# Patient Record
Sex: MALE | Race: WHITE | NOT HISPANIC OR LATINO | Employment: OTHER | URBAN - METROPOLITAN AREA
[De-identification: names, ages, dates, MRNs, and addresses within clinical notes are randomized per-mention and may not be internally consistent; named-entity substitution may affect disease eponyms.]

---

## 2018-11-12 ENCOUNTER — OFFICE VISIT (OUTPATIENT)
Dept: ENDOCRINOLOGY | Facility: MEDICAL CENTER | Age: 78
End: 2018-11-12
Payer: COMMERCIAL

## 2018-11-12 VITALS
HEART RATE: 102 BPM | OXYGEN SATURATION: 100 % | DIASTOLIC BLOOD PRESSURE: 60 MMHG | RESPIRATION RATE: 16 BRPM | SYSTOLIC BLOOD PRESSURE: 110 MMHG | WEIGHT: 177 LBS | HEIGHT: 70 IN | BODY MASS INDEX: 25.34 KG/M2

## 2018-11-12 DIAGNOSIS — E11.65 UNCONTROLLED TYPE 2 DIABETES MELLITUS WITH HYPERGLYCEMIA (HCC): ICD-10-CM

## 2018-11-12 LAB
GLUCOSE BLD-MCNC: 293 MG/DL (ref 70–100)
HBA1C MFR BLD: 6.2 % (ref ?–5.8)
INT CON NEG: NEGATIVE
INT CON POS: POSITIVE

## 2018-11-12 PROCEDURE — 83036 HEMOGLOBIN GLYCOSYLATED A1C: CPT | Performed by: PHYSICIAN ASSISTANT

## 2018-11-12 PROCEDURE — 82962 GLUCOSE BLOOD TEST: CPT | Performed by: PHYSICIAN ASSISTANT

## 2018-11-12 PROCEDURE — 99204 OFFICE O/P NEW MOD 45 MIN: CPT | Performed by: PHYSICIAN ASSISTANT

## 2018-11-12 RX ORDER — NATEGLINIDE 120 MG/1
120 TABLET ORAL
Status: ON HOLD | COMMUNITY
End: 2019-01-04

## 2018-11-12 RX ORDER — CALCITRIOL 0.25 UG/1
0.25 CAPSULE, LIQUID FILLED ORAL DAILY
Status: ON HOLD | COMMUNITY
End: 2018-12-03

## 2018-11-12 NOTE — PATIENT INSTRUCTIONS
Start:  1.  Ozempic 0.25 once a week on Monday  2.  Stay Actos 30mg once a day  Stop Nateslinde    Check when you wake up and when go to bed

## 2018-11-12 NOTE — PROGRESS NOTES
New Patient Consult Note  Referred by: Reema Gallo D.O.    Reason for consult: Diabetes Management Type 2    HPI:  Vince Almanzar is a 78 y.o. old patient who is seeing us today for diabetes care.  This is a pleasant patient with diabetes and I appreciate the opportunity to participate in the care of this patient.  This is a new patient with me today.    Labs of 11/12/18 HbA1c is 6.2, GFR 30    BG Diary:11/12/2018  In the AM:    Has been Diabetic since T@ 30+ years  Has a Glucagon pen at home: no    1. Uncontrolled type 2 diabetes mellitus with hyperglycemia (HCC)  This is a new patient on 11/12/18   He is on:  1.  Actos  2.  Starlix    ROS:   Constitutional: No change in weight , No fatigue, No night sweats.  HEENT: No Headache.  Eyes:  No blurred vision, No visual changes.  Cardiac: No chest pain, No palpitations.  Resp: No shortness of breath, No cough,   Gastro: No nausea or vomiting, No diarrhea.  Neuro: Denies numbness or tinging in bilateral feet or hands, and no loss of sensation.  Endo: No heat or cold intolerance.  : No polyuria, No polydipsia, No chronic UTI's.  Lower extremities: No lower leg edema bilateral.  All other systems were reviewed and were negative.    Past Medical History:  Patient Active Problem List    Diagnosis Date Noted   • Uncontrolled type 2 diabetes mellitus with hyperglycemia (HCC) 11/12/2018   • Mass of pancreas 06/03/2016       Past Surgical History:  Past Surgical History:   Procedure Laterality Date   • GASTROSCOPY-ENDO N/A 6/3/2016    Procedure: GASTROSCOPY-ENDO;  Surgeon: Jasper Donnelly M.D.;  Location: [facility];  Service:    • EGD W/ENDOSCOPIC ULTRASOUND N/A 6/3/2016    Procedure: EGD W/ENDOSCOPIC ULTRASOUND UPPER;  Surgeon: Jasper Donnelly M.D.;  Location: [facility];  Service:    • EGD WITH ASP/BX N/A 6/3/2016    Procedure: EGD WITH ASP/BX - FNA, DUODENAL BIOPSIES, FNA OF PANCREAS;  Surgeon: Jasper Donnelly M.D.;  Location:  "SURGERY [facility];  Service:    • ERCP  5/2016   • CHOLECYSTECTOMY  2006    gallstones removed   • HIP ARTHROPLASTY TOTAL Left 2001    left total hip       Allergies:  Patient has no known allergies.    Social History:  Social History     Social History   • Marital status: Single     Spouse name: N/A   • Number of children: N/A   • Years of education: N/A     Occupational History   • Not on file.     Social History Main Topics   • Smoking status: Never Smoker   • Smokeless tobacco: Never Used   • Alcohol use No   • Drug use: No   • Sexual activity: Not on file     Other Topics Concern   • Not on file     Social History Narrative   • No narrative on file       Family History:  History reviewed. No pertinent family history.    Medications:    Current Outpatient Prescriptions:   •  nateglinide (STARLIX) 120 MG Tab, Take 120 mg by mouth 3 times a day before meals., Disp: , Rfl:   •  calcitRIOL (ROCALTROL) 0.25 MCG Cap, Take 0.25 mcg by mouth every day., Disp: , Rfl:   •  vitamin D (CHOLECALCIFEROL) 1000 UNIT Tab, Take 1,000 Units by mouth every day., Disp: , Rfl:   •  propranolol (INDERAL) 80 MG Tab, Take 40 mg by mouth 2 Times a Day., Disp: , Rfl:   •  simvastatin (ZOCOR) 20 MG Tab, Take 20 mg by mouth every evening., Disp: , Rfl:   •  lisinopril (PRINIVIL) 10 MG Tab, Take 10 mg by mouth every day., Disp: , Rfl:   •  ferrous sulfate (FEOSOL) 300 (60 FE) MG/5ML Syrup, Take 325 mg by mouth 2 times a day., Disp: , Rfl:   •  pioglitazone (ACTOS) 45 MG Tab, Take 30 mg by mouth every day., Disp: , Rfl:       Physical Examination:   Vital signs: /60 (BP Location: Right arm, Patient Position: Sitting, BP Cuff Size: Adult)   Pulse (!) 102   Resp 16   Ht 1.778 m (5' 10\")   Wt 80.3 kg (177 lb)   SpO2 100%   BMI 25.40 kg/m²   General: No distress, cooperative, well dressed and well nourished.   Eyes: No scleral icterus or discharge, No hyposphagma  ENMT: Normal on external inspection of nose, lips, No nasal " drainage   Neck: No abnormal masses on inspection  Resp: Normal effort, Bilateral clear to auscultation, No wheezing, No rales  CVS: Regular rate and rhythm, S1 S2 normal, No murmur. No gallop  Extremities: No edema bilateral extremities  Neuro: Alert and oriented  Skin: No rash, No Ulcers  Psych: Normal mood and affect  Foot exam: normal sensation to monofilament testing, normal pulses, no ulcers.  Normal Vibration quantitative sensation test.    Assessment and Plan:    1. Uncontrolled type 2 diabetes mellitus with hyperglycemia (HCC)    Start:  1.  Ozempic 0.25 once a week on Monday  2.  Stay Actos 30mg once a day  Stop Nateslinde    Check when you wake up and when go to bed    Return in about 3 weeks (around 12/3/2018).     The total time spent seeing this patient today face to face in consultation, and formulating an action plan for this visit was greater than 41 minutes. > Than 50% of this time was spent counseling, discussing problems documented above and below, coordinating care and answering questions by the physician assistant.  We developed a diabetes care plan for this patient today.      Blood glucose log: Check BG in the morning when wake up, before lunch or dinner and before bed.  So three times a day.  Always bring BG diary to the next office visit.     This patient during there office visit was started on new medication Ozempic.  Side effects of new medications were discussed with the patient today in the office. The patient was supplied paperwork on this new medication.    Thank you kindly for allowing me to participate in the diabetes care plan for this patient.    Moisés Duggan PA-C, BC-ADM  Board Certified - Advanced Diabetes Management  11/12/18    CC:   Reema Gallo D.O.

## 2018-11-12 NOTE — LETTER
Request for Medical Records    Patient Name: Vince Almanzar    : 1940      Dear Doctor: Dr. Park    The above named patient receives primary care at the [facility] by Reema Gallo D.O..  The patient informs us that you are his eye care Provider.    Please fax a copy of the most recent eye exam to [phone] or answer the  questions below and fax this sheet back to us at the above number.  Attached is a signed Release of Information.      Date of last eye exam: _____________    Retinal eye exam summary:        Please select the choice(s) that apply.    ____ No diabetic retinopathy    ____    Diabetic retinopathy present      Printed Name and Credentials: __________________________________    Signature of Eye Care Provider: _________________________________    We appreciate your assistance and collaboration in providing efficient patient care!    Kindest Regards,    [facility]  [address]  [place] [address] [phone]

## 2018-11-12 NOTE — LETTER
[facility]  Reema Gallo D.O.  [address] [address]  Fax: [phone]   Authorization for Release/Disclosure of   Protected Health Information   Name: PABLO ALMANZAR : 1940 SSN: [id number]   Address: [address] [place] [id number] Phone:    [phone] (home)    I authorize the entity listed below to release/disclose the PHI below to:   [facility]/Reema Gallo D.O. and Moisés Duggan P.A.-C.   Provider or Entity Name:  Dr. Park   Address   City, [facility] [address], [facility]   Phone:      Fax:     Reason for request: continuity of care   Information to be released:    [  ] LAST COLONOSCOPY,  including any PATH REPORT and follow-up  [  ] LAST FIT/COLOGUARD RESULT [  ] LAST DEXA  [  ] LAST MAMMOGRAM  [  ] LAST PAP  [  ] LAST LABS [  ] RETINA EXAM REPORT  [  ] IMMUNIZATION RECORDS  [  ] Release all info      [  ] Check here and initial the line next to each item to release ALL health information INCLUDING  _____ Care and treatment for drug and / or alcohol abuse  _____ HIV testing, infection status, or AIDS  _____ Genetic Testing    DATES OF SERVICE OR TIME PERIOD TO BE DISCLOSED: _____________  I understand and acknowledge that:  * This Authorization may be revoked at any time by you in writing, except if your health information has already been used or disclosed.  * Your health information that will be used or disclosed as a result of you signing this authorization could be re-disclosed by the recipient. If this occurs, your re-disclosed health information may no longer be protected by State or Federal laws.  * You may refuse to sign this Authorization. Your refusal will not affect your ability to obtain treatment.  * This Authorization becomes effective upon signing and will  on (date) __________.      If no date is indicated, this Authorization will  one (1) year from the signature date.    Name: Pablo Almanzar    Signature:   Date:          11/12/2018       PLEASE FAX REQUESTED RECORDS BACK TO: [phone]

## 2018-11-21 ENCOUNTER — TELEPHONE (OUTPATIENT)
Dept: ENDOCRINOLOGY | Facility: MEDICAL CENTER | Age: 78
End: 2018-11-21

## 2018-11-21 NOTE — TELEPHONE ENCOUNTER
Pt left message stating his BS have been at 390 since starting ozempic. Would like to know if normal or what to do? Please advise.

## 2018-11-26 ENCOUNTER — HOSPITAL ENCOUNTER (OUTPATIENT)
Dept: LAB | Facility: MEDICAL CENTER | Age: 78
End: 2018-11-26
Attending: PHYSICIAN ASSISTANT
Payer: MEDICARE

## 2018-11-26 DIAGNOSIS — E11.65 UNCONTROLLED TYPE 2 DIABETES MELLITUS WITH HYPERGLYCEMIA (HCC): ICD-10-CM

## 2018-11-26 PROCEDURE — 82985 ASSAY OF GLYCATED PROTEIN: CPT

## 2018-11-26 PROCEDURE — 36415 COLL VENOUS BLD VENIPUNCTURE: CPT

## 2018-11-28 LAB — FRUCTOSAMINE SERPL-SCNC: 450 UMOL/L (ref 170–285)

## 2018-12-03 ENCOUNTER — APPOINTMENT (OUTPATIENT)
Dept: RADIOLOGY | Facility: MEDICAL CENTER | Age: 78
DRG: 964 | End: 2018-12-03
Attending: EMERGENCY MEDICINE
Payer: MEDICARE

## 2018-12-03 ENCOUNTER — HOSPITAL ENCOUNTER (INPATIENT)
Facility: MEDICAL CENTER | Age: 78
LOS: 7 days | DRG: 964 | End: 2018-12-10
Attending: EMERGENCY MEDICINE | Admitting: SURGERY
Payer: MEDICARE

## 2018-12-03 ENCOUNTER — APPOINTMENT (OUTPATIENT)
Dept: RADIOLOGY | Facility: MEDICAL CENTER | Age: 78
DRG: 964 | End: 2018-12-03
Attending: SURGERY
Payer: MEDICARE

## 2018-12-03 ENCOUNTER — APPOINTMENT (OUTPATIENT)
Dept: ENDOCRINOLOGY | Facility: MEDICAL CENTER | Age: 78
End: 2018-12-03
Payer: COMMERCIAL

## 2018-12-03 DIAGNOSIS — S14.129A CENTRAL CORD SYNDROME, INITIAL ENCOUNTER (HCC): ICD-10-CM

## 2018-12-03 DIAGNOSIS — S06.9X9A TRAUMATIC BRAIN INJURY WITH BRIEF (LESS THAN 1 HOUR) LOSS OF CONSCIOUSNESS (HCC): ICD-10-CM

## 2018-12-03 PROBLEM — I10 ESSENTIAL HYPERTENSION: Status: ACTIVE | Noted: 2018-12-03

## 2018-12-03 PROBLEM — S01.01XA SCALP LACERATION: Status: ACTIVE | Noted: 2018-12-03

## 2018-12-03 PROBLEM — N18.9 CHRONIC RENAL FAILURE: Status: ACTIVE | Noted: 2018-12-03

## 2018-12-03 PROBLEM — T14.90XA TRAUMA: Status: ACTIVE | Noted: 2018-12-03

## 2018-12-03 PROBLEM — E11.9 TYPE 2 DIABETES MELLITUS (HCC): Status: ACTIVE | Noted: 2018-12-03

## 2018-12-03 PROBLEM — E78.5 HYPERLIPEMIA: Status: ACTIVE | Noted: 2018-12-03

## 2018-12-03 PROBLEM — Z53.09 CONTRAINDICATION TO DEEP VEIN THROMBOSIS (DVT) PROPHYLAXIS: Status: ACTIVE | Noted: 2018-12-03

## 2018-12-03 PROBLEM — S06.2XAA BRAIN CONTUSION (HCC): Status: ACTIVE | Noted: 2018-12-03

## 2018-12-03 LAB
ALBUMIN SERPL BCP-MCNC: 3.9 G/DL (ref 3.2–4.9)
ALBUMIN/GLOB SERPL: 1.9 G/DL
ALP SERPL-CCNC: 77 U/L (ref 30–99)
ALT SERPL-CCNC: 9 U/L (ref 2–50)
ANION GAP SERPL CALC-SCNC: 8 MMOL/L (ref 0–11.9)
APTT PPP: 29.1 SEC (ref 24.7–36)
AST SERPL-CCNC: 14 U/L (ref 12–45)
BASOPHILS # BLD AUTO: 0.3 % (ref 0–1.8)
BASOPHILS # BLD: 0.02 K/UL (ref 0–0.12)
BILIRUB SERPL-MCNC: 1.1 MG/DL (ref 0.1–1.5)
BUN SERPL-MCNC: 44 MG/DL (ref 8–22)
CALCIUM SERPL-MCNC: 8.7 MG/DL (ref 8.5–10.5)
CHLORIDE SERPL-SCNC: 104 MMOL/L (ref 96–112)
CO2 SERPL-SCNC: 20 MMOL/L (ref 20–33)
CREAT SERPL-MCNC: 2.53 MG/DL (ref 0.5–1.4)
EKG IMPRESSION: NORMAL
EOSINOPHIL # BLD AUTO: 0.09 K/UL (ref 0–0.51)
EOSINOPHIL NFR BLD: 1.3 % (ref 0–6.9)
ERYTHROCYTE [DISTWIDTH] IN BLOOD BY AUTOMATED COUNT: 50.4 FL (ref 35.9–50)
GLOBULIN SER CALC-MCNC: 2.1 G/DL (ref 1.9–3.5)
GLUCOSE BLD-MCNC: 272 MG/DL (ref 65–99)
GLUCOSE SERPL-MCNC: 345 MG/DL (ref 65–99)
HCT VFR BLD AUTO: 34 % (ref 42–52)
HGB BLD-MCNC: 11.8 G/DL (ref 14–18)
IMM GRANULOCYTES # BLD AUTO: 0.03 K/UL (ref 0–0.11)
IMM GRANULOCYTES NFR BLD AUTO: 0.4 % (ref 0–0.9)
INR PPP: 1.07 (ref 0.87–1.13)
LYMPHOCYTES # BLD AUTO: 0.49 K/UL (ref 1–4.8)
LYMPHOCYTES NFR BLD: 7.1 % (ref 22–41)
MCH RBC QN AUTO: 30 PG (ref 27–33)
MCHC RBC AUTO-ENTMCNC: 34.7 G/DL (ref 33.7–35.3)
MCV RBC AUTO: 86.5 FL (ref 81.4–97.8)
MONOCYTES # BLD AUTO: 0.57 K/UL (ref 0–0.85)
MONOCYTES NFR BLD AUTO: 8.2 % (ref 0–13.4)
NEUTROPHILS # BLD AUTO: 5.74 K/UL (ref 1.82–7.42)
NEUTROPHILS NFR BLD: 82.7 % (ref 44–72)
NRBC # BLD AUTO: 0 K/UL
NRBC BLD-RTO: 0 /100 WBC
PLATELET # BLD AUTO: 150 K/UL (ref 164–446)
PMV BLD AUTO: 10.3 FL (ref 9–12.9)
POTASSIUM SERPL-SCNC: 5.3 MMOL/L (ref 3.6–5.5)
PROT SERPL-MCNC: 6 G/DL (ref 6–8.2)
PROTHROMBIN TIME: 14 SEC (ref 12–14.6)
RBC # BLD AUTO: 3.93 M/UL (ref 4.7–6.1)
SODIUM SERPL-SCNC: 132 MMOL/L (ref 135–145)
TROPONIN I SERPL-MCNC: <0.01 NG/ML (ref 0–0.04)
WBC # BLD AUTO: 6.9 K/UL (ref 4.8–10.8)

## 2018-12-03 PROCEDURE — 700101 HCHG RX REV CODE 250: Performed by: NURSE PRACTITIONER

## 2018-12-03 PROCEDURE — 36556 INSERT NON-TUNNEL CV CATH: CPT

## 2018-12-03 PROCEDURE — 71045 X-RAY EXAM CHEST 1 VIEW: CPT

## 2018-12-03 PROCEDURE — 96374 THER/PROPH/DIAG INJ IV PUSH: CPT

## 2018-12-03 PROCEDURE — 700102 HCHG RX REV CODE 250 W/ 637 OVERRIDE(OP): Performed by: EMERGENCY MEDICINE

## 2018-12-03 PROCEDURE — 99291 CRITICAL CARE FIRST HOUR: CPT

## 2018-12-03 PROCEDURE — 84484 ASSAY OF TROPONIN QUANT: CPT

## 2018-12-03 PROCEDURE — 700111 HCHG RX REV CODE 636 W/ 250 OVERRIDE (IP): Performed by: NURSE PRACTITIONER

## 2018-12-03 PROCEDURE — A9270 NON-COVERED ITEM OR SERVICE: HCPCS | Performed by: NURSE PRACTITIONER

## 2018-12-03 PROCEDURE — 304217 HCHG IRRIGATION SYSTEM

## 2018-12-03 PROCEDURE — 93005 ELECTROCARDIOGRAM TRACING: CPT | Performed by: EMERGENCY MEDICINE

## 2018-12-03 PROCEDURE — 304999 HCHG REPAIR-SIMPLE/INTERMED LEVEL 1

## 2018-12-03 PROCEDURE — 82962 GLUCOSE BLOOD TEST: CPT

## 2018-12-03 PROCEDURE — 36415 COLL VENOUS BLD VENIPUNCTURE: CPT

## 2018-12-03 PROCEDURE — 700105 HCHG RX REV CODE 258: Performed by: NURSE PRACTITIONER

## 2018-12-03 PROCEDURE — 700102 HCHG RX REV CODE 250 W/ 637 OVERRIDE(OP): Performed by: NURSE PRACTITIONER

## 2018-12-03 PROCEDURE — 85610 PROTHROMBIN TIME: CPT

## 2018-12-03 PROCEDURE — C1751 CATH, INF, PER/CENT/MIDLINE: HCPCS

## 2018-12-03 PROCEDURE — 96375 TX/PRO/DX INJ NEW DRUG ADDON: CPT

## 2018-12-03 PROCEDURE — 72141 MRI NECK SPINE W/O DYE: CPT

## 2018-12-03 PROCEDURE — 96376 TX/PRO/DX INJ SAME DRUG ADON: CPT

## 2018-12-03 PROCEDURE — 70450 CT HEAD/BRAIN W/O DYE: CPT

## 2018-12-03 PROCEDURE — 02HV33Z INSERTION OF INFUSION DEVICE INTO SUPERIOR VENA CAVA, PERCUTANEOUS APPROACH: ICD-10-PCS | Performed by: SURGERY

## 2018-12-03 PROCEDURE — C1751 CATH, INF, PER/CENT/MIDLINE: HCPCS | Performed by: SURGERY

## 2018-12-03 PROCEDURE — 94760 N-INVAS EAR/PLS OXIMETRY 1: CPT

## 2018-12-03 PROCEDURE — 93005 ELECTROCARDIOGRAM TRACING: CPT

## 2018-12-03 PROCEDURE — 700101 HCHG RX REV CODE 250: Performed by: EMERGENCY MEDICINE

## 2018-12-03 PROCEDURE — 96372 THER/PROPH/DIAG INJ SC/IM: CPT

## 2018-12-03 PROCEDURE — 0HQ1XZZ REPAIR FACE SKIN, EXTERNAL APPROACH: ICD-10-PCS | Performed by: EMERGENCY MEDICINE

## 2018-12-03 PROCEDURE — 73060 X-RAY EXAM OF HUMERUS: CPT | Mod: RT

## 2018-12-03 PROCEDURE — 85025 COMPLETE CBC W/AUTO DIFF WBC: CPT

## 2018-12-03 PROCEDURE — 303747 HCHG EXTRA SUTURE

## 2018-12-03 PROCEDURE — 770022 HCHG ROOM/CARE - ICU (200)

## 2018-12-03 PROCEDURE — 85730 THROMBOPLASTIN TIME PARTIAL: CPT

## 2018-12-03 PROCEDURE — 80053 COMPREHEN METABOLIC PANEL: CPT

## 2018-12-03 PROCEDURE — 72125 CT NECK SPINE W/O DYE: CPT

## 2018-12-03 PROCEDURE — 700111 HCHG RX REV CODE 636 W/ 250 OVERRIDE (IP): Performed by: EMERGENCY MEDICINE

## 2018-12-03 RX ORDER — AMOXICILLIN 250 MG
1 CAPSULE ORAL NIGHTLY
Status: DISCONTINUED | OUTPATIENT
Start: 2018-12-03 | End: 2018-12-10 | Stop reason: HOSPADM

## 2018-12-03 RX ORDER — ONDANSETRON 2 MG/ML
4 INJECTION INTRAMUSCULAR; INTRAVENOUS ONCE
Status: COMPLETED | OUTPATIENT
Start: 2018-12-03 | End: 2018-12-03

## 2018-12-03 RX ORDER — BACITRACIN ZINC AND POLYMYXIN B SULFATE 500; 1000 [USP'U]/G; [USP'U]/G
OINTMENT TOPICAL 3 TIMES DAILY
Status: DISCONTINUED | OUTPATIENT
Start: 2018-12-03 | End: 2018-12-04

## 2018-12-03 RX ORDER — ONDANSETRON 2 MG/ML
4 INJECTION INTRAMUSCULAR; INTRAVENOUS EVERY 4 HOURS PRN
Status: DISCONTINUED | OUTPATIENT
Start: 2018-12-03 | End: 2018-12-10 | Stop reason: HOSPADM

## 2018-12-03 RX ORDER — MORPHINE SULFATE 10 MG/ML
1-4 INJECTION, SOLUTION INTRAMUSCULAR; INTRAVENOUS
Status: DISCONTINUED | OUTPATIENT
Start: 2018-12-03 | End: 2018-12-10 | Stop reason: HOSPADM

## 2018-12-03 RX ORDER — LIDOCAINE HYDROCHLORIDE AND EPINEPHRINE 10; 10 MG/ML; UG/ML
10 INJECTION, SOLUTION INFILTRATION; PERINEURAL ONCE
Status: COMPLETED | OUTPATIENT
Start: 2018-12-03 | End: 2018-12-03

## 2018-12-03 RX ORDER — OXYCODONE HYDROCHLORIDE 10 MG/1
10 TABLET ORAL
Status: DISCONTINUED | OUTPATIENT
Start: 2018-12-03 | End: 2018-12-10 | Stop reason: HOSPADM

## 2018-12-03 RX ORDER — OXYCODONE HYDROCHLORIDE 5 MG/1
5 TABLET ORAL
Status: DISCONTINUED | OUTPATIENT
Start: 2018-12-03 | End: 2018-12-10 | Stop reason: HOSPADM

## 2018-12-03 RX ORDER — SIMVASTATIN 20 MG
20 TABLET ORAL NIGHTLY
Status: DISCONTINUED | OUTPATIENT
Start: 2018-12-03 | End: 2018-12-10 | Stop reason: HOSPADM

## 2018-12-03 RX ORDER — PROPRANOLOL HYDROCHLORIDE 40 MG/1
40 TABLET ORAL 3 TIMES DAILY
Status: ON HOLD | COMMUNITY
End: 2019-01-04

## 2018-12-03 RX ORDER — ENEMA 19; 7 G/133ML; G/133ML
1 ENEMA RECTAL
Status: DISCONTINUED | OUTPATIENT
Start: 2018-12-03 | End: 2018-12-10 | Stop reason: HOSPADM

## 2018-12-03 RX ORDER — HYDROMORPHONE HYDROCHLORIDE 1 MG/ML
0.5 INJECTION, SOLUTION INTRAMUSCULAR; INTRAVENOUS; SUBCUTANEOUS ONCE
Status: COMPLETED | OUTPATIENT
Start: 2018-12-03 | End: 2018-12-03

## 2018-12-03 RX ORDER — FERROUS SULFATE 325(65) MG
325 TABLET ORAL DAILY
Status: ON HOLD | COMMUNITY
End: 2019-01-04

## 2018-12-03 RX ORDER — BISACODYL 10 MG
10 SUPPOSITORY, RECTAL RECTAL
Status: DISCONTINUED | OUTPATIENT
Start: 2018-12-03 | End: 2018-12-10 | Stop reason: HOSPADM

## 2018-12-03 RX ORDER — SODIUM CHLORIDE 9 MG/ML
INJECTION, SOLUTION INTRAVENOUS CONTINUOUS
Status: DISCONTINUED | OUTPATIENT
Start: 2018-12-03 | End: 2018-12-04

## 2018-12-03 RX ORDER — HYDROMORPHONE HYDROCHLORIDE 1 MG/ML
0.5 INJECTION, SOLUTION INTRAMUSCULAR; INTRAVENOUS; SUBCUTANEOUS
Status: COMPLETED | OUTPATIENT
Start: 2018-12-03 | End: 2018-12-03

## 2018-12-03 RX ORDER — DEXTROSE MONOHYDRATE 25 G/50ML
25 INJECTION, SOLUTION INTRAVENOUS
Status: DISCONTINUED | OUTPATIENT
Start: 2018-12-03 | End: 2018-12-04

## 2018-12-03 RX ORDER — POLYETHYLENE GLYCOL 3350 17 G/17G
1 POWDER, FOR SOLUTION ORAL 2 TIMES DAILY
Status: DISCONTINUED | OUTPATIENT
Start: 2018-12-03 | End: 2018-12-10 | Stop reason: HOSPADM

## 2018-12-03 RX ORDER — MIDODRINE HYDROCHLORIDE 5 MG/1
5 TABLET ORAL EVERY 8 HOURS
Status: DISCONTINUED | OUTPATIENT
Start: 2018-12-03 | End: 2018-12-05

## 2018-12-03 RX ORDER — SODIUM CHLORIDE 9 MG/ML
1000 INJECTION, SOLUTION INTRAVENOUS ONCE
Status: COMPLETED | OUTPATIENT
Start: 2018-12-03 | End: 2018-12-03

## 2018-12-03 RX ORDER — AMOXICILLIN 250 MG
1 CAPSULE ORAL
Status: DISCONTINUED | OUTPATIENT
Start: 2018-12-03 | End: 2018-12-10 | Stop reason: HOSPADM

## 2018-12-03 RX ORDER — DOCUSATE SODIUM 100 MG/1
100 CAPSULE, LIQUID FILLED ORAL 2 TIMES DAILY
Status: DISCONTINUED | OUTPATIENT
Start: 2018-12-05 | End: 2018-12-05

## 2018-12-03 RX ADMIN — HYDROMORPHONE HYDROCHLORIDE 0.5 MG: 1 INJECTION, SOLUTION INTRAMUSCULAR; INTRAVENOUS; SUBCUTANEOUS at 16:30

## 2018-12-03 RX ADMIN — MIDODRINE HYDROCHLORIDE 5 MG: 5 TABLET ORAL at 23:08

## 2018-12-03 RX ADMIN — HYDROMORPHONE HYDROCHLORIDE 0.5 MG: 1 INJECTION, SOLUTION INTRAMUSCULAR; INTRAVENOUS; SUBCUTANEOUS at 17:41

## 2018-12-03 RX ADMIN — Medication 1 EACH: at 21:48

## 2018-12-03 RX ADMIN — ONDANSETRON 4 MG: 2 INJECTION INTRAMUSCULAR; INTRAVENOUS at 12:57

## 2018-12-03 RX ADMIN — SODIUM CHLORIDE: 9 INJECTION, SOLUTION INTRAVENOUS at 22:25

## 2018-12-03 RX ADMIN — MORPHINE SULFATE 1 MG: 10 INJECTION INTRAVENOUS at 23:15

## 2018-12-03 RX ADMIN — OXYCODONE HYDROCHLORIDE 10 MG: 10 TABLET ORAL at 21:50

## 2018-12-03 RX ADMIN — SODIUM CHLORIDE 1000 ML: 9 INJECTION, SOLUTION INTRAVENOUS at 20:16

## 2018-12-03 RX ADMIN — HYDROMORPHONE HYDROCHLORIDE 0.5 MG: 1 INJECTION, SOLUTION INTRAMUSCULAR; INTRAVENOUS; SUBCUTANEOUS at 12:57

## 2018-12-03 RX ADMIN — SIMVASTATIN 20 MG: 40 TABLET, FILM COATED ORAL at 23:09

## 2018-12-03 RX ADMIN — LIDOCAINE HYDROCHLORIDE AND EPINEPHRINE 3 ML: 10; 10 INJECTION, SOLUTION INFILTRATION; PERINEURAL at 19:00

## 2018-12-03 RX ADMIN — HYDROMORPHONE HYDROCHLORIDE 0.5 MG: 1 INJECTION, SOLUTION INTRAMUSCULAR; INTRAVENOUS; SUBCUTANEOUS at 14:13

## 2018-12-03 RX ADMIN — HYDROMORPHONE HYDROCHLORIDE 0.5 MG: 1 INJECTION, SOLUTION INTRAMUSCULAR; INTRAVENOUS; SUBCUTANEOUS at 18:58

## 2018-12-03 ASSESSMENT — PAIN SCALES - GENERAL
PAINLEVEL_OUTOF10: 6
PAINLEVEL_OUTOF10: 6
PAINLEVEL_OUTOF10: 8
PAINLEVEL_OUTOF10: 7

## 2018-12-03 ASSESSMENT — LIFESTYLE VARIABLES
EVER_SMOKED: NEVER
DO YOU DRINK ALCOHOL: NO

## 2018-12-03 ASSESSMENT — COPD QUESTIONNAIRES
COPD SCREENING SCORE: 0
DO YOU EVER COUGH UP ANY MUCUS OR PHLEGM?: NO/ONLY WITH OCCASIONAL COLDS OR INFECTIONS
HAVE YOU SMOKED AT LEAST 100 CIGARETTES IN YOUR ENTIRE LIFE: NO/DON'T KNOW
DURING THE PAST 4 WEEKS HOW MUCH DID YOU FEEL SHORT OF BREATH: NONE/LITTLE OF THE TIME

## 2018-12-03 NOTE — ED TRIAGE NOTES
"Chief Complaint   Patient presents with   • T-5000 GLF   • Neck Pain   • High Blood Sugar     fsbs 467   • Weakness     bilateral arm weakness     /79   Pulse 84   Temp 36.7 °C (98.1 °F) (Temporal)   Resp 16   Ht 1.778 m (5' 10\")   Wt 80.3 kg (177 lb)   SpO2 97%   BMI 25.40 kg/m²     BIB EMS for GLF. Pt was walking into urgent care to get checked out for high blood sugar (FSBS 467 per EMS), tripped on curb and fell. Lac to R forehead. Pt c/o neck pain, was placed in c collar by EMS, upper back pain, bilateral arm weakness. Connected to monitor, EKG done. No blood thinners. Pt with weak  bilaterally, able to lift legs off gurney.    Chart up for eval.    "

## 2018-12-03 NOTE — ED PROVIDER NOTES
"ED Provider Note    CHIEF COMPLAINT  Chief Complaint   Patient presents with   • T-5000 GLF   • Neck Pain   • High Blood Sugar     fsbs 467   • Weakness     bilateral arm weakness       HPI  Vince Almanzar is a 78 y.o. male who presents for evaluation of ground-level fall with head and neck injury, right arm pain.  Patient also reports bilateral arm weakness the patient reports that his blood sugar was running significantly high.  He is on his way to the urgent care with his sister.  He apparently tripped over an unmarked curb and struck his head and right arm.  He reports head and neck pain.  No reported injury to the lower extremities chest or abdomen.  He does not taken to anticoagulants, blood thinners or aspirin.  He did reportedly have a brief episode of loss of consciousness.  He reports some mild weakness to his bilateral arms no numbness or tingling.    REVIEW OF SYSTEMS  See HPI for further details.  Positive for headache loss of consciousness right arm pain all other systems are negative.     PAST MEDICAL HISTORY  Past Medical History:   Diagnosis Date   • Jaundice 5/8/2016   • Diabetes (HCC)    • Hemorrhagic disorder (HCC)     \"bleeds easily\"   • High cholesterol    • Hypertension    • Renal disorder     insufficiency \"due to metformin\"       FAMILY HISTORY  Noncontributory    SOCIAL HISTORY  Social History     Social History   • Marital status: Single     Spouse name: N/A   • Number of children: N/A   • Years of education: N/A     Social History Main Topics   • Smoking status: Never Smoker   • Smokeless tobacco: Never Used   • Alcohol use No   • Drug use: No   • Sexual activity: Not on file     Other Topics Concern   • Not on file     Social History Narrative   • No narrative on file     No drugs or alcohol  SURGICAL HISTORY  Past Surgical History:   Procedure Laterality Date   • GASTROSCOPY-ENDO N/A 6/3/2016    Procedure: GASTROSCOPY-ENDO;  Surgeon: Jasper Donnelly M.D.;  Location: SURGERY [facility]" [facility];  Service:    • EGD W/ENDOSCOPIC ULTRASOUND N/A 6/3/2016    Procedure: EGD W/ENDOSCOPIC ULTRASOUND UPPER;  Surgeon: Jasper Donnelly M.D.;  Location: SURGERY [facility];  Service:    • EGD WITH ASP/BX N/A 6/3/2016    Procedure: EGD WITH ASP/BX - FNA, DUODENAL BIOPSIES, FNA OF PANCREAS;  Surgeon: Jasper Donnelly M.D.;  Location: SURGERY [facility];  Service:    • ERCP  5/2016   • CHOLECYSTECTOMY  2006    gallstones removed   • HIP ARTHROPLASTY TOTAL Left 2001    left total hip       CURRENT MEDICATIONS  Home Medications     Reviewed by Meg Kilgore R.N. (Registered Nurse) on 12/03/18 at 1228  Med List Status: <None>   Medication Last Dose Status   calcitRIOL (ROCALTROL) 0.25 MCG Cap Taking Active   ferrous sulfate (FEOSOL) 300 (60 FE) MG/5ML Syrup Taking Active   lisinopril (PRINIVIL) 10 MG Tab Taking Active   nateglinide (STARLIX) 120 MG Tab Taking Active   pioglitazone (ACTOS) 45 MG Tab Taking Differently Active   propranolol (INDERAL) 80 MG Tab Taking Differently Active   simvastatin (ZOCOR) 20 MG Tab Taking Active   vitamin D (CHOLECALCIFEROL) 1000 UNIT Tab Taking Active                ALLERGIES  No Known Allergies    PHYSICAL EXAM  VITAL SIGNS: /79   Pulse 82   Temp 36.7 °C (98.1 °F) (Temporal)   Resp 16   Ht 1.778 m (5' 10\")   Wt 80.3 kg (177 lb)   SpO2 96%   BMI 25.40 kg/m²  Room air O2: 97    Constitutional: Well developed, Well nourished, No acute distress, Non-toxic appearance.   HENT: 2 x 3 cm hematoma noted over the right forehead with an associated 3.5 cm deep laceration no hemotympanum negative lugo sign, Bilateral external ears normal, Oropharynx moist, No oral exudates, Nose normal.   Eyes: PERRLA, EOMI, Conjunctiva normal, No discharge.   Neck: Rigid cervical collar is in place bony tenderness at C3 C2 without step-off  Cardiovascular: Normal heart rate, Normal rhythm, No murmurs, No rubs, No gallops.   Thorax & Lungs: Normal breath sounds, No respiratory " distress, No wheezing, No chest tenderness.   Abdomen: Bowel sounds normal, Soft, No tenderness, No masses, No pulsatile masses.   Skin: Warm, Dry, No erythema, No rash.   Back: No tenderness, No CVA tenderness.   Extremities: Intact distal pulses, No edema pain with range of motion in the midshaft of the right humerus no shortening no rotation   neurologic: Alert & oriented x 3 patient has diminished strength in the bilateral arms right worse than left approximately 2 out of 5 on the right 3 out of 5 on the left  Psychiatric: Anxious    Results for orders placed or performed during the hospital encounter of 12/03/18   CBC WITH DIFFERENTIAL   Result Value Ref Range    WBC 6.9 4.8 - 10.8 K/uL    RBC 3.93 (L) 4.70 - 6.10 M/uL    Hemoglobin 11.8 (L) 14.0 - 18.0 g/dL    Hematocrit 34.0 (L) 42.0 - 52.0 %    MCV 86.5 81.4 - 97.8 fL    MCH 30.0 27.0 - 33.0 pg    MCHC 34.7 33.7 - 35.3 g/dL    RDW 50.4 (H) 35.9 - 50.0 fL    Platelet Count 150 (L) 164 - 446 K/uL    MPV 10.3 9.0 - 12.9 fL    Neutrophils-Polys 82.70 (H) 44.00 - 72.00 %    Lymphocytes 7.10 (L) 22.00 - 41.00 %    Monocytes 8.20 0.00 - 13.40 %    Eosinophils 1.30 0.00 - 6.90 %    Basophils 0.30 0.00 - 1.80 %    Immature Granulocytes 0.40 0.00 - 0.90 %    Nucleated RBC 0.00 /100 WBC    Neutrophils (Absolute) 5.74 1.82 - 7.42 K/uL    Lymphs (Absolute) 0.49 (L) 1.00 - 4.80 K/uL    Monos (Absolute) 0.57 0.00 - 0.85 K/uL    Eos (Absolute) 0.09 0.00 - 0.51 K/uL    Baso (Absolute) 0.02 0.00 - 0.12 K/uL    Immature Granulocytes (abs) 0.03 0.00 - 0.11 K/uL    NRBC (Absolute) 0.00 K/uL   COMP METABOLIC PANEL   Result Value Ref Range    Sodium 132 (L) 135 - 145 mmol/L    Potassium 5.3 3.6 - 5.5 mmol/L    Chloride 104 96 - 112 mmol/L    Co2 20 20 - 33 mmol/L    Anion Gap 8.0 0.0 - 11.9    Glucose 345 (H) 65 - 99 mg/dL    Bun 44 (H) 8 - 22 mg/dL    Creatinine 2.53 (H) 0.50 - 1.40 mg/dL    Calcium 8.7 8.5 - 10.5 mg/dL    AST(SGOT) 14 12 - 45 U/L    ALT(SGPT) 9 2 - 50 U/L     Alkaline Phosphatase 77 30 - 99 U/L    Total Bilirubin 1.1 0.1 - 1.5 mg/dL    Albumin 3.9 3.2 - 4.9 g/dL    Total Protein 6.0 6.0 - 8.2 g/dL    Globulin 2.1 1.9 - 3.5 g/dL    A-G Ratio 1.9 g/dL   TROPONIN   Result Value Ref Range    Troponin I <0.01 0.00 - 0.04 ng/mL   PROTHROMBIN TIME   Result Value Ref Range    PT 14.0 12.0 - 14.6 sec    INR 1.07 0.87 - 1.13   APTT   Result Value Ref Range    APTT 29.1 24.7 - 36.0 sec   ESTIMATED GFR   Result Value Ref Range    GFR If African American 30 (A) >60 mL/min/1.73 m 2    GFR If Non African American 25 (A) >60 mL/min/1.73 m 2   EKG   Result Value Ref Range    Report       [facility] Emergency Dept.    Test Date:  2018  Pt Name:    PABLO STEVENS               Department: ER  MRN:        [id number]                      Room:       BL 15  Gender:     Male                         Technician: [id number]  :        1940                   Requested By:ER TRIAGE PROTOCOL  Order #:    [id number]                    Reading MD:    Measurements  Intervals                                Axis  Rate:       84                           P:          55  AR:         228                          QRS:        70  QRSD:       126                          T:          36  QT:         388  QTc:        459    Interpretive Statements  SINUS RHYTHM  SINUS PAUSE/ARREST WITH ATRIAL ESCAPE  FIRST DEGREE AV BLOCK  RIGHT BUNDLE BRANCH BLOCK  Compared to ECG 2016 14:33:11  Sinus pause or arrest now present  First degree AV block now present        RADIOLOGY/PROCEDURES  CT-CSPINE WITHOUT PLUS RECONS   Final Result      1.  Possible increased density within the cervical and proximal thoracic cervical canal which may be artifactual or related to hemorrhage within the CSF space. Consider MRI for further evaluation if indicated.   2.  Degenerative changes of the cervical spine as described above.      Attempted to convey these findings to Dr. SANDIE MCDERMOTT were initiated on  12/3/2018 2:53 PM.      CT-HEAD W/O   Final Result      1.  Soft tissue scalp injury over the right frontal region.   2.  Moderate area of encephalomalacic change at the inferior and lateral aspect of the left temporal lobe most consistent with chronic sequela of posttraumatic brain contusion.   3.  Moderate cerebral atrophy.   4.  Mild microvascular ischemic change in the deep white matter.   5.  No acute intracranial hemorrhage.      DX-HUMERUS 2+ RIGHT   Final Result      No RIGHT humerus fracture.      DX-CHEST-PORTABLE (1 VIEW)   Final Result      1.  Hypoinflation with minimal LEFT lung base atelectasis.   2.  No other evidence for acute intrathoracic injury.      MR-CERVICAL SPINE-W/O    (Results Pending)       Physician procedure: 3.5 cm facial laceration.  The wound was anesthetized with a total of 4 cc 1% lidocaine with epinephrine.  It was gently irrigated with sterile saline.  Sterile prep and drape.  A total of 7, six-point 0 nylon interrupted sutures were placed.  Minimal blood loss no complications  COURSE & MEDICAL DECISION MAKING  Pertinent Labs & Imaging studies reviewed. (See chart for details)  Patient is critically ill.  He presented here with ground-level fall with head neck injury with bilateral arm weakness.  Subsequent CT scan of the head was negative for acute hemorrhage and cervical spine CT was negative for acute fracture however he had ongoing persistent bilateral right greater than left arm weakness.  Emergent consultation was obtained with Dr. Simon with neurosurgery and a stat MRI was performed suggesting a cervical spine cord contusion consistent with classic central cord syndrome.  The patient will be admitted to trauma ICU, his blood pressure may need to be driven up as hyperdynamic therapy is indicated.    FINAL IMPRESSION  1.  Closed head injury  2.  Facial laceration  3.  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 This excludes any procedures. This includes time spent directly at caring for the patient, making critical medical decisions, involving consultants and speaking with the family.         Electronically signed by: Dimas Multani, 12/3/2018 12:43 PM

## 2018-12-04 ENCOUNTER — APPOINTMENT (OUTPATIENT)
Dept: RADIOLOGY | Facility: MEDICAL CENTER | Age: 78
DRG: 964 | End: 2018-12-04
Attending: NURSE PRACTITIONER
Payer: MEDICARE

## 2018-12-04 PROBLEM — R13.10 DYSPHAGIA: Status: ACTIVE | Noted: 2018-12-04

## 2018-12-04 LAB
ALBUMIN SERPL BCP-MCNC: 3.7 G/DL (ref 3.2–4.9)
ALBUMIN/GLOB SERPL: 1.9 G/DL
ALP SERPL-CCNC: 74 U/L (ref 30–99)
ALT SERPL-CCNC: 10 U/L (ref 2–50)
ANION GAP SERPL CALC-SCNC: 11 MMOL/L (ref 0–11.9)
ANION GAP SERPL CALC-SCNC: 8 MMOL/L (ref 0–11.9)
AST SERPL-CCNC: 12 U/L (ref 12–45)
BASOPHILS # BLD AUTO: 0.2 % (ref 0–1.8)
BASOPHILS # BLD AUTO: 0.3 % (ref 0–1.8)
BASOPHILS # BLD: 0.02 K/UL (ref 0–0.12)
BASOPHILS # BLD: 0.03 K/UL (ref 0–0.12)
BILIRUB SERPL-MCNC: 1.1 MG/DL (ref 0.1–1.5)
BUN SERPL-MCNC: 37 MG/DL (ref 8–22)
BUN SERPL-MCNC: 39 MG/DL (ref 8–22)
CALCIUM SERPL-MCNC: 8.4 MG/DL (ref 8.5–10.5)
CALCIUM SERPL-MCNC: 8.5 MG/DL (ref 8.5–10.5)
CHLORIDE SERPL-SCNC: 107 MMOL/L (ref 96–112)
CHLORIDE SERPL-SCNC: 107 MMOL/L (ref 96–112)
CO2 SERPL-SCNC: 18 MMOL/L (ref 20–33)
CO2 SERPL-SCNC: 19 MMOL/L (ref 20–33)
CREAT SERPL-MCNC: 2.19 MG/DL (ref 0.5–1.4)
CREAT SERPL-MCNC: 2.2 MG/DL (ref 0.5–1.4)
EOSINOPHIL # BLD AUTO: 0.01 K/UL (ref 0–0.51)
EOSINOPHIL # BLD AUTO: 0.04 K/UL (ref 0–0.51)
EOSINOPHIL NFR BLD: 0.1 % (ref 0–6.9)
EOSINOPHIL NFR BLD: 0.4 % (ref 0–6.9)
ERYTHROCYTE [DISTWIDTH] IN BLOOD BY AUTOMATED COUNT: 50.5 FL (ref 35.9–50)
ERYTHROCYTE [DISTWIDTH] IN BLOOD BY AUTOMATED COUNT: 52.3 FL (ref 35.9–50)
EST. AVERAGE GLUCOSE BLD GHB EST-MCNC: 157 MG/DL
GLOBULIN SER CALC-MCNC: 1.9 G/DL (ref 1.9–3.5)
GLUCOSE BLD-MCNC: 158 MG/DL (ref 65–99)
GLUCOSE BLD-MCNC: 190 MG/DL (ref 65–99)
GLUCOSE BLD-MCNC: 207 MG/DL (ref 65–99)
GLUCOSE BLD-MCNC: 237 MG/DL (ref 65–99)
GLUCOSE SERPL-MCNC: 167 MG/DL (ref 65–99)
GLUCOSE SERPL-MCNC: 190 MG/DL (ref 65–99)
HBA1C MFR BLD: 7.1 % (ref 0–5.6)
HCT VFR BLD AUTO: 33.2 % (ref 42–52)
HCT VFR BLD AUTO: 34 % (ref 42–52)
HGB BLD-MCNC: 11.2 G/DL (ref 14–18)
HGB BLD-MCNC: 11.3 G/DL (ref 14–18)
IMM GRANULOCYTES # BLD AUTO: 0.04 K/UL (ref 0–0.11)
IMM GRANULOCYTES # BLD AUTO: 0.11 K/UL (ref 0–0.11)
IMM GRANULOCYTES NFR BLD AUTO: 0.5 % (ref 0–0.9)
IMM GRANULOCYTES NFR BLD AUTO: 1.1 % (ref 0–0.9)
LYMPHOCYTES # BLD AUTO: 0.48 K/UL (ref 1–4.8)
LYMPHOCYTES # BLD AUTO: 0.69 K/UL (ref 1–4.8)
LYMPHOCYTES NFR BLD: 5.8 % (ref 22–41)
LYMPHOCYTES NFR BLD: 7.1 % (ref 22–41)
MAGNESIUM SERPL-MCNC: 1.8 MG/DL (ref 1.5–2.5)
MCH RBC QN AUTO: 29.3 PG (ref 27–33)
MCH RBC QN AUTO: 29.7 PG (ref 27–33)
MCHC RBC AUTO-ENTMCNC: 33.2 G/DL (ref 33.7–35.3)
MCHC RBC AUTO-ENTMCNC: 33.7 G/DL (ref 33.7–35.3)
MCV RBC AUTO: 88.1 FL (ref 81.4–97.8)
MCV RBC AUTO: 88.1 FL (ref 81.4–97.8)
MONOCYTES # BLD AUTO: 0.95 K/UL (ref 0–0.85)
MONOCYTES # BLD AUTO: 1.32 K/UL (ref 0–0.85)
MONOCYTES NFR BLD AUTO: 11.5 % (ref 0–13.4)
MONOCYTES NFR BLD AUTO: 13.7 % (ref 0–13.4)
NEUTROPHILS # BLD AUTO: 6.79 K/UL (ref 1.82–7.42)
NEUTROPHILS # BLD AUTO: 7.47 K/UL (ref 1.82–7.42)
NEUTROPHILS NFR BLD: 77.4 % (ref 44–72)
NEUTROPHILS NFR BLD: 81.9 % (ref 44–72)
NRBC # BLD AUTO: 0 K/UL
NRBC # BLD AUTO: 0 K/UL
NRBC BLD-RTO: 0 /100 WBC
NRBC BLD-RTO: 0 /100 WBC
PHOSPHATE SERPL-MCNC: 3.6 MG/DL (ref 2.5–4.5)
PLATELET # BLD AUTO: 161 K/UL (ref 164–446)
PLATELET # BLD AUTO: 182 K/UL (ref 164–446)
PMV BLD AUTO: 9.8 FL (ref 9–12.9)
PMV BLD AUTO: 9.8 FL (ref 9–12.9)
POTASSIUM SERPL-SCNC: 4.2 MMOL/L (ref 3.6–5.5)
POTASSIUM SERPL-SCNC: 4.4 MMOL/L (ref 3.6–5.5)
PROT SERPL-MCNC: 5.6 G/DL (ref 6–8.2)
RBC # BLD AUTO: 3.77 M/UL (ref 4.7–6.1)
RBC # BLD AUTO: 3.86 M/UL (ref 4.7–6.1)
SODIUM SERPL-SCNC: 134 MMOL/L (ref 135–145)
SODIUM SERPL-SCNC: 136 MMOL/L (ref 135–145)
WBC # BLD AUTO: 8.3 K/UL (ref 4.8–10.8)
WBC # BLD AUTO: 9.7 K/UL (ref 4.8–10.8)

## 2018-12-04 PROCEDURE — G8997 SWALLOW GOAL STATUS: HCPCS | Mod: CI

## 2018-12-04 PROCEDURE — 97535 SELF CARE MNGMENT TRAINING: CPT

## 2018-12-04 PROCEDURE — 700101 HCHG RX REV CODE 250: Performed by: NURSE PRACTITIONER

## 2018-12-04 PROCEDURE — 85025 COMPLETE CBC W/AUTO DIFF WBC: CPT

## 2018-12-04 PROCEDURE — 92526 ORAL FUNCTION THERAPY: CPT

## 2018-12-04 PROCEDURE — 700102 HCHG RX REV CODE 250 W/ 637 OVERRIDE(OP): Performed by: NURSE PRACTITIONER

## 2018-12-04 PROCEDURE — 92610 EVALUATE SWALLOWING FUNCTION: CPT

## 2018-12-04 PROCEDURE — 83036 HEMOGLOBIN GLYCOSYLATED A1C: CPT

## 2018-12-04 PROCEDURE — 83735 ASSAY OF MAGNESIUM: CPT

## 2018-12-04 PROCEDURE — G8996 SWALLOW CURRENT STATUS: HCPCS | Mod: CK

## 2018-12-04 PROCEDURE — 700111 HCHG RX REV CODE 636 W/ 250 OVERRIDE (IP): Performed by: SURGERY

## 2018-12-04 PROCEDURE — 700105 HCHG RX REV CODE 258: Performed by: NURSE PRACTITIONER

## 2018-12-04 PROCEDURE — 80053 COMPREHEN METABOLIC PANEL: CPT

## 2018-12-04 PROCEDURE — 71045 X-RAY EXAM CHEST 1 VIEW: CPT

## 2018-12-04 PROCEDURE — 84100 ASSAY OF PHOSPHORUS: CPT

## 2018-12-04 PROCEDURE — 700102 HCHG RX REV CODE 250 W/ 637 OVERRIDE(OP): Performed by: SURGERY

## 2018-12-04 PROCEDURE — A9270 NON-COVERED ITEM OR SERVICE: HCPCS | Performed by: SURGERY

## 2018-12-04 PROCEDURE — 82962 GLUCOSE BLOOD TEST: CPT

## 2018-12-04 PROCEDURE — A9270 NON-COVERED ITEM OR SERVICE: HCPCS | Performed by: NURSE PRACTITIONER

## 2018-12-04 PROCEDURE — 700111 HCHG RX REV CODE 636 W/ 250 OVERRIDE (IP): Performed by: NURSE PRACTITIONER

## 2018-12-04 PROCEDURE — 700105 HCHG RX REV CODE 258: Performed by: SURGERY

## 2018-12-04 PROCEDURE — 770022 HCHG ROOM/CARE - ICU (200)

## 2018-12-04 PROCEDURE — 99291 CRITICAL CARE FIRST HOUR: CPT | Performed by: SURGERY

## 2018-12-04 PROCEDURE — 80048 BASIC METABOLIC PNL TOTAL CA: CPT

## 2018-12-04 RX ORDER — GABAPENTIN 100 MG/1
100 CAPSULE ORAL EVERY MORNING
Status: DISCONTINUED | OUTPATIENT
Start: 2018-12-04 | End: 2018-12-10 | Stop reason: HOSPADM

## 2018-12-04 RX ORDER — DEXTROSE MONOHYDRATE 25 G/50ML
25 INJECTION, SOLUTION INTRAVENOUS
Status: DISCONTINUED | OUTPATIENT
Start: 2018-12-04 | End: 2018-12-04

## 2018-12-04 RX ORDER — HEPARIN SODIUM 5000 [USP'U]/ML
5000 INJECTION, SOLUTION INTRAVENOUS; SUBCUTANEOUS EVERY 8 HOURS
Status: DISCONTINUED | OUTPATIENT
Start: 2018-12-04 | End: 2018-12-10 | Stop reason: HOSPADM

## 2018-12-04 RX ORDER — ACETAMINOPHEN 325 MG/1
650 TABLET ORAL EVERY 6 HOURS
Status: DISCONTINUED | OUTPATIENT
Start: 2018-12-04 | End: 2018-12-10 | Stop reason: HOSPADM

## 2018-12-04 RX ORDER — SODIUM CHLORIDE 9 MG/ML
250 INJECTION, SOLUTION INTRAVENOUS ONCE
Status: COMPLETED | OUTPATIENT
Start: 2018-12-04 | End: 2018-12-04

## 2018-12-04 RX ORDER — SODIUM CHLORIDE 9 MG/ML
INJECTION, SOLUTION INTRAVENOUS CONTINUOUS
Status: DISCONTINUED | OUTPATIENT
Start: 2018-12-04 | End: 2018-12-10 | Stop reason: HOSPADM

## 2018-12-04 RX ORDER — DEXTROSE AND SODIUM CHLORIDE 5; .45 G/100ML; G/100ML
INJECTION, SOLUTION INTRAVENOUS CONTINUOUS
Status: DISCONTINUED | OUTPATIENT
Start: 2018-12-04 | End: 2018-12-04

## 2018-12-04 RX ORDER — DEXTROSE MONOHYDRATE 25 G/50ML
25 INJECTION, SOLUTION INTRAVENOUS
Status: DISCONTINUED | OUTPATIENT
Start: 2018-12-04 | End: 2018-12-10 | Stop reason: HOSPADM

## 2018-12-04 RX ADMIN — OXYCODONE HYDROCHLORIDE 10 MG: 10 TABLET ORAL at 22:02

## 2018-12-04 RX ADMIN — SODIUM CHLORIDE 250 ML: 9 INJECTION, SOLUTION INTRAVENOUS at 02:28

## 2018-12-04 RX ADMIN — ACETAMINOPHEN 650 MG: 325 TABLET, FILM COATED ORAL at 11:16

## 2018-12-04 RX ADMIN — DEXTROSE AND SODIUM CHLORIDE: 5; 450 INJECTION, SOLUTION INTRAVENOUS at 08:26

## 2018-12-04 RX ADMIN — INSULIN HUMAN 4 UNITS: 100 INJECTION, SOLUTION PARENTERAL at 12:18

## 2018-12-04 RX ADMIN — STANDARDIZED SENNA CONCENTRATE AND DOCUSATE SODIUM 1 TABLET: 8.6; 5 TABLET, FILM COATED ORAL at 21:13

## 2018-12-04 RX ADMIN — OXYCODONE HYDROCHLORIDE 10 MG: 10 TABLET ORAL at 13:03

## 2018-12-04 RX ADMIN — SODIUM CHLORIDE: 9 INJECTION, SOLUTION INTRAVENOUS at 01:40

## 2018-12-04 RX ADMIN — POLYETHYLENE GLYCOL 3350 1 PACKET: 17 POWDER, FOR SOLUTION ORAL at 18:38

## 2018-12-04 RX ADMIN — GABAPENTIN 100 MG: 100 CAPSULE ORAL at 11:16

## 2018-12-04 RX ADMIN — OXYCODONE HYDROCHLORIDE 10 MG: 10 TABLET ORAL at 18:38

## 2018-12-04 RX ADMIN — MORPHINE SULFATE 2 MG: 10 INJECTION INTRAVENOUS at 00:35

## 2018-12-04 RX ADMIN — MORPHINE SULFATE 4 MG: 10 INJECTION INTRAVENOUS at 04:25

## 2018-12-04 RX ADMIN — HEPARIN SODIUM 5000 UNITS: 5000 INJECTION, SOLUTION INTRAVENOUS; SUBCUTANEOUS at 14:28

## 2018-12-04 RX ADMIN — SIMVASTATIN 20 MG: 40 TABLET, FILM COATED ORAL at 21:12

## 2018-12-04 RX ADMIN — SODIUM CHLORIDE: 9 INJECTION, SOLUTION INTRAVENOUS at 11:17

## 2018-12-04 RX ADMIN — MIDODRINE HYDROCHLORIDE 5 MG: 5 TABLET ORAL at 19:54

## 2018-12-04 RX ADMIN — MORPHINE SULFATE 4 MG: 10 INJECTION INTRAVENOUS at 06:00

## 2018-12-04 RX ADMIN — MIDODRINE HYDROCHLORIDE 5 MG: 5 TABLET ORAL at 11:16

## 2018-12-04 RX ADMIN — MORPHINE SULFATE 2 MG: 10 INJECTION INTRAVENOUS at 02:20

## 2018-12-04 RX ADMIN — ACETAMINOPHEN 650 MG: 325 TABLET, FILM COATED ORAL at 18:38

## 2018-12-04 RX ADMIN — NOREPINEPHRINE BITARTRATE 5 MCG/MIN: 1 INJECTION INTRAVENOUS at 03:16

## 2018-12-04 RX ADMIN — Medication 1 EACH: at 05:59

## 2018-12-04 RX ADMIN — INSULIN HUMAN 3 UNITS: 100 INJECTION, SOLUTION PARENTERAL at 21:10

## 2018-12-04 RX ADMIN — MORPHINE SULFATE 4 MG: 10 INJECTION INTRAVENOUS at 08:26

## 2018-12-04 RX ADMIN — INSULIN HUMAN 4 UNITS: 100 INJECTION, SOLUTION PARENTERAL at 18:45

## 2018-12-04 RX ADMIN — HEPARIN SODIUM 5000 UNITS: 5000 INJECTION, SOLUTION INTRAVENOUS; SUBCUTANEOUS at 21:10

## 2018-12-04 ASSESSMENT — PAIN SCALES - GENERAL
PAINLEVEL_OUTOF10: 6
PAINLEVEL_OUTOF10: 3
PAINLEVEL_OUTOF10: 2
PAINLEVEL_OUTOF10: 7
PAINLEVEL_OUTOF10: 6
PAINLEVEL_OUTOF10: 6
PAINLEVEL_OUTOF10: 2
PAINLEVEL_OUTOF10: 2
PAINLEVEL_OUTOF10: 0
PAINLEVEL_OUTOF10: 6
PAINLEVEL_OUTOF10: 5
PAINLEVEL_OUTOF10: 6
PAINLEVEL_OUTOF10: 0
PAINLEVEL_OUTOF10: 6
PAINLEVEL_OUTOF10: 0

## 2018-12-04 ASSESSMENT — LIFESTYLE VARIABLES: EVER_SMOKED: NEVER

## 2018-12-04 ASSESSMENT — ACTIVITIES OF DAILY LIVING (ADL): TOILETING: INDEPENDENT

## 2018-12-04 NOTE — CONSULTS
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 Dimas Multani    History of Present Illness:  Vince Almanzar is a 78 y.o. male who presents for evaluation of ground-level fall with head and neck injury, right arm pain.  Patient also reports bilateral arm weakness the patient reports that his blood sugar was running significantly high.  He was on his way to the endocrinologist with his sister.  He apparently tripped over an unmarked curb and struck his head and right arm.  He reports head and neck pain.  No reported injury to the lower extremities chest or abdomen.  He does not taken to anticoagulants, blood thinners or aspirin.  He did reportedly have a brief episode of loss of consciousness.  He reports some weakness in both arms and some burning in the right arm.    Review of Systems:  Constitutional: Denies fevers, chills, night sweats, or weight changes  Eyes: Denies changes in vision, or eye pain  Ears/Nose/Throat/Mouth: Denies nasal congestion or sore throat   Cardiovascular: Denies chest pain or palpitations   Respiratory: Denies shortness of breath, or cough  Gastrointestinal/Hepatic: Denies abdominal pain, nausea, vomiting, diarrhea, or constipation  Genitourinary: Denies dysuria, frequency, or incontinence  Musculoskeletal/Rheum: Denies joint pain or swelling   Skin: Denies rash  Neurological: Denies headache, confusion, memory loss  Psychiatric: Denies mood disorder   Endocrine: Denies hx of thyroid dysfunction  Heme/Oncology/Lymph Nodes: Denies enlarged lymph nodes, bruising, or known bleeding disorder  Allergic/Immunologic: Denies hx of autoimmune disorder    Medications:  Current Facility-Administered Medications   Medication Dose Route Frequency Provider Last Rate Last Dose   • lidocaine-epinephrine 1 %-1:312512  "injection 10 mL  10 mL Injection Once Dimas Multani M.D.       • insulin regular (HUMULIN R) injection 4 Units  4 Units Subcutaneous Once Dimas Multani M.D.         Current Outpatient Prescriptions   Medication Sig Dispense Refill   • nateglinide (STARLIX) 120 MG Tab Take 120 mg by mouth 3 times a day before meals.     • calcitRIOL (ROCALTROL) 0.25 MCG Cap Take 0.25 mcg by mouth every day.     • vitamin D (CHOLECALCIFEROL) 1000 UNIT Tab Take 1,000 Units by mouth every day.     • propranolol (INDERAL) 80 MG Tab Take 40 mg by mouth 2 Times a Day.     • simvastatin (ZOCOR) 20 MG Tab Take 20 mg by mouth every evening.     • lisinopril (PRINIVIL) 10 MG Tab Take 10 mg by mouth every day.     • pioglitazone (ACTOS) 45 MG Tab Take 30 mg by mouth every day.     • ferrous sulfate (FEOSOL) 300 (60 FE) MG/5ML Syrup Take 325 mg by mouth 2 times a day.         Allergies:  No Known Allergies    Past Medical History:  Past Medical History:   Diagnosis Date   • Diabetes (HCC)    • Hemorrhagic disorder (HCC)     \"bleeds easily\"   • High cholesterol    • Hypertension    • Jaundice 5/8/2016   • Renal disorder     insufficiency \"due to metformin\"       Past Surgical History:  Past Surgical History:   Procedure Laterality Date   • GASTROSCOPY-ENDO N/A 6/3/2016    Procedure: GASTROSCOPY-ENDO;  Surgeon: Jasper Donnelly M.D.;  Location: [facility];  Service:    • EGD W/ENDOSCOPIC ULTRASOUND N/A 6/3/2016    Procedure: EGD W/ENDOSCOPIC ULTRASOUND UPPER;  Surgeon: Jasper Donnelly M.D.;  Location: [facility];  Service:    • EGD WITH ASP/BX N/A 6/3/2016    Procedure: EGD WITH ASP/BX - FNA, DUODENAL BIOPSIES, FNA OF PANCREAS;  Surgeon: Jasper Donnelly M.D.;  Location: [facility];  Service:    • ERCP  5/2016   • CHOLECYSTECTOMY  2006    gallstones removed   • HIP ARTHROPLASTY TOTAL Left 2001    left total hip       Family History:  No family history on file.    Social History:  Social History "     Social History   • Marital status: Single     Spouse name: N/A   • Number of children: N/A   • Years of education: N/A     Occupational History   • Not on file.     Social History Main Topics   • Smoking status: Never Smoker   • Smokeless tobacco: Never Used   • Alcohol use No   • Drug use: No   • Sexual activity: Not on file     Other Topics Concern   • Not on file     Social History Narrative   • No narrative on file       Physical Examination:  Patient has abrasions over the right side of his face  Pupils 3 mm and reactive  Extraocular eye movements are intact  Face is symmetric  He is weak in the right arm compared to the left; right deltoid 0/5, right biceps 1/5, right extensor radialis 1/5, right triceps 1/5, right hand  4/5.  Left deltoid 4/5, left biceps 4/5, left extensor radialis 5/5, left triceps 5/5, left hand  5/5.  He has 5/5 strength in his lower extremities bilaterally.  He complains of burning dysesthesia in the right arm, and less so in the left arm; he has normal sensation in the legs bilaterally  He is in a cervical collar    Labs:  Recent Labs      12/03/18   1300   WBC  6.9   RBC  3.93*   HEMOGLOBIN  11.8*   HEMATOCRIT  34.0*   MCV  86.5   MCH  30.0   MCHC  34.7   RDW  50.4*   PLATELETCT  150*   MPV  10.3     Recent Labs      12/03/18   1300   SODIUM  132*   POTASSIUM  5.3   CHLORIDE  104   CO2  20   GLUCOSE  345*   BUN  44*   CREATININE  2.53*   CALCIUM  8.7     Recent Labs      12/03/18   1300   APTT  29.1   INR  1.07           Imaging:  CT scan of the head shows some encephalomalacia in the left temporal lobe, but no evidence of intracranial hemorrhage.  MRI of the cervical spine shows cervical spondylosis with spinal cord compression due to degenerative changes.  There does appear to be intrinsic cord signal change at the C3-C4 level.    Assessment and Plan:  Vince Almanzar is a 78-year-old gentleman with cervical spondylosis who fell and has now sustained a spinal cord  contusion and a central cord syndrome with the weakness primarily involving the right arm, more proximally than distally.  He does have some weakness in the left arm, but not nearly as severe as the right.  Typically these injuries do improve over several weeks.  I think the patient should be monitored in the intensive care unit to monitor for neurologic deterioration and also to allow us to provide hyperperfusion therapy to the spinal cord.  The goal be to keep his mean arterial pressure between 85-90.  There is no role for steroids.  Surgical decompression could be considered if he fails to improve.  The plan of care was discussed with the patient's sister and his niece who are at the bedside.        Robert Simon M.D.

## 2018-12-04 NOTE — PROGRESS NOTES
Dysphagia Screening Tool completed after pt noted coughing on water. Pt failed, made NPO status per RN judgement. SLP evaluation already ordered.

## 2018-12-04 NOTE — PROGRESS NOTES
Neurosurgery Progress Note    Subjective:  No neck pain; burning in arms is better today    Exam:  Alert  In [place] collar  Burning dysesthesia in shoulders bilaterally, right < left  Right arm: deltoid 1/5, biceps 2/5, extensor carpi 3/5, triceps 3/5, hand intrinsics 4/5  Left arm: deltoid 2/5, biceps 3/5, extensor carpi 4/5, triceps 4/5, hand intrinsics 4+/5  Legs 5/5 bilaterally  Normal sensation in legs bilaterally    BP  Min: 149/79  Max: 149/79  Pulse  Av.2  Min: 77  Max: 94  Resp  Av.5  Min: 7  Max: 18  Temp  Av.3 °C (97.4 °F)  Min: 36 °C (96.8 °F)  Max: 36.7 °C (98.1 °F)  SpO2  Av.9 %  Min: 96 %  Max: 99 %    No Data Recorded    Recent Labs      18   1300  18   0130  18   0500   WBC  6.9  8.3  9.7   RBC  3.93*  3.77*  3.86*   HEMOGLOBIN  11.8*  11.2*  11.3*   HEMATOCRIT  34.0*  33.2*  34.0*   MCV  86.5  88.1  88.1   MCH  30.0  29.7  29.3   MCHC  34.7  33.7  33.2*   RDW  50.4*  50.5*  52.3*   PLATELETCT  150*  161*  182   MPV  10.3  9.8  9.8     Recent Labs      18   1300  18   0130  18   0500   SODIUM  132*  136  134*   POTASSIUM  5.3  4.4  4.2   CHLORIDE  104  107  107   CO2  20  18*  19*   GLUCOSE  345*  190*  167*   BUN  44*  39*  37*   CREATININE  2.53*  2.20*  2.19*   CALCIUM  8.7  8.5  8.4*     Recent Labs      18   1300   APTT  29.1   INR  1.07           Intake/Output       18 0700 - 18 0659 18 0700 - 18 0659      5755-5209 3425-7574 Total 7015-5784 2538-0839 Total       Intake    P.O.  --  240 240  --  -- --    P.O. -- 240 240 -- -- --    I.V.  --  832.4 832.4  --  -- --    Norepinephrine Volume -- 17.4 17.4 -- -- --    Volume (mL) (NS infusion) -- 815 815 -- -- --    IV Piggyback  --  1250 1250  --  -- --    Volume (mL) (NS (BOLUS) infusion 1,000 mL) -- 1000 1000 -- -- --    Volume (mL) (NS (BOLUS) infusion 250 mL) -- 250 250 -- -- --    Total Intake -- 2322.4 2322.4 -- -- --       Output    Urine  --  668 475  --   -- --    Number of Times Voided -- 2 x 2 x -- -- --    Urine Void (mL) -- 475 475 -- -- --    Stool  --  -- --  --  -- --    Number of Times Stooled -- 0 x 0 x -- -- --    Total Output -- 475 475 -- -- --       Net I/O     -- 1847.4 1847.4 -- -- --            Intake/Output Summary (Last 24 hours) at 12/04/18 0839  Last data filed at 12/04/18 0600   Gross per 24 hour   Intake          2322.38 ml   Output              475 ml   Net          1847.38 ml            • insulin regular  2-9 Units Q6HRS    And   • glucose 4 g  16 g Q15 MIN PRN    And   • dextrose 50%  25 mL Q15 MIN PRN   • D5 1/2 NS   Continuous   • NORepinephrine (LEVOPHED) infusion  0-30 mcg/min Continuous   • Phenylephrine infusion  0-300 mcg/min Continuous   • midodrine  5 mg Q8HR   • simvastatin  20 mg Nightly   • Respiratory Care per Protocol   Continuous RT   • Pharmacy Consult Request  1 Each PRN   • [START ON 12/5/2018] docusate sodium  100 mg BID   • senna-docusate  1 Tab Nightly   • senna-docusate  1 Tab Q24HRS PRN   • polyethylene glycol/lytes  1 Packet BID   • magnesium hydroxide  30 mL DAILY   • bisacodyl  10 mg Q24HRS PRN   • fleet  1 Each Once PRN   • oxyCODONE immediate-release  5 mg Q3HRS PRN   • oxyCODONE immediate release  10 mg Q3HRS PRN   • morphine injection  1-4 mg Q HOUR PRN   • ondansetron  4 mg Q4HRS PRN   • bacitracin-polymyxin b   TID       Assessment and Plan:  Hospital day #2  Doing well clinically; slightly less dysesthesia today  Keep MAP 85-90 mmHg for 5 days  Prophylactic anticoagulation: yes         Start date/time: OK to start from NSx POV  OK to mobilize in [place] collar

## 2018-12-04 NOTE — ASSESSMENT & PLAN NOTE
Moderate area of encephalomalacic change at the inferior and lateral aspect of the left temporal lobe most consistent with chronic sequela of posttraumatic brain contusion.  (+)Loss of consciousness  SLP for cognitive evaluation.

## 2018-12-04 NOTE — PROGRESS NOTES
"Trauma Progress Note     Briefly, this is a 78 y.o. male with central cord syndrome and hyperglycemia.    /79   Pulse 81   Temp 36.2 °C (97.1 °F) (Temporal)   Resp 13   Ht 1.778 m (5' 10\")   Wt 75.8 kg (167 lb 1.7 oz)   SpO2 97%   BMI 23.98 kg/m²       Intake/Output Summary (Last 24 hours) at 12/04/18 0221  Last data filed at 12/04/18 0000   Gross per 24 hour   Intake          1238.75 ml   Output              300 ml   Net           938.75 ml          Lab Results   Component Value Date/Time    WBC 8.3 12/04/2018 01:30 AM    RBC 3.77 (L) 12/04/2018 01:30 AM    HEMOGLOBIN 11.2 (L) 12/04/2018 01:30 AM    HEMATOCRIT 33.2 (L) 12/04/2018 01:30 AM    MCV 88.1 12/04/2018 01:30 AM    MCH 29.7 12/04/2018 01:30 AM    MCHC 33.7 12/04/2018 01:30 AM    MPV 9.8 12/04/2018 01:30 AM    NEUTSPOLYS 81.90 (H) 12/04/2018 01:30 AM    LYMPHOCYTES 5.80 (L) 12/04/2018 01:30 AM    MONOCYTES 11.50 12/04/2018 01:30 AM    EOSINOPHILS 0.10 12/04/2018 01:30 AM    BASOPHILS 0.20 12/04/2018 01:30 AM        Lab Results   Component Value Date/Time    SODIUM 132 (L) 12/03/2018 01:00 PM    POTASSIUM 5.3 12/03/2018 01:00 PM    CHLORIDE 104 12/03/2018 01:00 PM    CO2 20 12/03/2018 01:00 PM    GLUCOSE 345 (H) 12/03/2018 01:00 PM    BUN 44 (H) 12/03/2018 01:00 PM    CREATININE 2.53 (H) 12/03/2018 01:00 PM      Assessment/Plan  No changes in neurologic exam  Neurosurgery recommendations reviewed  Glucose 345  No need for pressors, MAP maintained between 85-90    - Increase high dose sliding scale insulin  - Increase MIVF to 150cc/hr, 250cc bolus now  - Advance to regular diabetic diet  - Labs at 0500    Plan discussed and directed by Dr. Bonilla  "

## 2018-12-04 NOTE — PROGRESS NOTES
2 RN skin assessment completed. Laceration and abrasion noted on R forehead, sutured and dressing in place. Laceration noted on R elbow. Brusing, abrasions, and tear noted on R hand. Redness and abrasions noted on bilateral knees and legs. Redness noted on bilateral inner thighs. Sacrum intact. Skin intact under [place] collar. Pictures taken, mepilexes in place, and appropriate skin interventions in place.

## 2018-12-04 NOTE — PROGRESS NOTES
"Trauma Progress Note     Briefly, this is a 78 y.o. male with a central cord syndrome and hyperglycemia.    /79   Pulse 94   Temp 36.6 °C (97.8 °F) (Temporal)   Resp 15   Ht 1.778 m (5' 10\")   Wt 75.8 kg (167 lb 1.7 oz)   SpO2 96%   BMI 23.98 kg/m²       Intake/Output Summary (Last 24 hours) at 12/04/18 0648  Last data filed at 12/04/18 0600   Gross per 24 hour   Intake          2322.38 ml   Output              475 ml   Net          1847.38 ml       Lab Results   Component Value Date/Time    WBC 9.7 12/04/2018 05:00 AM    RBC 3.86 (L) 12/04/2018 05:00 AM    HEMOGLOBIN 11.3 (L) 12/04/2018 05:00 AM    HEMATOCRIT 34.0 (L) 12/04/2018 05:00 AM    MCV 88.1 12/04/2018 05:00 AM    MCH 29.3 12/04/2018 05:00 AM    MCHC 33.2 (L) 12/04/2018 05:00 AM    MPV 9.8 12/04/2018 05:00 AM    NEUTSPOLYS 77.40 (H) 12/04/2018 05:00 AM    LYMPHOCYTES 7.10 (L) 12/04/2018 05:00 AM    MONOCYTES 13.70 (H) 12/04/2018 05:00 AM    EOSINOPHILS 0.40 12/04/2018 05:00 AM    BASOPHILS 0.30 12/04/2018 05:00 AM        Lab Results   Component Value Date/Time    SODIUM 134 (L) 12/04/2018 05:00 AM    POTASSIUM 4.2 12/04/2018 05:00 AM    CHLORIDE 107 12/04/2018 05:00 AM    CO2 19 (L) 12/04/2018 05:00 AM    GLUCOSE 167 (H) 12/04/2018 05:00 AM    BUN 37 (H) 12/04/2018 05:00 AM    CREATININE 2.19 (H) 12/04/2018 05:00 AM      Assessment and Plan  Low dose pressors for cord perfusion, MAP 85-90  Neurologic exam unchanged  Glucose improved  Some aspiration noted on bedside swallow    - NPO, swallow eval  - Medium sliding scale insulin  - D51/2NS at 125ml/hr    Plan discussed and directed by Dr. Bonilla      "

## 2018-12-04 NOTE — CARE PLAN
Problem: Skin Integrity  Goal: Risk for impaired skin integrity will decrease  Dale skin risk assessment and complete skin assessment completed. Pt is turned and repositioned q2h and PRN. Appropriate wound protocols in place.    Problem: Spinal Cord Injury  Goal: Optimal care of the spinal cord patient  Neuro checks completed every hour. Maintain MAP > 85 per MAR.

## 2018-12-04 NOTE — ASSESSMENT & PLAN NOTE
Failed bedside swallow.   SLP eval complete  12/6 Recommendations: 1) upgrade diet to dysphagia II with nectars, 1:1 feeding, 2) OK for ice chips  12/9 re-eval 12/10

## 2018-12-04 NOTE — ASSESSMENT & PLAN NOTE
Chronic condition treated with Starlix and Actos.  Difficult to control, admission serum glucose 467  HemA1C 6.2  High sliding scale insulin with Lantus

## 2018-12-04 NOTE — H&P
TRAUMA HISTORY AND PHYSICAL    CHIEF COMPLAINT: GLF with Spinal cord injury    HISTORY OF PRESENT ILLNESS: The patient is a  78 year old male who fell on a curb striking his head and right arm.  Brief LOC.  He was on his way to urgent care for high blood sugar.   The patient was triaged as a trauma   activation in accordance with established pre hospital protocols.  Upon arrival,   primary and secondary surveys with required adjuncts were performed.  CT showed encephalomalacia.  No obvious cervical fracture.  Upper extremity weakness prompted MRI which demonstrates cord injury.  Now admitted to TICU .  Patient has advanced directive.     Scalp laceration closed in ED.        PAST MEDICAL HISTORY:  has a past medical history of Diabetes (HCC); Hemorrhagic disorder (HCC); High cholesterol; Hypertension; Jaundice (5/8/2016); and Renal disorder.     PAST SURGICAL HISTORY:  has a past surgical history that includes hip arthroplasty total (Left, 2001); cholecystectomy (2006); ercp (5/2016); gastroscopy-endo (N/A, 6/3/2016); egd w/endoscopic ultrasound (N/A, 6/3/2016); and egd with asp/bx (N/A, 6/3/2016).     ALLERGIES: No Known Allergies     CURRENT MEDICATIONS:   Home Medications     Reviewed by Meg Kilgore R.N. (Registered Nurse) on 12/03/18 at 1228  Med List Status: <None>   Medication Last Dose Status   calcitRIOL (ROCALTROL) 0.25 MCG Cap Taking Active   ferrous sulfate (FEOSOL) 300 (60 FE) MG/5ML Syrup Taking Active   lisinopril (PRINIVIL) 10 MG Tab Taking Active   nateglinide (STARLIX) 120 MG Tab Taking Active   pioglitazone (ACTOS) 45 MG Tab Taking Differently Active   propranolol (INDERAL) 80 MG Tab Taking Differently Active   simvastatin (ZOCOR) 20 MG Tab Taking Active   vitamin D (CHOLECALCIFEROL) 1000 UNIT Tab Taking Active                FAMILY HISTORY: No family history on file.     SOCIAL HISTORY:   Social History     Social History Main Topics   • Smoking status: Never Smoker   • Smokeless tobacco:  "Never Used   • Alcohol use No   • Drug use: No   • Sexual activity: Not on file       REVIEW OF SYSTEMS: Comprehensive review of systems is negative with the   exception of the aforementioned HPI, PMH, and PSH elements in accordance with CMS guidelines.     PHYSICAL EXAMINATION:     GENERAL: No distress  VITAL SIGNS: Blood pressure 149/79, pulse 82, temperature 36.7 °C (98.1 °F), temperature source Temporal, resp. rate 16, height 1.778 m (5' 10\"), weight 80.3 kg (177 lb), SpO2 96 %.  HEAD AND NECK: Pupils:  Equal and Reactive.  Forehead laceration  Dentition: Occlusion is adequate  Facial:  Symmetrical.    NECK: No JVD. Trachea midline. Cervical tenderness is  absent   CHEST: Breath sounds are equal. No sternal tenderness.  No  lateral rib tenderness.  CARDIOVASCULAR: Regular rhythm  ABDOMEN: Soft, no tenderness guarding or peritoneal findings.   PELVIS: Stable.  EXTREMITIES: Examination of the upper and lower extremities : No gross long bone or joint deformity.    BACK: No midline stepoffs.  No Tenderness  NEUROLOGIC: Anders Coma Score is 15.   Left  4/5 Right 3/5 .  Proximal upper and brachioradialis weakness.  Lowers ok,    LABORATORY VALUES:   Recent Labs      12/03/18   1300   WBC  6.9   RBC  3.93*   HEMOGLOBIN  11.8*   HEMATOCRIT  34.0*   MCV  86.5   MCH  30.0   MCHC  34.7   RDW  50.4*   PLATELETCT  150*   MPV  10.3     Recent Labs      12/03/18   1300   SODIUM  132*   POTASSIUM  5.3   CHLORIDE  104   CO2  20   GLUCOSE  345*   BUN  44*   CREATININE  2.53*   CALCIUM  8.7     Recent Labs      12/03/18   1300   ASTSGOT  14   ALTSGPT  9   TBILIRUBIN  1.1   ALKPHOSPHAT  77   GLOBULIN  2.1   INR  1.07     Recent Labs      12/03/18   1300   APTT  29.1   INR  1.07        IMAGING:   CT-CSPINE WITHOUT PLUS RECONS   Final Result      1.  Possible increased density within the cervical and proximal thoracic cervical canal which may be artifactual or related to hemorrhage within the CSF space. Consider MRI for further " evaluation if indicated.   2.  Degenerative changes of the cervical spine as described above.      Attempted to convey these findings to Dr. SANDIE MCDERMOTT were initiated on 12/3/2018 2:53 PM.      CT-HEAD W/O   Final Result      1.  Soft tissue scalp injury over the right frontal region.   2.  Moderate area of encephalomalacic change at the inferior and lateral aspect of the left temporal lobe most consistent with chronic sequela of posttraumatic brain contusion.   3.  Moderate cerebral atrophy.   4.  Mild microvascular ischemic change in the deep white matter.   5.  No acute intracranial hemorrhage.      DX-HUMERUS 2+ RIGHT   Final Result      No RIGHT humerus fracture.      DX-CHEST-PORTABLE (1 VIEW)   Final Result      1.  Hypoinflation with minimal LEFT lung base atelectasis.   2.  No other evidence for acute intrathoracic injury.      MR-CERVICAL SPINE-W/O    (Results Pending)       IMPRESSION AND PLAN:  Central cord syndrome (HCC)  Possible increased density within the cervical and proximal thoracic cervical canal which may be artifactual or related to hemorrhage within the CSF space.  MRI pending  Spinal cord perfusion with goal MAP > 85 for 7 days  Definitive plan pending.  Robert Simon MD. Neurosurgery.    Chronic renal failure  Avoid nephrotoxic agents and monitor laboratory studies    Type 2 diabetes mellitus (HCC)  Difficult to control       Critical care 40 minutes managing multisystem injury : head and spinal cord injury.  Fluid bolus.  Vasopressors prn for MAP<85.      ____________________________________   Drew Bonilla MD, FACS      DD: 12/3/2018   DT: 7:17 PM

## 2018-12-04 NOTE — ED NOTES
Assumed care of patient. Pt assessed, AAOx4 . Patient's concerns addressed.  Fall precautions in place.  Pt repositioned and comfortable.  Call light within reach. Will continue to monitor. Family at bedside, Traction called for ASPEN collar

## 2018-12-04 NOTE — ASSESSMENT & PLAN NOTE
Chronic condition treated with Lisinopril and Inderal.  Spinal cord perfusion protocol completed (12/8).

## 2018-12-04 NOTE — THERAPY
"Speech Language Therapy Clinical Swallow Evaluation completed.    Functional Status: Pt was AAOx4, vocal quality and cough were strong.  Pt reports he has been having trouble swallowing large bites of food and liquids intermittently over the past year, but compensates by taking small bites and sips.  PO trials consisted of ice chips, nectars, purees, soft solids and thin liquids.  Pt had immediate coughing with thin liquids, and coughing with soft solids 50% of the time, which is concerning for possible aspiration.  He consumed ice chips, puree and nectars without any overt s/sx of aspiration, taking small bites and sips.  Pt remains at risk for aspiration due to acute onset of central cord syndrome, however appears to be at the level to start a nectar thick full liquid diet with 1:1 feeding.  SLP is following closely.      Recommendations - Diet: Nectar Thick Full Liquid                          Strategies: Strict 1:1 feeding  and Head of Bed at 90 Degrees                          Medication Administration: Crush all Medications in Puree    Plan of Care: Will benefit from Speech Therapy 5 times per week    Post-Acute Therapy: Recommend inpatient transitional care services for continued speech therapy services. Please consider physiatry (PM&R) consult.     See \"Rehab Therapy-Acute\" Patient Summary Report for complete documentation.  Thank you for the consult.  "

## 2018-12-04 NOTE — THERAPY
"Speech Language Therapy dysphagia treatment completed.     Functional Status:  Pt was seen for dysphagia tx with a NTFL meal tray.  Pt was unable to work with therapy this morning following swallow eval due to an episode of bradycardia while at rest, and nausea.  Pt willingly participated.  He ate purees (bowl of cream of wheat) as well as 4 straw sips of nectars without any overt s/sx of aspiration.  Vocal quality remained clear throughout session.  Pt noted to be repetitive and forgetful regarding results of swallow evaluation this morning.  He requires ongoing education regarding SLP recommendations and current swallowing status.  Recommend to continue a nectar thick full liquid diet with strict 1:1 feeding.  Discontinue PO with any s/sx of aspiration.  Pt is OK for single ice chips with RN.  SLP continues to follow closely.    Recommendations:  1) Continue a nectar thick full liquid diet with strict 1:1 feeding, 2) Discontinue PO with any s/sx of aspiration, 3) Pt is OK for single ice chips with RN.       Plan of Care: Will benefit from Speech Therapy 5 times per week    Post-Acute Therapy: Recommend inpatient transitional care services for continued speech therapy services. Please consider physiatry (PM&R) consult.     See \"Rehab Therapy-Acute\" Patient Summary Report for complete documentation.     "

## 2018-12-04 NOTE — ASSESSMENT & PLAN NOTE
Right scalp laceration  Will be approximated in the ER  Local wound care.   Suture removal in 7 days.

## 2018-12-04 NOTE — ASSESSMENT & PLAN NOTE
Ground level fall. Brief loss of consciousness.  T-5000 Activation.  Drew Bonilla MD. Trauma Surgery.

## 2018-12-04 NOTE — ED NOTES
Xray at bedside for line placement verification. Bonilla at bedside.   Floor called for pt ready.

## 2018-12-04 NOTE — PROGRESS NOTES
Bedside report received from Kimberlyn CINTRON. Pt transported from [place] [id number] to [id number] by ACLS RN and CCT with no incident. Pt arrived to room at 2125. /67, HR 88, RR 18, SpO2 97 on RA.

## 2018-12-04 NOTE — ED NOTES
Med rec updated and complete.  Allergies reviewed.  Pt denies antibiotic use in lats 30 days at home.

## 2018-12-04 NOTE — PROGRESS NOTES
Trauma / Surgical Daily Progress Note    Date of Service  12/4/2018    Interval Events  New admit  Some improvement in function of BLE  Dr. Simon's documentation reviewed  Presently on a norepinephrine infusion to maintain MAP goals per Cord Perfusion Protocol  Occasional ectopy, electrolytes optimized    Review of Systems     Vital Signs for last 24 hours  Temp:  [36 °C (96.8 °F)-36.6 °C (97.8 °F)] 36.1 °C (97 °F)  Pulse:  [] 91  Resp:  [7-22] 12    Respiratory Data     Respiration: 12, Pulse Oximetry: 95 %, O2 Daily Delivery Respiratory : Room Air with O2 Available     Work Of Breathing / Effort: Mild  RUL Breath Sounds: Diminished, RML Breath Sounds: Diminished, RLL Breath Sounds: Diminished, CIRA Breath Sounds: Diminished, LLL Breath Sounds: Diminished    Physical Exam  Physical Exam   Constitutional: He appears well-developed and well-nourished.  Non-toxic appearance. He does not have a sickly appearance. He does not appear ill. Cervical collar and nasal cannula in place.   HENT:   3cm laceration over the right forehead, approximated with suture.   Eyes: Pupils are equal, round, and reactive to light. Conjunctivae and EOM are normal.   Cardiovascular: Normal rate, regular rhythm, intact distal pulses and normal pulses.    Pulmonary/Chest: Effort normal and breath sounds normal. No accessory muscle usage. No respiratory distress. He has no decreased breath sounds.   Abdominal: Soft. Normal appearance. There is no tenderness.   Neurological: He is alert. No cranial nerve deficit. GCS eye subscore is 4. GCS verbal subscore is 5. GCS motor subscore is 6.   RUE with decreased active motor function at wrist, elbow and shoulder  LUE with decreased active motor function at wrist, elbow and shoulder, but less so than RUE  Able to make a fist bilaterally   Nursing note and vitals reviewed.      Laboratory  Recent Results (from the past 24 hour(s))   ACCU-CHEK GLUCOSE    Collection Time: 12/03/18  7:50 PM   Result  Value Ref Range    Glucose - Accu-Ck 272 (H) 65 - 99 mg/dL   CBC with Differential: Tomorrow AM    Collection Time: 12/04/18  1:30 AM   Result Value Ref Range    WBC 8.3 4.8 - 10.8 K/uL    RBC 3.77 (L) 4.70 - 6.10 M/uL    Hemoglobin 11.2 (L) 14.0 - 18.0 g/dL    Hematocrit 33.2 (L) 42.0 - 52.0 %    MCV 88.1 81.4 - 97.8 fL    MCH 29.7 27.0 - 33.0 pg    MCHC 33.7 33.7 - 35.3 g/dL    RDW 50.5 (H) 35.9 - 50.0 fL    Platelet Count 161 (L) 164 - 446 K/uL    MPV 9.8 9.0 - 12.9 fL    Neutrophils-Polys 81.90 (H) 44.00 - 72.00 %    Lymphocytes 5.80 (L) 22.00 - 41.00 %    Monocytes 11.50 0.00 - 13.40 %    Eosinophils 0.10 0.00 - 6.90 %    Basophils 0.20 0.00 - 1.80 %    Immature Granulocytes 0.50 0.00 - 0.90 %    Nucleated RBC 0.00 /100 WBC    Neutrophils (Absolute) 6.79 1.82 - 7.42 K/uL    Lymphs (Absolute) 0.48 (L) 1.00 - 4.80 K/uL    Monos (Absolute) 0.95 (H) 0.00 - 0.85 K/uL    Eos (Absolute) 0.01 0.00 - 0.51 K/uL    Baso (Absolute) 0.02 0.00 - 0.12 K/uL    Immature Granulocytes (abs) 0.04 0.00 - 0.11 K/uL    NRBC (Absolute) 0.00 K/uL   Comp Metabolic Panel (CMP): Tomorrow AM    Collection Time: 12/04/18  1:30 AM   Result Value Ref Range    Sodium 136 135 - 145 mmol/L    Potassium 4.4 3.6 - 5.5 mmol/L    Chloride 107 96 - 112 mmol/L    Co2 18 (L) 20 - 33 mmol/L    Anion Gap 11.0 0.0 - 11.9    Glucose 190 (H) 65 - 99 mg/dL    Bun 39 (H) 8 - 22 mg/dL    Creatinine 2.20 (H) 0.50 - 1.40 mg/dL    Calcium 8.5 8.5 - 10.5 mg/dL    AST(SGOT) 12 12 - 45 U/L    ALT(SGPT) 10 2 - 50 U/L    Alkaline Phosphatase 74 30 - 99 U/L    Albumin 3.7 3.2 - 4.9 g/dL    Total Protein 5.6 (L) 6.0 - 8.2 g/dL    Globulin 1.9 1.9 - 3.5 g/dL    A-G Ratio 1.9 g/dL    Total Bilirubin 1.1 0.1 - 1.5 mg/dL   ESTIMATED GFR    Collection Time: 12/04/18  1:30 AM   Result Value Ref Range    GFR If African American 35 (A) >60 mL/min/1.73 m 2    GFR If Non  29 (A) >60 mL/min/1.73 m 2   CBC WITH DIFFERENTIAL    Collection Time: 12/04/18  5:00 AM    Result Value Ref Range    WBC 9.7 4.8 - 10.8 K/uL    RBC 3.86 (L) 4.70 - 6.10 M/uL    Hemoglobin 11.3 (L) 14.0 - 18.0 g/dL    Hematocrit 34.0 (L) 42.0 - 52.0 %    MCV 88.1 81.4 - 97.8 fL    MCH 29.3 27.0 - 33.0 pg    MCHC 33.2 (L) 33.7 - 35.3 g/dL    RDW 52.3 (H) 35.9 - 50.0 fL    Platelet Count 182 164 - 446 K/uL    MPV 9.8 9.0 - 12.9 fL    Neutrophils-Polys 77.40 (H) 44.00 - 72.00 %    Lymphocytes 7.10 (L) 22.00 - 41.00 %    Monocytes 13.70 (H) 0.00 - 13.40 %    Eosinophils 0.40 0.00 - 6.90 %    Basophils 0.30 0.00 - 1.80 %    Immature Granulocytes 1.10 (H) 0.00 - 0.90 %    Nucleated RBC 0.00 /100 WBC    Neutrophils (Absolute) 7.47 (H) 1.82 - 7.42 K/uL    Lymphs (Absolute) 0.69 (L) 1.00 - 4.80 K/uL    Monos (Absolute) 1.32 (H) 0.00 - 0.85 K/uL    Eos (Absolute) 0.04 0.00 - 0.51 K/uL    Baso (Absolute) 0.03 0.00 - 0.12 K/uL    Immature Granulocytes (abs) 0.11 0.00 - 0.11 K/uL    NRBC (Absolute) 0.00 K/uL   BASIC METABOLIC PANEL    Collection Time: 12/04/18  5:00 AM   Result Value Ref Range    Sodium 134 (L) 135 - 145 mmol/L    Potassium 4.2 3.6 - 5.5 mmol/L    Chloride 107 96 - 112 mmol/L    Co2 19 (L) 20 - 33 mmol/L    Glucose 167 (H) 65 - 99 mg/dL    Bun 37 (H) 8 - 22 mg/dL    Creatinine 2.19 (H) 0.50 - 1.40 mg/dL    Calcium 8.4 (L) 8.5 - 10.5 mg/dL    Anion Gap 8.0 0.0 - 11.9   ESTIMATED GFR    Collection Time: 12/04/18  5:00 AM   Result Value Ref Range    GFR If  36 (A) >60 mL/min/1.73 m 2    GFR If Non  29 (A) >60 mL/min/1.73 m 2   ACCU-CHEK GLUCOSE    Collection Time: 12/04/18  6:14 AM   Result Value Ref Range    Glucose - Accu-Ck 158 (H) 65 - 99 mg/dL   ACCU-CHEK GLUCOSE    Collection Time: 12/04/18 12:15 PM   Result Value Ref Range    Glucose - Accu-Ck 237 (H) 65 - 99 mg/dL       Fluids    Intake/Output Summary (Last 24 hours) at 12/04/18 1333  Last data filed at 12/04/18 0600   Gross per 24 hour   Intake          2322.38 ml   Output              475 ml   Net           1847.38 ml       Core Measures & Quality Metrics  Labs reviewed, Medications reviewed and Radiology images reviewed  Wallis catheter: Critically Ill - Requiring Accurate Measurement of Urinary Output  Central line in place: Need for access and Vasopressors    DVT Prophylaxis: Heparin  DVT prophylaxis - mechanical: SCDs  Ulcer prophylaxis: Not indicated        AYLA Score  ETOH Screening    Assessment/Plan  Central cord syndrome (HCC)- (present on admission)   Assessment & Plan    Weakness BUE and burning in RUE  Possible increased density within the cervical and proximal thoracic cervical canal which may be artifactual or related to hemorrhage within the CSF space.  MRI: Suspicion for focal myelopathic cord signal abnormality at C4 in the right paramedian cord possible small cord contusion  Spinal cord perfusion with goal MAP > 85 for 5 days  Robert Simon MD. Neurosurgery.     Dysphagia- (present on admission)   Assessment & Plan    Failed bedside swallow.   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  (+)Loss of consciousness  SLP for cognitive evaluation     Scalp laceration- (present on admission)   Assessment & Plan    Right scalp laceration  Will be approximated in the ER  Local wound care.   Suture removal in 7 days     Chronic renal failure- (present on admission)   Assessment & Plan    Avoid nephrotoxic agents and monitor laboratory studies     Type 2 diabetes mellitus (HCC)- (present on admission)   Assessment & Plan    Chronic condition treated with Starlix and Actos.  Difficult  to control, admission serum glucose 467  HemA1C 6.2  High sliding scale insulin      Trauma- (present on admission)   Assessment & Plan    Ground level fall. Brief loss of consciousness.  T-5000 Activation.  Drew Bonilla MD. Trauma Surgery.     Hyperlipemia- (present on admission)   Assessment & Plan    Chronic condition treated with Simvistatin.  Resumed maintenance medication.     Essential hypertension- (present on admission)   Assessment & Plan    Chronic condition treated with Lisinopril and Inderal.  Hold for now, spinal cord perfusion protocol       I independently reviewed pertinent clinical lab tests since admission and ordered additional follow up clinical lab tests.  I independently reviewed pertinent radiographic images and the radiologist's reports since admission and ordered additional follow up radiographic studies.  I reviewed the details of the available patient records and documentation by consulting physicians in [facility] up to today, summated the information, and utilized the information as warranted in today's medical decision making for this patient.  I personally evaluated the patient condition at bedside and discussed the daily plan(s) with available nursing staff, dieticians, social workers, pharmacists on rounds.    Abrupt change in neurologic status due to traumatic central cord injury requiring drug therapy requiring intensive monitoring for toxicity involving high complexity decision making initiated in an urgent mannerby assessing, manipulating, and supporting central nervous system function given the high probability of further deterioration in the patient's condition and threat to life.    Aggregated critical care time spent evaluating, reviewing documentation, providing care, and managing this patient exclusive of procedures: 45 minutes    Willard Abdi MD

## 2018-12-05 ENCOUNTER — APPOINTMENT (OUTPATIENT)
Dept: RADIOLOGY | Facility: MEDICAL CENTER | Age: 78
DRG: 964 | End: 2018-12-05
Attending: NURSE PRACTITIONER
Payer: MEDICARE

## 2018-12-05 LAB
ANION GAP SERPL CALC-SCNC: 9 MMOL/L (ref 0–11.9)
BASOPHILS # BLD AUTO: 0.4 % (ref 0–1.8)
BASOPHILS # BLD: 0.03 K/UL (ref 0–0.12)
BUN SERPL-MCNC: 31 MG/DL (ref 8–22)
CALCIUM SERPL-MCNC: 8.4 MG/DL (ref 8.5–10.5)
CHLORIDE SERPL-SCNC: 109 MMOL/L (ref 96–112)
CO2 SERPL-SCNC: 20 MMOL/L (ref 20–33)
CREAT SERPL-MCNC: 2.03 MG/DL (ref 0.5–1.4)
EOSINOPHIL # BLD AUTO: 0.18 K/UL (ref 0–0.51)
EOSINOPHIL NFR BLD: 2.2 % (ref 0–6.9)
ERYTHROCYTE [DISTWIDTH] IN BLOOD BY AUTOMATED COUNT: 51.3 FL (ref 35.9–50)
GLUCOSE BLD-MCNC: 145 MG/DL (ref 65–99)
GLUCOSE BLD-MCNC: 149 MG/DL (ref 65–99)
GLUCOSE BLD-MCNC: 210 MG/DL (ref 65–99)
GLUCOSE BLD-MCNC: 213 MG/DL (ref 65–99)
GLUCOSE SERPL-MCNC: 144 MG/DL (ref 65–99)
HCT VFR BLD AUTO: 32.4 % (ref 42–52)
HGB BLD-MCNC: 11.1 G/DL (ref 14–18)
IMM GRANULOCYTES # BLD AUTO: 0.04 K/UL (ref 0–0.11)
IMM GRANULOCYTES NFR BLD AUTO: 0.5 % (ref 0–0.9)
LYMPHOCYTES # BLD AUTO: 0.55 K/UL (ref 1–4.8)
LYMPHOCYTES NFR BLD: 6.6 % (ref 22–41)
MAGNESIUM SERPL-MCNC: 1.7 MG/DL (ref 1.5–2.5)
MCH RBC QN AUTO: 29.8 PG (ref 27–33)
MCHC RBC AUTO-ENTMCNC: 34.3 G/DL (ref 33.7–35.3)
MCV RBC AUTO: 87.1 FL (ref 81.4–97.8)
MONOCYTES # BLD AUTO: 1.15 K/UL (ref 0–0.85)
MONOCYTES NFR BLD AUTO: 13.9 % (ref 0–13.4)
NEUTROPHILS # BLD AUTO: 6.33 K/UL (ref 1.82–7.42)
NEUTROPHILS NFR BLD: 76.4 % (ref 44–72)
NRBC # BLD AUTO: 0 K/UL
NRBC BLD-RTO: 0 /100 WBC
PHOSPHATE SERPL-MCNC: 3.2 MG/DL (ref 2.5–4.5)
PLATELET # BLD AUTO: 160 K/UL (ref 164–446)
PMV BLD AUTO: 9.3 FL (ref 9–12.9)
POTASSIUM SERPL-SCNC: 4.1 MMOL/L (ref 3.6–5.5)
RBC # BLD AUTO: 3.72 M/UL (ref 4.7–6.1)
SODIUM SERPL-SCNC: 138 MMOL/L (ref 135–145)
WBC # BLD AUTO: 8.3 K/UL (ref 4.8–10.8)

## 2018-12-05 PROCEDURE — 700111 HCHG RX REV CODE 636 W/ 250 OVERRIDE (IP): Performed by: NURSE PRACTITIONER

## 2018-12-05 PROCEDURE — 99291 CRITICAL CARE FIRST HOUR: CPT | Performed by: SURGERY

## 2018-12-05 PROCEDURE — 700111 HCHG RX REV CODE 636 W/ 250 OVERRIDE (IP): Performed by: SURGERY

## 2018-12-05 PROCEDURE — 97162 PT EVAL MOD COMPLEX 30 MIN: CPT

## 2018-12-05 PROCEDURE — G8978 MOBILITY CURRENT STATUS: HCPCS | Mod: CM

## 2018-12-05 PROCEDURE — G8997 SWALLOW GOAL STATUS: HCPCS | Mod: CI

## 2018-12-05 PROCEDURE — 770022 HCHG ROOM/CARE - ICU (200)

## 2018-12-05 PROCEDURE — 700101 HCHG RX REV CODE 250: Performed by: NURSE PRACTITIONER

## 2018-12-05 PROCEDURE — 84100 ASSAY OF PHOSPHORUS: CPT

## 2018-12-05 PROCEDURE — G8996 SWALLOW CURRENT STATUS: HCPCS | Mod: CK

## 2018-12-05 PROCEDURE — A9270 NON-COVERED ITEM OR SERVICE: HCPCS | Performed by: SURGERY

## 2018-12-05 PROCEDURE — A9270 NON-COVERED ITEM OR SERVICE: HCPCS | Performed by: NURSE PRACTITIONER

## 2018-12-05 PROCEDURE — 80048 BASIC METABOLIC PNL TOTAL CA: CPT

## 2018-12-05 PROCEDURE — 85025 COMPLETE CBC W/AUTO DIFF WBC: CPT

## 2018-12-05 PROCEDURE — 700112 HCHG RX REV CODE 229: Performed by: NURSE PRACTITIONER

## 2018-12-05 PROCEDURE — 97167 OT EVAL HIGH COMPLEX 60 MIN: CPT

## 2018-12-05 PROCEDURE — G8988 SELF CARE GOAL STATUS: HCPCS | Mod: CI

## 2018-12-05 PROCEDURE — G8987 SELF CARE CURRENT STATUS: HCPCS | Mod: CM

## 2018-12-05 PROCEDURE — 700105 HCHG RX REV CODE 258: Performed by: NURSE PRACTITIONER

## 2018-12-05 PROCEDURE — G8979 MOBILITY GOAL STATUS: HCPCS | Mod: CI

## 2018-12-05 PROCEDURE — 71045 X-RAY EXAM CHEST 1 VIEW: CPT

## 2018-12-05 PROCEDURE — 700105 HCHG RX REV CODE 258: Performed by: SURGERY

## 2018-12-05 PROCEDURE — 92523 SPEECH SOUND LANG COMPREHEN: CPT

## 2018-12-05 PROCEDURE — 700102 HCHG RX REV CODE 250 W/ 637 OVERRIDE(OP): Performed by: SURGERY

## 2018-12-05 PROCEDURE — 82962 GLUCOSE BLOOD TEST: CPT

## 2018-12-05 PROCEDURE — 700102 HCHG RX REV CODE 250 W/ 637 OVERRIDE(OP): Performed by: NURSE PRACTITIONER

## 2018-12-05 PROCEDURE — 83735 ASSAY OF MAGNESIUM: CPT

## 2018-12-05 RX ORDER — DIPHENHYDRAMINE HCL 25 MG
25 TABLET ORAL EVERY 6 HOURS PRN
Status: DISCONTINUED | OUTPATIENT
Start: 2018-12-05 | End: 2018-12-05

## 2018-12-05 RX ORDER — MAGNESIUM SULFATE HEPTAHYDRATE 40 MG/ML
2 INJECTION, SOLUTION INTRAVENOUS ONCE
Status: COMPLETED | OUTPATIENT
Start: 2018-12-05 | End: 2018-12-05

## 2018-12-05 RX ORDER — DOCUSATE SODIUM 50 MG/5ML
100 LIQUID ORAL 2 TIMES DAILY
Status: DISCONTINUED | OUTPATIENT
Start: 2018-12-05 | End: 2018-12-10 | Stop reason: HOSPADM

## 2018-12-05 RX ORDER — MIDODRINE HYDROCHLORIDE 5 MG/1
10 TABLET ORAL EVERY 8 HOURS
Status: DISCONTINUED | OUTPATIENT
Start: 2018-12-05 | End: 2018-12-06

## 2018-12-05 RX ORDER — INSULIN GLARGINE 100 [IU]/ML
8 INJECTION, SOLUTION SUBCUTANEOUS EVERY EVENING
Status: DISCONTINUED | OUTPATIENT
Start: 2018-12-05 | End: 2018-12-10 | Stop reason: HOSPADM

## 2018-12-05 RX ADMIN — MIDODRINE HYDROCHLORIDE 5 MG: 5 TABLET ORAL at 03:50

## 2018-12-05 RX ADMIN — NOREPINEPHRINE BITARTRATE 5 MCG/MIN: 1 INJECTION INTRAVENOUS at 11:57

## 2018-12-05 RX ADMIN — OXYCODONE HYDROCHLORIDE 5 MG: 5 TABLET ORAL at 08:16

## 2018-12-05 RX ADMIN — DOCUSATE SODIUM 100 MG: 50 LIQUID ORAL at 05:17

## 2018-12-05 RX ADMIN — ACETAMINOPHEN 650 MG: 325 TABLET, FILM COATED ORAL at 17:50

## 2018-12-05 RX ADMIN — INSULIN HUMAN 4 UNITS: 100 INJECTION, SOLUTION PARENTERAL at 08:24

## 2018-12-05 RX ADMIN — OXYCODONE HYDROCHLORIDE 10 MG: 10 TABLET ORAL at 23:57

## 2018-12-05 RX ADMIN — MAGNESIUM SULFATE IN WATER 2 G: 40 INJECTION, SOLUTION INTRAVENOUS at 08:18

## 2018-12-05 RX ADMIN — GABAPENTIN 100 MG: 100 CAPSULE ORAL at 05:15

## 2018-12-05 RX ADMIN — INSULIN HUMAN 4 UNITS: 100 INJECTION, SOLUTION PARENTERAL at 12:09

## 2018-12-05 RX ADMIN — MORPHINE SULFATE 4 MG: 10 INJECTION INTRAVENOUS at 17:50

## 2018-12-05 RX ADMIN — MIDODRINE HYDROCHLORIDE 10 MG: 5 TABLET ORAL at 21:16

## 2018-12-05 RX ADMIN — SODIUM CHLORIDE: 9 INJECTION, SOLUTION INTRAVENOUS at 16:36

## 2018-12-05 RX ADMIN — ACETAMINOPHEN 650 MG: 325 TABLET, FILM COATED ORAL at 11:57

## 2018-12-05 RX ADMIN — HEPARIN SODIUM 5000 UNITS: 5000 INJECTION, SOLUTION INTRAVENOUS; SUBCUTANEOUS at 21:16

## 2018-12-05 RX ADMIN — SIMVASTATIN 20 MG: 40 TABLET, FILM COATED ORAL at 21:17

## 2018-12-05 RX ADMIN — ACETAMINOPHEN 650 MG: 325 TABLET, FILM COATED ORAL at 23:57

## 2018-12-05 RX ADMIN — HEPARIN SODIUM 5000 UNITS: 5000 INJECTION, SOLUTION INTRAVENOUS; SUBCUTANEOUS at 05:16

## 2018-12-05 RX ADMIN — ONDANSETRON 4 MG: 2 INJECTION INTRAMUSCULAR; INTRAVENOUS at 08:55

## 2018-12-05 RX ADMIN — STANDARDIZED SENNA CONCENTRATE AND DOCUSATE SODIUM 1 TABLET: 8.6; 5 TABLET, FILM COATED ORAL at 21:16

## 2018-12-05 RX ADMIN — DOCUSATE SODIUM 100 MG: 50 LIQUID ORAL at 17:50

## 2018-12-05 RX ADMIN — INSULIN GLARGINE 8 UNITS: 100 INJECTION, SOLUTION SUBCUTANEOUS at 18:00

## 2018-12-05 RX ADMIN — OXYCODONE HYDROCHLORIDE 10 MG: 10 TABLET ORAL at 03:50

## 2018-12-05 RX ADMIN — MAGNESIUM HYDROXIDE 15 ML: 400 SUSPENSION ORAL at 05:16

## 2018-12-05 RX ADMIN — SODIUM CHLORIDE: 9 INJECTION, SOLUTION INTRAVENOUS at 01:10

## 2018-12-05 RX ADMIN — ACETAMINOPHEN 650 MG: 325 TABLET, FILM COATED ORAL at 05:15

## 2018-12-05 RX ADMIN — MIDODRINE HYDROCHLORIDE 10 MG: 5 TABLET ORAL at 11:57

## 2018-12-05 RX ADMIN — HEPARIN SODIUM 5000 UNITS: 5000 INJECTION, SOLUTION INTRAVENOUS; SUBCUTANEOUS at 11:57

## 2018-12-05 RX ADMIN — OXYCODONE HYDROCHLORIDE 5 MG: 5 TABLET ORAL at 16:36

## 2018-12-05 RX ADMIN — DIPHENHYDRAMINE HCL 25 MG: 25 TABLET ORAL at 13:23

## 2018-12-05 ASSESSMENT — COGNITIVE AND FUNCTIONAL STATUS - GENERAL
MOVING TO AND FROM BED TO CHAIR: A LOT
SUGGESTED CMS G CODE MODIFIER DAILY ACTIVITY: CL
PERSONAL GROOMING: A LOT
DRESSING REGULAR UPPER BODY CLOTHING: A LOT
CLIMB 3 TO 5 STEPS WITH RAILING: A LOT
SUGGESTED CMS G CODE MODIFIER MOBILITY: CL
EATING MEALS: A LOT
DRESSING REGULAR LOWER BODY CLOTHING: A LOT
STANDING UP FROM CHAIR USING ARMS: A LOT
TURNING FROM BACK TO SIDE WHILE IN FLAT BAD: A LOT
HELP NEEDED FOR BATHING: A LOT
MOBILITY SCORE: 12
TOILETING: A LOT
DAILY ACTIVITIY SCORE: 12
WALKING IN HOSPITAL ROOM: A LOT
MOVING FROM LYING ON BACK TO SITTING ON SIDE OF FLAT BED: A LOT

## 2018-12-05 ASSESSMENT — PAIN SCALES - GENERAL
PAINLEVEL_OUTOF10: 4
PAINLEVEL_OUTOF10: 0
PAINLEVEL_OUTOF10: 0
PAINLEVEL_OUTOF10: 5
PAINLEVEL_OUTOF10: 4
PAINLEVEL_OUTOF10: 7
PAINLEVEL_OUTOF10: 6
PAINLEVEL_OUTOF10: 6
PAINLEVEL_OUTOF10: 7
PAINLEVEL_OUTOF10: 7
PAINLEVEL_OUTOF10: 0
PAINLEVEL_OUTOF10: 8
PAINLEVEL_OUTOF10: 0
PAINLEVEL_OUTOF10: 6

## 2018-12-05 ASSESSMENT — GAIT ASSESSMENTS
GAIT LEVEL OF ASSIST: MODERATE ASSIST
ASSISTIVE DEVICE: FRONT WHEEL WALKER
DISTANCE (FEET): 10
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
## 2018-12-05 NOTE — PROGRESS NOTES
Trauma / Surgical Daily Progress Note    Date of Service  12/5/2018    Interval Events  Continued improvement in function of BLE - L>>R  Dr. Simon's documentation reviewed  Presently on a norepinephrine infusion to maintain MAP goals per Cord Perfusion Protocol  Remains in ICU during Cord Perfusion Protocol    Review of Systems     Vital Signs for last 24 hours  Temp:  [36.1 °C (97 °F)-36.9 °C (98.5 °F)] 36.2 °C (97.1 °F)  Pulse:  [] 98  Resp:  [8-35] 20    Respiratory Data     Respiration: 20, Pulse Oximetry: 97 %     Work Of Breathing / Effort: Mild  RUL Breath Sounds: Diminished, RML Breath Sounds: Diminished, RLL Breath Sounds: Diminished, CIRA Breath Sounds: Diminished, LLL Breath Sounds: Diminished    Physical Exam  Physical Exam   Constitutional: He appears well-developed and well-nourished.  Non-toxic appearance. He does not have a sickly appearance. He does not appear ill. Cervical collar and nasal cannula in place.   HENT:   3cm laceration over the right forehead, approximated with suture.   Eyes: Pupils are equal, round, and reactive to light. Conjunctivae and EOM are normal.   Cardiovascular: Normal rate, regular rhythm, intact distal pulses and normal pulses.    Pulmonary/Chest: Effort normal and breath sounds normal. No accessory muscle usage. No respiratory distress. He has no decreased breath sounds.   Abdominal: Soft. Normal appearance. There is no tenderness.   Neurological: He is alert. No cranial nerve deficit. GCS eye subscore is 4. GCS verbal subscore is 5. GCS motor subscore is 6.   RUE with decreased active motor function at wrist, elbow and shoulder  LUE with decreased active motor function at wrist, elbow and shoulder, but less so than RUE  Able to make a fist bilaterally   Nursing note and vitals reviewed.      Laboratory  Recent Results (from the past 24 hour(s))   MAGNESIUM    Collection Time: 12/04/18  2:30 PM   Result Value Ref Range    Magnesium 1.8 1.5 - 2.5 mg/dL   PHOSPHORUS     Collection Time: 12/04/18  2:30 PM   Result Value Ref Range    Phosphorus 3.6 2.5 - 4.5 mg/dL   ACCU-CHEK GLUCOSE    Collection Time: 12/04/18  6:44 PM   Result Value Ref Range    Glucose - Accu-Ck 207 (H) 65 - 99 mg/dL   ACCU-CHEK GLUCOSE    Collection Time: 12/04/18  9:08 PM   Result Value Ref Range    Glucose - Accu-Ck 190 (H) 65 - 99 mg/dL   MAGNESIUM    Collection Time: 12/05/18  3:35 AM   Result Value Ref Range    Magnesium 1.7 1.5 - 2.5 mg/dL   PHOSPHORUS    Collection Time: 12/05/18  3:35 AM   Result Value Ref Range    Phosphorus 3.2 2.5 - 4.5 mg/dL   CBC WITH DIFFERENTIAL    Collection Time: 12/05/18  3:35 AM   Result Value Ref Range    WBC 8.3 4.8 - 10.8 K/uL    RBC 3.72 (L) 4.70 - 6.10 M/uL    Hemoglobin 11.1 (L) 14.0 - 18.0 g/dL    Hematocrit 32.4 (L) 42.0 - 52.0 %    MCV 87.1 81.4 - 97.8 fL    MCH 29.8 27.0 - 33.0 pg    MCHC 34.3 33.7 - 35.3 g/dL    RDW 51.3 (H) 35.9 - 50.0 fL    Platelet Count 160 (L) 164 - 446 K/uL    MPV 9.3 9.0 - 12.9 fL    Neutrophils-Polys 76.40 (H) 44.00 - 72.00 %    Lymphocytes 6.60 (L) 22.00 - 41.00 %    Monocytes 13.90 (H) 0.00 - 13.40 %    Eosinophils 2.20 0.00 - 6.90 %    Basophils 0.40 0.00 - 1.80 %    Immature Granulocytes 0.50 0.00 - 0.90 %    Nucleated RBC 0.00 /100 WBC    Neutrophils (Absolute) 6.33 1.82 - 7.42 K/uL    Lymphs (Absolute) 0.55 (L) 1.00 - 4.80 K/uL    Monos (Absolute) 1.15 (H) 0.00 - 0.85 K/uL    Eos (Absolute) 0.18 0.00 - 0.51 K/uL    Baso (Absolute) 0.03 0.00 - 0.12 K/uL    Immature Granulocytes (abs) 0.04 0.00 - 0.11 K/uL    NRBC (Absolute) 0.00 K/uL   BASIC METABOLIC PANEL    Collection Time: 12/05/18  3:35 AM   Result Value Ref Range    Sodium 138 135 - 145 mmol/L    Potassium 4.1 3.6 - 5.5 mmol/L    Chloride 109 96 - 112 mmol/L    Co2 20 20 - 33 mmol/L    Glucose 144 (H) 65 - 99 mg/dL    Bun 31 (H) 8 - 22 mg/dL    Creatinine 2.03 (H) 0.50 - 1.40 mg/dL    Calcium 8.4 (L) 8.5 - 10.5 mg/dL    Anion Gap 9.0 0.0 - 11.9   ESTIMATED GFR    Collection  Time: 12/05/18  3:35 AM   Result Value Ref Range    GFR If  39 (A) >60 mL/min/1.73 m 2    GFR If Non  32 (A) >60 mL/min/1.73 m 2   ACCU-CHEK GLUCOSE    Collection Time: 12/05/18  8:23 AM   Result Value Ref Range    Glucose - Accu-Ck 213 (H) 65 - 99 mg/dL   ACCU-CHEK GLUCOSE    Collection Time: 12/05/18 12:08 PM   Result Value Ref Range    Glucose - Accu-Ck 210 (H) 65 - 99 mg/dL       Fluids    Intake/Output Summary (Last 24 hours) at 12/05/18 1300  Last data filed at 12/05/18 1200   Gross per 24 hour   Intake          2793.28 ml   Output             1075 ml   Net          1718.28 ml       Core Measures & Quality Metrics  Labs reviewed, Medications reviewed and Radiology images reviewed  Wallis catheter: Critically Ill - Requiring Accurate Measurement of Urinary Output  Central line in place: Need for access and Vasopressors    DVT Prophylaxis: Heparin  DVT prophylaxis - mechanical: SCDs  Ulcer prophylaxis: Not indicated        AYLA Score  ETOH Screening    Assessment/Plan  Central cord syndrome (HCC)- (present on admission)   Assessment & Plan    Weakness BUE and burning in RUE  Possible increased density within the cervical and proximal thoracic cervical canal which may be artifactual or related to hemorrhage within the CSF space.  MRI: Suspicion for focal myelopathic cord signal abnormality at C4 in the right paramedian cord possible small cord contusion  Spinal cord perfusion with goal MAP > 85 for 5 days  Robert Simon MD. Neurosurgery.     Dysphagia- (present on admission)   Assessment & Plan    Failed bedside swallow.   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  (+)Loss of consciousness  SLP for cognitive evaluation     Scalp laceration- (present on admission)   Assessment & Plan    Right scalp laceration  Will be approximated in the ER  Local wound care.   Suture removal in 7 days     Chronic renal failure- (present on admission)   Assessment & Plan    Avoid nephrotoxic agents and monitor laboratory studies     Type 2 diabetes mellitus (HCC)- (present on admission)   Assessment & Plan    Chronic condition treated with Starlix and Actos.  Difficult to control, admission serum glucose 467  HemA1C 6.2  High sliding scale insulin with Lantus     Trauma- (present on admission)   Assessment & Plan    Ground level fall. Brief loss of consciousness.  T-5000 Activation.  Drew Bonilla MD. Trauma Surgery.     Hyperlipemia- (present on admission)   Assessment & Plan    Chronic condition treated with Simvistatin.  Resumed maintenance medication.     Essential hypertension- (present on admission)   Assessment & Plan    Chronic condition treated with Lisinopril and Inderal.  Hold for now, spinal cord perfusion protocol       I independently reviewed pertinent clinical lab tests since admission and ordered additional follow up clinical lab tests.  I independently reviewed pertinent radiographic images and the radiologist's reports since admission and ordered additional follow up radiographic studies.  I reviewed the details of the available patient records and documentation by consulting physicians in EPIC up to today, summated the information, and utilized the information as warranted in today's medical decision making for this patient.  I personally evaluated the patient condition at bedside and discussed the daily plan(s) with available nursing staff, dieticians, social workers, pharmacists on rounds.    Abrupt change in neurologic status due to traumatic central cord  injury requiring drug therapy requiring intensive monitoring for toxicity involving high complexity decision making initiated in an urgent mannerby assessing, manipulating, and supporting central nervous system function given the high probability of further deterioration in the patient's condition and threat to life.    Aggregated critical care time spent evaluating, reviewing documentation, providing care, and managing this patient exclusive of procedures: 40 minutes    Willard Abdi MD
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
## 2018-12-05 NOTE — PROGRESS NOTES
Neurosurgery Progress Note    Subjective:  No neck pain    Exam:  Alert  In [place] collar; up in chair  Burning dysesthesia in shoulders bilaterally, right < left  Right arm: deltoid 1/5, biceps 2/5, extensor carpi 3/5, triceps 3/5, hand intrinsics 4/5  Left arm: deltoid 2/5, biceps 3/5, extensor carpi 4/5, triceps 4/5, hand intrinsics 4+/5  Legs 5/5 bilaterally  Normal sensation in legs bilaterally    Pulse  Av.1  Min: 75  Max: 101  Resp  Av.2  Min: 8  Max: 38  Temp  Av.4 °C (97.6 °F)  Min: 36.1 °C (97 °F)  Max: 36.9 °C (98.5 °F)  SpO2  Av %  Min: 92 %  Max: 97 %    No Data Recorded    Recent Labs      18   0130  18   0500  18   0335   WBC  8.3  9.7  8.3   RBC  3.77*  3.86*  3.72*   HEMOGLOBIN  11.2*  11.3*  11.1*   HEMATOCRIT  33.2*  34.0*  32.4*   MCV  88.1  88.1  87.1   MCH  29.7  29.3  29.8   MCHC  33.7  33.2*  34.3   RDW  50.5*  52.3*  51.3*   PLATELETCT  161*  182  160*   MPV  9.8  9.8  9.3     Recent Labs      18   0130  18   0500  18   0335   SODIUM  136  134*  138   POTASSIUM  4.4  4.2  4.1   CHLORIDE  107  107  109   CO2  18*  19*  20   GLUCOSE  190*  167*  144*   BUN  39*  37*  31*   CREATININE  2.20*  2.19*  2.03*   CALCIUM  8.5  8.4*  8.4*     Recent Labs      18   1300   APTT  29.1   INR  1.07           Intake/Output       18 0700 - 18 0659 18 0700 - 18 0659      3527-9532 4435-8227 Total 8729-0092 3620-2206 Total       Intake    P.O.  480  420 900  --  -- --    P.O. 480 420 900 -- -- --    I.V.  1263.4  989 2252.4  --  -- --    Norepinephrine Volume 88.4 89 177.4 -- -- --    Volume (mL) (NS infusion) 300 -- 300 -- -- --    Volume (mL) (D5 1/2 NS infusion) 350 -- 350 -- -- --    Volume (mL) (NS infusion)  -- -- --    Total Intake 1743.4 1409 3152.4 -- -- --       Output    Urine  600  325 925  --  -- --    Number of Times Voided -- 3 x 3 x -- -- --    Urine Void (mL) 600 325 925 -- -- --    Stool  --  --  --  --  -- --    Number of Times Stooled -- 0 x 0 x -- -- --    Total Output 600 325 925 -- -- --       Net I/O     1143.4 1084 2227.4 -- -- --            Intake/Output Summary (Last 24 hours) at 12/05/18 0755  Last data filed at 12/05/18 0600   Gross per 24 hour   Intake          3152.41 ml   Output              925 ml   Net          2227.41 ml            • docusate sodium 100mg/10mL  100 mg BID   • NS   Continuous   • acetaminophen  650 mg Q6HRS   • gabapentin  100 mg QAM   • heparin  5,000 Units Q8HRS   • insulin regular  3-14 Units 4X/DAY ACHS    And   • glucose 4 g  16 g Q15 MIN PRN    And   • dextrose 50%  25 mL Q15 MIN PRN   • NORepinephrine (LEVOPHED) infusion  0-30 mcg/min Continuous   • Phenylephrine infusion  0-300 mcg/min Continuous   • midodrine  5 mg Q8HR   • simvastatin  20 mg Nightly   • Respiratory Care per Protocol   Continuous RT   • Pharmacy Consult Request  1 Each PRN   • senna-docusate  1 Tab Nightly   • senna-docusate  1 Tab Q24HRS PRN   • polyethylene glycol/lytes  1 Packet BID   • magnesium hydroxide  30 mL DAILY   • bisacodyl  10 mg Q24HRS PRN   • fleet  1 Each Once PRN   • oxyCODONE immediate-release  5 mg Q3HRS PRN   • oxyCODONE immediate release  10 mg Q3HRS PRN   • morphine injection  1-4 mg Q HOUR PRN   • ondansetron  4 mg Q4HRS PRN       Assessment and Plan:  Hospital day #3  Doing well clinically; slightly less dysesthesia today  Keep MAP 85-90 mmHg for 5 days  Prophylactic anticoagulation: yes         Start date/time: OK to start from NSx POV  OK to mobilize in [place] collar; does not need collar in bed; may remove for hygiene  Rehab evaluation

## 2018-12-05 NOTE — PROGRESS NOTES
2 RN skin assessment completed. Laceration and abrasion noted on R forehead. Laceration noted on R elbow. Bruising, abrasions, and tear noted on R hand. Redness and abrasions noted on bilateral knees and legs. Redness noted on bilateral inner thighs and groin. Sacrum intact. Skin intact under [place] collar. Barrier cream applied to groin/inner thigh.

## 2018-12-05 NOTE — PROGRESS NOTES
Dr. Simon at bedside. Orders received for Q4 hr neuro checks, c-collar required only when pt is mobilizing OOB.

## 2018-12-05 NOTE — PROGRESS NOTES
2 RN skin assessment completed. Blood blister noted on 2nd toe of R foot. Sacrum intact, mepilex in place. Skin under [place] collar intact, mepilexes padding. Appropriate interventions in place.

## 2018-12-05 NOTE — CARE PLAN
Problem: Venous Thromboembolism (VTW)/Deep Vein Thrombosis (DVT) Prevention:  Goal: Patient will participate in Venous Thrombosis (VTE)/Deep Vein Thrombosis (DVT)Prevention Measures  Outcome: PROGRESSING AS EXPECTED  SCD's are in place and inflating    Problem: Pain Management  Goal: Pain level will decrease to patient's comfort goal  Outcome: PROGRESSING AS EXPECTED  Pain assessment completed using appropriate scale and PRN medications administered per MAR

## 2018-12-05 NOTE — DISCHARGE PLANNING
Care Transition Team Assessment  In the case of an emergency, pt's legal NOK is Ángela hernandez     RNCM met with pt at bedside and obtained the information used in this assessment. Pt verified accuracy of facesheet. Pt lives in a single story apt alone.  [facility] Prior to current hospitalization, pt was completely independent in ADLS/IADLS.Received Meals on Wheels and sister provides tranportation. Pt has a limited support system. Pt denies any hx of substance use and denies any dx of mh. Plan is to go to acute Rehab at discharge.      Information Source pt  Orientation : Oriented x 4  Information Given By: Patient  Informant's Name:  (niko)  Who is responsible for making decisions for patient? : Patient         Elopement Risk  Legal Hold: No  Ambulatory or Self Mobile in Wheelchair: No-Not an Elopement Risk  Elopement Risk: Not at Risk for Elopement    Interdisciplinary Discharge Planning  Does Admitting Nurse Feel This Could be a Complex Discharge?: Yes  Primary Care Physician:  ([facility])  Lives with - Patient's Self Care Capacity: Alone and Able to Care For Self  Patient or legal guardian wants to designate a caregiver (see row info): No  Support Systems: Family Member(s)  Housing / Facility: 2 Story Apartment / Condo  Do You Take your Prescribed Medications Regularly: Yes  Mobility Issues: Yes  Prior Services: None  Patient Expects to be Discharged to::  (Rehab)    Discharge Preparedness  What is your plan after discharge?: Uncertain - pending medical team collaboration, Other (comment)  What are your discharge supports?:  (Rehab)  Prior Functional Level: Ambulatory, Independent with Activities of Daily Living, Independent with Medication Management  Difficulity with ADLs: None  Difficulity with IADLs: None    Functional Assesment  Prior Functional Level: Ambulatory, Independent with Activities of Daily Living, Independent with Medication Management    Finances  Financial  Barriers to Discharge: No    Vision / Hearing Impairment  Vision Impairment : No  Right Eye Vision: Impaired, Wears Glasses  Left Eye Vision: Impaired, Wears Glasses  Hearing Impairment : Yes  Hearing Impairment: Hearing Device Not Available, Both Ears  Does Pt Need Special Equipment for the Hearing Impaired?: No    Values / Beliefs / Concerns  Values / Beliefs Concerns : No         Domestic Abuse  Have you ever been the victim of abuse or violence?: No  Physical Abuse or Sexual Abuse: No  Verbal Abuse or Emotional Abuse: No  Possible Abuse Reported to:: Not Applicable    Psychological Assessment  History of Substance Abuse: None  History of Psychiatric Problems: No         Anticipated Discharge Information  Anticipated discharge disposition: Acute rehab

## 2018-12-05 NOTE — THERAPY
"Physical Therapy Evaluation completed.   Bed Mobility:     Transfers: Sit to Stand: Moderate Assist  Gait: Level Of Assist: Moderate Assist with Front-Wheel Walker     10 ft  Plan of Care: Will benefit from Physical Therapy 5 times per week  Discharge Recommendations: Equipment: Will Continue to Assess for Equipment Needs. Post-acute therapy placement  See \"Rehab Therapy-Acute\" Patient Summary Report for complete documentation.     "

## 2018-12-05 NOTE — PROGRESS NOTES
2 RN skin assessment completed. Laceration and abrasion noted on R forehead, sutured and dressing in place. Laceration noted on R elbow. Brusing, abrasions, and tear noted on R hand. Redness and abrasions noted on bilateral knees and legs. Redness noted on bilateral inner thighs. Sacrum intact. Skin intact under [place] collar. Redness noted to groin area. Berrier cream applied.  Mepilex in place.

## 2018-12-05 NOTE — DISCHARGE PLANNING
Aware of PMR referral from Dr. Simon. 78 y.o. male presents for evaluation of ground-level fall with head and neck injury, right arm pain.  MRI cervical spine - suspicion for focal myelopathic cord signal abnormality at C4 in the right paramedian cord possible small cord contusion. Remains in ICU during Cord Perfusion Protocol. Anticipate Physiatry Dr. Garner to consult Tuesday per protocol. TCC will follow. Thank you for the referral.

## 2018-12-06 ENCOUNTER — APPOINTMENT (OUTPATIENT)
Dept: RADIOLOGY | Facility: MEDICAL CENTER | Age: 78
DRG: 964 | End: 2018-12-06
Attending: NURSE PRACTITIONER
Payer: MEDICARE

## 2018-12-06 LAB
ANION GAP SERPL CALC-SCNC: 7 MMOL/L (ref 0–11.9)
BASOPHILS # BLD AUTO: 0.3 % (ref 0–1.8)
BASOPHILS # BLD: 0.02 K/UL (ref 0–0.12)
BUN SERPL-MCNC: 27 MG/DL (ref 8–22)
CALCIUM SERPL-MCNC: 8.2 MG/DL (ref 8.5–10.5)
CHLORIDE SERPL-SCNC: 108 MMOL/L (ref 96–112)
CO2 SERPL-SCNC: 20 MMOL/L (ref 20–33)
CREAT SERPL-MCNC: 1.99 MG/DL (ref 0.5–1.4)
EOSINOPHIL # BLD AUTO: 0.12 K/UL (ref 0–0.51)
EOSINOPHIL NFR BLD: 1.7 % (ref 0–6.9)
ERYTHROCYTE [DISTWIDTH] IN BLOOD BY AUTOMATED COUNT: 51.1 FL (ref 35.9–50)
GLUCOSE BLD-MCNC: 120 MG/DL (ref 65–99)
GLUCOSE BLD-MCNC: 121 MG/DL (ref 65–99)
GLUCOSE BLD-MCNC: 163 MG/DL (ref 65–99)
GLUCOSE BLD-MCNC: 171 MG/DL (ref 65–99)
GLUCOSE SERPL-MCNC: 165 MG/DL (ref 65–99)
HCT VFR BLD AUTO: 28.9 % (ref 42–52)
HGB BLD-MCNC: 9.8 G/DL (ref 14–18)
IMM GRANULOCYTES # BLD AUTO: 0.03 K/UL (ref 0–0.11)
IMM GRANULOCYTES NFR BLD AUTO: 0.4 % (ref 0–0.9)
LYMPHOCYTES # BLD AUTO: 0.56 K/UL (ref 1–4.8)
LYMPHOCYTES NFR BLD: 8 % (ref 22–41)
MCH RBC QN AUTO: 29.5 PG (ref 27–33)
MCHC RBC AUTO-ENTMCNC: 33.9 G/DL (ref 33.7–35.3)
MCV RBC AUTO: 87 FL (ref 81.4–97.8)
MONOCYTES # BLD AUTO: 1 K/UL (ref 0–0.85)
MONOCYTES NFR BLD AUTO: 14.2 % (ref 0–13.4)
NEUTROPHILS # BLD AUTO: 5.3 K/UL (ref 1.82–7.42)
NEUTROPHILS NFR BLD: 75.4 % (ref 44–72)
NRBC # BLD AUTO: 0 K/UL
NRBC BLD-RTO: 0 /100 WBC
PLATELET # BLD AUTO: 136 K/UL (ref 164–446)
PMV BLD AUTO: 9.3 FL (ref 9–12.9)
POTASSIUM SERPL-SCNC: 4.3 MMOL/L (ref 3.6–5.5)
RBC # BLD AUTO: 3.32 M/UL (ref 4.7–6.1)
SODIUM SERPL-SCNC: 135 MMOL/L (ref 135–145)
WBC # BLD AUTO: 7 K/UL (ref 4.8–10.8)

## 2018-12-06 PROCEDURE — 71045 X-RAY EXAM CHEST 1 VIEW: CPT

## 2018-12-06 PROCEDURE — A9270 NON-COVERED ITEM OR SERVICE: HCPCS | Performed by: NURSE PRACTITIONER

## 2018-12-06 PROCEDURE — 82962 GLUCOSE BLOOD TEST: CPT | Mod: 91

## 2018-12-06 PROCEDURE — 700111 HCHG RX REV CODE 636 W/ 250 OVERRIDE (IP): Performed by: SURGERY

## 2018-12-06 PROCEDURE — A9270 NON-COVERED ITEM OR SERVICE: HCPCS | Performed by: SURGERY

## 2018-12-06 PROCEDURE — 770022 HCHG ROOM/CARE - ICU (200)

## 2018-12-06 PROCEDURE — 700105 HCHG RX REV CODE 258: Performed by: SURGERY

## 2018-12-06 PROCEDURE — 700102 HCHG RX REV CODE 250 W/ 637 OVERRIDE(OP): Performed by: SURGERY

## 2018-12-06 PROCEDURE — 700112 HCHG RX REV CODE 229: Performed by: NURSE PRACTITIONER

## 2018-12-06 PROCEDURE — 85025 COMPLETE CBC W/AUTO DIFF WBC: CPT

## 2018-12-06 PROCEDURE — 80048 BASIC METABOLIC PNL TOTAL CA: CPT

## 2018-12-06 PROCEDURE — 92526 ORAL FUNCTION THERAPY: CPT

## 2018-12-06 PROCEDURE — 99291 CRITICAL CARE FIRST HOUR: CPT | Performed by: SURGERY

## 2018-12-06 PROCEDURE — 700102 HCHG RX REV CODE 250 W/ 637 OVERRIDE(OP): Performed by: NURSE PRACTITIONER

## 2018-12-06 RX ORDER — MIDODRINE HYDROCHLORIDE 5 MG/1
10 TABLET ORAL EVERY 8 HOURS
Status: DISCONTINUED | OUTPATIENT
Start: 2018-12-06 | End: 2018-12-10 | Stop reason: HOSPADM

## 2018-12-06 RX ADMIN — DOCUSATE SODIUM 100 MG: 50 LIQUID ORAL at 05:25

## 2018-12-06 RX ADMIN — ACETAMINOPHEN 650 MG: 325 TABLET, FILM COATED ORAL at 23:24

## 2018-12-06 RX ADMIN — INSULIN GLARGINE 8 UNITS: 100 INJECTION, SOLUTION SUBCUTANEOUS at 18:17

## 2018-12-06 RX ADMIN — MAGNESIUM HYDROXIDE 30 ML: 400 SUSPENSION ORAL at 05:25

## 2018-12-06 RX ADMIN — OXYCODONE HYDROCHLORIDE 5 MG: 5 TABLET ORAL at 04:10

## 2018-12-06 RX ADMIN — INSULIN HUMAN 3 UNITS: 100 INJECTION, SOLUTION PARENTERAL at 13:24

## 2018-12-06 RX ADMIN — HEPARIN SODIUM 5000 UNITS: 5000 INJECTION, SOLUTION INTRAVENOUS; SUBCUTANEOUS at 15:24

## 2018-12-06 RX ADMIN — OXYCODONE HYDROCHLORIDE 5 MG: 5 TABLET ORAL at 16:33

## 2018-12-06 RX ADMIN — OXYCODONE HYDROCHLORIDE 5 MG: 5 TABLET ORAL at 07:53

## 2018-12-06 RX ADMIN — OXYCODONE HYDROCHLORIDE 5 MG: 5 TABLET ORAL at 13:19

## 2018-12-06 RX ADMIN — BISACODYL 10 MG: 10 SUPPOSITORY RECTAL at 19:18

## 2018-12-06 RX ADMIN — MIDODRINE HYDROCHLORIDE 10 MG: 5 TABLET ORAL at 15:25

## 2018-12-06 RX ADMIN — HEPARIN SODIUM 5000 UNITS: 5000 INJECTION, SOLUTION INTRAVENOUS; SUBCUTANEOUS at 21:15

## 2018-12-06 RX ADMIN — HEPARIN SODIUM 5000 UNITS: 5000 INJECTION, SOLUTION INTRAVENOUS; SUBCUTANEOUS at 05:25

## 2018-12-06 RX ADMIN — DOCUSATE SODIUM 100 MG: 50 LIQUID ORAL at 18:14

## 2018-12-06 RX ADMIN — SODIUM CHLORIDE: 9 INJECTION, SOLUTION INTRAVENOUS at 18:47

## 2018-12-06 RX ADMIN — MORPHINE SULFATE 4 MG: 10 INJECTION INTRAVENOUS at 15:32

## 2018-12-06 RX ADMIN — MORPHINE SULFATE 4 MG: 10 INJECTION INTRAVENOUS at 23:24

## 2018-12-06 RX ADMIN — ACETAMINOPHEN 650 MG: 325 TABLET, FILM COATED ORAL at 18:14

## 2018-12-06 RX ADMIN — OXYCODONE HYDROCHLORIDE 10 MG: 10 TABLET ORAL at 21:15

## 2018-12-06 RX ADMIN — ACETAMINOPHEN 650 MG: 325 TABLET, FILM COATED ORAL at 13:19

## 2018-12-06 RX ADMIN — SIMVASTATIN 20 MG: 40 TABLET, FILM COATED ORAL at 21:14

## 2018-12-06 RX ADMIN — INSULIN HUMAN 3 UNITS: 100 INJECTION, SOLUTION PARENTERAL at 08:23

## 2018-12-06 RX ADMIN — ACETAMINOPHEN 650 MG: 325 TABLET, FILM COATED ORAL at 05:25

## 2018-12-06 RX ADMIN — GABAPENTIN 100 MG: 100 CAPSULE ORAL at 05:27

## 2018-12-06 RX ADMIN — MIDODRINE HYDROCHLORIDE 10 MG: 5 TABLET ORAL at 04:03

## 2018-12-06 RX ADMIN — MIDODRINE HYDROCHLORIDE 10 MG: 5 TABLET ORAL at 21:15

## 2018-12-06 ASSESSMENT — PAIN SCALES - GENERAL
PAINLEVEL_OUTOF10: 8
PAINLEVEL_OUTOF10: 5
PAINLEVEL_OUTOF10: 6
PAINLEVEL_OUTOF10: 7
PAINLEVEL_OUTOF10: 3
PAINLEVEL_OUTOF10: 6
PAINLEVEL_OUTOF10: 5
PAINLEVEL_OUTOF10: 6
PAINLEVEL_OUTOF10: 7
PAINLEVEL_OUTOF10: 1
PAINLEVEL_OUTOF10: 6
PAINLEVEL_OUTOF10: 4
PAINLEVEL_OUTOF10: 6
PAINLEVEL_OUTOF10: 6
PAINLEVEL_OUTOF10: 7

## 2018-12-06 NOTE — THERAPY
"Speech Language Therapy dysphagia treatment completed.     Functional Status:  Pt was seen for dysphagia tx at breakfast with a [facility] meal tray.  Pt was sitting up in a chair for meal, attempting some self feeding.  He was observed taking a pill with sips of nectars without difficulty, with RN.  Pt eager to participate, and consumed purees and nectars via straw sips without any overt s/sx of aspiration.  He also took PO trials of 4 oz of soft solids without s/sx of aspiration, and mastication was effective.  Pt continues to have coughing in approx 1 of 3 trials of thins, which is concerning for aspiration.  Recommend to upgrade diet to a dysphagia II with nectars.  Pt is OK for ice chips with RN supervision.  SLP continues to follow.    Recommendations: 1) upgrade diet to dysphagia II with nectars, 1:1 feeding, 2) OK for ice chips     Plan of Care: Will benefit from Speech Therapy 5 times per week    Post-Acute Therapy: Recommend inpatient transitional care services for continued speech therapy services. Please consider physiatry (PM&R) consult.     See \"Rehab Therapy-Acute\" Patient Summary Report for complete documentation.     "

## 2018-12-06 NOTE — CARE PLAN
Problem: Safety  Goal: Will remain free from falls    Intervention: Assess risk factors for falls  Appropriate interventions in place to prevent falls. Bed/chair alarm on, appropriate assistive device available, pt educated on risk and encouraged to call for help       Problem: Pain Management  Goal: Pain level will decrease to patient's comfort goal    Intervention: Follow pain managment plan developed in collaboration with patient and Interdisciplinary Team  Pain medication administered per MAR as needed

## 2018-12-06 NOTE — PROGRESS NOTES
Trauma / Surgical Daily Progress Note    Date of Service  12/6/2018    Interval Events  Neuro exam unchanged  In good spirits  Dr. Simon's documentation reviewed  Presently on a norepinephrine infusion to maintain MAP goals per Cord Perfusion Protocol  Remains in ICU during Cord Perfusion Protocol    Review of Systems     Vital Signs for last 24 hours  Temp:  [35.9 °C (96.7 °F)-37.1 °C (98.8 °F)] 36.1 °C (97 °F)  Pulse:  [] 90  Resp:  [8-53] 13    Respiratory Data     Respiration: 13, Pulse Oximetry: 99 %     Work Of Breathing / Effort: Mild  RUL Breath Sounds: Diminished, RML Breath Sounds: Diminished, RLL Breath Sounds: Diminished, CIRA Breath Sounds: Diminished, LLL Breath Sounds: Diminished    Physical Exam  Physical Exam   Constitutional: He appears well-developed and well-nourished.  Non-toxic appearance. He does not have a sickly appearance. He does not appear ill. Cervical collar and nasal cannula in place.   HENT:   3cm laceration over the right forehead, approximated with suture.   Eyes: Pupils are equal, round, and reactive to light. Conjunctivae and EOM are normal.   Cardiovascular: Normal rate, regular rhythm, intact distal pulses and normal pulses.    Pulmonary/Chest: Effort normal and breath sounds normal. No accessory muscle usage. No respiratory distress. He has no decreased breath sounds.   Abdominal: Soft. Normal appearance. There is no tenderness.   Neurological: He is alert. No cranial nerve deficit. GCS eye subscore is 4. GCS verbal subscore is 5. GCS motor subscore is 6.   RUE with decreased active motor function at wrist, elbow and shoulder  LUE with decreased active motor function at wrist, elbow and shoulder, but less so than RUE  Able to make a fist bilaterally   Nursing note and vitals reviewed.      Laboratory  Recent Results (from the past 24 hour(s))   ACCU-CHEK GLUCOSE    Collection Time: 12/05/18  5:59 PM   Result Value Ref Range    Glucose - Accu-Ck 149 (H) 65 - 99 mg/dL    ACCU-CHEK GLUCOSE    Collection Time: 12/05/18  9:13 PM   Result Value Ref Range    Glucose - Accu-Ck 145 (H) 65 - 99 mg/dL   CBC WITH DIFFERENTIAL    Collection Time: 12/06/18  4:00 AM   Result Value Ref Range    WBC 7.0 4.8 - 10.8 K/uL    RBC 3.32 (L) 4.70 - 6.10 M/uL    Hemoglobin 9.8 (L) 14.0 - 18.0 g/dL    Hematocrit 28.9 (L) 42.0 - 52.0 %    MCV 87.0 81.4 - 97.8 fL    MCH 29.5 27.0 - 33.0 pg    MCHC 33.9 33.7 - 35.3 g/dL    RDW 51.1 (H) 35.9 - 50.0 fL    Platelet Count 136 (L) 164 - 446 K/uL    MPV 9.3 9.0 - 12.9 fL    Neutrophils-Polys 75.40 (H) 44.00 - 72.00 %    Lymphocytes 8.00 (L) 22.00 - 41.00 %    Monocytes 14.20 (H) 0.00 - 13.40 %    Eosinophils 1.70 0.00 - 6.90 %    Basophils 0.30 0.00 - 1.80 %    Immature Granulocytes 0.40 0.00 - 0.90 %    Nucleated RBC 0.00 /100 WBC    Neutrophils (Absolute) 5.30 1.82 - 7.42 K/uL    Lymphs (Absolute) 0.56 (L) 1.00 - 4.80 K/uL    Monos (Absolute) 1.00 (H) 0.00 - 0.85 K/uL    Eos (Absolute) 0.12 0.00 - 0.51 K/uL    Baso (Absolute) 0.02 0.00 - 0.12 K/uL    Immature Granulocytes (abs) 0.03 0.00 - 0.11 K/uL    NRBC (Absolute) 0.00 K/uL   BASIC METABOLIC PANEL    Collection Time: 12/06/18  4:00 AM   Result Value Ref Range    Sodium 135 135 - 145 mmol/L    Potassium 4.3 3.6 - 5.5 mmol/L    Chloride 108 96 - 112 mmol/L    Co2 20 20 - 33 mmol/L    Glucose 165 (H) 65 - 99 mg/dL    Bun 27 (H) 8 - 22 mg/dL    Creatinine 1.99 (H) 0.50 - 1.40 mg/dL    Calcium 8.2 (L) 8.5 - 10.5 mg/dL    Anion Gap 7.0 0.0 - 11.9   ESTIMATED GFR    Collection Time: 12/06/18  4:00 AM   Result Value Ref Range    GFR If  40 (A) >60 mL/min/1.73 m 2    GFR If Non  33 (A) >60 mL/min/1.73 m 2   ACCU-CHEK GLUCOSE    Collection Time: 12/06/18  8:21 AM   Result Value Ref Range    Glucose - Accu-Ck 163 (H) 65 - 99 mg/dL       Fluids    Intake/Output Summary (Last 24 hours) at 12/06/18 1341  Last data filed at 12/06/18 1000   Gross per 24 hour   Intake          2970.42 ml    Output             1225 ml   Net          1745.42 ml       Core Measures & Quality Metrics  Labs reviewed, Medications reviewed and Radiology images reviewed  Wallis catheter: Critically Ill - Requiring Accurate Measurement of Urinary Output  Central line in place: Need for access and Vasopressors    DVT Prophylaxis: Heparin  DVT prophylaxis - mechanical: SCDs  Ulcer prophylaxis: Not indicated        AYLA Score  ETOH Screening    Assessment/Plan  Central cord syndrome (HCC)- (present on admission)   Assessment & Plan    Weakness BUE and burning in RUE  Possible increased density within the cervical and proximal thoracic cervical canal which may be artifactual or related to hemorrhage within the CSF space.  MRI: Suspicion for focal myelopathic cord signal abnormality at C4 in the right paramedian cord possible small cord contusion  Spinal cord perfusion with goal MAP > 85 for 5 days per the Neurosurgeon  Robert Simon MD. Neurosurgery.     Dysphagia- (present on admission)   Assessment & Plan    Failed bedside swallow.   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  (+)Loss of consciousness  SLP for cognitive evaluation     Scalp laceration- (present on admission)   Assessment & Plan    Right scalp laceration  Will be approximated in the ER  Local wound care.   Suture removal in 7 days     Chronic renal failure- (present on admission)   Assessment & Plan    Avoid nephrotoxic agents and monitor laboratory studies     Type 2 diabetes mellitus (HCC)- (present on admission)   Assessment & Plan     Chronic condition treated with Starlix and Actos.  Difficult to control, admission serum glucose 467  HemA1C 6.2  High sliding scale insulin with Lantus     Trauma- (present on admission)   Assessment & Plan    Ground level fall. Brief loss of consciousness.  T-5000 Activation.  Drew Bonilla MD. Trauma Surgery.     Hyperlipemia- (present on admission)   Assessment & Plan    Chronic condition treated with Simvistatin.  Resumed maintenance medication.     Essential hypertension- (present on admission)   Assessment & Plan    Chronic condition treated with Lisinopril and Inderal.  Hold for now, spinal cord perfusion protocol       I independently reviewed pertinent clinical lab tests since admission and ordered additional follow up clinical lab tests.  I independently reviewed pertinent radiographic images and the radiologist's reports since admission and ordered additional follow up radiographic studies.  I reviewed the details of the available patient records and documentation by consulting physicians in EPIC up to today, summated the information, and utilized the information as warranted in today's medical decision making for this patient.  I personally evaluated the patient condition at bedside and discussed the daily plan(s) with available nursing staff, dieticians, social workers, pharmacists on rounds.    Abrupt change in neurologic status due to traumatic central cord injury requiring drug therapy requiring intensive monitoring for toxicity involving high complexity decision making initiated in an urgent mannerby assessing, manipulating, and supporting central nervous system function given the high probability of further deterioration in the patient's condition and threat to life.    Aggregated critical care time spent evaluating, reviewing documentation, providing care, and managing this patient exclusive of procedures: 40 minutes    Willard Abdi MD

## 2018-12-06 NOTE — DOCUMENTATION QUERY
DOCUMENTATION QUERY    PROVIDERS: Please select “Cosign w/ note”to reply to query.    To better represent the severity of illness of your patient, please review the following information and exercise your independent professional judgment in responding to this query. Please reference the information at the bottom of this query for clinical criteria to support CKD staging per [employer].     Chronic renal failure is documented in the H&P and the progress notes without specified stage.      Based upon the clinical findings, risk factors, and treatment, can this diagnosis be further specified?     • Chronic Kidney Disease Stage I      • Chronic Kidney Disease Stage II     • Chronic Kidney Disease Stage III    • Chronic Kidney Disease Stage IV    • Chronic Kidney Disease Stage V     • End Stage Renal Disease  • Other explanation of clinical findings  • Unable to determine     The medical record reflects the following:   Clinical Findings Unspecified chronic renal failure   12/3 BUN 44   Creatinine 2.53 with GFR of 25  12/4 BUN 39, 37   Creatinine 2.20, 2.19 with GFR of 29  12/5 BUN 31, 27  Creatinine 2.03 with GFR of 32   Treatment Avoid nephrotoxic agents   Monitory laboratory studies    Risk Factors Advanced age   Type 2 diabetes   Hypertension   Traumatic brain injury   Dysphagia  Central cord syndrome    Location within medical record History and Physical, Progress Notes and Lab Results      Thank you,   Clarissa Krishnamurthy RN  Clinical   [phone]

## 2018-12-06 NOTE — PROGRESS NOTES
Neurosurgery Progress Note    Subjective:  No neck pain    Exam:  Alert  In [place] collar; up in chair  Burning dysesthesia in shoulders improving  Right arm: deltoid 1/5, biceps 2/5, extensor carpi 3/5, triceps 3/5, hand intrinsics 4/5  Left arm: deltoid 2/5, biceps 3/5, extensor carpi 4/5, triceps 4/5, hand intrinsics 4+/5  Legs 5/5 bilaterally  Normal sensation in legs bilaterally    Pulse  Av.3  Min: 78  Max: 103  Resp  Avg: 15.8  Min: 8  Max: 53  Temp  Av.5 °C (97.7 °F)  Min: 35.9 °C (96.7 °F)  Max: 37.1 °C (98.8 °F)  SpO2  Av.9 %  Min: 95 %  Max: 99 %    No Data Recorded    Recent Labs      18   0500  18   0335  18   0400   WBC  9.7  8.3  7.0   RBC  3.86*  3.72*  3.32*   HEMOGLOBIN  11.3*  11.1*  9.8*   HEMATOCRIT  34.0*  32.4*  28.9*   MCV  88.1  87.1  87.0   MCH  29.3  29.8  29.5   MCHC  33.2*  34.3  33.9   RDW  52.3*  51.3*  51.1*   PLATELETCT  182  160*  136*   MPV  9.8  9.3  9.3     Recent Labs      18   0500  18   0335  18   0400   SODIUM  134*  138  135   POTASSIUM  4.2  4.1  4.3   CHLORIDE  107  109  108   CO2  19*  20  20   GLUCOSE  167*  144*  165*   BUN  37*  31*  27*   CREATININE  2.19*  2.03*  1.99*   CALCIUM  8.4*  8.4*  8.2*     Recent Labs      18   1300   APTT  29.1   INR  1.07           Intake/Output       18 0700 - 18 0659 18 0700 - 18 0659      1448-1946 3221-7287 Total 0567-8419 9284-7202 Total       Intake    P.O.  960  360 1320  --  -- --    P.O.  -- -- --    I.V.  1045.5  931.8 1977.3  155.6  -- 155.6    Magnesium Sulfate Volume 50 -- 50 -- -- --    Norepinephrine Volume 95.5 31.8 127.3 5.6 -- 5.6    Volume (mL) (NS infusion)  150 -- 150    Other  --  -- --  40  -- 40    Medications (PO/Enteral Liquids) -- -- -- 40 -- 40    Total Intake 2005.5 1291.8 3297.3 195.6 -- 195.6       Output    Urine  425  925 1350  --  -- --    Number of Times Voided 2 x 3 x 5 x -- -- --    Urine Void (mL)   -- -- --    Emesis  150  -- 150  --  -- --    Emesis 150 -- 150 -- -- --    Stool  --  -- --  --  -- --    Number of Times Stooled -- 0 x 0 x -- -- --    Total Output  -- -- --       Net I/O     1430.5 366.8 1797.3 195.6 -- 195.6            Intake/Output Summary (Last 24 hours) at 12/06/18 0837  Last data filed at 12/06/18 0800   Gross per 24 hour   Intake          3324.54 ml   Output             1400 ml   Net          1924.54 ml            • docusate sodium 100mg/10mL  100 mg BID   • midodrine  10 mg Q8HR   • insulin glargine  8 Units Q EVENING   • NS   Continuous   • acetaminophen  650 mg Q6HRS   • gabapentin  100 mg QAM   • heparin  5,000 Units Q8HRS   • insulin regular  3-14 Units 4X/DAY ACHS    And   • glucose 4 g  16 g Q15 MIN PRN    And   • dextrose 50%  25 mL Q15 MIN PRN   • NORepinephrine (LEVOPHED) infusion  0-30 mcg/min Continuous   • Phenylephrine infusion  0-300 mcg/min Continuous   • simvastatin  20 mg Nightly   • Respiratory Care per Protocol   Continuous RT   • Pharmacy Consult Request  1 Each PRN   • senna-docusate  1 Tab Nightly   • senna-docusate  1 Tab Q24HRS PRN   • polyethylene glycol/lytes  1 Packet BID   • magnesium hydroxide  30 mL DAILY   • bisacodyl  10 mg Q24HRS PRN   • fleet  1 Each Once PRN   • oxyCODONE immediate-release  5 mg Q3HRS PRN   • oxyCODONE immediate release  10 mg Q3HRS PRN   • morphine injection  1-4 mg Q HOUR PRN   • ondansetron  4 mg Q4HRS PRN       Assessment and Plan:  Hospital day #4  Doing well clinically; dysesthesia improving  Keep MAP 85-90 mmHg for 5 days  Prophylactic anticoagulation: yes         Start date/time: OK to start from NSx POV  OK to mobilize in [place] collar; does not need collar in bed; may remove for hygiene  Rehab evaluation

## 2018-12-06 NOTE — CONSULTS
Medical chart review completed on 12/6/2018    Patient is a 78 y.o. year-old male with a past medical history significant for diabetes, hyperlipidemia, hypertension, renal impairment, reported hemorrhagic disorder admitted to [facility] on 12/3/2018 12:22 PM following a ground-level fall.  He fell and struck his head and right arm on the curb.  He reported a brief loss of consciousness. he was reportedly on his way to the urgent care due to elevated blood glucose.    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  He had bilateral upper limb weakness.  There is also a painful paresthesia in the right upper limb.  He was seen by Dr. Simon from neurosurgery on December 3 with recommendation for maintaining mean arterial pressure at 85-90 with ongoing conservative management and monitoring.  He recommended consideration of surgical decompression if there is no clear improvement.      He is on the spinal cord perfusion protocol for a total of 5 days (through 12/8).  He is having dysphasia and will require speech-language pathology evaluation.  Due to his loss of consciousness lasting less than an hour, he will also benefit from speech-language pathology cognitive evaluations.    He is currently on Tylenol, sliding scale insulin, gabapentin, Lantus, ProAmatine 10 mg every 8 hours to maintain blood pressure, norepinephrine infusion to maintain blood pressure, oral oxycodone for pain control    He has some anemia with hemoglobin of 9.8.  He has chronic kidney disease with a creatinine of 1.99 today.    He was seen by speech-language pathology on December 6 and was upgraded to dysphagia 2 diet with nectar thick liquids.  He was seen by physical therapy on December 5 and required moderate assistance to ambulate 10 feet with a front wheeled walker.  He required moderate assist for bed mobility.  He had knee buckling and ataxic foot placement.  He was seen by occupational therapy on December  "5 and required max assist for lower body dressing and moderate assist for eating.        PMH:  Past Medical History:   Diagnosis Date   • Diabetes (HCC)    • Hemorrhagic disorder (HCC)     \"bleeds easily\"   • High cholesterol    • Hypertension    • Jaundice 5/8/2016   • Renal disorder     insufficiency \"due to metformin\"       PSH:  Past Surgical History:   Procedure Laterality Date   • GASTROSCOPY-ENDO N/A 6/3/2016    Procedure: GASTROSCOPY-ENDO;  Surgeon: Jasper Donnelly M.D.;  Location: SURGERY [facility];  Service:    • EGD W/ENDOSCOPIC ULTRASOUND N/A 6/3/2016    Procedure: EGD W/ENDOSCOPIC ULTRASOUND UPPER;  Surgeon: Jasper Donnelly M.D.;  Location: SURGERY [facility];  Service:    • EGD WITH ASP/BX N/A 6/3/2016    Procedure: EGD WITH ASP/BX - FNA, DUODENAL BIOPSIES, FNA OF PANCREAS;  Surgeon: Jasper Donnelly M.D.;  Location: [facility];  Service:    • ERCP  5/2016   • CHOLECYSTECTOMY  2006    gallstones removed   • HIP ARTHROPLASTY TOTAL Left 2001    left total hip         MEDICATIONS:  Current Facility-Administered Medications   Medication Dose   • midodrine (PROAMATINE) tablet 10 mg  10 mg   • norepinephrine (LEVOPHED) 8 mg in  mL Infusion  0-30 mcg/min   • docusate sodium 100mg/10mL (COLACE) solution 100 mg  100 mg   • insulin glargine (LANTUS) injection 8 Units  8 Units   • NS infusion     • acetaminophen (TYLENOL) tablet 650 mg  650 mg   • gabapentin (NEURONTIN) capsule 100 mg  100 mg   • heparin injection 5,000 Units  5,000 Units   • insulin regular (HUMULIN R) injection 3-14 Units  3-14 Units    And   • glucose 4 g chewable tablet 16 g  16 g    And   • dextrose 50% (D50W) injection 25 mL  25 mL   • simvastatin (ZOCOR) tablet 20 mg  20 mg   • Respiratory Care per Protocol     • Pharmacy Consult Request ...Pain Management Review 1 Each  1 Each   • senna-docusate (PERICOLACE or SENOKOT S) 8.6-50 MG per tablet 1 Tab  1 Tab   • senna-docusate (PERICOLACE or SENOKOT S) 8.6-50 " MG per tablet 1 Tab  1 Tab   • polyethylene glycol/lytes (MIRALAX) PACKET 1 Packet  1 Packet   • magnesium hydroxide (MILK OF MAGNESIA) suspension 30 mL  30 mL   • bisacodyl (DULCOLAX) suppository 10 mg  10 mg   • fleet enema 133 mL  1 Each   • oxyCODONE immediate-release (ROXICODONE) tablet 5 mg  5 mg   • oxyCODONE immediate-release (ROXICODONE) tablet 10 mg  10 mg   • morphine (pf) 10 mg/mL injection 1-4 mg  1-4 mg   • ondansetron (ZOFRAN) syringe/vial injection 4 mg  4 mg       ALLERGIES:  Patient has no known allergies.    PSYCHOSOCIAL HISTORY:    Chart review indicates that he lives in a second level apartment with no stairs to enter by himself.      DISCUSSION AND RECOMMENDATIONS:  The patient is a 78-year-old male with a central cord syndrome and mild traumatic brain injury.  He is currently on a spinal cord perfusion protocol requiring IV vasopressor support.  This is to continue through December 8.  He currently requires ongoing therapy from PT/OT/SLP.  His pain appears well-controlled with reasonable amounts of oxycodone.    As he continues to improve, he will likely need inpatient rehabilitation.  He currently lives alone, and with his functional deficits, he will likely require intermittent assistance throughout the day.  If he has a firm discharge plan with identified support, he would be a good candidate for acute inpatient rehabilitation.  If he has no support available, he would likely not progress enough to achieve full independence during a brief rehabilitation hospitalization.  In this case, he would need a skilled nursing facility level of care for more gradual rehabilitation.      The patient will need to be tapered off of IV pain medications and blood pressure medications prior to admission to rehabilitation.    The patient should be aware that pain will need to be controlled with oral medications totaling less than 90 mg Morphine equivalents by the time of discharge from a comprehensive  rehabilitation program    Note: if the patient continues to improve while waiting for bed availability/medical clearance/insurance authorization, and no longer requires 2 of of 3 therapy services (PT/OT/SLP), the patient will no longer meet criteria for acute inpatient rehabilitation.    Estimated length of stay is approximately 14-21 days.     Thank you for the consultation. Please call with any questions regarding this recommendation.    Mustapha Brandt M.D.  Spinal Cord Injury Medicine  12/6/2018

## 2018-12-07 ENCOUNTER — APPOINTMENT (OUTPATIENT)
Dept: RADIOLOGY | Facility: MEDICAL CENTER | Age: 78
DRG: 964 | End: 2018-12-07
Attending: NURSE PRACTITIONER
Payer: MEDICARE

## 2018-12-07 LAB
ANION GAP SERPL CALC-SCNC: 9 MMOL/L (ref 0–11.9)
BASOPHILS # BLD AUTO: 0.4 % (ref 0–1.8)
BASOPHILS # BLD: 0.03 K/UL (ref 0–0.12)
BUN SERPL-MCNC: 23 MG/DL (ref 8–22)
CALCIUM SERPL-MCNC: 8.4 MG/DL (ref 8.5–10.5)
CHLORIDE SERPL-SCNC: 106 MMOL/L (ref 96–112)
CO2 SERPL-SCNC: 20 MMOL/L (ref 20–33)
CREAT SERPL-MCNC: 1.91 MG/DL (ref 0.5–1.4)
EOSINOPHIL # BLD AUTO: 0.09 K/UL (ref 0–0.51)
EOSINOPHIL NFR BLD: 1.2 % (ref 0–6.9)
ERYTHROCYTE [DISTWIDTH] IN BLOOD BY AUTOMATED COUNT: 50.5 FL (ref 35.9–50)
GLUCOSE BLD-MCNC: 132 MG/DL (ref 65–99)
GLUCOSE BLD-MCNC: 153 MG/DL (ref 65–99)
GLUCOSE BLD-MCNC: 153 MG/DL (ref 65–99)
GLUCOSE BLD-MCNC: 168 MG/DL (ref 65–99)
GLUCOSE SERPL-MCNC: 130 MG/DL (ref 65–99)
HCT VFR BLD AUTO: 31.3 % (ref 42–52)
HGB BLD-MCNC: 11 G/DL (ref 14–18)
IMM GRANULOCYTES # BLD AUTO: 0.04 K/UL (ref 0–0.11)
IMM GRANULOCYTES NFR BLD AUTO: 0.5 % (ref 0–0.9)
LYMPHOCYTES # BLD AUTO: 0.47 K/UL (ref 1–4.8)
LYMPHOCYTES NFR BLD: 6.2 % (ref 22–41)
MCH RBC QN AUTO: 30.2 PG (ref 27–33)
MCHC RBC AUTO-ENTMCNC: 35.1 G/DL (ref 33.7–35.3)
MCV RBC AUTO: 86 FL (ref 81.4–97.8)
MONOCYTES # BLD AUTO: 0.86 K/UL (ref 0–0.85)
MONOCYTES NFR BLD AUTO: 11.3 % (ref 0–13.4)
NEUTROPHILS # BLD AUTO: 6.1 K/UL (ref 1.82–7.42)
NEUTROPHILS NFR BLD: 80.4 % (ref 44–72)
NRBC # BLD AUTO: 0 K/UL
NRBC BLD-RTO: 0 /100 WBC
PLATELET # BLD AUTO: 139 K/UL (ref 164–446)
PMV BLD AUTO: 9.2 FL (ref 9–12.9)
POTASSIUM SERPL-SCNC: 4.1 MMOL/L (ref 3.6–5.5)
RBC # BLD AUTO: 3.64 M/UL (ref 4.7–6.1)
SODIUM SERPL-SCNC: 135 MMOL/L (ref 135–145)
WBC # BLD AUTO: 7.6 K/UL (ref 4.8–10.8)

## 2018-12-07 PROCEDURE — 700111 HCHG RX REV CODE 636 W/ 250 OVERRIDE (IP): Performed by: SURGERY

## 2018-12-07 PROCEDURE — 97112 NEUROMUSCULAR REEDUCATION: CPT

## 2018-12-07 PROCEDURE — 700102 HCHG RX REV CODE 250 W/ 637 OVERRIDE(OP): Performed by: NURSE PRACTITIONER

## 2018-12-07 PROCEDURE — 85025 COMPLETE CBC W/AUTO DIFF WBC: CPT

## 2018-12-07 PROCEDURE — 700112 HCHG RX REV CODE 229: Performed by: NURSE PRACTITIONER

## 2018-12-07 PROCEDURE — A9270 NON-COVERED ITEM OR SERVICE: HCPCS | Performed by: SURGERY

## 2018-12-07 PROCEDURE — 700102 HCHG RX REV CODE 250 W/ 637 OVERRIDE(OP): Performed by: SURGERY

## 2018-12-07 PROCEDURE — 80048 BASIC METABOLIC PNL TOTAL CA: CPT

## 2018-12-07 PROCEDURE — 97535 SELF CARE MNGMENT TRAINING: CPT

## 2018-12-07 PROCEDURE — A9270 NON-COVERED ITEM OR SERVICE: HCPCS | Performed by: NURSE PRACTITIONER

## 2018-12-07 PROCEDURE — 71045 X-RAY EXAM CHEST 1 VIEW: CPT

## 2018-12-07 PROCEDURE — 700101 HCHG RX REV CODE 250: Performed by: SURGERY

## 2018-12-07 PROCEDURE — 700111 HCHG RX REV CODE 636 W/ 250 OVERRIDE (IP): Performed by: NURSE PRACTITIONER

## 2018-12-07 PROCEDURE — 700105 HCHG RX REV CODE 258: Performed by: SURGERY

## 2018-12-07 PROCEDURE — 82962 GLUCOSE BLOOD TEST: CPT | Mod: 91

## 2018-12-07 PROCEDURE — 99291 CRITICAL CARE FIRST HOUR: CPT | Performed by: SURGERY

## 2018-12-07 PROCEDURE — 770022 HCHG ROOM/CARE - ICU (200)

## 2018-12-07 PROCEDURE — 97110 THERAPEUTIC EXERCISES: CPT

## 2018-12-07 RX ADMIN — ACETAMINOPHEN 650 MG: 325 TABLET, FILM COATED ORAL at 18:26

## 2018-12-07 RX ADMIN — INSULIN HUMAN 3 UNITS: 100 INJECTION, SOLUTION PARENTERAL at 20:48

## 2018-12-07 RX ADMIN — OXYCODONE HYDROCHLORIDE 10 MG: 10 TABLET ORAL at 04:12

## 2018-12-07 RX ADMIN — DOCUSATE SODIUM 100 MG: 50 LIQUID ORAL at 05:17

## 2018-12-07 RX ADMIN — ONDANSETRON 4 MG: 2 INJECTION INTRAMUSCULAR; INTRAVENOUS at 04:05

## 2018-12-07 RX ADMIN — NOREPINEPHRINE BITARTRATE 2 MCG/MIN: 1 INJECTION INTRAVENOUS at 07:18

## 2018-12-07 RX ADMIN — HEPARIN SODIUM 5000 UNITS: 5000 INJECTION, SOLUTION INTRAVENOUS; SUBCUTANEOUS at 20:46

## 2018-12-07 RX ADMIN — MORPHINE SULFATE 2 MG: 10 INJECTION INTRAVENOUS at 20:57

## 2018-12-07 RX ADMIN — MIDODRINE HYDROCHLORIDE 10 MG: 5 TABLET ORAL at 05:25

## 2018-12-07 RX ADMIN — HEPARIN SODIUM 5000 UNITS: 5000 INJECTION, SOLUTION INTRAVENOUS; SUBCUTANEOUS at 15:17

## 2018-12-07 RX ADMIN — SIMVASTATIN 20 MG: 40 TABLET, FILM COATED ORAL at 20:46

## 2018-12-07 RX ADMIN — SODIUM CHLORIDE: 9 INJECTION, SOLUTION INTRAVENOUS at 08:15

## 2018-12-07 RX ADMIN — HEPARIN SODIUM 5000 UNITS: 5000 INJECTION, SOLUTION INTRAVENOUS; SUBCUTANEOUS at 05:28

## 2018-12-07 RX ADMIN — SODIUM CHLORIDE: 9 INJECTION, SOLUTION INTRAVENOUS at 20:57

## 2018-12-07 RX ADMIN — ACETAMINOPHEN 650 MG: 325 TABLET, FILM COATED ORAL at 15:17

## 2018-12-07 RX ADMIN — MIDODRINE HYDROCHLORIDE 10 MG: 5 TABLET ORAL at 20:46

## 2018-12-07 RX ADMIN — DOCUSATE SODIUM 100 MG: 50 LIQUID ORAL at 18:26

## 2018-12-07 RX ADMIN — INSULIN GLARGINE 8 UNITS: 100 INJECTION, SOLUTION SUBCUTANEOUS at 18:28

## 2018-12-07 RX ADMIN — MIDODRINE HYDROCHLORIDE 10 MG: 5 TABLET ORAL at 15:17

## 2018-12-07 RX ADMIN — INSULIN HUMAN 3 UNITS: 100 INJECTION, SOLUTION PARENTERAL at 08:34

## 2018-12-07 RX ADMIN — INSULIN HUMAN 3 UNITS: 100 INJECTION, SOLUTION PARENTERAL at 15:16

## 2018-12-07 RX ADMIN — GABAPENTIN 100 MG: 100 CAPSULE ORAL at 05:25

## 2018-12-07 RX ADMIN — ACETAMINOPHEN 650 MG: 325 TABLET, FILM COATED ORAL at 05:18

## 2018-12-07 ASSESSMENT — PAIN SCALES - GENERAL
PAINLEVEL_OUTOF10: 2
PAINLEVEL_OUTOF10: 0
PAINLEVEL_OUTOF10: 0
PAINLEVEL_OUTOF10: 5
PAINLEVEL_OUTOF10: 0
PAINLEVEL_OUTOF10: 0
PAINLEVEL_OUTOF10: 6
PAINLEVEL_OUTOF10: 5
PAINLEVEL_OUTOF10: 8
PAINLEVEL_OUTOF10: 0
PAINLEVEL_OUTOF10: 7
PAINLEVEL_OUTOF10: 0

## 2018-12-07 ASSESSMENT — COGNITIVE AND FUNCTIONAL STATUS - GENERAL
EATING MEALS: A LITTLE
DAILY ACTIVITIY SCORE: 14
DRESSING REGULAR LOWER BODY CLOTHING: A LOT
DRESSING REGULAR UPPER BODY CLOTHING: A LOT
HELP NEEDED FOR BATHING: A LOT
PERSONAL GROOMING: A LITTLE
SUGGESTED CMS G CODE MODIFIER DAILY ACTIVITY: CK
TOILETING: A LOT

## 2018-12-07 ASSESSMENT — ENCOUNTER SYMPTOMS: NECK PAIN: 0

## 2018-12-07 NOTE — PROGRESS NOTES
Trauma / Surgical Daily Progress Note    Date of Service  12/7/2018    Chief Complaint  78 y.o. male admitted 12/3/2018 s/p fall with associated central cord syndrome    Interval Events  Hospital day #5  Critically ill  Seen on rounds and discussed with multidisciplinary team  Critical care interventions include:  Ongoing pressor support for spinal cord hyperperfusion-on levophed-actively titrating  nutritional support  Ongoing therapies    Review of Systems  Review of Systems   Musculoskeletal: Negative for neck pain.   All other systems reviewed and are negative.       Vital Signs for last 24 hours  Temp:  [36.3 °C (97.4 °F)-37.1 °C (98.7 °F)] 36.5 °C (97.7 °F)  Pulse:  [] 92  Resp:  [11-38] 24    Hemodynamic parameters for last 24 hours       Respiratory Data     Respiration: (!) 24, Pulse Oximetry: 95 %     Work Of Breathing / Effort: Mild  RUL Breath Sounds: Clear;Diminished, RML Breath Sounds: Diminished, RLL Breath Sounds: Diminished, CIRA Breath Sounds: Clear, LLL Breath Sounds: Diminished    Physical Exam  Physical Exam   Constitutional: He is oriented to person, place, and time. He appears well-developed and well-nourished. No distress.   HENT:   Head: Normocephalic and atraumatic.   Eyes: Pupils are equal, round, and reactive to light. EOM are normal. No scleral icterus.   Neck:   c-collar on   Cardiovascular: Normal rate, regular rhythm and normal heart sounds.    Pulmonary/Chest: Effort normal and breath sounds normal. No respiratory distress.   Abdominal: Soft. Bowel sounds are normal. He exhibits no distension.   Musculoskeletal: Normal range of motion.   Neurological: He is alert and oriented to person, place, and time. No cranial nerve deficit.   Skin: Skin is warm and dry.   Psychiatric: He has a normal mood and affect. His behavior is normal. Thought content normal.       Laboratory  Recent Results (from the past 24 hour(s))   ACCU-CHEK GLUCOSE    Collection Time: 12/06/18  6:12 PM   Result  Value Ref Range    Glucose - Accu-Ck 121 (H) 65 - 99 mg/dL   ACCU-CHEK GLUCOSE    Collection Time: 12/06/18  9:13 PM   Result Value Ref Range    Glucose - Accu-Ck 120 (H) 65 - 99 mg/dL   CBC WITH DIFFERENTIAL    Collection Time: 12/07/18  4:10 AM   Result Value Ref Range    WBC 7.6 4.8 - 10.8 K/uL    RBC 3.64 (L) 4.70 - 6.10 M/uL    Hemoglobin 11.0 (L) 14.0 - 18.0 g/dL    Hematocrit 31.3 (L) 42.0 - 52.0 %    MCV 86.0 81.4 - 97.8 fL    MCH 30.2 27.0 - 33.0 pg    MCHC 35.1 33.7 - 35.3 g/dL    RDW 50.5 (H) 35.9 - 50.0 fL    Platelet Count 139 (L) 164 - 446 K/uL    MPV 9.2 9.0 - 12.9 fL    Neutrophils-Polys 80.40 (H) 44.00 - 72.00 %    Lymphocytes 6.20 (L) 22.00 - 41.00 %    Monocytes 11.30 0.00 - 13.40 %    Eosinophils 1.20 0.00 - 6.90 %    Basophils 0.40 0.00 - 1.80 %    Immature Granulocytes 0.50 0.00 - 0.90 %    Nucleated RBC 0.00 /100 WBC    Neutrophils (Absolute) 6.10 1.82 - 7.42 K/uL    Lymphs (Absolute) 0.47 (L) 1.00 - 4.80 K/uL    Monos (Absolute) 0.86 (H) 0.00 - 0.85 K/uL    Eos (Absolute) 0.09 0.00 - 0.51 K/uL    Baso (Absolute) 0.03 0.00 - 0.12 K/uL    Immature Granulocytes (abs) 0.04 0.00 - 0.11 K/uL    NRBC (Absolute) 0.00 K/uL   BASIC METABOLIC PANEL    Collection Time: 12/07/18  4:10 AM   Result Value Ref Range    Sodium 135 135 - 145 mmol/L    Potassium 4.1 3.6 - 5.5 mmol/L    Chloride 106 96 - 112 mmol/L    Co2 20 20 - 33 mmol/L    Glucose 130 (H) 65 - 99 mg/dL    Bun 23 (H) 8 - 22 mg/dL    Creatinine 1.91 (H) 0.50 - 1.40 mg/dL    Calcium 8.4 (L) 8.5 - 10.5 mg/dL    Anion Gap 9.0 0.0 - 11.9   ESTIMATED GFR    Collection Time: 12/07/18  4:10 AM   Result Value Ref Range    GFR If  42 (A) >60 mL/min/1.73 m 2    GFR If Non  34 (A) >60 mL/min/1.73 m 2   ACCU-CHEK GLUCOSE    Collection Time: 12/07/18  8:32 AM   Result Value Ref Range    Glucose - Accu-Ck 153 (H) 65 - 99 mg/dL       Fluids    Intake/Output Summary (Last 24 hours) at 12/07/18 1336  Last data filed at 12/07/18  0800   Gross per 24 hour   Intake          2364.67 ml   Output             1051 ml   Net          1313.67 ml       Core Measures & Quality Metrics  Medications reviewed and Radiology images reviewed  Wallis catheter: Critically Ill - Requiring Accurate Measurement of Urinary Output and No Wallis      DVT Prophylaxis: Heparin  DVT prophylaxis - mechanical: SCDs  Ulcer prophylaxis: Not indicated        AYLA Score  ETOH Screening    Assessment/Plan  Central cord syndrome (HCC)- (present on admission)   Assessment & Plan    Weakness BUE and burning in RUE  Possible increased density within the cervical and proximal thoracic cervical canal which may be artifactual or related to hemorrhage within the CSF space.  MRI: Suspicion for focal myelopathic cord signal abnormality at C4 in the right paramedian cord possible small cord contusion  Spinal cord perfusion with goal MAP > 85 for 5 days per the Neurosurgeon.  Robert Simon MD. Neurosurgery.     Dysphagia- (present on admission)   Assessment & Plan    Failed bedside swallow.   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  (+)Loss of consciousness  SLP for cognitive evaluation.     Scalp laceration- (present on admission)   Assessment & Plan    Right scalp laceration  Will be approximated in the ER  Local wound care.   Suture removal in 7 days.     Chronic renal failure- (present on admission)   Assessment & Plan    Avoid nephrotoxic agents and monitor laboratory studies  Parameters slowly improving     Type 2 diabetes mellitus (HCC)- (present on  admission)   Assessment & Plan    Chronic condition treated with Starlix and Actos.  Difficult to control, admission serum glucose 467  HemA1C 6.2  High sliding scale insulin with Lantus     Trauma- (present on admission)   Assessment & Plan    Ground level fall. Brief loss of consciousness.  T-5000 Activation.  Drew Bonilla MD. Trauma Surgery.     Hyperlipemia- (present on admission)   Assessment & Plan    Chronic condition treated with Simvistatin.  Resumed maintenance medication.     Essential hypertension- (present on admission)   Assessment & Plan    Chronic condition treated with Lisinopril and Inderal.  Hold for now, spinal cord perfusion protocol         Discussed patient condition with RN, RT, Pharmacy, Dietary,  and Patient.  CRITICAL CARE TIME EXCLUDING PROCEDURES: 32    minutes

## 2018-12-07 NOTE — PROGRESS NOTES
Neurosurgery Progress Note    Subjective:  No neck pain  Some persistent burning dysesthesias    Exam:  Alert & oriented x4, speech fluent & appropriate  In [place] collar; up in chair  Right arm: deltoid 1/5, biceps 2/5, extensor carpi 3/5, triceps 3/5, hand intrinsics 4/5  Left arm: deltoid 2/5, biceps 3/5, extensor carpi 4/5, triceps 4/5, hand intrinsics 4+/5  Legs 5/5 bilaterally  Normal sensation in legs bilaterally    Pulse  Av.2  Min: 77  Max: 103  Resp  Av  Min: 11  Max: 38  Temp  Av.7 °C (98.1 °F)  Min: 36.1 °C (97 °F)  Max: 37.1 °C (98.7 °F)  SpO2  Av %  Min: 95 %  Max: 99 %    No Data Recorded    Recent Labs      18   0335  18   0400  18   0410   WBC  8.3  7.0  7.6   RBC  3.72*  3.32*  3.64*   HEMOGLOBIN  11.1*  9.8*  11.0*   HEMATOCRIT  32.4*  28.9*  31.3*   MCV  87.1  87.0  86.0   MCH  29.8  29.5  30.2   MCHC  34.3  33.9  35.1   RDW  51.3*  51.1*  50.5*   PLATELETCT  160*  136*  139*   MPV  9.3  9.3  9.2     Recent Labs      18   0335  18   0400  18   0410   SODIUM  138  135  135   POTASSIUM  4.1  4.3  4.1   CHLORIDE  109  108  106   CO2  20  20  20   GLUCOSE  144*  165*  130*   BUN  31*  27*  23*   CREATININE  2.03*  1.99*  1.91*   CALCIUM  8.4*  8.2*  8.4*               Intake/Output       18 - 18 - 18 Total  Total       Intake    P.O.  --  600 600  --  -- --    P.O. -- 600 600 -- -- --    I.V.  931.4  911.4 1842.8  --  -- --    Norepinephrine Volume 31.4 11.4 42.8 -- -- --    Volume (mL) (NS infusion)  -- -- --    Other  520  -- 520  --  -- --    Medications (PO/Enteral Liquids) 520 -- 520 -- -- --    Total Intake 1451.4 1511.4 2962.8 -- -- --       Output    Urine  700  551 1251  --  -- --    Number of Times Voided 4 x 479 x 483 x -- -- --    Urine Void (mL)  -- -- --    Stool/Urine  --  150 150  --  -- --    Mixed Stool / Urine (ml)  -- 150 150 -- -- --    Stool  --  -- --  --  -- --    Number of Times Stooled 0 x 3 x 3 x -- -- --    Total Output  -- -- --       Net I/O     751.4 810.4 1561.8 -- -- --            Intake/Output Summary (Last 24 hours) at 12/07/18 0812  Last data filed at 12/07/18 0600   Gross per 24 hour   Intake          2767.21 ml   Output             1401 ml   Net          1366.21 ml            • midodrine  10 mg Q8HR   • NORepinephrine (LEVOPHED) infusion  0-30 mcg/min Continuous   • docusate sodium 100mg/10mL  100 mg BID   • insulin glargine  8 Units Q EVENING   • NS   Continuous   • acetaminophen  650 mg Q6HRS   • gabapentin  100 mg QAM   • heparin  5,000 Units Q8HRS   • insulin regular  3-14 Units 4X/DAY ACHS    And   • glucose 4 g  16 g Q15 MIN PRN    And   • dextrose 50%  25 mL Q15 MIN PRN   • simvastatin  20 mg Nightly   • Respiratory Care per Protocol   Continuous RT   • Pharmacy Consult Request  1 Each PRN   • senna-docusate  1 Tab Nightly   • senna-docusate  1 Tab Q24HRS PRN   • polyethylene glycol/lytes  1 Packet BID   • magnesium hydroxide  30 mL DAILY   • bisacodyl  10 mg Q24HRS PRN   • fleet  1 Each Once PRN   • oxyCODONE immediate-release  5 mg Q3HRS PRN   • oxyCODONE immediate release  10 mg Q3HRS PRN   • morphine injection  1-4 mg Q HOUR PRN   • ondansetron  4 mg Q4HRS PRN       Assessment and Plan:  Hospital day #5  Doing well clinically; dysesthesia improving  Keep MAP 85-90 mmHg for 5 days (last day today)  Prophylactic anticoagulation: yes         Start date/time: OK to start from NSx POV  OK to mobilize in [place] collar; does not need collar in bed; may remove for hygiene  Rehab evaluation- OK to floor or rehab tomorrow (Day 6) from NSGY POV  Discussed with Dr Simon

## 2018-12-07 NOTE — DISCHARGE PLANNING
Anticipated Discharge Disposition: Rehab    Action: LSW was updated during IDT rounds that pt will be medically cleared for [facility]. LSW spoke to BLANE [facility] who will have [facility] re-consult to see if they can accept pt.     Barriers to Discharge: None    Plan: Waiting on acceptance/denial from [facility]

## 2018-12-07 NOTE — PROGRESS NOTES
NSG PA at bedside to assess patient.  Today is the last day of hyperperfusion.  Would be okay to transfer to acute rehab tomorrow from NSG standpoint.

## 2018-12-07 NOTE — THERAPY
"Occupational Therapy Treatment completed with focus on ADLs, ADL transfers, patient education and upper extremity function.  Functional Status:  Pt seen today for OT treatment. He remains pleasant, cooperative, and motivated. He was able to feed himself with CGA-Sarah using his LUE and built up , an improvement from initial evaluation. He needed Sarah for balance while grooming in stance at the sink. He needed modA sit<>stand. Sarah for functional mobility with FWW, about 10 feet total during session. Attempted LB dressing, pt reports at baseline he doffs socks in supine, and dons socks sitting EOB with sock aid. He reports having done this for about 10 years. Will trial next session. He participated in A/AROM exercises to BUE in all planes. RUE still remains weaker than LUE but he had improved strength in LUE today as demonstrated by increased ability to feed himself. He remains limited by weakness, fatigue, impaired balance, and pain which impacts independence in ADLs and functional mobility.  Plan of Care: Will benefit from Occupational Therapy 5 times per week  Discharge Recommendations:  Equipment Will Continue to Assess for Equipment Needs. Recommend inpatient transitional care services for continued occupational therapy services. Please consider physiatry (PM&R) consult.       See \"Rehab Therapy-Acute\" Patient Summary Report for complete documentation.   "

## 2018-12-07 NOTE — PROGRESS NOTES
Discussed case with transitional care coordinator today.    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  He is currently on a spinal cord perfusion protocol requiring IV vasopressor support.  This is to continue through December 8.    Patient will be cleared from hyperperfusion protocol tomorrow.  He has limited support from his sister 3 times a week.  Due to his central cord syndrome and impaired hand function, I had requested occupational therapy reevaluation of toileting abilities, but this was not possible today due to his current status in the ICU.    The transitional care team will continue to follow the patient.  Recommend occupational therapy reevaluation on December 9 when he has left the intensive care unit and has completed the hyperperfusion protocol.      At discharge from the rehabilitation hospital, he would need to be able to independently manage his bowel and bladder as well as self administer insulin.  Currently he is still requiring the use of built-up utensils and his ability to manage bowel and bladder has not yet been established.    If he is able to show improved hand function with occupational therapy and a reasonable expectation of being modified independent with bladder/bowel management after OT eval he would qualify for an inpatient rehabilitation stay lasting 14-21 days.  If occupational therapy evaluation on December 9 shows limited potential for hygiene and medication management, he would likely require skilled nursing facility placement.    Mustapha Brandt MD  Spinal Cord Injury Medicine  12/7/2018

## 2018-12-08 ENCOUNTER — APPOINTMENT (OUTPATIENT)
Dept: RADIOLOGY | Facility: MEDICAL CENTER | Age: 78
DRG: 964 | End: 2018-12-08
Attending: NURSE PRACTITIONER
Payer: MEDICARE

## 2018-12-08 LAB
ANION GAP SERPL CALC-SCNC: 10 MMOL/L (ref 0–11.9)
BASOPHILS # BLD AUTO: 0.3 % (ref 0–1.8)
BASOPHILS # BLD: 0.02 K/UL (ref 0–0.12)
BUN SERPL-MCNC: 21 MG/DL (ref 8–22)
CALCIUM SERPL-MCNC: 9.1 MG/DL (ref 8.5–10.5)
CHLORIDE SERPL-SCNC: 106 MMOL/L (ref 96–112)
CO2 SERPL-SCNC: 18 MMOL/L (ref 20–33)
CREAT SERPL-MCNC: 1.91 MG/DL (ref 0.5–1.4)
EOSINOPHIL # BLD AUTO: 0.07 K/UL (ref 0–0.51)
EOSINOPHIL NFR BLD: 1 % (ref 0–6.9)
ERYTHROCYTE [DISTWIDTH] IN BLOOD BY AUTOMATED COUNT: 51.8 FL (ref 35.9–50)
GLUCOSE BLD-MCNC: 117 MG/DL (ref 65–99)
GLUCOSE BLD-MCNC: 120 MG/DL (ref 65–99)
GLUCOSE BLD-MCNC: 153 MG/DL (ref 65–99)
GLUCOSE BLD-MCNC: 158 MG/DL (ref 65–99)
GLUCOSE SERPL-MCNC: 127 MG/DL (ref 65–99)
HCT VFR BLD AUTO: 32.7 % (ref 42–52)
HGB BLD-MCNC: 10.9 G/DL (ref 14–18)
IMM GRANULOCYTES # BLD AUTO: 0.02 K/UL (ref 0–0.11)
IMM GRANULOCYTES NFR BLD AUTO: 0.3 % (ref 0–0.9)
LYMPHOCYTES # BLD AUTO: 0.44 K/UL (ref 1–4.8)
LYMPHOCYTES NFR BLD: 6 % (ref 22–41)
MCH RBC QN AUTO: 29.1 PG (ref 27–33)
MCHC RBC AUTO-ENTMCNC: 33.3 G/DL (ref 33.7–35.3)
MCV RBC AUTO: 87.4 FL (ref 81.4–97.8)
MONOCYTES # BLD AUTO: 0.83 K/UL (ref 0–0.85)
MONOCYTES NFR BLD AUTO: 11.3 % (ref 0–13.4)
NEUTROPHILS # BLD AUTO: 5.97 K/UL (ref 1.82–7.42)
NEUTROPHILS NFR BLD: 81.1 % (ref 44–72)
NRBC # BLD AUTO: 0 K/UL
NRBC BLD-RTO: 0 /100 WBC
PLATELET # BLD AUTO: 144 K/UL (ref 164–446)
PMV BLD AUTO: 8.9 FL (ref 9–12.9)
POTASSIUM SERPL-SCNC: 4.2 MMOL/L (ref 3.6–5.5)
RBC # BLD AUTO: 3.74 M/UL (ref 4.7–6.1)
SODIUM SERPL-SCNC: 134 MMOL/L (ref 135–145)
WBC # BLD AUTO: 7.4 K/UL (ref 4.8–10.8)

## 2018-12-08 PROCEDURE — A9270 NON-COVERED ITEM OR SERVICE: HCPCS | Performed by: SURGERY

## 2018-12-08 PROCEDURE — A9270 NON-COVERED ITEM OR SERVICE: HCPCS | Performed by: NURSE PRACTITIONER

## 2018-12-08 PROCEDURE — 700111 HCHG RX REV CODE 636 W/ 250 OVERRIDE (IP): Performed by: SURGERY

## 2018-12-08 PROCEDURE — 700102 HCHG RX REV CODE 250 W/ 637 OVERRIDE(OP): Performed by: SURGERY

## 2018-12-08 PROCEDURE — 770001 HCHG ROOM/CARE - MED/SURG/GYN PRIV*

## 2018-12-08 PROCEDURE — 82962 GLUCOSE BLOOD TEST: CPT

## 2018-12-08 PROCEDURE — 99233 SBSQ HOSP IP/OBS HIGH 50: CPT | Performed by: SURGERY

## 2018-12-08 PROCEDURE — 80048 BASIC METABOLIC PNL TOTAL CA: CPT

## 2018-12-08 PROCEDURE — 85025 COMPLETE CBC W/AUTO DIFF WBC: CPT

## 2018-12-08 PROCEDURE — 700102 HCHG RX REV CODE 250 W/ 637 OVERRIDE(OP): Performed by: NURSE PRACTITIONER

## 2018-12-08 PROCEDURE — 71045 X-RAY EXAM CHEST 1 VIEW: CPT

## 2018-12-08 PROCEDURE — 700112 HCHG RX REV CODE 229: Performed by: NURSE PRACTITIONER

## 2018-12-08 PROCEDURE — 700111 HCHG RX REV CODE 636 W/ 250 OVERRIDE (IP): Performed by: NURSE PRACTITIONER

## 2018-12-08 RX ADMIN — HEPARIN SODIUM 5000 UNITS: 5000 INJECTION, SOLUTION INTRAVENOUS; SUBCUTANEOUS at 21:22

## 2018-12-08 RX ADMIN — INSULIN HUMAN 3 UNITS: 100 INJECTION, SOLUTION PARENTERAL at 11:50

## 2018-12-08 RX ADMIN — STANDARDIZED SENNA CONCENTRATE AND DOCUSATE SODIUM 1 TABLET: 8.6; 5 TABLET, FILM COATED ORAL at 21:20

## 2018-12-08 RX ADMIN — INSULIN GLARGINE 8 UNITS: 100 INJECTION, SOLUTION SUBCUTANEOUS at 18:03

## 2018-12-08 RX ADMIN — MIDODRINE HYDROCHLORIDE 10 MG: 5 TABLET ORAL at 04:52

## 2018-12-08 RX ADMIN — HEPARIN SODIUM 5000 UNITS: 5000 INJECTION, SOLUTION INTRAVENOUS; SUBCUTANEOUS at 04:52

## 2018-12-08 RX ADMIN — ACETAMINOPHEN 650 MG: 325 TABLET, FILM COATED ORAL at 17:09

## 2018-12-08 RX ADMIN — DOCUSATE SODIUM 100 MG: 50 LIQUID ORAL at 17:08

## 2018-12-08 RX ADMIN — MIDODRINE HYDROCHLORIDE 10 MG: 5 TABLET ORAL at 13:31

## 2018-12-08 RX ADMIN — INSULIN HUMAN 3 UNITS: 100 INJECTION, SOLUTION PARENTERAL at 18:01

## 2018-12-08 RX ADMIN — GABAPENTIN 100 MG: 100 CAPSULE ORAL at 04:58

## 2018-12-08 RX ADMIN — POLYETHYLENE GLYCOL 3350 1 PACKET: 17 POWDER, FOR SOLUTION ORAL at 17:09

## 2018-12-08 RX ADMIN — OXYCODONE HYDROCHLORIDE 10 MG: 10 TABLET ORAL at 21:21

## 2018-12-08 RX ADMIN — ACETAMINOPHEN 650 MG: 325 TABLET, FILM COATED ORAL at 04:52

## 2018-12-08 RX ADMIN — SIMVASTATIN 20 MG: 40 TABLET, FILM COATED ORAL at 21:20

## 2018-12-08 RX ADMIN — MIDODRINE HYDROCHLORIDE 10 MG: 5 TABLET ORAL at 21:19

## 2018-12-08 RX ADMIN — ONDANSETRON 4 MG: 2 INJECTION INTRAMUSCULAR; INTRAVENOUS at 04:44

## 2018-12-08 RX ADMIN — HEPARIN SODIUM 5000 UNITS: 5000 INJECTION, SOLUTION INTRAVENOUS; SUBCUTANEOUS at 13:31

## 2018-12-08 RX ADMIN — MORPHINE SULFATE 2 MG: 10 INJECTION INTRAVENOUS at 04:52

## 2018-12-08 RX ADMIN — ACETAMINOPHEN 650 MG: 325 TABLET, FILM COATED ORAL at 11:43

## 2018-12-08 ASSESSMENT — PAIN SCALES - GENERAL
PAINLEVEL_OUTOF10: 0
PAINLEVEL_OUTOF10: 7

## 2018-12-08 ASSESSMENT — ENCOUNTER SYMPTOMS
FOCAL WEAKNESS: 1
SHORTNESS OF BREATH: 1
SPEECH CHANGE: 0
NAUSEA: 0
SENSORY CHANGE: 1
ABDOMINAL PAIN: 0
NECK PAIN: 0
FEVER: 0
MYALGIAS: 0
POLYDIPSIA: 0

## 2018-12-08 ASSESSMENT — PATIENT HEALTH QUESTIONNAIRE - PHQ9
2. FEELING DOWN, DEPRESSED, IRRITABLE, OR HOPELESS: NOT AT ALL
1. LITTLE INTEREST OR PLEASURE IN DOING THINGS: NOT AT ALL
SUM OF ALL RESPONSES TO PHQ9 QUESTIONS 1 AND 2: 0

## 2018-12-08 ASSESSMENT — LIFESTYLE VARIABLES: SUBSTANCE_ABUSE: 0

## 2018-12-08 NOTE — DISCHARGE PLANNING
Please review the progress note from Dr. Brandt on 12-07-18 for current Physiatry recommendations.  FIDELIA Peralta [id number] is aware of my request for an updated PT/OT on Sunday.

## 2018-12-08 NOTE — PROGRESS NOTES
2 RN skin check done. Generalized abrasions and scabs on BULE. 2+ edema on bilateral ankles. 1+ edema on right hand only. Redness on anterior neck, blanching. Q2 turns in place.

## 2018-12-08 NOTE — CARE PLAN
Problem: Communication  Goal: The ability to communicate needs accurately and effectively will improve  Outcome: PROGRESSING AS EXPECTED  AAO X4, calls appropriately    Problem: Pain Management  Goal: Pain level will decrease to patient's comfort goal  Outcome: PROGRESSING AS EXPECTED  PRNs available

## 2018-12-08 NOTE — PROGRESS NOTES
2 RN skin assessment completed. Noted dark spot to base of right 3rd toe (appeared to be an old diabetic wound). Bleeding/moisture noted to L 5th toe. Photos taken. Wound consult placed.

## 2018-12-08 NOTE — DISCHARGE PLANNING
:    Received a page from RN stating patient is under the impression he is going to [facility] today.  Reviewed notes from Ray Wilkins, Clinical Admissions Coordinator which states he is requesting an updated PT/OT eval on Sunday.  Discussed with RN who stated therapies saw him yesterday and is recommending inpatient rehab.      Message left for Ray Wiklins ([id number]) to discuss.

## 2018-12-08 NOTE — PROGRESS NOTES
Patient arrived on the floor via gurney. This RN agrees with the previous RN's assessment. Patient is A&Ox4. No complaints of pain or nausea. Patient denies numbness/tingling.

## 2018-12-08 NOTE — PROGRESS NOTES
Trauma / Surgical Daily Progress Note    Date of Service  12/8/2018    Chief Complaint  78 y.o. male admitted 12/3/2018 with Trauma  GLF T-5000    Interval Events  Medically cleared for transfer to Neurosurgery unit  Tertiary survey completed with no further findings  RAP/SBIRT completed.  Awaiting rehab or skilled acceptance.   Referrals placed.     Review of Systems  Review of Systems   Constitutional: Negative for fever.   Respiratory: Positive for shortness of breath.         Supplemental O2   Cardiovascular: Negative for chest pain.   Gastrointestinal: Negative for abdominal pain and nausea.        +BM 12/7   Genitourinary:        Awllis     Musculoskeletal: Negative for myalgias and neck pain.   Neurological: Positive for sensory change and focal weakness. Negative for speech change.   Endo/Heme/Allergies: Negative for polydipsia.   Psychiatric/Behavioral: Negative for substance abuse.        Vital Signs  Temp:  [36.5 °C (97.7 °F)-37.8 °C (100 °F)] 37.4 °C (99.3 °F)  Pulse:  [] 114  Resp:  [11-97] 18    Physical Exam  Physical Exam   Constitutional: He is oriented to person, place, and time. He appears well-developed and well-nourished. No distress.   HENT:   Head: Normocephalic and atraumatic.   Eyes: Pupils are equal, round, and reactive to light.   Neck:   c-collar on   Cardiovascular: Normal rate, regular rhythm and normal heart sounds.    Pulmonary/Chest: Effort normal and breath sounds normal. No respiratory distress.   Abdominal: Soft. Bowel sounds are normal. He exhibits no distension.   Musculoskeletal: Normal range of motion.   Neurological: He is alert and oriented to person, place, and time. No cranial nerve deficit.   Skin: Skin is warm and dry.   Psychiatric: He has a normal mood and affect. His behavior is normal.       Laboratory  Recent Results (from the past 24 hour(s))   ACCU-CHEK GLUCOSE    Collection Time: 12/07/18  3:15 PM   Result Value Ref Range    Glucose - Accu-Ck 168 (H) 65 - 99  mg/dL   ACCU-CHEK GLUCOSE    Collection Time: 12/07/18  6:28 PM   Result Value Ref Range    Glucose - Accu-Ck 132 (H) 65 - 99 mg/dL   ACCU-CHEK GLUCOSE    Collection Time: 12/07/18  8:48 PM   Result Value Ref Range    Glucose - Accu-Ck 153 (H) 65 - 99 mg/dL   ACCU-CHEK GLUCOSE    Collection Time: 12/08/18  4:57 AM   Result Value Ref Range    Glucose - Accu-Ck 117 (H) 65 - 99 mg/dL   CBC WITH DIFFERENTIAL    Collection Time: 12/08/18  5:00 AM   Result Value Ref Range    WBC 7.4 4.8 - 10.8 K/uL    RBC 3.74 (L) 4.70 - 6.10 M/uL    Hemoglobin 10.9 (L) 14.0 - 18.0 g/dL    Hematocrit 32.7 (L) 42.0 - 52.0 %    MCV 87.4 81.4 - 97.8 fL    MCH 29.1 27.0 - 33.0 pg    MCHC 33.3 (L) 33.7 - 35.3 g/dL    RDW 51.8 (H) 35.9 - 50.0 fL    Platelet Count 144 (L) 164 - 446 K/uL    MPV 8.9 (L) 9.0 - 12.9 fL    Neutrophils-Polys 81.10 (H) 44.00 - 72.00 %    Lymphocytes 6.00 (L) 22.00 - 41.00 %    Monocytes 11.30 0.00 - 13.40 %    Eosinophils 1.00 0.00 - 6.90 %    Basophils 0.30 0.00 - 1.80 %    Immature Granulocytes 0.30 0.00 - 0.90 %    Nucleated RBC 0.00 /100 WBC    Neutrophils (Absolute) 5.97 1.82 - 7.42 K/uL    Lymphs (Absolute) 0.44 (L) 1.00 - 4.80 K/uL    Monos (Absolute) 0.83 0.00 - 0.85 K/uL    Eos (Absolute) 0.07 0.00 - 0.51 K/uL    Baso (Absolute) 0.02 0.00 - 0.12 K/uL    Immature Granulocytes (abs) 0.02 0.00 - 0.11 K/uL    NRBC (Absolute) 0.00 K/uL   BASIC METABOLIC PANEL    Collection Time: 12/08/18  5:00 AM   Result Value Ref Range    Sodium 134 (L) 135 - 145 mmol/L    Potassium 4.2 3.6 - 5.5 mmol/L    Chloride 106 96 - 112 mmol/L    Co2 18 (L) 20 - 33 mmol/L    Glucose 127 (H) 65 - 99 mg/dL    Bun 21 8 - 22 mg/dL    Creatinine 1.91 (H) 0.50 - 1.40 mg/dL    Calcium 9.1 8.5 - 10.5 mg/dL    Anion Gap 10.0 0.0 - 11.9   ESTIMATED GFR    Collection Time: 12/08/18  5:00 AM   Result Value Ref Range    GFR If  42 (A) >60 mL/min/1.73 m 2    GFR If Non  34 (A) >60 mL/min/1.73 m 2        Fluids    Intake/Output Summary (Last 24 hours) at 12/08/18 1112  Last data filed at 12/08/18 1000   Gross per 24 hour   Intake           1327.6 ml   Output             1350 ml   Net            -22.4 ml       Core Measures & Quality Metrics  Medications reviewed and Radiology images reviewed  Wallis catheter: Critically Ill - Requiring Accurate Measurement of Urinary Output and No Wallis      DVT Prophylaxis: Heparin  DVT prophylaxis - mechanical: SCDs  Ulcer prophylaxis: Not indicated        Total Score: 2    ETOH Screening    Assessment/Plan  Central cord syndrome (HCC)- (present on admission)   Assessment & Plan    Weakness BUE and burning in RUE  Possible increased density within the cervical and proximal thoracic cervical canal which may be artifactual or related to hemorrhage within the CSF space.  MRI: Suspicion for focal myelopathic cord signal abnormality at C4 in the right paramedian cord possible small cord contusion  Spinal cord perfusion with goal MAP > 85 for 5 days per the Neurosurgeon. Completed 12/8   Robert Simon MD. Neurosurgery.     Dysphagia- (present on admission)   Assessment & Plan    Failed bedside swallow.   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  (+)Loss of consciousness  SLP for cognitive evaluation.     Scalp laceration- (present on admission)   Assessment & Plan    Right scalp laceration  Will be approximated in the ER  Local wound care.   Suture removal in 7 days.      Chronic renal failure- (present on admission)   Assessment & Plan    Avoid nephrotoxic agents and monitor laboratory studies  Parameters slowly improving     Type 2 diabetes mellitus (HCC)- (present on admission)   Assessment & Plan    Chronic condition treated with Starlix and Actos.  Difficult to control, admission serum glucose 467  HemA1C 6.2  High sliding scale insulin with Lantus     Uncontrolled type 2 diabetes mellitus with hyperglycemia (HCC)- (present on admission)   Assessment & Plan    12/8     Relative control     Trauma- (present on admission)   Assessment & Plan    Ground level fall. Brief loss of consciousness.  T-5000 Activation.  Drew Bonilla MD. Trauma Surgery.     Hyperlipemia- (present on admission)   Assessment & Plan    Chronic condition treated with Simvistatin.  Resumed maintenance medication.     Essential hypertension- (present on admission)   Assessment & Plan    Chronic condition treated with Lisinopril and Inderal.  Hold for now, spinal cord perfusion protocol completed (12/8)         Discussed patient condition with RN, Patient and trauma surgery. Dr. Desai    Seen on rounds  Now off of spinal hyperperfusion protocol  Clinically stable  Ok to the floor  Discussed with RN, RT, pharmacy, and Sabrina Desai Md

## 2018-12-08 NOTE — DISCHARGE PLANNING
:    Attempted to contact Ray Wilkins-Rehab Admissions Coordinater at [phone].  Left another message.

## 2018-12-08 NOTE — PROGRESS NOTES
2 RN skin assessment: redness to chest under aspen collar, mepilex in place and redness blanching. Scattered abrasions and scabs throughout all extremities. Bilateral lower extremities are cold, pale/purple, pt reports baseline. In gluteal fold, pt has scattered bruising, q2 hour turns in place.

## 2018-12-08 NOTE — PROGRESS NOTES
Neurosurgery Progress Note    Subjective:  No change in status. Awaiting rehab    Exam:  Alert and oriented,   Right delt 0, left delt 1  Right biceps 2, left 4-  Right triceps 3 , left 4   right 3, left 4  Legs 5/5    Dysesthesias diminished but present in hands.     Pulse  Av.7  Min: 81  Max: 121  Resp  Av.3  Min: 11  Max: 97  Temp  Av.9 °C (98.4 °F)  Min: 36.5 °C (97.7 °F)  Max: 37.2 °C (99 °F)  SpO2  Av %  Min: 94 %  Max: 99 %    No Data Recorded    Recent Labs      18   0400  18   0410  18   0500   WBC  7.0  7.6  7.4   RBC  3.32*  3.64*  3.74*   HEMOGLOBIN  9.8*  11.0*  10.9*   HEMATOCRIT  28.9*  31.3*  32.7*   MCV  87.0  86.0  87.4   MCH  29.5  30.2  29.1   MCHC  33.9  35.1  33.3*   RDW  51.1*  50.5*  51.8*   PLATELETCT  136*  139*  144*   MPV  9.3  9.2  8.9*     Recent Labs      18   0400  18   0410  18   0500   SODIUM  135  135  134*   POTASSIUM  4.3  4.1  4.2   CHLORIDE  108  106  106   CO2  20  20  18*   GLUCOSE  165*  130*  127*   BUN  27*  23*  21   CREATININE  1.99*  1.91*  1.91*   CALCIUM  8.2*  8.4*  9.1               Intake/Output       18 07 - 1859 18 07 - 1859       Total  Total       Intake    P.O.  --  210 210  --  -- --    P.O. -- 210 210 -- -- --    I.V.  317.9  900 1217.9  --  -- --    Norepinephrine Volume 17.9 -- 17.9 -- -- --    Volume (mL) (NS infusion)  -- -- --    Total Intake 317.9 1110 1427.9 -- -- --       Output    Urine  400  800 1200  --  -- --    Number of Times Voided 100 x 3 x 103 x -- -- --    Urine Void (mL)  -- -- --    Stool  --  -- --  --  -- --    Number of Times Stooled 1 x 1 x 2 x -- -- --    Total Output  -- -- --       Net I/O     -82.1 310 227.9 -- -- --            Intake/Output Summary (Last 24 hours) at 18 1038  Last data filed at 18 0600   Gross per 24 hour   Intake           1267.6 ml    Output             1200 ml   Net             67.6 ml            • midodrine  10 mg Q8HR   • NORepinephrine (LEVOPHED) infusion  0-30 mcg/min Continuous   • docusate sodium 100mg/10mL  100 mg BID   • insulin glargine  8 Units Q EVENING   • NS   Continuous   • acetaminophen  650 mg Q6HRS   • gabapentin  100 mg QAM   • heparin  5,000 Units Q8HRS   • insulin regular  3-14 Units 4X/DAY ACHS    And   • glucose 4 g  16 g Q15 MIN PRN    And   • dextrose 50%  25 mL Q15 MIN PRN   • simvastatin  20 mg Nightly   • Respiratory Care per Protocol   Continuous RT   • Pharmacy Consult Request  1 Each PRN   • senna-docusate  1 Tab Nightly   • senna-docusate  1 Tab Q24HRS PRN   • polyethylene glycol/lytes  1 Packet BID   • magnesium hydroxide  30 mL DAILY   • bisacodyl  10 mg Q24HRS PRN   • fleet  1 Each Once PRN   • oxyCODONE immediate-release  5 mg Q3HRS PRN   • oxyCODONE immediate release  10 mg Q3HRS PRN   • morphine injection  1-4 mg Q HOUR PRN   • ondansetron  4 mg Q4HRS PRN       Assessment and Plan:  Hospital day #5  POD #o  Prophylactic anticoagulation: yes     Start date/time:     Per Dr. Simon, patient awaiting Rehab.

## 2018-12-08 NOTE — CARE PLAN
Problem: Spinal Cord Injury  Goal: Optimal care of the spinal cord patient    Intervention: Early case management and  coordination of family and patient coping, support and discharge needs  Patient reports that he thought he was going to Rehab today. Paged social work, PT, and OT to discuss realistic discharge time frame. Patient needs to be seen by therapies once off the hyperperfusion protocol. Updated MD transfer orders placed.

## 2018-12-09 ENCOUNTER — APPOINTMENT (OUTPATIENT)
Dept: RADIOLOGY | Facility: MEDICAL CENTER | Age: 78
DRG: 964 | End: 2018-12-09
Attending: NURSE PRACTITIONER
Payer: MEDICARE

## 2018-12-09 LAB
ANION GAP SERPL CALC-SCNC: 8 MMOL/L (ref 0–11.9)
BASOPHILS # BLD AUTO: 0.4 % (ref 0–1.8)
BASOPHILS # BLD: 0.02 K/UL (ref 0–0.12)
BUN SERPL-MCNC: 20 MG/DL (ref 8–22)
CALCIUM SERPL-MCNC: 8.9 MG/DL (ref 8.5–10.5)
CHLORIDE SERPL-SCNC: 106 MMOL/L (ref 96–112)
CO2 SERPL-SCNC: 21 MMOL/L (ref 20–33)
CREAT SERPL-MCNC: 1.91 MG/DL (ref 0.5–1.4)
EOSINOPHIL # BLD AUTO: 0.09 K/UL (ref 0–0.51)
EOSINOPHIL NFR BLD: 1.9 % (ref 0–6.9)
ERYTHROCYTE [DISTWIDTH] IN BLOOD BY AUTOMATED COUNT: 50.8 FL (ref 35.9–50)
GLUCOSE BLD-MCNC: 102 MG/DL (ref 65–99)
GLUCOSE BLD-MCNC: 107 MG/DL (ref 65–99)
GLUCOSE BLD-MCNC: 118 MG/DL (ref 65–99)
GLUCOSE BLD-MCNC: 159 MG/DL (ref 65–99)
GLUCOSE SERPL-MCNC: 109 MG/DL (ref 65–99)
HCT VFR BLD AUTO: 28.4 % (ref 42–52)
HGB BLD-MCNC: 9.6 G/DL (ref 14–18)
IMM GRANULOCYTES # BLD AUTO: 0.02 K/UL (ref 0–0.11)
IMM GRANULOCYTES NFR BLD AUTO: 0.4 % (ref 0–0.9)
LYMPHOCYTES # BLD AUTO: 0.52 K/UL (ref 1–4.8)
LYMPHOCYTES NFR BLD: 11.2 % (ref 22–41)
MCH RBC QN AUTO: 29.3 PG (ref 27–33)
MCHC RBC AUTO-ENTMCNC: 33.8 G/DL (ref 33.7–35.3)
MCV RBC AUTO: 86.6 FL (ref 81.4–97.8)
MONOCYTES # BLD AUTO: 0.68 K/UL (ref 0–0.85)
MONOCYTES NFR BLD AUTO: 14.6 % (ref 0–13.4)
NEUTROPHILS # BLD AUTO: 3.32 K/UL (ref 1.82–7.42)
NEUTROPHILS NFR BLD: 71.5 % (ref 44–72)
NRBC # BLD AUTO: 0 K/UL
NRBC BLD-RTO: 0 /100 WBC
PLATELET # BLD AUTO: 122 K/UL (ref 164–446)
PMV BLD AUTO: 9.4 FL (ref 9–12.9)
POTASSIUM SERPL-SCNC: 4.2 MMOL/L (ref 3.6–5.5)
RBC # BLD AUTO: 3.28 M/UL (ref 4.7–6.1)
SODIUM SERPL-SCNC: 135 MMOL/L (ref 135–145)
WBC # BLD AUTO: 4.7 K/UL (ref 4.8–10.8)

## 2018-12-09 PROCEDURE — 80048 BASIC METABOLIC PNL TOTAL CA: CPT

## 2018-12-09 PROCEDURE — 36415 COLL VENOUS BLD VENIPUNCTURE: CPT

## 2018-12-09 PROCEDURE — A9270 NON-COVERED ITEM OR SERVICE: HCPCS | Performed by: SURGERY

## 2018-12-09 PROCEDURE — 700111 HCHG RX REV CODE 636 W/ 250 OVERRIDE (IP): Performed by: SURGERY

## 2018-12-09 PROCEDURE — A9270 NON-COVERED ITEM OR SERVICE: HCPCS | Performed by: NURSE PRACTITIONER

## 2018-12-09 PROCEDURE — 770001 HCHG ROOM/CARE - MED/SURG/GYN PRIV*

## 2018-12-09 PROCEDURE — 700102 HCHG RX REV CODE 250 W/ 637 OVERRIDE(OP): Performed by: SURGERY

## 2018-12-09 PROCEDURE — 71045 X-RAY EXAM CHEST 1 VIEW: CPT

## 2018-12-09 PROCEDURE — 700105 HCHG RX REV CODE 258: Performed by: SURGERY

## 2018-12-09 PROCEDURE — 97535 SELF CARE MNGMENT TRAINING: CPT

## 2018-12-09 PROCEDURE — 82962 GLUCOSE BLOOD TEST: CPT | Mod: 91

## 2018-12-09 PROCEDURE — 97530 THERAPEUTIC ACTIVITIES: CPT

## 2018-12-09 PROCEDURE — 85025 COMPLETE CBC W/AUTO DIFF WBC: CPT

## 2018-12-09 PROCEDURE — 700102 HCHG RX REV CODE 250 W/ 637 OVERRIDE(OP): Performed by: NURSE PRACTITIONER

## 2018-12-09 PROCEDURE — 700112 HCHG RX REV CODE 229: Performed by: NURSE PRACTITIONER

## 2018-12-09 RX ADMIN — ACETAMINOPHEN 650 MG: 325 TABLET, FILM COATED ORAL at 05:59

## 2018-12-09 RX ADMIN — HEPARIN SODIUM 5000 UNITS: 5000 INJECTION, SOLUTION INTRAVENOUS; SUBCUTANEOUS at 13:22

## 2018-12-09 RX ADMIN — OXYCODONE HYDROCHLORIDE 5 MG: 5 TABLET ORAL at 21:22

## 2018-12-09 RX ADMIN — POLYETHYLENE GLYCOL 3350 1 PACKET: 17 POWDER, FOR SOLUTION ORAL at 06:05

## 2018-12-09 RX ADMIN — SIMVASTATIN 20 MG: 40 TABLET, FILM COATED ORAL at 21:22

## 2018-12-09 RX ADMIN — MIDODRINE HYDROCHLORIDE 10 MG: 5 TABLET ORAL at 13:22

## 2018-12-09 RX ADMIN — ACETAMINOPHEN 650 MG: 325 TABLET, FILM COATED ORAL at 13:22

## 2018-12-09 RX ADMIN — MAGNESIUM HYDROXIDE 30 ML: 400 SUSPENSION ORAL at 06:01

## 2018-12-09 RX ADMIN — GABAPENTIN 100 MG: 100 CAPSULE ORAL at 06:02

## 2018-12-09 RX ADMIN — MIDODRINE HYDROCHLORIDE 10 MG: 5 TABLET ORAL at 06:02

## 2018-12-09 RX ADMIN — OXYCODONE HYDROCHLORIDE 5 MG: 5 TABLET ORAL at 06:03

## 2018-12-09 RX ADMIN — INSULIN GLARGINE 8 UNITS: 100 INJECTION, SOLUTION SUBCUTANEOUS at 18:01

## 2018-12-09 RX ADMIN — SODIUM CHLORIDE: 9 INJECTION, SOLUTION INTRAVENOUS at 18:42

## 2018-12-09 RX ADMIN — DOCUSATE SODIUM 100 MG: 50 LIQUID ORAL at 05:59

## 2018-12-09 RX ADMIN — HEPARIN SODIUM 5000 UNITS: 5000 INJECTION, SOLUTION INTRAVENOUS; SUBCUTANEOUS at 06:07

## 2018-12-09 RX ADMIN — MIDODRINE HYDROCHLORIDE 10 MG: 5 TABLET ORAL at 21:22

## 2018-12-09 RX ADMIN — ACETAMINOPHEN 650 MG: 325 TABLET, FILM COATED ORAL at 00:14

## 2018-12-09 RX ADMIN — HEPARIN SODIUM 5000 UNITS: 5000 INJECTION, SOLUTION INTRAVENOUS; SUBCUTANEOUS at 21:23

## 2018-12-09 RX ADMIN — INSULIN HUMAN 3 UNITS: 100 INJECTION, SOLUTION PARENTERAL at 13:23

## 2018-12-09 RX ADMIN — ACETAMINOPHEN 650 MG: 325 TABLET, FILM COATED ORAL at 18:01

## 2018-12-09 ASSESSMENT — PAIN SCALES - GENERAL
PAINLEVEL_OUTOF10: 0
PAINLEVEL_OUTOF10: 0
PAINLEVEL_OUTOF10: 6
PAINLEVEL_OUTOF10: 5
PAINLEVEL_OUTOF10: 0
PAINLEVEL_OUTOF10: 0

## 2018-12-09 ASSESSMENT — COGNITIVE AND FUNCTIONAL STATUS - GENERAL
DRESSING REGULAR LOWER BODY CLOTHING: A LOT
MOBILITY SCORE: 8
CLIMB 3 TO 5 STEPS WITH RAILING: TOTAL
TOILETING: A LOT
DAILY ACTIVITIY SCORE: 14
MOVING TO AND FROM BED TO CHAIR: UNABLE
TURNING FROM BACK TO SIDE WHILE IN FLAT BAD: A LOT
PERSONAL GROOMING: A LITTLE
SUGGESTED CMS G CODE MODIFIER DAILY ACTIVITY: CK
EATING MEALS: A LITTLE
STANDING UP FROM CHAIR USING ARMS: A LOT
HELP NEEDED FOR BATHING: A LOT
WALKING IN HOSPITAL ROOM: TOTAL
MOVING FROM LYING ON BACK TO SITTING ON SIDE OF FLAT BED: UNABLE
DRESSING REGULAR UPPER BODY CLOTHING: A LOT
SUGGESTED CMS G CODE MODIFIER MOBILITY: CM

## 2018-12-09 ASSESSMENT — ENCOUNTER SYMPTOMS
SENSORY CHANGE: 1
FEVER: 0
FOCAL WEAKNESS: 1
ABDOMINAL PAIN: 0
POLYDIPSIA: 0
NAUSEA: 0
NECK PAIN: 0
MYALGIAS: 0
SPEECH CHANGE: 0
SHORTNESS OF BREATH: 1

## 2018-12-09 ASSESSMENT — LIFESTYLE VARIABLES: SUBSTANCE_ABUSE: 0

## 2018-12-09 ASSESSMENT — GAIT ASSESSMENTS: GAIT LEVEL OF ASSIST: UNABLE TO PARTICIPATE

## 2018-12-09 NOTE — CARE PLAN
Problem: Safety  Goal: Will remain free from injury  Outcome: PROGRESSING AS EXPECTED    Goal: Will remain free from falls  Outcome: PROGRESSING AS EXPECTED  Pt educated to utilize call light when needing assistance to decrease chances of falling. Pt has not fallen this shift. Will continue to monitor.    Problem: Knowledge Deficit  Goal: Knowledge of disease process/condition, treatment plan, diagnostic tests, and medications will improve  Outcome: PROGRESSING AS EXPECTED  Pt educated on care plan, medications, treatment plan, diagnostic tests, and medications prescribed for the day. Pt verbalizes teaching and understanding of education. Will continue to monitor throughout the shift.

## 2018-12-09 NOTE — PROGRESS NOTES
Skin check: scab on right second toe, wound with scabs on right hand covered with dressing, bruising on UEs and LEs. redness on chest covered with mepilex. Laceration on forehead with stitches. Sacrum red and blanching. Two small black areas on the skin of both buttocks near the sacrum. Photos taken by Clara using camera.

## 2018-12-09 NOTE — PROGRESS NOTES
Neurosurgery Progress Note    Subjective:  No change in status. Awaiting rehab, up in chair, denies pain, collar on    Exam:  Alert and oriented, very [place]  Right delt 2, left 3  Right biceps 3, left 4-  Right triceps 3 , left 4   right 3, left 4  Legs 5/5    Dysesthesias diminished but present in hands.     BP  Min: 155/72  Max: 166/89  Pulse  Av.3  Min: 80  Max: 118  Resp  Av.9  Min: 17  Max: 20  Temp  Av.8 °C (98.3 °F)  Min: 36.4 °C (97.5 °F)  Max: 37.4 °C (99.3 °F)  SpO2  Av %  Min: 97 %  Max: 99 %    No Data Recorded    Recent Labs      18   0410  18   0500  18   0352   WBC  7.6  7.4  4.7*   RBC  3.64*  3.74*  3.28*   HEMOGLOBIN  11.0*  10.9*  9.6*   HEMATOCRIT  31.3*  32.7*  28.4*   MCV  86.0  87.4  86.6   MCH  30.2  29.1  29.3   MCHC  35.1  33.3*  33.8   RDW  50.5*  51.8*  50.8*   PLATELETCT  139*  144*  122*   MPV  9.2  8.9*  9.4     Recent Labs      18   0410  18   0500  18   0352   SODIUM  135  134*  135   POTASSIUM  4.1  4.2  4.2   CHLORIDE  106  106  106   CO2  20  18*  21   GLUCOSE  130*  127*  109*   BUN  23*  21  20   CREATININE  1.91*  1.91*  1.91*   CALCIUM  8.4*  9.1  8.9               Intake/Output       18 - 18 - 12/10/18 0659       Total  Total       Intake    P.O.  60  480 540  120  -- 120    P.O. 60 480 540 120 -- 120    I.V.  75  -- 75  --  -- --    Volume (mL) (NS infusion) 75 -- 75 -- -- --    Other  30  -- 30  --  -- --    Medications (PO/Enteral Liquids) 30 -- 30 -- -- --    Total Intake 165 480 645 120 -- 120       Output    Urine  775  850 1625  200  -- 200    Number of Times Voided 3 x 3 x 6 x 1 x -- 1 x    Urine Void (mL)  200 -- 200    Total Output  200 -- 200       Net I/O     -610 -370 -980 -80 -- -80            Intake/Output Summary (Last 24 hours) at 18  Last data filed at 18   Gross per 24 hour    Intake              690 ml   Output             1675 ml   Net             -985 ml            • midodrine  10 mg Q8HR   • docusate sodium 100mg/10mL  100 mg BID   • insulin glargine  8 Units Q EVENING   • NS   Continuous   • acetaminophen  650 mg Q6HRS   • gabapentin  100 mg QAM   • heparin  5,000 Units Q8HRS   • insulin regular  3-14 Units 4X/DAY ACHS    And   • glucose 4 g  16 g Q15 MIN PRN    And   • dextrose 50%  25 mL Q15 MIN PRN   • simvastatin  20 mg Nightly   • Respiratory Care per Protocol   Continuous RT   • Pharmacy Consult Request  1 Each PRN   • senna-docusate  1 Tab Nightly   • senna-docusate  1 Tab Q24HRS PRN   • polyethylene glycol/lytes  1 Packet BID   • magnesium hydroxide  30 mL DAILY   • bisacodyl  10 mg Q24HRS PRN   • fleet  1 Each Once PRN   • oxyCODONE immediate-release  5 mg Q3HRS PRN   • oxyCODONE immediate release  10 mg Q3HRS PRN   • morphine injection  1-4 mg Q HOUR PRN   • ondansetron  4 mg Q4HRS PRN       Assessment and Plan:  Hospital day #6 central cord sydrome, signal change in cord C3-4  POD #o  Prophylactic anticoagulation: yes     Start date/time:     Per Dr. Simon, patient awaiting Rehab.   Collar

## 2018-12-09 NOTE — THERAPY
"Occupational Therapy Treatment completed with focus on ADLs, ADL transfers and upper extremity function.  Functional Status:  Max Ax2 for functional transfer; total A for toileting; total Ax2 for sit>supine  Plan of Care: Will benefit from Occupational Therapy 5 times per week  Discharge Recommendations:  Equipment Will Continue to Assess for Equipment Needs. Recommend inpatient transitional care services for continued occupational therapy services.     See \"Rehab Therapy-Acute\" Patient Summary Report for complete documentation.     Pt participated in OT tx session. Pt required increased physical assist this tx session. Per RN, pt had been able to func amb with FWW to toilet however pt with difficulty standing and weightshifting during OT tx session. Pt required total A for toileting 2' difficulty maintaining standing this tx session and difficulty holding wipes. Pt reported 'tingling' at spine with return KATLYN, RN aware. Pt LUE continues to have greater AROM than RUE. Pt would require post-acute transitional care stay prior to d/c home. Will continue to follow for acute OT services while in-house.   "

## 2018-12-09 NOTE — PROGRESS NOTES
Trauma / Surgical Daily Progress Note    Date of Service  12/9/2018    Chief Complaint  78 y.o. male admitted 12/3/2018 with Trauma  GLF    Interval Events  Transferred to Neurosurgery  Therapies in place.   No overnight events.       Review of Systems  Review of Systems   Constitutional: Negative for fever.   Respiratory: Positive for shortness of breath.         Supplemental O2   Cardiovascular: Negative for chest pain.   Gastrointestinal: Negative for abdominal pain and nausea.        +BM 12/7   Genitourinary:        Wallis     Musculoskeletal: Negative for myalgias and neck pain.   Neurological: Positive for sensory change and focal weakness. Negative for speech change.   Endo/Heme/Allergies: Negative for polydipsia.   Psychiatric/Behavioral: Negative for substance abuse.        Vital Signs  Temp:  [36.4 °C (97.5 °F)-37.4 °C (99.3 °F)] 36.5 °C (97.7 °F)  Pulse:  [] 81  Resp:  [17-20] 20  BP: (155-166)/(72-89) 155/72    Physical Exam  Physical Exam    Laboratory  Recent Results (from the past 24 hour(s))   ACCU-CHEK GLUCOSE    Collection Time: 12/08/18 11:45 AM   Result Value Ref Range    Glucose - Accu-Ck 158 (H) 65 - 99 mg/dL   ACCU-CHEK GLUCOSE    Collection Time: 12/08/18  5:18 PM   Result Value Ref Range    Glucose - Accu-Ck 153 (H) 65 - 99 mg/dL   ACCU-CHEK GLUCOSE    Collection Time: 12/08/18  9:27 PM   Result Value Ref Range    Glucose - Accu-Ck 120 (H) 65 - 99 mg/dL   CBC WITH DIFFERENTIAL    Collection Time: 12/09/18  3:52 AM   Result Value Ref Range    WBC 4.7 (L) 4.8 - 10.8 K/uL    RBC 3.28 (L) 4.70 - 6.10 M/uL    Hemoglobin 9.6 (L) 14.0 - 18.0 g/dL    Hematocrit 28.4 (L) 42.0 - 52.0 %    MCV 86.6 81.4 - 97.8 fL    MCH 29.3 27.0 - 33.0 pg    MCHC 33.8 33.7 - 35.3 g/dL    RDW 50.8 (H) 35.9 - 50.0 fL    Platelet Count 122 (L) 164 - 446 K/uL    MPV 9.4 9.0 - 12.9 fL    Neutrophils-Polys 71.50 44.00 - 72.00 %    Lymphocytes 11.20 (L) 22.00 - 41.00 %    Monocytes 14.60 (H) 0.00 - 13.40 %     Eosinophils 1.90 0.00 - 6.90 %    Basophils 0.40 0.00 - 1.80 %    Immature Granulocytes 0.40 0.00 - 0.90 %    Nucleated RBC 0.00 /100 WBC    Neutrophils (Absolute) 3.32 1.82 - 7.42 K/uL    Lymphs (Absolute) 0.52 (L) 1.00 - 4.80 K/uL    Monos (Absolute) 0.68 0.00 - 0.85 K/uL    Eos (Absolute) 0.09 0.00 - 0.51 K/uL    Baso (Absolute) 0.02 0.00 - 0.12 K/uL    Immature Granulocytes (abs) 0.02 0.00 - 0.11 K/uL    NRBC (Absolute) 0.00 K/uL   BASIC METABOLIC PANEL    Collection Time: 12/09/18  3:52 AM   Result Value Ref Range    Sodium 135 135 - 145 mmol/L    Potassium 4.2 3.6 - 5.5 mmol/L    Chloride 106 96 - 112 mmol/L    Co2 21 20 - 33 mmol/L    Glucose 109 (H) 65 - 99 mg/dL    Bun 20 8 - 22 mg/dL    Creatinine 1.91 (H) 0.50 - 1.40 mg/dL    Calcium 8.9 8.5 - 10.5 mg/dL    Anion Gap 8.0 0.0 - 11.9   ESTIMATED GFR    Collection Time: 12/09/18  3:52 AM   Result Value Ref Range    GFR If  42 (A) >60 mL/min/1.73 m 2    GFR If Non  34 (A) >60 mL/min/1.73 m 2   ACCU-CHEK GLUCOSE    Collection Time: 12/09/18  6:11 AM   Result Value Ref Range    Glucose - Accu-Ck 107 (H) 65 - 99 mg/dL       Fluids    Intake/Output Summary (Last 24 hours) at 12/09/18 0807  Last data filed at 12/09/18 0525   Gross per 24 hour   Intake               90 ml   Output             1475 ml   Net            -1385 ml       Core Measures & Quality Metrics  Core Measures & Quality Metrics  AYLA Score  ETOH Screening    Assessment/Plan  Central cord syndrome (HCC)- (present on admission)   Assessment & Plan    Weakness BUE and burning in RUE  Possible increased density within the cervical and proximal thoracic cervical canal which may be artifactual or related to hemorrhage within the CSF space.  MRI: Suspicion for focal myelopathic cord signal abnormality at C4 in the right paramedian cord possible small cord contusion  Spinal cord perfusion with goal MAP > 85 for 5 days per the Neurosurgeon. Completed 12/8   Robert Simon  MD. Neurosurgery.     Dysphagia- (present on admission)   Assessment & Plan    Failed bedside swallow.   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  (+)Loss of consciousness  SLP for cognitive evaluation.     Scalp laceration- (present on admission)   Assessment & Plan    Right scalp laceration  Will be approximated in the ER  Local wound care.   Suture removal in 7 days.     Chronic renal failure- (present on admission)   Assessment & Plan    Avoid nephrotoxic agents and monitor laboratory studies  Parameters slowly improving     Type 2 diabetes mellitus (HCC)- (present on admission)   Assessment & Plan    Chronic condition treated with Starlix and Actos.  Difficult to control, admission serum glucose 467  HemA1C 6.2  High sliding scale insulin with Lantus     Uncontrolled type 2 diabetes mellitus with hyperglycemia (HCC)- (present on admission)   Assessment & Plan    12/9     Relative control     Trauma- (present on admission)   Assessment & Plan    Ground level fall. Brief loss of consciousness.  T-5000 Activation.  Drew Bonilla MD. Trauma Surgery.     Hyperlipemia- (present on admission)   Assessment & Plan    Chronic condition treated with Simvistatin.  Resumed maintenance medication.     Essential hypertension- (present on admission)   Assessment & Plan    Chronic condition treated with Lisinopril and Inderal.  Hold for now, spinal cord perfusion protocol completed (12/8).          Discussed patient condition with RN, Patient and trauma surgery. Dr. Abdi

## 2018-12-09 NOTE — CARE PLAN
Problem: Safety  Goal: Will remain free from falls    Intervention: Assess risk factors for falls  Fall risk assessed (see flow sheet). Bed in low position.      Problem: Infection  Goal: Will remain free from infection    Intervention: Implement standard precautions and perform hand washing before and after patient contact    Hand hygiene and standard precautions implemented

## 2018-12-09 NOTE — DISCHARGE PLANNING
Anticipated Discharge Disposition: Rehab pending acceptance    Action: DERICW spoke with Ray. PT/OT to see the pt and then Dr. Brandt will review therapy notes Monday.    Barriers to Discharge: None    Plan: LSW or RN CM to follow up with rehab Monday.

## 2018-12-10 ENCOUNTER — APPOINTMENT (OUTPATIENT)
Dept: RADIOLOGY | Facility: MEDICAL CENTER | Age: 78
DRG: 964 | End: 2018-12-10
Attending: NURSE PRACTITIONER
Payer: MEDICARE

## 2018-12-10 ENCOUNTER — HOSPITAL ENCOUNTER (INPATIENT)
Facility: REHABILITATION | Age: 78
LOS: 25 days | DRG: 949 | End: 2019-01-04
Attending: PHYSICAL MEDICINE & REHABILITATION | Admitting: PHYSICAL MEDICINE & REHABILITATION
Payer: MEDICARE

## 2018-12-10 VITALS
RESPIRATION RATE: 16 BRPM | OXYGEN SATURATION: 97 % | TEMPERATURE: 98.8 F | SYSTOLIC BLOOD PRESSURE: 148 MMHG | DIASTOLIC BLOOD PRESSURE: 55 MMHG | BODY MASS INDEX: 23.45 KG/M2 | HEIGHT: 70 IN | HEART RATE: 74 BPM | WEIGHT: 163.8 LBS

## 2018-12-10 LAB
ANION GAP SERPL CALC-SCNC: 9 MMOL/L (ref 0–11.9)
BASOPHILS # BLD AUTO: 0.3 % (ref 0–1.8)
BASOPHILS # BLD: 0.02 K/UL (ref 0–0.12)
BUN SERPL-MCNC: 19 MG/DL (ref 8–22)
CALCIUM SERPL-MCNC: 8.9 MG/DL (ref 8.5–10.5)
CHLORIDE SERPL-SCNC: 104 MMOL/L (ref 96–112)
CO2 SERPL-SCNC: 22 MMOL/L (ref 20–33)
CREAT SERPL-MCNC: 1.66 MG/DL (ref 0.5–1.4)
EOSINOPHIL # BLD AUTO: 0.05 K/UL (ref 0–0.51)
EOSINOPHIL NFR BLD: 0.8 % (ref 0–6.9)
ERYTHROCYTE [DISTWIDTH] IN BLOOD BY AUTOMATED COUNT: 50.2 FL (ref 35.9–50)
GLUCOSE BLD-MCNC: 128 MG/DL (ref 65–99)
GLUCOSE BLD-MCNC: 148 MG/DL (ref 65–99)
GLUCOSE BLD-MCNC: 191 MG/DL (ref 65–99)
GLUCOSE BLD-MCNC: 81 MG/DL (ref 65–99)
GLUCOSE SERPL-MCNC: 91 MG/DL (ref 65–99)
HCT VFR BLD AUTO: 28.8 % (ref 42–52)
HGB BLD-MCNC: 9.8 G/DL (ref 14–18)
IMM GRANULOCYTES # BLD AUTO: 0.02 K/UL (ref 0–0.11)
IMM GRANULOCYTES NFR BLD AUTO: 0.3 % (ref 0–0.9)
LYMPHOCYTES # BLD AUTO: 0.45 K/UL (ref 1–4.8)
LYMPHOCYTES NFR BLD: 7.6 % (ref 22–41)
MCH RBC QN AUTO: 29.3 PG (ref 27–33)
MCHC RBC AUTO-ENTMCNC: 34 G/DL (ref 33.7–35.3)
MCV RBC AUTO: 86 FL (ref 81.4–97.8)
MONOCYTES # BLD AUTO: 0.76 K/UL (ref 0–0.85)
MONOCYTES NFR BLD AUTO: 12.8 % (ref 0–13.4)
NEUTROPHILS # BLD AUTO: 4.66 K/UL (ref 1.82–7.42)
NEUTROPHILS NFR BLD: 78.2 % (ref 44–72)
NRBC # BLD AUTO: 0 K/UL
NRBC BLD-RTO: 0 /100 WBC
PLATELET # BLD AUTO: 134 K/UL (ref 164–446)
PMV BLD AUTO: 9.1 FL (ref 9–12.9)
POTASSIUM SERPL-SCNC: 4.2 MMOL/L (ref 3.6–5.5)
RBC # BLD AUTO: 3.35 M/UL (ref 4.7–6.1)
SODIUM SERPL-SCNC: 135 MMOL/L (ref 135–145)
WBC # BLD AUTO: 6 K/UL (ref 4.8–10.8)

## 2018-12-10 PROCEDURE — 700102 HCHG RX REV CODE 250 W/ 637 OVERRIDE(OP): Performed by: SURGERY

## 2018-12-10 PROCEDURE — A9270 NON-COVERED ITEM OR SERVICE: HCPCS | Performed by: SURGERY

## 2018-12-10 PROCEDURE — 700111 HCHG RX REV CODE 636 W/ 250 OVERRIDE (IP): Performed by: PHYSICAL MEDICINE & REHABILITATION

## 2018-12-10 PROCEDURE — 700111 HCHG RX REV CODE 636 W/ 250 OVERRIDE (IP): Performed by: SURGERY

## 2018-12-10 PROCEDURE — 97116 GAIT TRAINING THERAPY: CPT

## 2018-12-10 PROCEDURE — 71045 X-RAY EXAM CHEST 1 VIEW: CPT

## 2018-12-10 PROCEDURE — 85025 COMPLETE CBC W/AUTO DIFF WBC: CPT

## 2018-12-10 PROCEDURE — 99223 1ST HOSP IP/OBS HIGH 75: CPT | Mod: AI | Performed by: PHYSICAL MEDICINE & REHABILITATION

## 2018-12-10 PROCEDURE — 82962 GLUCOSE BLOOD TEST: CPT | Mod: 91

## 2018-12-10 PROCEDURE — 700102 HCHG RX REV CODE 250 W/ 637 OVERRIDE(OP): Performed by: PHYSICAL MEDICINE & REHABILITATION

## 2018-12-10 PROCEDURE — 700112 HCHG RX REV CODE 229: Performed by: PHYSICAL MEDICINE & REHABILITATION

## 2018-12-10 PROCEDURE — 80048 BASIC METABOLIC PNL TOTAL CA: CPT

## 2018-12-10 PROCEDURE — 770010 HCHG ROOM/CARE - REHAB SEMI PRIVAT*

## 2018-12-10 PROCEDURE — 700105 HCHG RX REV CODE 258: Performed by: SURGERY

## 2018-12-10 PROCEDURE — 94760 N-INVAS EAR/PLS OXIMETRY 1: CPT

## 2018-12-10 PROCEDURE — A9270 NON-COVERED ITEM OR SERVICE: HCPCS | Performed by: PHYSICAL MEDICINE & REHABILITATION

## 2018-12-10 PROCEDURE — 700102 HCHG RX REV CODE 250 W/ 637 OVERRIDE(OP): Performed by: NURSE PRACTITIONER

## 2018-12-10 PROCEDURE — A9270 NON-COVERED ITEM OR SERVICE: HCPCS | Performed by: NURSE PRACTITIONER

## 2018-12-10 PROCEDURE — 36415 COLL VENOUS BLD VENIPUNCTURE: CPT

## 2018-12-10 RX ORDER — BISACODYL 10 MG
10 SUPPOSITORY, RECTAL RECTAL
Status: DISCONTINUED | OUTPATIENT
Start: 2018-12-10 | End: 2018-12-17

## 2018-12-10 RX ORDER — INSULIN GLARGINE 100 [IU]/ML
8 INJECTION, SOLUTION SUBCUTANEOUS EVERY EVENING
Qty: 10 ML | Status: ON HOLD
Start: 2018-12-10 | End: 2019-01-04

## 2018-12-10 RX ORDER — BISACODYL 10 MG
10 SUPPOSITORY, RECTAL RECTAL
Refills: 0 | Status: ON HOLD
Start: 2018-12-10 | End: 2019-01-04

## 2018-12-10 RX ORDER — GABAPENTIN 100 MG/1
100 CAPSULE ORAL EVERY MORNING
Status: DISCONTINUED | OUTPATIENT
Start: 2018-12-11 | End: 2018-12-12

## 2018-12-10 RX ORDER — ALUMINA, MAGNESIA, AND SIMETHICONE 2400; 2400; 240 MG/30ML; MG/30ML; MG/30ML
20 SUSPENSION ORAL
Status: DISCONTINUED | OUTPATIENT
Start: 2018-12-10 | End: 2019-01-04 | Stop reason: HOSPADM

## 2018-12-10 RX ORDER — CYCLOBENZAPRINE HCL 10 MG
10 TABLET ORAL 3 TIMES DAILY PRN
Status: DISCONTINUED | OUTPATIENT
Start: 2018-12-10 | End: 2019-01-04 | Stop reason: HOSPADM

## 2018-12-10 RX ORDER — DEXTROSE MONOHYDRATE 25 G/50ML
25 INJECTION, SOLUTION INTRAVENOUS
Status: DISCONTINUED | OUTPATIENT
Start: 2018-12-10 | End: 2019-01-04 | Stop reason: HOSPADM

## 2018-12-10 RX ORDER — DOCUSATE SODIUM 50 MG/5ML
100 LIQUID ORAL 2 TIMES DAILY
Qty: 450 ML | Status: ON HOLD
Start: 2018-12-10 | End: 2019-01-04

## 2018-12-10 RX ORDER — GABAPENTIN 100 MG/1
100 CAPSULE ORAL EVERY MORNING
Qty: 90 CAP | Status: ON HOLD
Start: 2018-12-11 | End: 2019-01-04

## 2018-12-10 RX ORDER — ONDANSETRON 4 MG/1
4 TABLET, ORALLY DISINTEGRATING ORAL 4 TIMES DAILY PRN
Status: DISCONTINUED | OUTPATIENT
Start: 2018-12-10 | End: 2019-01-04 | Stop reason: HOSPADM

## 2018-12-10 RX ORDER — DOCUSATE SODIUM 50 MG/5ML
100 LIQUID ORAL 2 TIMES DAILY
Status: DISCONTINUED | OUTPATIENT
Start: 2018-12-10 | End: 2018-12-20

## 2018-12-10 RX ORDER — INSULIN GLARGINE 100 [IU]/ML
8 INJECTION, SOLUTION SUBCUTANEOUS EVERY EVENING
Status: DISCONTINUED | OUTPATIENT
Start: 2018-12-10 | End: 2018-12-12

## 2018-12-10 RX ORDER — ACETAMINOPHEN 325 MG/1
650 TABLET ORAL EVERY 4 HOURS PRN
Status: DISCONTINUED | OUTPATIENT
Start: 2018-12-10 | End: 2019-01-04 | Stop reason: HOSPADM

## 2018-12-10 RX ORDER — OXYCODONE HYDROCHLORIDE 5 MG/1
5 TABLET ORAL
Status: DISCONTINUED | OUTPATIENT
Start: 2018-12-10 | End: 2018-12-19

## 2018-12-10 RX ORDER — POLYETHYLENE GLYCOL 3350 17 G/17G
1 POWDER, FOR SOLUTION ORAL 2 TIMES DAILY
Status: DISCONTINUED | OUTPATIENT
Start: 2018-12-10 | End: 2018-12-20

## 2018-12-10 RX ORDER — HEPARIN SODIUM 5000 [USP'U]/ML
5000 INJECTION, SOLUTION INTRAVENOUS; SUBCUTANEOUS EVERY 8 HOURS
Refills: 0 | Status: ON HOLD
Start: 2018-12-10 | End: 2019-01-04

## 2018-12-10 RX ORDER — ECHINACEA PURPUREA EXTRACT 125 MG
2 TABLET ORAL PRN
Status: DISCONTINUED | OUTPATIENT
Start: 2018-12-10 | End: 2019-01-04 | Stop reason: HOSPADM

## 2018-12-10 RX ORDER — AMOXICILLIN 250 MG
2 CAPSULE ORAL 2 TIMES DAILY
Status: DISCONTINUED | OUTPATIENT
Start: 2018-12-10 | End: 2018-12-10

## 2018-12-10 RX ORDER — POLYVINYL ALCOHOL 14 MG/ML
1 SOLUTION/ DROPS OPHTHALMIC PRN
Status: DISCONTINUED | OUTPATIENT
Start: 2018-12-10 | End: 2019-01-04 | Stop reason: HOSPADM

## 2018-12-10 RX ORDER — HYDRALAZINE HYDROCHLORIDE 25 MG/1
25 TABLET, FILM COATED ORAL EVERY 8 HOURS PRN
Status: DISCONTINUED | OUTPATIENT
Start: 2018-12-10 | End: 2019-01-04 | Stop reason: HOSPADM

## 2018-12-10 RX ORDER — OMEPRAZOLE 20 MG/1
20 CAPSULE, DELAYED RELEASE ORAL DAILY
Status: DISCONTINUED | OUTPATIENT
Start: 2018-12-11 | End: 2019-01-04 | Stop reason: HOSPADM

## 2018-12-10 RX ORDER — AMOXICILLIN 250 MG
2 CAPSULE ORAL 2 TIMES DAILY PRN
Status: DISCONTINUED | OUTPATIENT
Start: 2018-12-10 | End: 2018-12-17

## 2018-12-10 RX ORDER — ACETAMINOPHEN 325 MG/1
650 TABLET ORAL EVERY 6 HOURS
Qty: 30 TAB | Refills: 0 | Status: SHIPPED | OUTPATIENT
Start: 2018-12-10 | End: 2018-12-10

## 2018-12-10 RX ORDER — ONDANSETRON 2 MG/ML
4 INJECTION INTRAMUSCULAR; INTRAVENOUS 4 TIMES DAILY PRN
Status: DISCONTINUED | OUTPATIENT
Start: 2018-12-10 | End: 2019-01-04 | Stop reason: HOSPADM

## 2018-12-10 RX ORDER — OXYCODONE HYDROCHLORIDE 10 MG/1
10 TABLET ORAL
Status: DISCONTINUED | OUTPATIENT
Start: 2018-12-10 | End: 2018-12-13

## 2018-12-10 RX ORDER — OXYCODONE HYDROCHLORIDE 5 MG/1
5 TABLET ORAL EVERY 6 HOURS PRN
Qty: 18 TAB | Refills: 0 | Status: ON HOLD
Start: 2018-12-10 | End: 2019-01-04

## 2018-12-10 RX ORDER — SIMVASTATIN 20 MG
20 TABLET ORAL NIGHTLY
Status: DISCONTINUED | OUTPATIENT
Start: 2018-12-10 | End: 2019-01-04 | Stop reason: HOSPADM

## 2018-12-10 RX ORDER — ACETAMINOPHEN 325 MG/1
650 TABLET ORAL EVERY 6 HOURS
Status: DISCONTINUED | OUTPATIENT
Start: 2018-12-10 | End: 2019-01-04 | Stop reason: HOSPADM

## 2018-12-10 RX ORDER — BISACODYL 10 MG
10 SUPPOSITORY, RECTAL RECTAL
Status: DISCONTINUED | OUTPATIENT
Start: 2018-12-10 | End: 2018-12-10

## 2018-12-10 RX ORDER — TRAZODONE HYDROCHLORIDE 50 MG/1
50 TABLET ORAL
Status: DISCONTINUED | OUTPATIENT
Start: 2018-12-10 | End: 2019-01-04 | Stop reason: HOSPADM

## 2018-12-10 RX ORDER — ACETAMINOPHEN 325 MG/1
650 TABLET ORAL EVERY 6 HOURS
Qty: 30 TAB | Refills: 0
Start: 2018-12-10 | End: 2020-02-25

## 2018-12-10 RX ORDER — DEXTROSE MONOHYDRATE 25 G/50ML
25 INJECTION, SOLUTION INTRAVENOUS PRN
Refills: 0
Start: 2018-12-10 | End: 2020-02-25

## 2018-12-10 RX ORDER — POLYETHYLENE GLYCOL 3350 17 G/17G
1 POWDER, FOR SOLUTION ORAL
Status: DISCONTINUED | OUTPATIENT
Start: 2018-12-10 | End: 2018-12-17

## 2018-12-10 RX ORDER — HEPARIN SODIUM 5000 [USP'U]/ML
5000 INJECTION, SOLUTION INTRAVENOUS; SUBCUTANEOUS EVERY 8 HOURS
Status: DISCONTINUED | OUTPATIENT
Start: 2018-12-10 | End: 2019-01-04 | Stop reason: HOSPADM

## 2018-12-10 RX ORDER — FERROUS SULFATE 325(65) MG
325 TABLET ORAL
Status: DISCONTINUED | OUTPATIENT
Start: 2018-12-11 | End: 2019-01-04 | Stop reason: HOSPADM

## 2018-12-10 RX ORDER — POLYETHYLENE GLYCOL 3350 17 G/17G
1 POWDER, FOR SOLUTION ORAL
Status: DISCONTINUED | OUTPATIENT
Start: 2018-12-10 | End: 2018-12-10

## 2018-12-10 RX ORDER — AMOXICILLIN 250 MG
1 CAPSULE ORAL NIGHTLY
Status: DISCONTINUED | OUTPATIENT
Start: 2018-12-10 | End: 2019-01-04 | Stop reason: HOSPADM

## 2018-12-10 RX ORDER — MIDODRINE HYDROCHLORIDE 10 MG/1
10 TABLET ORAL EVERY 8 HOURS
Status: DISCONTINUED | OUTPATIENT
Start: 2018-12-10 | End: 2018-12-11

## 2018-12-10 RX ADMIN — OXYCODONE HYDROCHLORIDE 5 MG: 5 TABLET ORAL at 07:38

## 2018-12-10 RX ADMIN — ACETAMINOPHEN 650 MG: 325 TABLET, FILM COATED ORAL at 11:54

## 2018-12-10 RX ADMIN — DOCUSATE SODIUM 100 MG: 50 LIQUID ORAL at 20:49

## 2018-12-10 RX ADMIN — SENNOSIDES AND DOCUSATE SODIUM 1 TABLET: 8.6; 5 TABLET ORAL at 20:49

## 2018-12-10 RX ADMIN — ACETAMINOPHEN 650 MG: 325 TABLET ORAL at 23:09

## 2018-12-10 RX ADMIN — ACETAMINOPHEN 650 MG: 325 TABLET ORAL at 17:54

## 2018-12-10 RX ADMIN — MIDODRINE HYDROCHLORIDE 10 MG: 10 TABLET ORAL at 16:02

## 2018-12-10 RX ADMIN — GABAPENTIN 100 MG: 100 CAPSULE ORAL at 05:23

## 2018-12-10 RX ADMIN — OXYCODONE HYDROCHLORIDE 5 MG: 5 TABLET ORAL at 11:54

## 2018-12-10 RX ADMIN — MIDODRINE HYDROCHLORIDE 10 MG: 10 TABLET ORAL at 23:09

## 2018-12-10 RX ADMIN — MIDODRINE HYDROCHLORIDE 10 MG: 5 TABLET ORAL at 05:24

## 2018-12-10 RX ADMIN — HEPARIN SODIUM 5000 UNITS: 5000 INJECTION, SOLUTION INTRAVENOUS; SUBCUTANEOUS at 20:48

## 2018-12-10 RX ADMIN — SODIUM CHLORIDE: 9 INJECTION, SOLUTION INTRAVENOUS at 07:38

## 2018-12-10 RX ADMIN — HEPARIN SODIUM 5000 UNITS: 5000 INJECTION, SOLUTION INTRAVENOUS; SUBCUTANEOUS at 16:04

## 2018-12-10 RX ADMIN — OXYCODONE HYDROCHLORIDE 10 MG: 10 TABLET ORAL at 20:51

## 2018-12-10 RX ADMIN — POLYETHYLENE GLYCOL 3350 1 PACKET: 17 POWDER, FOR SOLUTION ORAL at 20:49

## 2018-12-10 RX ADMIN — HEPARIN SODIUM 5000 UNITS: 5000 INJECTION, SOLUTION INTRAVENOUS; SUBCUTANEOUS at 05:23

## 2018-12-10 RX ADMIN — OXYCODONE HYDROCHLORIDE 5 MG: 5 TABLET ORAL at 16:04

## 2018-12-10 RX ADMIN — INSULIN GLARGINE 8 UNITS: 100 INJECTION, SOLUTION SUBCUTANEOUS at 21:01

## 2018-12-10 RX ADMIN — ACETAMINOPHEN 650 MG: 325 TABLET, FILM COATED ORAL at 00:43

## 2018-12-10 RX ADMIN — MAGNESIUM HYDROXIDE 30 ML: 400 SUSPENSION ORAL at 17:54

## 2018-12-10 RX ADMIN — SIMVASTATIN 20 MG: 20 TABLET, FILM COATED ORAL at 20:49

## 2018-12-10 RX ADMIN — INSULIN HUMAN 3 UNITS: 100 INJECTION, SOLUTION PARENTERAL at 17:54

## 2018-12-10 RX ADMIN — ACETAMINOPHEN 650 MG: 325 TABLET, FILM COATED ORAL at 05:23

## 2018-12-10 ASSESSMENT — ENCOUNTER SYMPTOMS
SHORTNESS OF BREATH: 1
BLURRED VISION: 0
NECK PAIN: 0
SPEECH CHANGE: 0
HEADACHES: 0
SENSORY CHANGE: 1
FOCAL WEAKNESS: 1
VOMITING: 0
FEVER: 0
ABDOMINAL PAIN: 0
POLYDIPSIA: 0
MYALGIAS: 0
NAUSEA: 0

## 2018-12-10 ASSESSMENT — GAIT ASSESSMENTS
GAIT LEVEL OF ASSIST: MINIMAL ASSIST
DISTANCE (FEET): 20
DEVIATION: ATAXIC;BRADYKINETIC;SHUFFLED GAIT
ASSISTIVE DEVICE: FRONT WHEEL WALKER

## 2018-12-10 ASSESSMENT — PAIN SCALES - GENERAL
PAINLEVEL_OUTOF10: 0
PAINLEVEL_OUTOF10: 6
PAINLEVEL_OUTOF10: 3
PAINLEVEL_OUTOF10: 3
PAINLEVEL_OUTOF10: 5
PAINLEVEL_OUTOF10: 7

## 2018-12-10 ASSESSMENT — LIFESTYLE VARIABLES
ALCOHOL_USE: NO
EVER_SMOKED: NEVER
SUBSTANCE_ABUSE: 0

## 2018-12-10 NOTE — CARE PLAN
Problem: Communication  Goal: The ability to communicate needs accurately and effectively will improve  Outcome: PROGRESSING AS EXPECTED  Pt is able to voice his needs/feelings at this time.    Problem: Infection  Goal: Will remain free from infection  Outcome: PROGRESSING AS EXPECTED  Pt assessed, proper hand hygiene performed. No signs/symptoms of infection observed at this time.
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
## 2018-12-10 NOTE — DISCHARGE INSTRUCTIONS
Discharge Instructions    Discharged to other by [facility] with escort. Discharged via wheelchair, hospital escort: Yes.  Special equipment needed: Not Applicable    Be sure to schedule a follow-up appointment with your primary care doctor or any specialists as instructed.     Discharge Plan:   Influenza Vaccine Indication: Not indicated: Previously immunized this influenza season and > 8 years of age    I understand that a diet low in cholesterol, fat, and sodium is recommended for good health. Unless I have been given specific instructions below for another diet, I accept this instruction as my diet prescription.   Other diet: Diabetic, Dysphagia II, Nectar thick liquids    Special Instructions: None    · Is patient discharged on Warfarin / Coumadin?   No     Depression / Suicide Risk    As you are discharged from this [facility] facility, it is important to learn how to keep safe from harming yourself.    Recognize the warning signs:  · Abrupt changes in personality, positive or negative- including increase in energy   · Giving away possessions  · Change in eating patterns- significant weight changes-  positive or negative  · Change in sleeping patterns- unable to sleep or sleeping all the time   · Unwillingness or inability to communicate  · Depression  · Unusual sadness, discouragement and loneliness  · Talk of wanting to die  · Neglect of personal appearance   · Rebelliousness- reckless behavior  · Withdrawal from people/activities they love  · Confusion- inability to concentrate     If you or a loved one observes any of these behaviors or has concerns about self-harm, here's what you can do:  · Talk about it- your feelings and reasons for harming yourself  · Remove any means that you might use to hurt yourself (examples: pills, rope, extension cords, firearm)  · Get professional help from the community (Mental Health, Substance Abuse, psychological counseling)  · Do not be alone:Call your Safe Contact-  someone whom you trust who will be there for you.  · Call your local CRISIS HOTLINE 457-9312 or 237-302-5591  · Call your local Children's Mobile Crisis Response Team [place] (147) 937-6574 or www.e-SENS  · Call the toll free National Suicide Prevention Hotlines   · National Suicide Prevention Lifeline 343-543-PJZE (5733)  · [employer] Line Network 800-SUICIDE (027-3432)

## 2018-12-10 NOTE — PROGRESS NOTES
Trauma / Surgical Daily Progress Note    Date of Service  12/10/2018    Chief Complaint  78 y.o. male admitted 12/3/2018 with Trauma  GLF  Interval Events  Overall doing well   Awaiting disposition  Medically cleared for post acute services.  Discussed case with .    Review of Systems  Review of Systems   Constitutional: Negative for fever.   Eyes: Negative for blurred vision.   Respiratory: Positive for shortness of breath.         Supplemental O2   Cardiovascular: Negative for chest pain.   Gastrointestinal: Negative for abdominal pain, nausea and vomiting.        +BM 12/8   Genitourinary:        Wallis     Musculoskeletal: Negative for myalgias and neck pain.   Neurological: Positive for sensory change and focal weakness. Negative for speech change and headaches.   Endo/Heme/Allergies: Negative for polydipsia.   Psychiatric/Behavioral: Negative for substance abuse.        Vital Signs  Temp:  [36.4 °C (97.6 °F)-37.2 °C (99 °F)] 36.7 °C (98.1 °F)  Pulse:  [84-97] 97  Resp:  [16-18] 16  BP: (130-141)/(75-86) 139/81    Physical Exam  Physical Exam   Constitutional: He is oriented to person, place, and time. He appears well-developed and well-nourished. No distress.   HENT:   Head: Normocephalic and atraumatic.   Eyes: Pupils are equal, round, and reactive to light.   Neck: Normal range of motion.   c-collar on   Cardiovascular: Normal rate, regular rhythm and normal heart sounds.    Pulmonary/Chest: Effort normal and breath sounds normal. No respiratory distress.   Abdominal: Soft. Bowel sounds are normal. He exhibits no distension.   Musculoskeletal: Normal range of motion.   Neurological: He is alert and oriented to person, place, and time.   Skin: Skin is warm and dry.   Psychiatric: He has a normal mood and affect. His behavior is normal.       Laboratory  Recent Results (from the past 24 hour(s))   ACCU-CHEK GLUCOSE    Collection Time: 12/09/18 11:56 AM   Result Value Ref Range    Glucose - Accu-Ck  159 (H) 65 - 99 mg/dL   ACCU-CHEK GLUCOSE    Collection Time: 12/09/18  5:56 PM   Result Value Ref Range    Glucose - Accu-Ck 118 (H) 65 - 99 mg/dL   ACCU-CHEK GLUCOSE    Collection Time: 12/09/18  9:29 PM   Result Value Ref Range    Glucose - Accu-Ck 102 (H) 65 - 99 mg/dL   CBC WITH DIFFERENTIAL    Collection Time: 12/10/18  5:07 AM   Result Value Ref Range    WBC 6.0 4.8 - 10.8 K/uL    RBC 3.35 (L) 4.70 - 6.10 M/uL    Hemoglobin 9.8 (L) 14.0 - 18.0 g/dL    Hematocrit 28.8 (L) 42.0 - 52.0 %    MCV 86.0 81.4 - 97.8 fL    MCH 29.3 27.0 - 33.0 pg    MCHC 34.0 33.7 - 35.3 g/dL    RDW 50.2 (H) 35.9 - 50.0 fL    Platelet Count 134 (L) 164 - 446 K/uL    MPV 9.1 9.0 - 12.9 fL    Neutrophils-Polys 78.20 (H) 44.00 - 72.00 %    Lymphocytes 7.60 (L) 22.00 - 41.00 %    Monocytes 12.80 0.00 - 13.40 %    Eosinophils 0.80 0.00 - 6.90 %    Basophils 0.30 0.00 - 1.80 %    Immature Granulocytes 0.30 0.00 - 0.90 %    Nucleated RBC 0.00 /100 WBC    Neutrophils (Absolute) 4.66 1.82 - 7.42 K/uL    Lymphs (Absolute) 0.45 (L) 1.00 - 4.80 K/uL    Monos (Absolute) 0.76 0.00 - 0.85 K/uL    Eos (Absolute) 0.05 0.00 - 0.51 K/uL    Baso (Absolute) 0.02 0.00 - 0.12 K/uL    Immature Granulocytes (abs) 0.02 0.00 - 0.11 K/uL    NRBC (Absolute) 0.00 K/uL   BASIC METABOLIC PANEL    Collection Time: 12/10/18  5:07 AM   Result Value Ref Range    Sodium 135 135 - 145 mmol/L    Potassium 4.2 3.6 - 5.5 mmol/L    Chloride 104 96 - 112 mmol/L    Co2 22 20 - 33 mmol/L    Glucose 91 65 - 99 mg/dL    Bun 19 8 - 22 mg/dL    Creatinine 1.66 (H) 0.50 - 1.40 mg/dL    Calcium 8.9 8.5 - 10.5 mg/dL    Anion Gap 9.0 0.0 - 11.9   ESTIMATED GFR    Collection Time: 12/10/18  5:07 AM   Result Value Ref Range    GFR If  49 (A) >60 mL/min/1.73 m 2    GFR If Non African American 40 (A) >60 mL/min/1.73 m 2   ACCU-CHEK GLUCOSE    Collection Time: 12/10/18  5:31 AM   Result Value Ref Range    Glucose - Accu-Ck 81 65 - 99 mg/dL       Fluids    Intake/Output  Summary (Last 24 hours) at 12/10/18 0921  Last data filed at 12/10/18 0700   Gross per 24 hour   Intake              480 ml   Output             1400 ml   Net             -920 ml       Core Measures & Quality Metrics  Medications reviewed and Radiology images reviewed  Wallis catheter: Critically Ill - Requiring Accurate Measurement of Urinary Output and No Wallis      DVT Prophylaxis: Heparin  DVT prophylaxis - mechanical: SCDs  Ulcer prophylaxis: Not indicated        Total Score: 10    ETOH Screening     Intervention complete date: 12/10/2018  Patient response to intervention: negative for ETOH.   Patient demonstrats understanding of intervention.Plan of care:    has not been contacted.Follow up with: PCP  Total ETOH intervention time: 15 - 30 mintues      Assessment/Plan  Central cord syndrome (HCC)- (present on admission)   Assessment & Plan    Weakness BUE and burning in RUE  Possible increased density within the cervical and proximal thoracic cervical canal which may be artifactual or related to hemorrhage within the CSF space.  MRI: Suspicion for focal myelopathic cord signal abnormality at C4 in the right paramedian cord possible small cord contusion  Spinal cord perfusion with goal MAP > 85 for 5 days per the Neurosurgeon. Completed 12/8   Robert Simon MD. Neurosurgery.     Dysphagia- (present on admission)   Assessment & Plan    Failed bedside swallow.   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  (+)Loss of consciousness  SLP for cognitive evaluation.     Scalp laceration- (present on admission)   Assessment & Plan    Right scalp laceration  Will be approximated in the ER  Local wound care.   Suture removal in 7 days.     Chronic renal failure- (present on admission)   Assessment & Plan    Avoid nephrotoxic agents and monitor laboratory studies  Parameters slowly improving     Type 2 diabetes mellitus (HCC)- (present on admission)   Assessment & Plan    Chronic condition treated with Starlix and Actos.  Difficult to control, admission serum glucose 467  HemA1C 6.2  High sliding scale insulin with Lantus     Uncontrolled type 2 diabetes mellitus with hyperglycemia (HCC)- (present on admission)   Assessment & Plan    12/10 BS 81   Relative control     Trauma- (present on admission)   Assessment & Plan    Ground level fall. Brief loss of consciousness.  T-5000 Activation.  Drew Bonilla MD. Trauma Surgery.     Hyperlipemia- (present on admission)   Assessment & Plan    Chronic condition treated with Simvistatin.  Resumed maintenance medication     Essential hypertension- (present on admission)   Assessment & Plan    Chronic condition treated with Lisinopril and Inderal.  Spinal cord perfusion protocol completed (12/8).         Discussed patient condition with Patient and trauma surgery. Dr.. Bonilla

## 2018-12-10 NOTE — FLOWSHEET NOTE
12/10/18 1451   Incentive Spirometry Group   Incentive Spirometry Instruction Yes   Breathing Exercises Yes   Incentive Spirometer Volume 750 mL   Incentive Spirometer Date Last Changed 12/03/18   Incentive Spirometer Next Change Date (Q 30 Days) 01/03/18   Chest Exam   Respiration 18   Pulse (!) 102   Breath Sounds   RML Breath Sounds Diminished   RLL Breath Sounds Diminished   LLL Breath Sounds Diminished   Secretions   Cough (occasional)   Oximetry   #Pulse Oximetry (Single Determination) Yes   Oxygen   Home O2 Use Prior To Admission? No   Pulse Oximetry 96 %   O2 Daily Delivery Respiratory  Room Air with O2 Available

## 2018-12-10 NOTE — PROGRESS NOTES
Bedside report received from day shift RN, care assumed. Pt assessed, A&Ox4, hard hearing noticed at this time. HOB @30 degrees at this time. Pt denies any pain/needs at this time. Bed locked in lowest position, bed alarm on. Call light and personal belongings within reach.

## 2018-12-10 NOTE — THERAPY
"Physical Therapy Treatment completed.   Bed Mobility:  Supine to Sit: Moderate Assist (HOB slightly elevated)  Transfers: Sit to Stand: Minimal Assist (->Mod assist from both EOB/chair->FWW)  Gait: Level Of Assist: Minimal Assist with Front-Wheel Walker, 20' x 2  Plan of Care: Will benefit from Physical Therapy 5 times per week  Discharge Recommendations: Equipment: Will Continue to Assess for Equipment Needs. Post-acute therapy Discharge to a transitional care facility for continued skilled therapy services.     See \"Rehab Therapy-Acute\" Patient Summary Report for complete documentation.       "

## 2018-12-10 NOTE — PROGRESS NOTES
Neurosurgery Progress Note    Subjective:  No change in status. Awaiting rehab,  collar in place    Exam:  Alert and oriented  Right delt 2, left 3  Right biceps 3, left 4-  Right triceps 3 , left 4   right 3, left 4  Legs 5/5    Dysesthesias diminished but present in hands.     BP  Min: 130/75  Max: 149/77  Pulse  Av.5  Min: 84  Max: 97  Resp  Av.5  Min: 16  Max: 20  Temp  Av.8 °C (98.2 °F)  Min: 36.4 °C (97.6 °F)  Max: 37.2 °C (99 °F)    No Data Recorded    Recent Labs      18   0500  18   0352  12/10/18   0507   WBC  7.4  4.7*  6.0   RBC  3.74*  3.28*  3.35*   HEMOGLOBIN  10.9*  9.6*  9.8*   HEMATOCRIT  32.7*  28.4*  28.8*   MCV  87.4  86.6  86.0   MCH  29.1  29.3  29.3   MCHC  33.3*  33.8  34.0   RDW  51.8*  50.8*  50.2*   PLATELETCT  144*  122*  134*   MPV  8.9*  9.4  9.1     Recent Labs      18   0500  18   0352  12/10/18   0507   SODIUM  134*  135  135   POTASSIUM  4.2  4.2  4.2   CHLORIDE  106  106  104   CO2  18*  21  22   GLUCOSE  127*  109*  91   BUN  21  20  19   CREATININE  1.91*  1.91*  1.66*   CALCIUM  9.1  8.9  8.9               Intake/Output       18 07 - 12/10/18 0659 12/10/18 07 - 18 0659      5721-8249 1213-6722 Total  4654-5089 Total       Intake    P.O.  600  -- 600  --  -- --    P.O. 600 -- 600 -- -- --    Total Intake 600 -- 600 -- -- --       Output    Urine  400  200 600  1000  -- 1000    Number of Times Voided 2 x 1 x 3 x 5 x -- 5 x    Urine Void (mL) 400  -- 1000    Stool  --  -- --  --  -- --    Number of Times Stooled 1 x -- 1 x -- -- --    Total Output 400  -- 1000       Net I/O     200 -200 0 -1000 -- -1000            Intake/Output Summary (Last 24 hours) at 12/10/18 0743  Last data filed at 12/10/18 0700   Gross per 24 hour   Intake              600 ml   Output             1600 ml   Net            -1000 ml            • midodrine  10 mg Q8HR   • docusate sodium 100mg/10mL  100 mg BID   •  insulin glargine  8 Units Q EVENING   • NS   Continuous   • acetaminophen  650 mg Q6HRS   • gabapentin  100 mg QAM   • heparin  5,000 Units Q8HRS   • insulin regular  3-14 Units 4X/DAY ACHS    And   • glucose 4 g  16 g Q15 MIN PRN    And   • dextrose 50%  25 mL Q15 MIN PRN   • simvastatin  20 mg Nightly   • Respiratory Care per Protocol   Continuous RT   • Pharmacy Consult Request  1 Each PRN   • senna-docusate  1 Tab Nightly   • senna-docusate  1 Tab Q24HRS PRN   • polyethylene glycol/lytes  1 Packet BID   • magnesium hydroxide  30 mL DAILY   • bisacodyl  10 mg Q24HRS PRN   • fleet  1 Each Once PRN   • oxyCODONE immediate-release  5 mg Q3HRS PRN   • oxyCODONE immediate release  10 mg Q3HRS PRN   • morphine injection  1-4 mg Q HOUR PRN   • ondansetron  4 mg Q4HRS PRN       Assessment and Plan:  Hospital day #7 central cord sydrome, signal change in cord C3-4  POD #o  Prophylactic anticoagulation: yes     Start date/time:     Per Dr. Simon, patient awaiting Rehab- OK to DC when arrangements made  Continue collar when OOB, can remove for shower, follow up in our office in 2 weeks

## 2018-12-10 NOTE — THERAPY
"Physical Therapy Treatment completed.   Bed Mobility:  Supine to Sit:  (chair pre)  Transfers: Sit to Stand: Maximal Assist (x2)  Gait: Level Of Assist: Unable to Participate with Front-Wheel Walker       Plan of Care: Will benefit from Physical Therapy 5 times per week  Discharge Recommendations: Equipment: Will Continue to Assess for Equipment Needs.     See \"Rehab Therapy-Acute\" Patient Summary Report for complete documentation.     Mr. Almanzar demonstrated significantly decreased functional mobility and leg strength compared to initial eval. Apparently he ambulated to and from the bathroom this morning without much difficulty (stated by both patient and RN). He required blocking at the knees and 2 person squat pivot transfer during the session today, and was unable to ambulate. After transferring to the bed, he reported he felt tingling down his spinal cord as well as feeling nasueated and the nurse was immediately notified. The RN came to the room and assessed the patient, who at that time stated the tingling down the spinal cord had resolved. Therapy was terminated at that time. He also demonstrated catch-and-release spasticity in bilateral knees and had poor control when performing knee flexion actively. He would likely benefit from post-acute transitional care before returning home.   "

## 2018-12-10 NOTE — DISCHARGE PLANNING
Dr. Owusu will accept Vince to inpatient rehab. Transport is scheduled at 1:30p today. Nursing to call report to [phone]. Bedside RN Farhan aware. TRINO Sinha aware. TCC will follow to assist as needed with transition to [facility]

## 2018-12-10 NOTE — DISCHARGE SUMMARY
Trauma Discharge Summary    DATE OF ADMISSION: 12/3/2018    DATE OF DISCHARGE: 12/10/2018    LENGTH OF STAY: eight day    ATTENDING PHYSICIAN: Drew Bonilla M.D.    CONSULTING PHYSICIAN:   1. Dr. Simon Nerosurgery.      DISCHARGE DIAGNOSIS:  1. [place] - T-5000 activation GLF  2. Central cord syndrome  3. Type 2 diabetes  4. Chronic renal failure  5. Scalp laceration  6. Traumatic Brain Injury  7. Mass of pancreas  8. HTN  9. Dysphagia    PROCEDURES:  None during this hospital course    HISTORY OF PRESENT ILLNESS: The patient is a 78 y.o. male involved in a GLF. He was subsequently transferred to [facility] for definite trauma care. He was triaged as a Trauma T-5000 in accordance with established pre-hospital protocols.    HOSPITAL COURSE: On arrival, Mr Almanzar was met by ER physician,  Dr. GM Multani.  The pt was stabilized in the trauma bay and taken to the CT scanner where CT head and c-spine was completed, finding were Soft tissue scalp injury over the right frontal region. with moderate area of encephalomalacia change, inferior and lateral aspect of the left temporal lobe, consistent with chronic posttraumatic brain contusion.  Moderate cerebral atrophy. No acute hemorrhage.  Possible increased density within the cervical and proximal thoracic cervical canal.  MRI Suspicion for focal myelopathic cord signal abnormality at C4 in the right paramedian cord possible small cord contusion. Dr. Simon - Neurosurgeon, recommended ICU care and surgical decompression if pt fails to improve.  Dr. NYA Bonilla, trauma was consulted for admission to SICU and management of his care.  During ICU course pt required norepinephrine infusion to maintain MAP per Cord perfusion protocol.  Protocol completed 12/8/18 and pt was medically cleared for transfer to the Neurosurgery unit.  Pt continued to work with therapies and was accepted for rehab hospital day #8.  He was transferred and very anxious to start rehab.      DISCHARGE PHYSICAL EXAM: See [facility] physical exam dated 12/10/2018  Physical Exam  Constitutional: He is oriented to person, place, and time. He appears well-developed and well-nourished. No distress.   HENT:   Head: Normocephalic and atraumatic.   Eyes: Pupils are equal, round, and reactive to light.   Neck: Normal range of motion.   c-collar on   Cardiovascular: Normal rate, regular rhythm and normal heart sounds.    Pulmonary/Chest: Effort normal and breath sounds normal. No respiratory distress.   Abdominal: Soft. Bowel sounds are normal. He exhibits no distension.   Musculoskeletal: Normal range of motion.   Neurological: He is alert and oriented to person, place, and time.   Skin: Skin is warm and dry.   Psychiatric: He has a normal mood and affect. His behavior is normal    DISCHARGE MEDICATIONS:  I reviewed the patients controlled substance history and obtained a controlled substance use informed consent (if applicable) provided by [facility] and the patient has been prescribed.       Medication List      START taking these medications      Instructions   acetaminophen 325 MG Tabs  Commonly known as:  TYLENOL   Take 2 Tabs by mouth every 6 hours.  Dose:  650 mg     bisacodyl 10 MG Supp  Commonly known as:  DULCOLAX   Insert 1 Suppository in rectum every 24 hours as needed (if magnesium hydroxide ineffective).  Dose:  10 mg     dextrose 50% 50 % Soln  Commonly known as:  D50W   25 mL by Intravenous route as needed (If FSBG is less than or equal to 70 mg/dL and If patient is NPO).  Dose:  25 mL     docusate sodium 100mg/10mL 150 MG/15ML Liqd  Commonly known as:  COLACE   Take 10 mL by mouth 2 Times a Day.  Dose:  100 mg     gabapentin 100 MG Caps  Start taking on:  12/11/2018  Commonly known as:  NEURONTIN   Take 1 Cap by mouth every morning.  Dose:  100 mg     glucose 4 g 4 g Chew   Take 4 Tabs by mouth as needed for Low Blood Sugar (If FSBG is less than or equal to 70 mg/dL and patient  able to eat or drink).  Dose:  16 g     heparin 5000 UNIT/ML Soln   Inject 1 mL as instructed every 8 hours.  Dose:  5000 Units     insulin glargine 100 UNIT/ML Soln  Commonly known as:  LANTUS   Inject 8 Units as instructed every evening.  Dose:  8 Units     insulin regular 100 Unit/mL Soln  Commonly known as:  HUMULIN R   Inject 3-14 Units as instructed 4 Times a Day,Before Meals and at Bedtime.  Dose:  3-14 Units     oxyCODONE immediate-release 5 MG Tabs  Commonly known as:  ROXICODONE   Take 1 Tab by mouth every 6 hours as needed for up to 3 days.  Dose:  5 mg        CONTINUE taking these medications      Instructions   ferrous sulfate 325 (65 Fe) MG tablet   Take 325 mg by mouth every day.  Dose:  325 mg     lisinopril 10 MG Tabs  Commonly known as:  PRINIVIL   Take 10 mg by mouth every day.  Dose:  10 mg     nateglinide 120 MG Tabs  Commonly known as:  STARLIX   Take 120 mg by mouth 3 times a day before meals.  Dose:  120 mg     pioglitazone 45 MG Tabs  Commonly known as:  ACTOS   Take 30 mg by mouth every day.  Dose:  30 mg     propranolol 40 MG Tabs  Commonly known as:  INDERAL   Take 40 mg by mouth 3 times a day.  Dose:  40 mg     simvastatin 20 MG Tabs  Commonly known as:  ZOCOR   Take 20 mg by mouth every evening.  Dose:  20 mg     vitamin D 1000 UNIT Tabs  Commonly known as:  cholecalciferol   Take 1,000 Units by mouth every day.  Dose:  1000 Units            DISPOSITION: The patient will be discharged home in stable condition on 12/10/18. He will follow up with Dr. Simon in one - two weeks.    The patient has and family have been extensively counseled and all questions have been answered. Special attention was paid to respiratory decompensation,  and signs and symptoms of infection and to seek immediate medical attention if these develop. The patient and family demonstrate understanding and give verbal compliance with discharge instructions.    TIME SPENT ON DISCHARGE: 55  minutes      ____________________________________________  DEBBIE Buckley    DD: 12/10/2018 11:56 AM

## 2018-12-10 NOTE — OP REPORT
DATE OF OPERATION: 12/3/2018    DIAGNOSES:   1. Central cord injury    PROCEDURE PERFORMED: Left  subclavian central venous catheter placement.     SURGEON: Drew Bonilla MD, FACS     INDICATIONS: The patient is a critically ill 78 y.o. male needing central venous access for fluid therapy and medications.     DESCRIPTION OF PROCEDURE: Following informed consent, the patient was properly identified and optimally positioned. Intravenous sedation and analgesia was administered. The clavicle and chest were prepped with ChloraPrep® and draped in a sterile fashion. Full barrier precautions.  The patient was placed in Trendelenburg position. The subclavian vein was cannulated and a  flexible guide wire was advanced without resistance. A  triple lumen catheter was passed using  Seldinger technique and secured to the skin with silk sutures. A sterile dressing was applied. All ports were noted to flush and aspirate freely.      The patient tolerated the procedure well. There were no apparent complications. A stat portable chest radiograph was ordered.      ____________________________________   Drew Bonilla MD , PAR[facility]    MJG / NTS   DD: 12/9/2018  7:41 PM

## 2018-12-10 NOTE — DISCHARGE PLANNING
Anticipated Discharge Disposition:   IRF    Action:   Received a call from Anel rincon have accepted pt.   APRN aware  Pt aware  IMM letter given. Explained to pt, pt signed, copy to pt and to chart.      Barriers to Discharge:   none    Plan:  Dc to IRF today at 1:30 pm

## 2018-12-11 PROBLEM — E55.9 VITAMIN D DEFICIENCY: Status: ACTIVE | Noted: 2018-12-11

## 2018-12-11 PROBLEM — D64.9 ANEMIA: Status: ACTIVE | Noted: 2018-12-11

## 2018-12-11 PROBLEM — D69.6 THROMBOCYTOPENIA (HCC): Status: ACTIVE | Noted: 2018-12-11

## 2018-12-11 LAB
25(OH)D3 SERPL-MCNC: 28 NG/ML (ref 30–100)
ALBUMIN SERPL BCP-MCNC: 4.1 G/DL (ref 3.2–4.9)
ALBUMIN/GLOB SERPL: 1.6 G/DL
ALP SERPL-CCNC: 118 U/L (ref 30–99)
ALT SERPL-CCNC: 33 U/L (ref 2–50)
ANION GAP SERPL CALC-SCNC: 10 MMOL/L (ref 0–11.9)
AST SERPL-CCNC: 31 U/L (ref 12–45)
BASOPHILS # BLD AUTO: 0.4 % (ref 0–1.8)
BASOPHILS # BLD: 0.02 K/UL (ref 0–0.12)
BILIRUB SERPL-MCNC: 0.9 MG/DL (ref 0.1–1.5)
BUN SERPL-MCNC: 21 MG/DL (ref 8–22)
CALCIUM SERPL-MCNC: 9.3 MG/DL (ref 8.5–10.5)
CHLORIDE SERPL-SCNC: 102 MMOL/L (ref 96–112)
CHOLEST SERPL-MCNC: 106 MG/DL (ref 100–199)
CO2 SERPL-SCNC: 25 MMOL/L (ref 20–33)
CREAT SERPL-MCNC: 1.92 MG/DL (ref 0.5–1.4)
EOSINOPHIL # BLD AUTO: 0.08 K/UL (ref 0–0.51)
EOSINOPHIL NFR BLD: 1.6 % (ref 0–6.9)
ERYTHROCYTE [DISTWIDTH] IN BLOOD BY AUTOMATED COUNT: 52.4 FL (ref 35.9–50)
GLOBULIN SER CALC-MCNC: 2.5 G/DL (ref 1.9–3.5)
GLUCOSE BLD-MCNC: 132 MG/DL (ref 65–99)
GLUCOSE BLD-MCNC: 133 MG/DL (ref 65–99)
GLUCOSE BLD-MCNC: 150 MG/DL (ref 65–99)
GLUCOSE SERPL-MCNC: 134 MG/DL (ref 65–99)
HCT VFR BLD AUTO: 34.4 % (ref 42–52)
HDLC SERPL-MCNC: 34 MG/DL
HGB BLD-MCNC: 11.3 G/DL (ref 14–18)
IMM GRANULOCYTES # BLD AUTO: 0.03 K/UL (ref 0–0.11)
IMM GRANULOCYTES NFR BLD AUTO: 0.6 % (ref 0–0.9)
LDLC SERPL CALC-MCNC: 41 MG/DL
LYMPHOCYTES # BLD AUTO: 0.56 K/UL (ref 1–4.8)
LYMPHOCYTES NFR BLD: 11.2 % (ref 22–41)
MCH RBC QN AUTO: 28.9 PG (ref 27–33)
MCHC RBC AUTO-ENTMCNC: 32.8 G/DL (ref 33.7–35.3)
MCV RBC AUTO: 88 FL (ref 81.4–97.8)
MONOCYTES # BLD AUTO: 0.55 K/UL (ref 0–0.85)
MONOCYTES NFR BLD AUTO: 11 % (ref 0–13.4)
NEUTROPHILS # BLD AUTO: 3.77 K/UL (ref 1.82–7.42)
NEUTROPHILS NFR BLD: 75.2 % (ref 44–72)
NRBC # BLD AUTO: 0 K/UL
NRBC BLD-RTO: 0 /100 WBC
PLATELET # BLD AUTO: 156 K/UL (ref 164–446)
PMV BLD AUTO: 9.4 FL (ref 9–12.9)
POTASSIUM SERPL-SCNC: 4.2 MMOL/L (ref 3.6–5.5)
PROT SERPL-MCNC: 6.6 G/DL (ref 6–8.2)
RBC # BLD AUTO: 3.91 M/UL (ref 4.7–6.1)
SODIUM SERPL-SCNC: 137 MMOL/L (ref 135–145)
TRIGL SERPL-MCNC: 157 MG/DL (ref 0–149)
TSH SERPL DL<=0.005 MIU/L-ACNC: 4.28 UIU/ML (ref 0.38–5.33)
WBC # BLD AUTO: 5 K/UL (ref 4.8–10.8)

## 2018-12-11 PROCEDURE — 700102 HCHG RX REV CODE 250 W/ 637 OVERRIDE(OP): Performed by: HOSPITALIST

## 2018-12-11 PROCEDURE — 83036 HEMOGLOBIN GLYCOSYLATED A1C: CPT

## 2018-12-11 PROCEDURE — 99223 1ST HOSP IP/OBS HIGH 75: CPT | Performed by: HOSPITALIST

## 2018-12-11 PROCEDURE — A9270 NON-COVERED ITEM OR SERVICE: HCPCS | Performed by: PHYSICAL MEDICINE & REHABILITATION

## 2018-12-11 PROCEDURE — 700102 HCHG RX REV CODE 250 W/ 637 OVERRIDE(OP): Performed by: PHYSICAL MEDICINE & REHABILITATION

## 2018-12-11 PROCEDURE — 97530 THERAPEUTIC ACTIVITIES: CPT

## 2018-12-11 PROCEDURE — 82306 VITAMIN D 25 HYDROXY: CPT

## 2018-12-11 PROCEDURE — 700111 HCHG RX REV CODE 636 W/ 250 OVERRIDE (IP): Performed by: PHYSICAL MEDICINE & REHABILITATION

## 2018-12-11 PROCEDURE — 36415 COLL VENOUS BLD VENIPUNCTURE: CPT

## 2018-12-11 PROCEDURE — 80053 COMPREHEN METABOLIC PANEL: CPT

## 2018-12-11 PROCEDURE — 92523 SPEECH SOUND LANG COMPREHEN: CPT

## 2018-12-11 PROCEDURE — 97162 PT EVAL MOD COMPLEX 30 MIN: CPT

## 2018-12-11 PROCEDURE — 97166 OT EVAL MOD COMPLEX 45 MIN: CPT

## 2018-12-11 PROCEDURE — 84443 ASSAY THYROID STIM HORMONE: CPT

## 2018-12-11 PROCEDURE — A9270 NON-COVERED ITEM OR SERVICE: HCPCS | Performed by: HOSPITALIST

## 2018-12-11 PROCEDURE — 80061 LIPID PANEL: CPT

## 2018-12-11 PROCEDURE — 97535 SELF CARE MNGMENT TRAINING: CPT

## 2018-12-11 PROCEDURE — 770010 HCHG ROOM/CARE - REHAB SEMI PRIVAT*

## 2018-12-11 PROCEDURE — 82962 GLUCOSE BLOOD TEST: CPT | Mod: 91

## 2018-12-11 PROCEDURE — 92610 EVALUATE SWALLOWING FUNCTION: CPT

## 2018-12-11 PROCEDURE — 85025 COMPLETE CBC W/AUTO DIFF WBC: CPT

## 2018-12-11 PROCEDURE — 99233 SBSQ HOSP IP/OBS HIGH 50: CPT | Performed by: PHYSICAL MEDICINE & REHABILITATION

## 2018-12-11 PROCEDURE — 700112 HCHG RX REV CODE 229: Performed by: PHYSICAL MEDICINE & REHABILITATION

## 2018-12-11 RX ORDER — ASCORBIC ACID 500 MG
500 TABLET ORAL
Status: DISCONTINUED | OUTPATIENT
Start: 2018-12-13 | End: 2019-01-04 | Stop reason: HOSPADM

## 2018-12-11 RX ORDER — MIDODRINE HYDROCHLORIDE 2.5 MG/1
5 TABLET ORAL
Status: DISCONTINUED | OUTPATIENT
Start: 2018-12-11 | End: 2018-12-12

## 2018-12-11 RX ADMIN — OXYCODONE HYDROCHLORIDE 10 MG: 10 TABLET ORAL at 14:48

## 2018-12-11 RX ADMIN — GABAPENTIN 100 MG: 100 CAPSULE ORAL at 09:38

## 2018-12-11 RX ADMIN — MIDODRINE HYDROCHLORIDE 5 MG: 2.5 TABLET ORAL at 18:17

## 2018-12-11 RX ADMIN — OMEPRAZOLE 20 MG: 20 CAPSULE, DELAYED RELEASE ORAL at 09:38

## 2018-12-11 RX ADMIN — POLYETHYLENE GLYCOL 3350 1 PACKET: 17 POWDER, FOR SOLUTION ORAL at 21:46

## 2018-12-11 RX ADMIN — ONDANSETRON 4 MG: 4 TABLET, ORALLY DISINTEGRATING ORAL at 10:11

## 2018-12-11 RX ADMIN — DOCUSATE SODIUM 100 MG: 50 LIQUID ORAL at 21:45

## 2018-12-11 RX ADMIN — ACETAMINOPHEN 650 MG: 325 TABLET ORAL at 11:56

## 2018-12-11 RX ADMIN — HEPARIN SODIUM 5000 UNITS: 5000 INJECTION, SOLUTION INTRAVENOUS; SUBCUTANEOUS at 21:45

## 2018-12-11 RX ADMIN — MIDODRINE HYDROCHLORIDE 5 MG: 2.5 TABLET ORAL at 21:46

## 2018-12-11 RX ADMIN — FERROUS SULFATE TAB 325 MG (65 MG ELEMENTAL FE) 325 MG: 325 (65 FE) TAB at 09:38

## 2018-12-11 RX ADMIN — OXYCODONE HYDROCHLORIDE 10 MG: 10 TABLET ORAL at 02:31

## 2018-12-11 RX ADMIN — HEPARIN SODIUM 5000 UNITS: 5000 INJECTION, SOLUTION INTRAVENOUS; SUBCUTANEOUS at 06:01

## 2018-12-11 RX ADMIN — MIDODRINE HYDROCHLORIDE 10 MG: 10 TABLET ORAL at 09:38

## 2018-12-11 RX ADMIN — ACETAMINOPHEN 650 MG: 325 TABLET ORAL at 06:01

## 2018-12-11 RX ADMIN — INSULIN GLARGINE 8 UNITS: 100 INJECTION, SOLUTION SUBCUTANEOUS at 21:47

## 2018-12-11 RX ADMIN — MIDODRINE HYDROCHLORIDE 10 MG: 10 TABLET ORAL at 14:45

## 2018-12-11 RX ADMIN — HEPARIN SODIUM 5000 UNITS: 5000 INJECTION, SOLUTION INTRAVENOUS; SUBCUTANEOUS at 14:45

## 2018-12-11 RX ADMIN — INSULIN HUMAN 3 UNITS: 100 INJECTION, SOLUTION PARENTERAL at 11:39

## 2018-12-11 RX ADMIN — SENNOSIDES AND DOCUSATE SODIUM 1 TABLET: 8.6; 5 TABLET ORAL at 21:45

## 2018-12-11 RX ADMIN — SIMVASTATIN 20 MG: 20 TABLET, FILM COATED ORAL at 21:46

## 2018-12-11 RX ADMIN — ACETAMINOPHEN 650 MG: 325 TABLET ORAL at 18:17

## 2018-12-11 RX ADMIN — OXYCODONE HYDROCHLORIDE 10 MG: 10 TABLET ORAL at 21:46

## 2018-12-11 ASSESSMENT — ENCOUNTER SYMPTOMS
CHILLS: 0
NAUSEA: 0
ABDOMINAL PAIN: 0
SENSORY CHANGE: 1
FOCAL WEAKNESS: 1
SHORTNESS OF BREATH: 0
VOMITING: 0
COUGH: 0
PALPITATIONS: 0
EYES NEGATIVE: 1
NECK PAIN: 1
FEVER: 0

## 2018-12-11 ASSESSMENT — BRIEF INTERVIEW FOR MENTAL STATUS (BIMS)
INITIAL REPETITION OF BED BLUE SOCK - FIRST ATTEMPT: 3
WHAT YEAR IS IT: CORRECT
BIMS SUMMARY SCORE: 11
ASKED TO RECALL BLUE: YES, AFTER CUEING (A COLOR")"
BIMS SUMMARY SCORE: 12
WHAT YEAR IS IT: CORRECT
ASKED TO RECALL BED: YES, AFTER CUEING (A PIECE OF FURNITURE")"
WHAT MONTH IS IT: ACCURATE WITHIN 5 DAYS
ASKED TO RECALL SOCK: YES, AFTER CUEING (SOMETHING TO WEAR")"
ASKED TO RECALL BED: YES, AFTER CUEING (A PIECE OF FURNITURE")"
INITIAL REPETITION OF BED BLUE SOCK - FIRST ATTEMPT: 3
ASKED TO RECALL BLUE: YES, NO CUE REQUIRED
WHAT MONTH IS IT: ACCURATE WITHIN 5 DAYS
ASKED TO RECALL SOCK: NO, COULD NOT RECALL
WHAT DAY OF THE WEEK IS IT: CORRECT
WHAT DAY OF THE WEEK IS IT: INCORRECT

## 2018-12-11 ASSESSMENT — PAIN SCALES - GENERAL
PAINLEVEL_OUTOF10: 7
PAINLEVEL_OUTOF10: 3
PAINLEVEL_OUTOF10: 1
PAINLEVEL_OUTOF10: 5
PAINLEVEL_OUTOF10: 3
PAINLEVEL_OUTOF10: 7

## 2018-12-11 ASSESSMENT — GAIT ASSESSMENTS
DISTANCE (FEET): 40
GAIT LEVEL OF ASSIST: CONTACT GUARD ASSIST
ASSISTIVE DEVICE: FRONT WHEEL WALKER

## 2018-12-11 ASSESSMENT — ACTIVITIES OF DAILY LIVING (ADL): TOILETING: INDEPENDENT

## 2018-12-11 NOTE — THERAPY
Speech Therapy Initial Plan of Care Note    1) Assessment:  Patient is 78 y.o. male with a diagnosis of mTBI, SCI secondary to GLF.  Additional factors influencing patient status / progress (ie: cognitive factors, co-morbidities, social support, etc): independent PLOF, supportive sister lives locally, dysphagia, cognitive deficits.  Long term and short term goals have been discussed with patient and they are in agreement.  2) Plan:  Recommend Speech Therapy  minutes per day 5-7 days per week for 3 weeks for the following treatments:  SLP Swallowing Dysfunction Treatment, SLP Oral Pharyngeal Evaluation, SLP Cognitive Skill Development and SLP Group Treatment.  3) Goals:  Please refer to care plan for goals.

## 2018-12-11 NOTE — PROGRESS NOTES
Admit to [facility] from [facility] via transport.  follow for .  Initial assessment initiated. O[facility] to [facility] process, safety in room, bathroom, and call light.    Client/family goal to return home

## 2018-12-11 NOTE — CARE PLAN
Problem: Safety  Goal: Will remain free from injury  Outcome: PROGRESSING AS EXPECTED  Instructed on the use of the call light and to call for all transfers into the w/c. He verbalized understanding.

## 2018-12-11 NOTE — DISCHARGE PLANNING
Case Management;  Called patient's sister and provided update and contact information.  She visited her brother today.  Her daughter will bring his clothes on Friday.  His sister states that he has always been very sharp.  Today she felt he was confused and hallucinating.  She states he was seeing people in the room that weren't there.  I will let his treating team know about this.  His sister confirms that she did housekeeping and laundry but he did his own meds and self care.  Will follow to update her after team conference.

## 2018-12-11 NOTE — CARE PLAN
Problem: Communication  Goal: The ability to communicate needs accurately and effectively will improve  Makes needs known to staff.  Encouraged to use call light for staff assist.    Problem: Safety  Goal: Will remain free from falls  Call light kept within reach and encouraged to use for assistance and with toileting needs. Encouraged to call staff and to wait for staff before transfers.    Problem: Pain Management  Goal: Pain level will decrease to patient's comfort goal  Complains of RUE pain, medicated with Oxycodone tonight with + relief.  See MAR and doc flow sheet.

## 2018-12-11 NOTE — REH[facility]-PHARMACY IDT TEAM NOTE
Pharmacy   Pharmacy  Antibiotics/Antifungals/Antivirals:  Medication:      Active Orders     None        Route:         n/a  Stop Date:  n/a  Reason:   Antihypertensives/Cardiac:  Medication:    Orders (72h ago through future)    Start     Ordered    12/10/18 2100  simvastatin (ZOCOR) tablet 20 mg  NIGHTLY      12/10/18 1510    12/10/18 1530  midodrine (PROAMATINE) tablet 10 mg  EVERY 8 HOURS      12/10/18 1510    12/10/18 1510  hydrALAZINE (APRESOLINE) tablet 25 mg  EVERY 8 HOURS PRN      12/10/18 1510        Patient Vitals for the past 24 hrs:   BP Pulse   12/11/18 0700 107/68 98   12/10/18 2309 132/68 100   12/10/18 2051 148/82 98   12/10/18 1841 156/85 (!) 102   12/10/18 1451 - (!) 102   12/10/18 1415 138/78 98       Anticoagulation:  Medication:  Heparin  INR:      Other key medications:Cyclobenzaprine, Gabapentin, Lantus, Omeprazole.   A review of the medication list reveals no issues at this time. Patient is currently on an antihypertensive. Recommend home blood pressure monitoring/recording if antihypertensive regimen continues.  Section completed by:  Sherri Dumont RPH

## 2018-12-11 NOTE — H&P
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 patient's medical status and rate of impairment recovery):  Transfers:   Modified Independent  Mobility/Gait:   Modified Independent  ADL's:   Modified Independent  Cognition:  Gokul  Swallowing:  Gokul    DISPOSITION: Discharge to pre-morbid independent living setting with the supportive care of patient's family.    ELOS: 14-21 days

## 2018-12-11 NOTE — REHAB-CM IDT TEAM NOTE
Case Management    DC Planning  DC destination/dispostion:  Patient lives alone in a Senior apartment complex.  He has safety bars in his bathroom and shower.    Referrals: Will update Meals on Wheels.    DC Needs: He may need home health initially.  He has a fww, spc safety bars and shower seat.  He has been doing his own insulin and fingersticks.  We may need to schedule family training closer to d/c.    Barriers to discharge:  Lives alone     Strengths: sister and her children are very supportive.    Section completed by:  Mckayla Mason R.N.

## 2018-12-11 NOTE — PROGRESS NOTES
Received bedside shift report from Susan FRANCO RN regarding patient and assumed care. Patient awake, calm and stable, currently positioned in bed for comfort and safety; call light within reach. Denies pain or discomfort at this time. Will continue to monitor.

## 2018-12-11 NOTE — IDT DISCHARGE PLANNING
CASE MANAGEMENT INITIAL ASSESSMENT    Admit Date:  12/10/2018     I met with patient to discuss role of case management / discharge planning / team conference.  Patient is a  78 y.o. male transferred from [facility].  Patient relays that he was on his way to his doctors office when he fell and was injured.  His admitting physician for rehab will be Dr. Brandt.    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) 12/03/2018     Priority: Medium   • Contraindication to deep vein thrombosis (DVT) prophylaxis 12/03/2018     Priority: Medium   • Essential hypertension 12/03/2018     Priority: Low   • Hyperlipemia 12/03/2018     Priority: Low   • Trauma 12/03/2018     Priority: Low   • Uncontrolled type 2 diabetes mellitus with hyperglycemia (HCC) 11/12/2018   • Mass of pancreas 06/03/2016     Prior Living Situation:  Housing / Facility: 1 Story Apartment / Condo  Lives with - Patient's Self Care Capacity: Alone and Able to Care For Self    Prior Level of Function:  Patient lived independently and was able to do his own personal care.  His sister drives him and helps with housekeeping and does his laundry.    Support Systems:  Primary : Sister is Jai Rodriguez at [phone]  Other support systems: Niece and nephew also live in [place].  Advance Directives: Yes  Power of  (Name & Phone): Jai Rodriguez at [phone]    Previous Services Utilized:   Equipment Owned: Single Point Cane, 4-Wheel Walker  Prior Services: Meals on Wheels,  Housekeeping / Homemaker Services    Other Information:  Occupation (Pre-Hospital Vocational): Retired Due To Age  Primary Payor Source: Medicare A, Medicare B  Secondary Payor Source: Medicare B  [facility]  Other MDs: Dr. Simon for neurosurgery    Additional Case Management Questions:  Have you ever received case management services for yourself or a family member?  no    Do you feel you have and an understanding of what services  provide? Yes after we reviewed services.    Do you have any additional questions regarding case management?   Just remember that [facility]       CASE MANAGEMENT PLAN OF CARE   Individualized Goals:   1. I will update patient's sister per request of plans and weekly team conference.  2. I will [facility] for [facility] and provision of medications.    Barriers:   1. Patient does live alone.    Patient / Family Goal:  Patient / Family Goal: Patient plans to return to his senior apartment in [place].  His sister lives 1/2 mile away and does his housekeeping for him.  [facility]  [facility].  He also has a neice and nephew who help out.  He gets Meals on Wheels once a day at his apartment.  He has been checking his own blood sugars and giving himself injections.  I will follow for set up of his follow up therapy, dr garcia and additional dme.    Plan:  1. Continue to follow patient through hospitalization and provide discharge planning in collaboration with patient, family, physicians and ancillary services.     2. Utilize community resources to ensure a safe discharge.

## 2018-12-11 NOTE — THERAPY
Physical Therapy Initial Plan of Care Note    1) Assessment: Patient is 78 y.o. 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  Additional factors influencing patient status / progress (ie: cognitive factors, co-morbidities, social support, etc): Impaired bed mobility, transfers, sitting and standing balance, fall risk.  Long term and short term goals have been discussed with patient and they are in agreement.  2) Plan: Recommend Physical Therapy  minutes per day 5-7 days per week for 4 weeks for the following treatments:  PT E Stim Attended, PT Gait Training, PT Therapeutic Exercises, PT Neuro Re-Ed/Balance, PT Therapeutic Activity and PT Evaluation.  3) Goals:  Please refer to care plan for goals.

## 2018-12-11 NOTE — PROGRESS NOTES
"Rehab Progress Note     Encounter Date: 12/11/2018    CC: Cervical myelopathy    Interval Events (Subjective)  Patient sitting up in bed. He reports he slept alright overnight. Reviewed admission labs, Cr 1.92, Vitamin D 28, and normal TSH.  Otherwise he reports sitting up for SLP this morning increased his fatigue. He is still willing to work with therapy as he wants to get stronger. Discussed would consult Hospitalist with goal to discontinue insulin as he will have difficulty self-administering at discharge.       Objective:  VITAL SIGNS: /68   Pulse 98   Temp 36.2 °C (97.2 °F) (Oral)   Resp 18   Ht 1.778 m (5' 10\")   Wt 78.2 kg (172 lb 8 oz)   SpO2 98%   BMI 24.75 kg/m²   Gen: NAD, right scalp lacerations  Psych: Mood and affect appropriate  CV: RRR, no edema  Resp: CTAB, no upper airway sounds  Abd: NTND  Neuro: AOx4, 3/5 RUE and 3+/5 LUE    Recent Results (from the past 72 hour(s))   ACCU-CHEK GLUCOSE    Collection Time: 12/08/18  5:18 PM   Result Value Ref Range    Glucose - Accu-Ck 153 (H) 65 - 99 mg/dL   ACCU-CHEK GLUCOSE    Collection Time: 12/08/18  9:27 PM   Result Value Ref Range    Glucose - Accu-Ck 120 (H) 65 - 99 mg/dL   CBC WITH DIFFERENTIAL    Collection Time: 12/09/18  3:52 AM   Result Value Ref Range    WBC 4.7 (L) 4.8 - 10.8 K/uL    RBC 3.28 (L) 4.70 - 6.10 M/uL    Hemoglobin 9.6 (L) 14.0 - 18.0 g/dL    Hematocrit 28.4 (L) 42.0 - 52.0 %    MCV 86.6 81.4 - 97.8 fL    MCH 29.3 27.0 - 33.0 pg    MCHC 33.8 33.7 - 35.3 g/dL    RDW 50.8 (H) 35.9 - 50.0 fL    Platelet Count 122 (L) 164 - 446 K/uL    MPV 9.4 9.0 - 12.9 fL    Neutrophils-Polys 71.50 44.00 - 72.00 %    Lymphocytes 11.20 (L) 22.00 - 41.00 %    Monocytes 14.60 (H) 0.00 - 13.40 %    Eosinophils 1.90 0.00 - 6.90 %    Basophils 0.40 0.00 - 1.80 %    Immature Granulocytes 0.40 0.00 - 0.90 %    Nucleated RBC 0.00 /100 WBC    Neutrophils (Absolute) 3.32 1.82 - 7.42 K/uL    Lymphs (Absolute) 0.52 (L) 1.00 - 4.80 K/uL    Monos " (Absolute) 0.68 0.00 - 0.85 K/uL    Eos (Absolute) 0.09 0.00 - 0.51 K/uL    Baso (Absolute) 0.02 0.00 - 0.12 K/uL    Immature Granulocytes (abs) 0.02 0.00 - 0.11 K/uL    NRBC (Absolute) 0.00 K/uL   BASIC METABOLIC PANEL    Collection Time: 12/09/18  3:52 AM   Result Value Ref Range    Sodium 135 135 - 145 mmol/L    Potassium 4.2 3.6 - 5.5 mmol/L    Chloride 106 96 - 112 mmol/L    Co2 21 20 - 33 mmol/L    Glucose 109 (H) 65 - 99 mg/dL    Bun 20 8 - 22 mg/dL    Creatinine 1.91 (H) 0.50 - 1.40 mg/dL    Calcium 8.9 8.5 - 10.5 mg/dL    Anion Gap 8.0 0.0 - 11.9   ESTIMATED GFR    Collection Time: 12/09/18  3:52 AM   Result Value Ref Range    GFR If  42 (A) >60 mL/min/1.73 m 2    GFR If Non  34 (A) >60 mL/min/1.73 m 2   ACCU-CHEK GLUCOSE    Collection Time: 12/09/18  6:11 AM   Result Value Ref Range    Glucose - Accu-Ck 107 (H) 65 - 99 mg/dL   ACCU-CHEK GLUCOSE    Collection Time: 12/09/18 11:56 AM   Result Value Ref Range    Glucose - Accu-Ck 159 (H) 65 - 99 mg/dL   ACCU-CHEK GLUCOSE    Collection Time: 12/09/18  5:56 PM   Result Value Ref Range    Glucose - Accu-Ck 118 (H) 65 - 99 mg/dL   ACCU-CHEK GLUCOSE    Collection Time: 12/09/18  9:29 PM   Result Value Ref Range    Glucose - Accu-Ck 102 (H) 65 - 99 mg/dL   CBC WITH DIFFERENTIAL    Collection Time: 12/10/18  5:07 AM   Result Value Ref Range    WBC 6.0 4.8 - 10.8 K/uL    RBC 3.35 (L) 4.70 - 6.10 M/uL    Hemoglobin 9.8 (L) 14.0 - 18.0 g/dL    Hematocrit 28.8 (L) 42.0 - 52.0 %    MCV 86.0 81.4 - 97.8 fL    MCH 29.3 27.0 - 33.0 pg    MCHC 34.0 33.7 - 35.3 g/dL    RDW 50.2 (H) 35.9 - 50.0 fL    Platelet Count 134 (L) 164 - 446 K/uL    MPV 9.1 9.0 - 12.9 fL    Neutrophils-Polys 78.20 (H) 44.00 - 72.00 %    Lymphocytes 7.60 (L) 22.00 - 41.00 %    Monocytes 12.80 0.00 - 13.40 %    Eosinophils 0.80 0.00 - 6.90 %    Basophils 0.30 0.00 - 1.80 %    Immature Granulocytes 0.30 0.00 - 0.90 %    Nucleated RBC 0.00 /100 WBC    Neutrophils (Absolute)  4.66 1.82 - 7.42 K/uL    Lymphs (Absolute) 0.45 (L) 1.00 - 4.80 K/uL    Monos (Absolute) 0.76 0.00 - 0.85 K/uL    Eos (Absolute) 0.05 0.00 - 0.51 K/uL    Baso (Absolute) 0.02 0.00 - 0.12 K/uL    Immature Granulocytes (abs) 0.02 0.00 - 0.11 K/uL    NRBC (Absolute) 0.00 K/uL   BASIC METABOLIC PANEL    Collection Time: 12/10/18  5:07 AM   Result Value Ref Range    Sodium 135 135 - 145 mmol/L    Potassium 4.2 3.6 - 5.5 mmol/L    Chloride 104 96 - 112 mmol/L    Co2 22 20 - 33 mmol/L    Glucose 91 65 - 99 mg/dL    Bun 19 8 - 22 mg/dL    Creatinine 1.66 (H) 0.50 - 1.40 mg/dL    Calcium 8.9 8.5 - 10.5 mg/dL    Anion Gap 9.0 0.0 - 11.9   ESTIMATED GFR    Collection Time: 12/10/18  5:07 AM   Result Value Ref Range    GFR If  49 (A) >60 mL/min/1.73 m 2    GFR If Non African American 40 (A) >60 mL/min/1.73 m 2   ACCU-CHEK GLUCOSE    Collection Time: 12/10/18  5:31 AM   Result Value Ref Range    Glucose - Accu-Ck 81 65 - 99 mg/dL   ACCU-CHEK GLUCOSE    Collection Time: 12/10/18 11:53 AM   Result Value Ref Range    Glucose - Accu-Ck 128 (H) 65 - 99 mg/dL   ACCU-CHEK GLUCOSE    Collection Time: 12/10/18  5:24 PM   Result Value Ref Range    Glucose - Accu-Ck 191 (H) 65 - 99 mg/dL   ACCU-CHEK GLUCOSE    Collection Time: 12/10/18  8:59 PM   Result Value Ref Range    Glucose - Accu-Ck 148 (H) 65 - 99 mg/dL   CBC with Differential    Collection Time: 12/11/18  6:32 AM   Result Value Ref Range    WBC 5.0 4.8 - 10.8 K/uL    RBC 3.91 (L) 4.70 - 6.10 M/uL    Hemoglobin 11.3 (L) 14.0 - 18.0 g/dL    Hematocrit 34.4 (L) 42.0 - 52.0 %    MCV 88.0 81.4 - 97.8 fL    MCH 28.9 27.0 - 33.0 pg    MCHC 32.8 (L) 33.7 - 35.3 g/dL    RDW 52.4 (H) 35.9 - 50.0 fL    Platelet Count 156 (L) 164 - 446 K/uL    MPV 9.4 9.0 - 12.9 fL    Neutrophils-Polys 75.20 (H) 44.00 - 72.00 %    Lymphocytes 11.20 (L) 22.00 - 41.00 %    Monocytes 11.00 0.00 - 13.40 %    Eosinophils 1.60 0.00 - 6.90 %    Basophils 0.40 0.00 - 1.80 %    Immature Granulocytes  0.60 0.00 - 0.90 %    Nucleated RBC 0.00 /100 WBC    Neutrophils (Absolute) 3.77 1.82 - 7.42 K/uL    Lymphs (Absolute) 0.56 (L) 1.00 - 4.80 K/uL    Monos (Absolute) 0.55 0.00 - 0.85 K/uL    Eos (Absolute) 0.08 0.00 - 0.51 K/uL    Baso (Absolute) 0.02 0.00 - 0.12 K/uL    Immature Granulocytes (abs) 0.03 0.00 - 0.11 K/uL    NRBC (Absolute) 0.00 K/uL   Comp Metabolic Panel (CMP)    Collection Time: 12/11/18  6:32 AM   Result Value Ref Range    Sodium 137 135 - 145 mmol/L    Potassium 4.2 3.6 - 5.5 mmol/L    Chloride 102 96 - 112 mmol/L    Co2 25 20 - 33 mmol/L    Anion Gap 10.0 0.0 - 11.9    Glucose 134 (H) 65 - 99 mg/dL    Bun 21 8 - 22 mg/dL    Creatinine 1.92 (H) 0.50 - 1.40 mg/dL    Calcium 9.3 8.5 - 10.5 mg/dL    AST(SGOT) 31 12 - 45 U/L    ALT(SGPT) 33 2 - 50 U/L    Alkaline Phosphatase 118 (H) 30 - 99 U/L    Total Bilirubin 0.9 0.1 - 1.5 mg/dL    Albumin 4.1 3.2 - 4.9 g/dL    Total Protein 6.6 6.0 - 8.2 g/dL    Globulin 2.5 1.9 - 3.5 g/dL    A-G Ratio 1.6 g/dL   Lipid Profile (Lipid Panel)    Collection Time: 12/11/18  6:32 AM   Result Value Ref Range    Cholesterol,Tot 106 100 - 199 mg/dL    Triglycerides 157 (H) 0 - 149 mg/dL    HDL 34 (A) >=40 mg/dL    LDL 41 <100 mg/dL   TSH with Reflex to FT4    Collection Time: 12/11/18  6:32 AM   Result Value Ref Range    TSH 4.280 0.380 - 5.330 uIU/mL   Vitamin D, 25-hydroxy (blood)    Collection Time: 12/11/18  6:32 AM   Result Value Ref Range    25-Hydroxy   Vitamin D 25 28 (L) 30 - 100 ng/mL   ESTIMATED GFR    Collection Time: 12/11/18  6:32 AM   Result Value Ref Range    GFR If  41 (A) >60 mL/min/1.73 m 2    GFR If Non  34 (A) >60 mL/min/1.73 m 2   ACCU-CHEK GLUCOSE    Collection Time: 12/11/18  7:28 AM   Result Value Ref Range    Glucose - Accu-Ck 133 (H) 65 - 99 mg/dL       Current Facility-Administered Medications   Medication Frequency   • [START ON 12/12/2018] vitamin D (cholecalciferol) tablet 1,000 Units DAILY   • acetaminophen  (TYLENOL) tablet 650 mg Q6HRS   • docusate sodium 100mg/10mL (COLACE) solution 100 mg BID   • gabapentin (NEURONTIN) capsule 100 mg QAM   • heparin injection 5,000 Units Q8HRS   • insulin glargine (LANTUS) injection 8 Units Q EVENING   • insulin regular (HUMULIN R) injection 3-14 Units 4X/DAY ACHS    And   • glucose 4 g chewable tablet 16 g Q15 MIN PRN    And   • dextrose 50% (D50W) injection 25 mL Q15 MIN PRN   • magnesium hydroxide (MILK OF MAGNESIA) suspension 30 mL DAILY   • midodrine (PROAMATINE) tablet 10 mg Q8HR   • polyethylene glycol/lytes (MIRALAX) PACKET 1 Packet BID   • senna-docusate (PERICOLACE or SENOKOT S) 8.6-50 MG per tablet 1 Tab Nightly   • simvastatin (ZOCOR) tablet 20 mg Nightly   • Respiratory Care per Protocol Continuous RT   • Pharmacy Consult Request ...Pain Management Review 1 Each PRN   • oxyCODONE immediate-release (ROXICODONE) tablet 5 mg Q3HRS PRN   • oxyCODONE immediate release (ROXICODONE) tablet 10 mg Q3HRS PRN   • hydrALAZINE (APRESOLINE) tablet 25 mg Q8HRS PRN   • acetaminophen (TYLENOL) tablet 650 mg Q4HRS PRN   • artificial tears 1.4 % ophthalmic solution 1 Drop PRN   • benzocaine-menthol (CEPACOL) lozenge 1 Lozenge Q2HRS PRN   • mag hydrox-al hydrox-simeth (MAALOX PLUS ES or MYLANTA DS) suspension 20 mL Q2HRS PRN   • ondansetron (ZOFRAN ODT) dispertab 4 mg 4X/DAY PRN    Or   • ondansetron (ZOFRAN) syringe/vial injection 4 mg 4X/DAY PRN   • traZODone (DESYREL) tablet 50 mg QHS PRN   • sodium chloride (OCEAN) 0.65 % nasal spray 2 Spray PRN   • senna-docusate (PERICOLACE or SENOKOT S) 8.6-50 MG per tablet 2 Tab BID PRN    And   • polyethylene glycol/lytes (MIRALAX) PACKET 1 Packet QDAY PRN    And   • magnesium hydroxide (MILK OF MAGNESIA) suspension 30 mL QDAY PRN    And   • bisacodyl (DULCOLAX) suppository 10 mg QDAY PRN   • cyclobenzaprine (FLEXERIL) tablet 10 mg TID PRN   • ferrous sulfate tablet 325 mg Q48HRS   • omeprazole (PRILOSEC) capsule 20 mg DAILY       Orders Placed  This Encounter   Procedures   • Diet Order Diabetic     Standing Status:   Standing     Number of Occurrences:   1     Order Specific Question:   Diet:     Answer:   Diabetic [3]     Order Specific Question:   Texture/Fiber modifications:     Answer:   Dysphagia 2(Pureed/Chopped)specify fluid consistency(question 6) [2]     Comments:   1:1 supervision for self feeding     Order Specific Question:   Consistency/Fluid modifications:     Answer:   Catarina Thick [2]       Assessment:  Active Hospital Problems    Diagnosis   • *Central cord syndrome (HCC)   • Dysphagia   • Type 2 diabetes mellitus (HCC)   • Chronic renal failure   • Scalp laceration   • 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  Patient was managed conservatively in ICU with cord perfusion protocol.  Now off of protocol and improving.   -PT and OT for mobility and ADLs  -SLP for cognition.    -C collar when OOB per NSG. Will need follow-up with Dr. Simon in 2 weeks.      Dysphagia - Patient with dysphagia 2/2 either his TBI or high cervical injury. Dysphagia 2 diet with thins on admission  -Consult SLP for swallow evaluation     Pain - Patient on scheduled tylenol q6h and Gabapentin 100 mg daily.  PRN Oxycodone  -Consider increase in gabapentin.      Neurogenic Bladder - Patient has frequency but has sensation. Will check PVRs, last volume 55     Neurogenic Bowel - patient with constipation since injury. He reports he thinks it from opiates, he does have sensation. Would like to avoid scheduled suppository at this time. Will monitor on Senna and docusate.  May require BTP  -With LBM on 12/11/18     HTN - Patient previously on Lisinopril 10 and Propranolol 40 mg TID. Transferred on midodrine for cord perfusion  -Will monitor on midodrine. Consult Hospitalist     DM - Patient on SSI and 8 U of Lantus.  Would be preferable to get patient onto oral agents as insulin self injections may be difficult with current level of injury. Previously on Actos 30 mg daily and Nateglinide 120 mg TID  -Consult hospitalist, would like patient off of insulin due to poor hand function     HLD - Patient on Simvastatin 20 mg daily.     Anemia - Patient on Fe supplement on transfer. Will check CBC     Vitamin D -  21 on admission , increase baseline to 2000 U     GI Ppx - Patient on Prilosec 20 mg daily.     DVT ppx - Patient on Heparin 5000 q8h on transfer.      Total time:  35 minutes.  I spent greater than 50% of the time for patient care and coordination on this date, including unit/floor time, and face-to-face time with the patient as per assessment and plan above.  Admission labs including Cr, neurogenic bowel/bladder improving, increase vitamin D supplement and consulted hospitalist for diabetes and HTN/hypotension.    Arsen Alvarado M.D.

## 2018-12-11 NOTE — WOUND TEAM
In to see pt for concerns with sacrum and both feet. Pt sitting up in chair. Viewed dry scab with suture present to R side of forehead. Pt has C-collar. He was able to stand with assistance from RN so that skin could be assessed. Viewed and palpated sacrococcygeal area. There are multiple discolored areas that are non-blanching to both buttocks and sacrococcygeal area. He also has similar discolored areas to upper back. These appear to be bruising vs pressure. Edges are diffuse and pt had fall causing trauma and reason for hospital admit. R 3rd toe with some eschar on tip and dorsal L 2nd toe with small abrasion. Both wounds are dry with no s/s of infection. Applied moisturizer to both feet and reapplied slipper socks. RN requested that R hand skin tear to be assessed. Changed gloves and then removed drsg, cleaned wounds with NS, dried. Applied small adhesive foam drsg. Pt to DC to rehab later.

## 2018-12-12 ENCOUNTER — APPOINTMENT (OUTPATIENT)
Dept: RADIOLOGY | Facility: REHABILITATION | Age: 78
DRG: 949 | End: 2018-12-12
Attending: PHYSICAL MEDICINE & REHABILITATION
Payer: MEDICARE

## 2018-12-12 ENCOUNTER — APPOINTMENT (OUTPATIENT)
Dept: PAIN MANAGEMENT | Facility: REHABILITATION | Age: 78
DRG: 949 | End: 2018-12-12
Attending: PHYSICAL MEDICINE & REHABILITATION
Payer: MEDICARE

## 2018-12-12 LAB
GLUCOSE BLD-MCNC: 100 MG/DL (ref 65–99)
GLUCOSE BLD-MCNC: 138 MG/DL (ref 65–99)
GLUCOSE BLD-MCNC: 151 MG/DL (ref 65–99)
GLUCOSE BLD-MCNC: 172 MG/DL (ref 65–99)
GLUCOSE BLD-MCNC: 200 MG/DL (ref 65–99)

## 2018-12-12 PROCEDURE — 73030 X-RAY EXAM OF SHOULDER: CPT | Mod: RT

## 2018-12-12 PROCEDURE — 92526 ORAL FUNCTION THERAPY: CPT

## 2018-12-12 PROCEDURE — 99233 SBSQ HOSP IP/OBS HIGH 50: CPT | Performed by: HOSPITALIST

## 2018-12-12 PROCEDURE — 770010 HCHG ROOM/CARE - REHAB SEMI PRIVAT*

## 2018-12-12 PROCEDURE — 700111 HCHG RX REV CODE 636 W/ 250 OVERRIDE (IP): Performed by: PHYSICAL MEDICINE & REHABILITATION

## 2018-12-12 PROCEDURE — A9270 NON-COVERED ITEM OR SERVICE: HCPCS | Performed by: HOSPITALIST

## 2018-12-12 PROCEDURE — 700102 HCHG RX REV CODE 250 W/ 637 OVERRIDE(OP): Performed by: HOSPITALIST

## 2018-12-12 PROCEDURE — 700102 HCHG RX REV CODE 250 W/ 637 OVERRIDE(OP): Performed by: PHYSICAL MEDICINE & REHABILITATION

## 2018-12-12 PROCEDURE — 97116 GAIT TRAINING THERAPY: CPT

## 2018-12-12 PROCEDURE — 700112 HCHG RX REV CODE 229: Performed by: PHYSICAL MEDICINE & REHABILITATION

## 2018-12-12 PROCEDURE — 92611 MOTION FLUOROSCOPY/SWALLOW: CPT

## 2018-12-12 PROCEDURE — 97535 SELF CARE MNGMENT TRAINING: CPT

## 2018-12-12 PROCEDURE — 97110 THERAPEUTIC EXERCISES: CPT

## 2018-12-12 PROCEDURE — 97530 THERAPEUTIC ACTIVITIES: CPT

## 2018-12-12 PROCEDURE — 74230 X-RAY XM SWLNG FUNCJ C+: CPT

## 2018-12-12 PROCEDURE — A9270 NON-COVERED ITEM OR SERVICE: HCPCS | Performed by: PHYSICAL MEDICINE & REHABILITATION

## 2018-12-12 PROCEDURE — 82962 GLUCOSE BLOOD TEST: CPT

## 2018-12-12 PROCEDURE — 99233 SBSQ HOSP IP/OBS HIGH 50: CPT | Performed by: PHYSICAL MEDICINE & REHABILITATION

## 2018-12-12 RX ORDER — MIDODRINE HYDROCHLORIDE 2.5 MG/1
5 TABLET ORAL 3 TIMES DAILY
Status: DISCONTINUED | OUTPATIENT
Start: 2018-12-12 | End: 2018-12-14

## 2018-12-12 RX ORDER — PIOGLITAZONEHYDROCHLORIDE 15 MG/1
7.5 TABLET ORAL DAILY
Status: DISCONTINUED | OUTPATIENT
Start: 2018-12-13 | End: 2018-12-14

## 2018-12-12 RX ORDER — INSULIN GLARGINE 100 [IU]/ML
5 INJECTION, SOLUTION SUBCUTANEOUS EVERY EVENING
Status: DISCONTINUED | OUTPATIENT
Start: 2018-12-12 | End: 2018-12-14

## 2018-12-12 RX ORDER — GABAPENTIN 100 MG/1
100 CAPSULE ORAL 3 TIMES DAILY
Status: DISCONTINUED | OUTPATIENT
Start: 2018-12-12 | End: 2018-12-14

## 2018-12-12 RX ADMIN — ACETAMINOPHEN 650 MG: 325 TABLET ORAL at 00:23

## 2018-12-12 RX ADMIN — HEPARIN SODIUM 5000 UNITS: 5000 INJECTION, SOLUTION INTRAVENOUS; SUBCUTANEOUS at 06:13

## 2018-12-12 RX ADMIN — GABAPENTIN 100 MG: 100 CAPSULE ORAL at 14:00

## 2018-12-12 RX ADMIN — INSULIN HUMAN 3 UNITS: 100 INJECTION, SOLUTION PARENTERAL at 20:48

## 2018-12-12 RX ADMIN — DOCUSATE SODIUM 100 MG: 50 LIQUID ORAL at 20:46

## 2018-12-12 RX ADMIN — ACETAMINOPHEN 650 MG: 325 TABLET ORAL at 06:16

## 2018-12-12 RX ADMIN — OXYCODONE HYDROCHLORIDE 5 MG: 5 TABLET ORAL at 14:00

## 2018-12-12 RX ADMIN — VITAMIN D, TAB 1000IU (100/BT) 2000 UNITS: 25 TAB at 08:24

## 2018-12-12 RX ADMIN — POLYETHYLENE GLYCOL 3350 1 PACKET: 17 POWDER, FOR SOLUTION ORAL at 20:47

## 2018-12-12 RX ADMIN — HEPARIN SODIUM 5000 UNITS: 5000 INJECTION, SOLUTION INTRAVENOUS; SUBCUTANEOUS at 20:46

## 2018-12-12 RX ADMIN — INSULIN GLARGINE 5 UNITS: 100 INJECTION, SOLUTION SUBCUTANEOUS at 21:00

## 2018-12-12 RX ADMIN — GABAPENTIN 100 MG: 100 CAPSULE ORAL at 20:47

## 2018-12-12 RX ADMIN — SENNOSIDES AND DOCUSATE SODIUM 1 TABLET: 8.6; 5 TABLET ORAL at 20:47

## 2018-12-12 RX ADMIN — HEPARIN SODIUM 5000 UNITS: 5000 INJECTION, SOLUTION INTRAVENOUS; SUBCUTANEOUS at 14:00

## 2018-12-12 RX ADMIN — ACETAMINOPHEN 650 MG: 325 TABLET ORAL at 17:35

## 2018-12-12 RX ADMIN — OMEPRAZOLE 20 MG: 20 CAPSULE, DELAYED RELEASE ORAL at 08:24

## 2018-12-12 RX ADMIN — MIDODRINE HYDROCHLORIDE 5 MG: 2.5 TABLET ORAL at 14:34

## 2018-12-12 RX ADMIN — SIMVASTATIN 20 MG: 20 TABLET, FILM COATED ORAL at 20:47

## 2018-12-12 RX ADMIN — INSULIN HUMAN 3 UNITS: 100 INJECTION, SOLUTION PARENTERAL at 17:31

## 2018-12-12 RX ADMIN — GABAPENTIN 100 MG: 100 CAPSULE ORAL at 08:24

## 2018-12-12 RX ADMIN — MIDODRINE HYDROCHLORIDE 5 MG: 2.5 TABLET ORAL at 20:47

## 2018-12-12 RX ADMIN — ACETAMINOPHEN 650 MG: 325 TABLET ORAL at 11:24

## 2018-12-12 RX ADMIN — DOCUSATE SODIUM 100 MG: 50 LIQUID ORAL at 08:24

## 2018-12-12 ASSESSMENT — PATIENT HEALTH QUESTIONNAIRE - PHQ9
SUM OF ALL RESPONSES TO PHQ9 QUESTIONS 1 AND 2: 0
1. LITTLE INTEREST OR PLEASURE IN DOING THINGS: NOT AT ALL
2. FEELING DOWN, DEPRESSED, IRRITABLE, OR HOPELESS: NOT AT ALL

## 2018-12-12 ASSESSMENT — ENCOUNTER SYMPTOMS
PALPITATIONS: 0
FEVER: 0
NAUSEA: 0
VOMITING: 0
ABDOMINAL PAIN: 0
FOCAL WEAKNESS: 1
EYES NEGATIVE: 1
COUGH: 0
NECK PAIN: 1
CHILLS: 0
SHORTNESS OF BREATH: 0

## 2018-12-12 ASSESSMENT — GAIT ASSESSMENTS
ASSISTIVE DEVICE: FRONT WHEEL WALKER
DISTANCE (FEET): 130
DEVIATION: ATAXIC;DECREASED BASE OF SUPPORT;BRADYKINETIC;DECREASED HEEL STRIKE;DECREASED TOE OFF
GAIT LEVEL OF ASSIST: CONTACT GUARD ASSIST

## 2018-12-12 ASSESSMENT — PAIN SCALES - GENERAL
PAINLEVEL_OUTOF10: 0
PAINLEVEL_OUTOF10: 0

## 2018-12-12 NOTE — CARE PLAN
Problem: Communication  Goal: The ability to communicate needs accurately and effectively will improve  Makes needs known to staff.  Encouraged to use call light for staff assist.    Problem: Safety  Goal: Will remain free from falls  Call light kept within reach and encouraged to use for assistance and with toileting needs. Encouraged to call staff and to wait for staff before transfers.    Problem: Pain Management  Goal: Pain level will decrease to patient's comfort goal  Complains of RUE and back pain, medicated with Oxycodone tonight with + relief.  See MAR and doc flow sheet.

## 2018-12-12 NOTE — CARE PLAN
Problem: Safety  Goal: Will remain free from injury  Patient demonstrates good safety technique this shift.  Asks for assistance when needed and does not attempt self transfer.  Able to verbalize needs.  Will continue to monitor.    Problem: Pain Management  Goal: Pain level will decrease to patient's comfort goal  Patient able to verbalize pain level and verbalize an acceptable level of pain. Medicated with roxicodone with relief

## 2018-12-12 NOTE — REHAB-PT IDT TEAM NOTE
Physical Therapy   Mobility  Bed mobility:   Min A  Bed /Chair/Wheelchair Transfer Initial:  3 - Moderate Assistance  Bed /Chair/Wheelchair Transfer Current:  4 - Minimal Assistance   Bed/Chair/Wheelchair Transfer Description:  Increased time, Supervision for safety, Verbal cueing, Adaptive equipment   Walk Initial:  1 - Total Assistance  Walk Current:  1 - Total Assistance   Walk Description:  Extra time, Walker, Safety concerns, Supervision for safety, Requires incidental assist  Wheelchair Initial:  2 - Max Assistance (AMB x 40 feet x 2 with FWW and CGA)  Wheelchair Current:  2 - Max Assistance   Wheelchair Description:  Extra time, Verbal cueing, Requires incidental assist  Stairs Initial:  0 - Not tested,medical condition (W/C mobility x 50 feet with CGA)  Stairs Current: 0 - Not tested,medical condition   Stairs Description:    Patient/Family Training/Education:  PT role, PT POC, rehab orientation   DME/DC Recommendations:  TBD  Strengths:  Independent PLOF, Motivated for self care and independence, Pleasant and cooperative and Willingly participates in therapeutic activities  Barriers:   Generalized weakness, Limited mobility, Poor activity tolerance, Poor balance and Poor family support  # of short term goals set= 4  # of short term goals met=0 (Pt just evaluated 12/11/18)       Physical Therapy Problems           Problem: Balance     Dates: Start: 12/11/18       Goal: STG-Within one week, patient will maintain dynamic standing     Dates: Start: 12/11/18       Description: 1) Individualized goal: Maintain dynamic standing balance fww sba 1 week  2) Interventions:  PT Gait Training, PT Therapeutic Exercises, PT Neuro Re-Ed/Balance and PT Therapeutic Activity               Problem: Mobility     Dates: Start: 12/11/18       Goal: STG-Within one week, patient will     Dates: Start: 12/11/18       Description: 1) Individualized goal: Gait fww sba 100 ft 1 week  2) Interventions:  PT Gait Training, PT Therapeutic  Exercises, PT Neuro Re-Ed/Balance and PT Therapeutic Activity               Problem: Mobility Transfers     Dates: Start: 12/11/18       Goal: STG-Within one week, patient will transfer bed to chair     Dates: Start: 12/11/18       Description: 1) Individualized goal: Transfer bed to/from chair fww sba, vc`s, 1 week  2) Interventions:  PT Gait Training, PT Therapeutic Exercises, PT Neuro Re-Ed/Balance and PT Therapeutic Activity             Goal: STG-Within one week, patient will move supine to sit     Dates: Start: 12/11/18       Description: 1) Individualized goal: Transfer supine to/from sit sba 1 week  2) Interventions:  PT Gait Training, PT Therapeutic Exercises, PT Neuro Re-Ed/Balance and PT Therapeutic Activity               Problem: PT-Long Term Goals     Dates: Start: 12/11/18       Goal: LTG-By discharge, patient will ambulate     Dates: Start: 12/11/18       Description: 1) Individualized goal: Gait fww household 150 ft, community 400 ft, supervision at discharge  2) Interventions:  PT Gait Training, PT Therapeutic Exercises, PT Neuro Re-Ed/Balance and PT Therapeutic Activity             Goal: LTG-By discharge, patient will transfer one surface to another     Dates: Start: 12/11/18       Description: 1) Individualized goal: Transfer one surface to another fww modified independent at discharge  2) Interventions:  PT Gait Training, PT Therapeutic Exercises, PT Neuro Re-Ed/Balance and PT Therapeutic Activity             Goal: LTG-By discharge, patient will transfer in/out of a car     Dates: Start: 12/11/18       Description: 1) Individualized goal: Car transfer fww supervision at discharge  2) Interventions:  PT Gait Training, PT Therapeutic Exercises, PT Neuro Re-Ed/Balance and PT Therapeutic Activity                     Section completed by:  Anca Fernandez DPT

## 2018-12-12 NOTE — CARE PLAN
Problem: Swallowing STGs  Goal: STG-Within one week, patient will  1) Individualized goal:  Safely swallow Dys 2/thin liquids with no overt s/sx of pen/asp.   2) Interventions:  SLP Swallowing Dysfunction Treatment and SLP Group Treatment     Outcome: NOT MET  Pt currently on Dys 2/NTL, will reassess following MBSS at 1300 today  Goal: STG-Within one week, patient will  1) Individualized goal: complete a MBSS for further evaluation of swallow function; with additional goals pending results  2) Interventions:  SLP Video Swallow Evaluation     Outcome: NOT MET  To be completed today at 1300    Problem: Problem Solving STGs  Goal: STG-Within one week, patient will  1) Individualized goal: Complete functional medication management/sorting task with demonstration of knowledge of accurate medication names, functions, admin times using MOD A with 100% accuracy   2) Interventions:  SLP Cognitive Skill Development     Outcome: NOT MET  Pt assessed yesterday 12/11/18. Will continue to target.     Problem: Memory STGs  Goal: STG-Within one week, patient will  1) Individualized goal:  use written memory strategies and simple memory book following education in order to direct staff to document daily activities with MIN A.    2) Interventions:  SLP Cognitive Skill Development     Outcome: NOT MET  Pt assessed 12/11/18, will continue to target.

## 2018-12-12 NOTE — ASSESSMENT & PLAN NOTE
CKD Stage III  Creatinine at baseline  Bun more elevated over baseline  Likely a little dehydrated -- encouraging fluid (water/juice) intake  Continue to monitor

## 2018-12-12 NOTE — DIETARY
"Nutrition services: Day 2 of admit.  Vince Almanzar is a 78 y.o. male with admitting DX of Traumatic Quadriplegia, Incomplete C1-C4, and Cervical Myelopathy.     Pt admitted following GLF on 12/3 and struck his R arm and head on the curb. Pt fell while in route to  for elevated blood sugar. Pt developed central cord pattern SCI and underwent spinal cord perfusion protocol for a total of 5 days. Pt noted to be having dysphagia. Pt noted to have been upgraded to Dysphagia II with NTL. Noted to also require mod assist for eating. Of note, pt reports constipation since the accident 2/2 to opiates. PO intake 25-50% x 2 % x 1 <25% x 2 and inadequate.     This RD visited pt in room. Pt resting in his wheelchair and enjoying some TV. Pt a little hard of hearing. Pt stated he has not noticed a change in his appetite though thinks it may be related to constipation. Stated his last BM was 12/11 AM (sounded as if he normally goes daily). Pt stated he is habitual with his meals and typically eats 3 meals a day. Pt states he typically eats every 5 hours for his meals and is getting used to the schedule at . PT denied N/V/D. Pt declined offer of boost GC, though amenable to a snack of 1/2 a sandwich or cheese and crackers. RD to continue to monitor to ensure adequate PO.     Assessment:  Height: 177.8 cm (5' 10\")  Weight: 78.2 kg (172 lb 8 oz)  Body mass index is 24.75 kg/m².   Diet/Intake: Diabetic Dysphagia II NTL as of 12/11     Evaluation:   1. Meds: Vit C 500mg q48 hours, Colace 10mg BID, Ferrous Sulfate 325mg q48 hours, Lantus 8 units QHS, Humulin SSI QID, MOM refused 30mL 12/12, Prilosec 20mg QD, Miralax 1 packet BID refused 0900, Senna, Zocor, Vit D 2000 units QD    2. Fluids: No IVF   3. Skin: Sutures noted to R forehead   4. GI/: large soft formed noted 12/11, UO Accident 12/12   5. Labs: POC Glucose  Cr 1.92 GFR 34  Vit D 28     Malnutrition Risk: Recent fall with SCI "     Recommendations/Plan:  1. Diet as ordered. Diet advancements per SLP.   2. Snack QD at PM of cheese and crackers or 1/2 sandwich.   3. Encourage intake of 50% or greater.   4. Document intake of all meals/supplements as % taken in ADL's to provide interdisciplinary communication across all shifts.   5. Monitor weight.  6. Nutrition rep will continue to see patient for ongoing meal and snack preferences.

## 2018-12-12 NOTE — THERAPY
Occupational Therapy Initial Plan of Care Note    1) Assessment:  Pt is a 78 year old male presenting to [facility] for TBI and central cord syndrome following a fall w/ a hx of diabetes, hyperlipidemia, hypertension, renal impairment, reported hemorrhagic disorder per H&P. Pt seen on this day for initial OT eval and tx and is currently functioning at a max to total assist for ADLs (except mod A for eating). Functional transfers are being performed w/ mod A sit<>stand w/ FWW. PTA, pt was living alone in a senior apartment complex in a single level apartment on the second floor (elevator access). Pt reports he was indep w/ ADLs/functional transfers w/ use of SPC but required assist for IADLs. Pt is now limited by decreased functional use of LUE, no functional use of RUE, impaired standing balance, generalized weakness, impulsivity, and decreased activity tolerance. As a result, pt will benefit from skilled inpatient OT services to address aforementioned deficits and increase overall independence with goals set at mod to min A for ADLS and SPV for functional transfers. Long term and short term goals have been discussed with patient and they are in agreement.  2) Plan:  Recommend Occupational Therapy  minutes per day 5-7 days per week for 3 weeks for the following treatments:  OT E Stim Attended, OT Group Therapy, OT Self Care/ADL, OT Neuro Re-Ed/Balance, OT Sensory Int Techniques, OT Therapeutic Activity, OT Aquatic Therapy and OT Therapeutic Exercise.  3) Goals:  Please refer to care plan for goals.

## 2018-12-12 NOTE — REHAB-COLLABORATIVE ONGOING IDT TEAM NOTE
Weekly Interdisciplinary Team Conference Note    Weekly Interdisciplinary Team Conference # 1  Date:  12/12/2018    Clinicians present and reporting at team conference include the following:   MD: Arsen Alvarado MD   RN:  Sybil Fonseca RN   PT:   Anca Fernandez, PT, DPT  OT:  Isabel Wells OT, OTR/L   ST:  Carla Hammer, MS, CCC-SLP  CM:  Mckayla Mason RN, [facility]  REC: [facility]  RT:  None  RPh:  Migel Hu [facility]  Other:   None  All reporting clinicians have a working knowledge of this patient's plan of care.    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) 12/03/2018     Priority: Medium   • Contraindication to deep vein thrombosis (DVT) prophylaxis 12/03/2018     Priority: Medium   • Essential hypertension 12/03/2018     Priority: Low   • Hyperlipemia 12/03/2018     Priority: Low   • Trauma 12/03/2018     Priority: Low   • Anemia 12/11/2018   • Thrombocytopenia (HCC) 12/11/2018   • Vitamin D deficiency 12/11/2018   • Uncontrolled type 2 diabetes mellitus with hyperglycemia (HCC) 11/12/2018   • Mass of pancreas 06/03/2016     Results     Procedure Component Value Ref Range Date/Time    ACCU-CHEK GLUCOSE [[id number]]  (Abnormal) Collected:  12/12/18 1122    Order Status:  Completed Lab Status:  Final result Updated:  12/12/18 1140     Glucose - Accu-Ck 138 (H) 65 - 99 mg/dL     ACCU-CHEK GLUCOSE [[id number]]  (Abnormal) Collected:  12/12/18 0727    Order Status:  Completed Lab Status:  Final result Updated:  12/12/18 0751     Glucose - Accu-Ck 100 (H) 65 - 99 mg/dL     ACCU-CHEK  GLUCOSE [[id number]]  (Abnormal) Collected:  12/11/18 1135    Order Status:  Completed Lab Status:  Final result Updated:  12/12/18 0109     Glucose - Accu-Ck 151 (H) 65 - 99 mg/dL     ACCU-CHEK GLUCOSE [[id number]]  (Abnormal) Collected:  12/11/18 2140    Order Status:  Completed Lab Status:  Final result Updated:  12/11/18 2210     Glucose - Accu-Ck 150 (H) 65 - 99 mg/dL     ACCU-CHEK GLUCOSE [[id number]]  (Abnormal) Collected:  12/11/18 1726    Order Status:  Completed Lab Status:  Final result Updated:  12/11/18 1755     Glucose - Accu-Ck 132 (H) 65 - 99 mg/dL            Nursing  Diet/Nutrition:  Regular, Dysphagia III-Mechanical Soft and Thin Liquids  Medication Administration:  Crush all Medications in Puree  % consumed at meals in last 24 hours:  Consumed % of meals per documentation.  Meal/Snack Consumption for the past 24 hrs:   Oral Nutrition   12/11/18 1900 Dinner;Between % Consumed   12/11/18 1258 Lunch;Less than 25% Consumed   12/11/18 0944 Breakfast;Between 25-50% Consumed       Snack schedule:  None  Supplement:  Other: N/A  Appetite:  Fair  Fluid Intake/Output in past 24 hours: In: 820 [P.O.:820]  Out: 1250   Admit Weight:  Weight: 78.2 kg (172 lb 8 oz)  Weight Last 7 Days   [78.2 kg (172 lb 8 oz)] 78.2 kg (172 lb 8 oz) (12/10 1415)    Pain Issues:    Location:  Arm (12/11 2246)  Right (12/11 2246)         Severity:  Severe   Scheduled pain medications:  gabapentin (NEURONTIN) Tylenol     PRN pain medications used in last 24 hours:  oxycodone immediate release (ROXICODONE)    Non Pharmacologic Interventions:  rest  Effectiveness of pain management plan:  good=patient states acceptable comfort after interventions    Bowel:    Bowel Assist Initial Score:  1 - Total Assistance  Bowel Assist Current Score:  1 - Total Assistance  Bowl Accidents in last 7 days:     Last bowel movement: 12/11/18  Stool Description: Large, Soft, Formed     Usual bowel pattern:  daily  Scheduled bowel medications:   docusate sodium (COLACE), senna-docusate (PERICOLACE or SENOKOT S) , polyethylene glycol/lytes (MIRALAX)  and magnesium hydroxide (MILK OF MAGNESIA)   PRN bowel medications used in last 24 hours:  None  Nursing Interventions:  Increased time, Scheduled medication, Supervision, Verbal cueing, Set-up, Brief, Positioning on commode/toilet  Effectiveness of bowel program:   good=regular, predictable, controlled emptying of bowel  Bladder:    Bladder Assist Initial Score:  1 - Total Assistance  Bladder Assist Current Score:  1 - Total Assistance  Bladder Accidents in last 7 days:     PVR range for past 24-48 hours:  [55-60]  ()  Intermittent Catheterization:  N/A  Medications affecting bladder:  None    Time void schedule/voiding pattern:  Voiding every 2-4 hours  Interventions:  Increased time, Verbal cueing, Emptying of device, Urinal, Supervision, Brief, Time void patient initiated  Effectiveness of bladder training:  Good=regular, predictable, emptying of bladder, patient initiates time voiding    Diabetes:  Blood Sugar Frequency:  Before meals and at bedtime    Range of BS for last 48 hours:   Recent Labs      12/10/18   0531  12/10/18   1153  12/10/18   1724  12/10/18   2059  12/11/18   0728  12/11/18   1135  12/11/18   1726  12/11/18   2140   POCGLUCOSE  81  128*  191*  148*  133*  151*  132*  150*     Scheduled diabetic medications:  insulin glargine (LANTUS) injection   Sliding scale usage in past 24 hours:  Yes  Total Short acting insulin in the past 24 hours:  Humulin 3 units.  Total Long acting insulin in the past 24 hours:  Lantus 8    Wound:         Patient Lines/Drains/Airways Status    Active Current Wounds     Name: Placement date: Placement time: Site: Days:    Wound 12/03/18 Laceration Face 12/03/18         9    Wound 12/08/18 Diabetic Ulcer Toe (Comment which one) R 2nd toe 12/10/18         3                   Interventions:  Laceration to face is EVANGELISTA.  Effectiveness of intervention:  wound is  improving     Sleep/wake cycle:   Average hours slept:  Sleeps 3-4 hours without waking  Sleep medication usage:  None    Patient/Family Training/Education:  Fall Prevention, General Self Care, Medication Management, Pain Management, Safe Transfers, Safety and Skin Care  Discharge Supply Recommendations:  Glucometer and Strips  Strengths: Alert and oriented and Effective communication skills   Barriers:   Generalized weakness and Limited mobility            Nursing Problems           Problem: Bowel/Gastric:     Goal: Normal bowel function is maintained or improved           Goal: Will not experience complications related to bowel motility             Problem: Communication     Goal: The ability to communicate needs accurately and effectively will improve             Problem: Discharge Barriers/Planning     Goal: Patient's continuum of care needs will be met             Problem: Infection     Goal: Will remain free from infection             Problem: Knowledge Deficit     Goal: Knowledge of disease process/condition, treatment plan, diagnostic tests, and medications will improve           Goal: Knowledge of the prescribed therapeutic regimen will improve             Problem: Mobility     Goal: Risk for activity intolerance will decrease             Problem: Pain Management     Goal: Pain level will decrease to patient's comfort goal             Problem: Psychosocial Needs:     Goal: Level of anxiety will decrease             Problem: Respiratory:     Goal: Respiratory status will improve             Problem: Safety     Goal: Will remain free from injury           Goal: Will remain free from falls             Problem: Skin Integrity     Goal: Risk for impaired skin integrity will decrease             Problem: Venous Thromboembolism (VTW)/Deep Vein Thrombosis (DVT) Prevention:     Goal: Patient will participate in Venous Thrombosis (VTE)/Deep Vein Thrombosis (DVT)Prevention Measures                  Long Term Goals:    At discharge patient will be able to function safely at home and in the community with support.    Section completed by:  Niya Kruse L.P.N.2              Mobility  Bed mobility:   Min A  Bed /Chair/Wheelchair Transfer Initial:  3 - Moderate Assistance  Bed /Chair/Wheelchair Transfer Current:  4 - Minimal Assistance   Bed/Chair/Wheelchair Transfer Description:  Increased time, Supervision for safety, Verbal cueing, Adaptive equipment   Walk Initial:  1 - Total Assistance  Walk Current:  1 - Total Assistance   Walk Description:  Extra time, Walker, Safety concerns, Supervision for safety, Requires incidental assist  Wheelchair Initial:  2 - Max Assistance (AMB x 40 feet x 2 with FWW and CGA)  Wheelchair Current:  2 - Max Assistance   Wheelchair Description:  Extra time, Verbal cueing, Requires incidental assist  Stairs Initial:  0 - Not tested,medical condition (W/C mobility x 50 feet with CGA)  Stairs Current: 0 - Not tested,medical condition   Stairs Description:    Patient/Family Training/Education:  PT role, PT POC, rehab orientation   DME/DC Recommendations:  TBD  Strengths:  Independent PLOF, Motivated for self care and independence, Pleasant and cooperative and Willingly participates in therapeutic activities  Barriers:   Generalized weakness, Limited mobility, Poor activity tolerance, Poor balance and Poor family support  # of short term goals set= 4  # of short term goals met=0 (Pt just evaluated 12/11/18)       Physical Therapy Problems           Problem: Balance     Dates: Start: 12/11/18       Goal: STG-Within one week, patient will maintain dynamic standing     Dates: Start: 12/11/18       Description: 1) Individualized goal: Maintain dynamic standing balance fww sba 1 week  2) Interventions:  PT Gait Training, PT Therapeutic Exercises, PT Neuro Re-Ed/Balance and PT Therapeutic Activity               Problem: Mobility     Dates: Start: 12/11/18       Goal: STG-Within one week, patient will      Dates: Start: 12/11/18       Description: 1) Individualized goal: Gait fww sba 100 ft 1 week  2) Interventions:  PT Gait Training, PT Therapeutic Exercises, PT Neuro Re-Ed/Balance and PT Therapeutic Activity               Problem: Mobility Transfers     Dates: Start: 12/11/18       Goal: STG-Within one week, patient will transfer bed to chair     Dates: Start: 12/11/18       Description: 1) Individualized goal: Transfer bed to/from chair fww sba, vc`s, 1 week  2) Interventions:  PT Gait Training, PT Therapeutic Exercises, PT Neuro Re-Ed/Balance and PT Therapeutic Activity             Goal: STG-Within one week, patient will move supine to sit     Dates: Start: 12/11/18       Description: 1) Individualized goal: Transfer supine to/from sit sba 1 week  2) Interventions:  PT Gait Training, PT Therapeutic Exercises, PT Neuro Re-Ed/Balance and PT Therapeutic Activity               Problem: PT-Long Term Goals     Dates: Start: 12/11/18       Goal: LTG-By discharge, patient will ambulate     Dates: Start: 12/11/18       Description: 1) Individualized goal: Gait fww household 150 ft, community 400 ft, supervision at discharge  2) Interventions:  PT Gait Training, PT Therapeutic Exercises, PT Neuro Re-Ed/Balance and PT Therapeutic Activity             Goal: LTG-By discharge, patient will transfer one surface to another     Dates: Start: 12/11/18       Description: 1) Individualized goal: Transfer one surface to another fww modified independent at discharge  2) Interventions:  PT Gait Training, PT Therapeutic Exercises, PT Neuro Re-Ed/Balance and PT Therapeutic Activity             Goal: LTG-By discharge, patient will transfer in/out of a car     Dates: Start: 12/11/18       Description: 1) Individualized goal: Car transfer fww supervision at discharge  2) Interventions:  PT Gait Training, PT Therapeutic Exercises, PT Neuro Re-Ed/Balance and PT Therapeutic Activity                     Section completed by:  Anca Fernandez,  DPT       Activities of Daily Living  Eating Initial:  1 - Total Assistance  Eating Current:  3 - Moderate Assistance   Eating Description:  Needs help scooping food, Needs help bringing food to mouth, Supervision for safety, Verbal cueing, Modified diet  Grooming Initial:  2 - Max Assistance  Grooming Current:  2 - Max Assistance   Grooming Description:   (Max A for OFH and combing hair.)  Bathing Initial:  2 - Max Assistance  Bathing Current:  2 - Max Assistance   Bathing Description:  Grab bar, Tub bench, Hand held shower, Increased time, Supervision for safety, Verbal cueing (Max A w/ pt able to wash chest, abdomen, RUE, and stefanie  area)  Upper Body Dressing Initial:  1 - Total Assistance  Upper Body Dressing Current:  1 - Total Assistance   Upper Body Dressing Description:  Supervision for safety, Increased time, Verbal cueing (Total A for donning pullover shirt w/ assist for threading BUE, pulling overhead, and adustment.)  Lower Body Dressing Initial:  1 - Total Assistance  Lower Body Dressing Current:  1 - Total Assistance   Lower Body Dressing Description:  1 - Total Assistance  Toileting Initial:  2 - Max Assistance  Toileting Current:  2 - Max Assistance   Toileting Description:  Grab bar, Increased time, Supervision for safety, Verbal cueing (Max A w/ pt requiring assist for clothing managment w/ CGA for standing balance.)  Toilet Transfer Initial:  3 - Moderate Assistance  Toilet Transfer Current:  3 - Moderate Assistance   Toilet Transfer Description:  3 - Moderate Assistance  Tub / Shower Transfer Initial:  3 - Moderate Assistance  Tub / Shower Transfer Current:  3 - Moderate Assistance   Tub / Shower Transfer Description:  Increased time, Supervision for safety, Verbal cueing, Grab bar (Mod A for sit<>stand from FWW<>tub bench.)  IADL:  TBD  Family Training/Education:  TBD   DME/DC Recommendations:  TBD     Strengths:  Alert and oriented, Effective communication skills, Independent PLOF, Manages pain  appropriately, Motivated for self care and independence, Pleasant and cooperative, Supportive family and Willingly participates in therapeutic activities  Barriers:  Decreased endurance, Generalized weakness, Impulsive, Limited mobility, Poor balance and Other: Lives alone.     # of short term goals set= 4    # of short term goals met= 0   (pt evaluated on 12/11/18)       Occupational Therapy Goals           Problem: Bathing     Dates: Start: 12/11/18       Goal: STG-Within one week, patient will bathe     Dates: Start: 12/11/18       Description: 1) Individualized Goal:  W/ mod A using AD/AE/DME prn.  2) Interventions:  OT E Stim Attended, OT Group Therapy, OT Self Care/ADL, OT Neuro Re-Ed/Balance, OT Sensory Int Techniques, OT Therapeutic Activity, OT Aquatic Therapy and OT Therapeutic Exercise       Note:     Goal Note filed on 12/12/18 1212 by Lucie Carlos, Student    Goal: STG-Within one week, patient will bathe  Outcome: PROGRESSING AS EXPECTED  Pt evaluated yesterday (12/11).                  Problem: Dressing     Dates: Start: 12/11/18       Goal: STG-Within one week, patient will dress UB     Dates: Start: 12/11/18       Description: 1) Individualized Goal:  W/ max A using AD/AE/DME prn.  2) Interventions:  OT E Stim Attended, OT Group Therapy, OT Self Care/ADL, OT Neuro Re-Ed/Balance, OT Sensory Int Techniques, OT Therapeutic Activity, OT Aquatic Therapy and OT Therapeutic Exercise       Note:     Goal Note filed on 12/12/18 1212 by Lucie Carlos, Student    Goal: STG-Within one week, patient will dress UB  Outcome: PROGRESSING AS EXPECTED  Pt evaluated yesterday (12/11).                Goal: STG-Within one week, patient will dress LB     Dates: Start: 12/11/18       Description: 1) Individualized Goal:  W/ max A using AD/AE/DME prn.  2) Interventions:  OT E Stim Attended, OT Group Therapy, OT Self Care/ADL, OT Neuro Re-Ed/Balance, OT Sensory Int Techniques, OT Therapeutic Activity, OT Aquatic  Therapy and OT Therapeutic Exercise       Note:     Goal Note filed on 12/12/18 1212 by Lucie Carlos, Student    Goal: STG-Within one week, patient will dress LB  Outcome: PROGRESSING AS EXPECTED  Pt evaluated yesterday (12/11).                  Problem: Functional Transfers     Dates: Start: 12/11/18       Goal: STG-Within one week, patient will transfer to toilet     Dates: Start: 12/11/18       Description: 1) Individualized Goal:  W/ min A using AD/AE/DME prn.  2) Interventions:  OT E Stim Attended, OT Group Therapy, OT Self Care/ADL, OT Neuro Re-Ed/Balance, OT Sensory Int Techniques, OT Therapeutic Activity, OT Aquatic Therapy and OT Therapeutic Exercise       Note:     Goal Note filed on 12/12/18 1212 by Lucie Carlos, Student    Goal: STG-Within one week, patient will transfer to toilet  Outcome: PROGRESSING AS EXPECTED  Pt evaluated yesterday (12/11).                  Problem: OT Long Term Goals     Dates: Start: 12/11/18       Goal: LTG-By discharge, patient will complete basic self care tasks     Dates: Start: 12/11/18       Description: 1) Individualized Goal:  W/ min A (except mod A for UBD) using AD/AE/DME prn.  2) Interventions:  OT E Stim Attended, OT Group Therapy, OT Self Care/ADL, OT Neuro Re-Ed/Balance, OT Sensory Int Techniques, OT Therapeutic Activity, OT Aquatic Therapy and OT Therapeutic Exercise             Goal: LTG-By discharge, patient will perform bathroom transfers     Dates: Start: 12/11/18       Description: 1) Individualized Goal:  W/ SPV using AD/AE/DME prn.  2) Interventions:  OT E Stim Attended, OT Group Therapy, OT Self Care/ADL, OT Neuro Re-Ed/Balance, OT Sensory Int Techniques, OT Therapeutic Activity, OT Aquatic Therapy and OT Therapeutic Exercise                   Section completed by:  Lucie Carlos, Student       Cognitive Linquistic Functions  Comprehension Initial:  4 - Minimal Assistance  Comprehension Current:  4 - Minimal Assistance   Comprehension  Description:  Verbal cues, Glasses. [place]  Expression Initial:  5 - Stand-by Prompting/Supervision or Set-up  Expression Current:  5 - Stand-by Prompting/Supervision or Set-up   Expression Description:  Verbal cueing  Social Interaction Initial:  5 - Stand-by Prompting/Supervision or Set-up  Social Interaction Current:  5 - Stand-by Prompting/Supervision or Set-up   Social Interaction Description:  Verbal cues  Problem Solving Initial:  3 - Moderate Assistance  Problem Solving Current:  3 - Moderate Assistance   Problem Solving Description:  Verbal cueing, Therapy schedule, Increased time  Memory Initial:  2 - Max Assistance  Memory Current:  2 - Max Assistance   Memory Description:  Verbal cueing, Therapy schedule  Executive Functioning / Organization Initial:     Executive Functioning / Organization Current: 2 - Max Assistance     Executive Functioning Desciption: pt was independent prior with IADLs.   Swallowing  Swallowing Status: Pt currently on Dys 2/NTL, MBSS scheduled for today at 1300 to determine readiness for advancement.   Orders Placed This Encounter   Procedures   • Diet Order Diabetic     Standing Status:   Standing     Number of Occurrences:   1     Order Specific Question:   Diet:     Answer:   Diabetic [3]     Order Specific Question:   Texture/Fiber modifications:     Answer:   Dysphagia 2(Pureed/Chopped)specify fluid consistency(question 6) [2]     Comments:   1:1 supervision for self feeding     Order Specific Question:   Consistency/Fluid modifications:     Answer:   Nectar Thick [2]     Behavior Modification Plan  Keep instructions simple/concrete, Give clear feedback, Set clear goals, Provide reasonable choices, Decrease the chance of failure by offering activities that are within the patient's abilities and Analyze tasks (break down into smaller steps)  Assistive Technology  Low/Impaired vision equipment and Low tech: Calendar, planner, schedule, alarms/timers, pill organizer, post-it notes,  lists  Family Training/Education:  To be completed with pt's sister  DC Recommendations: TBD  Strengths:  Independent PLOF, Motivated for self care and independence, Pleasant and cooperative, Supportive family and Willingly participates in therapeutic activities  Barriers:  Aspiration risk, Unable to follow instructions, Poor insight/denial of deficits and Other: STM, attention  # of short term goals set=4  # of short term goals met=0 (anticipate 2 goals will be met following MBSS)       Speech Therapy Problems           Problem: Memory STGs     Dates: Start: 12/11/18       Goal: STG-Within one week, patient will     Dates: Start: 12/11/18       Description: 1) Individualized goal:  use written memory strategies and simple memory book following education in order to direct staff to document daily activities with MIN A.    2) Interventions:  SLP Cognitive Skill Development       Note:     Goal Note filed on 12/12/18 1146 by Carla Hammer MS,CCC-SLP    Goal: STG-Within one week, patient will  Outcome: NOT MET  Pt assessed 12/11/18, will continue to target.                   Problem: Problem Solving STGs     Dates: Start: 12/11/18       Goal: STG-Within one week, patient will     Dates: Start: 12/11/18       Description: 1) Individualized goal: Complete functional medication management/sorting task with demonstration of knowledge of accurate medication names, functions, admin times using MOD A with 100% accuracy   2) Interventions:  SLP Cognitive Skill Development       Note:     Goal Note filed on 12/12/18 1146 by Carla Hammer MS,CCC-SLP    Goal: STG-Within one week, patient will  Outcome: NOT MET  Pt assessed yesterday 12/11/18. Will continue to target.                   Problem: Speech/Swallowing LTGs     Dates: Start: 12/11/18       Goal: LTG-By discharge, patient will safely swallow     Dates: Start: 12/11/18       Description: 1) Individualized goal:  Regular textures/thin liquids with MOD I for use of  compensatory strategies.   2) Interventions:  SLP Swallowing Dysfunction Treatment, SLP Cognitive Skill Development and SLP Group Treatment             Goal: LTG-By discharge, patient will solve complex problem     Dates: Start: 12/11/18       Description: 1) Individualized goal:  By utilizing compensatory intervention for memory and problem-solving to allow for safe completion of daily activities with MOD I  2) Interventions:  SLP Cognitive Skill Development and SLP Group Treatment               Problem: Swallowing STGs     Dates: Start: 12/11/18       Goal: STG-Within one week, patient will     Dates: Start: 12/11/18       Description: 1) Individualized goal:  Safely swallow Dys 2/thin liquids with no overt s/sx of pen/asp.   2) Interventions:  SLP Swallowing Dysfunction Treatment and SLP Group Treatment       Note:     Goal Note filed on 12/12/18 1146 by Carla Hammer MS,CCC-SLP    Goal: STG-Within one week, patient will  Outcome: NOT MET  Pt currently on Dys 2/NTL, will reassess following MBSS at 1300 today                Goal: STG-Within one week, patient will     Dates: Start: 12/11/18       Description: 1) Individualized goal: complete a MBSS for further evaluation of swallow function; with additional goals pending results  2) Interventions:  SLP Video Swallow Evaluation       Note:     Goal Note filed on 12/12/18 1146 by Carla Hammer MS,CCC-SLP    Goal: STG-Within one week, patient will  Outcome: NOT MET  To be completed today at 1300                       Section completed by:  Carla Hammer MS,CCC-SLP          REHAB-Pharmacy IDT Team Note by Sherri Dumont RPH at 12/11/2018 11:05 AM  Version 1 of 1    Author:  Sherri Dumont RPH Service:  (none) Author Type:  Pharmacist    Filed:  12/11/2018 11:07 AM Date of Service:  12/11/2018 11:05 AM Status:  Signed    :  Sherri Dumont RPH (Pharmacist)         Pharmacy   Pharmacy  Antibiotics/Antifungals/Antivirals:  Medication:      Active  Orders     None        Route:         n/a  Stop Date:  n/a  Reason:   Antihypertensives/Cardiac:  Medication:    Orders (72h ago through future)    Start     Ordered    12/10/18 2100  simvastatin (ZOCOR) tablet 20 mg  NIGHTLY      12/10/18 1510    12/10/18 1530  midodrine (PROAMATINE) tablet 10 mg  EVERY 8 HOURS      12/10/18 1510    12/10/18 1510  hydrALAZINE (APRESOLINE) tablet 25 mg  EVERY 8 HOURS PRN      12/10/18 1510        Patient Vitals for the past 24 hrs:   BP Pulse   12/11/18 0700 107/68 98   12/10/18 2309 132/68 100   12/10/18 2051 148/82 98   12/10/18 1841 156/85 (!) 102   12/10/18 1451 - (!) 102   12/10/18 1415 138/78 98       Anticoagulation:  Medication:  Heparin  INR:      Other key medications:Cyclobenzaprine, Gabapentin, Lantus, Omeprazole.   A review of the medication list reveals no issues at this time. Patient is currently on an antihypertensive. Recommend home blood pressure monitoring/recording if antihypertensive regimen continues.  Section completed by:  Sherri Dumont RPH[TK.1]        Attribution Key     TK.1 - Sherri Dumont RPH on 12/11/2018 11:05 AM                    DC Planning  DC destination/dispostion:  Patient lives alone in a Senior apartment complex.  He has safety bars in his bathroom and shower.    Referrals: Will update Meals on Wheels.    DC Needs: He may need home health initially.  He has a fww, [facility] safety bars and shower seat.  He has been doing his own insulin and fingersticks.  We may need to schedule family training closer to d/c.    Barriers to discharge:  Lives alone     Strengths: sister and her children are very supportive.    Section completed by:  Mckayla Mason R.N.      Physician Summary  Arsen Alvarado MD participated and led team conference discussion.

## 2018-12-12 NOTE — REHAB-OT IDT TEAM NOTE
Occupational Therapy   Activities of Daily Living  Eating Initial:  1 - Total Assistance  Eating Current:  3 - Moderate Assistance   Eating Description:  Needs help scooping food, Needs help bringing food to mouth, Supervision for safety, Verbal cueing, Modified diet  Grooming Initial:  2 - Max Assistance  Grooming Current:  2 - Max Assistance   Grooming Description:   (Max A for OFH and combing hair.)  Bathing Initial:  2 - Max Assistance  Bathing Current:  2 - Max Assistance   Bathing Description:  Grab bar, Tub bench, Hand held shower, Increased time, Supervision for safety, Verbal cueing (Max A w/ pt able to wash chest, abdomen, RUE, and stefanie  area)  Upper Body Dressing Initial:  1 - Total Assistance  Upper Body Dressing Current:  1 - Total Assistance   Upper Body Dressing Description:  Supervision for safety, Increased time, Verbal cueing (Total A for donning pullover shirt w/ assist for threading BUE, pulling overhead, and adustment.)  Lower Body Dressing Initial:  1 - Total Assistance  Lower Body Dressing Current:  1 - Total Assistance   Lower Body Dressing Description:  1 - Total Assistance  Toileting Initial:  2 - Max Assistance  Toileting Current:  2 - Max Assistance   Toileting Description:  Grab bar, Increased time, Supervision for safety, Verbal cueing (Max A w/ pt requiring assist for clothing managment w/ CGA for standing balance.)  Toilet Transfer Initial:  3 - Moderate Assistance  Toilet Transfer Current:  3 - Moderate Assistance   Toilet Transfer Description:  3 - Moderate Assistance  Tub / Shower Transfer Initial:  3 - Moderate Assistance  Tub / Shower Transfer Current:  3 - Moderate Assistance   Tub / Shower Transfer Description:  Increased time, Supervision for safety, Verbal cueing, Grab bar (Mod A for sit<>stand from FWW<>tub bench.)  IADL:  TBD  Family Training/Education:  TBD   DME/DC Recommendations:  TBD     Strengths:  Alert and oriented, Effective communication skills, Independent PLOF,  Manages pain appropriately, Motivated for self care and independence, Pleasant and cooperative, Supportive family and Willingly participates in therapeutic activities  Barriers:  Decreased endurance, Generalized weakness, Impulsive, Limited mobility, Poor balance and Other: Lives alone.     # of short term goals set= 4    # of short term goals met= 0   (pt evaluated on 12/11/18)       Occupational Therapy Goals           Problem: Bathing     Dates: Start: 12/11/18       Goal: STG-Within one week, patient will bathe     Dates: Start: 12/11/18       Description: 1) Individualized Goal:  W/ mod A using AD/AE/DME prn.  2) Interventions:  OT E Stim Attended, OT Group Therapy, OT Self Care/ADL, OT Neuro Re-Ed/Balance, OT Sensory Int Techniques, OT Therapeutic Activity, OT Aquatic Therapy and OT Therapeutic Exercise       Note:     Goal Note filed on 12/12/18 1212 by Lucie Carlos, Student    Goal: STG-Within one week, patient will bathe  Outcome: PROGRESSING AS EXPECTED  Pt evaluated yesterday (12/11).                  Problem: Dressing     Dates: Start: 12/11/18       Goal: STG-Within one week, patient will dress UB     Dates: Start: 12/11/18       Description: 1) Individualized Goal:  W/ max A using AD/AE/DME prn.  2) Interventions:  OT E Stim Attended, OT Group Therapy, OT Self Care/ADL, OT Neuro Re-Ed/Balance, OT Sensory Int Techniques, OT Therapeutic Activity, OT Aquatic Therapy and OT Therapeutic Exercise       Note:     Goal Note filed on 12/12/18 1212 by Lucie Carlos, Student    Goal: STG-Within one week, patient will dress UB  Outcome: PROGRESSING AS EXPECTED  Pt evaluated yesterday (12/11).                Goal: STG-Within one week, patient will dress LB     Dates: Start: 12/11/18       Description: 1) Individualized Goal:  W/ max A using AD/AE/DME prn.  2) Interventions:  OT E Stim Attended, OT Group Therapy, OT Self Care/ADL, OT Neuro Re-Ed/Balance, OT Sensory Int Techniques, OT Therapeutic Activity,  OT Aquatic Therapy and OT Therapeutic Exercise       Note:     Goal Note filed on 12/12/18 1212 by Lucie Carlos, Student    Goal: STG-Within one week, patient will dress LB  Outcome: PROGRESSING AS EXPECTED  Pt evaluated yesterday (12/11).                  Problem: Functional Transfers     Dates: Start: 12/11/18       Goal: STG-Within one week, patient will transfer to toilet     Dates: Start: 12/11/18       Description: 1) Individualized Goal:  W/ min A using AD/AE/DME prn.  2) Interventions:  OT E Stim Attended, OT Group Therapy, OT Self Care/ADL, OT Neuro Re-Ed/Balance, OT Sensory Int Techniques, OT Therapeutic Activity, OT Aquatic Therapy and OT Therapeutic Exercise       Note:     Goal Note filed on 12/12/18 1212 by Lucie Carlos, Student    Goal: STG-Within one week, patient will transfer to toilet  Outcome: PROGRESSING AS EXPECTED  Pt evaluated yesterday (12/11).                  Problem: OT Long Term Goals     Dates: Start: 12/11/18       Goal: LTG-By discharge, patient will complete basic self care tasks     Dates: Start: 12/11/18       Description: 1) Individualized Goal:  W/ min A (except mod A for UBD) using AD/AE/DME prn.  2) Interventions:  OT E Stim Attended, OT Group Therapy, OT Self Care/ADL, OT Neuro Re-Ed/Balance, OT Sensory Int Techniques, OT Therapeutic Activity, OT Aquatic Therapy and OT Therapeutic Exercise             Goal: LTG-By discharge, patient will perform bathroom transfers     Dates: Start: 12/11/18       Description: 1) Individualized Goal:  W/ SPV using AD/AE/DME prn.  2) Interventions:  OT E Stim Attended, OT Group Therapy, OT Self Care/ADL, OT Neuro Re-Ed/Balance, OT Sensory Int Techniques, OT Therapeutic Activity, OT Aquatic Therapy and OT Therapeutic Exercise                   Section completed by:  Lucie Carlos, Student

## 2018-12-12 NOTE — REHAB-SLP IDT TEAM NOTE
Speech Therapy   Cognitive Linquistic Functions  Comprehension Initial:  4 - Minimal Assistance  Comprehension Current:  4 - Minimal Assistance   Comprehension Description:  Verbal cues, Glasses. [place]  Expression Initial:  5 - Stand-by Prompting/Supervision or Set-up  Expression Current:  5 - Stand-by Prompting/Supervision or Set-up   Expression Description:  Verbal cueing  Social Interaction Initial:  5 - Stand-by Prompting/Supervision or Set-up  Social Interaction Current:  5 - Stand-by Prompting/Supervision or Set-up   Social Interaction Description:  Verbal cues  Problem Solving Initial:  3 - Moderate Assistance  Problem Solving Current:  3 - Moderate Assistance   Problem Solving Description:  Verbal cueing, Therapy schedule, Increased time  Memory Initial:  2 - Max Assistance  Memory Current:  2 - Max Assistance   Memory Description:  Verbal cueing, Therapy schedule  Executive Functioning / Organization Initial:     Executive Functioning / Organization Current: 2 - Max Assistance     Executive Functioning Desciption: pt was independent prior with IADLs.   Swallowing  Swallowing Status: Pt currently on Dys 2/NTL, MBSS scheduled for today at 1300 to determine readiness for advancement.   Orders Placed This Encounter   Procedures   • Diet Order Diabetic     Standing Status:   Standing     Number of Occurrences:   1     Order Specific Question:   Diet:     Answer:   Diabetic [3]     Order Specific Question:   Texture/Fiber modifications:     Answer:   Dysphagia 2(Pureed/Chopped)specify fluid consistency(question 6) [2]     Comments:   1:1 supervision for self feeding     Order Specific Question:   Consistency/Fluid modifications:     Answer:   Nectar Thick [2]     Behavior Modification Plan  Keep instructions simple/concrete, Give clear feedback, Set clear goals, Provide reasonable choices, Decrease the chance of failure by offering activities that are within the patient's abilities and Analyze tasks (break down  into smaller steps)  Assistive Technology  Low/Impaired vision equipment and Low tech: Calendar, planner, schedule, alarms/timers, pill organizer, post-it notes, lists  Family Training/Education:  To be completed with pt's sister  DC Recommendations: TBD  Strengths:  Independent PLOF, Motivated for self care and independence, Pleasant and cooperative, Supportive family and Willingly participates in therapeutic activities  Barriers:  Aspiration risk, Unable to follow instructions, Poor insight/denial of deficits and Other: STM, attention  # of short term goals set=4  # of short term goals met=0 (anticipate 2 goals will be met following MBSS)       Speech Therapy Problems           Problem: Memory STGs     Dates: Start: 12/11/18       Goal: STG-Within one week, patient will     Dates: Start: 12/11/18       Description: 1) Individualized goal:  use written memory strategies and simple memory book following education in order to direct staff to document daily activities with MIN A.    2) Interventions:  SLP Cognitive Skill Development       Note:     Goal Note filed on 12/12/18 1146 by Carla Hammer MS,CCC-SLP    Goal: STG-Within one week, patient will  Outcome: NOT MET  Pt assessed 12/11/18, will continue to target.                   Problem: Problem Solving STGs     Dates: Start: 12/11/18       Goal: STG-Within one week, patient will     Dates: Start: 12/11/18       Description: 1) Individualized goal: Complete functional medication management/sorting task with demonstration of knowledge of accurate medication names, functions, admin times using MOD A with 100% accuracy   2) Interventions:  SLP Cognitive Skill Development       Note:     Goal Note filed on 12/12/18 1146 by Carla Hammer MS,CCC-SLP    Goal: STG-Within one week, patient will  Outcome: NOT MET  Pt assessed yesterday 12/11/18. Will continue to target.                   Problem: Speech/Swallowing LTGs     Dates: Start: 12/11/18       Goal: LTG-By  discharge, patient will safely swallow     Dates: Start: 12/11/18       Description: 1) Individualized goal:  Regular textures/thin liquids with MOD I for use of compensatory strategies.   2) Interventions:  SLP Swallowing Dysfunction Treatment, SLP Cognitive Skill Development and SLP Group Treatment             Goal: LTG-By discharge, patient will solve complex problem     Dates: Start: 12/11/18       Description: 1) Individualized goal:  By utilizing compensatory intervention for memory and problem-solving to allow for safe completion of daily activities with MOD I  2) Interventions:  SLP Cognitive Skill Development and SLP Group Treatment               Problem: Swallowing STGs     Dates: Start: 12/11/18       Goal: STG-Within one week, patient will     Dates: Start: 12/11/18       Description: 1) Individualized goal:  Safely swallow Dys 2/thin liquids with no overt s/sx of pen/asp.   2) Interventions:  SLP Swallowing Dysfunction Treatment and SLP Group Treatment       Note:     Goal Note filed on 12/12/18 1146 by Carla Hammer MS,CCC-SLP    Goal: STG-Within one week, patient will  Outcome: NOT MET  Pt currently on Dys 2/NTL, will reassess following MBSS at 1300 today                Goal: STG-Within one week, patient will     Dates: Start: 12/11/18       Description: 1) Individualized goal: complete a MBSS for further evaluation of swallow function; with additional goals pending results  2) Interventions:  SLP Video Swallow Evaluation       Note:     Goal Note filed on 12/12/18 1146 by Carla Hammer MS,CCC-SLP    Goal: STG-Within one week, patient will  Outcome: NOT MET  To be completed today at 1300                       Section completed by:  Carla Hammer MS,CCC-SLP

## 2018-12-12 NOTE — CARE PLAN
Problem: Bathing  Goal: STG-Within one week, patient will bathe  1) Individualized Goal:  W/ mod A using AD/AE/DME prn.  2) Interventions:  OT E Stim Attended, OT Group Therapy, OT Self Care/ADL, OT Neuro Re-Ed/Balance, OT Sensory Int Techniques, OT Therapeutic Activity, OT Aquatic Therapy and OT Therapeutic Exercise     Outcome: PROGRESSING AS EXPECTED  Pt evaluated yesterday (12/11).    Problem: Dressing  Goal: STG-Within one week, patient will dress UB  1) Individualized Goal:  W/ max A using AD/AE/DME prn.  2) Interventions:  OT E Stim Attended, OT Group Therapy, OT Self Care/ADL, OT Neuro Re-Ed/Balance, OT Sensory Int Techniques, OT Therapeutic Activity, OT Aquatic Therapy and OT Therapeutic Exercise     Outcome: PROGRESSING AS EXPECTED  Pt evaluated yesterday (12/11).  Goal: STG-Within one week, patient will dress LB  1) Individualized Goal:  W/ max A using AD/AE/DME prn.  2) Interventions:  OT E Stim Attended, OT Group Therapy, OT Self Care/ADL, OT Neuro Re-Ed/Balance, OT Sensory Int Techniques, OT Therapeutic Activity, OT Aquatic Therapy and OT Therapeutic Exercise     Outcome: PROGRESSING AS EXPECTED  Pt evaluated yesterday (12/11).    Problem: Functional Transfers  Goal: STG-Within one week, patient will transfer to toilet  1) Individualized Goal:  W/ min A using AD/AE/DME prn.  2) Interventions:  OT E Stim Attended, OT Group Therapy, OT Self Care/ADL, OT Neuro Re-Ed/Balance, OT Sensory Int Techniques, OT Therapeutic Activity, OT Aquatic Therapy and OT Therapeutic Exercise     Outcome: PROGRESSING AS EXPECTED  Pt evaluated yesterday (12/11).

## 2018-12-12 NOTE — REHAB-NURSING IDT TEAM NOTE
Nursing   Nursing  Diet/Nutrition:  Regular, Dysphagia III-Mechanical Soft and Thin Liquids  Medication Administration:  Crush all Medications in Puree  % consumed at meals in last 24 hours:  Consumed % of meals per documentation.  Meal/Snack Consumption for the past 24 hrs:   Oral Nutrition   12/11/18 1900 Dinner;Between % Consumed   12/11/18 1258 Lunch;Less than 25% Consumed   12/11/18 0944 Breakfast;Between 25-50% Consumed       Snack schedule:  None  Supplement:  Other: N/A  Appetite:  Fair  Fluid Intake/Output in past 24 hours: In: 820 [P.O.:820]  Out: 1250   Admit Weight:  Weight: 78.2 kg (172 lb 8 oz)  Weight Last 7 Days   [78.2 kg (172 lb 8 oz)] 78.2 kg (172 lb 8 oz) (12/10 1415)    Pain Issues:    Location:  Arm (12/11 2246)  Right (12/11 2246)         Severity:  Severe   Scheduled pain medications:  gabapentin (NEURONTIN) Tylenol     PRN pain medications used in last 24 hours:  oxycodone immediate release (ROXICODONE)    Non Pharmacologic Interventions:  rest  Effectiveness of pain management plan:  good=patient states acceptable comfort after interventions    Bowel:    Bowel Assist Initial Score:  1 - Total Assistance  Bowel Assist Current Score:  1 - Total Assistance  Bowl Accidents in last 7 days:     Last bowel movement: 12/11/18  Stool Description: Large, Soft, Formed     Usual bowel pattern:  daily  Scheduled bowel medications:  docusate sodium (COLACE), senna-docusate (PERICOLACE or SENOKOT S) , polyethylene glycol/lytes (MIRALAX)  and magnesium hydroxide (MILK OF MAGNESIA)   PRN bowel medications used in last 24 hours:  None  Nursing Interventions:  Increased time, Scheduled medication, Supervision, Verbal cueing, Set-up, Brief, Positioning on commode/toilet  Effectiveness of bowel program:   good=regular, predictable, controlled emptying of bowel  Bladder:    Bladder Assist Initial Score:  1 - Total Assistance  Bladder Assist Current Score:  1 - Total Assistance  Bladder Accidents in  last 7 days:     PVR range for past 24-48 hours:  [55-60]  ()  Intermittent Catheterization:  N/A  Medications affecting bladder:  None    Time void schedule/voiding pattern:  Voiding every 2-4 hours  Interventions:  Increased time, Verbal cueing, Emptying of device, Urinal, Supervision, Brief, Time void patient initiated  Effectiveness of bladder training:  Good=regular, predictable, emptying of bladder, patient initiates time voiding    Diabetes:  Blood Sugar Frequency:  Before meals and at bedtime    Range of BS for last 48 hours:   Recent Labs      12/10/18   0531  12/10/18   1153  12/10/18   1724  12/10/18   2059  12/11/18   0728  12/11/18   1135  12/11/18   1726  12/11/18   2140   POCGLUCOSE  81  128*  191*  148*  133*  151*  132*  150*     Scheduled diabetic medications:  insulin glargine (LANTUS) injection   Sliding scale usage in past 24 hours:  Yes  Total Short acting insulin in the past 24 hours:  Humulin 3 units.  Total Long acting insulin in the past 24 hours:  Lantus 8    Wound:         Patient Lines/Drains/Airways Status    Active Current Wounds     Name: Placement date: Placement time: Site: Days:    Wound 12/03/18 Laceration Face 12/03/18         9    Wound 12/08/18 Diabetic Ulcer Toe (Comment which one) R 2nd toe 12/10/18         3                   Interventions:  Laceration to face is EVANGELISTA.  Effectiveness of intervention:  wound is improving     Sleep/wake cycle:   Average hours slept:  Sleeps 3-4 hours without waking  Sleep medication usage:  None    Patient/Family Training/Education:  Fall Prevention, General Self Care, Medication Management, Pain Management, Safe Transfers, Safety and Skin Care  Discharge Supply Recommendations:  Glucometer and Strips  Strengths: Alert and oriented and Effective communication skills   Barriers:   Generalized weakness and Limited mobility            Nursing Problems           Problem: Bowel/Gastric:     Goal: Normal bowel function is maintained or improved            Goal: Will not experience complications related to bowel motility             Problem: Communication     Goal: The ability to communicate needs accurately and effectively will improve             Problem: Discharge Barriers/Planning     Goal: Patient's continuum of care needs will be met             Problem: Infection     Goal: Will remain free from infection             Problem: Knowledge Deficit     Goal: Knowledge of disease process/condition, treatment plan, diagnostic tests, and medications will improve           Goal: Knowledge of the prescribed therapeutic regimen will improve             Problem: Mobility     Goal: Risk for activity intolerance will decrease             Problem: Pain Management     Goal: Pain level will decrease to patient's comfort goal             Problem: Psychosocial Needs:     Goal: Level of anxiety will decrease             Problem: Respiratory:     Goal: Respiratory status will improve             Problem: Safety     Goal: Will remain free from injury           Goal: Will remain free from falls             Problem: Skin Integrity     Goal: Risk for impaired skin integrity will decrease             Problem: Venous Thromboembolism (VTW)/Deep Vein Thrombosis (DVT) Prevention:     Goal: Patient will participate in Venous Thrombosis (VTE)/Deep Vein Thrombosis (DVT)Prevention Measures                  Long Term Goals:   At discharge patient will be able to function safely at home and in the community with support.    Section completed by:  Niya Kruse L.P.N.2

## 2018-12-12 NOTE — CARE PLAN
Problem: Balance  Goal: STG-Within one week, patient will maintain dynamic standing  1) Individualized goal: Maintain dynamic standing balance fww sba 1 week  2) Interventions:  PT Gait Training, PT Therapeutic Exercises, PT Neuro Re-Ed/Balance and PT Therapeutic Activity     Outcome: NOT MET      Problem: Mobility  Goal: STG-Within one week, patient will  1) Individualized goal: Gait fww sba 100 ft 1 week  2) Interventions:  PT Gait Training, PT Therapeutic Exercises, PT Neuro Re-Ed/Balance and PT Therapeutic Activity     Outcome: NOT MET      Problem: Mobility Transfers  Goal: STG-Within one week, patient will transfer bed to chair  1) Individualized goal: Transfer bed to/from chair fww sba, vc`s, 1 week  2) Interventions:  PT Gait Training, PT Therapeutic Exercises, PT Neuro Re-Ed/Balance and PT Therapeutic Activity     Outcome: NOT MET    Goal: STG-Within one week, patient will move supine to sit  1) Individualized goal: Transfer supine to/from sit sba 1 week  2) Interventions:  PT Gait Training, PT Therapeutic Exercises, PT Neuro Re-Ed/Balance and PT Therapeutic Activity     Outcome: NOT MET

## 2018-12-12 NOTE — PROGRESS NOTES
"Rehab Progress Note     Encounter Date: 12/12/2018    CC: Cervical myelopathy    Interval Events (Subjective)  Patient sitting up in room. He reports he is doing well. He reports his strength is improving and he was able to walk much further today. Reports pain in right shoulder has improved such that they are working on his ROM.   Will check XR of R shoulder.  Otherwise discussed will have IDT today.    IDT Team Meeting 12/12/2018    IArsen M.D., was present and led the interdisciplinary team conference on 12/12/2018.  I led the IDT conference and agree with the IDT conference documentation and plan of care as noted below.     RN:  Diet    % Meal     Pain    Sleep    Bowel Continent   Bladder Incontinent   In's & Out's    Taking medications for whole with NTL    PT:  Bed Mobility Sarah   Transfers Sarah   Mobility 130 feet CGA-maxA   Stairs    Barrier: Lives alone  Poor balance  Poor activity tolerance    OT:  Eating maxA   Grooming maxA   Bathing totA   UB Dressing    LB Dressing    Toileting    Shower & Transfer    Limited by RUE    SLP:  Silently aspirated thins on MBS  Delayed cough  Still on NTL  Advanced to Dysphagia 3  Did score low on memory    CM:  Continues to work on disposition and DME needs.      DC/Disposition:  12/27/18    Objective:  VITAL SIGNS: /71   Pulse 90   Temp 36.7 °C (98 °F) (Oral)   Resp 17   Ht 1.778 m (5' 10\")   Wt 78.2 kg (172 lb 8 oz)   SpO2 97%   BMI 24.75 kg/m²   Gen: NAD, right scalp lacerations  Psych: Mood and affect appropriate  CV: RRR, no edema  Resp: CTAB, no upper airway sounds  Abd: NTND  Neuro: AOx4, 3/5 RUE and 3+/5 LUE  Unchanged from 12/11/18    Recent Results (from the past 72 hour(s))   ACCU-CHEK GLUCOSE    Collection Time: 12/09/18  5:56 PM   Result Value Ref Range    Glucose - Accu-Ck 118 (H) 65 - 99 mg/dL   ACCU-CHEK GLUCOSE    Collection Time: 12/09/18  9:29 PM   Result Value Ref Range    Glucose - Accu-Ck 102 (H) 65 - 99 mg/dL   CBC " WITH DIFFERENTIAL    Collection Time: 12/10/18  5:07 AM   Result Value Ref Range    WBC 6.0 4.8 - 10.8 K/uL    RBC 3.35 (L) 4.70 - 6.10 M/uL    Hemoglobin 9.8 (L) 14.0 - 18.0 g/dL    Hematocrit 28.8 (L) 42.0 - 52.0 %    MCV 86.0 81.4 - 97.8 fL    MCH 29.3 27.0 - 33.0 pg    MCHC 34.0 33.7 - 35.3 g/dL    RDW 50.2 (H) 35.9 - 50.0 fL    Platelet Count 134 (L) 164 - 446 K/uL    MPV 9.1 9.0 - 12.9 fL    Neutrophils-Polys 78.20 (H) 44.00 - 72.00 %    Lymphocytes 7.60 (L) 22.00 - 41.00 %    Monocytes 12.80 0.00 - 13.40 %    Eosinophils 0.80 0.00 - 6.90 %    Basophils 0.30 0.00 - 1.80 %    Immature Granulocytes 0.30 0.00 - 0.90 %    Nucleated RBC 0.00 /100 WBC    Neutrophils (Absolute) 4.66 1.82 - 7.42 K/uL    Lymphs (Absolute) 0.45 (L) 1.00 - 4.80 K/uL    Monos (Absolute) 0.76 0.00 - 0.85 K/uL    Eos (Absolute) 0.05 0.00 - 0.51 K/uL    Baso (Absolute) 0.02 0.00 - 0.12 K/uL    Immature Granulocytes (abs) 0.02 0.00 - 0.11 K/uL    NRBC (Absolute) 0.00 K/uL   BASIC METABOLIC PANEL    Collection Time: 12/10/18  5:07 AM   Result Value Ref Range    Sodium 135 135 - 145 mmol/L    Potassium 4.2 3.6 - 5.5 mmol/L    Chloride 104 96 - 112 mmol/L    Co2 22 20 - 33 mmol/L    Glucose 91 65 - 99 mg/dL    Bun 19 8 - 22 mg/dL    Creatinine 1.66 (H) 0.50 - 1.40 mg/dL    Calcium 8.9 8.5 - 10.5 mg/dL    Anion Gap 9.0 0.0 - 11.9   ESTIMATED GFR    Collection Time: 12/10/18  5:07 AM   Result Value Ref Range    GFR If  49 (A) >60 mL/min/1.73 m 2    GFR If Non African American 40 (A) >60 mL/min/1.73 m 2   ACCU-CHEK GLUCOSE    Collection Time: 12/10/18  5:31 AM   Result Value Ref Range    Glucose - Accu-Ck 81 65 - 99 mg/dL   ACCU-CHEK GLUCOSE    Collection Time: 12/10/18 11:53 AM   Result Value Ref Range    Glucose - Accu-Ck 128 (H) 65 - 99 mg/dL   ACCU-CHEK GLUCOSE    Collection Time: 12/10/18  5:24 PM   Result Value Ref Range    Glucose - Accu-Ck 191 (H) 65 - 99 mg/dL   ACCU-CHEK GLUCOSE    Collection Time: 12/10/18  8:59 PM    Result Value Ref Range    Glucose - Accu-Ck 148 (H) 65 - 99 mg/dL   CBC with Differential    Collection Time: 12/11/18  6:32 AM   Result Value Ref Range    WBC 5.0 4.8 - 10.8 K/uL    RBC 3.91 (L) 4.70 - 6.10 M/uL    Hemoglobin 11.3 (L) 14.0 - 18.0 g/dL    Hematocrit 34.4 (L) 42.0 - 52.0 %    MCV 88.0 81.4 - 97.8 fL    MCH 28.9 27.0 - 33.0 pg    MCHC 32.8 (L) 33.7 - 35.3 g/dL    RDW 52.4 (H) 35.9 - 50.0 fL    Platelet Count 156 (L) 164 - 446 K/uL    MPV 9.4 9.0 - 12.9 fL    Neutrophils-Polys 75.20 (H) 44.00 - 72.00 %    Lymphocytes 11.20 (L) 22.00 - 41.00 %    Monocytes 11.00 0.00 - 13.40 %    Eosinophils 1.60 0.00 - 6.90 %    Basophils 0.40 0.00 - 1.80 %    Immature Granulocytes 0.60 0.00 - 0.90 %    Nucleated RBC 0.00 /100 WBC    Neutrophils (Absolute) 3.77 1.82 - 7.42 K/uL    Lymphs (Absolute) 0.56 (L) 1.00 - 4.80 K/uL    Monos (Absolute) 0.55 0.00 - 0.85 K/uL    Eos (Absolute) 0.08 0.00 - 0.51 K/uL    Baso (Absolute) 0.02 0.00 - 0.12 K/uL    Immature Granulocytes (abs) 0.03 0.00 - 0.11 K/uL    NRBC (Absolute) 0.00 K/uL   Comp Metabolic Panel (CMP)    Collection Time: 12/11/18  6:32 AM   Result Value Ref Range    Sodium 137 135 - 145 mmol/L    Potassium 4.2 3.6 - 5.5 mmol/L    Chloride 102 96 - 112 mmol/L    Co2 25 20 - 33 mmol/L    Anion Gap 10.0 0.0 - 11.9    Glucose 134 (H) 65 - 99 mg/dL    Bun 21 8 - 22 mg/dL    Creatinine 1.92 (H) 0.50 - 1.40 mg/dL    Calcium 9.3 8.5 - 10.5 mg/dL    AST(SGOT) 31 12 - 45 U/L    ALT(SGPT) 33 2 - 50 U/L    Alkaline Phosphatase 118 (H) 30 - 99 U/L    Total Bilirubin 0.9 0.1 - 1.5 mg/dL    Albumin 4.1 3.2 - 4.9 g/dL    Total Protein 6.6 6.0 - 8.2 g/dL    Globulin 2.5 1.9 - 3.5 g/dL    A-G Ratio 1.6 g/dL   Lipid Profile (Lipid Panel)    Collection Time: 12/11/18  6:32 AM   Result Value Ref Range    Cholesterol,Tot 106 100 - 199 mg/dL    Triglycerides 157 (H) 0 - 149 mg/dL    HDL 34 (A) >=40 mg/dL    LDL 41 <100 mg/dL   TSH with Reflex to FT4    Collection Time: 12/11/18  6:32 AM    Result Value Ref Range    TSH 4.280 0.380 - 5.330 uIU/mL   Vitamin D, 25-hydroxy (blood)    Collection Time: 12/11/18  6:32 AM   Result Value Ref Range    25-Hydroxy   Vitamin D 25 28 (L) 30 - 100 ng/mL   ESTIMATED GFR    Collection Time: 12/11/18  6:32 AM   Result Value Ref Range    GFR If  41 (A) >60 mL/min/1.73 m 2    GFR If Non  34 (A) >60 mL/min/1.73 m 2   ACCU-CHEK GLUCOSE    Collection Time: 12/11/18  7:28 AM   Result Value Ref Range    Glucose - Accu-Ck 133 (H) 65 - 99 mg/dL   ACCU-CHEK GLUCOSE    Collection Time: 12/11/18 11:35 AM   Result Value Ref Range    Glucose - Accu-Ck 151 (H) 65 - 99 mg/dL   ACCU-CHEK GLUCOSE    Collection Time: 12/11/18  5:26 PM   Result Value Ref Range    Glucose - Accu-Ck 132 (H) 65 - 99 mg/dL   ACCU-CHEK GLUCOSE    Collection Time: 12/11/18  9:40 PM   Result Value Ref Range    Glucose - Accu-Ck 150 (H) 65 - 99 mg/dL   ACCU-CHEK GLUCOSE    Collection Time: 12/12/18  7:27 AM   Result Value Ref Range    Glucose - Accu-Ck 100 (H) 65 - 99 mg/dL   ACCU-CHEK GLUCOSE    Collection Time: 12/12/18 11:22 AM   Result Value Ref Range    Glucose - Accu-Ck 138 (H) 65 - 99 mg/dL       Current Facility-Administered Medications   Medication Frequency   • midodrine (PROAMATINE) tablet 5 mg TID   • vitamin D (cholecalciferol) tablet 2,000 Units DAILY   • [START ON 12/13/2018] ascorbic acid tablet 500 mg Q48HRS   • acetaminophen (TYLENOL) tablet 650 mg Q6HRS   • docusate sodium 100mg/10mL (COLACE) solution 100 mg BID   • gabapentin (NEURONTIN) capsule 100 mg QAM   • heparin injection 5,000 Units Q8HRS   • insulin glargine (LANTUS) injection 8 Units Q EVENING   • insulin regular (HUMULIN R) injection 3-14 Units 4X/DAY ACHS    And   • glucose 4 g chewable tablet 16 g Q15 MIN PRN    And   • dextrose 50% (D50W) injection 25 mL Q15 MIN PRN   • magnesium hydroxide (MILK OF MAGNESIA) suspension 30 mL DAILY   • polyethylene glycol/lytes (MIRALAX) PACKET 1 Packet BID   •  senna-docusate (PERICOLACE or SENOKOT S) 8.6-50 MG per tablet 1 Tab Nightly   • simvastatin (ZOCOR) tablet 20 mg Nightly   • Respiratory Care per Protocol Continuous RT   • Pharmacy Consult Request ...Pain Management Review 1 Each PRN   • oxyCODONE immediate-release (ROXICODONE) tablet 5 mg Q3HRS PRN   • oxyCODONE immediate release (ROXICODONE) tablet 10 mg Q3HRS PRN   • hydrALAZINE (APRESOLINE) tablet 25 mg Q8HRS PRN   • acetaminophen (TYLENOL) tablet 650 mg Q4HRS PRN   • artificial tears 1.4 % ophthalmic solution 1 Drop PRN   • benzocaine-menthol (CEPACOL) lozenge 1 Lozenge Q2HRS PRN   • mag hydrox-al hydrox-simeth (MAALOX PLUS ES or MYLANTA DS) suspension 20 mL Q2HRS PRN   • ondansetron (ZOFRAN ODT) dispertab 4 mg 4X/DAY PRN    Or   • ondansetron (ZOFRAN) syringe/vial injection 4 mg 4X/DAY PRN   • traZODone (DESYREL) tablet 50 mg QHS PRN   • sodium chloride (OCEAN) 0.65 % nasal spray 2 Spray PRN   • senna-docusate (PERICOLACE or SENOKOT S) 8.6-50 MG per tablet 2 Tab BID PRN    And   • polyethylene glycol/lytes (MIRALAX) PACKET 1 Packet QDAY PRN    And   • magnesium hydroxide (MILK OF MAGNESIA) suspension 30 mL QDAY PRN    And   • bisacodyl (DULCOLAX) suppository 10 mg QDAY PRN   • cyclobenzaprine (FLEXERIL) tablet 10 mg TID PRN   • ferrous sulfate tablet 325 mg Q48HRS   • omeprazole (PRILOSEC) capsule 20 mg DAILY       Orders Placed This Encounter   Procedures   • Diet Order Diabetic     Standing Status:   Standing     Number of Occurrences:   1     Order Specific Question:   Diet:     Answer:   Diabetic [3]     Order Specific Question:   Texture/Fiber modifications:     Answer:   Dysphagia 2(Pureed/Chopped)specify fluid consistency(question 6) [2]     Comments:   1:1 supervision for self feeding     Order Specific Question:   Consistency/Fluid modifications:     Answer:   Markle Thick [2]       Assessment:  Active Hospital Problems    Diagnosis   • *Central cord syndrome (HCC)   • Dysphagia   • Type 2 diabetes  mellitus (HCC)   • Chronic renal failure   • Scalp laceration   • 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  Patient was managed conservatively in ICU with cord perfusion protocol.  Now off of protocol and improving.   -PT and OT for mobility and ADLs  -SLP for cognition.    -C collar when OOB per NSG. Will need follow-up with Dr. Simon in 2 weeks.      Dysphagia - Patient with dysphagia 2/2 either his TBI or high cervical injury. Dysphagia 2 diet with thins on admission  -Consult SLP for swallow evaluation. MBSS on 12/12/18 - pending     Pain - Patient on scheduled tylenol q6h and Gabapentin 100 mg daily.  PRN Oxycodone  -Consider increase in gabapentin. Increased to TID     Right shoulder - Injured in fall, most likely muscular but patient guarding on ROM exam. Will check XR. Previous humerus XR without fracture.   -XR R shoulder - pending    Neurogenic Bladder - Patient has frequency but has sensation. Will check PVRs, last volume 55     Neurogenic Bowel - patient with constipation since injury. He reports he thinks it from opiates, he does have sensation. Would like to avoid scheduled suppository at this time. Will monitor on Senna and docusate. May require BTP  -With LBM on 12/11/18     HTN - Patient previously on Lisinopril 10 and Propranolol 40 mg TID. Transferred on midodrine for cord perfusion  -Will monitor on midodrine. Consult Hospitalist     DM - Patient on SSI and 8 U of Lantus.  Would be preferable to get patient onto oral agents as insulin self injections may be difficult with current level of injury.  Previously on Actos 30 mg daily and Nateglinide 120 mg TID  -Consult hospitalist, would like patient off of insulin due to poor hand function     HLD - Patient on Simvastatin 20 mg daily.     Anemia - Patient on Fe supplement on transfer. Will check CBC     Vitamin D -  21 on admission , increase baseline to 2000 U     GI Ppx - Patient on Prilosec 20 mg daily.     DVT ppx - Patient on Heparin 5000 q8h on transfer.      Total time:  40 minutes.  I spent greater than 50% of the time for patient care, counseling, and coordination on this date, including unit/floor time, and face-to-face time with the patient as per interval events and assessment and plan above. Topics discussed included right shoulder pain, MBSS today discussed risks and benefits and discharge planning. Patient was discussed separately in IDT today; please see details above.    Arsen Alvarado M.D.

## 2018-12-12 NOTE — PROGRESS NOTES
Received bedside shift report from Safia BRAND RN regarding patient and assumed care. Patient awake, calm and stable, currently positioned in bed for comfort and safety; call light within reach. Denies pain or discomfort at this time. Will continue to monitor.

## 2018-12-12 NOTE — ASSESSMENT & PLAN NOTE
Hba1c: 6.9 (12/11)  BS: 126-231  On Actos: 30 mg daily --> 45 mg daily  Note: home meds was Actos 45 mg daily  Note: pt will be started on different meds when he goes home (sees Endocrinology)  Cont to monitor

## 2018-12-12 NOTE — CONSULTS
HOSPITAL MEDICINE CONSULTATION    Requesting Physician:  Dr. Alvarado    Reason for Consult:  Diabetes    History of Present Illness:  The patient is a 78-year-old  male with past medical history significant for hypertension, diabetes, and stage 3 chronic kidney disease.  His present illness began when he sustained a ground level fall.  He was admitted to [facility] under the Trauma service.  He was diagnosed with central cord syndrome and conservative management was recommended by Dr. Simon of Neurosurgery.  He completed spinal cord perfusion protocol and continues to have weakness and numbness in his arms.  Due to his ongoing functional debility, the patient was transferred to [facility].  Hospital Medicine consultation is requested to assist in the management of this patient's diabetes.  His home regimen includes Actos and Nateglinide but he was recently instructed to discontinue Nateglinide.    Review of Systems:  Review of Systems   Constitutional: Negative for chills and fever.   HENT: Negative.    Eyes: Negative.    Respiratory: Negative for cough and shortness of breath.    Cardiovascular: Negative for chest pain and palpitations.   Gastrointestinal: Negative for abdominal pain, nausea and vomiting.   Musculoskeletal: Positive for neck pain.   Skin: Negative for itching and rash.   Neurological: Positive for sensory change and focal weakness.       Allergies:  No Known Allergies    Medications:    Current Facility-Administered Medications:   •  [START ON 12/12/2018] vitamin D (cholecalciferol) tablet 2,000 Units, 2,000 Units, Oral, DAILY, Arsen Alvarado M.D.  •  ascorbic acid tablet 500 mg, 500 mg, Oral, Q48HRS, Diana Dykes M.D.  •  midodrine (PROAMATINE) tablet 5 mg, 5 mg, Oral, 4X/DAY WITH MEALS + NIGHTLY, Diana Dykes M.D.  •  acetaminophen (TYLENOL) tablet 650 mg, 650 mg, Oral, Q6HRS, Arsen Alvarado M.D., 650 mg at 12/11/18 1156  •   docusate sodium 100mg/10mL (COLACE) solution 100 mg, 100 mg, Oral, BID, Arsen Alvarado M.D., 100 mg at 12/10/18 2049  •  gabapentin (NEURONTIN) capsule 100 mg, 100 mg, Oral, QAM, Arsen Alvarado M.D., 100 mg at 12/11/18 0938  •  heparin injection 5,000 Units, 5,000 Units, Subcutaneous, Q8HRS, Arsen Alvarado M.D., 5,000 Units at 12/11/18 1445  •  insulin glargine (LANTUS) injection 8 Units, 8 Units, Subcutaneous, Q EVENING, Arsen Alvarado M.D., 8 Units at 12/10/18 2101  •  insulin regular (HUMULIN R) injection 3-14 Units, 3-14 Units, Subcutaneous, 4X/DAY ACHS, 3 Units at 12/11/18 1139 **AND** Accu-Chek ACHS, , , Q AC AND BEDTIME(S) **AND** NOTIFY MD and PharmD, , , Once **AND** glucose 4 g chewable tablet 16 g, 16 g, Oral, Q15 MIN PRN **AND** dextrose 50% (D50W) injection 25 mL, 25 mL, Intravenous, Q15 MIN PRN, Arsne Alvarado M.D.  •  magnesium hydroxide (MILK OF MAGNESIA) suspension 30 mL, 30 mL, Oral, DAILY, Arsen Alvarado M.D., 30 mL at 12/10/18 1754  •  polyethylene glycol/lytes (MIRALAX) PACKET 1 Packet, 1 Packet, Oral, BID, Arsen Alvarado M.D., 1 Packet at 12/10/18 2049  •  senna-docusate (PERICOLACE or SENOKOT S) 8.6-50 MG per tablet 1 Tab, 1 Tab, Oral, Nightly, Arsen Alvarado M.D., 1 Tab at 12/10/18 2049  •  simvastatin (ZOCOR) tablet 20 mg, 20 mg, Oral, Nightly, Arsen Alvarado M.D., 20 mg at 12/10/18 2049  •  Respiratory Care per Protocol, , Nebulization, Continuous RT, Arsen Alvarado M.D.  •  Pharmacy Consult Request ...Pain Management Review 1 Each, 1 Each, Other, PRN, Arsen Alvarado M.D.  •  oxyCODONE immediate-release (ROXICODONE) tablet 5 mg, 5 mg, Oral, Q3HRS PRN, Arsen Alvarado M.D., 5 mg at 12/10/18 1604  •  oxyCODONE immediate release (ROXICODONE) tablet 10 mg, 10 mg, Oral, Q3HRS PRN, Arsen Alvarado M.D., 10 mg at 12/11/18 1448  •  hydrALAZINE (APRESOLINE) tablet 25 mg, 25  "mg, Oral, Q8HRS PRN, Arsen Alvarado M.D.  •  acetaminophen (TYLENOL) tablet 650 mg, 650 mg, Oral, Q4HRS PRN, Arsen Alvarado M.D.  •  artificial tears 1.4 % ophthalmic solution 1 Drop, 1 Drop, Both Eyes, PRN, Arsen Alvarado M.D.  •  benzocaine-menthol (CEPACOL) lozenge 1 Lozenge, 1 Lozenge, Mouth/Throat, Q2HRS PRN, Arsen Alvarado M.D.  •  mag hydrox-al hydrox-simeth (MAALOX PLUS ES or MYLANTA DS) suspension 20 mL, 20 mL, Oral, Q2HRS PRN, Arsen Alvarado M.D.  •  ondansetron (ZOFRAN ODT) dispertab 4 mg, 4 mg, Oral, 4X/DAY PRN, 4 mg at 12/11/18 1011 **OR** ondansetron (ZOFRAN) syringe/vial injection 4 mg, 4 mg, Intramuscular, 4X/DAY PRN, Arsen Alvarado M.D.  •  traZODone (DESYREL) tablet 50 mg, 50 mg, Oral, QHS PRN, Arsen Alvarado M.D.  •  sodium chloride (OCEAN) 0.65 % nasal spray 2 Spray, 2 Spray, Nasal, PRN, Arsen Alvarado M.D.  •  senna-docusate (PERICOLACE or SENOKOT S) 8.6-50 MG per tablet 2 Tab, 2 Tab, Oral, BID PRN **AND** polyethylene glycol/lytes (MIRALAX) PACKET 1 Packet, 1 Packet, Oral, QDAY PRN **AND** magnesium hydroxide (MILK OF MAGNESIA) suspension 30 mL, 30 mL, Oral, QDAY PRN **AND** bisacodyl (DULCOLAX) suppository 10 mg, 10 mg, Rectal, QDAY PRN, Arsen Alvarado M.D.  •  cyclobenzaprine (FLEXERIL) tablet 10 mg, 10 mg, Oral, TID PRN, Leger Chandrakant Christiano, M.D.  •  ferrous sulfate tablet 325 mg, 325 mg, Oral, Q48HRS, Arsne Alvarado M.D., 325 mg at 12/11/18 0938  •  omeprazole (PRILOSEC) capsule 20 mg, 20 mg, Oral, DAILY, Arsen Alvarado M.D., 20 mg at 12/11/18 0938    Past Medical/Surgical History:  Past Medical History:   Diagnosis Date   • Diabetes (HCC)    • Hemorrhagic disorder (HCC)     \"bleeds easily\"   • High cholesterol    • Hypertension    • Jaundice 5/8/2016   • Renal disorder     insufficiency \"due to metformin\"     Past Surgical History:   Procedure Laterality Date   • " GASTROSCOPY-ENDO N/A 6/3/2016    Procedure: GASTROSCOPY-ENDO;  Surgeon: Jasper Donnelly M.D.;  Location: SURGERY [facility];  Service:    • EGD W/ENDOSCOPIC ULTRASOUND N/A 6/3/2016    Procedure: EGD W/ENDOSCOPIC ULTRASOUND UPPER;  Surgeon: Jasper Donnelly M.D.;  Location: SURGERY [facility];  Service:    • EGD WITH ASP/BX N/A 6/3/2016    Procedure: EGD WITH ASP/BX - FNA, DUODENAL BIOPSIES, FNA OF PANCREAS;  Surgeon: Jasper Donnelly M.D.;  Location: SURGERY [facility];  Service:    • ERCP  5/2016   • CHOLECYSTECTOMY  2006    gallstones removed   • HIP ARTHROPLASTY TOTAL Left 2001    left total hip       Social History:  Social History     Social History   • Marital status: Single     Spouse name: N/A   • Number of children: N/A   • Years of education: N/A     Occupational History   • Not on file.     Social History Main Topics   • Smoking status: Never Smoker   • Smokeless tobacco: Never Used   • Alcohol use No   • Drug use: No   • Sexual activity: Not on file     Other Topics Concern   • Not on file     Social History Narrative   • No narrative on file       Family History  No family history on file.    Physical Examination:   Vitals:    12/10/18 2051 12/10/18 2309 12/11/18 0700 12/11/18 1404   BP: 148/82 132/68 107/68 133/80   Pulse: 98 100 98 90   Resp: 18 18 18 18   Temp:   36.2 °C (97.2 °F) 36.2 °C (97.1 °F)   TempSrc:   Oral Tympanic   SpO2:   98% 95%   Weight:       Height:           Physical Exam   Constitutional: He is oriented to person, place, and time. No distress.   HENT:   Head: Normocephalic and atraumatic.   Right Ear: External ear normal.   Left Ear: External ear normal.   Eyes: Conjunctivae and EOM are normal. Right eye exhibits no discharge. Left eye exhibits no discharge.   Neck:   C-collar   Cardiovascular: Normal rate and regular rhythm.    Pulmonary/Chest: Effort normal and breath sounds normal. No stridor. No respiratory distress. He has no wheezes.   Abdominal: Soft. Bowel  sounds are normal. He exhibits no distension. There is no tenderness.   Musculoskeletal:   Trace edema BLE   Neurological: He is alert and oriented to person, place, and time.   Skin: Skin is warm and dry. He is not diaphoretic.   Vitals reviewed.      Laboratory Data:  Recent Labs      12/09/18 0352  12/10/18   0507  12/11/18   0632   WBC  4.7*  6.0  5.0   RBC  3.28*  3.35*  3.91*   HEMOGLOBIN  9.6*  9.8*  11.3*   HEMATOCRIT  28.4*  28.8*  34.4*   MCV  86.6  86.0  88.0   MCH  29.3  29.3  28.9   MCHC  33.8  34.0  32.8*   RDW  50.8*  50.2*  52.4*   PLATELETCT  122*  134*  156*   MPV  9.4  9.1  9.4     Recent Labs      12/09/18   0352  12/10/18   0507  12/11/18   0632   SODIUM  135  135  137   POTASSIUM  4.2  4.2  4.2   CHLORIDE  106  104  102   CO2  21  22  25   GLUCOSE  109*  91  134*   BUN  20  19  21   CREATININE  1.91*  1.66*  1.92*   CALCIUM  8.9  8.9  9.3       Imaging:  No orders to display       Impressions/Recommendations:  Central cord syndrome (HCC)  Conservative management per Neurosurgery  Cervical collar    Type 2 diabetes mellitus (HCC)  On Lantus and SSI  Monitor glucose trends prior to resuming outpt meds to avoid hypoglycemia    Chronic renal failure  CKD Stage III  Renal dose all meds  Avoid nephrotoxins  Outpt Nephrology consult    Essential hypertension  BP trending high  Will decrease Midodrine    Hyperlipemia  On Zocor    Anemia  Add Vit C to Fe to aid absorption    Thrombocytopenia (HCC)  Follow Platelet counts    Vitamin D deficiency  Vit D level 28  On supplementation    Full Code    Thank you for the opportunity to assist in this patient's care.  We will continue to follow along with you.

## 2018-12-13 LAB
EST. AVERAGE GLUCOSE BLD GHB EST-MCNC: 151 MG/DL
GLUCOSE BLD-MCNC: 121 MG/DL (ref 65–99)
GLUCOSE BLD-MCNC: 135 MG/DL (ref 65–99)
GLUCOSE BLD-MCNC: 173 MG/DL (ref 65–99)
GLUCOSE BLD-MCNC: 199 MG/DL (ref 65–99)
HBA1C MFR BLD: 6.9 % (ref 0–5.6)

## 2018-12-13 PROCEDURE — 99233 SBSQ HOSP IP/OBS HIGH 50: CPT | Performed by: PHYSICAL MEDICINE & REHABILITATION

## 2018-12-13 PROCEDURE — A9270 NON-COVERED ITEM OR SERVICE: HCPCS | Performed by: HOSPITALIST

## 2018-12-13 PROCEDURE — 82962 GLUCOSE BLOOD TEST: CPT | Mod: 91

## 2018-12-13 PROCEDURE — 97535 SELF CARE MNGMENT TRAINING: CPT

## 2018-12-13 PROCEDURE — 97110 THERAPEUTIC EXERCISES: CPT

## 2018-12-13 PROCEDURE — A9270 NON-COVERED ITEM OR SERVICE: HCPCS | Performed by: PHYSICAL MEDICINE & REHABILITATION

## 2018-12-13 PROCEDURE — 99232 SBSQ HOSP IP/OBS MODERATE 35: CPT | Performed by: HOSPITALIST

## 2018-12-13 PROCEDURE — 97116 GAIT TRAINING THERAPY: CPT

## 2018-12-13 PROCEDURE — 770010 HCHG ROOM/CARE - REHAB SEMI PRIVAT*

## 2018-12-13 PROCEDURE — 97530 THERAPEUTIC ACTIVITIES: CPT

## 2018-12-13 PROCEDURE — 700112 HCHG RX REV CODE 229: Performed by: PHYSICAL MEDICINE & REHABILITATION

## 2018-12-13 PROCEDURE — 700102 HCHG RX REV CODE 250 W/ 637 OVERRIDE(OP): Performed by: PHYSICAL MEDICINE & REHABILITATION

## 2018-12-13 PROCEDURE — 700102 HCHG RX REV CODE 250 W/ 637 OVERRIDE(OP): Performed by: HOSPITALIST

## 2018-12-13 PROCEDURE — 92526 ORAL FUNCTION THERAPY: CPT

## 2018-12-13 PROCEDURE — 700111 HCHG RX REV CODE 636 W/ 250 OVERRIDE (IP): Performed by: PHYSICAL MEDICINE & REHABILITATION

## 2018-12-13 RX ADMIN — GABAPENTIN 100 MG: 100 CAPSULE ORAL at 08:59

## 2018-12-13 RX ADMIN — OXYCODONE HYDROCHLORIDE AND ACETAMINOPHEN 500 MG: 500 TABLET ORAL at 09:00

## 2018-12-13 RX ADMIN — SENNOSIDES AND DOCUSATE SODIUM 1 TABLET: 8.6; 5 TABLET ORAL at 21:34

## 2018-12-13 RX ADMIN — HEPARIN SODIUM 5000 UNITS: 5000 INJECTION, SOLUTION INTRAVENOUS; SUBCUTANEOUS at 15:32

## 2018-12-13 RX ADMIN — GABAPENTIN 100 MG: 100 CAPSULE ORAL at 15:33

## 2018-12-13 RX ADMIN — MIDODRINE HYDROCHLORIDE 5 MG: 2.5 TABLET ORAL at 08:59

## 2018-12-13 RX ADMIN — GUAIFENESIN 200 MG: 100 SOLUTION ORAL at 11:38

## 2018-12-13 RX ADMIN — ACETAMINOPHEN 650 MG: 325 TABLET ORAL at 17:37

## 2018-12-13 RX ADMIN — PIOGLITAZONE 7.5 MG: 15 TABLET ORAL at 08:59

## 2018-12-13 RX ADMIN — GUAIFENESIN 200 MG: 100 SOLUTION ORAL at 21:35

## 2018-12-13 RX ADMIN — GUAIFENESIN 200 MG: 100 SOLUTION ORAL at 15:32

## 2018-12-13 RX ADMIN — MIDODRINE HYDROCHLORIDE 5 MG: 2.5 TABLET ORAL at 15:32

## 2018-12-13 RX ADMIN — INSULIN HUMAN 3 UNITS: 100 INJECTION, SOLUTION PARENTERAL at 11:40

## 2018-12-13 RX ADMIN — FERROUS SULFATE TAB 325 MG (65 MG ELEMENTAL FE) 325 MG: 325 (65 FE) TAB at 09:00

## 2018-12-13 RX ADMIN — OMEPRAZOLE 20 MG: 20 CAPSULE, DELAYED RELEASE ORAL at 08:59

## 2018-12-13 RX ADMIN — ACETAMINOPHEN 650 MG: 325 TABLET ORAL at 04:58

## 2018-12-13 RX ADMIN — HEPARIN SODIUM 5000 UNITS: 5000 INJECTION, SOLUTION INTRAVENOUS; SUBCUTANEOUS at 04:56

## 2018-12-13 RX ADMIN — ACETAMINOPHEN 650 MG: 325 TABLET ORAL at 11:38

## 2018-12-13 RX ADMIN — DOCUSATE SODIUM 100 MG: 50 LIQUID ORAL at 21:35

## 2018-12-13 RX ADMIN — HEPARIN SODIUM 5000 UNITS: 5000 INJECTION, SOLUTION INTRAVENOUS; SUBCUTANEOUS at 21:42

## 2018-12-13 RX ADMIN — INSULIN GLARGINE 5 UNITS: 100 INJECTION, SOLUTION SUBCUTANEOUS at 21:42

## 2018-12-13 RX ADMIN — INSULIN HUMAN 3 UNITS: 100 INJECTION, SOLUTION PARENTERAL at 21:42

## 2018-12-13 RX ADMIN — SIMVASTATIN 20 MG: 20 TABLET, FILM COATED ORAL at 21:34

## 2018-12-13 RX ADMIN — MIDODRINE HYDROCHLORIDE 5 MG: 2.5 TABLET ORAL at 21:34

## 2018-12-13 RX ADMIN — GABAPENTIN 100 MG: 100 CAPSULE ORAL at 21:34

## 2018-12-13 RX ADMIN — VITAMIN D, TAB 1000IU (100/BT) 2000 UNITS: 25 TAB at 09:00

## 2018-12-13 ASSESSMENT — PAIN SCALES - GENERAL
PAINLEVEL_OUTOF10: 0
PAINLEVEL_OUTOF10: 0

## 2018-12-13 ASSESSMENT — ENCOUNTER SYMPTOMS
ABDOMINAL PAIN: 0
SHORTNESS OF BREATH: 0
NECK PAIN: 1
EYES NEGATIVE: 1
FEVER: 0
CHILLS: 0
NAUSEA: 0
VOMITING: 0
COUGH: 0
PALPITATIONS: 0
FOCAL WEAKNESS: 1

## 2018-12-13 NOTE — CARE PLAN
Problem: Safety  Goal: Will remain free from falls    Intervention: Implement fall precautions  Use of call light encouraged to make needs known.Bed in low position, non skid socks/shoes on when out of bed.Call light within reach.Will continue to monitor and assess needs and safety.      Problem: Pain Management  Goal: Pain level will decrease to patient's comfort goal    Intervention: Follow pain managment plan developed in collaboration with patient and Interdisciplinary Team  Pain is manageable with scheduled medication.Repositioned with pillows for comfort.Will continue to monitor and assess pain level and medicate as needed.      Problem: Metabolic:  Goal: Ability to maintain appropriate glucose levels will improve    Intervention: Manage blood glucose measurement  FSBS 200, coverage given.Refused hs snack.Will continue to monitor and assess for s/sx of hyper/hypoglycemia.

## 2018-12-13 NOTE — PROGRESS NOTES
[facility] Medicine Daily Progress Note    Date of Service  12/12/2018    Chief Complaint  DM    Interval Problem Update  Slept well overnight.  Denies complaints.    Review of Systems  Review of Systems   Constitutional: Negative for chills and fever.   HENT: Negative.    Eyes: Negative.    Respiratory: Negative for cough and shortness of breath.    Cardiovascular: Negative for chest pain and palpitations.   Gastrointestinal: Negative for abdominal pain, nausea and vomiting.   Genitourinary: Negative.    Musculoskeletal: Positive for neck pain.   Neurological: Positive for focal weakness.        Physical Exam  Temp:  [36.4 °C (97.5 °F)-36.8 °C (98.3 °F)] 36.4 °C (97.5 °F)  Pulse:  [] 103  Resp:  [16-17] 17  BP: (129-145)/(69-76) 129/76    Physical Exam   Constitutional: He is oriented to person, place, and time. No distress.   HENT:   Head: Normocephalic and atraumatic.   Right Ear: External ear normal.   Left Ear: External ear normal.   Eyes: Conjunctivae and EOM are normal. Right eye exhibits no discharge. Left eye exhibits no discharge.   Neck:   C-collar   Cardiovascular: Normal rate and regular rhythm.    Pulmonary/Chest: Effort normal and breath sounds normal. No stridor. No respiratory distress. He has no wheezes.   Abdominal: Soft. Bowel sounds are normal. He exhibits no distension. There is no tenderness. There is no rebound and no guarding.   Musculoskeletal:   Trace edema   Neurological: He is alert and oriented to person, place, and time.   Skin: Skin is warm and dry. He is not diaphoretic.   Vitals reviewed.      Fluids    Intake/Output Summary (Last 24 hours) at 12/12/18 1745  Last data filed at 12/12/18 1511   Gross per 24 hour   Intake              560 ml   Output              300 ml   Net              260 ml       Laboratory  Recent Labs      12/10/18   0507  12/11/18   0632   WBC  6.0  5.0   RBC  3.35*  3.91*   HEMOGLOBIN  9.8*  11.3*   HEMATOCRIT  28.8*  34.4*   MCV  86.0  88.0   MCH  29.3   28.9   MCHC  34.0  32.8*   RDW  50.2*  52.4*   PLATELETCT  134*  156*   MPV  9.1  9.4     Recent Labs      12/10/18   0507  12/11/18   0632   SODIUM  135  137   POTASSIUM  4.2  4.2   CHLORIDE  104  102   CO2  22  25   GLUCOSE  91  134*   BUN  19  21   CREATININE  1.66*  1.92*   CALCIUM  8.9  9.3             Recent Labs      12/11/18   0632   TRIGLYCERIDE  157*   HDL  34*   LDL  41       Imaging  DX-SHOULDER 2+ RIGHT   Final Result      1.  No radiographic evidence of acute traumatic injury.   2.  Osteopenia      DX-ESOPHAGUS - [id number]    (Results Pending)        Assessment/Plan  * Central cord syndrome (HCC)- (present on admission)   Assessment & Plan    Conservative management per Neurosurgery  Cervical collar     Chronic renal failure- (present on admission)   Assessment & Plan    CKD Stage III  Renal dose all meds  Avoid nephrotoxins  Outpt Nephrology F/U     Type 2 diabetes mellitus (HCC)- (present on admission)   Assessment & Plan    Decrease Lantus and resume low dose outpt Actos  Continue to monitor glucose trends     Vitamin D deficiency   Assessment & Plan    Vit D level 28  On supplementation     Thrombocytopenia (HCC)   Assessment & Plan    Follow Platelet counts     Anemia   Assessment & Plan    Continue Fe and Vit C supplementation     Hyperlipemia- (present on admission)   Assessment & Plan    On Zocor     Essential hypertension- (present on admission)   Assessment & Plan    Attempting to taper off Midodrine     Full Code

## 2018-12-13 NOTE — DISCHARGE PLANNING
Case Management;  Met with patient and left vm message for his sister to update on team conference discussion.  Current discharge target reviewed.  Patient is verbally agreeable with this.  Will follow.

## 2018-12-13 NOTE — CARE PLAN
Problem: Venous Thromboembolism (VTW)/Deep Vein Thrombosis (DVT) Prevention:  Goal: Patient will participate in Venous Thrombosis (VTE)/Deep Vein Thrombosis (DVT)Prevention Measures    Intervention: Assess and monitor for anticoagulation complications  Right hand appear edematous. Pillow was placed under patient's arm to facilitate circulation.       Problem: Discharge Barriers/Planning  Goal: Patient's continuum of care needs will be met  Pt. Has been incontinent of bladder during this shift. Pt. Was kept clean and dry by staff.

## 2018-12-13 NOTE — CARE PLAN
Problem: Inadequate nutrient intake  Goal: Patient to consume greater than or equal to 50% of meals  Outcome: PROGRESSING SLOWER THAN EXPECTED

## 2018-12-13 NOTE — PROGRESS NOTES
Hospital Medicine Daily Progress Note        Chief Complaint  DM    Interval Problem Update  Pt w/o complaints to me but RN asking for Robitussin for dry cough.    Review of Systems  Review of Systems   Constitutional: Negative for chills and fever.   HENT: Negative.    Eyes: Negative.    Respiratory: Negative for cough and shortness of breath.    Cardiovascular: Negative for chest pain and palpitations.   Gastrointestinal: Negative for abdominal pain, nausea and vomiting.   Genitourinary: Negative.    Musculoskeletal: Positive for neck pain.   Neurological: Positive for focal weakness.        Physical Exam  Temp:  [36.4 °C (97.6 °F)-36.8 °C (98.2 °F)] 36.8 °C (98.2 °F)  Pulse:  [] 101  Resp:  [16-18] 16  BP: (128-131)/(63-75) 131/63    Physical Exam   Constitutional: He is oriented to person, place, and time. No distress.   HENT:   Head: Normocephalic and atraumatic.   Right Ear: External ear normal.   Left Ear: External ear normal.   Eyes: Conjunctivae and EOM are normal. Right eye exhibits no discharge. Left eye exhibits no discharge.   Neck:   C-collar   Cardiovascular: Normal rate and regular rhythm.    Pulmonary/Chest: Effort normal and breath sounds normal. No stridor. No respiratory distress. He has no wheezes.   Abdominal: Soft. Bowel sounds are normal. He exhibits no distension. There is no tenderness. There is no rebound and no guarding.   Musculoskeletal:   Trace edema   Neurological: He is alert and oriented to person, place, and time.   Skin: Skin is warm and dry. He is not diaphoretic.   Vitals reviewed.      Fluids    Intake/Output Summary (Last 24 hours) at 12/15/18 1436  Last data filed at 12/15/18 0900   Gross per 24 hour   Intake              460 ml   Output              300 ml   Net              160 ml       Laboratory                    Assessment/Plan  * Central cord syndrome (HCC)- (present on admission)   Assessment & Plan    Conservative management per Neurosurgery  Cervical collar      Chronic renal failure- (present on admission)   Assessment & Plan    CKD Stage III  Renal dose all meds  Avoid nephrotoxins  Outpt Nephrology F/U     Type 2 diabetes mellitus (HCC)- (present on admission)   Assessment & Plan    Actos resumed and tapering off Lantus  Continue to monitor glucose trends     Vitamin D deficiency   Assessment & Plan    Vit D level 28  On supplementation     Thrombocytopenia (HCC)   Assessment & Plan    Follow Platelet counts     Anemia   Assessment & Plan    Continue Fe and Vit C supplementation     Hyperlipemia- (present on admission)   Assessment & Plan    On Zocor     Essential hypertension- (present on admission)   Assessment & Plan    Attempting to taper off Midodrine     Full Code

## 2018-12-13 NOTE — REHAB-DIETARY IDT TEAM NOTE
"Dietary   Nutrition       Dietary Problems           Problem: Inadequate nutrient intake     Goal: Patient to consume greater than or equal to 50% of meals             Nutrition services: Day 2 of admit.  Vince Almanzar is a 78 y.o. male with admitting DX of Traumatic Quadriplegia, Incomplete C1-C4, and Cervical Myelopathy.     Pt admitted following GLF on 12/3 and struck his R arm and head on the curb. Pt fell while in route to  for elevated blood sugar. Pt developed central cord pattern SCI and underwent spinal cord perfusion protocol for a total of 5 days. Pt noted to be having dysphagia. Pt noted to have been upgraded to Dysphagia II with NTL. Noted to also require mod assist for eating. Of note, pt reports constipation since the accident 2/2 to opiates. PO intake 25-50% x 2 % x 1 <25% x 2 and inadequate.     This RD visited pt in room. Pt resting in his wheelchair and enjoying some TV. Pt a little hard of hearing. Pt stated he has not noticed a change in his appetite though thinks it may be related to constipation. Stated his last BM was 12/11 AM (sounded as if he normally goes daily). Pt stated he is habitual with his meals and typically eats 3 meals a day. Pt states he typically eats every 5 hours for his meals and is getting used to the schedule at . PT denied N/V/D. Pt declined offer of boost GC, though amenable to a snack of 1/2 a sandwich or cheese and crackers. RD to continue to monitor to ensure adequate PO.     Assessment:  Height: 177.8 cm (5' 10\")  Weight: 78.2 kg (172 lb 8 oz)  Body mass index is 24.75 kg/m².   Diet/Intake: Diabetic Dysphagia II NTL as of 12/11     Evaluation:   1. Meds: Vit C 500mg q48 hours, Colace 10mg BID, Ferrous Sulfate 325mg q48 hours, Lantus 8 units QHS, Humulin SSI QID, MOM refused 30mL 12/12, Prilosec 20mg QD, Miralax 1 packet BID refused 0900, Senna, Zocor, Vit D 2000 units QD    2. Fluids: No IVF   3. Skin: Sutures noted to R forehead   4. GI/: large " soft formed noted 12/11, UO Accident 12/12   5. Labs: POC Glucose  Cr 1.92 GFR 34  Vit D 28     Malnutrition Risk: Recent fall with SCI     Recommendations/Plan:  1. Diet as ordered. Diet advancements per SLP.   2. Snack QD at PM of cheese and crackers or 1/2 sandwich.   3. Encourage intake of 50% or greater.   4. Document intake of all meals/supplements as % taken in ADL's to provide interdisciplinary communication across all shifts.   5. Monitor weight.  6. Nutrition rep will continue to see patient for ongoing meal and snack preferences.       Section completed by:  Kena Ghotra R.D.

## 2018-12-14 LAB
GLUCOSE BLD-MCNC: 125 MG/DL (ref 65–99)
GLUCOSE BLD-MCNC: 205 MG/DL (ref 65–99)
GLUCOSE BLD-MCNC: 214 MG/DL (ref 65–99)
GLUCOSE BLD-MCNC: 243 MG/DL (ref 65–99)

## 2018-12-14 PROCEDURE — 700111 HCHG RX REV CODE 636 W/ 250 OVERRIDE (IP): Performed by: PHYSICAL MEDICINE & REHABILITATION

## 2018-12-14 PROCEDURE — 97110 THERAPEUTIC EXERCISES: CPT

## 2018-12-14 PROCEDURE — 97535 SELF CARE MNGMENT TRAINING: CPT

## 2018-12-14 PROCEDURE — 700102 HCHG RX REV CODE 250 W/ 637 OVERRIDE(OP): Performed by: HOSPITALIST

## 2018-12-14 PROCEDURE — 700102 HCHG RX REV CODE 250 W/ 637 OVERRIDE(OP): Performed by: PHYSICAL MEDICINE & REHABILITATION

## 2018-12-14 PROCEDURE — 770010 HCHG ROOM/CARE - REHAB SEMI PRIVAT*

## 2018-12-14 PROCEDURE — 92526 ORAL FUNCTION THERAPY: CPT

## 2018-12-14 PROCEDURE — 99232 SBSQ HOSP IP/OBS MODERATE 35: CPT | Performed by: HOSPITALIST

## 2018-12-14 PROCEDURE — 700112 HCHG RX REV CODE 229: Performed by: PHYSICAL MEDICINE & REHABILITATION

## 2018-12-14 PROCEDURE — 82962 GLUCOSE BLOOD TEST: CPT | Mod: 91

## 2018-12-14 PROCEDURE — 99232 SBSQ HOSP IP/OBS MODERATE 35: CPT | Performed by: PHYSICAL MEDICINE & REHABILITATION

## 2018-12-14 PROCEDURE — A9270 NON-COVERED ITEM OR SERVICE: HCPCS | Performed by: PHYSICAL MEDICINE & REHABILITATION

## 2018-12-14 PROCEDURE — 97116 GAIT TRAINING THERAPY: CPT

## 2018-12-14 PROCEDURE — A9270 NON-COVERED ITEM OR SERVICE: HCPCS | Performed by: HOSPITALIST

## 2018-12-14 PROCEDURE — 97112 NEUROMUSCULAR REEDUCATION: CPT

## 2018-12-14 RX ORDER — PIOGLITAZONEHYDROCHLORIDE 15 MG/1
15 TABLET ORAL DAILY
Status: DISCONTINUED | OUTPATIENT
Start: 2018-12-15 | End: 2018-12-18

## 2018-12-14 RX ORDER — GABAPENTIN 300 MG/1
300 CAPSULE ORAL 3 TIMES DAILY
Status: DISCONTINUED | OUTPATIENT
Start: 2018-12-14 | End: 2019-01-04 | Stop reason: HOSPADM

## 2018-12-14 RX ORDER — MIDODRINE HYDROCHLORIDE 2.5 MG/1
5 TABLET ORAL 2 TIMES DAILY
Status: DISCONTINUED | OUTPATIENT
Start: 2018-12-14 | End: 2018-12-15

## 2018-12-14 RX ADMIN — GABAPENTIN 100 MG: 100 CAPSULE ORAL at 08:28

## 2018-12-14 RX ADMIN — MIDODRINE HYDROCHLORIDE 5 MG: 2.5 TABLET ORAL at 20:58

## 2018-12-14 RX ADMIN — HEPARIN SODIUM 5000 UNITS: 5000 INJECTION, SOLUTION INTRAVENOUS; SUBCUTANEOUS at 05:47

## 2018-12-14 RX ADMIN — VITAMIN D, TAB 1000IU (100/BT) 2000 UNITS: 25 TAB at 08:29

## 2018-12-14 RX ADMIN — HEPARIN SODIUM 5000 UNITS: 5000 INJECTION, SOLUTION INTRAVENOUS; SUBCUTANEOUS at 14:56

## 2018-12-14 RX ADMIN — GUAIFENESIN 200 MG: 100 SOLUTION ORAL at 05:47

## 2018-12-14 RX ADMIN — GABAPENTIN 300 MG: 300 CAPSULE ORAL at 14:55

## 2018-12-14 RX ADMIN — MIDODRINE HYDROCHLORIDE 5 MG: 2.5 TABLET ORAL at 14:54

## 2018-12-14 RX ADMIN — PIOGLITAZONE 7.5 MG: 15 TABLET ORAL at 08:29

## 2018-12-14 RX ADMIN — ACETAMINOPHEN 650 MG: 325 TABLET ORAL at 17:58

## 2018-12-14 RX ADMIN — DOCUSATE SODIUM 100 MG: 50 LIQUID ORAL at 20:58

## 2018-12-14 RX ADMIN — OMEPRAZOLE 20 MG: 20 CAPSULE, DELAYED RELEASE ORAL at 08:29

## 2018-12-14 RX ADMIN — INSULIN HUMAN 4 UNITS: 100 INJECTION, SOLUTION PARENTERAL at 11:18

## 2018-12-14 RX ADMIN — INSULIN HUMAN 4 UNITS: 100 INJECTION, SOLUTION PARENTERAL at 20:57

## 2018-12-14 RX ADMIN — OXYCODONE HYDROCHLORIDE 5 MG: 5 TABLET ORAL at 13:17

## 2018-12-14 RX ADMIN — GABAPENTIN 300 MG: 300 CAPSULE ORAL at 20:58

## 2018-12-14 RX ADMIN — SENNOSIDES AND DOCUSATE SODIUM 1 TABLET: 8.6; 5 TABLET ORAL at 20:58

## 2018-12-14 RX ADMIN — MIDODRINE HYDROCHLORIDE 5 MG: 2.5 TABLET ORAL at 08:28

## 2018-12-14 RX ADMIN — POLYETHYLENE GLYCOL 3350 1 PACKET: 17 POWDER, FOR SOLUTION ORAL at 20:58

## 2018-12-14 RX ADMIN — ACETAMINOPHEN 650 MG: 325 TABLET ORAL at 23:31

## 2018-12-14 RX ADMIN — ACETAMINOPHEN 650 MG: 325 TABLET ORAL at 05:47

## 2018-12-14 RX ADMIN — INSULIN HUMAN 4 UNITS: 100 INJECTION, SOLUTION PARENTERAL at 17:57

## 2018-12-14 RX ADMIN — ACETAMINOPHEN 650 MG: 325 TABLET ORAL at 11:59

## 2018-12-14 RX ADMIN — SIMVASTATIN 20 MG: 20 TABLET, FILM COATED ORAL at 20:58

## 2018-12-14 RX ADMIN — HEPARIN SODIUM 5000 UNITS: 5000 INJECTION, SOLUTION INTRAVENOUS; SUBCUTANEOUS at 20:58

## 2018-12-14 ASSESSMENT — ENCOUNTER SYMPTOMS
FOCAL WEAKNESS: 1
VOMITING: 0
NAUSEA: 0
SHORTNESS OF BREATH: 0
FEVER: 0
CHILLS: 0
EYES NEGATIVE: 1
NECK PAIN: 1
ABDOMINAL PAIN: 0
PALPITATIONS: 0
COUGH: 0

## 2018-12-14 ASSESSMENT — PAIN SCALES - GENERAL
PAINLEVEL_OUTOF10: 0
PAINLEVEL_OUTOF10: 0
PAINLEVEL_OUTOF10: 7

## 2018-12-14 NOTE — PROGRESS NOTES
"Rehab Progress Note     Encounter Date: 12/13/2018    CC: Cervical myelopathy    Interval Events (Subjective)  Patient sitting up in wheelchair watching television. He reports he is doing well. He reports his shoulder pain is slowly improving. Reviewed XR of his shoulder, no acute fracture. Denies NVD.     IDT Team Meeting 12/12/2018  DC/Disposition:  12/27/18    Objective:  VITAL SIGNS: /71   Pulse 98   Temp 36.6 °C (97.8 °F) (Oral)   Resp 19   Ht 1.778 m (5' 10\")   Wt 78.2 kg (172 lb 8 oz)   SpO2 93%   BMI 24.75 kg/m²   Gen: NAD  Psych: Mood and affect appropriate  CV: RRR, no edema  Resp: CTAB, no upper airway sounds  Abd: NTND  Neuro: AOx4, pushing wheelchair with legs    Recent Results (from the past 72 hour(s))   ACCU-CHEK GLUCOSE    Collection Time: 12/10/18  5:24 PM   Result Value Ref Range    Glucose - Accu-Ck 191 (H) 65 - 99 mg/dL   ACCU-CHEK GLUCOSE    Collection Time: 12/10/18  8:59 PM   Result Value Ref Range    Glucose - Accu-Ck 148 (H) 65 - 99 mg/dL   CBC with Differential    Collection Time: 12/11/18  6:32 AM   Result Value Ref Range    WBC 5.0 4.8 - 10.8 K/uL    RBC 3.91 (L) 4.70 - 6.10 M/uL    Hemoglobin 11.3 (L) 14.0 - 18.0 g/dL    Hematocrit 34.4 (L) 42.0 - 52.0 %    MCV 88.0 81.4 - 97.8 fL    MCH 28.9 27.0 - 33.0 pg    MCHC 32.8 (L) 33.7 - 35.3 g/dL    RDW 52.4 (H) 35.9 - 50.0 fL    Platelet Count 156 (L) 164 - 446 K/uL    MPV 9.4 9.0 - 12.9 fL    Neutrophils-Polys 75.20 (H) 44.00 - 72.00 %    Lymphocytes 11.20 (L) 22.00 - 41.00 %    Monocytes 11.00 0.00 - 13.40 %    Eosinophils 1.60 0.00 - 6.90 %    Basophils 0.40 0.00 - 1.80 %    Immature Granulocytes 0.60 0.00 - 0.90 %    Nucleated RBC 0.00 /100 WBC    Neutrophils (Absolute) 3.77 1.82 - 7.42 K/uL    Lymphs (Absolute) 0.56 (L) 1.00 - 4.80 K/uL    Monos (Absolute) 0.55 0.00 - 0.85 K/uL    Eos (Absolute) 0.08 0.00 - 0.51 K/uL    Baso (Absolute) 0.02 0.00 - 0.12 K/uL    Immature Granulocytes (abs) 0.03 0.00 - 0.11 K/uL    NRBC " (Absolute) 0.00 K/uL   Comp Metabolic Panel (CMP)    Collection Time: 12/11/18  6:32 AM   Result Value Ref Range    Sodium 137 135 - 145 mmol/L    Potassium 4.2 3.6 - 5.5 mmol/L    Chloride 102 96 - 112 mmol/L    Co2 25 20 - 33 mmol/L    Anion Gap 10.0 0.0 - 11.9    Glucose 134 (H) 65 - 99 mg/dL    Bun 21 8 - 22 mg/dL    Creatinine 1.92 (H) 0.50 - 1.40 mg/dL    Calcium 9.3 8.5 - 10.5 mg/dL    AST(SGOT) 31 12 - 45 U/L    ALT(SGPT) 33 2 - 50 U/L    Alkaline Phosphatase 118 (H) 30 - 99 U/L    Total Bilirubin 0.9 0.1 - 1.5 mg/dL    Albumin 4.1 3.2 - 4.9 g/dL    Total Protein 6.6 6.0 - 8.2 g/dL    Globulin 2.5 1.9 - 3.5 g/dL    A-G Ratio 1.6 g/dL   Lipid Profile (Lipid Panel)    Collection Time: 12/11/18  6:32 AM   Result Value Ref Range    Cholesterol,Tot 106 100 - 199 mg/dL    Triglycerides 157 (H) 0 - 149 mg/dL    HDL 34 (A) >=40 mg/dL    LDL 41 <100 mg/dL   HEMOGLOBIN A1C    Collection Time: 12/11/18  6:32 AM   Result Value Ref Range    Glycohemoglobin 6.9 (H) 0.0 - 5.6 %    Est Avg Glucose 151 mg/dL   TSH with Reflex to FT4    Collection Time: 12/11/18  6:32 AM   Result Value Ref Range    TSH 4.280 0.380 - 5.330 uIU/mL   Vitamin D, 25-hydroxy (blood)    Collection Time: 12/11/18  6:32 AM   Result Value Ref Range    25-Hydroxy   Vitamin D 25 28 (L) 30 - 100 ng/mL   ESTIMATED GFR    Collection Time: 12/11/18  6:32 AM   Result Value Ref Range    GFR If  41 (A) >60 mL/min/1.73 m 2    GFR If Non  34 (A) >60 mL/min/1.73 m 2   ACCU-CHEK GLUCOSE    Collection Time: 12/11/18  7:28 AM   Result Value Ref Range    Glucose - Accu-Ck 133 (H) 65 - 99 mg/dL   ACCU-CHEK GLUCOSE    Collection Time: 12/11/18 11:35 AM   Result Value Ref Range    Glucose - Accu-Ck 151 (H) 65 - 99 mg/dL   ACCU-CHEK GLUCOSE    Collection Time: 12/11/18  5:26 PM   Result Value Ref Range    Glucose - Accu-Ck 132 (H) 65 - 99 mg/dL   ACCU-CHEK GLUCOSE    Collection Time: 12/11/18  9:40 PM   Result Value Ref Range    Glucose -  Accu-Ck 150 (H) 65 - 99 mg/dL   ACCU-CHEK GLUCOSE    Collection Time: 12/12/18  7:27 AM   Result Value Ref Range    Glucose - Accu-Ck 100 (H) 65 - 99 mg/dL   ACCU-CHEK GLUCOSE    Collection Time: 12/12/18 11:22 AM   Result Value Ref Range    Glucose - Accu-Ck 138 (H) 65 - 99 mg/dL   ACCU-CHEK GLUCOSE    Collection Time: 12/12/18  5:26 PM   Result Value Ref Range    Glucose - Accu-Ck 172 (H) 65 - 99 mg/dL   ACCU-CHEK GLUCOSE    Collection Time: 12/12/18  8:45 PM   Result Value Ref Range    Glucose - Accu-Ck 200 (H) 65 - 99 mg/dL   ACCU-CHEK GLUCOSE    Collection Time: 12/13/18  8:05 AM   Result Value Ref Range    Glucose - Accu-Ck 121 (H) 65 - 99 mg/dL   ACCU-CHEK GLUCOSE    Collection Time: 12/13/18 11:24 AM   Result Value Ref Range    Glucose - Accu-Ck 173 (H) 65 - 99 mg/dL       Current Facility-Administered Medications   Medication Frequency   • guaiFENesin (ROBITUSSIN) 100 MG/5ML solution 200 mg 4X/DAY   • midodrine (PROAMATINE) tablet 5 mg TID   • gabapentin (NEURONTIN) capsule 100 mg TID   • pioglitazone (ACTOS) tablet 7.5 mg DAILY   • insulin glargine (LANTUS) injection 5 Units Q EVENING   • vitamin D (cholecalciferol) tablet 2,000 Units DAILY   • ascorbic acid tablet 500 mg Q48HRS   • acetaminophen (TYLENOL) tablet 650 mg Q6HRS   • docusate sodium 100mg/10mL (COLACE) solution 100 mg BID   • heparin injection 5,000 Units Q8HRS   • insulin regular (HUMULIN R) injection 3-14 Units 4X/DAY ACHS    And   • glucose 4 g chewable tablet 16 g Q15 MIN PRN    And   • dextrose 50% (D50W) injection 25 mL Q15 MIN PRN   • magnesium hydroxide (MILK OF MAGNESIA) suspension 30 mL DAILY   • polyethylene glycol/lytes (MIRALAX) PACKET 1 Packet BID   • senna-docusate (PERICOLACE or SENOKOT S) 8.6-50 MG per tablet 1 Tab Nightly   • simvastatin (ZOCOR) tablet 20 mg Nightly   • Respiratory Care per Protocol Continuous RT   • Pharmacy Consult Request ...Pain Management Review 1 Each PRN   • oxyCODONE immediate-release (ROXICODONE)  tablet 5 mg Q3HRS PRN   • oxyCODONE immediate release (ROXICODONE) tablet 10 mg Q3HRS PRN   • hydrALAZINE (APRESOLINE) tablet 25 mg Q8HRS PRN   • acetaminophen (TYLENOL) tablet 650 mg Q4HRS PRN   • artificial tears 1.4 % ophthalmic solution 1 Drop PRN   • benzocaine-menthol (CEPACOL) lozenge 1 Lozenge Q2HRS PRN   • mag hydrox-al hydrox-simeth (MAALOX PLUS ES or MYLANTA DS) suspension 20 mL Q2HRS PRN   • ondansetron (ZOFRAN ODT) dispertab 4 mg 4X/DAY PRN    Or   • ondansetron (ZOFRAN) syringe/vial injection 4 mg 4X/DAY PRN   • traZODone (DESYREL) tablet 50 mg QHS PRN   • sodium chloride (OCEAN) 0.65 % nasal spray 2 Spray PRN   • senna-docusate (PERICOLACE or SENOKOT S) 8.6-50 MG per tablet 2 Tab BID PRN    And   • polyethylene glycol/lytes (MIRALAX) PACKET 1 Packet QDAY PRN    And   • magnesium hydroxide (MILK OF MAGNESIA) suspension 30 mL QDAY PRN    And   • bisacodyl (DULCOLAX) suppository 10 mg QDAY PRN   • cyclobenzaprine (FLEXERIL) tablet 10 mg TID PRN   • ferrous sulfate tablet 325 mg Q48HRS   • omeprazole (PRILOSEC) capsule 20 mg DAILY       Orders Placed This Encounter   Procedures   • Diet Order Diabetic     Standing Status:   Standing     Number of Occurrences:   1     Order Specific Question:   Diet:     Answer:   Diabetic [3]     Order Specific Question:   Texture/Fiber modifications:     Answer:   Dysphagia 3(Mechanical Soft)specify fluid consistency(question 6) [3]     Comments:   1:1 supervision for self feeding     Order Specific Question:   Consistency/Fluid modifications:     Answer:   [place] Thick [2]       Assessment:  Active Hospital Problems    Diagnosis   • *Central cord syndrome (HCC)   • Dysphagia   • Type 2 diabetes mellitus (HCC)   • Chronic renal failure   • Scalp laceration   • 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  Patient was managed conservatively in ICU with cord perfusion protocol.  Now off of protocol and improving.   -PT and OT for mobility and ADLs  -SLP for cognition.    -C collar when OOB per NSG. Will need follow-up with Dr. Simon in 2 weeks.      Dysphagia - Patient with dysphagia 2/2 either his TBI or high cervical injury. Dysphagia 2 diet with thins on admission  -Consult SLP for swallow evaluation. MBSS on 12/12/18 - upgraded to Dysphagia 3 with NTL     Pain - Patient on scheduled tylenol q6h and Gabapentin 100 mg daily.  PRN Oxycodone  -Consider increase in gabapentin. Increased to TID. Discontinue Oxycodone 10 mg. 5 mg PRN     Right shoulder - Injured in fall, most likely muscular but patient guarding on ROM exam. Will check XR. Previous humerus XR without fracture.   -XR R shoulder - no acute fractures. OT to work on ROM    Neurogenic Bladder - Patient has frequency but has sensation. Will check PVRs, last volume 55     Neurogenic Bowel - patient with constipation since injury. He reports he thinks it from opiates, he does have sensation. Would like to avoid scheduled suppository at this time. Will monitor on Senna and docusate. May require BTP  -With LBM on 12/11/18     HTN - Patient previously on Lisinopril 10 and Propranolol 40 mg TID. Transferred on midodrine for cord perfusion  -Will monitor on midodrine. Consult Hospitalist     DM - Patient on SSI and 8 U of Lantus.  Would be preferable to get patient onto oral agents as insulin self injections may be difficult with current level of injury. Previously on Actos 30 mg daily and Nateglinide 120 mg TID  -Consult hospitalist, would like patient off of insulin due to poor hand function. Started on Actos 7.5 mg daily per  Hospitalist     HLD - Patient on Simvastatin 20 mg daily.     Anemia - Patient on Fe supplement on transfer. Will check CBC     Vitamin D -  21 on admission , increase baseline to 2000 U     GI Ppx - Patient on Prilosec 20 mg daily.     DVT ppx - Patient on Heparin 5000 q8h on transfer.      Total time:  35 minutes.  I spent greater than 50% of the time for patient care, counseling, and coordination on this date, including unit/floor time, and face-to-face time with the patient as per interval events and assessment and plan above. Topics discussed included pain control, discussed XR results, discussed MBSS results, and discussed with hospitalist about DM medications.     Arsen Alvarado M.D.

## 2018-12-14 NOTE — PROGRESS NOTES
"Rehab Progress Note     Encounter Date: 12/14/2018    CC: Cervical myelopathy    Interval Events (Subjective)  Patient sitting up in room after therapy. He reports he is doing well. He is wondering about removal of his sutures, reviewed scalp sutures and they are ready to be removed. Otherwise still with ongoing pain, will increase gabapentin to 300 mg TID. Reports he was able to feed himself today with his left hand. Denies NVD.     IDT Team Meeting 12/12/2018  DC/Disposition:  12/27/18    Objective:  VITAL SIGNS: /66   Pulse 96   Temp 36.8 °C (98.2 °F) (Temporal)   Resp 16   Ht 1.778 m (5' 10\")   Wt 78.2 kg (172 lb 8 oz)   SpO2 96%   BMI 24.75 kg/m²   Gen: NAD  Psych: Mood and affect appropriate  CV: RRR, no edema  Resp: CTAB, no upper airway sounds  Abd: NTND  Neuro: AOx4, pushing wheelchair with legs  Unchanged from 12/13/18    Recent Results (from the past 72 hour(s))   ACCU-CHEK GLUCOSE    Collection Time: 12/11/18  5:26 PM   Result Value Ref Range    Glucose - Accu-Ck 132 (H) 65 - 99 mg/dL   ACCU-CHEK GLUCOSE    Collection Time: 12/11/18  9:40 PM   Result Value Ref Range    Glucose - Accu-Ck 150 (H) 65 - 99 mg/dL   ACCU-CHEK GLUCOSE    Collection Time: 12/12/18  7:27 AM   Result Value Ref Range    Glucose - Accu-Ck 100 (H) 65 - 99 mg/dL   ACCU-CHEK GLUCOSE    Collection Time: 12/12/18 11:22 AM   Result Value Ref Range    Glucose - Accu-Ck 138 (H) 65 - 99 mg/dL   ACCU-CHEK GLUCOSE    Collection Time: 12/12/18  5:26 PM   Result Value Ref Range    Glucose - Accu-Ck 172 (H) 65 - 99 mg/dL   ACCU-CHEK GLUCOSE    Collection Time: 12/12/18  8:45 PM   Result Value Ref Range    Glucose - Accu-Ck 200 (H) 65 - 99 mg/dL   ACCU-CHEK GLUCOSE    Collection Time: 12/13/18  8:05 AM   Result Value Ref Range    Glucose - Accu-Ck 121 (H) 65 - 99 mg/dL   ACCU-CHEK GLUCOSE    Collection Time: 12/13/18 11:24 AM   Result Value Ref Range    Glucose - Accu-Ck 173 (H) 65 - 99 mg/dL   ACCU-CHEK GLUCOSE    Collection Time: " 12/13/18  5:06 PM   Result Value Ref Range    Glucose - Accu-Ck 135 (H) 65 - 99 mg/dL   ACCU-CHEK GLUCOSE    Collection Time: 12/13/18  9:33 PM   Result Value Ref Range    Glucose - Accu-Ck 199 (H) 65 - 99 mg/dL   ACCU-CHEK GLUCOSE    Collection Time: 12/14/18  7:16 AM   Result Value Ref Range    Glucose - Accu-Ck 125 (H) 65 - 99 mg/dL   ACCU-CHEK GLUCOSE    Collection Time: 12/14/18 11:15 AM   Result Value Ref Range    Glucose - Accu-Ck 214 (H) 65 - 99 mg/dL       Current Facility-Administered Medications   Medication Frequency   • midodrine (PROAMATINE) tablet 5 mg TID   • gabapentin (NEURONTIN) capsule 100 mg TID   • pioglitazone (ACTOS) tablet 7.5 mg DAILY   • insulin glargine (LANTUS) injection 5 Units Q EVENING   • vitamin D (cholecalciferol) tablet 2,000 Units DAILY   • ascorbic acid tablet 500 mg Q48HRS   • acetaminophen (TYLENOL) tablet 650 mg Q6HRS   • docusate sodium 100mg/10mL (COLACE) solution 100 mg BID   • heparin injection 5,000 Units Q8HRS   • insulin regular (HUMULIN R) injection 3-14 Units 4X/DAY ACHS    And   • glucose 4 g chewable tablet 16 g Q15 MIN PRN    And   • dextrose 50% (D50W) injection 25 mL Q15 MIN PRN   • magnesium hydroxide (MILK OF MAGNESIA) suspension 30 mL DAILY   • polyethylene glycol/lytes (MIRALAX) PACKET 1 Packet BID   • senna-docusate (PERICOLACE or SENOKOT S) 8.6-50 MG per tablet 1 Tab Nightly   • simvastatin (ZOCOR) tablet 20 mg Nightly   • Respiratory Care per Protocol Continuous RT   • Pharmacy Consult Request ...Pain Management Review 1 Each PRN   • oxyCODONE immediate-release (ROXICODONE) tablet 5 mg Q3HRS PRN   • hydrALAZINE (APRESOLINE) tablet 25 mg Q8HRS PRN   • acetaminophen (TYLENOL) tablet 650 mg Q4HRS PRN   • artificial tears 1.4 % ophthalmic solution 1 Drop PRN   • benzocaine-menthol (CEPACOL) lozenge 1 Lozenge Q2HRS PRN   • mag hydrox-al hydrox-simeth (MAALOX PLUS ES or MYLANTA DS) suspension 20 mL Q2HRS PRN   • ondansetron (ZOFRAN ODT) dispertab 4 mg 4X/DAY  PRN    Or   • ondansetron (ZOFRAN) syringe/vial injection 4 mg 4X/DAY PRN   • traZODone (DESYREL) tablet 50 mg QHS PRN   • sodium chloride (OCEAN) 0.65 % nasal spray 2 Spray PRN   • senna-docusate (PERICOLACE or SENOKOT S) 8.6-50 MG per tablet 2 Tab BID PRN    And   • polyethylene glycol/lytes (MIRALAX) PACKET 1 Packet QDAY PRN    And   • magnesium hydroxide (MILK OF MAGNESIA) suspension 30 mL QDAY PRN    And   • bisacodyl (DULCOLAX) suppository 10 mg QDAY PRN   • cyclobenzaprine (FLEXERIL) tablet 10 mg TID PRN   • ferrous sulfate tablet 325 mg Q48HRS   • omeprazole (PRILOSEC) capsule 20 mg DAILY       Orders Placed This Encounter   Procedures   • Diet Order Diabetic     Standing Status:   Standing     Number of Occurrences:   1     Order Specific Question:   Diet:     Answer:   Diabetic [3]     Order Specific Question:   Texture/Fiber modifications:     Answer:   Dysphagia 3(Mechanical Soft)specify fluid consistency(question 6) [3]     Comments:   1:1 supervision for self feeding     Order Specific Question:   Consistency/Fluid modifications:     Answer:   [place] Thick [2]       Assessment:  Active Hospital Problems    Diagnosis   • *Central cord syndrome (HCC)   • Dysphagia   • Type 2 diabetes mellitus (HCC)   • Chronic renal failure   • Scalp laceration   • 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  Patient was managed conservatively in ICU with cord perfusion protocol.  Now off of protocol and improving.   -PT and OT for mobility and ADLs  -SLP for cognition.    -C collar when OOB per NSG. Will need follow-up with  Dr. Simon in 2 weeks.   -Scalp suture removal 12/14/18    Dysphagia - Patient with dysphagia 2/2 either his TBI or high cervical injury. Dysphagia 2 diet with thins on admission  -Consult SLP for swallow evaluation. MBSS on 12/12/18 - upgraded to Dysphagia 3 with NTL     Pain - Patient on scheduled tylenol q6h and Gabapentin 100 mg daily.  PRN Oxycodone  -Consider increase in gabapentin. Increased to TID. Discontinue Oxycodone 10 mg. 5 mg PRN.  Gabapentin 300 mg TID     Right shoulder - Injured in fall, most likely muscular but patient guarding on ROM exam. Will check XR. Previous humerus XR without fracture.   -XR R shoulder - no acute fractures. OT to work on ROM    Neurogenic Bladder - Patient has frequency but has sensation. Will check PVRs, last volume 55     Neurogenic Bowel - patient with constipation since injury. He reports he thinks it from opiates, he does have sensation. Would like to avoid scheduled suppository at this time. Will monitor on Senna and docusate. May require BTP  -With LBM on 12/11/18     HTN - Patient previously on Lisinopril 10 and Propranolol 40 mg TID. Transferred on midodrine for cord perfusion  -Will monitor on midodrine. Consult Hospitalist     DM - Patient on SSI and 8 U of Lantus.  Would be preferable to get patient onto oral agents as insulin self injections may be difficult with current level of injury. Previously on Actos 30 mg daily and Nateglinide 120 mg TID  -Consult hospitalist, would like patient off of insulin due to poor hand function. Started on Actos 7.5 mg daily per Hospitalist, weaning off of insulin.     HLD - Patient on Simvastatin 20 mg daily.     Anemia - Patient on Fe supplement on transfer. Will check CBC     Vitamin D -  21 on admission , increase baseline to 2000 U     GI Ppx - Patient on Prilosec 20 mg daily.     DVT ppx - Patient on Heparin 5000 q8h on transfer.      Total time:  30 minutes.  I spent greater than 50% of the time for patient care,  counseling, and coordination on this date, including unit/floor time, and face-to-face time with the patient as per interval events and assessment and plan above. Topics discussed included insulin weaning as will not be able to self medicate, increase pain medication, and continued hypotension.     Arsen Alvarado M.D.

## 2018-12-14 NOTE — CARE PLAN
Problem: Venous Thromboembolism (VTW)/Deep Vein Thrombosis (DVT) Prevention:  Goal: Patient will participate in Venous Thrombosis (VTE)/Deep Vein Thrombosis (DVT)Prevention Measures  Outcome: PROGRESSING AS EXPECTED  Patient is receiving scheduled unfractionated heparin, no s/s of DVT at this time. Will continue to monitor for changes.     Problem: Metabolic:  Goal: Ability to maintain appropriate glucose levels will improve  Outcome: PROGRESSING AS EXPECTED  Patient's HS blood sugar reading was 199, he was given 3 units of Humulin R, as well as 5 units Lantus. HS snack was given but patient refused. Encouraged patient to call if he begins to feel hypoglycemic.

## 2018-12-14 NOTE — CARE PLAN
Problem: Skin Integrity  Goal: Risk for impaired skin integrity will decrease  Outcome: PROGRESSING AS EXPECTED  Patient's skin remains intact and free from new or accidental injury this shift.  Patient is able to reposition self and pillows used to off set pressure points.

## 2018-12-14 NOTE — CARE PLAN
Problem: Mobility  Goal: Risk for activity intolerance will decrease  Outcome: PROGRESSING AS EXPECTED  Patient is able to reposition self and pillows used to off set pressure points.    Problem: Inadequate nutrient intake  Goal: Patient to consume greater than or equal to 50% of meals  Outcome: PROGRESSING AS EXPECTED  Pt request regular portions on his meals and eats 50 to 80% of all meals also request for snacks.

## 2018-12-14 NOTE — CARE PLAN
Problem: Safety  Goal: Will remain free from falls  Outcome: PROGRESSING AS EXPECTED  Patient uses call light consistently and appropriately this shift.  Waits for assistance when needed and does not attempt self transfer.  Able to verbalize needs.

## 2018-12-15 LAB
GLUCOSE BLD-MCNC: 161 MG/DL (ref 65–99)
GLUCOSE BLD-MCNC: 201 MG/DL (ref 65–99)
GLUCOSE BLD-MCNC: 235 MG/DL (ref 65–99)
GLUCOSE BLD-MCNC: 266 MG/DL (ref 65–99)

## 2018-12-15 PROCEDURE — 700102 HCHG RX REV CODE 250 W/ 637 OVERRIDE(OP): Performed by: PHYSICAL MEDICINE & REHABILITATION

## 2018-12-15 PROCEDURE — A9270 NON-COVERED ITEM OR SERVICE: HCPCS | Performed by: PHYSICAL MEDICINE & REHABILITATION

## 2018-12-15 PROCEDURE — 700102 HCHG RX REV CODE 250 W/ 637 OVERRIDE(OP): Performed by: HOSPITALIST

## 2018-12-15 PROCEDURE — 770010 HCHG ROOM/CARE - REHAB SEMI PRIVAT*

## 2018-12-15 PROCEDURE — A9270 NON-COVERED ITEM OR SERVICE: HCPCS | Performed by: HOSPITALIST

## 2018-12-15 PROCEDURE — 82962 GLUCOSE BLOOD TEST: CPT | Mod: 91

## 2018-12-15 PROCEDURE — 99232 SBSQ HOSP IP/OBS MODERATE 35: CPT | Performed by: HOSPITALIST

## 2018-12-15 PROCEDURE — 700111 HCHG RX REV CODE 636 W/ 250 OVERRIDE (IP): Performed by: PHYSICAL MEDICINE & REHABILITATION

## 2018-12-15 RX ORDER — MIDODRINE HYDROCHLORIDE 2.5 MG/1
2.5 TABLET ORAL 2 TIMES DAILY
Status: DISCONTINUED | OUTPATIENT
Start: 2018-12-15 | End: 2018-12-19

## 2018-12-15 RX ADMIN — PIOGLITAZONE 15 MG: 15 TABLET ORAL at 08:27

## 2018-12-15 RX ADMIN — INSULIN HUMAN 4 UNITS: 100 INJECTION, SOLUTION PARENTERAL at 17:31

## 2018-12-15 RX ADMIN — HEPARIN SODIUM 5000 UNITS: 5000 INJECTION, SOLUTION INTRAVENOUS; SUBCUTANEOUS at 05:35

## 2018-12-15 RX ADMIN — INSULIN HUMAN 3 UNITS: 100 INJECTION, SOLUTION PARENTERAL at 08:25

## 2018-12-15 RX ADMIN — INSULIN HUMAN 7 UNITS: 100 INJECTION, SOLUTION PARENTERAL at 20:02

## 2018-12-15 RX ADMIN — GABAPENTIN 300 MG: 300 CAPSULE ORAL at 19:51

## 2018-12-15 RX ADMIN — ACETAMINOPHEN 650 MG: 325 TABLET ORAL at 05:35

## 2018-12-15 RX ADMIN — GABAPENTIN 300 MG: 300 CAPSULE ORAL at 08:27

## 2018-12-15 RX ADMIN — HEPARIN SODIUM 5000 UNITS: 5000 INJECTION, SOLUTION INTRAVENOUS; SUBCUTANEOUS at 14:45

## 2018-12-15 RX ADMIN — INSULIN HUMAN 4 UNITS: 100 INJECTION, SOLUTION PARENTERAL at 11:28

## 2018-12-15 RX ADMIN — OXYCODONE HYDROCHLORIDE AND ACETAMINOPHEN 500 MG: 500 TABLET ORAL at 08:27

## 2018-12-15 RX ADMIN — VITAMIN D, TAB 1000IU (100/BT) 2000 UNITS: 25 TAB at 08:26

## 2018-12-15 RX ADMIN — GABAPENTIN 300 MG: 300 CAPSULE ORAL at 14:45

## 2018-12-15 RX ADMIN — OMEPRAZOLE 20 MG: 20 CAPSULE, DELAYED RELEASE ORAL at 08:27

## 2018-12-15 RX ADMIN — FERROUS SULFATE TAB 325 MG (65 MG ELEMENTAL FE) 325 MG: 325 (65 FE) TAB at 08:27

## 2018-12-15 RX ADMIN — OXYCODONE HYDROCHLORIDE 5 MG: 5 TABLET ORAL at 03:28

## 2018-12-15 RX ADMIN — MIDODRINE HYDROCHLORIDE 5 MG: 2.5 TABLET ORAL at 08:26

## 2018-12-15 RX ADMIN — ACETAMINOPHEN 650 MG: 325 TABLET ORAL at 11:30

## 2018-12-15 RX ADMIN — SIMVASTATIN 20 MG: 20 TABLET, FILM COATED ORAL at 19:51

## 2018-12-15 RX ADMIN — ACETAMINOPHEN 650 MG: 325 TABLET ORAL at 17:31

## 2018-12-15 RX ADMIN — HEPARIN SODIUM 5000 UNITS: 5000 INJECTION, SOLUTION INTRAVENOUS; SUBCUTANEOUS at 20:01

## 2018-12-15 ASSESSMENT — ENCOUNTER SYMPTOMS
EYES NEGATIVE: 1
PALPITATIONS: 0
ABDOMINAL PAIN: 0
VOMITING: 0
CHILLS: 0
NAUSEA: 0
FEVER: 0
NECK PAIN: 1
SHORTNESS OF BREATH: 0
COUGH: 0
FOCAL WEAKNESS: 1

## 2018-12-15 ASSESSMENT — PAIN SCALES - GENERAL
PAINLEVEL_OUTOF10: 0
PAINLEVEL_OUTOF10: 7
PAINLEVEL_OUTOF10: 0

## 2018-12-15 NOTE — CARE PLAN
Problem: Safety  Goal: Will remain free from falls  Outcome: PROGRESSING AS EXPECTED  Patient is asleep in bed, with no s/s of pain or discomfort. VS stable, pulse was elevated but when rechecked it was WDL.  Patient is hard of hearing which can make communication difficult. All needs met at this time, hourly rounding in place to make sure all needs are being met.     Problem: Metabolic:  Goal: Ability to maintain appropriate glucose levels will improve  Outcome: PROGRESSING AS EXPECTED  Patient's HS finger stick reading was 243, he received 4 units Humulin R per sliding scale. His 5 units of Lantus has been discontinued. HS snack was given and patient ate 100% of it.

## 2018-12-15 NOTE — CARE PLAN
Problem: Pain Management  Goal: Pain level will decrease to patient's comfort goal  Outcome: PROGRESSING AS EXPECTED  Patient able to verbalize needs.  Denies pain or discomfort this shift and no s/s same noted.      Problem: Inadequate nutrient intake  Goal: Patient to consume greater than or equal to 50% of meals  Outcome: PROGRESSING AS EXPECTED  Patient free from s/s dehydration.  Oral and buccal mucosa pink and moist; conjunctiva moist; skin turgor good.  PO intake adequate. 75 to 100% of meals.

## 2018-12-15 NOTE — PROGRESS NOTES
Removed sutures from right upper forehead, area is well appoximated and open to air, pt tolerated well.

## 2018-12-15 NOTE — PROGRESS NOTES
Hospital Medicine Daily Progress Note        Chief Complaint  DM    Interval Problem Update  Doing well w/o new issues.    Review of Systems  Review of Systems   Constitutional: Negative for chills and fever.   HENT: Negative.    Eyes: Negative.    Respiratory: Negative for cough and shortness of breath.    Cardiovascular: Negative for chest pain and palpitations.   Gastrointestinal: Negative for abdominal pain, nausea and vomiting.   Genitourinary: Negative.    Musculoskeletal: Positive for neck pain.   Neurological: Positive for focal weakness.        Physical Exam  Temp:  [36.4 °C (97.6 °F)-36.8 °C (98.2 °F)] 36.8 °C (98.2 °F)  Pulse:  [] 101  Resp:  [16-18] 16  BP: (128-131)/(63-75) 131/63    Physical Exam   Constitutional: He is oriented to person, place, and time. No distress.   HENT:   Head: Normocephalic and atraumatic.   Right Ear: External ear normal.   Left Ear: External ear normal.   Eyes: Conjunctivae and EOM are normal. Right eye exhibits no discharge. Left eye exhibits no discharge.   Neck:   C-collar   Cardiovascular: Normal rate and regular rhythm.    Pulmonary/Chest: Effort normal and breath sounds normal. No stridor. No respiratory distress. He has no wheezes.   Abdominal: Soft. Bowel sounds are normal. He exhibits no distension. There is no tenderness. There is no rebound and no guarding.   Musculoskeletal:   Trace edema   Neurological: He is alert and oriented to person, place, and time.   Skin: Skin is warm and dry. He is not diaphoretic.   Vitals reviewed.      Fluids    Intake/Output Summary (Last 24 hours) at 12/15/18 1449  Last data filed at 12/15/18 0900   Gross per 24 hour   Intake              460 ml   Output              300 ml   Net              160 ml       Laboratory                      Assessment/Plan  * Central cord syndrome (HCC)- (present on admission)   Assessment & Plan    Conservative management per Neurosurgery  Cervical collar     Chronic renal failure- (present on  admission)   Assessment & Plan    CKD Stage III  Renal dose all meds  Avoid nephrotoxins  Outpt Nephrology F/U     Type 2 diabetes mellitus (HCC)- (present on admission)   Assessment & Plan    Back on outpt Actos  Continue to monitor glucose trends off Lantus     Vitamin D deficiency   Assessment & Plan    Vit D level 28  On supplementation     Thrombocytopenia (HCC)   Assessment & Plan    Follow Platelet counts     Anemia   Assessment & Plan    Continue Fe and Vit C supplementation     Hyperlipemia- (present on admission)   Assessment & Plan    On Zocor     Essential hypertension- (present on admission)   Assessment & Plan    Continue to taper off Midodrine     Full Code

## 2018-12-15 NOTE — PROGRESS NOTES
Hospital Medicine Daily Progress Note        Chief Complaint  DM    Interval Problem Update  No new problems; cough better.    Review of Systems  Review of Systems   Constitutional: Negative for chills and fever.   HENT: Negative.    Eyes: Negative.    Respiratory: Negative for cough and shortness of breath.    Cardiovascular: Negative for chest pain and palpitations.   Gastrointestinal: Negative for abdominal pain, nausea and vomiting.   Genitourinary: Negative.    Musculoskeletal: Positive for neck pain.   Neurological: Positive for focal weakness.        Physical Exam  Temp:  [36.4 °C (97.6 °F)-36.8 °C (98.2 °F)] 36.8 °C (98.2 °F)  Pulse:  [] 101  Resp:  [16-18] 16  BP: (128-131)/(63-75) 131/63    Physical Exam   Constitutional: He is oriented to person, place, and time. No distress.   HENT:   Head: Normocephalic and atraumatic.   Right Ear: External ear normal.   Left Ear: External ear normal.   Eyes: Conjunctivae and EOM are normal. Right eye exhibits no discharge. Left eye exhibits no discharge.   Neck:   C-collar   Cardiovascular: Normal rate and regular rhythm.    Pulmonary/Chest: Effort normal and breath sounds normal. No stridor. No respiratory distress. He has no wheezes.   Abdominal: Soft. Bowel sounds are normal. He exhibits no distension. There is no tenderness. There is no rebound and no guarding.   Musculoskeletal:   Trace edema   Neurological: He is alert and oriented to person, place, and time.   Skin: Skin is warm and dry. He is not diaphoretic.   Vitals reviewed.      Fluids    Intake/Output Summary (Last 24 hours) at 12/15/18 1445  Last data filed at 12/15/18 0900   Gross per 24 hour   Intake              460 ml   Output              300 ml   Net              160 ml       Laboratory                        Imaging  DX-SHOULDER 2+ RIGHT   Final Result      1.  No radiographic evidence of acute traumatic injury.   2.  Osteopenia      DX-ESOPHAGUS - [id number]    (Results Pending)         Assessment/Plan  * Central cord syndrome (HCC)- (present on admission)   Assessment & Plan    Conservative management per Neurosurgery  Cervical collar     Chronic renal failure- (present on admission)   Assessment & Plan    CKD Stage III  Renal dose all meds  Avoid nephrotoxins  Outpt Nephrology F/U     Type 2 diabetes mellitus (HCC)- (present on admission)   Assessment & Plan    Increase Actos and D/C Lantus  Continue to monitor glucose trends     Vitamin D deficiency   Assessment & Plan    Vit D level 28  On supplementation     Thrombocytopenia (HCC)   Assessment & Plan    Follow Platelet counts     Anemia   Assessment & Plan    Continue Fe and Vit C supplementation     Hyperlipemia- (present on admission)   Assessment & Plan    On Zocor     Essential hypertension- (present on admission)   Assessment & Plan    Continue to taper off Midodrine     Full Code

## 2018-12-16 PROBLEM — R00.0 TACHYCARDIA: Status: ACTIVE | Noted: 2018-12-16

## 2018-12-16 LAB
ANION GAP SERPL CALC-SCNC: 9 MMOL/L (ref 0–11.9)
BUN SERPL-MCNC: 27 MG/DL (ref 8–22)
CALCIUM SERPL-MCNC: 8.8 MG/DL (ref 8.5–10.5)
CHLORIDE SERPL-SCNC: 102 MMOL/L (ref 96–112)
CO2 SERPL-SCNC: 25 MMOL/L (ref 20–33)
CREAT SERPL-MCNC: 2.01 MG/DL (ref 0.5–1.4)
ERYTHROCYTE [DISTWIDTH] IN BLOOD BY AUTOMATED COUNT: 53.3 FL (ref 35.9–50)
GLUCOSE BLD-MCNC: 149 MG/DL (ref 65–99)
GLUCOSE BLD-MCNC: 224 MG/DL (ref 65–99)
GLUCOSE BLD-MCNC: 225 MG/DL (ref 65–99)
GLUCOSE BLD-MCNC: 279 MG/DL (ref 65–99)
GLUCOSE SERPL-MCNC: 159 MG/DL (ref 65–99)
HCT VFR BLD AUTO: 30.7 % (ref 42–52)
HGB BLD-MCNC: 10.1 G/DL (ref 14–18)
MCH RBC QN AUTO: 29.3 PG (ref 27–33)
MCHC RBC AUTO-ENTMCNC: 32.9 G/DL (ref 33.7–35.3)
MCV RBC AUTO: 89 FL (ref 81.4–97.8)
PLATELET # BLD AUTO: 138 K/UL (ref 164–446)
PMV BLD AUTO: 9.3 FL (ref 9–12.9)
POTASSIUM SERPL-SCNC: 4.6 MMOL/L (ref 3.6–5.5)
RBC # BLD AUTO: 3.45 M/UL (ref 4.7–6.1)
SODIUM SERPL-SCNC: 136 MMOL/L (ref 135–145)
WBC # BLD AUTO: 4.4 K/UL (ref 4.8–10.8)

## 2018-12-16 PROCEDURE — 36415 COLL VENOUS BLD VENIPUNCTURE: CPT

## 2018-12-16 PROCEDURE — 700102 HCHG RX REV CODE 250 W/ 637 OVERRIDE(OP): Performed by: HOSPITALIST

## 2018-12-16 PROCEDURE — 700102 HCHG RX REV CODE 250 W/ 637 OVERRIDE(OP): Performed by: PHYSICAL MEDICINE & REHABILITATION

## 2018-12-16 PROCEDURE — 85027 COMPLETE CBC AUTOMATED: CPT

## 2018-12-16 PROCEDURE — 97112 NEUROMUSCULAR REEDUCATION: CPT

## 2018-12-16 PROCEDURE — 700112 HCHG RX REV CODE 229: Performed by: PHYSICAL MEDICINE & REHABILITATION

## 2018-12-16 PROCEDURE — A9270 NON-COVERED ITEM OR SERVICE: HCPCS | Performed by: HOSPITALIST

## 2018-12-16 PROCEDURE — 97530 THERAPEUTIC ACTIVITIES: CPT

## 2018-12-16 PROCEDURE — 93005 ELECTROCARDIOGRAM TRACING: CPT | Performed by: HOSPITALIST

## 2018-12-16 PROCEDURE — 93010 ELECTROCARDIOGRAM REPORT: CPT | Performed by: INTERNAL MEDICINE

## 2018-12-16 PROCEDURE — 770010 HCHG ROOM/CARE - REHAB SEMI PRIVAT*

## 2018-12-16 PROCEDURE — 92526 ORAL FUNCTION THERAPY: CPT

## 2018-12-16 PROCEDURE — A9270 NON-COVERED ITEM OR SERVICE: HCPCS | Performed by: PHYSICAL MEDICINE & REHABILITATION

## 2018-12-16 PROCEDURE — 80048 BASIC METABOLIC PNL TOTAL CA: CPT

## 2018-12-16 PROCEDURE — 99232 SBSQ HOSP IP/OBS MODERATE 35: CPT | Performed by: HOSPITALIST

## 2018-12-16 PROCEDURE — 82962 GLUCOSE BLOOD TEST: CPT | Mod: 91

## 2018-12-16 PROCEDURE — 94760 N-INVAS EAR/PLS OXIMETRY 1: CPT

## 2018-12-16 PROCEDURE — 97535 SELF CARE MNGMENT TRAINING: CPT

## 2018-12-16 PROCEDURE — 700111 HCHG RX REV CODE 636 W/ 250 OVERRIDE (IP): Performed by: PHYSICAL MEDICINE & REHABILITATION

## 2018-12-16 RX ADMIN — HEPARIN SODIUM 5000 UNITS: 5000 INJECTION, SOLUTION INTRAVENOUS; SUBCUTANEOUS at 05:05

## 2018-12-16 RX ADMIN — OXYCODONE HYDROCHLORIDE 5 MG: 5 TABLET ORAL at 20:59

## 2018-12-16 RX ADMIN — GABAPENTIN 300 MG: 300 CAPSULE ORAL at 14:39

## 2018-12-16 RX ADMIN — ACETAMINOPHEN 650 MG: 325 TABLET ORAL at 00:32

## 2018-12-16 RX ADMIN — MAGNESIUM HYDROXIDE 30 ML: 400 SUSPENSION ORAL at 08:20

## 2018-12-16 RX ADMIN — POLYETHYLENE GLYCOL 3350 1 PACKET: 17 POWDER, FOR SOLUTION ORAL at 20:59

## 2018-12-16 RX ADMIN — ACETAMINOPHEN 650 MG: 325 TABLET ORAL at 17:37

## 2018-12-16 RX ADMIN — OMEPRAZOLE 20 MG: 20 CAPSULE, DELAYED RELEASE ORAL at 08:21

## 2018-12-16 RX ADMIN — PIOGLITAZONE 15 MG: 15 TABLET ORAL at 08:21

## 2018-12-16 RX ADMIN — DOCUSATE SODIUM 100 MG: 50 LIQUID ORAL at 08:20

## 2018-12-16 RX ADMIN — ACETAMINOPHEN 650 MG: 325 TABLET ORAL at 11:53

## 2018-12-16 RX ADMIN — OXYCODONE HYDROCHLORIDE 5 MG: 5 TABLET ORAL at 04:29

## 2018-12-16 RX ADMIN — SENNOSIDES AND DOCUSATE SODIUM 1 TABLET: 8.6; 5 TABLET ORAL at 20:59

## 2018-12-16 RX ADMIN — INSULIN HUMAN 7 UNITS: 100 INJECTION, SOLUTION PARENTERAL at 17:01

## 2018-12-16 RX ADMIN — GABAPENTIN 300 MG: 300 CAPSULE ORAL at 08:21

## 2018-12-16 RX ADMIN — MIDODRINE HYDROCHLORIDE 2.5 MG: 2.5 TABLET ORAL at 20:59

## 2018-12-16 RX ADMIN — SIMVASTATIN 20 MG: 20 TABLET, FILM COATED ORAL at 20:59

## 2018-12-16 RX ADMIN — POLYETHYLENE GLYCOL 3350 1 PACKET: 17 POWDER, FOR SOLUTION ORAL at 08:20

## 2018-12-16 RX ADMIN — ACETAMINOPHEN 650 MG: 325 TABLET ORAL at 05:06

## 2018-12-16 RX ADMIN — HEPARIN SODIUM 5000 UNITS: 5000 INJECTION, SOLUTION INTRAVENOUS; SUBCUTANEOUS at 14:39

## 2018-12-16 RX ADMIN — VITAMIN D, TAB 1000IU (100/BT) 2000 UNITS: 25 TAB at 08:21

## 2018-12-16 RX ADMIN — GABAPENTIN 300 MG: 300 CAPSULE ORAL at 20:59

## 2018-12-16 RX ADMIN — MIDODRINE HYDROCHLORIDE 2.5 MG: 2.5 TABLET ORAL at 08:21

## 2018-12-16 RX ADMIN — DOCUSATE SODIUM 100 MG: 50 LIQUID ORAL at 20:58

## 2018-12-16 RX ADMIN — HEPARIN SODIUM 5000 UNITS: 5000 INJECTION, SOLUTION INTRAVENOUS; SUBCUTANEOUS at 20:59

## 2018-12-16 RX ADMIN — INSULIN HUMAN 4 UNITS: 100 INJECTION, SOLUTION PARENTERAL at 21:01

## 2018-12-16 RX ADMIN — INSULIN HUMAN 4 UNITS: 100 INJECTION, SOLUTION PARENTERAL at 11:04

## 2018-12-16 ASSESSMENT — PAIN SCALES - GENERAL
PAINLEVEL_OUTOF10: 6
PAINLEVEL_OUTOF10: 7
PAINLEVEL_OUTOF10: 3
PAINLEVEL_OUTOF10: ASSUMED PAIN PRESENT

## 2018-12-16 ASSESSMENT — ENCOUNTER SYMPTOMS
NAUSEA: 0
PALPITATIONS: 0
EYES NEGATIVE: 1
CHILLS: 0
FEVER: 0
SHORTNESS OF BREATH: 0
FOCAL WEAKNESS: 1
COUGH: 0
NECK PAIN: 1
VOMITING: 0
ABDOMINAL PAIN: 0

## 2018-12-16 NOTE — PROGRESS NOTES
Patient heart rate increased in the 130s. Dr Dykes notified.   This R.N. Increased patient fluids at bedside, will continue to monitor. Patient denies complaints such as shortness of breath or chest pain and has been in his room watching tv.

## 2018-12-16 NOTE — FLOWSHEET NOTE
12/16/18 1535   Chest Exam   Respiration 18   Pulse (!) 113   Oximetry   #Pulse Oximetry (Single Determination) Yes   Oxygen   Home O2 Use Prior To Admission? No   Pulse Oximetry 96 %   O2 (LPM) 0   EKG Group   EKG Completed Yes

## 2018-12-16 NOTE — PROGRESS NOTES
Patient is alert, uses the call light when assistance is needed and has not attempted to self-transfer so far this shift. Patient states he has pain that is controlled with scheduled and prn pain management. Patient has been up participating in therapies.

## 2018-12-16 NOTE — CARE PLAN
Problem: Respiratory:  Goal: Respiratory status will improve  Outcome: PROGRESSING AS EXPECTED  Pt is saturating >90% on RA. No s/s of respiratory distress or labored breathing noted.    Problem: Pain Management  Goal: Pain level will decrease to patient's comfort goal  Outcome: PROGRESSING AS EXPECTED  Pt denied pain when assessed at start of shift. Pt educated to call for changes in pain level or other needs.

## 2018-12-16 NOTE — CONSULTS
DATE OF SERVICE:  12/12/2018    BEHAVIORAL MEDICINE EVALUATION    BRIEF HISTORY OF PRESENTING COMPLAINTS:  The patient is a 78-year-old white   single male who is referred for behavioral medicine evaluation by Dr. Brandt.  The patient was transferred to rehab from acute where he was   admitted to [facility] on 12/03/2018 following a ground level fall.  The patient   struck his head and right arm on a curve.  He reported brief LOC.  The patient   developed a central cord pattern spinal cord injury.  He had upper bilateral   limb weakness.  He also had painful paresthesia in the right upper limb.  The   patient was seen by neurosurgery on 12/03 with the recommendation for   maintaining a mean arterial pressure and ongoing conservative management and   monitoring.  The patient was stabilized acutely.  He was then sent to rehab to   address his general debility.    PAST MEDICAL HISTORY:  Significant for diabetes, hemorrhagic disorder,   hypercholesterolemia, hypertension, jaundice, and renal disorder.    PSYCHOLOGICAL STATUS:  MENTAL STATUS EXAMINATION:  The patient is a frail-appearing elderly male who   appeared older than his stated age of 78.  At presentation, the patient was   alert.  He oriented well to my presence.  He was resting supine in bed when   approached.  The patient was kempt in appearance.  He was dressed in a   pullover shirt and PJ bottoms.  The patient's manner of presentation was   cooperative and friendly.    The patient was well oriented to time, place, and person.  His language was   logical and goal oriented for the most part.  His speech was somewhat slow and   hesitant at times.  The patient's concentration and memory functioning   appeared slightly diminished.    The patient's affect was somewhat constricted, stable, and mildly intense.  He   related marginally well.  His mood appeared slightly dysphoric, but   appropriate to the context.    There was no evidence of delusional or perceptual  disturbance.  Also, the   patient showed no unusual pain or motor behavior during the interview.    SPECIFIC BEHAVIORAL COMPLAINTS:  The patient denied any clinically significant   symptoms of a negative mood.  He reported no strong feelings of depression,   anxiety, or anger.  He did, however, state that at times he feels nervous.    The patient admitted to significant neck and upper back pain.  He rated his   pain as a 7/10 in intensity.  He said he is asking for medications when   needed.    The patient reported no problems with his appetite.  He said his energy level   is getting better and his sleep is good.  The patient also reported he was   managing his day-to-day stressors well prior to his hospitalization and he was   out and about trying to be social.  He denied any social discomfort.    The patient reported no EtOH or other drug abuse or any use of tobacco.    PSYCHIATRIC HISTORY:  The patient denied any history significant for   psychiatric disturbance or treatment including in or outpatient care.    PSYCHOMETRIC TESTING:  The patient was administered 2 psychometric tests and 2   screening instruments.  The PS/PC-R revealed no clinically significant   symptoms of a negative mood.  Patient's CDR survey showed no problems with   level of consciousness or attention.  His thinking seemed somewhat limited in   scope.  Patient's orientation was good.  Patient's speech was somewhat   hesitant.  Patient reported some loss of vigor, but not all that significant.    He said his sleep is good.  He did indicate problems with behavioral   activation and basic self-care.  He denied any mood disturbance.  He also   reported no significant problems with his appetite.  He said his pain is   significant averaging a 7/10 in intensity.  His pain is mostly in the upper   back and neck region.    The patient was screened for any elder abuse or risk of suicide.  There is no   strong evidence for either problem.    SOCIAL  HISTORY:  The patient is single and retired.  He reported he was very   independent prior to his hospitalization.  He has a supportive sister, who   lives nearby him.    IMPRESSION:  Minor adjustment difficulties.    RECOMMENDATIONS:  Patient will be followed for status and supportive care.       ____________________________________     LINETTE ALATORRE, PHD    ROULA / PALLAVI    DD:  12/16/2018 11:08:16  DT:  12/16/2018 12:11:02    D#:  [id number]  Job#:  [id number]

## 2018-12-16 NOTE — CARE PLAN
Problem: Safety  Goal: Will remain free from falls    Intervention: Assess risk factors for falls  Pt uses call light consistently and appropriately. Waits for assistance does not attempt self transfer this shift. Able to verbalize needs.      Problem: Pain Management  Goal: Pain level will decrease to patient's comfort goal    Intervention: Follow pain managment plan developed in collaboration with patient and Interdisciplinary Team  Pt able to participate in therapies and activities this shift.

## 2018-12-16 NOTE — PROGRESS NOTES
Hospital Medicine Daily Progress Note        Chief Complaint  DM    Interval Problem Update  RN reports fast HR but pt asymptomatic.    Review of Systems  Review of Systems   Constitutional: Negative for chills and fever.   HENT: Negative.    Eyes: Negative.    Respiratory: Negative for cough and shortness of breath.    Cardiovascular: Negative for chest pain and palpitations.   Gastrointestinal: Negative for abdominal pain, nausea and vomiting.   Genitourinary: Negative.    Musculoskeletal: Positive for neck pain.   Neurological: Positive for focal weakness.        Physical Exam  Temp:  [36.4 °C (97.6 °F)-36.6 °C (97.8 °F)] 36.4 °C (97.6 °F)  Pulse:  [100-133] 133  Resp:  [17-18] 17  BP: (107-126)/(62-70) 107/70    Physical Exam   Constitutional: He is oriented to person, place, and time. No distress.   HENT:   Head: Normocephalic and atraumatic.   Right Ear: External ear normal.   Left Ear: External ear normal.   Eyes: Conjunctivae and EOM are normal. Right eye exhibits no discharge. Left eye exhibits no discharge.   Neck:   C-collar   Cardiovascular:   tachycardia   Pulmonary/Chest: Effort normal and breath sounds normal. No stridor. No respiratory distress. He has no wheezes.   Abdominal: Soft. Bowel sounds are normal. He exhibits no distension. There is no tenderness. There is no rebound and no guarding.   Musculoskeletal:   Trace edema   Neurological: He is alert and oriented to person, place, and time.   Skin: Skin is warm and dry. He is not diaphoretic.   Vitals reviewed.      Fluids    Intake/Output Summary (Last 24 hours) at 12/16/18 1533  Last data filed at 12/16/18 1250   Gross per 24 hour   Intake              960 ml   Output              500 ml   Net              460 ml       Laboratory  Recent Labs      12/16/18   0544   WBC  4.4*   RBC  3.45*   HEMOGLOBIN  10.1*   HEMATOCRIT  30.7*   MCV  89.0   MCH  29.3   MCHC  32.9*   RDW  53.3*   PLATELETCT  138*   MPV  9.3     Recent Labs      12/16/18   0517    SODIUM  136   POTASSIUM  4.6   CHLORIDE  102   CO2  25   GLUCOSE  159*   BUN  27*   CREATININE  2.01*   CALCIUM  8.8                 Assessment/Plan  * Central cord syndrome (HCC)- (present on admission)   Assessment & Plan    Conservative management per Neurosurgery  Cervical collar     Chronic renal failure- (present on admission)   Assessment & Plan    CKD Stage III  Renal dose all meds  Avoid nephrotoxins  Outpt Nephrology F/U     Type 2 diabetes mellitus (HCC)- (present on admission)   Assessment & Plan    Back on outpt Actos  Continue to monitor glucose trends off Lantus  Goal is to discharge home on no insulin     Tachycardia   Assessment & Plan    Suspect 2/2 volume depletion and tapering of Midodrine  Encourage PO fluids  Request EKG R/O tachydysrhythmia     Vitamin D deficiency   Assessment & Plan    Vit D level 28  On supplementation     Thrombocytopenia (HCC)   Assessment & Plan    Follow Platelet counts  Transfuse if Platelet counts <10k or bleeding     Anemia   Assessment & Plan    Continue Fe and Vit C supplementation     Hyperlipemia- (present on admission)   Assessment & Plan    On Zocor     Essential hypertension- (present on admission)   Assessment & Plan    Attempting to taper off Midodrine     Full Code    Reviewed w/ RN

## 2018-12-17 LAB
EKG IMPRESSION: NORMAL
GLUCOSE BLD-MCNC: 189 MG/DL (ref 65–99)
GLUCOSE BLD-MCNC: 292 MG/DL (ref 65–99)

## 2018-12-17 PROCEDURE — 770010 HCHG ROOM/CARE - REHAB SEMI PRIVAT*

## 2018-12-17 PROCEDURE — 92526 ORAL FUNCTION THERAPY: CPT

## 2018-12-17 PROCEDURE — A9270 NON-COVERED ITEM OR SERVICE: HCPCS | Performed by: PHYSICAL MEDICINE & REHABILITATION

## 2018-12-17 PROCEDURE — 700112 HCHG RX REV CODE 229: Performed by: PHYSICAL MEDICINE & REHABILITATION

## 2018-12-17 PROCEDURE — 700102 HCHG RX REV CODE 250 W/ 637 OVERRIDE(OP): Performed by: PHYSICAL MEDICINE & REHABILITATION

## 2018-12-17 PROCEDURE — 82962 GLUCOSE BLOOD TEST: CPT | Mod: 91

## 2018-12-17 PROCEDURE — 700111 HCHG RX REV CODE 636 W/ 250 OVERRIDE (IP): Performed by: PHYSICAL MEDICINE & REHABILITATION

## 2018-12-17 PROCEDURE — 99232 SBSQ HOSP IP/OBS MODERATE 35: CPT | Performed by: PHYSICAL MEDICINE & REHABILITATION

## 2018-12-17 PROCEDURE — 97530 THERAPEUTIC ACTIVITIES: CPT

## 2018-12-17 PROCEDURE — 97112 NEUROMUSCULAR REEDUCATION: CPT

## 2018-12-17 PROCEDURE — A9270 NON-COVERED ITEM OR SERVICE: HCPCS | Performed by: HOSPITALIST

## 2018-12-17 PROCEDURE — 99232 SBSQ HOSP IP/OBS MODERATE 35: CPT | Performed by: HOSPITALIST

## 2018-12-17 PROCEDURE — 700102 HCHG RX REV CODE 250 W/ 637 OVERRIDE(OP): Performed by: HOSPITALIST

## 2018-12-17 PROCEDURE — G0515 COGNITIVE SKILLS DEVELOPMENT: HCPCS

## 2018-12-17 PROCEDURE — 97110 THERAPEUTIC EXERCISES: CPT

## 2018-12-17 RX ADMIN — MIDODRINE HYDROCHLORIDE 2.5 MG: 2.5 TABLET ORAL at 08:06

## 2018-12-17 RX ADMIN — DOCUSATE SODIUM 100 MG: 50 LIQUID ORAL at 08:05

## 2018-12-17 RX ADMIN — INSULIN HUMAN 4 UNITS: 100 INJECTION, SOLUTION PARENTERAL at 11:18

## 2018-12-17 RX ADMIN — OMEPRAZOLE 20 MG: 20 CAPSULE, DELAYED RELEASE ORAL at 08:06

## 2018-12-17 RX ADMIN — INSULIN HUMAN 7 UNITS: 100 INJECTION, SOLUTION PARENTERAL at 21:09

## 2018-12-17 RX ADMIN — OXYCODONE HYDROCHLORIDE 5 MG: 5 TABLET ORAL at 21:00

## 2018-12-17 RX ADMIN — GABAPENTIN 300 MG: 300 CAPSULE ORAL at 21:00

## 2018-12-17 RX ADMIN — FERROUS SULFATE TAB 325 MG (65 MG ELEMENTAL FE) 325 MG: 325 (65 FE) TAB at 08:06

## 2018-12-17 RX ADMIN — MAGNESIUM HYDROXIDE 30 ML: 400 SUSPENSION ORAL at 08:05

## 2018-12-17 RX ADMIN — HEPARIN SODIUM 5000 UNITS: 5000 INJECTION, SOLUTION INTRAVENOUS; SUBCUTANEOUS at 14:43

## 2018-12-17 RX ADMIN — ACETAMINOPHEN 650 MG: 325 TABLET ORAL at 11:15

## 2018-12-17 RX ADMIN — ACETAMINOPHEN 650 MG: 325 TABLET ORAL at 00:02

## 2018-12-17 RX ADMIN — PIOGLITAZONE 15 MG: 15 TABLET ORAL at 08:06

## 2018-12-17 RX ADMIN — HEPARIN SODIUM 5000 UNITS: 5000 INJECTION, SOLUTION INTRAVENOUS; SUBCUTANEOUS at 21:00

## 2018-12-17 RX ADMIN — POLYETHYLENE GLYCOL 3350 1 PACKET: 17 POWDER, FOR SOLUTION ORAL at 08:04

## 2018-12-17 RX ADMIN — ACETAMINOPHEN 650 MG: 325 TABLET ORAL at 23:31

## 2018-12-17 RX ADMIN — GABAPENTIN 300 MG: 300 CAPSULE ORAL at 14:43

## 2018-12-17 RX ADMIN — INSULIN HUMAN 7 UNITS: 100 INJECTION, SOLUTION PARENTERAL at 17:35

## 2018-12-17 RX ADMIN — HEPARIN SODIUM 5000 UNITS: 5000 INJECTION, SOLUTION INTRAVENOUS; SUBCUTANEOUS at 05:12

## 2018-12-17 RX ADMIN — SIMVASTATIN 20 MG: 20 TABLET, FILM COATED ORAL at 21:00

## 2018-12-17 RX ADMIN — OXYCODONE HYDROCHLORIDE AND ACETAMINOPHEN 500 MG: 500 TABLET ORAL at 08:06

## 2018-12-17 RX ADMIN — ACETAMINOPHEN 650 MG: 325 TABLET ORAL at 05:10

## 2018-12-17 RX ADMIN — ACETAMINOPHEN 650 MG: 325 TABLET ORAL at 17:31

## 2018-12-17 RX ADMIN — INSULIN HUMAN 3 UNITS: 100 INJECTION, SOLUTION PARENTERAL at 08:02

## 2018-12-17 RX ADMIN — GABAPENTIN 300 MG: 300 CAPSULE ORAL at 08:06

## 2018-12-17 RX ADMIN — VITAMIN D, TAB 1000IU (100/BT) 2000 UNITS: 25 TAB at 08:06

## 2018-12-17 ASSESSMENT — ENCOUNTER SYMPTOMS
EYES NEGATIVE: 1
NECK PAIN: 1
FEVER: 0
CHILLS: 0
COUGH: 0
NAUSEA: 0
VOMITING: 0
FOCAL WEAKNESS: 1
PALPITATIONS: 0
ABDOMINAL PAIN: 0
SHORTNESS OF BREATH: 0

## 2018-12-17 ASSESSMENT — PAIN SCALES - GENERAL
PAINLEVEL_OUTOF10: 0
PAINLEVEL_OUTOF10: 5
PAINLEVEL_OUTOF10: 0

## 2018-12-17 NOTE — CARE PLAN
Problem: Communication  Goal: The ability to communicate needs accurately and effectively will improve  Makes needs known to staff.  Encouraged to use call light for staff assist.    Problem: Safety  Goal: Will remain free from falls  Call light kept within reach and encouraged to use for assistance and with toileting needs. Encouraged to call staff and to wait for staff before transfers.    Problem: Pain Management  Goal: Pain level will decrease to patient's comfort goal  Complains of back pain.  Medicated with scheduled Gabapentin and prn Oxycodone with + relief.  See MAR and doc flow sheet.  Will continue to monitor patient.

## 2018-12-17 NOTE — CARE PLAN
"Problem: Skin Integrity  Goal: Risk for impaired skin integrity will decrease  Outcome: PROGRESSING SLOWER THAN EXPECTED  Pt has dusky, red, peeling skin on feet, has red, \"blister\" like wound on left 2 nd toe, 1+ bilat pedal pulses present, pt has cold feet bilat, no ankle or pedal edema present, warm blanket wrapped around feet while pt resting in his room, pt has healing laceration on his forehead, approximated, pink, no redness or drianage present on forehead area, will re assess skin integrity Q shift and PRN and proivde interventions for comfort as indicated.    Problem: Metabolic:  Goal: Ability to maintain appropriate glucose levels will improve  Outcome: PROGRESSING SLOWER THAN EXPECTED  Pt FSBS AC and HS, receiving sliding scale insulin, Actos and Lantus, see MAR, pt states he check his blood sugar in the morning and evening at home, pt states he was having \"problems\" keeping his blood sugar \"normal\" it was high and he fell while on his way to the Dr to talk about his diabetes medications, will educate pt regarding hypo and hyperglycemia S & S, educate regarding medications, monitor accuchecks and medicate as ordered.      "

## 2018-12-17 NOTE — PROGRESS NOTES
"Rehab Progress Note     Encounter date: 12/17/2018  Today I met with the patient face to face in his room    Chief Complaint:  Central cord syndrome (HCC) , right upper limb weakness    Interval Events (subjective)  Mr. Almanzar reports that he is doing well today.  He denies any fevers, chills, headache, dizziness, chest pain, or shortness of breath.  He denies any significant difficulty with memory.  He reports that he is working hard in therapy and is hopeful about his progress.  He reports that his swallowing is improving, and he is hopeful he will transition to thin soon.  He reports that his tachycardia has improved, and he has been working hard on hydration.  He reports that after his discussion with Dr. Dykes, he has been drinking every 10 minutes.  His neck pain is well controlled when he is wearing his cervical collar    Objective:  VITAL SIGNS: /66   Pulse 100   Temp 36.4 °C (97.6 °F) (Oral)   Resp 18   Ht 1.778 m (5' 10\")   Wt 78.2 kg (172 lb 8 oz)   SpO2 96%   BMI 24.75 kg/m²     Recent Results (from the past 72 hour(s))   ACCU-CHEK GLUCOSE    Collection Time: 12/14/18  5:09 PM   Result Value Ref Range    Glucose - Accu-Ck 205 (H) 65 - 99 mg/dL   ACCU-CHEK GLUCOSE    Collection Time: 12/14/18  8:56 PM   Result Value Ref Range    Glucose - Accu-Ck 243 (H) 65 - 99 mg/dL   ACCU-CHEK GLUCOSE    Collection Time: 12/15/18  7:26 AM   Result Value Ref Range    Glucose - Accu-Ck 161 (H) 65 - 99 mg/dL   ACCU-CHEK GLUCOSE    Collection Time: 12/15/18 11:20 AM   Result Value Ref Range    Glucose - Accu-Ck 235 (H) 65 - 99 mg/dL   ACCU-CHEK GLUCOSE    Collection Time: 12/15/18  5:16 PM   Result Value Ref Range    Glucose - Accu-Ck 201 (H) 65 - 99 mg/dL   ACCU-CHEK GLUCOSE    Collection Time: 12/15/18  8:00 PM   Result Value Ref Range    Glucose - Accu-Ck 266 (H) 65 - 99 mg/dL   CBC WITHOUT DIFFERENTIAL    Collection Time: 12/16/18  5:47 AM   Result Value Ref Range    WBC 4.4 (L) 4.8 - 10.8 K/uL    RBC 3.45 " (L) 4.70 - 6.10 M/uL    Hemoglobin 10.1 (L) 14.0 - 18.0 g/dL    Hematocrit 30.7 (L) 42.0 - 52.0 %    MCV 89.0 81.4 - 97.8 fL    MCH 29.3 27.0 - 33.0 pg    MCHC 32.9 (L) 33.7 - 35.3 g/dL    RDW 53.3 (H) 35.9 - 50.0 fL    Platelet Count 138 (L) 164 - 446 K/uL    MPV 9.3 9.0 - 12.9 fL   BASIC METABOLIC PANEL    Collection Time: 18  5:47 AM   Result Value Ref Range    Sodium 136 135 - 145 mmol/L    Potassium 4.6 3.6 - 5.5 mmol/L    Chloride 102 96 - 112 mmol/L    Co2 25 20 - 33 mmol/L    Glucose 159 (H) 65 - 99 mg/dL    Bun 27 (H) 8 - 22 mg/dL    Creatinine 2.01 (H) 0.50 - 1.40 mg/dL    Calcium 8.8 8.5 - 10.5 mg/dL    Anion Gap 9.0 0.0 - 11.9   ESTIMATED GFR    Collection Time: 18  5:47 AM   Result Value Ref Range    GFR If  39 (A) >60 mL/min/1.73 m 2    GFR If Non  32 (A) >60 mL/min/1.73 m 2   ACCU-CHEK GLUCOSE    Collection Time: 18  7:06 AM   Result Value Ref Range    Glucose - Accu-Ck 149 (H) 65 - 99 mg/dL   ACCU-CHEK GLUCOSE    Collection Time: 18 11:02 AM   Result Value Ref Range    Glucose - Accu-Ck 225 (H) 65 - 99 mg/dL   EKG    Collection Time: 18  3:41 PM   Result Value Ref Range    Report       [facility]    Test Date:  2018  Pt Name:    PABLO STEVENS               Department: [facility]  MRN:        [id number]                      Room:       [facility]  Gender:     Male                         Technician: LAUREL  :        1940                   Requested By:GLADIS ABDI  Order #:    [id number]                    Reading MD: Jovanny Zelaya MD    Measurements  Intervals                                Axis  Rate:       113                          P:          16  WI:         132                          QRS:        82  QRSD:       110                          T:          40  QT:         332  QTc:        456    Interpretive Statements  SINUS TACHYCARDIA  INCOMPLETE RIGHT BUNDLE BRANCH BLOCK  Compared to ECG 2018 12:29:28  Incomplete right  bundle-branch block now present  Sinus rhythm no longer present  First degree AV block no longer present  Right bundle-branch block no longer present    Electronically Signed On 12- 0:01:42 PST by Jovanny Zelaya MD     ACCU-CHEK GLUCOSE    Collection Time: 12/16/18  4:58 PM   Result Value Ref Range    Glucose - Accu-Ck 279 (H) 65 - 99 mg/dL   ACCU-CHEK GLUCOSE    Collection Time: 12/16/18  8:50 PM   Result Value Ref Range    Glucose - Accu-Ck 224 (H) 65 - 99 mg/dL   ACCU-CHEK GLUCOSE    Collection Time: 12/17/18  8:01 AM   Result Value Ref Range    Glucose - Accu-Ck 189 (H) 65 - 99 mg/dL       Current Facility-Administered Medications   Medication Frequency   • midodrine (PROAMATINE) tablet 2.5 mg BID   • gabapentin (NEURONTIN) capsule 300 mg TID   • pioglitazone (ACTOS) tablet 15 mg DAILY   • vitamin D (cholecalciferol) tablet 2,000 Units DAILY   • ascorbic acid tablet 500 mg Q48HRS   • acetaminophen (TYLENOL) tablet 650 mg Q6HRS   • docusate sodium 100mg/10mL (COLACE) solution 100 mg BID   • heparin injection 5,000 Units Q8HRS   • insulin regular (HUMULIN R) injection 3-14 Units 4X/DAY ACHS    And   • glucose 4 g chewable tablet 16 g Q15 MIN PRN    And   • dextrose 50% (D50W) injection 25 mL Q15 MIN PRN   • magnesium hydroxide (MILK OF MAGNESIA) suspension 30 mL DAILY   • polyethylene glycol/lytes (MIRALAX) PACKET 1 Packet BID   • senna-docusate (PERICOLACE or SENOKOT S) 8.6-50 MG per tablet 1 Tab Nightly   • simvastatin (ZOCOR) tablet 20 mg Nightly   • Respiratory Care per Protocol Continuous RT   • Pharmacy Consult Request ...Pain Management Review 1 Each PRN   • oxyCODONE immediate-release (ROXICODONE) tablet 5 mg Q3HRS PRN   • hydrALAZINE (APRESOLINE) tablet 25 mg Q8HRS PRN   • acetaminophen (TYLENOL) tablet 650 mg Q4HRS PRN   • artificial tears 1.4 % ophthalmic solution 1 Drop PRN   • benzocaine-menthol (CEPACOL) lozenge 1 Lozenge Q2HRS PRN   • mag hydrox-al hydrox-simeth (MAALOX PLUS ES or MYLANTA  DS) suspension 20 mL Q2HRS PRN   • ondansetron (ZOFRAN ODT) dispertab 4 mg 4X/DAY PRN    Or   • ondansetron (ZOFRAN) syringe/vial injection 4 mg 4X/DAY PRN   • traZODone (DESYREL) tablet 50 mg QHS PRN   • sodium chloride (OCEAN) 0.65 % nasal spray 2 Spray PRN   • senna-docusate (PERICOLACE or SENOKOT S) 8.6-50 MG per tablet 2 Tab BID PRN    And   • polyethylene glycol/lytes (MIRALAX) PACKET 1 Packet QDAY PRN    And   • magnesium hydroxide (MILK OF MAGNESIA) suspension 30 mL QDAY PRN    And   • bisacodyl (DULCOLAX) suppository 10 mg QDAY PRN   • cyclobenzaprine (FLEXERIL) tablet 10 mg TID PRN   • ferrous sulfate tablet 325 mg Q48HRS   • omeprazole (PRILOSEC) capsule 20 mg DAILY       Exam Date: 12/17/2018    General:  Awake, alert, oriented, no acute distress  HEENT:  Wearing Aspen cervical collar.     Cardiac: regular rate and rhythm  Lungs: clear to auscultation bilaterally.   Abdomen: soft; non tender, non distended, bowel sounds present and normoactive  Extremities: No edema in the bilateral lower limbs  Skin:Scalp laceration is healing well  Neuro:   Alert and oriented, able to relate the events of the last 24 hours as well as his injury.  Lower limb strength appears to be antigravity throughout.  Right upper limb strength appears to be 2/5 throughout.    Orders Placed This Encounter   Procedures   • Diet Order Diabetic     Standing Status:   Standing     Number of Occurrences:   1     Order Specific Question:   Diet:     Answer:   Diabetic [3]     Order Specific Question:   Texture/Fiber modifications:     Answer:   Dysphagia 3(Mechanical Soft)specify fluid consistency(question 6) [3]     Comments:   1:1 supervision for self feeding     Order Specific Question:   Consistency/Fluid modifications:     Answer:   [place] Thick [2]       Assessment:  Active Hospital Problems    Diagnosis   • *Central cord syndrome (HCC)   • Dysphagia   • Type 2 diabetes mellitus (HCC)   • Chronic renal failure   • Scalp laceration    • 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 Simon  Scalp sutures removed December 14  Speech and language pathology for cognition    Dysphagia  Dysphagia 3 with nectar thick liquid  Continue speech-language pathology sessions     Pain  Schedule Tylenol every 6 hours  Gabapentin 300 mg TID  Oxycodone 5 mg q 3 hours as needed     Neurogenic Bladder   Continue timed voiding     Neurogenic Bowel  Colace twice a day  Milk of magnesia daily  Pat-Colace nightly     History of hypertension  Orthostatic hypotension   At baseline, patient on lisinopril 10 mg daily and propranolol 40,000,003 times a day  Currently on ProAmatine 2.5 mg twice a day  Continue weaning ProAmatine and monitor for return of hypertension     Diabetes mellitus with hyperglycemia  Currently on sliding scale  Hospitalist working on weaning sliding scale  Actos 15 mg daily     Hyperlipidemia  Simvastatin 20 mg daily      Anemia  Hemoglobin 10.1 on December 16  Continue ferrous sulfate    Vitamin D deficiency  Vitamin D was 21 on admission  Increase supplementation to 2000 units of cholecalciferol daily     GI Ppx - Patient on Prilosec 20 mg daily.     DVT ppx - Patient on Heparin 5000 q8h on transfer.     Estimated  Discharge: 14-21 days    Total time:  28 minutes.  I spent greater than 50% of the time for patient care, counseling, and coordination on this date, including unit/floor time, and face-to-face time with the patient as per interval events and assessment and plan above. Topics discussed included functional progress, memory, right upper limb strength, cervical collar.  Discussed with Dr. Dykes.        Mustapha Brandt M.D.  12/17/2018

## 2018-12-17 NOTE — PROGRESS NOTES
Hospital Medicine Daily Progress Note        Chief Complaint  DM    Interval Problem Update  Rapid HR better w/ PO fluids.  No chest pain, shortness of breath, or palpitations.    Review of Systems  Review of Systems   Constitutional: Negative for chills and fever.   HENT: Negative.    Eyes: Negative.    Respiratory: Negative for cough and shortness of breath.    Cardiovascular: Negative for chest pain and palpitations.   Gastrointestinal: Negative for abdominal pain, nausea and vomiting.   Genitourinary: Negative.    Musculoskeletal: Positive for neck pain.   Neurological: Positive for focal weakness.        Physical Exam  Temp:  [36.4 °C (97.6 °F)-36.7 °C (98.1 °F)] 36.4 °C (97.6 °F)  Pulse:  [] 95  Resp:  [17-18] 18  BP: (107-123)/(62-70) 118/66    Physical Exam   Constitutional: He is oriented to person, place, and time. No distress.   HENT:   Head: Normocephalic and atraumatic.   Right Ear: External ear normal.   Left Ear: External ear normal.   Eyes: Conjunctivae and EOM are normal. Right eye exhibits no discharge. Left eye exhibits no discharge.   Neck:   C-collar   Cardiovascular: Normal rate and regular rhythm.    Pulmonary/Chest: Effort normal and breath sounds normal. No stridor. No respiratory distress. He has no wheezes.   Abdominal: Soft. Bowel sounds are normal. He exhibits no distension. There is no tenderness. There is no rebound and no guarding.   Musculoskeletal: He exhibits no edema.   Neurological: He is alert and oriented to person, place, and time.   Skin: Skin is warm and dry. He is not diaphoretic.   Vitals reviewed.      Fluids    Intake/Output Summary (Last 24 hours) at 12/17/18 1223  Last data filed at 12/17/18 0950   Gross per 24 hour   Intake             1080 ml   Output              300 ml   Net              780 ml       Laboratory  Recent Labs      12/16/18   0547   WBC  4.4*   RBC  3.45*   HEMOGLOBIN  10.1*   HEMATOCRIT  30.7*   MCV  89.0   MCH  29.3   MCHC  32.9*   RDW  53.3*    PLATELETCT  138*   MPV  9.3     Recent Labs      12/16/18   0547   SODIUM  136   POTASSIUM  4.6   CHLORIDE  102   CO2  25   GLUCOSE  159*   BUN  27*   CREATININE  2.01*   CALCIUM  8.8                 Assessment/Plan  * Central cord syndrome (HCC)- (present on admission)   Assessment & Plan    Conservative management per Neurosurgery  Cervical collar     Chronic renal failure- (present on admission)   Assessment & Plan    CKD Stage III  Renal dose all meds  Avoid nephrotoxins  Outpt Nephrology F/U     Type 2 diabetes mellitus (HCC)- (present on admission)   Assessment & Plan    Back on outpt Actos  Continue to monitor glucose trends off Lantus  Goal is to discharge home on no insulin     Tachycardia   Assessment & Plan    EKG sinus tachycardia  Suspect 2/2 volume depletion and tapering of Midodrine  HR improving w/  PO fluids     Vitamin D deficiency   Assessment & Plan    Vit D level 28  On supplementation     Thrombocytopenia (HCC)   Assessment & Plan    Follow Platelet counts  Transfuse if Platelet counts <10k or bleeding     Anemia   Assessment & Plan    Continue Fe and Vit C supplementation     Hyperlipemia- (present on admission)   Assessment & Plan    On Zocor     Essential hypertension- (present on admission)   Assessment & Plan    Attempting to taper off Midodrine     Full Code

## 2018-12-17 NOTE — PROGRESS NOTES
DATE OF SERVICE:  12/14/2018    The patient is doing well at followup.  The patient said he is participating   actively in physical and occupational therapy.  The patient reported no   significant problems including things related to his sleep, appetite, or mood.    The patient seemed happy with the way the staff is treating him.  The   patient said he is focused on returning home soon.       ____________________________________     LINETTE ALATORRE, PHD    [facility] / PALLAVI    DD:  12/16/2018 10:20:50  DT:  12/16/2018 17:30:56    D#:  [id number]  Job#:  [id number]

## 2018-12-17 NOTE — PROGRESS NOTES
Received bedside shift report from Vandana CH RN regarding patient and assumed care. Patient awake, calm and stable, currently positioned in bed for comfort and safety; call light within reach. Denies pain or discomfort at this time. Will continue to monitor.

## 2018-12-18 LAB
ANION GAP SERPL CALC-SCNC: 9 MMOL/L (ref 0–11.9)
BUN SERPL-MCNC: 32 MG/DL (ref 8–22)
CALCIUM SERPL-MCNC: 8.9 MG/DL (ref 8.5–10.5)
CHLORIDE SERPL-SCNC: 99 MMOL/L (ref 96–112)
CO2 SERPL-SCNC: 26 MMOL/L (ref 20–33)
CREAT SERPL-MCNC: 2.11 MG/DL (ref 0.5–1.4)
GLUCOSE BLD-MCNC: 151 MG/DL (ref 65–99)
GLUCOSE BLD-MCNC: 215 MG/DL (ref 65–99)
GLUCOSE BLD-MCNC: 235 MG/DL (ref 65–99)
GLUCOSE BLD-MCNC: 242 MG/DL (ref 65–99)
GLUCOSE BLD-MCNC: 259 MG/DL (ref 65–99)
GLUCOSE BLD-MCNC: 281 MG/DL (ref 65–99)
GLUCOSE BLD-MCNC: 342 MG/DL (ref 65–99)
GLUCOSE SERPL-MCNC: 161 MG/DL (ref 65–99)
POTASSIUM SERPL-SCNC: 4.1 MMOL/L (ref 3.6–5.5)
SODIUM SERPL-SCNC: 134 MMOL/L (ref 135–145)

## 2018-12-18 PROCEDURE — A9270 NON-COVERED ITEM OR SERVICE: HCPCS | Performed by: PHYSICAL MEDICINE & REHABILITATION

## 2018-12-18 PROCEDURE — 700111 HCHG RX REV CODE 636 W/ 250 OVERRIDE (IP): Performed by: PHYSICAL MEDICINE & REHABILITATION

## 2018-12-18 PROCEDURE — 97530 THERAPEUTIC ACTIVITIES: CPT

## 2018-12-18 PROCEDURE — 700102 HCHG RX REV CODE 250 W/ 637 OVERRIDE(OP): Performed by: HOSPITALIST

## 2018-12-18 PROCEDURE — 36415 COLL VENOUS BLD VENIPUNCTURE: CPT

## 2018-12-18 PROCEDURE — 97116 GAIT TRAINING THERAPY: CPT

## 2018-12-18 PROCEDURE — 97112 NEUROMUSCULAR REEDUCATION: CPT

## 2018-12-18 PROCEDURE — 700102 HCHG RX REV CODE 250 W/ 637 OVERRIDE(OP): Performed by: PHYSICAL MEDICINE & REHABILITATION

## 2018-12-18 PROCEDURE — 97535 SELF CARE MNGMENT TRAINING: CPT

## 2018-12-18 PROCEDURE — 80048 BASIC METABOLIC PNL TOTAL CA: CPT

## 2018-12-18 PROCEDURE — 99232 SBSQ HOSP IP/OBS MODERATE 35: CPT | Performed by: HOSPITALIST

## 2018-12-18 PROCEDURE — 99232 SBSQ HOSP IP/OBS MODERATE 35: CPT | Performed by: PHYSICAL MEDICINE & REHABILITATION

## 2018-12-18 PROCEDURE — 770010 HCHG ROOM/CARE - REHAB SEMI PRIVAT*

## 2018-12-18 PROCEDURE — A9270 NON-COVERED ITEM OR SERVICE: HCPCS | Performed by: HOSPITALIST

## 2018-12-18 PROCEDURE — G0515 COGNITIVE SKILLS DEVELOPMENT: HCPCS

## 2018-12-18 PROCEDURE — 700112 HCHG RX REV CODE 229: Performed by: PHYSICAL MEDICINE & REHABILITATION

## 2018-12-18 PROCEDURE — 82962 GLUCOSE BLOOD TEST: CPT

## 2018-12-18 PROCEDURE — 92526 ORAL FUNCTION THERAPY: CPT

## 2018-12-18 RX ORDER — PIOGLITAZONEHYDROCHLORIDE 15 MG/1
15 TABLET ORAL ONCE
Status: COMPLETED | OUTPATIENT
Start: 2018-12-18 | End: 2018-12-18

## 2018-12-18 RX ORDER — PIOGLITAZONEHYDROCHLORIDE 15 MG/1
30 TABLET ORAL DAILY
Status: DISCONTINUED | OUTPATIENT
Start: 2018-12-19 | End: 2019-01-01

## 2018-12-18 RX ADMIN — VITAMIN D, TAB 1000IU (100/BT) 2000 UNITS: 25 TAB at 09:10

## 2018-12-18 RX ADMIN — GABAPENTIN 300 MG: 300 CAPSULE ORAL at 20:16

## 2018-12-18 RX ADMIN — HEPARIN SODIUM 5000 UNITS: 5000 INJECTION, SOLUTION INTRAVENOUS; SUBCUTANEOUS at 05:28

## 2018-12-18 RX ADMIN — ACETAMINOPHEN 650 MG: 325 TABLET ORAL at 11:35

## 2018-12-18 RX ADMIN — HEPARIN SODIUM 5000 UNITS: 5000 INJECTION, SOLUTION INTRAVENOUS; SUBCUTANEOUS at 20:17

## 2018-12-18 RX ADMIN — SIMVASTATIN 20 MG: 20 TABLET, FILM COATED ORAL at 20:16

## 2018-12-18 RX ADMIN — INSULIN HUMAN 7 UNITS: 100 INJECTION, SOLUTION PARENTERAL at 20:18

## 2018-12-18 RX ADMIN — INSULIN HUMAN 4 UNITS: 100 INJECTION, SOLUTION PARENTERAL at 11:04

## 2018-12-18 RX ADMIN — DOCUSATE SODIUM 100 MG: 50 LIQUID ORAL at 20:20

## 2018-12-18 RX ADMIN — PIOGLITAZONE 15 MG: 15 TABLET ORAL at 11:34

## 2018-12-18 RX ADMIN — GABAPENTIN 300 MG: 300 CAPSULE ORAL at 14:43

## 2018-12-18 RX ADMIN — POLYETHYLENE GLYCOL 3350 1 PACKET: 17 POWDER, FOR SOLUTION ORAL at 09:20

## 2018-12-18 RX ADMIN — MIDODRINE HYDROCHLORIDE 2.5 MG: 2.5 TABLET ORAL at 20:16

## 2018-12-18 RX ADMIN — GABAPENTIN 300 MG: 300 CAPSULE ORAL at 09:10

## 2018-12-18 RX ADMIN — OXYCODONE HYDROCHLORIDE 5 MG: 5 TABLET ORAL at 20:22

## 2018-12-18 RX ADMIN — MAGNESIUM HYDROXIDE 30 ML: 400 SUSPENSION ORAL at 09:11

## 2018-12-18 RX ADMIN — SENNOSIDES AND DOCUSATE SODIUM 1 TABLET: 8.6; 5 TABLET ORAL at 20:16

## 2018-12-18 RX ADMIN — INSULIN HUMAN 10 UNITS: 100 INJECTION, SOLUTION PARENTERAL at 17:17

## 2018-12-18 RX ADMIN — MIDODRINE HYDROCHLORIDE 2.5 MG: 2.5 TABLET ORAL at 09:10

## 2018-12-18 RX ADMIN — PIOGLITAZONE 15 MG: 15 TABLET ORAL at 09:10

## 2018-12-18 RX ADMIN — OMEPRAZOLE 20 MG: 20 CAPSULE, DELAYED RELEASE ORAL at 09:10

## 2018-12-18 RX ADMIN — DOCUSATE SODIUM 100 MG: 50 LIQUID ORAL at 09:11

## 2018-12-18 RX ADMIN — ACETAMINOPHEN 650 MG: 325 TABLET ORAL at 23:47

## 2018-12-18 RX ADMIN — ACETAMINOPHEN 650 MG: 325 TABLET ORAL at 05:28

## 2018-12-18 RX ADMIN — OXYCODONE HYDROCHLORIDE 5 MG: 5 TABLET ORAL at 06:56

## 2018-12-18 RX ADMIN — HEPARIN SODIUM 5000 UNITS: 5000 INJECTION, SOLUTION INTRAVENOUS; SUBCUTANEOUS at 14:43

## 2018-12-18 RX ADMIN — ACETAMINOPHEN 650 MG: 325 TABLET ORAL at 17:23

## 2018-12-18 RX ADMIN — POLYETHYLENE GLYCOL 3350 1 PACKET: 17 POWDER, FOR SOLUTION ORAL at 20:20

## 2018-12-18 RX ADMIN — INSULIN HUMAN 3 UNITS: 100 INJECTION, SOLUTION PARENTERAL at 07:36

## 2018-12-18 ASSESSMENT — ENCOUNTER SYMPTOMS
FOCAL WEAKNESS: 1
NECK PAIN: 1
VOMITING: 0
FEVER: 0
SHORTNESS OF BREATH: 0
PALPITATIONS: 0
CHILLS: 0
NERVOUS/ANXIOUS: 1
COUGH: 0
NAUSEA: 0
ABDOMINAL PAIN: 0

## 2018-12-18 ASSESSMENT — PATIENT HEALTH QUESTIONNAIRE - PHQ9
SUM OF ALL RESPONSES TO PHQ9 QUESTIONS 1 AND 2: 0
2. FEELING DOWN, DEPRESSED, IRRITABLE, OR HOPELESS: NOT AT ALL
1. LITTLE INTEREST OR PLEASURE IN DOING THINGS: NOT AT ALL
2. FEELING DOWN, DEPRESSED, IRRITABLE, OR HOPELESS: NOT AT ALL
SUM OF ALL RESPONSES TO PHQ9 QUESTIONS 1 AND 2: 0
1. LITTLE INTEREST OR PLEASURE IN DOING THINGS: NOT AT ALL

## 2018-12-18 ASSESSMENT — PAIN SCALES - GENERAL
PAINLEVEL_OUTOF10: 6
PAINLEVEL_OUTOF10: 2
PAINLEVEL_OUTOF10: 5

## 2018-12-18 NOTE — PROGRESS NOTES
Hospital Medicine Daily Progress Note        Chief Complaint  DM    Interval Problem Update  12/17: Rapid HR better w/ PO fluids.  No chest pain, shortness of breath, or palpitations.  12/18: Intermittent tachycardia, patient continues to try and drink more fluids.  Glucose still on the high side between 151-292, increased pioglitazone to 30 after discussion with patient.    Review of Systems  Review of Systems   Constitutional: Negative for chills and fever.   HENT: Negative.    Respiratory: Negative for cough and shortness of breath.    Cardiovascular: Negative for chest pain and palpitations.   Gastrointestinal: Negative for abdominal pain, nausea and vomiting.   Genitourinary: Negative.    Musculoskeletal: Positive for neck pain.   Neurological: Positive for focal weakness.   Psychiatric/Behavioral: The patient is nervous/anxious.         Physical Exam  Temp:  [36.4 °C (97.6 °F)-36.7 °C (98 °F)] 36.4 °C (97.6 °F)  Pulse:  [] 99  Resp:  [18-20] 18  BP: (116-138)/(59-68) 132/68    Physical Exam   Constitutional: He is oriented to person, place, and time. No distress.   HENT:   Head: Normocephalic and atraumatic.   Right Ear: External ear normal.   Left Ear: External ear normal.   Eyes: Conjunctivae are normal.   Neck:   C-collar   Cardiovascular: Regular rhythm.  Tachycardia present.    Pulmonary/Chest: Effort normal and breath sounds normal. No respiratory distress. He has no wheezes.   Abdominal: Soft. Bowel sounds are normal. He exhibits no distension. There is no tenderness. There is no rebound and no guarding.   Musculoskeletal: He exhibits no edema.   Neurological: He is alert and oriented to person, place, and time.   Skin: Skin is warm and dry. He is not diaphoretic.   Vitals reviewed.      Fluids    Intake/Output Summary (Last 24 hours) at 12/18/18 1356  Last data filed at 12/18/18 0914   Gross per 24 hour   Intake              360 ml   Output              600 ml   Net             -240 ml        Laboratory  Recent Labs      12/16/18   0547   WBC  4.4*   RBC  3.45*   HEMOGLOBIN  10.1*   HEMATOCRIT  30.7*   MCV  89.0   MCH  29.3   MCHC  32.9*   RDW  53.3*   PLATELETCT  138*   MPV  9.3     Recent Labs      12/16/18   0547  12/18/18   0612   SODIUM  136  134*   POTASSIUM  4.6  4.1   CHLORIDE  102  99   CO2  25  26   GLUCOSE  159*  161*   BUN  27*  32*   CREATININE  2.01*  2.11*   CALCIUM  8.8  8.9                 Assessment/Plan  * Central cord syndrome (HCC)- (present on admission)   Assessment & Plan    Conservative management per Neurosurgery  Cervical collar     Chronic renal failure- (present on admission)   Assessment & Plan    CKD Stage III  Renal dose all meds  Avoid nephrotoxins  Outpt Nephrology F/U     Type 2 diabetes mellitus (HCC)- (present on admission)   Assessment & Plan    Back on outpt Actos  Continue to monitor glucose trends off Lantus  Increasing Actos dosing  Goal is to discharge home on no insulin     Tachycardia   Assessment & Plan    EKG sinus tachycardia  Suspect 2/2 volume depletion and tapering of Midodrine  HR improving w/  PO fluids     Vitamin D deficiency   Assessment & Plan    Vit D level 28  On supplementation     Thrombocytopenia (HCC)   Assessment & Plan    Follow Platelet counts  Transfuse if Platelet counts <10k or bleeding     Anemia   Assessment & Plan    Continue Fe and Vit C supplementation     Hyperlipemia- (present on admission)   Assessment & Plan    On Zocor     Essential hypertension- (present on admission)   Assessment & Plan    Attempting to taper off Midodrine     Full Code

## 2018-12-18 NOTE — REHAB-PHARMACY IDT TEAM NOTE
Pharmacy   Pharmacy  Antibiotics/Antifungals/Antivirals:  Medication:      Active Orders     None        Route:         n/a  Stop Date:  n/a  Reason:   Antihypertensives/Cardiac:  Medication:    Orders (72h ago through future)    Start     Ordered    12/15/18 2100  midodrine (PROAMATINE) tablet 2.5 mg  2 TIMES DAILY      12/15/18 1433    12/14/18 2100  midodrine (PROAMATINE) tablet 5 mg  2 TIMES DAILY,   Status:  Discontinued      12/14/18 1843    12/10/18 2100  simvastatin (ZOCOR) tablet 20 mg  NIGHTLY      12/10/18 1510    12/10/18 1510  hydrALAZINE (APRESOLINE) tablet 25 mg  EVERY 8 HOURS PRN      12/10/18 1510        Patient Vitals for the past 24 hrs:   BP Pulse   12/18/18 0700 132/68 99   12/17/18 2130 - (!) 102   12/17/18 2100 138/68 -   12/17/18 1840 116/59 (!) 113   12/17/18 1400 134/66 100       Anticoagulation:  Medication:  heparin  INR:      Other key medications: cyclobenzaprine, gabapentin, Humulin R, omeprazole  A review of the medication list reveals no issues at this time.  Section completed by:  Sherri Dumont RPH

## 2018-12-18 NOTE — CARE PLAN
Problem: Safety  Goal: Will remain free from injury  Pt uses call light consistently and appropriately. Waits for assistance does not attempt self transfer this shift. Able to verbalize needs.    Problem: Infection  Goal: Will remain free from infection  Patient remains free of infection as evidenced by normal vital signs and breath sounds. Will continue monitoring.    Problem: Venous Thromboembolism (VTW)/Deep Vein Thrombosis (DVT) Prevention:  Goal: Patient will participate in Venous Thrombosis (VTE)/Deep Vein Thrombosis (DVT)Prevention Measures  Patient on Heperin therapy for DVT prophylaxis.    Problem: Pain Management  Goal: Pain level will decrease to patient's comfort goal  Patient able to verbalize pain level and verbalize an acceptable level of pain.

## 2018-12-18 NOTE — PROGRESS NOTES
Received shift report and assumed care of patient.  Patient sleeping, calm and stable, currently positioned in bed for comfort and safety; call light within reach.  No complaints of pain or discomfort at this time.  Will continue to monitor.

## 2018-12-18 NOTE — REHAB-CM IDT TEAM NOTE
Case Management    DC Planning  DC destination/dispostion:  Patient lives alone in a Senior apartment complex.  He has safety bars in his bathroom and shower.     Referrals: Will update Meals on Wheels.     DC Needs: He may need home health initially.  He has a fww, [facility] safety bars and shower seat.  He has been doing his own insulin and fingersticks.  We may need to schedule family training closer to d/c.  He will need follow up with Dr. Sanchez at [facility] and his neurosurgeon.     Barriers to discharge:  Lives alone     Strengths: sister and her children are very supportive.     Section completed by:  Mckayla Mason R.N.

## 2018-12-18 NOTE — CARE PLAN
Problem: Respiratory:  Goal: Respiratory status will improve  Outcome: PROGRESSING AS EXPECTED  Pt is saturating >90% on RA with no s/s of respiratory distress or labored breathing noted.     Problem: Metabolic:  Goal: Ability to maintain appropriate glucose levels will improve  Outcome: PROGRESSING AS EXPECTED  HS blood sugar was 292. 7 units of insulin given per sliding scale. No s/s of hypoglycemia noted so far this shift.

## 2018-12-19 LAB
GLUCOSE BLD-MCNC: 185 MG/DL (ref 65–99)
GLUCOSE BLD-MCNC: 288 MG/DL (ref 65–99)
GLUCOSE BLD-MCNC: 293 MG/DL (ref 65–99)
GLUCOSE BLD-MCNC: 329 MG/DL (ref 65–99)

## 2018-12-19 PROCEDURE — 700102 HCHG RX REV CODE 250 W/ 637 OVERRIDE(OP): Performed by: PHYSICAL MEDICINE & REHABILITATION

## 2018-12-19 PROCEDURE — 97112 NEUROMUSCULAR REEDUCATION: CPT

## 2018-12-19 PROCEDURE — A9270 NON-COVERED ITEM OR SERVICE: HCPCS | Performed by: HOSPITALIST

## 2018-12-19 PROCEDURE — 700102 HCHG RX REV CODE 250 W/ 637 OVERRIDE(OP): Performed by: HOSPITALIST

## 2018-12-19 PROCEDURE — 99232 SBSQ HOSP IP/OBS MODERATE 35: CPT | Performed by: HOSPITALIST

## 2018-12-19 PROCEDURE — G0515 COGNITIVE SKILLS DEVELOPMENT: HCPCS

## 2018-12-19 PROCEDURE — A9270 NON-COVERED ITEM OR SERVICE: HCPCS | Performed by: PHYSICAL MEDICINE & REHABILITATION

## 2018-12-19 PROCEDURE — 92526 ORAL FUNCTION THERAPY: CPT

## 2018-12-19 PROCEDURE — 97530 THERAPEUTIC ACTIVITIES: CPT

## 2018-12-19 PROCEDURE — 700111 HCHG RX REV CODE 636 W/ 250 OVERRIDE (IP): Performed by: PHYSICAL MEDICINE & REHABILITATION

## 2018-12-19 PROCEDURE — 770010 HCHG ROOM/CARE - REHAB SEMI PRIVAT*

## 2018-12-19 PROCEDURE — 700112 HCHG RX REV CODE 229: Performed by: PHYSICAL MEDICINE & REHABILITATION

## 2018-12-19 PROCEDURE — 82962 GLUCOSE BLOOD TEST: CPT | Mod: 91

## 2018-12-19 PROCEDURE — 99233 SBSQ HOSP IP/OBS HIGH 50: CPT | Performed by: PHYSICAL MEDICINE & REHABILITATION

## 2018-12-19 RX ORDER — MIDODRINE HYDROCHLORIDE 2.5 MG/1
2.5 TABLET ORAL
Status: DISCONTINUED | OUTPATIENT
Start: 2018-12-20 | End: 2018-12-21

## 2018-12-19 RX ORDER — OXYCODONE HYDROCHLORIDE 5 MG/1
5 TABLET ORAL EVERY 6 HOURS PRN
Status: DISCONTINUED | OUTPATIENT
Start: 2018-12-19 | End: 2018-12-21

## 2018-12-19 RX ADMIN — OMEPRAZOLE 20 MG: 20 CAPSULE, DELAYED RELEASE ORAL at 08:19

## 2018-12-19 RX ADMIN — HEPARIN SODIUM 5000 UNITS: 5000 INJECTION, SOLUTION INTRAVENOUS; SUBCUTANEOUS at 14:33

## 2018-12-19 RX ADMIN — POLYETHYLENE GLYCOL 3350 1 PACKET: 17 POWDER, FOR SOLUTION ORAL at 19:54

## 2018-12-19 RX ADMIN — ACETAMINOPHEN 650 MG: 325 TABLET ORAL at 18:09

## 2018-12-19 RX ADMIN — VITAMIN D, TAB 1000IU (100/BT) 2000 UNITS: 25 TAB at 08:20

## 2018-12-19 RX ADMIN — GABAPENTIN 300 MG: 300 CAPSULE ORAL at 19:53

## 2018-12-19 RX ADMIN — SIMVASTATIN 20 MG: 20 TABLET, FILM COATED ORAL at 19:53

## 2018-12-19 RX ADMIN — GABAPENTIN 300 MG: 300 CAPSULE ORAL at 14:33

## 2018-12-19 RX ADMIN — FERROUS SULFATE TAB 325 MG (65 MG ELEMENTAL FE) 325 MG: 325 (65 FE) TAB at 08:19

## 2018-12-19 RX ADMIN — GABAPENTIN 300 MG: 300 CAPSULE ORAL at 08:20

## 2018-12-19 RX ADMIN — INSULIN HUMAN 7 UNITS: 100 INJECTION, SOLUTION PARENTERAL at 18:09

## 2018-12-19 RX ADMIN — HEPARIN SODIUM 5000 UNITS: 5000 INJECTION, SOLUTION INTRAVENOUS; SUBCUTANEOUS at 04:45

## 2018-12-19 RX ADMIN — SENNOSIDES AND DOCUSATE SODIUM 1 TABLET: 8.6; 5 TABLET ORAL at 19:53

## 2018-12-19 RX ADMIN — DOCUSATE SODIUM 100 MG: 50 LIQUID ORAL at 19:54

## 2018-12-19 RX ADMIN — OXYCODONE HYDROCHLORIDE AND ACETAMINOPHEN 500 MG: 500 TABLET ORAL at 08:19

## 2018-12-19 RX ADMIN — PIOGLITAZONE 30 MG: 15 TABLET ORAL at 08:19

## 2018-12-19 RX ADMIN — OXYCODONE HYDROCHLORIDE 5 MG: 5 TABLET ORAL at 22:41

## 2018-12-19 RX ADMIN — HEPARIN SODIUM 5000 UNITS: 5000 INJECTION, SOLUTION INTRAVENOUS; SUBCUTANEOUS at 20:01

## 2018-12-19 RX ADMIN — MIDODRINE HYDROCHLORIDE 2.5 MG: 2.5 TABLET ORAL at 08:19

## 2018-12-19 RX ADMIN — ACETAMINOPHEN 650 MG: 325 TABLET ORAL at 11:56

## 2018-12-19 RX ADMIN — INSULIN HUMAN 10 UNITS: 100 INJECTION, SOLUTION PARENTERAL at 20:11

## 2018-12-19 RX ADMIN — INSULIN HUMAN 3 UNITS: 100 INJECTION, SOLUTION PARENTERAL at 08:17

## 2018-12-19 RX ADMIN — INSULIN HUMAN 7 UNITS: 100 INJECTION, SOLUTION PARENTERAL at 11:55

## 2018-12-19 RX ADMIN — ACETAMINOPHEN 650 MG: 325 TABLET ORAL at 04:44

## 2018-12-19 ASSESSMENT — ENCOUNTER SYMPTOMS
NERVOUS/ANXIOUS: 1
COUGH: 0
DIARRHEA: 0
NECK PAIN: 1
FEVER: 0
ABDOMINAL PAIN: 0
NAUSEA: 0
FOCAL WEAKNESS: 1
CHILLS: 0
VOMITING: 0
SHORTNESS OF BREATH: 0

## 2018-12-19 ASSESSMENT — PAIN SCALES - GENERAL
PAINLEVEL_OUTOF10: 0
PAINLEVEL_OUTOF10: 7

## 2018-12-19 NOTE — REHAB-OT IDT TEAM NOTE
Occupational Therapy   Activities of Daily Living  Eating Initial:  1 - Total Assistance  Eating Current:  5 - Standby Prompting/Supervision or Set-up   Eating Description:  Modified diet, Set-up of equipment or meal/tube feeding, Supervision for safety, Verbal cueing  Grooming Initial:  2 - Max Assistance  Grooming Current:  5 - Standby Prompting/Supervision or Set-up   Grooming Description:   (assist to don and doff brace and set up of razor)  Bathing Initial:  2 - Max Assistance  Bathing Current:  4 - Minimal Assistance   Bathing Description:  Grab bar, Tub bench, Long handled bath tool, Hand held shower, Increased time, Supervision for safety, Verbal cueing, Set-up of equipment  Upper Body Dressing Initial:  1 - Total Assistance  Upper Body Dressing Current:  2 - Max Assistance   Upper Body Dressing Description:  Increased time, Supervision for safety, Verbal cueing (Mod A for donning pullover shirt w/ assist for pulling overhead and adustment.)  Lower Body Dressing Initial:  1 - Total Assistance  Lower Body Dressing Current:  2 - Max Assistance   Lower Body Dressing Description:  2 - Max Assistance  Toileting Initial:  2 - Max Assistance  Toileting Current:  2 - Max Assistance   Toileting Description:  Increased time, Supervision for safety, Verbal cueing, Grab bar  Toilet Transfer Initial:  3 - Moderate Assistance  Toilet Transfer Current:  4 - Minimal Assistance   Toilet Transfer Description:  4 - Minimal Assistance  Tub / Shower Transfer Initial:  3 - Moderate Assistance  Tub / Shower Transfer Current:  4 - Minimal Assistance   Tub / Shower Transfer Description:  Grab bar, Increased time, Supervision for safety, Verbal cueing (Min A for SPT w/c<>tub bench.)  IADL:  TBD  Family Training/Education:  TBD   DME/DC Recommendations:  TBD     Strengths:  Alert and oriented, Effective communication skills, Making steady progress towards goals, Manages pain appropriately, Motivated for self care and independence,  Pleasant and cooperative and Willingly participates in therapeutic activities  Barriers:  Unable to follow instructions, Generalized weakness, Limited mobility, Poor balance and Poor carryover of learning     # of short term goals set= 4    # of short term goals met= 4          Occupational Therapy Goals           Problem: Dressing     Dates: Start: 12/11/18       Goal: STG-Within one week, patient will dress UB     Dates: Start: 12/19/18       Description: 1) Individualized Goal: W/ mod A using AD/AE/DME prn.  2) Interventions: OT E Stim Attended, OT Group Therapy, OT Self Care/ADL, OT Neuro Re-Ed/Balance, OT Sensory Int Techniques, OT Therapeutic Activity, OT Aquatic Therapy and OT Therapeutic Exercise              Goal: STG-Within one week, patient will dress LB     Dates: Start: 12/19/18       Description: 1) Individualized Goal: W/ mod A using AD/AE/DME prn.  2) Interventions: OT E Stim Attended, OT Group Therapy, OT Self Care/ADL, OT Neuro Re-Ed/Balance, OT Sensory Int Techniques, OT Therapeutic Activity, OT Aquatic Therapy and OT Therapeutic Exercise                Problem: OT Long Term Goals     Dates: Start: 12/11/18       Goal: LTG-By discharge, patient will complete basic self care tasks     Dates: Start: 12/11/18       Description: 1) Individualized Goal:  W/ min A (except mod A for UBD) using AD/AE/DME prn.  2) Interventions:  OT E Stim Attended, OT Group Therapy, OT Self Care/ADL, OT Neuro Re-Ed/Balance, OT Sensory Int Techniques, OT Therapeutic Activity, OT Aquatic Therapy and OT Therapeutic Exercise             Goal: LTG-By discharge, patient will perform bathroom transfers     Dates: Start: 12/11/18       Description: 1) Individualized Goal:  W/ SPV using AD/AE/DME prn.  2) Interventions:  OT E Stim Attended, OT Group Therapy, OT Self Care/ADL, OT Neuro Re-Ed/Balance, OT Sensory Int Techniques, OT Therapeutic Activity, OT Aquatic Therapy and OT Therapeutic Exercise               Problem: Toileting      Dates: Start: 12/19/18       Goal: STG-Within one week, patient will complete toileting tasks     Dates: Start: 12/19/18       Description: 1) Individualized Goal: W/ mod A using AD/AE/DME prn.  2) Interventions: OT E Stim Attended, OT Group Therapy, OT Self Care/ADL, OT Neuro Re-Ed/Balance, OT Sensory Int Techniques, OT Therapeutic Activity, OT Aquatic Therapy and OT Therapeutic Exercise                    Section completed by:  Lucie Carlos, Student

## 2018-12-19 NOTE — PROGRESS NOTES
Hospital Medicine Daily Progress Note        Chief Complaint  DM    Interval Problem Update  12/17: Rapid HR better w/ PO fluids.  No chest pain, shortness of breath, or palpitations.  12/18: Intermittent tachycardia, patient continues to try and drink more fluids.  Glucose still on the high side between 151-292, increased pioglitazone to 30 after discussion with patient.  12/19: Patient tolerating increased dose of pioglitazone.  Blood pressure in good range.  Patient did have one episode of vomiting yesterday which he attributes to bowel regimen    Review of Systems  Review of Systems   Constitutional: Negative for chills and fever.   Respiratory: Negative for cough and shortness of breath.    Cardiovascular: Negative for chest pain.   Gastrointestinal: Negative for abdominal pain, diarrhea, nausea and vomiting.   Musculoskeletal: Positive for neck pain.   Neurological: Positive for focal weakness.   Psychiatric/Behavioral: The patient is nervous/anxious.         Physical Exam  Temp:  [36.1 °C (97 °F)-36.8 °C (98.3 °F)] 36.1 °C (97 °F)  Pulse:  [] 85  Resp:  [18] 18  BP: (113-128)/(57-69) 119/69    Physical Exam   Constitutional: He is oriented to person, place, and time. No distress.   HENT:   Head: Normocephalic and atraumatic.   Right Ear: External ear normal.   Left Ear: External ear normal.   Eyes: Conjunctivae are normal.   Neck:   C-collar   Cardiovascular: Regular rhythm.  Tachycardia present.    Pulmonary/Chest: Effort normal and breath sounds normal. No respiratory distress. He has no wheezes.   Abdominal: Soft. Bowel sounds are normal. He exhibits no distension. There is no tenderness.   Musculoskeletal: He exhibits no edema.   Neurological: He is alert and oriented to person, place, and time.   Skin: Skin is warm and dry. He is not diaphoretic.   Vitals reviewed.      Fluids    Intake/Output Summary (Last 24 hours) at 12/19/18 1240  Last data filed at 12/19/18 0910   Gross per 24 hour   Intake               960 ml   Output                0 ml   Net              960 ml       Laboratory      Recent Labs      12/18/18   0612   SODIUM  134*   POTASSIUM  4.1   CHLORIDE  99   CO2  26   GLUCOSE  161*   BUN  32*   CREATININE  2.11*   CALCIUM  8.9                 Assessment/Plan  * Central cord syndrome (HCC)- (present on admission)   Assessment & Plan    Conservative management per Neurosurgery  Cervical collar     Chronic renal failure- (present on admission)   Assessment & Plan    CKD Stage III  Renal dose all meds  Avoid nephrotoxins  Outpt Nephrology F/U     Type 2 diabetes mellitus (HCC)- (present on admission)   Assessment & Plan    Continue to monitor glucose trends off Lantus  Resumed outpatient Actos and then increased dosing  Goal is to discharge home on no insulin  We will hold off on increasing Actos any further.  Patient is working with his outpatient endocrinologist and will start on a once weekly medication to enhance glucose control after discharge.     Tachycardia   Assessment & Plan    EKG sinus tachycardia  Suspect 2/2 volume depletion and tapering of Midodrine  HR improving w/  PO fluids     Vitamin D deficiency   Assessment & Plan    Vit D level 28  On supplementation     Thrombocytopenia (HCC)   Assessment & Plan    Follow Platelet counts  Transfuse if Platelet counts <10k or bleeding     Anemia   Assessment & Plan    Continue Fe and Vit C supplementation     Hyperlipemia- (present on admission)   Assessment & Plan    On Zocor     Essential hypertension- (present on admission)   Assessment & Plan    Attempting to taper off Midodrine     Full Code

## 2018-12-19 NOTE — CARE PLAN
Problem: Bathing  Goal: STG-Within one week, patient will bathe  1) Individualized Goal:  W/ mod A using AD/AE/DME prn.  2) Interventions:  OT E Stim Attended, OT Group Therapy, OT Self Care/ADL, OT Neuro Re-Ed/Balance, OT Sensory Int Techniques, OT Therapeutic Activity, OT Aquatic Therapy and OT Therapeutic Exercise     Outcome: MET Date Met: 12/19/18      Problem: Dressing  Goal: STG-Within one week, patient will dress UB  1) Individualized Goal:  W/ max A using AD/AE/DME prn.  2) Interventions:  OT E Stim Attended, OT Group Therapy, OT Self Care/ADL, OT Neuro Re-Ed/Balance, OT Sensory Int Techniques, OT Therapeutic Activity, OT Aquatic Therapy and OT Therapeutic Exercise     Outcome: MET Date Met: 12/19/18    Goal: STG-Within one week, patient will dress LB  1) Individualized Goal:  W/ max A using AD/AE/DME prn.  2) Interventions:  OT E Stim Attended, OT Group Therapy, OT Self Care/ADL, OT Neuro Re-Ed/Balance, OT Sensory Int Techniques, OT Therapeutic Activity, OT Aquatic Therapy and OT Therapeutic Exercise     Outcome: MET Date Met: 12/19/18      Problem: Functional Transfers  Goal: STG-Within one week, patient will transfer to toilet  1) Individualized Goal:  W/ min A using AD/AE/DME prn.  2) Interventions:  OT E Stim Attended, OT Group Therapy, OT Self Care/ADL, OT Neuro Re-Ed/Balance, OT Sensory Int Techniques, OT Therapeutic Activity, OT Aquatic Therapy and OT Therapeutic Exercise     Outcome: MET Date Met: 12/19/18

## 2018-12-19 NOTE — REHAB-COLLABORATIVE ONGOING IDT TEAM NOTE
Weekly Interdisciplinary Team Conference Note    Weekly Interdisciplinary Team Conference # 2  Date:  12/19/2018    Clinicians present and reporting at team conference include the following:   MD: Mustapha Brandt MD   RN:  Mouna Lee RN   PT:   Vanessa eWlsh, PT, DPT  OT:  Isabel Wells, OT, OTR/L   ST:  Carla Hammer, MS, CCC-SLP  CM:  Mckayla Mason RN, CCM  REC:  None  RT:  None  RPh:  Migel Hu [facility]  Other:   None  All reporting clinicians have a working knowledge of this patient's plan of care.    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) 12/03/2018     Priority: Medium   • Contraindication to deep vein thrombosis (DVT) prophylaxis 12/03/2018     Priority: Medium   • Essential hypertension 12/03/2018     Priority: Low   • Hyperlipemia 12/03/2018     Priority: Low   • Trauma 12/03/2018     Priority: Low   • Tachycardia 12/16/2018   • Anemia 12/11/2018   • Thrombocytopenia (HCC) 12/11/2018   • Vitamin D deficiency 12/11/2018   • Uncontrolled type 2 diabetes mellitus with hyperglycemia ([facility]) 11/12/2018   • Mass of pancreas 06/03/2016     Results     Procedure Component Value Ref Range Date/Time    ACCU-CHEK GLUCOSE [[id number]]  (Abnormal) Collected:  12/19/18 1059    Order Status:  Completed Lab Status:  Final result Updated:  12/19/18 1112     Glucose - Accu-Ck 288 (H) 65 - 99 mg/dL      Comment: Coverage given  Followed orders         ACCU-CHEK GLUCOSE [[id number]]  (Abnormal) Collected:  12/19/18 0738    Order Status:  Completed Lab Status:  Final result Updated:  12/19/18 0752     Glucose -  Accu-Ck 185 (H) 65 - 99 mg/dL      Comment: Coverage given  Followed orders         ACCU-CHEK GLUCOSE [[id number]]  (Abnormal) Collected:  12/18/18 2015    Order Status:  Completed Lab Status:  Final result Updated:  12/18/18 2031     Glucose - Accu-Ck 281 (H) 65 - 99 mg/dL     ACCU-CHEK GLUCOSE [[id number]]  (Abnormal) Collected:  12/18/18 1101    Order Status:  Completed Lab Status:  Final result Updated:  12/18/18 1749     Glucose - Accu-Ck 215 (H) 65 - 99 mg/dL     ACCU-CHEK GLUCOSE [[id number]]  (Abnormal) Collected:  12/18/18 1151    Order Status:  Completed Lab Status:  Final result Updated:  12/18/18 1749     Glucose - Accu-Ck 235 (H) 65 - 99 mg/dL     ACCU-CHEK GLUCOSE [[id number]]  (Abnormal) Collected:  12/18/18 1716    Order Status:  Completed Lab Status:  Final result Updated:  12/18/18 1749     Glucose - Accu-Ck 342 (H) 65 - 99 mg/dL     ACCU-CHEK GLUCOSE [[id number]]  (Abnormal) Collected:  12/17/18 1114    Order Status:  Completed Lab Status:  Final result Updated:  12/18/18 1749     Glucose - Accu-Ck 242 (H) 65 - 99 mg/dL      Comment: Followed orders       ACCU-CHEK GLUCOSE [[id number]]  (Abnormal) Collected:  12/17/18 1733    Order Status:  Completed Lab Status:  Final result Updated:  12/18/18 1735     Glucose - Accu-Ck 259 (H) 65 - 99 mg/dL            Nursing  Diet/Nutrition:  Diabetic, Dysphagia III-Mechanical Soft and Nectar Thick Liquids  Medication Administration:  Other whole with nectar thick liquid  % consumed at meals in last 24 hours:  Consumed 75%-100% of meals per documentation.  Meal/Snack Consumption for the past 24 hrs:   Oral Nutrition   12/18/18 1840 Dinner;Between % Consumed   12/18/18 1400 Lunch;Between % Consumed   12/18/18 0914 Breakfast;Between % Consumed     Snack schedule:  HS  Appetite:  Good  Fluid Intake/Output in past 24 hours: In: 960 [P.O.:960]  Out: 600   Admit Weight:  Weight: 78.2 kg (172 lb 8 oz)  Weight Last 7 Days       Pain Issues:    Location:   Shoulder (12/18 2028)  Right (12/18 2028)         Severity:  Moderate   Scheduled pain medications:  gabapentin (NEURONTIN) , Tylenol     PRN pain medications used in last 24 hours:  oxycodone immediate release (ROXICODONE)    Non Pharmacologic Interventions:  emotional support, repositioned and rest  Effectiveness of pain management plan:  good=patient states acceptable comfort after interventions    Bowel:    Bowel Assist Initial Score:  1 - Total Assistance  Bowel Assist Current Score:  3 - Moderate Assistance  Bowl Accidents in last 7 days:     Last bowel movement: 12/18/18  Stool Description: Large, Edi, Formed     Usual bowel pattern:  Daily  Scheduled bowel medications:  docusate sodium (COLACE) and polyethylene glycol/lytes (MIRALAX) , senna  PRN bowel medications used in last 24 hours:  None  Nursing Interventions:  Increased time, Scheduled medication, Supervision, Positioning on commode/toilet  Effectiveness of bowel program:   fair=sometimes needs prn bowel meds for constipation  Bladder:    Bladder Assist Initial Score:  1 - Total Assistance  Bladder Assist Current Score:  4 - Minimal Assistance  Bladder Accidents in last 7 days:     PVR range for past 24-48 hours: -- ()  Medications affecting bladder:  None    Interventions:  Increased time, Supervision  Effectiveness of bladder training:  Good=regular, predictable, emptying of bladder, patient initiates time voiding    Diabetes:  Blood Sugar Frequency:  Before meals and at bedtime    Range of BS for last 48 hours:   Recent Labs      12/17/18   0801  12/17/18   1114  12/17/18   1733  12/17/18   2106  12/18/18   0728  12/18/18   1101  12/18/18   1151  12/18/18   1716  12/18/18 2015   POCGLUCOSE  189*  242*  259*  292*  151*  215*  235*  342*  281*     Scheduled diabetic medications:  Other Humulin R, Actos  Sliding scale usage in past 24 hours:  Yes  Total Short acting insulin in the past 24 hours:  Humulin 24 units  Total Long acting insulin in the  past 24 hours:  None    Wound:   Patient Lines/Drains/Airways Status    Active Current Wounds     Name: Placement date: Placement time: Site: Days:    Wound 12/03/18 Laceration Face 12/03/18         16    Wound 12/08/18 Diabetic Ulcer Toe (Comment which one) R 3rd toe, L 5th toe 12/08/18   1200      10                   Interventions:  Monitor and assess  Effectiveness of intervention:  wound is unchanged     Sleep/wake cycle:   Average hours slept:  Sleeps 4-6 hours without waking  Sleep medication usage:  None    Patient/Family Training/Education:  Aspiration Precautions, Bladder Management/Training, Bowel Management/Training, Diabetes Management, Diet/Nutrition, Fall Prevention, General Self Care, Medication Management, Pain Management, Respiratory Hygiene, Safety and Wound Care  Discharge Supply Recommendations:  Glucometer and Strips  Strengths: Alert and oriented, Willingly participates in therapeutic activities, Able to follow instructions, Supportive family and Manages pain appropriately   Barriers:   Fatigue, Generalized weakness and Limited mobility            Nursing Problems           Problem: Bowel/Gastric:     Goal: Normal bowel function is maintained or improved           Goal: Will not experience complications related to bowel motility             Problem: Communication     Goal: The ability to communicate needs accurately and effectively will improve             Problem: Discharge Barriers/Planning     Goal: Patient's continuum of care needs will be met             Problem: Infection     Goal: Will remain free from infection             Problem: Knowledge Deficit     Goal: Knowledge of disease process/condition, treatment plan, diagnostic tests, and medications will improve           Goal: Knowledge of the prescribed therapeutic regimen will improve             Problem: Metabolic:     Goal: Ability to maintain appropriate glucose levels will improve             Problem: Mobility     Goal: Risk for  activity intolerance will decrease             Problem: Pain Management     Goal: Pain level will decrease to patient's comfort goal             Problem: Psychosocial Needs:     Goal: Level of anxiety will decrease             Problem: Respiratory:     Goal: Respiratory status will improve             Problem: Safety     Goal: Will remain free from injury           Goal: Will remain free from falls             Problem: Skin Integrity     Goal: Risk for impaired skin integrity will decrease             Problem: Venous Thromboembolism (VTW)/Deep Vein Thrombosis (DVT) Prevention:     Goal: Patient will participate in Venous Thrombosis (VTE)/Deep Vein Thrombosis (DVT)Prevention Measures     Flowsheet:     Taken at 12/13/18 2200    Pharmacologic Prophylaxis Used Unfractionated Heparin by Diana Kramer R.N.                       Long Term Goals:   At discharge patient will be able to function safely at home and in the community with support.    Section completed by:  Vicky Mendoza R.N.              Mobility  Bed mobility: SBA- min A  Bed /Chair/Wheelchair Transfer Initial:  3 - Moderate Assistance  Bed /Chair/Wheelchair Transfer Current:  4 - Minimal Assistance   Bed/Chair/Wheelchair Transfer Description:  Adaptive equipment, Increased time, Supervision for safety, Verbal cueing, Set-up of equipment, Initial preparation for task  Walk Initial:  1 - Total Assistance  Walk Current:  4 - Minimal Assistance   Walk Description:  Extra time, Requires incidental assist (375 ft with 4WW and CGA)  Wheelchair Initial:  2 - Max Assistance  Wheelchair Current:  5 - Standby Prompting/Supervision or Set-up   Wheelchair Description:  Supervision for safety, Verbal cueing, Extra time (150 ft using UEs and LEs, SBA)  Stairs Initial:  0 - Not tested,medical condition  Stairs Current: 0 - Not tested,unsafe activity   Stairs Description:    Patient/Family Training/Education:  TBD   DME/DC Recommendations:  TBD  Strengths:   Independent PLOF, Motivated for self care and independence, Pleasant and cooperative and Willingly participates in therapeutic activities  Barriers:   Decreased endurance, Generalized weakness, Poor balance and Other: Limited UE strength, ataxic movements   # of short term goals set= 4  # of short term goals met= 1        Physical Therapy Problems           Problem: Mobility     Dates: Start: 12/11/18       Goal: STG-Within one week, patient will     Dates: Start: 12/11/18       Description: 1) Individualized goal: Gait fww sba 100 ft 1 week  2) Interventions:  PT Gait Training, PT Therapeutic Exercises, PT Neuro Re-Ed/Balance and PT Therapeutic Activity       Note:     Goal Note filed on 12/19/18 1239 by Vanessa Welsh, PT    Goal: STG-Within one week, patient will  Outcome: NOT MET  CGA                  Problem: Mobility Transfers     Dates: Start: 12/11/18       Goal: STG-Within one week, patient will transfer bed to chair     Dates: Start: 12/11/18       Description: 1) Individualized goal: Transfer bed to/from chair fww sba, vc`s, 1 week  2) Interventions:  PT Gait Training, PT Therapeutic Exercises, PT Neuro Re-Ed/Balance and PT Therapeutic Activity       Note:     Goal Note filed on 12/19/18 1239 by Vanessa Welsh, PT    Goal: STG-Within one week, patient will transfer bed to chair  Outcome: NOT MET  CGA                Goal: STG-Within one week, patient will move supine to sit     Dates: Start: 12/11/18       Description: 1) Individualized goal: Transfer supine to/from sit sba 1 week  2) Interventions:  PT Gait Training, PT Therapeutic Exercises, PT Neuro Re-Ed/Balance and PT Therapeutic Activity       Note:     Goal Note filed on 12/19/18 1239 by Vanessa Welsh, PT    Goal: STG-Within one week, patient will move supine to sit  Outcome: NOT MET  SBA- mod A                  Problem: PT-Long Term Goals     Dates: Start: 12/11/18       Goal: LTG-By discharge, patient will ambulate     Dates: Start: 12/11/18        Description: 1) Individualized goal: Gait fww household 150 ft, community 400 ft, supervision at discharge  2) Interventions:  PT Gait Training, PT Therapeutic Exercises, PT Neuro Re-Ed/Balance and PT Therapeutic Activity             Goal: LTG-By discharge, patient will transfer one surface to another     Dates: Start: 12/11/18       Description: 1) Individualized goal: Transfer one surface to another fww modified independent at discharge  2) Interventions:  PT Gait Training, PT Therapeutic Exercises, PT Neuro Re-Ed/Balance and PT Therapeutic Activity             Goal: LTG-By discharge, patient will transfer in/out of a car     Dates: Start: 12/11/18       Description: 1) Individualized goal: Car transfer fww supervision at discharge  2) Interventions:  PT Gait Training, PT Therapeutic Exercises, PT Neuro Re-Ed/Balance and PT Therapeutic Activity                     Section completed by:  Vanessa Welsh, PT, DPT        Activities of Daily Living  Eating Initial:  1 - Total Assistance  Eating Current:  5 - Standby Prompting/Supervision or Set-up   Eating Description:  Modified diet, Set-up of equipment or meal/tube feeding, Supervision for safety, Verbal cueing  Grooming Initial:  2 - Max Assistance  Grooming Current:  5 - Standby Prompting/Supervision or Set-up   Grooming Description:   (assist to don and doff brace and set up of razor)  Bathing Initial:  2 - Max Assistance  Bathing Current:  4 - Minimal Assistance   Bathing Description:  Grab bar, Tub bench, Long handled bath tool, Hand held shower, Increased time, Supervision for safety, Verbal cueing, Set-up of equipment  Upper Body Dressing Initial:  1 - Total Assistance  Upper Body Dressing Current:  2 - Max Assistance   Upper Body Dressing Description:  Increased time, Supervision for safety, Verbal cueing (Mod A for donning pullover shirt w/ assist for pulling overhead and adustment.)  Lower Body Dressing Initial:  1 - Total Assistance  Lower Body Dressing  Current:  2 - Max Assistance   Lower Body Dressing Description:  2 - Max Assistance  Toileting Initial:  2 - Max Assistance  Toileting Current:  2 - Max Assistance   Toileting Description:  Increased time, Supervision for safety, Verbal cueing, Grab bar  Toilet Transfer Initial:  3 - Moderate Assistance  Toilet Transfer Current:  4 - Minimal Assistance   Toilet Transfer Description:  4 - Minimal Assistance  Tub / Shower Transfer Initial:  3 - Moderate Assistance  Tub / Shower Transfer Current:  4 - Minimal Assistance   Tub / Shower Transfer Description:  Grab bar, Increased time, Supervision for safety, Verbal cueing (Min A for SPT w/c<>tub bench.)  IADL:  TBD  Family Training/Education:  TBD   DME/DC Recommendations:  TBD     Strengths:  Alert and oriented, Effective communication skills, Making steady progress towards goals, Manages pain appropriately, Motivated for self care and independence, Pleasant and cooperative and Willingly participates in therapeutic activities  Barriers:  Unable to follow instructions, Generalized weakness, Limited mobility, Poor balance and Poor carryover of learning     # of short term goals set= 4    # of short term goals met= 4          Occupational Therapy Goals           Problem: Dressing     Dates: Start: 12/11/18       Goal: STG-Within one week, patient will dress UB     Dates: Start: 12/19/18       Description: 1) Individualized Goal: W/ mod A using AD/AE/DME prn.  2) Interventions: OT E Stim Attended, OT Group Therapy, OT Self Care/ADL, OT Neuro Re-Ed/Balance, OT Sensory Int Techniques, OT Therapeutic Activity, OT Aquatic Therapy and OT Therapeutic Exercise              Goal: STG-Within one week, patient will dress LB     Dates: Start: 12/19/18       Description: 1) Individualized Goal: W/ mod A using AD/AE/DME prn.  2) Interventions: OT E Stim Attended, OT Group Therapy, OT Self Care/ADL, OT Neuro Re-Ed/Balance, OT Sensory Int Techniques, OT Therapeutic Activity, OT Aquatic  Therapy and OT Therapeutic Exercise                Problem: OT Long Term Goals     Dates: Start: 12/11/18       Goal: LTG-By discharge, patient will complete basic self care tasks     Dates: Start: 12/11/18       Description: 1) Individualized Goal:  W/ min A (except mod A for UBD) using AD/AE/DME prn.  2) Interventions:  OT E Stim Attended, OT Group Therapy, OT Self Care/ADL, OT Neuro Re-Ed/Balance, OT Sensory Int Techniques, OT Therapeutic Activity, OT Aquatic Therapy and OT Therapeutic Exercise             Goal: LTG-By discharge, patient will perform bathroom transfers     Dates: Start: 12/11/18       Description: 1) Individualized Goal:  W/ SPV using AD/AE/DME prn.  2) Interventions:  OT E Stim Attended, OT Group Therapy, OT Self Care/ADL, OT Neuro Re-Ed/Balance, OT Sensory Int Techniques, OT Therapeutic Activity, OT Aquatic Therapy and OT Therapeutic Exercise               Problem: Toileting     Dates: Start: 12/19/18       Goal: STG-Within one week, patient will complete toileting tasks     Dates: Start: 12/19/18       Description: 1) Individualized Goal: W/ mod A using AD/AE/DME prn.  2) Interventions: OT E Stim Attended, OT Group Therapy, OT Self Care/ADL, OT Neuro Re-Ed/Balance, OT Sensory Int Techniques, OT Therapeutic Activity, OT Aquatic Therapy and OT Therapeutic Exercise                    Section completed by:  Lcuie Carlos, Student       Cognitive Linquistic Functions  Comprehension Initial:  4 - Minimal Assistance  Comprehension Current:  6 - Modified Independent   Comprehension Description:  Verbal cues, Glasses  Expression Initial:  5 - Stand-by Prompting/Supervision or Set-up  Expression Current:  6 - Modified Independent   Expression Description:  Verbal cueing  Social Interaction Initial:  5 - Stand-by Prompting/Supervision or Set-up  Social Interaction Current:  6 - Modified Independent   Social Interaction Description:  Increased time  Problem Solving Initial:  3 - Moderate  Assistance  Problem Solving Current:  5 - Standby Prompting/Supervision or Set-up   Problem Solving Description:  Verbal cueing, Therapy schedule  Memory Initial:  2 - Max Assistance  Memory Current:  5 - Standby Prompting/Supervision or Set-up   Memory Description:  Verbal cueing, Therapy schedule  Executive Functioning / Organization Initial:     Executive Functioning / Organization Current: 5 - Standby Prompting/Supervision or Set-up   Executive Functioning Desciption: Pt will require assistance with IADLs at home   Swallowing  Swallowing Status: MBSS completed - Premature spillage to the valleculae for puree and solids. Premature spillage to the pyriforms for all liquid boluses. Consistent penetration and eventual SILENT ASPIRATION of straw sip thin liquids, Pt tolerated tsp and controlled cup sip thin liquids with no pen/asp. Pt advanced to Dys 3, trials of thin liquids via cup during meals. Trials of regular textures to be assessed today.  Orders Placed This Encounter   Procedures   • Diet Order Diabetic     Standing Status:   Standing     Number of Occurrences:   1     Order Specific Question:   Diet:     Answer:   Diabetic [3]     Order Specific Question:   Texture/Fiber modifications:     Answer:   Dysphagia 3(Mechanical Soft)specify fluid consistency(question 6) [3]     Comments:   1:1 supervision for self feeding     Order Specific Question:   Consistency/Fluid modifications:     Answer:   Nectar Thick [2]     Behavior Modification Plan  Allow for rest time, Give clear feedback, Set clear goals, Provide reasonable choices, Decrease the chance of failure by offering activities that are within the patient's abilities and Analyze tasks (break down into smaller steps)  Assistive Technology  Low/Impaired vision equipment and Low tech: Calendar, planner, schedule, alarms/timers, pill organizer, post-it notes, lists  Family Training/Education:  To be completed with sister  DC Recommendations: TBD  Strengths:   Able to follow instructions, Alert and oriented, Independent PLOF, Making steady progress towards goals, Manages pain appropriately, Motivated for self care and independence, Pleasant and cooperative and Willingly participates in therapeutic activities  Barriers:  Aspiration risk and Other: executive function, complex attention  # of short term goals set=4  # of short term goals met=3       Speech Therapy Problems           Problem: Problem Solving STGs     Dates: Start: 12/11/18       Goal: STG-Within one week, patient will     Dates: Start: 12/11/18       Description: 1) Individualized goal: Complete functional medication management/sorting task with demonstration of knowledge of accurate medication names, functions, admin times using MOD A with 100% accuracy   2) Interventions:  SLP Cognitive Skill Development       Note:     Goal Note filed on 12/19/18 1050 by Carla Hammer MS,CCC-SLP    Goal: STG-Within one week, patient will  Outcome: NOT MET  Pt 25% accuracy with med sort.                   Problem: Speech/Swallowing LTGs     Dates: Start: 12/11/18       Goal: LTG-By discharge, patient will safely swallow     Dates: Start: 12/11/18       Description: 1) Individualized goal:  Regular textures/thin liquids with MOD I for use of compensatory strategies.   2) Interventions:  SLP Swallowing Dysfunction Treatment, SLP Cognitive Skill Development and SLP Group Treatment             Goal: LTG-By discharge, patient will solve complex problem     Dates: Start: 12/11/18       Description: 1) Individualized goal:  By utilizing compensatory intervention for memory and problem-solving to allow for safe completion of daily activities with MOD I  2) Interventions:  SLP Cognitive Skill Development and SLP Group Treatment                    Section completed by:  Carla Hammer MS,CCC-SLP          Nutrition       Dietary Problems           Problem: Inadequate nutrient intake     Goal: Patient to consume greater than or  "equal to 50% of meals (Resolved)               Nutrition services - Brief Note: Day 9 of admit.  Vince Almanzar is a 78 y.o. male with admitting DX of incomplete C1-C4 and Cervical Myelopathy.     Pt with PO intake greatly improved. Current diet order Diabetic Dysphagia III with NTL. Current PO intake of % x 10, 50-75% x 4 and 25-50% x 1. Pt with current PO intake adequate. POC Glucose 149-342 since 12/16. Unfortunately, pt is unable to attend the DM class today. Will follow up with a DM education as able.This RD visited a pt T-Dine. PT to be trialing a regular tray today. Pt says he feels great and that he believes he is eating well. Regarding the pt's diabetes, he noted prior to his fall he was having his DM medication adjusted. In addition he acknowledged at the facility he may be eating foods he does not normally eat and may be affecting his BG. Pt having daily BMs and previously attributed constipation to his variable/poor PO which has since improved.       Assessment:  Height: 177.8 cm (5' 10\")  Weight: 78.2 kg (172 lb 8 oz)  Body mass index is 24.75 kg/m².   Diet/Intake: Diabetic Dysphagia III with NTL    Labs, MAR (Actos and Humulin SSI)and Chart reviewed.     Recommendations/Plan:  1. Diet as ordered. Advancements per SLP.   2. DM diet education as able.    3. Encourage intake of 50% or greater.   4. Document intake of all meals  as % taken in ADL's to provide interdisciplinary communication across all shifts.   5. Monitor weight.  6. Nutrition rep will continue to see patient for ongoing meal and snack preferences.   Section completed by:  Kena Ghotra R.D.    REHAB-Pharmacy IDT Team Note by Sherri Dumont RPH at 12/18/2018 11:04 AM  Version 1 of 1    Author:  Sherri Dumont RPH Service:  (none) Author Type:  Pharmacist    Filed:  12/18/2018 11:05 AM Date of Service:  12/18/2018 11:04 AM Status:  Signed    :  Sherri Dumont RPH (Pharmacist)         Pharmacy "   Pharmacy  Antibiotics/Antifungals/Antivirals:  Medication:      Active Orders     None        Route:         n/a  Stop Date:  n/a  Reason:   Antihypertensives/Cardiac:  Medication:    Orders (72h ago through future)    Start     Ordered    12/15/18 2100  midodrine (PROAMATINE) tablet 2.5 mg  2 TIMES DAILY      12/15/18 1433    12/14/18 2100  midodrine (PROAMATINE) tablet 5 mg  2 TIMES DAILY,   Status:  Discontinued      12/14/18 1843    12/10/18 2100  simvastatin (ZOCOR) tablet 20 mg  NIGHTLY      12/10/18 1510    12/10/18 1510  hydrALAZINE (APRESOLINE) tablet 25 mg  EVERY 8 HOURS PRN      12/10/18 1510        Patient Vitals for the past 24 hrs:   BP Pulse   12/18/18 0700 132/68 99   12/17/18 2130 - (!) 102   12/17/18 2100 138/68 -   12/17/18 1840 116/59 (!) 113   12/17/18 1400 134/66 100       Anticoagulation:  Medication:  heparin  INR:      Other key medications: cyclobenzaprine, gabapentin, Humulin R, omeprazole  A review of the medication list reveals no issues at this time.  Section completed by:  Sherri Dumont [facility][[email or web address]]        Attribution Key     TK.1 - Sherri Dumont [facility] on 12/18/2018 11:04 AM                    DC Planning  DC destination/dispostion:  Patient lives alone in a [place] apartment complex.  He has safety bars in his bathroom and shower.     Referrals: Will update Meals on Wheels.     DC Needs: He may need home health initially.  He has a fww, [facility] safety bars and shower seat.  He has been doing his own insulin and fingersticks.  We may need to schedule family training closer to d/c.  He will need [facility] and his neurosurgeon.     Barriers to discharge:  Lives alone     Strengths: sister and her children are very supportive.     Section completed by:  Mckayla Mason R.N.      Physician Summary  Mustapha Brandt MD participated and led team conference discussion.

## 2018-12-19 NOTE — CARE PLAN
Problem: Swallowing STGs  Goal: STG-Within one week, patient will  1) Individualized goal:  Safely swallow Dys 2/thin liquids with no overt s/sx of pen/asp.   2) Interventions:  SLP Swallowing Dysfunction Treatment and SLP Group Treatment     Outcome: MET Date Met: 12/19/18  Pt advanced to Dys 3/ trials of thin liquids during meals.   Goal: STG-Within one week, patient will  1) Individualized goal: complete a MBSS for further evaluation of swallow function; with additional goals pending results  2) Interventions:  SLP Video Swallow Evaluation     Outcome: MET Date Met: 12/19/18  Silent aspiration with straw sip.     Problem: Problem Solving STGs  Goal: STG-Within one week, patient will  1) Individualized goal: Complete functional medication management/sorting task with demonstration of knowledge of accurate medication names, functions, admin times using MOD A with 100% accuracy   2) Interventions:  SLP Cognitive Skill Development     Outcome: NOT MET  Pt 25% accuracy with med sort.     Problem: Memory STGs  Goal: STG-Within one week, patient will  1) Individualized goal:  use written memory strategies and simple memory book following education in order to direct staff to document daily activities with MIN A.    2) Interventions:  SLP Cognitive Skill Development     Outcome: MET Date Met: 12/19/18  Pt using external memory aids to recall training by staff.

## 2018-12-19 NOTE — REHAB-DIETARY IDT TEAM NOTE
"Dietary   Nutrition       Dietary Problems           Problem: Inadequate nutrient intake     Goal: Patient to consume greater than or equal to 50% of meals (Resolved)               Nutrition services - Brief Note: Day 9 of admit.  Vince Almanzar is a 78 y.o. male with admitting DX of incomplete C1-C4 and Cervical Myelopathy.     Pt with PO intake greatly improved. Current diet order Diabetic Dysphagia III with NTL. Current PO intake of % x 10, 50-75% x 4 and 25-50% x 1. Pt with current PO intake adequate. POC Glucose 149-342 since 12/16. Unfortunately, pt is unable to attend the DM class today. Will follow up with a DM education as able.This RD visited a pt T-Dine. PT to be trialing a regular tray today. Pt says he feels great and that he believes he is eating well. Regarding the pt's diabetes, he noted prior to his fall he was having his DM medication adjusted. In addition he acknowledged at the facility he may be eating foods he does not normally eat and may be affecting his BG. Pt having daily BMs and previously attributed constipation to his variable/poor PO which has since improved.       Assessment:  Height: 177.8 cm (5' 10\")  Weight: 78.2 kg (172 lb 8 oz)  Body mass index is 24.75 kg/m².   Diet/Intake: Diabetic Dysphagia III with NTL    Labs, MAR (Actos and Humulin SSI)and Chart reviewed.     Recommendations/Plan:  1. Diet as ordered. Advancements per SLP.   2. DM diet education as able.    3. Encourage intake of 50% or greater.   4. Document intake of all meals  as % taken in ADL's to provide interdisciplinary communication across all shifts.   5. Monitor weight.  6. Nutrition rep will continue to see patient for ongoing meal and snack preferences.   Section completed by:  Kena Ghotra R.D.     "

## 2018-12-19 NOTE — CARE PLAN
Problem: Balance  Goal: STG-Within one week, patient will maintain dynamic standing  1) Individualized goal: Maintain dynamic standing balance fww sba 1 week  2) Interventions:  PT Gait Training, PT Therapeutic Exercises, PT Neuro Re-Ed/Balance and PT Therapeutic Activity     Outcome: MET Date Met: 12/19/18      Problem: Mobility  Goal: STG-Within one week, patient will  1) Individualized goal: Gait fww sba 100 ft 1 week  2) Interventions:  PT Gait Training, PT Therapeutic Exercises, PT Neuro Re-Ed/Balance and PT Therapeutic Activity     Outcome: NOT MET  CGA    Problem: Mobility Transfers  Goal: STG-Within one week, patient will transfer bed to chair  1) Individualized goal: Transfer bed to/from chair fww sba, vc`s, 1 week  2) Interventions:  PT Gait Training, PT Therapeutic Exercises, PT Neuro Re-Ed/Balance and PT Therapeutic Activity     Outcome: NOT MET  CGA  Goal: STG-Within one week, patient will move supine to sit  1) Individualized goal: Transfer supine to/from sit sba 1 week  2) Interventions:  PT Gait Training, PT Therapeutic Exercises, PT Neuro Re-Ed/Balance and PT Therapeutic Activity     Outcome: NOT MET  SBA- mod A

## 2018-12-19 NOTE — REHAB-PT IDT TEAM NOTE
Physical Therapy   Mobility  Bed mobility: SBA- min A  Bed /Chair/Wheelchair Transfer Initial:  3 - Moderate Assistance  Bed /Chair/Wheelchair Transfer Current:  4 - Minimal Assistance   Bed/Chair/Wheelchair Transfer Description:  Adaptive equipment, Increased time, Supervision for safety, Verbal cueing, Set-up of equipment, Initial preparation for task  Walk Initial:  1 - Total Assistance  Walk Current:  4 - Minimal Assistance   Walk Description:  Extra time, Requires incidental assist (375 ft with 4WW and CGA)  Wheelchair Initial:  2 - Max Assistance  Wheelchair Current:  5 - Standby Prompting/Supervision or Set-up   Wheelchair Description:  Supervision for safety, Verbal cueing, Extra time (150 ft using UEs and LEs, SBA)  Stairs Initial:  0 - Not tested,medical condition  Stairs Current: 0 - Not tested,unsafe activity   Stairs Description:    Patient/Family Training/Education:  TBD   DME/DC Recommendations:  TBD  Strengths:  Independent PLOF, Motivated for self care and independence, Pleasant and cooperative and Willingly participates in therapeutic activities  Barriers:   Decreased endurance, Generalized weakness, Poor balance and Other: Limited UE strength, ataxic movements   # of short term goals set= 4  # of short term goals met= 1        Physical Therapy Problems           Problem: Mobility     Dates: Start: 12/11/18       Goal: STG-Within one week, patient will     Dates: Start: 12/11/18       Description: 1) Individualized goal: Gait fww sba 100 ft 1 week  2) Interventions:  PT Gait Training, PT Therapeutic Exercises, PT Neuro Re-Ed/Balance and PT Therapeutic Activity       Note:     Goal Note filed on 12/19/18 1239 by Vanessa Welsh, PT    Goal: STG-Within one week, patient will  Outcome: NOT MET  CGA                  Problem: Mobility Transfers     Dates: Start: 12/11/18       Goal: STG-Within one week, patient will transfer bed to chair     Dates: Start: 12/11/18       Description: 1) Individualized goal:  Transfer bed to/from chair fww sba, vc`s, 1 week  2) Interventions:  PT Gait Training, PT Therapeutic Exercises, PT Neuro Re-Ed/Balance and PT Therapeutic Activity       Note:     Goal Note filed on 12/19/18 1239 by Vanessa Welsh, PT    Goal: STG-Within one week, patient will transfer bed to chair  Outcome: NOT MET  CGA                Goal: STG-Within one week, patient will move supine to sit     Dates: Start: 12/11/18       Description: 1) Individualized goal: Transfer supine to/from sit sba 1 week  2) Interventions:  PT Gait Training, PT Therapeutic Exercises, PT Neuro Re-Ed/Balance and PT Therapeutic Activity       Note:     Goal Note filed on 12/19/18 1239 by Vanessa Welsh, PT    Goal: STG-Within one week, patient will move supine to sit  Outcome: NOT MET  SBA- mod A                  Problem: PT-Long Term Goals     Dates: Start: 12/11/18       Goal: LTG-By discharge, patient will ambulate     Dates: Start: 12/11/18       Description: 1) Individualized goal: Gait fww household 150 ft, community 400 ft, supervision at discharge  2) Interventions:  PT Gait Training, PT Therapeutic Exercises, PT Neuro Re-Ed/Balance and PT Therapeutic Activity             Goal: LTG-By discharge, patient will transfer one surface to another     Dates: Start: 12/11/18       Description: 1) Individualized goal: Transfer one surface to another fww modified independent at discharge  2) Interventions:  PT Gait Training, PT Therapeutic Exercises, PT Neuro Re-Ed/Balance and PT Therapeutic Activity             Goal: LTG-By discharge, patient will transfer in/out of a car     Dates: Start: 12/11/18       Description: 1) Individualized goal: Car transfer fww supervision at discharge  2) Interventions:  PT Gait Training, PT Therapeutic Exercises, PT Neuro Re-Ed/Balance and PT Therapeutic Activity                     Section completed by:  Vanessa Welsh, PT, DPT

## 2018-12-19 NOTE — CARE PLAN
Problem: Safety  Goal: Will remain free from falls    Intervention: Implement fall precautions  Use of call light encouraged to make needs known.Bed in low position, non skid socks/shoes on when out of bed.Call light within reach.Will continue to monitor and assess needs and safety.      Problem: Pain Management  Goal: Pain level will decrease to patient's comfort goal    Intervention: Follow pain managment plan developed in collaboration with patient and Interdisciplinary Team  Pain is manageable with scheduled medication.Repositioned with pillows for comfort.Will continue to monitor and assess pain level and medicate as needed.      Problem: Metabolic:  Goal: Ability to maintain appropriate glucose levels will improve    Intervention: Manage blood glucose measurement  FSBS 281, coverage given with hs snack.Will continue to monitor and assess for s/sx of hyper/hypoglycemia.

## 2018-12-19 NOTE — PROGRESS NOTES
"Rehab Progress Note     Encounter date: 2018  Today I met with the patient face to face in PT    Chief Complaint:  Central cord syndrome (HCC) , improved balance     Interval Events (subjective)  Mr. Almanzar is doing very well today.  We again reviewed the need to maintain hydration and oral intake.  He denies any fevers, chills, headache, dizziness, chest pain, or shortness of breath.  He reports that he does have some mild pain with therapy, but this is not limiting him.  He reports that his balance and strength are improving gradually.    Objective:  VITAL SIGNS: /69   Pulse 85   Temp 36.1 °C (97 °F) (Tympanic)   Resp 18   Ht 1.778 m (5' 10\")   Wt 78.2 kg (172 lb 8 oz)   SpO2 98%   BMI 24.75 kg/m²     Recent Results (from the past 72 hour(s))   EKG    Collection Time: 18  3:41 PM   Result Value Ref Range    Report       [facility]    Test Date:  2018  Pt Name:    PABLO ALMANZAR               Department: [facility]  MRN:        [id number]                      Room:       [facility]  Gender:     Male                         Technician: LAUREL  :        1940                   Requested By:GLADIS ABDI  Order #:    [id number]                    Reading MD: Jovanny Zelaya MD    Measurements  Intervals                                Axis  Rate:       113                          P:          16  SD:         132                          QRS:        82  QRSD:       110                          T:          40  QT:         332  QTc:        456    Interpretive Statements  SINUS TACHYCARDIA  INCOMPLETE RIGHT BUNDLE BRANCH BLOCK  Compared to ECG 2018 12:29:28  Incomplete right bundle-branch block now present  Sinus rhythm no longer present  First degree AV block no longer present  Right bundle-branch block no longer present    Electronically Signed On 2018 0:01:42 PST by Jovanny Zelaya MD     ACCU-CHEK GLUCOSE    Collection Time: 18  4:58 PM   Result Value Ref Range    Glucose " - Accu-Ck 279 (H) 65 - 99 mg/dL   ACCU-CHEK GLUCOSE    Collection Time: 12/16/18  8:50 PM   Result Value Ref Range    Glucose - Accu-Ck 224 (H) 65 - 99 mg/dL   ACCU-CHEK GLUCOSE    Collection Time: 12/17/18  8:01 AM   Result Value Ref Range    Glucose - Accu-Ck 189 (H) 65 - 99 mg/dL   ACCU-CHEK GLUCOSE    Collection Time: 12/17/18 11:14 AM   Result Value Ref Range    Glucose - Accu-Ck 242 (H) 65 - 99 mg/dL   ACCU-CHEK GLUCOSE    Collection Time: 12/17/18  5:33 PM   Result Value Ref Range    Glucose - Accu-Ck 259 (H) 65 - 99 mg/dL   ACCU-CHEK GLUCOSE    Collection Time: 12/17/18  9:06 PM   Result Value Ref Range    Glucose - Accu-Ck 292 (H) 65 - 99 mg/dL   BASIC METABOLIC PANEL    Collection Time: 12/18/18  6:12 AM   Result Value Ref Range    Sodium 134 (L) 135 - 145 mmol/L    Potassium 4.1 3.6 - 5.5 mmol/L    Chloride 99 96 - 112 mmol/L    Co2 26 20 - 33 mmol/L    Glucose 161 (H) 65 - 99 mg/dL    Bun 32 (H) 8 - 22 mg/dL    Creatinine 2.11 (H) 0.50 - 1.40 mg/dL    Calcium 8.9 8.5 - 10.5 mg/dL    Anion Gap 9.0 0.0 - 11.9   ESTIMATED GFR    Collection Time: 12/18/18  6:12 AM   Result Value Ref Range    GFR If  37 (A) >60 mL/min/1.73 m 2    GFR If Non  31 (A) >60 mL/min/1.73 m 2   ACCU-CHEK GLUCOSE    Collection Time: 12/18/18  7:28 AM   Result Value Ref Range    Glucose - Accu-Ck 151 (H) 65 - 99 mg/dL   ACCU-CHEK GLUCOSE    Collection Time: 12/18/18 11:01 AM   Result Value Ref Range    Glucose - Accu-Ck 215 (H) 65 - 99 mg/dL   ACCU-CHEK GLUCOSE    Collection Time: 12/18/18 11:51 AM   Result Value Ref Range    Glucose - Accu-Ck 235 (H) 65 - 99 mg/dL   ACCU-CHEK GLUCOSE    Collection Time: 12/18/18  5:16 PM   Result Value Ref Range    Glucose - Accu-Ck 342 (H) 65 - 99 mg/dL   ACCU-CHEK GLUCOSE    Collection Time: 12/18/18  8:15 PM   Result Value Ref Range    Glucose - Accu-Ck 281 (H) 65 - 99 mg/dL   ACCU-CHEK GLUCOSE    Collection Time: 12/19/18  7:38 AM   Result Value Ref Range    Glucose  - Accu-Ck 185 (H) 65 - 99 mg/dL   ACCU-CHEK GLUCOSE    Collection Time: 12/19/18 10:59 AM   Result Value Ref Range    Glucose - Accu-Ck 288 (H) 65 - 99 mg/dL       Current Facility-Administered Medications   Medication Frequency   • pioglitazone (ACTOS) tablet 30 mg DAILY   • midodrine (PROAMATINE) tablet 2.5 mg BID   • gabapentin (NEURONTIN) capsule 300 mg TID   • vitamin D (cholecalciferol) tablet 2,000 Units DAILY   • ascorbic acid tablet 500 mg Q48HRS   • acetaminophen (TYLENOL) tablet 650 mg Q6HRS   • docusate sodium 100mg/10mL (COLACE) solution 100 mg BID   • heparin injection 5,000 Units Q8HRS   • insulin regular (HUMULIN R) injection 3-14 Units 4X/DAY ACHS    And   • glucose 4 g chewable tablet 16 g Q15 MIN PRN    And   • dextrose 50% (D50W) injection 25 mL Q15 MIN PRN   • magnesium hydroxide (MILK OF MAGNESIA) suspension 30 mL DAILY   • polyethylene glycol/lytes (MIRALAX) PACKET 1 Packet BID   • senna-docusate (PERICOLACE or SENOKOT S) 8.6-50 MG per tablet 1 Tab Nightly   • simvastatin (ZOCOR) tablet 20 mg Nightly   • Respiratory Care per Protocol Continuous RT   • Pharmacy Consult Request ...Pain Management Review 1 Each PRN   • oxyCODONE immediate-release (ROXICODONE) tablet 5 mg Q3HRS PRN   • hydrALAZINE (APRESOLINE) tablet 25 mg Q8HRS PRN   • acetaminophen (TYLENOL) tablet 650 mg Q4HRS PRN   • artificial tears 1.4 % ophthalmic solution 1 Drop PRN   • benzocaine-menthol (CEPACOL) lozenge 1 Lozenge Q2HRS PRN   • mag hydrox-al hydrox-simeth (MAALOX PLUS ES or MYLANTA DS) suspension 20 mL Q2HRS PRN   • ondansetron (ZOFRAN ODT) dispertab 4 mg 4X/DAY PRN    Or   • ondansetron (ZOFRAN) syringe/vial injection 4 mg 4X/DAY PRN   • traZODone (DESYREL) tablet 50 mg QHS PRN   • sodium chloride (OCEAN) 0.65 % nasal spray 2 Spray PRN   • cyclobenzaprine (FLEXERIL) tablet 10 mg TID PRN   • ferrous sulfate tablet 325 mg Q48HRS   • omeprazole (PRILOSEC) capsule 20 mg DAILY       Exam Date: 12/19/2018    General:  Awake,  alert, oriented, no acute distress  HEENT:  Wearing Aspen cervical collar  Cardiac: regular rate and rhythm  Lungs: clear to auscultation bilaterally.   Abdomen: soft; non tender, non distended, bowel sounds present and normoactive  Extremities: No edema in the bilateral lower limbs  Neuro:   Standing in parallel bars with no support during physical therapy today.  He still has a kyphotic stooped posture.  Right elbow flexion remains 2+/5      Orders Placed This Encounter   Procedures   • Diet Order Diabetic     Standing Status:   Standing     Number of Occurrences:   1     Order Specific Question:   Diet:     Answer:   Diabetic [3]     Order Specific Question:   Texture/Fiber modifications:     Answer:   Dysphagia 3(Mechanical Soft)specify fluid consistency(question 6) [3]     Comments:   1:1 supervision for self feeding     Order Specific Question:   Consistency/Fluid modifications:     Answer:   [place] Thick [2]       Assessment:  Active Hospital Problems    Diagnosis   • *Central cord syndrome (HCC)   • Dysphagia   • Type 2 diabetes mellitus (HCC)   • Chronic renal failure   • Scalp laceration   • 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 Simon    Scalp sutures removed  December 14  Speech and language pathology for cognition    Dysphagia  Dysphagia 3 with nectar thick liquid  Continue speech-language pathology sessions     Pain  Schedule Tylenol every 6 hours  Gabapentin 300 mg TID--do not escalate due to renal impairment   Oxycodone 5 mg q 6 hours as needed--dose interval prolonged on December 19 due to disuse    Continue to monitor and decrease to 2.5 mg if tolerated    He has used 5 mg of oxycodone in the last 24 hours     Neurogenic Bladder   Continue timed voiding     Neurogenic Bowel  Colace twice a day  Milk of magnesia daily  Pat-Colace nightly     History of hypertension  Orthostatic hypotension   At baseline, patient on lisinopril 10 mg daily and propranolol 40 mg 3 times a day  Currently on ProAmatine 2.5 mg daily  Continue weaning ProAmatine and monitor for return of hypertension    Blood pressure today is 119/69  Decrease ProAmatine to 2.5 mg in the morning    Diabetes mellitus with hyperglycemia  Currently on sliding scale  Hospitalist working on weaning sliding scale  Actos 30 mg daily--increased on December 18    Glucose range of 185-342 and last 24 hours  Reviewed dietary choices with the patient today    Stage III chronic kidney disease  Creatinine 2.11 on December 18  Continue to encourage fluid intake    Discussed the need for improved oral intake with the patient today  Recheck tomorrow     Hyperlipidemia  Simvastatin 20 mg daily      Anemia  Hemoglobin 10.1 on December 16  Continue ferrous sulfate    Recheck tomorrow     Vitamin D deficiency  Vitamin D was 21 on admission  Increase supplementation to 2000 units of cholecalciferol daily     GI Ppx - Patient on Prilosec 20 mg daily.     DVT ppx - Patient on Heparin 5000 q8h on transfer.     Estimated Discharge: January 4    IDT Team Conference 12/19/2018  I individually evaluated the patient today.  In addition to my daily follow up visit, I was present for and led the interdisciplinary team conference on  12/19/2018 and agree with the interdisciplinary conference documentation and plan of care.     RN: He is doing well today.  He does not like to wear his hearing aids.  He is continent.  We are encouraging fluid intake.  His glucose remains variable      PT:  He is doing well.  He is motivated and making progress.  He has a good sense of humor.  His ataxia is improving.  Balance is improving.       OT:  He enjoys the [employer].  He is meeting goals.  He has some issues with motor planning with adaptive equipment.  He is working hard    Speech and language pathology:  Thins/regular texture trials are underway.  He is easily distracted and speaks during meals.  He eats meals quickly and needs cuing to slow down.  He is having some difficulty with medication sorting and management.  He will need help with medication management at home.     Total time:  35 minutes.  I spent greater than 50% of the time for patient care, counseling, and coordination on this date, including unit/floor time, and face-to-face time with the patient as per interval events and assessment and plan above. Topics discussed included functional progress, balance, hydration, discharge planning, pain control    Mustapha Brandt M.D.  12/19/2018

## 2018-12-19 NOTE — CARE PLAN
"Problem: Inadequate nutrient intake  Goal: Patient to consume greater than or equal to 50% of meals  Outcome: MET Date Met: 12/19/18  Nutrition services - Brief Note: Day 9 of admit.  Vince Almanzar is a 78 y.o. male with admitting DX of incomplete C1-C4 and Cervical Myelopathy.     Pt with PO intake greatly improved. Current diet order Diabetic Dysphagia III with NTL. Current PO intake of % x 10, 50-75% x 4 and 25-50% x 1. Pt with current PO intake adequate. POC Glucose 149-342 since 12/16. Unfortunately, pt is unable to attend the DM class today. Will follow up with a DM education as able.This RD visited a pt T-Dine. PT to be trialing a regular tray today. Pt says he feels great and that he believes he is eating well. Regarding the pt's diabetes, he noted prior to his fall he was having his DM medication adjusted. In addition he acknowledged at the facility he may be eating foods he does not normally eat and may be affecting his BG. Pt having daily BMs and previously attributed constipation to his variable/poor PO which has since improved.       Assessment:  Height: 177.8 cm (5' 10\")  Weight: 78.2 kg (172 lb 8 oz)  Body mass index is 24.75 kg/m².   Diet/Intake: Diabetic Dysphagia III with NTL    Labs, MAR (Actos and Humulin SSI)and Chart reviewed.     Recommendations/Plan:  1. Diet as ordered. Advancements per SLP.   2. DM diet education as able.    3. Encourage intake of 50% or greater.   4. Document intake of all meals  as % taken in ADL's to provide interdisciplinary communication across all shifts.   5. Monitor weight.  6. Nutrition rep will continue to see patient for ongoing meal and snack preferences.               "

## 2018-12-19 NOTE — PROGRESS NOTES
"Rehab Progress Note     Encounter date: 12/18/2018  Today I met with the patient face to face in OT    Chief Complaint:  Central cord syndrome (HCC) , right arm weakness     Interval Events (subjective)  Mr. Almanzar is doing well today.  He denies any fevers, chills, headache, dizziness, chest pain, shortness of breath.  He denies any ongoing memory impairment.  He recalls meeting me, but he cannot recall my name.  He is still frustrated by his right upper limb functional deficits.  He is working hard on these.  He reports that he is having occasional visual hallucinations at night.  He reports that when someone comes into the room to help him, he believes there is still in the room for a few seconds after they left.  These have been going on for quite some time.  These are stable.    Objective:  VITAL SIGNS: /62   Pulse (!) 115 Comment: Notified RN  Temp 36.5 °C (97.7 °F) (Oral)   Resp 18   Ht 1.778 m (5' 10\")   Wt 78.2 kg (172 lb 8 oz)   SpO2 98%   BMI 24.75 kg/m²     Recent Results (from the past 72 hour(s))   ACCU-CHEK GLUCOSE    Collection Time: 12/15/18  8:00 PM   Result Value Ref Range    Glucose - Accu-Ck 266 (H) 65 - 99 mg/dL   CBC WITHOUT DIFFERENTIAL    Collection Time: 12/16/18  5:47 AM   Result Value Ref Range    WBC 4.4 (L) 4.8 - 10.8 K/uL    RBC 3.45 (L) 4.70 - 6.10 M/uL    Hemoglobin 10.1 (L) 14.0 - 18.0 g/dL    Hematocrit 30.7 (L) 42.0 - 52.0 %    MCV 89.0 81.4 - 97.8 fL    MCH 29.3 27.0 - 33.0 pg    MCHC 32.9 (L) 33.7 - 35.3 g/dL    RDW 53.3 (H) 35.9 - 50.0 fL    Platelet Count 138 (L) 164 - 446 K/uL    MPV 9.3 9.0 - 12.9 fL   BASIC METABOLIC PANEL    Collection Time: 12/16/18  5:47 AM   Result Value Ref Range    Sodium 136 135 - 145 mmol/L    Potassium 4.6 3.6 - 5.5 mmol/L    Chloride 102 96 - 112 mmol/L    Co2 25 20 - 33 mmol/L    Glucose 159 (H) 65 - 99 mg/dL    Bun 27 (H) 8 - 22 mg/dL    Creatinine 2.01 (H) 0.50 - 1.40 mg/dL    Calcium 8.8 8.5 - 10.5 mg/dL    Anion Gap 9.0 0.0 - " 11.9   ESTIMATED GFR    Collection Time: 18  5:47 AM   Result Value Ref Range    GFR If  39 (A) >60 mL/min/1.73 m 2    GFR If Non  32 (A) >60 mL/min/1.73 m 2   ACCU-CHEK GLUCOSE    Collection Time: 18  7:06 AM   Result Value Ref Range    Glucose - Accu-Ck 149 (H) 65 - 99 mg/dL   ACCU-CHEK GLUCOSE    Collection Time: 18 11:02 AM   Result Value Ref Range    Glucose - Accu-Ck 225 (H) 65 - 99 mg/dL   EKG    Collection Time: 18  3:41 PM   Result Value Ref Range    Report       [facility]    Test Date:  2018  Pt Name:    PABLO STEVENS               Department: [facility]  MRN:        [id number]                      Room:       [facility]  Gender:     Male                         Technician: LAUREL  :        1940                   Requested By:GLADIS ABDI  Order #:    [id number]                    Reading MD: Jovanny Zelaya MD    Measurements  Intervals                                Axis  Rate:       113                          P:          16  MA:         132                          QRS:        82  QRSD:       110                          T:          40  QT:         332  QTc:        456    Interpretive Statements  SINUS TACHYCARDIA  INCOMPLETE RIGHT BUNDLE BRANCH BLOCK  Compared to ECG 2018 12:29:28  Incomplete right bundle-branch block now present  Sinus rhythm no longer present  First degree AV block no longer present  Right bundle-branch block no longer present    Electronically Signed On 2018 0:01:42 PST by Jovanny Zelaya MD     ACCU-CHEK GLUCOSE    Collection Time: 18  4:58 PM   Result Value Ref Range    Glucose - Accu-Ck 279 (H) 65 - 99 mg/dL   ACCU-CHEK GLUCOSE    Collection Time: 18  8:50 PM   Result Value Ref Range    Glucose - Accu-Ck 224 (H) 65 - 99 mg/dL   ACCU-CHEK GLUCOSE    Collection Time: 18  8:01 AM   Result Value Ref Range    Glucose - Accu-Ck 189 (H) 65 - 99 mg/dL   ACCU-CHEK GLUCOSE    Collection Time:  12/17/18  9:06 PM   Result Value Ref Range    Glucose - Accu-Ck 292 (H) 65 - 99 mg/dL   BASIC METABOLIC PANEL    Collection Time: 12/18/18  6:12 AM   Result Value Ref Range    Sodium 134 (L) 135 - 145 mmol/L    Potassium 4.1 3.6 - 5.5 mmol/L    Chloride 99 96 - 112 mmol/L    Co2 26 20 - 33 mmol/L    Glucose 161 (H) 65 - 99 mg/dL    Bun 32 (H) 8 - 22 mg/dL    Creatinine 2.11 (H) 0.50 - 1.40 mg/dL    Calcium 8.9 8.5 - 10.5 mg/dL    Anion Gap 9.0 0.0 - 11.9   ESTIMATED GFR    Collection Time: 12/18/18  6:12 AM   Result Value Ref Range    GFR If  37 (A) >60 mL/min/1.73 m 2    GFR If Non  31 (A) >60 mL/min/1.73 m 2   ACCU-CHEK GLUCOSE    Collection Time: 12/18/18  7:28 AM   Result Value Ref Range    Glucose - Accu-Ck 151 (H) 65 - 99 mg/dL       Current Facility-Administered Medications   Medication Frequency   • [START ON 12/19/2018] pioglitazone (ACTOS) tablet 30 mg DAILY   • midodrine (PROAMATINE) tablet 2.5 mg BID   • gabapentin (NEURONTIN) capsule 300 mg TID   • vitamin D (cholecalciferol) tablet 2,000 Units DAILY   • ascorbic acid tablet 500 mg Q48HRS   • acetaminophen (TYLENOL) tablet 650 mg Q6HRS   • docusate sodium 100mg/10mL (COLACE) solution 100 mg BID   • heparin injection 5,000 Units Q8HRS   • insulin regular (HUMULIN R) injection 3-14 Units 4X/DAY ACHS    And   • glucose 4 g chewable tablet 16 g Q15 MIN PRN    And   • dextrose 50% (D50W) injection 25 mL Q15 MIN PRN   • magnesium hydroxide (MILK OF MAGNESIA) suspension 30 mL DAILY   • polyethylene glycol/lytes (MIRALAX) PACKET 1 Packet BID   • senna-docusate (PERICOLACE or SENOKOT S) 8.6-50 MG per tablet 1 Tab Nightly   • simvastatin (ZOCOR) tablet 20 mg Nightly   • Respiratory Care per Protocol Continuous RT   • Pharmacy Consult Request ...Pain Management Review 1 Each PRN   • oxyCODONE immediate-release (ROXICODONE) tablet 5 mg Q3HRS PRN   • hydrALAZINE (APRESOLINE) tablet 25 mg Q8HRS PRN   • acetaminophen (TYLENOL) tablet  650 mg Q4HRS PRN   • artificial tears 1.4 % ophthalmic solution 1 Drop PRN   • benzocaine-menthol (CEPACOL) lozenge 1 Lozenge Q2HRS PRN   • mag hydrox-al hydrox-simeth (MAALOX PLUS ES or MYLANTA DS) suspension 20 mL Q2HRS PRN   • ondansetron (ZOFRAN ODT) dispertab 4 mg 4X/DAY PRN    Or   • ondansetron (ZOFRAN) syringe/vial injection 4 mg 4X/DAY PRN   • traZODone (DESYREL) tablet 50 mg QHS PRN   • sodium chloride (OCEAN) 0.65 % nasal spray 2 Spray PRN   • cyclobenzaprine (FLEXERIL) tablet 10 mg TID PRN   • ferrous sulfate tablet 325 mg Q48HRS   • omeprazole (PRILOSEC) capsule 20 mg DAILY       Exam Date: 12/18/2018    General:  Awake, alert, oriented, no acute distress  HEENT:  Wearing Aspen cervical collar  Cardiac: regular rate and rhythm  Lungs: clear to auscultation bilaterally.   Abdomen: soft; non tender, non distended, bowel sounds present and normoactive  Extremities: No edema in the bilateral lower limbs  Neuro:   Awake, alert, able to recall our meeting but not my name.  Lower limb strength continues to be antigravity.  Right elbow flexor strength is 2+/5      Orders Placed This Encounter   Procedures   • Diet Order Diabetic     Standing Status:   Standing     Number of Occurrences:   1     Order Specific Question:   Diet:     Answer:   Diabetic [3]     Order Specific Question:   Texture/Fiber modifications:     Answer:   Dysphagia 3(Mechanical Soft)specify fluid consistency(question 6) [3]     Comments:   1:1 supervision for self feeding     Order Specific Question:   Consistency/Fluid modifications:     Answer:   Ladora Thick [2]       Assessment:  Active Hospital Problems    Diagnosis   • *Central cord syndrome (HCC)   • Dysphagia   • Type 2 diabetes mellitus (HCC)   • Chronic renal failure   • Scalp laceration   • 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 Simon    Scalp sutures removed December 14  Speech and language pathology for cognition    Dysphagia  Dysphagia 3 with nectar thick liquid  Continue speech-language pathology sessions     Pain  Schedule Tylenol every 6 hours  Gabapentin 300 mg TID--do not escalate due to renal impairment   Oxycodone 5 mg q 3 hours as needed     Neurogenic Bladder   Continue timed voiding     Neurogenic Bowel  Colace twice a day  Milk of magnesia daily  Pat-Colace nightly     History of hypertension  Orthostatic hypotension   At baseline, patient on lisinopril 10 mg daily and propranolol 40 mg 3 times a day  Currently on ProAmatine 2.5 mg twice a day  Continue weaning ProAmatine and monitor for return of hypertension    If blood pressure remains above 120 mmHg tomorrow, discontinue ProAmatine    Diabetes mellitus with hyperglycemia  Currently on sliding scale  Hospitalist working on weaning sliding scale  Actos 30 mg daily--increased on December 18    Stage III chronic kidney disease  Creatinine 2.11 on December 18  Continue to encourage fluid intake     Hyperlipidemia  Simvastatin 20 mg daily      Anemia  Hemoglobin 10.1 on December 16  Continue ferrous sulfate    Vitamin D deficiency  Vitamin D was 21 on admission  Increase supplementation to 2000 units of cholecalciferol daily     GI Ppx - Patient on Prilosec 20 mg daily.     DVT ppx -  Patient on Heparin 5000 q8h on transfer.     Estimated Discharge: 14-21 days    Total time:  27 minutes.  I spent greater than 50% of the time for patient care, counseling, and coordination on this date, including unit/floor time, and face-to-face time with the patient as per interval events and assessment and plan above. Topics discussed included functional progress, discharge planning, Right upper limb function, transient hallucinations, hydration    Mustapha Brandt M.D.  12/18/2018

## 2018-12-19 NOTE — REHAB-NURSING IDT TEAM NOTE
Nursing   Nursing  Diet/Nutrition:  Diabetic, Dysphagia III-Mechanical Soft and Nectar Thick Liquids  Medication Administration:  Other whole with nectar thick liquid  % consumed at meals in last 24 hours:  Consumed 75%-100% of meals per documentation.  Meal/Snack Consumption for the past 24 hrs:   Oral Nutrition   12/18/18 1840 Dinner;Between % Consumed   12/18/18 1400 Lunch;Between % Consumed   12/18/18 0914 Breakfast;Between % Consumed     Snack schedule:  HS  Appetite:  Good  Fluid Intake/Output in past 24 hours: In: 960 [P.O.:960]  Out: 600   Admit Weight:  Weight: 78.2 kg (172 lb 8 oz)  Weight Last 7 Days       Pain Issues:    Location:  Shoulder (12/18 2028)  Right (12/18 2028)         Severity:  Moderate   Scheduled pain medications:  gabapentin (NEURONTIN) , Tylenol     PRN pain medications used in last 24 hours:  oxycodone immediate release (ROXICODONE)    Non Pharmacologic Interventions:  emotional support, repositioned and rest  Effectiveness of pain management plan:  good=patient states acceptable comfort after interventions    Bowel:    Bowel Assist Initial Score:  1 - Total Assistance  Bowel Assist Current Score:  3 - Moderate Assistance  Bowl Accidents in last 7 days:     Last bowel movement: 12/18/18  Stool Description: Large, Edi, Formed     Usual bowel pattern:  Daily  Scheduled bowel medications:  docusate sodium (COLACE) and polyethylene glycol/lytes (MIRALAX) , senna  PRN bowel medications used in last 24 hours:  None  Nursing Interventions:  Increased time, Scheduled medication, Supervision, Positioning on commode/toilet  Effectiveness of bowel program:   fair=sometimes needs prn bowel meds for constipation  Bladder:    Bladder Assist Initial Score:  1 - Total Assistance  Bladder Assist Current Score:  4 - Minimal Assistance  Bladder Accidents in last 7 days:     PVR range for past 24-48 hours: -- ()  Medications affecting bladder:  None    Interventions:  Increased time,  Supervision  Effectiveness of bladder training:  Good=regular, predictable, emptying of bladder, patient initiates time voiding    Diabetes:  Blood Sugar Frequency:  Before meals and at bedtime    Range of BS for last 48 hours:   Recent Labs      12/17/18   0801  12/17/18   1114  12/17/18   1733  12/17/18   2106  12/18/18   0728  12/18/18   1101  12/18/18   1151  12/18/18   1716  12/18/18 2015   POCGLUCOSE  189*  242*  259*  292*  151*  215*  235*  342*  281*     Scheduled diabetic medications:  Other Humulin R, Actos  Sliding scale usage in past 24 hours:  Yes  Total Short acting insulin in the past 24 hours:  Humulin 24 units  Total Long acting insulin in the past 24 hours:  None    Wound:   Patient Lines/Drains/Airways Status    Active Current Wounds     Name: Placement date: Placement time: Site: Days:    Wound 12/03/18 Laceration Face 12/03/18         16    Wound 12/08/18 Diabetic Ulcer Toe (Comment which one) R 3rd toe, L 5th toe 12/08/18   1200      10                   Interventions:  Monitor and assess  Effectiveness of intervention:  wound is unchanged     Sleep/wake cycle:   Average hours slept:  Sleeps 4-6 hours without waking  Sleep medication usage:  None    Patient/Family Training/Education:  Aspiration Precautions, Bladder Management/Training, Bowel Management/Training, Diabetes Management, Diet/Nutrition, Fall Prevention, General Self Care, Medication Management, Pain Management, Respiratory Hygiene, Safety and Wound Care  Discharge Supply Recommendations:  Glucometer and Strips  Strengths: Alert and oriented, Willingly participates in therapeutic activities, Able to follow instructions, Supportive family and Manages pain appropriately   Barriers:   Fatigue, Generalized weakness and Limited mobility            Nursing Problems           Problem: Bowel/Gastric:     Goal: Normal bowel function is maintained or improved           Goal: Will not experience complications related to bowel motility              Problem: Communication     Goal: The ability to communicate needs accurately and effectively will improve             Problem: Discharge Barriers/Planning     Goal: Patient's continuum of care needs will be met             Problem: Infection     Goal: Will remain free from infection             Problem: Knowledge Deficit     Goal: Knowledge of disease process/condition, treatment plan, diagnostic tests, and medications will improve           Goal: Knowledge of the prescribed therapeutic regimen will improve             Problem: Metabolic:     Goal: Ability to maintain appropriate glucose levels will improve             Problem: Mobility     Goal: Risk for activity intolerance will decrease             Problem: Pain Management     Goal: Pain level will decrease to patient's comfort goal             Problem: Psychosocial Needs:     Goal: Level of anxiety will decrease             Problem: Respiratory:     Goal: Respiratory status will improve             Problem: Safety     Goal: Will remain free from injury           Goal: Will remain free from falls             Problem: Skin Integrity     Goal: Risk for impaired skin integrity will decrease             Problem: Venous Thromboembolism (VTW)/Deep Vein Thrombosis (DVT) Prevention:     Goal: Patient will participate in Venous Thrombosis (VTE)/Deep Vein Thrombosis (DVT)Prevention Measures     Flowsheet:     Taken at 12/13/18 2200    Pharmacologic Prophylaxis Used Unfractionated Heparin by Diana Kramer R.N.                       Long Term Goals:   At discharge patient will be able to function safely at home and in the community with support.    Section completed by:  Vicky Mendoza R.N.

## 2018-12-19 NOTE — REHAB-SLP IDT TEAM NOTE
Speech Therapy   Cognitive Linquistic Functions  Comprehension Initial:  4 - Minimal Assistance  Comprehension Current:  6 - Modified Independent   Comprehension Description:  Verbal cues, Glasses  Expression Initial:  5 - Stand-by Prompting/Supervision or Set-up  Expression Current:  6 - Modified Independent   Expression Description:  Verbal cueing  Social Interaction Initial:  5 - Stand-by Prompting/Supervision or Set-up  Social Interaction Current:  6 - Modified Independent   Social Interaction Description:  Increased time  Problem Solving Initial:  3 - Moderate Assistance  Problem Solving Current:  5 - Standby Prompting/Supervision or Set-up   Problem Solving Description:  Verbal cueing, Therapy schedule  Memory Initial:  2 - Max Assistance  Memory Current:  5 - Standby Prompting/Supervision or Set-up   Memory Description:  Verbal cueing, Therapy schedule  Executive Functioning / Organization Initial:     Executive Functioning / Organization Current: 5 - Standby Prompting/Supervision or Set-up   Executive Functioning Desciption: Pt will require assistance with IADLs at home   Swallowing  Swallowing Status: MBSS completed - Premature spillage to the valleculae for puree and solids. Premature spillage to the pyriforms for all liquid boluses. Consistent penetration and eventual SILENT ASPIRATION of straw sip thin liquids, Pt tolerated tsp and controlled cup sip thin liquids with no pen/asp. Pt advanced to Dys 3, trials of thin liquids via cup during meals. Trials of regular textures to be assessed today.  Orders Placed This Encounter   Procedures   • Diet Order Diabetic     Standing Status:   Standing     Number of Occurrences:   1     Order Specific Question:   Diet:     Answer:   Diabetic [3]     Order Specific Question:   Texture/Fiber modifications:     Answer:   Dysphagia 3(Mechanical Soft)specify fluid consistency(question 6) [3]     Comments:   1:1 supervision for self feeding     Order Specific Question:    Consistency/Fluid modifications:     Answer:   Nectar Thick [2]     Behavior Modification Plan  Allow for rest time, Give clear feedback, Set clear goals, Provide reasonable choices, Decrease the chance of failure by offering activities that are within the patient's abilities and Analyze tasks (break down into smaller steps)  Assistive Technology  Low/Impaired vision equipment and Low tech: Calendar, planner, schedule, alarms/timers, pill organizer, post-it notes, lists  Family Training/Education:  To be completed with sister  DC Recommendations: TBD  Strengths:  Able to follow instructions, Alert and oriented, Independent PLOF, Making steady progress towards goals, Manages pain appropriately, Motivated for self care and independence, Pleasant and cooperative and Willingly participates in therapeutic activities  Barriers:  Aspiration risk and Other: executive function, complex attention  # of short term goals set=4  # of short term goals met=3       Speech Therapy Problems           Problem: Problem Solving STGs     Dates: Start: 12/11/18       Goal: STG-Within one week, patient will     Dates: Start: 12/11/18       Description: 1) Individualized goal: Complete functional medication management/sorting task with demonstration of knowledge of accurate medication names, functions, admin times using MOD A with 100% accuracy   2) Interventions:  SLP Cognitive Skill Development       Note:     Goal Note filed on 12/19/18 1050 by Carla Hammer MS,CCC-SLP    Goal: STG-Within one week, patient will  Outcome: NOT MET  Pt 25% accuracy with med sort.                   Problem: Speech/Swallowing LTGs     Dates: Start: 12/11/18       Goal: LTG-By discharge, patient will safely swallow     Dates: Start: 12/11/18       Description: 1) Individualized goal:  Regular textures/thin liquids with MOD I for use of compensatory strategies.   2) Interventions:  SLP Swallowing Dysfunction Treatment, SLP Cognitive Skill Development and SLP  Group Treatment             Goal: LTG-By discharge, patient will solve complex problem     Dates: Start: 12/11/18       Description: 1) Individualized goal:  By utilizing compensatory intervention for memory and problem-solving to allow for safe completion of daily activities with MOD I  2) Interventions:  SLP Cognitive Skill Development and SLP Group Treatment                    Section completed by:  Carla Hammer MS,CCC-SLP

## 2018-12-20 LAB
ANION GAP SERPL CALC-SCNC: 10 MMOL/L (ref 0–11.9)
BASOPHILS # BLD AUTO: 0.5 % (ref 0–1.8)
BASOPHILS # BLD: 0.02 K/UL (ref 0–0.12)
BUN SERPL-MCNC: 39 MG/DL (ref 8–22)
CALCIUM SERPL-MCNC: 8.7 MG/DL (ref 8.5–10.5)
CHLORIDE SERPL-SCNC: 101 MMOL/L (ref 96–112)
CO2 SERPL-SCNC: 26 MMOL/L (ref 20–33)
CREAT SERPL-MCNC: 2.1 MG/DL (ref 0.5–1.4)
EOSINOPHIL # BLD AUTO: 0.05 K/UL (ref 0–0.51)
EOSINOPHIL NFR BLD: 1.3 % (ref 0–6.9)
ERYTHROCYTE [DISTWIDTH] IN BLOOD BY AUTOMATED COUNT: 56.3 FL (ref 35.9–50)
GLUCOSE BLD-MCNC: 208 MG/DL (ref 65–99)
GLUCOSE BLD-MCNC: 214 MG/DL (ref 65–99)
GLUCOSE BLD-MCNC: 219 MG/DL (ref 65–99)
GLUCOSE BLD-MCNC: 311 MG/DL (ref 65–99)
GLUCOSE SERPL-MCNC: 188 MG/DL (ref 65–99)
HCT VFR BLD AUTO: 29 % (ref 42–52)
HGB BLD-MCNC: 9.5 G/DL (ref 14–18)
IMM GRANULOCYTES # BLD AUTO: 0.03 K/UL (ref 0–0.11)
IMM GRANULOCYTES NFR BLD AUTO: 0.8 % (ref 0–0.9)
LYMPHOCYTES # BLD AUTO: 0.46 K/UL (ref 1–4.8)
LYMPHOCYTES NFR BLD: 12.1 % (ref 22–41)
MCH RBC QN AUTO: 29.5 PG (ref 27–33)
MCHC RBC AUTO-ENTMCNC: 32.8 G/DL (ref 33.7–35.3)
MCV RBC AUTO: 90.1 FL (ref 81.4–97.8)
MONOCYTES # BLD AUTO: 0.41 K/UL (ref 0–0.85)
MONOCYTES NFR BLD AUTO: 10.8 % (ref 0–13.4)
NEUTROPHILS # BLD AUTO: 2.84 K/UL (ref 1.82–7.42)
NEUTROPHILS NFR BLD: 74.5 % (ref 44–72)
NRBC # BLD AUTO: 0 K/UL
NRBC BLD-RTO: 0 /100 WBC
PLATELET # BLD AUTO: 131 K/UL (ref 164–446)
PMV BLD AUTO: 9.6 FL (ref 9–12.9)
POTASSIUM SERPL-SCNC: 4.2 MMOL/L (ref 3.6–5.5)
RBC # BLD AUTO: 3.22 M/UL (ref 4.7–6.1)
SODIUM SERPL-SCNC: 137 MMOL/L (ref 135–145)
WBC # BLD AUTO: 3.8 K/UL (ref 4.8–10.8)

## 2018-12-20 PROCEDURE — 97112 NEUROMUSCULAR REEDUCATION: CPT

## 2018-12-20 PROCEDURE — 80048 BASIC METABOLIC PNL TOTAL CA: CPT

## 2018-12-20 PROCEDURE — 700102 HCHG RX REV CODE 250 W/ 637 OVERRIDE(OP): Performed by: PHYSICAL MEDICINE & REHABILITATION

## 2018-12-20 PROCEDURE — 99232 SBSQ HOSP IP/OBS MODERATE 35: CPT | Performed by: PHYSICAL MEDICINE & REHABILITATION

## 2018-12-20 PROCEDURE — 99232 SBSQ HOSP IP/OBS MODERATE 35: CPT | Performed by: HOSPITALIST

## 2018-12-20 PROCEDURE — A9270 NON-COVERED ITEM OR SERVICE: HCPCS | Performed by: PHYSICAL MEDICINE & REHABILITATION

## 2018-12-20 PROCEDURE — 82962 GLUCOSE BLOOD TEST: CPT

## 2018-12-20 PROCEDURE — 97530 THERAPEUTIC ACTIVITIES: CPT

## 2018-12-20 PROCEDURE — 92526 ORAL FUNCTION THERAPY: CPT

## 2018-12-20 PROCEDURE — 770010 HCHG ROOM/CARE - REHAB SEMI PRIVAT*

## 2018-12-20 PROCEDURE — 700111 HCHG RX REV CODE 636 W/ 250 OVERRIDE (IP): Performed by: PHYSICAL MEDICINE & REHABILITATION

## 2018-12-20 PROCEDURE — 85025 COMPLETE CBC W/AUTO DIFF WBC: CPT

## 2018-12-20 PROCEDURE — 97535 SELF CARE MNGMENT TRAINING: CPT

## 2018-12-20 PROCEDURE — A9270 NON-COVERED ITEM OR SERVICE: HCPCS | Performed by: HOSPITALIST

## 2018-12-20 PROCEDURE — 97110 THERAPEUTIC EXERCISES: CPT

## 2018-12-20 PROCEDURE — 36415 COLL VENOUS BLD VENIPUNCTURE: CPT

## 2018-12-20 PROCEDURE — 700102 HCHG RX REV CODE 250 W/ 637 OVERRIDE(OP): Performed by: HOSPITALIST

## 2018-12-20 PROCEDURE — G0515 COGNITIVE SKILLS DEVELOPMENT: HCPCS

## 2018-12-20 RX ORDER — DOCUSATE SODIUM 100 MG/1
100 CAPSULE, LIQUID FILLED ORAL
Status: DISCONTINUED | OUTPATIENT
Start: 2018-12-20 | End: 2019-01-04 | Stop reason: HOSPADM

## 2018-12-20 RX ORDER — POLYETHYLENE GLYCOL 3350 17 G/17G
1 POWDER, FOR SOLUTION ORAL PRN
Status: DISCONTINUED | OUTPATIENT
Start: 2018-12-20 | End: 2019-01-04 | Stop reason: HOSPADM

## 2018-12-20 RX ADMIN — INSULIN HUMAN 10 UNITS: 100 INJECTION, SOLUTION PARENTERAL at 21:06

## 2018-12-20 RX ADMIN — ACETAMINOPHEN 650 MG: 325 TABLET ORAL at 12:23

## 2018-12-20 RX ADMIN — HEPARIN SODIUM 5000 UNITS: 5000 INJECTION, SOLUTION INTRAVENOUS; SUBCUTANEOUS at 21:04

## 2018-12-20 RX ADMIN — ACETAMINOPHEN 650 MG: 325 TABLET ORAL at 04:56

## 2018-12-20 RX ADMIN — GABAPENTIN 300 MG: 300 CAPSULE ORAL at 19:42

## 2018-12-20 RX ADMIN — GABAPENTIN 300 MG: 300 CAPSULE ORAL at 08:52

## 2018-12-20 RX ADMIN — OMEPRAZOLE 20 MG: 20 CAPSULE, DELAYED RELEASE ORAL at 08:52

## 2018-12-20 RX ADMIN — GABAPENTIN 300 MG: 300 CAPSULE ORAL at 14:33

## 2018-12-20 RX ADMIN — PIOGLITAZONE 30 MG: 15 TABLET ORAL at 08:52

## 2018-12-20 RX ADMIN — OXYCODONE HYDROCHLORIDE 5 MG: 5 TABLET ORAL at 10:33

## 2018-12-20 RX ADMIN — HEPARIN SODIUM 5000 UNITS: 5000 INJECTION, SOLUTION INTRAVENOUS; SUBCUTANEOUS at 14:32

## 2018-12-20 RX ADMIN — ACETAMINOPHEN 650 MG: 325 TABLET ORAL at 17:43

## 2018-12-20 RX ADMIN — SENNOSIDES AND DOCUSATE SODIUM 1 TABLET: 8.6; 5 TABLET ORAL at 19:42

## 2018-12-20 RX ADMIN — MIDODRINE HYDROCHLORIDE 2.5 MG: 2.5 TABLET ORAL at 08:52

## 2018-12-20 RX ADMIN — ACETAMINOPHEN 650 MG: 325 TABLET ORAL at 23:10

## 2018-12-20 RX ADMIN — ACETAMINOPHEN 650 MG: 325 TABLET, FILM COATED ORAL at 01:17

## 2018-12-20 RX ADMIN — VITAMIN D, TAB 1000IU (100/BT) 2000 UNITS: 25 TAB at 08:52

## 2018-12-20 RX ADMIN — SIMVASTATIN 20 MG: 20 TABLET, FILM COATED ORAL at 19:42

## 2018-12-20 RX ADMIN — OXYCODONE HYDROCHLORIDE 5 MG: 5 TABLET ORAL at 19:42

## 2018-12-20 RX ADMIN — INSULIN HUMAN 4 UNITS: 100 INJECTION, SOLUTION PARENTERAL at 17:43

## 2018-12-20 RX ADMIN — INSULIN HUMAN 4 UNITS: 100 INJECTION, SOLUTION PARENTERAL at 12:18

## 2018-12-20 RX ADMIN — INSULIN HUMAN 4 UNITS: 100 INJECTION, SOLUTION PARENTERAL at 08:51

## 2018-12-20 RX ADMIN — HEPARIN SODIUM 5000 UNITS: 5000 INJECTION, SOLUTION INTRAVENOUS; SUBCUTANEOUS at 05:03

## 2018-12-20 ASSESSMENT — ENCOUNTER SYMPTOMS
VOMITING: 0
SENSORY CHANGE: 0
SPUTUM PRODUCTION: 0
EYE PAIN: 0
WEAKNESS: 1
PHOTOPHOBIA: 0
NAUSEA: 0
ORTHOPNEA: 0
MYALGIAS: 0
SORE THROAT: 0
PALPITATIONS: 0
BACK PAIN: 0
CONSTIPATION: 0
MEMORY LOSS: 0
FOCAL WEAKNESS: 1
DIZZINESS: 0
DEPRESSION: 0
NERVOUS/ANXIOUS: 0
SHORTNESS OF BREATH: 0
HEARTBURN: 0
COUGH: 0
CLAUDICATION: 0
HEADACHES: 0
STRIDOR: 0
DIARRHEA: 0
TINGLING: 0
HEMOPTYSIS: 0
PND: 0
BLOOD IN STOOL: 0
ABDOMINAL PAIN: 0
TREMORS: 0
SPEECH CHANGE: 0
DOUBLE VISION: 0
FEVER: 0
BLURRED VISION: 0
CHILLS: 0
NECK PAIN: 1

## 2018-12-20 ASSESSMENT — PAIN SCALES - GENERAL
PAINLEVEL_OUTOF10: 5
PAINLEVEL_OUTOF10: 2
PAINLEVEL_OUTOF10: 5
PAINLEVEL_OUTOF10: 5
PAINLEVEL_OUTOF10: 0

## 2018-12-21 LAB
GLUCOSE BLD-MCNC: 167 MG/DL (ref 65–99)
GLUCOSE BLD-MCNC: 197 MG/DL (ref 65–99)
GLUCOSE BLD-MCNC: 201 MG/DL (ref 65–99)
GLUCOSE BLD-MCNC: 252 MG/DL (ref 65–99)

## 2018-12-21 PROCEDURE — A9270 NON-COVERED ITEM OR SERVICE: HCPCS | Performed by: HOSPITALIST

## 2018-12-21 PROCEDURE — 700111 HCHG RX REV CODE 636 W/ 250 OVERRIDE (IP): Performed by: PHYSICAL MEDICINE & REHABILITATION

## 2018-12-21 PROCEDURE — 99232 SBSQ HOSP IP/OBS MODERATE 35: CPT | Performed by: HOSPITALIST

## 2018-12-21 PROCEDURE — 97530 THERAPEUTIC ACTIVITIES: CPT

## 2018-12-21 PROCEDURE — 82962 GLUCOSE BLOOD TEST: CPT

## 2018-12-21 PROCEDURE — 700102 HCHG RX REV CODE 250 W/ 637 OVERRIDE(OP): Performed by: HOSPITALIST

## 2018-12-21 PROCEDURE — 700102 HCHG RX REV CODE 250 W/ 637 OVERRIDE(OP): Performed by: PHYSICAL MEDICINE & REHABILITATION

## 2018-12-21 PROCEDURE — G0515 COGNITIVE SKILLS DEVELOPMENT: HCPCS

## 2018-12-21 PROCEDURE — 97110 THERAPEUTIC EXERCISES: CPT

## 2018-12-21 PROCEDURE — 770010 HCHG ROOM/CARE - REHAB SEMI PRIVAT*

## 2018-12-21 PROCEDURE — 99232 SBSQ HOSP IP/OBS MODERATE 35: CPT | Performed by: PHYSICAL MEDICINE & REHABILITATION

## 2018-12-21 PROCEDURE — A9270 NON-COVERED ITEM OR SERVICE: HCPCS | Performed by: PHYSICAL MEDICINE & REHABILITATION

## 2018-12-21 PROCEDURE — 97112 NEUROMUSCULAR REEDUCATION: CPT

## 2018-12-21 RX ORDER — OXYCODONE HYDROCHLORIDE 5 MG/1
2.5 TABLET ORAL EVERY 6 HOURS PRN
Status: DISCONTINUED | OUTPATIENT
Start: 2018-12-21 | End: 2018-12-26

## 2018-12-21 RX ADMIN — OMEPRAZOLE 20 MG: 20 CAPSULE, DELAYED RELEASE ORAL at 08:06

## 2018-12-21 RX ADMIN — PIOGLITAZONE 30 MG: 15 TABLET ORAL at 08:06

## 2018-12-21 RX ADMIN — OXYCODONE HYDROCHLORIDE 5 MG: 5 TABLET ORAL at 01:44

## 2018-12-21 RX ADMIN — MIDODRINE HYDROCHLORIDE 2.5 MG: 2.5 TABLET ORAL at 08:07

## 2018-12-21 RX ADMIN — INSULIN HUMAN 4 UNITS: 100 INJECTION, SOLUTION PARENTERAL at 17:26

## 2018-12-21 RX ADMIN — GABAPENTIN 300 MG: 300 CAPSULE ORAL at 08:07

## 2018-12-21 RX ADMIN — INSULIN HUMAN 3 UNITS: 100 INJECTION, SOLUTION PARENTERAL at 08:03

## 2018-12-21 RX ADMIN — SENNOSIDES AND DOCUSATE SODIUM 1 TABLET: 8.6; 5 TABLET ORAL at 21:00

## 2018-12-21 RX ADMIN — HEPARIN SODIUM 5000 UNITS: 5000 INJECTION, SOLUTION INTRAVENOUS; SUBCUTANEOUS at 05:15

## 2018-12-21 RX ADMIN — FERROUS SULFATE TAB 325 MG (65 MG ELEMENTAL FE) 325 MG: 325 (65 FE) TAB at 08:07

## 2018-12-21 RX ADMIN — HEPARIN SODIUM 5000 UNITS: 5000 INJECTION, SOLUTION INTRAVENOUS; SUBCUTANEOUS at 21:00

## 2018-12-21 RX ADMIN — ACETAMINOPHEN 650 MG: 325 TABLET ORAL at 23:09

## 2018-12-21 RX ADMIN — INSULIN HUMAN 7 UNITS: 100 INJECTION, SOLUTION PARENTERAL at 21:01

## 2018-12-21 RX ADMIN — GABAPENTIN 300 MG: 300 CAPSULE ORAL at 14:51

## 2018-12-21 RX ADMIN — VITAMIN D, TAB 1000IU (100/BT) 2000 UNITS: 25 TAB at 08:06

## 2018-12-21 RX ADMIN — ACETAMINOPHEN 650 MG: 325 TABLET ORAL at 17:26

## 2018-12-21 RX ADMIN — ACETAMINOPHEN 650 MG: 325 TABLET ORAL at 12:00

## 2018-12-21 RX ADMIN — OXYCODONE HYDROCHLORIDE AND ACETAMINOPHEN 500 MG: 500 TABLET ORAL at 08:07

## 2018-12-21 RX ADMIN — GABAPENTIN 300 MG: 300 CAPSULE ORAL at 21:00

## 2018-12-21 RX ADMIN — SIMVASTATIN 20 MG: 20 TABLET, FILM COATED ORAL at 21:00

## 2018-12-21 RX ADMIN — ACETAMINOPHEN 650 MG: 325 TABLET ORAL at 05:15

## 2018-12-21 RX ADMIN — HEPARIN SODIUM 5000 UNITS: 5000 INJECTION, SOLUTION INTRAVENOUS; SUBCUTANEOUS at 14:50

## 2018-12-21 RX ADMIN — ACETAMINOPHEN 650 MG: 325 TABLET, FILM COATED ORAL at 11:47

## 2018-12-21 RX ADMIN — INSULIN HUMAN 3 UNITS: 100 INJECTION, SOLUTION PARENTERAL at 11:46

## 2018-12-21 ASSESSMENT — ENCOUNTER SYMPTOMS
DIARRHEA: 0
CHILLS: 0
SORE THROAT: 0
EYE PAIN: 0
HEMOPTYSIS: 0
STRIDOR: 0
HEARTBURN: 0
BLURRED VISION: 0
SPUTUM PRODUCTION: 0
NECK PAIN: 1
FOCAL WEAKNESS: 1
VOMITING: 0
DIZZINESS: 0
MEMORY LOSS: 0
CONSTIPATION: 0
TINGLING: 0
SHORTNESS OF BREATH: 0
HEADACHES: 0
BACK PAIN: 0
SPEECH CHANGE: 0
NAUSEA: 0
ABDOMINAL PAIN: 0
COUGH: 0
PND: 0
NERVOUS/ANXIOUS: 0
MYALGIAS: 0
ORTHOPNEA: 0
SENSORY CHANGE: 0
DOUBLE VISION: 0
WEAKNESS: 1
PHOTOPHOBIA: 0
FEVER: 0
DEPRESSION: 0
PALPITATIONS: 0
BLOOD IN STOOL: 0
CLAUDICATION: 0
TREMORS: 0

## 2018-12-21 ASSESSMENT — PAIN SCALES - GENERAL
PAINLEVEL_OUTOF10: 0
PAINLEVEL_OUTOF10: 0
PAINLEVEL_OUTOF10: 6

## 2018-12-21 NOTE — PROGRESS NOTES
Hospital Medicine Daily Progress Note        Chief Complaint  DM    Interval Problem Update  12/17: Rapid HR better w/ PO fluids.  No chest pain, shortness of breath, or palpitations.  12/18: Intermittent tachycardia, patient continues to try and drink more fluids.  Glucose still on the high side between 151-292, increased pioglitazone to 30 after discussion with patient.  12/19: Patient tolerating increased dose of pioglitazone.  Blood pressure in good range.  Patient did have one episode of vomiting yesterday which he attributes to bowel regimen    12/20  No acute issues overall patient comfortable.  Patient reports increased strength in his arms as well as walking.     12/21  Overall clinically doing well, Patient denies fevers/chills, chest pain, shortness of breath or nausea/vommiting.   Resume therapy  Overall HR since last night improved now in low 90s, max 100. SBP in the 130s  D/c midodrine and monitor closely.         Review of Systems  Review of Systems   Constitutional: Negative for chills, fever and malaise/fatigue.   HENT: Negative for congestion, hearing loss, sore throat and tinnitus.    Eyes: Negative for blurred vision, double vision, photophobia and pain.   Respiratory: Negative for cough, hemoptysis, sputum production, shortness of breath and stridor.    Cardiovascular: Negative for chest pain, palpitations, orthopnea, claudication and PND.   Gastrointestinal: Negative for abdominal pain, blood in stool, constipation, diarrhea, heartburn, melena, nausea and vomiting.   Genitourinary: Negative for dysuria, frequency and urgency.   Musculoskeletal: Positive for neck pain (improving). Negative for back pain and myalgias.   Neurological: Positive for focal weakness and weakness (improving). Negative for dizziness, tingling, tremors, sensory change, speech change and headaches.   Psychiatric/Behavioral: Negative for depression, memory loss and suicidal ideas. The patient is not nervous/anxious.          Physical Exam  Temp:  [36.3 °C (97.3 °F)-36.8 °C (98.2 °F)] 36.8 °C (98.2 °F)  Pulse:  [] 99  Resp:  [16-18] 16  BP: (120-131)/(60-76) 126/73    Physical Exam   Constitutional: He is oriented to person, place, and time. He appears well-developed and well-nourished. No distress.   HENT:   Head: Normocephalic and atraumatic.   Right Ear: External ear normal.   Left Ear: External ear normal.   Mouth/Throat: No oropharyngeal exudate.   Eyes: Pupils are equal, round, and reactive to light. Conjunctivae are normal. Right eye exhibits no discharge. No scleral icterus.   Neck: Neck supple. No JVD present. No thyromegaly present.   Cervical collar in place   Cardiovascular: Regular rhythm, normal heart sounds and intact distal pulses.  Tachycardia present.    No murmur heard.  Pulses:       Dorsalis pedis pulses are 2+ on the right side, and 2+ on the left side.   Pulmonary/Chest: Effort normal and breath sounds normal. No stridor. No respiratory distress. He has no wheezes. He has no rales.   Abdominal: Soft. Bowel sounds are normal. He exhibits no distension. There is no tenderness. There is no rebound and no guarding.   Musculoskeletal: Normal range of motion. He exhibits no edema.   Neurological: He is alert and oriented to person, place, and time. No cranial nerve deficit.   Skin: Skin is warm and dry. No rash noted. He is not diaphoretic. No erythema. No pallor.   Psychiatric: He has a normal mood and affect. His behavior is normal. Thought content normal.   Vitals reviewed.      Fluids    Intake/Output Summary (Last 24 hours) at 12/21/18 0851  Last data filed at 12/20/18 2314   Gross per 24 hour   Intake              660 ml   Output              100 ml   Net              560 ml       Laboratory  Recent Labs      12/20/18   0602   WBC  3.8*   RBC  3.22*   HEMOGLOBIN  9.5*   HEMATOCRIT  29.0*   MCV  90.1   MCH  29.5   MCHC  32.8*   RDW  56.3*   PLATELETCT  131*   MPV  9.6     Recent Labs      12/20/18   0602    SODIUM  137   POTASSIUM  4.2   CHLORIDE  101   CO2  26   GLUCOSE  188*   BUN  39*   CREATININE  2.10*   CALCIUM  8.7                 Assessment/Plan  * Central cord syndrome (HCC)- (present on admission)   Assessment & Plan    Conservative management per Neurosurgery  Maintain cervical collar     Chronic renal failure- (present on admission)   Assessment & Plan    CKD Stage III  Creatinine 2.10 around baseline maybe little higher.  Repeat cmp in the am.  Continue to monitor closely  Continue to renally dose all medications, avoid nephrotoxins and nonsteroidal medications     Type 2 diabetes mellitus (HCC)- (present on admission)   Assessment & Plan    Continue home dose of Actos  Fingersticks TID  Deferred outpatient endocrinology follow-up     Tachycardia   Assessment & Plan    Noted SVT on EKG  Overall HR since last night improved now in low 90s, max 100.   D/c midodrine        Vitamin D deficiency   Assessment & Plan    Continue vitamin D supplementation       Thrombocytopenia (HCC)   Assessment & Plan    Platelets within normal limits, today's platelet count was 131,000     Anemia   Assessment & Plan    Stable no signs of gross bleeding continue monitor     Hyperlipemia- (present on admission)   Assessment & Plan    Resume simvastatin     Essential hypertension- (present on admission)   Assessment & Plan    Blood pressure well controlled, 130s systolic  D/c midodrine       CODE STATUS full code

## 2018-12-21 NOTE — CARE PLAN
Problem: Safety  Goal: Will remain free from falls  Outcome: PROGRESSING AS EXPECTED  Patient uses call light consistently and appropriately this shift.  Waits for assistance when needed and does not attempt self transfer.  Able to verbalize needs.      Problem: Pain Management  Goal: Pain level will decrease to patient's comfort goal  Outcome: PROGRESSING AS EXPECTED  Pt able to participate in therapies and activities this shift.Patient able to verbalize pain level and verbalize an acceptable level of pain.

## 2018-12-21 NOTE — PROGRESS NOTES
Hospital Medicine Daily Progress Note        Chief Complaint  DM    Interval Problem Update  12/17: Rapid HR better w/ PO fluids.  No chest pain, shortness of breath, or palpitations.  12/18: Intermittent tachycardia, patient continues to try and drink more fluids.  Glucose still on the high side between 151-292, increased pioglitazone to 30 after discussion with patient.  12/19: Patient tolerating increased dose of pioglitazone.  Blood pressure in good range.  Patient did have one episode of vomiting yesterday which he attributes to bowel regimen    12/20  No acute issues overall patient comfortable.  Patient reports increased strength in his arms as well as walking.       Review of Systems  Review of Systems   Constitutional: Negative for chills, fever and malaise/fatigue.   HENT: Negative for congestion, hearing loss, sore throat and tinnitus.    Eyes: Negative for blurred vision, double vision, photophobia and pain.   Respiratory: Negative for cough, hemoptysis, sputum production, shortness of breath and stridor.    Cardiovascular: Negative for chest pain, palpitations, orthopnea, claudication and PND.   Gastrointestinal: Negative for abdominal pain, blood in stool, constipation, diarrhea, heartburn, melena, nausea and vomiting.   Genitourinary: Negative for dysuria, frequency and urgency.   Musculoskeletal: Positive for neck pain. Negative for back pain and myalgias.   Neurological: Positive for focal weakness and weakness. Negative for dizziness, tingling, tremors, sensory change, speech change and headaches.   Psychiatric/Behavioral: Negative for depression, memory loss and suicidal ideas. The patient is not nervous/anxious.         Physical Exam  Temp:  [36.3 °C (97.3 °F)-36.5 °C (97.7 °F)] 36.3 °C (97.3 °F)  Pulse:  [] 100  Resp:  [18] 18  BP: (120-126)/(74-78) 120/76    Physical Exam   Constitutional: He is oriented to person, place, and time. He appears well-developed and well-nourished. No  distress.   HENT:   Head: Normocephalic and atraumatic.   Right Ear: External ear normal.   Left Ear: External ear normal.   Mouth/Throat: No oropharyngeal exudate.   Eyes: Pupils are equal, round, and reactive to light. Conjunctivae are normal. Right eye exhibits no discharge. No scleral icterus.   Neck: Neck supple. No JVD present. No thyromegaly present.   Cervical collar in place   Cardiovascular: Intact distal pulses.  Tachycardia present.    No murmur heard.  Pulses:       Dorsalis pedis pulses are 2+ on the right side, and 2+ on the left side.   Tachycardic   Pulmonary/Chest: Effort normal and breath sounds normal. No stridor. No respiratory distress. He has no wheezes. He has no rales.   Abdominal: Soft. Bowel sounds are normal. He exhibits no distension. There is no tenderness. There is no rebound.   Musculoskeletal: Normal range of motion. He exhibits no edema.   Neurological: He is alert and oriented to person, place, and time. No cranial nerve deficit.   Skin: Skin is warm and dry. He is not diaphoretic. No erythema.   Psychiatric: He has a normal mood and affect. His behavior is normal. Thought content normal.   Vitals reviewed.      Fluids    Intake/Output Summary (Last 24 hours) at 12/20/18 1838  Last data filed at 12/20/18 1243   Gross per 24 hour   Intake             1260 ml   Output              400 ml   Net              860 ml       Laboratory  Recent Labs      12/20/18   0602   WBC  3.8*   RBC  3.22*   HEMOGLOBIN  9.5*   HEMATOCRIT  29.0*   MCV  90.1   MCH  29.5   MCHC  32.8*   RDW  56.3*   PLATELETCT  131*   MPV  9.6     Recent Labs      12/18/18   0612  12/20/18   0602   SODIUM  134*  137   POTASSIUM  4.1  4.2   CHLORIDE  99  101   CO2  26  26   GLUCOSE  161*  188*   BUN  32*  39*   CREATININE  2.11*  2.10*   CALCIUM  8.9  8.7                 Assessment/Plan  * Central cord syndrome (HCC)- (present on admission)   Assessment & Plan    Conservative management per Neurosurgery  Maintain cervical  collar   Chronic renal failure- (present on admission)   Assessment & Plan    CKD Stage III  Creatinine 2.10 around baseline  Continue to monitor closely  Continue to renally dose all medications, avoid nephrotoxins and nonsteroidal medications     Type 2 diabetes mellitus (HCC)- (present on admission)   Assessment & Plan    Continue home dose of Actos  AC at bedtime fingersticks as needed  Deferred outpatient endocrinology follow-up       Tachycardia   Assessment & Plan    Noted SVT on EKG, his heart rate does fluctuate between low 90s to low 100s  Continue tapering off midodrine       Vitamin D deficiency   Assessment & Plan    Continue vitamin D supplementation       Thrombocytopenia (HCC)   Assessment & Plan    Platelets within normal limits, today's platelet count was 131,000     Anemia   Assessment & Plan    Stable no signs of gross bleeding continue monitor     Hyperlipemia- (present on admission)   Assessment & Plan    Resume simvastatin     Essential hypertension- (present on admission)   Assessment & Plan    Blood pressure well controlled, will consider discontinuing midodrine tomorrow       CODE STATUS full code

## 2018-12-21 NOTE — CARE PLAN
Problem: Safety  Goal: Will remain free from injury  Outcome: PROGRESSING AS EXPECTED  Patient demonstrates good safety technique this shift.  Asks for assistance when needed and does not attempt self transfer.  Able to verbalize needs.      Problem: Skin Integrity  Goal: Risk for impaired skin integrity will decrease  Outcome: PROGRESSING AS EXPECTED  Patient is able to reposition self and pillows used to off set pressure points.Patient's skin remains intact and free from new or accidental injury this shift.

## 2018-12-21 NOTE — CARE PLAN
Problem: Respiratory:  Goal: Respiratory status will improve  Outcome: PROGRESSING AS EXPECTED  Pt is saturating >90% on RA with no s/s of respiratory distress noted. Respirations are even and unlabored.    Problem: Metabolic:  Goal: Ability to maintain appropriate glucose levels will improve  Outcome: PROGRESSING AS EXPECTED  HS blood sugar was 311. 10 units of insulin given per sliding scale. Pt given evening snack. No s/s of hypoglycemia noted so far this shift.

## 2018-12-21 NOTE — PROGRESS NOTES
"Rehab Progress Note     Encounter date: 12/21/2018  Today I met with the patient face to face in his room    Chief Complaint:  Central cord syndrome (HCC) , blood pressure     Interval Events (subjective)  Mr. Almanzar reports that he is doing very well today.  He denies any fevers, chills, headache, dizziness, chest pain, shortness of breath.  He is feeling very fatigued after his outing with therapy this morning.  He reports that it was more challenging than he expected.  He reports that his right upper limb function is still the slowest progress.    Objective:  VITAL SIGNS: /73   Pulse 99   Temp 36.8 °C (98.2 °F) (Oral)   Resp 16   Ht 1.778 m (5' 10\")   Wt 78.2 kg (172 lb 8 oz)   SpO2 97%   BMI 24.75 kg/m²     Recent Results (from the past 72 hour(s))   ACCU-CHEK GLUCOSE    Collection Time: 12/18/18  5:16 PM   Result Value Ref Range    Glucose - Accu-Ck 342 (H) 65 - 99 mg/dL   ACCU-CHEK GLUCOSE    Collection Time: 12/18/18  8:15 PM   Result Value Ref Range    Glucose - Accu-Ck 281 (H) 65 - 99 mg/dL   ACCU-CHEK GLUCOSE    Collection Time: 12/19/18  7:38 AM   Result Value Ref Range    Glucose - Accu-Ck 185 (H) 65 - 99 mg/dL   ACCU-CHEK GLUCOSE    Collection Time: 12/19/18 10:59 AM   Result Value Ref Range    Glucose - Accu-Ck 288 (H) 65 - 99 mg/dL   ACCU-CHEK GLUCOSE    Collection Time: 12/19/18  5:19 PM   Result Value Ref Range    Glucose - Accu-Ck 293 (H) 65 - 99 mg/dL   ACCU-CHEK GLUCOSE    Collection Time: 12/19/18  8:08 PM   Result Value Ref Range    Glucose - Accu-Ck 329 (H) 65 - 99 mg/dL   BASIC METABOLIC PANEL    Collection Time: 12/20/18  6:02 AM   Result Value Ref Range    Sodium 137 135 - 145 mmol/L    Potassium 4.2 3.6 - 5.5 mmol/L    Chloride 101 96 - 112 mmol/L    Co2 26 20 - 33 mmol/L    Glucose 188 (H) 65 - 99 mg/dL    Bun 39 (H) 8 - 22 mg/dL    Creatinine 2.10 (H) 0.50 - 1.40 mg/dL    Calcium 8.7 8.5 - 10.5 mg/dL    Anion Gap 10.0 0.0 - 11.9   CBC WITH DIFFERENTIAL    Collection Time: " 12/20/18  6:02 AM   Result Value Ref Range    WBC 3.8 (L) 4.8 - 10.8 K/uL    RBC 3.22 (L) 4.70 - 6.10 M/uL    Hemoglobin 9.5 (L) 14.0 - 18.0 g/dL    Hematocrit 29.0 (L) 42.0 - 52.0 %    MCV 90.1 81.4 - 97.8 fL    MCH 29.5 27.0 - 33.0 pg    MCHC 32.8 (L) 33.7 - 35.3 g/dL    RDW 56.3 (H) 35.9 - 50.0 fL    Platelet Count 131 (L) 164 - 446 K/uL    MPV 9.6 9.0 - 12.9 fL    Neutrophils-Polys 74.50 (H) 44.00 - 72.00 %    Lymphocytes 12.10 (L) 22.00 - 41.00 %    Monocytes 10.80 0.00 - 13.40 %    Eosinophils 1.30 0.00 - 6.90 %    Basophils 0.50 0.00 - 1.80 %    Immature Granulocytes 0.80 0.00 - 0.90 %    Nucleated RBC 0.00 /100 WBC    Neutrophils (Absolute) 2.84 1.82 - 7.42 K/uL    Lymphs (Absolute) 0.46 (L) 1.00 - 4.80 K/uL    Monos (Absolute) 0.41 0.00 - 0.85 K/uL    Eos (Absolute) 0.05 0.00 - 0.51 K/uL    Baso (Absolute) 0.02 0.00 - 0.12 K/uL    Immature Granulocytes (abs) 0.03 0.00 - 0.11 K/uL    NRBC (Absolute) 0.00 K/uL   ESTIMATED GFR    Collection Time: 12/20/18  6:02 AM   Result Value Ref Range    GFR If  37 (A) >60 mL/min/1.73 m 2    GFR If Non  31 (A) >60 mL/min/1.73 m 2   ACCU-CHEK GLUCOSE    Collection Time: 12/20/18  7:46 AM   Result Value Ref Range    Glucose - Accu-Ck 208 (H) 65 - 99 mg/dL   ACCU-CHEK GLUCOSE    Collection Time: 12/20/18 11:05 AM   Result Value Ref Range    Glucose - Accu-Ck 214 (H) 65 - 99 mg/dL   ACCU-CHEK GLUCOSE    Collection Time: 12/20/18  5:18 PM   Result Value Ref Range    Glucose - Accu-Ck 219 (H) 65 - 99 mg/dL   ACCU-CHEK GLUCOSE    Collection Time: 12/20/18  9:03 PM   Result Value Ref Range    Glucose - Accu-Ck 311 (H) 65 - 99 mg/dL   ACCU-CHEK GLUCOSE    Collection Time: 12/21/18  7:11 AM   Result Value Ref Range    Glucose - Accu-Ck 167 (H) 65 - 99 mg/dL   ACCU-CHEK GLUCOSE    Collection Time: 12/21/18 11:09 AM   Result Value Ref Range    Glucose - Accu-Ck 197 (H) 65 - 99 mg/dL       Current Facility-Administered Medications   Medication Frequency    • docusate sodium (COLACE) capsule 100 mg QDAY PRN   • magnesium hydroxide (MILK OF MAGNESIA) suspension 30 mL QDAY PRN   • polyethylene glycol/lytes (MIRALAX) PACKET 1 Packet PRN   • oxyCODONE immediate-release (ROXICODONE) tablet 5 mg Q6HRS PRN   • pioglitazone (ACTOS) tablet 30 mg DAILY   • gabapentin (NEURONTIN) capsule 300 mg TID   • vitamin D (cholecalciferol) tablet 2,000 Units DAILY   • ascorbic acid tablet 500 mg Q48HRS   • acetaminophen (TYLENOL) tablet 650 mg Q6HRS   • heparin injection 5,000 Units Q8HRS   • insulin regular (HUMULIN R) injection 3-14 Units 4X/DAY ACHS    And   • glucose 4 g chewable tablet 16 g Q15 MIN PRN    And   • dextrose 50% (D50W) injection 25 mL Q15 MIN PRN   • senna-docusate (PERICOLACE or SENOKOT S) 8.6-50 MG per tablet 1 Tab Nightly   • simvastatin (ZOCOR) tablet 20 mg Nightly   • Respiratory Care per Protocol Continuous RT   • Pharmacy Consult Request ...Pain Management Review 1 Each PRN   • hydrALAZINE (APRESOLINE) tablet 25 mg Q8HRS PRN   • acetaminophen (TYLENOL) tablet 650 mg Q4HRS PRN   • artificial tears 1.4 % ophthalmic solution 1 Drop PRN   • benzocaine-menthol (CEPACOL) lozenge 1 Lozenge Q2HRS PRN   • mag hydrox-al hydrox-simeth (MAALOX PLUS ES or MYLANTA DS) suspension 20 mL Q2HRS PRN   • ondansetron (ZOFRAN ODT) dispertab 4 mg 4X/DAY PRN    Or   • ondansetron (ZOFRAN) syringe/vial injection 4 mg 4X/DAY PRN   • traZODone (DESYREL) tablet 50 mg QHS PRN   • sodium chloride (OCEAN) 0.65 % nasal spray 2 Spray PRN   • cyclobenzaprine (FLEXERIL) tablet 10 mg TID PRN   • ferrous sulfate tablet 325 mg Q48HRS   • omeprazole (PRILOSEC) capsule 20 mg DAILY       Exam Date: 12/21/2018    General:  Awake, alert, oriented, no acute distress  HEENT:  Wearing Aspen cervical collar  Cardiac: regular rate and rhythm  Lungs: clear to auscultation bilaterally.   Abdomen: soft; non tender, non distended, bowel sounds present and normoactive  Extremities: No edema in the bilateral lower  limbs  Skin: Small skin tear on the dorsum of the left hand  Neuro:   Remains somewhat hard of hearing.  Right elbow flexion is 2+/5, right sided pronation is 4/5, right sided supination is 2/5, right wrist extension is 2/5        Orders Placed This Encounter   Procedures   • Diet Order Diabetic     Standing Status:   Standing     Number of Occurrences:   1     Order Specific Question:   Diet:     Answer:   Diabetic [3]     Order Specific Question:   Texture/Fiber modifications:     Answer:   Chopped Meat [5]     Comments:   cut sandwiches into 1/4       Assessment:  Active Hospital Problems    Diagnosis   • *Central cord syndrome (HCC)   • Dysphagia   • Type 2 diabetes mellitus (HCC)   • Chronic renal failure   • Scalp laceration   • 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 Simon    Scalp sutures removed December 14  Speech and language pathology for cognition    Dysphagia  Advanced to chopped meats today  Continue speech-language pathology sessions     Pain  Schedule Tylenol every 6 hours  Gabapentin 300 mg TID--do not escalate due to renal impairment   Oxycodone 2.5 mg every 6 hours as needed for severe pain--dose decreased December 21    He has used 10 mg of oxycodone in  the last 24 hours     Neurogenic Bladder   Continue timed voiding     Neurogenic Bowel  Colace twice a day  Milk of magnesia daily  Pat-Colace nightly     History of hypertension  Orthostatic hypotension   At baseline, patient on lisinopril 10 mg daily and propranolol 40 mg 3 times a day    Blood pressure today of 126/73 mmHg  This is well within the normal to upper level of normal for spinal cord injury  Discontinuing ProAmatine December 21  Continue to monitor  Continuing to encourage hydration    Diabetes mellitus with hyperglycemia  Currently on sliding scale  Hospitalist working on weaning sliding scale  Actos 30 mg daily--increased on December 18    Glucose range of 167-311 in last 24 hours    Stage III chronic kidney disease  Creatinine 2.10 on December 20 and stable   Continue to encourage fluid intake    Continues to stress the importance of fluid intake     Hyperlipidemia  Simvastatin 20 mg daily      Anemia  Hemoglobin 9.5 December 20  Continue ferrous sulfate    Vitamin D deficiency  Vitamin D was 21 on admission  Increase supplementation to 2000 units of cholecalciferol daily     GI Ppx - Patient on Prilosec 20 mg daily.     DVT ppx - Patient on Heparin 5000 q8h .     Estimated Discharge: January 4    Total time:  25 minutes.  I spent greater than 50% of the time for patient care, counseling, and coordination on this date, including unit/floor time, and face-to-face time with the patient as per interval events and assessment and plan above. Topics discussed included functional progress, right upper limb function, pain control, blood pressure, hydration      Mustapha Brandt M.D.  12/21/2018

## 2018-12-21 NOTE — PROGRESS NOTES
"Rehab Progress Note     Encounter date: 12/20/2018  Today I met with the patient face to face in his room    Chief Complaint:  Central cord syndrome (HCC) , fluid intake    Interval Events (subjective)  Mr. Almanzar reports that he is doing very well today.  He reports he is making a concerted effort to improve his oral intake of fluids.  He denies any fevers or chills.  He is very impressed with his progress.  He feels his upper limb strength is gradually improving.  He reports that his walking has also improved.  We discussed his projected discharge date, and he is very pleased with this plan.    Objective:  VITAL SIGNS: /76   Pulse 100   Temp 36.3 °C (97.3 °F) (Oral)   Resp 18   Ht 1.778 m (5' 10\")   Wt 78.2 kg (172 lb 8 oz)   SpO2 98%   BMI 24.75 kg/m²     Recent Results (from the past 72 hour(s))   ACCU-CHEK GLUCOSE    Collection Time: 12/17/18  5:33 PM   Result Value Ref Range    Glucose - Accu-Ck 259 (H) 65 - 99 mg/dL   ACCU-CHEK GLUCOSE    Collection Time: 12/17/18  9:06 PM   Result Value Ref Range    Glucose - Accu-Ck 292 (H) 65 - 99 mg/dL   BASIC METABOLIC PANEL    Collection Time: 12/18/18  6:12 AM   Result Value Ref Range    Sodium 134 (L) 135 - 145 mmol/L    Potassium 4.1 3.6 - 5.5 mmol/L    Chloride 99 96 - 112 mmol/L    Co2 26 20 - 33 mmol/L    Glucose 161 (H) 65 - 99 mg/dL    Bun 32 (H) 8 - 22 mg/dL    Creatinine 2.11 (H) 0.50 - 1.40 mg/dL    Calcium 8.9 8.5 - 10.5 mg/dL    Anion Gap 9.0 0.0 - 11.9   ESTIMATED GFR    Collection Time: 12/18/18  6:12 AM   Result Value Ref Range    GFR If  37 (A) >60 mL/min/1.73 m 2    GFR If Non  31 (A) >60 mL/min/1.73 m 2   ACCU-CHEK GLUCOSE    Collection Time: 12/18/18  7:28 AM   Result Value Ref Range    Glucose - Accu-Ck 151 (H) 65 - 99 mg/dL   ACCU-CHEK GLUCOSE    Collection Time: 12/18/18 11:01 AM   Result Value Ref Range    Glucose - Accu-Ck 215 (H) 65 - 99 mg/dL   ACCU-CHEK GLUCOSE    Collection Time: 12/18/18 11:51 " AM   Result Value Ref Range    Glucose - Accu-Ck 235 (H) 65 - 99 mg/dL   ACCU-CHEK GLUCOSE    Collection Time: 12/18/18  5:16 PM   Result Value Ref Range    Glucose - Accu-Ck 342 (H) 65 - 99 mg/dL   ACCU-CHEK GLUCOSE    Collection Time: 12/18/18  8:15 PM   Result Value Ref Range    Glucose - Accu-Ck 281 (H) 65 - 99 mg/dL   ACCU-CHEK GLUCOSE    Collection Time: 12/19/18  7:38 AM   Result Value Ref Range    Glucose - Accu-Ck 185 (H) 65 - 99 mg/dL   ACCU-CHEK GLUCOSE    Collection Time: 12/19/18 10:59 AM   Result Value Ref Range    Glucose - Accu-Ck 288 (H) 65 - 99 mg/dL   ACCU-CHEK GLUCOSE    Collection Time: 12/19/18  5:19 PM   Result Value Ref Range    Glucose - Accu-Ck 293 (H) 65 - 99 mg/dL   ACCU-CHEK GLUCOSE    Collection Time: 12/19/18  8:08 PM   Result Value Ref Range    Glucose - Accu-Ck 329 (H) 65 - 99 mg/dL   BASIC METABOLIC PANEL    Collection Time: 12/20/18  6:02 AM   Result Value Ref Range    Sodium 137 135 - 145 mmol/L    Potassium 4.2 3.6 - 5.5 mmol/L    Chloride 101 96 - 112 mmol/L    Co2 26 20 - 33 mmol/L    Glucose 188 (H) 65 - 99 mg/dL    Bun 39 (H) 8 - 22 mg/dL    Creatinine 2.10 (H) 0.50 - 1.40 mg/dL    Calcium 8.7 8.5 - 10.5 mg/dL    Anion Gap 10.0 0.0 - 11.9   CBC WITH DIFFERENTIAL    Collection Time: 12/20/18  6:02 AM   Result Value Ref Range    WBC 3.8 (L) 4.8 - 10.8 K/uL    RBC 3.22 (L) 4.70 - 6.10 M/uL    Hemoglobin 9.5 (L) 14.0 - 18.0 g/dL    Hematocrit 29.0 (L) 42.0 - 52.0 %    MCV 90.1 81.4 - 97.8 fL    MCH 29.5 27.0 - 33.0 pg    MCHC 32.8 (L) 33.7 - 35.3 g/dL    RDW 56.3 (H) 35.9 - 50.0 fL    Platelet Count 131 (L) 164 - 446 K/uL    MPV 9.6 9.0 - 12.9 fL    Neutrophils-Polys 74.50 (H) 44.00 - 72.00 %    Lymphocytes 12.10 (L) 22.00 - 41.00 %    Monocytes 10.80 0.00 - 13.40 %    Eosinophils 1.30 0.00 - 6.90 %    Basophils 0.50 0.00 - 1.80 %    Immature Granulocytes 0.80 0.00 - 0.90 %    Nucleated RBC 0.00 /100 WBC    Neutrophils (Absolute) 2.84 1.82 - 7.42 K/uL    Lymphs (Absolute) 0.46  (L) 1.00 - 4.80 K/uL    Monos (Absolute) 0.41 0.00 - 0.85 K/uL    Eos (Absolute) 0.05 0.00 - 0.51 K/uL    Baso (Absolute) 0.02 0.00 - 0.12 K/uL    Immature Granulocytes (abs) 0.03 0.00 - 0.11 K/uL    NRBC (Absolute) 0.00 K/uL   ESTIMATED GFR    Collection Time: 12/20/18  6:02 AM   Result Value Ref Range    GFR If  37 (A) >60 mL/min/1.73 m 2    GFR If Non  31 (A) >60 mL/min/1.73 m 2   ACCU-CHEK GLUCOSE    Collection Time: 12/20/18  7:46 AM   Result Value Ref Range    Glucose - Accu-Ck 208 (H) 65 - 99 mg/dL   ACCU-CHEK GLUCOSE    Collection Time: 12/20/18 11:05 AM   Result Value Ref Range    Glucose - Accu-Ck 214 (H) 65 - 99 mg/dL       Current Facility-Administered Medications   Medication Frequency   • docusate sodium (COLACE) capsule 100 mg QDAY PRN   • magnesium hydroxide (MILK OF MAGNESIA) suspension 30 mL QDAY PRN   • polyethylene glycol/lytes (MIRALAX) PACKET 1 Packet PRN   • oxyCODONE immediate-release (ROXICODONE) tablet 5 mg Q6HRS PRN   • midodrine (PROAMATINE) tablet 2.5 mg QAM AC   • pioglitazone (ACTOS) tablet 30 mg DAILY   • gabapentin (NEURONTIN) capsule 300 mg TID   • vitamin D (cholecalciferol) tablet 2,000 Units DAILY   • ascorbic acid tablet 500 mg Q48HRS   • acetaminophen (TYLENOL) tablet 650 mg Q6HRS   • heparin injection 5,000 Units Q8HRS   • insulin regular (HUMULIN R) injection 3-14 Units 4X/DAY ACHS    And   • glucose 4 g chewable tablet 16 g Q15 MIN PRN    And   • dextrose 50% (D50W) injection 25 mL Q15 MIN PRN   • senna-docusate (PERICOLACE or SENOKOT S) 8.6-50 MG per tablet 1 Tab Nightly   • simvastatin (ZOCOR) tablet 20 mg Nightly   • Respiratory Care per Protocol Continuous RT   • Pharmacy Consult Request ...Pain Management Review 1 Each PRN   • hydrALAZINE (APRESOLINE) tablet 25 mg Q8HRS PRN   • acetaminophen (TYLENOL) tablet 650 mg Q4HRS PRN   • artificial tears 1.4 % ophthalmic solution 1 Drop PRN   • benzocaine-menthol (CEPACOL) lozenge 1 Lozenge Q2HRS  PRN   • mag hydrox-al hydrox-simeth (MAALOX PLUS ES or MYLANTA DS) suspension 20 mL Q2HRS PRN   • ondansetron (ZOFRAN ODT) dispertab 4 mg 4X/DAY PRN    Or   • ondansetron (ZOFRAN) syringe/vial injection 4 mg 4X/DAY PRN   • traZODone (DESYREL) tablet 50 mg QHS PRN   • sodium chloride (OCEAN) 0.65 % nasal spray 2 Spray PRN   • cyclobenzaprine (FLEXERIL) tablet 10 mg TID PRN   • ferrous sulfate tablet 325 mg Q48HRS   • omeprazole (PRILOSEC) capsule 20 mg DAILY       Exam Date: 12/20/2018    General:  Awake, alert, oriented, no acute distress  HEENT:  Wearing Aspen cervical collar  Cardiac: regular rate and rhythm  Lungs: clear to auscultation bilaterally.   Abdomen: soft; non tender, non distended, bowel sounds present and normoactive  Extremities: No edema in the bilateral lower limbs  Neuro:   Remains somewhat hard of hearing.  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 Simon    Scalp sutures removed December 14  Speech and language pathology for cognition    Dysphagia  Advanced to chopped meats today  Continue speech-language pathology sessions     Pain  Schedule Tylenol every 6 hours  Gabapentin 300 mg TID--do not escalate due to renal impairment   Oxycodone 5 mg q 6 hours as needed--dose interval prolonged on December 19 due to disuse    Continue to monitor and decrease to 2.5 mg if tolerated on December 21    He has used 10mg of oxycodone in the last 24 hours     Neurogenic Bladder   Continue timed voiding     Neurogenic Bowel  Colace twice a day  Milk of magnesia daily  Pat-Colace nightly     History of hypertension  Orthostatic hypotension   At baseline, patient on lisinopril 10 mg daily and propranolol 40 mg 3 times a day  Currently on ProAmatine 2.5 mg daily  Continue weaning ProAmatine and monitor for return of hypertension    Blood pressure 120/76 mmHg today    If blood pressure remained stable, discontinue ProAmatine on December 21    Diabetes mellitus with hyperglycemia  Currently on sliding scale  Hospitalist working on weaning sliding scale  Actos 30 mg daily--increased on December 18    Glucose range of 208-328 in last 24 hours    Stage III chronic kidney disease  Creatinine 2.10 on December 20 and stable   Continue to encourage fluid intake    Reinforced fluid intake     Hyperlipidemia  Simvastatin 20 mg daily      Anemia  Hemoglobin 9.5 December 20  Continue ferrous sulfate    Vitamin D deficiency  Vitamin D was 21 on admission  Increase supplementation to 2000 units of cholecalciferol daily     GI Ppx - Patient on Prilosec 20 mg daily.     DVT ppx - Patient on Heparin 5000 q8h on transfer.     Estimated  Discharge: January 4    Total time:  25 minutes.  I spent greater than 50% of the time for patient care, counseling, and coordination on this date, including unit/floor time, and face-to-face time with the patient as per interval events and assessment and plan above. Topics discussed included functional progress, discharge planning, diet, upper limb strength      Mustapha Brandt M.D.  12/20/2018

## 2018-12-22 LAB
ALBUMIN SERPL BCP-MCNC: 3.5 G/DL (ref 3.2–4.9)
ALBUMIN/GLOB SERPL: 1.8 G/DL
ALP SERPL-CCNC: 90 U/L (ref 30–99)
ALT SERPL-CCNC: 21 U/L (ref 2–50)
ANION GAP SERPL CALC-SCNC: 7 MMOL/L (ref 0–11.9)
AST SERPL-CCNC: 18 U/L (ref 12–45)
BILIRUB SERPL-MCNC: 0.6 MG/DL (ref 0.1–1.5)
BUN SERPL-MCNC: 39 MG/DL (ref 8–22)
CALCIUM SERPL-MCNC: 9.1 MG/DL (ref 8.5–10.5)
CHLORIDE SERPL-SCNC: 103 MMOL/L (ref 96–112)
CO2 SERPL-SCNC: 25 MMOL/L (ref 20–33)
CREAT SERPL-MCNC: 2.03 MG/DL (ref 0.5–1.4)
GLOBULIN SER CALC-MCNC: 2 G/DL (ref 1.9–3.5)
GLUCOSE BLD-MCNC: 176 MG/DL (ref 65–99)
GLUCOSE BLD-MCNC: 241 MG/DL (ref 65–99)
GLUCOSE BLD-MCNC: 277 MG/DL (ref 65–99)
GLUCOSE BLD-MCNC: 296 MG/DL (ref 65–99)
GLUCOSE SERPL-MCNC: 190 MG/DL (ref 65–99)
MAGNESIUM SERPL-MCNC: 1.8 MG/DL (ref 1.5–2.5)
POTASSIUM SERPL-SCNC: 4.4 MMOL/L (ref 3.6–5.5)
PROT SERPL-MCNC: 5.5 G/DL (ref 6–8.2)
SODIUM SERPL-SCNC: 135 MMOL/L (ref 135–145)

## 2018-12-22 PROCEDURE — 36415 COLL VENOUS BLD VENIPUNCTURE: CPT

## 2018-12-22 PROCEDURE — A9270 NON-COVERED ITEM OR SERVICE: HCPCS | Performed by: HOSPITALIST

## 2018-12-22 PROCEDURE — 700102 HCHG RX REV CODE 250 W/ 637 OVERRIDE(OP): Performed by: HOSPITALIST

## 2018-12-22 PROCEDURE — 99232 SBSQ HOSP IP/OBS MODERATE 35: CPT | Performed by: HOSPITALIST

## 2018-12-22 PROCEDURE — 80053 COMPREHEN METABOLIC PANEL: CPT

## 2018-12-22 PROCEDURE — 770010 HCHG ROOM/CARE - REHAB SEMI PRIVAT*

## 2018-12-22 PROCEDURE — 700111 HCHG RX REV CODE 636 W/ 250 OVERRIDE (IP): Performed by: PHYSICAL MEDICINE & REHABILITATION

## 2018-12-22 PROCEDURE — 82962 GLUCOSE BLOOD TEST: CPT

## 2018-12-22 PROCEDURE — A9270 NON-COVERED ITEM OR SERVICE: HCPCS | Performed by: PHYSICAL MEDICINE & REHABILITATION

## 2018-12-22 PROCEDURE — 700102 HCHG RX REV CODE 250 W/ 637 OVERRIDE(OP): Performed by: PHYSICAL MEDICINE & REHABILITATION

## 2018-12-22 PROCEDURE — 83735 ASSAY OF MAGNESIUM: CPT

## 2018-12-22 RX ADMIN — INSULIN HUMAN 7 UNITS: 100 INJECTION, SOLUTION PARENTERAL at 17:17

## 2018-12-22 RX ADMIN — ACETAMINOPHEN 650 MG: 325 TABLET ORAL at 05:46

## 2018-12-22 RX ADMIN — OXYCODONE HYDROCHLORIDE 2.5 MG: 5 TABLET ORAL at 21:19

## 2018-12-22 RX ADMIN — GABAPENTIN 300 MG: 300 CAPSULE ORAL at 14:15

## 2018-12-22 RX ADMIN — HEPARIN SODIUM 5000 UNITS: 5000 INJECTION, SOLUTION INTRAVENOUS; SUBCUTANEOUS at 14:15

## 2018-12-22 RX ADMIN — PIOGLITAZONE 30 MG: 15 TABLET ORAL at 08:07

## 2018-12-22 RX ADMIN — ACETAMINOPHEN 650 MG: 325 TABLET ORAL at 11:32

## 2018-12-22 RX ADMIN — SIMVASTATIN 20 MG: 20 TABLET, FILM COATED ORAL at 21:08

## 2018-12-22 RX ADMIN — SENNOSIDES AND DOCUSATE SODIUM 1 TABLET: 8.6; 5 TABLET ORAL at 21:08

## 2018-12-22 RX ADMIN — GABAPENTIN 300 MG: 300 CAPSULE ORAL at 08:07

## 2018-12-22 RX ADMIN — GABAPENTIN 300 MG: 300 CAPSULE ORAL at 21:07

## 2018-12-22 RX ADMIN — ACETAMINOPHEN 650 MG: 325 TABLET ORAL at 17:22

## 2018-12-22 RX ADMIN — OMEPRAZOLE 20 MG: 20 CAPSULE, DELAYED RELEASE ORAL at 08:08

## 2018-12-22 RX ADMIN — HEPARIN SODIUM 5000 UNITS: 5000 INJECTION, SOLUTION INTRAVENOUS; SUBCUTANEOUS at 21:09

## 2018-12-22 RX ADMIN — HEPARIN SODIUM 5000 UNITS: 5000 INJECTION, SOLUTION INTRAVENOUS; SUBCUTANEOUS at 05:47

## 2018-12-22 RX ADMIN — VITAMIN D, TAB 1000IU (100/BT) 2000 UNITS: 25 TAB at 08:07

## 2018-12-22 RX ADMIN — INSULIN HUMAN 7 UNITS: 100 INJECTION, SOLUTION PARENTERAL at 21:13

## 2018-12-22 RX ADMIN — INSULIN HUMAN 3 UNITS: 100 INJECTION, SOLUTION PARENTERAL at 08:03

## 2018-12-22 RX ADMIN — INSULIN HUMAN 4 UNITS: 100 INJECTION, SOLUTION PARENTERAL at 11:26

## 2018-12-22 ASSESSMENT — ENCOUNTER SYMPTOMS
PHOTOPHOBIA: 0
FOCAL WEAKNESS: 1
CHILLS: 0
PND: 0
CLAUDICATION: 0
HEADACHES: 0
VOMITING: 0
TREMORS: 0
WEAKNESS: 1
SPUTUM PRODUCTION: 0
PALPITATIONS: 0
DIARRHEA: 0
HEARTBURN: 0
BLOOD IN STOOL: 0
NECK PAIN: 1
DEPRESSION: 0
SPEECH CHANGE: 0
HEMOPTYSIS: 0
ORTHOPNEA: 0
NAUSEA: 0
DIZZINESS: 0
MYALGIAS: 0
FEVER: 0
STRIDOR: 0
ABDOMINAL PAIN: 0
SHORTNESS OF BREATH: 0
DOUBLE VISION: 0
SORE THROAT: 0
MEMORY LOSS: 0
BLURRED VISION: 0
CONSTIPATION: 0
SENSORY CHANGE: 0
BACK PAIN: 0
COUGH: 0
EYE PAIN: 0
TINGLING: 0
NERVOUS/ANXIOUS: 0

## 2018-12-22 ASSESSMENT — PAIN SCALES - GENERAL
PAINLEVEL_OUTOF10: 7
PAINLEVEL_OUTOF10: 0

## 2018-12-22 NOTE — CARE PLAN
Problem: Pain Management  Goal: Pain level will decrease to patient's comfort goal  Outcome: PROGRESSING AS EXPECTED  Patient denies any pain or discomfort at this time, encouraged him to use his call light should that change. Patient verbalized understanding, will monitor and assess as needed.     Problem: Metabolic:  Goal: Ability to maintain appropriate glucose levels will improve  Outcome: PROGRESSING SLOWER THAN EXPECTED  Patient's HS blood sugar reading was 252, he received 7 units of Humulin R per sliding scale. HS snack of chicken salad was given. Will monitor patient for s/s of hypo/hyperglycemia.

## 2018-12-22 NOTE — DISCHARGE PLANNING
Case management:  I have updated patient and his sister from team conference discussion and new d/c target.  [facility] with [facility] follow [facility].  Will follow.

## 2018-12-22 NOTE — PROGRESS NOTES
Hospital Medicine Daily Progress Note        Chief Complaint  DM    Interval Problem Update  12/17: Rapid HR better w/ PO fluids.  No chest pain, shortness of breath, or palpitations.  12/18: Intermittent tachycardia, patient continues to try and drink more fluids.  Glucose still on the high side between 151-292, increased pioglitazone to 30 after discussion with patient.  12/19: Patient tolerating increased dose of pioglitazone.  Blood pressure in good range.  Patient did have one episode of vomiting yesterday which he attributes to bowel regimen    12/20  No acute issues overall patient comfortable.  Patient reports increased strength in his arms as well as walking.     12/21  Overall clinically doing well, Patient denies fevers/chills, chest pain, shortness of breath or nausea/vommiting.   Resume therapy  Overall HR since last night improved now in low 90s, max 100. SBP in the 130s  D/c midodrine and monitor closely.     12/22  Patient doing well, no complaints. Patient denies fevers/chills, chest pain, shortness of breath or nausea/vommiting.   HR controlled now that he is off Midodrine, CTM.  Renal functions improved.  Resume therapy,        Review of Systems  Review of Systems   Constitutional: Negative for chills, fever and malaise/fatigue.   HENT: Negative for congestion, hearing loss, sore throat and tinnitus.    Eyes: Negative for blurred vision, double vision, photophobia and pain.   Respiratory: Negative for cough, hemoptysis, sputum production, shortness of breath and stridor.    Cardiovascular: Negative for chest pain, palpitations, orthopnea, claudication and PND.   Gastrointestinal: Negative for abdominal pain, blood in stool, constipation, diarrhea, heartburn, melena, nausea and vomiting.   Genitourinary: Negative for dysuria, frequency and urgency.   Musculoskeletal: Positive for neck pain (improving overall). Negative for back pain and myalgias.   Neurological: Positive for focal weakness and  weakness. Negative for dizziness, tingling, tremors, sensory change, speech change and headaches.   Psychiatric/Behavioral: Negative for depression, memory loss and suicidal ideas. The patient is not nervous/anxious.         Physical Exam  Temp:  [36.4 °C (97.6 °F)-36.7 °C (98.1 °F)] 36.4 °C (97.6 °F)  Pulse:  [] 99  Resp:  [18] 18  BP: (118-119)/(60-73) 118/60    Physical Exam   Constitutional: He is oriented to person, place, and time. He appears well-developed and well-nourished. No distress.   HENT:   Head: Normocephalic and atraumatic.   Right Ear: External ear normal.   Left Ear: External ear normal.   Mouth/Throat: No oropharyngeal exudate.   Eyes: Pupils are equal, round, and reactive to light. Conjunctivae are normal. Right eye exhibits no discharge. No scleral icterus.   Neck: Neck supple. No JVD present. No tracheal deviation present. No thyromegaly present.   Cervical collar in place   Cardiovascular: Regular rhythm, normal heart sounds and intact distal pulses.  Tachycardia present.    No murmur heard.  Pulses:       Dorsalis pedis pulses are 2+ on the right side, and 2+ on the left side.   Pulmonary/Chest: Effort normal and breath sounds normal. No stridor. No respiratory distress. He has no wheezes. He has no rales.   Abdominal: Soft. Bowel sounds are normal. He exhibits no distension and no mass. There is no tenderness. There is no rebound and no guarding.   Musculoskeletal: Normal range of motion. He exhibits no edema.   Lymphadenopathy:     He has no cervical adenopathy.   Neurological: He is alert and oriented to person, place, and time. No cranial nerve deficit.   Skin: Skin is warm and dry. No rash noted. He is not diaphoretic. No erythema. No pallor.   Psychiatric: He has a normal mood and affect. His behavior is normal. Judgment and thought content normal.   Vitals reviewed.      Fluids    Intake/Output Summary (Last 24 hours) at 12/22/18 7247  Last data filed at 12/22/18 8622   Gross  per 24 hour   Intake              480 ml   Output              900 ml   Net             -420 ml       Laboratory  Recent Labs      12/20/18   0602   WBC  3.8*   RBC  3.22*   HEMOGLOBIN  9.5*   HEMATOCRIT  29.0*   MCV  90.1   MCH  29.5   MCHC  32.8*   RDW  56.3*   PLATELETCT  131*   MPV  9.6     Recent Labs      12/20/18   0602  12/22/18   0537   SODIUM  137  135   POTASSIUM  4.2  4.4   CHLORIDE  101  103   CO2  26  25   GLUCOSE  188*  190*   BUN  39*  39*   CREATININE  2.10*  2.03*   CALCIUM  8.7  9.1                 Assessment/Plan  * Central cord syndrome (HCC)- (present on admission)   Assessment & Plan    Conservative management per Neurosurgery  Maintain cervical collar     Chronic renal failure- (present on admission)   Assessment & Plan    CKD Stage III  Creatinine 2.03<2.10 near baseline.   Continue to monitor closely  Continue to renally dose all medications, avoid nephrotoxins and nonsteroidal medications     Type 2 diabetes mellitus (HCC)- (present on admission)   Assessment & Plan    Continue home dose of Actos  uncontrolled  Fingersticks TID  Start insulin sliding scale   Deferred outpatient endocrinology follow-up     Tachycardia   Assessment & Plan    Noted SVT on EKG  Resolved after d/noris midodrine yesterday       Vitamin D deficiency   Assessment & Plan    Continue vitamin D supplementation     Thrombocytopenia (HCC)   Assessment & Plan    Stable, no signs of gross bleeding.     Anemia   Assessment & Plan    Stable no signs of gross bleeding continue monitor     Hyperlipemia- (present on admission)   Assessment & Plan    Resume simvastatin     Essential hypertension- (present on admission)   Assessment & Plan    Blood pressure well controlled, not on any medications   D/noris midodrine       CODE STATUS full code

## 2018-12-22 NOTE — PROGRESS NOTES
At 1915 received report from the day shift RN and assumed care of patient. Patient is asleep with no s/s of pain, no signs of distress. Bed is in the lowest position with the call light in reach, all patient's needs are met at this time. Will continue to monitor.

## 2018-12-22 NOTE — PROGRESS NOTES
Received shift report and assumed care of patient.  Patient asleep, calm and stable, currently positioned in bed for comfort and safety; call light within reach.  No signs of pain or discomfort at this time.  Will continue to monitor.

## 2018-12-23 LAB
GLUCOSE BLD-MCNC: 185 MG/DL (ref 65–99)
GLUCOSE BLD-MCNC: 244 MG/DL (ref 65–99)
GLUCOSE BLD-MCNC: 269 MG/DL (ref 65–99)
GLUCOSE BLD-MCNC: 329 MG/DL (ref 65–99)

## 2018-12-23 PROCEDURE — A9270 NON-COVERED ITEM OR SERVICE: HCPCS | Performed by: PHYSICAL MEDICINE & REHABILITATION

## 2018-12-23 PROCEDURE — 700102 HCHG RX REV CODE 250 W/ 637 OVERRIDE(OP): Performed by: HOSPITALIST

## 2018-12-23 PROCEDURE — 770010 HCHG ROOM/CARE - REHAB SEMI PRIVAT*

## 2018-12-23 PROCEDURE — A9270 NON-COVERED ITEM OR SERVICE: HCPCS | Performed by: HOSPITALIST

## 2018-12-23 PROCEDURE — 700111 HCHG RX REV CODE 636 W/ 250 OVERRIDE (IP): Performed by: PHYSICAL MEDICINE & REHABILITATION

## 2018-12-23 PROCEDURE — 82962 GLUCOSE BLOOD TEST: CPT | Mod: 91

## 2018-12-23 PROCEDURE — 700102 HCHG RX REV CODE 250 W/ 637 OVERRIDE(OP): Performed by: PHYSICAL MEDICINE & REHABILITATION

## 2018-12-23 PROCEDURE — 99232 SBSQ HOSP IP/OBS MODERATE 35: CPT | Performed by: HOSPITALIST

## 2018-12-23 RX ADMIN — INSULIN HUMAN 10 UNITS: 100 INJECTION, SOLUTION PARENTERAL at 20:49

## 2018-12-23 RX ADMIN — GABAPENTIN 300 MG: 300 CAPSULE ORAL at 20:43

## 2018-12-23 RX ADMIN — ACETAMINOPHEN 650 MG: 325 TABLET ORAL at 18:00

## 2018-12-23 RX ADMIN — INSULIN HUMAN 7 UNITS: 100 INJECTION, SOLUTION PARENTERAL at 11:13

## 2018-12-23 RX ADMIN — HEPARIN SODIUM 5000 UNITS: 5000 INJECTION, SOLUTION INTRAVENOUS; SUBCUTANEOUS at 14:02

## 2018-12-23 RX ADMIN — ACETAMINOPHEN 650 MG: 325 TABLET ORAL at 11:43

## 2018-12-23 RX ADMIN — PIOGLITAZONE 30 MG: 15 TABLET ORAL at 08:03

## 2018-12-23 RX ADMIN — ACETAMINOPHEN 650 MG: 325 TABLET ORAL at 05:47

## 2018-12-23 RX ADMIN — VITAMIN D, TAB 1000IU (100/BT) 2000 UNITS: 25 TAB at 08:04

## 2018-12-23 RX ADMIN — ACETAMINOPHEN 650 MG: 325 TABLET ORAL at 00:31

## 2018-12-23 RX ADMIN — HEPARIN SODIUM 5000 UNITS: 5000 INJECTION, SOLUTION INTRAVENOUS; SUBCUTANEOUS at 20:43

## 2018-12-23 RX ADMIN — INSULIN HUMAN 3 UNITS: 100 INJECTION, SOLUTION PARENTERAL at 07:41

## 2018-12-23 RX ADMIN — OXYCODONE HYDROCHLORIDE AND ACETAMINOPHEN 500 MG: 500 TABLET ORAL at 08:04

## 2018-12-23 RX ADMIN — GABAPENTIN 300 MG: 300 CAPSULE ORAL at 08:04

## 2018-12-23 RX ADMIN — GABAPENTIN 300 MG: 300 CAPSULE ORAL at 14:02

## 2018-12-23 RX ADMIN — HEPARIN SODIUM 5000 UNITS: 5000 INJECTION, SOLUTION INTRAVENOUS; SUBCUTANEOUS at 05:47

## 2018-12-23 RX ADMIN — FERROUS SULFATE TAB 325 MG (65 MG ELEMENTAL FE) 325 MG: 325 (65 FE) TAB at 08:04

## 2018-12-23 RX ADMIN — SIMVASTATIN 20 MG: 20 TABLET, FILM COATED ORAL at 20:43

## 2018-12-23 RX ADMIN — ACETAMINOPHEN 650 MG: 325 TABLET, FILM COATED ORAL at 20:43

## 2018-12-23 RX ADMIN — SENNOSIDES AND DOCUSATE SODIUM 1 TABLET: 8.6; 5 TABLET ORAL at 20:43

## 2018-12-23 RX ADMIN — INSULIN HUMAN 4 UNITS: 100 INJECTION, SOLUTION PARENTERAL at 17:03

## 2018-12-23 RX ADMIN — OMEPRAZOLE 20 MG: 20 CAPSULE, DELAYED RELEASE ORAL at 08:04

## 2018-12-23 ASSESSMENT — ENCOUNTER SYMPTOMS
PHOTOPHOBIA: 0
MEMORY LOSS: 0
HEMOPTYSIS: 0
BACK PAIN: 0
EYE PAIN: 0
ORTHOPNEA: 0
SPEECH CHANGE: 0
SENSORY CHANGE: 0
TINGLING: 0
DIARRHEA: 0
CHILLS: 0
FEVER: 0
SORE THROAT: 0
DIZZINESS: 0
HEADACHES: 0
STRIDOR: 0
VOMITING: 0
NAUSEA: 0
BLOOD IN STOOL: 0
SHORTNESS OF BREATH: 0
DOUBLE VISION: 0
CLAUDICATION: 0
DEPRESSION: 0
CONSTIPATION: 0
PND: 0
HEARTBURN: 0
MYALGIAS: 0
NERVOUS/ANXIOUS: 0
ABDOMINAL PAIN: 0
COUGH: 0
WEAKNESS: 1
PALPITATIONS: 0
NECK PAIN: 1
TREMORS: 0
FOCAL WEAKNESS: 1
SPUTUM PRODUCTION: 0
BLURRED VISION: 0

## 2018-12-23 ASSESSMENT — PAIN SCALES - GENERAL
PAINLEVEL_OUTOF10: 6
PAINLEVEL_OUTOF10: 6

## 2018-12-23 NOTE — CARE PLAN
Problem: Safety  Goal: Will remain free from falls    Intervention: Implement fall precautions  Patient unsteady, assisted with transfers to prevent falls.      Problem: Pain Management  Goal: Pain level will decrease to patient's comfort goal  Patient on scheduled Tylenol with good pain relief & did not require prn medication.

## 2018-12-23 NOTE — PROGRESS NOTES
Hospital Medicine Daily Progress Note        Chief Complaint  DM    Interval Problem Update  12/17: Rapid HR better w/ PO fluids.  No chest pain, shortness of breath, or palpitations.  12/18: Intermittent tachycardia, patient continues to try and drink more fluids.  Glucose still on the high side between 151-292, increased pioglitazone to 30 after discussion with patient.  12/19: Patient tolerating increased dose of pioglitazone.  Blood pressure in good range.  Patient did have one episode of vomiting yesterday which he attributes to bowel regimen    12/20  No acute issues overall patient comfortable.  Patient reports increased strength in his arms as well as walking.     12/21  Overall clinically doing well, Patient denies fevers/chills, chest pain, shortness of breath or nausea/vommiting.   Resume therapy  Overall HR since last night improved now in low 90s, max 100. SBP in the 130s  D/c midodrine and monitor closely.     12/22  Patient doing well, no complaints. Patient denies fevers/chills, chest pain, shortness of breath or nausea/vommiting.   HR controlled now that he is off Midodrine, CTM.  Renal functions improved.  Resume therapy,      12/23  Patient clinically stable overall, no acute complaints. Patient denies fevers/chills, chest pain, shortness of breath or nausea/vommiting.   HR still 90s-100s but overall improved now Off midodrine.  Resume physical  Therapy.      Review of Systems  Review of Systems   Constitutional: Negative for chills, fever and malaise/fatigue.   HENT: Negative for congestion, hearing loss, sore throat and tinnitus.    Eyes: Negative for blurred vision, double vision, photophobia and pain.   Respiratory: Negative for cough, hemoptysis, sputum production, shortness of breath and stridor.    Cardiovascular: Negative for chest pain, palpitations, orthopnea, claudication and PND.   Gastrointestinal: Negative for abdominal pain, blood in stool, constipation, diarrhea, heartburn, melena,  nausea and vomiting.   Genitourinary: Negative for dysuria, frequency and urgency.   Musculoskeletal: Positive for neck pain (minimal). Negative for back pain and myalgias.   Neurological: Positive for focal weakness and weakness. Negative for dizziness, tingling, tremors, sensory change, speech change and headaches.   Psychiatric/Behavioral: Negative for depression, memory loss and suicidal ideas. The patient is not nervous/anxious.         Physical Exam  Temp:  [36.4 °C (97.6 °F)-36.6 °C (97.9 °F)] 36.6 °C (97.9 °F)  Pulse:  [] 98  Resp:  [17-18] 17  BP: (121-130)/(68-76) 130/75    Physical Exam   Constitutional: He is oriented to person, place, and time. He appears well-developed and well-nourished. No distress.   HENT:   Head: Normocephalic and atraumatic.   Right Ear: External ear normal.   Left Ear: External ear normal.   Mouth/Throat: No oropharyngeal exudate.   Eyes: Pupils are equal, round, and reactive to light. Conjunctivae are normal. Right eye exhibits no discharge. No scleral icterus.   Neck: Neck supple. No JVD present. No tracheal deviation present. No thyromegaly present.   Cervical collar in place   Cardiovascular: Regular rhythm, normal heart sounds and intact distal pulses.  Tachycardia present.  Exam reveals no gallop and no friction rub.    No murmur heard.  Pulses:       Dorsalis pedis pulses are 2+ on the right side, and 2+ on the left side.   Pulmonary/Chest: Effort normal and breath sounds normal. No stridor. No respiratory distress. He has no wheezes. He has no rales.   Abdominal: Soft. Bowel sounds are normal. He exhibits no distension and no mass. There is no tenderness. There is no rebound and no guarding.   Musculoskeletal: Normal range of motion. He exhibits no edema.   Lymphadenopathy:     He has no cervical adenopathy.   Neurological: He is alert and oriented to person, place, and time. No cranial nerve deficit.   Skin: Skin is warm and dry. No rash noted. He is not  diaphoretic. No erythema. No pallor.   Psychiatric: He has a normal mood and affect. His behavior is normal. Judgment and thought content normal.   Vitals reviewed.      Fluids    Intake/Output Summary (Last 24 hours) at 12/23/18 0819  Last data filed at 12/23/18 0300   Gross per 24 hour   Intake              540 ml   Output              300 ml   Net              240 ml       Laboratory      Recent Labs      12/22/18   0537   SODIUM  135   POTASSIUM  4.4   CHLORIDE  103   CO2  25   GLUCOSE  190*   BUN  39*   CREATININE  2.03*   CALCIUM  9.1                 Assessment/Plan  * Central cord syndrome (HCC)- (present on admission)   Assessment & Plan    Conservative management per Neurosurgery  Maintain cervical collar     Chronic renal failure- (present on admission)   Assessment & Plan    CKD Stage III  Creatinine 2.03<2.10 near baseline.   Continue to monitor closely  Continue to renally dose all medications, avoid nephrotoxins and nonsteroidal medications     Type 2 diabetes mellitus (HCC)- (present on admission)   Assessment & Plan    uncontrolled  Continue home dose of Actos  Continue Insulin-sliding scale, accu-checks and hypoglycemia protocol.  Deferred outpatient endocrinology follow-up     Tachycardia   Assessment & Plan    Noted SVT on EKG  intermittent episodes of HR going to low 100s but overall <100  CTM       Vitamin D deficiency   Assessment & Plan    Continue vitamin D supplementation     Thrombocytopenia (HCC)   Assessment & Plan    Stable, no signs of gross bleeding.     Anemia   Assessment & Plan    Stable no signs of gross bleeding continue monitor     Hyperlipemia- (present on admission)   Assessment & Plan    Resume simvastatin     Essential hypertension- (present on admission)   Assessment & Plan    Blood pressure well controlled now.  Not currently on medications         Total time:  33 minutes.  I spent greater than 50% of the time for patient care, counseling, and coordination on this date,  including unit/floor time, and face-to-face time with the patient as per interval events and assessment and plan above    CODE STATUS full code

## 2018-12-23 NOTE — PROGRESS NOTES
Received patient during shift change, report rec'd from day shift RN. Resting in bed, VS stable on room air. Per report continent of B&[facility]. A&O x 4, [place], able to make needs known. Bed in low position, call light within reach.

## 2018-12-23 NOTE — CARE PLAN
Problem: Bowel/Gastric:  Goal: Normal bowel function is maintained or improved  Outcome: PROGRESSING SLOWER THAN EXPECTED  Pt had 3 loose BMs this morning. No c/o of abd cramping or subsequent episodes of loose stool.      Problem: Pain Management  Goal: Pain level will decrease to patient's comfort goal  Patient able to verbalize needs.  Denies pain or discomfort this shift and no s/s same noted.  Will continue to monitor.

## 2018-12-24 LAB
GLUCOSE BLD-MCNC: 194 MG/DL (ref 65–99)
GLUCOSE BLD-MCNC: 196 MG/DL (ref 65–99)
GLUCOSE BLD-MCNC: 241 MG/DL (ref 65–99)
GLUCOSE BLD-MCNC: 271 MG/DL (ref 65–99)

## 2018-12-24 PROCEDURE — 770010 HCHG ROOM/CARE - REHAB SEMI PRIVAT*

## 2018-12-24 PROCEDURE — 97116 GAIT TRAINING THERAPY: CPT

## 2018-12-24 PROCEDURE — A9270 NON-COVERED ITEM OR SERVICE: HCPCS | Performed by: PHYSICAL MEDICINE & REHABILITATION

## 2018-12-24 PROCEDURE — 97530 THERAPEUTIC ACTIVITIES: CPT

## 2018-12-24 PROCEDURE — 700102 HCHG RX REV CODE 250 W/ 637 OVERRIDE(OP): Performed by: PHYSICAL MEDICINE & REHABILITATION

## 2018-12-24 PROCEDURE — 99232 SBSQ HOSP IP/OBS MODERATE 35: CPT | Performed by: PHYSICAL MEDICINE & REHABILITATION

## 2018-12-24 PROCEDURE — 700102 HCHG RX REV CODE 250 W/ 637 OVERRIDE(OP): Performed by: HOSPITALIST

## 2018-12-24 PROCEDURE — 700111 HCHG RX REV CODE 636 W/ 250 OVERRIDE (IP): Performed by: PHYSICAL MEDICINE & REHABILITATION

## 2018-12-24 PROCEDURE — 82962 GLUCOSE BLOOD TEST: CPT | Mod: 91

## 2018-12-24 PROCEDURE — 99232 SBSQ HOSP IP/OBS MODERATE 35: CPT | Performed by: HOSPITALIST

## 2018-12-24 PROCEDURE — G0515 COGNITIVE SKILLS DEVELOPMENT: HCPCS

## 2018-12-24 PROCEDURE — 97110 THERAPEUTIC EXERCISES: CPT

## 2018-12-24 PROCEDURE — A9270 NON-COVERED ITEM OR SERVICE: HCPCS | Performed by: HOSPITALIST

## 2018-12-24 RX ADMIN — OXYCODONE HYDROCHLORIDE 2.5 MG: 5 TABLET ORAL at 22:35

## 2018-12-24 RX ADMIN — HEPARIN SODIUM 5000 UNITS: 5000 INJECTION, SOLUTION INTRAVENOUS; SUBCUTANEOUS at 05:11

## 2018-12-24 RX ADMIN — INSULIN HUMAN 3 UNITS: 100 INJECTION, SOLUTION PARENTERAL at 17:23

## 2018-12-24 RX ADMIN — ACETAMINOPHEN 650 MG: 325 TABLET, FILM COATED ORAL at 21:46

## 2018-12-24 RX ADMIN — OMEPRAZOLE 20 MG: 20 CAPSULE, DELAYED RELEASE ORAL at 08:00

## 2018-12-24 RX ADMIN — HEPARIN SODIUM 5000 UNITS: 5000 INJECTION, SOLUTION INTRAVENOUS; SUBCUTANEOUS at 21:36

## 2018-12-24 RX ADMIN — SIMVASTATIN 20 MG: 20 TABLET, FILM COATED ORAL at 21:36

## 2018-12-24 RX ADMIN — INSULIN HUMAN 7 UNITS: 100 INJECTION, SOLUTION PARENTERAL at 21:41

## 2018-12-24 RX ADMIN — ACETAMINOPHEN 650 MG: 325 TABLET ORAL at 05:11

## 2018-12-24 RX ADMIN — INSULIN HUMAN 3 UNITS: 100 INJECTION, SOLUTION PARENTERAL at 07:39

## 2018-12-24 RX ADMIN — GABAPENTIN 300 MG: 300 CAPSULE ORAL at 14:24

## 2018-12-24 RX ADMIN — GABAPENTIN 300 MG: 300 CAPSULE ORAL at 21:36

## 2018-12-24 RX ADMIN — PIOGLITAZONE 30 MG: 15 TABLET ORAL at 08:00

## 2018-12-24 RX ADMIN — INSULIN HUMAN 4 UNITS: 100 INJECTION, SOLUTION PARENTERAL at 11:26

## 2018-12-24 RX ADMIN — ACETAMINOPHEN 650 MG: 325 TABLET ORAL at 17:29

## 2018-12-24 RX ADMIN — HEPARIN SODIUM 5000 UNITS: 5000 INJECTION, SOLUTION INTRAVENOUS; SUBCUTANEOUS at 14:25

## 2018-12-24 RX ADMIN — SENNOSIDES AND DOCUSATE SODIUM 1 TABLET: 8.6; 5 TABLET ORAL at 21:36

## 2018-12-24 RX ADMIN — VITAMIN D, TAB 1000IU (100/BT) 2000 UNITS: 25 TAB at 08:00

## 2018-12-24 RX ADMIN — ACETAMINOPHEN 650 MG: 325 TABLET ORAL at 00:05

## 2018-12-24 RX ADMIN — GABAPENTIN 300 MG: 300 CAPSULE ORAL at 08:00

## 2018-12-24 RX ADMIN — ACETAMINOPHEN 650 MG: 325 TABLET ORAL at 11:43

## 2018-12-24 ASSESSMENT — PAIN SCALES - GENERAL
PAINLEVEL_OUTOF10: 7
PAINLEVEL_OUTOF10: 5

## 2018-12-24 ASSESSMENT — ENCOUNTER SYMPTOMS
FALLS: 0
HEMOPTYSIS: 0
BLURRED VISION: 0
EYE PAIN: 0
CLAUDICATION: 0
VOMITING: 0
SENSORY CHANGE: 0
PALPITATIONS: 0
TREMORS: 0
COUGH: 0
SHORTNESS OF BREATH: 0
PHOTOPHOBIA: 0
FOCAL WEAKNESS: 1
MEMORY LOSS: 0
DIARRHEA: 0
NERVOUS/ANXIOUS: 0
FEVER: 0
BACK PAIN: 0
SPEECH CHANGE: 0
CONSTIPATION: 0
STRIDOR: 0
HEARTBURN: 0
DEPRESSION: 0
MYALGIAS: 0
SPUTUM PRODUCTION: 0
SORE THROAT: 0
ABDOMINAL PAIN: 0
ORTHOPNEA: 0
WEAKNESS: 1
NECK PAIN: 1
DOUBLE VISION: 0
TINGLING: 0
CHILLS: 0
PND: 0
BLOOD IN STOOL: 0
NAUSEA: 0
DIZZINESS: 0
HEADACHES: 0

## 2018-12-24 NOTE — REHAB-PHARMACY IDT TEAM NOTE
Pharmacy   Pharmacy  Antibiotics/Antifungals/Antivirals:  Medication:      Active Orders     None        Route:         n/a  Stop Date:  n/a  Reason:   Antihypertensives/Cardiac:  Medication:    Orders (72h ago through future)    Start     Ordered    12/10/18 2100  simvastatin (ZOCOR) tablet 20 mg  NIGHTLY      12/10/18 1510    12/10/18 1510  hydrALAZINE (APRESOLINE) tablet 25 mg  EVERY 8 HOURS PRN      12/10/18 1510        Patient Vitals for the past 24 hrs:   BP Pulse   12/24/18 0653 127/74 98   12/23/18 1900 100/64 (!) 104   12/23/18 1401 124/71 100       Anticoagulation:  Medication:  heparin  INR:      Other key medications: cyclobenzaprine, gabapentin, Humulin R, omeprazole, pioglitazone.    A review of the medication list reveals no issues at this time. Patient is currently on an antihypertensive. Recommend home blood pressure monitoring/recording if antihypertensive regimen continues.  Section completed by:  Sherri Dumont RPH

## 2018-12-24 NOTE — CARE PLAN
Problem: Safety  Goal: Will remain free from injury    Intervention: Provide assistance with mobility  Patient unsteady, assisted with transfers to prevent falls.      Problem: Metabolic:  Goal: Ability to maintain appropriate glucose levels will improve    Intervention: Manage blood glucose measurement  Patient's FSBS monitored ACHS, Sliding scale Insulin coverage administered.

## 2018-12-24 NOTE — PROGRESS NOTES
"Rehab Progress Note     Encounter date: 12/24/2018  Today I met with the patient face to face in PT    Chief Complaint:  Central cord syndrome (HCC) , no concerns      Interval Events (subjective)  Mr. Almanzar is doing well today.  He denies any fevers, chills, headache, dizziness, chest pain, shortness of breath.  He denies any symptomatic orthostatic episodes after discontinuation of his Midodrine.  He reports that his right arm function continues to gradually improve, but this is still frustrating and limits him.  He reports that he is attempting to eat and drink well, but he acknowledges his swallowing strategies are slowing down his drinking.  I reinforced the swallow strategies and encouraged him to drink more fluids as much as possible.    Objective:  VITAL SIGNS: /74   Pulse 98   Temp 36.9 °C (98.5 °F) (Oral)   Resp 17   Ht 1.778 m (5' 10\")   Wt 74.9 kg (165 lb 2 oz)   SpO2 96%   BMI 23.69 kg/m²     Recent Results (from the past 72 hour(s))   ACCU-CHEK GLUCOSE    Collection Time: 12/21/18  5:12 PM   Result Value Ref Range    Glucose - Accu-Ck 201 (H) 65 - 99 mg/dL   ACCU-CHEK GLUCOSE    Collection Time: 12/21/18  8:58 PM   Result Value Ref Range    Glucose - Accu-Ck 252 (H) 65 - 99 mg/dL   COMP METABOLIC PANEL    Collection Time: 12/22/18  5:37 AM   Result Value Ref Range    Sodium 135 135 - 145 mmol/L    Potassium 4.4 3.6 - 5.5 mmol/L    Chloride 103 96 - 112 mmol/L    Co2 25 20 - 33 mmol/L    Anion Gap 7.0 0.0 - 11.9    Glucose 190 (H) 65 - 99 mg/dL    Bun 39 (H) 8 - 22 mg/dL    Creatinine 2.03 (H) 0.50 - 1.40 mg/dL    Calcium 9.1 8.5 - 10.5 mg/dL    AST(SGOT) 18 12 - 45 U/L    ALT(SGPT) 21 2 - 50 U/L    Alkaline Phosphatase 90 30 - 99 U/L    Total Bilirubin 0.6 0.1 - 1.5 mg/dL    Albumin 3.5 3.2 - 4.9 g/dL    Total Protein 5.5 (L) 6.0 - 8.2 g/dL    Globulin 2.0 1.9 - 3.5 g/dL    A-G Ratio 1.8 g/dL   MAGNESIUM    Collection Time: 12/22/18  5:37 AM   Result Value Ref Range    Magnesium 1.8 " 1.5 - 2.5 mg/dL   ESTIMATED GFR    Collection Time: 12/22/18  5:37 AM   Result Value Ref Range    GFR If  39 (A) >60 mL/min/1.73 m 2    GFR If Non  32 (A) >60 mL/min/1.73 m 2   ACCU-CHEK GLUCOSE    Collection Time: 12/22/18  7:59 AM   Result Value Ref Range    Glucose - Accu-Ck 176 (H) 65 - 99 mg/dL   ACCU-CHEK GLUCOSE    Collection Time: 12/22/18 11:23 AM   Result Value Ref Range    Glucose - Accu-Ck 241 (H) 65 - 99 mg/dL   ACCU-CHEK GLUCOSE    Collection Time: 12/22/18  4:57 PM   Result Value Ref Range    Glucose - Accu-Ck 277 (H) 65 - 99 mg/dL   ACCU-CHEK GLUCOSE    Collection Time: 12/22/18  7:40 PM   Result Value Ref Range    Glucose - Accu-Ck 296 (H) 65 - 99 mg/dL   ACCU-CHEK GLUCOSE    Collection Time: 12/23/18  7:39 AM   Result Value Ref Range    Glucose - Accu-Ck 185 (H) 65 - 99 mg/dL   ACCU-CHEK GLUCOSE    Collection Time: 12/23/18 11:13 AM   Result Value Ref Range    Glucose - Accu-Ck 269 (H) 65 - 99 mg/dL   ACCU-CHEK GLUCOSE    Collection Time: 12/23/18  5:02 PM   Result Value Ref Range    Glucose - Accu-Ck 244 (H) 65 - 99 mg/dL   ACCU-CHEK GLUCOSE    Collection Time: 12/23/18  8:40 PM   Result Value Ref Range    Glucose - Accu-Ck 329 (H) 65 - 99 mg/dL   ACCU-CHEK GLUCOSE    Collection Time: 12/24/18  7:35 AM   Result Value Ref Range    Glucose - Accu-Ck 194 (H) 65 - 99 mg/dL   ACCU-CHEK GLUCOSE    Collection Time: 12/24/18 11:24 AM   Result Value Ref Range    Glucose - Accu-Ck 241 (H) 65 - 99 mg/dL       Current Facility-Administered Medications   Medication Frequency   • oxyCODONE immediate-release (ROXICODONE) tablet 2.5 mg Q6HRS PRN   • docusate sodium (COLACE) capsule 100 mg QDAY PRN   • magnesium hydroxide (MILK OF MAGNESIA) suspension 30 mL QDAY PRN   • polyethylene glycol/lytes (MIRALAX) PACKET 1 Packet PRN   • pioglitazone (ACTOS) tablet 30 mg DAILY   • gabapentin (NEURONTIN) capsule 300 mg TID   • vitamin D (cholecalciferol) tablet 2,000 Units DAILY   • ascorbic  acid tablet 500 mg Q48HRS   • acetaminophen (TYLENOL) tablet 650 mg Q6HRS   • heparin injection 5,000 Units Q8HRS   • insulin regular (HUMULIN R) injection 3-14 Units 4X/DAY ACHS    And   • glucose 4 g chewable tablet 16 g Q15 MIN PRN    And   • dextrose 50% (D50W) injection 25 mL Q15 MIN PRN   • senna-docusate (PERICOLACE or SENOKOT S) 8.6-50 MG per tablet 1 Tab Nightly   • simvastatin (ZOCOR) tablet 20 mg Nightly   • Respiratory Care per Protocol Continuous RT   • Pharmacy Consult Request ...Pain Management Review 1 Each PRN   • hydrALAZINE (APRESOLINE) tablet 25 mg Q8HRS PRN   • acetaminophen (TYLENOL) tablet 650 mg Q4HRS PRN   • artificial tears 1.4 % ophthalmic solution 1 Drop PRN   • benzocaine-menthol (CEPACOL) lozenge 1 Lozenge Q2HRS PRN   • mag hydrox-al hydrox-simeth (MAALOX PLUS ES or MYLANTA DS) suspension 20 mL Q2HRS PRN   • ondansetron (ZOFRAN ODT) dispertab 4 mg 4X/DAY PRN    Or   • ondansetron (ZOFRAN) syringe/vial injection 4 mg 4X/DAY PRN   • traZODone (DESYREL) tablet 50 mg QHS PRN   • sodium chloride (OCEAN) 0.65 % nasal spray 2 Spray PRN   • cyclobenzaprine (FLEXERIL) tablet 10 mg TID PRN   • ferrous sulfate tablet 325 mg Q48HRS   • omeprazole (PRILOSEC) capsule 20 mg DAILY       Exam Date: 12/24/2018    General:  Awake, alert, oriented, no acute distress  HEENT:  Wearing Aspen cervical collar  Cardiac: regular rate and rhythm  Lungs: clear to auscultation bilaterally.   Abdomen: soft; non tender, non distended, bowel sounds present and normoactive  Extremities: No edema in the bilateral lower limbs  Neuro:   Seen ambulate in with 4 wheeled walker and mildly ataxic/wide-based gait.  Right elbow flexion remains 2+/5.  With gravity eliminated, he has a full active range of motion.  Distal right upper extremity strength is relatively preserved at 4/5    Orders Placed This Encounter   Procedures   • Diet Order Diabetic     Standing Status:   Standing     Number of Occurrences:   1     Order Specific  Question:   Diet:     Answer:   Diabetic [3]     Order Specific Question:   Texture/Fiber modifications:     Answer:   Chopped Meat [5]     Comments:   cut sandwiches into 1/4       Assessment:  Active Hospital Problems    Diagnosis   • *Central cord syndrome (HCC)   • Dysphagia   • Type 2 diabetes mellitus (HCC)   • Chronic renal failure   • Scalp laceration   • 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 Simon    Scalp sutures removed December 14  Speech and language pathology for cognition    Dysphagia  Continuing to gradually improve  Reinforced liquid swallow strategies  Continue speech-language pathology sessions     Pain  Schedule Tylenol every 6 hours  Gabapentin 300 mg TID--do not escalate due to renal impairment   Oxycodone 2.5 mg every 6 hours as needed for severe pain--dose decreased December 21    He has used no of oxycodone in the last 24 hours     Neurogenic Bladder   Continue timed voiding     Neurogenic Bowel  Colace twice a day  Milk of magnesia daily  Pat-Colace nightly     History of hypertension  Orthostatic hypotension   At baseline, patient on lisinopril 10 mg daily and propranolol 40 mg 3 times a day    Blood pressure range in the last 24 hours of 100-127  mmHg  Tolerated discontinuation of ProAmatine on December 21 with no issues  Heart rate mildly variable with borderline tachycardia  Likely worsened by discontinuation of ProAmatine    Continuing to encourage hydration    Diabetes mellitus with hyperglycemia  Currently on sliding scale  Hospitalist working on weaning sliding scale  Actos 30 mg daily--increased on December 18    Glucose range of 194-329 in last 24 hours    Stage III chronic kidney disease  Creatinine 2.10 on December 20 and stable   Continue to encourage fluid intake    Continues to stress the importance of fluid intake     Hyperlipidemia  Simvastatin 20 mg daily      Anemia  Hemoglobin 9.5 December 20  Continue ferrous sulfate    Vitamin D deficiency  Vitamin D was 21 on admission  Increase supplementation to 2000 units of cholecalciferol daily     GI Ppx - Patient on Prilosec 20 mg daily.     DVT ppx - Patient on Heparin 5000 q8h .     Estimated Discharge: January 4    Total time:  25 minutes.  I spent greater than 50% of the time for patient care, counseling, and coordination on this date, including unit/floor time, and face-to-face time with the patient as per interval events and assessment and plan above. Topics discussed included functional progress, right upper limb function, hydration status,      Mustapha Brandt M.D.  12/24/2018

## 2018-12-24 NOTE — CARE PLAN
Problem: Safety  Goal: Will remain free from injury  Patient uses call light  appropriately this shift.  Waits for assistance when needed and does not attempt self transfer.  Able to verbalize needs.  Will continue to monitor.    Problem: Pain Management  Goal: Pain level will decrease to patient's comfort goal  Tylenol given PRN and as scheduled for c/o right shoulder pain 6/10 with moderate relief. Pt is also provided with ice pack to his right shoulder for comfort. Pt reports a pain down to 2/10 upon reassessment. He sleeps good. Will continue to monitor.    Problem: Metabolic:  Goal: Ability to maintain appropriate glucose levels will improve    Intervention: Manage blood glucose measurement  HS [id number]. Humulin R 10 units given per sliding scale per MAR. Offered HS snack but refused for now. Will continue to monitor.

## 2018-12-24 NOTE — PROGRESS NOTES
Hospital Medicine Daily Progress Note        Chief Complaint  DM    Interval Problem Update  12/17: Rapid HR better w/ PO fluids.  No chest pain, shortness of breath, or palpitations.  12/18: Intermittent tachycardia, patient continues to try and drink more fluids.  Glucose still on the high side between 151-292, increased pioglitazone to 30 after discussion with patient.  12/19: Patient tolerating increased dose of pioglitazone.  Blood pressure in good range.  Patient did have one episode of vomiting yesterday which he attributes to bowel regimen    12/20  No acute issues overall patient comfortable.  Patient reports increased strength in his arms as well as walking.     12/21  Overall clinically doing well, Patient denies fevers/chills, chest pain, shortness of breath or nausea/vommiting.   Resume therapy  Overall HR since last night improved now in low 90s, max 100. SBP in the 130s  D/c midodrine and monitor closely.     12/22  Patient doing well, no complaints. Patient denies fevers/chills, chest pain, shortness of breath or nausea/vommiting.   HR controlled now that he is off Midodrine, CTM.  Renal functions improved.  Resume therapy,      12/23  Patient clinically stable overall, no acute complaints. Patient denies fevers/chills, chest pain, shortness of breath or nausea/vommiting.   HR still 90s-100s but overall improved now Off midodrine.  Resume physical  Therapy.    12/24  Patient comfortable, neck pain controlled. Unhappy about not being able to move his right arm way he wants.  HR and BP well controlled, CTM,   Continue with pain control, therapy,     Review of Systems  Review of Systems   Constitutional: Negative for chills, fever and malaise/fatigue.   HENT: Negative for congestion, hearing loss, sore throat and tinnitus.    Eyes: Negative for blurred vision, double vision, photophobia and pain.   Respiratory: Negative for cough, hemoptysis, sputum production, shortness of breath and stridor.     Cardiovascular: Negative for chest pain, palpitations, orthopnea, claudication and PND.   Gastrointestinal: Negative for abdominal pain, blood in stool, constipation, diarrhea, heartburn, melena, nausea and vomiting.   Genitourinary: Negative for dysuria, frequency and urgency.   Musculoskeletal: Positive for neck pain. Negative for back pain, falls, joint pain and myalgias.   Neurological: Positive for focal weakness and weakness. Negative for dizziness, tingling, tremors, sensory change, speech change and headaches.   Psychiatric/Behavioral: Negative for depression, memory loss and suicidal ideas. The patient is not nervous/anxious.         Physical Exam  Temp:  [36.4 °C (97.5 °F)-36.9 °C (98.5 °F)] 36.9 °C (98.5 °F)  Pulse:  [] 98  Resp:  [17-18] 17  BP: (100-127)/(64-74) 127/74    Physical Exam   Constitutional: He is oriented to person, place, and time. He appears well-developed and well-nourished. No distress.   HENT:   Head: Normocephalic and atraumatic.   Right Ear: External ear normal.   Left Ear: External ear normal.   Mouth/Throat: No oropharyngeal exudate.   Eyes: Pupils are equal, round, and reactive to light. Conjunctivae are normal. Right eye exhibits no discharge. No scleral icterus.   Neck: Neck supple. No JVD present. No tracheal deviation present. No thyromegaly present.   Cervical collar in place   Cardiovascular: Regular rhythm, normal heart sounds and intact distal pulses.  Tachycardia present.  Exam reveals no gallop and no friction rub.    No murmur heard.  Pulses:       Dorsalis pedis pulses are 2+ on the right side, and 2+ on the left side.   Pulmonary/Chest: Effort normal and breath sounds normal. No stridor. No respiratory distress. He has no wheezes. He has no rales.   Abdominal: Soft. Bowel sounds are normal. He exhibits no distension and no mass. There is no tenderness. There is no rebound and no guarding.   Musculoskeletal: Normal range of motion. He exhibits no edema.    Lymphadenopathy:     He has no cervical adenopathy.   Neurological: He is alert and oriented to person, place, and time. No cranial nerve deficit. He exhibits abnormal muscle tone.   Skin: Skin is warm and dry. No rash noted. He is not diaphoretic. No erythema. No pallor.   Psychiatric: He has a normal mood and affect. His behavior is normal. Judgment and thought content normal.   Vitals reviewed.      Fluids    Intake/Output Summary (Last 24 hours) at 12/24/18 0800  Last data filed at 12/24/18 0513   Gross per 24 hour   Intake              840 ml   Output              700 ml   Net              140 ml       Laboratory      Recent Labs      12/22/18   0537   SODIUM  135   POTASSIUM  4.4   CHLORIDE  103   CO2  25   GLUCOSE  190*   BUN  39*   CREATININE  2.03*   CALCIUM  9.1                 Assessment/Plan  * Central cord syndrome (HCC)- (present on admission)   Assessment & Plan    Conservative management per Neurosurgery  Maintain cervical collar       Chronic renal failure- (present on admission)   Assessment & Plan    CKD Stage III  Creatinine at baseline.  Continue to monitor closely  Continue to renally dose all medications, avoid nephrotoxins and nonsteroidal medications     Type 2 diabetes mellitus (HCC)- (present on admission)   Assessment & Plan    Uncontrolled  Continue home dose of Actos  Continue Insulin-sliding scale, accu-checks and hypoglycemia protocol.  Deferred outpatient endocrinology follow-up     Tachycardia   Assessment & Plan    Noted SVT on EKG  HR mostly <100, improving, CTM       Vitamin D deficiency   Assessment & Plan    Continue vitamin D supplementation       Thrombocytopenia (HCC)   Assessment & Plan    Stable, no signs of gross bleeding.       Anemia   Assessment & Plan    Stable no signs of gross bleeding continue monitor       Hyperlipemia- (present on admission)   Assessment & Plan    Resume simvastatin     Essential hypertension- (present on admission)   Assessment & Plan    Blood  pressure well controlled now.  Not currently on medications         Total time:  30 minutes.  I spent greater than 50% of the time for patient care, counseling, and coordination on this date, including unit/floor time, and face-to-face time with the patient as per interval events and assessment and plan above    CODE STATUS full code

## 2018-12-25 LAB
GLUCOSE BLD-MCNC: 218 MG/DL (ref 65–99)
GLUCOSE BLD-MCNC: 218 MG/DL (ref 65–99)
GLUCOSE BLD-MCNC: 224 MG/DL (ref 65–99)
GLUCOSE BLD-MCNC: 248 MG/DL (ref 65–99)

## 2018-12-25 PROCEDURE — 700102 HCHG RX REV CODE 250 W/ 637 OVERRIDE(OP): Performed by: PHYSICAL MEDICINE & REHABILITATION

## 2018-12-25 PROCEDURE — 700102 HCHG RX REV CODE 250 W/ 637 OVERRIDE(OP): Performed by: INTERNAL MEDICINE

## 2018-12-25 PROCEDURE — 700111 HCHG RX REV CODE 636 W/ 250 OVERRIDE (IP): Performed by: PHYSICAL MEDICINE & REHABILITATION

## 2018-12-25 PROCEDURE — A9270 NON-COVERED ITEM OR SERVICE: HCPCS | Performed by: PHYSICAL MEDICINE & REHABILITATION

## 2018-12-25 PROCEDURE — A9270 NON-COVERED ITEM OR SERVICE: HCPCS | Performed by: HOSPITALIST

## 2018-12-25 PROCEDURE — G0515 COGNITIVE SKILLS DEVELOPMENT: HCPCS

## 2018-12-25 PROCEDURE — 99231 SBSQ HOSP IP/OBS SF/LOW 25: CPT | Performed by: INTERNAL MEDICINE

## 2018-12-25 PROCEDURE — 82962 GLUCOSE BLOOD TEST: CPT | Mod: 91

## 2018-12-25 PROCEDURE — A9270 NON-COVERED ITEM OR SERVICE: HCPCS | Performed by: INTERNAL MEDICINE

## 2018-12-25 PROCEDURE — 97116 GAIT TRAINING THERAPY: CPT

## 2018-12-25 PROCEDURE — 97112 NEUROMUSCULAR REEDUCATION: CPT

## 2018-12-25 PROCEDURE — 700102 HCHG RX REV CODE 250 W/ 637 OVERRIDE(OP): Performed by: HOSPITALIST

## 2018-12-25 PROCEDURE — 770010 HCHG ROOM/CARE - REHAB SEMI PRIVAT*

## 2018-12-25 RX ADMIN — GABAPENTIN 300 MG: 300 CAPSULE ORAL at 08:23

## 2018-12-25 RX ADMIN — GABAPENTIN 300 MG: 300 CAPSULE ORAL at 19:05

## 2018-12-25 RX ADMIN — INSULIN HUMAN 4 UNITS: 100 INJECTION, SOLUTION PARENTERAL at 20:28

## 2018-12-25 RX ADMIN — HEPARIN SODIUM 5000 UNITS: 5000 INJECTION, SOLUTION INTRAVENOUS; SUBCUTANEOUS at 14:40

## 2018-12-25 RX ADMIN — INSULIN HUMAN 4 UNITS: 100 INJECTION, SOLUTION PARENTERAL at 11:44

## 2018-12-25 RX ADMIN — GABAPENTIN 300 MG: 300 CAPSULE ORAL at 14:40

## 2018-12-25 RX ADMIN — ACETAMINOPHEN 650 MG: 325 TABLET ORAL at 05:34

## 2018-12-25 RX ADMIN — CYCLOBENZAPRINE HYDROCHLORIDE 10 MG: 10 TABLET, FILM COATED ORAL at 19:09

## 2018-12-25 RX ADMIN — ACETAMINOPHEN 650 MG: 325 TABLET ORAL at 11:49

## 2018-12-25 RX ADMIN — PIOGLITAZONE 30 MG: 15 TABLET ORAL at 08:23

## 2018-12-25 RX ADMIN — OXYCODONE HYDROCHLORIDE AND ACETAMINOPHEN 500 MG: 500 TABLET ORAL at 08:23

## 2018-12-25 RX ADMIN — VITAMIN D, TAB 1000IU (100/BT) 2000 UNITS: 25 TAB at 08:23

## 2018-12-25 RX ADMIN — SIMVASTATIN 20 MG: 20 TABLET, FILM COATED ORAL at 19:05

## 2018-12-25 RX ADMIN — METOPROLOL TARTRATE 25 MG: 25 TABLET ORAL at 16:20

## 2018-12-25 RX ADMIN — HEPARIN SODIUM 5000 UNITS: 5000 INJECTION, SOLUTION INTRAVENOUS; SUBCUTANEOUS at 05:34

## 2018-12-25 RX ADMIN — INSULIN HUMAN 4 UNITS: 100 INJECTION, SOLUTION PARENTERAL at 08:20

## 2018-12-25 RX ADMIN — INSULIN HUMAN 4 UNITS: 100 INJECTION, SOLUTION PARENTERAL at 17:30

## 2018-12-25 RX ADMIN — SENNOSIDES AND DOCUSATE SODIUM 1 TABLET: 8.6; 5 TABLET ORAL at 19:05

## 2018-12-25 RX ADMIN — FERROUS SULFATE TAB 325 MG (65 MG ELEMENTAL FE) 325 MG: 325 (65 FE) TAB at 08:23

## 2018-12-25 RX ADMIN — HEPARIN SODIUM 5000 UNITS: 5000 INJECTION, SOLUTION INTRAVENOUS; SUBCUTANEOUS at 20:26

## 2018-12-25 RX ADMIN — OXYCODONE HYDROCHLORIDE 2.5 MG: 5 TABLET ORAL at 21:55

## 2018-12-25 RX ADMIN — OMEPRAZOLE 20 MG: 20 CAPSULE, DELAYED RELEASE ORAL at 08:23

## 2018-12-25 RX ADMIN — ACETAMINOPHEN 650 MG: 325 TABLET ORAL at 17:27

## 2018-12-25 ASSESSMENT — ENCOUNTER SYMPTOMS
ORTHOPNEA: 0
SORE THROAT: 0
SHORTNESS OF BREATH: 0
DIZZINESS: 0
PND: 0
FALLS: 0
MYALGIAS: 0
FEVER: 0
PHOTOPHOBIA: 0
FOCAL WEAKNESS: 1
SPUTUM PRODUCTION: 0
BLOOD IN STOOL: 0
WEAKNESS: 1
NAUSEA: 0
EYE PAIN: 0
HEMOPTYSIS: 0
MEMORY LOSS: 0
HEADACHES: 0
TINGLING: 0
CHILLS: 0
BACK PAIN: 0
ABDOMINAL PAIN: 0
VOMITING: 0
SENSORY CHANGE: 0
CLAUDICATION: 0
COUGH: 0
PALPITATIONS: 0
DOUBLE VISION: 0
TREMORS: 0
DEPRESSION: 0
NECK PAIN: 1
SPEECH CHANGE: 0
BLURRED VISION: 0
STRIDOR: 0
NERVOUS/ANXIOUS: 0
HEARTBURN: 0
CONSTIPATION: 0
DIARRHEA: 0

## 2018-12-25 ASSESSMENT — PAIN SCALES - GENERAL
PAINLEVEL_OUTOF10: 4
PAINLEVEL_OUTOF10: 5
PAINLEVEL_OUTOF10: 5
PAINLEVEL_OUTOF10: 2
PAINLEVEL_OUTOF10: 2

## 2018-12-25 NOTE — WOUND TEAM
Wound consult placed for evaluation of bilateral buttocks wound.  Photo assessed and discussed with nurse.  Instructed to place mepilex tucked into buttocks fold.  Wound team will try to see patient Wednesday.

## 2018-12-25 NOTE — CARE PLAN
Problem: Communication  Goal: The ability to communicate needs accurately and effectively will improve  Outcome: MET Date Met: 12/25/18  Pt is able to effectively communicate his needs to staff.    Problem: Safety  Goal: Will remain free from injury  Outcome: PROGRESSING AS EXPECTED  Pt uses call light consistently for staff assistance. Pt has good safety awareness, no impulsivity observed.    Problem: Bowel/Gastric:  Goal: Will not experience complications related to bowel motility  Outcome: PROGRESSING AS EXPECTED  Pt is continent of bowel and had a BM thi shift.

## 2018-12-26 LAB
GLUCOSE BLD-MCNC: 154 MG/DL (ref 65–99)
GLUCOSE BLD-MCNC: 188 MG/DL (ref 65–99)
GLUCOSE BLD-MCNC: 215 MG/DL (ref 65–99)

## 2018-12-26 PROCEDURE — 97112 NEUROMUSCULAR REEDUCATION: CPT

## 2018-12-26 PROCEDURE — 82962 GLUCOSE BLOOD TEST: CPT | Mod: 91

## 2018-12-26 PROCEDURE — A9270 NON-COVERED ITEM OR SERVICE: HCPCS | Performed by: PHYSICAL MEDICINE & REHABILITATION

## 2018-12-26 PROCEDURE — 97110 THERAPEUTIC EXERCISES: CPT

## 2018-12-26 PROCEDURE — 97530 THERAPEUTIC ACTIVITIES: CPT

## 2018-12-26 PROCEDURE — 700102 HCHG RX REV CODE 250 W/ 637 OVERRIDE(OP): Performed by: PHYSICAL MEDICINE & REHABILITATION

## 2018-12-26 PROCEDURE — G0515 COGNITIVE SKILLS DEVELOPMENT: HCPCS

## 2018-12-26 PROCEDURE — A9270 NON-COVERED ITEM OR SERVICE: HCPCS | Performed by: HOSPITALIST

## 2018-12-26 PROCEDURE — A9270 NON-COVERED ITEM OR SERVICE: HCPCS | Performed by: INTERNAL MEDICINE

## 2018-12-26 PROCEDURE — 770010 HCHG ROOM/CARE - REHAB SEMI PRIVAT*

## 2018-12-26 PROCEDURE — 99233 SBSQ HOSP IP/OBS HIGH 50: CPT | Performed by: PHYSICAL MEDICINE & REHABILITATION

## 2018-12-26 PROCEDURE — 97116 GAIT TRAINING THERAPY: CPT

## 2018-12-26 PROCEDURE — 700102 HCHG RX REV CODE 250 W/ 637 OVERRIDE(OP): Performed by: HOSPITALIST

## 2018-12-26 PROCEDURE — 700102 HCHG RX REV CODE 250 W/ 637 OVERRIDE(OP): Performed by: INTERNAL MEDICINE

## 2018-12-26 PROCEDURE — 700111 HCHG RX REV CODE 636 W/ 250 OVERRIDE (IP): Performed by: PHYSICAL MEDICINE & REHABILITATION

## 2018-12-26 PROCEDURE — 99231 SBSQ HOSP IP/OBS SF/LOW 25: CPT | Performed by: INTERNAL MEDICINE

## 2018-12-26 RX ORDER — OXYCODONE HYDROCHLORIDE 5 MG/1
2.5 TABLET ORAL EVERY 8 HOURS PRN
Status: DISCONTINUED | OUTPATIENT
Start: 2018-12-26 | End: 2019-01-04

## 2018-12-26 RX ADMIN — METOPROLOL TARTRATE 25 MG: 25 TABLET ORAL at 05:24

## 2018-12-26 RX ADMIN — SENNOSIDES AND DOCUSATE SODIUM 1 TABLET: 8.6; 5 TABLET ORAL at 20:29

## 2018-12-26 RX ADMIN — INSULIN HUMAN 3 UNITS: 100 INJECTION, SOLUTION PARENTERAL at 08:16

## 2018-12-26 RX ADMIN — GABAPENTIN 300 MG: 300 CAPSULE ORAL at 20:29

## 2018-12-26 RX ADMIN — ACETAMINOPHEN 650 MG: 325 TABLET ORAL at 23:48

## 2018-12-26 RX ADMIN — HEPARIN SODIUM 5000 UNITS: 5000 INJECTION, SOLUTION INTRAVENOUS; SUBCUTANEOUS at 05:22

## 2018-12-26 RX ADMIN — METOPROLOL TARTRATE 25 MG: 25 TABLET ORAL at 17:42

## 2018-12-26 RX ADMIN — VITAMIN D, TAB 1000IU (100/BT) 2000 UNITS: 25 TAB at 08:16

## 2018-12-26 RX ADMIN — GABAPENTIN 300 MG: 300 CAPSULE ORAL at 15:22

## 2018-12-26 RX ADMIN — INSULIN HUMAN 4 UNITS: 100 INJECTION, SOLUTION PARENTERAL at 20:33

## 2018-12-26 RX ADMIN — PIOGLITAZONE 30 MG: 15 TABLET ORAL at 08:16

## 2018-12-26 RX ADMIN — ACETAMINOPHEN 650 MG: 325 TABLET ORAL at 11:44

## 2018-12-26 RX ADMIN — INSULIN HUMAN 3 UNITS: 100 INJECTION, SOLUTION PARENTERAL at 11:44

## 2018-12-26 RX ADMIN — ACETAMINOPHEN 650 MG: 325 TABLET ORAL at 05:24

## 2018-12-26 RX ADMIN — OXYCODONE HYDROCHLORIDE 2.5 MG: 5 TABLET ORAL at 22:42

## 2018-12-26 RX ADMIN — SIMVASTATIN 20 MG: 20 TABLET, FILM COATED ORAL at 20:29

## 2018-12-26 RX ADMIN — OMEPRAZOLE 20 MG: 20 CAPSULE, DELAYED RELEASE ORAL at 08:16

## 2018-12-26 RX ADMIN — ACETAMINOPHEN 650 MG: 325 TABLET ORAL at 17:42

## 2018-12-26 RX ADMIN — GABAPENTIN 300 MG: 300 CAPSULE ORAL at 08:16

## 2018-12-26 RX ADMIN — HEPARIN SODIUM 5000 UNITS: 5000 INJECTION, SOLUTION INTRAVENOUS; SUBCUTANEOUS at 15:22

## 2018-12-26 RX ADMIN — HEPARIN SODIUM 5000 UNITS: 5000 INJECTION, SOLUTION INTRAVENOUS; SUBCUTANEOUS at 20:30

## 2018-12-26 ASSESSMENT — ENCOUNTER SYMPTOMS
DIARRHEA: 0
HEADACHES: 0
FOCAL WEAKNESS: 1
SPUTUM PRODUCTION: 0
ORTHOPNEA: 0
HEMOPTYSIS: 0
ABDOMINAL PAIN: 0
BLOOD IN STOOL: 0
NERVOUS/ANXIOUS: 0
PND: 0
COUGH: 0
PALPITATIONS: 0
BLURRED VISION: 0
FALLS: 0
BACK PAIN: 0
SHORTNESS OF BREATH: 0
CONSTIPATION: 0
TREMORS: 0
PHOTOPHOBIA: 0
SPEECH CHANGE: 0
MEMORY LOSS: 0
NECK PAIN: 1
STRIDOR: 0
DIZZINESS: 0
DOUBLE VISION: 0
CLAUDICATION: 0
EYE PAIN: 0
SENSORY CHANGE: 0
HEARTBURN: 0
TINGLING: 0
SORE THROAT: 0
CHILLS: 0
VOMITING: 0
WEAKNESS: 1
FEVER: 0
DEPRESSION: 0
NAUSEA: 0
MYALGIAS: 0

## 2018-12-26 ASSESSMENT — PAIN SCALES - GENERAL
PAINLEVEL_OUTOF10: 0
PAINLEVEL_OUTOF10: 4
PAINLEVEL_OUTOF10: 7
PAINLEVEL_OUTOF10: 8

## 2018-12-26 NOTE — CARE PLAN
Problem: Dressing  Goal: STG-Within one week, patient will dress UB  1) Individualized Goal: W/ mod A using AD/AE/DME prn.  2) Interventions: OT E Stim Attended, OT Group Therapy, OT Self Care/ADL, OT Neuro Re-Ed/Balance, OT Sensory Int Techniques, OT Therapeutic Activity, OT Aquatic Therapy and OT Therapeutic Exercise      Outcome: PROGRESSING SLOWER THAN EXPECTED    Goal: STG-Within one week, patient will dress LB  1) Individualized Goal: W/ mod A using AD/AE/DME prn.  2) Interventions: OT E Stim Attended, OT Group Therapy, OT Self Care/ADL, OT Neuro Re-Ed/Balance, OT Sensory Int Techniques, OT Therapeutic Activity, OT Aquatic Therapy and OT Therapeutic Exercise      Outcome: MET Date Met: 12/26/18

## 2018-12-26 NOTE — REHAB-SLP IDT TEAM NOTE
Speech Therapy   Cognitive Linquistic Functions  Comprehension Initial:  4 - Minimal Assistance  Comprehension Current:  6 - Modified Independent   Comprehension Description:  Glasses  Expression Initial:  5 - Stand-by Prompting/Supervision or Set-up  Expression Current:  6 - Modified Independent   Expression Description:  Verbal cueing  Social Interaction Initial:  5 - Stand-by Prompting/Supervision or Set-up  Social Interaction Current:  7 - Independent   Social Interaction Description:  Increased time  Problem Solving Initial:  3 - Moderate Assistance  Problem Solving Current:  5 - Standby Prompting/Supervision or Set-up   Problem Solving Description:  Verbal cueing, Therapy schedule, Bed/chair alarm  Memory Initial:  2 - Max Assistance  Memory Current:  5 - Standby Prompting/Supervision or Set-up   Memory Description:  Verbal cueing, Therapy schedule, Bed/chair alarm  Executive Functioning / Organization Initial:     Executive Functioning / Organization Current: 4 minimal assistance     Executive Functioning Desciption:  Attention to detail, planning and organization skills.     Swallowing  Swallowing Status:  Pt d/c from further dysphagia management. Tolerating well with no overt s/sx of asp/pen.    Orders Placed This Encounter   Procedures   • Diet Order Diabetic     Standing Status:   Standing     Number of Occurrences:   1     Order Specific Question:   Diet:     Answer:   Diabetic [3]     Order Specific Question:   Texture/Fiber modifications:     Answer:   Chopped Meat [5]     Comments:   cut sandwiches into 1/4     Behavior Modification Plan  Allow for rest time, Give clear feedback, Set clear goals, Provide reasonable choices, Decrease the chance of failure by offering activities that are within the patient's abilities, Analyze tasks (break down into smaller steps) and Reinforce participation in desired tasks  Assistive Technology  Low/Impaired vision equipment and Low tech: Calendar, planner, schedule,  alarms/timers, pill organizer, post-it notes, lists  Family Training/Education:  Ongoing education with pt   DC Recommendations:  Decrease to 30 min ST  Strengths:  Able to follow instructions, Effective communication skills, Making steady progress towards goals, Motivated for self care and independence, Pleasant and cooperative and Willingly participates in therapeutic activities  Barriers:  Decreased endurance, Generalized weakness and Other: implementation of precautions   # of short term goals set=1  # of short term goals met=1       Speech Therapy Problems           Problem: Problem Solving STGs     Dates: Start: 12/11/18       Goal: STG-Within one week, patient will     Dates: Start: 12/26/18       Description: 1) Individualized goal:  Complete functional medication management and financial management task with 100% accuracy provided SPV.   2) Interventions:  SLP Self Care / ADL Training , SLP Cognitive Skill Development and SLP Group Treatment               Problem: Speech/Swallowing LTGs     Dates: Start: 12/11/18       Goal: LTG-By discharge, patient will safely swallow     Dates: Start: 12/11/18       Description: 1) Individualized goal:  Regular textures/thin liquids with MOD I for use of compensatory strategies.   2) Interventions:  SLP Swallowing Dysfunction Treatment, SLP Cognitive Skill Development and SLP Group Treatment             Goal: LTG-By discharge, patient will solve complex problem     Dates: Start: 12/11/18       Description: 1) Individualized goal:  By utilizing compensatory intervention for memory and problem-solving to allow for safe completion of daily activities with MOD I  2) Interventions:  SLP Cognitive Skill Development and SLP Group Treatment                    Section completed by:  Stefani Blackwood MS,CCC-SLP

## 2018-12-26 NOTE — PROGRESS NOTES
"Rehab Progress Note     Encounter date: 12/26/2018  Today I met with the patient face to face in OT    Chief Complaint:  Central cord syndrome (HCC) , improved gait    Interval Events (subjective)  Mr. Almanzar is very excited about his ambulation improvements.  He was able to walk 500 feet in the [facility].  He reports his upper limb improvement has been more gradual.  He is eating and drinking well.  He denies dizziness, headache, fever, chills, nausea, vomiting, coughing or choking.  He feels that he is making good progress towards discharge.       Objective:  VITAL SIGNS: /68   Pulse 91   Temp 36.4 °C (97.5 °F) (Oral)   Resp 18   Ht 1.778 m (5' 10\")   Wt 74.9 kg (165 lb 2 oz)   SpO2 97%   BMI 23.69 kg/m²     Recent Results (from the past 72 hour(s))   ACCU-CHEK GLUCOSE    Collection Time: 12/23/18  5:02 PM   Result Value Ref Range    Glucose - Accu-Ck 244 (H) 65 - 99 mg/dL   ACCU-CHEK GLUCOSE    Collection Time: 12/23/18  8:40 PM   Result Value Ref Range    Glucose - Accu-Ck 329 (H) 65 - 99 mg/dL   ACCU-CHEK GLUCOSE    Collection Time: 12/24/18  7:35 AM   Result Value Ref Range    Glucose - Accu-Ck 194 (H) 65 - 99 mg/dL   ACCU-CHEK GLUCOSE    Collection Time: 12/24/18 11:24 AM   Result Value Ref Range    Glucose - Accu-Ck 241 (H) 65 - 99 mg/dL   ACCU-CHEK GLUCOSE    Collection Time: 12/24/18  5:21 PM   Result Value Ref Range    Glucose - Accu-Ck 196 (H) 65 - 99 mg/dL   ACCU-CHEK GLUCOSE    Collection Time: 12/24/18  9:38 PM   Result Value Ref Range    Glucose - Accu-Ck 271 (H) 65 - 99 mg/dL   ACCU-CHEK GLUCOSE    Collection Time: 12/25/18  7:54 AM   Result Value Ref Range    Glucose - Accu-Ck 224 (H) 65 - 99 mg/dL   ACCU-CHEK GLUCOSE    Collection Time: 12/25/18 11:36 AM   Result Value Ref Range    Glucose - Accu-Ck 248 (H) 65 - 99 mg/dL   ACCU-CHEK GLUCOSE    Collection Time: 12/25/18  5:19 PM   Result Value Ref Range    Glucose - Accu-Ck 218 (H) 65 - 99 mg/dL   ACCU-CHEK GLUCOSE    Collection " Time: 12/25/18  8:24 PM   Result Value Ref Range    Glucose - Accu-Ck 218 (H) 65 - 99 mg/dL   ACCU-CHEK GLUCOSE    Collection Time: 12/26/18  7:32 AM   Result Value Ref Range    Glucose - Accu-Ck 154 (H) 65 - 99 mg/dL   ACCU-CHEK GLUCOSE    Collection Time: 12/26/18 11:33 AM   Result Value Ref Range    Glucose - Accu-Ck 188 (H) 65 - 99 mg/dL       Current Facility-Administered Medications   Medication Frequency   • metoprolol (LOPRESSOR) tablet 25 mg TWICE DAILY   • oxyCODONE immediate-release (ROXICODONE) tablet 2.5 mg Q6HRS PRN   • docusate sodium (COLACE) capsule 100 mg QDAY PRN   • magnesium hydroxide (MILK OF MAGNESIA) suspension 30 mL QDAY PRN   • polyethylene glycol/lytes (MIRALAX) PACKET 1 Packet PRN   • pioglitazone (ACTOS) tablet 30 mg DAILY   • gabapentin (NEURONTIN) capsule 300 mg TID   • vitamin D (cholecalciferol) tablet 2,000 Units DAILY   • ascorbic acid tablet 500 mg Q48HRS   • acetaminophen (TYLENOL) tablet 650 mg Q6HRS   • heparin injection 5,000 Units Q8HRS   • insulin regular (HUMULIN R) injection 3-14 Units 4X/DAY ACHS    And   • glucose 4 g chewable tablet 16 g Q15 MIN PRN    And   • dextrose 50% (D50W) injection 25 mL Q15 MIN PRN   • senna-docusate (PERICOLACE or SENOKOT S) 8.6-50 MG per tablet 1 Tab Nightly   • simvastatin (ZOCOR) tablet 20 mg Nightly   • Respiratory Care per Protocol Continuous RT   • Pharmacy Consult Request ...Pain Management Review 1 Each PRN   • hydrALAZINE (APRESOLINE) tablet 25 mg Q8HRS PRN   • acetaminophen (TYLENOL) tablet 650 mg Q4HRS PRN   • artificial tears 1.4 % ophthalmic solution 1 Drop PRN   • benzocaine-menthol (CEPACOL) lozenge 1 Lozenge Q2HRS PRN   • mag hydrox-al hydrox-simeth (MAALOX PLUS ES or MYLANTA DS) suspension 20 mL Q2HRS PRN   • ondansetron (ZOFRAN ODT) dispertab 4 mg 4X/DAY PRN    Or   • ondansetron (ZOFRAN) syringe/vial injection 4 mg 4X/DAY PRN   • traZODone (DESYREL) tablet 50 mg QHS PRN   • sodium chloride (OCEAN) 0.65 % nasal spray 2 Spray  PRN   • cyclobenzaprine (FLEXERIL) tablet 10 mg TID PRN   • ferrous sulfate tablet 325 mg Q48HRS   • omeprazole (PRILOSEC) capsule 20 mg DAILY       Exam Date: 12/26/2018    General:  Awake, alert, oriented, no acute distress  HEENT:  Wearing Aspen cervical collar  Cardiac: regular rate and rhythm--around 90 beats per minute  Lungs: clear to auscultation bilaterally.   Abdomen: soft; non tender, non distended, bowel sounds present and normoactive  Extremities: No edema in the bilateral lower limbs  Neuro:   Right elbow flexion remains 2+/5, but there has been qualitative improvement.  He is able to move through 75% of an active range of motion against gravity.  He has a full gravity eliminated range of motion.      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 Simon    Scalp sutures removed December 14    He is continuing to have gradual improvement in upper limb function and his lower limb strength and coronation have significantly improved    Dysphagia  Continuing to gradually improve  Reinforced liquid swallow strategies  Continue speech-language pathology sessions     Pain  Schedule Tylenol every 6 hours  Gabapentin 300 mg TID--do not escalate due to renal impairment   Oxycodone 2.5 mg every 8 hours as needed for severe pain--dose interval increase December 26    He has used 2.5 mg of oxycodone in the last 24 hours     Neurogenic Bladder   Continue timed voiding     Neurogenic Bowel  Colace twice a day  Milk of magnesia daily  Pat-Colace nightly     History of hypertension  Orthostatic hypotension --improved  Tachycardia  At baseline, patient on lisinopril 10 mg daily and propranolol 40 mg 3 times a day    Blood pressure range in the last 24 hours of 116-134 mmHg  Tolerated discontinuation of ProAmatine on December 21 with no issues    Tachycardia has returned with a maximum heart rate of 114 in last 24 hours    Started on Lopressor 25 mg daily on December 26  Appreciate hospitalist assistance  Continue to monitor    Continuing to encourage hydration    Diabetes mellitus with hyperglycemia  Currently on sliding scale  Hospitalist working on weaning sliding scale  Actos 30 mg daily--increased on December 18    Glucose range of 154-218 in last 24 hours    Stage III chronic kidney disease  Creatinine stable at 2.03 on December 22  Continue to encourage fluid intake    Continues to stress the importance of fluid intake     Hyperlipidemia  Simvastatin 20 mg daily      Anemia  Hemoglobin 9.5 December 20  Continue ferrous sulfate    Vitamin D deficiency  Vitamin D was 21 on admission  Increase supplementation to 2000 units of cholecalciferol daily     GI Ppx - Patient on Prilosec  20 mg daily.     DVT ppx - Patient on Heparin 5000 q8h .     Estimated Discharge: January 4    IDT Team Conference 12/26/2018  I individually evaluated the patient today.  In addition to my daily follow up visit, I was present for and led the interdisciplinary team conference on 12/26/2018 and agree with the interdisciplinary conference documentation and plan of care.     RN: He is doing well.  His glucose is better today.  We are encouraging him on diabetic choices.  He is continent      PT:  He is making good progress.  He is participating well.  He is practicing early gait with no device.     OT:  He is making progress.  His right upper limb function is gradually improving.  The proximal weakness is predominant.  He does independent exercises     SLP: He is making excellent progress.  He is discharged from [facility].  He will decrease to 30 minutes of SLP daily for executive tasks.      OT will take over for 1.5 hours daily due to the decreased SLP     Total time:  35 minutes.  I spent greater than 50% of the time for patient care, counseling, and coordination on this date, including unit/floor time, and face-to-face time with the patient as per interval events and assessment and plan above. Topics discussed included functional progress, right upper limb function, pain, heart rate and blood pressure, ambulatory status    Mustapha Brandt M.D.  12/26/2018

## 2018-12-26 NOTE — CARE PLAN
Problem: Safety  Goal: Will remain free from injury  Outcome: PROGRESSING AS EXPECTED  Pt uses call light consistently for staff assistance. Pt has good safety awareness, no impulsivity observed.    Problem: Bowel/Gastric:  Goal: Normal bowel function is maintained or improved  Outcome: PROGRESSING AS EXPECTED  Pt is continent of bowel and reports having daily BMs    Problem: Respiratory:  Goal: Respiratory status will improve  Outcome: PROGRESSING AS EXPECTED  Pt is on room air and respirations are WNL.

## 2018-12-26 NOTE — PROGRESS NOTES
Hospital Medicine Daily Progress Note        Chief Complaint  DM    Interval Problem Update  12/17: Rapid HR better w/ PO fluids.  No chest pain, shortness of breath, or palpitations.  12/18: Intermittent tachycardia, patient continues to try and drink more fluids.  Glucose still on the high side between 151-292, increased pioglitazone to 30 after discussion with patient.  12/19: Patient tolerating increased dose of pioglitazone.  Blood pressure in good range.  Patient did have one episode of vomiting yesterday which he attributes to bowel regimen    12/20  No acute issues overall patient comfortable.  Patient reports increased strength in his arms as well as walking.     12/21  Overall clinically doing well, Patient denies fevers/chills, chest pain, shortness of breath or nausea/vommiting.   Resume therapy  Overall HR since last night improved now in low 90s, max 100. SBP in the 130s  D/c midodrine and monitor closely.     12/22  Patient doing well, no complaints. Patient denies fevers/chills, chest pain, shortness of breath or nausea/vommiting.   HR controlled now that he is off Midodrine, CTM.  Renal functions improved.  Resume therapy,      12/23  Patient clinically stable overall, no acute complaints. Patient denies fevers/chills, chest pain, shortness of breath or nausea/vommiting.   HR still 90s-100s but overall improved now Off midodrine.  Resume physical  Therapy.    12/24  Patient comfortable, neck pain controlled. Unhappy about not being able to move his right arm way he wants.  HR and BP well controlled, CTM,   Continue with pain control, therapy,     12/25: Pain controlled and resting comfortably.  No new complaints.    Review of Systems  Review of Systems   Constitutional: Negative for chills, fever and malaise/fatigue.   HENT: Negative for congestion, hearing loss, sore throat and tinnitus.    Eyes: Negative for blurred vision, double vision, photophobia and pain.   Respiratory: Negative for cough,  hemoptysis, sputum production, shortness of breath and stridor.    Cardiovascular: Negative for chest pain, palpitations, orthopnea, claudication and PND.   Gastrointestinal: Negative for abdominal pain, blood in stool, constipation, diarrhea, heartburn, melena, nausea and vomiting.   Genitourinary: Negative for dysuria, frequency and urgency.   Musculoskeletal: Positive for neck pain. Negative for back pain, falls, joint pain and myalgias.   Neurological: Positive for focal weakness and weakness. Negative for dizziness, tingling, tremors, sensory change, speech change and headaches.   Psychiatric/Behavioral: Negative for depression, memory loss and suicidal ideas. The patient is not nervous/anxious.         Physical Exam  Temp:  [36.5 °C (97.7 °F)-36.7 °C (98.1 °F)] 36.7 °C (98.1 °F)  Pulse:  [] 114  Resp:  [18] 18  BP: (124-146)/(66-74) 124/68    Physical Exam   Constitutional: He is oriented to person, place, and time. He appears well-developed and well-nourished. No distress.   HENT:   Head: Normocephalic and atraumatic.   Right Ear: External ear normal.   Left Ear: External ear normal.   Mouth/Throat: No oropharyngeal exudate.   Eyes: Pupils are equal, round, and reactive to light. Conjunctivae are normal. Right eye exhibits no discharge. No scleral icterus.   Neck: Neck supple. No JVD present. No tracheal deviation present. No thyromegaly present.   Cervical collar in place   Cardiovascular: Regular rhythm, normal heart sounds and intact distal pulses.  Tachycardia present.  Exam reveals no gallop and no friction rub.    No murmur heard.  Pulses:       Dorsalis pedis pulses are 2+ on the right side, and 2+ on the left side.   Pulmonary/Chest: Effort normal and breath sounds normal. No stridor. No respiratory distress. He has no wheezes. He has no rales.   Abdominal: Soft. Bowel sounds are normal. He exhibits no distension and no mass. There is no tenderness. There is no rebound and no guarding.    Musculoskeletal: Normal range of motion. He exhibits no edema.   Lymphadenopathy:     He has no cervical adenopathy.   Neurological: He is alert and oriented to person, place, and time. No cranial nerve deficit. He exhibits abnormal muscle tone.   Skin: Skin is warm and dry. No rash noted. He is not diaphoretic. No erythema. No pallor.   Psychiatric: He has a normal mood and affect. His behavior is normal. Judgment and thought content normal.   Vitals reviewed.      Fluids    Intake/Output Summary (Last 24 hours) at 12/25/18 1605  Last data filed at 12/25/18 1157   Gross per 24 hour   Intake             1060 ml   Output              700 ml   Net              360 ml       Laboratory                      Assessment/Plan  * Central cord syndrome (HCC)- (present on admission)   Assessment & Plan    Conservative management per Neurosurgery  Maintain cervical collar       Chronic renal failure- (present on admission)   Assessment & Plan    CKD Stage III  Creatinine at baseline.  Continue to monitor closely  Continue to renally dose all medications, avoid nephrotoxins and nonsteroidal medications     Type 2 diabetes mellitus (HCC)- (present on admission)   Assessment & Plan    Uncontrolled  Continue home dose of Actos  Continue Insulin-sliding scale, accu-checks and hypoglycemia protocol.  Deferred outpatient endocrinology follow-up     Tachycardia   Assessment & Plan    Noted SVT on EKG  HR mostly <100, improving, CTM       Vitamin D deficiency   Assessment & Plan    Continue vitamin D supplementation       Thrombocytopenia (HCC)   Assessment & Plan    Stable, no signs of gross bleeding.       Anemia   Assessment & Plan    Stable no signs of gross bleeding continue monitor       Hyperlipemia- (present on admission)   Assessment & Plan    Resume simvastatin     Essential hypertension- (present on admission)   Assessment & Plan    - started Metoprolol; monitoring         Total time:  30 minutes.  I spent greater than 50%  of the time for patient care, counseling, and coordination on this date, including unit/floor time, and face-to-face time with the patient as per interval events and assessment and plan above    CODE STATUS full code

## 2018-12-26 NOTE — REHAB-OT IDT TEAM NOTE
Occupational Therapy   Activities of Daily Living  Eating Initial:  1 - Total Assistance  Eating Current:  5 - Standby Prompting/Supervision or Set-up   Eating Description:  Set-up of equipment or meal/tube feeding  Grooming Initial:  2 - Max Assistance  Grooming Current:  4 - Minimal Assistance   Grooming Description:   (shaving assist)  Bathing Initial:  2 - Max Assistance  Bathing Current:  4 - Minimal Assistance   Bathing Description:  Grab bar, Long handled bath tool, Increased time, Supervision for safety, Verbal cueing, Tub bench (Pt required assist for posterior hygiene and thoroughness of LUE (pt attempted to use long handled sponge for LUE w/ some success), CGA for standing balance at grab bars.)  Upper Body Dressing Initial:  1 - Total Assistance  Upper Body Dressing Current:  2 - Max Assistance   Upper Body Dressing Description:  Supervision for safety, Verbal cueing, Increased time (Max A for donning pullover shirt w/ assist for completing theading of LUE and pulling overhead.)  Lower Body Dressing Initial:  1 - Total Assistance  Lower Body Dressing Current:  4 - Minimal Assistance   Lower Body Dressing Description:  4 - Minimal Assistance  Toileting Initial:  2 - Max Assistance  Toileting Current:  3 - Moderate Assistance   Toileting Description:  Grab bar, Increased time, Supervision for safety, Verbal cueing (Pt requires assist for posterior hygiene.)  Toilet Transfer Initial:  3 - Moderate Assistance  Toilet Transfer Current:  4 - Minimal Assistance   Toilet Transfer Description:  4 - Minimal Assistance  Tub / Shower Transfer Initial:  3 - Moderate Assistance  Tub / Shower Transfer Current:  4 - Minimal Assistance   Tub / Shower Transfer Description:  Supervision for safety, Increased time, Grab bar, Verbal cueing (Mod A for SPT w/c<>toilet)  IADL:  To be attempted   Family Training/Education:  No family available for training   DME/DC Recommendations:  TBD     Strengths:  Able to follow  instructions, Good carryover of learning, Good endurance, Good insight into deficits/needs, Independent PLOF, Making steady progress towards goals, Motivated for self care and independence, Pleasant and cooperative and Willingly participates in therapeutic activities  Barriers:  Generalized weakness, Hemiplegia, Poor family support and Other: Hard of hearing, R UE proximal weakness (R hand dominant)     # of short term goals set= 2    # of short term goals met= 1/2          Occupational Therapy Goals           Problem: Dressing     Dates: Start: 12/11/18       Goal: STG-Within one week, patient will dress UB     Dates: Start: 12/19/18       Description: 1) Individualized Goal: W/ mod A using AD/AE/DME prn.  2) Interventions: OT E Stim Attended, OT Group Therapy, OT Self Care/ADL, OT Neuro Re-Ed/Balance, OT Sensory Int Techniques, OT Therapeutic Activity, OT Aquatic Therapy and OT Therapeutic Exercise              Goal: STG-Within one week, patient will dress LB     Dates: Start: 12/19/18       Description: 1) Individualized Goal: W/ mod A using AD/AE/DME prn.  2) Interventions: OT E Stim Attended, OT Group Therapy, OT Self Care/ADL, OT Neuro Re-Ed/Balance, OT Sensory Int Techniques, OT Therapeutic Activity, OT Aquatic Therapy and OT Therapeutic Exercise                Problem: OT Long Term Goals     Dates: Start: 12/11/18       Goal: LTG-By discharge, patient will complete basic self care tasks     Dates: Start: 12/11/18       Description: 1) Individualized Goal:  W/ min A (except mod A for UBD) using AD/AE/DME prn.  2) Interventions:  OT E Stim Attended, OT Group Therapy, OT Self Care/ADL, OT Neuro Re-Ed/Balance, OT Sensory Int Techniques, OT Therapeutic Activity, OT Aquatic Therapy and OT Therapeutic Exercise             Goal: LTG-By discharge, patient will perform bathroom transfers     Dates: Start: 12/11/18       Description: 1) Individualized Goal:  W/ SPV using AD/AE/DME prn.  2) Interventions:  OT E Stim  Attended, OT Group Therapy, OT Self Care/ADL, OT Neuro Re-Ed/Balance, OT Sensory Int Techniques, OT Therapeutic Activity, OT Aquatic Therapy and OT Therapeutic Exercise               Problem: Toileting     Dates: Start: 12/19/18       Goal: STG-Within one week, patient will complete toileting tasks     Dates: Start: 12/19/18       Description: 1) Individualized Goal: W/ mod A using AD/AE/DME prn.  2) Interventions: OT E Stim Attended, OT Group Therapy, OT Self Care/ADL, OT Neuro Re-Ed/Balance, OT Sensory Int Techniques, OT Therapeutic Activity, OT Aquatic Therapy and OT Therapeutic Exercise                    Section completed by:  Isabel Wells, OT

## 2018-12-26 NOTE — REHAB-COLLABORATIVE ONGOING IDT TEAM NOTE
Weekly Interdisciplinary Team Conference Note    Weekly Interdisciplinary Team Conference # 3  Date:  12/26/2018    Clinicians present and reporting at team conference include the following:   MD: Mustapha Brandt MD   RN:  Mouna Lee RN   PT:   Vanessa Welsh, PT, DPT  OT:  Isabel Wells, OT, OTR/L   ST:  Stefani Blackwood MS, CCC-SLP  CM:  Mckayla Mason, RN, CCM  REC:  None  RT:  None  RPh:  Migel Hu [facility]  Other:   None  All reporting clinicians have a working knowledge of this patient's plan of care.    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) 12/03/2018     Priority: Medium   • Contraindication to deep vein thrombosis (DVT) prophylaxis 12/03/2018     Priority: Medium   • Essential hypertension 12/03/2018     Priority: Low   • Hyperlipemia 12/03/2018     Priority: Low   • Trauma 12/03/2018     Priority: Low   • Tachycardia 12/16/2018   • Anemia 12/11/2018   • Thrombocytopenia (HCC) 12/11/2018   • Vitamin D deficiency 12/11/2018   • Uncontrolled type 2 diabetes mellitus with hyperglycemia (HCC) 11/12/2018   • Mass of pancreas 06/03/2016     Results     Procedure Component Value Ref Range Date/Time    ACCU-CHEK GLUCOSE [[id number]]  (Abnormal) Collected:  12/26/18 1133    Order Status:  Completed Lab Status:  Final result Updated:  12/26/18 1146     Glucose - Accu-Ck 188 (H) 65 - 99 mg/dL     ACCU-CHEK GLUCOSE [[id number]]  (Abnormal) Collected:  12/26/18 0732    Order Status:  Completed Lab Status:  Final result Updated:  12/26/18 0745     Glucose - Accu-Ck 154 (H) 65 - 99 mg/dL     ACCU-CHEK  GLUCOSE [[id number]]  (Abnormal) Collected:  12/25/18 2024    Order Status:  Completed Lab Status:  Final result Updated:  12/25/18 2044     Glucose - Accu-Ck 218 (H) 65 - 99 mg/dL      Comment: Followed orders       ACCU-CHEK GLUCOSE [[id number]]  (Abnormal) Collected:  12/25/18 1719    Order Status:  Completed Lab Status:  Final result Updated:  12/25/18 1732     Glucose - Accu-Ck 218 (H) 65 - 99 mg/dL            Nursing  Diet/Nutrition:  Diabetic, Thin Liquids and Other: chopped meat  Medication Administration:  Whole with Liquid Wash  % consumed at meals in last 24 hours:  Consumed % of meals per documentation.  Meal/Snack Consumption for the past 24 hrs:   Oral Nutrition   12/25/18 1700 Dinner;Between 50-75% Consumed   12/25/18 1157 Lunch;Between % Consumed   12/25/18 0845 Breakfast;Between % Consumed       Snack schedule:  HS  Appetite:  Good  Admit Weight:  Weight: 78.2 kg (172 lb 8 oz)  Weight Last 7 Days   [74.9 kg (165 lb 2 oz)] 74.9 kg (165 lb 2 oz) (12/23 0500)    Pain Issues:    Location:  Arm (12/25 2154)  Right;Left (12/25 2154)         Severity:  Moderate   Scheduled pain medications:  gabapentin (NEURONTIN)  + Tylenol  PRN pain medications used in last 24 hours:  oxycodone immediate release (ROXICODONE)  + Flexeril  Non Pharmacologic Interventions:  cold pack, distraction, emotional support, relaxation, repositioned and rest  Effectiveness of pain management plan:  good=patient states acceptable comfort after interventions    Bowel:    Bowel Assist Initial Score:  1 - Total Assistance  Bowel Assist Current Score:  4 - Minimal Assistance  Bowl Accidents in last 7 days:  0  Last bowel movement: 12/25/18 (per pt)  Stool Description: Small, Formed     Usual bowel pattern:  daily  Scheduled bowel medications:  senna-docusate (PERICOLACE or SENOKOT S)   PRN bowel medications used in last 24 hours:  None  Nursing Interventions:  Increased time, Scheduled medication, Supervision,  Brief  Effectiveness of bowel program:   good=regular, predictable, controlled emptying of bowel     Bladder:    Bladder Assist Initial Score:  1 - Total Assistance  Bladder Assist Current Score:  2 - Max Assistance  Bladder Accidents in last 7 days:  0  Intermittent Catheterization:  NO  Medications affecting bladder:  None    Interventions:  Increased time, Supervision, Verbal cueing, Emptying of device, Urinal, Time void patient initiated (staff holds urinal in place )  Effectiveness of bladder training:  Good=regular, predictable, emptying of bladder, patient initiates time voiding    Diabetes:  Blood Sugar Frequency:  Before meals and at bedtime    Range of BS for last 48 hours:   Recent Labs      12/24/18   0735  12/24/18   1124  12/24/18   1721  12/24/18   2138  12/25/18   0754  12/25/18   1136  12/25/18   1719  12/25/18   2024   POCGLUCOSE  194*  241*  196*  271*  224*  248*  218*  218*     Scheduled diabetic medications:  None  Sliding scale usage in past 24 hours:  Yes  Total Short acting insulin in the past 24 hours:  Humulin 16 units  Total Long acting insulin in the past 24 hours:  None     Wound:       Patient Lines/Drains/Airways Status    Active Current Wounds     Name: Placement date: Placement time: Site: Days:    Wound 12/03/18 Laceration Face 12/03/18         23    Wound 12/08/18 Diabetic Ulcer Toe (Comment which one) R 3rd toe, L 5th toe 12/08/18   1200      17                   Interventions: monitoring for s/s of infection    Effectiveness of intervention:  wound is improving     Sleep/wake cycle:   Average hours slept:  Sleeps 4-6 hours without waking  Sleep medication usage:  None    Patient/Family Training/Education:  Bladder Management/Training, Diabetes Management, Diet/Nutrition, Fall Prevention, General Self Care, Medication Management, Pain Management, Safe Transfers, Safety, Skin Care and Wound Care  Discharge Supply Recommendations:  Glucometer and Strips, Blood Pressure Monitor and  Wound Care Supplies  Strengths: Alert and oriented, Able to follow instructions, Pleasant and cooperative and Effective communication skills   Barriers:   Fatigue, Generalized weakness and Limited mobility            Nursing Problems           Problem: Bowel/Gastric:     Goal: Normal bowel function is maintained or improved           Goal: Will not experience complications related to bowel motility             Problem: Discharge Barriers/Planning     Goal: Patient's continuum of care needs will be met             Problem: Infection     Goal: Will remain free from infection             Problem: Knowledge Deficit     Goal: Knowledge of disease process/condition, treatment plan, diagnostic tests, and medications will improve           Goal: Knowledge of the prescribed therapeutic regimen will improve             Problem: Metabolic:     Goal: Ability to maintain appropriate glucose levels will improve             Problem: Mobility     Goal: Risk for activity intolerance will decrease             Problem: Pain Management     Goal: Pain level will decrease to patient's comfort goal     Flowsheet:     Taken at 12/22/18 0800    Pain Scale 0 - 10  0 by Janay Leach R.N.    Comfort Goal Comfort at Rest;Comfort with Movement;Sleep Comfortably;Stay Alert by Janay Leach R.N.    Non Verbal Scale  Calm;Unlabored Breathing by Janay Leach R.N.    Taken at 12/21/18 2200    Pain Scale 0 - 10  0 by Diana Kramer R.N.                  Problem: Psychosocial Needs:     Goal: Level of anxiety will decrease             Problem: Respiratory:     Goal: Respiratory status will improve             Problem: Safety     Goal: Will remain free from injury           Goal: Will remain free from falls             Problem: Skin Integrity     Goal: Risk for impaired skin integrity will decrease             Problem: Venous Thromboembolism (VTW)/Deep Vein Thrombosis (DVT) Prevention:     Goal: Patient will participate in Venous Thrombosis  (VTE)/Deep Vein Thrombosis (DVT)Prevention Measures     Flowsheet:     Taken at 12/13/18 2200    Pharmacologic Prophylaxis Used Unfractionated Heparin by Diana Kramer R.N.                       Long Term Goals:   At discharge patient will be able to function safely at home and in the community with support.    Section completed by:  Champ Lara R.N.              Mobility  Bed mobility:   SBA - min A   Bed /Chair/Wheelchair Transfer Initial:  3 - Moderate Assistance  Bed /Chair/Wheelchair Transfer Current:  4 - Minimal Assistance   Bed/Chair/Wheelchair Transfer Description:  Supervision for safety, Increased time  Walk Initial:  1 - Total Assistance  Walk Current:  5 - Standby Prompting/Supervision or Set-up   Walk Description:  Walker, Verbal cueing, Supervision for safety, Safety concerns, Extra time, Requires incidental assist (365' x 1 and 425' x 1 4WW SBA)  Wheelchair Initial:  2 - Max Assistance  Wheelchair Current:  2 - Max Assistance   Wheelchair Description:  Extra time (150' x 2 alt B UE/LE in door level surafces SPV)  Stairs Initial:  0 - Not tested,medical condition  Stairs Current: 0 - Not tested,unsafe activity   Stairs Description:    Patient/Family Training/Education:  TBD  DME/DC Recommendations:  Pt owns 4WW and SPC  Strengths:  Independent PLOF, Making steady progress towards goals, Motivated for self care and independence, Pleasant and cooperative, Supportive family and Willingly participates in therapeutic activities  Barriers:   Decreased endurance and Other: UE weakness, decreased balance   # of short term goals set= 3  # of short term goals met= 1       Physical Therapy Problems           Problem: Mobility Transfers     Dates: Start: 12/11/18       Goal: STG-Within one week, patient will transfer bed to chair     Dates: Start: 12/11/18       Description: 1) Individualized goal: Transfer bed to/from chair fww sba, vc`s, 1 week  2) Interventions:  PT Gait Training, PT Therapeutic  Exercises, PT Neuro Re-Ed/Balance and PT Therapeutic Activity       Note:     Goal Note filed on 12/26/18 1131 by Vanessa Welsh, PT    Goal: STG-Within one week, patient will transfer bed to chair  Outcome: NOT MET  Min A for bed mob, SBA- CGA for stand pivot                Goal: STG-Within one week, patient will move supine to sit     Dates: Start: 12/11/18       Description: 1) Individualized goal: Transfer supine to/from sit sba 1 week  2) Interventions:  PT Gait Training, PT Therapeutic Exercises, PT Neuro Re-Ed/Balance and PT Therapeutic Activity       Note:     Goal Note filed on 12/26/18 1131 by Vanessa Welsh, PT    Goal: STG-Within one week, patient will move supine to sit  Outcome: NOT MET  SBA sit> supine, min A supine > sit                   Problem: PT-Long Term Goals     Dates: Start: 12/11/18       Goal: LTG-By discharge, patient will ambulate     Dates: Start: 12/11/18       Description: 1) Individualized goal: Gait fww household 150 ft, community 400 ft, supervision at discharge  2) Interventions:  PT Gait Training, PT Therapeutic Exercises, PT Neuro Re-Ed/Balance and PT Therapeutic Activity             Goal: LTG-By discharge, patient will transfer one surface to another     Dates: Start: 12/11/18       Description: 1) Individualized goal: Transfer one surface to another fww modified independent at discharge  2) Interventions:  PT Gait Training, PT Therapeutic Exercises, PT Neuro Re-Ed/Balance and PT Therapeutic Activity             Goal: LTG-By discharge, patient will transfer in/out of a car     Dates: Start: 12/11/18       Description: 1) Individualized goal: Car transfer fww supervision at discharge  2) Interventions:  PT Gait Training, PT Therapeutic Exercises, PT Neuro Re-Ed/Balance and PT Therapeutic Activity                     Section completed by:  Vanessa Welsh, PT, DPT       Activities of Daily Living  Eating Initial:  1 - Total Assistance  Eating Current:  5 - Standby Prompting/Supervision or  Set-up   Eating Description:  Set-up of equipment or meal/tube feeding  Grooming Initial:  2 - Max Assistance  Grooming Current:  4 - Minimal Assistance   Grooming Description:   (shaving assist)  Bathing Initial:  2 - Max Assistance  Bathing Current:  4 - Minimal Assistance   Bathing Description:  Grab bar, Long handled bath tool, Increased time, Supervision for safety, Verbal cueing, Tub bench (Pt required assist for posterior hygiene and thoroughness of LUE (pt attempted to use long handled sponge for LUE w/ some success), CGA for standing balance at grab bars.)  Upper Body Dressing Initial:  1 - Total Assistance  Upper Body Dressing Current:  2 - Max Assistance   Upper Body Dressing Description:  Supervision for safety, Verbal cueing, Increased time (Max A for donning pullover shirt w/ assist for completing theading of LUE and pulling overhead.)  Lower Body Dressing Initial:  1 - Total Assistance  Lower Body Dressing Current:  4 - Minimal Assistance   Lower Body Dressing Description:  4 - Minimal Assistance  Toileting Initial:  2 - Max Assistance  Toileting Current:  3 - Moderate Assistance   Toileting Description:  Grab bar, Increased time, Supervision for safety, Verbal cueing (Pt requires assist for posterior hygiene.)  Toilet Transfer Initial:  3 - Moderate Assistance  Toilet Transfer Current:  4 - Minimal Assistance   Toilet Transfer Description:  4 - Minimal Assistance  Tub / Shower Transfer Initial:  3 - Moderate Assistance  Tub / Shower Transfer Current:  4 - Minimal Assistance   Tub / Shower Transfer Description:  Supervision for safety, Increased time, Grab bar, Verbal cueing (Mod A for SPT w/c<>toilet)  IADL:  To be attempted   Family Training/Education:  No family available for training   DME/DC Recommendations:  TBD     Strengths:  Able to follow instructions, Good carryover of learning, Good endurance, Good insight into deficits/needs, Independent PLOF, Making steady progress towards goals,  Motivated for self care and independence, Pleasant and cooperative and Willingly participates in therapeutic activities  Barriers:  Generalized weakness, Hemiplegia, Poor family support and Other: Hard of hearing, R UE proximal weakness (R hand dominant)     # of short term goals set= 2    # of short term goals met= 1/2          Occupational Therapy Goals           Problem: Dressing     Dates: Start: 12/11/18       Goal: STG-Within one week, patient will dress UB     Dates: Start: 12/19/18       Description: 1) Individualized Goal: W/ mod A using AD/AE/DME prn.  2) Interventions: OT E Stim Attended, OT Group Therapy, OT Self Care/ADL, OT Neuro Re-Ed/Balance, OT Sensory Int Techniques, OT Therapeutic Activity, OT Aquatic Therapy and OT Therapeutic Exercise              Goal: STG-Within one week, patient will dress LB     Dates: Start: 12/19/18       Description: 1) Individualized Goal: W/ mod A using AD/AE/DME prn.  2) Interventions: OT E Stim Attended, OT Group Therapy, OT Self Care/ADL, OT Neuro Re-Ed/Balance, OT Sensory Int Techniques, OT Therapeutic Activity, OT Aquatic Therapy and OT Therapeutic Exercise                Problem: OT Long Term Goals     Dates: Start: 12/11/18       Goal: LTG-By discharge, patient will complete basic self care tasks     Dates: Start: 12/11/18       Description: 1) Individualized Goal:  W/ min A (except mod A for UBD) using AD/AE/DME prn.  2) Interventions:  OT E Stim Attended, OT Group Therapy, OT Self Care/ADL, OT Neuro Re-Ed/Balance, OT Sensory Int Techniques, OT Therapeutic Activity, OT Aquatic Therapy and OT Therapeutic Exercise             Goal: LTG-By discharge, patient will perform bathroom transfers     Dates: Start: 12/11/18       Description: 1) Individualized Goal:  W/ SPV using AD/AE/DME prn.  2) Interventions:  OT E Stim Attended, OT Group Therapy, OT Self Care/ADL, OT Neuro Re-Ed/Balance, OT Sensory Int Techniques, OT Therapeutic Activity, OT Aquatic Therapy and OT  Therapeutic Exercise               Problem: Toileting     Dates: Start: 12/19/18       Goal: STG-Within one week, patient will complete toileting tasks     Dates: Start: 12/19/18       Description: 1) Individualized Goal: W/ mod A using AD/AE/DME prn.  2) Interventions: OT E Stim Attended, OT Group Therapy, OT Self Care/ADL, OT Neuro Re-Ed/Balance, OT Sensory Int Techniques, OT Therapeutic Activity, OT Aquatic Therapy and OT Therapeutic Exercise                    Section completed by:  Isabel Wells OT       Cognitive Linquistic Functions  Comprehension Initial:  4 - Minimal Assistance  Comprehension Current:  6 - Modified Independent   Comprehension Description:  Glasses  Expression Initial:  5 - Stand-by Prompting/Supervision or Set-up  Expression Current:  6 - Modified Independent   Expression Description:  Verbal cueing  Social Interaction Initial:  5 - Stand-by Prompting/Supervision or Set-up  Social Interaction Current:  7 - Independent   Social Interaction Description:  Increased time  Problem Solving Initial:  3 - Moderate Assistance  Problem Solving Current:  5 - Standby Prompting/Supervision or Set-up   Problem Solving Description:  Verbal cueing, Therapy schedule, Bed/chair alarm  Memory Initial:  2 - Max Assistance  Memory Current:  5 - Standby Prompting/Supervision or Set-up   Memory Description:  Verbal cueing, Therapy schedule, Bed/chair alarm  Executive Functioning / Organization Initial:     Executive Functioning / Organization Current: 4 minimal assistance     Executive Functioning Desciption:  Attention to detail, planning and organization skills.     Swallowing  Swallowing Status:  Pt d/c from further dysphagia management. Tolerating well with no overt s/sx of asp/pen.    Orders Placed This Encounter   Procedures   • Diet Order Diabetic     Standing Status:   Standing     Number of Occurrences:   1     Order Specific Question:   Diet:     Answer:   Diabetic [3]     Order Specific Question:    Texture/Fiber modifications:     Answer:   Chopped Meat [5]     Comments:   cut sandwiches into 1/4     Behavior Modification Plan  Allow for rest time, Give clear feedback, Set clear goals, Provide reasonable choices, Decrease the chance of failure by offering activities that are within the patient's abilities, Analyze tasks (break down into smaller steps) and Reinforce participation in desired tasks  Assistive Technology  Low/Impaired vision equipment and Low tech: Calendar, planner, schedule, alarms/timers, pill organizer, post-it notes, lists  Family Training/Education:  Ongoing education with pt   DC Recommendations:  Decrease to 30 min ST  Strengths:  Able to follow instructions, Effective communication skills, Making steady progress towards goals, Motivated for self care and independence, Pleasant and cooperative and Willingly participates in therapeutic activities  Barriers:  Decreased endurance, Generalized weakness and Other: implementation of precautions   # of short term goals set=1  # of short term goals met=1       Speech Therapy Problems           Problem: Problem Solving STGs     Dates: Start: 12/11/18       Goal: STG-Within one week, patient will     Dates: Start: 12/26/18       Description: 1) Individualized goal:  Complete functional medication management and financial management task with 100% accuracy provided SPV.   2) Interventions:  SLP Self Care / ADL Training , SLP Cognitive Skill Development and SLP Group Treatment               Problem: Speech/Swallowing LTGs     Dates: Start: 12/11/18       Goal: LTG-By discharge, patient will safely swallow     Dates: Start: 12/11/18       Description: 1) Individualized goal:  Regular textures/thin liquids with MOD I for use of compensatory strategies.   2) Interventions:  SLP Swallowing Dysfunction Treatment, SLP Cognitive Skill Development and SLP Group Treatment             Goal: LTG-By discharge, patient will solve complex problem     Dates: Start:  "12/11/18       Description: 1) Individualized goal:  By utilizing compensatory intervention for memory and problem-solving to allow for safe completion of daily activities with MOD I  2) Interventions:  SLP Cognitive Skill Development and SLP Group Treatment                    Section completed by:  Stefani Blackwood MS,[facility]-SLP          Nutrition       Dietary Problems           Problem: Inadequate nutrient intake     Goal: Patient to consume greater than or equal to 50% of meals (Resolved)               Nutrition services - Brief Note: Day 9 of admit.  Vince Almanzar is a 78 y.o. male with admitting DX of incomplete C1-C4 and Cervical Myelopathy.     Pt with PO intake greatly improved. Current diet order Diabetic Dysphagia III with NTL. Current PO intake of % x 10, 50-75% x 4 and 25-50% x 1. Pt with current PO intake adequate. POC Glucose 149-342 since 12/16. Unfortunately, pt is unable to attend the DM class today. Will follow up with a DM education as able.This RD visited a pt T-Dine. PT to be trialing a regular tray today. Pt says he feels great and that he believes he is eating well. Regarding the pt's diabetes, he noted prior to his fall he was having his DM medication adjusted. In addition he acknowledged at the facility he may be eating foods he does not normally eat and may be affecting his BG. Pt having daily BMs and previously attributed constipation to his variable/poor PO which has since improved.       Assessment:  Height: 177.8 cm (5' 10\")  Weight: 78.2 kg (172 lb 8 oz)  Body mass index is 24.75 kg/m².   Diet/Intake: Diabetic Dysphagia III with NTL    Labs, MAR (Actos and Humulin SSI)and Chart reviewed.     Recommendations/Plan:  1. Diet as ordered. Advancements per SLP.   2. DM diet education as able.    3. Encourage intake of 50% or greater.   4. Document intake of all meals  as % taken in ADL's to provide interdisciplinary communication across all shifts.   5. Monitor " weight.  6. Nutrition rep will continue to see patient for ongoing meal and snack preferences.   Section completed by:  Kena Ghotra R.D.    REHAB-Pharmacy IDT Team Note by Sherri Dumont RPH at 12/24/2018  1:03 PM  Version 1 of 1    Author:  Sherri Dumont RPH Service:  (none) Author Type:  Pharmacist    Filed:  12/24/2018  1:05 PM Date of Service:  12/24/2018  1:03 PM Status:  Signed    :  Sherri Dumont RPH (Pharmacist)         Pharmacy   Pharmacy  Antibiotics/Antifungals/Antivirals:  Medication:      Active Orders     None        Route:         n/a  Stop Date:  n/a  Reason:   Antihypertensives/Cardiac:  Medication:    Orders (72h ago through future)    Start     Ordered    12/10/18 2100  simvastatin (ZOCOR) tablet 20 mg  NIGHTLY      12/10/18 1510    12/10/18 1510  hydrALAZINE (APRESOLINE) tablet 25 mg  EVERY 8 HOURS PRN      12/10/18 1510        Patient Vitals for the past 24 hrs:   BP Pulse   12/24/18 0653 127/74 98   12/23/18 1900 100/64 (!) 104   12/23/18 1401 124/71 100       Anticoagulation:  Medication:  heparin  INR:      Other key medications: cyclobenzaprine, gabapentin, Humulin R, omeprazole, pioglitazone.    A review of the medication list reveals no issues at this time. Patient is currently on an antihypertensive. Recommend home blood pressure monitoring/recording if antihypertensive regimen continues.  Section completed by:  Sherri Dumont RPH[TK.1]        Attribution Key     TK.1 - Sherri Dumont RPH on 12/24/2018  1:03 PM                    DC Planning  DC destination/dispostion:  Patient lives alone in a Senior apartment complex.  He has safety bars in his bathroom and shower.     Referrals: Will update Meals on Wheels.     DC Needs: He may need home health initially.  He has a fww, [facility] safety bars and shower seat.  He has been doing his own insulin and fingersticks.  We may need to schedule family training closer to d/c.  He will need follow up with [facility] Sanchez at [facility] and  his neurosurgeon.     Barriers to discharge:  Lives alone     Strengths: sister and her children are very supportive.     Section completed by:  Mckayla Mason R.N.      Physician Summary  Mustapha Brandt MD participated and led team conference discussion.

## 2018-12-26 NOTE — REHAB-PT IDT TEAM NOTE
Physical Therapy   Mobility  Bed mobility:   SBA - min A   Bed /Chair/Wheelchair Transfer Initial:  3 - Moderate Assistance  Bed /Chair/Wheelchair Transfer Current:  4 - Minimal Assistance   Bed/Chair/Wheelchair Transfer Description:  Supervision for safety, Increased time  Walk Initial:  1 - Total Assistance  Walk Current:  5 - Standby Prompting/Supervision or Set-up   Walk Description:  Walker, Verbal cueing, Supervision for safety, Safety concerns, Extra time, Requires incidental assist (365' x 1 and 425' x 1 4WW SBA)  Wheelchair Initial:  2 - Max Assistance  Wheelchair Current:  2 - Max Assistance   Wheelchair Description:  Extra time (150' x 2 alt B UE/LE in door level surafces SPV)  Stairs Initial:  0 - Not tested,medical condition  Stairs Current: 0 - Not tested,unsafe activity   Stairs Description:    Patient/Family Training/Education:  TBD  DME/DC Recommendations:  Pt owns 4WW and SPC  Strengths:  Independent PLOF, Making steady progress towards goals, Motivated for self care and independence, Pleasant and cooperative, Supportive family and Willingly participates in therapeutic activities  Barriers:   Decreased endurance and Other: UE weakness, decreased balance   # of short term goals set= 3  # of short term goals met= 1       Physical Therapy Problems           Problem: Mobility Transfers     Dates: Start: 12/11/18       Goal: STG-Within one week, patient will transfer bed to chair     Dates: Start: 12/11/18       Description: 1) Individualized goal: Transfer bed to/from chair fww sba, vc`s, 1 week  2) Interventions:  PT Gait Training, PT Therapeutic Exercises, PT Neuro Re-Ed/Balance and PT Therapeutic Activity       Note:     Goal Note filed on 12/26/18 1131 by Vanessa Welsh, PT    Goal: STG-Within one week, patient will transfer bed to chair  Outcome: NOT MET  Min A for bed mob, SBA- CGA for stand pivot                Goal: STG-Within one week, patient will move supine to sit     Dates: Start: 12/11/18        Description: 1) Individualized goal: Transfer supine to/from sit sba 1 week  2) Interventions:  PT Gait Training, PT Therapeutic Exercises, PT Neuro Re-Ed/Balance and PT Therapeutic Activity       Note:     Goal Note filed on 12/26/18 1131 by Vanessa Welsh, PT    Goal: STG-Within one week, patient will move supine to sit  Outcome: NOT MET  SBA sit> supine, min A supine > sit                   Problem: PT-Long Term Goals     Dates: Start: 12/11/18       Goal: LTG-By discharge, patient will ambulate     Dates: Start: 12/11/18       Description: 1) Individualized goal: Gait fww household 150 ft, community 400 ft, supervision at discharge  2) Interventions:  PT Gait Training, PT Therapeutic Exercises, PT Neuro Re-Ed/Balance and PT Therapeutic Activity             Goal: LTG-By discharge, patient will transfer one surface to another     Dates: Start: 12/11/18       Description: 1) Individualized goal: Transfer one surface to another fww modified independent at discharge  2) Interventions:  PT Gait Training, PT Therapeutic Exercises, PT Neuro Re-Ed/Balance and PT Therapeutic Activity             Goal: LTG-By discharge, patient will transfer in/out of a car     Dates: Start: 12/11/18       Description: 1) Individualized goal: Car transfer fww supervision at discharge  2) Interventions:  PT Gait Training, PT Therapeutic Exercises, PT Neuro Re-Ed/Balance and PT Therapeutic Activity                     Section completed by:  Vanessa Welsh, PT, DPT

## 2018-12-26 NOTE — CARE PLAN
Problem: Venous Thromboembolism (VTW)/Deep Vein Thrombosis (DVT) Prevention:  Goal: Patient will participate in Venous Thrombosis (VTE)/Deep Vein Thrombosis (DVT)Prevention Measures  Outcome: PROGRESSING AS EXPECTED  Pt is up and walking, participates in therapies, and up to dinning room for all meals.    Problem: Bowel/Gastric:  Goal: Normal bowel function is maintained or improved  Outcome: PROGRESSING AS EXPECTED  Patient having regular bowel movements.  Denies s/s constipation; bowel meds available if needed.

## 2018-12-26 NOTE — REHAB-NURSING IDT TEAM NOTE
Nursing   Nursing  Diet/Nutrition:  Diabetic, Thin Liquids and Other: chopped meat  Medication Administration:  Whole with Liquid Wash  % consumed at meals in last 24 hours:  Consumed % of meals per documentation.  Meal/Snack Consumption for the past 24 hrs:   Oral Nutrition   12/25/18 1700 Dinner;Between 50-75% Consumed   12/25/18 1157 Lunch;Between % Consumed   12/25/18 0845 Breakfast;Between % Consumed       Snack schedule:  HS  Appetite:  Good  Admit Weight:  Weight: 78.2 kg (172 lb 8 oz)  Weight Last 7 Days   [74.9 kg (165 lb 2 oz)] 74.9 kg (165 lb 2 oz) (12/23 0500)    Pain Issues:    Location:  Arm (12/25 2154)  Right;Left (12/25 2154)         Severity:  Moderate   Scheduled pain medications:  gabapentin (NEURONTIN)  + Tylenol  PRN pain medications used in last 24 hours:  oxycodone immediate release (ROXICODONE)  + Flexeril  Non Pharmacologic Interventions:  cold pack, distraction, emotional support, relaxation, repositioned and rest  Effectiveness of pain management plan:  good=patient states acceptable comfort after interventions    Bowel:    Bowel Assist Initial Score:  1 - Total Assistance  Bowel Assist Current Score:  4 - Minimal Assistance  Bowl Accidents in last 7 days:  0  Last bowel movement: 12/25/18 (per pt)  Stool Description: Small, Formed     Usual bowel pattern:  daily  Scheduled bowel medications:  senna-docusate (PERICOLACE or SENOKOT S)   PRN bowel medications used in last 24 hours:  None  Nursing Interventions:  Increased time, Scheduled medication, Supervision, Brief  Effectiveness of bowel program:   good=regular, predictable, controlled emptying of bowel     Bladder:    Bladder Assist Initial Score:  1 - Total Assistance  Bladder Assist Current Score:  2 - Max Assistance  Bladder Accidents in last 7 days:  0  Intermittent Catheterization:  NO  Medications affecting bladder:  None    Interventions:  Increased time, Supervision, Verbal cueing, Emptying of device, Urinal,  Time void patient initiated (staff holds urinal in place )  Effectiveness of bladder training:  Good=regular, predictable, emptying of bladder, patient initiates time voiding    Diabetes:  Blood Sugar Frequency:  Before meals and at bedtime    Range of BS for last 48 hours:   Recent Labs      12/24/18   0735  12/24/18   1124  12/24/18   1721  12/24/18   2138  12/25/18   0754  12/25/18   1136  12/25/18   1719  12/25/18   2024   POCGLUCOSE  194*  241*  196*  271*  224*  248*  218*  218*     Scheduled diabetic medications:  None  Sliding scale usage in past 24 hours:  Yes  Total Short acting insulin in the past 24 hours:  Humulin 16 units  Total Long acting insulin in the past 24 hours:  None     Wound:       Patient Lines/Drains/Airways Status    Active Current Wounds     Name: Placement date: Placement time: Site: Days:    Wound 12/03/18 Laceration Face 12/03/18         23    Wound 12/08/18 Diabetic Ulcer Toe (Comment which one) R 3rd toe, L 5th toe 12/08/18   1200      17                   Interventions: monitoring for s/s of infection    Effectiveness of intervention:  wound is improving     Sleep/wake cycle:   Average hours slept:  Sleeps 4-6 hours without waking  Sleep medication usage:  None    Patient/Family Training/Education:  Bladder Management/Training, Diabetes Management, Diet/Nutrition, Fall Prevention, General Self Care, Medication Management, Pain Management, Safe Transfers, Safety, Skin Care and Wound Care  Discharge Supply Recommendations:  Glucometer and Strips, Blood Pressure Monitor and Wound Care Supplies  Strengths: Alert and oriented, Able to follow instructions, Pleasant and cooperative and Effective communication skills   Barriers:   Fatigue, Generalized weakness and Limited mobility            Nursing Problems           Problem: Bowel/Gastric:     Goal: Normal bowel function is maintained or improved           Goal: Will not experience complications related to bowel motility              Problem: Discharge Barriers/Planning     Goal: Patient's continuum of care needs will be met             Problem: Infection     Goal: Will remain free from infection             Problem: Knowledge Deficit     Goal: Knowledge of disease process/condition, treatment plan, diagnostic tests, and medications will improve           Goal: Knowledge of the prescribed therapeutic regimen will improve             Problem: Metabolic:     Goal: Ability to maintain appropriate glucose levels will improve             Problem: Mobility     Goal: Risk for activity intolerance will decrease             Problem: Pain Management     Goal: Pain level will decrease to patient's comfort goal     Flowsheet:     Taken at 12/22/18 0800    Pain Scale 0 - 10  0 by Janay Leach R.N.    Comfort Goal Comfort at Rest;Comfort with Movement;Sleep Comfortably;Stay Alert by Janay Leach R.N.    Non Verbal Scale  Calm;Unlabored Breathing by Janay Leach R.N.    Taken at 12/21/18 2200    Pain Scale 0 - 10  0 by Diana Kramer R.N.                  Problem: Psychosocial Needs:     Goal: Level of anxiety will decrease             Problem: Respiratory:     Goal: Respiratory status will improve             Problem: Safety     Goal: Will remain free from injury           Goal: Will remain free from falls             Problem: Skin Integrity     Goal: Risk for impaired skin integrity will decrease             Problem: Venous Thromboembolism (VTW)/Deep Vein Thrombosis (DVT) Prevention:     Goal: Patient will participate in Venous Thrombosis (VTE)/Deep Vein Thrombosis (DVT)Prevention Measures     Flowsheet:     Taken at 12/13/18 2200    Pharmacologic Prophylaxis Used Unfractionated Heparin by Diana Kramer R.N.                       Long Term Goals:   At discharge patient will be able to function safely at home and in the community with support.    Section completed by:  Champ Lara R.N.

## 2018-12-26 NOTE — CARE PLAN
Problem: Mobility  Goal: STG-Within one week, patient will  1) Individualized goal: Gait 4ww sba 100 ft 1 week  2) Interventions:  PT Gait Training, PT Therapeutic Exercises, PT Neuro Re-Ed/Balance and PT Therapeutic Activity      Outcome: MET Date Met: 12/26/18      Problem: Mobility Transfers  Goal: STG-Within one week, patient will transfer bed to chair  1) Individualized goal: Transfer bed to/from chair fww sba, vc`s, 1 week  2) Interventions:  PT Gait Training, PT Therapeutic Exercises, PT Neuro Re-Ed/Balance and PT Therapeutic Activity     Outcome: NOT MET  Min A for bed mob, SBA- CGA for stand pivot  Goal: STG-Within one week, patient will move supine to sit  1) Individualized goal: Transfer supine to/from sit sba 1 week  2) Interventions:  PT Gait Training, PT Therapeutic Exercises, PT Neuro Re-Ed/Balance and PT Therapeutic Activity     Outcome: NOT MET  SBA sit> supine, min A supine > sit

## 2018-12-26 NOTE — CARE PLAN
Problem: Problem Solving STGs  Goal: STG-Within one week, patient will  1) Individualized goal: Complete functional medication management/sorting task with demonstration of knowledge of accurate medication names, functions, admin times using MOD A with 100% accuracy   2) Interventions:  SLP Cognitive Skill Development     Outcome: MET Date Met: 12/26/18

## 2018-12-27 LAB
GLUCOSE BLD-MCNC: 171 MG/DL (ref 65–99)
GLUCOSE BLD-MCNC: 177 MG/DL (ref 65–99)
GLUCOSE BLD-MCNC: 184 MG/DL (ref 65–99)
GLUCOSE BLD-MCNC: 265 MG/DL (ref 65–99)

## 2018-12-27 PROCEDURE — 97110 THERAPEUTIC EXERCISES: CPT

## 2018-12-27 PROCEDURE — 99231 SBSQ HOSP IP/OBS SF/LOW 25: CPT | Performed by: INTERNAL MEDICINE

## 2018-12-27 PROCEDURE — A9270 NON-COVERED ITEM OR SERVICE: HCPCS | Performed by: HOSPITALIST

## 2018-12-27 PROCEDURE — 700102 HCHG RX REV CODE 250 W/ 637 OVERRIDE(OP): Performed by: HOSPITALIST

## 2018-12-27 PROCEDURE — 97116 GAIT TRAINING THERAPY: CPT

## 2018-12-27 PROCEDURE — A9270 NON-COVERED ITEM OR SERVICE: HCPCS | Performed by: PHYSICAL MEDICINE & REHABILITATION

## 2018-12-27 PROCEDURE — 99232 SBSQ HOSP IP/OBS MODERATE 35: CPT | Performed by: PHYSICAL MEDICINE & REHABILITATION

## 2018-12-27 PROCEDURE — 700102 HCHG RX REV CODE 250 W/ 637 OVERRIDE(OP): Performed by: PHYSICAL MEDICINE & REHABILITATION

## 2018-12-27 PROCEDURE — A9270 NON-COVERED ITEM OR SERVICE: HCPCS | Performed by: INTERNAL MEDICINE

## 2018-12-27 PROCEDURE — 700102 HCHG RX REV CODE 250 W/ 637 OVERRIDE(OP): Performed by: INTERNAL MEDICINE

## 2018-12-27 PROCEDURE — 700111 HCHG RX REV CODE 636 W/ 250 OVERRIDE (IP): Performed by: PHYSICAL MEDICINE & REHABILITATION

## 2018-12-27 PROCEDURE — 97535 SELF CARE MNGMENT TRAINING: CPT

## 2018-12-27 PROCEDURE — G0515 COGNITIVE SKILLS DEVELOPMENT: HCPCS

## 2018-12-27 PROCEDURE — 97112 NEUROMUSCULAR REEDUCATION: CPT

## 2018-12-27 PROCEDURE — 97530 THERAPEUTIC ACTIVITIES: CPT

## 2018-12-27 PROCEDURE — 770010 HCHG ROOM/CARE - REHAB SEMI PRIVAT*

## 2018-12-27 PROCEDURE — 82962 GLUCOSE BLOOD TEST: CPT | Mod: 91

## 2018-12-27 RX ADMIN — HEPARIN SODIUM 5000 UNITS: 5000 INJECTION, SOLUTION INTRAVENOUS; SUBCUTANEOUS at 05:35

## 2018-12-27 RX ADMIN — GABAPENTIN 300 MG: 300 CAPSULE ORAL at 07:52

## 2018-12-27 RX ADMIN — METOPROLOL TARTRATE 25 MG: 25 TABLET ORAL at 05:36

## 2018-12-27 RX ADMIN — METOPROLOL TARTRATE 25 MG: 25 TABLET ORAL at 17:17

## 2018-12-27 RX ADMIN — SENNOSIDES AND DOCUSATE SODIUM 1 TABLET: 8.6; 5 TABLET ORAL at 20:24

## 2018-12-27 RX ADMIN — ACETAMINOPHEN 650 MG: 325 TABLET ORAL at 05:36

## 2018-12-27 RX ADMIN — INSULIN HUMAN 3 UNITS: 100 INJECTION, SOLUTION PARENTERAL at 07:36

## 2018-12-27 RX ADMIN — PIOGLITAZONE 30 MG: 15 TABLET ORAL at 07:51

## 2018-12-27 RX ADMIN — VITAMIN D, TAB 1000IU (100/BT) 2000 UNITS: 25 TAB at 07:51

## 2018-12-27 RX ADMIN — GABAPENTIN 300 MG: 300 CAPSULE ORAL at 20:24

## 2018-12-27 RX ADMIN — OXYCODONE HYDROCHLORIDE AND ACETAMINOPHEN 500 MG: 500 TABLET ORAL at 07:52

## 2018-12-27 RX ADMIN — GABAPENTIN 300 MG: 300 CAPSULE ORAL at 14:34

## 2018-12-27 RX ADMIN — FERROUS SULFATE TAB 325 MG (65 MG ELEMENTAL FE) 325 MG: 325 (65 FE) TAB at 07:52

## 2018-12-27 RX ADMIN — ACETAMINOPHEN 650 MG: 325 TABLET ORAL at 11:42

## 2018-12-27 RX ADMIN — HEPARIN SODIUM 5000 UNITS: 5000 INJECTION, SOLUTION INTRAVENOUS; SUBCUTANEOUS at 14:34

## 2018-12-27 RX ADMIN — ACETAMINOPHEN 650 MG: 325 TABLET ORAL at 17:17

## 2018-12-27 RX ADMIN — SIMVASTATIN 20 MG: 20 TABLET, FILM COATED ORAL at 20:24

## 2018-12-27 RX ADMIN — INSULIN HUMAN 7 UNITS: 100 INJECTION, SOLUTION PARENTERAL at 20:24

## 2018-12-27 RX ADMIN — INSULIN HUMAN 3 UNITS: 100 INJECTION, SOLUTION PARENTERAL at 17:15

## 2018-12-27 RX ADMIN — INSULIN HUMAN 3 UNITS: 100 INJECTION, SOLUTION PARENTERAL at 11:33

## 2018-12-27 RX ADMIN — OMEPRAZOLE 20 MG: 20 CAPSULE, DELAYED RELEASE ORAL at 07:53

## 2018-12-27 RX ADMIN — HEPARIN SODIUM 5000 UNITS: 5000 INJECTION, SOLUTION INTRAVENOUS; SUBCUTANEOUS at 20:24

## 2018-12-27 ASSESSMENT — ENCOUNTER SYMPTOMS
BACK PAIN: 0
FOCAL WEAKNESS: 1
BLOOD IN STOOL: 0
DIZZINESS: 0
PALPITATIONS: 0
BLURRED VISION: 0
DEPRESSION: 0
SENSORY CHANGE: 0
SHORTNESS OF BREATH: 0
STRIDOR: 0
MYALGIAS: 0
CHILLS: 0
PHOTOPHOBIA: 0
VOMITING: 0
FEVER: 0
HEARTBURN: 0
SORE THROAT: 0
NECK PAIN: 1
DIARRHEA: 0
DOUBLE VISION: 0
HEADACHES: 0
PND: 0
CONSTIPATION: 0
WEAKNESS: 1
CLAUDICATION: 0
HEMOPTYSIS: 0
ABDOMINAL PAIN: 0
SPUTUM PRODUCTION: 0
NERVOUS/ANXIOUS: 0
SPEECH CHANGE: 0
MEMORY LOSS: 0
FALLS: 0
ORTHOPNEA: 0
COUGH: 0
NAUSEA: 0
TINGLING: 0
TREMORS: 0
EYE PAIN: 0

## 2018-12-27 ASSESSMENT — PAIN SCALES - GENERAL
PAINLEVEL_OUTOF10: 0
PAINLEVEL_OUTOF10: 0

## 2018-12-27 NOTE — CARE PLAN
Problem: Safety  Goal: Will remain free from injury  Patient demonstrates good safety technique this shift.  Asks for assistance when needed and does not attempt self transfer.  Able to verbalize needs.  Will continue to monitor.    Problem: Pain Management  Goal: Pain level will decrease to patient's comfort goal  Patient able to verbalize pain level and verbalize an acceptable level of pain.    Problem: Metabolic:  Goal: Ability to maintain appropriate glucose levels will improve  Patient on FSBG  Ac/hs. Mantaned glucose level between defined ranges. Educated in s/s of hyper/hipoglycemia and diabetic diet.

## 2018-12-27 NOTE — CARE PLAN
Problem: Safety  Goal: Will remain free from falls  Patient uses call light consistently and appropriately this shift.  Waits for assistance when needed and does not attempt self transfer.  Able to verbalize needs.  Will continue to monitor.    Problem: Infection  Goal: Will remain free from infection  Patient remains free from s/s infection; afebrile.  Will continue to monitor.    Problem: Pain Management  Goal: Pain level will decrease to patient's comfort goal  Medicated pt per mar with routine and prn meds with relief, non pharmacological measures such as relaxation, distraction and repositioning  Encouraged.    Problem: Metabolic:  Goal: Ability to maintain appropriate glucose levels will improve  fsbs at hs was 215, sliding scale insulin as per orders given, hs snack consumed by pt, will monitor for s&s of hypo/hyperglycemia

## 2018-12-27 NOTE — PROGRESS NOTES
"Rehab Progress Note     Encounter date: 12/27/2018  Today I met with the patient face to face in PT    Chief Complaint:  Central cord syndrome (HCC) , excitement about progress    Interval Events (subjective)  Mr. Almanzar continues to be encouraged by his ongoing progress with ambulation.  He is very excited to be walking with no assistive device during physical therapy sessions.  He is hopeful that his right upper limb will continue to improve as well.  He reports that he is having difficulty maintaining hydration due to his recommendation for small sips.  We discussed the recommendation to continue with small sips but try to drink more frequently throughout the day.  He is willing to attempt this.  He denies any fevers, chills, headache, dizziness, chest pain, shortness of breath.    Objective:  VITAL SIGNS: BP (!) 99/61 Comment: nurse notified  Pulse 84   Temp 36.7 °C (98 °F) (Oral)   Resp 18   Ht 1.778 m (5' 10\")   Wt 74.9 kg (165 lb 2 oz)   SpO2 98%   BMI 23.69 kg/m²     Recent Results (from the past 72 hour(s))   ACCU-CHEK GLUCOSE    Collection Time: 12/24/18  5:21 PM   Result Value Ref Range    Glucose - Accu-Ck 196 (H) 65 - 99 mg/dL   ACCU-CHEK GLUCOSE    Collection Time: 12/24/18  9:38 PM   Result Value Ref Range    Glucose - Accu-Ck 271 (H) 65 - 99 mg/dL   ACCU-CHEK GLUCOSE    Collection Time: 12/25/18  7:54 AM   Result Value Ref Range    Glucose - Accu-Ck 224 (H) 65 - 99 mg/dL   ACCU-CHEK GLUCOSE    Collection Time: 12/25/18 11:36 AM   Result Value Ref Range    Glucose - Accu-Ck 248 (H) 65 - 99 mg/dL   ACCU-CHEK GLUCOSE    Collection Time: 12/25/18  5:19 PM   Result Value Ref Range    Glucose - Accu-Ck 218 (H) 65 - 99 mg/dL   ACCU-CHEK GLUCOSE    Collection Time: 12/25/18  8:24 PM   Result Value Ref Range    Glucose - Accu-Ck 218 (H) 65 - 99 mg/dL   ACCU-CHEK GLUCOSE    Collection Time: 12/26/18  7:32 AM   Result Value Ref Range    Glucose - Accu-Ck 154 (H) 65 - 99 mg/dL   ACCU-CHEK GLUCOSE    " Collection Time: 12/26/18 11:33 AM   Result Value Ref Range    Glucose - Accu-Ck 188 (H) 65 - 99 mg/dL   ACCU-CHEK GLUCOSE    Collection Time: 12/26/18  8:28 PM   Result Value Ref Range    Glucose - Accu-Ck 215 (H) 65 - 99 mg/dL   ACCU-CHEK GLUCOSE    Collection Time: 12/27/18  7:32 AM   Result Value Ref Range    Glucose - Accu-Ck 177 (H) 65 - 99 mg/dL   ACCU-CHEK GLUCOSE    Collection Time: 12/27/18 11:32 AM   Result Value Ref Range    Glucose - Accu-Ck 184 (H) 65 - 99 mg/dL       Current Facility-Administered Medications   Medication Frequency   • oxyCODONE immediate-release (ROXICODONE) tablet 2.5 mg Q8HRS PRN   • metoprolol (LOPRESSOR) tablet 25 mg TWICE DAILY   • docusate sodium (COLACE) capsule 100 mg QDAY PRN   • magnesium hydroxide (MILK OF MAGNESIA) suspension 30 mL QDAY PRN   • polyethylene glycol/lytes (MIRALAX) PACKET 1 Packet PRN   • pioglitazone (ACTOS) tablet 30 mg DAILY   • gabapentin (NEURONTIN) capsule 300 mg TID   • vitamin D (cholecalciferol) tablet 2,000 Units DAILY   • ascorbic acid tablet 500 mg Q48HRS   • acetaminophen (TYLENOL) tablet 650 mg Q6HRS   • heparin injection 5,000 Units Q8HRS   • insulin regular (HUMULIN R) injection 3-14 Units 4X/DAY ACHS    And   • glucose 4 g chewable tablet 16 g Q15 MIN PRN    And   • dextrose 50% (D50W) injection 25 mL Q15 MIN PRN   • senna-docusate (PERICOLACE or SENOKOT S) 8.6-50 MG per tablet 1 Tab Nightly   • simvastatin (ZOCOR) tablet 20 mg Nightly   • Respiratory Care per Protocol Continuous RT   • Pharmacy Consult Request ...Pain Management Review 1 Each PRN   • hydrALAZINE (APRESOLINE) tablet 25 mg Q8HRS PRN   • acetaminophen (TYLENOL) tablet 650 mg Q4HRS PRN   • artificial tears 1.4 % ophthalmic solution 1 Drop PRN   • benzocaine-menthol (CEPACOL) lozenge 1 Lozenge Q2HRS PRN   • mag hydrox-al hydrox-simeth (MAALOX PLUS ES or MYLANTA DS) suspension 20 mL Q2HRS PRN   • ondansetron (ZOFRAN ODT) dispertab 4 mg 4X/DAY PRN    Or   • ondansetron (ZOFRAN)  syringe/vial injection 4 mg 4X/DAY PRN   • traZODone (DESYREL) tablet 50 mg QHS PRN   • sodium chloride (OCEAN) 0.65 % nasal spray 2 Spray PRN   • cyclobenzaprine (FLEXERIL) tablet 10 mg TID PRN   • ferrous sulfate tablet 325 mg Q48HRS   • omeprazole (PRILOSEC) capsule 20 mg DAILY       Exam Date: 12/27/2018    General:  Awake, alert, oriented, no acute distress  HEENT:  Wearing Aspen cervical collar  Cardiac: regular rate and rhythm  Lungs: clear to auscultation bilaterally.   Abdomen: soft; non tender, non distended, bowel sounds present and normoactive  Extremities: No edema in the bilateral lower limbs  Neuro:   Right elbow flexion is stable at 2+/5.  His active range of motion today is only approximately 50% against gravity.  Seen ambulating with physical therapy with no assistive device.  His gait remains kyphotic, but the reciprocal pattern of foot movement has improved.  He does continue to have some valgus deformity in the knees bilaterally with mild snapping of the feet during swing phase      Orders Placed This Encounter   Procedures   • Diet Order Diabetic     Standing Status:   Standing     Number of Occurrences:   1     Order Specific Question:   Diet:     Answer:   Diabetic [3]     Order Specific Question:   Texture/Fiber modifications:     Answer:   Chopped Meat [5]     Comments:   cut sandwiches into 1/4       Assessment:  Active Hospital Problems    Diagnosis   • *Central cord syndrome (HCC)   • Dysphagia   • Type 2 diabetes mellitus (HCC)   • Chronic renal failure   • Scalp laceration   • 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 Simon    Scalp sutures removed December 14    Continued gradual functional improvement    Speech continues to follow for executive functions    Dysphagia--improved  Speech has completed swallow therapy     Pain  Schedule Tylenol every 6 hours  Gabapentin 300 mg TID--do not escalate due to renal impairment   Oxycodone 2.5 mg every 8 hours as needed for severe pain--dose interval increase December 26    He has used 2.5 mg of oxycodone in the last 24 hours     Neurogenic Bladder   Continue timed voiding     Neurogenic Bowel  Colace twice a day  Milk of magnesia daily  Pat-Colace nightly     History of hypertension  Orthostatic hypotension   Tachycardia  At baseline, patient on lisinopril 10 mg daily and propranolol 40 mg 3 times a day    Blood pressure range in the last 24 hours of  mmHg  ProAmatine discontinued December 21    Tachycardia has improved with addition of Lopressor with a range of  bpm last 24 hours    With the addition of Lopressor, he has been mildly hypotensive today    Continue to monitor another 24 hours and potentially decrease Lopressor to 12.5 mg      Lopressor 25 mg BID started on December 26  Appreciate hospitalist assistance  Continue to monitor    Continuing to reinforce the importance of hydration    Diabetes mellitus with hyperglycemia  Currently on sliding scale  Hospitalist working on weaning sliding scale  Actos 30 mg daily--increased on December 18    Glucose range of 177-215 in last 24 hours    Stage III chronic kidney disease  Creatinine stable at 2.03 on December 22  Continue to encourage fluid  intake    Continues to stress the importance of fluid intake     Hyperlipidemia  Simvastatin 20 mg daily      Anemia  Hemoglobin 9.5 December 20  Continue ferrous sulfate    Vitamin D deficiency  Vitamin D was 21 on admission  Increase supplementation to 2000 units of cholecalciferol daily     GI Ppx - Patient on Prilosec 20 mg daily.     DVT ppx - Patient on Heparin 5000 q8h .     Estimated Discharge: January 4    Total time:  26 minutes.  I spent greater than 50% of the time for patient care, counseling, and coordination on this date, including unit/floor time, and face-to-face time with the patient as per interval events and assessment and plan above. Topics discussed included functional progress, improved gait, improved balance, discharge planning, right upper limb function      Mustapha Brandt M.D.  12/27/2018

## 2018-12-27 NOTE — DISCHARGE PLANNING
Case Management;  On track for d/c home on 1/4/18.  Patient continues to make progress and is verbally agreeable with plans.

## 2018-12-27 NOTE — WOUND TEAM
"[facility]  Inpatient Services  Initial Wound and Skin Care Evaluation    Admission Date: 12/10/2018     Consult Date: 12/24/18   HPI, PMH, SH: Reviewed    Unit where seen by Wound Team: [id number]     WOUND CONSULT RELATED TO:  Evaluation of inner buttocks wounds     SUBJECTIVE:  \"It's pretty sore right there\"      Self Report / Pain Level:  See above       OBJECTIVE:  mepilex intact as instructed 12/24/18 afternoon when I was unable to see patient    WOUND TYPE, LOCATION, CHARACTERISTICS (Pressure Injuries: location, stage, POA or date identified)        12/24/18 Buttocks partial thickness left buttock due to friction/moisture (Active)   State of Healing Early/partial granulation 12/26/2018  5:00 PM   Site Assessment Clean;Dry;Intact 12/26/2018  5:00 PM   Pat-wound Assessment Clean;Intact;Dry 12/26/2018  5:00 PM   Margins Attached edges 12/26/2018  5:00 PM   Wound Length (cm) 1.5 cm 12/26/2018  5:00 PM   Wound Width (cm) 1.5 cm 12/26/2018  5:00 PM   Wound Depth (cm) 0.2 cm 12/26/2018  5:00 PM   Wound Surface Area (cm^2) 2.25 cm^2 12/26/2018  5:00 PM   Tunneling 0 cm 12/26/2018  5:00 PM   Undermining 0 cm 12/26/2018  5:00 PM   Closure Secondary intention 12/26/2018  5:00 PM   Drainage Amount Scant 12/26/2018  5:00 PM   Drainage Description Serosanguineous 12/26/2018  5:00 PM   Non-staged Wound Description Partial thickness 12/26/2018  5:00 PM   Treatments Site care 12/26/2018  5:00 PM   Cleansing Not Applicable 12/26/2018  5:00 PM   Periwound Protectant Not Applicable 12/26/2018  5:00 PM   Dressing Options Mepilex 12/26/2018  5:00 PM   Dressing Cleansing/Solutions Not Applicable 12/26/2018  5:00 PM   Dressing Changed Changed 12/26/2018  5:00 PM   Dressing Status Intact 12/26/2018  5:00 PM   Dressing Change Frequency Every 48 hrs 12/26/2018  5:00 PM   NEXT Dressing Change  12/28/18 12/26/2018  5:00 PM   NEXT Weekly Photo (Inpatient Only) 12/31/18 12/26/2018  5:00 PM   WOUND NURSE ONLY - Odor None " 12/26/2018  5:00 PM   WOUND NURSE ONLY - Exposed Structures None 12/26/2018  5:00 PM   WOUND NURSE ONLY - Tissue Type and Percentage red/purple 100% 12/26/2018  5:00 PM   WOUND NURSE ONLY - Time Spent with Patient (mins) 45 12/26/2018  5:00 PM     Lab Values:    WBC:       WBC   Date/Time Value Ref Range Status   12/20/2018 06:02 AM 3.8 (L) 4.8 - 10.8 K/uL Final     AIC:      Lab Results   Component Value Date/Time    HBA1C 6.9 (H) 12/11/2018 06:32 AM         Culture:  NA    INTERVENTIONS BY WOUND TEAM:  Patient turned to side and mepilex dressing removed revealing right side of buttocks intact with excoriation resolved.  Left buttocks with dark red/purplish area.  Measurements taken and mepilex replaced.  This doesn't appear to be pressure related but due to friction between buttocks and compounded by moisture.  Discussed with staff RN.    Dressing selection:  mepilex         Interdisciplinary consultation: Patient, Bedside RN     EVALUATION: clean appearing wound that appears to be resolving with protection from friction and moisture    Factors affecting wound healing: friction, moisture  Goals: Steady decrease in wound area and depth weekly.    NURSING PLAN OF CARE ORDERS (X):    Dressing changes: See Dressing Care orders: X  Skin care: See Skin Care orders:   Rectal tube care: See Rectal Tube Care orders:   Other orders:    RSKIN: CURRENT (X) ORDERED (O):   Q shift Dale:  X  Q shift pressure point assessments:  X  Pressure redistribution mattress   X         DARELL          Bariatric DARELL         Bariatric foam           Heel float boots          Float Heels off Bed with Pillows moves in bed              Barrier wipes         Barrier Cream         Barrier paste          Sacral silicone dressing         Silicone O2 tubing         Anchorfast         Cannula fixation Device (Tender )          Gray Foam Ear protectors           Trach with Optifoam split foam                 Waffle cushion        Waffle Overlay          Rectal tube or BMS         Antifungal tx      Interdry          Reposition q 2 hours  X      Up to chair        Ambulate      PT/OT        Dietician        Diabetes Education      PO  X   TF     TPN     NPO   # days   Other        WOUND TEAM PLAN OF CARE (X):   NPWT change 3 x week:        Dressing changes by wound team:       Follow up as needed:   X    Other (explain):     Anticipated discharge plans (X):  SNF:           Home Care:           Outpatient Wound Center:            Self Care:            Other:    TBD

## 2018-12-27 NOTE — PROGRESS NOTES
Shift report received, patient alert and oriented x 4, awake in no acute distress, Denied any pain  At this time.

## 2018-12-27 NOTE — PROGRESS NOTES
Hospital Medicine Daily Progress Note        Chief Complaint  DM    Interval Problem Update  12/17: Rapid HR better w/ PO fluids.  No chest pain, shortness of breath, or palpitations.  12/18: Intermittent tachycardia, patient continues to try and drink more fluids.  Glucose still on the high side between 151-292, increased pioglitazone to 30 after discussion with patient.  12/19: Patient tolerating increased dose of pioglitazone.  Blood pressure in good range.  Patient did have one episode of vomiting yesterday which he attributes to bowel regimen    12/20  No acute issues overall patient comfortable.  Patient reports increased strength in his arms as well as walking.     12/21  Overall clinically doing well, Patient denies fevers/chills, chest pain, shortness of breath or nausea/vommiting.   Resume therapy  Overall HR since last night improved now in low 90s, max 100. SBP in the 130s  D/c midodrine and monitor closely.     12/22  Patient doing well, no complaints. Patient denies fevers/chills, chest pain, shortness of breath or nausea/vommiting.   HR controlled now that he is off Midodrine, CTM.  Renal functions improved.  Resume therapy,      12/23  Patient clinically stable overall, no acute complaints. Patient denies fevers/chills, chest pain, shortness of breath or nausea/vommiting.   HR still 90s-100s but overall improved now Off midodrine.  Resume physical  Therapy.    12/24  Patient comfortable, neck pain controlled. Unhappy about not being able to move his right arm way he wants.  HR and BP well controlled, CTM,   Continue with pain control, therapy,     12/25: Pain controlled and resting comfortably.  No new complaints.  12/26: No acute issues overnight.      Review of Systems  Review of Systems   Constitutional: Negative for chills, fever and malaise/fatigue.   HENT: Negative for congestion, hearing loss, sore throat and tinnitus.    Eyes: Negative for blurred vision, double vision, photophobia and pain.    Respiratory: Negative for cough, hemoptysis, sputum production, shortness of breath and stridor.    Cardiovascular: Negative for chest pain, palpitations, orthopnea, claudication and PND.   Gastrointestinal: Negative for abdominal pain, blood in stool, constipation, diarrhea, heartburn, melena, nausea and vomiting.   Genitourinary: Negative for dysuria, frequency and urgency.   Musculoskeletal: Positive for neck pain. Negative for back pain, falls, joint pain and myalgias.   Neurological: Positive for focal weakness and weakness. Negative for dizziness, tingling, tremors, sensory change, speech change and headaches.   Psychiatric/Behavioral: Negative for depression, memory loss and suicidal ideas. The patient is not nervous/anxious.         Physical Exam  Temp:  [36.3 °C (97.3 °F)-36.7 °C (98 °F)] 36.3 °C (97.3 °F)  Pulse:  [] 93  Resp:  [18] 18  BP: (116-134)/(66-74) 116/74    Physical Exam   Constitutional: He is oriented to person, place, and time. He appears well-developed and well-nourished. No distress.   HENT:   Head: Normocephalic and atraumatic.   Right Ear: External ear normal.   Left Ear: External ear normal.   Mouth/Throat: No oropharyngeal exudate.   Eyes: Pupils are equal, round, and reactive to light. Conjunctivae are normal. Right eye exhibits no discharge. No scleral icterus.   Neck: Neck supple. No JVD present. No tracheal deviation present. No thyromegaly present.   Cervical collar in place   Cardiovascular: Regular rhythm, normal heart sounds and intact distal pulses.  Tachycardia present.  Exam reveals no gallop and no friction rub.    No murmur heard.  Pulses:       Dorsalis pedis pulses are 2+ on the right side, and 2+ on the left side.   Pulmonary/Chest: Effort normal and breath sounds normal. No stridor. No respiratory distress. He has no wheezes. He has no rales.   Abdominal: Soft. Bowel sounds are normal. He exhibits no distension and no mass. There is no tenderness. There is no  rebound and no guarding.   Musculoskeletal: Normal range of motion. He exhibits no edema.   Lymphadenopathy:     He has no cervical adenopathy.   Neurological: He is alert and oriented to person, place, and time. No cranial nerve deficit. He exhibits abnormal muscle tone.   Skin: Skin is warm and dry. No rash noted. He is not diaphoretic. No erythema. No pallor.   Psychiatric: He has a normal mood and affect. His behavior is normal. Judgment and thought content normal.   Vitals reviewed.      Fluids    Intake/Output Summary (Last 24 hours) at 12/26/18 1615  Last data filed at 12/26/18 1400   Gross per 24 hour   Intake              960 ml   Output             1100 ml   Net             -140 ml       Laboratory                      Assessment/Plan  * Central cord syndrome (HCC)- (present on admission)   Assessment & Plan    Conservative management per Neurosurgery  Maintain cervical collar       Chronic renal failure- (present on admission)   Assessment & Plan    CKD Stage III  Creatinine at baseline.  Continue to monitor closely  Continue to renally dose all medications, avoid nephrotoxins and nonsteroidal medications     Type 2 diabetes mellitus (HCC)- (present on admission)   Assessment & Plan    Uncontrolled  Continue home dose of Actos  Continue Insulin-sliding scale, accu-checks and hypoglycemia protocol.  Deferred outpatient endocrinology follow-up     Tachycardia   Assessment & Plan    Noted SVT on EKG  HR mostly <100, improving, CTM       Vitamin D deficiency   Assessment & Plan    Continue vitamin D supplementation       Thrombocytopenia (HCC)   Assessment & Plan    Stable, no signs of gross bleeding.       Anemia   Assessment & Plan    Stable no signs of gross bleeding continue monitor       Hyperlipemia- (present on admission)   Assessment & Plan    Resume simvastatin     Essential hypertension- (present on admission)   Assessment & Plan    - cont Metoprolol; monitoring         Total time:  30 minutes.  I  spent greater than 50% of the time for patient care, counseling, and coordination on this date, including unit/floor time, and face-to-face time with the patient as per interval events and assessment and plan above    CODE STATUS full code

## 2018-12-28 LAB
GLUCOSE BLD-MCNC: 151 MG/DL (ref 65–99)
GLUCOSE BLD-MCNC: 159 MG/DL (ref 65–99)
GLUCOSE BLD-MCNC: 193 MG/DL (ref 65–99)
GLUCOSE BLD-MCNC: 218 MG/DL (ref 65–99)

## 2018-12-28 PROCEDURE — A9270 NON-COVERED ITEM OR SERVICE: HCPCS | Performed by: PHYSICAL MEDICINE & REHABILITATION

## 2018-12-28 PROCEDURE — 82962 GLUCOSE BLOOD TEST: CPT | Mod: 91

## 2018-12-28 PROCEDURE — 770010 HCHG ROOM/CARE - REHAB SEMI PRIVAT*

## 2018-12-28 PROCEDURE — 97116 GAIT TRAINING THERAPY: CPT

## 2018-12-28 PROCEDURE — A9270 NON-COVERED ITEM OR SERVICE: HCPCS | Performed by: HOSPITALIST

## 2018-12-28 PROCEDURE — G0515 COGNITIVE SKILLS DEVELOPMENT: HCPCS

## 2018-12-28 PROCEDURE — 700111 HCHG RX REV CODE 636 W/ 250 OVERRIDE (IP): Performed by: PHYSICAL MEDICINE & REHABILITATION

## 2018-12-28 PROCEDURE — 700102 HCHG RX REV CODE 250 W/ 637 OVERRIDE(OP): Performed by: HOSPITALIST

## 2018-12-28 PROCEDURE — 97530 THERAPEUTIC ACTIVITIES: CPT

## 2018-12-28 PROCEDURE — 99232 SBSQ HOSP IP/OBS MODERATE 35: CPT | Performed by: PHYSICAL MEDICINE & REHABILITATION

## 2018-12-28 PROCEDURE — 97112 NEUROMUSCULAR REEDUCATION: CPT

## 2018-12-28 PROCEDURE — 99232 SBSQ HOSP IP/OBS MODERATE 35: CPT | Performed by: INTERNAL MEDICINE

## 2018-12-28 PROCEDURE — 700102 HCHG RX REV CODE 250 W/ 637 OVERRIDE(OP): Performed by: INTERNAL MEDICINE

## 2018-12-28 PROCEDURE — A9270 NON-COVERED ITEM OR SERVICE: HCPCS | Performed by: INTERNAL MEDICINE

## 2018-12-28 PROCEDURE — 97110 THERAPEUTIC EXERCISES: CPT

## 2018-12-28 PROCEDURE — 700102 HCHG RX REV CODE 250 W/ 637 OVERRIDE(OP): Performed by: PHYSICAL MEDICINE & REHABILITATION

## 2018-12-28 RX ADMIN — INSULIN HUMAN 3 UNITS: 100 INJECTION, SOLUTION PARENTERAL at 07:34

## 2018-12-28 RX ADMIN — INSULIN HUMAN 4 UNITS: 100 INJECTION, SOLUTION PARENTERAL at 20:32

## 2018-12-28 RX ADMIN — METOPROLOL TARTRATE 25 MG: 25 TABLET ORAL at 05:41

## 2018-12-28 RX ADMIN — HEPARIN SODIUM 5000 UNITS: 5000 INJECTION, SOLUTION INTRAVENOUS; SUBCUTANEOUS at 05:41

## 2018-12-28 RX ADMIN — HEPARIN SODIUM 5000 UNITS: 5000 INJECTION, SOLUTION INTRAVENOUS; SUBCUTANEOUS at 20:23

## 2018-12-28 RX ADMIN — SENNOSIDES AND DOCUSATE SODIUM 1 TABLET: 8.6; 5 TABLET ORAL at 20:23

## 2018-12-28 RX ADMIN — GABAPENTIN 300 MG: 300 CAPSULE ORAL at 08:07

## 2018-12-28 RX ADMIN — INSULIN HUMAN 3 UNITS: 100 INJECTION, SOLUTION PARENTERAL at 11:26

## 2018-12-28 RX ADMIN — ACETAMINOPHEN 650 MG: 325 TABLET ORAL at 17:30

## 2018-12-28 RX ADMIN — ACETAMINOPHEN 650 MG: 325 TABLET ORAL at 23:50

## 2018-12-28 RX ADMIN — ACETAMINOPHEN 650 MG: 325 TABLET, FILM COATED ORAL at 02:33

## 2018-12-28 RX ADMIN — INSULIN HUMAN 3 UNITS: 100 INJECTION, SOLUTION PARENTERAL at 17:28

## 2018-12-28 RX ADMIN — GABAPENTIN 300 MG: 300 CAPSULE ORAL at 14:31

## 2018-12-28 RX ADMIN — GABAPENTIN 300 MG: 300 CAPSULE ORAL at 20:23

## 2018-12-28 RX ADMIN — ACETAMINOPHEN 650 MG: 325 TABLET ORAL at 12:06

## 2018-12-28 RX ADMIN — ACETAMINOPHEN 650 MG: 325 TABLET ORAL at 05:40

## 2018-12-28 RX ADMIN — SIMVASTATIN 20 MG: 20 TABLET, FILM COATED ORAL at 20:23

## 2018-12-28 RX ADMIN — VITAMIN D, TAB 1000IU (100/BT) 2000 UNITS: 25 TAB at 08:07

## 2018-12-28 RX ADMIN — HEPARIN SODIUM 5000 UNITS: 5000 INJECTION, SOLUTION INTRAVENOUS; SUBCUTANEOUS at 14:31

## 2018-12-28 RX ADMIN — METOPROLOL TARTRATE 25 MG: 25 TABLET ORAL at 17:30

## 2018-12-28 RX ADMIN — OMEPRAZOLE 20 MG: 20 CAPSULE, DELAYED RELEASE ORAL at 08:07

## 2018-12-28 RX ADMIN — PIOGLITAZONE 30 MG: 15 TABLET ORAL at 08:07

## 2018-12-28 ASSESSMENT — ENCOUNTER SYMPTOMS
DIZZINESS: 0
SPEECH CHANGE: 0
WEAKNESS: 1
NERVOUS/ANXIOUS: 0
VOMITING: 0
HEMOPTYSIS: 0
SHORTNESS OF BREATH: 0
MEMORY LOSS: 0
DOUBLE VISION: 0
FOCAL WEAKNESS: 1
EYE PAIN: 0
TREMORS: 0
NAUSEA: 0
MYALGIAS: 0
DEPRESSION: 0
PALPITATIONS: 0
PHOTOPHOBIA: 0
COUGH: 0
CLAUDICATION: 0
SORE THROAT: 0
BACK PAIN: 0
NECK PAIN: 1
BLOOD IN STOOL: 0
FEVER: 0
ABDOMINAL PAIN: 0
SENSORY CHANGE: 0
CONSTIPATION: 0
HEADACHES: 0
CHILLS: 0
DIARRHEA: 0
BLURRED VISION: 0
STRIDOR: 0
SPUTUM PRODUCTION: 0
PND: 0
HEARTBURN: 0
FALLS: 0
TINGLING: 0
ORTHOPNEA: 0

## 2018-12-28 ASSESSMENT — PAIN SCALES - GENERAL
PAINLEVEL_OUTOF10: 5
PAINLEVEL_OUTOF10: 0

## 2018-12-28 NOTE — PROGRESS NOTES
At 1915 received report from the day shift RN and assumed care of patient. Patient is asleep, VSS. Bed is in the lowest position with the call light in reach, will continue to monitor.

## 2018-12-28 NOTE — CARE PLAN
Problem: Safety  Goal: Will remain free from injury  Pt uses call light consistently and appropriately. Waits for assistance does not attempt self transfer this shift. Able to verbalize needs.    Problem: Pain Management  Goal: Pain level will decrease to patient's comfort goal  Patient able to verbalize needs.  Denies pain or discomfort this shift and no s/s same noted.  Will continue to monitor.

## 2018-12-28 NOTE — PROGRESS NOTES
"Rehab Progress Note     Encounter date: 12/28/2018  Today I met with the patient face to face in his room    Chief Complaint:  Central cord syndrome (HCC) , mild fatigue    Interval Events (subjective)  Mr. Almanzar is doing well today.  He reports that he has some mild fatigue, but he believes this is due to the amount of therapy he has today.  He has already had physical and occupational therapy.  He will have an additional physical therapy as well as a second occupational therapy session to work on the [place] spring.  He is very excited to use the [place] spring.  He reports that this has been very helpful.  He denies any fevers, chills, headache, dizziness, chest pain, or shortness of breath.    Objective:  VITAL SIGNS: /61   Pulse 92   Temp 36.6 °C (97.9 °F) (Oral)   Resp 17   Ht 1.778 m (5' 10\")   Wt 74.9 kg (165 lb 2 oz)   SpO2 100%   BMI 23.69 kg/m²     Recent Results (from the past 72 hour(s))   ACCU-CHEK GLUCOSE    Collection Time: 12/25/18  5:19 PM   Result Value Ref Range    Glucose - Accu-Ck 218 (H) 65 - 99 mg/dL   ACCU-CHEK GLUCOSE    Collection Time: 12/25/18  8:24 PM   Result Value Ref Range    Glucose - Accu-Ck 218 (H) 65 - 99 mg/dL   ACCU-CHEK GLUCOSE    Collection Time: 12/26/18  7:32 AM   Result Value Ref Range    Glucose - Accu-Ck 154 (H) 65 - 99 mg/dL   ACCU-CHEK GLUCOSE    Collection Time: 12/26/18 11:33 AM   Result Value Ref Range    Glucose - Accu-Ck 188 (H) 65 - 99 mg/dL   ACCU-CHEK GLUCOSE    Collection Time: 12/26/18  8:28 PM   Result Value Ref Range    Glucose - Accu-Ck 215 (H) 65 - 99 mg/dL   ACCU-CHEK GLUCOSE    Collection Time: 12/27/18  7:32 AM   Result Value Ref Range    Glucose - Accu-Ck 177 (H) 65 - 99 mg/dL   ACCU-CHEK GLUCOSE    Collection Time: 12/27/18 11:32 AM   Result Value Ref Range    Glucose - Accu-Ck 184 (H) 65 - 99 mg/dL   ACCU-CHEK GLUCOSE    Collection Time: 12/27/18  5:13 PM   Result Value Ref Range    Glucose - Accu-Ck 171 (H) 65 - 99 mg/dL   ACCU-CHEK " GLUCOSE    Collection Time: 12/27/18  8:24 PM   Result Value Ref Range    Glucose - Accu-Ck 265 (H) 65 - 99 mg/dL   ACCU-CHEK GLUCOSE    Collection Time: 12/28/18  7:34 AM   Result Value Ref Range    Glucose - Accu-Ck 159 (H) 65 - 99 mg/dL   ACCU-CHEK GLUCOSE    Collection Time: 12/28/18 11:25 AM   Result Value Ref Range    Glucose - Accu-Ck 193 (H) 65 - 99 mg/dL       Current Facility-Administered Medications   Medication Frequency   • oxyCODONE immediate-release (ROXICODONE) tablet 2.5 mg Q8HRS PRN   • metoprolol (LOPRESSOR) tablet 25 mg TWICE DAILY   • docusate sodium (COLACE) capsule 100 mg QDAY PRN   • magnesium hydroxide (MILK OF MAGNESIA) suspension 30 mL QDAY PRN   • polyethylene glycol/lytes (MIRALAX) PACKET 1 Packet PRN   • pioglitazone (ACTOS) tablet 30 mg DAILY   • gabapentin (NEURONTIN) capsule 300 mg TID   • vitamin D (cholecalciferol) tablet 2,000 Units DAILY   • ascorbic acid tablet 500 mg Q48HRS   • acetaminophen (TYLENOL) tablet 650 mg Q6HRS   • heparin injection 5,000 Units Q8HRS   • insulin regular (HUMULIN R) injection 3-14 Units 4X/DAY ACHS    And   • glucose 4 g chewable tablet 16 g Q15 MIN PRN    And   • dextrose 50% (D50W) injection 25 mL Q15 MIN PRN   • senna-docusate (PERICOLACE or SENOKOT S) 8.6-50 MG per tablet 1 Tab Nightly   • simvastatin (ZOCOR) tablet 20 mg Nightly   • Respiratory Care per Protocol Continuous RT   • Pharmacy Consult Request ...Pain Management Review 1 Each PRN   • hydrALAZINE (APRESOLINE) tablet 25 mg Q8HRS PRN   • acetaminophen (TYLENOL) tablet 650 mg Q4HRS PRN   • artificial tears 1.4 % ophthalmic solution 1 Drop PRN   • benzocaine-menthol (CEPACOL) lozenge 1 Lozenge Q2HRS PRN   • mag hydrox-al hydrox-simeth (MAALOX PLUS ES or MYLANTA DS) suspension 20 mL Q2HRS PRN   • ondansetron (ZOFRAN ODT) dispertab 4 mg 4X/DAY PRN    Or   • ondansetron (ZOFRAN) syringe/vial injection 4 mg 4X/DAY PRN   • traZODone (DESYREL) tablet 50 mg QHS PRN   • sodium chloride (OCEAN) 0.65  % nasal spray 2 Spray PRN   • cyclobenzaprine (FLEXERIL) tablet 10 mg TID PRN   • ferrous sulfate tablet 325 mg Q48HRS   • omeprazole (PRILOSEC) capsule 20 mg DAILY       Exam Date: 12/28/2018    General:  Awake, alert, oriented, no acute distress  HEENT:  Wearing Aspen cervical collar  Cardiac: regular rate and rhythm  Lungs: clear to auscultation bilaterally.   Abdomen: soft; non tender, non distended, bowel sounds present and normoactive  Extremities: No edema in the bilateral lower limbs  Neuro:   Stable impairment in the right elbow flexion at 2+/5.  Right shoulder abduction is 2-/5.  Ongoing improvement in coordination and strength in the lower limbs.  Distal right upper limb strength is nearly normal.  He still continues to have mild coordination deficits and imprecise fine motor movements in the right hand.      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 Simon    Scalp sutures removed December 14    Continued gradual functional improvement    Speech continues to follow for executive functions    Dysphagia--improved  Speech has completed swallow therapy     Pain  Schedule Tylenol every 6 hours  Gabapentin 300 mg TID--do not escalate due to renal impairment   Oxycodone 2.5 mg every 8 hours as needed for severe pain--dose interval increase December 26    He has used no oxycodone in the last 24 hours     Neurogenic Bladder   Continue timed voiding     Neurogenic Bowel  Colace twice a day  Milk of magnesia daily  Pat-Colace nightly     History of hypertension  Orthostatic hypotension   Tachycardia  At baseline, patient on lisinopril 10 mg daily and propranolol 40 mg 3 times a day    Blood pressure range in the last 24 hours of 110-124 mmHg  ProAmatine discontinued December 21    Tachycardia has improved with addition of Lopressor with a range of 90-92 bpm last 24 hours    Continue to monitor and adjust dosing if needed    Lopressor 25 mg BID started on December 26  Appreciate hospitalist assistance    Continuing to reinforce the importance of hydration    Diabetes mellitus with hyperglycemia  Currently on sliding scale  Hospitalist working on weaning sliding scale  Actos 30 mg daily--increased on December 18    Glucose range of 159-265 in last 24 hours    Stage III chronic kidney disease  Creatinine stable at 2.03 on December 22  Continue to encourage fluid intake    Continues to stress the importance of fluid intake    Recheck December 31     Hyperlipidemia  Simvastatin 20 mg daily      Anemia  Hemoglobin 9.5 December 20  Continue ferrous sulfate    Recheck December 31    Vitamin D deficiency  Vitamin D was 21 on admission  Continue supplementation with 2000 units of  cholecalciferol daily     GI Ppx - Patient on Prilosec 20 mg daily.     DVT ppx - Patient on Heparin 5000 q8h .     Estimated Discharge: January 4    Total time:  25 minutes.  I spent greater than 50% of the time for patient care, counseling, and coordination on this date, including unit/floor time, and face-to-face time with the patient as per interval events and assessment and plan above. Topics discussed included functional progress, right upper limb strength, therapy sessions, mild fatigue        Mustapha Brandt M.D.  12/28/2018

## 2018-12-28 NOTE — PROGRESS NOTES
Hospital Medicine Daily Progress Note        Chief Complaint  DM    Interval Problem Update  12/17: Rapid HR better w/ PO fluids.  No chest pain, shortness of breath, or palpitations.  12/18: Intermittent tachycardia, patient continues to try and drink more fluids.  Glucose still on the high side between 151-292, increased pioglitazone to 30 after discussion with patient.  12/19: Patient tolerating increased dose of pioglitazone.  Blood pressure in good range.  Patient did have one episode of vomiting yesterday which he attributes to bowel regimen    12/20  No acute issues overall patient comfortable.  Patient reports increased strength in his arms as well as walking.     12/21  Overall clinically doing well, Patient denies fevers/chills, chest pain, shortness of breath or nausea/vommiting.   Resume therapy  Overall HR since last night improved now in low 90s, max 100. SBP in the 130s  D/c midodrine and monitor closely.     12/22  Patient doing well, no complaints. Patient denies fevers/chills, chest pain, shortness of breath or nausea/vommiting.   HR controlled now that he is off Midodrine, CTM.  Renal functions improved.  Resume therapy,      12/23  Patient clinically stable overall, no acute complaints. Patient denies fevers/chills, chest pain, shortness of breath or nausea/vommiting.   HR still 90s-100s but overall improved now Off midodrine.  Resume physical  Therapy.    12/24  Patient comfortable, neck pain controlled. Unhappy about not being able to move his right arm way he wants.  HR and BP well controlled, CTM,   Continue with pain control, therapy,     12/25: Pain controlled and resting comfortably.  No new complaints.  12/26: No acute issues overnight.   12/27: Pt watching TV at bedside in chair.  No acute complaints or issues.     Review of Systems  Review of Systems   Constitutional: Negative for chills, fever and malaise/fatigue.   HENT: Negative for congestion, hearing loss, sore throat and tinnitus.     Eyes: Negative for blurred vision, double vision, photophobia and pain.   Respiratory: Negative for cough, hemoptysis, sputum production, shortness of breath and stridor.    Cardiovascular: Negative for chest pain, palpitations, orthopnea, claudication and PND.   Gastrointestinal: Negative for abdominal pain, blood in stool, constipation, diarrhea, heartburn, melena, nausea and vomiting.   Genitourinary: Negative for dysuria, frequency and urgency.   Musculoskeletal: Positive for neck pain. Negative for back pain, falls, joint pain and myalgias.   Neurological: Positive for focal weakness and weakness. Negative for dizziness, tingling, tremors, sensory change, speech change and headaches.   Psychiatric/Behavioral: Negative for depression, memory loss and suicidal ideas. The patient is not nervous/anxious.         Physical Exam  Temp:  [36.4 °C (97.5 °F)-36.7 °C (98 °F)] 36.7 °C (98 °F)  Pulse:  [] 100  Resp:  [18] 18  BP: ()/(61-76) 117/74    Physical Exam   Constitutional: He is oriented to person, place, and time. He appears well-developed and well-nourished. No distress.   HENT:   Head: Normocephalic and atraumatic.   Right Ear: External ear normal.   Left Ear: External ear normal.   Mouth/Throat: No oropharyngeal exudate.   Eyes: Pupils are equal, round, and reactive to light. Conjunctivae are normal. Right eye exhibits no discharge. No scleral icterus.   Neck: Neck supple. No JVD present. No tracheal deviation present. No thyromegaly present.   Cervical collar in place   Cardiovascular: Regular rhythm, normal heart sounds and intact distal pulses.  Tachycardia present.  Exam reveals no gallop and no friction rub.    No murmur heard.  Pulses:       Dorsalis pedis pulses are 2+ on the right side, and 2+ on the left side.   Pulmonary/Chest: Effort normal and breath sounds normal. No stridor. No respiratory distress. He has no wheezes. He has no rales.   Abdominal: Soft. Bowel sounds are normal. He  exhibits no distension and no mass. There is no tenderness. There is no rebound and no guarding.   Musculoskeletal: Normal range of motion. He exhibits no edema.   Lymphadenopathy:     He has no cervical adenopathy.   Neurological: He is alert and oriented to person, place, and time. No cranial nerve deficit. He exhibits abnormal muscle tone.   Skin: Skin is warm and dry. No rash noted. He is not diaphoretic. No erythema. No pallor.   Psychiatric: He has a normal mood and affect. His behavior is normal. Judgment and thought content normal.   Vitals reviewed.      Fluids    Intake/Output Summary (Last 24 hours) at 12/27/18 1701  Last data filed at 12/27/18 1232   Gross per 24 hour   Intake              837 ml   Output             1300 ml   Net             -463 ml       Laboratory                      Assessment/Plan  * Central cord syndrome (HCC)- (present on admission)   Assessment & Plan    Conservative management per Neurosurgery  Maintain cervical collar       Chronic renal failure- (present on admission)   Assessment & Plan    CKD Stage III  Creatinine at baseline.  Continue to monitor closely  Continue to renally dose all medications, avoid nephrotoxins and nonsteroidal medications     Type 2 diabetes mellitus (HCC)- (present on admission)   Assessment & Plan    Uncontrolled  Continue home dose of Actos  Continue Insulin-sliding scale, accu-checks and hypoglycemia protocol.  Deferred outpatient endocrinology follow-up     Tachycardia   Assessment & Plan    Noted SVT on EKG  HR mostly <100, improving, CTM       Vitamin D deficiency   Assessment & Plan    Continue vitamin D supplementation       Thrombocytopenia (HCC)   Assessment & Plan    Stable, no signs of gross bleeding.       Anemia   Assessment & Plan    Stable no signs of gross bleeding continue monitor       Hyperlipemia- (present on admission)   Assessment & Plan    Resume simvastatin     Essential hypertension- (present on admission)   Assessment &  Plan    - cont Metoprolol; monitoring         Total time:  31 minutes.  I spent greater than 50% of the time for patient care, counseling, and coordination on this date, including unit/floor time, and face-to-face time with the patient as per interval events and assessment and plan above    CODE STATUS full code

## 2018-12-28 NOTE — CARE PLAN
Problem: Safety  Goal: Will remain free from injury  Outcome: PROGRESSING AS EXPECTED  Patient does not demonstrate any unsafe/impulsive behavior. He understands how to use the call light, all needs met at this time. Hourly rounding in place.    Problem: Metabolic:  Goal: Ability to maintain appropriate glucose levels will improve  Outcome: PROGRESSING SLOWER THAN EXPECTED  Patient's HS blood sugar reading was 265, he received 7 units Humulin R per sliding scale. HS snack was given, will monitor patient for s/s of hypo/hyperglycemia.

## 2018-12-29 LAB
GLUCOSE BLD-MCNC: 145 MG/DL (ref 65–99)
GLUCOSE BLD-MCNC: 167 MG/DL (ref 65–99)
GLUCOSE BLD-MCNC: 222 MG/DL (ref 65–99)

## 2018-12-29 PROCEDURE — 700102 HCHG RX REV CODE 250 W/ 637 OVERRIDE(OP): Performed by: HOSPITALIST

## 2018-12-29 PROCEDURE — 700102 HCHG RX REV CODE 250 W/ 637 OVERRIDE(OP): Performed by: INTERNAL MEDICINE

## 2018-12-29 PROCEDURE — 99231 SBSQ HOSP IP/OBS SF/LOW 25: CPT | Performed by: INTERNAL MEDICINE

## 2018-12-29 PROCEDURE — A9270 NON-COVERED ITEM OR SERVICE: HCPCS | Performed by: PHYSICAL MEDICINE & REHABILITATION

## 2018-12-29 PROCEDURE — 700102 HCHG RX REV CODE 250 W/ 637 OVERRIDE(OP): Performed by: PHYSICAL MEDICINE & REHABILITATION

## 2018-12-29 PROCEDURE — A9270 NON-COVERED ITEM OR SERVICE: HCPCS | Performed by: HOSPITALIST

## 2018-12-29 PROCEDURE — A9270 NON-COVERED ITEM OR SERVICE: HCPCS | Performed by: INTERNAL MEDICINE

## 2018-12-29 PROCEDURE — G0515 COGNITIVE SKILLS DEVELOPMENT: HCPCS

## 2018-12-29 PROCEDURE — 700111 HCHG RX REV CODE 636 W/ 250 OVERRIDE (IP): Performed by: PHYSICAL MEDICINE & REHABILITATION

## 2018-12-29 PROCEDURE — 82962 GLUCOSE BLOOD TEST: CPT

## 2018-12-29 PROCEDURE — 770010 HCHG ROOM/CARE - REHAB SEMI PRIVAT*

## 2018-12-29 RX ADMIN — FERROUS SULFATE TAB 325 MG (65 MG ELEMENTAL FE) 325 MG: 325 (65 FE) TAB at 08:33

## 2018-12-29 RX ADMIN — HEPARIN SODIUM 5000 UNITS: 5000 INJECTION, SOLUTION INTRAVENOUS; SUBCUTANEOUS at 05:06

## 2018-12-29 RX ADMIN — ACETAMINOPHEN 650 MG: 325 TABLET ORAL at 23:35

## 2018-12-29 RX ADMIN — GABAPENTIN 300 MG: 300 CAPSULE ORAL at 08:33

## 2018-12-29 RX ADMIN — HEPARIN SODIUM 5000 UNITS: 5000 INJECTION, SOLUTION INTRAVENOUS; SUBCUTANEOUS at 20:18

## 2018-12-29 RX ADMIN — ACETAMINOPHEN 650 MG: 325 TABLET ORAL at 11:26

## 2018-12-29 RX ADMIN — ACETAMINOPHEN 650 MG: 325 TABLET ORAL at 05:01

## 2018-12-29 RX ADMIN — INSULIN HUMAN 7 UNITS: 100 INJECTION, SOLUTION PARENTERAL at 20:21

## 2018-12-29 RX ADMIN — GABAPENTIN 300 MG: 300 CAPSULE ORAL at 14:34

## 2018-12-29 RX ADMIN — OXYCODONE HYDROCHLORIDE 2.5 MG: 5 TABLET ORAL at 06:52

## 2018-12-29 RX ADMIN — HEPARIN SODIUM 5000 UNITS: 5000 INJECTION, SOLUTION INTRAVENOUS; SUBCUTANEOUS at 14:33

## 2018-12-29 RX ADMIN — PIOGLITAZONE 30 MG: 15 TABLET ORAL at 08:33

## 2018-12-29 RX ADMIN — SIMVASTATIN 20 MG: 20 TABLET, FILM COATED ORAL at 20:18

## 2018-12-29 RX ADMIN — GABAPENTIN 300 MG: 300 CAPSULE ORAL at 20:18

## 2018-12-29 RX ADMIN — INSULIN HUMAN 3 UNITS: 100 INJECTION, SOLUTION PARENTERAL at 07:47

## 2018-12-29 RX ADMIN — OMEPRAZOLE 20 MG: 20 CAPSULE, DELAYED RELEASE ORAL at 08:33

## 2018-12-29 RX ADMIN — INSULIN HUMAN 4 UNITS: 100 INJECTION, SOLUTION PARENTERAL at 11:27

## 2018-12-29 RX ADMIN — SENNOSIDES AND DOCUSATE SODIUM 1 TABLET: 8.6; 5 TABLET ORAL at 20:18

## 2018-12-29 RX ADMIN — VITAMIN D, TAB 1000IU (100/BT) 2000 UNITS: 25 TAB at 08:33

## 2018-12-29 RX ADMIN — METOPROLOL TARTRATE 25 MG: 25 TABLET ORAL at 17:22

## 2018-12-29 RX ADMIN — METOPROLOL TARTRATE 25 MG: 25 TABLET ORAL at 05:01

## 2018-12-29 RX ADMIN — OXYCODONE HYDROCHLORIDE AND ACETAMINOPHEN 500 MG: 500 TABLET ORAL at 08:33

## 2018-12-29 RX ADMIN — ACETAMINOPHEN 650 MG: 325 TABLET ORAL at 17:22

## 2018-12-29 ASSESSMENT — ENCOUNTER SYMPTOMS
HEMOPTYSIS: 0
DIZZINESS: 0
CHILLS: 0
NERVOUS/ANXIOUS: 0
HEADACHES: 0
COUGH: 0
TREMORS: 0
VOMITING: 0
CLAUDICATION: 0
FALLS: 0
EYE PAIN: 0
MEMORY LOSS: 0
DEPRESSION: 0
SHORTNESS OF BREATH: 0
DIARRHEA: 0
SENSORY CHANGE: 0
SORE THROAT: 0
BACK PAIN: 0
ABDOMINAL PAIN: 0
FEVER: 0
BLOOD IN STOOL: 0
BLURRED VISION: 0
TINGLING: 0
SPUTUM PRODUCTION: 0
CONSTIPATION: 0
NECK PAIN: 1
HEARTBURN: 0
STRIDOR: 0
DOUBLE VISION: 0
PHOTOPHOBIA: 0
SPEECH CHANGE: 0
PALPITATIONS: 0
FOCAL WEAKNESS: 1
ORTHOPNEA: 0
WEAKNESS: 1
MYALGIAS: 0
PND: 0
NAUSEA: 0

## 2018-12-29 ASSESSMENT — PAIN SCALES - GENERAL
PAINLEVEL_OUTOF10: 0
PAINLEVEL_OUTOF10: 2
PAINLEVEL_OUTOF10: 7

## 2018-12-29 NOTE — PROGRESS NOTES
Hospital Medicine Daily Progress Note        Chief Complaint  DM    Interval Problem Update  12/17: Rapid HR better w/ PO fluids.  No chest pain, shortness of breath, or palpitations.  12/18: Intermittent tachycardia, patient continues to try and drink more fluids.  Glucose still on the high side between 151-292, increased pioglitazone to 30 after discussion with patient.  12/19: Patient tolerating increased dose of pioglitazone.  Blood pressure in good range.  Patient did have one episode of vomiting yesterday which he attributes to bowel regimen    12/20  No acute issues overall patient comfortable.  Patient reports increased strength in his arms as well as walking.     12/21  Overall clinically doing well, Patient denies fevers/chills, chest pain, shortness of breath or nausea/vommiting.   Resume therapy  Overall HR since last night improved now in low 90s, max 100. SBP in the 130s  D/c midodrine and monitor closely.     12/22  Patient doing well, no complaints. Patient denies fevers/chills, chest pain, shortness of breath or nausea/vommiting.   HR controlled now that he is off Midodrine, CTM.  Renal functions improved.  Resume therapy,      12/23  Patient clinically stable overall, no acute complaints. Patient denies fevers/chills, chest pain, shortness of breath or nausea/vommiting.   HR still 90s-100s but overall improved now Off midodrine.  Resume physical  Therapy.    12/24  Patient comfortable, neck pain controlled. Unhappy about not being able to move his right arm way he wants.  HR and BP well controlled, CTM,   Continue with pain control, therapy,     12/25: Pain controlled and resting comfortably.  No new complaints.  12/26: No acute issues overnight.   12/27: Pt watching TV at bedside in chair.  No acute complaints or issues.   12/28:  No acute issues overnight.  Pt reports feeling a bit tired but otherwise no complaints.     Review of Systems  Review of Systems   Constitutional: Negative for chills,  fever and malaise/fatigue.   HENT: Negative for congestion, hearing loss, sore throat and tinnitus.    Eyes: Negative for blurred vision, double vision, photophobia and pain.   Respiratory: Negative for cough, hemoptysis, sputum production, shortness of breath and stridor.    Cardiovascular: Negative for chest pain, palpitations, orthopnea, claudication and PND.   Gastrointestinal: Negative for abdominal pain, blood in stool, constipation, diarrhea, heartburn, melena, nausea and vomiting.   Genitourinary: Negative for dysuria, frequency and urgency.   Musculoskeletal: Positive for neck pain. Negative for back pain, falls, joint pain and myalgias.   Neurological: Positive for focal weakness and weakness. Negative for dizziness, tingling, tremors, sensory change, speech change and headaches.   Psychiatric/Behavioral: Negative for depression, memory loss and suicidal ideas. The patient is not nervous/anxious.         Physical Exam  Temp:  [36.4 °C (97.5 °F)-36.6 °C (97.9 °F)] 36.6 °C (97.9 °F)  Pulse:  [89-96] 96  Resp:  [17-18] 17  BP: (110-124)/(60-73) 122/73    Physical Exam   Constitutional: He is oriented to person, place, and time. He appears well-developed and well-nourished. No distress.   HENT:   Head: Normocephalic and atraumatic.   Right Ear: External ear normal.   Left Ear: External ear normal.   Mouth/Throat: No oropharyngeal exudate.   Eyes: Pupils are equal, round, and reactive to light. Conjunctivae are normal. Right eye exhibits no discharge. No scleral icterus.   Neck: Neck supple. No JVD present. No tracheal deviation present. No thyromegaly present.   Cervical collar in place   Cardiovascular: Regular rhythm, normal heart sounds and intact distal pulses.  Tachycardia present.  Exam reveals no gallop and no friction rub.    No murmur heard.  Pulses:       Dorsalis pedis pulses are 2+ on the right side, and 2+ on the left side.   Pulmonary/Chest: Effort normal and breath sounds normal. No stridor. No  respiratory distress. He has no wheezes. He has no rales.   Abdominal: Soft. Bowel sounds are normal. He exhibits no distension and no mass. There is no tenderness. There is no rebound and no guarding.   Musculoskeletal: Normal range of motion. He exhibits no edema.   Lymphadenopathy:     He has no cervical adenopathy.   Neurological: He is alert and oriented to person, place, and time. No cranial nerve deficit. He exhibits abnormal muscle tone.   Skin: Skin is warm and dry. No rash noted. He is not diaphoretic. No erythema. No pallor.   Psychiatric: He has a normal mood and affect. His behavior is normal. Judgment and thought content normal.   Vitals reviewed.      Fluids    Intake/Output Summary (Last 24 hours) at 12/28/18 1634  Last data filed at 12/28/18 1614   Gross per 24 hour   Intake             1077 ml   Output             1200 ml   Net             -123 ml       Laboratory                      Assessment/Plan  * Central cord syndrome (HCC)- (present on admission)   Assessment & Plan    Conservative management per Neurosurgery  Maintain cervical collar       Chronic renal failure- (present on admission)   Assessment & Plan    CKD Stage III  Creatinine at baseline.  Continue to monitor closely  Continue to renally dose all medications, avoid nephrotoxins and nonsteroidal medications     Type 2 diabetes mellitus (HCC)- (present on admission)   Assessment & Plan    Uncontrolled  Continue home dose of Actos  Continue Insulin-sliding scale, accu-checks and hypoglycemia protocol.  Deferred outpatient endocrinology follow-up     Tachycardia   Assessment & Plan    Noted SVT on EKG  HR mostly <100, improving, CTM       Vitamin D deficiency   Assessment & Plan    Continue vitamin D supplementation       Thrombocytopenia (HCC)   Assessment & Plan    Stable, no signs of gross bleeding.       Anemia   Assessment & Plan    Stable no signs of gross bleeding continue monitor       Hyperlipemia- (present on admission)    Assessment & Plan    Resume simvastatin     Essential hypertension- (present on admission)   Assessment & Plan    - cont Metoprolol; monitoring         Total time:  30 minutes.  I spent greater than 50% of the time for patient care, counseling, and coordination on this date, including unit/floor time, and face-to-face time with the patient as per interval events and assessment and plan above    CODE STATUS full code

## 2018-12-29 NOTE — CARE PLAN
Problem: Safety  Goal: Will remain free from injury  Patient demonstrates good safety technique this shift.  Asks for assistance when needed and does not attempt self transfer.  Able to verbalize needs.  Will continue to monitor.    Problem: Venous Thromboembolism (VTW)/Deep Vein Thrombosis (DVT) Prevention:  Goal: Patient will participate in Venous Thrombosis (VTE)/Deep Vein Thrombosis (DVT)Prevention Measures  Pt on heparin SQ every 8 hrs for DVT prophylaxis

## 2018-12-29 NOTE — PROGRESS NOTES
Received shift report and assumed care of patient.  Patient awake, calm and stable, currently positioned in bed for comfort and safety; call light within reach.  C/o right shoulder and arm pain, medicated with roxicodone 2.5.  Will continue to monitor.

## 2018-12-29 NOTE — PROGRESS NOTES
Hospital Medicine Daily Progress Note        Chief Complaint  DM    Interval Problem Update  12/17: Rapid HR better w/ PO fluids.  No chest pain, shortness of breath, or palpitations.  12/18: Intermittent tachycardia, patient continues to try and drink more fluids.  Glucose still on the high side between 151-292, increased pioglitazone to 30 after discussion with patient.  12/19: Patient tolerating increased dose of pioglitazone.  Blood pressure in good range.  Patient did have one episode of vomiting yesterday which he attributes to bowel regimen    12/20  No acute issues overall patient comfortable.  Patient reports increased strength in his arms as well as walking.     12/21  Overall clinically doing well, Patient denies fevers/chills, chest pain, shortness of breath or nausea/vommiting.   Resume therapy  Overall HR since last night improved now in low 90s, max 100. SBP in the 130s  D/c midodrine and monitor closely.     12/22  Patient doing well, no complaints. Patient denies fevers/chills, chest pain, shortness of breath or nausea/vommiting.   HR controlled now that he is off Midodrine, CTM.  Renal functions improved.  Resume therapy,      12/23  Patient clinically stable overall, no acute complaints. Patient denies fevers/chills, chest pain, shortness of breath or nausea/vommiting.   HR still 90s-100s but overall improved now Off midodrine.  Resume physical  Therapy.    12/24  Patient comfortable, neck pain controlled. Unhappy about not being able to move his right arm way he wants.  HR and BP well controlled, CTM,   Continue with pain control, therapy,     12/25: Pain controlled and resting comfortably.  No new complaints.  12/26: No acute issues overnight.   12/27: Pt watching TV at bedside in chair.  No acute complaints or issues.   12/28: No acute issues overnight.  Pt reports feeling a bit tired but otherwise no complaints.   12/29: Neck pain stable/improving.  BP stable.     Review of Systems  Review of  Systems   Constitutional: Negative for chills, fever and malaise/fatigue.   HENT: Negative for congestion, hearing loss, sore throat and tinnitus.    Eyes: Negative for blurred vision, double vision, photophobia and pain.   Respiratory: Negative for cough, hemoptysis, sputum production, shortness of breath and stridor.    Cardiovascular: Negative for chest pain, palpitations, orthopnea, claudication and PND.   Gastrointestinal: Negative for abdominal pain, blood in stool, constipation, diarrhea, heartburn, melena, nausea and vomiting.   Genitourinary: Negative for dysuria, frequency and urgency.   Musculoskeletal: Positive for neck pain. Negative for back pain, falls, joint pain and myalgias.   Neurological: Positive for focal weakness and weakness. Negative for dizziness, tingling, tremors, sensory change, speech change and headaches.   Psychiatric/Behavioral: Negative for depression, memory loss and suicidal ideas. The patient is not nervous/anxious.         Physical Exam  Temp:  [36.3 °C (97.4 °F)-36.7 °C (98 °F)] 36.7 °C (98 °F)  Pulse:  [84-87] 84  Resp:  [17-18] 17  BP: (112-130)/(62-71) 113/66    Physical Exam   Constitutional: He is oriented to person, place, and time. He appears well-developed and well-nourished. No distress.   HENT:   Head: Normocephalic and atraumatic.   Right Ear: External ear normal.   Left Ear: External ear normal.   Mouth/Throat: No oropharyngeal exudate.   Eyes: Pupils are equal, round, and reactive to light. Conjunctivae are normal. Right eye exhibits no discharge. No scleral icterus.   Neck: Neck supple. No JVD present. No tracheal deviation present. No thyromegaly present.   Cervical collar in place   Cardiovascular: Regular rhythm, normal heart sounds and intact distal pulses.  Tachycardia present.  Exam reveals no gallop and no friction rub.    No murmur heard.  Pulses:       Dorsalis pedis pulses are 2+ on the right side, and 2+ on the left side.   Pulmonary/Chest: Effort  normal and breath sounds normal. No stridor. No respiratory distress. He has no wheezes. He has no rales.   Abdominal: Soft. Bowel sounds are normal. He exhibits no distension and no mass. There is no tenderness. There is no rebound and no guarding.   Musculoskeletal: Normal range of motion. He exhibits no edema.   Lymphadenopathy:     He has no cervical adenopathy.   Neurological: He is alert and oriented to person, place, and time. No cranial nerve deficit. He exhibits abnormal muscle tone.   Skin: Skin is warm and dry. No rash noted. He is not diaphoretic. No erythema. No pallor.   Psychiatric: He has a normal mood and affect. His behavior is normal. Judgment and thought content normal.   Vitals reviewed.      Fluids    Intake/Output Summary (Last 24 hours) at 12/29/18 1427  Last data filed at 12/29/18 1152   Gross per 24 hour   Intake              840 ml   Output             1200 ml   Net             -360 ml       Laboratory                      Assessment/Plan  * Central cord syndrome (HCC)- (present on admission)   Assessment & Plan    Conservative management per Neurosurgery  Maintain cervical collar       Chronic renal failure- (present on admission)   Assessment & Plan    CKD Stage III  Creatinine at baseline.  Continue to monitor closely  Continue to renally dose all medications, avoid nephrotoxins and nonsteroidal medications     Type 2 diabetes mellitus (HCC)- (present on admission)   Assessment & Plan    Uncontrolled  Continue home dose of Actos  Continue Insulin-sliding scale, accu-checks and hypoglycemia protocol.  Deferred outpatient endocrinology follow-up     Tachycardia   Assessment & Plan    Noted SVT on EKG  HR mostly <100, improving, CTM       Vitamin D deficiency   Assessment & Plan    Continue vitamin D supplementation       Thrombocytopenia (HCC)   Assessment & Plan    Stable, no signs of gross bleeding.       Anemia   Assessment & Plan    Stable no signs of gross bleeding continue  monitor       Hyperlipemia- (present on admission)   Assessment & Plan    Resume simvastatin     Essential hypertension- (present on admission)   Assessment & Plan    - cont Metoprolol; monitoring         CODE STATUS full code

## 2018-12-29 NOTE — CARE PLAN
Problem: Safety  Goal: Will remain free from injury  Outcome: PROGRESSING AS EXPECTED  Pt uses call light appropriately and waits for assistance before transferring.    Problem: Metabolic:  Goal: Ability to maintain appropriate glucose levels will improve  Outcome: PROGRESSING AS EXPECTED  HS blood sugar was 218. 4 units of insulin given per sliding scale. Pt given evening snack. No s/s of hypoglycemia noted.

## 2018-12-30 LAB
GLUCOSE BLD-MCNC: 161 MG/DL (ref 65–99)
GLUCOSE BLD-MCNC: 185 MG/DL (ref 65–99)
GLUCOSE BLD-MCNC: 237 MG/DL (ref 65–99)
GLUCOSE BLD-MCNC: 241 MG/DL (ref 65–99)
GLUCOSE BLD-MCNC: 280 MG/DL (ref 65–99)

## 2018-12-30 PROCEDURE — 97110 THERAPEUTIC EXERCISES: CPT

## 2018-12-30 PROCEDURE — 700102 HCHG RX REV CODE 250 W/ 637 OVERRIDE(OP): Performed by: INTERNAL MEDICINE

## 2018-12-30 PROCEDURE — 97530 THERAPEUTIC ACTIVITIES: CPT

## 2018-12-30 PROCEDURE — 97112 NEUROMUSCULAR REEDUCATION: CPT

## 2018-12-30 PROCEDURE — 700102 HCHG RX REV CODE 250 W/ 637 OVERRIDE(OP): Performed by: PHYSICAL MEDICINE & REHABILITATION

## 2018-12-30 PROCEDURE — 770010 HCHG ROOM/CARE - REHAB SEMI PRIVAT*

## 2018-12-30 PROCEDURE — 99231 SBSQ HOSP IP/OBS SF/LOW 25: CPT | Performed by: INTERNAL MEDICINE

## 2018-12-30 PROCEDURE — 700102 HCHG RX REV CODE 250 W/ 637 OVERRIDE(OP): Performed by: HOSPITALIST

## 2018-12-30 PROCEDURE — 97116 GAIT TRAINING THERAPY: CPT

## 2018-12-30 PROCEDURE — A9270 NON-COVERED ITEM OR SERVICE: HCPCS | Performed by: HOSPITALIST

## 2018-12-30 PROCEDURE — 82962 GLUCOSE BLOOD TEST: CPT | Mod: 91

## 2018-12-30 PROCEDURE — A9270 NON-COVERED ITEM OR SERVICE: HCPCS | Performed by: INTERNAL MEDICINE

## 2018-12-30 PROCEDURE — 700111 HCHG RX REV CODE 636 W/ 250 OVERRIDE (IP): Performed by: PHYSICAL MEDICINE & REHABILITATION

## 2018-12-30 PROCEDURE — G0515 COGNITIVE SKILLS DEVELOPMENT: HCPCS

## 2018-12-30 PROCEDURE — A9270 NON-COVERED ITEM OR SERVICE: HCPCS | Performed by: PHYSICAL MEDICINE & REHABILITATION

## 2018-12-30 RX ADMIN — GABAPENTIN 300 MG: 300 CAPSULE ORAL at 20:49

## 2018-12-30 RX ADMIN — METOPROLOL TARTRATE 25 MG: 25 TABLET ORAL at 06:07

## 2018-12-30 RX ADMIN — METOPROLOL TARTRATE 25 MG: 25 TABLET ORAL at 17:44

## 2018-12-30 RX ADMIN — HEPARIN SODIUM 5000 UNITS: 5000 INJECTION, SOLUTION INTRAVENOUS; SUBCUTANEOUS at 06:07

## 2018-12-30 RX ADMIN — GABAPENTIN 300 MG: 300 CAPSULE ORAL at 14:49

## 2018-12-30 RX ADMIN — INSULIN HUMAN 4 UNITS: 100 INJECTION, SOLUTION PARENTERAL at 11:38

## 2018-12-30 RX ADMIN — ACETAMINOPHEN 650 MG: 325 TABLET ORAL at 06:07

## 2018-12-30 RX ADMIN — SENNOSIDES AND DOCUSATE SODIUM 1 TABLET: 8.6; 5 TABLET ORAL at 20:49

## 2018-12-30 RX ADMIN — GABAPENTIN 300 MG: 300 CAPSULE ORAL at 09:27

## 2018-12-30 RX ADMIN — VITAMIN D, TAB 1000IU (100/BT) 2000 UNITS: 25 TAB at 09:27

## 2018-12-30 RX ADMIN — ACETAMINOPHEN 650 MG: 325 TABLET ORAL at 17:44

## 2018-12-30 RX ADMIN — INSULIN HUMAN 4 UNITS: 100 INJECTION, SOLUTION PARENTERAL at 20:53

## 2018-12-30 RX ADMIN — HEPARIN SODIUM 5000 UNITS: 5000 INJECTION, SOLUTION INTRAVENOUS; SUBCUTANEOUS at 14:49

## 2018-12-30 RX ADMIN — SIMVASTATIN 20 MG: 20 TABLET, FILM COATED ORAL at 20:49

## 2018-12-30 RX ADMIN — HEPARIN SODIUM 5000 UNITS: 5000 INJECTION, SOLUTION INTRAVENOUS; SUBCUTANEOUS at 20:50

## 2018-12-30 RX ADMIN — INSULIN HUMAN 3 UNITS: 100 INJECTION, SOLUTION PARENTERAL at 07:18

## 2018-12-30 RX ADMIN — INSULIN HUMAN 3 UNITS: 100 INJECTION, SOLUTION PARENTERAL at 17:37

## 2018-12-30 RX ADMIN — PIOGLITAZONE 30 MG: 15 TABLET ORAL at 09:27

## 2018-12-30 RX ADMIN — OMEPRAZOLE 20 MG: 20 CAPSULE, DELAYED RELEASE ORAL at 09:28

## 2018-12-30 RX ADMIN — ACETAMINOPHEN 650 MG: 325 TABLET ORAL at 11:44

## 2018-12-30 ASSESSMENT — ENCOUNTER SYMPTOMS
NECK PAIN: 1
HEARTBURN: 0
DEPRESSION: 0
EYE PAIN: 0
DIZZINESS: 0
TINGLING: 0
SPEECH CHANGE: 0
SHORTNESS OF BREATH: 0
NERVOUS/ANXIOUS: 0
FALLS: 0
MEMORY LOSS: 0
PND: 0
FEVER: 0
VOMITING: 0
STRIDOR: 0
HEMOPTYSIS: 0
MYALGIAS: 0
DIARRHEA: 0
SPUTUM PRODUCTION: 0
DOUBLE VISION: 0
FOCAL WEAKNESS: 1
ORTHOPNEA: 0
SORE THROAT: 0
CONSTIPATION: 0
ABDOMINAL PAIN: 0
CHILLS: 0
CLAUDICATION: 0
COUGH: 0
TREMORS: 0
NAUSEA: 0
BLOOD IN STOOL: 0
PHOTOPHOBIA: 0
BLURRED VISION: 0
BACK PAIN: 0
SENSORY CHANGE: 0
WEAKNESS: 1
HEADACHES: 0
PALPITATIONS: 0

## 2018-12-30 ASSESSMENT — PAIN SCALES - GENERAL
PAINLEVEL_OUTOF10: 0

## 2018-12-31 LAB
ANION GAP SERPL CALC-SCNC: 8 MMOL/L (ref 0–11.9)
BASOPHILS # BLD AUTO: 0.3 % (ref 0–1.8)
BASOPHILS # BLD: 0.01 K/UL (ref 0–0.12)
BUN SERPL-MCNC: 59 MG/DL (ref 8–22)
CALCIUM SERPL-MCNC: 9.4 MG/DL (ref 8.5–10.5)
CHLORIDE SERPL-SCNC: 105 MMOL/L (ref 96–112)
CO2 SERPL-SCNC: 21 MMOL/L (ref 20–33)
CREAT SERPL-MCNC: 2.22 MG/DL (ref 0.5–1.4)
EOSINOPHIL # BLD AUTO: 0.05 K/UL (ref 0–0.51)
EOSINOPHIL NFR BLD: 1.4 % (ref 0–6.9)
ERYTHROCYTE [DISTWIDTH] IN BLOOD BY AUTOMATED COUNT: 56 FL (ref 35.9–50)
GLUCOSE BLD-MCNC: 159 MG/DL (ref 65–99)
GLUCOSE BLD-MCNC: 179 MG/DL (ref 65–99)
GLUCOSE BLD-MCNC: 190 MG/DL (ref 65–99)
GLUCOSE BLD-MCNC: 228 MG/DL (ref 65–99)
GLUCOSE SERPL-MCNC: 183 MG/DL (ref 65–99)
HCT VFR BLD AUTO: 30.5 % (ref 42–52)
HGB BLD-MCNC: 10.3 G/DL (ref 14–18)
IMM GRANULOCYTES # BLD AUTO: 0.01 K/UL (ref 0–0.11)
IMM GRANULOCYTES NFR BLD AUTO: 0.3 % (ref 0–0.9)
LYMPHOCYTES # BLD AUTO: 0.46 K/UL (ref 1–4.8)
LYMPHOCYTES NFR BLD: 12.7 % (ref 22–41)
MCH RBC QN AUTO: 30.2 PG (ref 27–33)
MCHC RBC AUTO-ENTMCNC: 33.8 G/DL (ref 33.7–35.3)
MCV RBC AUTO: 89.4 FL (ref 81.4–97.8)
MONOCYTES # BLD AUTO: 0.46 K/UL (ref 0–0.85)
MONOCYTES NFR BLD AUTO: 12.7 % (ref 0–13.4)
NEUTROPHILS # BLD AUTO: 2.63 K/UL (ref 1.82–7.42)
NEUTROPHILS NFR BLD: 72.6 % (ref 44–72)
NRBC # BLD AUTO: 0 K/UL
NRBC BLD-RTO: 0 /100 WBC
PLATELET # BLD AUTO: 130 K/UL (ref 164–446)
PMV BLD AUTO: 9.6 FL (ref 9–12.9)
POTASSIUM SERPL-SCNC: 4.3 MMOL/L (ref 3.6–5.5)
RBC # BLD AUTO: 3.41 M/UL (ref 4.7–6.1)
SODIUM SERPL-SCNC: 134 MMOL/L (ref 135–145)
WBC # BLD AUTO: 3.6 K/UL (ref 4.8–10.8)

## 2018-12-31 PROCEDURE — 700102 HCHG RX REV CODE 250 W/ 637 OVERRIDE(OP): Performed by: PHYSICAL MEDICINE & REHABILITATION

## 2018-12-31 PROCEDURE — 700102 HCHG RX REV CODE 250 W/ 637 OVERRIDE(OP): Performed by: INTERNAL MEDICINE

## 2018-12-31 PROCEDURE — G0515 COGNITIVE SKILLS DEVELOPMENT: HCPCS

## 2018-12-31 PROCEDURE — 97535 SELF CARE MNGMENT TRAINING: CPT

## 2018-12-31 PROCEDURE — 97110 THERAPEUTIC EXERCISES: CPT

## 2018-12-31 PROCEDURE — 85025 COMPLETE CBC W/AUTO DIFF WBC: CPT

## 2018-12-31 PROCEDURE — 99232 SBSQ HOSP IP/OBS MODERATE 35: CPT | Performed by: PHYSICAL MEDICINE & REHABILITATION

## 2018-12-31 PROCEDURE — 99231 SBSQ HOSP IP/OBS SF/LOW 25: CPT | Performed by: INTERNAL MEDICINE

## 2018-12-31 PROCEDURE — A9270 NON-COVERED ITEM OR SERVICE: HCPCS | Performed by: PHYSICAL MEDICINE & REHABILITATION

## 2018-12-31 PROCEDURE — 80048 BASIC METABOLIC PNL TOTAL CA: CPT

## 2018-12-31 PROCEDURE — 700111 HCHG RX REV CODE 636 W/ 250 OVERRIDE (IP): Performed by: PHYSICAL MEDICINE & REHABILITATION

## 2018-12-31 PROCEDURE — 770010 HCHG ROOM/CARE - REHAB SEMI PRIVAT*

## 2018-12-31 PROCEDURE — 700102 HCHG RX REV CODE 250 W/ 637 OVERRIDE(OP): Performed by: HOSPITALIST

## 2018-12-31 PROCEDURE — A9270 NON-COVERED ITEM OR SERVICE: HCPCS | Performed by: INTERNAL MEDICINE

## 2018-12-31 PROCEDURE — 97530 THERAPEUTIC ACTIVITIES: CPT

## 2018-12-31 PROCEDURE — 82962 GLUCOSE BLOOD TEST: CPT | Mod: 91

## 2018-12-31 PROCEDURE — 97116 GAIT TRAINING THERAPY: CPT

## 2018-12-31 PROCEDURE — A9270 NON-COVERED ITEM OR SERVICE: HCPCS | Performed by: HOSPITALIST

## 2018-12-31 PROCEDURE — 97112 NEUROMUSCULAR REEDUCATION: CPT

## 2018-12-31 PROCEDURE — 36415 COLL VENOUS BLD VENIPUNCTURE: CPT

## 2018-12-31 RX ADMIN — PIOGLITAZONE 30 MG: 15 TABLET ORAL at 08:41

## 2018-12-31 RX ADMIN — HEPARIN SODIUM 5000 UNITS: 5000 INJECTION, SOLUTION INTRAVENOUS; SUBCUTANEOUS at 22:03

## 2018-12-31 RX ADMIN — METOPROLOL TARTRATE 25 MG: 25 TABLET ORAL at 05:45

## 2018-12-31 RX ADMIN — VITAMIN D, TAB 1000IU (100/BT) 2000 UNITS: 25 TAB at 08:42

## 2018-12-31 RX ADMIN — HEPARIN SODIUM 5000 UNITS: 5000 INJECTION, SOLUTION INTRAVENOUS; SUBCUTANEOUS at 14:55

## 2018-12-31 RX ADMIN — ACETAMINOPHEN 650 MG: 325 TABLET ORAL at 11:43

## 2018-12-31 RX ADMIN — SENNOSIDES AND DOCUSATE SODIUM 1 TABLET: 8.6; 5 TABLET ORAL at 22:03

## 2018-12-31 RX ADMIN — HEPARIN SODIUM 5000 UNITS: 5000 INJECTION, SOLUTION INTRAVENOUS; SUBCUTANEOUS at 05:45

## 2018-12-31 RX ADMIN — FERROUS SULFATE TAB 325 MG (65 MG ELEMENTAL FE) 325 MG: 325 (65 FE) TAB at 08:42

## 2018-12-31 RX ADMIN — OXYCODONE HYDROCHLORIDE AND ACETAMINOPHEN 500 MG: 500 TABLET ORAL at 08:42

## 2018-12-31 RX ADMIN — OXYCODONE HYDROCHLORIDE 2.5 MG: 5 TABLET ORAL at 03:26

## 2018-12-31 RX ADMIN — ACETAMINOPHEN 650 MG: 325 TABLET ORAL at 05:45

## 2018-12-31 RX ADMIN — INSULIN HUMAN 3 UNITS: 100 INJECTION, SOLUTION PARENTERAL at 22:07

## 2018-12-31 RX ADMIN — ACETAMINOPHEN 650 MG: 325 TABLET ORAL at 00:00

## 2018-12-31 RX ADMIN — METOPROLOL TARTRATE 25 MG: 25 TABLET ORAL at 17:33

## 2018-12-31 RX ADMIN — GABAPENTIN 300 MG: 300 CAPSULE ORAL at 22:03

## 2018-12-31 RX ADMIN — INSULIN HUMAN 3 UNITS: 100 INJECTION, SOLUTION PARENTERAL at 17:34

## 2018-12-31 RX ADMIN — SIMVASTATIN 20 MG: 20 TABLET, FILM COATED ORAL at 22:03

## 2018-12-31 RX ADMIN — ACETAMINOPHEN 650 MG: 325 TABLET ORAL at 17:33

## 2018-12-31 RX ADMIN — OMEPRAZOLE 20 MG: 20 CAPSULE, DELAYED RELEASE ORAL at 08:42

## 2018-12-31 RX ADMIN — GABAPENTIN 300 MG: 300 CAPSULE ORAL at 14:56

## 2018-12-31 RX ADMIN — GABAPENTIN 300 MG: 300 CAPSULE ORAL at 08:42

## 2018-12-31 RX ADMIN — INSULIN HUMAN 3 UNITS: 100 INJECTION, SOLUTION PARENTERAL at 08:43

## 2018-12-31 RX ADMIN — ACETAMINOPHEN 650 MG: 325 TABLET ORAL at 23:39

## 2018-12-31 RX ADMIN — INSULIN HUMAN 4 UNITS: 100 INJECTION, SOLUTION PARENTERAL at 11:40

## 2018-12-31 ASSESSMENT — GAIT ASSESSMENTS
ASSISTIVE DEVICE: 4 WHEEL WALKER
GAIT LEVEL OF ASSIST: STAND BY ASSIST

## 2018-12-31 ASSESSMENT — PAIN SCALES - GENERAL
PAINLEVEL_OUTOF10: 3
PAINLEVEL_OUTOF10: 0
PAINLEVEL_OUTOF10: 7

## 2018-12-31 ASSESSMENT — ENCOUNTER SYMPTOMS
STRIDOR: 0
ABDOMINAL PAIN: 0
FEVER: 0
NERVOUS/ANXIOUS: 0
SENSORY CHANGE: 0
BLOOD IN STOOL: 0
DIARRHEA: 0
TREMORS: 0
BLURRED VISION: 0
MEMORY LOSS: 0
PHOTOPHOBIA: 0
CHILLS: 0
BACK PAIN: 0
CLAUDICATION: 0
FOCAL WEAKNESS: 1
SPEECH CHANGE: 0
HEADACHES: 0
FALLS: 0
CONSTIPATION: 0
COUGH: 0
PALPITATIONS: 0
ORTHOPNEA: 0
WEAKNESS: 1
SORE THROAT: 0
VOMITING: 0
EYE PAIN: 0
TINGLING: 0
DIZZINESS: 0
HEARTBURN: 0
DEPRESSION: 0
SHORTNESS OF BREATH: 0
DOUBLE VISION: 0
MYALGIAS: 0
PND: 0
HEMOPTYSIS: 0
SPUTUM PRODUCTION: 0
NECK PAIN: 1
NAUSEA: 0

## 2018-12-31 NOTE — PROGRESS NOTES
Received shift report and assumed care of patient.  Patient awake, calm and stable, currently working with OT. Will continue to monitor.

## 2018-12-31 NOTE — PROGRESS NOTES
Received patient during shift change, report rec'd from day shift RN. Resting in bed, VS stable on room air. Per report [facility] of [facility], stand by, contact assist for transfers. A&O x 4, able to make needs known. Bed in low position, call light within reach.

## 2018-12-31 NOTE — PROGRESS NOTES
Hospital Medicine Daily Progress Note        Chief Complaint  DM    Interval Problem Update  12/17: Rapid HR better w/ PO fluids.  No chest pain, shortness of breath, or palpitations.  12/18: Intermittent tachycardia, patient continues to try and drink more fluids.  Glucose still on the high side between 151-292, increased pioglitazone to 30 after discussion with patient.  12/19: Patient tolerating increased dose of pioglitazone.  Blood pressure in good range.  Patient did have one episode of vomiting yesterday which he attributes to bowel regimen    12/20  No acute issues overall patient comfortable.  Patient reports increased strength in his arms as well as walking.     12/21  Overall clinically doing well, Patient denies fevers/chills, chest pain, shortness of breath or nausea/vommiting.   Resume therapy  Overall HR since last night improved now in low 90s, max 100. SBP in the 130s  D/c midodrine and monitor closely.     12/22  Patient doing well, no complaints. Patient denies fevers/chills, chest pain, shortness of breath or nausea/vommiting.   HR controlled now that he is off Midodrine, CTM.  Renal functions improved.  Resume therapy,      12/23  Patient clinically stable overall, no acute complaints. Patient denies fevers/chills, chest pain, shortness of breath or nausea/vommiting.   HR still 90s-100s but overall improved now Off midodrine.  Resume physical  Therapy.    12/24  Patient comfortable, neck pain controlled. Unhappy about not being able to move his right arm way he wants.  HR and BP well controlled, CTM,   Continue with pain control, therapy,     12/25: Pain controlled and resting comfortably.  No new complaints.  12/26: No acute issues overnight.   12/27: Pt watching TV at bedside in chair.  No acute complaints or issues.   12/28: No acute issues overnight.  Pt reports feeling a bit tired but otherwise no complaints.   12/29: Neck pain stable/improving.  BP stable.   12/30: No changes.    Review of  Systems  Review of Systems   Constitutional: Negative for chills, fever and malaise/fatigue.   HENT: Negative for congestion, hearing loss, sore throat and tinnitus.    Eyes: Negative for blurred vision, double vision, photophobia and pain.   Respiratory: Negative for cough, hemoptysis, sputum production, shortness of breath and stridor.    Cardiovascular: Negative for chest pain, palpitations, orthopnea, claudication and PND.   Gastrointestinal: Negative for abdominal pain, blood in stool, constipation, diarrhea, heartburn, melena, nausea and vomiting.   Genitourinary: Negative for dysuria, frequency and urgency.   Musculoskeletal: Positive for neck pain. Negative for back pain, falls, joint pain and myalgias.   Neurological: Positive for focal weakness and weakness. Negative for dizziness, tingling, tremors, sensory change, speech change and headaches.   Psychiatric/Behavioral: Negative for depression, memory loss and suicidal ideas. The patient is not nervous/anxious.         Physical Exam  Temp:  [36.4 °C (97.5 °F)-36.7 °C (98.1 °F)] 36.4 °C (97.5 °F)  Pulse:  [] 104  Resp:  [17-18] 18  BP: (111-138)/(56-64) 138/64    Physical Exam   Constitutional: He is oriented to person, place, and time. He appears well-developed and well-nourished. No distress.   HENT:   Head: Normocephalic and atraumatic.   Right Ear: External ear normal.   Left Ear: External ear normal.   Mouth/Throat: No oropharyngeal exudate.   Eyes: Pupils are equal, round, and reactive to light. Conjunctivae are normal. Right eye exhibits no discharge. No scleral icterus.   Neck: Neck supple. No JVD present. No tracheal deviation present. No thyromegaly present.   Cervical collar in place   Cardiovascular: Regular rhythm, normal heart sounds and intact distal pulses.  Tachycardia present.  Exam reveals no gallop and no friction rub.    No murmur heard.  Pulses:       Dorsalis pedis pulses are 2+ on the right side, and 2+ on the left side.    Pulmonary/Chest: Effort normal and breath sounds normal. No stridor. No respiratory distress. He has no wheezes. He has no rales.   Abdominal: Soft. Bowel sounds are normal. He exhibits no distension and no mass. There is no tenderness. There is no rebound and no guarding.   Musculoskeletal: Normal range of motion. He exhibits no edema.   Lymphadenopathy:     He has no cervical adenopathy.   Neurological: He is alert and oriented to person, place, and time. No cranial nerve deficit. He exhibits abnormal muscle tone.   Skin: Skin is warm and dry. No rash noted. He is not diaphoretic. No erythema. No pallor.   Psychiatric: He has a normal mood and affect. His behavior is normal. Judgment and thought content normal.   Vitals reviewed.      Fluids    Intake/Output Summary (Last 24 hours) at 12/30/18 1611  Last data filed at 12/30/18 1400   Gross per 24 hour   Intake              360 ml   Output             1050 ml   Net             -690 ml       Laboratory                      Assessment/Plan  * Central cord syndrome (HCC)- (present on admission)   Assessment & Plan    Conservative management per Neurosurgery  Maintain cervical collar       Chronic renal failure- (present on admission)   Assessment & Plan    CKD Stage III  Creatinine at baseline.  Continue to monitor closely  Continue to renally dose all medications, avoid nephrotoxins and nonsteroidal medications     Type 2 diabetes mellitus (HCC)- (present on admission)   Assessment & Plan    Uncontrolled  Continue home dose of Actos  Continue Insulin-sliding scale, accu-checks and hypoglycemia protocol.  Deferred outpatient endocrinology follow-up     Tachycardia   Assessment & Plan    Noted SVT on EKG  HR mostly <100, improving, CTM       Vitamin D deficiency   Assessment & Plan    Continue vitamin D supplementation       Thrombocytopenia (HCC)   Assessment & Plan    Stable, no signs of gross bleeding.       Anemia   Assessment & Plan    Stable no signs of gross  bleeding continue monitor       Hyperlipemia- (present on admission)   Assessment & Plan    Resume simvastatin     Essential hypertension- (present on admission)   Assessment & Plan    - cont Metoprolol; monitoring         CODE STATUS full code

## 2018-12-31 NOTE — CARE PLAN
Problem: Safety  Goal: Will remain free from injury  Encourage to use call light for assist to toilet/transfer. Verbally confirmed understanding. Call light within reach, bed in low position.    Problem: Pain Management  Goal: Pain level will decrease to patient's comfort goal  Denies pain w/administration of scheduled tylenol. Resting in bed, eyes closed, resp even, unlabored.

## 2018-12-31 NOTE — PROGRESS NOTES
Hospital Medicine Daily Progress Note        Chief Complaint  DM    Interval Problem Update  12/17: Rapid HR better w/ PO fluids.  No chest pain, shortness of breath, or palpitations.  12/18: Intermittent tachycardia, patient continues to try and drink more fluids.  Glucose still on the high side between 151-292, increased pioglitazone to 30 after discussion with patient.  12/19: Patient tolerating increased dose of pioglitazone.  Blood pressure in good range.  Patient did have one episode of vomiting yesterday which he attributes to bowel regimen    12/20  No acute issues overall patient comfortable.  Patient reports increased strength in his arms as well as walking.     12/21  Overall clinically doing well, Patient denies fevers/chills, chest pain, shortness of breath or nausea/vommiting.   Resume therapy  Overall HR since last night improved now in low 90s, max 100. SBP in the 130s  D/c midodrine and monitor closely.     12/22  Patient doing well, no complaints. Patient denies fevers/chills, chest pain, shortness of breath or nausea/vommiting.   HR controlled now that he is off Midodrine, CTM.  Renal functions improved.  Resume therapy,      12/23  Patient clinically stable overall, no acute complaints. Patient denies fevers/chills, chest pain, shortness of breath or nausea/vommiting.   HR still 90s-100s but overall improved now Off midodrine.  Resume physical  Therapy.    12/24  Patient comfortable, neck pain controlled. Unhappy about not being able to move his right arm way he wants.  HR and BP well controlled, CTM,   Continue with pain control, therapy,     12/25: Pain controlled and resting comfortably.  No new complaints.  12/26: No acute issues overnight.   12/27: Pt watching TV at bedside in chair.  No acute complaints or issues.   12/28: No acute issues overnight.  Pt reports feeling a bit tired but otherwise no complaints.   12/29: Neck pain stable/improving.  BP stable.   12/30: No changes.  12/31:  Resting comfortably with no new complaints.    Review of Systems  Review of Systems   Constitutional: Negative for chills, fever and malaise/fatigue.   HENT: Negative for congestion, hearing loss, sore throat and tinnitus.    Eyes: Negative for blurred vision, double vision, photophobia and pain.   Respiratory: Negative for cough, hemoptysis, sputum production, shortness of breath and stridor.    Cardiovascular: Negative for chest pain, palpitations, orthopnea, claudication and PND.   Gastrointestinal: Negative for abdominal pain, blood in stool, constipation, diarrhea, heartburn, melena, nausea and vomiting.   Genitourinary: Negative for dysuria, frequency and urgency.   Musculoskeletal: Positive for neck pain. Negative for back pain, falls, joint pain and myalgias.   Neurological: Positive for focal weakness and weakness. Negative for dizziness, tingling, tremors, sensory change, speech change and headaches.   Psychiatric/Behavioral: Negative for depression, memory loss and suicidal ideas. The patient is not nervous/anxious.         Physical Exam  Temp:  [36.4 °C (97.6 °F)-36.8 °C (98.2 °F)] 36.4 °C (97.6 °F)  Pulse:  [] 79  Resp:  [16-18] 17  BP: (119-138)/(65-74) 126/65    Physical Exam   Constitutional: He is oriented to person, place, and time. He appears well-developed and well-nourished. No distress.   HENT:   Head: Normocephalic and atraumatic.   Right Ear: External ear normal.   Left Ear: External ear normal.   Mouth/Throat: No oropharyngeal exudate.   Eyes: Pupils are equal, round, and reactive to light. Conjunctivae are normal. Right eye exhibits no discharge. No scleral icterus.   Neck: Neck supple. No JVD present. No tracheal deviation present. No thyromegaly present.   Cervical collar in place   Cardiovascular: Regular rhythm, normal heart sounds and intact distal pulses.  Tachycardia present.  Exam reveals no gallop and no friction rub.    No murmur heard.  Pulses:       Dorsalis pedis pulses  are 2+ on the right side, and 2+ on the left side.   Pulmonary/Chest: Effort normal and breath sounds normal. No stridor. No respiratory distress. He has no wheezes. He has no rales.   Abdominal: Soft. Bowel sounds are normal. He exhibits no distension and no mass. There is no tenderness. There is no rebound and no guarding.   Musculoskeletal: Normal range of motion. He exhibits no edema.   Lymphadenopathy:     He has no cervical adenopathy.   Neurological: He is alert and oriented to person, place, and time. No cranial nerve deficit. He exhibits abnormal muscle tone.   Skin: Skin is warm and dry. No rash noted. He is not diaphoretic. No erythema. No pallor.   Psychiatric: He has a normal mood and affect. His behavior is normal. Judgment and thought content normal.   Vitals reviewed.      Fluids    Intake/Output Summary (Last 24 hours) at 12/31/18 1533  Last data filed at 12/31/18 1212   Gross per 24 hour   Intake              800 ml   Output             1000 ml   Net             -200 ml       Laboratory  Recent Labs      12/31/18   0604   WBC  3.6*   RBC  3.41*   HEMOGLOBIN  10.3*   HEMATOCRIT  30.5*   MCV  89.4   MCH  30.2   MCHC  33.8   RDW  56.0*   PLATELETCT  130*   MPV  9.6     Recent Labs      12/31/18   0604   SODIUM  134*   POTASSIUM  4.3   CHLORIDE  105   CO2  21   GLUCOSE  183*   BUN  59*   CREATININE  2.22*   CALCIUM  9.4                 Assessment/Plan  * Central cord syndrome (HCC)- (present on admission)   Assessment & Plan    Conservative management per Neurosurgery  Maintain cervical collar       Chronic renal failure- (present on admission)   Assessment & Plan    CKD Stage III  Creatinine at baseline.  Continue to monitor closely  Continue to renally dose all medications, avoid nephrotoxins and nonsteroidal medications     Type 2 diabetes mellitus (HCC)- (present on admission)   Assessment & Plan    Uncontrolled  Continue home dose of Actos  Continue Insulin-sliding scale, accu-checks and  hypoglycemia protocol.  Deferred outpatient endocrinology follow-up     Tachycardia   Assessment & Plan    Noted SVT on EKG  HR mostly <100, improving, CTM       Vitamin D deficiency   Assessment & Plan    Continue vitamin D supplementation       Thrombocytopenia (HCC)   Assessment & Plan    Stable, no signs of gross bleeding.       Anemia   Assessment & Plan    Stable no signs of gross bleeding continue monitor       Hyperlipemia- (present on admission)   Assessment & Plan    Resume simvastatin     Essential hypertension- (present on admission)   Assessment & Plan    - cont Metoprolol; monitoring         CODE STATUS full code

## 2018-12-31 NOTE — REHAB-CM IDT TEAM NOTE
Case Management    DC Planning:  DC destination/dispostion:  Patient lives alone in a [place] apartment complex.  He has safety bars in his bathroom and shower.     Referrals: Will update Meals on Wheels.     DC Needs: He will need home health initially.  [facility] to [facility].  He has a fww, spc safety bars and shower seat.  He has been doing his own insulin and fingersticks.  We may need to schedule family training closer to d/c.  He will need follow [facility] and his neurosurgeon.  PCP has been scheduled.  Patient may need transition to [facility] for ongoing rehab to increase his independence for home alone.     Barriers to discharge:  Lives alone     Strengths: sister and her children are very supportive.     Section completed by:  Mckayla Mason R.N.

## 2018-12-31 NOTE — PROGRESS NOTES
"Rehab Progress Note     Encounter date: 12/31/2018  Today I met with the patient face to face in his room    Chief Complaint:  Central cord syndrome (HCC) , no concerns     Interval Events (subjective)  Mr. Almanzar reports that he is feeling stronger and making progress.  He was able to feed himself this morning, and he was very excited about the ability to use the right upper limb functionally.  He stated he was able to do this with normal silverware.  He continues to enjoy working with occupational therapy on virtual reality therapy for the right upper limb.  He denies any fevers, chills, headache, dizziness, chest pain, or shortness of breath.  We again discussed the importance of hydration and I shared with him his creatinine and BUN from today.  He reports that he will be working on drinking at least 2 L of water today.  We discussed the potential need for IV hydration if this does not improve.    Objective:  VITAL SIGNS: /73   Pulse 84   Temp 36.4 °C (97.6 °F) (Oral)   Resp 16   Ht 1.778 m (5' 10\")   Wt 75.4 kg (166 lb 3.2 oz)   SpO2 99%   BMI 23.85 kg/m²     Recent Results (from the past 72 hour(s))   ACCU-CHEK GLUCOSE    Collection Time: 12/28/18  5:27 PM   Result Value Ref Range    Glucose - Accu-Ck 151 (H) 65 - 99 mg/dL   ACCU-CHEK GLUCOSE    Collection Time: 12/28/18  8:27 PM   Result Value Ref Range    Glucose - Accu-Ck 218 (H) 65 - 99 mg/dL   ACCU-CHEK GLUCOSE    Collection Time: 12/29/18  7:41 AM   Result Value Ref Range    Glucose - Accu-Ck 167 (H) 65 - 99 mg/dL   ACCU-CHEK GLUCOSE    Collection Time: 12/29/18 11:26 AM   Result Value Ref Range    Glucose - Accu-Ck 222 (H) 65 - 99 mg/dL   ACCU-CHEK GLUCOSE    Collection Time: 12/29/18  5:09 PM   Result Value Ref Range    Glucose - Accu-Ck 145 (H) 65 - 99 mg/dL   ACCU-CHEK GLUCOSE    Collection Time: 12/29/18  8:19 PM   Result Value Ref Range    Glucose - Accu-Ck 280 (H) 65 - 99 mg/dL   ACCU-CHEK GLUCOSE    Collection Time: 12/30/18  7:16 " AM   Result Value Ref Range    Glucose - Accu-Ck 161 (H) 65 - 99 mg/dL   ACCU-CHEK GLUCOSE    Collection Time: 12/30/18 11:36 AM   Result Value Ref Range    Glucose - Accu-Ck 241 (H) 65 - 99 mg/dL   ACCU-CHEK GLUCOSE    Collection Time: 12/30/18  5:37 PM   Result Value Ref Range    Glucose - Accu-Ck 185 (H) 65 - 99 mg/dL   ACCU-CHEK GLUCOSE    Collection Time: 12/30/18  8:44 PM   Result Value Ref Range    Glucose - Accu-Ck 237 (H) 65 - 99 mg/dL   CBC WITH DIFFERENTIAL    Collection Time: 12/31/18  6:04 AM   Result Value Ref Range    WBC 3.6 (L) 4.8 - 10.8 K/uL    RBC 3.41 (L) 4.70 - 6.10 M/uL    Hemoglobin 10.3 (L) 14.0 - 18.0 g/dL    Hematocrit 30.5 (L) 42.0 - 52.0 %    MCV 89.4 81.4 - 97.8 fL    MCH 30.2 27.0 - 33.0 pg    MCHC 33.8 33.7 - 35.3 g/dL    RDW 56.0 (H) 35.9 - 50.0 fL    Platelet Count 130 (L) 164 - 446 K/uL    MPV 9.6 9.0 - 12.9 fL    Neutrophils-Polys 72.60 (H) 44.00 - 72.00 %    Lymphocytes 12.70 (L) 22.00 - 41.00 %    Monocytes 12.70 0.00 - 13.40 %    Eosinophils 1.40 0.00 - 6.90 %    Basophils 0.30 0.00 - 1.80 %    Immature Granulocytes 0.30 0.00 - 0.90 %    Nucleated RBC 0.00 /100 WBC    Neutrophils (Absolute) 2.63 1.82 - 7.42 K/uL    Lymphs (Absolute) 0.46 (L) 1.00 - 4.80 K/uL    Monos (Absolute) 0.46 0.00 - 0.85 K/uL    Eos (Absolute) 0.05 0.00 - 0.51 K/uL    Baso (Absolute) 0.01 0.00 - 0.12 K/uL    Immature Granulocytes (abs) 0.01 0.00 - 0.11 K/uL    NRBC (Absolute) 0.00 K/uL   BASIC METABOLIC PANEL    Collection Time: 12/31/18  6:04 AM   Result Value Ref Range    Sodium 134 (L) 135 - 145 mmol/L    Potassium 4.3 3.6 - 5.5 mmol/L    Chloride 105 96 - 112 mmol/L    Co2 21 20 - 33 mmol/L    Glucose 183 (H) 65 - 99 mg/dL    Bun 59 (H) 8 - 22 mg/dL    Creatinine 2.22 (H) 0.50 - 1.40 mg/dL    Calcium 9.4 8.5 - 10.5 mg/dL    Anion Gap 8.0 0.0 - 11.9   ESTIMATED GFR    Collection Time: 12/31/18  6:04 AM   Result Value Ref Range    GFR If African American 35 (A) >60 mL/min/1.73 m 2    GFR If Non African  American 29 (A) >60 mL/min/1.73 m 2   ACCU-CHEK GLUCOSE    Collection Time: 12/31/18  8:01 AM   Result Value Ref Range    Glucose - Accu-Ck 179 (H) 65 - 99 mg/dL   ACCU-CHEK GLUCOSE    Collection Time: 12/31/18 10:36 AM   Result Value Ref Range    Glucose - Accu-Ck 228 (H) 65 - 99 mg/dL       Current Facility-Administered Medications   Medication Frequency   • oxyCODONE immediate-release (ROXICODONE) tablet 2.5 mg Q8HRS PRN   • metoprolol (LOPRESSOR) tablet 25 mg TWICE DAILY   • docusate sodium (COLACE) capsule 100 mg QDAY PRN   • magnesium hydroxide (MILK OF MAGNESIA) suspension 30 mL QDAY PRN   • polyethylene glycol/lytes (MIRALAX) PACKET 1 Packet PRN   • pioglitazone (ACTOS) tablet 30 mg DAILY   • gabapentin (NEURONTIN) capsule 300 mg TID   • vitamin D (cholecalciferol) tablet 2,000 Units DAILY   • ascorbic acid tablet 500 mg Q48HRS   • acetaminophen (TYLENOL) tablet 650 mg Q6HRS   • heparin injection 5,000 Units Q8HRS   • insulin regular (HUMULIN R) injection 3-14 Units 4X/DAY ACHS    And   • glucose 4 g chewable tablet 16 g Q15 MIN PRN    And   • dextrose 50% (D50W) injection 25 mL Q15 MIN PRN   • senna-docusate (PERICOLACE or SENOKOT S) 8.6-50 MG per tablet 1 Tab Nightly   • simvastatin (ZOCOR) tablet 20 mg Nightly   • Respiratory Care per Protocol Continuous RT   • Pharmacy Consult Request ...Pain Management Review 1 Each PRN   • hydrALAZINE (APRESOLINE) tablet 25 mg Q8HRS PRN   • acetaminophen (TYLENOL) tablet 650 mg Q4HRS PRN   • artificial tears 1.4 % ophthalmic solution 1 Drop PRN   • benzocaine-menthol (CEPACOL) lozenge 1 Lozenge Q2HRS PRN   • mag hydrox-al hydrox-simeth (MAALOX PLUS ES or MYLANTA DS) suspension 20 mL Q2HRS PRN   • ondansetron (ZOFRAN ODT) dispertab 4 mg 4X/DAY PRN    Or   • ondansetron (ZOFRAN) syringe/vial injection 4 mg 4X/DAY PRN   • traZODone (DESYREL) tablet 50 mg QHS PRN   • sodium chloride (OCEAN) 0.65 % nasal spray 2 Spray PRN   • cyclobenzaprine (FLEXERIL) tablet 10 mg TID PRN    • ferrous sulfate tablet 325 mg Q48HRS   • omeprazole (PRILOSEC) capsule 20 mg DAILY       Exam Date: 12/31/2018    General:  Awake, alert, oriented, no acute distress  HEENT:  Wearing Aspen cervical collar  Cardiac: regular rate and rhythm  Lungs: clear to auscultation bilaterally.   Abdomen: soft; non tender, non distended, bowel sounds present and normoactive  Extremities: No edema in the bilateral lower limbs  Neuro:   Ongoing improvement in right upper limb function.  Right elbow flexor is 3/5 today.  There is some cocontraction and over pronation with elbow flexion, but this has also improved.  Right shoulder abduction remains 2-/5.  Distal right upper limb strength is normal and coordination is improving.        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 Simon    Scalp sutures removed December 14    Continued gradual functional improvement    Speech continues to follow for executive functions    Dysphagia--improved  Speech has completed swallow therapy     Pain  Schedule Tylenol every 6 hours  Gabapentin 300 mg TID--do not escalate due to renal impairment   Oxycodone 2.5 mg every 8 hours as needed for severe pain--dose interval increase December 26    He has used 2.5 mg oxycodone in the last 24 hours     Neurogenic Bladder   Continue timed voiding  Continuing to have good urine output     Neurogenic Bowel  Colace twice a day  Milk of magnesia daily  Pat-Colace nightly     History of hypertension  Orthostatic hypotension   Tachycardia  At baseline, patient on lisinopril 10 mg daily and propranolol 40 mg 3 times a day    Blood pressure today of 125/70 3 mm of March    ProAmatine discontinued December 21    Continue to monitor and adjust dosing if needed    Lopressor 25 mg BID--started on December 26    Appreciate hospitalist assistance    Continuing to reinforce the importance of hydration    Diabetes mellitus with hyperglycemia  Currently on sliding scale  Hospitalist working on weaning sliding scale  Actos 30 mg daily--increased on December 18    Glucose range of 159-265 in last 24 hours    Stage III chronic kidney disease  Creatinine has worsened to 2.22 with BUN of 59 today  Continuing to reinforce the importance of hydration  Encourage patient to take at least 2000 mL of oral hydration  Discussed potential need for IV fluids if this continues to worsen     Hyperlipidemia  Simvastatin 20 mg daily      Anemia  Hemoglobin stable at 10.3 on December 31  Continue ferrous sulfate    Vitamin D deficiency  Vitamin D was 21 on admission  Continue supplementation with 2000 units of cholecalciferol daily     GI Ppx - Patient on Prilosec 20 mg daily.     DVT ppx - Patient on  Heparin 5000 q8h .     Estimated Discharge: January 4    Total time:  25 minutes.  I spent greater than 50% of the time for patient care, counseling, and coordination on this date, including unit/floor time, and face-to-face time with the patient as per interval events and assessment and plan above. Topics discussed included functional progress, improved right upper limb function, hydration, oral intake, potential need for IV fluids      Mustapha Brandt M.D.  12/31/2018

## 2019-01-01 LAB
ANION GAP SERPL CALC-SCNC: 11 MMOL/L (ref 0–11.9)
BUN SERPL-MCNC: 59 MG/DL (ref 8–22)
CALCIUM SERPL-MCNC: 9.5 MG/DL (ref 8.5–10.5)
CHLORIDE SERPL-SCNC: 105 MMOL/L (ref 96–112)
CO2 SERPL-SCNC: 19 MMOL/L (ref 20–33)
CREAT SERPL-MCNC: 2.26 MG/DL (ref 0.5–1.4)
GLUCOSE BLD-MCNC: 126 MG/DL (ref 65–99)
GLUCOSE BLD-MCNC: 160 MG/DL (ref 65–99)
GLUCOSE BLD-MCNC: 205 MG/DL (ref 65–99)
GLUCOSE BLD-MCNC: 211 MG/DL (ref 65–99)
GLUCOSE SERPL-MCNC: 196 MG/DL (ref 65–99)
POTASSIUM SERPL-SCNC: 4.5 MMOL/L (ref 3.6–5.5)
SODIUM SERPL-SCNC: 135 MMOL/L (ref 135–145)

## 2019-01-01 PROCEDURE — 700102 HCHG RX REV CODE 250 W/ 637 OVERRIDE(OP): Performed by: PHYSICAL MEDICINE & REHABILITATION

## 2019-01-01 PROCEDURE — A9270 NON-COVERED ITEM OR SERVICE: HCPCS | Performed by: HOSPITALIST

## 2019-01-01 PROCEDURE — 700102 HCHG RX REV CODE 250 W/ 637 OVERRIDE(OP): Performed by: INTERNAL MEDICINE

## 2019-01-01 PROCEDURE — 97116 GAIT TRAINING THERAPY: CPT

## 2019-01-01 PROCEDURE — 770010 HCHG ROOM/CARE - REHAB SEMI PRIVAT*

## 2019-01-01 PROCEDURE — 97530 THERAPEUTIC ACTIVITIES: CPT

## 2019-01-01 PROCEDURE — 36415 COLL VENOUS BLD VENIPUNCTURE: CPT

## 2019-01-01 PROCEDURE — 97110 THERAPEUTIC EXERCISES: CPT

## 2019-01-01 PROCEDURE — A9270 NON-COVERED ITEM OR SERVICE: HCPCS | Performed by: INTERNAL MEDICINE

## 2019-01-01 PROCEDURE — 700111 HCHG RX REV CODE 636 W/ 250 OVERRIDE (IP): Performed by: PHYSICAL MEDICINE & REHABILITATION

## 2019-01-01 PROCEDURE — A9270 NON-COVERED ITEM OR SERVICE: HCPCS | Performed by: PHYSICAL MEDICINE & REHABILITATION

## 2019-01-01 PROCEDURE — 82962 GLUCOSE BLOOD TEST: CPT | Mod: 91

## 2019-01-01 PROCEDURE — 700102 HCHG RX REV CODE 250 W/ 637 OVERRIDE(OP): Performed by: HOSPITALIST

## 2019-01-01 PROCEDURE — 99232 SBSQ HOSP IP/OBS MODERATE 35: CPT | Performed by: HOSPITALIST

## 2019-01-01 PROCEDURE — 80048 BASIC METABOLIC PNL TOTAL CA: CPT

## 2019-01-01 PROCEDURE — G0515 COGNITIVE SKILLS DEVELOPMENT: HCPCS

## 2019-01-01 RX ORDER — PIOGLITAZONEHYDROCHLORIDE 15 MG/1
45 TABLET ORAL DAILY
Status: DISCONTINUED | OUTPATIENT
Start: 2019-01-02 | End: 2019-01-04 | Stop reason: HOSPADM

## 2019-01-01 RX ORDER — PIOGLITAZONEHYDROCHLORIDE 15 MG/1
15 TABLET ORAL ONCE
Status: COMPLETED | OUTPATIENT
Start: 2019-01-01 | End: 2019-01-01

## 2019-01-01 RX ADMIN — ACETAMINOPHEN 650 MG: 325 TABLET ORAL at 17:48

## 2019-01-01 RX ADMIN — ACETAMINOPHEN 650 MG: 325 TABLET ORAL at 23:07

## 2019-01-01 RX ADMIN — PIOGLITAZONE 15 MG: 15 TABLET ORAL at 11:35

## 2019-01-01 RX ADMIN — HEPARIN SODIUM 5000 UNITS: 5000 INJECTION, SOLUTION INTRAVENOUS; SUBCUTANEOUS at 15:04

## 2019-01-01 RX ADMIN — ACETAMINOPHEN 650 MG: 325 TABLET ORAL at 04:16

## 2019-01-01 RX ADMIN — GABAPENTIN 300 MG: 300 CAPSULE ORAL at 08:37

## 2019-01-01 RX ADMIN — GABAPENTIN 300 MG: 300 CAPSULE ORAL at 15:04

## 2019-01-01 RX ADMIN — ACETAMINOPHEN 650 MG: 325 TABLET ORAL at 11:34

## 2019-01-01 RX ADMIN — OXYCODONE HYDROCHLORIDE 2.5 MG: 5 TABLET ORAL at 04:16

## 2019-01-01 RX ADMIN — HEPARIN SODIUM 5000 UNITS: 5000 INJECTION, SOLUTION INTRAVENOUS; SUBCUTANEOUS at 21:04

## 2019-01-01 RX ADMIN — PIOGLITAZONE 30 MG: 15 TABLET ORAL at 08:36

## 2019-01-01 RX ADMIN — OMEPRAZOLE 20 MG: 20 CAPSULE, DELAYED RELEASE ORAL at 08:37

## 2019-01-01 RX ADMIN — METOPROLOL TARTRATE 25 MG: 25 TABLET ORAL at 04:16

## 2019-01-01 RX ADMIN — HEPARIN SODIUM 5000 UNITS: 5000 INJECTION, SOLUTION INTRAVENOUS; SUBCUTANEOUS at 04:16

## 2019-01-01 RX ADMIN — INSULIN HUMAN 4 UNITS: 100 INJECTION, SOLUTION PARENTERAL at 21:04

## 2019-01-01 RX ADMIN — METOPROLOL TARTRATE 25 MG: 25 TABLET ORAL at 17:48

## 2019-01-01 RX ADMIN — SIMVASTATIN 20 MG: 20 TABLET, FILM COATED ORAL at 21:03

## 2019-01-01 RX ADMIN — VITAMIN D, TAB 1000IU (100/BT) 2000 UNITS: 25 TAB at 08:37

## 2019-01-01 RX ADMIN — GABAPENTIN 300 MG: 300 CAPSULE ORAL at 21:03

## 2019-01-01 RX ADMIN — INSULIN HUMAN 4 UNITS: 100 INJECTION, SOLUTION PARENTERAL at 11:36

## 2019-01-01 RX ADMIN — INSULIN HUMAN 3 UNITS: 100 INJECTION, SOLUTION PARENTERAL at 08:34

## 2019-01-01 RX ADMIN — SENNOSIDES AND DOCUSATE SODIUM 1 TABLET: 8.6; 5 TABLET ORAL at 21:03

## 2019-01-01 ASSESSMENT — PAIN SCALES - GENERAL
PAINLEVEL_OUTOF10: 0
PAINLEVEL_OUTOF10: 0
PAINLEVEL_OUTOF10: 4
PAINLEVEL_OUTOF10: 7

## 2019-01-01 ASSESSMENT — ENCOUNTER SYMPTOMS
DIARRHEA: 0
VOMITING: 0
FEVER: 0
NERVOUS/ANXIOUS: 0
NAUSEA: 0
CHILLS: 0
SHORTNESS OF BREATH: 0
ABDOMINAL PAIN: 0

## 2019-01-01 ASSESSMENT — GAIT ASSESSMENTS
DISTANCE (FEET): 625
GAIT LEVEL OF ASSIST: STAND BY ASSIST
DEVIATION: SHUFFLED GAIT;OTHER (COMMENT)
ASSISTIVE DEVICE: 4 WHEEL WALKER

## 2019-01-01 NOTE — CARE PLAN
Problem: Safety  Goal: Will remain free from falls  Call light kept within reach and encouraged to use for assistance and with toileting needs. Encouraged to call staff and to wait for staff before transfers.    Problem: Pain Management  Goal: Pain level will decrease to patient's comfort goal  Denies need for pain medication at this time.  Lets staff know when pain relievers are needed.  Will continue to monitor patient.

## 2019-01-01 NOTE — PROGRESS NOTES
Received bedside shift report from Janay BRASWELL RN regarding patient and assumed care. Patient awake, calm and stable, currently positioned in bed for comfort and safety; call light within reach. Denies pain or discomfort at this time. Will continue to monitor.

## 2019-01-01 NOTE — REHAB-PHARMACY IDT TEAM NOTE
Pharmacy   Pharmacy  Antibiotics/Antifungals/Antivirals:  Medication:      Active Orders     None        Route:        NA  Stop Date:  NA  Reason:      NA  Antihypertensives/Cardiac:  Medication:    Orders (72h ago through future)    Start     Ordered    12/25/18 1530  metoprolol (LOPRESSOR) tablet 25 mg  TWICE DAILY      12/25/18 1504    12/10/18 2100  simvastatin (ZOCOR) tablet 20 mg  NIGHTLY      12/10/18 1510    12/10/18 1510  hydrALAZINE (APRESOLINE) tablet 25 mg  EVERY 8 HOURS PRN      12/10/18 1510        Patient Vitals for the past 24 hrs:   BP Pulse   01/01/19 0700 130/72 87   01/01/19 0416 130/72 94   12/31/18 1850 104/53 94   12/31/18 1400 126/65 79       Anticoagulation:  Medication: Heparin    Other key medications: A review of the medication list reveals no issues at this time. Patient is currently on antihypertensive(s). Recommend home blood pressure monitoring/recording if antihypertensive(s) regimen(s) continue. Patient is currently on diabetic medication(s) and/or Insulin(s). Recommend home blood glucose monitoring/recording if these regimen(s) continue.    Section completed by: Asael Kilogre [facility]

## 2019-01-01 NOTE — PROGRESS NOTES
[facility] Medicine Daily Progress Note    Date of Service  1/1/2019    Chief Complaint:  Hypertension  Diabetes    Interval History:  No significant events or changes since last visit  Patient denies SOB, cough, or diarrhea  Patient slept ok last night      Review of Systems  Review of Systems   Constitutional: Negative for chills and fever.   Respiratory: Negative for shortness of breath.    Cardiovascular: Negative for chest pain.   Gastrointestinal: Negative for abdominal pain, diarrhea, nausea and vomiting.   Psychiatric/Behavioral: The patient is not nervous/anxious.         Physical Exam  Temp:  [36.2 °C (97.1 °F)-36.6 °C (97.8 °F)] 36.2 °C (97.1 °F)  Pulse:  [79-94] 87  Resp:  [16-18] 18  BP: (104-130)/(53-72) 130/72    Physical Exam   Constitutional: He is oriented to person, place, and time. He appears well-nourished.   HENT:   Head: Atraumatic.   Eyes: Pupils are equal, round, and reactive to light. Conjunctivae are normal.   Neck:   Has C-collar   Cardiovascular: Normal rate, regular rhythm, S1 normal and S2 normal.    No murmur heard.  Pulmonary/Chest: Effort normal. No stridor. He has no wheezes. He has no rhonchi. He has no rales.   Abdominal: Soft. He exhibits no distension. There is no tenderness. Hernia confirmed negative in the right inguinal area and confirmed negative in the left inguinal area.   Musculoskeletal: He exhibits no edema.   Neurological: He is alert and oriented to person, place, and time. No sensory deficit.   Skin: Skin is warm and dry. No rash noted. No cyanosis.   Psychiatric: He has a normal mood and affect. His behavior is normal.   Nursing note and vitals reviewed.      Fluids    Intake/Output Summary (Last 24 hours) at 01/01/19 1047  Last data filed at 01/01/19 0838   Gross per 24 hour   Intake             1160 ml   Output             1600 ml   Net             -440 ml       Laboratory  Recent Labs      12/31/18   0604   WBC  3.6*   RBC  3.41*   HEMOGLOBIN  10.3*   HEMATOCRIT   30.5*   MCV  89.4   MCH  30.2   MCHC  33.8   RDW  56.0*   PLATELETCT  130*   MPV  9.6     Recent Labs      12/31/18   0604  01/01/19   0551   SODIUM  134*  135   POTASSIUM  4.3  4.5   CHLORIDE  105  105   CO2  21  19*   GLUCOSE  183*  196*   BUN  59*  59*   CREATININE  2.22*  2.26*   CALCIUM  9.4  9.5                   Imaging    Assessment/Plan  * Central cord syndrome (HCC)- (present on admission)   Assessment & Plan    Conservative management per Neurosurgery  Maintain cervical collar       Chronic renal failure- (present on admission)   Assessment & Plan    CKD Stage III  Creatinine at baseline  Bun more elevated over baseline  Continue to monitor     Type 2 diabetes mellitus (HCC)- (present on admission)   Assessment & Plan    Hba1c: 6.9 (12/11)  BS: 145-280  On Actos: 30 mg daily --> will increase to 45 mg daily  Note: home meds was Actos 45 mg daily  Cont to monitor     Tachycardia   Assessment & Plan    HR recently ok (occ rises up)  On Lopressor: 25 mg bid  Cont to monitor     Vitamin D deficiency   Assessment & Plan    On supplements     Hyperlipemia- (present on admission)   Assessment & Plan    On statin     Essential hypertension- (present on admission)   Assessment & Plan    BP ok  On Lopressor: 25 mg bid  Cont to monitor

## 2019-01-01 NOTE — CARE PLAN
"Problem: Knowledge Deficit  Goal: Knowledge of the prescribed therapeutic regimen will improve  Outcome: PROGRESSING AS EXPECTED  Pt encouraged by Dr Brandt to drink more water or he \"will get an IV instead\". Pt agreeable; is unable to lift pink water jug but calls for help pouring more water into cup.     Problem: Skin Integrity  Goal: Risk for impaired skin integrity will decrease  Outcome: PROGRESSING SLOWER THAN EXPECTED  Pt c/o pain to sacral area; redness and moisture damage present. New Mepilex applied, pt reports improvement. Barrier cream used regularly.      "

## 2019-01-02 LAB
GLUCOSE BLD-MCNC: 139 MG/DL (ref 65–99)
GLUCOSE BLD-MCNC: 199 MG/DL (ref 65–99)
GLUCOSE BLD-MCNC: 231 MG/DL (ref 65–99)
GLUCOSE BLD-MCNC: 348 MG/DL (ref 65–99)

## 2019-01-02 PROCEDURE — A9270 NON-COVERED ITEM OR SERVICE: HCPCS | Performed by: HOSPITALIST

## 2019-01-02 PROCEDURE — 97535 SELF CARE MNGMENT TRAINING: CPT

## 2019-01-02 PROCEDURE — 700111 HCHG RX REV CODE 636 W/ 250 OVERRIDE (IP): Performed by: PHYSICAL MEDICINE & REHABILITATION

## 2019-01-02 PROCEDURE — 700102 HCHG RX REV CODE 250 W/ 637 OVERRIDE(OP): Performed by: HOSPITALIST

## 2019-01-02 PROCEDURE — A9270 NON-COVERED ITEM OR SERVICE: HCPCS | Performed by: INTERNAL MEDICINE

## 2019-01-02 PROCEDURE — A9270 NON-COVERED ITEM OR SERVICE: HCPCS | Performed by: PHYSICAL MEDICINE & REHABILITATION

## 2019-01-02 PROCEDURE — G0515 COGNITIVE SKILLS DEVELOPMENT: HCPCS

## 2019-01-02 PROCEDURE — 99232 SBSQ HOSP IP/OBS MODERATE 35: CPT | Performed by: HOSPITALIST

## 2019-01-02 PROCEDURE — 82962 GLUCOSE BLOOD TEST: CPT | Mod: 91

## 2019-01-02 PROCEDURE — 770010 HCHG ROOM/CARE - REHAB SEMI PRIVAT*

## 2019-01-02 PROCEDURE — 97112 NEUROMUSCULAR REEDUCATION: CPT

## 2019-01-02 PROCEDURE — 700102 HCHG RX REV CODE 250 W/ 637 OVERRIDE(OP): Performed by: PHYSICAL MEDICINE & REHABILITATION

## 2019-01-02 PROCEDURE — 700102 HCHG RX REV CODE 250 W/ 637 OVERRIDE(OP): Performed by: INTERNAL MEDICINE

## 2019-01-02 PROCEDURE — 99233 SBSQ HOSP IP/OBS HIGH 50: CPT | Performed by: PHYSICAL MEDICINE & REHABILITATION

## 2019-01-02 PROCEDURE — 97140 MANUAL THERAPY 1/> REGIONS: CPT

## 2019-01-02 PROCEDURE — 97116 GAIT TRAINING THERAPY: CPT

## 2019-01-02 PROCEDURE — 97110 THERAPEUTIC EXERCISES: CPT

## 2019-01-02 RX ADMIN — GABAPENTIN 300 MG: 300 CAPSULE ORAL at 08:50

## 2019-01-02 RX ADMIN — FERROUS SULFATE TAB 325 MG (65 MG ELEMENTAL FE) 325 MG: 325 (65 FE) TAB at 08:50

## 2019-01-02 RX ADMIN — OXYCODONE HYDROCHLORIDE 2.5 MG: 5 TABLET ORAL at 04:54

## 2019-01-02 RX ADMIN — ACETAMINOPHEN 650 MG: 325 TABLET ORAL at 12:00

## 2019-01-02 RX ADMIN — ACETAMINOPHEN 650 MG: 325 TABLET ORAL at 18:06

## 2019-01-02 RX ADMIN — METOPROLOL TARTRATE 25 MG: 25 TABLET ORAL at 18:06

## 2019-01-02 RX ADMIN — VITAMIN D, TAB 1000IU (100/BT) 2000 UNITS: 25 TAB at 08:49

## 2019-01-02 RX ADMIN — INSULIN HUMAN 10 UNITS: 100 INJECTION, SOLUTION PARENTERAL at 21:12

## 2019-01-02 RX ADMIN — INSULIN HUMAN 4 UNITS: 100 INJECTION, SOLUTION PARENTERAL at 11:37

## 2019-01-02 RX ADMIN — HEPARIN SODIUM 5000 UNITS: 5000 INJECTION, SOLUTION INTRAVENOUS; SUBCUTANEOUS at 21:16

## 2019-01-02 RX ADMIN — OXYCODONE HYDROCHLORIDE AND ACETAMINOPHEN 500 MG: 500 TABLET ORAL at 08:50

## 2019-01-02 RX ADMIN — HEPARIN SODIUM 5000 UNITS: 5000 INJECTION, SOLUTION INTRAVENOUS; SUBCUTANEOUS at 15:36

## 2019-01-02 RX ADMIN — HEPARIN SODIUM 5000 UNITS: 5000 INJECTION, SOLUTION INTRAVENOUS; SUBCUTANEOUS at 04:54

## 2019-01-02 RX ADMIN — GABAPENTIN 300 MG: 300 CAPSULE ORAL at 21:16

## 2019-01-02 RX ADMIN — SENNOSIDES AND DOCUSATE SODIUM 1 TABLET: 8.6; 5 TABLET ORAL at 21:16

## 2019-01-02 RX ADMIN — ACETAMINOPHEN 650 MG: 325 TABLET ORAL at 04:54

## 2019-01-02 RX ADMIN — INSULIN HUMAN 3 UNITS: 100 INJECTION, SOLUTION PARENTERAL at 07:56

## 2019-01-02 RX ADMIN — OMEPRAZOLE 20 MG: 20 CAPSULE, DELAYED RELEASE ORAL at 08:50

## 2019-01-02 RX ADMIN — METOPROLOL TARTRATE 25 MG: 25 TABLET ORAL at 04:54

## 2019-01-02 RX ADMIN — ACETAMINOPHEN 650 MG: 325 TABLET ORAL at 23:08

## 2019-01-02 RX ADMIN — GABAPENTIN 300 MG: 300 CAPSULE ORAL at 15:36

## 2019-01-02 RX ADMIN — SIMVASTATIN 20 MG: 20 TABLET, FILM COATED ORAL at 21:16

## 2019-01-02 ASSESSMENT — PAIN SCALES - GENERAL
PAINLEVEL_OUTOF10: 0
PAINLEVEL_OUTOF10: 0
PAINLEVEL_OUTOF10: 7

## 2019-01-02 ASSESSMENT — ENCOUNTER SYMPTOMS
SHORTNESS OF BREATH: 0
FEVER: 0
HALLUCINATIONS: 0
BLURRED VISION: 0
HEADACHES: 0
VOMITING: 0
NAUSEA: 0
PALPITATIONS: 0
DIZZINESS: 0

## 2019-01-02 NOTE — PROGRESS NOTES
"Rehab Progress Note     Encounter date: 1/2/2019  Today I met with the patient face to face in his room    Chief Complaint:  Central cord syndrome (HCC) , Functional progress, diabetic medications     Interval Events (subjective)  Mr. Almanzar is doing well today.  He continues to report gradual progress with his upper limb, but this is slow.  He reports that his walking has improved relatively more than his upper limb function.  He denies any fevers, chills, headache, dizziness, chest pain, or shortness of breath.  We discussed the potential plan for discharge on January 4.  We discussed diabetic medications.  He would like to talk more in depth about these.  I directed him to the hospitalist team to answer questions about the current diabetic management regimen.    Objective:  VITAL SIGNS: /63   Pulse 86   Temp 37.2 °C (99 °F) (Temporal)   Resp 18   Ht 1.778 m (5' 10\")   Wt 75.4 kg (166 lb 3.2 oz)   SpO2 97%   BMI 23.85 kg/m²     Recent Results (from the past 72 hour(s))   ACCU-CHEK GLUCOSE    Collection Time: 12/30/18  5:37 PM   Result Value Ref Range    Glucose - Accu-Ck 185 (H) 65 - 99 mg/dL   ACCU-CHEK GLUCOSE    Collection Time: 12/30/18  8:44 PM   Result Value Ref Range    Glucose - Accu-Ck 237 (H) 65 - 99 mg/dL   CBC WITH DIFFERENTIAL    Collection Time: 12/31/18  6:04 AM   Result Value Ref Range    WBC 3.6 (L) 4.8 - 10.8 K/uL    RBC 3.41 (L) 4.70 - 6.10 M/uL    Hemoglobin 10.3 (L) 14.0 - 18.0 g/dL    Hematocrit 30.5 (L) 42.0 - 52.0 %    MCV 89.4 81.4 - 97.8 fL    MCH 30.2 27.0 - 33.0 pg    MCHC 33.8 33.7 - 35.3 g/dL    RDW 56.0 (H) 35.9 - 50.0 fL    Platelet Count 130 (L) 164 - 446 K/uL    MPV 9.6 9.0 - 12.9 fL    Neutrophils-Polys 72.60 (H) 44.00 - 72.00 %    Lymphocytes 12.70 (L) 22.00 - 41.00 %    Monocytes 12.70 0.00 - 13.40 %    Eosinophils 1.40 0.00 - 6.90 %    Basophils 0.30 0.00 - 1.80 %    Immature Granulocytes 0.30 0.00 - 0.90 %    Nucleated RBC 0.00 /100 WBC    Neutrophils " (Absolute) 2.63 1.82 - 7.42 K/uL    Lymphs (Absolute) 0.46 (L) 1.00 - 4.80 K/uL    Monos (Absolute) 0.46 0.00 - 0.85 K/uL    Eos (Absolute) 0.05 0.00 - 0.51 K/uL    Baso (Absolute) 0.01 0.00 - 0.12 K/uL    Immature Granulocytes (abs) 0.01 0.00 - 0.11 K/uL    NRBC (Absolute) 0.00 K/uL   BASIC METABOLIC PANEL    Collection Time: 12/31/18  6:04 AM   Result Value Ref Range    Sodium 134 (L) 135 - 145 mmol/L    Potassium 4.3 3.6 - 5.5 mmol/L    Chloride 105 96 - 112 mmol/L    Co2 21 20 - 33 mmol/L    Glucose 183 (H) 65 - 99 mg/dL    Bun 59 (H) 8 - 22 mg/dL    Creatinine 2.22 (H) 0.50 - 1.40 mg/dL    Calcium 9.4 8.5 - 10.5 mg/dL    Anion Gap 8.0 0.0 - 11.9   ESTIMATED GFR    Collection Time: 12/31/18  6:04 AM   Result Value Ref Range    GFR If African American 35 (A) >60 mL/min/1.73 m 2    GFR If Non  29 (A) >60 mL/min/1.73 m 2   ACCU-CHEK GLUCOSE    Collection Time: 12/31/18  8:01 AM   Result Value Ref Range    Glucose - Accu-Ck 179 (H) 65 - 99 mg/dL   ACCU-CHEK GLUCOSE    Collection Time: 12/31/18 10:36 AM   Result Value Ref Range    Glucose - Accu-Ck 228 (H) 65 - 99 mg/dL   ACCU-CHEK GLUCOSE    Collection Time: 12/31/18  5:23 PM   Result Value Ref Range    Glucose - Accu-Ck 159 (H) 65 - 99 mg/dL   ACCU-CHEK GLUCOSE    Collection Time: 12/31/18 10:01 PM   Result Value Ref Range    Glucose - Accu-Ck 190 (H) 65 - 99 mg/dL   BASIC METABOLIC PANEL    Collection Time: 01/01/19  5:51 AM   Result Value Ref Range    Sodium 135 135 - 145 mmol/L    Potassium 4.5 3.6 - 5.5 mmol/L    Chloride 105 96 - 112 mmol/L    Co2 19 (L) 20 - 33 mmol/L    Glucose 196 (H) 65 - 99 mg/dL    Bun 59 (H) 8 - 22 mg/dL    Creatinine 2.26 (H) 0.50 - 1.40 mg/dL    Calcium 9.5 8.5 - 10.5 mg/dL    Anion Gap 11.0 0.0 - 11.9   ESTIMATED GFR    Collection Time: 01/01/19  5:51 AM   Result Value Ref Range    GFR If  34 (A) >60 mL/min/1.73 m 2    GFR If Non  28 (A) >60 mL/min/1.73 m 2   ACCU-CHEK GLUCOSE     Collection Time: 01/01/19  7:49 AM   Result Value Ref Range    Glucose - Accu-Ck 160 (H) 65 - 99 mg/dL   ACCU-CHEK GLUCOSE    Collection Time: 01/01/19 11:33 AM   Result Value Ref Range    Glucose - Accu-Ck 205 (H) 65 - 99 mg/dL   ACCU-CHEK GLUCOSE    Collection Time: 01/01/19  5:18 PM   Result Value Ref Range    Glucose - Accu-Ck 126 (H) 65 - 99 mg/dL   ACCU-CHEK GLUCOSE    Collection Time: 01/01/19  9:01 PM   Result Value Ref Range    Glucose - Accu-Ck 211 (H) 65 - 99 mg/dL   ACCU-CHEK GLUCOSE    Collection Time: 01/02/19  7:53 AM   Result Value Ref Range    Glucose - Accu-Ck 199 (H) 65 - 99 mg/dL       Current Facility-Administered Medications   Medication Frequency   • pioglitazone (ACTOS) tablet 45 mg DAILY   • oxyCODONE immediate-release (ROXICODONE) tablet 2.5 mg Q8HRS PRN   • metoprolol (LOPRESSOR) tablet 25 mg TWICE DAILY   • docusate sodium (COLACE) capsule 100 mg QDAY PRN   • magnesium hydroxide (MILK OF MAGNESIA) suspension 30 mL QDAY PRN   • polyethylene glycol/lytes (MIRALAX) PACKET 1 Packet PRN   • gabapentin (NEURONTIN) capsule 300 mg TID   • vitamin D (cholecalciferol) tablet 2,000 Units DAILY   • ascorbic acid tablet 500 mg Q48HRS   • acetaminophen (TYLENOL) tablet 650 mg Q6HRS   • heparin injection 5,000 Units Q8HRS   • insulin regular (HUMULIN R) injection 3-14 Units 4X/DAY ACHS    And   • glucose 4 g chewable tablet 16 g Q15 MIN PRN    And   • dextrose 50% (D50W) injection 25 mL Q15 MIN PRN   • senna-docusate (PERICOLACE or SENOKOT S) 8.6-50 MG per tablet 1 Tab Nightly   • simvastatin (ZOCOR) tablet 20 mg Nightly   • Respiratory Care per Protocol Continuous RT   • Pharmacy Consult Request ...Pain Management Review 1 Each PRN   • hydrALAZINE (APRESOLINE) tablet 25 mg Q8HRS PRN   • acetaminophen (TYLENOL) tablet 650 mg Q4HRS PRN   • artificial tears 1.4 % ophthalmic solution 1 Drop PRN   • benzocaine-menthol (CEPACOL) lozenge 1 Lozenge Q2HRS PRN   • mag hydrox-al hydrox-simeth (MAALOX PLUS ES or  MYLANTA DS) suspension 20 mL Q2HRS PRN   • ondansetron (ZOFRAN ODT) dispertab 4 mg 4X/DAY PRN    Or   • ondansetron (ZOFRAN) syringe/vial injection 4 mg 4X/DAY PRN   • traZODone (DESYREL) tablet 50 mg QHS PRN   • sodium chloride (OCEAN) 0.65 % nasal spray 2 Spray PRN   • cyclobenzaprine (FLEXERIL) tablet 10 mg TID PRN   • ferrous sulfate tablet 325 mg Q48HRS   • omeprazole (PRILOSEC) capsule 20 mg DAILY       Exam Date: 1/2/2019    General:  Awake, alert, oriented, no acute distress  HEENT:  Wearing Aspen cervical collar  Cardiac: regular rate and rhythm  Lungs: clear to auscultation bilaterally.   Abdomen: soft; non tender, non distended, bowel sounds present and normoactive  Extremities: No edema in the bilateral lower limbs  Neuro:   Stable right upper limb function.  Right elbow flexor is 3/5 today.  There is cocontraction present with pronation during elbow flexion.  Right shoulder abduction remains significantly weak at 2-/5.      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 Simon  Discussed continued cervical collar recommendations with patient today.  Discussed that he will need to follow-up with neurosurgery before he receives clearance to discontinue his cervical collar    Scalp sutures removed December 14    Continued gradual functional improvement    Speech continues to follow for executive functions    Dysphagia--improved  Speech has completed swallow therapy     Pain  Schedule Tylenol every 6 hours  Gabapentin 300 mg TID--do not escalate due to renal impairment   Oxycodone 2.5 mg every 8 hours as needed for severe pain--dose interval increase December 26    He has used 2.5 mg oxycodone in the last 24 hours     Neurogenic Bladder   Continue timed voiding  Now voiding without issue     Neurogenic Bowel  Colace twice a day  Milk of magnesia daily  Pat-Colace nightly     History of hypertension  Orthostatic hypotension   Tachycardia  At baseline, patient on lisinopril 10 mg daily and propranolol 40 mg 3 times a day    Blood pressure today of 109/63 mmHg    ProAmatine discontinued December 21    Continue to monitor and adjust dosing if needed    Lopressor 25 mg BID--started on December 26    Appreciate hospitalist assistance    Continuing to reinforce the importance of hydration    Diabetes mellitus with hyperglycemia  Currently on sliding scale  Hospitalist working on weaning sliding scale  Actos 45 mg daily--increased on January 2    Glucose range of 126-211 in last 24 hours    Stage III chronic kidney disease  Creatinine of 2.26 with BUN of 59 and GFR of 28 on January 1  Continuing to reinforce the importance of hydration  Encourage patient to take at least 2000 mL of oral  hydration    Appreciate hospitalist assistance     Hyperlipidemia  Simvastatin 20 mg daily      Anemia  Hemoglobin stable at 10.3 on December 31  Continue ferrous sulfate    Vitamin D deficiency  Vitamin D was 21 on admission  Continue supplementation with 2000 units of cholecalciferol daily     GI Ppx - Patient on Prilosec 20 mg daily.     DVT ppx - Patient on Heparin 5000 q8h .     Estimated Discharge: We will need to transition to skilled nursing due to his right arm weakness and need for assistance with ADLs    IDT Team Conference 1/2/2019  I individually evaluated the patient today.  In addition to my daily follow up visit, I was present for and led the interdisciplinary team conference on 1/2/2019 and agree with the interdisciplinary conference documentation and plan of care.     RN: He is doing well today.  He is continent of bowel and bladder.  His glucose remains variable and it is generally improving.       PT:  He is doing very well.  He standby for 400 feet with his 4 wheeled walker.  He is working on improving endurance.       OT:  He is still needing help with ADLs.  He is mod assist due to the right arm weakness.  He will need help with IADLs.  He would need intermittent assistance at home    SLP:  He is on a regular diet.  He does have some mild cog deficits with carry over deficits       Total time:  35 minutes.  I spent greater than 50% of the time for patient care, counseling, and coordination on this date, including unit/floor time, and face-to-face time with the patient as per interval events and assessment and plan above. Topics discussed included functional progress, right upper limb function, diabetic medications, discharge planning    Mustapha Brandt M.D.  1/2/2019

## 2019-01-02 NOTE — CARE PLAN
Problem: Speech/Swallowing LTGs  Goal: LTG-By discharge, patient will safely swallow  1) Individualized goal:  Regular textures/thin liquids with MOD I for use of compensatory strategies.   2) Interventions:  SLP Swallowing Dysfunction Treatment, SLP Cognitive Skill Development and SLP Group Treatment     Outcome: MET Date Met: 01/02/19      Problem: Problem Solving STGs  Goal: STG-Within one week, patient will  1) Individualized goal:  Complete functional medication management and financial management task with 100% accuracy provided SPV.   2) Interventions:  SLP Self Care / ADL Training , SLP Cognitive Skill Development and SLP Group Treatment     Outcome: MET Date Met: 01/02/19

## 2019-01-02 NOTE — REHAB-SLP IDT TEAM NOTE
Speech Therapy   Cognitive Linquistic Functions  Comprehension Initial:  4 - Minimal Assistance  Comprehension Current:  6 - Modified Independent   Comprehension Description:  Glasses  Expression Initial:  5 - Stand-by Prompting/Supervision or Set-up  Expression Current:  7 - Independent   Expression Description:  Verbal cueing  Social Interaction Initial:  5 - Stand-by Prompting/Supervision or Set-up  Social Interaction Current:  7 - Independent   Social Interaction Description:  Increased time  Problem Solving Initial:  3 - Moderate Assistance  Problem Solving Current:  6 - Modified Independent   Problem Solving Description:  Increased time, Therapy schedule  Memory Initial:  2 - Max Assistance  Memory Current:  6 - Modified Independent   Memory Description:  Verbal cueing, Therapy schedule  Executive Functioning / Organization Initial:     Executive Functioning / Organization Current: 6 Modified Independent    Executive Functioning Desciption: Pt is able to mng meds/finances upon d/c.   Swallowing  Swallowing Status: SLP no longer following for dysphagia. Pt remains in general dining on regular/thin liquids.   Orders Placed This Encounter   Procedures   • Diet Order Diabetic     Standing Status:   Standing     Number of Occurrences:   1     Order Specific Question:   Diet:     Answer:   Diabetic [3]     Order Specific Question:   Texture/Fiber modifications:     Answer:   Chopped Meat [5]     Comments:   cut sandwiches into 1/4     Behavior Modification Plan  Keep instructions simple/concrete, Give clear feedback, Set clear goals, Redirect to task/topic, Provide reasonable choices, Decrease the chance of failure by offering activities that are within the patient's abilities and Analyze tasks (break down into smaller steps)  Assistive Technology  Low/Impaired vision equipment and Low tech: Calendar, planner, schedule, alarms/timers, pill organizer, post-it notes, lists  Family Training/Education:  To be completed  with pt's sister  DC Recommendations: Recommend short term home health SLP services upon d/c to ensure carryover of IADLs to new home environment.   Strengths:  Able to follow instructions, Alert and oriented, Effective communication skills, Independent PLOF, Making steady progress towards goals, Manages pain appropriately, Motivated for self care and independence, Pleasant and cooperative, Supportive family and Willingly participates in therapeutic activities  Barriers:  Other: Elim IRA, STM  # of short term goals set=1  # of short term goals met=1 (+1LTG)       Speech Therapy Problems           Problem: Speech/Swallowing LTGs     Dates: Start: 12/11/18       Goal: LTG-By discharge, patient will solve complex problem     Dates: Start: 12/11/18       Description: 1) Individualized goal:  By utilizing compensatory intervention for memory and problem-solving to allow for safe completion of daily activities with MOD I  2) Interventions:  SLP Cognitive Skill Development and SLP Group Treatment                    Section completed by:  Carla Hammer MS,CCC-SLP

## 2019-01-02 NOTE — PROGRESS NOTES
[facility] Medicine Daily Progress Note    Date of Service  1/2/2019    Chief Complaint:  Hypertension  Diabetes    Interval History:  No significant events or changes since last visit  Patient denies SOB, cough, or diarrhea  Patient slept ok last night      Review of Systems  Review of Systems   Constitutional: Negative for fever.   Eyes: Negative for blurred vision.   Respiratory: Negative for shortness of breath.    Cardiovascular: Negative for palpitations.   Gastrointestinal: Negative for nausea and vomiting.   Neurological: Negative for dizziness and headaches.   Psychiatric/Behavioral: Negative for hallucinations.        Physical Exam  Temp:  [36.3 °C (97.3 °F)-37.2 °C (99 °F)] 37.2 °C (99 °F)  Pulse:  [86-97] 86  Resp:  [18] 18  BP: (108-131)/(63-78) 109/63    Physical Exam   Constitutional: He is oriented to person, place, and time.   HENT:   Mouth/Throat: Oropharynx is clear and moist.   Eyes: No scleral icterus.   Neck:   Has C-collar   Cardiovascular: Normal rate, regular rhythm, S1 normal and S2 normal.    No murmur heard.  Pulmonary/Chest: Effort normal and breath sounds normal. No stridor. He has no rhonchi.   Abdominal: Soft. He exhibits no distension. There is no tenderness. Hernia confirmed negative in the right inguinal area and confirmed negative in the left inguinal area.   Musculoskeletal: He exhibits no edema.   Neurological: He is alert and oriented to person, place, and time. No sensory deficit.   Skin: Skin is warm and dry. No rash noted. He is not diaphoretic. No cyanosis.   Psychiatric: He has a normal mood and affect. His behavior is normal.   Nursing note and vitals reviewed.      Fluids    Intake/Output Summary (Last 24 hours) at 01/02/19 1005  Last data filed at 01/02/19 0508   Gross per 24 hour   Intake              840 ml   Output             1000 ml   Net             -160 ml       Laboratory  Recent Labs      12/31/18   0604   WBC  3.6*   RBC  3.41*   HEMOGLOBIN  10.3*   HEMATOCRIT   30.5*   MCV  89.4   MCH  30.2   MCHC  33.8   RDW  56.0*   PLATELETCT  130*   MPV  9.6     Recent Labs      12/31/18   0604  01/01/19   0551   SODIUM  134*  135   POTASSIUM  4.3  4.5   CHLORIDE  105  105   CO2  21  19*   GLUCOSE  183*  196*   BUN  59*  59*   CREATININE  2.22*  2.26*   CALCIUM  9.4  9.5                   Imaging    Assessment/Plan  * Central cord syndrome (HCC)- (present on admission)   Assessment & Plan    Conservative management per Neurosurgery  Maintain cervical collar     Chronic renal failure- (present on admission)   Assessment & Plan    CKD Stage III  Creatinine at baseline  Bun more elevated over baseline  Likely a little dehydrated -- encouraging fluid (water/juice) intake  Continue to monitor     Type 2 diabetes mellitus (HCC)- (present on admission)   Assessment & Plan    Hba1c: 6.9 (12/11)  BS: 126-211  On Actos: 30 mg daily --> 45 mg daily  Note: home meds was Actos 45 mg daily  Note: pt will be started on different meds when he goes home (sees Endocrinology)  Cont to monitor     Tachycardia   Assessment & Plan    HR recently ok   On Lopressor: 25 mg bid  Cont to monitor     Vitamin D deficiency   Assessment & Plan    On supplements     Hyperlipemia- (present on admission)   Assessment & Plan    On statin     Essential hypertension- (present on admission)   Assessment & Plan    BP ok  On Lopressor: 25 mg bid  Cont to monitor

## 2019-01-02 NOTE — PROGRESS NOTES
Received bedside shift report from Dianne BRASWELL RN regarding patient and assumed care. Patient awake, calm and stable, currently positioned in bed for comfort and safety; call light within reach. Denies pain or discomfort at this time. Will continue to monitor.

## 2019-01-02 NOTE — REHAB-OT IDT TEAM NOTE
Occupational Therapy   Activities of Daily Living  Eating Initial:  1 - Total Assistance  Eating Current:  6 - Modified Independent   Eating Description:  Increased time (increased time for container management d/t BUE weakness)  Grooming Initial:  2 - Max Assistance  Grooming Current:  6 - Modified Independent   Grooming Description:  Increased time (increased time for shaving and brushing teeth seated at sink.)  Bathing Initial:  2 - Max Assistance  Bathing Current:  4 - Minimal Assistance   Bathing Description:  Grab bar, Tub bench, Hand held shower, Long handled bath tool (Min assist to wash buttocks ,  setup / supervision and cues for all other bathing steps. )  Upper Body Dressing Initial:  1 - Total Assistance  Upper Body Dressing Current:  3 - Moderate Assistance   Upper Body Dressing Description:   ( assist to pull shirt over head and down back. )  Lower Body Dressing Initial:  1 - Total Assistance  Lower Body Dressing Current:  4 - Minimal Assistance   Lower Body Dressing Description:  4 - Minimal Assistance  Toileting Initial:  2 - Max Assistance  Toileting Current:  3 - Moderate Assistance   Toileting Description:   (asisst with wiping    for quality )  Toilet Transfer Initial:  3 - Moderate Assistance  Toilet Transfer Current:  4 - Minimal Assistance   Toilet Transfer Description:  4 - Minimal Assistance  Tub / Shower Transfer Initial:  3 - Moderate Assistance  Tub / Shower Transfer Current:  5 - Standby Prompting/Supervision or Set-up   Tub / Shower Transfer Description:  Grab bar ( SBA and cues for technique )  IADL:  N/A - focus on RUE neuro re-ed  Family Training/Education: No family available for training   DME/DC Recommendations:  Requires physical  assist for ADL's - TBD    Strengths:  Able to follow instructions, Good carryover of learning, Good endurance, Good insight into deficits/needs, Independent PLOF, Making steady progress towards goals, Motivated for self care and independence, Pleasant  and cooperative and Willingly participates in therapeutic activities  Barriers:  Generalized weakness, Hemiplegia, Poor family support and Other: Hard of hearing, R UE proximal weakness (R hand dominant), requires verbal cues for adaptive equipment use - poor carry-over of learning        Occupational Therapy Goals           Problem: Dressing     Dates: Start: 12/11/18       Goal: STG-Within one week, patient will dress UB     Dates: Start: 01/02/19       Description: 1) Individualized Goal: W/ min A using AD/AE/DME prn.  2) Interventions: OT E Stim Attended, OT Group Therapy, OT Self Care/ADL, OT Neuro Re-Ed/Balance, OT Sensory Int Techniques, OT Therapeutic Activity, OT Aquatic Therapy and OT Therapeutic Exercise                Problem: OT Long Term Goals     Dates: Start: 12/11/18       Goal: LTG-By discharge, patient will complete basic self care tasks     Dates: Start: 12/11/18       Description: 1) Individualized Goal:  W/ min A (except mod A for UBD) using AD/AE/DME prn.  2) Interventions:  OT E Stim Attended, OT Group Therapy, OT Self Care/ADL, OT Neuro Re-Ed/Balance, OT Sensory Int Techniques, OT Therapeutic Activity, OT Aquatic Therapy and OT Therapeutic Exercise             Goal: LTG-By discharge, patient will perform bathroom transfers     Dates: Start: 12/11/18       Description: 1) Individualized Goal:  W/ SPV using AD/AE/DME prn.  2) Interventions:  OT E Stim Attended, OT Group Therapy, OT Self Care/ADL, OT Neuro Re-Ed/Balance, OT Sensory Int Techniques, OT Therapeutic Activity, OT Aquatic Therapy and OT Therapeutic Exercise               Problem: Toileting     Dates: Start: 12/19/18       Goal: STG-Within one week, patient will complete toileting tasks     Dates: Start: 01/02/19       Description: 1) Individualized Goal: W/ min A using AD/AE/DME prn.  2) Interventions: OT E Stim Attended, OT Group Therapy, OT Self Care/ADL, OT Neuro Re-Ed/Balance, OT Sensory Int Techniques, OT Therapeutic Activity,  OT Aquatic Therapy and OT Therapeutic Exercise                    Section completed by:  Harmony Schwarz MS,OTR/L

## 2019-01-02 NOTE — REHAB-PT IDT TEAM NOTE
"Physical Therapy   Mobility  Bed mobility:   SBA- min A  Bed /Chair/Wheelchair Transfer Initial:  3 - Moderate Assistance  Bed /Chair/Wheelchair Transfer Current:  4 - Minimal Assistance   Bed/Chair/Wheelchair Transfer Description:  Increased time, Verbal cueing, Set-up of equipment  Walk Initial:  1 - Total Assistance  Walk Current:  5 - Standby Prompting/Supervision or Set-up   Walk Description:   (pt amb x625' with 4WW and SBA. pt amb with flexed posture, dec evan, dec heel strike at IC, dec speed)  Wheelchair Initial:  2 - Max Assistance  Wheelchair Current:  6 - Modified Independent   Wheelchair Description:  Extra time (150 ft using UEs and LEs, mod I )  Stairs Initial:  0 - Not tested,medical condition  Stairs Current: 4 - Minimal Assistance   Stairs Description: Extra time, Hand rails, Verbal cueing, Requires incidental assist (12 4\" steps with 2 HRs, CGA with 1 instance of min A for LOB)  Patient/Family Training/Education:  TBD   DME/DC Recommendations:  Possible transition to SNF before DC home alone  Strengths:  Independent PLOF, Making steady progress towards goals, Motivated for self care and independence, Pleasant and cooperative and Willingly participates in therapeutic activities  Barriers:   Decreased endurance, Limited mobility, Poor balance and Other: poor UE function   # of short term goals set= 2  # of short term goals met= 2       Physical Therapy Problems           Problem: PT-Long Term Goals     Dates: Start: 12/11/18       Goal: LTG-By discharge, patient will ambulate     Dates: Start: 12/11/18       Description: 1) Individualized goal: Gait fww household 150 ft, community 400 ft, supervision at discharge  2) Interventions:  PT Gait Training, PT Therapeutic Exercises, PT Neuro Re-Ed/Balance and PT Therapeutic Activity             Goal: LTG-By discharge, patient will transfer one surface to another     Dates: Start: 12/11/18       Description: 1) Individualized goal: Transfer one surface " to another fww modified independent at discharge  2) Interventions:  PT Gait Training, PT Therapeutic Exercises, PT Neuro Re-Ed/Balance and PT Therapeutic Activity             Goal: LTG-By discharge, patient will transfer in/out of a car     Dates: Start: 12/11/18       Description: 1) Individualized goal: Car transfer fww supervision at discharge  2) Interventions:  PT Gait Training, PT Therapeutic Exercises, PT Neuro Re-Ed/Balance and PT Therapeutic Activity                     Section completed by:  Vanessa Welsh, PT, DPT

## 2019-01-02 NOTE — CARE PLAN
Problem: Mobility Transfers  Goal: STG-Within one week, patient will transfer bed to chair  1) Individualized goal: Transfer bed to/from chair fww sba, vc`s, 1 week  2) Interventions:  PT Gait Training, PT Therapeutic Exercises, PT Neuro Re-Ed/Balance and PT Therapeutic Activity     Outcome: MET Date Met: 01/02/19  On regular bed  Goal: STG-Within one week, patient will move supine to sit  1) Individualized goal: Transfer supine to/from sit sba 1 week  2) Interventions:  PT Gait Training, PT Therapeutic Exercises, PT Neuro Re-Ed/Balance and PT Therapeutic Activity     Outcome: MET Date Met: 01/02/19  On regular mattress

## 2019-01-02 NOTE — REHAB-COLLABORATIVE ONGOING IDT TEAM NOTE
Weekly Interdisciplinary Team Conference Note    Weekly Interdisciplinary Team Conference # 4  Date:  1/2/2019    Clinicians present and reporting at team conference include the following:   MD: Mustapha Brandt MD   RN:  Jerzy Barclay RN  PT:   Vanessa Welsh, PT, DPT  OT:  Harmony Schwarz, MS, OTR/L   ST:  Carla Hammer, MS, CCC-SLP  CM:  Mckayla Mason, RN, CCM  REC:  None  RT:  None  RPh:  Sherri Dumont [facility]  Other:   None  All reporting clinicians have a working knowledge of this patient's plan of care.    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) 12/03/2018     Priority: Medium   • Contraindication to deep vein thrombosis (DVT) prophylaxis 12/03/2018     Priority: Medium   • Essential hypertension 12/03/2018     Priority: Low   • Hyperlipemia 12/03/2018     Priority: Low   • Trauma 12/03/2018     Priority: Low   • Tachycardia 12/16/2018   • Anemia 12/11/2018   • Thrombocytopenia (HCC) 12/11/2018   • Vitamin D deficiency 12/11/2018   • Uncontrolled type 2 diabetes mellitus with hyperglycemia (HCC) 11/12/2018   • Mass of pancreas 06/03/2016     Results     Procedure Component Value Ref Range Date/Time    ACCU-CHEK GLUCOSE [[id number]]  (Abnormal) Collected:  01/02/19 1136    Order Status:  Completed Lab Status:  Final result Updated:  01/02/19 1154     Glucose - Accu-Ck 231 (H) 65 - 99 mg/dL     ACCU-CHEK GLUCOSE [[id number]]  (Abnormal) Collected:  01/02/19 0753    Order Status:  Completed Lab Status:  Final result Updated:  01/02/19 0839     Glucose - Accu-Ck 199 (H) 65 - 99 mg/dL     ACCU-CHEK GLUCOSE  [[id number]]  (Abnormal) Collected:  01/01/19 2101    Order Status:  Completed Lab Status:  Final result Updated:  01/01/19 2134     Glucose - Accu-Ck 211 (H) 65 - 99 mg/dL      Comment: Coverage given  Followed orders  Notified provider         ACCU-CHEK GLUCOSE [[id number]]  (Abnormal) Collected:  01/01/19 1718    Order Status:  Completed Lab Status:  Final result Updated:  01/01/19 1805     Glucose - Accu-Ck 126 (H) 65 - 99 mg/dL     ACCU-CHEK GLUCOSE [[id number]]  (Abnormal) Collected:  01/01/19 1133    Order Status:  Completed Lab Status:  Final result Updated:  01/01/19 1805     Glucose - Accu-Ck 205 (H) 65 - 99 mg/dL      Comment: Followed orders              Nursing  Diet/Nutrition:  Diabetic, Thin Liquids and Other:Chopped Meat  Medication Administration:  Whole with Liquid Wash  % consumed at meals in last 24 hours:  Consumed % of meals per documentation.  Meal/Snack Consumption for the past 24 hrs:   Oral Nutrition   01/01/19 1800 Dinner;Between 25-50% Consumed   01/01/19 1206 Lunch;Between % Consumed   01/01/19 0838 Between % Consumed       Snack schedule:  HS  Supplement:  Other: N/A  Appetite:  Fair  Fluid Intake/Output in past 24 hours: In: 1200 [P.O.:1200]  Out: 1600   Admit Weight:  Weight: 78.2 kg (172 lb 8 oz)  Weight Last 7 Days   [75.4 kg (166 lb 3.2 oz)] 75.4 kg (166 lb 3.2 oz) (12/30 0700)    Pain Issues:    Location:  Shoulder (01/01 0516)  Right (01/01 0516)         Severity:  Severe   Scheduled pain medications:  gabapentin (NEURONTIN) Tylenol  PRN pain medications used in last 24 hours:  oxycodone immediate release (ROXICODONE)    Non Pharmacologic Interventions:  rest  Effectiveness of pain management plan:  good=patient states acceptable comfort after interventions    Bowel:    Bowel Assist Initial Score:  1 - Total Assistance  Bowel Assist Current Score:  1 - Total Assistance  Bowl Accidents in last 7 days:  0  Last bowel movement: 01/01/19  Stool Description: Medium,  Formed     Usual bowel pattern:  daily  Scheduled bowel medications:  senna-docusate (PERICOLACE or SENOKOT S)   PRN bowel medications used in last 24 hours:  None  Nursing Interventions:  Increased time, Brief, Positioning on commode/toilet, Verbal cueing  Effectiveness of bowel program:   good=regular, predictable, controlled emptying of bowel  Bladder:    Bladder Assist Initial Score:  1 - Total Assistance  Bladder Assist Current Score:  1 - Total Assistance  Bladder Accidents in last 7 days:  0  PVR range for past 24-48 hours: -- ()  Intermittent Catheterization:  N/A  Medications affecting bladder:  None    Time void schedule/voiding pattern:  Voiding every 2-4 hours  Interventions:  Increased time, Verbal cueing, Emptying of device, Urinal, Brief, Time void patient initiated  Effectiveness of bladder training:  Good=regular, predictable, emptying of bladder, patient initiates time voiding    Diabetes:  Blood Sugar Frequency:  Before meals and at bedtime    Range of BS for last 48 hours:   Recent Labs      12/31/18   0801  12/31/18   1036  12/31/18   1723  12/31/18   2201  01/01/19   0749  01/01/19   1133  01/01/19   1718  01/01/19   2101   POCGLUCOSE  179*  228*  159*  190*  160*  205*  126*  211*     Scheduled diabetic medications:  Other Actos   Sliding scale usage in past 24 hours:  Yes  Total Short acting insulin in the past 24 hours:  Humulin 11 units  Total Long acting insulin in the past 24 hours:  None    Wound:         Patient Lines/Drains/Airways Status    Active Current Wounds     Name: Placement date: Placement time: Site: Days:    Buttocks/Sacral                           Interventions:  Mepilex  Effectiveness of intervention:  wound is improving     Sleep/wake cycle:   Average hours slept:  Sleeps 3-4 hours without waking  Sleep medication usage:  None    Patient/Family Training/Education:  Diabetes Management, Fall Prevention, General Self Care, Medication Management, Safe Transfers and Wound  Care  Discharge Supply Recommendations:  Glucometer and Strips and Wound Care Supplies  Strengths: Alert and oriented, Able to follow instructions and Effective communication skills   Barriers:   Generalized weakness and Limited mobility            Nursing Problems           Problem: Bowel/Gastric:     Goal: Normal bowel function is maintained or improved           Goal: Will not experience complications related to bowel motility             Problem: Discharge Barriers/Planning     Goal: Patient's continuum of care needs will be met             Problem: Infection     Goal: Will remain free from infection             Problem: Knowledge Deficit     Goal: Knowledge of disease process/condition, treatment plan, diagnostic tests, and medications will improve           Goal: Knowledge of the prescribed therapeutic regimen will improve             Problem: Metabolic:     Goal: Ability to maintain appropriate glucose levels will improve             Problem: Mobility     Goal: Risk for activity intolerance will decrease             Problem: Pain Management     Goal: Pain level will decrease to patient's comfort goal     Flowsheet:     Taken at 12/31/18 0125    Pain Scale 0 - 10  0 by Van Oconnor R.N.    Non Verbal Scale  Calm by Van Oconnor R.N.    Comfort Goal Comfort at Rest;Sleep Comfortably by Van Oconnor R.N.    Taken at 12/22/18 0800    Pain Scale 0 - 10  0 by Janay Leach R.N.    Comfort Goal Comfort at Rest;Comfort with Movement;Sleep Comfortably;Stay Alert by Janay Leach R.N.    Non Verbal Scale  Calm;Unlabored Breathing by Janay Leach R.N.                  Problem: Psychosocial Needs:     Goal: Level of anxiety will decrease             Problem: Respiratory:     Goal: Respiratory status will improve             Problem: Safety     Goal: Will remain free from injury           Goal: Will remain free from falls             Problem: Skin Integrity     Goal: Risk for impaired skin  "integrity will decrease             Problem: Venous Thromboembolism (VTW)/Deep Vein Thrombosis (DVT) Prevention:     Goal: Patient will participate in Venous Thrombosis (VTE)/Deep Vein Thrombosis (DVT)Prevention Measures     Flowsheet:     Taken at 12/26/18 1457    Pharmacologic Prophylaxis Used Unfractionated Heparin by Mouna Lee RGERMAIN    Taken at 12/13/18 2200    Pharmacologic Prophylaxis Used Unfractionated Heparin by Diana Kramer R.N.                       Long Term Goals:   At discharge patient will be able to function safely at home and in the community with support.    Section completed by:  Niya Kruse L.P.N.2              Mobility  Bed mobility:   SBA- min A  Bed /Chair/Wheelchair Transfer Initial:  3 - Moderate Assistance  Bed /Chair/Wheelchair Transfer Current:  4 - Minimal Assistance   Bed/Chair/Wheelchair Transfer Description:  Increased time, Verbal cueing, Set-up of equipment  Walk Initial:  1 - Total Assistance  Walk Current:  5 - Standby Prompting/Supervision or Set-up   Walk Description:   (pt amb x625' with 4WW and SBA. pt amb with flexed posture, dec evan, dec heel strike at IC, dec speed)  Wheelchair Initial:  2 - Max Assistance  Wheelchair Current:  6 - Modified Independent   Wheelchair Description:  Extra time (150 ft using UEs and LEs, mod I )  Stairs Initial:  0 - Not tested,medical condition  Stairs Current: 4 - Minimal Assistance   Stairs Description: Extra time, Hand rails, Verbal cueing, Requires incidental assist (12 4\" steps with 2 HRs, CGA with 1 instance of min A for LOB)  Patient/Family Training/Education:  TBD   DME/DC Recommendations:  Possible transition to SNF before DC home alone  Strengths:  Independent PLOF, Making steady progress towards goals, Motivated for self care and independence, Pleasant and cooperative and Willingly participates in therapeutic activities  Barriers:   Decreased endurance, Limited mobility, Poor balance and Other: poor UE function "   # of short term goals set= 2  # of short term goals met= 2       Physical Therapy Problems           Problem: PT-Long Term Goals     Dates: Start: 12/11/18       Goal: LTG-By discharge, patient will ambulate     Dates: Start: 12/11/18       Description: 1) Individualized goal: Gait fww household 150 ft, community 400 ft, supervision at discharge  2) Interventions:  PT Gait Training, PT Therapeutic Exercises, PT Neuro Re-Ed/Balance and PT Therapeutic Activity             Goal: LTG-By discharge, patient will transfer one surface to another     Dates: Start: 12/11/18       Description: 1) Individualized goal: Transfer one surface to another fww modified independent at discharge  2) Interventions:  PT Gait Training, PT Therapeutic Exercises, PT Neuro Re-Ed/Balance and PT Therapeutic Activity             Goal: LTG-By discharge, patient will transfer in/out of a car     Dates: Start: 12/11/18       Description: 1) Individualized goal: Car transfer fww supervision at discharge  2) Interventions:  PT Gait Training, PT Therapeutic Exercises, PT Neuro Re-Ed/Balance and PT Therapeutic Activity                     Section completed by:  Vanessa Welsh, PT, DPT       Activities of Daily Living  Eating Initial:  1 - Total Assistance  Eating Current:  6 - Modified Independent   Eating Description:  Increased time (increased time for container management d/t BUE weakness)  Grooming Initial:  2 - Max Assistance  Grooming Current:  6 - Modified Independent   Grooming Description:  Increased time (increased time for shaving and brushing teeth seated at sink.)  Bathing Initial:  2 - Max Assistance  Bathing Current:  4 - Minimal Assistance   Bathing Description:  Grab bar, Tub bench, Hand held shower, Long handled bath tool (Min assist to wash buttocks ,  setup / supervision and cues for all other bathing steps. )  Upper Body Dressing Initial:  1 - Total Assistance  Upper Body Dressing Current:  3 - Moderate Assistance   Upper Body  Dressing Description:   ( assist to pull shirt over head and down back. )  Lower Body Dressing Initial:  1 - Total Assistance  Lower Body Dressing Current:  4 - Minimal Assistance   Lower Body Dressing Description:  4 - Minimal Assistance  Toileting Initial:  2 - Max Assistance  Toileting Current:  3 - Moderate Assistance   Toileting Description:   (asisst with wiping    for quality )  Toilet Transfer Initial:  3 - Moderate Assistance  Toilet Transfer Current:  4 - Minimal Assistance   Toilet Transfer Description:  4 - Minimal Assistance  Tub / Shower Transfer Initial:  3 - Moderate Assistance  Tub / Shower Transfer Current:  5 - Standby Prompting/Supervision or Set-up   Tub / Shower Transfer Description:  Grab bar ( SBA and cues for technique )  IADL:  N/A - focus on RUE neuro re-ed  Family Training/Education: No family available for training   DME/DC Recommendations:  Requires physical  assist for ADL's - TBD    Strengths:  Able to follow instructions, Good carryover of learning, Good endurance, Good insight into deficits/needs, Independent PLOF, Making steady progress towards goals, Motivated for self care and independence, Pleasant and cooperative and Willingly participates in therapeutic activities  Barriers:  Generalized weakness, Hemiplegia, Poor family support and Other: Hard of hearing, R UE proximal weakness (R hand dominant), requires verbal cues for adaptive equipment use - poor carry-over of learning        Occupational Therapy Goals           Problem: Dressing     Dates: Start: 12/11/18       Goal: STG-Within one week, patient will dress UB     Dates: Start: 01/02/19       Description: 1) Individualized Goal: W/ min A using AD/AE/DME prn.  2) Interventions: OT E Stim Attended, OT Group Therapy, OT Self Care/ADL, OT Neuro Re-Ed/Balance, OT Sensory Int Techniques, OT Therapeutic Activity, OT Aquatic Therapy and OT Therapeutic Exercise                Problem: OT Long Term Goals     Dates: Start: 12/11/18        Goal: LTG-By discharge, patient will complete basic self care tasks     Dates: Start: 12/11/18       Description: 1) Individualized Goal:  W/ min A (except mod A for UBD) using AD/AE/DME prn.  2) Interventions:  OT E Stim Attended, OT Group Therapy, OT Self Care/ADL, OT Neuro Re-Ed/Balance, OT Sensory Int Techniques, OT Therapeutic Activity, OT Aquatic Therapy and OT Therapeutic Exercise             Goal: LTG-By discharge, patient will perform bathroom transfers     Dates: Start: 12/11/18       Description: 1) Individualized Goal:  W/ SPV using AD/AE/DME prn.  2) Interventions:  OT E Stim Attended, OT Group Therapy, OT Self Care/ADL, OT Neuro Re-Ed/Balance, OT Sensory Int Techniques, OT Therapeutic Activity, OT Aquatic Therapy and OT Therapeutic Exercise               Problem: Toileting     Dates: Start: 12/19/18       Goal: STG-Within one week, patient will complete toileting tasks     Dates: Start: 01/02/19       Description: 1) Individualized Goal: W/ min A using AD/AE/DME prn.  2) Interventions: OT E Stim Attended, OT Group Therapy, OT Self Care/ADL, OT Neuro Re-Ed/Balance, OT Sensory Int Techniques, OT Therapeutic Activity, OT Aquatic Therapy and OT Therapeutic Exercise                    Section completed by:  Harmony Schwarz MS,OTR/L       Cognitive Linquistic Functions  Comprehension Initial:  4 - Minimal Assistance  Comprehension Current:  6 - Modified Independent   Comprehension Description:  Glasses  Expression Initial:  5 - Stand-by Prompting/Supervision or Set-up  Expression Current:  7 - Independent   Expression Description:  Verbal cueing  Social Interaction Initial:  5 - Stand-by Prompting/Supervision or Set-up  Social Interaction Current:  7 - Independent   Social Interaction Description:  Increased time  Problem Solving Initial:  3 - Moderate Assistance  Problem Solving Current:  6 - Modified Independent   Problem Solving Description:  Increased time, Therapy schedule  Memory Initial:  2 -  Clarence Assistance  Memory Current:  6 - Modified Independent   Memory Description:  Verbal cueing, Therapy schedule  Executive Functioning / Organization Initial:     Executive Functioning / Organization Current: 6 Modified Independent    Executive Functioning Desciption: Pt is able to mng meds/finances upon d/c.   Swallowing  Swallowing Status: SLP no longer following for dysphagia. Pt remains in general dining on regular/thin liquids.   Orders Placed This Encounter   Procedures   • Diet Order Diabetic     Standing Status:   Standing     Number of Occurrences:   1     Order Specific Question:   Diet:     Answer:   Diabetic [3]     Order Specific Question:   Texture/Fiber modifications:     Answer:   Chopped Meat [5]     Comments:   cut sandwiches into 1/4     Behavior Modification Plan  Keep instructions simple/concrete, Give clear feedback, Set clear goals, Redirect to task/topic, Provide reasonable choices, Decrease the chance of failure by offering activities that are within the patient's abilities and Analyze tasks (break down into smaller steps)  Assistive Technology  Low/Impaired vision equipment and Low tech: Calendar, planner, schedule, alarms/timers, pill organizer, post-it notes, lists  Family Training/Education:  To be completed with pt's sister  DC Recommendations: Recommend short term home health SLP services upon d/c to ensure carryover of IADLs to new home environment.   Strengths:  Able to follow instructions, Alert and oriented, Effective communication skills, Independent PLOF, Making steady progress towards goals, Manages pain appropriately, Motivated for self care and independence, Pleasant and cooperative, Supportive family and Willingly participates in therapeutic activities  Barriers:  Other: False Pass, STM  # of short term goals set=1  # of short term goals met=1 (+1LTG)       Speech Therapy Problems           Problem: Speech/Swallowing LTGs     Dates: Start: 12/11/18       Goal: LTG-By discharge,  "patient will solve complex problem     Dates: Start: 12/11/18       Description: 1) Individualized goal:  By utilizing compensatory intervention for memory and problem-solving to allow for safe completion of daily activities with MOD I  2) Interventions:  SLP Cognitive Skill Development and SLP Group Treatment                    Section completed by:  Carla Hammer MS,[facility]-SLP          Nutrition       Dietary Problems           Problem: Inadequate nutrient intake     Goal: Patient to consume greater than or equal to 50% of meals (Resolved)               Nutrition services - Brief Note: Day 9 of admit.  Vince Almanzar is a 78 y.o. male with admitting DX of incomplete C1-C4 and Cervical Myelopathy.     Pt with PO intake greatly improved. Current diet order Diabetic Dysphagia III with NTL. Current PO intake of % x 10, 50-75% x 4 and 25-50% x 1. Pt with current PO intake adequate. POC Glucose 149-342 since 12/16. Unfortunately, pt is unable to attend the DM class today. Will follow up with a DM education as able.This RD visited a pt T-Dine. PT to be trialing a regular tray today. Pt says he feels great and that he believes he is eating well. Regarding the pt's diabetes, he noted prior to his fall he was having his DM medication adjusted. In addition he acknowledged at the facility he may be eating foods he does not normally eat and may be affecting his BG. Pt having daily BMs and previously attributed constipation to his variable/poor PO which has since improved.       Assessment:  Height: 177.8 cm (5' 10\")  Weight: 78.2 kg (172 lb 8 oz)  Body mass index is 24.75 kg/m².   Diet/Intake: Diabetic Dysphagia III with NTL    Labs, MAR (Actos and Humulin SSI)and Chart reviewed.     Recommendations/Plan:  1. Diet as ordered. Advancements per SLP.   2. DM diet education as able.    3. Encourage intake of 50% or greater.   4. Document intake of all meals  as % taken in ADL's to provide interdisciplinary " communication across all shifts.   5. Monitor weight.  6. Nutrition rep will continue to see patient for ongoing meal and snack preferences.   Section completed by:  Kena Ghotra R.D.    REHAB-Pharmacy IDT Team Note by Asael Kilgore RPH at 1/1/2019 11:07 AM  Version 1 of 1    Author:  Asael Kilgore RPH Service:  (none) Author Type:  Pharmacist    Filed:  1/1/2019 11:08 AM Date of Service:  1/1/2019 11:07 AM Status:  Signed    :  Asael Kilgore RPH (Pharmacist)         Pharmacy   Pharmacy  Antibiotics/Antifungals/Antivirals:  Medication:      Active Orders     None        Route:        NA  Stop Date:  NA  Reason:      NA  Antihypertensives/Cardiac:  Medication:    Orders (72h ago through future)    Start     Ordered    12/25/18 1530  metoprolol (LOPRESSOR) tablet 25 mg  TWICE DAILY      12/25/18 1504    12/10/18 2100  simvastatin (ZOCOR) tablet 20 mg  NIGHTLY      12/10/18 1510    12/10/18 1510  hydrALAZINE (APRESOLINE) tablet 25 mg  EVERY 8 HOURS PRN      12/10/18 1510        Patient Vitals for the past 24 hrs:   BP Pulse   01/01/19 0700 130/72 87   01/01/19 0416 130/72 94   12/31/18 1850 104/53 94   12/31/18 1400 126/65 79       Anticoagulation:  Medication: Heparin    Other key medications: A review of the medication list reveals no issues at this time. Patient is currently on antihypertensive(s). Recommend home blood pressure monitoring/recording if antihypertensive(s) regimen(s) continue. Patient is currently on diabetic medication(s) and/or Insulin(s). Recommend home blood glucose monitoring/recording if these regimen(s) continue.    Section completed by: Asael Kilgore [facility][[email or web address].1]     Attribution Key     AW.1 - Asael Kilgore RPH on 1/1/2019 11:07 AM                    DC Planning:  DC destination/dispostion:  Patient lives alone in a Senior apartment complex.  He has safety bars in his bathroom and shower.     Referrals: Will update Meals on Wheels.     DC Needs: He will need home  health initially.  [facility].  He has a fww, spc safety bars and shower seat.  He has been doing his own insulin and fingersticks.  We may need to schedule family training closer to d/c.  He will need [facility] and his neurosurgeon.  PCP has been scheduled.  Patient may need transition to [facility] for ongoing rehab to increase his independence for home alone.     Barriers to discharge:  Lives alone     Strengths: sister and her children are very supportive.     Section completed by:  Mckayla Mason R.N.      Physician Summary  Mustapha Brandt MD participated and led team conference discussion.

## 2019-01-02 NOTE — CARE PLAN
Problem: Safety  Goal: Will remain free from injury  Outcome: PROGRESSING AS EXPECTED  Pt uses call light consistently and appropriately. Waits for assistance does not attempt self transfer this shift. Able to verbalize needs.    Problem: Pain Management  Goal: Pain level will decrease to patient's comfort goal  Outcome: PROGRESSING AS EXPECTED  Pt reports no pain today. Pt able to participate in therapies and activities this shift.

## 2019-01-02 NOTE — DISCHARGE PLANNING
Case Management:   Received phone call from patient's sister.  She has concerns that patient will not be ready for d/c yet d/t his lack of arm function.  I will follow with her after team conference today.

## 2019-01-02 NOTE — PROGRESS NOTES
Patient care assumed. Report received from night RN. Patient is alert and calm, resting in bed. Call light and bedside table within reach. Will continue to monitor.

## 2019-01-02 NOTE — CARE PLAN
Problem: Dressing  Goal: STG-Within one week, patient will dress UB  1) Individualized Goal: W/ mod A using AD/AE/DME prn.  2) Interventions: OT E Stim Attended, OT Group Therapy, OT Self Care/ADL, OT Neuro Re-Ed/Balance, OT Sensory Int Techniques, OT Therapeutic Activity, OT Aquatic Therapy and OT Therapeutic Exercise      Outcome: MET Date Met: 01/02/19      Problem: Toileting  Goal: STG-Within one week, patient will complete toileting tasks  1) Individualized Goal: W/ mod A using AD/AE/DME prn.  2) Interventions: OT E Stim Attended, OT Group Therapy, OT Self Care/ADL, OT Neuro Re-Ed/Balance, OT Sensory Int Techniques, OT Therapeutic Activity, OT Aquatic Therapy and OT Therapeutic Exercise      Outcome: MET Date Met: 01/02/19

## 2019-01-02 NOTE — REHAB-NURSING IDT TEAM NOTE
Nursing   Nursing  Diet/Nutrition:  Diabetic, Thin Liquids and Other:Chopped Meat  Medication Administration:  Whole with Liquid Wash  % consumed at meals in last 24 hours:  Consumed % of meals per documentation.  Meal/Snack Consumption for the past 24 hrs:   Oral Nutrition   01/01/19 1800 Dinner;Between 25-50% Consumed   01/01/19 1206 Lunch;Between % Consumed   01/01/19 0838 Between % Consumed       Snack schedule:  HS  Supplement:  Other: N/A  Appetite:  Fair  Fluid Intake/Output in past 24 hours: In: 1200 [P.O.:1200]  Out: 1600   Admit Weight:  Weight: 78.2 kg (172 lb 8 oz)  Weight Last 7 Days   [75.4 kg (166 lb 3.2 oz)] 75.4 kg (166 lb 3.2 oz) (12/30 0700)    Pain Issues:    Location:  Shoulder (01/01 0516)  Right (01/01 0516)         Severity:  Severe   Scheduled pain medications:  gabapentin (NEURONTIN) Tylenol  PRN pain medications used in last 24 hours:  oxycodone immediate release (ROXICODONE)    Non Pharmacologic Interventions:  rest  Effectiveness of pain management plan:  good=patient states acceptable comfort after interventions    Bowel:    Bowel Assist Initial Score:  1 - Total Assistance  Bowel Assist Current Score:  1 - Total Assistance  Bowl Accidents in last 7 days:  0  Last bowel movement: 01/01/19  Stool Description: Medium, Formed     Usual bowel pattern:  daily  Scheduled bowel medications:  senna-docusate (PERICOLACE or SENOKOT S)   PRN bowel medications used in last 24 hours:  None  Nursing Interventions:  Increased time, Brief, Positioning on commode/toilet, Verbal cueing  Effectiveness of bowel program:   good=regular, predictable, controlled emptying of bowel  Bladder:    Bladder Assist Initial Score:  1 - Total Assistance  Bladder Assist Current Score:  1 - Total Assistance  Bladder Accidents in last 7 days:  0  PVR range for past 24-48 hours: -- ()  Intermittent Catheterization:  N/A  Medications affecting bladder:  None    Time void schedule/voiding pattern:  Voiding  every 2-4 hours  Interventions:  Increased time, Verbal cueing, Emptying of device, Urinal, Brief, Time void patient initiated  Effectiveness of bladder training:  Good=regular, predictable, emptying of bladder, patient initiates time voiding    Diabetes:  Blood Sugar Frequency:  Before meals and at bedtime    Range of BS for last 48 hours:   Recent Labs      12/31/18   0801  12/31/18   1036  12/31/18   1723  12/31/18   2201  01/01/19   0749  01/01/19   1133  01/01/19   1718  01/01/19   2101   POCGLUCOSE  179*  228*  159*  190*  160*  205*  126*  211*     Scheduled diabetic medications:  Other Actos   Sliding scale usage in past 24 hours:  Yes  Total Short acting insulin in the past 24 hours:  Humulin 11 units  Total Long acting insulin in the past 24 hours:  None    Wound:         Patient Lines/Drains/Airways Status    Active Current Wounds     Name: Placement date: Placement time: Site: Days:    Buttocks/Sacral                           Interventions:  Mepilex  Effectiveness of intervention:  wound is improving     Sleep/wake cycle:   Average hours slept:  Sleeps 3-4 hours without waking  Sleep medication usage:  None    Patient/Family Training/Education:  Diabetes Management, Fall Prevention, General Self Care, Medication Management, Safe Transfers and Wound Care  Discharge Supply Recommendations:  Glucometer and Strips and Wound Care Supplies  Strengths: Alert and oriented, Able to follow instructions and Effective communication skills   Barriers:   Generalized weakness and Limited mobility            Nursing Problems           Problem: Bowel/Gastric:     Goal: Normal bowel function is maintained or improved           Goal: Will not experience complications related to bowel motility             Problem: Discharge Barriers/Planning     Goal: Patient's continuum of care needs will be met             Problem: Infection     Goal: Will remain free from infection             Problem: Knowledge Deficit     Goal:  Knowledge of disease process/condition, treatment plan, diagnostic tests, and medications will improve           Goal: Knowledge of the prescribed therapeutic regimen will improve             Problem: Metabolic:     Goal: Ability to maintain appropriate glucose levels will improve             Problem: Mobility     Goal: Risk for activity intolerance will decrease             Problem: Pain Management     Goal: Pain level will decrease to patient's comfort goal     Flowsheet:     Taken at 12/31/18 0125    Pain Scale 0 - 10  0 by Van Oconnor R.N.    Non Verbal Scale  Calm by Van Oconnor R.N.    Comfort Goal Comfort at Rest;Sleep Comfortably by Van Oconnor R.N.    Taken at 12/22/18 0800    Pain Scale 0 - 10  0 by Janay Leach R.N.    Comfort Goal Comfort at Rest;Comfort with Movement;Sleep Comfortably;Stay Alert by Janay Leach R.N.    Non Verbal Scale  Calm;Unlabored Breathing by Janay Leach R.N.                  Problem: Psychosocial Needs:     Goal: Level of anxiety will decrease             Problem: Respiratory:     Goal: Respiratory status will improve             Problem: Safety     Goal: Will remain free from injury           Goal: Will remain free from falls             Problem: Skin Integrity     Goal: Risk for impaired skin integrity will decrease             Problem: Venous Thromboembolism (VTW)/Deep Vein Thrombosis (DVT) Prevention:     Goal: Patient will participate in Venous Thrombosis (VTE)/Deep Vein Thrombosis (DVT)Prevention Measures     Flowsheet:     Taken at 12/26/18 1457    Pharmacologic Prophylaxis Used Unfractionated Heparin by Mouna Lee RRamseyNRamsey    Taken at 12/13/18 2200    Pharmacologic Prophylaxis Used Unfractionated Heparin by Diana Kramer RGERMAIN                       Long Term Goals:   At discharge patient will be able to function safely at home and in the community with support.    Section completed by:  Niya Kurse L.P.N.2

## 2019-01-02 NOTE — WOUND TEAM
Follow up for left inner buttock partial thickness wound.  Patient stood up from sitting in wheelchair and removed mepilex dressing revealing intact skin that is pink and blanching.  Wound is resolved.  Replaced Mepilex dressing and assisted with patients clothing and he sat back to wheelchair.  Discussed with staff RN.  No further involvement by wound team.

## 2019-01-03 LAB
GLUCOSE BLD-MCNC: 149 MG/DL (ref 65–99)
GLUCOSE BLD-MCNC: 150 MG/DL (ref 65–99)
GLUCOSE BLD-MCNC: 236 MG/DL (ref 65–99)
GLUCOSE BLD-MCNC: 268 MG/DL (ref 65–99)

## 2019-01-03 PROCEDURE — 97110 THERAPEUTIC EXERCISES: CPT

## 2019-01-03 PROCEDURE — 700102 HCHG RX REV CODE 250 W/ 637 OVERRIDE(OP): Performed by: HOSPITALIST

## 2019-01-03 PROCEDURE — 82962 GLUCOSE BLOOD TEST: CPT

## 2019-01-03 PROCEDURE — 97530 THERAPEUTIC ACTIVITIES: CPT

## 2019-01-03 PROCEDURE — 97535 SELF CARE MNGMENT TRAINING: CPT

## 2019-01-03 PROCEDURE — A9270 NON-COVERED ITEM OR SERVICE: HCPCS | Performed by: PHYSICAL MEDICINE & REHABILITATION

## 2019-01-03 PROCEDURE — A9270 NON-COVERED ITEM OR SERVICE: HCPCS | Performed by: INTERNAL MEDICINE

## 2019-01-03 PROCEDURE — 99232 SBSQ HOSP IP/OBS MODERATE 35: CPT | Performed by: HOSPITALIST

## 2019-01-03 PROCEDURE — A9270 NON-COVERED ITEM OR SERVICE: HCPCS | Performed by: HOSPITALIST

## 2019-01-03 PROCEDURE — 700111 HCHG RX REV CODE 636 W/ 250 OVERRIDE (IP): Performed by: PHYSICAL MEDICINE & REHABILITATION

## 2019-01-03 PROCEDURE — 770010 HCHG ROOM/CARE - REHAB SEMI PRIVAT*

## 2019-01-03 PROCEDURE — 700102 HCHG RX REV CODE 250 W/ 637 OVERRIDE(OP): Performed by: PHYSICAL MEDICINE & REHABILITATION

## 2019-01-03 PROCEDURE — 700102 HCHG RX REV CODE 250 W/ 637 OVERRIDE(OP): Performed by: INTERNAL MEDICINE

## 2019-01-03 PROCEDURE — 99232 SBSQ HOSP IP/OBS MODERATE 35: CPT | Performed by: PHYSICAL MEDICINE & REHABILITATION

## 2019-01-03 PROCEDURE — G0515 COGNITIVE SKILLS DEVELOPMENT: HCPCS

## 2019-01-03 PROCEDURE — 700112 HCHG RX REV CODE 229: Performed by: PHYSICAL MEDICINE & REHABILITATION

## 2019-01-03 RX ADMIN — VITAMIN D, TAB 1000IU (100/BT) 2000 UNITS: 25 TAB at 08:08

## 2019-01-03 RX ADMIN — SIMVASTATIN 20 MG: 20 TABLET, FILM COATED ORAL at 20:34

## 2019-01-03 RX ADMIN — HEPARIN SODIUM 5000 UNITS: 5000 INJECTION, SOLUTION INTRAVENOUS; SUBCUTANEOUS at 20:50

## 2019-01-03 RX ADMIN — METOPROLOL TARTRATE 25 MG: 25 TABLET ORAL at 17:28

## 2019-01-03 RX ADMIN — ACETAMINOPHEN 650 MG: 325 TABLET ORAL at 12:05

## 2019-01-03 RX ADMIN — ACETAMINOPHEN 650 MG: 325 TABLET ORAL at 05:40

## 2019-01-03 RX ADMIN — OMEPRAZOLE 20 MG: 20 CAPSULE, DELAYED RELEASE ORAL at 08:09

## 2019-01-03 RX ADMIN — PIOGLITAZONE 45 MG: 15 TABLET ORAL at 08:09

## 2019-01-03 RX ADMIN — METOPROLOL TARTRATE 25 MG: 25 TABLET ORAL at 05:40

## 2019-01-03 RX ADMIN — GABAPENTIN 300 MG: 300 CAPSULE ORAL at 20:34

## 2019-01-03 RX ADMIN — SENNOSIDES AND DOCUSATE SODIUM 1 TABLET: 8.6; 5 TABLET ORAL at 20:34

## 2019-01-03 RX ADMIN — GABAPENTIN 300 MG: 300 CAPSULE ORAL at 15:05

## 2019-01-03 RX ADMIN — ACETAMINOPHEN 650 MG: 325 TABLET ORAL at 17:28

## 2019-01-03 RX ADMIN — OXYCODONE HYDROCHLORIDE 2.5 MG: 5 TABLET ORAL at 20:34

## 2019-01-03 RX ADMIN — DOCUSATE SODIUM 100 MG: 100 CAPSULE, LIQUID FILLED ORAL at 05:40

## 2019-01-03 RX ADMIN — HEPARIN SODIUM 5000 UNITS: 5000 INJECTION, SOLUTION INTRAVENOUS; SUBCUTANEOUS at 15:00

## 2019-01-03 RX ADMIN — GABAPENTIN 300 MG: 300 CAPSULE ORAL at 08:09

## 2019-01-03 RX ADMIN — INSULIN HUMAN 4 UNITS: 100 INJECTION, SOLUTION PARENTERAL at 20:41

## 2019-01-03 RX ADMIN — INSULIN HUMAN 7 UNITS: 100 INJECTION, SOLUTION PARENTERAL at 11:31

## 2019-01-03 RX ADMIN — HEPARIN SODIUM 5000 UNITS: 5000 INJECTION, SOLUTION INTRAVENOUS; SUBCUTANEOUS at 05:39

## 2019-01-03 ASSESSMENT — ENCOUNTER SYMPTOMS
COUGH: 0
BLURRED VISION: 0
DIARRHEA: 0
FEVER: 0
NERVOUS/ANXIOUS: 0
DIZZINESS: 0

## 2019-01-03 ASSESSMENT — PAIN SCALES - GENERAL
PAINLEVEL_OUTOF10: 7
PAINLEVEL_OUTOF10: 2

## 2019-01-03 ASSESSMENT — ACTIVITIES OF DAILY LIVING (ADL)
TOILET_TRANSFER_LEVEL_OF_ASSIST: REQUIRES PHYSICAL ASSIST WITH TOILET TRANSFER
TOILETING_LEVEL_OF_ASSIST: REQUIRES PHYSICAL ASSIST WITH TOILETING
SHOWER_TRANSFER_LEVEL_OF_ASSIST: REQUIRES SUPERVISION WITH SHOWER TRANSFER

## 2019-01-03 ASSESSMENT — PATIENT HEALTH QUESTIONNAIRE - PHQ9
2. FEELING DOWN, DEPRESSED, IRRITABLE, OR HOPELESS: NOT AT ALL
SUM OF ALL RESPONSES TO PHQ9 QUESTIONS 1 AND 2: 0
1. LITTLE INTEREST OR PLEASURE IN DOING THINGS: NOT AT ALL

## 2019-01-03 NOTE — CARE PLAN
Problem: Dressing  Goal: STG-Within one week, patient will dress UB  1) Individualized Goal: W/ min A using AD/AE/DME prn.  2) Interventions: OT E Stim Attended, OT Group Therapy, OT Self Care/ADL, OT Neuro Re-Ed/Balance, OT Sensory Int Techniques, OT Therapeutic Activity, OT Aquatic Therapy and OT Therapeutic Exercise      Outcome: NOT MET  Pt continues to require mod A d/t B UE weakness.     Problem: OT Long Term Goals  Goal: LTG-By discharge, patient will complete basic self care tasks  1) Individualized Goal:  W/ min A (except mod A for UBD) using AD/AE/DME prn.  2) Interventions:  OT E Stim Attended, OT Group Therapy, OT Self Care/ADL, OT Neuro Re-Ed/Balance, OT Sensory Int Techniques, OT Therapeutic Activity, OT Aquatic Therapy and OT Therapeutic Exercise     Outcome: NOT MET  Pt continues to require mod A for toileting d/t B UE weakness and balance deficits.   Goal: LTG-By discharge, patient will perform bathroom transfers  1) Individualized Goal:  W/ SPV using AD/AE/DME prn.  2) Interventions:  OT E Stim Attended, OT Group Therapy, OT Self Care/ADL, OT Neuro Re-Ed/Balance, OT Sensory Int Techniques, OT Therapeutic Activity, OT Aquatic Therapy and OT Therapeutic Exercise     Outcome: NOT MET  Min A required for toilet t/f.

## 2019-01-03 NOTE — DISCHARGE PLANNING
Per aLuren at [facility], referral declined as they are not contracted with patient's insurance.  Also, they are not admitting at this time due to staffing.

## 2019-01-03 NOTE — PROGRESS NOTES
[facility] Medicine Daily Progress Note    Date of Service  1/3/2019    Chief Complaint:  Hypertension  Diabetes    Interval History:  No significant events or changes since last visit  Patient denies SOB, cough, or diarrhea  Patient slept ok last night      Review of Systems  Review of Systems   Constitutional: Negative for fever.   Eyes: Negative for blurred vision.   Respiratory: Negative for cough.    Cardiovascular: Negative for chest pain.   Gastrointestinal: Negative for diarrhea.   Musculoskeletal: Negative for joint pain.   Neurological: Negative for dizziness.   Psychiatric/Behavioral: The patient is not nervous/anxious.         Physical Exam  Temp:  [36.6 °C (97.9 °F)-37.2 °C (98.9 °F)] 36.6 °C (97.9 °F)  Pulse:  [] 84  Resp:  [18] 18  BP: (106-134)/(60-74) 106/61    Physical Exam   Constitutional: He is oriented to person, place, and time.   HENT:   Mouth/Throat: No oropharyngeal exudate.   Eyes: EOM are normal.   Neck:   Has C-collar   Cardiovascular: Normal rate, regular rhythm, S1 normal and S2 normal.    No murmur heard.  Pulmonary/Chest: Effort normal and breath sounds normal. No stridor. He has no rhonchi.   Abdominal: Soft. Bowel sounds are normal. Hernia confirmed negative in the right inguinal area and confirmed negative in the left inguinal area.   Musculoskeletal: He exhibits no edema.   Neurological: He is alert and oriented to person, place, and time. No sensory deficit.   Skin: Skin is warm and dry. No rash noted. No cyanosis.   Psychiatric: He has a normal mood and affect. His behavior is normal.   Nursing note and vitals reviewed.      Fluids    Intake/Output Summary (Last 24 hours) at 01/03/19 1043  Last data filed at 01/03/19 0800   Gross per 24 hour   Intake              600 ml   Output              600 ml   Net                0 ml       Laboratory      Recent Labs      01/01/19   0551   SODIUM  135   POTASSIUM  4.5   CHLORIDE  105   CO2  19*   GLUCOSE  196*   BUN  59*   CREATININE   2.26*   CALCIUM  9.5                   Imaging    Assessment/Plan  * Central cord syndrome (HCC)- (present on admission)   Assessment & Plan    Conservative management per Neurosurgery  Maintain cervical collar     Chronic renal failure- (present on admission)   Assessment & Plan    CKD Stage III  Creatinine at baseline  Bun more elevated over baseline  Likely a little dehydrated -- encouraging fluid (water/juice) intake  Continue to monitor     Type 2 diabetes mellitus (HCC)- (present on admission)   Assessment & Plan    Hba1c: 6.9 (12/11)  BS: 126-231  On Actos: 30 mg daily --> 45 mg daily  Note: home meds was Actos 45 mg daily  Note: pt will be started on different meds when he goes home (sees Endocrinology)  Cont to monitor     Tachycardia   Assessment & Plan    HR recently ok   On Lopressor: 25 mg bid  Cont to monitor     Vitamin D deficiency   Assessment & Plan    On supplements     Hyperlipemia- (present on admission)   Assessment & Plan    On statin     Essential hypertension- (present on admission)   Assessment & Plan    BP ok  On Lopressor: 25 mg bid  Cont to monitor

## 2019-01-03 NOTE — CARE PLAN
Problem: Safety  Goal: Will remain free from falls  Outcome: PROGRESSING AS EXPECTED  Patient uses call light appropriately and does not demonstrate any unsafe behaviors. Bed is locked and in the lowest position with 3 bed rails up. Call light is in reach, all needs met at this time.     Problem: Metabolic:  Goal: Ability to maintain appropriate glucose levels will improve  Outcome: PROGRESSING SLOWER THAN EXPECTED  Patient's HS blood sugar reading was 348, he received 10 units of his Humulin R. HS snack was provided, will continue to monitor patient for s/s of hypo/hyperglycemia.

## 2019-01-03 NOTE — CARE PLAN
Problem: Skin Integrity  Goal: Risk for impaired skin integrity will decrease  Outcome: PROGRESSING SLOWER THAN EXPECTED  Patient's skin remains fragile red in lower extremities, mepilex in place on coccyx changed and wound care performed as per order, no new new or accidental injury this shift.  Will continue to monitor and provide care.

## 2019-01-03 NOTE — DISCHARGE PLANNING
Per Nadeen at [facility], referral accepted pending insurance authorization.  CM notified.

## 2019-01-03 NOTE — DISCHARGE PLANNING
Case Management;  The 2 facilities we have referred are not available to patient with his new insurance.  Will complete referral to [facility] and rehab and [facility] acute and follow.  Updated patient's sister.  Will follow.  We will plan for transfer when bed obtained.  Patient is in agreement.

## 2019-01-03 NOTE — PROGRESS NOTES
"Rehab Progress Note     Encounter date: 1/3/2019  Today I met with the patient face to face in his room    Chief Complaint:  Central cord syndrome (HCC) , discharge plans    Interval Events (subjective)  Mr. Almanzar reports that he is noticing ongoing progress in his right upper limb.  This has been very gradual, but he is encouraged by the progress nonetheless.  He denies any fevers, chills, headache, dizziness, chest pain, or shortness of breath.  He reports that he is eating and drinking well.  We discussed discharge plans to a skilled nursing facility in order to continue working on his right upper limb function, and he was agreeable to this plan.    Objective:  VITAL SIGNS: /61   Pulse 84   Temp 36.6 °C (97.9 °F) (Temporal)   Resp 18   Ht 1.778 m (5' 10\")   Wt 75.4 kg (166 lb 3.2 oz)   SpO2 98%   BMI 23.85 kg/m²     Recent Results (from the past 72 hour(s))   ACCU-CHEK GLUCOSE    Collection Time: 12/31/18  5:23 PM   Result Value Ref Range    Glucose - Accu-Ck 159 (H) 65 - 99 mg/dL   ACCU-CHEK GLUCOSE    Collection Time: 12/31/18 10:01 PM   Result Value Ref Range    Glucose - Accu-Ck 190 (H) 65 - 99 mg/dL   BASIC METABOLIC PANEL    Collection Time: 01/01/19  5:51 AM   Result Value Ref Range    Sodium 135 135 - 145 mmol/L    Potassium 4.5 3.6 - 5.5 mmol/L    Chloride 105 96 - 112 mmol/L    Co2 19 (L) 20 - 33 mmol/L    Glucose 196 (H) 65 - 99 mg/dL    Bun 59 (H) 8 - 22 mg/dL    Creatinine 2.26 (H) 0.50 - 1.40 mg/dL    Calcium 9.5 8.5 - 10.5 mg/dL    Anion Gap 11.0 0.0 - 11.9   ESTIMATED GFR    Collection Time: 01/01/19  5:51 AM   Result Value Ref Range    GFR If  34 (A) >60 mL/min/1.73 m 2    GFR If Non  28 (A) >60 mL/min/1.73 m 2   ACCU-CHEK GLUCOSE    Collection Time: 01/01/19  7:49 AM   Result Value Ref Range    Glucose - Accu-Ck 160 (H) 65 - 99 mg/dL   ACCU-CHEK GLUCOSE    Collection Time: 01/01/19 11:33 AM   Result Value Ref Range    Glucose - Accu-Ck 205 (H) 65 " - 99 mg/dL   ACCU-CHEK GLUCOSE    Collection Time: 01/01/19  5:18 PM   Result Value Ref Range    Glucose - Accu-Ck 126 (H) 65 - 99 mg/dL   ACCU-CHEK GLUCOSE    Collection Time: 01/01/19  9:01 PM   Result Value Ref Range    Glucose - Accu-Ck 211 (H) 65 - 99 mg/dL   ACCU-CHEK GLUCOSE    Collection Time: 01/02/19  7:53 AM   Result Value Ref Range    Glucose - Accu-Ck 199 (H) 65 - 99 mg/dL   ACCU-CHEK GLUCOSE    Collection Time: 01/02/19 11:36 AM   Result Value Ref Range    Glucose - Accu-Ck 231 (H) 65 - 99 mg/dL   ACCU-CHEK GLUCOSE    Collection Time: 01/02/19  5:29 PM   Result Value Ref Range    Glucose - Accu-Ck 139 (H) 65 - 99 mg/dL   ACCU-CHEK GLUCOSE    Collection Time: 01/02/19  9:11 PM   Result Value Ref Range    Glucose - Accu-Ck 348 (H) 65 - 99 mg/dL   ACCU-CHEK GLUCOSE    Collection Time: 01/03/19  7:22 AM   Result Value Ref Range    Glucose - Accu-Ck 150 (H) 65 - 99 mg/dL   ACCU-CHEK GLUCOSE    Collection Time: 01/03/19 11:29 AM   Result Value Ref Range    Glucose - Accu-Ck 268 (H) 65 - 99 mg/dL       Current Facility-Administered Medications   Medication Frequency   • pioglitazone (ACTOS) tablet 45 mg DAILY   • oxyCODONE immediate-release (ROXICODONE) tablet 2.5 mg Q8HRS PRN   • metoprolol (LOPRESSOR) tablet 25 mg TWICE DAILY   • docusate sodium (COLACE) capsule 100 mg QDAY PRN   • magnesium hydroxide (MILK OF MAGNESIA) suspension 30 mL QDAY PRN   • polyethylene glycol/lytes (MIRALAX) PACKET 1 Packet PRN   • gabapentin (NEURONTIN) capsule 300 mg TID   • vitamin D (cholecalciferol) tablet 2,000 Units DAILY   • ascorbic acid tablet 500 mg Q48HRS   • acetaminophen (TYLENOL) tablet 650 mg Q6HRS   • heparin injection 5,000 Units Q8HRS   • insulin regular (HUMULIN R) injection 3-14 Units 4X/DAY ACHS    And   • glucose 4 g chewable tablet 16 g Q15 MIN PRN    And   • dextrose 50% (D50W) injection 25 mL Q15 MIN PRN   • senna-docusate (PERICOLACE or SENOKOT S) 8.6-50 MG per tablet 1 Tab Nightly   • simvastatin  (ZOCOR) tablet 20 mg Nightly   • Respiratory Care per Protocol Continuous RT   • Pharmacy Consult Request ...Pain Management Review 1 Each PRN   • hydrALAZINE (APRESOLINE) tablet 25 mg Q8HRS PRN   • acetaminophen (TYLENOL) tablet 650 mg Q4HRS PRN   • artificial tears 1.4 % ophthalmic solution 1 Drop PRN   • benzocaine-menthol (CEPACOL) lozenge 1 Lozenge Q2HRS PRN   • mag hydrox-al hydrox-simeth (MAALOX PLUS ES or MYLANTA DS) suspension 20 mL Q2HRS PRN   • ondansetron (ZOFRAN ODT) dispertab 4 mg 4X/DAY PRN    Or   • ondansetron (ZOFRAN) syringe/vial injection 4 mg 4X/DAY PRN   • traZODone (DESYREL) tablet 50 mg QHS PRN   • sodium chloride (OCEAN) 0.65 % nasal spray 2 Spray PRN   • cyclobenzaprine (FLEXERIL) tablet 10 mg TID PRN   • ferrous sulfate tablet 325 mg Q48HRS   • omeprazole (PRILOSEC) capsule 20 mg DAILY       Exam Date: 1/3/2019    General:  Awake, alert, oriented, no acute distress  HEENT:  Wearing Aspen cervical collar  Cardiac: regular rate and rhythm  Lungs: clear to auscultation bilaterally.   Abdomen: soft; non tender, non distended, bowel sounds present and normoactive  Extremities: No edema in the bilateral lower limbs  Neuro:   Continued gradual progress in the right upper limb.  Right elbow flexor remains stable at 3/5 today, but contraction is more fluid.  There is cocontraction present with over pronation during elbow flexion.  Right shoulder abduction remains weak at 2-/5.  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 Simon    Scalp sutures removed December 14    Continued gradual functional improvement    Speech continues to follow for executive functions    Dysphagia--improved  Speech has completed swallow therapy     Pain  Schedule Tylenol every 6 hours  Gabapentin 300 mg TID--do not escalate due to renal impairment   Oxycodone 2.5 mg every 8 hours as needed for severe pain--dose interval increase December 26    He has used no oxycodone in the last 24 hours     Neurogenic Bladder   Continue timed voiding  Now voiding without issue     Neurogenic Bowel  Colace twice a day  Milk of magnesia daily  Pat-Colace nightly     History of hypertension  Orthostatic hypotension   Tachycardia  At baseline, patient on lisinopril 10 mg daily and propranolol 40 mg 3 times a day    Blood pressure today of 106/61 mmHg    ProAmatine discontinued December 21    Continue to monitor and adjust dosing if needed    Lopressor 25 mg BID--started on December 26    Appreciate hospitalist assistance    Continuing to reinforce the importance of hydration    Diabetes mellitus with hyperglycemia  Currently on sliding scale  Hospitalist working  on weaning sliding scale  Actos 45 mg daily--increased on January 2    Glucose range of 150-348 in last 24 hours    Stage III chronic kidney disease  Continuing to reinforce the importance of hydration  Encourage patient to take at least 2000 mL of oral hydration    Appreciate hospitalist assistance     Hyperlipidemia  Simvastatin 20 mg daily      Anemia  Hemoglobin stable at 10.3 on December 31  Continue ferrous sulfate    Vitamin D deficiency  Vitamin D was 21 on admission  Continue supplementation with 2000 units of cholecalciferol daily     GI Ppx - Patient on Prilosec 20 mg daily.     DVT ppx - Patient on Heparin 5000 q8h .     Estimated Discharge: We will need to transition to skilled nursing due to his right arm weakness and need for assistance with ADLs    Total time:  26 minutes.  I spent greater than 50% of the time for patient care, counseling, and coordination on this date, including unit/floor time, and face-to-face time with the patient as per interval events and assessment and plan above. Topics discussed included functional progress, ongoing deficits with right upper limb, skilled nursing facility referral, hydration       Mustapha Brandt M.D.  1/3/2019

## 2019-01-03 NOTE — CARE PLAN
Problem: Pain Management  Goal: Pain level will decrease to patient's comfort goal  Outcome: PROGRESSING AS EXPECTED  Patient able to verbalize needs.  Denies pain or discomfort this shift and no s/s same noted.  Will continue to monitor.

## 2019-01-03 NOTE — PROGRESS NOTES
Patient care assumed. Report received from night RN. Patient is calm, resting in bed, resp even and unlabored bilat, eyes closed. Call light and bedside table within reach. Will continue to monitor.

## 2019-01-03 NOTE — DISCHARGE PLANNING
Case Management;  Received phone call from patient's sister.  She tells me that patient signed up for [facility] which is effective on January 1st.  She does not yet have his card and member number but he did receive a letter verifying this.  I have asked admission staff to verify.  I will need to confirm which facilities are on plan in [place].

## 2019-01-03 NOTE — DISCHARGE PLANNING
Case Management;  Met with patient and called his sister.  Reviewed skilled rehabs in [place].  List provided.  Will refer to skilled facilities in [place] and follow.  Both are in agreement with plans.

## 2019-01-03 NOTE — CARE PLAN
Problem: Bowel/Gastric:  Goal: Normal bowel function is maintained or improved  Outcome: PROGRESSING AS EXPECTED  Patient reports difficulty passing stool the past couple days, prn colace was given and water was encouraged. Will monitor for results.

## 2019-01-03 NOTE — DISCHARGE PLANNING
Per Champ at [facility], referral declined as they are not open for admissions yet.

## 2019-01-04 VITALS
SYSTOLIC BLOOD PRESSURE: 110 MMHG | TEMPERATURE: 97.5 F | RESPIRATION RATE: 18 BRPM | BODY MASS INDEX: 23.79 KG/M2 | OXYGEN SATURATION: 100 % | HEIGHT: 70 IN | HEART RATE: 80 BPM | WEIGHT: 166.2 LBS | DIASTOLIC BLOOD PRESSURE: 52 MMHG

## 2019-01-04 LAB
ANION GAP SERPL CALC-SCNC: 10 MMOL/L (ref 0–11.9)
BUN SERPL-MCNC: 63 MG/DL (ref 8–22)
CALCIUM SERPL-MCNC: 9.5 MG/DL (ref 8.5–10.5)
CHLORIDE SERPL-SCNC: 106 MMOL/L (ref 96–112)
CO2 SERPL-SCNC: 20 MMOL/L (ref 20–33)
CREAT SERPL-MCNC: 2.33 MG/DL (ref 0.5–1.4)
GLUCOSE BLD-MCNC: 187 MG/DL (ref 65–99)
GLUCOSE BLD-MCNC: 220 MG/DL (ref 65–99)
GLUCOSE SERPL-MCNC: 211 MG/DL (ref 65–99)
MAGNESIUM SERPL-MCNC: 1.8 MG/DL (ref 1.5–2.5)
PHOSPHATE SERPL-MCNC: 4 MG/DL (ref 2.5–4.5)
POTASSIUM SERPL-SCNC: 4.5 MMOL/L (ref 3.6–5.5)
SODIUM SERPL-SCNC: 136 MMOL/L (ref 135–145)

## 2019-01-04 PROCEDURE — 36415 COLL VENOUS BLD VENIPUNCTURE: CPT

## 2019-01-04 PROCEDURE — 700111 HCHG RX REV CODE 636 W/ 250 OVERRIDE (IP): Performed by: PHYSICAL MEDICINE & REHABILITATION

## 2019-01-04 PROCEDURE — 83735 ASSAY OF MAGNESIUM: CPT

## 2019-01-04 PROCEDURE — 84100 ASSAY OF PHOSPHORUS: CPT

## 2019-01-04 PROCEDURE — 99232 SBSQ HOSP IP/OBS MODERATE 35: CPT | Performed by: HOSPITALIST

## 2019-01-04 PROCEDURE — A9270 NON-COVERED ITEM OR SERVICE: HCPCS | Performed by: PHYSICAL MEDICINE & REHABILITATION

## 2019-01-04 PROCEDURE — A9270 NON-COVERED ITEM OR SERVICE: HCPCS | Performed by: INTERNAL MEDICINE

## 2019-01-04 PROCEDURE — 97110 THERAPEUTIC EXERCISES: CPT

## 2019-01-04 PROCEDURE — 97530 THERAPEUTIC ACTIVITIES: CPT

## 2019-01-04 PROCEDURE — 700102 HCHG RX REV CODE 250 W/ 637 OVERRIDE(OP): Performed by: HOSPITALIST

## 2019-01-04 PROCEDURE — A9270 NON-COVERED ITEM OR SERVICE: HCPCS | Performed by: HOSPITALIST

## 2019-01-04 PROCEDURE — 80048 BASIC METABOLIC PNL TOTAL CA: CPT

## 2019-01-04 PROCEDURE — 700102 HCHG RX REV CODE 250 W/ 637 OVERRIDE(OP): Performed by: PHYSICAL MEDICINE & REHABILITATION

## 2019-01-04 PROCEDURE — 82962 GLUCOSE BLOOD TEST: CPT

## 2019-01-04 PROCEDURE — 700102 HCHG RX REV CODE 250 W/ 637 OVERRIDE(OP): Performed by: INTERNAL MEDICINE

## 2019-01-04 PROCEDURE — 99239 HOSP IP/OBS DSCHRG MGMT >30: CPT | Performed by: PHYSICAL MEDICINE & REHABILITATION

## 2019-01-04 RX ORDER — ASCORBIC ACID 500 MG
500 TABLET ORAL
Qty: 30 TAB | Refills: 3 | Status: SHIPPED | OUTPATIENT
Start: 2019-01-06 | End: 2020-02-25

## 2019-01-04 RX ORDER — SIMVASTATIN 20 MG
20 TABLET ORAL EVERY EVENING
Qty: 30 TAB | Refills: 11 | Status: ON HOLD
Start: 2019-01-04 | End: 2020-02-28

## 2019-01-04 RX ORDER — OMEPRAZOLE 20 MG/1
20 CAPSULE, DELAYED RELEASE ORAL DAILY
Qty: 30 CAP | Status: ON HOLD
Start: 2019-01-05 | End: 2020-02-28

## 2019-01-04 RX ORDER — FERROUS SULFATE 325(65) MG
325 TABLET ORAL
Qty: 30 TAB | Status: ON HOLD
Start: 2019-01-06 | End: 2020-02-28

## 2019-01-04 RX ORDER — PIOGLITAZONEHYDROCHLORIDE 45 MG/1
45 TABLET ORAL DAILY
Qty: 30 TAB | Refills: 11 | Status: ON HOLD | OUTPATIENT
Start: 2019-01-05 | End: 2020-02-28

## 2019-01-04 RX ORDER — GABAPENTIN 300 MG/1
300 CAPSULE ORAL 3 TIMES DAILY
Qty: 90 CAP | Status: ON HOLD
Start: 2019-01-04 | End: 2020-02-28

## 2019-01-04 RX ORDER — PSEUDOEPHEDRINE HCL 30 MG
100 TABLET ORAL
Qty: 60 CAP
Start: 2019-01-04 | End: 2020-02-25

## 2019-01-04 RX ORDER — AMOXICILLIN 250 MG
1 CAPSULE ORAL
Qty: 30 TAB | Refills: 0
Start: 2019-01-04 | End: 2020-02-25

## 2019-01-04 RX ADMIN — METOPROLOL TARTRATE 25 MG: 25 TABLET ORAL at 05:23

## 2019-01-04 RX ADMIN — GABAPENTIN 300 MG: 300 CAPSULE ORAL at 08:09

## 2019-01-04 RX ADMIN — ACETAMINOPHEN 650 MG: 325 TABLET ORAL at 00:27

## 2019-01-04 RX ADMIN — ACETAMINOPHEN 650 MG: 325 TABLET ORAL at 11:23

## 2019-01-04 RX ADMIN — OXYCODONE HYDROCHLORIDE AND ACETAMINOPHEN 500 MG: 500 TABLET ORAL at 08:09

## 2019-01-04 RX ADMIN — FERROUS SULFATE TAB 325 MG (65 MG ELEMENTAL FE) 325 MG: 325 (65 FE) TAB at 08:09

## 2019-01-04 RX ADMIN — HEPARIN SODIUM 5000 UNITS: 5000 INJECTION, SOLUTION INTRAVENOUS; SUBCUTANEOUS at 05:27

## 2019-01-04 RX ADMIN — INSULIN HUMAN 4 UNITS: 100 INJECTION, SOLUTION PARENTERAL at 11:23

## 2019-01-04 RX ADMIN — ACETAMINOPHEN 650 MG: 325 TABLET, FILM COATED ORAL at 05:23

## 2019-01-04 RX ADMIN — OMEPRAZOLE 20 MG: 20 CAPSULE, DELAYED RELEASE ORAL at 08:09

## 2019-01-04 RX ADMIN — INSULIN HUMAN 3 UNITS: 100 INJECTION, SOLUTION PARENTERAL at 08:09

## 2019-01-04 RX ADMIN — VITAMIN D, TAB 1000IU (100/BT) 2000 UNITS: 25 TAB at 08:08

## 2019-01-04 RX ADMIN — PIOGLITAZONE 45 MG: 15 TABLET ORAL at 08:08

## 2019-01-04 ASSESSMENT — ENCOUNTER SYMPTOMS
FEVER: 0
CHILLS: 0
NERVOUS/ANXIOUS: 0
ABDOMINAL PAIN: 0
DIARRHEA: 0
NAUSEA: 0
VOMITING: 0
SHORTNESS OF BREATH: 0

## 2019-01-04 ASSESSMENT — PATIENT HEALTH QUESTIONNAIRE - PHQ9
1. LITTLE INTEREST OR PLEASURE IN DOING THINGS: NOT AT ALL
2. FEELING DOWN, DEPRESSED, IRRITABLE, OR HOPELESS: NOT AT ALL
2. FEELING DOWN, DEPRESSED, IRRITABLE, OR HOPELESS: NOT AT ALL
SUM OF ALL RESPONSES TO PHQ9 QUESTIONS 1 AND 2: 0
1. LITTLE INTEREST OR PLEASURE IN DOING THINGS: NOT AT ALL
SUM OF ALL RESPONSES TO PHQ9 QUESTIONS 1 AND 2: 0

## 2019-01-04 NOTE — DISCHARGE INSTRUCTIONS
[facility] NURSING DISCHARGE INSTRUCTIONS    Blood Pressure : 110/52  Weight: 75.4 kg (166 lb 3.2 oz)  Nursing recommendations for Vince Atkinson Yohan at time of discharge are as follows:  Client verbalized understanding of all discharge instructions and prescriptions.     Review all your home medications and newly ordered medications with your doctor and/or pharmacist. Follow medication instructions as directed by your doctor and/or pharmacist.    Pain Management:   Discharge Pain Medication Instructions:  Comfort Goal: Comfort at Rest, Comfort with Movement, Sleep Comfortably  Notify your primary care provider if pain is unrelieved with these measures, if the pain is new, or increased in intensity.    Discharge Skin Characteristics: Warm, Dry  Discharge Skin Exam: Clear  Wound 12/08/18 Diabetic Ulcer Toe (Comment which one) R 3rd toe, L 5th toe (Active)     Skin / Wound Care Instructions: Please contact your primary care physician for any change in skin integrity.     If You Have Surgical Incisions / Wounds:  Monitor surgical site(s) for signs of increased swelling, redness or symptoms of drainage from the site or fever as this could indicate signs and symptoms of infection. If these symptoms are noted, notifiy your primary care provider.      Discharge Safety Instructions: Should Have ADULT SUPERVISION     Discharge Safety Concerns:    The interdisciplinary team has made recommendation that you should not be left alone  in the house due to weakness  Anti-embolic stockings are required during the day and off at night to increase circulation to the lower extremities.    Discharge Diet: Diabetic. Chopped meat     Discharge Liquids: thin  Discharge Bowel Function: Continent  Please contact your primary care physician for any changes in bowel habits.  Discharge Bowel Program:    Discharge Bladder Function: Continent  Discharge Urinary Devices:        Nursing Discharge Plan:   Influenza  Vaccine Indication: Not indicated: Previously immunized this influenza season and > 8 years of age    Case Management Discharge Instructions:   Discharge Location: Skilled Nursing Facility  Agency Name/Address/Phone: [facility] at [phone], [address] [place] [address]  Home Health:    Outpatient Services:    DME Provider/Phone: No additional equipment ordered.  Medical Equipment Ordered:    Prescription Faxed to:        Discharge Medication Instructions:  Below are the medications your physician expects you to take upon discharge    Central Cord Syndrome  Introduction  Central cord syndrome occurs after the neck gets extended too far back and causes a certain form of spinal cord injury. The damage from the injury reduces feeling and movement in the arms and hands. In this syndrome, the legs may also be affected, but usually not as much as the arms and hands.  Your spinal cord is a long bundle of nerve cells (neurons) and nerve fibers (axons). It carries messages back and forth between your brain and the rest of your body. A spinal cord injury can block the pathway for these messages. With central cord syndrome, the pathway is only partially blocked. This is referred to as an incomplete spinal cord injury. The amount of function loss will depend on the extent of nerve damage. Many people with this syndrome are able to regain function.  What are the causes?  This syndrome is usually the result of trauma that causes damage to a portion of your spinal cord in your neck (cervical) or upper back (thoracic). The damage affects nerves that are especially important for the functioning of the hands and arms.  What increases the risk?  Risk factors include:  · Being age 50 or older.  · Being male.  · Participating in sports or activities that involve a higher chance of falling on your head, neck, or back.  What are the signs or symptoms?  Symptoms may include:  · Partial loss of movement or feeling in the  arms and hands.  · Partial loss of movement or feeling in the legs. This is usually less severe than in the arms and hands.  · Loss of bladder control.  · Sensations of pain, pressure, or tingling.  How is this diagnosed?  Your health care provider may suspect central cord syndrome based on the trauma you experienced and your signs and symptoms. A physical exam will be done. Imaging tests will be done to help confirm the diagnosis. These may include:  · X-rays.  · CT scan.  · MRI.  · Electromyogram (EMG).  How is this treated?  Initial treatment may include:  · Wearing a rigid neck collar to keep your neck from moving.  · Anti-inflammatory medicines (steroids) to reduce swelling.  · Surgery to relieve pressure on the spine or improve stability of the spine. This is not often needed.  There is no cure for central cord syndrome. Long-term care, therapy, and support will be provided by your team of health care providers and may include family caregivers as well. Therapy may include:  · Physical therapy exercises to strengthen muscles and prevent stiffness.  · Occupational therapy to make your home or work environment safe and manageable.  · Social and emotional support.  Follow these instructions at home:  · Take medicines only as directed by your health care provider.  · Keep all follow-up visits as directed by your health care provider. This is important.  · Work closely with all your health care providers. These may include therapists to help you:  ¨ Exercise.  ¨ Get the right amount and type of nutrition.  ¨ Take care of yourself at home and in the community.  ¨ Manage bowel and bladder problems.  ¨ [place] with mental health problems.  ¨ Manage pain.  · Make sure you have a good support system at home. Let someone know if you are struggling with anxiety or depression.  Contact a health care provider if:  · You have a cough.  · You have shortness of breath.  · You have a fever.  · You have chills.  · Your symptoms  gradually change or get worse.  · You need more support at home.  Get help right away if:  · Your symptoms suddenly get worse.  · You have chest pain or trouble breathing.  · You do not feel safe at home.  · You lose bowel or bladder function.  · You have severe neck pain.  This information is not intended to replace advice given to you by your health care provider. Make sure you discuss any questions you have with your health care provider.  Document Released: 09/09/2003 Document Revised: 05/25/2017 Document Reviewed: 08/20/2015  © 2017 [employer]  Diabetes Mellitus and Food  It is important for you to manage your blood sugar (glucose) level. Your blood glucose level can be greatly affected by what you eat. Eating healthier foods in the appropriate amounts throughout the day at about the same time each day will help you control your blood glucose level. It can also help slow or prevent worsening of your diabetes mellitus. Healthy eating may even help you improve the level of your blood pressure and reach or maintain a healthy weight.  General recommendations for healthful eating and cooking habits include:  · Eating meals and snacks regularly. Avoid going long periods of time without eating to lose weight.  · Eating a diet that consists mainly of plant-based foods, such as fruits, vegetables, nuts, legumes, and whole grains.  · Using low-heat cooking methods, such as baking, instead of high-heat cooking methods, such as deep frying.  Work with your dietitian to make sure you understand how to use the Nutrition Facts information on food labels.  How can food affect me?  Carbohydrates   Carbohydrates affect your blood glucose level more than any other type of food. Your dietitian will help you determine how many carbohydrates to eat at each meal and teach you how to count carbohydrates. Counting carbohydrates is important to keep your blood glucose at a healthy level, especially if you are using insulin or taking  certain medicines for diabetes mellitus.  Alcohol   Alcohol can cause sudden decreases in blood glucose (hypoglycemia), especially if you use insulin or take certain medicines for diabetes mellitus. Hypoglycemia can be a life-threatening condition. Symptoms of hypoglycemia (sleepiness, dizziness, and disorientation) are similar to symptoms of having too much alcohol.  If your health care provider has given you approval to drink alcohol, do so in moderation and use the following guidelines:  · Women should not have more than one drink per day, and men should not have more than two drinks per day. One drink is equal to:  ¨ 12 oz of beer.  ¨ 5 oz of wine.  ¨ 1½ oz of hard liquor.  · Do not drink on an empty stomach.  · Keep yourself hydrated. Have water, diet soda, or unsweetened iced tea.  · Regular soda, juice, and other mixers might contain a lot of carbohydrates and should be counted.  What foods are not recommended?  As you make food choices, it is important to remember that all foods are not the same. Some foods have fewer nutrients per serving than other foods, even though they might have the same number of calories or carbohydrates. It is difficult to get your body what it needs when you eat foods with fewer nutrients. Examples of foods that you should avoid that are high in calories and carbohydrates but low in nutrients include:  · Trans fats (most processed foods list trans fats on the Nutrition Facts label).  · Regular soda.  · Juice.  · Candy.  · Sweets, such as cake, pie, doughnuts, and cookies.  · Fried foods.  What foods can I eat?  Eat nutrient-rich foods, which will nourish your body and keep you healthy. The food you should eat also will depend on several factors, including:  · The calories you need.  · The medicines you take.  · Your weight.  · Your blood glucose level.  · Your blood pressure level.  · Your cholesterol level.  You should eat a variety of foods, including:  · Protein.  ¨ Lean cuts of  meat.  ¨ Proteins low in saturated fats, such as fish, egg whites, and beans. Avoid processed meats.  · Fruits and vegetables.  ¨ Fruits and vegetables that may help control blood glucose levels, such as apples, mangoes, and yams.  · Dairy products.  ¨ Choose fat-free or low-fat dairy products, such as milk, yogurt, and cheese.  · Grains, bread, pasta, and rice.  ¨ Choose whole grain products, such as multigrain bread, whole oats, and brown rice. These foods may help control blood pressure.  · Fats.  ¨ Foods containing healthful fats, such as nuts, avocado, olive oil, canola oil, and fish.  Does everyone with diabetes mellitus have the same meal plan?  Because every person with diabetes mellitus is different, there is not one meal plan that works for everyone. It is very important that you meet with a dietitian who will help you create a meal plan that is just right for you.  This information is not intended to replace advice given to you by your health care provider. Make sure you discuss any questions you have with your health care provider.  Document Released: 09/14/2006 Document Revised: 05/25/2017 Document Reviewed: 11/14/2014  [employer] Interactive Patient Education © 2017 [employer]    Diabetes Mellitus and Standards of Medical Care  Managing diabetes (diabetes mellitus) can be complicated. Your diabetes treatment may be managed by a team of health care providers, including:  · A diet and nutrition specialist (registered dietitian).  · A nurse.  · A certified diabetes educator (CDE).  · A diabetes specialist (endocrinologist).  · An eye doctor.  · A primary care provider.  · A dentist.  Your health care providers follow a schedule in order to help you get the best quality of care. The following schedule is a general guideline for your diabetes management plan. Your health care providers may also give you more specific instructions.  HbA1c (  hemoglobin A1c) test  This test provides information about blood  sugar (glucose) control over the previous 2-3 months. It is used to check whether your diabetes management plan needs to be adjusted.  · If you are meeting your treatment goals, this test is done at least 2 times a year.  · If you are not meeting treatment goals or if your treatment goals have changed, this test is done 4 times a year.  Blood pressure test  · This test is done at every routine medical visit. For most people, the goal is less than 140/90. In some cases, your goal blood pressure may be 130/80 or less. Ask your health care provider what your goal blood pressure should be.  Dental and eye exams  · Visit your dentist two times a year.  · If you have type 1 diabetes, get an eye exam 3-5 years after you are diagnosed, and then once a year after your first exam.  ¨ If you were diagnosed with type 1 diabetes as a child, get an eye exam when you are age 10 or older and have had diabetes for 3-5 years. After the first exam, you should get an eye exam once a year.  · If you have type 2 diabetes, have an eye exam as soon as you are diagnosed, and then once a year after your first exam.  Foot care exam  · Visual foot exams are done at every routine medical visit. The exams check for cuts, bruises, redness, blisters, sores, or other problems with the feet.  · A complete foot exam is done by your health care provider once a year. This exam includes an inspection of the structure and skin of your feet, and a check of the pulses and sensation in your feet.  ¨ Type 1 diabetes: Get your first exam 3-5 years after diagnosis.  ¨ Type 2 diabetes: Get your first exam as soon as you are diagnosed.  · Check your feet every day for cuts, bruises, redness, blisters, or sores. If you have any of these or other problems that are not healing, contact your health care provider.  Kidney function test (  urine microalbumin)  · This test is done once a year.  ¨ Type 1 diabetes: Get your first test 5 years after diagnosis.  ¨ Type 2  diabetes: Get your first test as soon as you are diagnosed.  · If you have chronic kidney disease (CKD), get a serum creatinine and estimated glomerular filtration rate (eGFR) test once a year.  Lipid profile (cholesterol, HDL, LDL, triglycerides)  · This test should be done when you are diagnosed with diabetes, and every 5 years after the first test. If you are on medicines to lower your cholesterol, you may need to get this test done every year.  ¨ The goal for LDL is less than 100 mg/dL (5.5 mmol/L). If you are at high risk, the goal is less than 70 mg/dL (3.9 mmol/L).  ¨ The goal for HDL is 40 mg/dL (2.2 mmol/L) for men and 50 mg/dL(2.8 mmol/L) for women. An HDL cholesterol of 60 mg/dL (3.3 mmol/L) or higher gives some protection against heart disease.  ¨ The goal for triglycerides is less than 150 mg/dL (8.3 mmol/L).  Immunizations  · The yearly flu (influenza) vaccine is recommended for everyone 6 months or older who has diabetes.  · The pneumonia (pneumococcal) vaccine is recommended for everyone 2 years or older who has diabetes. If you are 65 or older, you may get the pneumonia vaccine as a series of two separate shots.  · The hepatitis B vaccine is recommended for adults shortly after they have been diagnosed with diabetes.  · The Tdap (tetanus, diphtheria, and pertussis) vaccine should be given:  ¨ According to normal childhood vaccination schedules, for children.  ¨ Every 10 years, for adults who have diabetes.  · The shingles vaccine is recommended for people who have had chicken pox and are 50 years or older.  Mental and emotional health  · Screening for symptoms of eating disorders, anxiety, and depression is recommended at the time of diagnosis and afterward as needed. If your screening shows that you have symptoms (you have a positive screening result), you may need further evaluation and be referred to a mental health care provider.  Diabetes self-management education  · Education about how to  manage your diabetes is recommended at diagnosis and ongoing as needed.  Treatment plan  · Your treatment plan will be reviewed at every medical visit.  Summary  · Managing diabetes (diabetes mellitus) can be complicated. Your diabetes treatment may be managed by a team of health care providers.  · Your health care providers follow a schedule in order to help you get the best quality of care.  · Standards of care including having regular physical exams, blood tests, blood pressure monitoring, immunizations, screening tests, and education about how to manage your diabetes.  · Your health care providers may also give you more specific instructions based on your individual health.  This information is not intended to replace advice given to you by your health care provider. Make sure you discuss any questions you have with your health care provider.  Document Released: 10/15/2010 Document Revised: 09/15/2017 Document Reviewed: 09/15/2017  [employer] Interactive Patient Education © 2017 [employer]      Fall Prevention in the Home  Falls can cause injuries and can affect people from all age groups. There are many simple things that you can do to make your home safe and to help prevent falls.  What can I do on the outside of my home?  · Regularly repair the edges of walkways and driveways and fix any cracks.  · Remove high doorway thresholds.  · Trim any shrubbery on the main path into your home.  · Use bright outdoor lighting.  · Clear walkways of debris and clutter, including tools and rocks.  · Regularly check that handrails are securely fastened and in good repair. Both sides of any steps should have handrails.  · Install guardrails along the edges of any raised decks or porches.  · Have leaves, snow, and ice cleared regularly.  · Use sand or salt on walkways during winter months.  · In the garage, clean up any spills right away, including grease or oil spills.  What can I do in the bathroom?  · Use night  lights.  · Install grab bars by the toilet and in the tub and shower. Do not use towel bars as grab bars.  · Use non-skid mats or decals on the floor of the tub or shower.  · If you need to sit down while you are in the shower, use a plastic, non-slip stool.  · Keep the floor dry. Immediately clean up any water that spills on the floor.  · Remove soap buildup in the tub or shower on a regular basis.  · Attach bath mats securely with double-sided non-slip rug tape.  · Remove throw rugs and other tripping hazards from the floor.  What can I do in the bedroom?  · Use night lights.  · Make sure that a bedside light is easy to reach.  · Do not use oversized bedding that drapes onto the floor.  · Have a firm chair that has side arms to use for getting dressed.  · Remove throw rugs and other tripping hazards from the floor.  What can I do in the kitchen?  · Clean up any spills right away.  · Avoid walking on wet floors.  · Place frequently used items in easy-to-reach places.  · If you need to reach for something above you, use a sturdy step stool that has a grab bar.  · Keep electrical cables out of the way.  · Do not use floor polish or wax that makes floors slippery. If you have to use wax, make sure that it is non-skid floor wax.  · Remove throw rugs and other tripping hazards from the floor.  What can I do in the stairways?  · Do not leave any items on the stairs.  · Make sure that there are handrails on both sides of the stairs. Fix handrails that are broken or loose. Make sure that handrails are as long as the stairways.  · Check any carpeting to make sure that it is firmly attached to the stairs. Fix any carpet that is loose or worn.  · Avoid having throw rugs at the top or bottom of stairways, or secure the rugs with carpet tape to prevent them from moving.  · Make sure that you have a light switch at the top of the stairs and the bottom of the stairs. If you do not have them, have them installed.  What are some  other fall prevention tips?  · Wear closed-toe shoes that fit well and support your feet. Wear shoes that have rubber soles or low heels.  · When you use a stepladder, make sure that it is completely opened and that the sides are firmly locked. Have someone hold the ladder while you are using it. Do not climb a closed stepladder.  · Add color or contrast paint or tape to grab bars and handrails in your home. Place contrasting color strips on the first and last steps.  · Use mobility aids as needed, such as canes, walkers, scooters, and crutches.  · Turn on lights if it is dark. Replace any light bulbs that burn out.  · Set up furniture so that there are clear paths. Keep the furniture in the same spot.  · Fix any uneven floor surfaces.  · Choose a carpet design that does not hide the edge of steps of a stairway.  · Be aware of any and all pets.  · Review your medicines with your healthcare provider. Some medicines can cause dizziness or changes in blood pressure, which increase your risk of falling.  Talk with your health care provider about other ways that you can decrease your risk of falls. This may include working with a physical therapist or  to improve your strength, balance, and endurance.  This information is not intended to replace advice given to you by your health care provider. Make sure you discuss any questions you have with your health care provider.  Document Released: 12/08/2003 Document Revised: 05/16/2017 Document Reviewed: 01/22/2016  [employer] Interactive Patient Education © 2017 [employer]    Suicidal Feelings: How to Help Yourself  Suicide is the taking of one's own life. If you feel as though life is getting too tough to handle and are thinking about suicide, get help right away. To get help:  · Call your local emergency services (911 in the [place]  · Call a suicide hotline to speak with a trained counselor who understands how you are feeling. The following is a list of suicide  hotlines in the [place]. For a list of hotlines in [place], visit www.suicide.org/hotlines/international/auypgp-telsxxo-iwebvaoh.html.  ¨ 1-271-898-TALK (1-420.485.5217).  ¨ 7-552-ZCAHGMX (1-239.186.1207).  ¨ 1-538.105.9103. This is a hotline for [place]mese speakers.  ¨ 8-523-735-4TTY (1-270.639.7682). This is a hotline for TTY users.  ¨ 9-040-6-U-ARCHIE (1-817.448.4677). This is a hotline for lesbian, culver, bisexual, transgender, or questioning youth.  · Contact a crisis center or a local suicide prevention center. To find a crisis center or suicide prevention center:  ¨ Call your local hospital, clinic, community service organization, mental health center, social service provider, or health department. Ask for assistance in connecting to a crisis center.  ¨ Visit www.suicidepreventionlifeline.org/getinvolved/ for a list of crisis centers in the [place], or visit www.suicideprevention.ca/efeutswa-frkfy-ueptpwc/find-a-crisis-centre for a list of centers in [place].  · Visit the following websites:  ¨ National Suicide Prevention Lifeline: www.suicidepreventionlifeline.org  ¨ Hopeline: www.hopeline.com  ¨ American Foundation for Suicide Prevention: www.afsp.org  ¨ The Archie Project (for lesbian, culver, bisexual, transgender, or questioning youth): www.thetrevorproject.org  How can I help myself feel better?  · Promise yourself that you will not do anything drastic when you have suicidal feelings. Remember, there is hope. Many people have gotten through suicidal thoughts and feelings, and you will, too. You may have gotten through them before, and this proves that you can get through them again.  · Let family, friends, teachers, or counselors know how you are feeling. Try not to isolate yourself from those who care about you. Remember, they will want to help you. Talk with someone every day, even if you do not feel sociable. Face-to-face conversation is best.  · Call a mental health professional and see one  regularly.  · Visit your primary health care provider every year.  · Eat a well-balanced diet, and space your meals so you eat regularly.  · Get plenty of rest.  · Avoid alcohol and drugs, and remove them from your home. They will only make you feel worse.  · If you are thinking of taking a lot of medicine, give your medicine to someone who can give it to you one day at a time. If you are on antidepressants and are concerned you will overdose, let your health care provider know so he or she can give you safer medicines. Ask your mental health professional about the possible side effects of any medicines you are taking.  · Remove weapons, poisons, knives, and anything else that could harm you from your home.  · Try to stick to routines. Follow a schedule every day. Put self-care on your schedule.  · Make a list of realistic goals, and cross them off when you achieve them. Accomplishments give a sense of worth.  · Wait until you are feeling better before doing the things you find difficult or unpleasant.  · Exercise if you are able. You will feel better if you exercise for even a half hour each day.  · Go out in the sun or into nature. This will help you recover from depression faster. If you have a favorite place to walk, go there.  · Do the things that have always given you pleasure. Play your favorite music, read a good book, paint a picture, play your favorite instrument, or do anything else that takes your mind off your depression if it is safe to do.  · Keep your living space well lit.  · When you are feeling well, write yourself a letter about tips and support that you can read when you are not feeling well.  · Remember that life’s difficulties can be sorted out with help. Conditions can be treated. You can work on thoughts and strategies that serve you well.  This information is not intended to replace advice given to you by your health care provider. Make sure you discuss any questions you have with your  health care provider.  Document Released: 06/23/2004 Document Revised: 08/16/2017 Document Reviewed: 04/14/2015  [employer] Patient Education © 2017 [employer]                  :Speech Therapy Discharge Instructions for Vince Almanzar    1/4/2019    Diet: Regular - all foods allowed, Thin Liquids - any liquid like water, coffee, tea, juices, or clear fluids like Gatorade, etc.  Swallow Strategies: small bites/sips, no talking with food in mouth. Please wait until you have completely swallowed prior to taking additional food or liquid.   Other Diet Instructions: please have kitchen staff cut meats/sandwiches for you for ease of self feeding.   Supervision: 24 hour supervision is recommended for safety at this time.   Cognition / Communication: It has been a pleasure working with you, Vince. Please keep up the great work in [place] and best of luck in your continued recovery! ~ Carla Hammer MS, CCC-SLP    Occupational Therapy Discharge Instructions for Vince Almanzar    1/3/2019    Level of Assist Required for Eating: Able to Complete Eating without Assist  Level of Assist Required for Grooming: Able to Complete Grooming without Assist  Level of Assist Required for Dressing: Requires Physical Assist with Dressing  Equipment for Dressing: Sock Aid, Reacher  Level of Assist Required for Toileting: Requires Physical Assist with Toileting  Level of Assist Required for Toilet Transfer: Requires Physical Assist with Toilet Transfer  Equipment for Toilet Transfer: Grab Bars by Toilet  Level of Assist Required for Bathing: Requires Physical Assist with Bathing  Equipment for Bathing: Shower Chair, Hand Held Shower Head, Long Handled Sponge, Grab Bars in Tub / Shower  Level of Assist Required for Shower Transfer: Requires Supervision with Shower Transfer  Equipment for Shower Transfer: Shower Chair, Grab Bars in Tub / Shower  Level of Assist Required for Home Mgmt: Requires Physical Assist with  Home Management  Level of Assist Required for Meal Prep: Requires Physical Assist with Meal Preparation  Driving: May not Drive, Please Contact Physician for Further Information  Home Exercise Program: Refer to Home Exercise Program Handout for Details    Vince- It was wonderful meeting and working with you! Continue to work hard and best of luck with your continued recovery! - Isabel OT

## 2019-01-04 NOTE — PROGRESS NOTES
[facility] Medicine Daily Progress Note    Date of Service  1/4/2019    Chief Complaint:  Hypertension  Diabetes    Interval History:  No significant events or changes since last visit  Patient denies SOB, cough, or diarrhea  Patient slept ok last night      Review of Systems  Review of Systems   Constitutional: Negative for chills and fever.   Respiratory: Negative for shortness of breath.    Cardiovascular: Negative for chest pain.   Gastrointestinal: Negative for abdominal pain, diarrhea, nausea and vomiting.   Psychiatric/Behavioral: The patient is not nervous/anxious.         Physical Exam  Temp:  [36.6 °C (97.9 °F)] 36.6 °C (97.9 °F)  Pulse:  [] 90  Resp:  [18] 18  BP: (110-128)/(52-68) 110/52    Physical Exam   Constitutional: He is oriented to person, place, and time. He appears well-nourished.   HENT:   Head: Atraumatic.   Eyes: Pupils are equal, round, and reactive to light. Conjunctivae are normal.   Neck:   Has C-collar   Cardiovascular: Normal rate, regular rhythm, S1 normal and S2 normal.    Pulmonary/Chest: Effort normal and breath sounds normal. No stridor. He has no rhonchi.   Abdominal: Soft. Bowel sounds are normal. Hernia confirmed negative in the right inguinal area and confirmed negative in the left inguinal area.   Neurological: He is alert and oriented to person, place, and time. No sensory deficit.   Skin: Skin is warm and dry. No cyanosis.   Psychiatric: He has a normal mood and affect. His behavior is normal.   Nursing note and vitals reviewed.      Fluids    Intake/Output Summary (Last 24 hours) at 01/04/19 0829  Last data filed at 01/04/19 0017   Gross per 24 hour   Intake              780 ml   Output              400 ml   Net              380 ml       Laboratory      Recent Labs      01/04/19   0556   SODIUM  136   POTASSIUM  4.5   CHLORIDE  106   CO2  20   GLUCOSE  211*   BUN  63*   CREATININE  2.33*   CALCIUM  9.5                   Imaging    Assessment/Plan  * Central cord  syndrome (HCC)- (present on admission)   Assessment & Plan    Conservative management per Neurosurgery  Maintain cervical collar     Chronic renal failure- (present on admission)   Assessment & Plan    CKD Stage III  Creatinine at baseline  Bun more elevated over baseline  Likely a little dehydrated -- encouraging fluid (water/juice) intake  Continue to monitor     Type 2 diabetes mellitus (HCC)- (present on admission)   Assessment & Plan    Hba1c: 6.9 (12/11)  BS: 126-231  On Actos: 30 mg daily --> 45 mg daily  Note: home meds was Actos 45 mg daily  Note: pt will be started on different meds when he goes home (sees Endocrinology)  Cont to monitor     Tachycardia   Assessment & Plan    HR recently ok   On Lopressor: 25 mg bid  Cont to monitor     Vitamin D deficiency   Assessment & Plan    On supplements     Hyperlipemia- (present on admission)   Assessment & Plan    On statin     Essential hypertension- (present on admission)   Assessment & Plan    BP ok  On Lopressor: 25 mg bid  Cont to monitor

## 2019-01-04 NOTE — DISCHARGE SUMMARY
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)   • Contraindication to deep vein thrombosis (DVT) prophylaxis   • Essential hypertension   • Hyperlipemia   • Tachycardia   • Anemia   • Thrombocytopenia (HCC)   • Vitamin D deficiency   • Uncontrolled type 2 diabetes mellitus with hyperglycemia (HCC)       Discharge Diagnosis:  Active Hospital Problems    Diagnosis   • *Central cord syndrome (HCC)   • Dysphagia   • Type 2 diabetes mellitus (HCC)   • Chronic renal failure   • Scalp laceration   • 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 Alvarado:  Patient is a 78 y.o. year-old male with a past medical history significant for diabetes, hyperlipidemia, hypertension, renal impairment, reported hemorrhagic disorder admitted to [facility] on 12/3/2018 12:22 PM following a ground-level fall.  He fell and struck his head and right arm on the curb.  He reported a brief loss of consciousness. he was reportedly on his way to the urgent care due to elevated blood glucose.     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  He had bilateral upper limb weakness.  There is also a painful paresthesia in the right upper limb.  He was seen by Dr. Simon from neurosurgery on December 3 with recommendation for maintaining mean  arterial pressure at 85-90 with ongoing conservative management and monitoring.  He recommended consideration of surgical decompression if there is no clear improvement.       He is on the spinal cord perfusion protocol for a total of 5 days (through 12/8).  He is having dysphasia and will require speech-language pathology evaluation.  Due to his loss of consciousness lasting less than an hour, he will also benefit from speech-language pathology cognitive evaluations.     He is currently on Tylenol, sliding scale insulin, gabapentin, Lantus, ProAmatine 10 mg every 8 hours to maintain blood pressure, norepinephrine infusion to maintain blood pressure, oral oxycodone for pain control     He has some anemia with hemoglobin of 9.8.  He has chronic kidney disease with a creatinine of 1.99 today.     He was seen by speech-language pathology on December 6 and was upgraded to dysphagia 2 diet with nectar thick liquids.  He was seen by physical therapy on December 5 and required moderate assistance to ambulate 10 feet with a front wheeled walker.  He required moderate assist for bed mobility.  He had knee buckling and ataxic foot placement.  He was seen by occupational therapy on December 5 and required max assist for lower body dressing and moderate assist for eating    Patient was admitted to [facility] on 12/10/2018.     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  He has participated actively in a comprehensive rehabilitation program and has made good progress with gait and lower limb function.  His right upper limb has improved, but it remains more impaired than the other 3 limbs.  He will need ongoing therapy to improve endurance with ambulation, improve gait safety, and improve right upper limb  function and coordination.  He should continue his cervical collar at all times until he is cleared by Dr. Simon in neurosurgery.    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  He would benefit from ongoing speech therapy for cognition.     Dysphagia--improved  Speech has completed swallow therapy and he has transitioned to a regular diabetic diet.  Meat is chopped due to right hand weakness rather than dysphagia.     Pain  He is currently on scheduled Tylenol for pain control and gabapentin 300 mg 3 times a day.  Due to his renal impairment, this is the maximum available dose of gabapentin.  His pain is well controlled at discharge.     Neurogenic Bladder   Continue timed voiding  Now voiding without issue     Neurogenic Bowel  His bowel function has improved significantly.  He can continue Colace or milk of magnesia as needed for constipation.     History of hypertension  Orthostatic hypotension   Tachycardia  At baseline, he takes lisinopril 10 mg daily and propranolol 40 mg 3 times a day.  Since his spinal cord injury and brain injury, he has had orthostatic hypotension due to autonomic dysfunction.  He was weaned off of ProAmatine on December 21.  Due to tachycardia and borderline hypertension, he was started on Lopressor 25 mg twice a day.  He should continue this dosing for now, but he will need frequent monitoring for blood pressure and may need to reinitiate his home regimen depending on ongoing recovery from spinal and brain injury.     Diabetes mellitus with hyperglycemia  He has been followed by the hospitalist team during his rehabilitation stay.  He is currently on Actos 45 mg daily.  He will need ongoing Accu-Cheks and sliding scale insulin as his medications continue to be adjusted.  He will need to follow-up with his home endocrinologist after discharge.     Stage III chronic kidney  disease  Mr. berry requires frequent cueing to maintain appropriate fluid intake.  Please continue to encourage the patient to consume at least 2000 mL of fluid daily.  He is very compliant when fluid is offered, but he has difficulty self cueing for appropriate fluid intake.  At discharge, creatinine is 2.33, BUN 63, and GFR of 27.  This should be monitored and fluids adjusted as needed.     Hyperlipidemia  Simvastatin 20 mg daily      Anemia  Hemoglobin stable at 10.3 on   Continue ferrous sulfate every other day     Vitamin D deficiency  Vitamin D was 21 on admission  Continue supplementation with 2000 units of cholecalciferol daily     GI Ppx - Patient on Prilosec 20 mg daily.       Functional Status at Discharge  Eatin - Modified Independent  Eating Description:  Increased time  Groomin - Modified Independent  Grooming Description:  Increased time (increased time for shaving and brushing teeth seated at sink.)  Bathin - Minimal Assistance  Bathing Description:  Grab bar, Tub bench, Hand held shower, Long handled bath tool (Min assist to wash buttocks ,  setup / supervision and cues for all other bathing steps. )  Upper Body Dressing:  3 - Moderate Assistance  Upper Body Dressing Description:   (assist to pull shirt over head and down back)  Lower Body Dressin - Minimal Assistance  Lower Body Dressing Description:  4 - Minimal Assistance  Discharge Location : Skilled Nursing Facility  Patient Discharging with Assist of:  (SNF services)  Level of Supervision Required: Intermittent Supervision (for ADL assist)  Recommended Equipment for Discharge: Grab Bars by Toilet;Grab Bars in Tub / Shower;Shower Chair  Recommended Services Upon Discharge: Outpatient Occupational Therapy  Long Term Goals Met: 0/2  Long Term Goals Not Met: 2/2  Reason(s) for Goals Not Met: Pt continues to require mod A for toileting and UBD. Goals set at min to SPV.  Criteria for Termination of Services:  "Maximum Function Achieved for Inpatient Rehabilitation  Walk:  5 - Standby Prompting/Supervision or Set-up  Distance Walked:  Walks a minimum of 150 feet  Walk Description:  Extra time, Walker, Supervision for safety (900 ft, 275 ft with 4WW and SBA)  Wheelchair:  6 - Modified Independent  Distance Propelled:  Propels a minimum of 150 feet   Wheelchair Description:  Extra time (250 ft using LEs > UEs, SBA)  Stairs 4 - Minimal Assistance  Stairs DescriptionExtra time, Hand rails, Requires incidental assist (12 4\" steps with 2 HRs and CGA)     Comprehension Mode:  Both  Comprehension:  6 - Modified Independent  Comprehension Description:  Glasses  Expression Mode:  Vocal  Expression:  7 - Independent  Expression Description:  Verbal cueing  Social Interaction:  7 - Independent  Social Interaction Description:  Increased time  Problem Solvin - Modified Independent  Problem Solving Description:  Therapy schedule, Increased time  Memory:  6 - Modified Independent  Memory Description:  Therapy schedule, Verbal cueing  Progress since Admit: Pt has made significant progress since admission. Pt is currently tolerating regular textures/thin liquids in general supervised dining. No further dysphagia intervention warranted at this time as pt is implementing strategies independently. Pt continues to demonstrate mild STM deficits and benefits from repetitions of novel information to aid in carryover to fxl environments. No further SLP services are recommended at SNF at this time. Pt may benefit from short term home health once d/c to home environment alone to ensure carryover of strategies and IADLs in home setting. Pt to d/c to SNF as he continues to require supervision and physicial support and is unable to d/c home alone at this time.   Discharge Location : Skilled Nursing Facility  Patient Discharging with Assist of: Family   Level of Supervision Required: 24 Hour Supervision  Recommended Services Upon Discharge: No " "Follow-Up Speech Therapy Recommended  Long Term Goals Met: 2/2  Long Term Goals Not Met: 0/2  Reason(s) for Goals Not Met: NA  Criteria for Termination of Services: Maximum Function Achieved for Inpatient Rehabilitation    Vital signs:  VITAL SIGNS: /52   Pulse 80   Temp 36.4 °C (97.5 °F) (Temporal)   Resp 18   Ht 1.778 m (5' 10\")   Wt 75.4 kg (166 lb 3.2 oz)   SpO2 100%   BMI 23.85 kg/m²     Physical Examination 1/4/2019:  General:  Awake, alert, oriented, no acute distress  HEENT: Wearing Aspen cervical collar  Cardiac: regular rate and rhythm  Lungs: clear to auscultation bilaterally.   Abdomen: soft; non tender, non distended, bowel sounds present and normoactive  Extremities: No edema in the bilateral lower limbs  Skin: Skin remains somewhat thin with several mild abrasions/tears.  Neuro:   Continued gradual improvement.  He is able to walk with a 4 wheeled walker with a kyphotic gait pattern.  Right elbow flexor strength has improved to 3+/5 today.  There is cocontraction present, but this continues to gradually improve.  Right shoulder abduction remains weak at 2-/5.  Patient is hard of hearing.      Discharge Medication:    I performed a medication reconciliation at discharge and no potential clinically significant medication issues were identified       Medication List      START taking these medications      Instructions   ascorbic acid 500 MG tablet  Start taking on:  1/6/2019  Commonly known as:  VITAMIN C   Take 1 Tab by mouth every 48 hours.  Dose:  500 mg     docusate sodium 100 MG Caps  Replaces:  docusate sodium 100mg/10mL 150 MG/15ML Liqd   Take 100 mg by mouth 1 time daily as needed for Constipation.  Dose:  100 mg     metoprolol 25 MG Tabs  Commonly known as:  LOPRESSOR   Take 1 Tab by mouth 2 Times a Day.  Dose:  25 mg     omeprazole 20 MG delayed-release capsule  Start taking on:  1/5/2019  Commonly known as:  PRILOSEC   Take 1 Cap by mouth every day.  Dose:  20 mg   "   senna-docusate 8.6-50 MG Tabs  Commonly known as:  PERICOLACE or SENOKOT S   Take 1 Tab by mouth every bedtime.  Dose:  1 Tab        CHANGE how you take these medications      Instructions   Cholecalciferol 2000 UNIT Tabs  Start taking on:  1/5/2019  What changed:  · medication strength  · how much to take   Take 1 Tab by mouth every day.  Dose:  2000 Units     ferrous sulfate 325 (65 Fe) MG tablet  Start taking on:  1/6/2019  What changed:  when to take this   Take 1 Tab by mouth every 48 hours.  Dose:  325 mg     gabapentin 300 MG Caps  What changed:  · medication strength  · how much to take  · when to take this  Commonly known as:  NEURONTIN   Take 1 Cap by mouth 3 times a day.  Dose:  300 mg     pioglitazone 45 MG Tabs  Start taking on:  1/5/2019  What changed:  how much to take  Commonly known as:  ACTOS   Take 1 Tab by mouth every day.  Dose:  45 mg     simvastatin 20 MG Tabs  What changed:  when to take this  Commonly known as:  ZOCOR   Take 1 Tab by mouth every evening.  Dose:  20 mg        CONTINUE taking these medications      Instructions   acetaminophen 325 MG Tabs  Commonly known as:  TYLENOL   Take 2 Tabs by mouth every 6 hours.  Dose:  650 mg     dextrose 50% 50 % Soln  Commonly known as:  D50W   25 mL by Intravenous route as needed (If FSBG is less than or equal to 70 mg/dL and If patient is NPO).  Dose:  25 mL     insulin regular 100 Unit/mL Soln  Commonly known as:  HUMULIN R   Inject 3-14 Units as instructed 4 Times a Day,Before Meals and at Bedtime.  Dose:  3-14 Units        STOP taking these medications    bisacodyl 10 MG Supp  Commonly known as:  DULCOLAX     docusate sodium 100mg/10mL 150 MG/15ML Liqd  Commonly known as:  COLACE  Replaced by:  docusate sodium 100 MG Caps     glucose 4 g 4 g Chew     heparin 5000 UNIT/ML Soln     insulin glargine 100 UNIT/ML Soln  Commonly known as:  LANTUS     lisinopril 10 MG Tabs  Commonly known as:  PRINIVIL     nateglinide 120 MG Tabs  Commonly known  as:  STARLIX     oxyCODONE immediate-release 5 MG Tabs  Commonly known as:  ROXICODONE     propranolol 40 MG Tabs  Commonly known as:  INDERAL            Discharge Diet:  Strict diabetic.  Chopped meat due to right arm weakness and not dysphagia    Discharge Activity:  As tolerated     Disposition:  Patient to discharge to [facility] for ongoing therapies       Follow-up:  DISCHARGE INSTRUCTIONS:  Follow up with your primary care provider (PCP) within 7-10 days of discharge to review your medications and take over your care.     If you develop chest pain, fever, chills, change in neurologic function (weakness, sensation changes, vision changes), or other concerning symptoms, seek immediate medical attention or call 911.      Follow up: please see Case Management Discharge Instructions for follow up appointment information, DME information, and other useful discharge planning information.     Do not drive until cleared by your PCP    Condition on Discharge:  Stable     35 minutes was spent on discharging this patient, including face-to-face time, prescription management, and the dictation of this note.    Mustapha Brandt M.D.  1/4/2019

## 2019-01-04 NOTE — DISCHARGE PLANNING
Case management:  Patient will transfer to [facility] skilled rehab at 1 pm.  His sister will transport.  Transfer paperwork completed.  [facility].  All staff has been updated.  Provided patient with a copy of other upcoming appointments with instruction to cancel these if he is unable to attend.  Patient verbalizes agreement with all plans and understanding of next steps.  While hospitalized, I provided education, support, updates from weekly team meetings, communication with care team and availability for questions during hours of operation.       CASE MANAGEMENT PLAN OF CARE   Individualized Goals:   1. I will update patient's sister per request of plans and weekly team conference.  2. I will [facility] for [facility] up and provision of medications.    Outcomes:  1. Met: I have provided patient and his sister with updates on all plans and conference discussions.  1. Met: I have communicated with patient's PCP through his medical assistance and left  with update of current plans.

## 2019-01-04 NOTE — PROGRESS NOTES
Patient care assumed. Report received from night RN. Patient is calm, resting in bed. Eyes closed, resp even and unlabored bilat. Call light and bedside table within reach. Will continue to monitor.

## 2019-01-04 NOTE — DISCHARGE PLANNING
Case Management;  Patient accepted at [facility] in [place].  Insurance auth obtained.  I contacted patient's sister to see if she can transport patient today.  She will pick him up at 1 pm.  Updated physician.  Will update patient and staff.

## 2019-01-04 NOTE — DISCHARGE PLANNING
01/04/19  0925   Discharge Instructions - Completed by Case Mgmt   Discharge Location   Skilled Nursing Facility     Agency Name / Address / Phone   [facility] at [phone], [address] [place], [place] [address]     Medical equipment Provider / Phone   No additional equipment ordered.              Follow-up With  Details  Why  Contact Info   Robert Simon M.D. (Neurosurgery)  On 1/10/2019  Thursday at 11:00 am  [address] [place] [id number] [phone]       Andre Vincent M.D. (Primary Care)  On 1/14/2019 Monday at 10:30 am   [address] [place] [id number]  [phone]         If unable to keep these appointments, please reschedule.

## 2019-01-04 NOTE — CARE PLAN
Problem: Safety  Goal: Will remain free from falls  Outcome: PROGRESSING AS EXPECTED  Reviewed fall and safety precautions with patient and reminded patient to call for assistance before getting out of bed. Patient verbalized understanding and has not attempted self transfer this shift.

## 2019-01-04 NOTE — CARE PLAN
Problem: Pain Management  Goal: Pain level will decrease to patient's comfort goal  Outcome: PROGRESSING AS EXPECTED  Patient has some shoulder pain at the beginning of the shift for which he was given 2.5 mg of oxycodone. At midnight when scheduled dose of tylenol was given patient stated his pain was around a 2. Will continue to monitor.    Problem: Metabolic:  Goal: Ability to maintain appropriate glucose levels will improve  Outcome: PROGRESSING SLOWER THAN EXPECTED  Patient's blood sugar was 236. Patient was given 4 units of insulin. HS snack was given, eaten around midnight. Will continue to monitor.

## 2019-01-04 NOTE — DISCHARGE PLANNING
Per Nadeen at [facility], they have received insurance authorization and can take patient today. Dr. Leno Reid is accepting physician.  CM notified.

## 2019-01-04 NOTE — CARE PLAN
Problem: Safety  Goal: Will remain free from falls  Outcome: PROGRESSING AS EXPECTED  Pt uses call light consistently and appropriately. Waits for assistance does not attempt self transfer this shift. Able to verbalize needs.

## 2019-01-04 NOTE — PROGRESS NOTES
Patient discharged to skilled facility per order.  Reviewed all discharge instructions, appointments, discharge medications, and wound care instructions with patient and pt sister; they verbalize understanding.  Education provided in discharge instructions about diabetes, skin integrity, and Home Safety and Fall Prevention. Discharge paperwork completed; signed copies in chart.  Patient has education binder and all belongings; signed copy in chart.  Pt alert, calm, stable; no change in status from morning assessment.  Patient left facility at 1330 via wheelchair accompanied by sisiter; escorted to car by staff.  Have enjoyed working with this pleasant patient.

## 2019-01-04 NOTE — CARE PLAN
Problem: Infection  Goal: Will remain free from infection  Outcome: PROGRESSING AS EXPECTED  Pt is able to verbalize s/s of infection: redness of area, fever, pain. Will continue to monitor and will continue to reinforce education

## 2019-01-04 NOTE — CARE PLAN
Problem: Skin Integrity  Goal: Risk for impaired skin integrity will decrease  Outcome: PROGRESSING AS EXPECTED  Patient's skin remains intact and free from new or accidental injury this shift.  Will continue to monitor. Mepilex on sacral area clean dry intact.

## 2019-01-06 NOTE — PROGRESS NOTES
DATE OF SERVICE:  01/03/2019    The patient was seen for a followup visit.  The patient will be transferred to   skilled nursing.  His thoughts and feelings about leaving the hospital and   what he believes he needs to accomplish as looks to his long-term goals were   addressed at length.       ____________________________________     LINETTE ALATORRE, PHD    ROULA / PALLAVI    DD:  01/06/2019 11:05:18  DT:  01/06/2019 13:08:31    D#:  [id number]  Job#:  [id number]

## 2019-01-06 NOTE — PROGRESS NOTES
DATE OF SERVICE:  12/19/2018    The patient continues to do well on followup.  He is very pleased with his   progress.  The patient still has ways to go to meet his long-term goals.  Team   is talking about the possibility of referring the patient or transferring the   patient to Skilled Nursing to complete his goals.  The patient's thoughts and   feelings about this were talked about with him.       ____________________________________     LINETTE ALATORRE, PHD    ROULA / PALLAVI    DD:  01/06/2019 10:31:02  DT:  01/06/2019 12:23:47    D#:  [id number]  Job#:  [id number]

## 2019-01-06 NOTE — PROGRESS NOTES
DATE OF SERVICE:  12/20/2018    As reported previously, the patient has done very well.  He is anticipating a   possible transfer to skilled nursing.  The patient is meeting all of his   goals.  He is very pleased with his progress.  His mood is upbeat and   positive.  The patient's thoughts and feelings about his progress and what he   believes he needs to accomplish long-term were addressed.       ____________________________________     LINETTE ALATORRE, PHD    ROULA / PALLAVI    DD:  01/06/2019 10:43:25  DT:  01/06/2019 12:41:14    D#:  [id number]  Job#:  [id number]

## 2019-01-06 NOTE — PROGRESS NOTES
DATE OF SERVICE:  12/18/2018    The patient was seen for followup visit.  The patient continues to be very   pleased with his progress.  He said he is improving in his strength and   endurance.  The patient talked about his various goals over the next 2 weeks.    The patient said some of the staff members have reported he might need   skilled nursing.  This has not been decided to date.  The patient's mood   remains fairly upbeat.       ____________________________________     LINETTE ALATORRE, PHD    ROULA / PALLAVI    DD:  01/06/2019 09:46:01  DT:  01/06/2019 12:04:55    D#:  [id number]  Job#:  [id number]

## 2019-02-06 ENCOUNTER — PATIENT OUTREACH (OUTPATIENT)
Dept: HEALTH INFORMATION MANAGEMENT | Facility: OTHER | Age: 79
End: 2019-02-06
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
## 2019-02-19 ENCOUNTER — HOME HEALTH ADMISSION (OUTPATIENT)
Dept: HOME HEALTH SERVICES | Facility: HOME HEALTHCARE | Age: 79
End: 2019-02-19
Payer: MEDICARE

## 2019-02-20 ENCOUNTER — HOME CARE VISIT (OUTPATIENT)
Dept: HOME HEALTH SERVICES | Facility: HOME HEALTHCARE | Age: 79
End: 2019-02-20
Payer: MEDICARE

## 2019-02-20 VITALS
TEMPERATURE: 97.7 F | DIASTOLIC BLOOD PRESSURE: 58 MMHG | SYSTOLIC BLOOD PRESSURE: 92 MMHG | HEIGHT: 70 IN | BODY MASS INDEX: 23.37 KG/M2 | RESPIRATION RATE: 16 BRPM | WEIGHT: 163.25 LBS | OXYGEN SATURATION: 98 % | HEART RATE: 74 BPM

## 2019-02-20 ASSESSMENT — ENCOUNTER SYMPTOMS: DIFFICULTY THINKING: 1

## 2019-02-20 ASSESSMENT — ACTIVITIES OF DAILY LIVING (ADL): OASIS_M1830: 03

## 2019-02-20 ASSESSMENT — PATIENT HEALTH QUESTIONNAIRE - PHQ9
CLINICAL INTERPRETATION OF PHQ2 SCORE: 0
2. FEELING DOWN, DEPRESSED, IRRITABLE, OR HOPELESS: 00
1. LITTLE INTEREST OR PLEASURE IN DOING THINGS: 00

## 2019-02-21 ENCOUNTER — HOME CARE VISIT (OUTPATIENT)
Dept: HOME HEALTH SERVICES | Facility: HOME HEALTHCARE | Age: 79
End: 2019-02-21

## 2019-02-21 ENCOUNTER — DOCUMENTATION (OUTPATIENT)
Dept: HEALTH INFORMATION MANAGEMENT | Facility: OTHER | Age: 79
End: 2019-02-21

## 2019-02-21 NOTE — PROGRESS NOTES
TCN received phone call from [facility]  on 2/20/19 regarding concerns over patient recent DC home. Per  [facility] contacted him to inform him that on 2/19/19 patient fell at home after tripping over his wound vac. Patient sustained a cut to his hand which required him to go to the ED for stitches. Patient was then discharged from [facility] ED to home. [facility] went to see patient on 2/20/19 for his start of care and found patient to be confused with higher functioning tasks regarding memory and medication management. [facility] contacted SNF to discuss his discharge levels and request patient return to SNF.    At time of SNF DC patient was at an independent level for all mobility up to 600ft with 4WW, TUG of 19 sec with 4WW, Independent with toileting and Phylicia with dressing and bathing. Patient was also independent with cognitive/executive function related to ADLs/IADLs, attention, organization, problem solving and reasoning per ST notes.     SNF  voiced concerns to TCN and [facility] over patient having what sounded like a drastic change in function compared to DC level. TCN contacted at Dorothy at [facility] to discuss. Dorothy advised patient return to ED for further medical work up and to have referral sent to SNF from ED if needed.     Patient returned to [facility] ED and was discharged home per report of .     On 2/21/19 SNF  contacted TCN stating [facility] was requesting patient be directly re-admitted to SNF.  stated readmission was possible but that he needed a referral and authorization from insurance. TCN contacted Dorothy at [facility] to discuss. Dorothy advised TCN to have HH send clinical notes and referral to [facility] as well as PCP to begin process of readmission. [facility]  advised. Per [facility]  and [facility], [facility] did not admit patient as they do not feel he is safe to be home therefore they did not feel they should complete  the referral. Carla at [facility] was asking why patient needed an order for readmission. Carla was referred to Dorothy for further clarification of process for re-admission to SNF.     TCN following for further transitional care needs. Anticipate patient will return to SNF following medical work up, orders and referral.     Jazmine Mueller, PT DPT TCN  [phone]

## 2019-03-11 ENCOUNTER — PATIENT OUTREACH (OUTPATIENT)
Dept: HEALTH INFORMATION MANAGEMENT | Facility: OTHER | Age: 79
End: 2019-03-11

## 2019-03-14 ASSESSMENT — ENCOUNTER SYMPTOMS
DEPRESSION: 0
LOSS OF SENSATION IN FEET: 1
OCCASIONAL FEELINGS OF UNSTEADINESS: 1

## 2019-03-14 ASSESSMENT — PATIENT HEALTH QUESTIONNAIRE - PHQ9: CLINICAL INTERPRETATION OF PHQ2 SCORE: 0

## 2019-03-14 NOTE — PROGRESS NOTES
Situation    TCN met with patient at [facility] to discuss his DC plan and enrollment in [facility] program. Patient is now agreeable to CCM program reporting he realizes he needs all the help he can get. Patient states he plans to return home to his apartment for the time beng with intermittent assistance with [facility] and paid caregiving services that his Niece is setting up for him. Patient states he does not drive and his sister drives him to all of his appointments. Patient is working on applying for medicaid. [facility]. Patient does receive meals on wheels and will also get microwave meals that he can prepare for himself. Patient states he needs to follow up with his nephrologist and endocrinologist once he gets home. Patient is scheduled to DC from SNF on 3/14/19 but patient and family have filled and appeal.    Background       Patient is a 77 yo male recently readmitted to SNF following heel infection. Patient currently has hydrocolloide dressing on Right heel, and standard vac on Left heel.    Assessment    Patient is currently at supervised level for mobility and requires min A for bathing and dressing and min to SBA for toileting. Patient states he would like to stay in SNF longer because his family thinks it would be best. Patient understands he needs more help at home and is willing to have caregivers assist him. He is also now open to the idea of moving to [facility] or  if they can find one within his budget as patient only makes $1700 per month. Patient states he is applying for medicaid for additional financial assistance. Patient heel wounds are improving. Patient will require intermittent assistance at home and is currently working with therapy using standard vac to improve his balance and mobility to reduce his risk of falling.   Recommendation TCN to follow up with patient either by phone or in SNF depending on DC appeal on 3/18/19.     Jazmine Mueller, PT DPT TCN  [phone]

## 2019-03-15 ENCOUNTER — PATIENT OUTREACH (OUTPATIENT)
Dept: HEALTH INFORMATION MANAGEMENT | Facility: OTHER | Age: 79
End: 2019-03-15

## 2019-03-15 NOTE — PROGRESS NOTES
SBAR Documentation: Situation, Background, Assessment, Recommendation     Situation    TCN spoke with patient to discuss his progress at home. Patient feels he is doing well at this point and reports [facility] is coming to see him and change his wound dressing on 3/16/19. Patient also reports he has caregivers coming to help him at some point but was unable to recall when. Patient requested TCN contact his sister to discuss those plans.     TCN contacted patient sister who reports patient is doing better this time since SNF DC. Patient will have [facility] starting tomorrow and caregivers are currently lined up to be with him from 7-9am T, W, and TH. And she and her daughter are working on finding caregivers for the additional days. Patient was denied by medicaid and sister plans to work with HH SW to complete appeal. Sister provided with TCN contact information.    Background       Patient is a 79 yo male recently readmitted to SNF following heel infection. Patient currently has hydrocolloide dressing on Right heel, and standard vac on Left heel.    Assessment    Patient is doing well at home at this point. Patient will have follow up care with [facility] and family is in the process of getting daily intermittent care set up. Patient is scheduled for caregiving on T, W, Th for 2 hours per day. Sister and Niece are also taking turns assisting patient throughout the day. Patient and patient family aware they can contact TCN at any point if concerns or needs arise.    Recommendation TCN to follow up with patient on 3/22/19.     Jamzine Mueller, PT DPT TCN  [phone]

## 2019-03-21 ENCOUNTER — PATIENT OUTREACH (OUTPATIENT)
Dept: HEALTH INFORMATION MANAGEMENT | Facility: OTHER | Age: 79
End: 2019-03-21

## 2019-03-21 NOTE — PROGRESS NOTES
"Situation    TCN contacted patient at home to follow up on how he was doing. Patient was being seen by Geriatric specialty MD at time of call. He reports he is doing well and MD states he is doing ok but she would like to see more help in the home. MD to see patient again on 3/26/19. Patient reports he does have intermittent caregiving which has been \"fine\".     TCN spoke with patient sister Renee who also manages patient care, she reports he is doing very well with HH and he has had no falls. She states he has had some diarrhea and stomach pain and his blood sugars are still elevated in the 180-250s but the MD is running lab work and may change his medication.    Background       Patient is a 77 yo male recently readmitted to SNF following heel infection. Patient currently has hydrocolloide dressing on Right heel, and standard vac on Left heel.    Assessment    Patient is currently doing as well as can be expected at home. He is being seen by [facility] and Geriatric Specialty MDs for in home follow up care. He does have intermittent caregiving.   Recommendation TCN to follow up with patient and his sister on 3/28/19       Jazmine Mueller,PT DPT TCN  [phone]  "

## 2019-03-22 ENCOUNTER — HOSPITAL ENCOUNTER (OUTPATIENT)
Facility: MEDICAL CENTER | Age: 79
End: 2019-03-22
Attending: PHYSICIAN ASSISTANT
Payer: MEDICARE

## 2019-03-22 LAB
ALBUMIN SERPL BCP-MCNC: 3.9 G/DL (ref 3.2–4.9)
ALBUMIN/GLOB SERPL: 2.4 G/DL
ALP SERPL-CCNC: 87 U/L (ref 30–99)
ALT SERPL-CCNC: 18 U/L (ref 2–50)
ANION GAP SERPL CALC-SCNC: 9 MMOL/L (ref 0–11.9)
AST SERPL-CCNC: 23 U/L (ref 12–45)
BASOPHILS # BLD AUTO: 0.3 % (ref 0–1.8)
BASOPHILS # BLD: 0.02 K/UL (ref 0–0.12)
BILIRUB SERPL-MCNC: 0.7 MG/DL (ref 0.1–1.5)
BUN SERPL-MCNC: 45 MG/DL (ref 8–22)
CALCIUM SERPL-MCNC: 8.8 MG/DL (ref 8.5–10.5)
CHLORIDE SERPL-SCNC: 109 MMOL/L (ref 96–112)
CO2 SERPL-SCNC: 18 MMOL/L (ref 20–33)
CREAT SERPL-MCNC: 2.33 MG/DL (ref 0.5–1.4)
EOSINOPHIL # BLD AUTO: 0.25 K/UL (ref 0–0.51)
EOSINOPHIL NFR BLD: 3.2 % (ref 0–6.9)
ERYTHROCYTE [DISTWIDTH] IN BLOOD BY AUTOMATED COUNT: 52.7 FL (ref 35.9–50)
FERRITIN SERPL-MCNC: 185.5 NG/ML (ref 22–322)
GLOBULIN SER CALC-MCNC: 1.6 G/DL (ref 1.9–3.5)
GLUCOSE SERPL-MCNC: 112 MG/DL (ref 65–99)
HCT VFR BLD AUTO: 33 % (ref 42–52)
HGB BLD-MCNC: 11 G/DL (ref 14–18)
IMM GRANULOCYTES # BLD AUTO: 0.02 K/UL (ref 0–0.11)
IMM GRANULOCYTES NFR BLD AUTO: 0.3 % (ref 0–0.9)
LYMPHOCYTES # BLD AUTO: 0.51 K/UL (ref 1–4.8)
LYMPHOCYTES NFR BLD: 6.5 % (ref 22–41)
MCH RBC QN AUTO: 29.5 PG (ref 27–33)
MCHC RBC AUTO-ENTMCNC: 33.3 G/DL (ref 33.7–35.3)
MCV RBC AUTO: 88.5 FL (ref 81.4–97.8)
MONOCYTES # BLD AUTO: 0.89 K/UL (ref 0–0.85)
MONOCYTES NFR BLD AUTO: 11.4 % (ref 0–13.4)
NEUTROPHILS # BLD AUTO: 6.15 K/UL (ref 1.82–7.42)
NEUTROPHILS NFR BLD: 78.3 % (ref 44–72)
NRBC # BLD AUTO: 0 K/UL
NRBC BLD-RTO: 0 /100 WBC
PLATELET # BLD AUTO: 147 K/UL (ref 164–446)
PMV BLD AUTO: 10.4 FL (ref 9–12.9)
POTASSIUM SERPL-SCNC: 3.8 MMOL/L (ref 3.6–5.5)
PROT SERPL-MCNC: 5.5 G/DL (ref 6–8.2)
RBC # BLD AUTO: 3.73 M/UL (ref 4.7–6.1)
SODIUM SERPL-SCNC: 136 MMOL/L (ref 135–145)
VIT B12 SERPL-MCNC: 299 PG/ML (ref 211–911)
WBC # BLD AUTO: 7.8 K/UL (ref 4.8–10.8)

## 2019-03-22 PROCEDURE — 82728 ASSAY OF FERRITIN: CPT

## 2019-03-22 PROCEDURE — 82607 VITAMIN B-12: CPT

## 2019-03-22 PROCEDURE — 36415 COLL VENOUS BLD VENIPUNCTURE: CPT

## 2019-03-22 PROCEDURE — 85025 COMPLETE CBC W/AUTO DIFF WBC: CPT

## 2019-03-22 PROCEDURE — 80053 COMPREHEN METABOLIC PANEL: CPT

## 2019-03-25 ENCOUNTER — OFFICE VISIT (OUTPATIENT)
Dept: ENDOCRINOLOGY | Facility: MEDICAL CENTER | Age: 79
End: 2019-03-25
Payer: MEDICARE

## 2019-03-25 VITALS
WEIGHT: 156.2 LBS | HEART RATE: 107 BPM | OXYGEN SATURATION: 98 % | BODY MASS INDEX: 22.36 KG/M2 | SYSTOLIC BLOOD PRESSURE: 96 MMHG | DIASTOLIC BLOOD PRESSURE: 46 MMHG | HEIGHT: 70 IN

## 2019-03-25 DIAGNOSIS — E11.65 UNCONTROLLED TYPE 2 DIABETES MELLITUS WITH HYPERGLYCEMIA (HCC): ICD-10-CM

## 2019-03-25 DIAGNOSIS — I10 ESSENTIAL HYPERTENSION: ICD-10-CM

## 2019-03-25 LAB
HBA1C MFR BLD: 5.5 % (ref ?–5.8)
INT CON NEG: NORMAL
INT CON POS: NORMAL

## 2019-03-25 PROCEDURE — 99214 OFFICE O/P EST MOD 30 MIN: CPT | Performed by: PHYSICIAN ASSISTANT

## 2019-03-25 PROCEDURE — 83036 HEMOGLOBIN GLYCOSYLATED A1C: CPT | Performed by: PHYSICIAN ASSISTANT

## 2019-03-25 NOTE — PATIENT INSTRUCTIONS
I put him on back in 11/12/18 and he has not returned  1.  Ozempic 0.25 once a week on Monday (Restart)  INCREASE to 0.5 on the third week.  2.  Stay Actos 30mg once a day  Stop Glipizide

## 2019-03-25 NOTE — PROGRESS NOTES
Return to office Patient Consult Note  Referred by: Pcp Pt States None    Reason for consult: Diabetes Management Type 2    HPI:  Vince Almanzar is a 78 y.o. old patient who is seeing us today for diabetes care.  This is a pleasant patient with diabetes and I appreciate the opportunity to participate in the care of this patient.    Labs of 3/25/19 HbA1c is 5.5  Labs of 11/12/18 HbA1c is 6.2, GFR 30       BG Diary:3/25/2019  In the AM:  Before meal:  Before Bed:    Weight:      1. Uncontrolled type 2 diabetes mellitus with hyperglycemia (HCC)  This is a new patient on 11/12/18   He is on:  1.  Actos  2.  Starlix    I put him on back in 11/12/18 and he has not returned  1.  Ozempic 0.25 once a week on Monday  2.  Stay Actos 30mg once a day  Stop Nateslinde       2. Essential hypertension  This is stable today and no new changes are needed or required in today's visit      ROS:   Constitutional: No night sweats.  Eyes:  No visual changes.  Cardiac: No chest pain, No palpitations or racing heart rate.  Resp: No shortness of breath, No cough,   Gi: No Diarrhea    All other systems were reviewed and were/are negative.  The ROS was revised/revisited during this office visit from the patients first office visit with me on 11/12/18  Please review the full ROS during the first office visit.    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) 12/03/2018     Priority: Medium   • Contraindication to deep vein thrombosis (DVT) prophylaxis 12/03/2018     Priority: Medium   • Essential hypertension 12/03/2018     Priority: Low   •  Hyperlipemia 12/03/2018     Priority: Low   • Trauma 12/03/2018     Priority: Low   • Tachycardia 12/16/2018   • Anemia 12/11/2018   • Thrombocytopenia (HCC) 12/11/2018   • Vitamin D deficiency 12/11/2018   • Uncontrolled type 2 diabetes mellitus with hyperglycemia (HCC) 11/12/2018   • Mass of pancreas 06/03/2016       Past Surgical History:  Past Surgical History:   Procedure Laterality Date   • GASTROSCOPY-ENDO N/A 6/3/2016    Procedure: GASTROSCOPY-ENDO;  Surgeon: Jasper Donnelly M.D.;  Location: [facility];  Service:    • EGD W/ENDOSCOPIC ULTRASOUND N/A 6/3/2016    Procedure: EGD W/ENDOSCOPIC ULTRASOUND UPPER;  Surgeon: Jasper Donnelly M.D.;  Location: [facility];  Service:    • EGD WITH ASP/BX N/A 6/3/2016    Procedure: EGD WITH ASP/BX - FNA, DUODENAL BIOPSIES, FNA OF PANCREAS;  Surgeon: Jasper Donnelly M.D.;  Location: [facility];  Service:    • ERCP  5/2016   • CHOLECYSTECTOMY  2006    gallstones removed   • HIP ARTHROPLASTY TOTAL Left 2001    left total hip       Allergies:  Patient has no known allergies.    Social History:  Social History     Social History   • Marital status: Single     Spouse name: N/A   • Number of children: N/A   • Years of education: N/A     Occupational History   • Not on file.     Social History Main Topics   • Smoking status: Never Smoker   • Smokeless tobacco: Never Used   • Alcohol use No   • Drug use: No   • Sexual activity: Not on file     Other Topics Concern   • Not on file     Social History Narrative   • No narrative on file       Family History:  No family history on file.    Medications:    Current Outpatient Prescriptions:   •  OxyCODONE HCl 5 MG Tablet Abuse-Deterrent, Take 5 mg by mouth every 12 hours as needed (pain)., Disp: , Rfl:   •  insulin glargine (LANTUS) 100 UNIT/ML Solution Pen-injector injection, Inject 12 Units as instructed every evening., Disp: , Rfl:   •  ascorbic acid (VITAMIN C) 500 MG tablet, Take 1 Tab by  "mouth every 48 hours., Disp: 30 Tab, Rfl: 3  •  docusate sodium 100 MG Cap, Take 100 mg by mouth 1 time daily as needed for Constipation., Disp: 60 Cap, Rfl:   •  ferrous sulfate 325 (65 Fe) MG tablet, Take 1 Tab by mouth every 48 hours., Disp: 30 Tab, Rfl:   •  gabapentin (NEURONTIN) 300 MG Cap, Take 1 Cap by mouth 3 times a day., Disp: 90 Cap, Rfl:   •  metoprolol (LOPRESSOR) 25 MG Tab, Take 1 Tab by mouth 2 Times a Day., Disp: 60 Tab, Rfl:   •  omeprazole (PRILOSEC) 20 MG delayed-release capsule, Take 1 Cap by mouth every day., Disp: 30 Cap, Rfl:   •  pioglitazone (ACTOS) 45 MG Tab, Take 1 Tab by mouth every day., Disp: 30 Tab, Rfl: 11  •  senna-docusate (PERICOLACE OR SENOKOT S) 8.6-50 MG Tab, Take 1 Tab by mouth every bedtime., Disp: 30 Tab, Rfl: 0  •  simvastatin (ZOCOR) 20 MG Tab, Take 1 Tab by mouth every evening., Disp: 30 Tab, Rfl: 11  •  vitamin D 2000 UNIT Tab, Take 1 Tab by mouth every day., Disp: 60 Tab, Rfl:   •  insulin regular (HUMULIN R) 100 Unit/mL Solution, Inject 3-14 Units as instructed 4 Times a Day,Before Meals and at Bedtime., Disp: 10 mL, Rfl:   •  dextrose 50% (D50W) 50 % Solution, 25 mL by Intravenous route as needed (If FSBG is less than or equal to 70 mg/dL and If patient is NPO)., Disp: , Rfl: 0  •  acetaminophen (TYLENOL) 325 MG Tab, Take 2 Tabs by mouth every 6 hours., Disp: 30 Tab, Rfl: 0        Physical Examination:   Vital signs: BP (!) 96/46 (BP Location: Left arm, Patient Position: Sitting, BP Cuff Size: Adult)   Pulse (!) 107   Ht 1.778 m (5' 10\")   Wt 70.9 kg (156 lb 3.2 oz)   SpO2 98%   BMI 22.41 kg/m²   General: No distress, cooperative, well dressed and well nourished.   Eyes: No scleral icterus or discharge, No hyposphagma  ENMT: Normal on external inspection of nose, lips, No nasal drainage   Neck: No abnormal masses on inspection  Resp: Normal effort, Bilateral clear to auscultation, No wheezing, No rales  CVS: Regular rate and rhythm, S1 S2 normal, No murmur. No " gallop  Extremities: No edema bilateral extremities  Neuro: Alert and oriented  Skin: No rash, No Ulcers  Psych: Normal mood and affect      Assessment and Plan:    1. Uncontrolled type 2 diabetes mellitus with hyperglycemia (HCC)    I put him on back in 11/12/18 and he has not returned  1.  Ozempic 0.25 once a week on Monday (Restart)  INCREASE to 0.5 on the third week.  2.  Stay Actos 30mg once a day  Stop Glipizide    2. Essential hypertension  This is stable today and no new changes are needed or required in today's visit    Return in about 2 months (around 5/25/2019).      Thank you kindly for allowing me to participate in the diabetes care plan for this patient.    Moisés Duggan PA-C, BC-ADM  Board Certified - Advanced Diabetes Management  03/25/19    CC:   Pcp Pt States None

## 2019-03-28 ENCOUNTER — PATIENT OUTREACH (OUTPATIENT)
Dept: HEALTH INFORMATION MANAGEMENT | Facility: OTHER | Age: 79
End: 2019-03-28

## 2019-03-28 NOTE — PROGRESS NOTES
"Situation    TCN contacted patient at home. Patient reports he is doing fine and \"that thing is off my foot\". Patient declined to elaborate on services still being provided by home or additional details on progress.     TCN contacted patient sister Juliet who reports patient is progressing well. Patient is not getting any private caregiving at this time as he is being seen daily by home health and family and doing well with this arrangement. Juliet feels she and the patient are no longer in need of follow up calls and reports she will contact TCN if any concerns come up.    Background       Patient is a 77 yo male recently readmitted to SNF following heel infection. Patient currently has hydrocolloide dressing on Right heel, and standard vac on Left heel.   Assessment    Patient is progressing well at home with [facility]. He is being seen by RN, PT, OT, ST, Aide and MD. Patient is completing his own ADLs and mobility in his hoe including light cooking and his sister is assisting him with cleaning. Family and patient feel he is stable at this time and no longer in need of CCM services.    Recommendation TCN to DC patient off of CCM caseload at this point due to patient and family request. TCN available if new needs or concerns arise.      Jazmine Mueller, PT DPT TCN   [phone]  "

## 2019-05-06 ENCOUNTER — OFFICE VISIT (OUTPATIENT)
Dept: ENDOCRINOLOGY | Facility: MEDICAL CENTER | Age: 79
End: 2019-05-06
Payer: MEDICARE

## 2019-05-06 VITALS
SYSTOLIC BLOOD PRESSURE: 112 MMHG | DIASTOLIC BLOOD PRESSURE: 62 MMHG | OXYGEN SATURATION: 97 % | WEIGHT: 151 LBS | HEART RATE: 100 BPM | HEIGHT: 70 IN | BODY MASS INDEX: 21.62 KG/M2

## 2019-05-06 DIAGNOSIS — I10 ESSENTIAL HYPERTENSION: ICD-10-CM

## 2019-05-06 DIAGNOSIS — E11.65 UNCONTROLLED TYPE 2 DIABETES MELLITUS WITH HYPERGLYCEMIA (HCC): ICD-10-CM

## 2019-05-06 PROCEDURE — 99214 OFFICE O/P EST MOD 30 MIN: CPT | Performed by: PHYSICIAN ASSISTANT

## 2019-05-06 PROCEDURE — 95250 CONT GLUC MNTR PHYS/QHP EQP: CPT | Performed by: PHYSICIAN ASSISTANT

## 2019-05-06 NOTE — PROGRESS NOTES
Return to office Patient Consult Note  Referred by: Pcp Pt States None    Reason for consult: Diabetes Management Type 2    HPI:  Vince Almanzar is a 78 y.o. old patient who is seeing us today for diabetes care.  This is a pleasant patient with diabetes and I appreciate the opportunity to participate in the care of this patient.    Labs of 3/25/19 HbA1c is 5.5  Labs of 11/12/18 HbA1c is 6.2, GFR 30       BG Diary:5/6/2019  In the AM: 161, 174, 167, 187  Before Bed:251, 264, 261. 281      1. Uncontrolled type 2 diabetes mellitus with hyperglycemia (HCC)  This is a new patient on 11/12/18   He is on:  1.  Actos  2.  Starlix     I put him on back in 11/12/18 and he has not returned  1.  Ozempic 0.5 once a week on Monday  2.  Stay Actos 30mg once a day  Stop Nateslinde    2. Essential hypertension  This is stable today and no new changes are needed or required in today's visit    ROS:   Constitutional: No night sweats.  Eyes:  No visual changes.  Cardiac: No chest pain, No palpitations or racing heart rate.  Resp: No shortness of breath, No cough,   Gi: No Diarrhea    All other systems were reviewed and were/are negative.     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) 12/03/2018     Priority: Medium   • Contraindication to deep vein thrombosis (DVT) prophylaxis 12/03/2018     Priority: Medium   • Essential hypertension 12/03/2018     Priority: Low   • Hyperlipemia 12/03/2018     Priority: Low   • Trauma 12/03/2018     Priority: Low   • Tachycardia 12/16/2018   • Anemia 12/11/2018   • Thrombocytopenia (HCC) 12/11/2018    • Vitamin D deficiency 12/11/2018   • Uncontrolled type 2 diabetes mellitus with hyperglycemia (HCC) 11/12/2018   • Mass of pancreas 06/03/2016       Past Surgical History:  Past Surgical History:   Procedure Laterality Date   • GASTROSCOPY-ENDO N/A 6/3/2016    Procedure: GASTROSCOPY-ENDO;  Surgeon: Jasper Donnelly M.D.;  Location: [facility];  Service:    • EGD W/ENDOSCOPIC ULTRASOUND N/A 6/3/2016    Procedure: EGD W/ENDOSCOPIC ULTRASOUND UPPER;  Surgeon: Jasper Donnelly M.D.;  Location: [facility];  Service:    • EGD WITH ASP/BX N/A 6/3/2016    Procedure: EGD WITH ASP/BX - FNA, DUODENAL BIOPSIES, FNA OF PANCREAS;  Surgeon: Jasper Donnelly M.D.;  Location: [facility];  Service:    • ERCP  5/2016   • CHOLECYSTECTOMY  2006    gallstones removed   • HIP ARTHROPLASTY TOTAL Left 2001    left total hip       Allergies:  Patient has no known allergies.    Social History:  Social History     Social History   • Marital status: Single     Spouse name: N/A   • Number of children: N/A   • Years of education: N/A     Occupational History   • Not on file.     Social History Main Topics   • Smoking status: Never Smoker   • Smokeless tobacco: Never Used   • Alcohol use No   • Drug use: No   • Sexual activity: Not on file     Other Topics Concern   • Not on file     Social History Narrative   • No narrative on file       Family History:  History reviewed. No pertinent family history.    Medications:    Current Outpatient Prescriptions:   •  OxyCODONE HCl 5 MG Tablet Abuse-Deterrent, Take 5 mg by mouth every 12 hours as needed (pain)., Disp: , Rfl:   •  ascorbic acid (VITAMIN C) 500 MG tablet, Take 1 Tab by mouth every 48 hours., Disp: 30 Tab, Rfl: 3  •  docusate sodium 100 MG Cap, Take 100 mg by mouth 1 time daily as needed for Constipation., Disp: 60 Cap, Rfl:   •  ferrous sulfate 325 (65 Fe) MG tablet, Take 1 Tab by mouth every 48 hours., Disp: 30 Tab, Rfl:   •  gabapentin (NEURONTIN)  "300 MG Cap, Take 1 Cap by mouth 3 times a day., Disp: 90 Cap, Rfl:   •  metoprolol (LOPRESSOR) 25 MG Tab, Take 1 Tab by mouth 2 Times a Day., Disp: 60 Tab, Rfl:   •  omeprazole (PRILOSEC) 20 MG delayed-release capsule, Take 1 Cap by mouth every day., Disp: 30 Cap, Rfl:   •  pioglitazone (ACTOS) 45 MG Tab, Take 1 Tab by mouth every day., Disp: 30 Tab, Rfl: 11  •  senna-docusate (PERICOLACE OR SENOKOT S) 8.6-50 MG Tab, Take 1 Tab by mouth every bedtime., Disp: 30 Tab, Rfl: 0  •  simvastatin (ZOCOR) 20 MG Tab, Take 1 Tab by mouth every evening., Disp: 30 Tab, Rfl: 11  •  vitamin D 2000 UNIT Tab, Take 1 Tab by mouth every day., Disp: 60 Tab, Rfl:   •  dextrose 50% (D50W) 50 % Solution, 25 mL by Intravenous route as needed (If FSBG is less than or equal to 70 mg/dL and If patient is NPO)., Disp: , Rfl: 0  •  acetaminophen (TYLENOL) 325 MG Tab, Take 2 Tabs by mouth every 6 hours., Disp: 30 Tab, Rfl: 0        Physical Examination:   Vital signs: /62 (BP Location: Left arm, Patient Position: Sitting)   Pulse 100   Ht 1.778 m (5' 10\")   Wt 68.5 kg (151 lb)   SpO2 97%   BMI 21.67 kg/m²   General: No distress, cooperative, well dressed and well nourished.   Eyes: No scleral icterus or discharge, No hyposphagma  ENMT: Normal on external inspection of nose, lips, No nasal drainage   Neck: No abnormal masses on inspection  Resp: Normal effort, Bilateral clear to auscultation, No wheezing, No rales  CVS: Regular rate and rhythm, S1 S2 normal, No murmur. No gallop  Extremities: No edema bilateral extremities  Neuro: Alert and oriented  Skin: No rash, No Ulcers  Psych: Normal mood and affect      Assessment and Plan:    1. Uncontrolled type 2 diabetes mellitus with hyperglycemia (HCC)  1.  Ozempic 0.5 once a week on Monday (INCREASE to 1.0)  2.   Actos /Pioglitazone 30mg once a day  3.  Juarez Ashok Pro ordered      2. Essential hypertension  This is stable today and no new changes are needed or required in today's " visit    Return in about 2 weeks (around 5/20/2019).      Thank you kindly for allowing me to participate in the diabetes care plan for this patient.    Moisés Duggan PA-C, BC-ADM  Board Certified - Advanced Diabetes Management  05/06/19    CC:   Pcp Pt States None

## 2019-05-31 ENCOUNTER — OFFICE VISIT (OUTPATIENT)
Dept: ENDOCRINOLOGY | Facility: MEDICAL CENTER | Age: 79
End: 2019-05-31
Payer: MEDICARE

## 2019-05-31 VITALS
WEIGHT: 147 LBS | HEIGHT: 70 IN | OXYGEN SATURATION: 98 % | HEART RATE: 103 BPM | DIASTOLIC BLOOD PRESSURE: 68 MMHG | BODY MASS INDEX: 21.05 KG/M2 | SYSTOLIC BLOOD PRESSURE: 112 MMHG

## 2019-05-31 DIAGNOSIS — E11.65 UNCONTROLLED TYPE 2 DIABETES MELLITUS WITH HYPERGLYCEMIA (HCC): ICD-10-CM

## 2019-05-31 DIAGNOSIS — I10 ESSENTIAL HYPERTENSION: ICD-10-CM

## 2019-05-31 PROCEDURE — 99214 OFFICE O/P EST MOD 30 MIN: CPT | Performed by: PHYSICIAN ASSISTANT

## 2019-05-31 NOTE — PATIENT INSTRUCTIONS
1.  Ozempic 1.0 once a week on Monday  2.  Stay Actos/Pioglitazone 30mg once a day  3.  Tresiba 10 units every night

## 2019-05-31 NOTE — PROGRESS NOTES
Return to office Patient Consult Note  Referred by: Pcp Pt States None    Reason for consult: Diabetes Management Type 2    HPI:  Vince Almanzar is a 78 y.o. old patient who is seeing us today for diabetes care.  This is a pleasant patient with diabetes and I appreciate the opportunity to participate in the care of this patient.    Labs of 3/25/19 HbA1c is 5.5  Labs of 11/12/18 HbA1c is 6.2, GFR 30    BG Diary:5/31/2019  In the AM:  Appetite+ Ashok Pro CGM  Average BG is 215      1. Uncontrolled type 2 diabetes mellitus with hyperglycemia (HCC)    This is a new patient on 11/12/18   He is on:  1.  Actos  2.  Starlix     I put him on back in 11/12/18 and he has not returned  1.  Ozempic 1.0 once a week on Monday  2.  Stay Actos/Pioglitazone 30mg once a day  Stop Nateslinde     2. Essential hypertension  This is stable today and no new changes are needed or required in today's visit    ROS:   Constitutional: No night sweats.  Eyes:  No visual changes.  Cardiac: No chest pain, No palpitations or racing heart rate.  Resp: No shortness of breath, No cough,   Gi: No Diarrhea    All other systems were reviewed and were/are negative.      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) 12/03/2018     Priority: Medium   • Contraindication to deep vein thrombosis (DVT) prophylaxis 12/03/2018     Priority: Medium   • Essential hypertension 12/03/2018     Priority: Low   • Hyperlipemia 12/03/2018     Priority: Low   • Trauma 12/03/2018     Priority: Low   • Tachycardia 12/16/2018   • Anemia 12/11/2018   • Thrombocytopenia (HCC)  12/11/2018   • Vitamin D deficiency 12/11/2018   • Uncontrolled type 2 diabetes mellitus with hyperglycemia (HCC) 11/12/2018   • Mass of pancreas 06/03/2016       Past Surgical History:  Past Surgical History:   Procedure Laterality Date   • GASTROSCOPY-ENDO N/A 6/3/2016    Procedure: GASTROSCOPY-ENDO;  Surgeon: Jasper Donnelly M.D.;  Location: [facility];  Service:    • EGD W/ENDOSCOPIC ULTRASOUND N/A 6/3/2016    Procedure: EGD W/ENDOSCOPIC ULTRASOUND UPPER;  Surgeon: Jasper Donnelly M.D.;  Location: [facility];  Service:    • EGD WITH ASP/BX N/A 6/3/2016    Procedure: EGD WITH ASP/BX - FNA, DUODENAL BIOPSIES, FNA OF PANCREAS;  Surgeon: Jasper Donnelly M.D.;  Location: [facility];  Service:    • ERCP  5/2016   • CHOLECYSTECTOMY  2006    gallstones removed   • HIP ARTHROPLASTY TOTAL Left 2001    left total hip       Allergies:  Patient has no known allergies.    Social History:  Social History     Social History   • Marital status: Single     Spouse name: N/A   • Number of children: N/A   • Years of education: N/A     Occupational History   • Not on file.     Social History Main Topics   • Smoking status: Never Smoker   • Smokeless tobacco: Never Used   • Alcohol use No   • Drug use: No   • Sexual activity: Not on file     Other Topics Concern   • Not on file     Social History Narrative   • No narrative on file       Family History:  History reviewed. No pertinent family history.    Medications:    Current Outpatient Prescriptions:   •  Semaglutide (OZEMPIC) 1 MG/DOSE Solution Pen-injector, Inject 1 mg as instructed every 7 days., Disp: 6 PEN, Rfl: 4  •  OxyCODONE HCl 5 MG Tablet Abuse-Deterrent, Take 5 mg by mouth every 12 hours as needed (pain)., Disp: , Rfl:   •  ascorbic acid (VITAMIN C) 500 MG tablet, Take 1 Tab by mouth every 48 hours., Disp: 30 Tab, Rfl: 3  •  docusate sodium 100 MG Cap, Take 100 mg by mouth 1 time daily as needed for Constipation., Disp: 60 Cap,  "Rfl:   •  ferrous sulfate 325 (65 Fe) MG tablet, Take 1 Tab by mouth every 48 hours., Disp: 30 Tab, Rfl:   •  gabapentin (NEURONTIN) 300 MG Cap, Take 1 Cap by mouth 3 times a day., Disp: 90 Cap, Rfl:   •  metoprolol (LOPRESSOR) 25 MG Tab, Take 1 Tab by mouth 2 Times a Day., Disp: 60 Tab, Rfl:   •  omeprazole (PRILOSEC) 20 MG delayed-release capsule, Take 1 Cap by mouth every day., Disp: 30 Cap, Rfl:   •  pioglitazone (ACTOS) 45 MG Tab, Take 1 Tab by mouth every day., Disp: 30 Tab, Rfl: 11  •  senna-docusate (PERICOLACE OR SENOKOT S) 8.6-50 MG Tab, Take 1 Tab by mouth every bedtime., Disp: 30 Tab, Rfl: 0  •  simvastatin (ZOCOR) 20 MG Tab, Take 1 Tab by mouth every evening., Disp: 30 Tab, Rfl: 11  •  vitamin D 2000 UNIT Tab, Take 1 Tab by mouth every day., Disp: 60 Tab, Rfl:   •  dextrose 50% (D50W) 50 % Solution, 25 mL by Intravenous route as needed (If FSBG is less than or equal to 70 mg/dL and If patient is NPO)., Disp: , Rfl: 0  •  acetaminophen (TYLENOL) 325 MG Tab, Take 2 Tabs by mouth every 6 hours., Disp: 30 Tab, Rfl: 0        Physical Examination:   Vital signs: /68 (BP Location: Left arm, Patient Position: Sitting)   Pulse (!) 103   Ht 1.778 m (5' 10\")   Wt 66.7 kg (147 lb)   SpO2 98%   BMI 21.09 kg/m²   General: No distress, cooperative, well dressed and well nourished.   Eyes: No scleral icterus or discharge, No hyposphagma  ENMT: Normal on external inspection of nose, lips, No nasal drainage   Neck: No abnormal masses on inspection  Resp: Normal effort, Bilateral clear to auscultation, No wheezing, No rales  CVS: Regular rate and rhythm, S1 S2 normal, No murmur. No gallop  Extremities: No edema bilateral extremities  Neuro: Alert and oriented  Skin: No rash, No Ulcers  Psych: Normal mood and affect      Assessment and Plan:    1. Uncontrolled type 2 diabetes mellitus with hyperglycemia (HCC)  This patient uses a continuous glucose monitor 24 hours a day to measure there blood glucose levels.  " Our office printed out the CGM data report today in the office and I discussed this report at length with the patient today.  Recommendations on the data were given to the patient today to implement in the A&P section.    I put him on back in 11/12/18 and he has not returned  1.  Ozempic 1.0 once a week on Monday  2.  Stay Actos/Pioglitazone 30mg once a day  3.  Tresiba 10 units every night        2. Essential hypertension  This is stable today and no new changes are needed or required in today's visit        Return in about 6 weeks (around 7/12/2019).      Thank you kindly for allowing me to participate in the diabetes care plan for this patient.    Moisés Duggna PA-C, BC-ADM  Board Certified - Advanced Diabetes Management  05/31/19    CC:   Pcp Pt States None

## 2019-06-03 ENCOUNTER — TELEPHONE (OUTPATIENT)
Dept: ENDOCRINOLOGY | Facility: MEDICAL CENTER | Age: 79
End: 2019-06-03

## 2019-06-03 NOTE — TELEPHONE ENCOUNTER
1. Caller Name: Ángela                                             Call Back Number: [phone]    Patient approves a detailed voicemail message: N\A    Ángela called for patient asking for a prescription for diabetic shoes and inserts to be sent to the [facility] in [place].    [facility]:  [address] [place] [address], [facility], [place] [address]   Phone:   [phone]    Fax:   [phone]

## 2019-06-04 NOTE — TELEPHONE ENCOUNTER
Phone Number Called: [phone]     Message: LVM letting them know he will need to go to the [facility] clinic to get fitted first, then they will fax over a script for the shoes.    Left Message for patient to call back: no

## 2019-06-04 NOTE — TELEPHONE ENCOUNTER
Phone Number Called: [phone] (home)       Call outcome: spoke to patient regarding message below    Message: Patient updated us on their process. Need prescription first to get diabetic shoes with inserts then they will see him. Please create script. If they need the authorizing physician signature they will send form.

## 2019-06-07 ENCOUNTER — TELEPHONE (OUTPATIENT)
Dept: ENDOCRINOLOGY | Facility: MEDICAL CENTER | Age: 79
End: 2019-06-07

## 2019-06-07 NOTE — TELEPHONE ENCOUNTER
Ángela called this morning to request a Rx for diabetic shoes and inserts to be sent to [facility] in [place] for her brother before they are able to schedule an appointment. I notified Ángela that a request to Moisés has already been made and that it may take him another day or two to get to it. Patient understood, no further questions or complaints at this time.

## 2019-06-10 NOTE — TELEPHONE ENCOUNTER
Phone Number Called: [phone] (home)     Call outcome: spoke to patient regarding message below    Message: Pt needs to be seen by [facility] and they will send an order to us for Moisés and an overseeing MD to sign. Pt informaed me that [facility] recently changed their guidelines and they want a Rx for the shoes first. I informed him that I will contact them for further clarification on what needs to be done.

## 2019-06-11 NOTE — TELEPHONE ENCOUNTER
Phone Number Called: [phone] ([facility])    Call outcome: Spoke to Kristyn, the referral specialist for the [place] office    Message: she informed me that Medicare has recently changed their guidelines thus requiring them to change how they get Pt's their orders. The Prescriber needs to beck an order for Diabetic shoes and Diabetic inserts with a corresponding DX code (note: pain codes do not apply for any insurance) and a cosign is not needed on the order for a PA or NP. The office will also need to send signed notes to [facility] with the DM shoe order (if it is a PA or NP then the overseeing MD needs to sign the notes as well).    This Pt has [facility] and they use medicare guidelines.

## 2019-06-24 ENCOUNTER — TELEPHONE (OUTPATIENT)
Dept: ENDOCRINOLOGY | Facility: MEDICAL CENTER | Age: 79
End: 2019-06-24

## 2019-06-24 NOTE — TELEPHONE ENCOUNTER
1. Caller Name: Sister                                             Call Back Number: N/A     Patient approves a detailed voicemail message: N\A      Patient's sister called stating patient ran out of Ozempic samples and needs 1 more to last him to his next appt in 3 weeks. I told her I can set a sample aside for him.

## 2019-07-15 ENCOUNTER — APPOINTMENT (OUTPATIENT)
Dept: ENDOCRINOLOGY | Facility: MEDICAL CENTER | Age: 79
End: 2019-07-15
Payer: COMMERCIAL

## 2020-02-20 ENCOUNTER — HOSPITAL ENCOUNTER (OUTPATIENT)
Dept: RADIOLOGY | Facility: MEDICAL CENTER | Age: 80
End: 2020-02-20
Payer: COMMERCIAL

## 2020-02-25 RX ORDER — SODIUM BICARBONATE 650 MG/1
650 TABLET ORAL 3 TIMES DAILY
COMMUNITY

## 2020-02-25 RX ORDER — INSULIN GLARGINE 100 [IU]/ML
6 INJECTION, SOLUTION SUBCUTANEOUS EVERY EVENING
COMMUNITY

## 2020-02-25 RX ORDER — CALCITRIOL 0.25 UG/1
0.25 CAPSULE, LIQUID FILLED ORAL DAILY
COMMUNITY

## 2020-02-25 RX ORDER — PIOGLITAZONEHYDROCHLORIDE 30 MG/1
30 TABLET ORAL DAILY
COMMUNITY

## 2020-02-25 NOTE — PROGRESS NOTES
Request placed for anesthesia to please contact patient regarding insulin admin night prior to procedure.

## 2020-02-25 NOTE — PROGRESS NOTES
Preadmission appt completed via tele line. Patient states he had all pre admission testing done at [facility] on 2/24/2020. Call made to facility by RN, results being faxed to [phone].

## 2020-02-28 ENCOUNTER — APPOINTMENT (OUTPATIENT)
Dept: RADIOLOGY | Facility: MEDICAL CENTER | Age: 80
DRG: 472 | End: 2020-02-28
Attending: NEUROLOGICAL SURGERY
Payer: COMMERCIAL

## 2020-02-28 ENCOUNTER — ANESTHESIA EVENT (OUTPATIENT)
Dept: SURGERY | Facility: MEDICAL CENTER | Age: 80
DRG: 472 | End: 2020-02-28
Payer: COMMERCIAL

## 2020-02-28 ENCOUNTER — ANESTHESIA (OUTPATIENT)
Dept: SURGERY | Facility: MEDICAL CENTER | Age: 80
DRG: 472 | End: 2020-02-28
Payer: COMMERCIAL

## 2020-02-28 ENCOUNTER — HOSPITAL ENCOUNTER (INPATIENT)
Facility: MEDICAL CENTER | Age: 80
LOS: 13 days | DRG: 472 | End: 2020-03-12
Attending: NEUROLOGICAL SURGERY | Admitting: NEUROLOGICAL SURGERY
Payer: COMMERCIAL

## 2020-02-28 DIAGNOSIS — I10 ESSENTIAL HYPERTENSION: ICD-10-CM

## 2020-02-28 DIAGNOSIS — S14.129A CENTRAL CORD SYNDROME, INITIAL ENCOUNTER (HCC): ICD-10-CM

## 2020-02-28 DIAGNOSIS — E11.69 TYPE 2 DIABETES MELLITUS WITH OTHER SPECIFIED COMPLICATION, WITHOUT LONG-TERM CURRENT USE OF INSULIN (HCC): ICD-10-CM

## 2020-02-28 DIAGNOSIS — N18.30 CHRONIC RENAL FAILURE, STAGE 3 (MODERATE): ICD-10-CM

## 2020-02-28 PROBLEM — N18.9 ACUTE ON CHRONIC RENAL FAILURE (HCC): Status: ACTIVE | Noted: 2020-02-28

## 2020-02-28 PROBLEM — N17.9 ACUTE RENAL FAILURE (ARF) (HCC): Status: ACTIVE | Noted: 2020-02-28

## 2020-02-28 LAB
GLUCOSE BLD-MCNC: 116 MG/DL (ref 65–99)
GLUCOSE BLD-MCNC: 188 MG/DL (ref 65–99)
GLUCOSE BLD-MCNC: 210 MG/DL (ref 65–99)

## 2020-02-28 PROCEDURE — 700102 HCHG RX REV CODE 250 W/ 637 OVERRIDE(OP): Performed by: ANESTHESIOLOGY

## 2020-02-28 PROCEDURE — 0RG2071 FUSION OF 2 OR MORE CERVICAL VERTEBRAL JOINTS WITH AUTOLOGOUS TISSUE SUBSTITUTE, POSTERIOR APPROACH, POSTERIOR COLUMN, OPEN APPROACH: ICD-10-PCS | Performed by: NEUROLOGICAL SURGERY

## 2020-02-28 PROCEDURE — 160041 HCHG SURGERY MINUTES - EA ADDL 1 MIN LEVEL 4: Performed by: NEUROLOGICAL SURGERY

## 2020-02-28 PROCEDURE — 500865 HCHG NEEDLE, SPINAL 20G/22G: Performed by: NEUROLOGICAL SURGERY

## 2020-02-28 PROCEDURE — 700111 HCHG RX REV CODE 636 W/ 250 OVERRIDE (IP): Performed by: ANESTHESIOLOGY

## 2020-02-28 PROCEDURE — 500444 HCHG HEMOSTAT, SURGICEL 2X3: Performed by: NEUROLOGICAL SURGERY

## 2020-02-28 PROCEDURE — 160048 HCHG OR STATISTICAL LEVEL 1-5: Performed by: NEUROLOGICAL SURGERY

## 2020-02-28 PROCEDURE — 501838 HCHG SUTURE GENERAL: Performed by: NEUROLOGICAL SURGERY

## 2020-02-28 PROCEDURE — 700102 HCHG RX REV CODE 250 W/ 637 OVERRIDE(OP): Performed by: NEUROLOGICAL SURGERY

## 2020-02-28 PROCEDURE — 500075 HCHG BLADE, CLIPPER NEURO: Performed by: NEUROLOGICAL SURGERY

## 2020-02-28 PROCEDURE — 700105 HCHG RX REV CODE 258: Performed by: PHYSICIAN ASSISTANT

## 2020-02-28 PROCEDURE — 99222 1ST HOSP IP/OBS MODERATE 55: CPT | Performed by: HOSPITALIST

## 2020-02-28 PROCEDURE — A9270 NON-COVERED ITEM OR SERVICE: HCPCS | Performed by: ANESTHESIOLOGY

## 2020-02-28 PROCEDURE — 700105 HCHG RX REV CODE 258: Performed by: ANESTHESIOLOGY

## 2020-02-28 PROCEDURE — 160002 HCHG RECOVERY MINUTES (STAT): Performed by: NEUROLOGICAL SURGERY

## 2020-02-28 PROCEDURE — 700101 HCHG RX REV CODE 250: Performed by: ANESTHESIOLOGY

## 2020-02-28 PROCEDURE — 72040 X-RAY EXAM NECK SPINE 2-3 VW: CPT

## 2020-02-28 PROCEDURE — 700111 HCHG RX REV CODE 636 W/ 250 OVERRIDE (IP): Performed by: PHYSICIAN ASSISTANT

## 2020-02-28 PROCEDURE — 95925 SOMATOSENSORY TESTING: CPT | Performed by: NEUROLOGICAL SURGERY

## 2020-02-28 PROCEDURE — 160029 HCHG SURGERY MINUTES - 1ST 30 MINS LEVEL 4: Performed by: NEUROLOGICAL SURGERY

## 2020-02-28 PROCEDURE — 82962 GLUCOSE BLOOD TEST: CPT | Mod: 91

## 2020-02-28 PROCEDURE — 700112 HCHG RX REV CODE 229: Performed by: PHYSICIAN ASSISTANT

## 2020-02-28 PROCEDURE — 160009 HCHG ANES TIME/MIN: Performed by: NEUROLOGICAL SURGERY

## 2020-02-28 PROCEDURE — A9270 NON-COVERED ITEM OR SERVICE: HCPCS | Performed by: NEUROLOGICAL SURGERY

## 2020-02-28 PROCEDURE — 700101 HCHG RX REV CODE 250: Performed by: NEUROLOGICAL SURGERY

## 2020-02-28 PROCEDURE — 500367 HCHG DRAIN KIT, HEMOVAC: Performed by: NEUROLOGICAL SURGERY

## 2020-02-28 PROCEDURE — 01N10ZZ RELEASE CERVICAL NERVE, OPEN APPROACH: ICD-10-PCS | Performed by: NEUROLOGICAL SURGERY

## 2020-02-28 PROCEDURE — 700102 HCHG RX REV CODE 250 W/ 637 OVERRIDE(OP): Performed by: PHYSICIAN ASSISTANT

## 2020-02-28 PROCEDURE — C1713 ANCHOR/SCREW BN/BN,TIS/BN: HCPCS | Performed by: NEUROLOGICAL SURGERY

## 2020-02-28 PROCEDURE — 700111 HCHG RX REV CODE 636 W/ 250 OVERRIDE (IP): Performed by: NEUROLOGICAL SURGERY

## 2020-02-28 PROCEDURE — A4314 CATH W/DRAINAGE 2-WAY LATEX: HCPCS | Performed by: NEUROLOGICAL SURGERY

## 2020-02-28 PROCEDURE — 00NW0ZZ RELEASE CERVICAL SPINAL CORD, OPEN APPROACH: ICD-10-PCS | Performed by: NEUROLOGICAL SURGERY

## 2020-02-28 PROCEDURE — 700111 HCHG RX REV CODE 636 W/ 250 OVERRIDE (IP)

## 2020-02-28 PROCEDURE — 502240 HCHG MISC OR SUPPLY RC 0272: Performed by: NEUROLOGICAL SURGERY

## 2020-02-28 PROCEDURE — 95861 NEEDLE EMG 2 EXTREMITIES: CPT | Performed by: NEUROLOGICAL SURGERY

## 2020-02-28 PROCEDURE — 95940 IONM IN OPERATNG ROOM 15 MIN: CPT | Performed by: NEUROLOGICAL SURGERY

## 2020-02-28 PROCEDURE — 8E090EZ FLUORESCENCE GUIDED PROCEDURE OF HEAD AND NECK REGION, OPEN APPROACH: ICD-10-PCS | Performed by: NEUROLOGICAL SURGERY

## 2020-02-28 PROCEDURE — 500331 HCHG COTTONOID, SURG PATTIE: Performed by: NEUROLOGICAL SURGERY

## 2020-02-28 PROCEDURE — 700105 HCHG RX REV CODE 258: Performed by: NEUROLOGICAL SURGERY

## 2020-02-28 PROCEDURE — A9270 NON-COVERED ITEM OR SERVICE: HCPCS | Performed by: PHYSICIAN ASSISTANT

## 2020-02-28 PROCEDURE — 500864 HCHG NEEDLE, SPINAL 18G: Performed by: NEUROLOGICAL SURGERY

## 2020-02-28 PROCEDURE — 770001 HCHG ROOM/CARE - MED/SURG/GYN PRIV*

## 2020-02-28 PROCEDURE — 160035 HCHG PACU - 1ST 60 MINS PHASE I: Performed by: NEUROLOGICAL SURGERY

## 2020-02-28 PROCEDURE — 95939 C MOTOR EVOKED UPR&LWR LIMBS: CPT | Performed by: NEUROLOGICAL SURGERY

## 2020-02-28 PROCEDURE — 110454 HCHG SHELL REV 250: Performed by: NEUROLOGICAL SURGERY

## 2020-02-28 PROCEDURE — 4A11X4G MONITORING OF PERIPHERAL NERVOUS ELECTRICAL ACTIVITY, INTRAOPERATIVE, EXTERNAL APPROACH: ICD-10-PCS | Performed by: NEUROLOGICAL SURGERY

## 2020-02-28 PROCEDURE — 502000 HCHG MISC OR IMPLANTS RC 0278: Performed by: NEUROLOGICAL SURGERY

## 2020-02-28 PROCEDURE — 160036 HCHG PACU - EA ADDL 30 MINS PHASE I: Performed by: NEUROLOGICAL SURGERY

## 2020-02-28 DEVICE — DBM PUTTY PROGENIX 5.0CC: Type: IMPLANTABLE DEVICE | Site: NECK | Status: FUNCTIONAL

## 2020-02-28 DEVICE — IMPLANTABLE DEVICE: Type: IMPLANTABLE DEVICE | Site: NECK | Status: FUNCTIONAL

## 2020-02-28 RX ORDER — DOCUSATE SODIUM 100 MG/1
100 CAPSULE, LIQUID FILLED ORAL 2 TIMES DAILY
Status: DISCONTINUED | OUTPATIENT
Start: 2020-02-28 | End: 2020-03-12 | Stop reason: HOSPADM

## 2020-02-28 RX ORDER — AMOXICILLIN 250 MG
1 CAPSULE ORAL
Status: DISCONTINUED | OUTPATIENT
Start: 2020-02-28 | End: 2020-03-12 | Stop reason: HOSPADM

## 2020-02-28 RX ORDER — HEPARIN SODIUM 5000 [USP'U]/ML
5000 INJECTION, SOLUTION INTRAVENOUS; SUBCUTANEOUS 2 TIMES DAILY
Status: COMPLETED | OUTPATIENT
Start: 2020-02-29 | End: 2020-02-29

## 2020-02-28 RX ORDER — SODIUM BICARBONATE 650 MG/1
650 TABLET ORAL 3 TIMES DAILY
Status: DISCONTINUED | OUTPATIENT
Start: 2020-02-28 | End: 2020-03-12 | Stop reason: HOSPADM

## 2020-02-28 RX ORDER — ONDANSETRON 4 MG/1
4 TABLET, ORALLY DISINTEGRATING ORAL EVERY 4 HOURS PRN
Status: DISCONTINUED | OUTPATIENT
Start: 2020-02-28 | End: 2020-03-12 | Stop reason: HOSPADM

## 2020-02-28 RX ORDER — DIPHENHYDRAMINE HCL 25 MG
25 TABLET ORAL EVERY 6 HOURS PRN
Status: DISCONTINUED | OUTPATIENT
Start: 2020-02-28 | End: 2020-03-12 | Stop reason: HOSPADM

## 2020-02-28 RX ORDER — DIPHENHYDRAMINE HYDROCHLORIDE 50 MG/ML
12.5 INJECTION INTRAMUSCULAR; INTRAVENOUS
Status: DISCONTINUED | OUTPATIENT
Start: 2020-02-28 | End: 2020-02-28 | Stop reason: HOSPADM

## 2020-02-28 RX ORDER — CEFAZOLIN SODIUM 2 G/100ML
2 INJECTION, SOLUTION INTRAVENOUS EVERY 8 HOURS
Status: COMPLETED | OUTPATIENT
Start: 2020-02-28 | End: 2020-02-29

## 2020-02-28 RX ORDER — VANCOMYCIN HYDROCHLORIDE 1 G/20ML
INJECTION, POWDER, LYOPHILIZED, FOR SOLUTION INTRAVENOUS
Status: COMPLETED | OUTPATIENT
Start: 2020-02-28 | End: 2020-02-28

## 2020-02-28 RX ORDER — CEFAZOLIN SODIUM 1 G/3ML
INJECTION, POWDER, FOR SOLUTION INTRAMUSCULAR; INTRAVENOUS PRN
Status: DISCONTINUED | OUTPATIENT
Start: 2020-02-28 | End: 2020-02-28 | Stop reason: SURG

## 2020-02-28 RX ORDER — OXYCODONE HYDROCHLORIDE AND ACETAMINOPHEN 5; 325 MG/1; MG/1
1 TABLET ORAL
Status: COMPLETED | OUTPATIENT
Start: 2020-02-28 | End: 2020-02-28

## 2020-02-28 RX ORDER — HYDROCODONE BITARTRATE AND ACETAMINOPHEN 5; 325 MG/1; MG/1
1-2 TABLET ORAL EVERY 6 HOURS PRN
Status: DISCONTINUED | OUTPATIENT
Start: 2020-02-28 | End: 2020-03-12 | Stop reason: HOSPADM

## 2020-02-28 RX ORDER — BISACODYL 10 MG
10 SUPPOSITORY, RECTAL RECTAL
Status: DISCONTINUED | OUTPATIENT
Start: 2020-02-28 | End: 2020-03-12 | Stop reason: HOSPADM

## 2020-02-28 RX ORDER — PIOGLITAZONEHYDROCHLORIDE 30 MG/1
30 TABLET ORAL DAILY
Status: DISCONTINUED | OUTPATIENT
Start: 2020-02-28 | End: 2020-03-12 | Stop reason: HOSPADM

## 2020-02-28 RX ORDER — METHOCARBAMOL 750 MG/1
750 TABLET, FILM COATED ORAL EVERY 8 HOURS PRN
Status: DISCONTINUED | OUTPATIENT
Start: 2020-02-28 | End: 2020-03-12 | Stop reason: HOSPADM

## 2020-02-28 RX ORDER — ACETAMINOPHEN 500 MG
1000 TABLET ORAL EVERY 6 HOURS
Status: DISCONTINUED | OUTPATIENT
Start: 2020-02-28 | End: 2020-03-02

## 2020-02-28 RX ORDER — SODIUM CHLORIDE, SODIUM LACTATE, POTASSIUM CHLORIDE, CALCIUM CHLORIDE 600; 310; 30; 20 MG/100ML; MG/100ML; MG/100ML; MG/100ML
INJECTION, SOLUTION INTRAVENOUS CONTINUOUS
Status: ACTIVE | OUTPATIENT
Start: 2020-02-28 | End: 2020-02-28

## 2020-02-28 RX ORDER — INSULIN GLARGINE 100 [IU]/ML
6 INJECTION, SOLUTION SUBCUTANEOUS EVERY EVENING
Status: DISCONTINUED | OUTPATIENT
Start: 2020-02-28 | End: 2020-03-01

## 2020-02-28 RX ORDER — ONDANSETRON 2 MG/ML
4 INJECTION INTRAMUSCULAR; INTRAVENOUS EVERY 4 HOURS PRN
Status: DISCONTINUED | OUTPATIENT
Start: 2020-02-28 | End: 2020-03-12 | Stop reason: HOSPADM

## 2020-02-28 RX ORDER — CLONIDINE HYDROCHLORIDE 0.1 MG/1
0.1 TABLET ORAL EVERY 4 HOURS PRN
Status: DISCONTINUED | OUTPATIENT
Start: 2020-02-28 | End: 2020-03-12 | Stop reason: HOSPADM

## 2020-02-28 RX ORDER — SODIUM CHLORIDE 9 MG/ML
INJECTION, SOLUTION INTRAVENOUS CONTINUOUS
Status: DISCONTINUED | OUTPATIENT
Start: 2020-02-28 | End: 2020-03-03

## 2020-02-28 RX ORDER — POLYETHYLENE GLYCOL 3350 17 G/17G
1 POWDER, FOR SOLUTION ORAL 2 TIMES DAILY PRN
Status: DISCONTINUED | OUTPATIENT
Start: 2020-02-28 | End: 2020-03-02

## 2020-02-28 RX ORDER — OXYCODONE HYDROCHLORIDE 5 MG/1
5 TABLET ORAL
Status: DISCONTINUED | OUTPATIENT
Start: 2020-02-28 | End: 2020-03-12 | Stop reason: HOSPADM

## 2020-02-28 RX ORDER — SODIUM CHLORIDE, SODIUM LACTATE, POTASSIUM CHLORIDE, CALCIUM CHLORIDE 600; 310; 30; 20 MG/100ML; MG/100ML; MG/100ML; MG/100ML
INJECTION, SOLUTION INTRAVENOUS CONTINUOUS
Status: DISCONTINUED | OUTPATIENT
Start: 2020-02-28 | End: 2020-02-28 | Stop reason: HOSPADM

## 2020-02-28 RX ORDER — OXYCODONE HYDROCHLORIDE AND ACETAMINOPHEN 5; 325 MG/1; MG/1
2 TABLET ORAL
Status: COMPLETED | OUTPATIENT
Start: 2020-02-28 | End: 2020-02-28

## 2020-02-28 RX ORDER — SIMVASTATIN 20 MG
20 TABLET ORAL NIGHTLY
Status: DISCONTINUED | OUTPATIENT
Start: 2020-02-28 | End: 2020-03-12 | Stop reason: HOSPADM

## 2020-02-28 RX ORDER — SIMVASTATIN 20 MG
20 TABLET ORAL NIGHTLY
COMMUNITY

## 2020-02-28 RX ORDER — CALCIUM CARBONATE 500 MG/1
500 TABLET, CHEWABLE ORAL 2 TIMES DAILY
Status: DISCONTINUED | OUTPATIENT
Start: 2020-02-28 | End: 2020-03-12 | Stop reason: HOSPADM

## 2020-02-28 RX ORDER — BUPIVACAINE HYDROCHLORIDE AND EPINEPHRINE 5; 5 MG/ML; UG/ML
INJECTION, SOLUTION EPIDURAL; INTRACAUDAL; PERINEURAL
Status: DISCONTINUED | OUTPATIENT
Start: 2020-02-28 | End: 2020-02-28 | Stop reason: HOSPADM

## 2020-02-28 RX ORDER — LIDOCAINE HYDROCHLORIDE 10 MG/ML
INJECTION, SOLUTION EPIDURAL; INFILTRATION; INTRACAUDAL; PERINEURAL
Status: COMPLETED
Start: 2020-02-28 | End: 2020-02-28

## 2020-02-28 RX ORDER — HALOPERIDOL 5 MG/ML
1 INJECTION INTRAMUSCULAR
Status: DISCONTINUED | OUTPATIENT
Start: 2020-02-28 | End: 2020-02-28 | Stop reason: HOSPADM

## 2020-02-28 RX ORDER — DIPHENHYDRAMINE HYDROCHLORIDE 50 MG/ML
25 INJECTION INTRAMUSCULAR; INTRAVENOUS EVERY 6 HOURS PRN
Status: DISCONTINUED | OUTPATIENT
Start: 2020-02-28 | End: 2020-03-12 | Stop reason: HOSPADM

## 2020-02-28 RX ORDER — ENEMA 19; 7 G/133ML; G/133ML
1 ENEMA RECTAL
Status: DISCONTINUED | OUTPATIENT
Start: 2020-02-28 | End: 2020-03-02

## 2020-02-28 RX ORDER — DEXMEDETOMIDINE HYDROCHLORIDE 4 UG/ML
INJECTION, SOLUTION INTRAVENOUS
Status: DISCONTINUED | OUTPATIENT
Start: 2020-02-28 | End: 2020-02-28 | Stop reason: SURG

## 2020-02-28 RX ORDER — ONDANSETRON 2 MG/ML
4 INJECTION INTRAMUSCULAR; INTRAVENOUS
Status: DISCONTINUED | OUTPATIENT
Start: 2020-02-28 | End: 2020-02-28 | Stop reason: HOSPADM

## 2020-02-28 RX ORDER — VITAMIN B COMPLEX
5000 TABLET ORAL DAILY
Status: DISCONTINUED | OUTPATIENT
Start: 2020-02-28 | End: 2020-03-12 | Stop reason: HOSPADM

## 2020-02-28 RX ORDER — SUCCINYLCHOLINE CHLORIDE 20 MG/ML
INJECTION INTRAMUSCULAR; INTRAVENOUS PRN
Status: DISCONTINUED | OUTPATIENT
Start: 2020-02-28 | End: 2020-02-28 | Stop reason: SURG

## 2020-02-28 RX ORDER — MORPHINE SULFATE 4 MG/ML
2 INJECTION, SOLUTION INTRAMUSCULAR; INTRAVENOUS
Status: DISCONTINUED | OUTPATIENT
Start: 2020-02-28 | End: 2020-03-02

## 2020-02-28 RX ORDER — BACITRACIN ZINC 500 [USP'U]/G
OINTMENT TOPICAL
Status: DISCONTINUED | OUTPATIENT
Start: 2020-02-28 | End: 2020-02-28 | Stop reason: HOSPADM

## 2020-02-28 RX ORDER — HEPARIN SODIUM 5000 [USP'U]/ML
5000 INJECTION, SOLUTION INTRAVENOUS; SUBCUTANEOUS EVERY 8 HOURS
Status: DISCONTINUED | OUTPATIENT
Start: 2020-03-01 | End: 2020-03-03

## 2020-02-28 RX ORDER — AMOXICILLIN 250 MG
1 CAPSULE ORAL NIGHTLY
Status: DISCONTINUED | OUTPATIENT
Start: 2020-02-28 | End: 2020-03-12 | Stop reason: HOSPADM

## 2020-02-28 RX ORDER — DEXAMETHASONE SODIUM PHOSPHATE 4 MG/ML
4 INJECTION, SOLUTION INTRA-ARTICULAR; INTRALESIONAL; INTRAMUSCULAR; INTRAVENOUS; SOFT TISSUE EVERY 6 HOURS
Status: DISCONTINUED | OUTPATIENT
Start: 2020-02-28 | End: 2020-02-28

## 2020-02-28 RX ADMIN — CEFAZOLIN SODIUM 2 G: 2 INJECTION, SOLUTION INTRAVENOUS at 23:54

## 2020-02-28 RX ADMIN — ACETAMINOPHEN 1000 MG: 500 TABLET ORAL at 17:23

## 2020-02-28 RX ADMIN — FENTANYL CITRATE 150 MCG: 50 INJECTION, SOLUTION INTRAMUSCULAR; INTRAVENOUS at 09:18

## 2020-02-28 RX ADMIN — FENTANYL CITRATE 50 MCG: 0.05 INJECTION, SOLUTION INTRAMUSCULAR; INTRAVENOUS at 11:00

## 2020-02-28 RX ADMIN — HYDROCODONE BITARTRATE AND ACETAMINOPHEN 1 TABLET: 5; 325 TABLET ORAL at 17:23

## 2020-02-28 RX ADMIN — ANTACID TABLETS 500 MG: 500 TABLET, CHEWABLE ORAL at 17:23

## 2020-02-28 RX ADMIN — DOCUSATE SODIUM 100 MG: 100 CAPSULE, LIQUID FILLED ORAL at 17:23

## 2020-02-28 RX ADMIN — SENNOSIDES AND DOCUSATE SODIUM 1 TABLET: 8.6; 5 TABLET ORAL at 20:44

## 2020-02-28 RX ADMIN — HYDROCODONE BITARTRATE AND ACETAMINOPHEN 1 TABLET: 5; 325 TABLET ORAL at 20:44

## 2020-02-28 RX ADMIN — SIMVASTATIN 20 MG: 20 TABLET, FILM COATED ORAL at 20:44

## 2020-02-28 RX ADMIN — SODIUM CHLORIDE, POTASSIUM CHLORIDE, SODIUM LACTATE AND CALCIUM CHLORIDE: 600; 310; 30; 20 INJECTION, SOLUTION INTRAVENOUS at 06:56

## 2020-02-28 RX ADMIN — SODIUM CHLORIDE, POTASSIUM CHLORIDE, SODIUM LACTATE AND CALCIUM CHLORIDE 1000 ML: 600; 310; 30; 20 INJECTION, SOLUTION INTRAVENOUS at 11:30

## 2020-02-28 RX ADMIN — OXYCODONE HYDROCHLORIDE 5 MG: 5 TABLET ORAL at 23:54

## 2020-02-28 RX ADMIN — LIDOCAINE HYDROCHLORIDE 0.5 ML: 10 INJECTION, SOLUTION EPIDURAL; INFILTRATION; INTRACAUDAL; PERINEURAL at 06:56

## 2020-02-28 RX ADMIN — EPHEDRINE SULFATE 10 MG: 50 INJECTION, SOLUTION INTRAVENOUS at 10:02

## 2020-02-28 RX ADMIN — ACETAMINOPHEN 1000 MG: 500 TABLET ORAL at 23:54

## 2020-02-28 RX ADMIN — PROPOFOL 200 MG: 10 INJECTION, EMULSION INTRAVENOUS at 07:42

## 2020-02-28 RX ADMIN — SUCCINYLCHOLINE CHLORIDE 100 MG: 20 INJECTION, SOLUTION INTRAMUSCULAR; INTRAVENOUS at 07:42

## 2020-02-28 RX ADMIN — INSULIN LISPRO 1 UNITS: 100 INJECTION, SOLUTION INTRAVENOUS; SUBCUTANEOUS at 20:46

## 2020-02-28 RX ADMIN — FENTANYL CITRATE 50 MCG: 0.05 INJECTION, SOLUTION INTRAMUSCULAR; INTRAVENOUS at 11:34

## 2020-02-28 RX ADMIN — CEFAZOLIN SODIUM 2 G: 2 INJECTION, SOLUTION INTRAVENOUS at 17:23

## 2020-02-28 RX ADMIN — FENTANYL CITRATE 50 MCG: 0.05 INJECTION, SOLUTION INTRAMUSCULAR; INTRAVENOUS at 10:40

## 2020-02-28 RX ADMIN — METOPROLOL TARTRATE 12.5 MG: 25 TABLET, FILM COATED ORAL at 17:24

## 2020-02-28 RX ADMIN — FENTANYL CITRATE 100 MCG: 50 INJECTION, SOLUTION INTRAMUSCULAR; INTRAVENOUS at 07:42

## 2020-02-28 RX ADMIN — Medication 0.5 ML: at 06:56

## 2020-02-28 RX ADMIN — OXYCODONE HYDROCHLORIDE AND ACETAMINOPHEN 2 TABLET: 5; 325 TABLET ORAL at 11:15

## 2020-02-28 RX ADMIN — SODIUM CHLORIDE: 9 INJECTION, SOLUTION INTRAVENOUS at 12:53

## 2020-02-28 RX ADMIN — DEXMEDETOMIDINE HYDROCHLORIDE 0.7 MCG/KG/HR: 100 INJECTION, SOLUTION INTRAVENOUS at 08:53

## 2020-02-28 RX ADMIN — INSULIN GLARGINE 6 UNITS: 100 INJECTION, SOLUTION SUBCUTANEOUS at 18:45

## 2020-02-28 RX ADMIN — DEXMEDETOMIDINE HYDROCHLORIDE 0.7 MCG/KG/HR: 100 INJECTION, SOLUTION INTRAVENOUS at 07:44

## 2020-02-28 RX ADMIN — MORPHINE SULFATE 2 MG: 4 INJECTION INTRAVENOUS at 12:52

## 2020-02-28 RX ADMIN — CEFAZOLIN 2 G: 330 INJECTION, POWDER, FOR SOLUTION INTRAMUSCULAR; INTRAVENOUS at 07:35

## 2020-02-28 ASSESSMENT — COGNITIVE AND FUNCTIONAL STATUS - GENERAL
TURNING FROM BACK TO SIDE WHILE IN FLAT BAD: A LITTLE
TOILETING: A LITTLE
EATING MEALS: A LITTLE
HELP NEEDED FOR BATHING: A LITTLE
CLIMB 3 TO 5 STEPS WITH RAILING: A LOT
DRESSING REGULAR UPPER BODY CLOTHING: A LITTLE
DRESSING REGULAR LOWER BODY CLOTHING: A LITTLE
WALKING IN HOSPITAL ROOM: A LOT
DAILY ACTIVITIY SCORE: 18
MOVING FROM LYING ON BACK TO SITTING ON SIDE OF FLAT BED: A LOT
SUGGESTED CMS G CODE MODIFIER DAILY ACTIVITY: CK
PERSONAL GROOMING: A LITTLE
SUGGESTED CMS G CODE MODIFIER MOBILITY: CL
MOVING TO AND FROM BED TO CHAIR: A LITTLE
STANDING UP FROM CHAIR USING ARMS: A LOT
MOBILITY SCORE: 14

## 2020-02-28 ASSESSMENT — ENCOUNTER SYMPTOMS
EYES NEGATIVE: 1
SEIZURES: 0
RESPIRATORY NEGATIVE: 1
BLOOD IN STOOL: 0
VOMITING: 0
GASTROINTESTINAL NEGATIVE: 1
PSYCHIATRIC NEGATIVE: 1
CONSTITUTIONAL NEGATIVE: 1
NAUSEA: 0
LOSS OF CONSCIOUSNESS: 0
NERVOUS/ANXIOUS: 0
HEARTBURN: 0
CHILLS: 0
DIZZINESS: 0
DIARRHEA: 0
DOUBLE VISION: 0
NEUROLOGICAL NEGATIVE: 1
CARDIOVASCULAR NEGATIVE: 1
NECK PAIN: 1
HEADACHES: 0
PALPITATIONS: 0
ABDOMINAL PAIN: 0
BRUISES/BLEEDS EASILY: 0
FEVER: 0
HEMOPTYSIS: 0
CONSTIPATION: 0
WHEEZING: 0
COUGH: 0
DIAPHORESIS: 0
FOCAL WEAKNESS: 0

## 2020-02-28 ASSESSMENT — LIFESTYLE VARIABLES
EVER HAD A DRINK FIRST THING IN THE MORNING TO STEADY YOUR NERVES TO GET RID OF A HANGOVER: NO
HOW MANY TIMES IN THE PAST YEAR HAVE YOU HAD 5 OR MORE DRINKS IN A DAY: 0
TOTAL SCORE: 0
EVER_SMOKED: NEVER
ON A TYPICAL DAY WHEN YOU DRINK ALCOHOL HOW MANY DRINKS DO YOU HAVE: 0
HAVE YOU EVER FELT YOU SHOULD CUT DOWN ON YOUR DRINKING: NO
HAVE PEOPLE ANNOYED YOU BY CRITICIZING YOUR DRINKING: NO
CONSUMPTION TOTAL: NEGATIVE
EVER FELT BAD OR GUILTY ABOUT YOUR DRINKING: NO
TOTAL SCORE: 0
ALCOHOL_USE: NO
TOTAL SCORE: 0
AVERAGE NUMBER OF DAYS PER WEEK YOU HAVE A DRINK CONTAINING ALCOHOL: 0

## 2020-02-28 ASSESSMENT — PAIN SCALES - GENERAL: PAIN_LEVEL: 3

## 2020-02-28 ASSESSMENT — PATIENT HEALTH QUESTIONNAIRE - PHQ9
1. LITTLE INTEREST OR PLEASURE IN DOING THINGS: NOT AT ALL
SUM OF ALL RESPONSES TO PHQ9 QUESTIONS 1 AND 2: 0
2. FEELING DOWN, DEPRESSED, IRRITABLE, OR HOPELESS: NOT AT ALL

## 2020-02-28 NOTE — OP REPORT
DATE OF SURGERY: 2/28/2020  SURGEON: Lg Cadena D.O.  ASSISTANT: Ethan Sarmiento PA-C  PREOPERATIVE DIAGNOSIS: C3-C6 cervical spondylosis with myelopathy, cervical spondylosis, neural foraminal stenosis.  POSTOPERATIVE DIAGNOSIS: C3-C6 cervical spondylosis with myelopathy, cervical spondylosis, neural foraminal stenosis.  PROCEDURE PERFORMED:  1.  C3 C6 laminectomy, foraminotomy with decompression of spinal cord.  2.  C3 C6 posterior lateral mass instrumentation with screws and rods.  3.  C3 C6 posterior arthrodesis   4. Allograft morselized bone (local bone spinous process).  5. Intraoperative use of microscope for microsurgical techniques.  6. Fluoroscopic guidance for localization and instrumentation.     ANESTHESIA: GETA.  ESTIMATED BLOOD LOSS: 200 cc  COMPLICATIONS: None.  DISPOSITION: Stable to the PACU.  INDICATIONS FOR THE PROCEDURE    HISTORY: Mr. berry is a pleasant 79-year-old gentleman who presents with signs, symptoms and radiographic evidence of myelopathy, neck pain radiating into His arms and generalized weakness in all muscle muscle extremities typical of severe myelopathy.   DIAGNOSTIC STUDY: MRI of the cervical spine showed severe stenosis from C3-C6.  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  He had failed conservative treatment (physical therapy, pain medication, epidural steroids) and his symptoms continued to progress. Given the progression of the symptoms, it was decided to proceed with cervical laminectomy and arthrodesis at C3-C6.  The primary objective of the procedure is to prevent any worsening of his myelopathy.  Patient understands this and wishes to proceed for the procedure.    SURGICAL RISKS: The patient was well apprised of all objectives, benefits, risks and potential complications of the  procedure, including but not limited to: worsening of current status, the possible need for further procedures, the risk of infection, headaches, CSF leak, possible spinal cord injury resulting in paralysis, infection, neck hematoma, injury to major vessels causing hemorrhage, stroke, loss of language function, coma and even death. Informed consent was obtained and secured in the chart after the patient voiced understanding of these risks and decided to proceed with the operation.     DESCRIPTION OF THE PROCEDURE  The patient was transferred to the operating room.  He was given preoperative prophylactic IV antibiotics.    ANESTHESIA: The patient was sedated and intubated without difficulty by the anesthesia service. Eyes were taped shut after ointment was applied to prevent corneal abrasion. A Cody Hugger was placed over the lower body to maintain control of core body temperature. A Wallis catheter was inserted.    POSITIONING: A Tangible Playita head clamp was applied. The patient was turned prone on foam rolls. All pressure points were carefully padded. The hair was clipped over the suboccipital region to include the area where he was to undergo the cervical incision.   The neuro monitoring team placed pins in the proper position for pre-as well as post positioning monitoring of SSEPs as well as MEP's.  At baseline was confirmed prior to flipping.    OPERATIVE TECHNIQUE  The patient was prepped and draped in the standard sterile fashion. The C-arm fluoroscopy unit was draped with sterile drapes and brought into the operative field and the C localized. Local anesthetic was infiltrated along the line of the skin incision which was subsequently opened sharply with a #10 scalpel blade. Further dissection was carried down in the midline until the level of supraspinous ligament utilizing bipolar forceps and Bovie electrocautery for hemostasis. The muscle was elevated subperiosteally from C3-C6. Hemostasis was achieved.  Self-retaining retractors were then inserted. The space was again confirmed utilizing fluoroscopy.   The operating microscope was draped in sterile fashion and brought into the operative field. Utilizing a high-speed pneumatic drill and C1 bit,  holes were drilled bilaterally into the lateral masses of C3 C6 using woodpecker technique. All holes were sounded with a blunt feeler probe and were felt to be surrounded by cortical bone. All holes were approximately 12-14 mm in depth.   The superior spinous processes of C3 through C6 were removed (harvested autograft) and the bone saved for the posterior arthrodesis.  I then ensured to undercut the C2 lamina and remove all ligament between C2 and C3 for decompression from the inferior border of C2 although the superior border of C7.  Also undercut the C7 lamina superiorly to ensure decompression from the bottom of C2 to the top of C7.  With a high-speed pneumatic drill and a AM-8 round drill bit, foraminotomies were carried out on the side at the C3 -C6. Once the foraminotomies were performed, each nerve root was tracked within its neuroforamen and found to be decompressed and free.    holes were tapped and screws measuring 12-14 mm were then screwed and tightened into the pre-drilled holes. Subsequently rods were cut and bent into the cervical curvature. The rods were inserted into the lateral mass screws and were tightened in place with threaded locking nuts. Fluoroscopy confirmed good placement of the screws and rods. Visible cortical bone of the posterolateral masses were decorticated to prepare for better arthrodesis. The harvested bone chips were cleaned and mixed with bone putty (e.g. DBX). The microscope was then removed and the wound irrigated with antibiotic saline solution.  For arthrodesis, bone putty and autograft bone chips were placed over the area where the posterolateral masses had been superficially decorticated to achieve a good arthrodesis. A  Hemovac drain was left in place. Hemostasis was again meticulously achieved utilizing bipolar and Bovie electrocautery. The wound was copiously irrigated with antibiotic saline solution until clear. Local anesthetic was given around the area.  I also placed 1 g of vancomycin within the wound.  The muscle was approximated with 0-polyglactin synthetic absorbable suture (Vicryl) and the fascia closed with 0-polyglactin synthetic absorbable suture (Vicryl). The subcutaneous tissue was approximated with 2-0 polyglactin synthetic absorbable suture (Vicryl). The skin was then approximated with surgical staples.     Sponge and needle counts were correct at the end of the case times two. All pressure points remained padded throughout the entire case. The patient tolerated the procedure well and was transferred to the recovery room in stable condition.  There were no changes in the SSEP nor MEP monitoring during the case in fact there was slight improvement in MEP's post decompression and fusion.

## 2020-02-28 NOTE — ANESTHESIA PROCEDURE NOTES
Arterial Line  Performed by: Georgi Fitch M.D.  Authorized by: Georgi Fitch M.D.     Start Time:  2/28/2020 7:43 AM  End Time:  2/28/2020 7:50 AM  Localization: surface landmarks    Patient Location:  OR  Indication: continuous blood pressure monitoring    Catheter Size:  20 G  Seldinger Technique?: Yes    Laterality:  Right  Site:  Radial artery  Line Secured:  Antimicrobial disc, transparent dressing and tape  Events: patient tolerated procedure well with no complications

## 2020-02-28 NOTE — OR NURSING
"Pt reports that neck ' is killing me'. Rates pain level   \" medium\" .   Pt has been resting. Eyes closed.  Medicated per anesthesia RX .  VS hr 84 /65 100 % 2L O2 NC RR 15      "

## 2020-02-28 NOTE — ANESTHESIA PREPROCEDURE EVALUATION
Relevant Problems   CARDIAC   (+) Essential hypertension         (+) Chronic renal failure      ENDO   (+) Type 2 diabetes mellitus (HCC)       Physical Exam    Airway   Mallampati: II  TM distance: >3 FB  Neck ROM: full       Cardiovascular   Rhythm: regular  Rate: normal     Dental - normal exam         Pulmonary    Abdominal - normal exam     Neurological - normal exam                 Anesthesia Plan    ASA 3   ASA physical status 3 criteria: other (comment)    Plan - general       Airway plan will be ETT      Plan Factors:   Patient was not previously instructed to abstain from smoking on day of procedure.  Patient did not smoke on day of procedure.      Induction: intravenous          Informed Consent:    Anesthetic plan and risks discussed with patient.    Use of blood products discussed with: patient whom consented to blood products.

## 2020-02-28 NOTE — ANESTHESIA PROCEDURE NOTES
Airway  Date/Time: 2/28/2020 7:45 AM  Performed by: Georgi Fitch M.D.  Authorized by: Georgi Fitch M.D.     Location:  OR  Urgency:  Elective  Indications for Airway Management:  Anesthesia  Spontaneous Ventilation: present    Sedation Level:  Deep  Preoxygenated: Yes    Patient Position:  Sniffing  MILS Maintained Throughout: No    Mask Difficulty Assessment:  0 - not attempted  Final Airway Type:  Endotracheal airway  Final Endotracheal Airway:  ETT  Cuffed: Yes    Technique Used for Successful ETT Placement:  Video laryngoscopy  Devices/Methods Used in Placement:  Intubating stylet  Blade Type:  Elva  Laryngoscope Blade/Videolaryngoscope Blade Size:  3  ETT Size (mm):  7.0  Placement Verified by: auscultation and capnometry    Cormack-Lehane Classification:  Grade I - full view of glottis  Number of Attempts at Approach:  1

## 2020-02-28 NOTE — OR NURSING
Report to Erma CINTRON  C3-C6 cervical decompression. prevena intact C/D/I. Hemo vac C/D/I. Scant drainage. Not emptied.  Pt rec'd fentanyl 150 mcgs iv. Percocet 2 tabs. At 1115.   rates pain ' medium'. Able to rest.  VSS MONZON on command. Very [place]. AXOX4.  Wallis 255 cc. 18 g right forearm. Patent.  Daughter updated on room and pt status

## 2020-02-28 NOTE — OR SURGEON
Immediate Post OP Note    PreOp Diagnosis: Cervical spondylosis with myelopathy    PostOp Diagnosis: Cervical spondylosis with myelopathy    Procedure(s):  DECOMPRESSION, SPINE, CERVICAL, POSTERIOR APPROACH- C3-6 DISCECTOMY AND FUSION - Wound Class: Clean with Drain    Surgeon(s):  Lg Sellers D.O.    Anesthesiologist/Type of Anesthesia:  Anesthesiologist: Georgi Fitch M.D./General    Surgical Staff:  Assistant: Ethan Sarmiento P.A.-C.  Circulator: Nilay Hernadez R.N.  Relief Circulator: Elaina Leung R.N.  Scrub Person: Apurva Deleon  Radiology Technologist: Kim Saab    Specimens removed if any:  * No specimens in log *    Estimated Blood Loss: 200 cc    Findings: Severe stenosis C2-C6 severe stenosis. Good decompression. Pulsatile dura post decompression. Good placement of screws and rods.    Complications: None        2/28/2020 10:27 AM Lg Sellers D.O.

## 2020-02-28 NOTE — ANESTHESIA QCDR
2019 [facility] Clinical Data Registry (for Quality Improvement)     Postoperative nausea/vomiting risk protocol (Adult = 18 yrs and Pediatric 3-17 yrs)- (430 and 463)  General inhalation anesthetic (NOT TIVA) with PONV risk factors: Yes  Provision of anti-emetic therapy with at least 2 different classes of agents: Yes   Patient DID NOT receive anti-emetic therapy and reason is documented in Medical Record:  N/A    Multimodal Pain Management- (477)  Non-emergent surgery AND patient age >= 18: Yes  Use of Multimodal Pain Management, two or more drugs and/or interventions, NOT including systemic opioids: No  Exception: Documented allergy to multiple classes of analgesics: No       Smoking Abstinence (404)  Patient is current smoker (cigarette, pipe, e-cig, marijuanna): No  Elective Surgery:   Abstinence instructions provided prior to day of surgery:   Patient abstained from smoking on day of surgery:     Pre-Op Beta-Blocker in Isolated CABG (44)  Isolated CABG AND patient age >= 18: No  Beta-blocker admin within 24 hours of surgical incision:   Exception:of medical reason(s) for not administering beta blocker within 24 hours prior to surgical incision (e.g., not  indicated,other medical reason):     PACU assessment of acute postoperative pain prior to Anesthesia Care End- Applies to Patients Age = 18- (ABG7)  Initial PACU pain score is which of the following: < 7/10  Patient unable to report pain score: N/A    Post-anesthetic transfer of care checklist/protocol to PACU/ICU- (426 and 427)  Upon conclusion of case, patient transferred to which of the following locations: PACU/Non-ICU  Use of transfer checklist/protocol: Yes  Exclusion: Service Performed in Patient Hospital Room (and thus did not require transfer): N/A  Unplanned admission to ICU related to anesthesia service up through end of PACU care- (MD51)  Unplanned admission to ICU (not initially anticipated at anesthesia start time): No

## 2020-02-28 NOTE — ANESTHESIA TIME REPORT
Anesthesia Start and Stop Event Times     Date Time Event    2/28/2020 0715 Ready for Procedure     0735 Anesthesia Start     1029 Anesthesia Stop        Responsible Staff  02/28/20    Name Role Begin End    Georgi Fitch M.D. Anesth 0735 1029        Preop Diagnosis (Free Text):  Pre-op Diagnosis     SPINAL STENOSIS, CERVICAL REGION        Preop Diagnosis (Codes):    Post op Diagnosis  Neck pain      Premium Reason  Non-Premium    Comments:

## 2020-02-29 LAB
ANION GAP SERPL CALC-SCNC: 10 MMOL/L (ref 0–11.9)
BUN SERPL-MCNC: 41 MG/DL (ref 8–22)
CALCIUM SERPL-MCNC: 8.5 MG/DL (ref 8.5–10.5)
CHLORIDE SERPL-SCNC: 105 MMOL/L (ref 96–112)
CHOLEST SERPL-MCNC: 75 MG/DL (ref 100–199)
CO2 SERPL-SCNC: 20 MMOL/L (ref 20–33)
CREAT SERPL-MCNC: 2.31 MG/DL (ref 0.5–1.4)
ERYTHROCYTE [DISTWIDTH] IN BLOOD BY AUTOMATED COUNT: 61.4 FL (ref 35.9–50)
EST. AVERAGE GLUCOSE BLD GHB EST-MCNC: 100 MG/DL
GLUCOSE BLD-MCNC: 168 MG/DL (ref 65–99)
GLUCOSE BLD-MCNC: 265 MG/DL (ref 65–99)
GLUCOSE BLD-MCNC: 342 MG/DL (ref 65–99)
GLUCOSE SERPL-MCNC: 235 MG/DL (ref 65–99)
HBA1C MFR BLD: 5.1 % (ref 0–5.6)
HCT VFR BLD AUTO: 28 % (ref 42–52)
HDLC SERPL-MCNC: 42 MG/DL
HGB BLD-MCNC: 9.1 G/DL (ref 14–18)
LDLC SERPL CALC-MCNC: 23 MG/DL
MCH RBC QN AUTO: 32.2 PG (ref 27–33)
MCHC RBC AUTO-ENTMCNC: 32.5 G/DL (ref 33.7–35.3)
MCV RBC AUTO: 98.9 FL (ref 81.4–97.8)
PLATELET # BLD AUTO: 109 K/UL (ref 164–446)
PMV BLD AUTO: 10.2 FL (ref 9–12.9)
POTASSIUM SERPL-SCNC: 5 MMOL/L (ref 3.6–5.5)
RBC # BLD AUTO: 2.83 M/UL (ref 4.7–6.1)
SODIUM SERPL-SCNC: 135 MMOL/L (ref 135–145)
TRIGL SERPL-MCNC: 48 MG/DL (ref 0–149)
WBC # BLD AUTO: 8.7 K/UL (ref 4.8–10.8)

## 2020-02-29 PROCEDURE — 700112 HCHG RX REV CODE 229: Performed by: PHYSICIAN ASSISTANT

## 2020-02-29 PROCEDURE — 85027 COMPLETE CBC AUTOMATED: CPT

## 2020-02-29 PROCEDURE — 97535 SELF CARE MNGMENT TRAINING: CPT

## 2020-02-29 PROCEDURE — 82962 GLUCOSE BLOOD TEST: CPT | Mod: 91

## 2020-02-29 PROCEDURE — 770001 HCHG ROOM/CARE - MED/SURG/GYN PRIV*

## 2020-02-29 PROCEDURE — 36415 COLL VENOUS BLD VENIPUNCTURE: CPT

## 2020-02-29 PROCEDURE — 80061 LIPID PANEL: CPT

## 2020-02-29 PROCEDURE — 99232 SBSQ HOSP IP/OBS MODERATE 35: CPT | Performed by: HOSPITALIST

## 2020-02-29 PROCEDURE — 700111 HCHG RX REV CODE 636 W/ 250 OVERRIDE (IP): Performed by: PHYSICIAN ASSISTANT

## 2020-02-29 PROCEDURE — 700102 HCHG RX REV CODE 250 W/ 637 OVERRIDE(OP): Performed by: PHYSICIAN ASSISTANT

## 2020-02-29 PROCEDURE — A9270 NON-COVERED ITEM OR SERVICE: HCPCS | Performed by: PHYSICIAN ASSISTANT

## 2020-02-29 PROCEDURE — 97166 OT EVAL MOD COMPLEX 45 MIN: CPT

## 2020-02-29 PROCEDURE — 83036 HEMOGLOBIN GLYCOSYLATED A1C: CPT

## 2020-02-29 PROCEDURE — 80048 BASIC METABOLIC PNL TOTAL CA: CPT

## 2020-02-29 RX ADMIN — DOCUSATE SODIUM 100 MG: 100 CAPSULE, LIQUID FILLED ORAL at 05:35

## 2020-02-29 RX ADMIN — HEPARIN SODIUM 5000 UNITS: 5000 INJECTION, SOLUTION INTRAVENOUS; SUBCUTANEOUS at 05:33

## 2020-02-29 RX ADMIN — INSULIN LISPRO 5 UNITS: 100 INJECTION, SOLUTION INTRAVENOUS; SUBCUTANEOUS at 20:58

## 2020-02-29 RX ADMIN — DOCUSATE SODIUM 100 MG: 100 CAPSULE, LIQUID FILLED ORAL at 17:24

## 2020-02-29 RX ADMIN — SODIUM BICARBONATE 650 MG: 650 TABLET ORAL at 17:29

## 2020-02-29 RX ADMIN — METHOCARBAMOL 750 MG: 750 TABLET, FILM COATED ORAL at 05:35

## 2020-02-29 RX ADMIN — HEPARIN SODIUM 5000 UNITS: 5000 INJECTION, SOLUTION INTRAVENOUS; SUBCUTANEOUS at 17:24

## 2020-02-29 RX ADMIN — INSULIN GLARGINE 6 UNITS: 100 INJECTION, SOLUTION SUBCUTANEOUS at 17:32

## 2020-02-29 RX ADMIN — OXYCODONE HYDROCHLORIDE 5 MG: 5 TABLET ORAL at 09:08

## 2020-02-29 RX ADMIN — ANTACID TABLETS 500 MG: 500 TABLET, CHEWABLE ORAL at 05:35

## 2020-02-29 RX ADMIN — PIOGLITAZONE 30 MG: 30 TABLET ORAL at 05:35

## 2020-02-29 RX ADMIN — HYDROCODONE BITARTRATE AND ACETAMINOPHEN 2 TABLET: 5; 325 TABLET ORAL at 12:25

## 2020-02-29 RX ADMIN — ONDANSETRON 4 MG: 2 INJECTION INTRAMUSCULAR; INTRAVENOUS at 09:56

## 2020-02-29 RX ADMIN — SODIUM BICARBONATE 650 MG: 650 TABLET ORAL at 12:30

## 2020-02-29 RX ADMIN — SENNOSIDES AND DOCUSATE SODIUM 1 TABLET: 8.6; 5 TABLET ORAL at 20:53

## 2020-02-29 RX ADMIN — ANTACID TABLETS 500 MG: 500 TABLET, CHEWABLE ORAL at 17:24

## 2020-02-29 RX ADMIN — METOPROLOL TARTRATE 12.5 MG: 25 TABLET, FILM COATED ORAL at 05:35

## 2020-02-29 RX ADMIN — SODIUM BICARBONATE 650 MG: 650 TABLET ORAL at 05:42

## 2020-02-29 RX ADMIN — SIMVASTATIN 20 MG: 20 TABLET, FILM COATED ORAL at 20:53

## 2020-02-29 RX ADMIN — HYDROCODONE BITARTRATE AND ACETAMINOPHEN 2 TABLET: 5; 325 TABLET ORAL at 17:24

## 2020-02-29 RX ADMIN — MELATONIN 5000 UNITS: at 05:34

## 2020-02-29 RX ADMIN — POLYETHYLENE GLYCOL 3350 1 PACKET: 17 POWDER, FOR SOLUTION ORAL at 09:08

## 2020-02-29 RX ADMIN — METOPROLOL TARTRATE 12.5 MG: 25 TABLET, FILM COATED ORAL at 17:25

## 2020-02-29 RX ADMIN — HYDROCODONE BITARTRATE AND ACETAMINOPHEN 2 TABLET: 5; 325 TABLET ORAL at 05:34

## 2020-02-29 ASSESSMENT — ENCOUNTER SYMPTOMS
WHEEZING: 0
FLANK PAIN: 0
NECK PAIN: 1
CHILLS: 0
VOMITING: 0
DIAPHORESIS: 0
HALLUCINATIONS: 0
DIARRHEA: 0
SHORTNESS OF BREATH: 0
COUGH: 0
PALPITATIONS: 0
FEVER: 0
ABDOMINAL PAIN: 0
TREMORS: 0
SPEECH CHANGE: 0
MYALGIAS: 1
SENSORY CHANGE: 0
EYE REDNESS: 0
BACK PAIN: 0
EYE DISCHARGE: 0
STRIDOR: 0

## 2020-02-29 ASSESSMENT — COGNITIVE AND FUNCTIONAL STATUS - GENERAL
SUGGESTED CMS G CODE MODIFIER DAILY ACTIVITY: CK
EATING MEALS: A LITTLE
DRESSING REGULAR LOWER BODY CLOTHING: A LITTLE
HELP NEEDED FOR BATHING: A LITTLE
PERSONAL GROOMING: A LITTLE
DRESSING REGULAR UPPER BODY CLOTHING: A LITTLE
TOILETING: A LITTLE
DAILY ACTIVITIY SCORE: 18

## 2020-02-29 ASSESSMENT — ACTIVITIES OF DAILY LIVING (ADL): TOILETING: INDEPENDENT

## 2020-02-29 ASSESSMENT — LIFESTYLE VARIABLES: SUBSTANCE_ABUSE: 0

## 2020-02-29 NOTE — THERAPY
"Occupational Therapy Evaluation completed.   Functional Status:  Pt presents to skilled OT services following C3-6 lami, foraminotomy with decompression, brace on at all times. Pt performed bed mobility with min a from a raised hob(sleeps in electric recliner baseline), LB dressing cga/min a, ambulated to BR with min a cues for upright posture and weakness, declined toilet t/f assessment d/t low toilet seat height, standing oral care with cga, returned back to supine with min a, mod a for brace management. Pt demonstrates impaired activity tolerance, significant pain with light ADLs, generalized strength and endurance deficits impacting pt's ability to safely and independently complete ADLs at this time. Pt reports sister lives nearby and can assist with IADLs but unable to stay with him upon d/c. Will benefit from acute skilled OT services while in house and post acute recommended prior to d/c home.   Plan of Care: Will benefit from Occupational Therapy 4 times per week  Discharge Recommendations:  Equipment: Will Continue to Assess for Equipment Needs. Post-acute therapy Recommend post-acute placement for additional occupational therapy services prior to discharge home.      See \"Rehab Therapy-Acute\" Patient Summary Report for complete documentation.    "

## 2020-02-29 NOTE — PROGRESS NOTES
Received report and assumed pt care.  A&O x4.  Bed alarm and treaded socks on.  Call light and personal belongings within reach.  Bed locked and at lowest position.  MONZON.  VSS.  Prevena, anders catheter, and hemovac in use.  Family at bedside.

## 2020-02-29 NOTE — CARE PLAN
Problem: Respiratory:  Goal: Respiratory status will improve  Outcome: PROGRESSING AS EXPECTED  Note:  On room air sat 98%. Encouraged and educated on proper use of Incentive spirometer and deep breathing exercise.     Problem: Safety  Goal: Will remain free from falls  Outcome: PROGRESSING AS EXPECTED  Note: Hourly rounding.  Non-skid socks. Bed locked & in low position. Personal belongings and call light  within reach. .      Problem: Pain Management  Goal: Pain level will decrease to patient's comfort goal  Outcome: PROGRESSING AS EXPECTED  Note: Taught pt 0-10 pain scale and  non pharmacological method of pain mgt, encouraged to verbalize when in pain. Administered PRN pain med as needed.

## 2020-02-29 NOTE — PROGRESS NOTES
Hospital Medicine Daily Progress Note    Date of Service  2/29/2020    Chief Complaint  79 y.o. male admitted 2/28/2020 with neck pain.     Hospital Course      Patient with hx of DM , Hypertension, CKD III admitted following a cervical neck injury. Findings of severe cervical spondylosis, myelopathy and foraminal stenosis.  Patient underwent cervical laminectomy,  decompression and fusion on 2-28.  Hospitalist service consulted for Diabetes, IM issues management.     Interval Problem Update    Labile blood sugars - 200s - 160s this morning. BP controlled. Plan therapies.     Consultants/Specialty    Hospitalist     Code Status  Full code     Disposition  TBD, plan to mobilize.     Review of Systems  Review of Systems   Constitutional: Negative for chills, diaphoresis, fever and malaise/fatigue.   HENT: Negative for congestion.    Eyes: Negative for discharge and redness.   Respiratory: Negative for cough, shortness of breath, wheezing and stridor.    Cardiovascular: Negative for chest pain, palpitations and leg swelling.   Gastrointestinal: Negative for abdominal pain, diarrhea and vomiting.   Genitourinary: Negative for flank pain and hematuria.   Musculoskeletal: Positive for myalgias and neck pain. Negative for back pain and joint pain.   Neurological: Negative for tremors, sensory change and speech change.   Psychiatric/Behavioral: Negative for hallucinations and substance abuse.        Physical Exam  Temp:  [35.8 °C (96.5 °F)-36.8 °C (98.3 °F)] 36.8 °C (98.3 °F)  Pulse:  [] 95  Resp:  [14-20] 16  BP: (113-153)/(41-65) 119/46  SpO2:  [98 %-100 %] 98 %    Physical Exam  Constitutional:       General: He is not in acute distress.     Appearance: He is not diaphoretic.   HENT:      Head: Normocephalic and atraumatic.      Right Ear: External ear normal.      Left Ear: External ear normal.      Nose: Nose normal.   Eyes:      General: No scleral icterus.     Extraocular Movements: Extraocular movements  intact.      Conjunctiva/sclera: Conjunctivae normal.   Neck:      Musculoskeletal: Neck rigidity present.      Comments: Cervical collar in place.   hemovac, Chapis in place .  Cardiovascular:      Rate and Rhythm: Normal rate and regular rhythm.      Heart sounds: No murmur.   Pulmonary:      Breath sounds: No stridor. No wheezing, rhonchi or rales.   Abdominal:      General: There is no distension.      Palpations: Abdomen is soft.      Tenderness: There is no abdominal tenderness.   Musculoskeletal:         General: No swelling.   Skin:     General: Skin is dry.   Neurological:      Mental Status: He is alert.         Fluids    Intake/Output Summary (Last 24 hours) at 2/29/2020 0852  Last data filed at 2/29/2020 0400  Gross per 24 hour   Intake 2210 ml   Output 2955 ml   Net -745 ml       Laboratory  Recent Labs     02/29/20  0345   WBC 8.7   RBC 2.83*   HEMOGLOBIN 9.1*   HEMATOCRIT 28.0*   MCV 98.9*   MCH 32.2   MCHC 32.5*   RDW 61.4*   PLATELETCT 109*   MPV 10.2     Recent Labs     02/29/20  0345   SODIUM 135   POTASSIUM 5.0   CHLORIDE 105   CO2 20   GLUCOSE 235*   BUN 41*   CREATININE 2.31*   CALCIUM 8.5             Recent Labs     02/29/20  0345   TRIGLYCERIDE 48   HDL 42   LDL 23       Imaging  DX-PORTABLE FLUORO > 1 HOUR   Final Result      Portable fluoroscopy utilized for 6 seconds.         INTERPRETING LOCATION: [address] [place] [address], [id number]      DX-CERVICAL SPINE-2 OR 3 VIEWS   Final Result      Intraoperative image as above described.           Assessment/Plan  * Type 2 diabetes mellitus (HCC)- (present on admission)  Assessment & Plan  Labile blood sugars with periods of hyperglycemia . Most recent hemoglobin A1c is 5.5 showing a good control.  Monitor with serial accu checks, SSI as needed  Sched Lantus- increased dose if remains hyperglycemic.   ADA diet  Continue with  oral antihyperglycemics, pioglitazone  Monitor for hypoglycemia.     Acute on chronic renal failure (HCC)  Assessment & Plan  CKD  III. Mild improvement with IVFs  Continue with IVF support  Encourage intake  Fu renal fxn, electrolytes, UOP  Avoid nephrotoxic medications    Central cord syndrome (HCC)- (present on admission)  Assessment & Plan  With myelopathy - Status post cervical surgery-  laminectomy,  decompression and fusion on 2-28  Stabilize with neck brace , monitor hemovac output, wound care, NSG input  Pain management- robaxin, norco, IV morphine as needed .  Monitor neurological status  PT/OT eval       Thrombocytopenia (HCC)- (present on admission)  Assessment & Plan  Thrombocytopenia at this point is mild, chronic .   Monitor platelets.     Anemia- (present on admission)  Assessment & Plan  Chronic   Check iron studies  Monitor for acute symptoms.     Vitamin D deficiency- (present on admission)  Assessment & Plan  Continue with supplementation.     Hyperlipemia- (present on admission)  Assessment & Plan  Low-fat low-cholesterol diet  Statin  Lab Results   Component Value Date/Time    CHOLSTRLTOT 106 12/11/2018 06:32 AM    LDL 41 12/11/2018 06:32 AM    HDL 34 (A) 12/11/2018 06:32 AM    TRIGLYCERIDE 157 (H) 12/11/2018 06:32 AM             Essential hypertension- (present on admission)  Assessment & Plan  Controlled   cw metoprolol 12.5 mg twice daily, clonidine as needed  Monitor BP response.        VTE prophylaxis: SCDs

## 2020-02-29 NOTE — ASSESSMENT & PLAN NOTE
Thrombocytopenia at this point is mild, chronic .   Monitor platelets.   Platelets have been stable around 100.

## 2020-02-29 NOTE — PROGRESS NOTES
RN MOBILITY NOTE     Surgery patient?: Yes  Date of surgery: 2/28/20  Ambulated 50 ft on day of surgery? (N/A if today is not date of surgery): Yes  Number of times ambulated 50 feet or greater today: 1  Patient has been up to chair, edge of bed or HOB 90 degrees for all meals?: Yes  Goal met? (goal is ambulating at least 50 feet 2 times on day shift, one time on night shift): Yes  If patient did not meet mobility goal, why?:

## 2020-02-29 NOTE — ASSESSMENT & PLAN NOTE
With hyperglycemia  Glycated hemoglobin 5.5%   Long & short acting insulin, glargine last increased 3/4   Accu-Checks, hypoglycemia protocol

## 2020-02-29 NOTE — ASSESSMENT & PLAN NOTE
CKD 4,  Ultrasound with findings of bilateral hydronephrosis, mild  Check bladder US to eval for urinary retention signs of outlet obstruction.  May need anders if significant retention.    Avoid nephrotoxic medications     Slowly improving

## 2020-02-29 NOTE — CARE PLAN
Problem: Pain Management  Goal: Pain level will decrease to patient's comfort goal  Outcome: PROGRESSING AS EXPECTED     Problem: Urinary Elimination:  Goal: Ability to reestablish a normal urinary elimination pattern will improve  Outcome: PROGRESSING AS EXPECTED     Problem: Communication  Goal: The ability to communicate needs accurately and effectively will improve  Outcome: PROGRESSING AS EXPECTED      Yes

## 2020-02-29 NOTE — ASSESSMENT & PLAN NOTE
With myelopathy - Status post cervical surgery-  laminectomy,  decompression and fusion on 2-28  Stabilize with neck brace , monitor hemovac output, wound care, NSG input  Multimodal pain control  He lives alone in an upstairs unit. PT/OT, needs SNF.

## 2020-02-29 NOTE — ASSESSMENT & PLAN NOTE
Chronic, iron deficient  IV iron replacement   No clinical evidence for bleeding, history of colonic polyposis with colonoscopy few months ago with plan to follow-up colon[facility] years [facility].  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 Started epo      Occult blood neg  Hemoglobin stable   Monitor

## 2020-02-29 NOTE — PROGRESS NOTES
Neurosurgery Progress Note    Subjective:  Pt doing well, complains of neck pain, pt states the cold sensation he experienced in his arms is improved from surgery  Pt complains of nausea    Exam:  Pt AAOx3, FCx4.  Pt unable to abduct shoulder to 90* as a base line.  Deltoid 5/5 bilaterallys, Biceps 5/5 bilaterally, Triceps 5/5 bilaterally,  5/5 bilaterally.    BP  Min: 119/46  Max: 153/57  Pulse  Av  Min: 70  Max: 100  Resp  Av.6  Min: 14  Max: 20  Temp  Av.1 °C (97 °F)  Min: 35.8 °C (96.5 °F)  Max: 36.8 °C (98.3 °F)  SpO2  Av.9 %  Min: 98 %  Max: 100 %    No data recorded    Recent Labs     20  0345   WBC 8.7   RBC 2.83*   HEMOGLOBIN 9.1*   HEMATOCRIT 28.0*   MCV 98.9*   MCH 32.2   MCHC 32.5*   RDW 61.4*   PLATELETCT 109*   MPV 10.2     Recent Labs     20  0345   SODIUM 135   POTASSIUM 5.0   CHLORIDE 105   CO2 20   GLUCOSE 235*   BUN 41*   CREATININE 2.31*   CALCIUM 8.5               Intake/Output       20 07 - 20 0659 20 - 20 0659       Total 1900-0659 Total       Intake    P.O.  90  120 210  --  -- --    P.O. 90 120 210 -- -- --    I.V.  2000  --  -- --    Volume (mL) (lactated ringers infusion) 2000 -- -- --    Total Intake 2090 120 2210 -- -- --       Output    Urine  655   265  --  -- --    Urine 400 -- 400 -- -- --    Output (mL) ([REMOVED] Urethral Catheter Latex 16 Fr.) 255 2000 -- -- --    Drains  --  300 300  --  -- --    Output (mL) (Closed/Suction Drain Posterior Neck Hemovac) -- 300 300 -- -- --    Stool  --  -- --  --  -- --    Number of Times Stooled -- -- -- 1 x -- 1 x    Total Output 657 2300 2955 -- -- --       Net I/O     7091 -[phone] -[phone] -- -- --            Intake/Output Summary (Last 24 hours) at 2020 1040  Last data filed at 2020 0400  Gross per 24 hour   Intake 510 ml   Output 2555 ml   Net -2045 ml            • sodium bicarbonate  650 mg TID   • simvastatin  20  mg Nightly   • metoprolol  12.5 mg BID   • insulin lispro  0-6 Units QHS   • Pharmacy Consult Request  1 Each PHARMACY TO DOSE   • MD ALERT...DO NOT ADMINISTER NSAIDS or ASPIRIN unless ORDERED By Neurosurgery  1 Each PRN   • docusate sodium  100 mg BID   • senna-docusate  1 Tab Nightly   • senna-docusate  1 Tab Q24HRS PRN   • polyethylene glycol/lytes  1 Packet BID PRN   • magnesium hydroxide  30 mL QDAY PRN   • bisacodyl  10 mg Q24HRS PRN   • fleet  1 Each Once PRN   • insulin glargine  6 Units Q EVENING   • pioglitazone  30 mg DAILY   • NS   Continuous   • acetaminophen  1,000 mg Q6HRS   • oxyCODONE immediate-release  5 mg Q3HRS PRN   • morphine injection  2 mg Q3HRS PRN   • HYDROcodone-acetaminophen  1-2 Tab Q6HRS PRN   • diphenhydrAMINE  25 mg Q6HRS PRN    Or   • diphenhydrAMINE  25 mg Q6HRS PRN   • ondansetron  4 mg Q4HRS PRN   • ondansetron  4 mg Q4HRS PRN   • methocarbamol  750 mg Q8HRS PRN   • cloNIDine  0.1 mg Q4HRS PRN   • benzocaine-menthol  1 Lozenge Q2HRS PRN   • calcium carbonate  500 mg BID   • vitamin D  5,000 Units DAILY   • heparin  5,000 Units BID   • [START ON 3/1/2020] heparin  5,000 Units Q8HRS       Assessment and Plan:  Hospital day # 1  POD# 1  Chemical prophylactic DVT therapy: pt on Heparin 5000 units subq q8hrs.  Pt is doing well postoperatively. Pt complains of neck soreness and nausea.    Order placed for rehabilitation placement. Pt awaiting placement.    Cervical Stenosis:  With myelopathy - Status post cervical surgery-  laminectomy,  decompression and fusion on 2-28  Stabilize with neck brace , monitor hemovac output, wound care, NSG input  Pain management- robaxin, norco, IV morphine as needed .  Monitor neurological status  PT/OT eval     Type 2 Diabetes Mellitus:  Pt's medications and blood sugar managed by hospitalist team. Continue with BS management per their recommendations.  Monitor with serial accu checks, SSI as needed  Scheduled Lantus  ADA diet  Continue with home  antihyperglycemics    Acute on chronic renal failure (HCC)  Assessment & Plan  CKD III. Mild improvement with IVFs  Continue with IVF support  Encourage intake  Fu renal fxn, electrolytes, UOP  Avoid nephrotoxic medications    Anemia:  Assessment & Plan  Chronic   Check iron studies  Monitor for acute symptoms.       Thrombocytopenia (HCC)- (present on admission)  Assessment & Plan  Thrombocytopenia at this point is mild, chronic .   Monitor platelets.

## 2020-02-29 NOTE — PROGRESS NOTES
2 RN skin check complete.   Hemovac, prevena, and Aspen collar in place.  No pressure ulcers or skin problems noted.  Surgical incision CDI.

## 2020-02-29 NOTE — CONSULTS
Hospital Medicine Consultation    Date of Service  2/28/2020    Referring Physician  Lg Sellers D.O.    Consulting Physician  Akua Cervantes M.D.    Reason for Consultation  Diabetic management    History of Presenting Illness  79 y.o. male who presented 2/28/2020 with cervical neck pain.  The patient was brought in because of evaluation for cervical neck injury.  The patient underwent a cervical neck fusion today.  Afterwards the diabetic management has not been updated.  So medicine was asked to consult.  At this point we have updated his diabetic management and patient will be continued to be followed by medicine until needed.    Review of Systems  Review of Systems   Constitutional: Negative.  Negative for chills, diaphoresis and fever.   HENT: Negative.    Eyes: Negative.  Negative for double vision.   Respiratory: Negative.  Negative for cough, hemoptysis and wheezing.    Cardiovascular: Negative.  Negative for chest pain, palpitations and leg swelling.   Gastrointestinal: Negative.  Negative for abdominal pain, blood in stool, constipation, diarrhea, heartburn, nausea and vomiting.   Genitourinary: Negative.  Negative for frequency, hematuria and urgency.   Musculoskeletal: Positive for neck pain. Negative for joint pain.   Skin: Negative.  Negative for itching and rash.   Neurological: Negative.  Negative for dizziness, focal weakness, seizures, loss of consciousness and headaches.   Endo/Heme/Allergies: Negative.  Does not bruise/bleed easily.   Psychiatric/Behavioral: Negative.  Negative for suicidal ideas. The patient is not nervous/anxious.    All other systems reviewed and are negative.      Past Medical History   has a past medical history of Arrhythmia, Diabetes (HCC), Hemorrhagic disorder (HCC), High cholesterol, Hypertension, Jaundice (5/8/2016), and Renal disorder.    Surgical History   has a past surgical history that includes hip arthroplasty total (Left, 2001); cholecystectomy (2006); ercp  (5/2016); gastroscopy-endo (N/A, 6/3/2016); egd w/endoscopic ultrasound (N/A, 6/3/2016); egd with asp/bx (N/A, 6/3/2016); and cervical decompression posterior (2/28/2020).    Family History  Mother had cancer  Father had cancer and heart disease.    Social History   reports that he has never smoked. He has never used smokeless tobacco. He reports that he does not drink alcohol or use drugs.    Medications  Prior to Admission Medications   Prescriptions Last Dose Informant Patient Reported? Taking?   Insulin Aspart 100 UNIT/ML Subcutaneous Solution Pen-injector (NOVOLOG) 2/27/2020 at 2130 Patient Yes No   Sig: Inject 5 Units as instructed every bedtime.   calcitRIOL (ROCALTROL) 0.25 MCG Cap 2/27/2020 at 0800 Patient Yes No   Sig: Take 0.25 mcg by mouth every day.   insulin glargine (LANTUS) 100 UNIT/ML Solution 2/27/2020 at 1700 Patient Yes No   Sig: Inject 6 Units as instructed every evening.   metoprolol (LOPRESSOR) 25 MG Tab 2/26/2020 at 2100 Patient Yes Yes   Sig: Take 12.5 mg by mouth 2 times a day.   pioglitazone (ACTOS) 30 MG Tab 2/27/2020 at 0800 Patient Yes No   Sig: Take 30 mg by mouth every day.   simvastatin (ZOCOR) 20 MG Tab 2/27/2020 at 2130 Patient Yes Yes   Sig: Take 20 mg by mouth every evening.   sodium bicarbonate (SODIUM BICARBONATE) 650 MG Tab 2/27/2020 at 1700 Patient Yes No   Sig: Take 650 mg by mouth 3 times a day.      Facility-Administered Medications: None       Allergies  No Known Allergies    Physical Exam  Temp:  [35.8 °C (96.5 °F)-36.3 °C (97.4 °F)] 35.9 °C (96.6 °F)  Pulse:  [] 90  Resp:  [14-18] 16  BP: (113-153)/(41-65) 138/61  SpO2:  [97 %-100 %] 100 %    Physical Exam  Vitals signs and nursing note reviewed.   Constitutional:       Appearance: Normal appearance. He is well-developed and normal weight.   HENT:      Head: Normocephalic and atraumatic.      Right Ear: External ear normal.      Left Ear: External ear normal.      Nose: Nose normal.      Mouth/Throat:      Mouth:  Mucous membranes are moist.      Pharynx: Oropharynx is clear.   Eyes:      Extraocular Movements: Extraocular movements intact.      Conjunctiva/sclera: Conjunctivae normal.      Pupils: Pupils are equal, round, and reactive to light.   Neck:      Musculoskeletal: Neck rigidity and muscular tenderness present.      Thyroid: No thyromegaly.      Vascular: No JVD.      Comments: Neck is in c-collar  Cardiovascular:      Rate and Rhythm: Normal rate and regular rhythm.      Pulses: Normal pulses.      Heart sounds: Normal heart sounds. No murmur.   Pulmonary:      Effort: Pulmonary effort is normal.      Breath sounds: Normal breath sounds. No wheezing or rales.   Chest:      Chest wall: No tenderness.   Abdominal:      General: Abdomen is flat. Bowel sounds are normal. There is no distension.      Palpations: Abdomen is soft. There is no mass.      Tenderness: There is no abdominal tenderness. There is no guarding or rebound.   Musculoskeletal: Normal range of motion.         General: No tenderness.   Skin:     General: Skin is warm and dry.      Capillary Refill: Capillary refill takes 2 to 3 seconds.      Findings: No erythema or rash.   Neurological:      General: No focal deficit present.      Mental Status: He is alert and oriented to person, place, and time. Mental status is at baseline.      Cranial Nerves: No cranial nerve deficit.      Deep Tendon Reflexes: Reflexes are normal and symmetric.   Psychiatric:         Mood and Affect: Mood normal.         Behavior: Behavior normal.         Thought Content: Thought content normal.         Judgment: Judgment normal.         Fluids  Date 02/28/20 0700 - 02/29/20 0659   Shift 1413-7147 4788-5384 5088-0503 24 Hour Total   INTAKE   P.O. 90   90   I.V. 2000 2000   Shift Total 2090   2090   OUTPUT   Urine 655   655   Shift Total 655   655   Weight (kg) 74.3 74.3 74.3 74.3       Laboratory                          Imaging  DX-PORTABLE FLUORO > 1 HOUR   Final Result       Portable fluoroscopy utilized for 6 seconds.         INTERPRETING LOCATION: [address] [place], [place] [address], [address]      DX-CERVICAL SPINE-2 OR 3 VIEWS   Final Result      Intraoperative image as above described.          Assessment/Plan  * Type 2 diabetes mellitus (HCC)- (present on admission)  Assessment & Plan  -accus with sliding scale coverage, continue with Lantus 6 units subcutaneously nightly  -diabetic diet to be continued  -diabetic education to be provided  -follow glycohemoglobin levels long term, most recent hemoglobin A1c is 5.5 showing a good control.  -continue with oral antihyperglycemics, pioglitazone  -monitor for hypoglycemic episodes and adjust control if he should get low    Acute on chronic renal failure (HCC)  Assessment & Plan  Patient will need fluid resuscitation for any acute renal failure.  Monitor renal functions and avoid nephrotoxic medications    Central cord syndrome (HCC)- (present on admission)  Assessment & Plan  Status post cervical surgery  Continue at this point neck brace until neurosurgery says so  Pain management  Monitor neurological status    Thrombocytopenia (HCC)- (present on admission)  Assessment & Plan  Thrombocytopenia at this point is mild  Continue to monitor platelet counts  Currently not bleeding.    Hyperlipemia- (present on admission)  Assessment & Plan  Low-fat low-cholesterol diet  Statin  Lab Results   Component Value Date/Time    CHOLSTRLTOT 106 12/11/2018 06:32 AM    LDL 41 12/11/2018 06:32 AM    HDL 34 (A) 12/11/2018 06:32 AM    TRIGLYCERIDE 157 (H) 12/11/2018 06:32 AM             Essential hypertension- (present on admission)  Assessment & Plan  Optimize blood pressure management.  Continue with metoprolol 12.5 mg twice daily, clonidine as needed

## 2020-03-01 ENCOUNTER — APPOINTMENT (OUTPATIENT)
Dept: RADIOLOGY | Facility: MEDICAL CENTER | Age: 80
DRG: 472 | End: 2020-03-01
Attending: HOSPITALIST
Payer: COMMERCIAL

## 2020-03-01 LAB
ANION GAP SERPL CALC-SCNC: 11 MMOL/L (ref 0–11.9)
BUN SERPL-MCNC: 49 MG/DL (ref 8–22)
CALCIUM SERPL-MCNC: 8.8 MG/DL (ref 8.5–10.5)
CHLORIDE SERPL-SCNC: 102 MMOL/L (ref 96–112)
CO2 SERPL-SCNC: 19 MMOL/L (ref 20–33)
CREAT SERPL-MCNC: 2.87 MG/DL (ref 0.5–1.4)
ERYTHROCYTE [DISTWIDTH] IN BLOOD BY AUTOMATED COUNT: 66.2 FL (ref 35.9–50)
FERRITIN SERPL-MCNC: 311.9 NG/ML (ref 22–322)
GLUCOSE BLD-MCNC: 223 MG/DL (ref 65–99)
GLUCOSE BLD-MCNC: 234 MG/DL (ref 65–99)
GLUCOSE BLD-MCNC: 263 MG/DL (ref 65–99)
GLUCOSE BLD-MCNC: 266 MG/DL (ref 65–99)
GLUCOSE SERPL-MCNC: 289 MG/DL (ref 65–99)
HCT VFR BLD AUTO: 29 % (ref 42–52)
HGB BLD-MCNC: 9.1 G/DL (ref 14–18)
IRON SATN MFR SERPL: 12 % (ref 15–55)
IRON SERPL-MCNC: 33 UG/DL (ref 50–180)
MCH RBC QN AUTO: 32 PG (ref 27–33)
MCHC RBC AUTO-ENTMCNC: 31.4 G/DL (ref 33.7–35.3)
MCV RBC AUTO: 102.1 FL (ref 81.4–97.8)
PLATELET # BLD AUTO: 110 K/UL (ref 164–446)
PMV BLD AUTO: 10.4 FL (ref 9–12.9)
POTASSIUM SERPL-SCNC: 4.8 MMOL/L (ref 3.6–5.5)
RBC # BLD AUTO: 2.84 M/UL (ref 4.7–6.1)
SODIUM SERPL-SCNC: 132 MMOL/L (ref 135–145)
TIBC SERPL-MCNC: 270 UG/DL (ref 250–450)
TRANSFERRIN SERPL-MCNC: 188 MG/DL (ref 200–370)
WBC # BLD AUTO: 8.5 K/UL (ref 4.8–10.8)

## 2020-03-01 PROCEDURE — 700102 HCHG RX REV CODE 250 W/ 637 OVERRIDE(OP): Performed by: HOSPITALIST

## 2020-03-01 PROCEDURE — 51798 US URINE CAPACITY MEASURE: CPT

## 2020-03-01 PROCEDURE — 700112 HCHG RX REV CODE 229: Performed by: PHYSICIAN ASSISTANT

## 2020-03-01 PROCEDURE — A9270 NON-COVERED ITEM OR SERVICE: HCPCS | Performed by: PHYSICIAN ASSISTANT

## 2020-03-01 PROCEDURE — 83550 IRON BINDING TEST: CPT

## 2020-03-01 PROCEDURE — 700105 HCHG RX REV CODE 258: Performed by: HOSPITALIST

## 2020-03-01 PROCEDURE — 36415 COLL VENOUS BLD VENIPUNCTURE: CPT

## 2020-03-01 PROCEDURE — 83540 ASSAY OF IRON: CPT

## 2020-03-01 PROCEDURE — 80048 BASIC METABOLIC PNL TOTAL CA: CPT

## 2020-03-01 PROCEDURE — 700111 HCHG RX REV CODE 636 W/ 250 OVERRIDE (IP): Performed by: PHYSICIAN ASSISTANT

## 2020-03-01 PROCEDURE — 82962 GLUCOSE BLOOD TEST: CPT | Mod: 91

## 2020-03-01 PROCEDURE — 770001 HCHG ROOM/CARE - MED/SURG/GYN PRIV*

## 2020-03-01 PROCEDURE — 85027 COMPLETE CBC AUTOMATED: CPT

## 2020-03-01 PROCEDURE — 82728 ASSAY OF FERRITIN: CPT

## 2020-03-01 PROCEDURE — 76775 US EXAM ABDO BACK WALL LIM: CPT

## 2020-03-01 PROCEDURE — 84466 ASSAY OF TRANSFERRIN: CPT

## 2020-03-01 PROCEDURE — 700102 HCHG RX REV CODE 250 W/ 637 OVERRIDE(OP): Performed by: PHYSICIAN ASSISTANT

## 2020-03-01 PROCEDURE — 99233 SBSQ HOSP IP/OBS HIGH 50: CPT | Performed by: HOSPITALIST

## 2020-03-01 RX ORDER — INSULIN GLARGINE 100 [IU]/ML
10 INJECTION, SOLUTION SUBCUTANEOUS EVERY EVENING
Status: DISCONTINUED | OUTPATIENT
Start: 2020-03-01 | End: 2020-03-02

## 2020-03-01 RX ADMIN — DOCUSATE SODIUM 100 MG: 100 CAPSULE, LIQUID FILLED ORAL at 18:25

## 2020-03-01 RX ADMIN — SODIUM BICARBONATE 650 MG: 650 TABLET ORAL at 13:25

## 2020-03-01 RX ADMIN — ACETAMINOPHEN 1000 MG: 500 TABLET ORAL at 18:25

## 2020-03-01 RX ADMIN — PIOGLITAZONE 30 MG: 30 TABLET ORAL at 05:48

## 2020-03-01 RX ADMIN — SODIUM BICARBONATE 650 MG: 650 TABLET ORAL at 18:27

## 2020-03-01 RX ADMIN — SENNOSIDES AND DOCUSATE SODIUM 1 TABLET: 8.6; 5 TABLET ORAL at 20:43

## 2020-03-01 RX ADMIN — MELATONIN 5000 UNITS: at 05:48

## 2020-03-01 RX ADMIN — HYDROCODONE BITARTRATE AND ACETAMINOPHEN 2 TABLET: 5; 325 TABLET ORAL at 09:00

## 2020-03-01 RX ADMIN — HEPARIN SODIUM 5000 UNITS: 5000 INJECTION, SOLUTION INTRAVENOUS; SUBCUTANEOUS at 20:43

## 2020-03-01 RX ADMIN — DOCUSATE SODIUM 100 MG: 100 CAPSULE, LIQUID FILLED ORAL at 05:48

## 2020-03-01 RX ADMIN — OXYCODONE HYDROCHLORIDE 5 MG: 5 TABLET ORAL at 20:43

## 2020-03-01 RX ADMIN — INSULIN LISPRO 2 UNITS: 100 INJECTION, SOLUTION INTRAVENOUS; SUBCUTANEOUS at 20:59

## 2020-03-01 RX ADMIN — ANTACID TABLETS 500 MG: 500 TABLET, CHEWABLE ORAL at 18:24

## 2020-03-01 RX ADMIN — INSULIN GLARGINE 10 UNITS: 100 INJECTION, SOLUTION SUBCUTANEOUS at 18:22

## 2020-03-01 RX ADMIN — ACETAMINOPHEN 1000 MG: 500 TABLET ORAL at 05:47

## 2020-03-01 RX ADMIN — SODIUM BICARBONATE 650 MG: 650 TABLET ORAL at 05:48

## 2020-03-01 RX ADMIN — METHOCARBAMOL 750 MG: 750 TABLET, FILM COATED ORAL at 06:25

## 2020-03-01 RX ADMIN — SODIUM CHLORIDE: 9 INJECTION, SOLUTION INTRAVENOUS at 23:54

## 2020-03-01 RX ADMIN — METOPROLOL TARTRATE 12.5 MG: 25 TABLET, FILM COATED ORAL at 18:25

## 2020-03-01 RX ADMIN — ACETAMINOPHEN 1000 MG: 500 TABLET ORAL at 23:56

## 2020-03-01 RX ADMIN — SIMVASTATIN 20 MG: 20 TABLET, FILM COATED ORAL at 20:43

## 2020-03-01 RX ADMIN — HEPARIN SODIUM 5000 UNITS: 5000 INJECTION, SOLUTION INTRAVENOUS; SUBCUTANEOUS at 13:27

## 2020-03-01 RX ADMIN — ACETAMINOPHEN 1000 MG: 500 TABLET ORAL at 00:02

## 2020-03-01 ASSESSMENT — ENCOUNTER SYMPTOMS
EYE REDNESS: 0
CHILLS: 0
FLANK PAIN: 0
TINGLING: 1
DIARRHEA: 0
TREMORS: 0
EYE DISCHARGE: 0
MYALGIAS: 1
BACK PAIN: 0
SENSORY CHANGE: 0
COUGH: 0
WEAKNESS: 1
DIAPHORESIS: 0
PALPITATIONS: 0
ABDOMINAL PAIN: 0
VOMITING: 0
SHORTNESS OF BREATH: 0
WHEEZING: 0
STRIDOR: 0
FEVER: 0
SPEECH CHANGE: 0
NECK PAIN: 1

## 2020-03-01 ASSESSMENT — FIBROSIS 4 INDEX: FIB4 SCORE: 3.79

## 2020-03-01 NOTE — CARE PLAN
Problem: Pain Management  Goal: Pain level will decrease to patient's comfort goal  Outcome: PROGRESSING AS EXPECTED  Intervention: Educate and implement non-pharmacologic comfort measures. Examples: relaxation, distration, play therapy, activity therapy, massage, etc.  Flowsheets (Taken 3/1/2020 1227)  Intervention:   Relaxation Technique   Ambulation / Increased Activity  Note: Pt educated about pain management regimen in place, increasing ambulation      Problem: Communication  Goal: The ability to communicate needs accurately and effectively will improve  Outcome: PROGRESSING AS EXPECTED  Intervention: Educate patient and significant other/support system about the plan of care, procedures, treatments, medications and allow for questions  Note: POC, renal US order. labs

## 2020-03-01 NOTE — PROGRESS NOTES
Hospital Medicine Daily Progress Note    Date of Service  3/1/2020    Chief Complaint  79 y.o. male admitted 2/28/2020 with neck pain.     Hospital Course      Patient with hx of DM , Hypertension, CKD III admitted following a cervical neck injury. Findings of severe cervical spondylosis, myelopathy and foraminal stenosis.  Patient underwent cervical laminectomy,  decompression and fusion on 2-28.  Hospitalist service consulted for Diabetes, IM issues management.     Interval Problem Update    Renal function worsening.  Hyperglycemic - BS at times in 300s.  Remains unsteady w generalized weakness -  plan continued therapies w walker.  He is interested in post discharge rehab.     Consultants/Specialty    Hospitalist     Code Status  Full code     Disposition  TBD, plan to mobilize.     Review of Systems  Review of Systems   Constitutional: Negative for chills, diaphoresis, fever and malaise/fatigue.   HENT: Negative for congestion.    Eyes: Negative for discharge and redness.   Respiratory: Negative for cough, shortness of breath, wheezing and stridor.    Cardiovascular: Negative for chest pain, palpitations and leg swelling.   Gastrointestinal: Negative for abdominal pain, diarrhea and vomiting.   Genitourinary: Negative for flank pain and hematuria.   Musculoskeletal: Positive for myalgias and neck pain. Negative for back pain and joint pain.   Neurological: Positive for tingling and weakness (generalized ). Negative for tremors, sensory change and speech change.        Physical Exam  Temp:  [36.1 °C (96.9 °F)-36.9 °C (98.4 °F)] 36.4 °C (97.5 °F)  Pulse:  [] 78  Resp:  [16-17] 16  BP: (102-140)/(41-62) 102/41  SpO2:  [92 %-99 %] 99 %    Physical Exam  Constitutional:       General: He is not in acute distress.     Appearance: He is not diaphoretic.   HENT:      Head: Normocephalic and atraumatic.      Right Ear: External ear normal.      Left Ear: External ear normal.      Nose: Nose normal.   Eyes:       General: No scleral icterus.     Extraocular Movements: Extraocular movements intact.      Conjunctiva/sclera: Conjunctivae normal.   Neck:      Musculoskeletal: Neck rigidity present.      Comments: Cervical collar in place.   Hemovac, Chapis in place .  Cardiovascular:      Rate and Rhythm: Normal rate and regular rhythm.      Heart sounds: No murmur.   Pulmonary:      Breath sounds: No stridor. No wheezing, rhonchi or rales.   Abdominal:      General: There is no distension.      Palpations: Abdomen is soft.      Tenderness: There is no abdominal tenderness.   Musculoskeletal:         General: No swelling.   Skin:     General: Skin is dry.   Neurological:      Mental Status: He is alert.         Fluids    Intake/Output Summary (Last 24 hours) at 3/1/2020 1123  Last data filed at 3/1/2020 0400  Gross per 24 hour   Intake 360 ml   Output 950 ml   Net -590 ml       Laboratory  Recent Labs     02/29/20  0345 03/01/20  0459   WBC 8.7 8.5   RBC 2.83* 2.84*   HEMOGLOBIN 9.1* 9.1*   HEMATOCRIT 28.0* 29.0*   MCV 98.9* 102.1*   MCH 32.2 32.0   MCHC 32.5* 31.4*   RDW 61.4* 66.2*   PLATELETCT 109* 110*   MPV 10.2 10.4     Recent Labs     02/29/20  0345 03/01/20  0459   SODIUM 135 132*   POTASSIUM 5.0 4.8   CHLORIDE 105 102   CO2 20 19*   GLUCOSE 235* 289*   BUN 41* 49*   CREATININE 2.31* 2.87*   CALCIUM 8.5 8.8             Recent Labs     02/29/20  0345   TRIGLYCERIDE 48   HDL 42   LDL 23       Imaging  DX-PORTABLE FLUORO > 1 HOUR   Final Result      Portable fluoroscopy utilized for 6 seconds.         INTERPRETING LOCATION: [address] [place] [address], [address]      DX-CERVICAL SPINE-2 OR 3 VIEWS   Final Result      Intraoperative image as above described.      US-RENAL    (Results Pending)        Assessment/Plan  * Type 2 diabetes mellitus (HCC)- (present on admission)  Assessment & Plan  With Hyperglycemia . Most recent hemoglobin A1c is 5.5 showing a good control.  Monitor with serial accu checks, SSI as needed  ADA  diet  Continue with  oral antihyperglycemics, pioglitazone  Increase Lantus.   Monitor for hypoglycemia.     Acute on chronic renal failure (HCC)  Assessment & Plan  CKD III. Cr increased   Increase IVFs  Check Renal US to eval for obstructive process, medical renal dz.  Bladder scan.   Encourage intake  Fu renal fxn, electrolytes, UOP  Avoid nephrotoxic medications    Central cord syndrome (HCC)- (present on admission)  Assessment & Plan  With myelopathy - Status post cervical surgery-  laminectomy,  decompression and fusion on 2-28  Stabilize with neck brace , monitor hemovac output, wound care, NSG input  Pain management- robaxin, norco, IV morphine as needed- monitor for side effects.  PT/OT eval   He lives alone in an upstairs unit-  Referral to SNF for continued therapies.       Thrombocytopenia (HCC)- (present on admission)  Assessment & Plan  Thrombocytopenia at this point is mild, chronic .   Monitor platelets.     Anemia- (present on admission)  Assessment & Plan  Chronic   fu iron studies  Monitor for acute symptoms.     Vitamin D deficiency- (present on admission)  Assessment & Plan  Check level  Continue with supplementation.     Hyperlipemia- (present on admission)  Assessment & Plan  Low-fat low-cholesterol diet  Statin  Lab Results   Component Value Date/Time    CHOLSTRLTOT 106 12/11/2018 06:32 AM    LDL 41 12/11/2018 06:32 AM    HDL 34 (A) 12/11/2018 06:32 AM    TRIGLYCERIDE 157 (H) 12/11/2018 06:32 AM             Essential hypertension- (present on admission)  Assessment & Plan  Controlled   cw metoprolol 12.5 mg twice daily, clonidine as needed  Monitor BP response.        VTE prophylaxis: SCDs

## 2020-03-01 NOTE — CARE PLAN
Problem: Pain Management  Goal: Pain level will decrease to patient's comfort goal  Outcome: PROGRESSING AS EXPECTED     Problem: Urinary Elimination:  Goal: Ability to reestablish a normal urinary elimination pattern will improve  Outcome: PROGRESSING AS EXPECTED     Problem: Communication  Goal: The ability to communicate needs accurately and effectively will improve  Outcome: PROGRESSING AS EXPECTED

## 2020-03-01 NOTE — PROGRESS NOTES
Neurosurgery Progress Note    Subjective:  Pt doing well, complains of neck pain when being seated upright    Exam:  Pt AAOx3, FCx4.  Pt unable to abduct shoulder to 90* as a base line.  Deltoid 5/5 bilaterallys, Biceps 5/5 bilaterally, Triceps 5/5 bilaterally,  5/5 bilaterally.    BP  Min: 102/41  Max: 128/52  Pulse  Av.6  Min: 78  Max: 100  Resp  Av.3  Min: 16  Max: 17  Temp  Av.4 °C (97.5 °F)  Min: 36.1 °C (96.9 °F)  Max: 36.9 °C (98.4 °F)  SpO2  Av %  Min: 92 %  Max: 99 %    No data recorded    Recent Labs     20  0459   WBC 8.7 8.5   RBC 2.83* 2.84*   HEMOGLOBIN 9.1* 9.1*   HEMATOCRIT 28.0* 29.0*   MCV 98.9* 102.1*   MCH 32.2 32.0   MCHC 32.5* 31.4*   RDW 61.4* 66.2*   PLATELETCT 109* 110*   MPV 10.2 10.4     Recent Labs     20  0459   SODIUM 135 132*   POTASSIUM 5.0 4.8   CHLORIDE 105 102   CO2 20 19*   GLUCOSE 235* 289*   BUN 41* 49*   CREATININE 2.31* 2.87*   CALCIUM 8.5 8.8               Intake/Output       20 - 2059 20 - 20 59       Total  Total       Intake    P.O.  --  360 360  --  -- --    P.O. -- 360 360 -- -- --    Total Intake -- 360 360 -- -- --       Output    Urine  300  500 800  --  -- --    Number of Times Voided 1 x 2 x 3 x -- -- --    Urine Void (mL) 300 500 800 -- -- --    Drains  110  40 150  --  -- --    Output (mL) (Closed/Suction Drain Posterior Neck Hemovac) 110 40 150 -- -- --    Stool  --  -- --  --  -- --    Number of Times Stooled 1 x -- 1 x -- -- --    Total Output 410 540 950 -- -- --       Net I/O     -410 -180 -590 -- -- --            Intake/Output Summary (Last 24 hours) at 3/1/2020 1232  Last data filed at 3/1/2020 0400  Gross per 24 hour   Intake 360 ml   Output 540 ml   Net -180 ml            • insulin glargine  10 Units Q EVENING   • glucose  16 g Q15 MIN PRN    And   • dextrose 10% bolus  250 mL Q15 MIN PRN   • sodium bicarbonate   650 mg TID   • simvastatin  20 mg Nightly   • metoprolol  12.5 mg BID   • insulin lispro  0-6 Units QHS   • Pharmacy Consult Request  1 Each PHARMACY TO DOSE   • MD ALERT...DO NOT ADMINISTER NSAIDS or ASPIRIN unless ORDERED By Neurosurgery  1 Each PRN   • docusate sodium  100 mg BID   • senna-docusate  1 Tab Nightly   • senna-docusate  1 Tab Q24HRS PRN   • polyethylene glycol/lytes  1 Packet BID PRN   • magnesium hydroxide  30 mL QDAY PRN   • bisacodyl  10 mg Q24HRS PRN   • fleet  1 Each Once PRN   • pioglitazone  30 mg DAILY   • NS   Continuous   • acetaminophen  1,000 mg Q6HRS   • oxyCODONE immediate-release  5 mg Q3HRS PRN   • morphine injection  2 mg Q3HRS PRN   • HYDROcodone-acetaminophen  1-2 Tab Q6HRS PRN   • diphenhydrAMINE  25 mg Q6HRS PRN    Or   • diphenhydrAMINE  25 mg Q6HRS PRN   • ondansetron  4 mg Q4HRS PRN   • ondansetron  4 mg Q4HRS PRN   • methocarbamol  750 mg Q8HRS PRN   • cloNIDine  0.1 mg Q4HRS PRN   • benzocaine-menthol  1 Lozenge Q2HRS PRN   • calcium carbonate  500 mg BID   • vitamin D  5,000 Units DAILY   • heparin  5,000 Units Q8HRS       Assessment and Plan:  Hospital day # 2  POD# 2- s/p C3-6 laminectomy with posterolateral fusion  Chemical prophylactic DVT therapy: pt on Heparin 5000 units subq q8hrs.  Pt is doing well postoperatively. Pt complains of neck soreness and nausea.    Order placed for rehabilitation placement. Pt awaiting placement.    Cervical Stenosis:  With myelopathy - Status post cervical surgery-  laminectomy,  decompression and fusion on 2-28  Stabilize with neck brace , monitor hemovac output, wound care, NSG input  Pain management- robaxin, norco, IV morphine as needed .  Monitor neurological status  PT/OT eval     Type 2 Diabetes Mellitus:  Pt's medications and blood sugar managed by hospitalist team. Continue with BS management per their recommendations.  Monitor with serial accu checks, SSI as needed  Scheduled Lantus  ADA diet  Continue with home  antihyperglycemics    Acute on chronic renal failure (HCC)  Assessment & Plan  CKD III. Mild improvement with IVFs  Continue with IVF support  Encourage intake  Fu renal fxn, electrolytes, UOP  Avoid nephrotoxic medications    Anemia:  Assessment & Plan  Chronic   Check iron studies  Monitor for acute symptoms.       Thrombocytopenia (HCC)- (present on admission)  Assessment & Plan  Thrombocytopenia at this point is mild, chronic .   Monitor platelets.

## 2020-03-02 LAB
ANION GAP SERPL CALC-SCNC: 7 MMOL/L (ref 0–11.9)
BUN SERPL-MCNC: 44 MG/DL (ref 8–22)
CALCIUM SERPL-MCNC: 8.5 MG/DL (ref 8.5–10.5)
CHLORIDE SERPL-SCNC: 106 MMOL/L (ref 96–112)
CO2 SERPL-SCNC: 23 MMOL/L (ref 20–33)
CREAT SERPL-MCNC: 2.77 MG/DL (ref 0.5–1.4)
GLUCOSE BLD-MCNC: 188 MG/DL (ref 65–99)
GLUCOSE BLD-MCNC: 207 MG/DL (ref 65–99)
GLUCOSE SERPL-MCNC: 151 MG/DL (ref 65–99)
HGB RETIC QN AUTO: 32 PG/CELL (ref 29–35)
IMM RETICS NFR: 29.6 % (ref 9.3–17.4)
POTASSIUM SERPL-SCNC: 4.4 MMOL/L (ref 3.6–5.5)
RETICS # AUTO: 0.16 M/UL (ref 0.04–0.06)
RETICS/RBC NFR: 6.7 % (ref 0.8–2.1)
SODIUM SERPL-SCNC: 136 MMOL/L (ref 135–145)
VIT B12 SERPL-MCNC: 264 PG/ML (ref 211–911)

## 2020-03-02 PROCEDURE — 85046 RETICYTE/HGB CONCENTRATE: CPT

## 2020-03-02 PROCEDURE — A9270 NON-COVERED ITEM OR SERVICE: HCPCS | Performed by: PHYSICIAN ASSISTANT

## 2020-03-02 PROCEDURE — 82962 GLUCOSE BLOOD TEST: CPT

## 2020-03-02 PROCEDURE — 700102 HCHG RX REV CODE 250 W/ 637 OVERRIDE(OP): Performed by: PHYSICIAN ASSISTANT

## 2020-03-02 PROCEDURE — 306263 VAC CANNISTER W/GEL 500ML: Performed by: NEUROLOGICAL SURGERY

## 2020-03-02 PROCEDURE — 80048 BASIC METABOLIC PNL TOTAL CA: CPT

## 2020-03-02 PROCEDURE — 97161 PT EVAL LOW COMPLEX 20 MIN: CPT

## 2020-03-02 PROCEDURE — 302299 SYSTEM PREVENA INCISION MNGM: Performed by: PHYSICIAN ASSISTANT

## 2020-03-02 PROCEDURE — 82607 VITAMIN B-12: CPT

## 2020-03-02 PROCEDURE — 700111 HCHG RX REV CODE 636 W/ 250 OVERRIDE (IP): Performed by: HOSPITALIST

## 2020-03-02 PROCEDURE — 700112 HCHG RX REV CODE 229: Performed by: PHYSICIAN ASSISTANT

## 2020-03-02 PROCEDURE — 36415 COLL VENOUS BLD VENIPUNCTURE: CPT

## 2020-03-02 PROCEDURE — 700105 HCHG RX REV CODE 258: Performed by: HOSPITALIST

## 2020-03-02 PROCEDURE — 700111 HCHG RX REV CODE 636 W/ 250 OVERRIDE (IP): Performed by: PHYSICIAN ASSISTANT

## 2020-03-02 PROCEDURE — 770001 HCHG ROOM/CARE - MED/SURG/GYN PRIV*

## 2020-03-02 PROCEDURE — 99232 SBSQ HOSP IP/OBS MODERATE 35: CPT | Performed by: HOSPITALIST

## 2020-03-02 PROCEDURE — 700102 HCHG RX REV CODE 250 W/ 637 OVERRIDE(OP): Performed by: HOSPITALIST

## 2020-03-02 RX ORDER — ENEMA 19; 7 G/133ML; G/133ML
1 ENEMA RECTAL ONCE
Status: ACTIVE | OUTPATIENT
Start: 2020-03-02 | End: 2020-03-03

## 2020-03-02 RX ORDER — POLYETHYLENE GLYCOL 3350 17 G/17G
1 POWDER, FOR SOLUTION ORAL DAILY
Status: DISCONTINUED | OUTPATIENT
Start: 2020-03-03 | End: 2020-03-12 | Stop reason: HOSPADM

## 2020-03-02 RX ORDER — INSULIN GLARGINE 100 [IU]/ML
15 INJECTION, SOLUTION SUBCUTANEOUS EVERY EVENING
Status: DISCONTINUED | OUTPATIENT
Start: 2020-03-02 | End: 2020-03-03

## 2020-03-02 RX ADMIN — OXYCODONE HYDROCHLORIDE 5 MG: 5 TABLET ORAL at 22:27

## 2020-03-02 RX ADMIN — MAGNESIUM HYDROXIDE 30 ML: 400 SUSPENSION ORAL at 12:21

## 2020-03-02 RX ADMIN — SENNOSIDES AND DOCUSATE SODIUM 1 TABLET: 8.6; 5 TABLET ORAL at 22:27

## 2020-03-02 RX ADMIN — ONDANSETRON 4 MG: 2 INJECTION INTRAMUSCULAR; INTRAVENOUS at 13:41

## 2020-03-02 RX ADMIN — ANTACID TABLETS 500 MG: 500 TABLET, CHEWABLE ORAL at 18:29

## 2020-03-02 RX ADMIN — MELATONIN 5000 UNITS: at 06:00

## 2020-03-02 RX ADMIN — ACETAMINOPHEN 1000 MG: 500 TABLET ORAL at 04:28

## 2020-03-02 RX ADMIN — DOCUSATE SODIUM 100 MG: 100 CAPSULE, LIQUID FILLED ORAL at 18:30

## 2020-03-02 RX ADMIN — METOPROLOL TARTRATE 12.5 MG: 25 TABLET, FILM COATED ORAL at 18:29

## 2020-03-02 RX ADMIN — OXYCODONE HYDROCHLORIDE 5 MG: 5 TABLET ORAL at 08:47

## 2020-03-02 RX ADMIN — DOCUSATE SODIUM 100 MG: 100 CAPSULE, LIQUID FILLED ORAL at 06:00

## 2020-03-02 RX ADMIN — INSULIN GLARGINE 15 UNITS: 100 INJECTION, SOLUTION SUBCUTANEOUS at 18:39

## 2020-03-02 RX ADMIN — PIOGLITAZONE 30 MG: 30 TABLET ORAL at 04:28

## 2020-03-02 RX ADMIN — OXYCODONE HYDROCHLORIDE 5 MG: 5 TABLET ORAL at 01:38

## 2020-03-02 RX ADMIN — INSULIN LISPRO 2 UNITS: 100 INJECTION, SOLUTION INTRAVENOUS; SUBCUTANEOUS at 18:39

## 2020-03-02 RX ADMIN — MAGNESIUM HYDROXIDE 30 ML: 400 SUSPENSION ORAL at 12:19

## 2020-03-02 RX ADMIN — SODIUM CHLORIDE 125 MG: 9 INJECTION, SOLUTION INTRAVENOUS at 18:31

## 2020-03-02 RX ADMIN — METOPROLOL TARTRATE 12.5 MG: 25 TABLET, FILM COATED ORAL at 04:28

## 2020-03-02 RX ADMIN — HEPARIN SODIUM 5000 UNITS: 5000 INJECTION, SOLUTION INTRAVENOUS; SUBCUTANEOUS at 04:28

## 2020-03-02 RX ADMIN — BISACODYL 10 MG: 10 SUPPOSITORY RECTAL at 16:05

## 2020-03-02 RX ADMIN — INSULIN LISPRO 2 UNITS: 100 INJECTION, SOLUTION INTRAVENOUS; SUBCUTANEOUS at 22:30

## 2020-03-02 RX ADMIN — SODIUM BICARBONATE 650 MG: 650 TABLET ORAL at 18:29

## 2020-03-02 RX ADMIN — METHOCARBAMOL 750 MG: 750 TABLET, FILM COATED ORAL at 08:47

## 2020-03-02 RX ADMIN — SIMVASTATIN 20 MG: 20 TABLET, FILM COATED ORAL at 22:27

## 2020-03-02 RX ADMIN — ONDANSETRON 4 MG: 2 INJECTION INTRAMUSCULAR; INTRAVENOUS at 08:52

## 2020-03-02 RX ADMIN — HEPARIN SODIUM 5000 UNITS: 5000 INJECTION, SOLUTION INTRAVENOUS; SUBCUTANEOUS at 22:27

## 2020-03-02 RX ADMIN — METHOCARBAMOL 750 MG: 750 TABLET, FILM COATED ORAL at 22:27

## 2020-03-02 RX ADMIN — SODIUM BICARBONATE 650 MG: 650 TABLET ORAL at 12:21

## 2020-03-02 RX ADMIN — ANTACID TABLETS 500 MG: 500 TABLET, CHEWABLE ORAL at 04:28

## 2020-03-02 RX ADMIN — SODIUM BICARBONATE 650 MG: 650 TABLET ORAL at 04:27

## 2020-03-02 ASSESSMENT — ENCOUNTER SYMPTOMS
ABDOMINAL PAIN: 0
WEAKNESS: 1
SHORTNESS OF BREATH: 0
SENSORY CHANGE: 0
STRIDOR: 0
MYALGIAS: 1
NECK PAIN: 1
TREMORS: 0
VOMITING: 0
DIARRHEA: 0
FEVER: 0
CHILLS: 0
PALPITATIONS: 0
COUGH: 0
TINGLING: 1
FLANK PAIN: 0
SPEECH CHANGE: 0
DIAPHORESIS: 0
BACK PAIN: 0

## 2020-03-02 ASSESSMENT — GAIT ASSESSMENTS
DISTANCE (FEET): 75
ASSISTIVE DEVICE: FRONT WHEEL WALKER
GAIT LEVEL OF ASSIST: MINIMAL ASSIST

## 2020-03-02 ASSESSMENT — COGNITIVE AND FUNCTIONAL STATUS - GENERAL
SUGGESTED CMS G CODE MODIFIER MOBILITY: CK
WALKING IN HOSPITAL ROOM: A LITTLE
MOVING FROM LYING ON BACK TO SITTING ON SIDE OF FLAT BED: A LOT
STANDING UP FROM CHAIR USING ARMS: A LITTLE
MOBILITY SCORE: 15
TURNING FROM BACK TO SIDE WHILE IN FLAT BAD: A LITTLE
MOVING TO AND FROM BED TO CHAIR: A LOT
CLIMB 3 TO 5 STEPS WITH RAILING: A LOT

## 2020-03-02 NOTE — THERAPY
"Physical Therapy Evaluation completed.   Bed Mobility:  Supine to Sit: Minimal Assist  Transfers: Sit to Stand: Minimal Assist  Gait: Level Of Assist: Minimal Assist with Front-Wheel Walker       Plan of Care: Will benefit from Physical Therapy 3 times per week  Discharge Recommendations: Equipment: Will Continue to Assess for Equipment Needs. Post-acute therapy: Recommend post-acute placement for continued physical therapy services prior to discharge home.       See \"Rehab Therapy-Acute\" Patient Summary Report for complete documentation.    Patient is a 80 YO male s/p C3-6 PCDF. PMHx significant for DM, acute on chronic renal failure, central cord syndrome, HLD, HTN. He presented to PT with complaints of neck pain, decreased activity tolerance, impaired balance and coordination, and functional weakness that are limiting his ability to safely perform mobility. He required min A for bed mobility with log roll, reported he sleeps in recliner at home. He performed sit to stand and ambulated approximately 100ft with FWW and min A. Provided patient education regarding cervical precautions, log roll, use of AD, brace wear and care, and pain management techniques, patient with fair return demo. At this time recommend post acute placement prior to home given findings, PLOF, and limited ability of social supports to provide assist needed. Will follow while in house.  "

## 2020-03-02 NOTE — CARE PLAN
Problem: Nutritional:  Goal: Achieve adequate nutritional intake  Description: Patient will consume >50% of meals   Outcome: PROGRESSING AS EXPECTED

## 2020-03-02 NOTE — DISCHARGE PLANNING
Anticipated Discharge Disposition:   [facility] vs SNF    Action:    Pt s/p C3-6 lami with post fusion.  Cervical stenosis with myelopathy.    Pt sitting up in chair with c collar on.  Pts sister in room.  Pt stated he lives in a senior apartment on second level.  Complex has an elevator.  Pt uses four wheel walker.  He receives meals-on-wheels, cooks eggs or heats food.  He manages his own meds.  His sister provides transportation.  Pt is a .  Per [facility], patient has Medicare A, B & D.  He does not have a service connected disability.     OT/PT recommending post-acute placement.    Rehab referral placed per protocol.    Pt provided choice for [facility], [facility] and [facility].  Choice forms signed by patient.  Forms faxed to [facility].    Barriers to Discharge:    Medical clearance  Placement acceptance    Plan:  F/U referrals.    Care Transition Team Assessment    Information Source  Orientation : Oriented x 4  Information Given By: Patient, Relative  Who is responsible for making decisions for patient? : Patient    Readmission Evaluation  Is this a readmission?: Yes - planned readmission    Elopement Risk  Legal Hold: No  Ambulatory or Self Mobile in Wheelchair: Yes  Disoriented: No  Psychiatric Symptoms: None  History of Wandering: No  Elopement this Admit: No  Vocalizing Wanting to Leave: No  Displays Behaviors, Body Language Wanting to Leave: No-Not at Risk for Elopement  Elopement Risk: Not at Risk for Elopement    Interdisciplinary Discharge Planning  Lives with - Patient's Self Care Capacity: Alone and Able to Care For Self  Patient or legal guardian wants to designate a caregiver (see row info): No  Housing / Facility: 2 Story Apartment / Condo(ILF)  Prior Services: Intermittent Physical Support for ADL Per Family, Meals on Wheels    Discharge Preparedness  What is your plan after discharge?: Uncertain - pending medical team collaboration  What are your discharge  supports?: Sibling  Prior Functional Level: Ambulatory, Independent with Activities of Daily Living, Independent with Medication Management, Uses Walker  Difficulity with ADLs: Bathing, Dressing, Walking  Difficulity with IADLs: Cooking, Driving, Laundry, Managing medication, Shopping    Functional Assesment  Prior Functional Level: Ambulatory, Independent with Activities of Daily Living, Independent with Medication Management, Uses Walker    Finances  Financial Barriers to Discharge: No  Prescription Coverage: Yes    Vision / Hearing Impairment  Right Eye Vision: Impaired, Wears Glasses  Left Eye Vision: Impaired, Wears Glasses         Advance Directive  Advance Directive?: DPOA for Health Care    Domestic Abuse  Have you ever been the victim of abuse or violence?: No  Physical Abuse or Sexual Abuse: No  Verbal Abuse or Emotional Abuse: No  Possible Abuse Reported to:: Not Applicable         Discharge Risks or Barriers  Discharge risks or barriers?: No    Anticipated Discharge Information  Anticipated discharge disposition: Acute rehab, SNF  Discharge Contact Phone Number: [phone]

## 2020-03-02 NOTE — DISCHARGE PLANNING
Received Choice form at 4104  Agency/Facility Name: [facility]  Referral sent per Choice form @ 2110

## 2020-03-02 NOTE — WOUND TEAM
[facility]  Inpatient Services  Initial Wound and Skin Care Evaluation    Admission Date: 2/28/2020     Last order of IP CONSULT TO WOUND CARE was found on 3/2/2020 from Hospital Encounter on 2/6/2020       HPI, PMH, SH: Reviewed    Unit where seen by Wound Team: [id number]     WOUND CONSULT RELATED TO:  Prevena VAC change per provider    Self Report / Pain Level:  8/10 with palpitation     OBJECTIVE:  Pt lying in bed with prevena dressing in place. Preventable measures in place .    WOUND TYPE, LOCATION, CHARACTERISTICS (Pressure Injuries: location, stage, POA or date identified)     Wound 02/28/20 Incision Neck prevena (Active)   Site Assessment GRADY 3/2/2020  8:30 AM   Periwound Assessment Clean;Dry;Intact 3/2/2020 12:59 PM   Margins GRADY 3/2/2020  8:30 AM   Closure Staples;Approximated 3/2/2020 12:59 PM   Drainage Amount Small 3/2/2020 12:59 PM   Drainage Description Serosanguineous 3/2/2020 12:59 PM   Treatments Cleansed 3/2/2020 12:59 PM   Wound Cleansing Normal Saline Irrigation 3/2/2020 12:59 PM   Periwound Protectant Skin Protectant Wipes to Periwound 3/2/2020 12:59 PM   Dressing Options Wound Vac;Transparent Film 3/2/2020  1:00 PM   Dressing Changed New 3/2/2020  1:00 PM   Dressing Status Clean;Dry;Intact 3/2/2020  1:00 PM   Dressing Change/Treatment Frequency As Needed 3/2/2020 12:59 PM      Vascular:      DANYELL:   No results found.      Lab Values:    Lab Results   Component Value Date/Time    WBC 8.5 03/01/2020 04:59 AM    RBC 2.84 (L) 03/01/2020 04:59 AM    HEMOGLOBIN 9.1 (L) 03/01/2020 04:59 AM    HEMATOCRIT 29.0 (L) 03/01/2020 04:59 AM    HBA1C 5.1 02/29/2020 03:45 AM          Culture:  Not needed  Culture Results show:  No results found for this or any previous visit (from the past 720 hour(s)).      INTERVENTIONS BY WOUND TEAM:  Chart and pictures reviewed.  Prevena at bedside to be place.  Patient was sat up at the side of the bed, previous prevena was removed.  RN removed hemovac, and  "put pressure on hemovac tube site.  Patient's skin was prepped with cavilon skin prep.  Prevena 13\" was placed, did not achieve absolute suction, so switched to a Ulta for suctioning.     Interdisciplinary consultation: Patient, Bedside RN (Janay)    EVALUATION: Prevena for negative pressure suctioning to control drainage.     Goals: incisional drainage control    NURSING PLAN OF CARE ORDERS (X):    Dressing changes: See Dressing Care orders: x  Skin care: See Skin Care orders:   Rectal tube care: See Rectal Tube Care orders:   Other orders:    RSKIN:   CURRENTLY IN PLACE (X), APPLIED THIS VISIT (A), ORDERED (O):   Q shift Dale:  x  Q shift pressure point assessments:    Pressure redistribution mattress            Low Airloss          Bariatric DARELL         Bariatric foam           Heel float boots     Heel Silicone dressing        Float Heels off Bed with Pillows x              Barrier wipes         Barrier Cream         Barrier paste          Sacral silicone dressing         Silicone O2 tubing         Anchorfast         Cannula fixation Device (Tender )          Gray Foam Ear protectors           Trach with Optifoam split foam                 Waffle cushion        Waffle Overlay         Rectal tube or BMS    Purwick/Condom Cath          Antifungal tx      Interdry          Reposition q 2 hours        Up to chair   x     Ambulate  x    PT/OT  x      Dietician        Diabetes Education      PO  x   TF     TPN     NPO   # days   Other        WOUND TEAM PLAN OF CARE   Dressing changes by wound team:          Follow up 1-2 times weekly:               Follow up 3 times weekly:                NPWT change 3 times weekly:     Follow up as needed:       Other (explain): Prevena follow-up as needed    Anticipated discharge plans:  LTACH:        SNF/Rehab: x                 Home Care:           Outpatient Wound Center:            Self Care:              "

## 2020-03-02 NOTE — DIETARY
Nutrition Services: Day 3 of admit. Vince Almanzar is a 79 y.o. male with admitting DX of SPINAL STENOSIS, CERVICAL REGION.  Consult received for 14-23 lb weight loss x 4 months.     Pt reports losing weight from 161# in December 2018 and weighing 131# in April 2019 during admission in rehab facility. Since April 2019, pt reports gaining weight back. UBW is 152-160# per pt verbalization. PTA pt reports PO intake was consistent with baseline. Today pt reports consuming 50% of breakfast tray and consumed 1/4 of turkey sandwich on lunch tray per RD visualization. Pt states appetite is low. However, pt believes this may be related to recent surgery on Friday. Pt denies physical changes in appearance of fat and muscle stores recently.     Assessment:  Ht: 177.8 cm, Wt 2/28: 74.3 kg (163 lb 12.8 oz) via stand up scale, BMI: 24.1 - normal  Diet/Intake: renal, diabetic - Per chart pt PO 0-100% x 4 meals (% x 3 meals)      Evaluation:   1. Pt appears well-nourished.  2. Weight appears stable with UBW per pt verbalization.  3. Labs: BUN 44, creatinine 2.77, POC glucose 188-266  4. Meds: lantus (10 units), SSI, actos, sodium bicarbonate, vitamin D, zofran, bowel protocol   5. Last BM: 2/28    Malnutrition Risk: No new risk identified.     Recommendations/Plan:   1. Encourage intake of meals.  2. Document intake of all meals as % taken in ADL's to provide interdisciplinary communication across all shifts.   3. Monitor weight.  4. Nutrition rep will continue to see patient for ongoing meal and snack preferences.  5. Obtain supplement order per RD as needed.    RD following.

## 2020-03-02 NOTE — PROGRESS NOTES
Neurosurgery Progress Note    Subjective:  Pt doing well, complains of occasional neck pain     Exam:  Pt A&Ox3  FCx4  Deltoid 4+/5 bilat (baseline, limited by patient pain), Biceps 5/5 bilat, Triceps 5/5 bilat,  5/5 bilat  Pt unable to ABDuct shoulder past 90 degrees bilat, limited due to pain.   Prevena drain humming and cartridge full     BP  Min: 113/42  Max: 126/52  Pulse  Av.8  Min: 61  Max: 93  Resp  Av.5  Min: 16  Max: 18  Temp  Av.6 °C (97.9 °F)  Min: 36.5 °C (97.7 °F)  Max: 36.7 °C (98.1 °F)  SpO2  Av.5 %  Min: 95 %  Max: 100 %    No data recorded    Recent Labs     20   WBC 8.7 8.5   RBC 2.83* 2.84*   HEMOGLOBIN 9.1* 9.1*   HEMATOCRIT 28.0* 29.0*   MCV 98.9* 102.1*   MCH 32.2 32.0   MCHC 32.5* 31.4*   RDW 61.4* 66.2*   PLATELETCT 109* 110*   MPV 10.2 10.4     Recent Labs     20  0417   SODIUM 135 132* 136   POTASSIUM 5.0 4.8 4.4   CHLORIDE 105 102 106   CO2 20 19* 23   GLUCOSE 235* 289* 151*   BUN 41* 49* 44*   CREATININE 2.31* 2.87* 2.77*   CALCIUM 8.5 8.8 8.5               Intake/Output       20 - 20 0620 07 - 20 0659       Total 1900-0659 Total       Intake    Total Intake -- -- -- -- -- --       Output    Urine  --  375 375  --  -- --    Number of Times Voided 1 x -- 1 x -- -- --    Urine Void (mL) -- 375 375 -- -- --    Drains  --  20 20  --  -- --    Output (mL) (Closed/Suction Drain Posterior Neck Hemovac) -- 20 20 -- -- --    Total Output -- 395 395 -- -- --       Net I/O     -- -395 -395 -- -- --            Intake/Output Summary (Last 24 hours) at 3/2/2020 0950  Last data filed at 3/2/2020 0400  Gross per 24 hour   Intake --   Output 395 ml   Net -395 ml       $ Bladder Scan Results (mL): 242    • insulin glargine  10 Units Q EVENING   • glucose  16 g Q15 MIN PRN    And   • dextrose 10% bolus  250 mL Q15 MIN PRN   • sodium bicarbonate  650 mg TID    • simvastatin  20 mg Nightly   • metoprolol  12.5 mg BID   • insulin lispro  0-6 Units QHS   • Pharmacy Consult Request  1 Each PHARMACY TO DOSE   • MD ALERT...DO NOT ADMINISTER NSAIDS or ASPIRIN unless ORDERED By Neurosurgery  1 Each PRN   • docusate sodium  100 mg BID   • senna-docusate  1 Tab Nightly   • senna-docusate  1 Tab Q24HRS PRN   • polyethylene glycol/lytes  1 Packet BID PRN   • magnesium hydroxide  30 mL QDAY PRN   • bisacodyl  10 mg Q24HRS PRN   • fleet  1 Each Once PRN   • pioglitazone  30 mg DAILY   • NS   Continuous   • acetaminophen  1,000 mg Q6HRS   • oxyCODONE immediate-release  5 mg Q3HRS PRN   • morphine injection  2 mg Q3HRS PRN   • HYDROcodone-acetaminophen  1-2 Tab Q6HRS PRN   • diphenhydrAMINE  25 mg Q6HRS PRN    Or   • diphenhydrAMINE  25 mg Q6HRS PRN   • ondansetron  4 mg Q4HRS PRN   • ondansetron  4 mg Q4HRS PRN   • methocarbamol  750 mg Q8HRS PRN   • cloNIDine  0.1 mg Q4HRS PRN   • benzocaine-menthol  1 Lozenge Q2HRS PRN   • calcium carbonate  500 mg BID   • vitamin D  5,000 Units DAILY   • heparin  5,000 Units Q8HRS       Assessment and Plan:  Hospital day #3  POD #3 s/p C3-6 laminectomy and posterolateral fusion     Prophylactic anticoagulation: yes         Start date/time: POD 1 started on Heparin 5000 units subQ q8h. Pt is doing well post operatively.     Discharge Planning: SNF or rehab, consults placed     Cervical Stenosis:  With myelopathy - Status post cervical surgery-  laminectomy,  decompression and fusion on 2/28  Stabilize with neck brace , DC Hemovac today, wound care consult, Prevena drain cartridge full and seal has not been maintained, Prevena unit will need replacement by wound care nurse  Nursing to remove right sided cranial staples  Pain management- robaxin, norco, IV morphine as needed.  Monitor neurological status  Pt unable to Abduct shoulder above 90 degrees, worsened since pre-operative exam, pt effort limited by pain. Possible C5 palsy.         PT/OT  eval     Type 2 Diabetes Mellitus:  Goal for medical management blood glucose less than 150 from Neurosurgical point of view  Monitor with serial accu checks, SSI as needed    Acute on Chronic Renal Failure:   CKD III, Mild improvement with IVFs  Continue NS, encourage PO intake  F/u renal fxn, electrolytes, UOP   Daily BMPs  Avoid nephrotoxic agents    Anemia (HCC)  Chronic  Check iron studies  Monitor for acute symptoms    Thrombocytopenia (present on admission)  Thrombocytopenia at this point is mild, chronic .   Monitor platelets.

## 2020-03-02 NOTE — CARE PLAN
Problem: Pain Management  Goal: Pain level will decrease to patient's comfort goal  Outcome: PROGRESSING AS EXPECTED  Flowsheets (Taken 3/1/2020 5474)  Pain Rating Scale (NPRS): 7  Note: c/o throbbing and ache to neck. PRN oxy given with relief. Aspen collar on. Resting in bed      Problem: Safety  Goal: Will remain free from falls  Outcome: PROGRESSING AS EXPECTED  Note: Uses call light. Up with 1 assist w/fww. No falls this admission. Gait is steady      Problem: Bowel/Gastric:  Goal: Normal bowel function is maintained or improved  Outcome: PROGRESSING AS EXPECTED  Note: Last bm 2/28/2020.Scheduled bowel meds given      Problem: Skin Integrity  Goal: Risk for impaired skin integrity will decrease  Outcome: PROGRESSING AS EXPECTED  Note: Hemovac and provena on wound site. (see flow sheet)

## 2020-03-02 NOTE — DISCHARGE PLANNING
[facility] Transitional [facility] Coordination     Referral from:  Dr. Moore  Facesheet indicates:  Medicare  Potential Rehab Diagnosis: Neuro Muscular/SCI    POD #3 s/p C3-6 laminectomy and posterolateral fusion.    Chart review indicates patient has on going medical management and therapy needs to possibly meet inpatient rehab facility criteria with the goal of returning to community.    D/C support: Lives alone - Sister assists with IADL's     Physiatry consultation forwarded per protocol. [facility] following.        Thank you for the referral.

## 2020-03-03 ENCOUNTER — APPOINTMENT (OUTPATIENT)
Dept: RADIOLOGY | Facility: MEDICAL CENTER | Age: 80
DRG: 472 | End: 2020-03-03
Attending: NURSE PRACTITIONER
Payer: COMMERCIAL

## 2020-03-03 LAB
25(OH)D3 SERPL-MCNC: 40 NG/ML (ref 30–100)
ANION GAP SERPL CALC-SCNC: 7 MMOL/L (ref 0–11.9)
BASOPHILS # BLD AUTO: 0.2 % (ref 0–1.8)
BASOPHILS # BLD: 0.01 K/UL (ref 0–0.12)
BUN SERPL-MCNC: 43 MG/DL (ref 8–22)
CALCIUM SERPL-MCNC: 8.4 MG/DL (ref 8.5–10.5)
CHLORIDE SERPL-SCNC: 105 MMOL/L (ref 96–112)
CO2 SERPL-SCNC: 21 MMOL/L (ref 20–33)
CREAT SERPL-MCNC: 2.5 MG/DL (ref 0.5–1.4)
EOSINOPHIL # BLD AUTO: 0.02 K/UL (ref 0–0.51)
EOSINOPHIL NFR BLD: 0.4 % (ref 0–6.9)
ERYTHROCYTE [DISTWIDTH] IN BLOOD BY AUTOMATED COUNT: 62 FL (ref 35.9–50)
GLUCOSE SERPL-MCNC: 144 MG/DL (ref 65–99)
HCT VFR BLD AUTO: 23 % (ref 42–52)
HEPIND PLTAB  51052: NEGATIVE
HGB BLD-MCNC: 7.6 G/DL (ref 14–18)
IMM GRANULOCYTES # BLD AUTO: 0.04 K/UL (ref 0–0.11)
IMM GRANULOCYTES NFR BLD AUTO: 0.8 % (ref 0–0.9)
LYMPHOCYTES # BLD AUTO: 0.29 K/UL (ref 1–4.8)
LYMPHOCYTES NFR BLD: 6.1 % (ref 22–41)
MCH RBC QN AUTO: 32.5 PG (ref 27–33)
MCHC RBC AUTO-ENTMCNC: 33 G/DL (ref 33.7–35.3)
MCV RBC AUTO: 98.3 FL (ref 81.4–97.8)
MONOCYTES # BLD AUTO: 0.84 K/UL (ref 0–0.85)
MONOCYTES NFR BLD AUTO: 17.5 % (ref 0–13.4)
NEUTROPHILS # BLD AUTO: 3.59 K/UL (ref 1.82–7.42)
NEUTROPHILS NFR BLD: 75 % (ref 44–72)
NRBC # BLD AUTO: 0 K/UL
NRBC BLD-RTO: 0 /100 WBC
PLATELET # BLD AUTO: 96 K/UL (ref 164–446)
PMV BLD AUTO: 9.7 FL (ref 9–12.9)
POTASSIUM SERPL-SCNC: 4.4 MMOL/L (ref 3.6–5.5)
RBC # BLD AUTO: 2.34 M/UL (ref 4.7–6.1)
SODIUM SERPL-SCNC: 133 MMOL/L (ref 135–145)
WBC # BLD AUTO: 4.8 K/UL (ref 4.8–10.8)

## 2020-03-03 PROCEDURE — 700102 HCHG RX REV CODE 250 W/ 637 OVERRIDE(OP): Performed by: HOSPITALIST

## 2020-03-03 PROCEDURE — 99232 SBSQ HOSP IP/OBS MODERATE 35: CPT | Performed by: HOSPITALIST

## 2020-03-03 PROCEDURE — 700111 HCHG RX REV CODE 636 W/ 250 OVERRIDE (IP): Performed by: HOSPITALIST

## 2020-03-03 PROCEDURE — 700112 HCHG RX REV CODE 229: Performed by: PHYSICIAN ASSISTANT

## 2020-03-03 PROCEDURE — 99223 1ST HOSP IP/OBS HIGH 75: CPT | Performed by: PHYSICAL MEDICINE & REHABILITATION

## 2020-03-03 PROCEDURE — A9270 NON-COVERED ITEM OR SERVICE: HCPCS | Performed by: PHYSICIAN ASSISTANT

## 2020-03-03 PROCEDURE — 85025 COMPLETE CBC W/AUTO DIFF WBC: CPT

## 2020-03-03 PROCEDURE — 97535 SELF CARE MNGMENT TRAINING: CPT

## 2020-03-03 PROCEDURE — 36415 COLL VENOUS BLD VENIPUNCTURE: CPT

## 2020-03-03 PROCEDURE — 80048 BASIC METABOLIC PNL TOTAL CA: CPT

## 2020-03-03 PROCEDURE — 700105 HCHG RX REV CODE 258: Performed by: HOSPITALIST

## 2020-03-03 PROCEDURE — 93971 EXTREMITY STUDY: CPT | Mod: RT

## 2020-03-03 PROCEDURE — 770001 HCHG ROOM/CARE - MED/SURG/GYN PRIV*

## 2020-03-03 PROCEDURE — 82962 GLUCOSE BLOOD TEST: CPT

## 2020-03-03 PROCEDURE — 86022 PLATELET ANTIBODIES: CPT

## 2020-03-03 PROCEDURE — 700111 HCHG RX REV CODE 636 W/ 250 OVERRIDE (IP): Performed by: PHYSICIAN ASSISTANT

## 2020-03-03 PROCEDURE — A9270 NON-COVERED ITEM OR SERVICE: HCPCS | Performed by: HOSPITALIST

## 2020-03-03 PROCEDURE — 700102 HCHG RX REV CODE 250 W/ 637 OVERRIDE(OP): Performed by: PHYSICIAN ASSISTANT

## 2020-03-03 PROCEDURE — 82306 VITAMIN D 25 HYDROXY: CPT

## 2020-03-03 RX ORDER — INSULIN GLARGINE 100 [IU]/ML
18 INJECTION, SOLUTION SUBCUTANEOUS EVERY EVENING
Status: DISCONTINUED | OUTPATIENT
Start: 2020-03-03 | End: 2020-03-04

## 2020-03-03 RX ORDER — OMEPRAZOLE 20 MG/1
20 CAPSULE, DELAYED RELEASE ORAL DAILY
Status: DISCONTINUED | OUTPATIENT
Start: 2020-03-03 | End: 2020-03-12 | Stop reason: HOSPADM

## 2020-03-03 RX ADMIN — SENNOSIDES AND DOCUSATE SODIUM 1 TABLET: 8.6; 5 TABLET ORAL at 21:14

## 2020-03-03 RX ADMIN — SIMVASTATIN 20 MG: 20 TABLET, FILM COATED ORAL at 21:14

## 2020-03-03 RX ADMIN — DOCUSATE SODIUM 100 MG: 100 CAPSULE, LIQUID FILLED ORAL at 18:45

## 2020-03-03 RX ADMIN — SODIUM BICARBONATE 650 MG: 650 TABLET ORAL at 05:29

## 2020-03-03 RX ADMIN — SODIUM BICARBONATE 650 MG: 650 TABLET ORAL at 13:30

## 2020-03-03 RX ADMIN — INSULIN GLARGINE 15 UNITS: 100 INJECTION, SOLUTION SUBCUTANEOUS at 18:39

## 2020-03-03 RX ADMIN — HYDROCODONE BITARTRATE AND ACETAMINOPHEN 1 TABLET: 5; 325 TABLET ORAL at 11:13

## 2020-03-03 RX ADMIN — MELATONIN 5000 UNITS: at 05:27

## 2020-03-03 RX ADMIN — OXYCODONE HYDROCHLORIDE 5 MG: 5 TABLET ORAL at 05:28

## 2020-03-03 RX ADMIN — POLYETHYLENE GLYCOL 3350 1 PACKET: 17 POWDER, FOR SOLUTION ORAL at 05:27

## 2020-03-03 RX ADMIN — METOPROLOL TARTRATE 12.5 MG: 25 TABLET, FILM COATED ORAL at 18:48

## 2020-03-03 RX ADMIN — OMEPRAZOLE 20 MG: 20 CAPSULE, DELAYED RELEASE ORAL at 09:48

## 2020-03-03 RX ADMIN — INSULIN LISPRO 1 UNITS: 100 INJECTION, SOLUTION INTRAVENOUS; SUBCUTANEOUS at 18:40

## 2020-03-03 RX ADMIN — SODIUM CHLORIDE 125 MG: 9 INJECTION, SOLUTION INTRAVENOUS at 06:23

## 2020-03-03 RX ADMIN — HYDROCODONE BITARTRATE AND ACETAMINOPHEN 1 TABLET: 5; 325 TABLET ORAL at 18:45

## 2020-03-03 RX ADMIN — METOPROLOL TARTRATE 12.5 MG: 25 TABLET, FILM COATED ORAL at 05:28

## 2020-03-03 RX ADMIN — DOCUSATE SODIUM 100 MG: 100 CAPSULE, LIQUID FILLED ORAL at 05:27

## 2020-03-03 RX ADMIN — INSULIN LISPRO 1 UNITS: 100 INJECTION, SOLUTION INTRAVENOUS; SUBCUTANEOUS at 21:22

## 2020-03-03 RX ADMIN — PIOGLITAZONE 30 MG: 30 TABLET ORAL at 05:28

## 2020-03-03 RX ADMIN — OXYCODONE HYDROCHLORIDE 5 MG: 5 TABLET ORAL at 21:42

## 2020-03-03 RX ADMIN — HEPARIN SODIUM 5000 UNITS: 5000 INJECTION, SOLUTION INTRAVENOUS; SUBCUTANEOUS at 05:28

## 2020-03-03 RX ADMIN — SODIUM BICARBONATE 650 MG: 650 TABLET ORAL at 18:45

## 2020-03-03 RX ADMIN — SODIUM CHLORIDE: 9 INJECTION, SOLUTION INTRAVENOUS at 06:23

## 2020-03-03 ASSESSMENT — ENCOUNTER SYMPTOMS
FEVER: 0
MYALGIAS: 1
COUGH: 0
BACK PAIN: 0
NECK PAIN: 1
SENSORY CHANGE: 0
CHILLS: 0
TREMORS: 0
WEAKNESS: 1
STRIDOR: 0
PALPITATIONS: 0
DIARRHEA: 0
DIAPHORESIS: 0
TINGLING: 1
SHORTNESS OF BREATH: 0
ABDOMINAL PAIN: 0
FLANK PAIN: 0
VOMITING: 0
SPEECH CHANGE: 0

## 2020-03-03 ASSESSMENT — COGNITIVE AND FUNCTIONAL STATUS - GENERAL
DRESSING REGULAR LOWER BODY CLOTHING: A LOT
DRESSING REGULAR UPPER BODY CLOTHING: A LITTLE
DAILY ACTIVITIY SCORE: 14
HELP NEEDED FOR BATHING: A LOT
PERSONAL GROOMING: A LOT
SUGGESTED CMS G CODE MODIFIER DAILY ACTIVITY: CK
EATING MEALS: A LITTLE
TOILETING: A LOT

## 2020-03-03 NOTE — WOUND TEAM
Pt Provena with leak. New Provena placed. Incision remains well approximated, no drainage noted, no healing ridge present. Provena cut to fit applied. RN in to help hold C-collar with application. Seal obtained, no leaks noted.

## 2020-03-03 NOTE — PROGRESS NOTES
Hospital Medicine Daily Progress Note    Date of Service  3/3/2020    Chief Complaint  79 y.o. male admitted 2/28/2020 with neck pain.     Hospital Course      Patient with hx of DM , Hypertension, CKD III admitted following a cervical neck injury. Findings of severe cervical spondylosis, myelopathy and foraminal stenosis.  Patient underwent cervical laminectomy,  decompression and fusion on 2-28.  Hospitalist service consulted for Diabetes, IM issues management.     Interval Problem Update  Heart rate 80s-90s, blood pressure within normal limits.  Saturating well on room air this morning.  Hemoglobin dropped to 7.6, down from 9.1, continue to monitor closely, no clinical evidence for bleeding however platelets is dropping down to 96 from 100.  Hold pharmacologic anti-coagulation, agree with checking HIT antibodies.  Patient is eating well, will hold intravenous fluids,?  Diluted sample.  Getting intravenous iron for low iron.  Creatinine slightly improving 2.5, down from 2.7   Blood sugars elevated, I am increasing glargine dose   Discussed with patient, patient's nurse and with multidisciplinary team during rounds including , pharmacist and charge nurse.      Consultants/Specialty  Hospitalist for NS     Code Status  Full code      Disposition  Needs [facility]    Review of Systems  Review of Systems   Constitutional: Negative for chills, diaphoresis, fever and malaise/fatigue.   HENT: Negative for congestion.    Respiratory: Negative for cough, shortness of breath and stridor.    Cardiovascular: Negative for chest pain and palpitations.   Gastrointestinal: Negative for abdominal pain, diarrhea and vomiting.   Genitourinary: Negative for flank pain and hematuria.   Musculoskeletal: Positive for myalgias and neck pain. Negative for back pain and joint pain.   Skin: Positive for rash (Chronic erythema bilateral lower extremities).   Neurological: Positive for tingling and weakness (generalized ). Negative for  tremors, sensory change and speech change.      Physical Exam  Temp:  [36.6 °C (97.9 °F)-37.4 °C (99.3 °F)] 36.7 °C (98.1 °F)  Pulse:  [] 82  Resp:  [16] 16  BP: (118-136)/(40-66) 136/66  SpO2:  [95 %-98 %] 97 %    Physical Exam  Constitutional:       General: He is not in acute distress.     Appearance: He is not diaphoretic.   HENT:      Head: Normocephalic and atraumatic.      Right Ear: External ear normal.      Left Ear: External ear normal.      Nose: Nose normal.   Eyes:      Extraocular Movements: Extraocular movements intact.      Conjunctiva/sclera: Conjunctivae normal.   Neck:      Musculoskeletal: Neck rigidity present.      Comments: Cervical collar in place.   Hemovac, Chapis in place .  Cardiovascular:      Rate and Rhythm: Normal rate and regular rhythm.      Heart sounds: No murmur.   Pulmonary:      Breath sounds: No stridor. No wheezing, rhonchi or rales.   Abdominal:      General: There is no distension.      Palpations: Abdomen is soft.      Tenderness: There is no abdominal tenderness.   Musculoskeletal:         General: No swelling.      Comments: Bilateral lower extremity erythema, chronic   Skin:     General: Skin is dry.   Neurological:      Mental Status: He is alert.   Psychiatric:         Mood and Affect: Mood normal.         Fluids    Intake/Output Summary (Last 24 hours) at 3/3/2020 1820  Last data filed at 3/3/2020 1600  Gross per 24 hour   Intake 1260 ml   Output 625 ml   Net 635 ml       Laboratory  Recent Labs     03/01/20  0459 03/03/20  0355   WBC 8.5 4.8   RBC 2.84* 2.34*   HEMOGLOBIN 9.1* 7.6*   HEMATOCRIT 29.0* 23.0*   .1* 98.3*   MCH 32.0 32.5   MCHC 31.4* 33.0*   RDW 66.2* 62.0*   PLATELETCT 110* 96*   MPV 10.4 9.7     Recent Labs     03/01/20  0459 03/02/20  0417 03/03/20  0355   SODIUM 132* 136 133*   POTASSIUM 4.8 4.4 4.4   CHLORIDE 102 106 105   CO2 19* 23 21   GLUCOSE 289* 151* 144*   BUN 49* 44* 43*   CREATININE 2.87* 2.77* 2.50*   CALCIUM 8.8 8.5 8.4*                    Imaging  US-EXTREMITY VENOUS UPPER UNILAT RIGHT         US-RENAL   Final Result      Bilateral mild hydronephrosis.      Atrophic left kidney.      DX-PORTABLE FLUORO > 1 HOUR   Final Result      Portable fluoroscopy utilized for 6 seconds.         INTERPRETING LOCATION: [address] [place] [address], [place], [address]      DX-CERVICAL SPINE-2 OR 3 VIEWS   Final Result      Intraoperative image as above described.        Assessment/Plan  * Type 2 diabetes mellitus (HCC)- (present on admission)  Assessment & Plan  With hyperglycemia  Glycated hemoglobin 5.5%   Long & short acting insulin, glargine last increased 3/3   Accu-Checks, hypoglycemia protocol      Acute on chronic renal failure (HCC)  Assessment & Plan  CKD 4,  Ultrasound with findings of bilateral hydronephrosis, mild  Check bladder US to eval for urinary retention signs of outlet obstruction.  May need anders if significant retention.    Avoid nephrotoxic medications      Central cord syndrome (HCC)- (present on admission)  Assessment & Plan  With myelopathy - Status post cervical surgery-  laminectomy,  decompression and fusion on 2-28  Stabilize with neck brace , monitor hemovac output, wound care, NSG input  Multimodal pain control  He lives alone in an upstairs unit. PT/OT, needs SNF.     Thrombocytopenia (HCC)- (present on admission)  Assessment & Plan  Thrombocytopenia at this point is mild, chronic .   Monitor platelets.      Anemia- (present on admission)  Assessment & Plan  Chronic, iron deficient  IV iron replacement  Monitor for acute symptoms.      Vitamin D deficiency- (present on admission)  Assessment & Plan  Adequate levels     Hyperlipemia- (present on admission)  Assessment & Plan  Low-fat low-cholesterol diet  Resume home simvastatin     Essential hypertension- (present on admission)  Assessment & Plan  Controlled with current regimen metoprolol, and clonidine as needed.  Vital signs per policy.      VTE prophylaxis: SCDs

## 2020-03-03 NOTE — PROGRESS NOTES
Neurosurgery Progress Note    Subjective:  C/o right proximal arm pain to forearm x 3 days    Exam:  Incision with Prevena  RUE: swelling over bicep/tricep to below elbow joint, tender to touch  Able to lift arm to 90 degrees, shoulder palpation/rom negative  Strength 4/5 throughout    BP  Min: 118/40  Max: 133/60  Pulse  Av.5  Min: 85  Max: 102  Resp  Av  Min: 16  Max: 16  Temp  Av.1 °C (98.8 °F)  Min: 36.6 °C (97.9 °F)  Max: 37.7 °C (99.8 °F)  SpO2  Av %  Min: 95 %  Max: 99 %    No data recorded    Recent Labs     20  04520  0355   WBC 8.5 4.8   RBC 2.84* 2.34*   HEMOGLOBIN 9.1* 7.6*   HEMATOCRIT 29.0* 23.0*   .1* 98.3*   MCH 32.0 32.5   MCHC 31.4* 33.0*   RDW 66.2* 62.0*   PLATELETCT 110* 96*   MPV 10.4 9.7     Recent Labs     20  04520  0417 20  0355   SODIUM 132* 136 133*   POTASSIUM 4.8 4.4 4.4   CHLORIDE 102 106 105   CO2 19* 23 21   GLUCOSE 289* 151* 144*   BUN 49* 44* 43*   CREATININE 2.87* 2.77* 2.50*   CALCIUM 8.8 8.5 8.4*               Intake/Output       20 - 2059 20 - 20 0659       Total  Total       Intake    P.O.  --  360 360  --  -- --    P.O. -- 360 360 -- -- --    I.V.  --  900 900  --  -- --    Volume (mL) (NS infusion) -- 900 900 -- -- --    Total Intake -- 1260 1260 -- -- --       Output    Urine  --  200 200  --  -- --    Number of Times Voided -- 1 x 1 x -- -- --    Urine Void (mL) -- 200 200 -- -- --    Stool  --  -- --  --  -- --    Number of Times Stooled 1 x -- 1 x -- -- --    Total Output -- 200 200 -- -- --       Net I/O     -- 1060 1060 -- -- --            Intake/Output Summary (Last 24 hours) at 3/3/2020 1240  Last data filed at 3/2/2020 2227  Gross per 24 hour   Intake 1260 ml   Output 200 ml   Net 1060 ml            • omeprazole  20 mg DAILY   • magnesium hydroxide  30 mL DAILY   • polyethylene glycol/lytes  1 Packet DAILY   • insulin lispro  1-6  Units 4X/DAY ACHS    And   • glucose  16 g Q15 MIN PRN    And   • dextrose 10% bolus  250 mL Q15 MIN PRN   • insulin glargine  15 Units Q EVENING   • [START ON 3/4/2020] ferric gluconate (FERRLECIT) IV  250 mg Q24HRS   • sodium bicarbonate  650 mg TID   • simvastatin  20 mg Nightly   • metoprolol  12.5 mg BID   • Pharmacy Consult Request  1 Each PHARMACY TO DOSE   • MD ALERT...DO NOT ADMINISTER NSAIDS or ASPIRIN unless ORDERED By Neurosurgery  1 Each PRN   • docusate sodium  100 mg BID   • senna-docusate  1 Tab Nightly   • senna-docusate  1 Tab Q24HRS PRN   • bisacodyl  10 mg Q24HRS PRN   • pioglitazone  30 mg DAILY   • NS   Continuous   • oxyCODONE immediate-release  5 mg Q3HRS PRN   • HYDROcodone-acetaminophen  1-2 Tab Q6HRS PRN   • diphenhydrAMINE  25 mg Q6HRS PRN    Or   • diphenhydrAMINE  25 mg Q6HRS PRN   • ondansetron  4 mg Q4HRS PRN   • ondansetron  4 mg Q4HRS PRN   • methocarbamol  750 mg Q8HRS PRN   • cloNIDine  0.1 mg Q4HRS PRN   • benzocaine-menthol  1 Lozenge Q2HRS PRN   • calcium carbonate  500 mg BID   • vitamin D  5,000 Units DAILY   • heparin  5,000 Units Q8HRS       Assessment and Plan:  Hospital day #4  POD #4 s/p C3-6 laminectomy and posterolateral fusion  for cervical stenosis/myelopathy:     RUE pain and swelling: worsened since pre-operative exam, will obtain US: if negative consider antiinflammatories if ok per hospitalist on background of worsened CKD      Prophylactic anticoagulation: yes         Start date/time: POD 1 started on Heparin 5000 units subQ q8h.          CType 2 Diabetes Mellitus:  Goal for medical management blood glucose less than 150 from Neurosurgical point of view: coverage adjusted per hospitalist team        Acute on Chronic Renal Failure: managed per hospitalist team          Thrombocytopenia (present on admission)  Thrombocytopenia at this point is mild, chronic: platelet below 100 today, will order HIT, hold Heparin for now.     Discharge Planning: SNF or rehab,  consults placed

## 2020-03-03 NOTE — PROGRESS NOTES
Hospital Medicine Daily Progress Note    Date of Service  3/2/2020    Chief Complaint  79 y.o. male admitted 2/28/2020 with neck pain.     Hospital Course      Patient with hx of DM , Hypertension, CKD III admitted following a cervical neck injury. Findings of severe cervical spondylosis, myelopathy and foraminal stenosis.  Patient underwent cervical laminectomy,  decompression and fusion on 2-28.  Hospitalist service consulted for Diabetes, IM issues management.     Interval Problem Update    Generalized weakness.  Hemovac and Chapis still in place.  Plan continued therapies.  Ultrasound with mild bilateral hydronephrosis.     Consultants/Specialty    Hospitalist     Code Status  Full code     Disposition  TBD, plan to mobilize.     Review of Systems  Review of Systems   Constitutional: Negative for chills, diaphoresis, fever and malaise/fatigue.   HENT: Negative for congestion.    Respiratory: Negative for cough, shortness of breath and stridor.    Cardiovascular: Negative for chest pain and palpitations.   Gastrointestinal: Negative for abdominal pain, diarrhea and vomiting.   Genitourinary: Negative for flank pain and hematuria.   Musculoskeletal: Positive for myalgias and neck pain. Negative for back pain and joint pain.   Skin: Positive for rash (Chronic erythema bilateral lower extremities).   Neurological: Positive for tingling and weakness (generalized ). Negative for tremors, sensory change and speech change.        Physical Exam  Temp:  [36.5 °C (97.7 °F)-36.7 °C (98.1 °F)] 36.6 °C (97.8 °F)  Pulse:  [61-93] 80  Resp:  [16-18] 16  BP: (113-126)/(40-52) 113/42  SpO2:  [95 %-100 %] 96 %    Physical Exam  Constitutional:       General: He is not in acute distress.     Appearance: He is not diaphoretic.   HENT:      Head: Normocephalic and atraumatic.      Right Ear: External ear normal.      Left Ear: External ear normal.      Nose: Nose normal.   Eyes:      Extraocular Movements: Extraocular movements  intact.      Conjunctiva/sclera: Conjunctivae normal.   Neck:      Musculoskeletal: Neck rigidity present.      Comments: Cervical collar in place.   Hemovac, Chapis in place .  Cardiovascular:      Rate and Rhythm: Normal rate and regular rhythm.      Heart sounds: No murmur.   Pulmonary:      Breath sounds: No stridor. No wheezing, rhonchi or rales.   Abdominal:      General: There is no distension.      Palpations: Abdomen is soft.      Tenderness: There is no abdominal tenderness.   Musculoskeletal:         General: No swelling.      Comments: Bilateral lower extremity erythema, chronic   Skin:     General: Skin is dry.   Neurological:      Mental Status: He is alert.         Fluids    Intake/Output Summary (Last 24 hours) at 3/2/2020 1604  Last data filed at 3/2/2020 0400  Gross per 24 hour   Intake --   Output 395 ml   Net -395 ml       Laboratory  Recent Labs     02/29/20 0345 03/01/20  0459   WBC 8.7 8.5   RBC 2.83* 2.84*   HEMOGLOBIN 9.1* 9.1*   HEMATOCRIT 28.0* 29.0*   MCV 98.9* 102.1*   MCH 32.2 32.0   MCHC 32.5* 31.4*   RDW 61.4* 66.2*   PLATELETCT 109* 110*   MPV 10.2 10.4     Recent Labs     02/29/20 0345 03/01/20  0459 03/02/20  0417   SODIUM 135 132* 136   POTASSIUM 5.0 4.8 4.4   CHLORIDE 105 102 106   CO2 20 19* 23   GLUCOSE 235* 289* 151*   BUN 41* 49* 44*   CREATININE 2.31* 2.87* 2.77*   CALCIUM 8.5 8.8 8.5             Recent Labs     02/29/20  0345   TRIGLYCERIDE 48   HDL 42   LDL 23       Imaging  US-RENAL   Final Result      Bilateral mild hydronephrosis.      Atrophic left kidney.      DX-PORTABLE FLUORO > 1 HOUR   Final Result      Portable fluoroscopy utilized for 6 seconds.         INTERPRETING LOCATION: [address] [place] [address] [place], [id number]      DX-CERVICAL SPINE-2 OR 3 VIEWS   Final Result      Intraoperative image as above described.           Assessment/Plan  * Type 2 diabetes mellitus (HCC)- (present on admission)  Assessment & Plan  Most recent hemoglobin A1c is 5.5 showing a good control.   Persistent hyperglycemia, possible stress-induced.  Monitor with serial accu checks, SSI as needed  Increase Lantus  ADA diet  Continue with  oral antihyperglycemics, pioglitazone  Monitor for hypoglycemia.     Acute on chronic renal failure (HCC)  Assessment & Plan  CKD III. Ultrasound with findings of bilateral hydronephrosis, mild  Continue IVFs  Check bladder US to eval for urinary retention signs of outlet obstruction.  May need anders if significant retention.  Renal function improved.  Consult urology, nephrology if worsened.  Fu renal fxn, electrolytes, UOP  Avoid nephrotoxic medications      Central cord syndrome (HCC)- (present on admission)  Assessment & Plan  With myelopathy - Status post cervical surgery-  laminectomy,  decompression and fusion on 2-28  Stabilize with neck brace , monitor hemovac output, wound care, NSG input  Pain management- prn robaxin, oxycodone.  Transition off  of narcotics with concern for side effects/delirium.  DC IV morphine.    PT/OT   He lives alone in an upstairs unit-  Referral made to SNF/rehab for continued therapies.  I discussed with case management.      Thrombocytopenia (HCC)- (present on admission)  Assessment & Plan  Thrombocytopenia at this point is mild, chronic .   Monitor platelets.     Anemia- (present on admission)  Assessment & Plan  Chronic, iron deficient  IV iron replacement  Monitor for acute symptoms.     Vitamin D deficiency- (present on admission)  Assessment & Plan  Fu level  Continue with supplementation.     Hyperlipemia- (present on admission)  Assessment & Plan  Low-fat low-cholesterol diet  Statin  Lab Results   Component Value Date/Time    CHOLSTRLTOT 106 12/11/2018 06:32 AM    LDL 41 12/11/2018 06:32 AM    HDL 34 (A) 12/11/2018 06:32 AM    TRIGLYCERIDE 157 (H) 12/11/2018 06:32 AM             Essential hypertension- (present on admission)  Assessment & Plan  Controlled   cw metoprolol 12.5 mg twice daily, clonidine as needed  Monitor BP  response.        VTE prophylaxis: SCDs

## 2020-03-03 NOTE — CONSULTS
Physical Medicine and Rehabilitation Consultation         Initial Consult      Date of Consultation: 3/3/2020  Consulting provider: Adrian Moore MD  Reason for consultation: assess for acute inpatient rehab appropriateness  LOS: 4 Day(s)    Chief complaint: C3-C6 lami/fusion    HPI: The patient is a 79 y.o. right hand dominant male with a past medical history of diabetes, hypertension, hyperlipidemia;  who presented on 2/28/2020  5:20 AM with neck pain radiating down his right arm to his right hand that occurred after he tripped and fell on December 8, 2018.  Patient continues to have right hand numbness and occasional sharp and dull neck pain radiating to his right arm.  Review of outside records shows that patient has been seeing neurosurgery as an outpatient and was diagnosed with central cord syndrome, now with worsening balance issues and numbness in the bilateral upper extremities and neck pain.  Patient also has difficulty with fine motor movements.  Most recent MRI shows severe stenosis at C3-C6, with interval progression over the last year.  Patient was advised to have a posterior cervical decompression and fusion from C3-C6.     Chart review of patient's last PCP visit on 5/31/2019 reflects the following for diabetes management: 1.  Ozempic 1.0 once a week on Monday; 2.  Stay Actos/Pioglitazone 30mg once a day; 3.  Tresiba 10 units every night    The patient currently reports nausea, constipation, paresthesias bilateral upper extremities.  Patient is hard of hearing.  Nurse also informs me that he has been complaining of right arm pain and swelling, and that a ultrasound is ordered.  Patient states he has been to [facility] in the past, and enjoyed his time there.  Chart review indicates he was admitted to [facility] from December 10, 2018 to January 4, 2019 for a ground-level fall with loss of consciousness and development of central cord's syndrome.    ROS:  Pertinent positives are listed in HPI,  "all other systems reviewed and are negative      Social Hx:  Pre-morbidly, this patient lived in a single level home with 0 steps to enter, alone and able to care for self.   Employment: Retired    Patient lives in senior living, on the second floor with elevator access.  He lives alone and does not have additional support available.    Current level of function:  The patient was evaluated by acute care Physical Therapy and Occupational Therapy; currently requiring minimal assistance for mobility and minimal assistance for ADLs    PMH:  Past Medical History:   Diagnosis Date   • Arrhythmia     [facility]   • Diabetes (HCC)    • Hemorrhagic disorder (HCC)     \"bleeds easily\"   • High cholesterol    • Hypertension    • Jaundice 5/8/2016   • Renal disorder     hx kidney stones       PSH:  Past Surgical History:   Procedure Laterality Date   • CERVICAL DECOMPRESSION POSTERIOR  2/28/2020    Procedure: DECOMPRESSION, SPINE, CERVICAL, POSTERIOR APPROACH- C3-6 DISCECTOMY AND FUSION;  Surgeon: Lg Sellers D.O.;  Location: [facility];  Service: Neurosurgery   • GASTROSCOPY-ENDO N/A 6/3/2016    Procedure: GASTROSCOPY-ENDO;  Surgeon: Jasper Donnelly M.D.;  Location: [facility];  Service:    • EGD W/ENDOSCOPIC ULTRASOUND N/A 6/3/2016    Procedure: EGD W/ENDOSCOPIC ULTRASOUND UPPER;  Surgeon: Jasper Donnelly M.D.;  Location: [facility];  Service:    • EGD WITH ASP/BX N/A 6/3/2016    Procedure: EGD WITH ASP/BX - FNA, DUODENAL BIOPSIES, FNA OF PANCREAS;  Surgeon: Jasper Donnelly M.D.;  Location: [facility];  Service:    • ERCP  5/2016   • CHOLECYSTECTOMY  2006    gallstones removed   • HIP ARTHROPLASTY TOTAL Left 2001    left total hip       FHX:  Non-pertinent to today's issue    Medications:  Current Facility-Administered Medications   Medication Dose   • omeprazole (PRILOSEC) capsule 20 mg  20 mg   • magnesium hydroxide (MILK OF MAGNESIA) suspension 30 mL  " "30 mL   • polyethylene glycol/lytes (MIRALAX) PACKET 1 Packet  1 Packet   • insulin lispro (HUMALOG) injection 1-6 Units  1-6 Units    And   • glucose 4 g chewable tablet 16 g  16 g    And   • DEXTROSE 10% BOLUS 250 mL  250 mL   • insulin glargine (LANTUS) injection 15 Units  15 Units   • [START ON 3/4/2020] ferric gluconate complex (FERRLECIT) 250 mg in  mL IVPB  250 mg   • sodium bicarbonate tablet 650 mg  650 mg   • simvastatin (ZOCOR) tablet 20 mg  20 mg   • metoprolol (LOPRESSOR) tablet 12.5 mg  12.5 mg   • Pharmacy Consult Request ...Pain Management Review 1 Each  1 Each   • MD ALERT...DO NOT ADMINISTER NSAIDS or ASPIRIN unless ORDERED By Neurosurgery 1 Each  1 Each   • docusate sodium (COLACE) capsule 100 mg  100 mg   • senna-docusate (PERICOLACE or SENOKOT S) 8.6-50 MG per tablet 1 Tab  1 Tab   • senna-docusate (PERICOLACE or SENOKOT S) 8.6-50 MG per tablet 1 Tab  1 Tab   • bisacodyl (DULCOLAX) suppository 10 mg  10 mg   • pioglitazone (ACTOS) tablet 30 mg  30 mg   • NS infusion     • oxyCODONE immediate-release (ROXICODONE) tablet 5 mg  5 mg   • HYDROcodone-acetaminophen (NORCO) 5-325 MG per tablet 1-2 Tab  1-2 Tab   • diphenhydrAMINE (BENADRYL) tablet/capsule 25 mg  25 mg    Or   • diphenhydrAMINE (BENADRYL) injection 25 mg  25 mg   • ondansetron (ZOFRAN) syringe/vial injection 4 mg  4 mg   • ondansetron (ZOFRAN ODT) dispertab 4 mg  4 mg   • methocarbamol (ROBAXIN) tablet 750 mg  750 mg   • cloNIDine (CATAPRES) tablet 0.1 mg  0.1 mg   • benzocaine-menthol (CEPACOL) lozenge 1 Lozenge  1 Lozenge   • calcium carbonate (TUMS) chewable tab 500 mg  500 mg   • vitamin D (cholecalciferol) tablet 5,000 Units  5,000 Units   • heparin injection 5,000 Units  5,000 Units       Allergies:  No Known Allergies    Physical Exam:  Vitals: /63   Pulse 88   Temp 36.6 °C (97.9 °F) (Temporal)   Resp 16   Ht 1.778 m (5' 10\")   Wt 76.2 kg (167 lb 15.9 oz)   SpO2 98%   Gen: NAD  Head: NC/AT  Eyes/ Nose/ Mouth: " PERRLA, moist mucous membranes  Cardio: RRR, no mumurs  Pulm: CTAB, with normal respiratory effort  Abd: Soft NTND, active bowel sounds,   Ext: 2+ peripheral edema.  Hemosiderin deposits bilateral. no calf tenderness. No clubbing/cyanosis. +dorsal pedalis pulses bilaterally.  Hard callus present over fifth metatarsal head on right foot    Mental status: answers questions appropriately follows commands  Speech: fluent, no aphasia or dysarthria    CRANIAL NERVES:  2,3: visual acuity grossly intact, PERRL  3,4,6: EOMI bilaterally, no nystagmus or diplopia  5: sensation intact to light touch bilaterally and symmetric  7: no facial asymmetry  8: hearing grossly intact  9,10: symmetric palate elevation  11: SCM/Trapezius strength 5/5 bilaterally  12: tongue protrudes midline    Motor:  Note: right arm is weaker than left arm, although still in 4/5 range      Shoulder flexors:  Right -  4/5, Left -  4/5  Elbow flexors:  Bilateral -  4/5  Wrist extensors:  Bilateral -  5/5  Elbow extensors:  Bilateral -  4/5  Finger flexors:  Bilateral -  4/5  Finger abductors:  Bilateral -  4/5  Hip flexors:  Right -  2/5, Left -  3/5  Knee ext:  Bilateral -  5/5  Dorsiflexors:  Bilateral -  4/5  EHL:  Bilateral -  4/5  Plantar flexors:  Bilateral -  4/5     Sensory:   intact to light touch through out    DTRs: 2+ in bilateral biceps, triceps, brachioradialis, 2+ in bilateral patellar and achilles tendons  No clonus at bilateral ankles  Negative babinski b/l  Negative Villavicencio b/l     Tone: no spasticity noted, no cogwheeling noted    Labs: Reviewed and significant for   Recent Labs     03/01/20 0459 03/01/20  1203 03/03/20  0355   RBC 2.84*  --  2.34*   HEMOGLOBIN 9.1*  --  7.6*   HEMATOCRIT 29.0*  --  23.0*   PLATELETCT 110*  --  96*   IRON  --  33*  --    FERRITIN  --  311.9  --    TOTIRONBC  --  270  --      Recent Labs     03/01/20 0459 03/02/20  0417 03/03/20  0355   SODIUM 132* 136 133*   POTASSIUM 4.8 4.4 4.4   CHLORIDE 102 106  105   CO2 19* 23 21   GLUCOSE 289* 151* 144*   BUN 49* 44* 43*   CREATININE 2.87* 2.77* 2.50*   CALCIUM 8.8 8.5 8.4*     Recent Results (from the past 24 hour(s))   ACCU-CHEK GLUCOSE    Collection Time: 03/02/20 12:30 PM   Result Value Ref Range    Glucose - Accu-Ck 188 (H) 65 - 99 mg/dL   ACCU-CHEK GLUCOSE    Collection Time: 03/02/20  6:38 PM   Result Value Ref Range    Glucose - Accu-Ck 207 (H) 65 - 99 mg/dL   CBC WITH DIFFERENTIAL    Collection Time: 03/03/20  3:55 AM   Result Value Ref Range    WBC 4.8 4.8 - 10.8 K/uL    RBC 2.34 (L) 4.70 - 6.10 M/uL    Hemoglobin 7.6 (L) 14.0 - 18.0 g/dL    Hematocrit 23.0 (L) 42.0 - 52.0 %    MCV 98.3 (H) 81.4 - 97.8 fL    MCH 32.5 27.0 - 33.0 pg    MCHC 33.0 (L) 33.7 - 35.3 g/dL    RDW 62.0 (H) 35.9 - 50.0 fL    Platelet Count 96 (L) 164 - 446 K/uL    MPV 9.7 9.0 - 12.9 fL    Neutrophils-Polys 75.00 (H) 44.00 - 72.00 %    Lymphocytes 6.10 (L) 22.00 - 41.00 %    Monocytes 17.50 (H) 0.00 - 13.40 %    Eosinophils 0.40 0.00 - 6.90 %    Basophils 0.20 0.00 - 1.80 %    Immature Granulocytes 0.80 0.00 - 0.90 %    Nucleated RBC 0.00 /100 WBC    Neutrophils (Absolute) 3.59 1.82 - 7.42 K/uL    Lymphs (Absolute) 0.29 (L) 1.00 - 4.80 K/uL    Monos (Absolute) 0.84 0.00 - 0.85 K/uL    Eos (Absolute) 0.02 0.00 - 0.51 K/uL    Baso (Absolute) 0.01 0.00 - 0.12 K/uL    Immature Granulocytes (abs) 0.04 0.00 - 0.11 K/uL    NRBC (Absolute) 0.00 K/uL   Basic Metabolic Panel    Collection Time: 03/03/20  3:55 AM   Result Value Ref Range    Sodium 133 (L) 135 - 145 mmol/L    Potassium 4.4 3.6 - 5.5 mmol/L    Chloride 105 96 - 112 mmol/L    Co2 21 20 - 33 mmol/L    Glucose 144 (H) 65 - 99 mg/dL    Bun 43 (H) 8 - 22 mg/dL    Creatinine 2.50 (H) 0.50 - 1.40 mg/dL    Calcium 8.4 (L) 8.5 - 10.5 mg/dL    Anion Gap 7.0 0.0 - 11.9   VITAMIN D,25 HYDROXY    Collection Time: 03/03/20  3:55 AM   Result Value Ref Range    25-Hydroxy   Vitamin D 25 40 30 - 100 ng/mL   ESTIMATED GFR    Collection Time: 03/03/20   3:55 AM   Result Value Ref Range    GFR If African American 30 (A) >60 mL/min/1.73 m 2    GFR If Non African American 25 (A) >60 mL/min/1.73 m 2       Imaging:   MRI C-spine 12/3/2018  1.  Prominent cervical lordosis.  2.  Suspicion for focal myelopathic cord signal abnormality at C4 in the right paramedian cord possible small cord contusion.  3.  Multilevel degenerative and spondylotic changes notable for C2-C3 marked central stenosis, C3-4 marked central stenosis, C4-5 marked central stenosis, C5-6 mild central stenosis.  4.  Multilevel foraminal stenoses due to spondylotic changes, findings concordant with CT. Details above for each level in the body of report.  5.  Anatomic detail somewhat compromised by the marked lordosis with the axial slice images nonorthogonal to the axis of the spinal canal.  6. The case was discussed (preliminary call report) by Dr. Coronel with SANDIE MCDERMOTT at 1900 hours 12/3/2018.    ASSESSMENT:    Patient is a 79 y.o. male admitted with C3-C6 cervical spondylosis with myelopathy, cervical spondylosis, neural foraminal stenosis, now s/p C3-C6 laminectomy, foraminotomy with decompression of spinal cord and instrumentation by Dr. Lg Sellers DO on 2/28/2020.    Rehabilitation: Impaired ADLs and mobility  Patient is a good candidate for inpatient rehab based on needs for PT, OT. Patient has a good discharge situation which will be home with community support.   Barriers to transfer include: Insurance authorization, TCCs to verify disposition, medical clearance and bed availability     [facility] Code / Diagnosis to Support: 04.130 Other Non-Traumatic Spinal Cord Dysfunction    Central cord syndrome - Incomplete quadriplegia secondary to C3-C6 cervical spondylosis with myelopathy, cervical spondylosis, neural foraminal stenosis, now s/p C3-C6 laminectomy, foraminotomy with decompression of spinal cord and instrumentation by Dr. Lg Sellers DO on 2/28/2020  -Continue PT OT while  in-house  -Anticipate patient will benefit from IPR services  -No aspirin or NSAIDs until cleared by neurosurgery    Diabetes mellitus type 2  -Management per primary team  -Humalog 1 to 6 units sliding scale  -Glargine 15 units q. evening  -Actos 30 mg daily  -Wound care to evaluate callus on plantar surface of right fifth metatarsal head    Pain  -Norco 5-3 25 1-2 tabs every 6 hours as needed  -Roxicodone 5 mg every 3 hours as needed  -Methocarbamol 750 every 8 hours PRN    Hypertension  -Metoprolol 12.5 mg twice daily    Hyponatremia  -Sodium bicarb tablets 650 mg 3 times daily    GI prophylaxis  -Omeprazole 20 mg daily    Hyperlipidemia  -Simvastatin 20 mg nightly    Bowel program  -Spoke with nurse about providing prune juice with butter  - Senokot 1 tab nightly  - MiraLAX 1 packet twice daily PRN  - Milk of magnesia 30mL daily if no bowel movement in greater than 24 hours  - Dulcolax suppository 10 mg if patient goes more than 48 hours without a bowel movement  - Fleet enema if patient goes more than 72 hours without a bowel movement    DVT Prophylaxis:   -Heparin 5K 3 times daily  -Bilateral LILLY hose ordered today      Discussed with pt, summarized hospitalization and care, options for next step of care  Labs reviewed   Imaging personally reviewed    Discussed with team about recommendations     Thank you for allowing us to participate in the care of this patient.       Discussed with patient about recommendations for and plan for rehabilitation as follows. Patient with multiple co-morbidities(including but not limited to central cord syndrome, diabetes, hypertension, hyperlipidemia); with functional deficits in mobility/self-care, and Moderate de-conditioning.     Pre-morbidly, this patient lived in a single level home with 0 steps to enter, alone and able to care for self. The patient was evaluated by acute care Physical Therapy and Occupational Therapy; currently requiring minimal assistance for mobility  and minimal assistance for ADLs    The patient is a(n) Good candidate for an acute inpatient rehabilitation program with a coordinated program of care at an intensity and frequency not available at a lower level of care.     Note: if patient continues to progress while waiting for medical clearance, and no longer requires 2 of of 3 therapy services (PT/OT/SLP) at a CGA/Minimal assistance level, patient will no longer need acute inpatient rehabilitation.    This recommendation is substantiated by the patient's current medical condition with intervention and assessment of medical issues requiring an acute level of care for patient's safety and maximum outcome. A coordinated program of care will be provided by an interdisciplinary team including physical therapy, occupational therapy, hospitalist, physiatry and rehab nursing. Rehab goals include improved mobility, self-care management, strength and conditioning/endurance, pain management, bowel and bladder management, mood and affect, and safety with independent home management including caregiver training.     Estimated length of stay is approximately 14 days. Rehab potential: Very Good. Disposition: to pre-morbid independent living setting with supportive care of patient's community resources. We will continue to follow with you in anticipation of discharge to acute inpatient rehabilitation when medically stable to do so at the discretion of his  attending physician. Thank you for allowing us to participate in his care. Please call with any questions regarding this recommendation.    Negrito Zambrano, DO   Physical Medicine and Rehabilitation

## 2020-03-03 NOTE — PROGRESS NOTES
RN MOBILITY NOTE     Surgery patient?: yes  Date of surgery: 2/28  Ambulated 50 ft on day of surgery? (N/A if today is not date of surgery): n/a  Number of times ambulated 50 feet or greater today: 4  Patient has been up to chair, edge of bed or HOB 90 degrees for all meals?: yes  Goal met? (goal is ambulating at least 50 feet 2 times on day shift, one time on night shift): yes  If patient did not meet mobility goal, why?:

## 2020-03-03 NOTE — PROGRESS NOTES
Received report and assumed pt care.  A&O x4.  Bed alarm and treaded socks on.  Call light and personal belongings within reach.  Bed locked and at lowest position.  MONZON.  VSS.  Aspen collar on at all times.

## 2020-03-04 ENCOUNTER — PATIENT OUTREACH (OUTPATIENT)
Dept: HEALTH INFORMATION MANAGEMENT | Facility: OTHER | Age: 80
End: 2020-03-04

## 2020-03-04 ENCOUNTER — APPOINTMENT (OUTPATIENT)
Dept: RADIOLOGY | Facility: MEDICAL CENTER | Age: 80
DRG: 472 | End: 2020-03-04
Attending: NURSE PRACTITIONER
Payer: COMMERCIAL

## 2020-03-04 LAB
ALBUMIN SERPL BCP-MCNC: 3.1 G/DL (ref 3.2–4.9)
ALBUMIN/GLOB SERPL: 1.6 G/DL
ALP SERPL-CCNC: 74 U/L (ref 30–99)
ALT SERPL-CCNC: 10 U/L (ref 2–50)
ANION GAP SERPL CALC-SCNC: 8 MMOL/L (ref 0–11.9)
AST SERPL-CCNC: 26 U/L (ref 12–45)
BASOPHILS # BLD AUTO: 0.2 % (ref 0–1.8)
BASOPHILS # BLD: 0.01 K/UL (ref 0–0.12)
BILIRUB SERPL-MCNC: 0.7 MG/DL (ref 0.1–1.5)
BUN SERPL-MCNC: 45 MG/DL (ref 8–22)
CALCIUM SERPL-MCNC: 8.2 MG/DL (ref 8.5–10.5)
CHLORIDE SERPL-SCNC: 105 MMOL/L (ref 96–112)
CO2 SERPL-SCNC: 21 MMOL/L (ref 20–33)
CREAT SERPL-MCNC: 2.64 MG/DL (ref 0.5–1.4)
EOSINOPHIL # BLD AUTO: 0.03 K/UL (ref 0–0.51)
EOSINOPHIL NFR BLD: 0.6 % (ref 0–6.9)
ERYTHROCYTE [DISTWIDTH] IN BLOOD BY AUTOMATED COUNT: 63.9 FL (ref 35.9–50)
GLOBULIN SER CALC-MCNC: 2 G/DL (ref 1.9–3.5)
GLUCOSE BLD-MCNC: 129 MG/DL (ref 65–99)
GLUCOSE BLD-MCNC: 164 MG/DL (ref 65–99)
GLUCOSE BLD-MCNC: 166 MG/DL (ref 65–99)
GLUCOSE BLD-MCNC: 172 MG/DL (ref 65–99)
GLUCOSE BLD-MCNC: 184 MG/DL (ref 65–99)
GLUCOSE BLD-MCNC: 212 MG/DL (ref 65–99)
GLUCOSE BLD-MCNC: 218 MG/DL (ref 65–99)
GLUCOSE SERPL-MCNC: 104 MG/DL (ref 65–99)
HCT VFR BLD AUTO: 22.4 % (ref 42–52)
HGB BLD-MCNC: 7.4 G/DL (ref 14–18)
IMM GRANULOCYTES # BLD AUTO: 0.02 K/UL (ref 0–0.11)
IMM GRANULOCYTES NFR BLD AUTO: 0.4 % (ref 0–0.9)
LYMPHOCYTES # BLD AUTO: 0.68 K/UL (ref 1–4.8)
LYMPHOCYTES NFR BLD: 14.4 % (ref 22–41)
MAGNESIUM SERPL-MCNC: 2 MG/DL (ref 1.5–2.5)
MCH RBC QN AUTO: 32.6 PG (ref 27–33)
MCHC RBC AUTO-ENTMCNC: 33 G/DL (ref 33.7–35.3)
MCV RBC AUTO: 98.7 FL (ref 81.4–97.8)
MONOCYTES # BLD AUTO: 0.79 K/UL (ref 0–0.85)
MONOCYTES NFR BLD AUTO: 16.7 % (ref 0–13.4)
NEUTROPHILS # BLD AUTO: 3.2 K/UL (ref 1.82–7.42)
NEUTROPHILS NFR BLD: 67.7 % (ref 44–72)
NRBC # BLD AUTO: 0 K/UL
NRBC BLD-RTO: 0 /100 WBC
PLATELET # BLD AUTO: 101 K/UL (ref 164–446)
PMV BLD AUTO: 9.7 FL (ref 9–12.9)
POTASSIUM SERPL-SCNC: 4.2 MMOL/L (ref 3.6–5.5)
PROT SERPL-MCNC: 5.1 G/DL (ref 6–8.2)
RBC # BLD AUTO: 2.27 M/UL (ref 4.7–6.1)
SODIUM SERPL-SCNC: 134 MMOL/L (ref 135–145)
WBC # BLD AUTO: 4.7 K/UL (ref 4.8–10.8)

## 2020-03-04 PROCEDURE — 700112 HCHG RX REV CODE 229: Performed by: PHYSICIAN ASSISTANT

## 2020-03-04 PROCEDURE — 99232 SBSQ HOSP IP/OBS MODERATE 35: CPT | Performed by: HOSPITALIST

## 2020-03-04 PROCEDURE — 700105 HCHG RX REV CODE 258: Performed by: HOSPITALIST

## 2020-03-04 PROCEDURE — 36415 COLL VENOUS BLD VENIPUNCTURE: CPT

## 2020-03-04 PROCEDURE — A9270 NON-COVERED ITEM OR SERVICE: HCPCS | Performed by: PHYSICIAN ASSISTANT

## 2020-03-04 PROCEDURE — 700102 HCHG RX REV CODE 250 W/ 637 OVERRIDE(OP): Performed by: HOSPITALIST

## 2020-03-04 PROCEDURE — 97530 THERAPEUTIC ACTIVITIES: CPT | Mod: CQ

## 2020-03-04 PROCEDURE — 700111 HCHG RX REV CODE 636 W/ 250 OVERRIDE (IP): Performed by: HOSPITALIST

## 2020-03-04 PROCEDURE — 770001 HCHG ROOM/CARE - MED/SURG/GYN PRIV*

## 2020-03-04 PROCEDURE — 97116 GAIT TRAINING THERAPY: CPT | Mod: CQ

## 2020-03-04 PROCEDURE — 72040 X-RAY EXAM NECK SPINE 2-3 VW: CPT

## 2020-03-04 PROCEDURE — 700102 HCHG RX REV CODE 250 W/ 637 OVERRIDE(OP): Performed by: PHYSICIAN ASSISTANT

## 2020-03-04 PROCEDURE — 83735 ASSAY OF MAGNESIUM: CPT

## 2020-03-04 PROCEDURE — 82962 GLUCOSE BLOOD TEST: CPT

## 2020-03-04 PROCEDURE — 700111 HCHG RX REV CODE 636 W/ 250 OVERRIDE (IP): Performed by: NURSE PRACTITIONER

## 2020-03-04 PROCEDURE — 80053 COMPREHEN METABOLIC PANEL: CPT

## 2020-03-04 PROCEDURE — 85025 COMPLETE CBC W/AUTO DIFF WBC: CPT

## 2020-03-04 PROCEDURE — A9270 NON-COVERED ITEM OR SERVICE: HCPCS | Performed by: HOSPITALIST

## 2020-03-04 PROCEDURE — 700101 HCHG RX REV CODE 250: Performed by: NURSE PRACTITIONER

## 2020-03-04 RX ORDER — LIDOCAINE 50 MG/G
1 PATCH TOPICAL EVERY 24 HOURS
Status: DISCONTINUED | OUTPATIENT
Start: 2020-03-04 | End: 2020-03-12 | Stop reason: HOSPADM

## 2020-03-04 RX ORDER — HEPARIN SODIUM 5000 [USP'U]/ML
5000 INJECTION, SOLUTION INTRAVENOUS; SUBCUTANEOUS EVERY 12 HOURS
Status: DISCONTINUED | OUTPATIENT
Start: 2020-03-04 | End: 2020-03-09

## 2020-03-04 RX ORDER — HYDROCODONE BITARTRATE AND ACETAMINOPHEN 5; 325 MG/1; MG/1
1-2 TABLET ORAL EVERY 6 HOURS PRN
Qty: 60 TAB | Refills: 0 | Status: SHIPPED | OUTPATIENT
Start: 2020-03-04 | End: 2020-03-14

## 2020-03-04 RX ORDER — INSULIN GLARGINE 100 [IU]/ML
20 INJECTION, SOLUTION SUBCUTANEOUS EVERY EVENING
Status: DISCONTINUED | OUTPATIENT
Start: 2020-03-04 | End: 2020-03-05

## 2020-03-04 RX ADMIN — HYDROCODONE BITARTRATE AND ACETAMINOPHEN 1 TABLET: 5; 325 TABLET ORAL at 17:55

## 2020-03-04 RX ADMIN — OXYCODONE HYDROCHLORIDE 5 MG: 5 TABLET ORAL at 04:43

## 2020-03-04 RX ADMIN — ANTACID TABLETS 500 MG: 500 TABLET, CHEWABLE ORAL at 04:45

## 2020-03-04 RX ADMIN — INSULIN GLARGINE 20 UNITS: 100 INJECTION, SOLUTION SUBCUTANEOUS at 18:02

## 2020-03-04 RX ADMIN — SODIUM CHLORIDE 250 MG: 9 INJECTION, SOLUTION INTRAVENOUS at 06:41

## 2020-03-04 RX ADMIN — MELATONIN 5000 UNITS: at 04:43

## 2020-03-04 RX ADMIN — HEPARIN SODIUM 5000 UNITS: 5000 INJECTION, SOLUTION INTRAVENOUS; SUBCUTANEOUS at 11:12

## 2020-03-04 RX ADMIN — DOCUSATE SODIUM 100 MG: 100 CAPSULE, LIQUID FILLED ORAL at 04:46

## 2020-03-04 RX ADMIN — SODIUM BICARBONATE 650 MG: 650 TABLET ORAL at 04:45

## 2020-03-04 RX ADMIN — HEPARIN SODIUM 5000 UNITS: 5000 INJECTION, SOLUTION INTRAVENOUS; SUBCUTANEOUS at 17:56

## 2020-03-04 RX ADMIN — PIOGLITAZONE 30 MG: 30 TABLET ORAL at 04:45

## 2020-03-04 RX ADMIN — MAGNESIUM HYDROXIDE 30 ML: 400 SUSPENSION ORAL at 04:47

## 2020-03-04 RX ADMIN — METOPROLOL TARTRATE 12.5 MG: 25 TABLET, FILM COATED ORAL at 04:45

## 2020-03-04 RX ADMIN — SIMVASTATIN 20 MG: 20 TABLET, FILM COATED ORAL at 22:09

## 2020-03-04 RX ADMIN — LIDOCAINE 1 PATCH: 50 PATCH TOPICAL at 11:11

## 2020-03-04 RX ADMIN — ANTACID TABLETS 500 MG: 500 TABLET, CHEWABLE ORAL at 17:56

## 2020-03-04 RX ADMIN — POLYETHYLENE GLYCOL 3350 1 PACKET: 17 POWDER, FOR SOLUTION ORAL at 04:47

## 2020-03-04 RX ADMIN — INSULIN LISPRO 2 UNITS: 100 INJECTION, SOLUTION INTRAVENOUS; SUBCUTANEOUS at 22:29

## 2020-03-04 RX ADMIN — INSULIN LISPRO 1 UNITS: 100 INJECTION, SOLUTION INTRAVENOUS; SUBCUTANEOUS at 06:49

## 2020-03-04 RX ADMIN — SODIUM BICARBONATE 650 MG: 650 TABLET ORAL at 11:11

## 2020-03-04 RX ADMIN — INSULIN LISPRO 1 UNITS: 100 INJECTION, SOLUTION INTRAVENOUS; SUBCUTANEOUS at 11:10

## 2020-03-04 RX ADMIN — SODIUM BICARBONATE 650 MG: 650 TABLET ORAL at 18:10

## 2020-03-04 RX ADMIN — EPOETIN ALFA 2000 UNITS: 2000 SOLUTION INTRAVENOUS; SUBCUTANEOUS at 17:56

## 2020-03-04 RX ADMIN — DOCUSATE SODIUM 100 MG: 100 CAPSULE, LIQUID FILLED ORAL at 17:56

## 2020-03-04 RX ADMIN — OMEPRAZOLE 20 MG: 20 CAPSULE, DELAYED RELEASE ORAL at 04:46

## 2020-03-04 RX ADMIN — METOPROLOL TARTRATE 12.5 MG: 25 TABLET, FILM COATED ORAL at 17:56

## 2020-03-04 ASSESSMENT — ENCOUNTER SYMPTOMS
MYALGIAS: 1
VOMITING: 0
TREMORS: 0
NECK PAIN: 1
SENSORY CHANGE: 0
COUGH: 0
ABDOMINAL PAIN: 0
TINGLING: 1
DIARRHEA: 0
FEVER: 0
FLANK PAIN: 0
SPEECH CHANGE: 0
CHILLS: 0
SHORTNESS OF BREATH: 0
STRIDOR: 0
PALPITATIONS: 0
DIAPHORESIS: 0
WEAKNESS: 1
BACK PAIN: 0

## 2020-03-04 ASSESSMENT — GAIT ASSESSMENTS
DISTANCE (FEET): 100
ASSISTIVE DEVICE: FRONT WHEEL WALKER
DEVIATION: DECREASED BASE OF SUPPORT;SHUFFLED GAIT
GAIT LEVEL OF ASSIST: MINIMAL ASSIST

## 2020-03-04 ASSESSMENT — COGNITIVE AND FUNCTIONAL STATUS - GENERAL
TURNING FROM BACK TO SIDE WHILE IN FLAT BAD: A LITTLE
MOVING TO AND FROM BED TO CHAIR: A LITTLE
MOBILITY SCORE: 16
WALKING IN HOSPITAL ROOM: A LITTLE
STANDING UP FROM CHAIR USING ARMS: A LITTLE
CLIMB 3 TO 5 STEPS WITH RAILING: A LOT
SUGGESTED CMS G CODE MODIFIER MOBILITY: CK
MOVING FROM LYING ON BACK TO SITTING ON SIDE OF FLAT BED: A LOT

## 2020-03-04 NOTE — PROGRESS NOTES
Night shift:    Pt is A&Ox 4  Ambulated: Yes  Up with 1x assist, steady gait.   Voiding: Yes  Last BM: 3/2 (prune juice & butter, miralax, MOM, stool softeners given)  Pain: Controlled with PO

## 2020-03-04 NOTE — PREADMISSION SCREENING NOTE
Updated Pre-Screen Assessment     Name: Vince Almanzar  MRN: [id number]  : 1940    Medical Status/ Changes:     Oscar Wang M.D.   Physician   Hospital Medicine   Progress Notes   Signed   Date of Service:  3/11/2020  1:14 PM                    []Hide copied text    []Debby for details  Hospital Medicine Daily Progress Note     Date of Service  3/11/2020     Chief Complaint  79 y.o. male admitted 2020 with neck pain.      Hospital Course      Patient with hx of DM , Hypertension, CKD 4 admitted following a cervical neck injury. Findings of severe cervical spondylosis, myelopathy and foraminal stenosis.  Patient underwent cervical laminectomy,  decompression and fusion on .  Hospitalist service consulted for Diabetes, IM issues management.        Interval Problem Update  Afebrile, no overnight events, no nausea or vomiting.  Pain controlled.  Hemoglobin stable, occult blood neg.  Saturating well on room air and blood pressure       Consultants/Specialty  Hospitalist for NS       Code Status   Full code         Disposition  Needs SNF Vs Rehab      Review of Systems  Review of Systems   Constitutional: Positive for malaise/fatigue. Negative for chills, diaphoresis and fever.   HENT: Negative for congestion and ear pain.    Respiratory: Negative for cough, shortness of breath and stridor.    Cardiovascular: Negative for chest pain, palpitations and orthopnea.   Gastrointestinal: Positive for constipation. Negative for abdominal pain, diarrhea and vomiting.   Genitourinary: Negative for flank pain and hematuria.   Musculoskeletal: Positive for myalgias and neck pain. Negative for back pain.   Skin: Positive for rash (Chronic erythema bilateral lower extremities).   Neurological: Positive for tingling. Negative for tremors, sensory change and speech change.      Physical Exam  Temp:  [36.7 °C (98 °F)-37.4 °C (99.3 °F)] 36.8 °C (98.3 °F)  Pulse:  [77-87] 84  Resp:  [16] 16  BP: (121-136)/(41-45)  132/42  SpO2:  [97 %-100 %] 98 %     Physical Exam  Constitutional:       General: He is not in acute distress.     Appearance: He is not diaphoretic.   HENT:      Head: Normocephalic and atraumatic.      Right Ear: External ear normal.      Left Ear: External ear normal.      Nose: Nose normal.   Eyes:      General: No scleral icterus.     Extraocular Movements: Extraocular movements intact.      Conjunctiva/sclera: Conjunctivae normal.   Neck:      Musculoskeletal: Neck rigidity present.      Comments: Cervical collar in place.   Hemovac, Chapis in place .  Cardiovascular:      Rate and Rhythm: Normal rate and regular rhythm.      Heart sounds: No murmur.   Pulmonary:      Breath sounds: No stridor. No wheezing, rhonchi or rales.   Abdominal:      General: There is no distension.      Palpations: Abdomen is soft.      Tenderness: There is no abdominal tenderness. There is no guarding or rebound.   Musculoskeletal:         General: No swelling.      Comments: Bilateral lower extremity erythema, chronic   Skin:     General: Skin is dry.      Coloration: Skin is pale.   Neurological:      Mental Status: He is alert.   Psychiatric:         Mood and Affect: Mood normal.         Fluids     Intake/Output Summary (Last 24 hours) at 3/11/2020 1314  Last data filed at 3/11/2020 0800      Gross per 24 hour   Intake --   Output 150 ml   Net -150 ml         Laboratory       Recent Labs     03/09/20  0423 03/11/20  0312   WBC 8.4 6.8   RBC 2.40* 2.44*   HEMOGLOBIN 7.5* 7.6*   HEMATOCRIT 23.1* 23.7*   MCV 96.3 97.1   MCH 31.3 31.1   MCHC 32.5* 32.1*   RDW 59.6* 59.3*   PLATELETCT 152* 165   MPV 9.3 8.9*          Recent Labs     03/11/20  0312   SODIUM 134*   POTASSIUM 4.6   CHLORIDE 101   CO2 27   GLUCOSE 163*   BUN 51*   CREATININE 2.91*   CALCIUM 8.5                     Imaging  US-DANYELL SINGLE LEVEL BILAT   Final Result       [id number] VIEW   Final Result       No evidence of bowel obstruction.   Possible constipation.                        DX-CERVICAL SPINE-2 OR 3 VIEWS   Final Result       1.  There has been interval posterior decompression with andrew and screw fixation extending from C3 through C6 levels.       US-EXTREMITY VENOUS UPPER UNILAT RIGHT   Final Result       US-RENAL   Final Result       Bilateral mild hydronephrosis.       Atrophic left kidney.       DX-PORTABLE FLUORO > 1 HOUR   Final Result       Portable fluoroscopy utilized for 6 seconds.           INTERPRETING LOCATION: [address] [place] [address], [place], [address]       DX-CERVICAL SPINE-2 OR 3 VIEWS   Final Result       Intraoperative image as above described.          Assessment/Plan  * Type 2 diabetes mellitus (HCC)- (present on admission)  Assessment & Plan  With hyperglycemia  Glycated hemoglobin 5.5%   Long & short acting insulin, glargine last increased 3/4   Accu-Checks, hypoglycemia protocol             Acute on chronic renal failure (HCC)  Assessment & Plan  CKD 4,  Ultrasound with findings of bilateral hydronephrosis, mild  Check bladder US to eval for urinary retention signs of outlet obstruction.  May need anders if significant retention.    Avoid nephrotoxic medications     Slowly improving        Central cord syndrome (HCC)- (present on admission)  Assessment & Plan  With myelopathy - Status post cervical surgery-  laminectomy,  decompression and fusion on 2-28  Stabilize with neck brace , monitor hemovac output, wound care, NSG input  Multimodal pain control  He lives alone in an upstairs unit. PT/OT, needs SNF.        Thrombocytopenia (HCC)- (present on admission)  Assessment & Plan  Thrombocytopenia at this point is mild, chronic .   Monitor platelets.   Platelets have been stable around 100.      Anemia- (present on admission)  Assessment & Plan  Chronic, iron deficient  IV iron replacement   No clinical evidence for bleeding, history of colonic polyposis with colonoscopy few months ago with plan [facility]  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 Started epo      Occult blood neg  Hemoglobin stable   Monitor      Vitamin D deficiency- (present on admission)  Assessment & Plan  Adequate levels       Hyperlipemia- (present on admission)  Assessment & Plan  Low-fat low-cholesterol diet  Resume home simvastatin      Essential hypertension- (present on admission)  Assessment & Plan  Controlled with current regimen metoprolol, and clonidine as needed.  Vital signs per policy.       VTE prophylaxis: SCDs, holding heparin for  anemia           Functional Status/ Changes:     Physical Therapy Treatment completed.   Bed Mobility:  Supine to Sit: (Up in chair)  Transfers: Sit to Stand: Minimal Assist  Gait: Level Of Assist: Minimal Assist with Front-Wheel Walker       Plan of Care: Will benefit from Physical Therapy 3 times per week  Discharge Recommendations: Equipment: Will Continue to Assess for Equipment Needs. Post-acute therapy Discharge to a transitional care facility for continued skilled therapy services.     Pt seen today for PT treatment session. Pt pleasant and agreeable to session. Pt up in chair upon arrival, reports minor pain to the posterior aspect of his neck. Min A for safety with sit to stand, able to complete without physical assist but guarding require due to balance. Pt ambulated around unit with use of FWW, minimal assist for balance. Pt with improved evan, very short step and stride length with intermittent shuffled gait L>R. Pt with ongoing forward flexed posture that he is only able to briefly correct. Pt does fatigue with ambulation and quality of movement does start to decline with fatigue. Pt requested back to chair. Pt would benefit from ongoing PT intervention while in the acute care setting. Recommend post-acute placement for continued physical therapy services prior to discharge home.        Occupational Therapy Treatment completed with focus on ADLs, ADL transfers and patient  "education.  Functional Status:  Sarah sit>stand from chair and toilet, ModA toileting pericare post BM, Sarah standing grooming at sink, MaxA LB dress (sock change), Sarah functional mobility of household distances (for safety), cues for upright posture  Plan of Care: Will benefit from Occupational Therapy 4 times per week  Discharge Recommendations:  Equipment Will Continue to Assess for Equipment Needs. Post-acute therapy Recommend post-acute placement for additional occupational therapy services prior to discharge home.     See \"Rehab Therapy-Acute\" Patient Summary Report for complete documentation.      Pt seen for OT tx, agreeable to participate in functional activities. Pt required Sarah for toilet txf and pericare for thorough clean but did complete two successful wipes prior to receiving assistance. Pt stood and completed handwashing and grooming, Sarah for safety 2/2 decreased balance while washing bilateral hands without FWW support. Pt requested longer walk following ADLs in BR and at sink, pt demonstrated improved stability and tolerance for household distance ambulation with FWW, reports he prefers his 4WW at home. Pt is highly motivated for increased independence, will continue to benefit from acute OT. Recommend post-acute placement prior to DC home.         Reviewer: Ray Wilkins L.P.N.  Date: 3/12/2020  Time: 9:19 AM  Pre-Admission Screening Form    Patient Information:   Name: Vince Almanzar     MRN: [id number]       : 1940      Age: 79 y.o.   Gender: male      Race: White [7]       Marital Status: Single [1]  Family Contact: Ángela Rodriguez        Relationship: Sister [14]  Home Phone: [phone]           Cell Phone:   Advanced Directives: Yes  Code Status:  FULL  Current Attending Provider: Sweetie Rodriguez M.D.  Referring Physician: Dr. Moore   Physiatrist Consult: Dr. Zambrano    Referral Date: 20  Primary Payor Source:  [facility]  Secondary Payor Source:      Medical " "Information:   Date of Admission to Acute Care [place]2020  Room Number: S169/00  Rehabilitation Diagnosis: 04.130 Other Non-Traumatic Spinal Cord Dysfunction  Immunization History   Administered Date(s) Administered   • Influenza Vaccine Adult HD 09/15/2019     Allergies   Allergen Reactions   • Heparin      Heparin antibody ordered 3/3/20 (pending)     Past Medical History:   Diagnosis Date   • Arrhythmia     [facility]   • Diabetes (HCC)    • Hemorrhagic disorder (HCC)     \"bleeds easily\"   • High cholesterol    • Hypertension    • Jaundice 2016   • Renal disorder     hx kidney stones     Past Surgical History:   Procedure Laterality Date   • CERVICAL DECOMPRESSION POSTERIOR  2020    Procedure: DECOMPRESSION, SPINE, CERVICAL, POSTERIOR APPROACH- C3-6 DISCECTOMY AND FUSION;  Surgeon: Lg Sellers D.O.;  Location: [facility];  Service: Neurosurgery   • GASTROSCOPY-ENDO N/A 6/3/2016    Procedure: GASTROSCOPY-ENDO;  Surgeon: Jasper Donnelly M.D.;  Location: [facility];  Service:    • EGD W/ENDOSCOPIC ULTRASOUND N/A 6/3/2016    Procedure: EGD W/ENDOSCOPIC ULTRASOUND UPPER;  Surgeon: Jasper Donnelly M.D.;  Location: [facility];  Service:    • EGD WITH ASP/BX N/A 6/3/2016    Procedure: EGD WITH ASP/BX - FNA, DUODENAL BIOPSIES, FNA OF PANCREAS;  Surgeon: Jasper Donnelly M.D.;  Location: [facility];  Service:    • ERCP  2016   • CHOLECYSTECTOMY      gallstones removed   • HIP ARTHROPLASTY TOTAL Left     left total hip       History Leading to Admission, Conditions that Caused the Need for Rehab (CMS):     DATE OF SURGERY: 2020  SURGEON: Lg Cadena D.O.  ASSISTANT: Ethan Sarmiento PA-C  PREOPERATIVE DIAGNOSIS: C3-C6 cervical spondylosis with myelopathy, cervical spondylosis, neural foraminal stenosis.  POSTOPERATIVE DIAGNOSIS: C3-C6 cervical spondylosis with myelopathy, cervical spondylosis, neural foraminal " stenosis.  PROCEDURE PERFORMED:  1.  C3 C6 laminectomy, foraminotomy with decompression of spinal cord.  2.  C3 C6 posterior lateral mass instrumentation with screws and rods.  3.  C3 C6 posterior arthrodesis   4. Allograft morselized bone (local bone spinous process).  5. Intraoperative use of microscope for microsurgical techniques.  6. Fluoroscopic guidance for localization and instrumentation.      ANESTHESIA: GETA.  ESTIMATED BLOOD LOSS: 200 cc  COMPLICATIONS: None.  DISPOSITION: Stable to the PACU.  INDICATIONS FOR THE PROCEDURE     HISTORY: Mr. berry is a pleasant 79-year-old gentleman who presents with signs, symptoms and radiographic evidence of myelopathy, neck pain radiating into His arms and generalized weakness in all muscle muscle extremities typical of severe myelopathy.   DIAGNOSTIC STUDY: MRI of the cervical spine showed severe stenosis from C3-C6.  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  He had failed conservative treatment (physical therapy, pain medication, epidural steroids) and his symptoms continued to progress. Given the progression of the symptoms, it was decided to proceed with cervical laminectomy and arthrodesis at C3-C6.  The primary objective of the procedure is to prevent any worsening of his myelopathy.  Patient understands this and wishes to proceed for the procedure.     Dr. Zambrano (Physiatry) recommendations:  ASSESSMENT:     Patient is a 79 y.o. male admitted with C3-C6 cervical spondylosis with myelopathy, cervical spondylosis, neural foraminal stenosis, now s/p C3-C6 laminectomy, foraminotomy with decompression of spinal cord and instrumentation by Dr. Lg Sellers DO on 2/28/2020.     Rehabilitation: Impaired ADLs and mobility  Patient is a good candidate for inpatient rehab based on  needs for PT, OT. Patient has a good discharge situation which will be home with community support.   Barriers to transfer include: Insurance authorization, TCCs to verify disposition, medical clearance and bed availability      [facility] Code / Diagnosis to Support: 04.130 Other Non-Traumatic Spinal Cord Dysfunction     Central cord syndrome - Incomplete quadriplegia secondary to C3-C6 cervical spondylosis with myelopathy, cervical spondylosis, neural foraminal stenosis, now s/p C3-C6 laminectomy, foraminotomy with decompression of spinal cord and instrumentation by Dr. Lg Sellers, DO on 2/28/2020  -Continue PT OT while in-house  -Anticipate patient will benefit from IPR services  -No aspirin or NSAIDs until cleared by neurosurgery     Diabetes mellitus type 2  -Management per primary team  -Humalog 1 to 6 units sliding scale  -Glargine 15 units q. evening  -Actos 30 mg daily  -Wound care to evaluate callus on plantar surface of right fifth metatarsal head     Pain  -Norco 5-3 25 1-2 tabs every 6 hours as needed  -Roxicodone 5 mg every 3 hours as needed  -Methocarbamol 750 every 8 hours PRN     Hypertension  -Metoprolol 12.5 mg twice daily     Hyponatremia  -Sodium bicarb tablets 650 mg 3 times daily     GI prophylaxis  -Omeprazole 20 mg daily     Hyperlipidemia  -Simvastatin 20 mg nightly     Bowel program  -Spoke with nurse about providing prune juice with butter  - Senokot 1 tab nightly  - MiraLAX 1 packet twice daily PRN  - Milk of magnesia 30mL daily if no bowel movement in greater than 24 hours  - Dulcolax suppository 10 mg if patient goes more than 48 hours without a bowel movement  - Fleet enema if patient goes more than 72 hours without a bowel movement     DVT Prophylaxis:   -Heparin 5K 3 times daily  -Bilateral LILLY hose ordered today        Discussed with pt, summarized hospitalization and care, options for next step of care  Labs reviewed   Imaging personally reviewed    Discussed with team about  recommendations      Thank you for allowing us to participate in the care of this patient.         Discussed with patient about recommendations for and plan for rehabilitation as follows. Patient with multiple co-morbidities(including but not limited to central cord syndrome, diabetes, hypertension, hyperlipidemia); with functional deficits in mobility/self-care, and Moderate de-conditioning.      Pre-morbidly, this patient lived in a single level home with 0 steps to enter, alone and able to care for self. The patient was evaluated by acute care Physical Therapy and Occupational Therapy; currently requiring minimal assistance for mobility and minimal assistance for ADLs     The patient is a(n) Good candidate for an acute inpatient rehabilitation program with a coordinated program of care at an intensity and frequency not available at a lower level of care.      Note: if patient continues to progress while waiting for medical clearance, and no longer requires 2 of of 3 therapy services (PT/OT/SLP) at a CGA/Minimal assistance level, patient will no longer need acute inpatient rehabilitation.     This recommendation is substantiated by the patient's current medical condition with intervention and assessment of medical issues requiring an acute level of care for patient's safety and maximum outcome. A coordinated program of care will be provided by an interdisciplinary team including physical therapy, occupational therapy, hospitalist, physiatry and rehab nursing. Rehab goals include improved mobility, self-care management, strength and conditioning/endurance, pain management, bowel and bladder management, mood and affect, and safety with independent home management including caregiver training.      Estimated length of stay is approximately 14 days. Rehab potential: Very Good. Disposition: to pre-morbid independent living setting with supportive care of patient's community resources. We will continue to follow with  you in anticipation of discharge to acute inpatient rehabilitation when medically stable to do so at the discretion of his  attending physician. Thank you for allowing us to participate in his care. Please call with any questions regarding this recommendation.    Dr. Cervantes (Hospital Medicine) recommendations:  Assessment/Plan  * Type 2 diabetes mellitus (HCC)- (present on admission)  Assessment & Plan  -accus with sliding scale coverage, continue with Lantus 6 units subcutaneously nightly  -diabetic diet to be continued  -diabetic education to be provided  -follow glycohemoglobin levels long term, most recent hemoglobin A1c is 5.5 showing a good control.  -continue with oral antihyperglycemics, pioglitazone  -monitor for hypoglycemic episodes and adjust control if he should get low     Acute on chronic renal failure (HCC)  Assessment & Plan  Patient will need fluid resuscitation for any acute renal failure.  Monitor renal functions and avoid nephrotoxic medications     Central cord syndrome (HCC)- (present on admission)  Assessment & Plan  Status post cervical surgery  Continue at this point neck brace until neurosurgery says so  Pain management  Monitor neurological status     Thrombocytopenia (HCC)- (present on admission)  Assessment & Plan  Thrombocytopenia at this point is mild  Continue to monitor platelet counts  Currently not bleeding.     Hyperlipemia- (present on admission)  Assessment & Plan  Low-fat low-cholesterol diet  Statin        Lab Results   Component Value Date/Time     CHOLSTRLTOT 106 12/11/2018 06:32 AM     LDL 41 12/11/2018 06:32 AM     HDL 34 (A) 12/11/2018 06:32 AM     TRIGLYCERIDE 157 (H) 12/11/2018 06:32 AM                Essential hypertension- (present on admission)  Assessment & Plan  Optimize blood pressure management.  Continue with metoprolol 12.5 mg twice daily, clonidine as needed              Co-morbidities: See   Potential Risk - Complications: Contractures, Deep Vein  "Thrombosis, Incontinence, Malnutrition, Pain, Paralysis, Pneumonia, Pressure Ulcer and Urinary Tract Infection  Level of Risk: High    Ongoing Medical Management Needed (Medical/Nursing Needs):   Patient Active Problem List    Diagnosis Date Noted   • Acute on chronic renal failure (formerly [facility]) 02/28/2020     Priority: High   • Type 2 diabetes mellitus (formerly [facility]) 12/03/2018     Priority: High   • Central cord syndrome (formerly [facility]) 12/03/2018     Priority: High   • Anemia 12/11/2018     Priority: Medium   • Thrombocytopenia (formerly [facility]) 12/11/2018     Priority: Medium   • Dysphagia 12/04/2018     Priority: Medium   • Chronic renal failure 12/03/2018     Priority: Medium   • Scalp laceration 12/03/2018     Priority: Medium   • 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 (formerly [facility]) 12/03/2018     Priority: Medium   • Contraindication to deep vein thrombosis (DVT) prophylaxis 12/03/2018     Priority: Medium   • Essential hypertension 12/03/2018     Priority: Low   • Hyperlipemia 12/03/2018     Priority: Low   • Trauma 12/03/2018     Priority: Low   • Tachycardia 12/16/2018   • Vitamin D deficiency 12/11/2018   • Uncontrolled type 2 diabetes mellitus with hyperglycemia (formerly [facility]) 11/12/2018   • Mass of pancreas 06/03/2016     A & O    Current Vital Signs:   Temperature: 37.4 °C (99.4 °F) Pulse: 84 Respiration: 16 Blood Pressure : 140/52  Weight: 76.2 kg (167 lb 15.9 oz) Height: 177.8 cm (5' 10\")  Pulse Oximetry: 94 % O2 (LPM): 0      Completed Laboratory Reports:  Recent Labs     03/01/20  1324 03/01/20  1821 03/01/20 2055 03/02/20  0417 03/02/20  1230 03/02/20  1838 03/03/20  0355 03/04/20  0355   WBC  --   --   --   --   --   --  4.8 4.7*   HEMOGLOBIN  --   --   --   --   --   --  7.6* 7.4*   HEMATOCRIT  --   --   --   --   --   --  23.0* 22.4*   PLATELETCT  --   --   --   --   --   --  96* 101*   SODIUM  --   --   --  136  --   --  133* 134*   POTASSIUM  --   --   --  4.4  --   --  4.4 4.2   BUN  --   --   --  44*  --   " --  43* 45*   CREATININE  --   --   --  2.77*  --   --  2.50* 2.64*   ALBUMIN  --   --   --   --   --   --   --  3.1*   GLUCOSE  --   --   --  151*  --   --  144* 104*   POCGLUCOSE 263* 234* 223*  --  188* 207*  --   --      Additional Labs: Not Applicable    Prior Living Situation:   Housing / Facility: 2 Story Apartment / Condo([facility])  Lives with - Patient's Self Care Capacity: Alone and Able to Care For Self  Equipment Owned: 4-Wheel Walker    Prior Level of Function / Living Situation:   Physical Therapy: Prior Services: Intermittent Physical Support for ADL Per Family, Meals on Wheels  Housing / Facility: 2 Story Apartment / Condo([facility])  Elevator: Yes  Bathroom Set up: Bathtub / Shower Combination  Equipment Owned: 4-Wheel Walker  Lives with - Patient's Self Care Capacity: Alone and Able to Care For Self  Bed Mobility: Independent  Transfer Status: Independent  Ambulation: Independent  Distance Ambulation (Feet): (limited community)  Assistive Devices Used: 4-Wheel Walker  Stairs: Other (Comments)(avoids)  Current Level of Function:   Level Of Assist: Minimal Assist  Assistive Device: Front Wheel Walker  Distance (Feet): 75  Deviation: (decreased evan, step length, forward head/head hanging)  # of Stairs Climbed: 0  Weight Bearing Status: no restrictions  Supine to Sit: Minimal Assist  Sit to Supine: (up to chair)  Scooting: Supervised  Rolling: Minimal Assist to Rt.  Skilled Intervention: Verbal Cuing, Tactile Cuing, Sequencing, Postural Facilitation, Compensatory Strategies  Sit to Stand: Moderate Assist  Bed, Chair, Wheelchair Transfer: Minimal Assist  Toilet Transfers: (stood at toilet)  Transfer Method: Stand Pivot  Skilled Intervention: Verbal Cuing, Tactile Cuing, Sequencing, Compensatory Strategies, Facilitation  Comments: pt required increased assist to stand from chair than from bed height  Sitting in Chair: 10+min  Sitting Edge of Bed: 5min  Standing: 10min  Occupational Therapy:   Self Feeding:  Independent  Grooming / Hygiene: Independent  Bathing: Independent  Dressing: Independent  Toileting: Independent  Medication Management: Independent  Laundry: Dependent  Kitchen Mobility: Independent  Finances: Independent  Home Management: Requires Assist  Shopping: Requires Assist  Prior Level Of Mobility: Independent With Device in Community, Independent Without Device in Home  Driving / Transportation: Relatives / Others Provide Transportation(sister)  Prior Services: Intermittent Physical Support for ADL Per Family, Meals on Wheels  Housing / Facility: 2 Story Apartment / Condo([facility])  Occupation (Pre-Hospital Vocational): Retired Due To Age  Current Level of Function:   Eating: Not Tested  Bathing: Not Tested  Upper Body Dressing: Minimal Assist  Lower Body Dressing: Maximal Assist(uses AE at baseline)  Toileting: (politely declined)  Skilled Intervention: Verbal Cuing, Tactile Cuing, Sequencing, Compensatory Strategies  Speech Language Pathology:      Rehabilitation Prognosis/Potential: Good  Estimated Length of Stay: 14 days    Nursing:   Orientation : Oriented x 4  Incontinent    Scope/Intensity of Services Recommended:  Physical Therapy: 1.5 hr / day  5 days / week. Therapeutic Interventions Required: Maximize Endurance, Mobility, Strength and Safety  Occupational Therapy: 1.5 hr / day 5 days / week. Therapeutic Interventions Required: Maximize Self Care, ADLs, IADLs and Energy Conservation  Rehabilitation Nursin/7. Therapeutic Interventions Required: Monitor Pain, Skin, Wound(s), Vital Signs, Intake and Output, Labs and Safety  Rehabilitation Physician: 3 - 5 days / week. Therapeutic Interventions Required: Medical Management    Rehabilitation Goals and Plan (Expected frequency & duration of treatment in the IRF):   Return to the Community, Modified Independent Level of Care and Outpatient Support  Anticipated Date of Rehabilitation Admission: 20  Patient/Family oriented IRF level of  care/facility/plan: Yes  Patient/Family willing to participate in IRF care/facility/plan: Yes  Patient able to tolerate IRF level of care proposed: Yes  Patient has potential to benefit IRF level of care proposed: Yes  Comments: Not Applicable    Special Needs or Precautions - Medical Necessity:  Safety Concerns/Precautions:  Fall Risk / High Risk for Falls, Balance and Bed / Chair Alarm  Complex Wound Care: Surgical  Fall Risk;Cervical Collar  ;Spinal / Back Precautions   Aspen collar on AAT  Pain Management  IV Site: Peripheral  Current Medications:    Current Facility-Administered Medications Ordered in Epic   Medication Dose Route Frequency Provider Last Rate Last Dose   • omeprazole (PRILOSEC) capsule 20 mg  20 mg Oral DAILY Sweetie Rodriguez M.D.   20 mg at 03/04/20 0446   • Pharmacy Consult: HIT Monitoring   Other PRN Carmen West, A.P.N.       • insulin glargine (LANTUS) injection 18 Units  18 Units Subcutaneous Q EVENING Sweetie Rodriguez M.D.   Stopped at 03/03/20 1830   • magnesium hydroxide (MILK OF MAGNESIA) suspension 30 mL  30 mL Oral DAILY MAY Zarate.A.-C.   30 mL at 03/04/20 0447   • polyethylene glycol/lytes (MIRALAX) PACKET 1 Packet  1 Packet Oral DAILY MAY Zarate.A.-C.   1 Packet at 03/04/20 0447   • insulin lispro (HUMALOG) injection 1-6 Units  1-6 Units Subcutaneous 4X/DAY LALITA Moore M.D.   1 Units at 03/04/20 0649    And   • glucose 4 g chewable tablet 16 g  16 g Oral Q15 MIN PRN Adrian Moore M.D.        And   • DEXTROSE 10% BOLUS 250 mL  250 mL Intravenous Q15 MIN PRN Adrian Moore M.D.       • ferric gluconate complex (FERRLECIT) 250 mg in  mL IVPB  250 mg Intravenous Q24HRS Adrian Moore M.D. 100 mL/hr at 03/04/20 0641 250 mg at 03/04/20 0641   • sodium bicarbonate tablet 650 mg  650 mg Oral TID MAY Zarate.A.-C.   650 mg at 03/04/20 0445   • simvastatin (ZOCOR) tablet 20 mg  20 mg Oral Nightly Ethan Sarmiento P.A.-C.   20 mg at 03/03/20 2114   • metoprolol  (LOPRESSOR) tablet 12.5 mg  12.5 mg Oral BID STEVEN Zarate.-C.   12.5 mg at 03/04/20 0445   • Pharmacy Consult Request ...Pain Management Review 1 Each  1 Each Other PHARMACY TO DOSE MAY Zarate.YVONNE.MYRA.       • MD ALERT...DO NOT ADMINISTER NSAIDS or ASPIRIN unless ORDERED By Neurosurgery 1 Each  1 Each Other PRN STEVEN Zarate.HillaryC.       • docusate sodium (COLACE) capsule 100 mg  100 mg Oral BID MAY Zarate.A.-C.   100 mg at 03/04/20 0446   • senna-docusate (PERICOLACE or SENOKOT S) 8.6-50 MG per tablet 1 Tab  1 Tab Oral Nightly MAY Zarate.A.-C.   1 Tab at 03/03/20 2114   • senna-docusate (PERICOLACE or SENOKOT S) 8.6-50 MG per tablet 1 Tab  1 Tab Oral Q24HRS PRN MAY Zarate.A.-C.       • bisacodyl (DULCOLAX) suppository 10 mg  10 mg Rectal Q24HRS PRN MAY Zarate.A.-C.   10 mg at 03/02/20 1605   • pioglitazone (ACTOS) tablet 30 mg  30 mg Oral DAILY MAY Zarate.A.-C.   30 mg at 03/04/20 0445   • oxyCODONE immediate-release (ROXICODONE) tablet 5 mg  5 mg Oral Q3HRS PRN MAY Zarate.A.-C.   5 mg at 03/04/20 0443   • HYDROcodone-acetaminophen (NORCO) 5-325 MG per tablet 1-2 Tab  1-2 Tab Oral Q6HRS PRN MAY Zarate.A.-C.   1 Tab at 03/03/20 1845   • diphenhydrAMINE (BENADRYL) tablet/capsule 25 mg  25 mg Oral Q6HRS PRN MAY Zarate.A.-C.        Or   • diphenhydrAMINE (BENADRYL) injection 25 mg  25 mg Intravenous Q6HRS PRN STEVEN Zarate.HillaryC.       • ondansetron (ZOFRAN) syringe/vial injection 4 mg  4 mg Intravenous Q4HRS PRN MISSY ZarateCRamsey   4 mg at 03/02/20 1341   • ondansetron (ZOFRAN ODT) dispertab 4 mg  4 mg Oral Q4HRS PRN STEVEN Zarate.HillaryC.       • methocarbamol (ROBAXIN) tablet 750 mg  750 mg Oral Q8HRS PRN MAY Zarate.A.-C.   750 mg at 03/02/20 2227   • cloNIDine (CATAPRES) tablet 0.1 mg  0.1 mg Oral Q4HRS PRN STEVEN Zarate.-C.       • benzocaine-menthol (CEPACOL) lozenge 1 Lozenge  1 Lozenge Mouth/Throat Q2HRS PRN STEVEN Zarate.HillaryCRamsey        • calcium carbonate (TUMS) chewable tab 500 mg  500 mg Oral BID Ethan Sarmiento P.A.-C.   500 mg at 03/04/20 0445   • vitamin D (cholecalciferol) tablet 5,000 Units  5,000 Units Oral DAILY Ethan Sarmiento P.A.-C.   5,000 Units at 03/04/20 0443     No current [facility]-ordered outpatient medications on file.     Diet:   DIET ORDERS (From admission to next 24h)     Start     Ordered    02/28/20 1247  Diet Order Renal (NO FISH), Diabetic  ALL MEALS     Question Answer Comment   Diet: Renal NO FISH   Diet: Diabetic        02/28/20 1248                Anticipated Discharge Destination / Patient/Family Goal:  Destination: Home Alone Support System: None  Anticipated home health services: OT and PT  Previously used HH service/ provider: Not Applicable  Anticipated DME Needs: [facility] [employer]  Outpatient Services: OT and PT  Alternative resources to address additional identified needs:     Pre-Screen Completed: 3/4/2020 7:43 AM Ray Wilkins L.P.N.

## 2020-03-04 NOTE — PROGRESS NOTES
Hospital Medicine Daily Progress Note    Date of Service  3/4/2020    Chief Complaint  79 y.o. male admitted 2/28/2020 with neck pain.     Hospital Course      Patient with hx of DM , Hypertension, CKD 4 admitted following a cervical neck injury. Findings of severe cervical spondylosis, myelopathy and foraminal stenosis.  Patient underwent cervical laminectomy,  decompression and fusion on 2-28.  Hospitalist service consulted for Diabetes, IM issues management.       Interval Problem Update  BP within normal limits, and saturating well on room air this morning.  Hemoglobin 7.4, no clinical evidence for bleeding.    Platelets stable around 100.      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  Creatinine stable     Discussed with nursing, check bladder scans to rule out obstruction.   Blood sugars elevated, I am increasing glargine dose to 20   Discussed with patient, neuro PA, patient's nurse and with multidisciplinary team during rounds including , pharmacist and charge nurse.        Consultants/Specialty  Hospitalist for NS     Code Status  Full code      Disposition  Needs SNF Vs Rehab     Review of Systems  Review of Systems   Constitutional: Negative for chills, diaphoresis, fever and malaise/fatigue.   HENT: Negative for congestion.    Respiratory: Negative for cough, shortness of breath and stridor.    Cardiovascular: Negative for chest pain and palpitations.   Gastrointestinal: Negative for abdominal pain, diarrhea and vomiting.   Genitourinary: Negative for flank pain and hematuria.   Musculoskeletal: Positive for myalgias and neck pain. Negative for back pain and joint pain.   Skin: Positive for rash (Chronic erythema bilateral lower extremities).   Neurological: Positive for tingling and weakness (generalized ). Negative for tremors, sensory change and speech change.      Physical Exam  Temp:  [36.7 °C (98.1 °F)-37.7 °C (99.8 °F)] 37.6 °C (99.7 °F)  Pulse:  [82-90]  90  Resp:  [16] 16  BP: (107-140)/(48-66) 116/48  SpO2:  [91 %-98 %] 91 %    Physical Exam  Constitutional:       General: He is not in acute distress.     Appearance: He is not diaphoretic.   HENT:      Head: Normocephalic and atraumatic.      Right Ear: External ear normal.      Left Ear: External ear normal.      Nose: Nose normal.   Eyes:      Extraocular Movements: Extraocular movements intact.      Conjunctiva/sclera: Conjunctivae normal.   Neck:      Musculoskeletal: Neck rigidity present.      Comments: Cervical collar in place.   Hemovac, Chapis in place .  Cardiovascular:      Rate and Rhythm: Normal rate and regular rhythm.      Heart sounds: No murmur.   Pulmonary:      Breath sounds: No stridor. No wheezing, rhonchi or rales.   Abdominal:      General: There is no distension.      Palpations: Abdomen is soft.      Tenderness: There is no abdominal tenderness.   Musculoskeletal:         General: No swelling.      Comments: Bilateral lower extremity erythema, chronic   Skin:     General: Skin is dry.   Neurological:      Mental Status: He is alert.   Psychiatric:         Mood and Affect: Mood normal.       Fluids    Intake/Output Summary (Last 24 hours) at 3/4/2020 1519  Last data filed at 3/3/2020 1953  Gross per 24 hour   Intake --   Output 375 ml   Net -375 ml       Laboratory  Recent Labs     03/03/20  0355 03/04/20  0355   WBC 4.8 4.7*   RBC 2.34* 2.27*   HEMOGLOBIN 7.6* 7.4*   HEMATOCRIT 23.0* 22.4*   MCV 98.3* 98.7*   MCH 32.5 32.6   MCHC 33.0* 33.0*   RDW 62.0* 63.9*   PLATELETCT 96* 101*   MPV 9.7 9.7     Recent Labs     03/02/20  0417 03/03/20  0355 03/04/20  0355   SODIUM 136 133* 134*   POTASSIUM 4.4 4.4 4.2   CHLORIDE 106 105 105   CO2 23 21 21   GLUCOSE 151* 144* 104*   BUN 44* 43* 45*   CREATININE 2.77* 2.50* 2.64*   CALCIUM 8.5 8.4* 8.2*                   Imaging  US-EXTREMITY VENOUS UPPER UNILAT RIGHT   Final Result      US-RENAL   Final Result      Bilateral mild hydronephrosis.       Atrophic left kidney.      DX-PORTABLE FLUORO > 1 HOUR   Final Result      Portable fluoroscopy utilized for 6 seconds.         INTERPRETING LOCATION: [address] [place] [address], [place], [address]      DX-CERVICAL SPINE-2 OR 3 VIEWS   Final Result      Intraoperative image as above described.      DX-CERVICAL SPINE-2 OR 3 VIEWS    (Results Pending)     Assessment/Plan  * Type 2 diabetes mellitus (HCC)- (present on admission)  Assessment & Plan  With hyperglycemia  Glycated hemoglobin 5.5%   Long & short acting insulin, glargine last increased 3/4   Accu-Checks, hypoglycemia protocol       Acute on chronic renal failure (HCC)  Assessment & Plan  CKD 4,  Ultrasound with findings of bilateral hydronephrosis, mild  Check bladder US to eval for urinary retention signs of outlet obstruction.  May need anders if significant retention.    Avoid nephrotoxic medications      Central cord syndrome (HCC)- (present on admission)  Assessment & Plan  With myelopathy - Status post cervical surgery-  laminectomy,  decompression and fusion on 2-28  Stabilize with neck brace , monitor hemovac output, wound care, NSG input  Multimodal pain control  He lives alone in an upstairs unit. PT/OT, needs SNF.     Thrombocytopenia (HCC)- (present on admission)  Assessment & Plan  Thrombocytopenia at this point is mild, chronic .   Monitor platelets.      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 Will start epo      Vitamin D deficiency- (present on admission)  Assessment & Plan  Adequate levels     Hyperlipemia- (present on admission)  Assessment & Plan  Low-fat low-cholesterol diet  Resume home simvastatin     Essential hypertension- (present on admission)  Assessment & Plan  Controlled with current regimen metoprolol, and clonidine as needed.  Vital signs per policy.       VTE prophylaxis: SCDs, sever anemia

## 2020-03-04 NOTE — CARE PLAN
Problem: Pain Management  Goal: Pain level will decrease to patient's comfort goal  Outcome: PROGRESSING AS EXPECTED     Problem: Urinary Elimination:  Goal: Ability to reestablish a normal urinary elimination pattern will improve  Outcome: PROGRESSING AS EXPECTED     Problem: Communication  Goal: The ability to communicate needs accurately and effectively will improve  Outcome: PROGRESSING AS EXPECTED     Problem: Safety  Goal: Will remain free from injury  Outcome: PROGRESSING AS EXPECTED  Goal: Will remain free from falls  Outcome: PROGRESSING AS EXPECTED     Problem: Infection  Goal: Will remain free from infection  Outcome: PROGRESSING AS EXPECTED     Problem: Venous Thromboembolism (VTW)/Deep Vein Thrombosis (DVT) Prevention:  Goal: Patient will participate in Venous Thrombosis (VTE)/Deep Vein Thrombosis (DVT)Prevention Measures  Outcome: PROGRESSING AS EXPECTED     Problem: Bowel/Gastric:  Goal: Normal bowel function is maintained or improved  Outcome: PROGRESSING AS EXPECTED  Goal: Will not experience complications related to bowel motility  Outcome: PROGRESSING AS EXPECTED     Problem: Knowledge Deficit  Goal: Knowledge of disease process/condition, treatment plan, diagnostic tests, and medications will improve  Outcome: PROGRESSING AS EXPECTED  Goal: Knowledge of the prescribed therapeutic regimen will improve  Outcome: PROGRESSING AS EXPECTED     Problem: Discharge Barriers/Planning  Goal: Patient's continuum of care needs will be met  Outcome: PROGRESSING AS EXPECTED     Problem: Fluid Volume:  Goal: Will maintain balanced intake and output  Outcome: PROGRESSING AS EXPECTED     Problem: Respiratory:  Goal: Respiratory status will improve  Outcome: PROGRESSING AS EXPECTED     Problem: Skin Integrity  Goal: Risk for impaired skin integrity will decrease  Outcome: PROGRESSING AS EXPECTED     Problem: Mobility  Goal: Risk for activity intolerance will decrease  Outcome: PROGRESSING AS EXPECTED

## 2020-03-04 NOTE — DISCHARGE PLANNING
[facility], [phone].  She has received our documentation requesting [facility].  Vandana COLINDRES) is aware.

## 2020-03-04 NOTE — THERAPY
"Occupational Therapy Treatment completed with focus on ADLs, ADL transfers, patient education and upper extremity function.  Functional Status:  ModA supine>sit, Sarah sit>stand from bed height, ModA sit>stand from bedside chair, Sarah standing grooming,  Plan of Care: Will benefit from Occupational Therapy 4 times per week  Discharge Recommendations:  Equipment Will Continue to Assess for Equipment Needs. Post-acute therapy Recommend post-acute placement for additional occupational therapy services prior to discharge home.    See \"Rehab Therapy-Acute\" Patient Summary Report for complete documentation.     Pt seen for OT tx, agreeable to OOB functional activities. Pt requires physical assist for all ADLs and ADL transfers at this time, increased assist required for sit>stand from chair (modA) than bed height (Sarah), pt reports an elevated toilet seat at his home, he is used to standing from higher surfaces. Pt was unable to lift his arms enough against gravity and sustain in order to brush his teeth without physical support at his elbow to lift hand to mouth. Pt is currently below his baseline of functional independent and would be unsafe to DC home at this time, recommend post-acute placement for continued therapy, pt agrees.   "

## 2020-03-04 NOTE — PROGRESS NOTES
The patient is ready to be discharged.  DC summary dictated and written prescriptions in his paper chart. Cleared for discharge by Dr. Street.

## 2020-03-04 NOTE — DISCHARGE PLANNING
Spoke with Tiffanie, Sister regarding [facility].  They are agreeable.  I did inform them that [facility], that I am hopeful for a response on Monday. They are both alright with waiting.  [facility] remains monitoring.

## 2020-03-04 NOTE — PROGRESS NOTES
RN MOBILITY NOTE     Surgery patient?: y  Date of surgery: 2/28  Ambulated 50 ft on day of surgery? (N/A if today is not date of surgery): na   Number of times ambulated 50 feet or greater today: 3  Patient has been up to chair, edge of bed or HOB 90 degrees for all meals?: y  Goal met? (goal is ambulating at least 50 feet 2 times on day shift, one time on night shift): y  If patient did not meet mobility goal, why?: na

## 2020-03-05 LAB
ABO + RH BLD: NORMAL
ABO GROUP BLD: NORMAL
ALBUMIN SERPL BCP-MCNC: 3.3 G/DL (ref 3.2–4.9)
ALBUMIN/GLOB SERPL: 1.7 G/DL
ALP SERPL-CCNC: 85 U/L (ref 30–99)
ALT SERPL-CCNC: 14 U/L (ref 2–50)
ANION GAP SERPL CALC-SCNC: 8 MMOL/L (ref 0–11.9)
AST SERPL-CCNC: 28 U/L (ref 12–45)
BARCODED ABORH UBTYP: 9500
BARCODED PRD CODE UBPRD: NORMAL
BARCODED UNIT NUM UBUNT: NORMAL
BASOPHILS # BLD AUTO: 0.2 % (ref 0–1.8)
BASOPHILS # BLD: 0.01 K/UL (ref 0–0.12)
BILIRUB SERPL-MCNC: 0.6 MG/DL (ref 0.1–1.5)
BLD GP AB SCN SERPL QL: NORMAL
BUN SERPL-MCNC: 48 MG/DL (ref 8–22)
CALCIUM SERPL-MCNC: 8.1 MG/DL (ref 8.5–10.5)
CHLORIDE SERPL-SCNC: 103 MMOL/L (ref 96–112)
CO2 SERPL-SCNC: 23 MMOL/L (ref 20–33)
COMPONENT R 8504R: NORMAL
CREAT SERPL-MCNC: 2.82 MG/DL (ref 0.5–1.4)
EOSINOPHIL # BLD AUTO: 0.04 K/UL (ref 0–0.51)
EOSINOPHIL NFR BLD: 1 % (ref 0–6.9)
ERYTHROCYTE [DISTWIDTH] IN BLOOD BY AUTOMATED COUNT: 63.4 FL (ref 35.9–50)
GLOBULIN SER CALC-MCNC: 2 G/DL (ref 1.9–3.5)
GLUCOSE BLD-MCNC: 150 MG/DL (ref 65–99)
GLUCOSE BLD-MCNC: 157 MG/DL (ref 65–99)
GLUCOSE BLD-MCNC: 237 MG/DL (ref 65–99)
GLUCOSE BLD-MCNC: 62 MG/DL (ref 65–99)
GLUCOSE SERPL-MCNC: 70 MG/DL (ref 65–99)
HCT VFR BLD AUTO: 21.9 % (ref 42–52)
HGB BLD-MCNC: 7.2 G/DL (ref 14–18)
IMM GRANULOCYTES # BLD AUTO: 0.03 K/UL (ref 0–0.11)
IMM GRANULOCYTES NFR BLD AUTO: 0.7 % (ref 0–0.9)
LYMPHOCYTES # BLD AUTO: 0.49 K/UL (ref 1–4.8)
LYMPHOCYTES NFR BLD: 12.2 % (ref 22–41)
MAGNESIUM SERPL-MCNC: 2.2 MG/DL (ref 1.5–2.5)
MCH RBC QN AUTO: 32.3 PG (ref 27–33)
MCHC RBC AUTO-ENTMCNC: 32.9 G/DL (ref 33.7–35.3)
MCV RBC AUTO: 98.2 FL (ref 81.4–97.8)
MONOCYTES # BLD AUTO: 0.64 K/UL (ref 0–0.85)
MONOCYTES NFR BLD AUTO: 16 % (ref 0–13.4)
NEUTROPHILS # BLD AUTO: 2.8 K/UL (ref 1.82–7.42)
NEUTROPHILS NFR BLD: 69.9 % (ref 44–72)
NRBC # BLD AUTO: 0 K/UL
NRBC BLD-RTO: 0 /100 WBC
PLATELET # BLD AUTO: 106 K/UL (ref 164–446)
PMV BLD AUTO: 9.6 FL (ref 9–12.9)
POTASSIUM SERPL-SCNC: 3.9 MMOL/L (ref 3.6–5.5)
PRODUCT TYPE UPROD: NORMAL
PROT SERPL-MCNC: 5.3 G/DL (ref 6–8.2)
RBC # BLD AUTO: 2.23 M/UL (ref 4.7–6.1)
RH BLD: NORMAL
SODIUM SERPL-SCNC: 134 MMOL/L (ref 135–145)
UNIT STATUS USTAT: NORMAL
WBC # BLD AUTO: 4 K/UL (ref 4.8–10.8)

## 2020-03-05 PROCEDURE — 99232 SBSQ HOSP IP/OBS MODERATE 35: CPT | Performed by: HOSPITALIST

## 2020-03-05 PROCEDURE — 86923 COMPATIBILITY TEST ELECTRIC: CPT

## 2020-03-05 PROCEDURE — 770001 HCHG ROOM/CARE - MED/SURG/GYN PRIV*

## 2020-03-05 PROCEDURE — 97535 SELF CARE MNGMENT TRAINING: CPT | Mod: CO

## 2020-03-05 PROCEDURE — 36430 TRANSFUSION BLD/BLD COMPNT: CPT

## 2020-03-05 PROCEDURE — 700102 HCHG RX REV CODE 250 W/ 637 OVERRIDE(OP): Performed by: PHYSICIAN ASSISTANT

## 2020-03-05 PROCEDURE — 85025 COMPLETE CBC W/AUTO DIFF WBC: CPT

## 2020-03-05 PROCEDURE — A9270 NON-COVERED ITEM OR SERVICE: HCPCS | Performed by: PHYSICIAN ASSISTANT

## 2020-03-05 PROCEDURE — 700101 HCHG RX REV CODE 250: Performed by: NURSE PRACTITIONER

## 2020-03-05 PROCEDURE — 82962 GLUCOSE BLOOD TEST: CPT | Mod: 91

## 2020-03-05 PROCEDURE — 700112 HCHG RX REV CODE 229: Performed by: PHYSICIAN ASSISTANT

## 2020-03-05 PROCEDURE — 700102 HCHG RX REV CODE 250 W/ 637 OVERRIDE(OP): Performed by: HOSPITALIST

## 2020-03-05 PROCEDURE — 30233N1 TRANSFUSION OF NONAUTOLOGOUS RED BLOOD CELLS INTO PERIPHERAL VEIN, PERCUTANEOUS APPROACH: ICD-10-PCS | Performed by: HOSPITALIST

## 2020-03-05 PROCEDURE — 700111 HCHG RX REV CODE 636 W/ 250 OVERRIDE (IP): Performed by: HOSPITALIST

## 2020-03-05 PROCEDURE — 700111 HCHG RX REV CODE 636 W/ 250 OVERRIDE (IP): Performed by: NURSE PRACTITIONER

## 2020-03-05 PROCEDURE — 97530 THERAPEUTIC ACTIVITIES: CPT | Mod: CQ

## 2020-03-05 PROCEDURE — 86900 BLOOD TYPING SEROLOGIC ABO: CPT

## 2020-03-05 PROCEDURE — 80053 COMPREHEN METABOLIC PANEL: CPT

## 2020-03-05 PROCEDURE — 36415 COLL VENOUS BLD VENIPUNCTURE: CPT

## 2020-03-05 PROCEDURE — 86850 RBC ANTIBODY SCREEN: CPT

## 2020-03-05 PROCEDURE — A9270 NON-COVERED ITEM OR SERVICE: HCPCS | Performed by: HOSPITALIST

## 2020-03-05 PROCEDURE — P9016 RBC LEUKOCYTES REDUCED: HCPCS

## 2020-03-05 PROCEDURE — 83735 ASSAY OF MAGNESIUM: CPT

## 2020-03-05 PROCEDURE — 700105 HCHG RX REV CODE 258: Performed by: HOSPITALIST

## 2020-03-05 PROCEDURE — 86901 BLOOD TYPING SEROLOGIC RH(D): CPT

## 2020-03-05 PROCEDURE — 97116 GAIT TRAINING THERAPY: CPT | Mod: CQ

## 2020-03-05 RX ORDER — INSULIN GLARGINE 100 [IU]/ML
6 INJECTION, SOLUTION SUBCUTANEOUS EVERY EVENING
Status: DISCONTINUED | OUTPATIENT
Start: 2020-03-05 | End: 2020-03-05

## 2020-03-05 RX ORDER — SODIUM CHLORIDE 9 MG/ML
INJECTION, SOLUTION INTRAVENOUS
Status: ACTIVE
Start: 2020-03-05 | End: 2020-03-05

## 2020-03-05 RX ORDER — SODIUM CHLORIDE 9 MG/ML
INJECTION, SOLUTION INTRAVENOUS
Status: ACTIVE
Start: 2020-03-05 | End: 2020-03-06

## 2020-03-05 RX ORDER — FOLIC ACID 1 MG/1
5 TABLET ORAL DAILY
Status: DISCONTINUED | OUTPATIENT
Start: 2020-03-05 | End: 2020-03-12 | Stop reason: HOSPADM

## 2020-03-05 RX ORDER — CHOLECALCIFEROL (VITAMIN D3) 125 MCG
1000 CAPSULE ORAL DAILY
Status: DISCONTINUED | OUTPATIENT
Start: 2020-03-05 | End: 2020-03-12 | Stop reason: HOSPADM

## 2020-03-05 RX ORDER — INSULIN GLARGINE 100 [IU]/ML
10 INJECTION, SOLUTION SUBCUTANEOUS EVERY EVENING
Status: DISCONTINUED | OUTPATIENT
Start: 2020-03-05 | End: 2020-03-05

## 2020-03-05 RX ORDER — INSULIN GLARGINE 100 [IU]/ML
16 INJECTION, SOLUTION SUBCUTANEOUS EVERY EVENING
Status: DISCONTINUED | OUTPATIENT
Start: 2020-03-05 | End: 2020-03-12 | Stop reason: HOSPADM

## 2020-03-05 RX ORDER — INSULIN GLARGINE 100 [IU]/ML
8 INJECTION, SOLUTION SUBCUTANEOUS EVERY EVENING
Status: DISCONTINUED | OUTPATIENT
Start: 2020-03-05 | End: 2020-03-05

## 2020-03-05 RX ADMIN — CYANOCOBALAMIN TAB 500 MCG 1000 MCG: 500 TAB at 07:39

## 2020-03-05 RX ADMIN — ANTACID TABLETS 500 MG: 500 TABLET, CHEWABLE ORAL at 06:45

## 2020-03-05 RX ADMIN — DOCUSATE SODIUM 100 MG: 100 CAPSULE, LIQUID FILLED ORAL at 06:45

## 2020-03-05 RX ADMIN — SODIUM BICARBONATE 650 MG: 650 TABLET ORAL at 16:17

## 2020-03-05 RX ADMIN — HEPARIN SODIUM 5000 UNITS: 5000 INJECTION, SOLUTION INTRAVENOUS; SUBCUTANEOUS at 16:17

## 2020-03-05 RX ADMIN — LIDOCAINE 1 PATCH: 50 PATCH TOPICAL at 11:14

## 2020-03-05 RX ADMIN — SODIUM CHLORIDE 250 MG: 9 INJECTION, SOLUTION INTRAVENOUS at 06:44

## 2020-03-05 RX ADMIN — DOCUSATE SODIUM 100 MG: 100 CAPSULE, LIQUID FILLED ORAL at 16:18

## 2020-03-05 RX ADMIN — OMEPRAZOLE 20 MG: 20 CAPSULE, DELAYED RELEASE ORAL at 06:45

## 2020-03-05 RX ADMIN — INSULIN LISPRO 2 UNITS: 100 INJECTION, SOLUTION INTRAVENOUS; SUBCUTANEOUS at 21:11

## 2020-03-05 RX ADMIN — MELATONIN 5000 UNITS: at 06:45

## 2020-03-05 RX ADMIN — FOLIC ACID 5 MG: 1 TABLET ORAL at 07:39

## 2020-03-05 RX ADMIN — ANTACID TABLETS 500 MG: 500 TABLET, CHEWABLE ORAL at 16:25

## 2020-03-05 RX ADMIN — INSULIN GLARGINE 16 UNITS: 100 INJECTION, SOLUTION SUBCUTANEOUS at 19:25

## 2020-03-05 RX ADMIN — HEPARIN SODIUM 5000 UNITS: 5000 INJECTION, SOLUTION INTRAVENOUS; SUBCUTANEOUS at 06:47

## 2020-03-05 RX ADMIN — SODIUM BICARBONATE 650 MG: 650 TABLET ORAL at 06:48

## 2020-03-05 RX ADMIN — SIMVASTATIN 20 MG: 20 TABLET, FILM COATED ORAL at 21:13

## 2020-03-05 RX ADMIN — METOPROLOL TARTRATE 12.5 MG: 25 TABLET, FILM COATED ORAL at 16:17

## 2020-03-05 RX ADMIN — PIOGLITAZONE 30 MG: 30 TABLET ORAL at 06:45

## 2020-03-05 RX ADMIN — SODIUM BICARBONATE 650 MG: 650 TABLET ORAL at 11:13

## 2020-03-05 ASSESSMENT — GAIT ASSESSMENTS
DISTANCE (FEET): 120
DEVIATION: SHUFFLED GAIT;DECREASED BASE OF SUPPORT
ASSISTIVE DEVICE: FRONT WHEEL WALKER
GAIT LEVEL OF ASSIST: MINIMAL ASSIST

## 2020-03-05 ASSESSMENT — ENCOUNTER SYMPTOMS
ABDOMINAL PAIN: 0
BACK PAIN: 0
MYALGIAS: 1
DIAPHORESIS: 0
TINGLING: 1
COUGH: 0
FLANK PAIN: 0
SENSORY CHANGE: 0
NECK PAIN: 1
FEVER: 0
VOMITING: 0
SPEECH CHANGE: 0
CHILLS: 0
SHORTNESS OF BREATH: 0
PALPITATIONS: 0
DIARRHEA: 0
WEAKNESS: 1
TREMORS: 0
STRIDOR: 0

## 2020-03-05 ASSESSMENT — COGNITIVE AND FUNCTIONAL STATUS - GENERAL
TOILETING: A LOT
SUGGESTED CMS G CODE MODIFIER DAILY ACTIVITY: CK
MOVING TO AND FROM BED TO CHAIR: A LITTLE
TURNING FROM BACK TO SIDE WHILE IN FLAT BAD: A LITTLE
HELP NEEDED FOR BATHING: A LOT
CLIMB 3 TO 5 STEPS WITH RAILING: A LOT
DRESSING REGULAR UPPER BODY CLOTHING: A LITTLE
MOVING FROM LYING ON BACK TO SITTING ON SIDE OF FLAT BED: A LOT
STANDING UP FROM CHAIR USING ARMS: A LITTLE
DAILY ACTIVITIY SCORE: 14
DRESSING REGULAR LOWER BODY CLOTHING: A LOT
SUGGESTED CMS G CODE MODIFIER MOBILITY: CK
PERSONAL GROOMING: A LOT
EATING MEALS: A LITTLE
WALKING IN HOSPITAL ROOM: A LITTLE
MOBILITY SCORE: 16

## 2020-03-05 NOTE — CARE PLAN
Problem: Communication  Goal: The ability to communicate needs accurately and effectively will improve  Outcome: PROGRESSING AS EXPECTED  Note:   Patient has been educated to use the call light to communicate needs. Hourly rounding is in place to allow patient to communicate needs.         Problem: Safety  Goal: Will remain free from injury  Outcome: PROGRESSING AS EXPECTED  Note:   Patient's bed is in the low, locked position w/call light within reach. Bedside table is within reach of patient. Patient has been educated to use call light to ask for assistance when necessary.

## 2020-03-05 NOTE — THERAPY
"Occupational Therapy Treatment completed with focus on ADLs, ADL transfers and patient education.  Functional Status:  Pt seen for OT tx. Up in chair pre and post session. Max A LB dressing. Min A sit > stand, min A amb w/ FWW in room to BR. Completed toilet transfer w/ mod A for balance and safety. Adjusted toilet riser for safety. Max A for pericare. Demonstrated standing tolerance for light grooming at sink w/ min A for balance and safety. Will continue to benefit from OT services while in-house to increase strength, endurance and independence in ADLs and ADL transfers.   Plan of Care: Will benefit from Occupational Therapy 4 times per week  Discharge Recommendations:  Equipment Will Continue to Assess for Equipment Needs. Post-acute therapy Recommend post-acute placement for additional occupational therapy services prior to discharge home.    See \"Rehab Therapy-Acute\" Patient Summary Report for complete documentation.   "

## 2020-03-05 NOTE — PROGRESS NOTES
Hospital Medicine Daily Progress Note    Date of Service  3/5/2020    Chief Complaint  79 y.o. male admitted 2/28/2020 with neck pain.     Hospital Course      Patient with hx of DM , Hypertension, CKD 4 admitted following a cervical neck injury. Findings of severe cervical spondylosis, myelopathy and foraminal stenosis.  Patient underwent cervical laminectomy,  decompression and fusion on 2-28.  Hospitalist service consulted for Diabetes, IM issues management.       Interval Problem Update  Blood sugars this morning 60s, patient did not have symptoms I will reduce his insulin.  Having shortness of breath and weakness with attempted ambulation, hemoglobin 7.2, he had 200 mL's of blood loss during his surgery.  Will transfuse 1 unit RBCs.  Platelets have been stable around 100.  Needs SNF, care coordination working on placement at [facility].  Discussed with patient, patient's nurse and with multidisciplinary team during rounds including , pharmacist and charge nurse.          Consultants/Specialty  Hospitalist for NS     Code Status  Full code        Disposition  Needs SNF Vs Rehab     Review of Systems  Review of Systems   Constitutional: Negative for chills, diaphoresis, fever and malaise/fatigue.   HENT: Negative for congestion.    Respiratory: Negative for cough, shortness of breath and stridor.    Cardiovascular: Negative for chest pain and palpitations.   Gastrointestinal: Negative for abdominal pain, diarrhea and vomiting.   Genitourinary: Negative for flank pain and hematuria.   Musculoskeletal: Positive for myalgias and neck pain. Negative for back pain and joint pain.   Skin: Positive for rash (Chronic erythema bilateral lower extremities).   Neurological: Positive for tingling and weakness (generalized ). Negative for tremors, sensory change and speech change.      Physical Exam  Temp:  [36.6 °C (97.9 °F)-36.9 °C (98.4 °F)] 36.7 °C (98.1 °F)  Pulse:  [72-83] 72  Resp:  [16-17] 16  BP:  (102-124)/(41-54) 123/54  SpO2:  [95 %-98 %] 98 %    Physical Exam  Constitutional:       General: He is not in acute distress.     Appearance: He is not diaphoretic.   HENT:      Head: Normocephalic and atraumatic.      Right Ear: External ear normal.      Left Ear: External ear normal.      Nose: Nose normal.   Eyes:      Extraocular Movements: Extraocular movements intact.      Conjunctiva/sclera: Conjunctivae normal.   Neck:      Musculoskeletal: Neck rigidity present.      Comments: Cervical collar in place.   Hemovac, Chapis in place .  Cardiovascular:      Rate and Rhythm: Normal rate and regular rhythm.      Heart sounds: No murmur.   Pulmonary:      Breath sounds: No stridor. No wheezing, rhonchi or rales.   Abdominal:      General: There is no distension.      Palpations: Abdomen is soft.      Tenderness: There is no abdominal tenderness.   Musculoskeletal:         General: No swelling.      Comments: Bilateral lower extremity erythema, chronic   Skin:     General: Skin is dry.   Neurological:      Mental Status: He is alert.   Psychiatric:         Mood and Affect: Mood normal.       Fluids  No intake or output data in the 24 hours ending 03/05/20 1439    Laboratory  Recent Labs     03/03/20  0355 03/04/20  0355 03/05/20  0602   WBC 4.8 4.7* 4.0*   RBC 2.34* 2.27* 2.23*   HEMOGLOBIN 7.6* 7.4* 7.2*   HEMATOCRIT 23.0* 22.4* 21.9*   MCV 98.3* 98.7* 98.2*   MCH 32.5 32.6 32.3   MCHC 33.0* 33.0* 32.9*   RDW 62.0* 63.9* 63.4*   PLATELETCT 96* 101* 106*   MPV 9.7 9.7 9.6     Recent Labs     03/03/20  0355 03/04/20  0355 03/05/20  0602   SODIUM 133* 134* 134*   POTASSIUM 4.4 4.2 3.9   CHLORIDE 105 105 103   CO2 21 21 23   GLUCOSE 144* 104* 70   BUN 43* 45* 48*   CREATININE 2.50* 2.64* 2.82*   CALCIUM 8.4* 8.2* 8.1*                   Imaging  DX-CERVICAL SPINE-2 OR 3 VIEWS   Final Result      1.  There has been interval posterior decompression with andrew and screw fixation extending from C3 through C6 levels.       US-EXTREMITY VENOUS UPPER UNILAT RIGHT   Final Result      US-RENAL   Final Result      Bilateral mild hydronephrosis.      Atrophic left kidney.      DX-PORTABLE FLUORO > 1 HOUR   Final Result      Portable fluoroscopy utilized for 6 seconds.         INTERPRETING LOCATION: [address] [place] [address] [place], [address]      DX-CERVICAL SPINE-2 OR 3 VIEWS   Final Result      Intraoperative image as above described.        Assessment/Plan  * Type 2 diabetes mellitus (HCC)- (present on admission)  Assessment & Plan  With hyperglycemia  Glycated hemoglobin 5.5%   Long & short acting insulin, glargine last increased 3/4   Accu-Checks, hypoglycemia protocol       Acute on chronic renal failure (HCC)  Assessment & Plan  CKD 4,  Ultrasound with findings of bilateral hydronephrosis, mild  Check bladder US to eval for urinary retention signs of outlet obstruction.  May need anders if significant retention.    Avoid nephrotoxic medications       Central cord syndrome (HCC)- (present on admission)  Assessment & Plan  With myelopathy - Status post cervical surgery-  laminectomy,  decompression and fusion on 2-28  Stabilize with neck brace , monitor hemovac output, wound care, NSG input  Multimodal pain control  He lives alone in an upstairs unit. PT/OT, needs SNF.      Thrombocytopenia (HCC)- (present on admission)  Assessment & Plan  Thrombocytopenia at this point is mild, chronic .   Monitor platelets.   Platelets have been stable around 100.     Anemia- (present on admission)  Assessment & Plan  Chronic, iron deficient  IV iron replacement   No clinical evidence for bleeding, history of colonic polyposis with colonoscopy few months ago with plan [facility]  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 Started epo        Vitamin D deficiency- (present on admission)  Assessment & Plan  Adequate levels     Hyperlipemia- (present on admission)  Assessment & Plan  Low-fat  low-cholesterol diet  Resume home simvastatin     Essential hypertension- (present on admission)  Assessment & Plan  Controlled with current regimen metoprolol, and clonidine as needed.  Vital signs per policy.       VTE prophylaxis: SCDs, sever anemia

## 2020-03-05 NOTE — DISCHARGE SUMMARY
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    COURSE OF HOSPITALIZATION:  This 79 year old patient presented with symptoms of myelopathy   and associated neck pain radiating into his arms with generalized weakness in   all extremities.  Surgery was recommended.  The patient was admitted   to the [facility] on 02/28/2020 to undergo a C3 through C6   laminectomy, foraminotomy with posterolateral mass instrumentation and   arthrodesis.  There were no intraoperative complications.    The patient did progress as anticipated from a neurosurgical point of view.    On examination, he has full range of motion of his left upper extremity with   biceps, triceps 4-4+/5,  and finger flexors are about 4-4+/5.  He has a   painful right shoulder girdle and trapezius muscle with visible ecchymosis and   swelling limiting his range of motion over the right shoulder.  Distally, he   has biceps strength of 4/5, wrist extensor 5/5, and  and finger extensors   about 4/5.  When tested in the seated position, he has bilateral hip flexor   weakness at antigravity strength 3/5.  Distal strength is fairly well   preserved with some breakaway weakness of the bilateral dorsiflexion.  He is   Ambulatory with a front wheel walker. His  incision is covered with a Prevena wound  Care system system in place. There is no drainage in the canister.     For his history of diabetes mellitus, chronic kidney disease, anemia, and   thrombocytopenia, a hospitalist consult was obtained by Dr. Akua Cervantes on   02/28/2020.  Please see the detailed initial consult as well as subsequent progress notes.    Throughout his hospital stay. The patient's diabetic regimen has been adjusted and latest blood sugars have been improved, generally between 120 and 160.  Originally, blood sugars ranged  around 270 and 290 on the patient's usual regimen of Lantus and NovoLog at night  time.  For  his acute on chronic kidney injury, he has been maintained on IV fluids.  He   is receiving iron infusions for his worsened anemia.  His admission hemoglobin   was 11.7 and hematocrit of 34.1.  Per latest results on 03/04/2020, his   hemoglobin is 7.4 and hematocrit of 22.4.    The patient's baseline platelet count ranges around 120 to 140.  His latest   platelet count is 101.  A HIT panel was negative and therefore, heparin 5000   units b.i.d. will be resumed today after a platelet count low of 96 on   03/03/2020.    On 03/03/2020, the patient complained of worsened right-sided trapezius and   shoulder pain.  His arm was also found to be swollen.  Vascular ultrasound was   negative for any deep venous thrombosis, but demonstrated soft tissue edema.    The arm edema was most likely due to IV fluid extravasation.  The swelling has   since resolved and the associated pain has much improved.  He does have   visible ecchymosis and swelling of the right trapezius as mentioned above.  We   will start the patient on a lidocaine patch over this area for pain relief.    DISCHARGE MEDICATIONS:  Will include:  1.  Hydrocodone 5 mg, #60.  2.  Robaxin 500 mg t.i.d. p.r.n.  3. Lidocaine 5% patch to right trapezius.     DISCHARGE INSTRUCTIONS:  Include:  1.  DVT prophylaxis 5000 mg b.i.d.  Continue as indicated. May switch to Lovenox SQ DVT  prohylaxis on POD # 8.  2.  Hold any aspirin cardiac prophylaxis for 1 week postop.  3.  No anti-inflammatories from a neurosurgical point of view as well as his   chronic kidney disease.  4.  Keep the Prevena wound care system in place for minimum of 7 days postop. Recommend removal around POD 9-10 if unit still functional, then may   remove.  Keep the incision covered with a dressing.  Call for any swelling,   erythema, or drainage.  5.  Follow up with Dr. Lg Sellers at [facility] at 2 weeks   postop.    IMPRESSION AND PLAN:  1.  Cervical stenosis with myelopathy,  status post uncomplicated C3 through C6   decompression and fusion.  Neuromotor status improved postoperatively.    Postoperative x-rays for monitoring hardware placement and alignment  pending   at the time of dictation.  2.  Right-sided shoulder pain with trapezius ecchymosis.  Shoulder examination   negative; start lidocaine patch today.  3.  Diabetes mellitus.  Postoperative acute exacerbation, improved on current   regimen.  Please see hospitalist's note.  4.  Acute-on-chronic kidney injury.  Creatinine is back close to baseline   today at 2.64.  Further treatment recommendation with IV fluids as per   hospitalist.  5.  Anemia.  Continue regimen as per hospitalist.  6.  Thrombocytopenia, stable at the time of discharge.  We prefer keeping the   platelet count greater than 100 for 2 weeks postoperatively.  Recommend   monitoring platelet count while on heparin.  HIT panel negative.  7.  Right upper extremity swelling.  Ultrasound negative.  8.  Discharge disposition: At this time, the patient is deemed safe for discharge as discussed with the hospitalist team today, Dr. Rodriguez.  Please call for any questions or   concerns.       ____________________________________     COREEN ALVAREZ / PALLAVI    DD:  03/04/2020 10:44:44  DT:  03/05/2020 01:17:07    D#:  [id number]  Job#:  [id number]    cc: ANGEL REYES MD

## 2020-03-05 NOTE — THERAPY
"Physical Therapy Treatment completed.   Bed Mobility:  Supine to Sit: (up in chair )  Transfers: Sit to Stand: Minimal Assist  Gait: Level Of Assist: Minimal Assist with Front-Wheel Walker       Plan of Care: Will benefit from Physical Therapy 3 times per week  Discharge Recommendations: Equipment: Will Continue to Assess for Equipment Needs. Post-acute therapy Recommend post-acute placement for continued physical therapy services prior to discharge home.  See \"Rehab Therapy-Acute\" Patient Summary Report for complete documentation.     Pt was pleasant and agreeable to therapy session. He did report more pain in cervical spine today and adjusted c collar. He required Sarah for stability with fww. As well vc to keep fww clsoe during transfers for safety. He increased gait distance but required Sarah for balance. He presents with forward flexed posture, slow evan and shuffling steps. Vc to improve gait. Pt reporting forward flexion is his baseline. He did require standing rest break due to pain today. Pt preferred staying up in chair but able to verbally cite log rolling technique. Continue to recommend post acute placement at [place]. Will follow while in house.   "

## 2020-03-05 NOTE — PROGRESS NOTES
Neurosurgery Progress Note    Subjective:  Feeling better today, right trapezius pain and neck pain controlled    Exam:  Incision with Prevena: no drainage  Eccymosis/swelling right trapezius: slightly improved  Good ROM and strength LUE, distal 4+/5  Improved ROM RUE with 4/5 distal weakness    BP  Min: 102/41  Max: 124/48  Pulse  Av.8  Min: 72  Max: 83  Resp  Av.3  Min: 16  Max: 17  Temp  Av.8 °C (98.2 °F)  Min: 36.6 °C (97.9 °F)  Max: 36.9 °C (98.4 °F)  SpO2  Av.5 %  Min: 95 %  Max: 98 %    No data recorded    Recent Labs     20  03520  03520  0602   WBC 4.8 4.7* 4.0*   RBC 2.34* 2.27* 2.23*   HEMOGLOBIN 7.6* 7.4* 7.2*   HEMATOCRIT 23.0* 22.4* 21.9*   MCV 98.3* 98.7* 98.2*   MCH 32.5 32.6 32.3   MCHC 33.0* 33.0* 32.9*   RDW 62.0* 63.9* 63.4*   PLATELETCT 96* 101* 106*   MPV 9.7 9.7 9.6     Recent Labs     20  03520  03520  0602   SODIUM 133* 134* 134*   POTASSIUM 4.4 4.2 3.9   CHLORIDE 105 105 103   CO2 21 21 23   GLUCOSE 144* 104* 70   BUN 43* 45* 48*   CREATININE 2.50* 2.64* 2.82*   CALCIUM 8.4* 8.2* 8.1*               Intake/Output       20 - 20 0659 20 - 20 0659       Total  Total       Intake    Total Intake -- -- -- -- -- --       Output    Urine  --  -- --  --  -- --    Number of Times Voided 1 x -- 1 x -- -- --    Stool  --  -- --  --  -- --    Number of Times Stooled 2 x -- 2 x -- -- --    Total Output -- -- -- -- -- --       Net I/O     -- -- -- -- -- --          No intake or output data in the 24 hours ending 20 1045         • cyanocobalamin  1,000 mcg DAILY   • folic acid  5 mg DAILY   • NS      • insulin glargine  16 Units Q EVENING   • lidocaine  1 Patch Q24HR   • heparin  5,000 Units Q12HRS   • epoetin prema  2,000 Units MO, WE + FR   • omeprazole  20 mg DAILY   • Pharmacy Consult:   PRN   • magnesium hydroxide  30 mL DAILY   • polyethylene glycol/lytes  1  Packet DAILY   • insulin lispro  1-6 Units 4X/DAY ACHS    And   • glucose  16 g Q15 MIN PRN    And   • dextrose 10% bolus  250 mL Q15 MIN PRN   • ferric gluconate (FERRLECIT) IV  250 mg Q24HRS   • sodium bicarbonate  650 mg TID   • simvastatin  20 mg Nightly   • metoprolol  12.5 mg BID   • Pharmacy Consult Request  1 Each PHARMACY TO DOSE   • MD ALERT...DO NOT ADMINISTER NSAIDS or ASPIRIN unless ORDERED By Neurosurgery  1 Each PRN   • docusate sodium  100 mg BID   • senna-docusate  1 Tab Nightly   • senna-docusate  1 Tab Q24HRS PRN   • bisacodyl  10 mg Q24HRS PRN   • pioglitazone  30 mg DAILY   • oxyCODONE immediate-release  5 mg Q3HRS PRN   • HYDROcodone-acetaminophen  1-2 Tab Q6HRS PRN   • diphenhydrAMINE  25 mg Q6HRS PRN    Or   • diphenhydrAMINE  25 mg Q6HRS PRN   • ondansetron  4 mg Q4HRS PRN   • ondansetron  4 mg Q4HRS PRN   • methocarbamol  750 mg Q8HRS PRN   • cloNIDine  0.1 mg Q4HRS PRN   • benzocaine-menthol  1 Lozenge Q2HRS PRN   • calcium carbonate  500 mg BID   • vitamin D  5,000 Units DAILY       Assessment and Plan:    POD #6 s/p C3-6 laminectomy and posterolateral fusion  for cervical stenosis/myelopathy: post op xrays satisfactory     RUE pain and swelling: trapezius eccymosis improving, pain better resolved, edema resolved, US Doppler negative    Prophylactic anticoagulation: yes         Start date/time: POD 1 started on Heparin 5000 units subQ bid          CType 2 Diabetes Mellitus:  BS  Trending down below 200 on average: management per hospitalist      Acute on Chronic Renal Failure: managed per hospitalist team, baseline creatinine 2.4 - 2.5.     Anemia: on iron transfusions,  basline HgB 11.7, today 7.2, patient started on Epogen 3/4/20        Thrombocytopenia (present on admission)  Thrombocytopenia  mild, chronic: platelet 106, baseline 120-140, HIT negative,       Discharge Planning: SNF or rehab, consults placed: [facility]            Revision History

## 2020-03-05 NOTE — PROGRESS NOTES
Hourly rounding in place. Patient's bed is in the low, locked position w/call light within reach. Plan of care discussed, all questions answered.

## 2020-03-05 NOTE — THERAPY
"Physical Therapy Treatment completed.   Bed Mobility:  Supine to Sit: (up in chair )  Transfers: Sit to Stand: Moderate Assist  Gait: Level Of Assist: Minimal Assist with Front-Wheel Walker       Plan of Care: Will benefit from Physical Therapy 4 times per week  Discharge Recommendations: Equipment: Will Continue to Assess for Equipment Needs. Post-acute therapy Recommend post-acute placement for continued physical therapy services prior to discharge home.     See \"Rehab Therapy-Acute\" Patient Summary Report for complete documentation.     Pt was pleasant and agreeable to therapy session. He required Sarah for sit to stands with fww, but did require more assist from low surface of toilet. He increased his gait distance with fww and Sarah. He required vc to keep fww close for safety. He presented with forward flexed posture and short shuffling steps. No overt LOB noted. Recommend post acute placement at [place]. Will follow while in house   "

## 2020-03-06 ENCOUNTER — APPOINTMENT (OUTPATIENT)
Dept: RADIOLOGY | Facility: MEDICAL CENTER | Age: 80
DRG: 472 | End: 2020-03-06
Attending: NURSE PRACTITIONER
Payer: COMMERCIAL

## 2020-03-06 LAB
ALBUMIN SERPL BCP-MCNC: 3.2 G/DL (ref 3.2–4.9)
ALBUMIN/GLOB SERPL: 1.5 G/DL
ALP SERPL-CCNC: 83 U/L (ref 30–99)
ALT SERPL-CCNC: 15 U/L (ref 2–50)
ANION GAP SERPL CALC-SCNC: 9 MMOL/L (ref 0–11.9)
AST SERPL-CCNC: 26 U/L (ref 12–45)
BASOPHILS # BLD AUTO: 0.2 % (ref 0–1.8)
BASOPHILS # BLD: 0.01 K/UL (ref 0–0.12)
BILIRUB SERPL-MCNC: 0.9 MG/DL (ref 0.1–1.5)
BUN SERPL-MCNC: 47 MG/DL (ref 8–22)
CALCIUM SERPL-MCNC: 8 MG/DL (ref 8.5–10.5)
CHLORIDE SERPL-SCNC: 102 MMOL/L (ref 96–112)
CO2 SERPL-SCNC: 23 MMOL/L (ref 20–33)
CREAT SERPL-MCNC: 2.79 MG/DL (ref 0.5–1.4)
EOSINOPHIL # BLD AUTO: 0.06 K/UL (ref 0–0.51)
EOSINOPHIL NFR BLD: 1.4 % (ref 0–6.9)
ERYTHROCYTE [DISTWIDTH] IN BLOOD BY AUTOMATED COUNT: 62.1 FL (ref 35.9–50)
FOLATE SERPL-MCNC: >24 NG/ML
GLOBULIN SER CALC-MCNC: 2.2 G/DL (ref 1.9–3.5)
GLUCOSE BLD-MCNC: 128 MG/DL (ref 65–99)
GLUCOSE BLD-MCNC: 165 MG/DL (ref 65–99)
GLUCOSE BLD-MCNC: 215 MG/DL (ref 65–99)
GLUCOSE BLD-MCNC: 261 MG/DL (ref 65–99)
GLUCOSE SERPL-MCNC: 129 MG/DL (ref 65–99)
HCT VFR BLD AUTO: 26.8 % (ref 42–52)
HGB BLD-MCNC: 8.9 G/DL (ref 14–18)
IMM GRANULOCYTES # BLD AUTO: 0.05 K/UL (ref 0–0.11)
IMM GRANULOCYTES NFR BLD AUTO: 1.2 % (ref 0–0.9)
LYMPHOCYTES # BLD AUTO: 0.57 K/UL (ref 1–4.8)
LYMPHOCYTES NFR BLD: 13.3 % (ref 22–41)
MAGNESIUM SERPL-MCNC: 2.1 MG/DL (ref 1.5–2.5)
MCH RBC QN AUTO: 31.9 PG (ref 27–33)
MCHC RBC AUTO-ENTMCNC: 32.7 G/DL (ref 33.7–35.3)
MCV RBC AUTO: 97.5 FL (ref 81.4–97.8)
MONOCYTES # BLD AUTO: 0.68 K/UL (ref 0–0.85)
MONOCYTES NFR BLD AUTO: 15.9 % (ref 0–13.4)
NEUTROPHILS # BLD AUTO: 2.91 K/UL (ref 1.82–7.42)
NEUTROPHILS NFR BLD: 68 % (ref 44–72)
NRBC # BLD AUTO: 0 K/UL
NRBC BLD-RTO: 0 /100 WBC
PLATELET # BLD AUTO: 115 K/UL (ref 164–446)
PMV BLD AUTO: 9.4 FL (ref 9–12.9)
POTASSIUM SERPL-SCNC: 4.1 MMOL/L (ref 3.6–5.5)
PROT SERPL-MCNC: 5.4 G/DL (ref 6–8.2)
RBC # BLD AUTO: 2.79 M/UL (ref 4.7–6.1)
SODIUM SERPL-SCNC: 134 MMOL/L (ref 135–145)
WBC # BLD AUTO: 4.3 K/UL (ref 4.8–10.8)

## 2020-03-06 PROCEDURE — A9270 NON-COVERED ITEM OR SERVICE: HCPCS | Performed by: PHYSICIAN ASSISTANT

## 2020-03-06 PROCEDURE — 85025 COMPLETE CBC W/AUTO DIFF WBC: CPT

## 2020-03-06 PROCEDURE — 99232 SBSQ HOSP IP/OBS MODERATE 35: CPT | Performed by: HOSPITALIST

## 2020-03-06 PROCEDURE — 82962 GLUCOSE BLOOD TEST: CPT | Mod: 91

## 2020-03-06 PROCEDURE — 74018 RADEX ABDOMEN 1 VIEW: CPT

## 2020-03-06 PROCEDURE — 83735 ASSAY OF MAGNESIUM: CPT

## 2020-03-06 PROCEDURE — A9270 NON-COVERED ITEM OR SERVICE: HCPCS | Performed by: HOSPITALIST

## 2020-03-06 PROCEDURE — 700111 HCHG RX REV CODE 636 W/ 250 OVERRIDE (IP): Performed by: HOSPITALIST

## 2020-03-06 PROCEDURE — 700111 HCHG RX REV CODE 636 W/ 250 OVERRIDE (IP): Performed by: PHYSICIAN ASSISTANT

## 2020-03-06 PROCEDURE — 700111 HCHG RX REV CODE 636 W/ 250 OVERRIDE (IP): Performed by: NURSE PRACTITIONER

## 2020-03-06 PROCEDURE — 700112 HCHG RX REV CODE 229: Performed by: PHYSICIAN ASSISTANT

## 2020-03-06 PROCEDURE — 700102 HCHG RX REV CODE 250 W/ 637 OVERRIDE(OP): Performed by: PHYSICIAN ASSISTANT

## 2020-03-06 PROCEDURE — 36415 COLL VENOUS BLD VENIPUNCTURE: CPT

## 2020-03-06 PROCEDURE — 82746 ASSAY OF FOLIC ACID SERUM: CPT

## 2020-03-06 PROCEDURE — 97530 THERAPEUTIC ACTIVITIES: CPT

## 2020-03-06 PROCEDURE — 700101 HCHG RX REV CODE 250: Performed by: NURSE PRACTITIONER

## 2020-03-06 PROCEDURE — 700105 HCHG RX REV CODE 258: Performed by: HOSPITALIST

## 2020-03-06 PROCEDURE — 770001 HCHG ROOM/CARE - MED/SURG/GYN PRIV*

## 2020-03-06 PROCEDURE — 97535 SELF CARE MNGMENT TRAINING: CPT

## 2020-03-06 PROCEDURE — 700102 HCHG RX REV CODE 250 W/ 637 OVERRIDE(OP): Performed by: HOSPITALIST

## 2020-03-06 PROCEDURE — 80053 COMPREHEN METABOLIC PANEL: CPT

## 2020-03-06 RX ORDER — SIMETHICONE 80 MG
80 TABLET,CHEWABLE ORAL 3 TIMES DAILY PRN
Status: DISCONTINUED | OUTPATIENT
Start: 2020-03-06 | End: 2020-03-12 | Stop reason: HOSPADM

## 2020-03-06 RX ORDER — AMMONIUM LACTATE 12 G/100G
LOTION TOPICAL 2 TIMES DAILY
Status: DISCONTINUED | OUTPATIENT
Start: 2020-03-06 | End: 2020-03-12 | Stop reason: HOSPADM

## 2020-03-06 RX ADMIN — OMEPRAZOLE 20 MG: 20 CAPSULE, DELAYED RELEASE ORAL at 05:03

## 2020-03-06 RX ADMIN — PIOGLITAZONE 30 MG: 30 TABLET ORAL at 05:03

## 2020-03-06 RX ADMIN — METOPROLOL TARTRATE 12.5 MG: 25 TABLET, FILM COATED ORAL at 18:02

## 2020-03-06 RX ADMIN — DOCUSATE SODIUM 100 MG: 100 CAPSULE, LIQUID FILLED ORAL at 18:02

## 2020-03-06 RX ADMIN — Medication: at 18:03

## 2020-03-06 RX ADMIN — DOCUSATE SODIUM 100 MG: 100 CAPSULE, LIQUID FILLED ORAL at 05:01

## 2020-03-06 RX ADMIN — LIDOCAINE 1 PATCH: 50 PATCH TOPICAL at 10:37

## 2020-03-06 RX ADMIN — EPOETIN ALFA 2000 UNITS: 2000 SOLUTION INTRAVENOUS; SUBCUTANEOUS at 10:37

## 2020-03-06 RX ADMIN — SODIUM BICARBONATE 650 MG: 650 TABLET ORAL at 18:03

## 2020-03-06 RX ADMIN — METOPROLOL TARTRATE 12.5 MG: 25 TABLET, FILM COATED ORAL at 05:02

## 2020-03-06 RX ADMIN — SODIUM BICARBONATE 650 MG: 650 TABLET ORAL at 07:31

## 2020-03-06 RX ADMIN — HEPARIN SODIUM 5000 UNITS: 5000 INJECTION, SOLUTION INTRAVENOUS; SUBCUTANEOUS at 05:02

## 2020-03-06 RX ADMIN — Medication: at 11:27

## 2020-03-06 RX ADMIN — SODIUM CHLORIDE 250 MG: 9 INJECTION, SOLUTION INTRAVENOUS at 07:29

## 2020-03-06 RX ADMIN — ONDANSETRON 4 MG: 2 INJECTION INTRAMUSCULAR; INTRAVENOUS at 10:37

## 2020-03-06 RX ADMIN — INSULIN LISPRO 2 UNITS: 100 INJECTION, SOLUTION INTRAVENOUS; SUBCUTANEOUS at 18:04

## 2020-03-06 RX ADMIN — CYANOCOBALAMIN TAB 500 MCG 1000 MCG: 500 TAB at 04:59

## 2020-03-06 RX ADMIN — SENNOSIDES AND DOCUSATE SODIUM 1 TABLET: 8.6; 5 TABLET ORAL at 21:47

## 2020-03-06 RX ADMIN — POLYETHYLENE GLYCOL 3350 1 PACKET: 17 POWDER, FOR SOLUTION ORAL at 05:03

## 2020-03-06 RX ADMIN — FOLIC ACID 5 MG: 1 TABLET ORAL at 05:02

## 2020-03-06 RX ADMIN — INSULIN GLARGINE 16 UNITS: 100 INJECTION, SOLUTION SUBCUTANEOUS at 18:04

## 2020-03-06 RX ADMIN — SIMVASTATIN 20 MG: 20 TABLET, FILM COATED ORAL at 21:47

## 2020-03-06 RX ADMIN — HEPARIN SODIUM 5000 UNITS: 5000 INJECTION, SOLUTION INTRAVENOUS; SUBCUTANEOUS at 18:03

## 2020-03-06 RX ADMIN — INSULIN LISPRO 3 UNITS: 100 INJECTION, SOLUTION INTRAVENOUS; SUBCUTANEOUS at 11:40

## 2020-03-06 RX ADMIN — SODIUM BICARBONATE 650 MG: 650 TABLET ORAL at 12:56

## 2020-03-06 RX ADMIN — MELATONIN 5000 UNITS: at 05:03

## 2020-03-06 RX ADMIN — INSULIN LISPRO 1 UNITS: 100 INJECTION, SOLUTION INTRAVENOUS; SUBCUTANEOUS at 21:46

## 2020-03-06 RX ADMIN — ANTACID TABLETS 500 MG: 500 TABLET, CHEWABLE ORAL at 04:58

## 2020-03-06 RX ADMIN — ANTACID TABLETS 500 MG: 500 TABLET, CHEWABLE ORAL at 18:28

## 2020-03-06 ASSESSMENT — ENCOUNTER SYMPTOMS
PALPITATIONS: 0
MYALGIAS: 1
CHILLS: 0
SPEECH CHANGE: 0
COUGH: 0
SENSORY CHANGE: 0
ABDOMINAL PAIN: 0
SHORTNESS OF BREATH: 0
DIAPHORESIS: 0
FLANK PAIN: 0
FEVER: 0
DIARRHEA: 0
BACK PAIN: 0
TREMORS: 0
TINGLING: 1
WEAKNESS: 1
STRIDOR: 0
NECK PAIN: 1
VOMITING: 0

## 2020-03-06 ASSESSMENT — COGNITIVE AND FUNCTIONAL STATUS - GENERAL
DAILY ACTIVITIY SCORE: 15
TOILETING: A LOT
PERSONAL GROOMING: A LITTLE
EATING MEALS: A LITTLE
SUGGESTED CMS G CODE MODIFIER DAILY ACTIVITY: CK
DRESSING REGULAR LOWER BODY CLOTHING: A LOT
HELP NEEDED FOR BATHING: A LOT
DRESSING REGULAR UPPER BODY CLOTHING: A LITTLE

## 2020-03-06 NOTE — CARE PLAN
Problem: Nutritional:  Goal: Achieve adequate nutritional intake  Description: Patient will consume >50% of meals   Outcome: MET

## 2020-03-06 NOTE — PROGRESS NOTES
A&OX4 pleasant male,even non labored breathing,productive cough,dry drainage to right eye noted,Carmen aware,edema and redness to both feet,bruising to BUEXT,extremely dry skin to feet,laceration to left upper thigh near groin noted.Bladder scan PVR 93mL noted.Will continue to monitor.

## 2020-03-06 NOTE — WOUND TEAM
Pt seen for wound consult: diabetic patient with callous on plantar surface of right 5th metatarsal head. Pt has small dry area of skin noted to R 5th MTH. Pt with chronically poor perfusion, Markel hose in place. Unable to palpate pedal pulses. Amlactin ordered to help soften built up skin. No advanced wound needs at this time.

## 2020-03-06 NOTE — PROGRESS NOTES
Hospital Medicine Daily Progress Note    Date of Service  3/6/2020    Chief Complaint  79 y.o. male admitted 2/28/2020 with neck pain.     Hospital Course      Patient with hx of DM , Hypertension, CKD 4 admitted following a cervical neck injury. Findings of severe cervical spondylosis, myelopathy and foraminal stenosis.  Patient underwent cervical laminectomy,  decompression and fusion on 2-28.  Hospitalist service consulted for Diabetes, IM issues management.       Interval Problem Update  Hemoglobin improved to 8.9, continue to monitor, transfuse for hemoglobin of less than or equal to 7.  No evidence of clinical bleeding.  Bladder scan showing minimal residual less than 100 mL's post void.   Patient is complaining of some abdominal discomfort and fullness, tympanic on examination I am adding simethicone as needed.   Blood sugars 120s this morning, continue to monitor.  Needs SNF, care coordination working on placement, likely going on Monday.   Discussed with patient, patient's nurse and with multidisciplinary team during rounds including , pharmacist and charge nurse.           Consultants/Specialty  Hospitalist for NS     Code Status  Full code        Disposition  Needs SNF Vs Rehab, likely go on Monday     Review of Systems  Review of Systems   Constitutional: Negative for chills, diaphoresis, fever and malaise/fatigue.   HENT: Negative for congestion.    Respiratory: Negative for cough, shortness of breath and stridor.    Cardiovascular: Negative for chest pain and palpitations.   Gastrointestinal: Negative for abdominal pain, diarrhea and vomiting.   Genitourinary: Negative for flank pain and hematuria.   Musculoskeletal: Positive for myalgias and neck pain. Negative for back pain and joint pain.   Skin: Positive for rash (Chronic erythema bilateral lower extremities).   Neurological: Positive for tingling and weakness (generalized ). Negative for tremors, sensory change and speech change.       Physical Exam  Temp:  [36.2 °C (97.1 °F)-36.6 °C (97.8 °F)] 36.4 °C (97.6 °F)  Pulse:  [80-82] 81  Resp:  [12-16] 16  BP: (125-145)/(44-60) 139/53  SpO2:  [99 %-100 %] 100 %    Physical Exam  Constitutional:       General: He is not in acute distress.     Appearance: He is not diaphoretic.   HENT:      Head: Normocephalic and atraumatic.      Right Ear: External ear normal.      Left Ear: External ear normal.      Nose: Nose normal.   Eyes:      Extraocular Movements: Extraocular movements intact.      Conjunctiva/sclera: Conjunctivae normal.   Neck:      Musculoskeletal: Neck rigidity present.      Comments: Cervical collar in place.   Hemovac, Chapis in place .  Cardiovascular:      Rate and Rhythm: Normal rate and regular rhythm.      Heart sounds: No murmur.   Pulmonary:      Breath sounds: No stridor. No wheezing, rhonchi or rales.   Abdominal:      General: There is no distension.      Palpations: Abdomen is soft.      Tenderness: There is no abdominal tenderness.   Musculoskeletal:         General: No swelling.      Comments: Bilateral lower extremity erythema, chronic   Skin:     General: Skin is dry.   Neurological:      Mental Status: He is alert.   Psychiatric:         Mood and Affect: Mood normal.       Fluids    Intake/Output Summary (Last 24 hours) at 3/6/2020 1603  Last data filed at 3/6/2020 1200  Gross per 24 hour   Intake --   Output 300 ml   Net -300 ml       Laboratory  Recent Labs     03/04/20 0355 03/05/20  0602 03/06/20  0513   WBC 4.7* 4.0* 4.3*   RBC 2.27* 2.23* 2.79*   HEMOGLOBIN 7.4* 7.2* 8.9*   HEMATOCRIT 22.4* 21.9* 26.8*   MCV 98.7* 98.2* 97.5   MCH 32.6 32.3 31.9   MCHC 33.0* 32.9* 32.7*   RDW 63.9* 63.4* 62.1*   PLATELETCT 101* 106* 115*   MPV 9.7 9.6 9.4     Recent Labs     03/04/20  0355 03/05/20  0602 03/06/20  0513   SODIUM 134* 134* 134*   POTASSIUM 4.2 3.9 4.1   CHLORIDE 105 103 102   CO2 21 23 23   GLUCOSE 104* 70 129*   BUN 45* 48* 47*   CREATININE 2.64* 2.82* 2.79*    CALCIUM 8.2* 8.1* 8.0*                   Imaging  DX-CERVICAL SPINE-2 OR 3 VIEWS   Final Result      1.  There has been interval posterior decompression with andrew and screw fixation extending from C3 through C6 levels.      US-EXTREMITY VENOUS UPPER UNILAT RIGHT   Final Result      US-RENAL   Final Result      Bilateral mild hydronephrosis.      Atrophic left kidney.      DX-PORTABLE FLUORO > 1 HOUR   Final Result      Portable fluoroscopy utilized for 6 seconds.         INTERPRETING LOCATION: [address] [place] [address], [place], [address]      DX-CERVICAL SPINE-2 OR 3 VIEWS   Final Result      Intraoperative image as above described.      [id number] VIEW    (Results Pending)     Assessment/Plan  * Type 2 diabetes mellitus (HCC)- (present on admission)  Assessment & Plan  With hyperglycemia  Glycated hemoglobin 5.5%   Long & short acting insulin, glargine last increased 3/4   Accu-Checks, hypoglycemia protocol         Acute on chronic renal failure (HCC)  Assessment & Plan  CKD 4,  Ultrasound with findings of bilateral hydronephrosis, mild  Check bladder US to eval for urinary retention signs of outlet obstruction.  May need anders if significant retention.    Avoid nephrotoxic medications       Central cord syndrome (HCC)- (present on admission)  Assessment & Plan  With myelopathy - Status post cervical surgery-  laminectomy,  decompression and fusion on 2-28  Stabilize with neck brace , monitor hemovac output, wound care, NSG input  Multimodal pain control  He lives alone in an upstairs unit. PT/OT, needs SNF.   Likely Monday     Thrombocytopenia (HCC)- (present on admission)  Assessment & Plan  Thrombocytopenia at this point is mild, chronic .   Monitor platelets.   Platelets have been stable around 100.     Anemia- (present on admission)  Assessment & Plan  Chronic, iron deficient  IV iron replacement   No clinical evidence for bleeding, history of colonic polyposis with colonoscopy few months ago with plan [facility]  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 Started epo        Vitamin D deficiency- (present on admission)  Assessment & Plan  Adequate levels     Hyperlipemia- (present on admission)  Assessment & Plan  Low-fat low-cholesterol diet  Resume home simvastatin     Essential hypertension- (present on admission)  Assessment & Plan  Controlled with current regimen metoprolol, and clonidine as needed.  Vital signs per policy.       VTE prophylaxis: SCDs, sever anemia

## 2020-03-06 NOTE — DISCHARGE SUMMARY
ADDENDUM TO DISCHARGE SUMMARY 3/4/20    Subjective:  C/O left lower quadrant pain, has had regular bowel movements    Exam:  Incision with Prevena in place, no drainage in canister  NM: R> L trapezius pain beyond 90 degrees, ecchymosis at varius stages of healing over right trapezius  RUE: B/T 4/5,  4+/5  LUE: 4+/5  LLE: 5/5 (b), ambulatory with FWW with PT, poor posture  Abdomen: modestly distended    BP  Min: 123/53  Max: 145/60  Pulse  Av.4  Min: 80  Max: 91  Resp  Av.9  Min: 12  Max: 16  Temp  Av.5 °C (97.7 °F)  Min: 36.2 °C (97.1 °F)  Max: 36.8 °C (98.3 °F)  SpO2  Av.4 %  Min: 97 %  Max: 100 %    No data recorded    Recent Labs     20  0620  0513   WBC 4.7* 4.0* 4.3*   RBC 2.27* 2.23* 2.79*   HEMOGLOBIN 7.4* 7.2* 8.9*   HEMATOCRIT 22.4* 21.9* 26.8*   MCV 98.7* 98.2* 97.5   MCH 32.6 32.3 31.9   MCHC 33.0* 32.9* 32.7*   RDW 63.9* 63.4* 62.1*   PLATELETCT 101* 106* 115*   MPV 9.7 9.6 9.4     Recent Labs     20  03520  0602 20  0513   SODIUM 134* 134* 134*   POTASSIUM 4.2 3.9 4.1   CHLORIDE 105 103 102   CO2 21 23 23   GLUCOSE 104* 70 129*   BUN 45* 48* 47*   CREATININE 2.64* 2.82* 2.79*   CALCIUM 8.2* 8.1* 8.0*               Intake/Output     None          No intake or output data in the 24 hours ending 20 1106         • ammonium lactate   BID   • cyanocobalamin  1,000 mcg DAILY   • folic acid  5 mg DAILY   • insulin glargine  16 Units Q EVENING   • lidocaine  1 Patch Q24HR   • heparin  5,000 Units Q12HRS   • epoetin prema  2,000 Units MO, WE + FR   • omeprazole  20 mg DAILY   • Pharmacy Consult:   PRN   • magnesium hydroxide  30 mL DAILY   • polyethylene glycol/lytes  1 Packet DAILY   • insulin lispro  1-6 Units 4X/DAY ACHS    And   • glucose  16 g Q15 MIN PRN    And   • dextrose 10% bolus  250 mL Q15 MIN PRN   • sodium bicarbonate  650 mg TID   • simvastatin  20 mg Nightly   • metoprolol  12.5 mg BID   • Pharmacy Consult Request  1  Each PHARMACY TO DOSE   • MD ALERT...DO NOT ADMINISTER NSAIDS or ASPIRIN unless ORDERED By Neurosurgery  1 Each PRN   • docusate sodium  100 mg BID   • senna-docusate  1 Tab Nightly   • senna-docusate  1 Tab Q24HRS PRN   • bisacodyl  10 mg Q24HRS PRN   • pioglitazone  30 mg DAILY   • oxyCODONE immediate-release  5 mg Q3HRS PRN   • HYDROcodone-acetaminophen  1-2 Tab Q6HRS PRN   • diphenhydrAMINE  25 mg Q6HRS PRN    Or   • diphenhydrAMINE  25 mg Q6HRS PRN   • ondansetron  4 mg Q4HRS PRN   • ondansetron  4 mg Q4HRS PRN   • methocarbamol  750 mg Q8HRS PRN   • cloNIDine  0.1 mg Q4HRS PRN   • benzocaine-menthol  1 Lozenge Q2HRS PRN   • calcium carbonate  500 mg BID   • vitamin D  5,000 Units DAILY       Assessment and Plan:    POD #7 s/p C3-6 laminectomy and posterolateral fusion  for cervical stenosis/myelopathy: post op xrays satisfactory     RUE pain and swelling: trapezius eccymosis improving, pain  resolving, Lidoderm patch in place , edema resolved, US Doppler negative     Abdominal pain with distention: KUB ordered , has had regular BM's, check PVR: UO not recorded    Prophylactic anticoagulation: yes         Start date/time: POD 1 started on Heparin 5000 units subQ bid          CType 2 Diabetes Mellitus:  BS  Trending down below 160 on average: management per hospitalist      Acute on Chronic Renal Failure: managed per hospitalist team, baseline creatinine 2.4 - 2.5. Creat remains around 2.7     Anemia: on iron transfusions,  baseline HgB 11.7, today improved HgB 8.9, patient started on Epogen 3/4/20        Thrombocytopenia (present on admission)  Thrombocytopenia, mild, chronic: platelet 115, baseline 120-140, HIT negative,       Discharge Planning: SNF or rehab, consults placed: [facility].  Plan on removing Prevena next few days, unit still functioning: patient at high risk for wound healing complications r/t comorbidities.     Appreciate continued hospitalist assistance.

## 2020-03-06 NOTE — CARE PLAN
Problem: Pain Management  Goal: Pain level will decrease to patient's comfort goal  Outcome: PROGRESSING AS EXPECTED     Problem: Communication  Goal: The ability to communicate needs accurately and effectively will improve  Outcome: PROGRESSING AS EXPECTED     Problem: Safety  Goal: Will remain free from injury  Outcome: PROGRESSING AS EXPECTED     Problem: Knowledge Deficit  Goal: Knowledge of disease process/condition, treatment plan, diagnostic tests, and medications will improve  Outcome: PROGRESSING AS EXPECTED     Problem: Mobility  Goal: Risk for activity intolerance will decrease  Outcome: PROGRESSING AS EXPECTED

## 2020-03-07 LAB
ALBUMIN SERPL BCP-MCNC: 3.3 G/DL (ref 3.2–4.9)
ALBUMIN/GLOB SERPL: 1.7 G/DL
ALP SERPL-CCNC: 85 U/L (ref 30–99)
ALT SERPL-CCNC: 14 U/L (ref 2–50)
ANION GAP SERPL CALC-SCNC: 9 MMOL/L (ref 0–11.9)
AST SERPL-CCNC: 22 U/L (ref 12–45)
BASOPHILS # BLD AUTO: 0.1 % (ref 0–1.8)
BASOPHILS # BLD: 0.01 K/UL (ref 0–0.12)
BILIRUB SERPL-MCNC: 0.9 MG/DL (ref 0.1–1.5)
BUN SERPL-MCNC: 43 MG/DL (ref 8–22)
CALCIUM SERPL-MCNC: 8.5 MG/DL (ref 8.5–10.5)
CHLORIDE SERPL-SCNC: 104 MMOL/L (ref 96–112)
CO2 SERPL-SCNC: 23 MMOL/L (ref 20–33)
CREAT SERPL-MCNC: 2.43 MG/DL (ref 0.5–1.4)
EOSINOPHIL # BLD AUTO: 0.01 K/UL (ref 0–0.51)
EOSINOPHIL NFR BLD: 0.1 % (ref 0–6.9)
ERYTHROCYTE [DISTWIDTH] IN BLOOD BY AUTOMATED COUNT: 61.1 FL (ref 35.9–50)
GLOBULIN SER CALC-MCNC: 2 G/DL (ref 1.9–3.5)
GLUCOSE BLD-MCNC: 125 MG/DL (ref 65–99)
GLUCOSE BLD-MCNC: 162 MG/DL (ref 65–99)
GLUCOSE BLD-MCNC: 185 MG/DL (ref 65–99)
GLUCOSE BLD-MCNC: 237 MG/DL (ref 65–99)
GLUCOSE SERPL-MCNC: 140 MG/DL (ref 65–99)
HCT VFR BLD AUTO: 25.7 % (ref 42–52)
HGB BLD-MCNC: 8.6 G/DL (ref 14–18)
IMM GRANULOCYTES # BLD AUTO: 0.08 K/UL (ref 0–0.11)
IMM GRANULOCYTES NFR BLD AUTO: 1.2 % (ref 0–0.9)
LYMPHOCYTES # BLD AUTO: 0.54 K/UL (ref 1–4.8)
LYMPHOCYTES NFR BLD: 7.8 % (ref 22–41)
MCH RBC QN AUTO: 32.2 PG (ref 27–33)
MCHC RBC AUTO-ENTMCNC: 33.5 G/DL (ref 33.7–35.3)
MCV RBC AUTO: 96.3 FL (ref 81.4–97.8)
MONOCYTES # BLD AUTO: 1.14 K/UL (ref 0–0.85)
MONOCYTES NFR BLD AUTO: 16.5 % (ref 0–13.4)
NEUTROPHILS # BLD AUTO: 5.11 K/UL (ref 1.82–7.42)
NEUTROPHILS NFR BLD: 74.3 % (ref 44–72)
NRBC # BLD AUTO: 0 K/UL
NRBC BLD-RTO: 0 /100 WBC
PLATELET # BLD AUTO: 141 K/UL (ref 164–446)
PMV BLD AUTO: 9.1 FL (ref 9–12.9)
POTASSIUM SERPL-SCNC: 4.6 MMOL/L (ref 3.6–5.5)
PROT SERPL-MCNC: 5.3 G/DL (ref 6–8.2)
RBC # BLD AUTO: 2.67 M/UL (ref 4.7–6.1)
SODIUM SERPL-SCNC: 136 MMOL/L (ref 135–145)
WBC # BLD AUTO: 6.9 K/UL (ref 4.8–10.8)

## 2020-03-07 PROCEDURE — 700111 HCHG RX REV CODE 636 W/ 250 OVERRIDE (IP): Performed by: NURSE PRACTITIONER

## 2020-03-07 PROCEDURE — 700102 HCHG RX REV CODE 250 W/ 637 OVERRIDE(OP): Performed by: PHYSICIAN ASSISTANT

## 2020-03-07 PROCEDURE — 770001 HCHG ROOM/CARE - MED/SURG/GYN PRIV*

## 2020-03-07 PROCEDURE — 700112 HCHG RX REV CODE 229: Performed by: PHYSICIAN ASSISTANT

## 2020-03-07 PROCEDURE — 700101 HCHG RX REV CODE 250: Performed by: NURSE PRACTITIONER

## 2020-03-07 PROCEDURE — 36415 COLL VENOUS BLD VENIPUNCTURE: CPT

## 2020-03-07 PROCEDURE — 80053 COMPREHEN METABOLIC PANEL: CPT

## 2020-03-07 PROCEDURE — A9270 NON-COVERED ITEM OR SERVICE: HCPCS | Performed by: HOSPITALIST

## 2020-03-07 PROCEDURE — 700102 HCHG RX REV CODE 250 W/ 637 OVERRIDE(OP): Performed by: HOSPITALIST

## 2020-03-07 PROCEDURE — 82962 GLUCOSE BLOOD TEST: CPT

## 2020-03-07 PROCEDURE — A9270 NON-COVERED ITEM OR SERVICE: HCPCS | Performed by: PHYSICIAN ASSISTANT

## 2020-03-07 PROCEDURE — 99232 SBSQ HOSP IP/OBS MODERATE 35: CPT | Performed by: HOSPITALIST

## 2020-03-07 PROCEDURE — 85025 COMPLETE CBC W/AUTO DIFF WBC: CPT

## 2020-03-07 RX ADMIN — OMEPRAZOLE 20 MG: 20 CAPSULE, DELAYED RELEASE ORAL at 06:12

## 2020-03-07 RX ADMIN — INSULIN LISPRO 1 UNITS: 100 INJECTION, SOLUTION INTRAVENOUS; SUBCUTANEOUS at 18:03

## 2020-03-07 RX ADMIN — FOLIC ACID 5 MG: 1 TABLET ORAL at 06:12

## 2020-03-07 RX ADMIN — Medication: at 18:00

## 2020-03-07 RX ADMIN — HEPARIN SODIUM 5000 UNITS: 5000 INJECTION, SOLUTION INTRAVENOUS; SUBCUTANEOUS at 18:02

## 2020-03-07 RX ADMIN — Medication: at 06:14

## 2020-03-07 RX ADMIN — SODIUM BICARBONATE 650 MG: 650 TABLET ORAL at 11:58

## 2020-03-07 RX ADMIN — SODIUM BICARBONATE 650 MG: 650 TABLET ORAL at 06:15

## 2020-03-07 RX ADMIN — CYANOCOBALAMIN TAB 500 MCG 1000 MCG: 500 TAB at 06:12

## 2020-03-07 RX ADMIN — DOCUSATE SODIUM 100 MG: 100 CAPSULE, LIQUID FILLED ORAL at 18:01

## 2020-03-07 RX ADMIN — SENNOSIDES AND DOCUSATE SODIUM 1 TABLET: 8.6; 5 TABLET ORAL at 21:05

## 2020-03-07 RX ADMIN — INSULIN LISPRO 1 UNITS: 100 INJECTION, SOLUTION INTRAVENOUS; SUBCUTANEOUS at 12:01

## 2020-03-07 RX ADMIN — PIOGLITAZONE 30 MG: 30 TABLET ORAL at 06:12

## 2020-03-07 RX ADMIN — MELATONIN 5000 UNITS: at 06:11

## 2020-03-07 RX ADMIN — METOPROLOL TARTRATE 12.5 MG: 25 TABLET, FILM COATED ORAL at 06:12

## 2020-03-07 RX ADMIN — ANTACID TABLETS 500 MG: 500 TABLET, CHEWABLE ORAL at 18:01

## 2020-03-07 RX ADMIN — SODIUM BICARBONATE 650 MG: 650 TABLET ORAL at 18:01

## 2020-03-07 RX ADMIN — INSULIN LISPRO 2 UNITS: 100 INJECTION, SOLUTION INTRAVENOUS; SUBCUTANEOUS at 21:00

## 2020-03-07 RX ADMIN — SIMVASTATIN 20 MG: 20 TABLET, FILM COATED ORAL at 21:05

## 2020-03-07 RX ADMIN — ANTACID TABLETS 500 MG: 500 TABLET, CHEWABLE ORAL at 06:11

## 2020-03-07 RX ADMIN — INSULIN GLARGINE 16 UNITS: 100 INJECTION, SOLUTION SUBCUTANEOUS at 18:02

## 2020-03-07 RX ADMIN — METOPROLOL TARTRATE 12.5 MG: 25 TABLET, FILM COATED ORAL at 18:07

## 2020-03-07 ASSESSMENT — ENCOUNTER SYMPTOMS
TINGLING: 1
ABDOMINAL PAIN: 0
CHILLS: 0
VOMITING: 0
NECK PAIN: 1
FEVER: 0
STRIDOR: 0
SPEECH CHANGE: 0
DIARRHEA: 0
SENSORY CHANGE: 0
TREMORS: 0
DIAPHORESIS: 0
COUGH: 0
WEAKNESS: 1
SHORTNESS OF BREATH: 0
MYALGIAS: 1
PALPITATIONS: 0
FLANK PAIN: 0
BACK PAIN: 0

## 2020-03-07 NOTE — PROGRESS NOTES
Handed off from Santos,patient is sitting up in chair,A&Ox4,even non labored breathing,no distress noted,no complaints voiced by patient.

## 2020-03-07 NOTE — PROGRESS NOTES
Hospital Medicine Daily Progress Note    Date of Service  3/7/2020    Chief Complaint  79 y.o. male admitted 2/28/2020 with neck pain.     Hospital Course      Patient with hx of DM , Hypertension, CKD 4 admitted following a cervical neck injury. Findings of severe cervical spondylosis, myelopathy and foraminal stenosis.  Patient underwent cervical laminectomy,  decompression and fusion on 2-28.  Hospitalist service consulted for Diabetes, IM issues management.       Interval Problem Update  Blood sugars reasonably controlled, continue current regime   PLTs improving 140's Hb stable 8.6, continue to monitor, transfuse for hemoglobin of less than or equal to 7.  Needs SNF, care coordination working on placement, likely going on Monday.   Discussed with patient, patient's nurse       Consultants/Specialty  Hospitalist for NS     Code Status   Full code        Disposition  Needs SNF Vs Rehab, likely go on Monday     Review of Systems  Review of Systems   Constitutional: Negative for chills, diaphoresis, fever and malaise/fatigue.   HENT: Negative for congestion.    Respiratory: Negative for cough, shortness of breath and stridor.    Cardiovascular: Negative for chest pain and palpitations.   Gastrointestinal: Negative for abdominal pain, diarrhea and vomiting.   Genitourinary: Negative for flank pain and hematuria.   Musculoskeletal: Positive for myalgias and neck pain. Negative for back pain and joint pain.   Skin: Positive for rash (Chronic erythema bilateral lower extremities).   Neurological: Positive for tingling and weakness (generalized ). Negative for tremors, sensory change and speech change.      Physical Exam  Temp:  [36.5 °C (97.7 °F)-36.7 °C (98 °F)] 36.5 °C (97.7 °F)  Pulse:  [80-87] 87  Resp:  [16-18] 18  BP: (118-139)/(53-84) 118/54  SpO2:  [98 %-100 %] 100 %    Physical Exam  Constitutional:       General: He is not in acute distress.     Appearance: He is not diaphoretic.   HENT:      Head:  Normocephalic and atraumatic.      Right Ear: External ear normal.      Left Ear: External ear normal.      Nose: Nose normal.   Eyes:      Extraocular Movements: Extraocular movements intact.      Conjunctiva/sclera: Conjunctivae normal.   Neck:      Musculoskeletal: Neck rigidity present.      Comments: Cervical collar in place.   Hemovac, Chapis in place .  Cardiovascular:      Rate and Rhythm: Normal rate and regular rhythm.      Heart sounds: No murmur.   Pulmonary:      Breath sounds: No stridor. No wheezing, rhonchi or rales.   Abdominal:      General: There is no distension.      Palpations: Abdomen is soft.      Tenderness: There is no abdominal tenderness.   Musculoskeletal:         General: No swelling.      Comments: Bilateral lower extremity erythema, chronic   Skin:     General: Skin is dry.   Neurological:      Mental Status: He is alert.   Psychiatric:         Mood and Affect: Mood normal.       Fluids    Intake/Output Summary (Last 24 hours) at 3/7/2020 1519  Last data filed at 3/7/2020 1400  Gross per 24 hour   Intake 240 ml   Output 600 ml   Net -360 ml       Laboratory  Recent Labs     03/05/20  0602 03/06/20  0513 03/07/20  0453   WBC 4.0* 4.3* 6.9   RBC 2.23* 2.79* 2.67*   HEMOGLOBIN 7.2* 8.9* 8.6*   HEMATOCRIT 21.9* 26.8* 25.7*   MCV 98.2* 97.5 96.3   MCH 32.3 31.9 32.2   MCHC 32.9* 32.7* 33.5*   RDW 63.4* 62.1* 61.1*   PLATELETCT 106* 115* 141*   MPV 9.6 9.4 9.1     Recent Labs     03/05/20  0602 03/06/20  0513 03/07/20  0453   SODIUM 134* 134* 136   POTASSIUM 3.9 4.1 4.6   CHLORIDE 103 102 104   CO2 23 23 23   GLUCOSE 70 129* 140*   BUN 48* 47* 43*   CREATININE 2.82* 2.79* 2.43*   CALCIUM 8.1* 8.0* 8.5                   Imaging  [id number] VIEW   Final Result      No evidence of bowel obstruction.   Possible constipation.                  DX-CERVICAL SPINE-2 OR 3 VIEWS   Final Result      1.  There has been interval posterior decompression with andrew and screw fixation extending from C3  through C6 levels.      US-EXTREMITY VENOUS UPPER UNILAT RIGHT   Final Result      US-RENAL   Final Result      Bilateral mild hydronephrosis.      Atrophic left kidney.      DX-PORTABLE FLUORO > 1 HOUR   Final Result      Portable fluoroscopy utilized for 6 seconds.         INTERPRETING LOCATION: [address] [place] [address], [place], [address]      DX-CERVICAL SPINE-2 OR 3 VIEWS   Final Result      Intraoperative image as above described.        Assessment/Plan  * Type 2 diabetes mellitus (HCC)- (present on admission)  Assessment & Plan  With hyperglycemia  Glycated hemoglobin 5.5%   Long & short acting insulin, glargine last increased 3/4   Accu-Checks, hypoglycemia protocol           Acute on chronic renal failure (HCC)  Assessment & Plan  CKD 4,  Ultrasound with findings of bilateral hydronephrosis, mild  Check bladder US to eval for urinary retention signs of outlet obstruction.  May need anders if significant retention.    Avoid nephrotoxic medications        Central cord syndrome (HCC)- (present on admission)  Assessment & Plan  With myelopathy - Status post cervical surgery-  laminectomy,  decompression and fusion on 2-28  Stabilize with neck brace , monitor hemovac output, wound care, NSG input  Multimodal pain control  He lives alone in an upstairs unit. PT/OT, needs SNF.   Likely Monday      Thrombocytopenia (HCC)- (present on admission)  Assessment & Plan  Thrombocytopenia at this point is mild, chronic .   Monitor platelets.   Platelets have been stable around 100.     Anemia- (present on admission)  Assessment & Plan  Chronic, iron deficient  IV iron replacement   No clinical evidence for bleeding, history of colonic polyposis with colonoscopy few months ago with [facility]  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 Started epo      3/6-7 improving     Vitamin D deficiency- (present on admission)  Assessment & Plan  Adequate levels      Hyperlipemia- (present on admission)  Assessment & Plan  Low-fat low-cholesterol diet  Resume home simvastatin     Essential hypertension- (present on admission)  Assessment & Plan  Controlled with current regimen metoprolol, and clonidine as needed.  Vital signs per policy.       VTE prophylaxis: SCDs, Heparin SC

## 2020-03-07 NOTE — THERAPY
"Occupational Therapy Treatment completed   Functional Status:  Pt seen for OT tx today, received sitting up in chair, declined most of the ADLs this session as pt had completed little bit ago with nursing. Pt required max a for socks donning, declined AE training, ambulated short distance in room and hallway with min a using FWW, forward flexed posture d/t pain. Pt endurance, balance, BUE ROM and strength deficits continues to limit pt's safety and independence with ADLs at this time. Will continue to follow while in house and post acute recommended.   Plan of Care: Will benefit from Occupational Therapy 4 times per week  Discharge Recommendations:  Equipment Will Continue to Assess for Equipment Needs. Post-acute therapy Recommend post-acute placement for additional occupational therapy services prior to discharge home.      See \"Rehab Therapy-Acute\" Patient Summary Report for complete documentation.     "

## 2020-03-07 NOTE — DISCHARGE SUMMARY
Progress Note    Subjective:  C/o Bilateral arm tingling, no pain, states his hands feel cold  Feels that right arm slightly weaker after surgery  Abdominal pain resolved      Exam:  Incision with Prevena in place, no drainage in canister  NM: R> L trapezius pain beyond 90 degrees, ecchymosis at varius stages of healing over right trapezius. Able to lift RUE to 90 degrees ROM   RUE: B/T 4/5,  4+/5  LUE: 4++ bicep, tricep, deltoid, handgrip  Sensation intact and equal C4-T1  LEs: 5/5 dorsiflexion, plantar flexion  + Wet cough  Not hooked up to Pulse O2   Spirometer to < 1000   Sensation intact distally  Abdomen: modestly distended    BP  Min: 118/54  Max: 139/55  Pulse  Av.3  Min: 80  Max: 87  Resp  Av.5  Min: 16  Max: 18  Temp  Av.6 °C (97.9 °F)  Min: 36.5 °C (97.7 °F)  Max: 36.7 °C (98 °F)  SpO2  Av.3 %  Min: 98 %  Max: 100 %    No data recorded    Recent Labs     20  0602 20  0513 20  0453   WBC 4.0* 4.3* 6.9   RBC 2.23* 2.79* 2.67*   HEMOGLOBIN 7.2* 8.9* 8.6*   HEMATOCRIT 21.9* 26.8* 25.7*   MCV 98.2* 97.5 96.3   MCH 32.3 31.9 32.2   MCHC 32.9* 32.7* 33.5*   RDW 63.4* 62.1* 61.1*   PLATELETCT 106* 115* 141*   MPV 9.6 9.4 9.1     Recent Labs     20  0602 20  0513 20  0453   SODIUM 134* 134* 136   POTASSIUM 3.9 4.1 4.6   CHLORIDE 103 102 104   CO2 23 23 23   GLUCOSE 70 129* 140*   BUN 48* 47* 43*   CREATININE 2.82* 2.79* 2.43*   CALCIUM 8.1* 8.0* 8.5               Intake/Output       20 07 - 20 0659 20 - 2059       Total  Total       Intake    Total Intake -- -- -- -- -- --       Output    Urine  300  600 900  --  -- --    Number of Times Voided 1 x 2 x 3 x -- -- --    Urine Void (mL) 300 600 900 -- -- --    Total Output 300 600 900 -- -- --       Net I/O     -300 -600 -900 -- -- --            Intake/Output Summary (Last 24 hours) at 3/7/2020 1015  Last data filed at 3/7/2020  0200  Gross per 24 hour   Intake --   Output 900 ml   Net -900 ml            • ammonium lactate   BID   • simethicone  80 mg TID PRN   • cyanocobalamin  1,000 mcg DAILY   • folic acid  5 mg DAILY   • insulin glargine  16 Units Q EVENING   • lidocaine  1 Patch Q24HR   • heparin  5,000 Units Q12HRS   • epoetin prema  2,000 Units MO, WE + FR   • omeprazole  20 mg DAILY   • magnesium hydroxide  30 mL DAILY   • polyethylene glycol/lytes  1 Packet DAILY   • insulin lispro  1-6 Units 4X/DAY ACHS    And   • glucose  16 g Q15 MIN PRN    And   • dextrose 10% bolus  250 mL Q15 MIN PRN   • sodium bicarbonate  650 mg TID   • simvastatin  20 mg Nightly   • metoprolol  12.5 mg BID   • Pharmacy Consult Request  1 Each PHARMACY TO DOSE   • MD ALERT...DO NOT ADMINISTER NSAIDS or ASPIRIN unless ORDERED By Neurosurgery  1 Each PRN   • docusate sodium  100 mg BID   • senna-docusate  1 Tab Nightly   • senna-docusate  1 Tab Q24HRS PRN   • bisacodyl  10 mg Q24HRS PRN   • pioglitazone  30 mg DAILY   • oxyCODONE immediate-release  5 mg Q3HRS PRN   • HYDROcodone-acetaminophen  1-2 Tab Q6HRS PRN   • diphenhydrAMINE  25 mg Q6HRS PRN    Or   • diphenhydrAMINE  25 mg Q6HRS PRN   • ondansetron  4 mg Q4HRS PRN   • ondansetron  4 mg Q4HRS PRN   • methocarbamol  750 mg Q8HRS PRN   • cloNIDine  0.1 mg Q4HRS PRN   • benzocaine-menthol  1 Lozenge Q2HRS PRN   • calcium carbonate  500 mg BID   • vitamin D  5,000 Units DAILY       Assessment and Plan:  POD #8 s/p C3-6 laminectomy and posterolateral fusion  for cervical stenosis/myelopathy: post op xrays satisfactory  Patient neurologically stable  OK to d/c Prevena today or tomorrow (will start beeping once completed)  Pt has cough - Recommend watching O2 saturation with pulse O2, continue IS, defer to primary team for recommendations and workup. Pt denies SOB    RUE pain and swelling: trapezius eccymosis improving, pain  resolving, Lidoderm patch in place , edema resolved, US Doppler  negative    Prophylactic anticoagulation: yes         Start date/time: POD 1 started on Heparin 5000 units subQ bid      Type 2 Diabetes Mellitus:  BS  Trending down below 160 on average: management per hospitalist      Acute on Chronic Renal Failure: managed per hospitalist team, baseline creatinine 2.4 - 2.5. Creat remains around 2.7     Anemia: on iron transfusions,  baseline HgB 11.7, today improved HgB 8.9, patient started on Epogen 3/4/20     Thrombocytopenia (present on admission)  Thrombocytopenia, mild, chronic: platelet 115, baseline 120-140, HIT negative,       Discharge Planning: SNF or rehab, consults placed: [facility].    Appreciate continued hospitalist assistance.     Case D/w Dr. Watters

## 2020-03-08 LAB
BASOPHILS # BLD AUTO: 0.2 % (ref 0–1.8)
BASOPHILS # BLD: 0.01 K/UL (ref 0–0.12)
EOSINOPHIL # BLD AUTO: 0.02 K/UL (ref 0–0.51)
EOSINOPHIL NFR BLD: 0.3 % (ref 0–6.9)
ERYTHROCYTE [DISTWIDTH] IN BLOOD BY AUTOMATED COUNT: 59.3 FL (ref 35.9–50)
GLUCOSE BLD-MCNC: 157 MG/DL (ref 65–99)
GLUCOSE BLD-MCNC: 175 MG/DL (ref 65–99)
GLUCOSE BLD-MCNC: 183 MG/DL (ref 65–99)
GLUCOSE BLD-MCNC: 210 MG/DL (ref 65–99)
HCT VFR BLD AUTO: 22.7 % (ref 42–52)
HGB BLD-MCNC: 7.6 G/DL (ref 14–18)
IMM GRANULOCYTES # BLD AUTO: 0.05 K/UL (ref 0–0.11)
IMM GRANULOCYTES NFR BLD AUTO: 0.9 % (ref 0–0.9)
LYMPHOCYTES # BLD AUTO: 0.49 K/UL (ref 1–4.8)
LYMPHOCYTES NFR BLD: 8.6 % (ref 22–41)
MCH RBC QN AUTO: 31.8 PG (ref 27–33)
MCHC RBC AUTO-ENTMCNC: 33.5 G/DL (ref 33.7–35.3)
MCV RBC AUTO: 95 FL (ref 81.4–97.8)
MONOCYTES # BLD AUTO: 0.95 K/UL (ref 0–0.85)
MONOCYTES NFR BLD AUTO: 16.6 % (ref 0–13.4)
NEUTROPHILS # BLD AUTO: 4.21 K/UL (ref 1.82–7.42)
NEUTROPHILS NFR BLD: 73.4 % (ref 44–72)
NRBC # BLD AUTO: 0 K/UL
NRBC BLD-RTO: 0 /100 WBC
PLATELET # BLD AUTO: 134 K/UL (ref 164–446)
PMV BLD AUTO: 9.3 FL (ref 9–12.9)
RBC # BLD AUTO: 2.39 M/UL (ref 4.7–6.1)
WBC # BLD AUTO: 5.7 K/UL (ref 4.8–10.8)

## 2020-03-08 PROCEDURE — 36415 COLL VENOUS BLD VENIPUNCTURE: CPT

## 2020-03-08 PROCEDURE — 82962 GLUCOSE BLOOD TEST: CPT | Mod: 91

## 2020-03-08 PROCEDURE — A9270 NON-COVERED ITEM OR SERVICE: HCPCS | Performed by: PHYSICIAN ASSISTANT

## 2020-03-08 PROCEDURE — 700111 HCHG RX REV CODE 636 W/ 250 OVERRIDE (IP): Performed by: NURSE PRACTITIONER

## 2020-03-08 PROCEDURE — A9270 NON-COVERED ITEM OR SERVICE: HCPCS | Performed by: HOSPITALIST

## 2020-03-08 PROCEDURE — 700102 HCHG RX REV CODE 250 W/ 637 OVERRIDE(OP): Performed by: PHYSICIAN ASSISTANT

## 2020-03-08 PROCEDURE — 700102 HCHG RX REV CODE 250 W/ 637 OVERRIDE(OP): Performed by: HOSPITALIST

## 2020-03-08 PROCEDURE — 700101 HCHG RX REV CODE 250: Performed by: NURSE PRACTITIONER

## 2020-03-08 PROCEDURE — 700112 HCHG RX REV CODE 229: Performed by: PHYSICIAN ASSISTANT

## 2020-03-08 PROCEDURE — 99232 SBSQ HOSP IP/OBS MODERATE 35: CPT | Performed by: HOSPITALIST

## 2020-03-08 PROCEDURE — 85025 COMPLETE CBC W/AUTO DIFF WBC: CPT

## 2020-03-08 PROCEDURE — 770001 HCHG ROOM/CARE - MED/SURG/GYN PRIV*

## 2020-03-08 RX ORDER — FERROUS SULFATE 325(65) MG
325 TABLET ORAL 2 TIMES DAILY WITH MEALS
Status: DISCONTINUED | OUTPATIENT
Start: 2020-03-08 | End: 2020-03-12 | Stop reason: HOSPADM

## 2020-03-08 RX ADMIN — Medication: at 05:16

## 2020-03-08 RX ADMIN — DOCUSATE SODIUM 100 MG: 100 CAPSULE, LIQUID FILLED ORAL at 18:00

## 2020-03-08 RX ADMIN — INSULIN LISPRO 1 UNITS: 100 INJECTION, SOLUTION INTRAVENOUS; SUBCUTANEOUS at 18:07

## 2020-03-08 RX ADMIN — OXYCODONE HYDROCHLORIDE 5 MG: 5 TABLET ORAL at 01:51

## 2020-03-08 RX ADMIN — INSULIN LISPRO 2 UNITS: 100 INJECTION, SOLUTION INTRAVENOUS; SUBCUTANEOUS at 21:24

## 2020-03-08 RX ADMIN — HEPARIN SODIUM 5000 UNITS: 5000 INJECTION, SOLUTION INTRAVENOUS; SUBCUTANEOUS at 05:19

## 2020-03-08 RX ADMIN — MAGNESIUM HYDROXIDE 30 ML: 400 SUSPENSION ORAL at 05:19

## 2020-03-08 RX ADMIN — METOPROLOL TARTRATE 12.5 MG: 25 TABLET, FILM COATED ORAL at 18:00

## 2020-03-08 RX ADMIN — MELATONIN 5000 UNITS: at 05:19

## 2020-03-08 RX ADMIN — PIOGLITAZONE 30 MG: 30 TABLET ORAL at 05:17

## 2020-03-08 RX ADMIN — SODIUM BICARBONATE 650 MG: 650 TABLET ORAL at 12:08

## 2020-03-08 RX ADMIN — CYANOCOBALAMIN TAB 500 MCG 1000 MCG: 500 TAB at 05:18

## 2020-03-08 RX ADMIN — FERROUS SULFATE TAB 325 MG (65 MG ELEMENTAL FE) 325 MG: 325 (65 FE) TAB at 09:12

## 2020-03-08 RX ADMIN — FERROUS SULFATE TAB 325 MG (65 MG ELEMENTAL FE) 325 MG: 325 (65 FE) TAB at 18:00

## 2020-03-08 RX ADMIN — INSULIN LISPRO 1 UNITS: 100 INJECTION, SOLUTION INTRAVENOUS; SUBCUTANEOUS at 12:09

## 2020-03-08 RX ADMIN — SIMVASTATIN 20 MG: 20 TABLET, FILM COATED ORAL at 21:27

## 2020-03-08 RX ADMIN — ANTACID TABLETS 500 MG: 500 TABLET, CHEWABLE ORAL at 05:17

## 2020-03-08 RX ADMIN — INSULIN LISPRO 1 UNITS: 100 INJECTION, SOLUTION INTRAVENOUS; SUBCUTANEOUS at 05:31

## 2020-03-08 RX ADMIN — FOLIC ACID 5 MG: 1 TABLET ORAL at 05:17

## 2020-03-08 RX ADMIN — Medication: at 18:29

## 2020-03-08 RX ADMIN — METOPROLOL TARTRATE 12.5 MG: 25 TABLET, FILM COATED ORAL at 05:18

## 2020-03-08 RX ADMIN — DOCUSATE SODIUM 100 MG: 100 CAPSULE, LIQUID FILLED ORAL at 05:19

## 2020-03-08 RX ADMIN — ANTACID TABLETS 500 MG: 500 TABLET, CHEWABLE ORAL at 18:00

## 2020-03-08 RX ADMIN — SODIUM BICARBONATE 650 MG: 650 TABLET ORAL at 18:01

## 2020-03-08 RX ADMIN — HEPARIN SODIUM 5000 UNITS: 5000 INJECTION, SOLUTION INTRAVENOUS; SUBCUTANEOUS at 18:01

## 2020-03-08 RX ADMIN — SENNOSIDES AND DOCUSATE SODIUM 1 TABLET: 8.6; 5 TABLET ORAL at 21:27

## 2020-03-08 RX ADMIN — INSULIN GLARGINE 16 UNITS: 100 INJECTION, SOLUTION SUBCUTANEOUS at 18:07

## 2020-03-08 RX ADMIN — OMEPRAZOLE 20 MG: 20 CAPSULE, DELAYED RELEASE ORAL at 05:17

## 2020-03-08 RX ADMIN — SODIUM BICARBONATE 650 MG: 650 TABLET ORAL at 05:21

## 2020-03-08 RX ADMIN — POLYETHYLENE GLYCOL 3350 1 PACKET: 17 POWDER, FOR SOLUTION ORAL at 05:19

## 2020-03-08 ASSESSMENT — ENCOUNTER SYMPTOMS
VOMITING: 0
MYALGIAS: 1
SENSORY CHANGE: 0
FEVER: 0
DIARRHEA: 0
COUGH: 0
SHORTNESS OF BREATH: 0
TREMORS: 0
SPEECH CHANGE: 0
STRIDOR: 0
FLANK PAIN: 0
CHILLS: 0
NECK PAIN: 1
TINGLING: 1
PALPITATIONS: 0
WEAKNESS: 1
BACK PAIN: 0
DIAPHORESIS: 0
ABDOMINAL PAIN: 0

## 2020-03-08 ASSESSMENT — FIBROSIS 4 INDEX: FIB4 SCORE: 3.38

## 2020-03-08 NOTE — PROGRESS NOTES
Redness to BLEXT,productive cough,when I asked him to take a deep breath he exhahales instead not really able to get good breath sounds,will try again,will continue to monitor,up to chair at this time.

## 2020-03-08 NOTE — PROGRESS NOTES
Progress Note    Subjective:  C/o Bilateral arm tingling, no pain, states his hands feel cold  C/o R shoulder pain since prior to surgery  Ongoing d/c planning    Exam:  Incision with Prevena in place, no drainage in canister  NM: R> L trapezius pain beyond 90 degrees, ecchymosis at varius stages of healing over right trapezius. Able to lift RUE to 90 degrees ROM   RUE: B/T 4+/5,  4+/5  LUE: 4/5 bicep, tricep, deltoid, handgrip  Sensation intact and equal C4-T1  LEs: 5/5 dorsiflexion, plantar flexion  + Wet cough  Not hooked up to Pulse O2   Spirometer to < 1000   Sensation/motors intact distally    BP  Min: 117/44  Max: 129/48  Pulse  Av.3  Min: 79  Max: 88  Resp  Av.8  Min: 16  Max: 18  Temp  Av.9 °C (98.4 °F)  Min: 36.7 °C (98 °F)  Max: 36.9 °C (98.5 °F)  SpO2  Av.3 %  Min: 96 %  Max: 100 %    No data recorded    Recent Labs     20  0513 20  0453 20  0424   WBC 4.3* 6.9 5.7   RBC 2.79* 2.67* 2.39*   HEMOGLOBIN 8.9* 8.6* 7.6*   HEMATOCRIT 26.8* 25.7* 22.7*   MCV 97.5 96.3 95.0   MCH 31.9 32.2 31.8   MCHC 32.7* 33.5* 33.5*   RDW 62.1* 61.1* 59.3*   PLATELETCT 115* 141* 134*   MPV 9.4 9.1 9.3     Recent Labs     20  0513 20  0453   SODIUM 134* 136   POTASSIUM 4.1 4.6   CHLORIDE 102 104   CO2 23 23   GLUCOSE 129* 140*   BUN 47* 43*   CREATININE 2.79* 2.43*   CALCIUM 8.0* 8.5               Intake/Output       20 - 2059 20 - 2059       Total [id number] 0658-4813 Total       Intake    P.O.  240  480 720  --  -- --    P.O. 240 480 720 -- -- --    Total Intake 240 480 720 -- -- --       Output    Urine  --  800 800  200  -- 200    Number of Times Voided 1 x 3 x 4 x -- -- --    Urine Void (mL) -- 800 800 200 -- 200    Total Output -- 800 800 200 -- 200       Net I/O     240 -320 -80 -200 -- -200            Intake/Output Summary (Last 24 hours) at 3/8/2020 1249  Last data filed at 3/8/2020 0900  Gross per 24 hour    Intake 720 ml   Output 1000 ml   Net -280 ml            • ferrous sulfate  325 mg BID WITH MEALS   • ammonium lactate   BID   • simethicone  80 mg TID PRN   • cyanocobalamin  1,000 mcg DAILY   • folic acid  5 mg DAILY   • insulin glargine  16 Units Q EVENING   • lidocaine  1 Patch Q24HR   • heparin  5,000 Units Q12HRS   • epoetin prema  2,000 Units MO, WE + FR   • omeprazole  20 mg DAILY   • magnesium hydroxide  30 mL DAILY   • polyethylene glycol/lytes  1 Packet DAILY   • insulin lispro  1-6 Units 4X/DAY ACHS    And   • glucose  16 g Q15 MIN PRN    And   • dextrose 10% bolus  250 mL Q15 MIN PRN   • sodium bicarbonate  650 mg TID   • simvastatin  20 mg Nightly   • metoprolol  12.5 mg BID   • Pharmacy Consult Request  1 Each PHARMACY TO DOSE   • MD ALERT...DO NOT ADMINISTER NSAIDS or ASPIRIN unless ORDERED By Neurosurgery  1 Each PRN   • docusate sodium  100 mg BID   • senna-docusate  1 Tab Nightly   • senna-docusate  1 Tab Q24HRS PRN   • bisacodyl  10 mg Q24HRS PRN   • pioglitazone  30 mg DAILY   • oxyCODONE immediate-release  5 mg Q3HRS PRN   • HYDROcodone-acetaminophen  1-2 Tab Q6HRS PRN   • diphenhydrAMINE  25 mg Q6HRS PRN    Or   • diphenhydrAMINE  25 mg Q6HRS PRN   • ondansetron  4 mg Q4HRS PRN   • ondansetron  4 mg Q4HRS PRN   • methocarbamol  750 mg Q8HRS PRN   • cloNIDine  0.1 mg Q4HRS PRN   • benzocaine-menthol  1 Lozenge Q2HRS PRN   • calcium carbonate  500 mg BID   • vitamin D  5,000 Units DAILY       Assessment and Plan:  POD #9 s/p C3-6 laminectomy and posterolateral fusion  for cervical stenosis/myelopathy: post op xrays satisfactory  Patient neurologically stable  OK to d/c Prevena today or tomorrow (will start beeping once completed)  Pt has cough - Recommend watching O2 saturation with pulse O2, continue IS, defer to primary team for recommendations and workup. Pt denies SOB    RUE pain and swelling: trapezius eccymosis improving, pain  resolving, Lidoderm patch in place , edema resolved, US  Doppler negative    Prophylactic anticoagulation: yes         Start date/time: POD 1 started on Heparin 5000 units subQ bid     Hospitalist following for HTN, DM, CKD, thrombocytopenia/anemia    Discharge Planning: SNF or rehab, consult placed. Will ask for update tomorrow from SW/care coordinator     Appreciate continued hospitalist assistance.     Case D/w Dr. Watters

## 2020-03-08 NOTE — PROGRESS NOTES
Hospital Medicine Daily Progress Note    Date of Service  3/8/2020    Chief Complaint  79 y.o. male admitted 2/28/2020 with neck pain.     Hospital Course      Patient with hx of DM , Hypertension, CKD 4 admitted following a cervical neck injury. Findings of severe cervical spondylosis, myelopathy and foraminal stenosis.  Patient underwent cervical laminectomy,  decompression and fusion on 2-28.  Hospitalist service consulted for Diabetes, IM issues management.       Interval Problem Update  Hemodynamically stable overnight heart rate 80s, blood pressure within normal limits and saturating well on room air this morning.  Blood sugars reasonably controlled 150s.  Needs SNF, care coordination working on placement,  potential discharge Monday if okay with neurosurgery/drains and vacs are out  Renal function stable at stage IV, he has follow-up with nephrology.   Discussed with patient, patient's nurse       Consultants/Specialty  Hospitalist for NS      Code Status   Full code        Disposition  Needs SNF Vs Rehab, likely go on Monday     Review of Systems  Review of Systems   Constitutional: Negative for chills, diaphoresis, fever and malaise/fatigue.   HENT: Negative for congestion.    Respiratory: Negative for cough, shortness of breath and stridor.    Cardiovascular: Negative for chest pain and palpitations.   Gastrointestinal: Negative for abdominal pain, diarrhea and vomiting.   Genitourinary: Negative for flank pain and hematuria.   Musculoskeletal: Positive for myalgias and neck pain. Negative for back pain and joint pain.   Skin: Positive for rash (Chronic erythema bilateral lower extremities).   Neurological: Positive for tingling and weakness (generalized ). Negative for tremors, sensory change and speech change.      Physical Exam  Temp:  [36.7 °C (98 °F)-36.9 °C (98.5 °F)] 36.9 °C (98.5 °F)  Pulse:  [79-88] 88  Resp:  [16-18] 16  BP: (117-129)/(44-52) 117/44  SpO2:  [96 %-100 %] 100 %    Physical  Exam  Constitutional:       General: He is not in acute distress.     Appearance: He is not diaphoretic.   HENT:      Head: Normocephalic and atraumatic.      Right Ear: External ear normal.      Left Ear: External ear normal.      Nose: Nose normal.   Eyes:      Extraocular Movements: Extraocular movements intact.      Conjunctiva/sclera: Conjunctivae normal.   Neck:      Musculoskeletal: Neck rigidity present.      Comments: Cervical collar in place.   Hemovac, Chapis in place .  Cardiovascular:      Rate and Rhythm: Normal rate and regular rhythm.      Heart sounds: No murmur.   Pulmonary:      Breath sounds: No stridor. No wheezing, rhonchi or rales.   Abdominal:      General: There is no distension.      Palpations: Abdomen is soft.      Tenderness: There is no abdominal tenderness.   Musculoskeletal:         General: No swelling.      Comments: Bilateral lower extremity erythema, chronic   Skin:     General: Skin is dry.   Neurological:      Mental Status: He is alert.   Psychiatric:         Mood and Affect: Mood normal.       Fluids    Intake/Output Summary (Last 24 hours) at 3/8/2020 1250  Last data filed at 3/8/2020 0900  Gross per 24 hour   Intake 720 ml   Output 1000 ml   Net -280 ml       Laboratory  Recent Labs     03/06/20  0513 03/07/20  0453 03/08/20  0424   WBC 4.3* 6.9 5.7   RBC 2.79* 2.67* 2.39*   HEMOGLOBIN 8.9* 8.6* 7.6*   HEMATOCRIT 26.8* 25.7* 22.7*   MCV 97.5 96.3 95.0   MCH 31.9 32.2 31.8   MCHC 32.7* 33.5* 33.5*   RDW 62.1* 61.1* 59.3*   PLATELETCT 115* 141* 134*   MPV 9.4 9.1 9.3     Recent Labs     03/06/20  0513 03/07/20  0453   SODIUM 134* 136   POTASSIUM 4.1 4.6   CHLORIDE 102 104   CO2 23 23   GLUCOSE 129* 140*   BUN 47* 43*   CREATININE 2.79* 2.43*   CALCIUM 8.0* 8.5                   Imaging  [id number] VIEW   Final Result      No evidence of bowel obstruction.   Possible constipation.                  DX-CERVICAL SPINE-2 OR 3 VIEWS   Final Result      1.  There has been  interval posterior decompression with andrew and screw fixation extending from C3 through C6 levels.      US-EXTREMITY VENOUS UPPER UNILAT RIGHT   Final Result      US-RENAL   Final Result      Bilateral mild hydronephrosis.      Atrophic left kidney.      DX-PORTABLE FLUORO > 1 HOUR   Final Result      Portable fluoroscopy utilized for 6 seconds.         INTERPRETING LOCATION: [address] [place] [address], [place], [id number]      DX-CERVICAL SPINE-2 OR 3 VIEWS   Final Result      Intraoperative image as above described.        Assessment/Plan  * Type 2 diabetes mellitus (HCC)- (present on admission)  Assessment & Plan  With hyperglycemia  Glycated hemoglobin 5.5%   Long & short acting insulin, glargine last increased 3/4   Accu-Checks, hypoglycemia protocol            Acute on chronic renal failure (HCC)  Assessment & Plan  CKD 4,  Ultrasound with findings of bilateral hydronephrosis, mild  Check bladder US to eval for urinary retention signs of outlet obstruction.  May need anders if significant retention.    Avoid nephrotoxic medications        Central cord syndrome (HCC)- (present on admission)  Assessment & Plan  With myelopathy - Status post cervical surgery-  laminectomy,  decompression and fusion on 2-28  Stabilize with neck brace , monitor hemovac output, wound care, NSG input  Multimodal pain control  He lives alone in an upstairs unit. PT/OT, needs SNF.   Likely Monday      Thrombocytopenia (HCC)- (present on admission)  Assessment & Plan  Thrombocytopenia at this point is mild, chronic .   Monitor platelets.   Platelets have been stable around 100.     Anemia- (present on admission)  Assessment & Plan  Chronic, iron deficient  IV iron replacement   No clinical evidence for bleeding, history of colonic polyposis with colonoscopy few months ago with [facility]  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 Started epo      3/6-7 improving     Vitamin D  deficiency- (present on admission)  Assessment & Plan  Adequate levels      Hyperlipemia- (present on admission)  Assessment & Plan  Low-fat low-cholesterol diet  Resume home simvastatin     Essential hypertension- (present on admission)  Assessment & Plan  Controlled with current regimen metoprolol, and clonidine as needed.  Vital signs per policy.       VTE prophylaxis: SCDs, Heparin SC

## 2020-03-08 NOTE — PROGRESS NOTES
Handed off from Herlyn,patient is sleeping,easily aroused,even non labored breathing,no distress noted,no complaints voiced by patient,will continue to monitor.

## 2020-03-08 NOTE — CARE PLAN
Problem: Safety  Goal: Will remain free from injury  Outcome: PROGRESSING AS EXPECTED  Intervention: Provide assistance with mobility  Flowsheets (Taken 3/8/2020 0102)  Assistance: Assistance of One  Note: Fall precaution in place, bed alarm on at all times.     Problem: Skin Integrity  Goal: Risk for impaired skin integrity will decrease  Outcome: PROGRESSING AS EXPECTED  Intervention: Assess risk factors for impaired skin integrity and/or pressure ulcers  Note: Will assess incision site, prevena site.

## 2020-03-09 LAB
BASOPHILS # BLD AUTO: 0.2 % (ref 0–1.8)
BASOPHILS # BLD: 0.02 K/UL (ref 0–0.12)
EOSINOPHIL # BLD AUTO: 0.03 K/UL (ref 0–0.51)
EOSINOPHIL NFR BLD: 0.4 % (ref 0–6.9)
ERYTHROCYTE [DISTWIDTH] IN BLOOD BY AUTOMATED COUNT: 59.6 FL (ref 35.9–50)
GLUCOSE BLD-MCNC: 134 MG/DL (ref 65–99)
GLUCOSE BLD-MCNC: 194 MG/DL (ref 65–99)
GLUCOSE BLD-MCNC: 209 MG/DL (ref 65–99)
GLUCOSE BLD-MCNC: 224 MG/DL (ref 65–99)
GLUCOSE BLD-MCNC: 260 MG/DL (ref 65–99)
HCT VFR BLD AUTO: 23.1 % (ref 42–52)
HEMOCCULT STL QL: NEGATIVE
HGB BLD-MCNC: 7.5 G/DL (ref 14–18)
IMM GRANULOCYTES # BLD AUTO: 0.08 K/UL (ref 0–0.11)
IMM GRANULOCYTES NFR BLD AUTO: 1 % (ref 0–0.9)
LYMPHOCYTES # BLD AUTO: 0.66 K/UL (ref 1–4.8)
LYMPHOCYTES NFR BLD: 7.9 % (ref 22–41)
MCH RBC QN AUTO: 31.3 PG (ref 27–33)
MCHC RBC AUTO-ENTMCNC: 32.5 G/DL (ref 33.7–35.3)
MCV RBC AUTO: 96.3 FL (ref 81.4–97.8)
MONOCYTES # BLD AUTO: 1.09 K/UL (ref 0–0.85)
MONOCYTES NFR BLD AUTO: 13 % (ref 0–13.4)
NEUTROPHILS # BLD AUTO: 6.51 K/UL (ref 1.82–7.42)
NEUTROPHILS NFR BLD: 77.5 % (ref 44–72)
NRBC # BLD AUTO: 0 K/UL
NRBC BLD-RTO: 0 /100 WBC
PLATELET # BLD AUTO: 152 K/UL (ref 164–446)
PMV BLD AUTO: 9.3 FL (ref 9–12.9)
RBC # BLD AUTO: 2.4 M/UL (ref 4.7–6.1)
WBC # BLD AUTO: 8.4 K/UL (ref 4.8–10.8)

## 2020-03-09 PROCEDURE — 700102 HCHG RX REV CODE 250 W/ 637 OVERRIDE(OP): Performed by: PHYSICIAN ASSISTANT

## 2020-03-09 PROCEDURE — 700111 HCHG RX REV CODE 636 W/ 250 OVERRIDE (IP): Performed by: NURSE PRACTITIONER

## 2020-03-09 PROCEDURE — 36415 COLL VENOUS BLD VENIPUNCTURE: CPT

## 2020-03-09 PROCEDURE — A9270 NON-COVERED ITEM OR SERVICE: HCPCS | Performed by: HOSPITALIST

## 2020-03-09 PROCEDURE — 700101 HCHG RX REV CODE 250: Performed by: NURSE PRACTITIONER

## 2020-03-09 PROCEDURE — 700111 HCHG RX REV CODE 636 W/ 250 OVERRIDE (IP): Performed by: HOSPITALIST

## 2020-03-09 PROCEDURE — 99232 SBSQ HOSP IP/OBS MODERATE 35: CPT | Performed by: HOSPITALIST

## 2020-03-09 PROCEDURE — 700102 HCHG RX REV CODE 250 W/ 637 OVERRIDE(OP): Performed by: HOSPITALIST

## 2020-03-09 PROCEDURE — 700112 HCHG RX REV CODE 229: Performed by: PHYSICIAN ASSISTANT

## 2020-03-09 PROCEDURE — A9270 NON-COVERED ITEM OR SERVICE: HCPCS | Performed by: PHYSICIAN ASSISTANT

## 2020-03-09 PROCEDURE — 770001 HCHG ROOM/CARE - MED/SURG/GYN PRIV*

## 2020-03-09 PROCEDURE — 82962 GLUCOSE BLOOD TEST: CPT

## 2020-03-09 PROCEDURE — 82272 OCCULT BLD FECES 1-3 TESTS: CPT

## 2020-03-09 PROCEDURE — 85025 COMPLETE CBC W/AUTO DIFF WBC: CPT

## 2020-03-09 RX ADMIN — INSULIN LISPRO 2 UNITS: 100 INJECTION, SOLUTION INTRAVENOUS; SUBCUTANEOUS at 21:43

## 2020-03-09 RX ADMIN — DOCUSATE SODIUM 100 MG: 100 CAPSULE, LIQUID FILLED ORAL at 17:06

## 2020-03-09 RX ADMIN — METHOCARBAMOL 750 MG: 750 TABLET, FILM COATED ORAL at 21:49

## 2020-03-09 RX ADMIN — LIDOCAINE 1 PATCH: 50 PATCH TOPICAL at 11:25

## 2020-03-09 RX ADMIN — ANTACID TABLETS 500 MG: 500 TABLET, CHEWABLE ORAL at 07:52

## 2020-03-09 RX ADMIN — ANTACID TABLETS 500 MG: 500 TABLET, CHEWABLE ORAL at 17:07

## 2020-03-09 RX ADMIN — CYANOCOBALAMIN TAB 500 MCG 1000 MCG: 500 TAB at 07:51

## 2020-03-09 RX ADMIN — MAGNESIUM HYDROXIDE 30 ML: 400 SUSPENSION ORAL at 07:52

## 2020-03-09 RX ADMIN — HEPARIN SODIUM 5000 UNITS: 5000 INJECTION, SOLUTION INTRAVENOUS; SUBCUTANEOUS at 07:52

## 2020-03-09 RX ADMIN — DOCUSATE SODIUM 100 MG: 100 CAPSULE, LIQUID FILLED ORAL at 07:51

## 2020-03-09 RX ADMIN — INSULIN LISPRO 3 UNITS: 100 INJECTION, SOLUTION INTRAVENOUS; SUBCUTANEOUS at 18:26

## 2020-03-09 RX ADMIN — OXYCODONE HYDROCHLORIDE 5 MG: 5 TABLET ORAL at 17:11

## 2020-03-09 RX ADMIN — SENNOSIDES AND DOCUSATE SODIUM 1 TABLET: 8.6; 5 TABLET ORAL at 21:00

## 2020-03-09 RX ADMIN — OXYCODONE HYDROCHLORIDE 5 MG: 5 TABLET ORAL at 20:44

## 2020-03-09 RX ADMIN — OXYCODONE HYDROCHLORIDE 5 MG: 5 TABLET ORAL at 11:25

## 2020-03-09 RX ADMIN — FOLIC ACID 5 MG: 1 TABLET ORAL at 07:51

## 2020-03-09 RX ADMIN — INSULIN GLARGINE 16 UNITS: 100 INJECTION, SOLUTION SUBCUTANEOUS at 18:27

## 2020-03-09 RX ADMIN — OMEPRAZOLE 20 MG: 20 CAPSULE, DELAYED RELEASE ORAL at 07:51

## 2020-03-09 RX ADMIN — FERROUS SULFATE TAB 325 MG (65 MG ELEMENTAL FE) 325 MG: 325 (65 FE) TAB at 17:06

## 2020-03-09 RX ADMIN — EPOETIN ALFA 2000 UNITS: 2000 SOLUTION INTRAVENOUS; SUBCUTANEOUS at 08:38

## 2020-03-09 RX ADMIN — MELATONIN 5000 UNITS: at 07:51

## 2020-03-09 RX ADMIN — POLYETHYLENE GLYCOL 3350 1 PACKET: 17 POWDER, FOR SOLUTION ORAL at 07:52

## 2020-03-09 RX ADMIN — INSULIN LISPRO 1 UNITS: 100 INJECTION, SOLUTION INTRAVENOUS; SUBCUTANEOUS at 13:08

## 2020-03-09 RX ADMIN — SIMVASTATIN 20 MG: 20 TABLET, FILM COATED ORAL at 21:00

## 2020-03-09 RX ADMIN — PIOGLITAZONE 30 MG: 30 TABLET ORAL at 07:51

## 2020-03-09 RX ADMIN — Medication: at 17:06

## 2020-03-09 RX ADMIN — SODIUM BICARBONATE 650 MG: 650 TABLET ORAL at 07:51

## 2020-03-09 RX ADMIN — SODIUM BICARBONATE 650 MG: 650 TABLET ORAL at 11:25

## 2020-03-09 RX ADMIN — FERROUS SULFATE TAB 325 MG (65 MG ELEMENTAL FE) 325 MG: 325 (65 FE) TAB at 07:51

## 2020-03-09 RX ADMIN — SODIUM BICARBONATE 650 MG: 650 TABLET ORAL at 17:06

## 2020-03-09 RX ADMIN — METOPROLOL TARTRATE 12.5 MG: 25 TABLET, FILM COATED ORAL at 07:52

## 2020-03-09 ASSESSMENT — ENCOUNTER SYMPTOMS
TINGLING: 1
BACK PAIN: 0
TREMORS: 0
STRIDOR: 0
DIARRHEA: 0
FEVER: 0
SHORTNESS OF BREATH: 0
SPEECH CHANGE: 0
CONSTIPATION: 1
FLANK PAIN: 0
PALPITATIONS: 0
MYALGIAS: 1
SENSORY CHANGE: 0
COUGH: 0
CHILLS: 0
ABDOMINAL PAIN: 0
DIAPHORESIS: 0
NECK PAIN: 1
VOMITING: 0

## 2020-03-09 NOTE — PROGRESS NOTES
Hospital Medicine Daily Progress Note    Date of Service  3/9/2020    Chief Complaint  79 y.o. male admitted 2/28/2020 with neck pain.     Hospital Course      Patient with hx of DM , Hypertension, CKD 4 admitted following a cervical neck injury. Findings of severe cervical spondylosis, myelopathy and foraminal stenosis.  Patient underwent cervical laminectomy,  decompression and fusion on 2-28.  Hospitalist service consulted for Diabetes, IM issues management.       Interval Problem Update  Saturating well on room air and blood pressure within normal limits this morning.  Constipated, advancing bowel protocol, will consider magnesium citrate  Hemoglobin dropping slowly, occult blood in stool have been ordered.  We will hold heparin for now.  May need GI consult if positive.  Patient reports that [facility]  Blood sugars 130s, continue current regimen.  Discussed with patient, patient's nurse and with multidisciplinary team during rounds including , pharmacist and charge nurse.      Consultants/Specialty  Hospitalist for NS      Code Status   Full code        Disposition  Following on hemoglobin stability and checking occult blood in stool   Needs SNF Vs Rehab     Review of Systems  Review of Systems   Constitutional: Positive for malaise/fatigue. Negative for chills, diaphoresis and fever.   HENT: Negative for congestion.    Respiratory: Negative for cough, shortness of breath and stridor.    Cardiovascular: Negative for chest pain and palpitations.   Gastrointestinal: Positive for constipation. Negative for abdominal pain, diarrhea and vomiting.   Genitourinary: Negative for flank pain and hematuria.   Musculoskeletal: Positive for myalgias and neck pain. Negative for back pain and joint pain.   Skin: Positive for rash (Chronic erythema bilateral lower extremities).   Neurological: Positive for tingling. Negative for tremors, sensory change and speech change.       Physical Exam  Temp:  [37.2 °C (99 °F)-37.7 °C (99.8 °F)] 37.4 °C (99.3 °F)  Pulse:  [71-84] 71  Resp:  [16-17] 16  BP: (109-139)/(42-75) 139/75  SpO2:  [98 %-100 %] 98 %    Physical Exam  Constitutional:       General: He is not in acute distress.     Appearance: He is not diaphoretic.   HENT:      Head: Normocephalic and atraumatic.      Right Ear: External ear normal.      Left Ear: External ear normal.      Nose: Nose normal.   Eyes:      Extraocular Movements: Extraocular movements intact.      Conjunctiva/sclera: Conjunctivae normal.   Neck:      Musculoskeletal: Neck rigidity present.      Comments: Cervical collar in place.   Hemovac, Chapis in place .  Cardiovascular:      Rate and Rhythm: Normal rate and regular rhythm.      Heart sounds: No murmur.   Pulmonary:      Breath sounds: No stridor. No wheezing, rhonchi or rales.   Abdominal:      General: There is no distension.      Palpations: Abdomen is soft.      Tenderness: There is no abdominal tenderness.   Musculoskeletal:         General: No swelling.      Comments: Bilateral lower extremity erythema, chronic   Skin:     General: Skin is dry.      Coloration: Skin is pale.   Neurological:      Mental Status: He is alert.   Psychiatric:         Mood and Affect: Mood normal.       Fluids    Intake/Output Summary (Last 24 hours) at 3/9/2020 1559  Last data filed at 3/9/2020 1125  Gross per 24 hour   Intake --   Output 700 ml   Net -700 ml       Laboratory  Recent Labs     03/07/20  0453 03/08/20  0424 03/09/20  0423   WBC 6.9 5.7 8.4   RBC 2.67* 2.39* 2.40*   HEMOGLOBIN 8.6* 7.6* 7.5*   HEMATOCRIT 25.7* 22.7* 23.1*   MCV 96.3 95.0 96.3   MCH 32.2 31.8 31.3   MCHC 33.5* 33.5* 32.5*   RDW 61.1* 59.3* 59.6*   PLATELETCT 141* 134* 152*   MPV 9.1 9.3 9.3     Recent Labs     03/07/20  0453   SODIUM 136   POTASSIUM 4.6   CHLORIDE 104   CO2 23   GLUCOSE 140*   BUN 43*   CREATININE 2.43*   CALCIUM 8.5                   Imaging  [id number] VIEW   Final Result       No evidence of bowel obstruction.   Possible constipation.                  DX-CERVICAL SPINE-2 OR 3 VIEWS   Final Result      1.  There has been interval posterior decompression with andrew and screw fixation extending from C3 through C6 levels.      US-EXTREMITY VENOUS UPPER UNILAT RIGHT   Final Result      US-RENAL   Final Result      Bilateral mild hydronephrosis.      Atrophic left kidney.      DX-PORTABLE FLUORO > 1 HOUR   Final Result      Portable fluoroscopy utilized for 6 seconds.         INTERPRETING LOCATION: [address] [place] [address], [facility], [id number]      DX-CERVICAL SPINE-2 OR 3 VIEWS   Final Result      Intraoperative image as above described.        Assessment/Plan  * Type 2 diabetes mellitus (HCC)- (present on admission)  Assessment & Plan  With hyperglycemia  Glycated hemoglobin 5.5%   Long & short acting insulin, glargine last increased 3/4   Accu-Checks, hypoglycemia protocol            Acute on chronic renal failure (HCC)  Assessment & Plan  CKD 4,  Ultrasound with findings of bilateral hydronephrosis, mild  Check bladder US to eval for urinary retention signs of outlet obstruction.  May need anders if significant retention.    Avoid nephrotoxic medications         Central cord syndrome (HCC)- (present on admission)  Assessment & Plan  With myelopathy - Status post cervical surgery-  laminectomy,  decompression and fusion on 2-28  Stabilize with neck brace , monitor hemovac output, wound care, NSG input  Multimodal pain control  He lives alone in an upstairs unit. PT/OT, needs SNF.       Thrombocytopenia (HCC)- (present on admission)  Assessment & Plan  Thrombocytopenia at this point is mild, chronic .   Monitor platelets.   Platelets have been stable around 100.     Anemia- (present on admission)  Assessment & Plan  Chronic, iron deficient  IV iron replacement   No clinical evidence for bleeding, history of colonic polyposis with colonoscopy few months ago with [facility].  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 Started epo      3/9, Hb dropping slowly, pending occult blood in stool, may need GI consult if positive, patient has been constipated.     Vitamin D deficiency- (present on admission)  Assessment & Plan  Adequate levels      Hyperlipemia- (present on admission)  Assessment & Plan  Low-fat low-cholesterol diet  Resume home simvastatin     Essential hypertension- (present on admission)  Assessment & Plan  Controlled with current regimen metoprolol, and clonidine as needed.  Vital signs per policy.       VTE prophylaxis: SCDs, holding heparin for severe anemia pending occult blood in stool

## 2020-03-09 NOTE — CARE PLAN
Problem: Infection  Goal: Will remain free from infection  Outcome: PROGRESSING AS EXPECTED  Note: Assessed patient for signs and symptoms of infection.  Reviewed hand hygiene with patient.  Foamed in/out.     Problem: Skin Integrity  Goal: Risk for impaired skin integrity will decrease  Outcome: PROGRESSING SLOWER THAN EXPECTED  Note: Placed wound consult for pressure injury found under C-collar.  Placed mepitel on wound and mepilex onto C-collar.

## 2020-03-09 NOTE — PROGRESS NOTES
0715 Received report from night ABDULAZIZ Fuentes, ASHLEY discussed. Call light in place, bed lowered and locked, bed alarm on. Encourage pt to call if needed anything. Pt sitting up on a chair up for meals.

## 2020-03-09 NOTE — PROGRESS NOTES
Progress Note    Subjective:  C/o Bilateral arm tingling, no pain, states his hands feel cold  C/o R shoulder pain since prior to surgery  Ongoing d/c planning    Exam:  Incision with Prevena in place, no drainage in canister  NM: R> L trapezius pain beyond 90 degrees, ecchymosis at varius stages of healing over right trapezius. Able to lift RUE to 90 degrees ROM   RUE: B/T 4+/5,  4+/5  LUE: 4/5 bicep, tricep, deltoid, handgrip  Sensation intact and equal C4-T1  LEs: 5/5 dorsiflexion, plantar flexion  + Wet cough  Not hooked up to Pulse O2   Spirometer to < 1000   Sensation/motors intact distally    BP  Min: 109/42  Max: 139/75  Pulse  Av  Min: 71  Max: 84  Resp  Av.3  Min: 16  Max: 17  Temp  Av.4 °C (99.3 °F)  Min: 37.2 °C (99 °F)  Max: 37.7 °C (99.8 °F)  SpO2  Av.3 %  Min: 98 %  Max: 100 %    No data recorded    Recent Labs     20  0453 20  0424 20  0423   WBC 6.9 5.7 8.4   RBC 2.67* 2.39* 2.40*   HEMOGLOBIN 8.6* 7.6* 7.5*   HEMATOCRIT 25.7* 22.7* 23.1*   MCV 96.3 95.0 96.3   MCH 32.2 31.8 31.3   MCHC 33.5* 33.5* 32.5*   RDW 61.1* 59.3* 59.6*   PLATELETCT 141* 134* 152*   MPV 9.1 9.3 9.3     Recent Labs     20  0453   SODIUM 136   POTASSIUM 4.6   CHLORIDE 104   CO2 23   GLUCOSE 140*   BUN 43*   CREATININE 2.43*   CALCIUM 8.5               Intake/Output       20 - 20 - 03/10/20 0659       Total  Total       Intake    Total Intake -- -- -- -- -- --       Output    Urine  200  600 800  --  -- --    Number of Times Voided 1 x 3 x 4 x -- -- --    Urine Void (mL) 200 600 800 -- -- --    Stool  --  -- --  --  -- --    Number of Times Stooled 1 x -- 1 x -- -- --    Total Output 200 600 800 -- -- --       Net I/O     -200 -600 -800 -- -- --            Intake/Output Summary (Last 24 hours) at 3/9/2020 0907  Last data filed at 3/9/2020 0400  Gross per 24 hour   Intake --   Output 600 ml   Net -600 ml             • ferrous sulfate  325 mg BID WITH MEALS   • ammonium lactate   BID   • simethicone  80 mg TID PRN   • cyanocobalamin  1,000 mcg DAILY   • folic acid  5 mg DAILY   • insulin glargine  16 Units Q EVENING   • lidocaine  1 Patch Q24HR   • heparin  5,000 Units Q12HRS   • epoetin prema  2,000 Units MO, WE + FR   • omeprazole  20 mg DAILY   • magnesium hydroxide  30 mL DAILY   • polyethylene glycol/lytes  1 Packet DAILY   • insulin lispro  1-6 Units 4X/DAY ACHS    And   • glucose  16 g Q15 MIN PRN    And   • dextrose 10% bolus  250 mL Q15 MIN PRN   • sodium bicarbonate  650 mg TID   • simvastatin  20 mg Nightly   • metoprolol  12.5 mg BID   • Pharmacy Consult Request  1 Each PHARMACY TO DOSE   • MD ALERT...DO NOT ADMINISTER NSAIDS or ASPIRIN unless ORDERED By Neurosurgery  1 Each PRN   • docusate sodium  100 mg BID   • senna-docusate  1 Tab Nightly   • senna-docusate  1 Tab Q24HRS PRN   • bisacodyl  10 mg Q24HRS PRN   • pioglitazone  30 mg DAILY   • oxyCODONE immediate-release  5 mg Q3HRS PRN   • HYDROcodone-acetaminophen  1-2 Tab Q6HRS PRN   • diphenhydrAMINE  25 mg Q6HRS PRN    Or   • diphenhydrAMINE  25 mg Q6HRS PRN   • ondansetron  4 mg Q4HRS PRN   • ondansetron  4 mg Q4HRS PRN   • methocarbamol  750 mg Q8HRS PRN   • cloNIDine  0.1 mg Q4HRS PRN   • benzocaine-menthol  1 Lozenge Q2HRS PRN   • calcium carbonate  500 mg BID   • vitamin D  5,000 Units DAILY       Assessment and Plan:  POD #10 s/p C3-6 laminectomy and posterolateral fusion  for cervical stenosis/myelopathy: post op xrays satisfactory  Patient neurologically stable  + Cough, ongoing  RUE pain and swelling: trapezius eccymosis improving, pain  resolving, Lidoderm patch in place , edema resolved, US Doppler negative    Prophylactic anticoagulation: yes, already on DVT Ppx    Hospitalist following for HTN, DM, CKD, thrombocytopenia/anemia    Discharge Planning: SNF or rehab, consult placed. Will ask for update tomorrow from SW/care coordinator   Post  op meds in chart  D/w hospitalist - they will coordinate d/c meds and instructions for other comorbidities.   Cleared to d/c from neurosurgery standpoint.     Appreciate continued hospitalist assistance.     Case D/w Dr. Watters

## 2020-03-10 LAB
GLUCOSE BLD-MCNC: 124 MG/DL (ref 65–99)
GLUCOSE BLD-MCNC: 174 MG/DL (ref 65–99)
GLUCOSE BLD-MCNC: 197 MG/DL (ref 65–99)
GLUCOSE BLD-MCNC: 209 MG/DL (ref 65–99)

## 2020-03-10 PROCEDURE — A9270 NON-COVERED ITEM OR SERVICE: HCPCS | Performed by: HOSPITALIST

## 2020-03-10 PROCEDURE — 770001 HCHG ROOM/CARE - MED/SURG/GYN PRIV*

## 2020-03-10 PROCEDURE — 97530 THERAPEUTIC ACTIVITIES: CPT

## 2020-03-10 PROCEDURE — 99232 SBSQ HOSP IP/OBS MODERATE 35: CPT | Performed by: INTERNAL MEDICINE

## 2020-03-10 PROCEDURE — A9270 NON-COVERED ITEM OR SERVICE: HCPCS | Performed by: PHYSICIAN ASSISTANT

## 2020-03-10 PROCEDURE — 700102 HCHG RX REV CODE 250 W/ 637 OVERRIDE(OP): Performed by: PHYSICIAN ASSISTANT

## 2020-03-10 PROCEDURE — 97535 SELF CARE MNGMENT TRAINING: CPT

## 2020-03-10 PROCEDURE — 700102 HCHG RX REV CODE 250 W/ 637 OVERRIDE(OP): Performed by: HOSPITALIST

## 2020-03-10 PROCEDURE — 82962 GLUCOSE BLOOD TEST: CPT | Mod: 91

## 2020-03-10 PROCEDURE — 700112 HCHG RX REV CODE 229: Performed by: PHYSICIAN ASSISTANT

## 2020-03-10 RX ADMIN — METOPROLOL TARTRATE 12.5 MG: 25 TABLET, FILM COATED ORAL at 17:55

## 2020-03-10 RX ADMIN — FOLIC ACID 5 MG: 1 TABLET ORAL at 05:12

## 2020-03-10 RX ADMIN — SENNOSIDES AND DOCUSATE SODIUM 1 TABLET: 8.6; 5 TABLET ORAL at 21:11

## 2020-03-10 RX ADMIN — SODIUM BICARBONATE 650 MG: 650 TABLET ORAL at 05:13

## 2020-03-10 RX ADMIN — INSULIN LISPRO 1 UNITS: 100 INJECTION, SOLUTION INTRAVENOUS; SUBCUTANEOUS at 13:05

## 2020-03-10 RX ADMIN — PIOGLITAZONE 30 MG: 30 TABLET ORAL at 05:12

## 2020-03-10 RX ADMIN — SIMVASTATIN 20 MG: 20 TABLET, FILM COATED ORAL at 21:11

## 2020-03-10 RX ADMIN — OXYCODONE HYDROCHLORIDE 5 MG: 5 TABLET ORAL at 05:12

## 2020-03-10 RX ADMIN — POLYETHYLENE GLYCOL 3350 1 PACKET: 17 POWDER, FOR SOLUTION ORAL at 05:13

## 2020-03-10 RX ADMIN — SODIUM BICARBONATE 650 MG: 650 TABLET ORAL at 12:59

## 2020-03-10 RX ADMIN — MELATONIN 5000 UNITS: at 05:13

## 2020-03-10 RX ADMIN — ANTACID TABLETS 500 MG: 500 TABLET, CHEWABLE ORAL at 05:12

## 2020-03-10 RX ADMIN — SODIUM BICARBONATE 650 MG: 650 TABLET ORAL at 17:55

## 2020-03-10 RX ADMIN — INSULIN GLARGINE 16 UNITS: 100 INJECTION, SOLUTION SUBCUTANEOUS at 18:02

## 2020-03-10 RX ADMIN — Medication: at 17:56

## 2020-03-10 RX ADMIN — DOCUSATE SODIUM 100 MG: 100 CAPSULE, LIQUID FILLED ORAL at 17:55

## 2020-03-10 RX ADMIN — OMEPRAZOLE 20 MG: 20 CAPSULE, DELAYED RELEASE ORAL at 05:13

## 2020-03-10 RX ADMIN — Medication: at 05:13

## 2020-03-10 RX ADMIN — INSULIN LISPRO 1 UNITS: 100 INJECTION, SOLUTION INTRAVENOUS; SUBCUTANEOUS at 18:01

## 2020-03-10 RX ADMIN — FERROUS SULFATE TAB 325 MG (65 MG ELEMENTAL FE) 325 MG: 325 (65 FE) TAB at 09:13

## 2020-03-10 RX ADMIN — DOCUSATE SODIUM 100 MG: 100 CAPSULE, LIQUID FILLED ORAL at 05:12

## 2020-03-10 RX ADMIN — FERROUS SULFATE TAB 325 MG (65 MG ELEMENTAL FE) 325 MG: 325 (65 FE) TAB at 17:56

## 2020-03-10 RX ADMIN — INSULIN LISPRO 2 UNITS: 100 INJECTION, SOLUTION INTRAVENOUS; SUBCUTANEOUS at 21:14

## 2020-03-10 RX ADMIN — MAGNESIUM HYDROXIDE 30 ML: 400 SUSPENSION ORAL at 05:12

## 2020-03-10 RX ADMIN — ANTACID TABLETS 500 MG: 500 TABLET, CHEWABLE ORAL at 17:55

## 2020-03-10 RX ADMIN — CYANOCOBALAMIN TAB 500 MCG 1000 MCG: 500 TAB at 05:13

## 2020-03-10 ASSESSMENT — ENCOUNTER SYMPTOMS
SHORTNESS OF BREATH: 0
COUGH: 0
PALPITATIONS: 0
STRIDOR: 0
MYALGIAS: 1
ABDOMINAL PAIN: 0
CHILLS: 0
DIARRHEA: 0
BACK PAIN: 0
TINGLING: 1
VOMITING: 0
FLANK PAIN: 0
DIAPHORESIS: 0
TREMORS: 0
NECK PAIN: 1
SPEECH CHANGE: 0
FEVER: 0
ORTHOPNEA: 0
SENSORY CHANGE: 0
CONSTIPATION: 1

## 2020-03-10 ASSESSMENT — COGNITIVE AND FUNCTIONAL STATUS - GENERAL
DAILY ACTIVITIY SCORE: 14
PERSONAL GROOMING: A LITTLE
SUGGESTED CMS G CODE MODIFIER DAILY ACTIVITY: CK
TOILETING: A LOT
DRESSING REGULAR UPPER BODY CLOTHING: A LOT
EATING MEALS: A LITTLE
DRESSING REGULAR LOWER BODY CLOTHING: A LOT
HELP NEEDED FOR BATHING: A LOT

## 2020-03-10 NOTE — CARE PLAN
Problem: Communication  Goal: The ability to communicate needs accurately and effectively will improve  Outcome: PROGRESSING AS EXPECTED  Intervention: Educate patient and significant other/support system about the plan of care, procedures, treatments, medications and allow for questions  Note: POC, meds, discharge plans     Problem: Safety  Goal: Will remain free from falls  Outcome: PROGRESSING AS EXPECTED  Intervention: Implement fall precautions  Flowsheets (Taken 3/10/2020 0857)  Environmental Precautions:   Treaded Slipper Socks on Patient   Personal Belongings, Wastebasket, Call Bell etc. in Easy Reach   Bed in Low Position

## 2020-03-10 NOTE — THERAPY
Pt contact note:    Attempted to see pt for PT treatment. Pt stating he was just up ambulating with nursing. Pt reporting still having difficulty with supine>sitting but refusing PT at this time. Pt requesting therapist to come back another day. PT will follow up with pt.     Jennifer Lowe PT  Pager [phone]   nutrition assessment for length of stay nutrition assessment for length of stay.

## 2020-03-10 NOTE — PROGRESS NOTES
Hospital Medicine Daily Progress Note    Date of Service  3/10/2020    Chief Complaint  79 y.o. male admitted 2/28/2020 with neck pain.     Hospital Course      Patient with hx of DM , Hypertension, CKD 4 admitted following a cervical neck injury. Findings of severe cervical spondylosis, myelopathy and foraminal stenosis.  Patient underwent cervical laminectomy,  decompression and fusion on 2-28.  Hospitalist service consulted for Diabetes, IM issues management.       Interval Problem Update  Pt seen and examined, afebrile, no overnight events, no nausea or vomiting.  Pain controlled.  Hemoglobin stable, occult blood neg.  Saturating well on room air and blood pressure      Consultants/Specialty  Hospitalist for NS      Code Status   Full code        Disposition  Needs SNF Vs Rehab     Review of Systems  Review of Systems   Constitutional: Positive for malaise/fatigue. Negative for chills, diaphoresis and fever.   HENT: Negative for congestion and ear pain.    Respiratory: Negative for cough, shortness of breath and stridor.    Cardiovascular: Negative for chest pain, palpitations and orthopnea.   Gastrointestinal: Positive for constipation. Negative for abdominal pain, diarrhea and vomiting.   Genitourinary: Negative for flank pain and hematuria.   Musculoskeletal: Positive for myalgias and neck pain. Negative for back pain.   Skin: Positive for rash (Chronic erythema bilateral lower extremities).   Neurological: Positive for tingling. Negative for tremors, sensory change and speech change.      Physical Exam  Temp:  [36.6 °C (97.9 °F)-37.3 °C (99.2 °F)] 37.2 °C (98.9 °F)  Pulse:  [62-83] 69  Resp:  [15-16] 16  BP: (114-129)/(39-54) 129/52  SpO2:  [95 %-99 %] 98 %    Physical Exam  Constitutional:       General: He is not in acute distress.     Appearance: He is not diaphoretic.   HENT:      Head: Normocephalic and atraumatic.      Right Ear: External ear normal.      Left Ear: External ear normal.      Nose: Nose  normal.   Eyes:      General: No scleral icterus.     Extraocular Movements: Extraocular movements intact.      Conjunctiva/sclera: Conjunctivae normal.   Neck:      Musculoskeletal: Neck rigidity present.      Comments: Cervical collar in place.   Hemovac, Chapis in place .  Cardiovascular:      Rate and Rhythm: Normal rate and regular rhythm.      Heart sounds: No murmur.   Pulmonary:      Breath sounds: No stridor. No wheezing, rhonchi or rales.   Abdominal:      General: There is no distension.      Palpations: Abdomen is soft.      Tenderness: There is no abdominal tenderness. There is no guarding or rebound.   Musculoskeletal:         General: No swelling.      Comments: Bilateral lower extremity erythema, chronic   Skin:     General: Skin is dry.      Coloration: Skin is pale.   Neurological:      Mental Status: He is alert.   Psychiatric:         Mood and Affect: Mood normal.       Fluids    Intake/Output Summary (Last 24 hours) at 3/10/2020 1319  Last data filed at 3/10/2020 0400  Gross per 24 hour   Intake --   Output 400 ml   Net -400 ml       Laboratory  Recent Labs     03/08/20  0424 03/09/20  0423   WBC 5.7 8.4   RBC 2.39* 2.40*   HEMOGLOBIN 7.6* 7.5*   HEMATOCRIT 22.7* 23.1*   MCV 95.0 96.3   MCH 31.8 31.3   MCHC 33.5* 32.5*   RDW 59.3* 59.6*   PLATELETCT 134* 152*   MPV 9.3 9.3                       Imaging  [id number] VIEW   Final Result      No evidence of bowel obstruction.   Possible constipation.                  DX-CERVICAL SPINE-2 OR 3 VIEWS   Final Result      1.  There has been interval posterior decompression with andrew and screw fixation extending from C3 through C6 levels.      US-EXTREMITY VENOUS UPPER UNILAT RIGHT   Final Result      US-RENAL   Final Result      Bilateral mild hydronephrosis.      Atrophic left kidney.      DX-PORTABLE FLUORO > 1 HOUR   Final Result      Portable fluoroscopy utilized for 6 seconds.         INTERPRETING LOCATION: [address] [place] [address] [place], [id number]       DX-CERVICAL SPINE-2 OR 3 VIEWS   Final Result      Intraoperative image as above described.        Assessment/Plan  * Type 2 diabetes mellitus (HCC)- (present on admission)  Assessment & Plan  With hyperglycemia  Glycated hemoglobin 5.5%   Long & short acting insulin, glargine last increased 3/4   Accu-Checks, hypoglycemia protocol            Acute on chronic renal failure (HCC)  Assessment & Plan  CKD 4,  Ultrasound with findings of bilateral hydronephrosis, mild  Check bladder US to eval for urinary retention signs of outlet obstruction.  May need anders if significant retention.    Avoid nephrotoxic medications     Slowly improving       Central cord syndrome (HCC)- (present on admission)  Assessment & Plan  With myelopathy - Status post cervical surgery-  laminectomy,  decompression and fusion on 2-28  Stabilize with neck brace , monitor hemovac output, wound care, NSG input  Multimodal pain control  He lives alone in an upstairs unit. PT/OT, needs SNF.       Thrombocytopenia (HCC)- (present on admission)  Assessment & Plan  Thrombocytopenia at this point is mild, chronic .   Monitor platelets.   Platelets have been stable around 100.     Anemia- (present on admission)  Assessment & Plan  Chronic, iron deficient  IV iron replacement   No clinical evidence for bleeding, history of colonic polyposis with colonoscopy few months ago with plan [facility]  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 Started epo      Occult blood neg  Hemoglobin stable   Monitor     Vitamin D deficiency- (present on admission)  Assessment & Plan  Adequate levels      Hyperlipemia- (present on admission)  Assessment & Plan  Low-fat low-cholesterol diet  Resume home simvastatin     Essential hypertension- (present on admission)  Assessment & Plan  Controlled with current regimen metoprolol, and clonidine as needed.  Vital signs per policy.       VTE prophylaxis: SCDs, holding heparin for   anemia

## 2020-03-10 NOTE — CARE PLAN
Problem: Safety  Goal: Will remain free from falls  Outcome: PROGRESSING AS EXPECTED  Note: Assess LOC, assess environment. Maintain a clutter-free environment, bed alarm on, bed lowered and locked, non-skid socks in use, call light in place, personal belongings within reach. Offered toileting. Fall precautions in place.      Problem: Infection  Goal: Will remain free from infection  Outcome: PROGRESSING AS EXPECTED  Intervention: Assess signs and symptoms of infection  Note: Assess any s/s of infection(V/S, wound site assessment, inflammation, redness, swelling, drainage). Use thorough handwashing, aseptic technique in wound care. Educated about importance of handwashing, eating nutritious food, increasing fluid if not contraindicated.

## 2020-03-10 NOTE — PROGRESS NOTES
Neurosurgery Progress Note    Subjective:  Pt states that he is feeling well  Pt complains of continued right shoulder pain  Ongoing d/c planning.    Exam:  Incision with Prevena in place, no drainage in canister  NM: R> L trapezius pain beyond 90 degrees, ecchymosis at varius stages of healing over right trapezius. Able to lift RUE to 90 degrees ROM   RUE: B/T 4+/5,  4+/5  LUE: 4/5 bicep, tricep, deltoid, handgrip  Sensation intact and equal C4-T1  LEs: 5/5 dorsiflexion, plantar flexion  + Wet cough  Not hooked up to Pulse O2   Spirometer to < 1000   Sensation/motors intact distally    BP  Min: 114/43  Max: 129/52  Pulse  Av.8  Min: 62  Max: 83  Resp  Avg: 15.8  Min: 15  Max: 16  Temp  Av °C (98.6 °F)  Min: 36.6 °C (97.9 °F)  Max: 37.3 °C (99.2 °F)  SpO2  Av.5 %  Min: 95 %  Max: 99 %    No data recorded    Recent Labs     20  0424 20  0423   WBC 5.7 8.4   RBC 2.39* 2.40*   HEMOGLOBIN 7.6* 7.5*   HEMATOCRIT 22.7* 23.1*   MCV 95.0 96.3   MCH 31.8 31.3   MCHC 33.5* 32.5*   RDW 59.3* 59.6*   PLATELETCT 134* 152*   MPV 9.3 9.3                   Intake/Output       20 - 03/10/20 0659 03/10/20 0700 - 20 0659       Total  Total       Intake    Total Intake -- -- -- -- -- --       Output    Urine  100  400 500  --  -- --    Number of Times Voided -- 2 x 2 x 1 x -- 1 x    Urine Void (mL) 100 400 500 -- -- --    Stool  --  -- --  --  -- --    Number of Times Stooled -- -- -- 1 x -- 1 x    Total Output 100 400 500 -- -- --       Net I/O     -100 -400 -500 -- -- --            Intake/Output Summary (Last 24 hours) at 3/10/2020 1032  Last data filed at 3/10/2020 0400  Gross per 24 hour   Intake --   Output 500 ml   Net -500 ml            • ferrous sulfate  325 mg BID WITH MEALS   • ammonium lactate   BID   • simethicone  80 mg TID PRN   • cyanocobalamin  1,000 mcg DAILY   • folic acid  5 mg DAILY   • insulin glargine  16 Units Q EVENING   •  lidocaine  1 Patch Q24HR   • epoetin prema  2,000 Units MO, WE + FR   • omeprazole  20 mg DAILY   • magnesium hydroxide  30 mL DAILY   • polyethylene glycol/lytes  1 Packet DAILY   • insulin lispro  1-6 Units 4X/DAY ACHS    And   • glucose  16 g Q15 MIN PRN    And   • dextrose 10% bolus  250 mL Q15 MIN PRN   • sodium bicarbonate  650 mg TID   • simvastatin  20 mg Nightly   • metoprolol  12.5 mg BID   • Pharmacy Consult Request  1 Each PHARMACY TO DOSE   • MD ALERT...DO NOT ADMINISTER NSAIDS or ASPIRIN unless ORDERED By Neurosurgery  1 Each PRN   • docusate sodium  100 mg BID   • senna-docusate  1 Tab Nightly   • senna-docusate  1 Tab Q24HRS PRN   • bisacodyl  10 mg Q24HRS PRN   • pioglitazone  30 mg DAILY   • oxyCODONE immediate-release  5 mg Q3HRS PRN   • HYDROcodone-acetaminophen  1-2 Tab Q6HRS PRN   • diphenhydrAMINE  25 mg Q6HRS PRN    Or   • diphenhydrAMINE  25 mg Q6HRS PRN   • ondansetron  4 mg Q4HRS PRN   • ondansetron  4 mg Q4HRS PRN   • methocarbamol  750 mg Q8HRS PRN   • cloNIDine  0.1 mg Q4HRS PRN   • benzocaine-menthol  1 Lozenge Q2HRS PRN   • calcium carbonate  500 mg BID   • vitamin D  5,000 Units DAILY       Assessment and Plan:  Hospital day # 10  s/p C3-6 laminectomy and posterolateral fusion  for cervical stenosis/myelopathy: post op xrays satisfactory  Pt neurologically intact.   +cough ongoing, pt encouraged to continue with IS.  RUE pain and swelling: trapezius eccymosis improving, pain  resolving, Lidoderm patch in place , edema resolved, US Doppler negative        Hospitalist following for HTN, DM, CKD, thrombocytopenia/anemia     Discharge Planning: SNF or rehab, consult placed. Will ask for update tomorrow from SW/care coordinator   Post op meds in chart  D/w hospitalist - they will coordinate d/c meds and instructions for other comorbidities.   Cleared to d/c from neurosurgery standpoint.  Discharge pain medications written     Appreciate continued hospitalist assistance.

## 2020-03-11 ENCOUNTER — APPOINTMENT (OUTPATIENT)
Dept: RADIOLOGY | Facility: MEDICAL CENTER | Age: 80
DRG: 472 | End: 2020-03-11
Attending: INTERNAL MEDICINE
Payer: COMMERCIAL

## 2020-03-11 LAB
ANION GAP SERPL CALC-SCNC: 6 MMOL/L (ref 0–11.9)
BUN SERPL-MCNC: 51 MG/DL (ref 8–22)
CALCIUM SERPL-MCNC: 8.5 MG/DL (ref 8.5–10.5)
CHLORIDE SERPL-SCNC: 101 MMOL/L (ref 96–112)
CO2 SERPL-SCNC: 27 MMOL/L (ref 20–33)
CREAT SERPL-MCNC: 2.91 MG/DL (ref 0.5–1.4)
ERYTHROCYTE [DISTWIDTH] IN BLOOD BY AUTOMATED COUNT: 59.3 FL (ref 35.9–50)
GLUCOSE BLD-MCNC: 126 MG/DL (ref 65–99)
GLUCOSE BLD-MCNC: 245 MG/DL (ref 65–99)
GLUCOSE BLD-MCNC: 249 MG/DL (ref 65–99)
GLUCOSE BLD-MCNC: 252 MG/DL (ref 65–99)
GLUCOSE SERPL-MCNC: 163 MG/DL (ref 65–99)
HCT VFR BLD AUTO: 23.7 % (ref 42–52)
HGB BLD-MCNC: 7.6 G/DL (ref 14–18)
MCH RBC QN AUTO: 31.1 PG (ref 27–33)
MCHC RBC AUTO-ENTMCNC: 32.1 G/DL (ref 33.7–35.3)
MCV RBC AUTO: 97.1 FL (ref 81.4–97.8)
PLATELET # BLD AUTO: 165 K/UL (ref 164–446)
PMV BLD AUTO: 8.9 FL (ref 9–12.9)
POTASSIUM SERPL-SCNC: 4.6 MMOL/L (ref 3.6–5.5)
RBC # BLD AUTO: 2.44 M/UL (ref 4.7–6.1)
SODIUM SERPL-SCNC: 134 MMOL/L (ref 135–145)
WBC # BLD AUTO: 6.8 K/UL (ref 4.8–10.8)

## 2020-03-11 PROCEDURE — 36415 COLL VENOUS BLD VENIPUNCTURE: CPT

## 2020-03-11 PROCEDURE — 770001 HCHG ROOM/CARE - MED/SURG/GYN PRIV*

## 2020-03-11 PROCEDURE — 80048 BASIC METABOLIC PNL TOTAL CA: CPT

## 2020-03-11 PROCEDURE — 99231 SBSQ HOSP IP/OBS SF/LOW 25: CPT | Performed by: INTERNAL MEDICINE

## 2020-03-11 PROCEDURE — A9270 NON-COVERED ITEM OR SERVICE: HCPCS | Performed by: PHYSICIAN ASSISTANT

## 2020-03-11 PROCEDURE — 85027 COMPLETE CBC AUTOMATED: CPT

## 2020-03-11 PROCEDURE — 93922 UPR/L XTREMITY ART 2 LEVELS: CPT | Mod: 26 | Performed by: INTERNAL MEDICINE

## 2020-03-11 PROCEDURE — A9270 NON-COVERED ITEM OR SERVICE: HCPCS | Performed by: HOSPITALIST

## 2020-03-11 PROCEDURE — 700102 HCHG RX REV CODE 250 W/ 637 OVERRIDE(OP): Performed by: PHYSICIAN ASSISTANT

## 2020-03-11 PROCEDURE — 82962 GLUCOSE BLOOD TEST: CPT | Mod: 91

## 2020-03-11 PROCEDURE — 93922 UPR/L XTREMITY ART 2 LEVELS: CPT

## 2020-03-11 PROCEDURE — 97116 GAIT TRAINING THERAPY: CPT

## 2020-03-11 PROCEDURE — 700102 HCHG RX REV CODE 250 W/ 637 OVERRIDE(OP): Performed by: HOSPITALIST

## 2020-03-11 RX ADMIN — ANTACID TABLETS 500 MG: 500 TABLET, CHEWABLE ORAL at 05:25

## 2020-03-11 RX ADMIN — SODIUM BICARBONATE 650 MG: 650 TABLET ORAL at 05:25

## 2020-03-11 RX ADMIN — FERROUS SULFATE TAB 325 MG (65 MG ELEMENTAL FE) 325 MG: 325 (65 FE) TAB at 17:35

## 2020-03-11 RX ADMIN — INSULIN GLARGINE 16 UNITS: 100 INJECTION, SOLUTION SUBCUTANEOUS at 17:35

## 2020-03-11 RX ADMIN — SIMVASTATIN 20 MG: 20 TABLET, FILM COATED ORAL at 20:49

## 2020-03-11 RX ADMIN — ANTACID TABLETS 500 MG: 500 TABLET, CHEWABLE ORAL at 17:35

## 2020-03-11 RX ADMIN — FERROUS SULFATE TAB 325 MG (65 MG ELEMENTAL FE) 325 MG: 325 (65 FE) TAB at 08:22

## 2020-03-11 RX ADMIN — INSULIN LISPRO 2 UNITS: 100 INJECTION, SOLUTION INTRAVENOUS; SUBCUTANEOUS at 20:53

## 2020-03-11 RX ADMIN — INSULIN LISPRO 2 UNITS: 100 INJECTION, SOLUTION INTRAVENOUS; SUBCUTANEOUS at 17:35

## 2020-03-11 RX ADMIN — OMEPRAZOLE 20 MG: 20 CAPSULE, DELAYED RELEASE ORAL at 05:26

## 2020-03-11 RX ADMIN — FOLIC ACID 5 MG: 1 TABLET ORAL at 05:26

## 2020-03-11 RX ADMIN — CYANOCOBALAMIN TAB 500 MCG 1000 MCG: 500 TAB at 05:25

## 2020-03-11 RX ADMIN — SODIUM BICARBONATE 650 MG: 650 TABLET ORAL at 17:35

## 2020-03-11 RX ADMIN — SODIUM BICARBONATE 650 MG: 650 TABLET ORAL at 11:34

## 2020-03-11 RX ADMIN — INSULIN LISPRO 3 UNITS: 100 INJECTION, SOLUTION INTRAVENOUS; SUBCUTANEOUS at 11:34

## 2020-03-11 RX ADMIN — METHOCARBAMOL 750 MG: 750 TABLET, FILM COATED ORAL at 09:56

## 2020-03-11 RX ADMIN — MELATONIN 5000 UNITS: at 05:25

## 2020-03-11 RX ADMIN — PIOGLITAZONE 30 MG: 30 TABLET ORAL at 05:25

## 2020-03-11 RX ADMIN — Medication: at 05:33

## 2020-03-11 ASSESSMENT — ENCOUNTER SYMPTOMS
FLANK PAIN: 0
SHORTNESS OF BREATH: 0
BACK PAIN: 0
STRIDOR: 0
CONSTIPATION: 1
ORTHOPNEA: 0
ABDOMINAL PAIN: 0
MYALGIAS: 1
TREMORS: 0
DIAPHORESIS: 0
VOMITING: 0
SPEECH CHANGE: 0
NECK PAIN: 1
SENSORY CHANGE: 0
TINGLING: 1
CHILLS: 0
COUGH: 0
FEVER: 0
PALPITATIONS: 0
DIARRHEA: 0

## 2020-03-11 ASSESSMENT — GAIT ASSESSMENTS
ASSISTIVE DEVICE: FRONT WHEEL WALKER
DEVIATION: SHUFFLED GAIT;OTHER (COMMENT)
GAIT LEVEL OF ASSIST: MINIMAL ASSIST
DISTANCE (FEET): 150

## 2020-03-11 ASSESSMENT — COGNITIVE AND FUNCTIONAL STATUS - GENERAL
WALKING IN HOSPITAL ROOM: A LITTLE
MOVING TO AND FROM BED TO CHAIR: A LITTLE
CLIMB 3 TO 5 STEPS WITH RAILING: A LOT
TURNING FROM BACK TO SIDE WHILE IN FLAT BAD: A LITTLE
MOBILITY SCORE: 17
MOVING FROM LYING ON BACK TO SITTING ON SIDE OF FLAT BED: A LITTLE
STANDING UP FROM CHAIR USING ARMS: A LITTLE
SUGGESTED CMS G CODE MODIFIER MOBILITY: CK

## 2020-03-11 NOTE — WOUND TEAM
Rounded on PT for follow up to 5th MTH callus - dry skin still present to 5th MTH, no c/o pain or discomfort with palpation. Pt up in chair with feet down. Markel greene have been DC'c, pt has palpable 2+ pulses DP bilaterally - toes are purple but feet are warm to touch. PT by doppler. Spoke with hopsitalist re blood flow concerns, DANYELL ordered. Will continue to monitor.

## 2020-03-11 NOTE — DISCHARGE PLANNING
Insurance remains pending.  [facility] is [facility].  Would appreciate an updated PT eval once appropriate.

## 2020-03-11 NOTE — PROGRESS NOTES
Hospital Medicine Daily Progress Note    Date of Service  3/11/2020    Chief Complaint  79 y.o. male admitted 2/28/2020 with neck pain.     Hospital Course      Patient with hx of DM , Hypertension, CKD 4 admitted following a cervical neck injury. Findings of severe cervical spondylosis, myelopathy and foraminal stenosis.  Patient underwent cervical laminectomy,  decompression and fusion on 2-28.  Hospitalist service consulted for Diabetes, IM issues management.       Interval Problem Update  Afebrile, no overnight events, no nausea or vomiting.  Pain controlled.  Hemoglobin stable, occult blood neg.  Saturating well on room air and blood pressure      Consultants/Specialty  Hospitalist for NS      Code Status   Full code        Disposition  Needs SNF Vs Rehab     Review of Systems  Review of Systems   Constitutional: Positive for malaise/fatigue. Negative for chills, diaphoresis and fever.   HENT: Negative for congestion and ear pain.    Respiratory: Negative for cough, shortness of breath and stridor.    Cardiovascular: Negative for chest pain, palpitations and orthopnea.   Gastrointestinal: Positive for constipation. Negative for abdominal pain, diarrhea and vomiting.   Genitourinary: Negative for flank pain and hematuria.   Musculoskeletal: Positive for myalgias and neck pain. Negative for back pain.   Skin: Positive for rash (Chronic erythema bilateral lower extremities).   Neurological: Positive for tingling. Negative for tremors, sensory change and speech change.      Physical Exam  Temp:  [36.7 °C (98 °F)-37.4 °C (99.3 °F)] 36.8 °C (98.3 °F)  Pulse:  [77-87] 84  Resp:  [16] 16  BP: (121-136)/(41-45) 132/42  SpO2:  [97 %-100 %] 98 %    Physical Exam  Constitutional:       General: He is not in acute distress.     Appearance: He is not diaphoretic.   HENT:      Head: Normocephalic and atraumatic.      Right Ear: External ear normal.      Left Ear: External ear normal.      Nose: Nose normal.   Eyes:       General: No scleral icterus.     Extraocular Movements: Extraocular movements intact.      Conjunctiva/sclera: Conjunctivae normal.   Neck:      Musculoskeletal: Neck rigidity present.      Comments: Cervical collar in place.   Hemovac, Chapis in place .  Cardiovascular:      Rate and Rhythm: Normal rate and regular rhythm.      Heart sounds: No murmur.   Pulmonary:      Breath sounds: No stridor. No wheezing, rhonchi or rales.   Abdominal:      General: There is no distension.      Palpations: Abdomen is soft.      Tenderness: There is no abdominal tenderness. There is no guarding or rebound.   Musculoskeletal:         General: No swelling.      Comments: Bilateral lower extremity erythema, chronic   Skin:     General: Skin is dry.      Coloration: Skin is pale.   Neurological:      Mental Status: He is alert.   Psychiatric:         Mood and Affect: Mood normal.       Fluids    Intake/Output Summary (Last 24 hours) at 3/11/2020 1314  Last data filed at 3/11/2020 0800  Gross per 24 hour   Intake --   Output 150 ml   Net -150 ml       Laboratory  Recent Labs     03/09/20  0423 03/11/20  0312   WBC 8.4 6.8   RBC 2.40* 2.44*   HEMOGLOBIN 7.5* 7.6*   HEMATOCRIT 23.1* 23.7*   MCV 96.3 97.1   MCH 31.3 31.1   MCHC 32.5* 32.1*   RDW 59.6* 59.3*   PLATELETCT 152* 165   MPV 9.3 8.9*     Recent Labs     03/11/20  0312   SODIUM 134*   POTASSIUM 4.6   CHLORIDE 101   CO2 27   GLUCOSE 163*   BUN 51*   CREATININE 2.91*   CALCIUM 8.5                   Imaging  US-DANYELL SINGLE LEVEL BILAT   Final Result      [id number] VIEW   Final Result      No evidence of bowel obstruction.   Possible constipation.                  DX-CERVICAL SPINE-2 OR 3 VIEWS   Final Result      1.  There has been interval posterior decompression with andrew and screw fixation extending from C3 through C6 levels.      US-EXTREMITY VENOUS UPPER UNILAT RIGHT   Final Result      US-RENAL   Final Result      Bilateral mild hydronephrosis.      Atrophic left kidney.       DX-PORTABLE FLUORO > 1 HOUR   Final Result      Portable fluoroscopy utilized for 6 seconds.         INTERPRETING LOCATION: [address] [place] [address], [place], [address]      DX-CERVICAL SPINE-2 OR 3 VIEWS   Final Result      Intraoperative image as above described.        Assessment/Plan  * Type 2 diabetes mellitus (HCC)- (present on admission)  Assessment & Plan  With hyperglycemia  Glycated hemoglobin 5.5%   Long & short acting insulin, glargine last increased 3/4   Accu-Checks, hypoglycemia protocol            Acute on chronic renal failure (HCC)  Assessment & Plan  CKD 4,  Ultrasound with findings of bilateral hydronephrosis, mild  Check bladder US to eval for urinary retention signs of outlet obstruction.  May need anders if significant retention.    Avoid nephrotoxic medications     Slowly improving       Central cord syndrome (HCC)- (present on admission)  Assessment & Plan  With myelopathy - Status post cervical surgery-  laminectomy,  decompression and fusion on 2-28  Stabilize with neck brace , monitor hemovac output, wound care, NSG input  Multimodal pain control  He lives alone in an upstairs unit. PT/OT, needs SNF.       Thrombocytopenia (HCC)- (present on admission)  Assessment & Plan  Thrombocytopenia at this point is mild, chronic .   Monitor platelets.   Platelets have been stable around 100.     Anemia- (present on admission)  Assessment & Plan  Chronic, iron deficient  IV iron replacement   No clinical evidence for bleeding, history of colonic polyposis with colonoscopy few months ago with plan [facility].  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 Started epo      Occult blood neg  Hemoglobin stable   Monitor     Vitamin D deficiency- (present on admission)  Assessment & Plan  Adequate levels      Hyperlipemia- (present on admission)  Assessment & Plan  Low-fat low-cholesterol diet  Resume home simvastatin     Essential hypertension- (present on  admission)  Assessment & Plan  Controlled with current regimen metoprolol, and clonidine as needed.  Vital signs per policy.       VTE prophylaxis: SCDs, holding heparin for  anemia

## 2020-03-11 NOTE — PROGRESS NOTES
Neurosurgery Progress Note    Subjective:  Pt states that he is feeling well  Pt complains of continued right shoulder pain  Ongoing d/c planning limited by insurance    Exam:  Incision CDI staples intact  NM: R> L trapezius pain beyond 90 degrees, ecchymosis at varius stages of healing over right trapezius. Able to lift RUE to 90 degrees ROM   RUE: B/T 4+/5,  4+/5  LUE: 4/5 bicep, tricep, deltoid, handgrip  Sensation intact and equal C4-T1  LEs: 5/5 dorsiflexion, plantar flexion  + Wet cough  Not hooked up to Pulse O2   Spirometer to < 1000   Sensation/motors intact distally    BP  Min: 121/45  Max: 136/44  Pulse  Av.3  Min: 77  Max: 87  Resp  Av  Min: 16  Max: 16  Temp  Av °C (98.6 °F)  Min: 36.7 °C (98 °F)  Max: 37.4 °C (99.3 °F)  SpO2  Av.8 %  Min: 97 %  Max: 100 %    No data recorded    Recent Labs     20  0423 20  0312   WBC 8.4 6.8   RBC 2.40* 2.44*   HEMOGLOBIN 7.5* 7.6*   HEMATOCRIT 23.1* 23.7*   MCV 96.3 97.1   MCH 31.3 31.1   MCHC 32.5* 32.1*   RDW 59.6* 59.3*   PLATELETCT 152* 165   MPV 9.3 8.9*     Recent Labs     20  0312   SODIUM 134*   POTASSIUM 4.6   CHLORIDE 101   CO2 27   GLUCOSE 163*   BUN 51*   CREATININE 2.91*   CALCIUM 8.5               Intake/Output       03/10/20 07 - 20 0659 20 07 - 20 0659       Total  Total       Intake    Total Intake -- -- -- -- -- --       Output    Urine  --  -- --  150  -- 150    Number of Times Voided 1 x -- 1 x 7 x -- 7 x    Urine Void (mL) -- -- -- 150 -- 150    Stool  --  -- --  --  -- --    Number of Times Stooled 1 x -- 1 x 1 x -- 1 x    Total Output -- -- -- 150 -- 150       Net I/O     -- -- -- -150 -- -150            Intake/Output Summary (Last 24 hours) at 3/11/2020 1247  Last data filed at 3/11/2020 0800  Gross per 24 hour   Intake --   Output 150 ml   Net -150 ml            • ferrous sulfate  325 mg BID WITH MEALS   • ammonium lactate   BID   •  simethicone  80 mg TID PRN   • cyanocobalamin  1,000 mcg DAILY   • folic acid  5 mg DAILY   • insulin glargine  16 Units Q EVENING   • lidocaine  1 Patch Q24HR   • epoetin prema  2,000 Units MO, WE + FR   • omeprazole  20 mg DAILY   • magnesium hydroxide  30 mL DAILY   • polyethylene glycol/lytes  1 Packet DAILY   • insulin lispro  1-6 Units 4X/DAY ACHS    And   • glucose  16 g Q15 MIN PRN    And   • dextrose 10% bolus  250 mL Q15 MIN PRN   • sodium bicarbonate  650 mg TID   • simvastatin  20 mg Nightly   • metoprolol  12.5 mg BID   • Pharmacy Consult Request  1 Each PHARMACY TO DOSE   • MD ALERT...DO NOT ADMINISTER NSAIDS or ASPIRIN unless ORDERED By Neurosurgery  1 Each PRN   • docusate sodium  100 mg BID   • senna-docusate  1 Tab Nightly   • senna-docusate  1 Tab Q24HRS PRN   • bisacodyl  10 mg Q24HRS PRN   • pioglitazone  30 mg DAILY   • oxyCODONE immediate-release  5 mg Q3HRS PRN   • HYDROcodone-acetaminophen  1-2 Tab Q6HRS PRN   • diphenhydrAMINE  25 mg Q6HRS PRN    Or   • diphenhydrAMINE  25 mg Q6HRS PRN   • ondansetron  4 mg Q4HRS PRN   • ondansetron  4 mg Q4HRS PRN   • methocarbamol  750 mg Q8HRS PRN   • cloNIDine  0.1 mg Q4HRS PRN   • benzocaine-menthol  1 Lozenge Q2HRS PRN   • calcium carbonate  500 mg BID   • vitamin D  5,000 Units DAILY       Assessment and Plan:  POD #12  s/p C3-6 laminectomy and posterolateral fusion  for cervical stenosis/myelopathy  RUE pain and swelling: trapezius eccymosis improving, pain  resolving, Lidoderm patch in place , edema resolved, US Doppler negative      Hospitalist following for HTN, DM, CKD, thrombocytopenia/anemia     Discharge Planning: Barriers include insurance, admissions coordinator following. PT eval again today   D/w hospitalist - they will coordinate d/c meds and instructions for other comorbidities.   Cleared to d/c from neurosurgery standpoint.  Discharge pain medications written     Appreciate continued hospitalist assistance.

## 2020-03-11 NOTE — WOUND TEAM
[facility]  Inpatient Services  Wound and Skin Care Progress note    Admission Date: 2/28/2020     Last order of IP CONSULT TO WOUND CARE was found on 3/8/2020 from Hospital Encounter on 2/6/2020     HPI, PMH, SH: Reviewed    Unit where seen by Wound Team: [id number]     WOUND CONSULT/FOLLOW-UP RELATED TO: pressure injury mgmt    Self Report / Pain Level:  No c/o pain       OBJECTIVE:  Sitting up in bed, C-collar in place, padded with mepilex on anterior superior surface    WOUND TYPE, LOCATION, CHARACTERISTICS (Pressure Injuries: location, stage, POA or date identified)  Wound 03/08/20 Pressure Injury Neck Anterior Left (Active)      3/8/2020 10:00 PM   Site Assessment Pink;Yellow    Periwound Assessment Intact    Margins Defined edges    Closure GRADY    Drainage Amount None    Drainage Description Serosanguineous    Treatments Cleansed    Wound Cleansing Normal Saline Irrigation    Periwound Protectant Hydrocolloid    Dressing Cleansing/Solutions Not Applicable    Dressing Options Hydrocolloid Thin    Dressing Changed Changed    Dressing Status Intact    Dressing Change/Treatment Frequency Every 72 hrs, and As Needed    NEXT Dressing Change/Treatment Date 03/13/20    NEXT Weekly Photo (Inpatient Only) 03/13/20    Pressure Injury Stage Pending Stag 3/10/2020  4:30 PM   Wound Length (cm) 1 cm 3/10/2020  4:30 PM   Wound Width (cm) 1.5 cm 3/10/2020  4:30 PM   Wound Surface Area (cm^2) 1.5 cm^2 3/10/2020  4:30 PM   Tunneling (cm) 0 cm 3/10/2020  4:30 PM   Undermining (cm) 0 cm 3/10/2020  4:30 PM   Shape oval    Wound Odor None    Exposed Structures None    Number of days: 2      Vascular:    DANYELL:   No results found.      Lab Values:    Lab Results   Component Value Date/Time    WBC 8.4 03/09/2020 04:23 AM    RBC 2.40 (L) 03/09/2020 04:23 AM    HEMOGLOBIN 7.5 (L) 03/09/2020 04:23 AM    HEMATOCRIT 23.1 (L) 03/09/2020 04:23 AM    HBA1C 5.1 02/29/2020 03:45 AM          Culture:  Culture Results show:  No  results found for this or any previous visit (from the past 720 hour(s)).      INTERVENTIONS BY WOUND TEAM:  RN removed C-collar. Removed mepitel piece, cleaned wound with NS, dried. Applied piece of hydrocolloid to wound bed.. RN reapplied C-collar    Interdisciplinary consultation: Patient, Bedside RN      EVALUATION: pt has small pressure injury to L side pof neck with center of wound with some adherent yellow and the rest is red/pink. Hydrocolloid fro moist wound healing. C-collar and/or pat padded with mepilex for prevention.     Goals: Steady decrease in wound area and depth weekly.    NURSING PLAN OF CARE ORDERS (X):     Dressing changes: Continue previous Dressing Maintenance orders:     x   See new Dressing Maintenance orders:       Skin care: See Skin Care orders:        Rectal tube care: See Rectal Tube Care orders:      Other orders:           WOUND TEAM PLAN OF CARE:   Dressing changes by wound team:          Follow up 1-2 times weekly: x             Follow up 3 times weekly:                NPWT change 3 times weekly:     Follow up as needed:       Other (explain):     Anticipated discharge plans:   LTACH:        SNF/Rehab:                  Home Care:           Outpatient Wound Center:            Self Care:           Other:unsure of needs @ this time

## 2020-03-11 NOTE — THERAPY
"Physical Therapy Treatment completed.   Bed Mobility:  Supine to Sit: (Up in chair)  Transfers: Sit to Stand: Minimal Assist  Gait: Level Of Assist: Minimal Assist with Front-Wheel Walker       Plan of Care: Will benefit from Physical Therapy 3 times per week  Discharge Recommendations: Equipment: Will Continue to Assess for Equipment Needs. Post-acute therapy Discharge to a transitional care facility for continued skilled therapy services.    Pt seen today for PT treatment session. Pt pleasant and agreeable to session. Pt up in chair upon arrival, reports minor pain to the posterior aspect of his neck. Min A for safety with sit to stand, able to complete without physical assist but guarding require due to balance. Pt ambulated around unit with use of FWW, minimal assist for balance. Pt with improved evan, very short step and stride length with intermittent shuffled gait L>R. Pt with ongoing forward flexed posture that he is only able to briefly correct. Pt does fatigue with ambulation and quality of movement does start to decline with fatigue. Pt requested back to chair. Pt would benefit from ongoing PT intervention while in the acute care setting. Recommend post-acute placement for continued physical therapy services prior to discharge home.          See \"Rehab Therapy-Acute\" Patient Summary Report for complete documentation.       "

## 2020-03-11 NOTE — DISCHARGE INSTRUCTIONS
Discharge Instructions    Discharged to other by [facility] with escort. Discharged via wheelchair, hospital escort: Yes.  Special equipment needed: C-Collar    Be sure to schedule a follow-up appointment with your primary care doctor or any specialists as instructed.     Discharge Plan:   Influenza Vaccine Indication: Not indicated: Previously immunized this influenza season and > 8 years of age    I understand that a diet low in cholesterol, fat, and sodium is recommended for good health. Unless I have been given specific instructions below for another diet, I accept this instruction as my diet prescription.   Other diet: diabetic/renal    Special Instructions:  Wear cervical collar at all times   Follow up DR. Sellers 2 weeks post op   Follow up PCP asap after release from rehab     Every 72 hrs:  L neck under mandible: Nsg to remove drsg, clean wound with NS, dry. Apply piece of hydrocolloid over wound bed. Nsg to change every 3 days and PRN saturation or dislodgment.          · Is patient discharged on Warfarin / Coumadin?   No     Depression / Suicide Risk    As you are discharged from this [facility] facility, it is important to learn how to keep safe from harming yourself.    Recognize the warning signs:  · Abrupt changes in personality, positive or negative- including increase in energy   · Giving away possessions  · Change in eating patterns- significant weight changes-  positive or negative  · Change in sleeping patterns- unable to sleep or sleeping all the time   · Unwillingness or inability to communicate  · Depression  · Unusual sadness, discouragement and loneliness  · Talk of wanting to die  · Neglect of personal appearance   · Rebelliousness- reckless behavior  · Withdrawal from people/activities they love  · Confusion- inability to concentrate     If you or a loved one observes any of these behaviors or has concerns about self-harm, here's what you can do:  · Talk about it- your feelings and  reasons for harming yourself  · Remove any means that you might use to hurt yourself (examples: pills, rope, extension cords, firearm)  · Get professional help from the community (Mental Health, Substance Abuse, psychological counseling)  · Do not be alone:Call your Safe Contact- someone whom you trust who will be there for you.  · Call your local CRISIS HOTLINE 141-1291 or 174-407-6265  · Call your local Children's Mobile Crisis Response Team [place] (088) 222-1826 or www.[employer]  · Call the toll free National Suicide Prevention Hotlines   · National Suicide Prevention Lifeline 431-406-EBKS (1527)  · [employer] Line Network 800-SUICIDE (724-0683)            Cervical Fusion  Cervical fusion is a procedure that is done on bones in the neck (cervical spine) to relieve pressure on the spinal cord or one or more nerve roots.  There are two types of cervical fusion:  · Posterior cervical fusion. This surgery is done through the back (posterior) of the neck. The goal of this procedure is to make two or more of the bones in the spinal column (vertebrae) grow together (fuse). This procedure is most commonly used to treat neck fractures and dislocations and to fix deformities in the curve of the neck.  · Anterior cervical fusion. This surgery is done through the front (anterior) part of the neck. This procedure is most commonly used to treat herniated cervical disk, degenerative cervical disease, and arthritis in the cervical spine. During this type of surgery:  ¨ The affected disk between the two vertebrae (intervertebral disk) is removed, as well as any extra bone on the edges of the vertebrae (bone spurs). This takes pressure off of the nerves or spinal cord (decompression).  ¨ The area where the disk was removed is filled with a bone material (graft) or artificial bone material (implant) and then it fuses over time.  In either procedure, hardware such as rods, screws, metal plates, or cages may be  inserted to stabilize the vertebrae while they heal.  Tell a health care provider about:  · Any allergies you have.  · All medicines you are taking, including vitamins, herbs, eye drops, creams, and over-the-counter medicines.  · Any problems you or family members have had with anesthetic medicines.  · Any blood disorders you have.  · Any surgeries you have had.  · Any medical conditions you have.  · Whether you are pregnant or may be pregnant.  What are the risks?  Generally, this is a safe procedure. However, problems may occur, including:  · Infection.  · Bleeding.  · Allergic reactions to medicines or dyes.  · Damage to other structures or organs, such as nerves near the spine.  · Spinal fluid leakage.  · Blood clots.  · Trouble controlling urination or bowel movements.  · Vertebrae not fusing together completely (pseudoarthrosis).  · Temporary hoarseness of the voice.  · Difficulty swallowing.  What happens before the procedure?  Staying hydrated   Follow instructions from your health care provider about hydration, which may include:  · Up to 2 hours before the procedure - you may continue to drink clear liquids, such as water, clear fruit juice, black coffee, and plain tea.  Eating and drinking restrictions   Follow instructions from your health care provider about eating and drinking, which may include:  · 8 hours before the procedure - stop eating heavy meals or foods such as meat, fried foods, or fatty foods.  · 6 hours before the procedure - stop eating light meals or foods, such as toast or cereal.  · 6 hours before the procedure - stop drinking milk or drinks that contain milk.  · 2 hours before the procedure - stop drinking clear liquids.  Medicines  · Ask your health care provider about:  ¨ Changing or stopping your regular medicines. This is especially important if you are taking diabetes medicines or blood thinners.  ¨ Taking medicines such as aspirin and ibuprofen. These medicines can thin your  blood. Do not take these medicines before your procedure if your health care provider instructs you not to.  · You may be given antibiotic medicine to help prevent infection.  General instructions  · Ask your health care provider how your surgical site will be marked or identified.  · You will have blood and urine samples taken.  · You may have tests, such as:  ¨ X-rays.  ¨ CT scan.  ¨ MRI.  · Plan to have someone take you home from the hospital or clinic.  · If you will be going home right after the procedure, plan to have someone with you for 24 hours.  What happens during the procedure?  · To reduce your risk of infection:  ¨ Your health care team will wash or sanitize their hands.  ¨ Your skin will be washed with soap.  ¨ Hair may be removed from the surgical area.  · An IV tube will be inserted into one of your veins.  · You will be given one or more of the following:  ¨ A medicine to help you relax (sedative).  ¨ A medicine to make you fall asleep (general anesthetic).  · If you are having a posterior cervical fusion:  ¨ An incision will be made in the back of your neck.  ¨ Two or more vertebrae will be joined into one solid section of bone with a bone graft.  · If you are having an anterior cervical fusion:  ¨ An incision will be made in the area where the fusion will be placed. This is usually done at the front of your neck.  ¨ Your neck muscles will be pushed aside.  ¨ The affected disk and bone spurs will be removed (decompression).  ¨ The area where the disk was removed will then be filled with bone graft or bone implants or both.  · Screws and rods or metal plates may be used to stabilize the vertebrae while they fuse.  · Your incision may be closed.  · A bandage (dressing) may be placed over your incision.  The procedure may vary among health care providers and hospitals.  What happens after the procedure?  · You will be given medicine to relieve pain as needed.  · You will continue to receive fluids  and medicines through an IV tube.  · Your blood pressure, heart rate, breathing rate, and blood oxygen level will be monitored until the medicines you were given have worn off.  · You may be given a brace to wear while you heal.  · Do not drive until your health care provider approves.  · You may have to wear compression stockings. These stockings help to prevent blood clots and reduce swelling in your legs.  · You may be asked to do breathing exercises. This helps prevent a lung infection.  · You will be encouraged to stand up and walk as soon as you are able. You will be taught how to move correctly and how to stand and walk.  · You will be assisted to turn in bed frequently by moving your whole body without twisting your back (log rolling technique).  This information is not intended to replace advice given to you by your health care provider. Make sure you discuss any questions you have with your health care provider.  Document Released: 06/09/2003 Document Revised: 07/12/2017 Document Reviewed: 06/28/2017  [employer] Interactive Patient Education © 2017 [employer]    Cervical Disk Replacement, Care After  This sheet gives you information about how to care for yourself after your procedure. Your health care provider may also give you more specific instructions. If you have problems or questions, contact your health care provider.  What can I expect after the procedure?  After your procedure, it is common to have:  · Pain or soreness.  · Some pain, burning, or tingling in one or both arms.  · Stiffness.  · A hoarse voice.  · Constipation.  · Decreased appetite.  It may take 6-12 weeks for you to fully recover. You will need to wear a neck collar (cervical collar) to support your neck and keep it from moving. Hard collars are usually worn for about 6-12 weeks. Soft collars are usually worn for about 2 weeks. This may vary among health care providers and hospitals.  Follow these instructions at home:  If you have  a cervical collar:  · Do not remove the collar unless your health care provider tells you to do this.  · Ask your health care provider before making any adjustments to your collar. Small adjustments may be needed over time to improve comfort and reduce pressure on your chin or on the back of your head.  · If you are allowed to remove the collar for cleaning and bathing:  ¨ Follow instructions from your health care provider about how to safely remove the collar.  ¨ Wash and thoroughly dry the skin on your neck. Check your skin for irritation or sores. If you see any, tell your health care provider.  ¨ Keep your collar clean by wiping it with mild soap and water and letting it air-dry completely.  ¨ If your collar has removable pads, hand-wash them with soap and water and let them air-dry completely.  · Keep long hair outside of the collar.  Incision care  · Follow instructions from your health care provider about how to take care of your incision. Make sure you:  ¨ Wash your hands with soap and water before you change your bandage (dressing). If soap and water are not available, use hand .  ¨ Change your dressing as told by your health care provider.  ¨ Leave stitches (sutures), skin glue, or adhesive strips in place. These skin closures may need to stay in place for 2 weeks or longer. If adhesive strip edges start to loosen and curl up, you may trim the loose edges. Do not remove adhesive strips completely unless your health care provider tells you to do that.  · Do not rub the incision area.  · Check your incision area every day for signs of infection. Check for:  ¨ More redness, swelling, or pain.  ¨ More fluid or blood.  ¨ Warmth.  ¨ Pus or a bad smell.  Bathing  · Do not take baths, swim, or use a hot tub until your health care provider approves. If your health care provider approves, you may take showers starting 1-3 days after your procedure.  · Do not get water directly onto your incision  area.  Activity  · Move around gently for short periods or take short walks as directed by your health care provider.  · Ask your health care provider what activities are safe for you, and ask when you may return to your normal activities.  ¨ Avoid activities that require jumping or a lot of energy for 6-12 weeks, or as long as told by your health care provider. This includes jumping, aerobics, dancing, and most sports.  ¨ Avoid lifting anything that is heavier than 3 lb (1.4 kg).  · Do not drive or use heavy machinery until your health care provider approves.  · If physical therapy was prescribed, do exercises as told by your health care provider or physical therapist.  General instructions  · Take over-the-counter and prescription medicines only as told by your health care provider.  · To prevent or treat constipation while you are taking prescription pain medicine, your health care provider may recommend that you:  ¨ Drink enough fluid to keep your urine clear or pale yellow.  ¨ Take over-the-counter or prescription medicines.  ¨ Eat foods that are high in fiber, such as fresh fruits and vegetables, whole grains, and beans.  ¨ Limit foods that are high in fat and processed sugars, such as fried and sweet foods.  · Eat a normal, healthy diet.  · If you have a sore throat, you may take lozenges to help relieve soreness. It may help to eat soft foods for a few days.  · Do not use any products that contain nicotine or tobacco, such as cigarettes and e-cigarettes. These can delay bone healing. If you need help quitting, ask your health care provider.  · Keep all follow-up visits as told by your health care provider. This is important.  Contact a health care provider if:  · You have a fever or chills.  · You have pain that does not get better with medicine.  · You have difficulty:  ¨ Swallowing.  ¨ Breathing.  ¨ Urinating.  ¨ Having a bowel movement.  · Your voice changes or becomes hoarse.  Get help right away  if:  · You have more redness, swelling, or pain around your incision.  · You have more fluid or blood coming from your incision.  · Your incision feels warm to the touch.  · You have pus or a bad smell coming from your incision.  · You have pain, burning, or tingling in one or both of your arms that becomes different or gets worse.  · You have pain, redness, warmth, or swelling in your lower leg.  This information is not intended to replace advice given to you by your health care provider. Make sure you discuss any questions you have with your health care provider.  Document Released: 12/23/2014 Document Revised: 09/13/2017 Document Reviewed: 09/13/2017  [employer] Interactive Patient Education © 2017 [employer]

## 2020-03-11 NOTE — THERAPY
"Occupational Therapy Treatment completed with focus on ADLs, ADL transfers and patient education.  Functional Status:  Sarah sit>stand from chair and toilet, ModA toileting pericare post BM, Sarah standing grooming at sink, MaxA LB dress (sock change), Sarah functional mobility of household distances (for safety), cues for upright posture  Plan of Care: Will benefit from Occupational Therapy 4 times per week  Discharge Recommendations:  Equipment Will Continue to Assess for Equipment Needs. Post-acute therapy Recommend post-acute placement for additional occupational therapy services prior to discharge home.    See \"Rehab Therapy-Acute\" Patient Summary Report for complete documentation.     Pt seen for OT tx, agreeable to participate in functional activities. Pt required Sarah for toilet txf and pericare for thorough clean but did complete two successful wipes prior to receiving assistance. Pt stood and completed handwashing and grooming, Sarah for safety 2/2 decreased balance while washing bilateral hands without FWW support. Pt requested longer walk following ADLs in BR and at sink, pt demonstrated improved stability and tolerance for household distance ambulation with FWW, reports he prefers his 4WW at home. Pt is highly motivated for increased independence, will continue to benefit from acute OT. Recommend post-acute placement prior to DC home.   "

## 2020-03-12 ENCOUNTER — HOSPITAL ENCOUNTER (INPATIENT)
Facility: REHABILITATION | Age: 80
LOS: 16 days | DRG: 052 | End: 2020-03-28
Attending: PHYSICAL MEDICINE & REHABILITATION | Admitting: PHYSICAL MEDICINE & REHABILITATION
Payer: COMMERCIAL

## 2020-03-12 ENCOUNTER — APPOINTMENT (OUTPATIENT)
Dept: RADIOLOGY | Facility: REHABILITATION | Age: 80
DRG: 052 | End: 2020-03-12
Attending: PHYSICAL MEDICINE & REHABILITATION
Payer: COMMERCIAL

## 2020-03-12 VITALS
TEMPERATURE: 98.6 F | SYSTOLIC BLOOD PRESSURE: 128 MMHG | RESPIRATION RATE: 17 BRPM | WEIGHT: 166.67 LBS | HEIGHT: 70 IN | BODY MASS INDEX: 23.86 KG/M2 | HEART RATE: 81 BPM | DIASTOLIC BLOOD PRESSURE: 49 MMHG | OXYGEN SATURATION: 99 %

## 2020-03-12 PROBLEM — G82.54: Status: ACTIVE | Noted: 2020-03-12

## 2020-03-12 PROBLEM — N31.9 NEUROGENIC BLADDER: Status: ACTIVE | Noted: 2020-03-12

## 2020-03-12 PROBLEM — M79.2 NEUROPATHIC PAIN: Status: ACTIVE | Noted: 2020-03-12

## 2020-03-12 PROBLEM — K59.2 NEUROGENIC BOWEL: Status: ACTIVE | Noted: 2020-03-12

## 2020-03-12 LAB
ANION GAP SERPL CALC-SCNC: 15 MMOL/L (ref 7–16)
BUN SERPL-MCNC: 49 MG/DL (ref 8–22)
CALCIUM SERPL-MCNC: 8.2 MG/DL (ref 8.4–10.2)
CHLORIDE SERPL-SCNC: 97 MMOL/L (ref 96–112)
CO2 SERPL-SCNC: 24 MMOL/L (ref 20–33)
CREAT SERPL-MCNC: 2.62 MG/DL (ref 0.5–1.4)
ERYTHROCYTE [DISTWIDTH] IN BLOOD BY AUTOMATED COUNT: 57.8 FL (ref 35.9–50)
GLUCOSE BLD-MCNC: 138 MG/DL (ref 65–99)
GLUCOSE BLD-MCNC: 176 MG/DL (ref 65–99)
GLUCOSE BLD-MCNC: 177 MG/DL (ref 65–99)
GLUCOSE BLD-MCNC: 210 MG/DL (ref 65–99)
GLUCOSE SERPL-MCNC: 145 MG/DL (ref 65–99)
HCT VFR BLD AUTO: 25.6 % (ref 42–52)
HGB BLD-MCNC: 8.1 G/DL (ref 14–18)
MCH RBC QN AUTO: 30.2 PG (ref 27–33)
MCHC RBC AUTO-ENTMCNC: 31.6 G/DL (ref 33.7–35.3)
MCV RBC AUTO: 95.5 FL (ref 81.4–97.8)
PLATELET # BLD AUTO: 189 K/UL (ref 164–446)
PMV BLD AUTO: 9.1 FL (ref 9–12.9)
POTASSIUM SERPL-SCNC: 4.2 MMOL/L (ref 3.6–5.5)
RBC # BLD AUTO: 2.68 M/UL (ref 4.7–6.1)
SODIUM SERPL-SCNC: 136 MMOL/L (ref 135–145)
WBC # BLD AUTO: 6.8 K/UL (ref 4.8–10.8)

## 2020-03-12 PROCEDURE — 94760 N-INVAS EAR/PLS OXIMETRY 1: CPT

## 2020-03-12 PROCEDURE — 99239 HOSP IP/OBS DSCHRG MGMT >30: CPT | Performed by: INTERNAL MEDICINE

## 2020-03-12 PROCEDURE — 770010 HCHG ROOM/CARE - REHAB SEMI PRIVAT*

## 2020-03-12 PROCEDURE — 700102 HCHG RX REV CODE 250 W/ 637 OVERRIDE(OP): Performed by: PHYSICAL MEDICINE & REHABILITATION

## 2020-03-12 PROCEDURE — 99223 1ST HOSP IP/OBS HIGH 75: CPT | Performed by: PHYSICAL MEDICINE & REHABILITATION

## 2020-03-12 PROCEDURE — 80048 BASIC METABOLIC PNL TOTAL CA: CPT

## 2020-03-12 PROCEDURE — 74018 RADEX ABDOMEN 1 VIEW: CPT

## 2020-03-12 PROCEDURE — A6213 FOAM DRG >16<=48 SQ IN W/BDR: HCPCS | Performed by: PHYSICAL MEDICINE & REHABILITATION

## 2020-03-12 PROCEDURE — 700112 HCHG RX REV CODE 229: Performed by: PHYSICAL MEDICINE & REHABILITATION

## 2020-03-12 PROCEDURE — A9270 NON-COVERED ITEM OR SERVICE: HCPCS | Performed by: PHYSICIAN ASSISTANT

## 2020-03-12 PROCEDURE — A9270 NON-COVERED ITEM OR SERVICE: HCPCS | Performed by: HOSPITALIST

## 2020-03-12 PROCEDURE — 700102 HCHG RX REV CODE 250 W/ 637 OVERRIDE(OP): Performed by: PHYSICIAN ASSISTANT

## 2020-03-12 PROCEDURE — 82962 GLUCOSE BLOOD TEST: CPT | Mod: 91

## 2020-03-12 PROCEDURE — 700111 HCHG RX REV CODE 636 W/ 250 OVERRIDE (IP): Performed by: PHYSICAL MEDICINE & REHABILITATION

## 2020-03-12 PROCEDURE — 700102 HCHG RX REV CODE 250 W/ 637 OVERRIDE(OP): Performed by: HOSPITALIST

## 2020-03-12 PROCEDURE — A9270 NON-COVERED ITEM OR SERVICE: HCPCS | Performed by: PHYSICAL MEDICINE & REHABILITATION

## 2020-03-12 PROCEDURE — 85027 COMPLETE CBC AUTOMATED: CPT

## 2020-03-12 PROCEDURE — 36415 COLL VENOUS BLD VENIPUNCTURE: CPT

## 2020-03-12 RX ORDER — MIDODRINE HYDROCHLORIDE 2.5 MG/1
5 TABLET ORAL EVERY 4 HOURS PRN
Status: DISCONTINUED | OUTPATIENT
Start: 2020-03-12 | End: 2020-03-28 | Stop reason: HOSPADM

## 2020-03-12 RX ORDER — SIMVASTATIN 20 MG
20 TABLET ORAL NIGHTLY
Status: DISCONTINUED | OUTPATIENT
Start: 2020-03-12 | End: 2020-03-28 | Stop reason: HOSPADM

## 2020-03-12 RX ORDER — OXYCODONE HYDROCHLORIDE 10 MG/1
10 TABLET ORAL
Status: DISCONTINUED | OUTPATIENT
Start: 2020-03-12 | End: 2020-03-28 | Stop reason: HOSPADM

## 2020-03-12 RX ORDER — POLYVINYL ALCOHOL 14 MG/ML
1 SOLUTION/ DROPS OPHTHALMIC PRN
Status: DISCONTINUED | OUTPATIENT
Start: 2020-03-12 | End: 2020-03-28 | Stop reason: HOSPADM

## 2020-03-12 RX ORDER — TRAZODONE HYDROCHLORIDE 50 MG/1
50 TABLET ORAL
Status: DISCONTINUED | OUTPATIENT
Start: 2020-03-12 | End: 2020-03-28 | Stop reason: HOSPADM

## 2020-03-12 RX ORDER — BISACODYL 10 MG/1
10 SUPPOSITORY RECTAL
Status: DISCONTINUED | OUTPATIENT
Start: 2020-03-12 | End: 2020-03-18

## 2020-03-12 RX ORDER — ACETAMINOPHEN 500 MG
1000 TABLET ORAL 3 TIMES DAILY
Status: DISCONTINUED | OUTPATIENT
Start: 2020-03-12 | End: 2020-03-14

## 2020-03-12 RX ORDER — SIMVASTATIN 20 MG
20 TABLET ORAL NIGHTLY
Status: CANCELLED | OUTPATIENT
Start: 2020-03-12

## 2020-03-12 RX ORDER — CALCIUM CARBONATE 500 MG/1
500 TABLET, CHEWABLE ORAL 2 TIMES DAILY
Status: CANCELLED | OUTPATIENT
Start: 2020-03-12

## 2020-03-12 RX ORDER — INSULIN GLARGINE 100 [IU]/ML
16 INJECTION, SOLUTION SUBCUTANEOUS EVERY EVENING
Status: DISCONTINUED | OUTPATIENT
Start: 2020-03-12 | End: 2020-03-15

## 2020-03-12 RX ORDER — LIDOCAINE 50 MG/G
2 PATCH TOPICAL EVERY 24 HOURS
Status: DISCONTINUED | OUTPATIENT
Start: 2020-03-13 | End: 2020-03-28 | Stop reason: HOSPADM

## 2020-03-12 RX ORDER — OXYCODONE HYDROCHLORIDE 5 MG/1
5 TABLET ORAL
Status: DISCONTINUED | OUTPATIENT
Start: 2020-03-12 | End: 2020-03-28 | Stop reason: HOSPADM

## 2020-03-12 RX ORDER — DEXTROSE MONOHYDRATE 25 G/50ML
50 INJECTION, SOLUTION INTRAVENOUS
Status: DISCONTINUED | OUTPATIENT
Start: 2020-03-12 | End: 2020-03-16

## 2020-03-12 RX ORDER — ALUMINA, MAGNESIA, AND SIMETHICONE 2400; 2400; 240 MG/30ML; MG/30ML; MG/30ML
20 SUSPENSION ORAL
Status: DISCONTINUED | OUTPATIENT
Start: 2020-03-12 | End: 2020-03-28 | Stop reason: HOSPADM

## 2020-03-12 RX ORDER — ECHINACEA PURPUREA EXTRACT 125 MG
2 TABLET ORAL PRN
Status: DISCONTINUED | OUTPATIENT
Start: 2020-03-12 | End: 2020-03-28 | Stop reason: HOSPADM

## 2020-03-12 RX ORDER — METHOCARBAMOL 750 MG/1
750 TABLET, FILM COATED ORAL EVERY 8 HOURS PRN
Status: CANCELLED | OUTPATIENT
Start: 2020-03-12

## 2020-03-12 RX ORDER — CALCIUM CARBONATE 500 MG/1
500 TABLET, CHEWABLE ORAL 2 TIMES DAILY
Status: DISCONTINUED | OUTPATIENT
Start: 2020-03-12 | End: 2020-03-19

## 2020-03-12 RX ORDER — ACETAMINOPHEN 325 MG/1
650 TABLET ORAL EVERY 4 HOURS PRN
Status: DISCONTINUED | OUTPATIENT
Start: 2020-03-12 | End: 2020-03-28 | Stop reason: HOSPADM

## 2020-03-12 RX ORDER — INSULIN GLARGINE 100 [IU]/ML
16 INJECTION, SOLUTION SUBCUTANEOUS EVERY EVENING
Status: CANCELLED | OUTPATIENT
Start: 2020-03-12

## 2020-03-12 RX ORDER — METHOCARBAMOL 750 MG/1
750 TABLET, FILM COATED ORAL EVERY 8 HOURS PRN
Status: DISCONTINUED | OUTPATIENT
Start: 2020-03-12 | End: 2020-03-25

## 2020-03-12 RX ORDER — HEPARIN SODIUM 5000 [USP'U]/ML
5000 INJECTION, SOLUTION INTRAVENOUS; SUBCUTANEOUS EVERY 8 HOURS
Status: DISCONTINUED | OUTPATIENT
Start: 2020-03-12 | End: 2020-03-28 | Stop reason: HOSPADM

## 2020-03-12 RX ORDER — GABAPENTIN 300 MG/1
300 CAPSULE ORAL EVERY EVENING
Status: DISCONTINUED | OUTPATIENT
Start: 2020-03-12 | End: 2020-03-13

## 2020-03-12 RX ORDER — SIMETHICONE 80 MG
80 TABLET,CHEWABLE ORAL 3 TIMES DAILY PRN
Status: DISCONTINUED | OUTPATIENT
Start: 2020-03-12 | End: 2020-03-28 | Stop reason: HOSPADM

## 2020-03-12 RX ORDER — VITAMIN B COMPLEX
4000 TABLET ORAL DAILY
Status: DISCONTINUED | OUTPATIENT
Start: 2020-03-13 | End: 2020-03-13

## 2020-03-12 RX ORDER — FERROUS SULFATE 325(65) MG
325 TABLET ORAL 2 TIMES DAILY WITH MEALS
Status: DISCONTINUED | OUTPATIENT
Start: 2020-03-12 | End: 2020-03-28 | Stop reason: HOSPADM

## 2020-03-12 RX ORDER — LIDOCAINE HYDROCHLORIDE 20 MG/ML
JELLY TOPICAL PRN
Status: DISCONTINUED | OUTPATIENT
Start: 2020-03-12 | End: 2020-03-28 | Stop reason: HOSPADM

## 2020-03-12 RX ORDER — DOCUSATE SODIUM 100 MG/1
200 CAPSULE, LIQUID FILLED ORAL 2 TIMES DAILY
Status: DISCONTINUED | OUTPATIENT
Start: 2020-03-12 | End: 2020-03-18

## 2020-03-12 RX ORDER — FERROUS SULFATE 325(65) MG
325 TABLET ORAL 2 TIMES DAILY WITH MEALS
Status: CANCELLED | OUTPATIENT
Start: 2020-03-12

## 2020-03-12 RX ORDER — FOLIC ACID 1 MG/1
5 TABLET ORAL DAILY
Status: DISCONTINUED | OUTPATIENT
Start: 2020-03-13 | End: 2020-03-14

## 2020-03-12 RX ORDER — FOLIC ACID 1 MG/1
5 TABLET ORAL DAILY
Status: CANCELLED | OUTPATIENT
Start: 2020-03-13

## 2020-03-12 RX ORDER — ONDANSETRON 2 MG/ML
4 INJECTION INTRAMUSCULAR; INTRAVENOUS 4 TIMES DAILY PRN
Status: DISCONTINUED | OUTPATIENT
Start: 2020-03-12 | End: 2020-03-28 | Stop reason: HOSPADM

## 2020-03-12 RX ORDER — FERROUS SULFATE 325(65) MG
325 TABLET ORAL 2 TIMES DAILY WITH MEALS
Qty: 30 TAB
Start: 2020-03-12

## 2020-03-12 RX ORDER — SODIUM BICARBONATE 650 MG/1
650 TABLET ORAL 3 TIMES DAILY
Status: DISCONTINUED | OUTPATIENT
Start: 2020-03-12 | End: 2020-03-24

## 2020-03-12 RX ORDER — VITAMIN B COMPLEX
5000 TABLET ORAL DAILY
Status: DISCONTINUED | OUTPATIENT
Start: 2020-03-13 | End: 2020-03-12

## 2020-03-12 RX ORDER — ASCORBIC ACID 500 MG
500 TABLET ORAL 2 TIMES DAILY WITH MEALS
Status: DISCONTINUED | OUTPATIENT
Start: 2020-03-12 | End: 2020-03-19

## 2020-03-12 RX ORDER — LIDOCAINE 50 MG/G
2 PATCH TOPICAL EVERY 24 HOURS
Status: CANCELLED | OUTPATIENT
Start: 2020-03-12

## 2020-03-12 RX ORDER — SENNOSIDES A AND B 8.6 MG/1
17.2 TABLET, FILM COATED ORAL
Status: DISCONTINUED | OUTPATIENT
Start: 2020-03-12 | End: 2020-03-18

## 2020-03-12 RX ORDER — ONDANSETRON 4 MG/1
4 TABLET, ORALLY DISINTEGRATING ORAL 4 TIMES DAILY PRN
Status: DISCONTINUED | OUTPATIENT
Start: 2020-03-12 | End: 2020-03-28 | Stop reason: HOSPADM

## 2020-03-12 RX ORDER — SODIUM BICARBONATE 650 MG/1
650 TABLET ORAL 3 TIMES DAILY
Status: CANCELLED | OUTPATIENT
Start: 2020-03-12

## 2020-03-12 RX ORDER — PIOGLITAZONEHYDROCHLORIDE 30 MG/1
30 TABLET ORAL DAILY
Status: CANCELLED | OUTPATIENT
Start: 2020-03-13

## 2020-03-12 RX ORDER — CHOLECALCIFEROL (VITAMIN D3) 125 MCG
1000 CAPSULE ORAL DAILY
Status: CANCELLED | OUTPATIENT
Start: 2020-03-13

## 2020-03-12 RX ORDER — AMMONIUM LACTATE 12 G/100G
LOTION TOPICAL 2 TIMES DAILY
Status: CANCELLED | OUTPATIENT
Start: 2020-03-12

## 2020-03-12 RX ORDER — PIOGLITAZONEHYDROCHLORIDE 15 MG/1
30 TABLET ORAL DAILY
Status: DISCONTINUED | OUTPATIENT
Start: 2020-03-13 | End: 2020-03-28 | Stop reason: HOSPADM

## 2020-03-12 RX ORDER — M-VIT,TX,IRON,MINS/CALC/FOLIC 27MG-0.4MG
1 TABLET ORAL
Status: DISCONTINUED | OUTPATIENT
Start: 2020-03-13 | End: 2020-03-28 | Stop reason: HOSPADM

## 2020-03-12 RX ORDER — AMMONIUM LACTATE 12 G/100G
LOTION TOPICAL 2 TIMES DAILY
Status: DISCONTINUED | OUTPATIENT
Start: 2020-03-12 | End: 2020-03-28 | Stop reason: HOSPADM

## 2020-03-12 RX ORDER — VITAMIN B COMPLEX
5000 TABLET ORAL DAILY
Status: CANCELLED | OUTPATIENT
Start: 2020-03-13

## 2020-03-12 RX ORDER — SIMETHICONE 80 MG
80 TABLET,CHEWABLE ORAL 3 TIMES DAILY PRN
Status: CANCELLED | OUTPATIENT
Start: 2020-03-12

## 2020-03-12 RX ADMIN — METOPROLOL TARTRATE 12.5 MG: 25 TABLET, FILM COATED ORAL at 05:04

## 2020-03-12 RX ADMIN — DOCUSATE SODIUM 200 MG: 100 CAPSULE, LIQUID FILLED ORAL at 20:34

## 2020-03-12 RX ADMIN — FERROUS SULFATE TAB 325 MG (65 MG ELEMENTAL FE) 325 MG: 325 (65 FE) TAB at 17:48

## 2020-03-12 RX ADMIN — ACETAMINOPHEN 1000 MG: 500 TABLET, FILM COATED ORAL at 20:35

## 2020-03-12 RX ADMIN — CALCIUM CARBONATE (ANTACID) CHEW TAB 500 MG 500 MG: 500 CHEW TAB at 20:34

## 2020-03-12 RX ADMIN — GABAPENTIN 300 MG: 300 CAPSULE ORAL at 20:35

## 2020-03-12 RX ADMIN — FERROUS SULFATE TAB 325 MG (65 MG ELEMENTAL FE) 325 MG: 325 (65 FE) TAB at 08:23

## 2020-03-12 RX ADMIN — PIOGLITAZONE 30 MG: 30 TABLET ORAL at 05:03

## 2020-03-12 RX ADMIN — SODIUM BICARBONATE 650 MG TABLET 650 MG: at 20:34

## 2020-03-12 RX ADMIN — OXYCODONE HYDROCHLORIDE AND ACETAMINOPHEN 500 MG: 500 TABLET ORAL at 17:48

## 2020-03-12 RX ADMIN — Medication: at 05:10

## 2020-03-12 RX ADMIN — FOLIC ACID 5 MG: 1 TABLET ORAL at 05:02

## 2020-03-12 RX ADMIN — SODIUM BICARBONATE 650 MG: 650 TABLET ORAL at 05:03

## 2020-03-12 RX ADMIN — MELATONIN 5000 UNITS: at 05:02

## 2020-03-12 RX ADMIN — HEPARIN SODIUM 5000 UNITS: 5000 INJECTION, SOLUTION INTRAVENOUS; SUBCUTANEOUS at 20:34

## 2020-03-12 RX ADMIN — SODIUM BICARBONATE 650 MG TABLET 650 MG: at 14:31

## 2020-03-12 RX ADMIN — INSULIN LISPRO 1 UNITS: 100 INJECTION, SOLUTION INTRAVENOUS; SUBCUTANEOUS at 20:29

## 2020-03-12 RX ADMIN — INSULIN LISPRO 2 UNITS: 100 INJECTION, SOLUTION INTRAVENOUS; SUBCUTANEOUS at 12:09

## 2020-03-12 RX ADMIN — ACETAMINOPHEN 1000 MG: 500 TABLET, FILM COATED ORAL at 14:31

## 2020-03-12 RX ADMIN — OMEPRAZOLE 20 MG: 20 CAPSULE, DELAYED RELEASE ORAL at 05:03

## 2020-03-12 RX ADMIN — SIMVASTATIN 20 MG: 20 TABLET, FILM COATED ORAL at 20:34

## 2020-03-12 RX ADMIN — METOPROLOL TARTRATE 12.5 MG: 25 TABLET, FILM COATED ORAL at 20:35

## 2020-03-12 RX ADMIN — CYANOCOBALAMIN TAB 500 MCG 1000 MCG: 500 TAB at 05:03

## 2020-03-12 RX ADMIN — BISACODYL 10 MG: 10 SUPPOSITORY RECTAL at 20:35

## 2020-03-12 RX ADMIN — INSULIN LISPRO 1 UNITS: 100 INJECTION, SOLUTION INTRAVENOUS; SUBCUTANEOUS at 17:22

## 2020-03-12 RX ADMIN — OXYCODONE HYDROCHLORIDE 5 MG: 5 TABLET ORAL at 20:35

## 2020-03-12 RX ADMIN — INSULIN GLARGINE 16 UNITS: 100 INJECTION, SOLUTION SUBCUTANEOUS at 20:28

## 2020-03-12 RX ADMIN — ANTACID TABLETS 500 MG: 500 TABLET, CHEWABLE ORAL at 05:03

## 2020-03-12 RX ADMIN — HEPARIN SODIUM 5000 UNITS: 5000 INJECTION, SOLUTION INTRAVENOUS; SUBCUTANEOUS at 14:31

## 2020-03-12 ASSESSMENT — LIFESTYLE VARIABLES
AVERAGE NUMBER OF DAYS PER WEEK YOU HAVE A DRINK CONTAINING ALCOHOL: 0
ALCOHOL_USE: NO
EVER FELT BAD OR GUILTY ABOUT YOUR DRINKING: NO
TOTAL SCORE: 0
TOTAL SCORE: 0
EVER HAD A DRINK FIRST THING IN THE MORNING TO STEADY YOUR NERVES TO GET RID OF A HANGOVER: NO
HOW MANY TIMES IN THE PAST YEAR HAVE YOU HAD 5 OR MORE DRINKS IN A DAY: 0
TOTAL SCORE: 0
EVER_SMOKED: NEVER
HAVE YOU EVER FELT YOU SHOULD CUT DOWN ON YOUR DRINKING: NO
HAVE PEOPLE ANNOYED YOU BY CRITICIZING YOUR DRINKING: NO
ON A TYPICAL DAY WHEN YOU DRINK ALCOHOL HOW MANY DRINKS DO YOU HAVE: 0
CONSUMPTION TOTAL: NEGATIVE

## 2020-03-12 ASSESSMENT — PATIENT HEALTH QUESTIONNAIRE - PHQ9
1. LITTLE INTEREST OR PLEASURE IN DOING THINGS: NOT AT ALL
SUM OF ALL RESPONSES TO PHQ9 QUESTIONS 1 AND 2: 0
2. FEELING DOWN, DEPRESSED, IRRITABLE, OR HOPELESS: NOT AT ALL
1. LITTLE INTEREST OR PLEASURE IN DOING THINGS: NOT AT ALL
2. FEELING DOWN, DEPRESSED, IRRITABLE, OR HOPELESS: NOT AT ALL
SUM OF ALL RESPONSES TO PHQ9 QUESTIONS 1 AND 2: 0

## 2020-03-12 ASSESSMENT — COPD QUESTIONNAIRES
DURING THE PAST 4 WEEKS HOW MUCH DID YOU FEEL SHORT OF BREATH: NONE/LITTLE OF THE TIME
HAVE YOU SMOKED AT LEAST 100 CIGARETTES IN YOUR ENTIRE LIFE: NO/DON'T KNOW
DO YOU EVER COUGH UP ANY MUCUS OR PHLEGM?: NO/ONLY WITH OCCASIONAL COLDS OR INFECTIONS
COPD SCREENING SCORE: 2

## 2020-03-12 ASSESSMENT — FIBROSIS 4 INDEX: FIB4 SCORE: 2.4

## 2020-03-12 NOTE — DISCHARGE SUMMARY
Discharge Summary    CHIEF COMPLAINT ON ADMISSION  No chief complaint on file.      Reason for Admission  SPINAL STENOSIS, CERVICAL REGION     CODE STATUS  Prior    HPI & HOSPITAL COURSE  This is a 79 y.o. male with hx of DM , Hypertension, CKD 4 admitted following a cervical neck injury. Findings of severe cervical spondylosis, myelopathy and foraminal stenosis.  Patient underwent cervical laminectomy,  decompression and fusion on 2-28.  Hospitalist service consulted for Diabetes. His A1c was 5.5. Pt was continued on lantus and SSI. His blood sugars better controlled. His BP has been controlled.  Pt as seen by physical therapy and rehab has been recommended. Pt will be discharged to rehab today.    The patient met 2-midnight criteria for an inpatient stay at the time of discharge.      FOLLOW UP ITEMS POST DISCHARGE  Neurosurgery  PCP     DISCHARGE DIAGNOSES  Principal Problem:    Type 2 diabetes mellitus (HCC) POA: Yes  Active Problems:    Central cord syndrome (HCC) POA: Yes    Acute on chronic renal failure (HCC) POA: Unknown    Anemia POA: Yes    Thrombocytopenia (HCC) POA: Yes    Vitamin D deficiency POA: Yes    Essential hypertension POA: Yes    Hyperlipemia POA: Yes  Resolved Problems:    * No resolved hospital problems. *      FOLLOW UP  No future appointments.  Jason Granda M.D.  [address] [facility]  [place] [address] [phone]      Per office please call to schedule your hospital follow up. Thank you.     Lg Sellers D.O.  [id number]  [facility] [address]  [place] [address]  [phone]    Schedule an appointment as soon as possible for a visit in 2 weeks  If symptoms worsen, As needed, For suture removal, For wound re-check      MEDICATIONS ON DISCHARGE     Medication List      START taking these medications      Instructions   ferrous sulfate 325 (65 Fe) MG tablet   Take 1 Tab by mouth 2 times a day, with meals.  Dose:  325 mg     HYDROcodone-acetaminophen 5-325 MG Tabs per tablet  Commonly known as:   NORCO   Take 1-2 Tabs by mouth every 6 hours as needed for up to 10 days.  Dose:  1-2 Tab        CONTINUE taking these medications      Instructions   calcitRIOL 0.25 MCG Caps  Commonly known as:  ROCALTROL   Take 0.25 mcg by mouth every day.  Dose:  0.25 mcg     insulin glargine 100 UNIT/ML Soln  Commonly known as:  LANTUS   Inject 6 Units as instructed every evening.  Dose:  6 Units     metoprolol 25 MG Tabs  Commonly known as:  LOPRESSOR   Take 12.5 mg by mouth 2 times a day.  Dose:  12.5 mg     NOVOLOG (insulin aspart) 100 UNIT/ML injection PEN  Commonly known as:  NOVOLOG   Inject 5 Units as instructed every bedtime.  Dose:  5 Units     pioglitazone 30 MG Tabs  Commonly known as:  ACTOS   Take 30 mg by mouth every day.  Dose:  30 mg     simvastatin 20 MG Tabs  Commonly known as:  ZOCOR   Take 20 mg by mouth every evening.  Dose:  20 mg     sodium bicarbonate 650 MG Tabs  Commonly known as:  SODIUM BICARBONATE   Take 650 mg by mouth 3 times a day.  Dose:  650 mg            Allergies  No Active Allergies    DIET  Orders Placed This Encounter   Procedures   • Diet Order Renal (NO FISH), Diabetic     Standing Status:   Standing     Number of Occurrences:   1     Order Specific Question:   Diet:     Answer:   Renal [8]     Comments:   NO FISH     Order Specific Question:   Diet:     Answer:   Diabetic [3]       ACTIVITY  As tolerated.  Weight bearing as tolerated    LINES, DRAINS, AND WOUNDS  This is an automated list. Peripheral IVs will be removed prior to discharge.       Wound 02/28/20 Incision Neck (Active)   Site Assessment [facility] 3/12/2020  8:23 AM   Periwound Assessment GRADY 3/12/2020  8:23 AM   Margins GRADY 3/12/2020  8:23 AM   Closure GRADY 3/12/2020  8:23 AM   Drainage Amount None 3/10/2020  8:57 AM   Drainage Description [facility] 3/10/2020  8:00 PM   Treatments Cleansed 3/2/2020 12:59 PM   Wound Cleansing Normal Saline Irrigation 3/2/2020 12:59 PM   Periwound Protectant Skin Protectant Wipes to Periwound 3/2/2020  12:59 PM   Dressing Options Wound Vac 3/9/2020  8:25 AM   Dressing Changed Observed 3/11/2020  8:20 AM   Dressing Status Clean;Dry;Intact 3/12/2020  8:23 AM   Dressing Change/Treatment Frequency As Needed 3/12/2020  8:23 AM   NEXT Dressing Change/Treatment Date 03/11/20 3/4/2020 10:00 AM       Wound 03/08/20 Pressure Injury Neck Anterior Left (Active)   Wound Image   3/8/2020 10:00 PM   Site Assessment Clean;Dry;Intact;Pink;Yellow 3/12/2020  8:23 AM   Periwound Assessment Intact 3/10/2020  8:00 PM   Margins Defined edges 3/10/2020  8:00 PM   Closure GRADY 3/10/2020  8:00 PM   Drainage Amount None 3/10/2020  4:30 PM   Drainage Description Serosanguineous 3/8/2020 10:00 PM   Treatments Cleansed 3/10/2020  4:30 PM   Wound Cleansing Normal Saline Irrigation 3/10/2020  4:30 PM   Periwound Protectant Hydrocolloid 3/10/2020  4:30 PM   Dressing Cleansing/Solutions Not Applicable 3/10/2020  4:30 PM   Dressing Options Hydrocolloid Thin 3/12/2020  8:23 AM   Dressing Changed Observed 3/10/2020  8:00 PM   Dressing Status Clean;Dry;Intact 3/12/2020  8:23 AM   Dressing Change/Treatment Frequency Every 72 hrs, and As Needed 3/10/2020  8:00 PM   NEXT Dressing Change/Treatment Date 03/13/20 3/10/2020  4:30 PM   NEXT Weekly Photo (Inpatient Only) 03/13/20 3/10/2020  4:30 PM   Pressure Injury Stage Pending Stag 3/10/2020  4:30 PM   Wound Length (cm) 1 cm 3/10/2020  4:30 PM   Wound Width (cm) 1.5 cm 3/10/2020  4:30 PM   Wound Surface Area (cm^2) 1.5 cm^2 3/10/2020  4:30 PM   Tunneling (cm) 0 cm 3/10/2020  4:30 PM   Undermining (cm) 0 cm 3/10/2020  4:30 PM   Shape oval 3/10/2020  4:30 PM   Wound Odor None 3/10/2020  4:30 PM   Exposed Structures None 3/10/2020  4:30 PM   WOUND NURSE ONLY - Time Spent with Patient (mins) 45 3/10/2020  4:30 PM                  MENTAL STATUS ON TRANSFER  Level of Consciousness: Alert  Orientation : Oriented x 4  Speech: Speech Clear    CONSULTATIONS  Neurosurgery     PROCEDURES  DECOMPRESSION, SPINE, CERVICAL,  POSTERIOR APPROACH- C3-6 DISCECTOMY AND FUSION     LABORATORY  Lab Results   Component Value Date    SODIUM 136 03/12/2020    POTASSIUM 4.2 03/12/2020    CHLORIDE 97 03/12/2020    CO2 24 03/12/2020    GLUCOSE 145 (H) 03/12/2020    BUN 49 (H) 03/12/2020    CREATININE 2.62 (H) 03/12/2020        Lab Results   Component Value Date    WBC 6.8 03/12/2020    HEMOGLOBIN 8.1 (L) 03/12/2020    HEMATOCRIT 25.6 (L) 03/12/2020    PLATELETCT 189 03/12/2020        Total time of the discharge process exceeds 41 minutes.

## 2020-03-12 NOTE — PROGRESS NOTES
2 RN skin check done with Rajesh. Face photo and skin photos documented in media.  Findings includes: sydnie heels pressure injury, sacrum redness, bruising on left flank, left elbow redness , left FA swollen from old IV, purple spot on left plantar  Pressure injury at left anterior neck, posterior neck incision.  Appropriate LDAs opened.   Pt with Dale score of 16, RN wound protocol ordered on 3/12, orders current. Prevention measures in place including low air loss mattress, mepilex for heels and sacrum, turn and repositioned, heel protectors while in bed. Wound consult ordered.

## 2020-03-12 NOTE — PROGRESS NOTES
0700 Report received from [facility] RN    0823 Assessment completed. Medications administered per MAR. Patient sitting in chair watching TV. Denies further needs.     0927 Patient sitting in chair eating breakfast. Appears comfortable. Denies further needs. Patient scheduled to discharge at 1100.     1110 Patient discharged at 1110

## 2020-03-12 NOTE — CARE PLAN
Problem: Communication  Goal: The ability to communicate needs accurately and effectively will improve  Outcome: PROGRESSING AS EXPECTED  Note: Plan of care discussed with patient, questions answered, verbalized understanding.     Problem: Safety  Goal: Will remain free from injury  Outcome: PROGRESSING AS EXPECTED  Note: Bed in low position, non-slip tread socks on, call light within reach. Patient told to call for assistance before ambulating. Verbalized understanding.

## 2020-03-12 NOTE — PROGRESS NOTES
Neurosurgery Progress Note    Subjective:  Pt states that he is feeling well  Overall very happy and improved from surgery   Pt complains of continued right shoulder pain  Ongoing d/c planning limited by insurance - accepted by [facility] today    Exam:  Incision CDI staples intact  NM: R> L trapezius pain beyond 90 degrees, ecchymosis at varius stages of healing over right trapezius. Able to lift RUE to 90 degrees ROM   RUE: B/T 4+/5,  4+/5, 4+ bicep at 80-90 degrees ROM  Low index of suspicion for C5 neuropraxia  LUE: 4+/5 bicep, tricep, deltoid, handgrip  Sensation intact and equal C4-T1  LEs: 5/5 dorsiflexion, plantar flexion  Sensation/motors intact distally  Incision CDI    BP  Min: 116/62  Max: 133/51  Pulse  Av  Min: 81  Max: 93  Resp  Av  Min: 15  Max: 17  Temp  Av.8 °C (98.3 °F)  Min: 36.6 °C (97.8 °F)  Max: 37.1 °C (98.7 °F)  SpO2  Av %  Min: 97 %  Max: 100 %    No data recorded    Recent Labs     20  0443   WBC 6.8 6.8   RBC 2.44* 2.68*   HEMOGLOBIN 7.6* 8.1*   HEMATOCRIT 23.7* 25.6*   MCV 97.1 95.5   MCH 31.1 30.2   MCHC 32.1* 31.6*   RDW 59.3* 57.8*   PLATELETCT 165 189   MPV 8.9* 9.1     Recent Labs     202 20  0443   SODIUM 134* 136   POTASSIUM 4.6 4.2   CHLORIDE 101 97   CO2 27 24   GLUCOSE 163* 145*   BUN 51* 49*   CREATININE 2.91* 2.62*   CALCIUM 8.5 8.2*               Intake/Output       20 - 20 - 2059       Total  Total       Intake    Total Intake -- -- -- -- -- --       Output    Urine  150  400 550  --  -- --    Number of Times Voided 7 x 2 x 9 x -- -- --    Urine Void (mL) 150 400 550 -- -- --    Stool  --  -- --  --  -- --    Number of Times Stooled 1 x -- 1 x -- -- --    Total Output 150 400 550 -- -- --       Net I/O     -150 -400 -550 -- -- --            Intake/Output Summary (Last 24 hours) at 3/12/2020 1011  Last data filed at 3/12/2020  0336  Gross per 24 hour   Intake --   Output 400 ml   Net -400 ml            • ferrous sulfate  325 mg BID WITH MEALS   • ammonium lactate   BID   • simethicone  80 mg TID PRN   • cyanocobalamin  1,000 mcg DAILY   • folic acid  5 mg DAILY   • insulin glargine  16 Units Q EVENING   • lidocaine  1 Patch Q24HR   • epoetin prema  2,000 Units MO, WE + FR   • omeprazole  20 mg DAILY   • magnesium hydroxide  30 mL DAILY   • polyethylene glycol/lytes  1 Packet DAILY   • insulin lispro  1-6 Units 4X/DAY ACHS    And   • glucose  16 g Q15 MIN PRN    And   • dextrose 10% bolus  250 mL Q15 MIN PRN   • sodium bicarbonate  650 mg TID   • simvastatin  20 mg Nightly   • metoprolol  12.5 mg BID   • Pharmacy Consult Request  1 Each PHARMACY TO DOSE   • MD ALERT...DO NOT ADMINISTER NSAIDS or ASPIRIN unless ORDERED By Neurosurgery  1 Each PRN   • docusate sodium  100 mg BID   • senna-docusate  1 Tab Nightly   • senna-docusate  1 Tab Q24HRS PRN   • bisacodyl  10 mg Q24HRS PRN   • pioglitazone  30 mg DAILY   • oxyCODONE immediate-release  5 mg Q3HRS PRN   • HYDROcodone-acetaminophen  1-2 Tab Q6HRS PRN   • diphenhydrAMINE  25 mg Q6HRS PRN    Or   • diphenhydrAMINE  25 mg Q6HRS PRN   • ondansetron  4 mg Q4HRS PRN   • ondansetron  4 mg Q4HRS PRN   • methocarbamol  750 mg Q8HRS PRN   • cloNIDine  0.1 mg Q4HRS PRN   • benzocaine-menthol  1 Lozenge Q2HRS PRN   • calcium carbonate  500 mg BID   • vitamin D  5,000 Units DAILY       Assessment and Plan:  POD #13  s/p C3-6 laminectomy and posterolateral fusion for cervical stenosis/myelopathy  Suspect RUE pain and LUE limited ROM due more to shoulder arthropathy/injury. Low suspicion for C5 neuropraxia given R shoulder crepitus and good strength > 30 degrees ROM   OK to d/c staples    Hospitalist following for HTN, DM, CKD, thrombocytopenia/anemia  Cleared to d/c from neurosurgery standpoint.  Discharge pain medications written     Appreciate continued hospitalist assistance and all teams involved  in placement of this patient    Recommend f/u in outpatient clinic in 3-4 weeks with Dr. Sellers with new XR cervical AP/Lateral views    Seen with Dr. Sellers

## 2020-03-12 NOTE — DISCHARGE PLANNING
Anticipated Discharge Disposition:   [facility]    Action:    Pt accepted and bed available today with transport time 1100 per [facility].  Pt agreeable.  Verbal for Cobra obtained from patient.  Cobra signed by Dr. Wang.  Cobra given to bedside RN Janay.    Barriers to Discharge:    None    Plan:    DC

## 2020-03-12 NOTE — PROGRESS NOTES
Staples removed from incision, pt tolerated well, pt transferred to [facility] at this time in a stable condition

## 2020-03-12 NOTE — FLOWSHEET NOTE
03/12/20 1618   Events/Summary/Plan   Events/Summary/Plan Assessment   Vital Signs   Pulse 81   Respiration 16   Pulse Oximetry 95 %   $ Pulse Oximetry (Spot Check) Yes   Respiratory Assessment   Level of Consciousness Alert   Breath Sounds   RUL Breath Sounds Clear   RML Breath Sounds Clear   RLL Breath Sounds Clear   CIRA Breath Sounds Clear   LLL Breath Sounds Clear   Secretions   Cough Moist;Non Productive   Oxygen   O2 Delivery Device None - Room Air   Smoking History   Have you ever smoked Never

## 2020-03-12 NOTE — CARE PLAN
Problem: Safety  Goal: Will remain free from injury  Outcome: PROGRESSING AS EXPECTED  Note: Reviewed fall and safety precautions with patient and reminded patient to call for assistance before getting out of bed. Patient verbalized understanding and has not attempted self transfer this shift.      Problem: Urinary Elimination:  Goal: Ability to reestablish a normal urinary elimination pattern will improve  Outcome: PROGRESSING AS EXPECTED  Note: Patient remains continent of bladder. Use urinal for elimination. PVR was 37ml.

## 2020-03-12 NOTE — DISCHARGE PLANNING
Insurance has authorized [facility]. Dr. Garner has accepted Vince.  Transport has been arranged for 1100.  Msg placed to Dr. Felipe Ross (CM).  Janay (BSN) is aware.

## 2020-03-12 NOTE — H&P
REHABILITATION HISTORY AND PHYSICAL/POST ADMISSION EVALUATION    3/12/2020  Vince Almanzar      PRIMARY REHAB DIAGNOSIS:    This patient is a 79 y.o. male admitted for acute inpatient rehabilitation with reason for admission/[facility] code of 04.1211 Quadriplegia, Incomplete C1-4 due to the C2 AIS D incomplete spinal cord injury, etiologic diagnosis of spondylosis with myelopathy.    HPI:  The patient is a 79 y.o. right hand dominant male with a past medical history of diabetes, hypertension, hyperlipidemia, neuropathic pain, stage III chronic kidney disease, previous TBI and central cord syndrome and 2018  who was admitted to [facility] on 2/28/2020 with worsening bilateral upper extremity weakness, balance, neck pain. Most recent MRI showed severe stenosis at C3-C6 with interval progression over the last year.  He underwent an C3-C6 laminectomy and posterior decompression.    Patient was being followed by neurosurgery for continued right arm discoordination and pain.      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  At this time he was managed nonoperatively.  Right upper limb was still mildly weak and discoordinated at time of discharge from acute rehabilitation.  At time of discharge from acute rehabilitation he was able to void normally, no incontinence, no significant post void residuals.  His bowel had significantly improved requiring only Colace and PRN MOM.     Chart review of patient's last PCP visit on 5/31/2019 reflects the following for diabetes management: 1.  Ozempic 1.0 once a week on Monday; 2.  Stay Actos/Pioglitazone 30mg once a day; 3.  Tresiba 10 units every night    Patient was evaluated by physiatry and Physical Therapy and Occupational Therapy and determined to be appropriate for acute inpatient rehab and was admitted to [facility] on  3/12    Patient has not had a bowel movement since admission.  Has history of difficulty with constipation.    He denies any difficulty with urinary retention or incontinence over the past year after discharge from acute rehabilitation.  During acute hospitalization post surgery patient was having difficulty with urinary retention.  Ultrasound of kidneys showed bilateral hydronephrosis.    He reports after surgery strength has been improving in upper extremities, balance is still significantly altered.  He prefers to use four-wheel walker versus front wheel walker.    Neuropathic pain in upper extremities is improving but still present with burning and tingling in bilateral upper extremities going from the neck all the way down to the hands.  Constant, positive radiation, no other associated symptoms.      Pre-mobidly, the patient lived in a two level assisted living home with self and attendant.  With this acute therapeutic intervention, this patient hopes to improve his functional status, and return to independent living with the supportive care of caregiver(s).        REVIEW OF SYSTEMS:       Gen: No fatigue, confusion, significant weight loss  Eyes: no blurry vision, double vision or pain in eyes  ENT: no changes in hearing, runny nose, nose bleeds, sinus pain  CV: No CP, palpitations,   Lungs: no shortness of breath, changes in secretions, changes in cough, pain with coughing  Abd: No abd pain, nausea, vomiting, pain with eating  : no blood in urine, suprapubic pain  Ext: No swelling in legs, asymmetric atrophy  Neuro: no changes in strength or sensation in last 24 hours  Skin: no new ulcers/skin breakdown appreciated by patient  Mood: No changes in mood today, increase in depression or anxiety  Heme: no bruising, or bleeding  Rest of 14 point review of systems is negative      PMH:  Past Medical History:   Diagnosis Date   • Arrhythmia     [facility]   • Diabetes (HCC)    • Hemorrhagic disorder  "(HCC)     \"bleeds easily\"   • High cholesterol    • Hypertension    • Jaundice 5/8/2016   • Renal disorder     hx kidney stones       PSH:  Past Surgical History:   Procedure Laterality Date   • CERVICAL DECOMPRESSION POSTERIOR  2/28/2020    Procedure: DECOMPRESSION, SPINE, CERVICAL, POSTERIOR APPROACH- C3-6 DISCECTOMY AND FUSION;  Surgeon: Lg Sellers D.O.;  Location: [facility];  Service: Neurosurgery   • GASTROSCOPY-ENDO N/A 6/3/2016    Procedure: GASTROSCOPY-ENDO;  Surgeon: Jasper Donnelly M.D.;  Location: [facility];  Service:    • EGD W/ENDOSCOPIC ULTRASOUND N/A 6/3/2016    Procedure: EGD W/ENDOSCOPIC ULTRASOUND UPPER;  Surgeon: Jasper Donnelly M.D.;  Location: [facility];  Service:    • EGD WITH ASP/BX N/A 6/3/2016    Procedure: EGD WITH ASP/BX - FNA, DUODENAL BIOPSIES, FNA OF PANCREAS;  Surgeon: Jasper Donnelly M.D.;  Location: [facility];  Service:    • ERCP  5/2016   • CHOLECYSTECTOMY  2006    gallstones removed   • HIP ARTHROPLASTY TOTAL Left 2001    left total hip       FAMILY HISTORY:  No history of kidney disease, no pertinent family history from today's interview    MEDICATIONS:  Current Facility-Administered Medications   Medication Dose   • lidocaine 2 % jelly      And   • nitroglycerin (NITRO-BID) 2 % ointment 1 Inch  1 Inch   • Pharmacy Consult Request ...Pain Management Review 1 Each  1 Each   • Respiratory Therapy Consult     • acetaminophen (TYLENOL) tablet 1,000 mg  1,000 mg   • oxyCODONE immediate-release (ROXICODONE) tablet 5 mg  5 mg   • oxyCODONE immediate release (ROXICODONE) tablet 10 mg  10 mg   • acetaminophen (TYLENOL) tablet 650 mg  650 mg   • ascorbic acid tablet 500 mg  500 mg   • [START ON 3/13/2020] therapeutic multivitamin-minerals (THERAGRAN-M) tablet 1 Tab  1 Tab   • docusate sodium (COLACE) capsule 200 mg  200 mg    And   • sennosides (SENOKOT) 8.6 MG tablet 17.2 mg  17.2 mg    And   • bisacodyl (THE MAGIC BULLET) " suppository 10 mg  10 mg    And   • magnesium hydroxide (MILK OF MAGNESIA) suspension 30 mL  30 mL   • artificial tears ophthalmic solution 1 Drop  1 Drop   • benzocaine-menthol (CEPACOL) lozenge 1 Lozenge  1 Lozenge   • mag hydrox-al hydrox-simeth (MAALOX PLUS ES or MYLANTA DS) suspension 20 mL  20 mL   • ondansetron (ZOFRAN ODT) dispertab 4 mg  4 mg    Or   • ondansetron (ZOFRAN) syringe/vial injection 4 mg  4 mg   • sodium chloride (OCEAN) 0.65 % nasal spray 2 Spray  2 Spray   • traZODone (DESYREL) tablet 50 mg  50 mg   • ammonium lactate (LAC-HYDRIN) 12 % lotion     • insulin lispro (HUMALOG) injection 1-6 Units  1-6 Units    And   • glucose 4 g chewable tablet 16 g  16 g    And   • dextrose 50% (D50W) injection 50 mL  50 mL   • calcium carbonate (TUMS) chewable tab 500 mg  500 mg   • methocarbamol (ROBAXIN) tablet 750 mg  750 mg   • [START ON 3/13/2020] cyanocobalamin (VITAMIN B12) tablet 1,000 mcg  1,000 mcg   • [START ON 3/13/2020] epoetin prema (EPOGEN,PROCRIT) injection 2,000 Units  2,000 Units   • ferrous sulfate tablet 325 mg  325 mg   • [START ON 3/13/2020] folic acid (FOLVITE) tablet 5 mg  5 mg   • insulin glargine (LANTUS) injection 16 Units  16 Units   • [START ON 3/13/2020] lidocaine (LIDODERM) 5 % 2 Patch  2 Patch   • metoprolol (LOPRESSOR) tablet 12.5 mg  12.5 mg   • [START ON 3/13/2020] pioglitazone (ACTOS) tablet 30 mg  30 mg   • simethicone (MYLICON) chewable tab 80 mg  80 mg   • simvastatin (ZOCOR) tablet 20 mg  20 mg   • sodium bicarbonate tablet 650 mg  650 mg   • gabapentin (NEURONTIN) capsule 300 mg  300 mg   • heparin injection 5,000 Units  5,000 Units   • [START ON 3/13/2020] vitamin D (cholecalciferol) tablet 4,000 Units  4,000 Units   • midodrine (PROAMATINE) tablet 5 mg  5 mg       ALLERGIES:  Patient has no active allergies.    PSYCHOSOCIAL HISTORY:  Living Site:  Assisted living residence  Living With:  self  Caregiver's availability:  assisted living  Number of stairs:  Elevator  "present  Substance use history:  denies      LEVEL OF FUNCTION PRIOR TO DISABILTY:  Ambulated with four-wheel walker, otherwise modified independent    LEVEL OF FUNCTION PRIOR TO ADMISSION to [facility]:  Physical Therapy Treatment completed.   Bed Mobility:  Supine to Sit: (Up in chair)  Transfers: Sit to Stand: Minimal Assist  Gait: Level Of Assist: Minimal Assist with Front-Wheel Walker         Occupational Therapy Treatment completed with focus on ADLs, ADL transfers and patient education.  Functional Status:  Phylicia sit>stand from chair and toilet, ModA toileting pericare post BM, Phylicia standing grooming at sink, MaxA LB dress (sock change), Phylicia functional mobility of household distances (for safety), cues for upright posture    CURRENT LEVEL OF FUNCTION:   Same as level of function prior to admission to [facility]    PHYSICAL EXAM:     VITAL SIGNS:   height is 1.778 m (5' 10\") and weight is 75.8 kg (167 lb). His temporal temperature is 36.2 °C (97.2 °F). His blood pressure is 134/49 and his pulse is 84. His respiration is 18 and oxygen saturation is 94%.     GENERAL: No apparent distress  HEENT: Normocephalic/atraumatic, EOMI and PERRL  CARDIAC: Regular rate and rhythm, normal S1, S2   LUNGS: Clear to auscultation bilaterally.  Cough force is strong enough to independently clear secretions.  There is no sign of accessory muscle use.    ABDOMINAL: bowel sounds present, soft, nontender and nondistended    EXTREMITIES: no contractures, spasticity, 2+ pitting edema in bilateral lower extremities.  No calf tenderness bilaterally, 1+ bilateral DP/PT pulses.  Erythema present and bottom half of bilateral legs    SKIN:  There is no sign of pressure injury or skin breakdown.  The neck  incision is clean, dry and intact, without signs of dehiscence.      NEURO:    Mental status:  A&Ox4 (person, place, date, situation) answers questions appropriately follows " commands  Speech/language: fluent, no aphasia or dysarthria    CRANIAL NERVES:  2,3: visual acuity grossly intact, PERRL  3,4,6: EOMI bilaterally, no nystagmus or diplopia  5: intact in all branches  7: no facial asymmetry  8: hearing grossly intact  9,10: symmetric palate elevation  11: SCM/Trapezius strength 5/5 bilaterally  12: tongue protrudes midline    Motor:   Motor Exam Upper Extremities   ? Myotome R L   Shoulder flexion C5 3 4   Elbow flexion C5 4 4   Wrist extension C6 4 4   Elbow extension C7 4 5   Finger flexion C8 4 4   Finger abduction T1 3 4       Motor Exam Lower Extremities    ? Myotome R L   Hip flexion L2 4 4   Knee extension L3 5 5   Ankle dorsiflexion L4 5 5   Toe extension L5 5 5   Ankle plantarflexion S1 ! !     ! - Difficulty following commands       Sensory: With light touch/pin prick, last normal sensory level is   C2      DTRs: 3+ in bilateral biceps, brachioradialis, patellar, and Achilles tendons  Babinski: + bilaterally  Villavicencio: + bilaterally      RADIOLOGY:  KUB 3/6: Significant stool load present in a sending transverse and descending colon with dilation of transverse and descending colon.    DANYELL 3/11:  Conclusions   No evidence of arterial insufficiency.     Ultrasound right upper extremity 3/3:   CONCLUSIONS   No right upper extremity DVT.    LABS:  Recent Labs     03/11/20 0312 03/12/20  0443   SODIUM 134* 136   POTASSIUM 4.6 4.2   CHLORIDE 101 97   CO2 27 24   GLUCOSE 163* 145*   BUN 51* 49*   CREATININE 2.91* 2.62*   CALCIUM 8.5 8.2*     Recent Labs     03/11/20 0312 03/12/20  0443   WBC 6.8 6.8   RBC 2.44* 2.68*   HEMOGLOBIN 7.6* 8.1*   HEMATOCRIT 23.7* 25.6*   MCV 97.1 95.5   MCH 31.1 30.2   MCHC 32.1* 31.6*   RDW 59.3* 57.8*   PLATELETCT 165 189   MPV 8.9* 9.1             IMPAIRMENTS:   Cognitive  ADLs/IADLs  Mobility  Swallow      RELEVANT CHANGES SINCE PREADMISSION EVALUATION:    Status unchanged    The patient's rehabilitation potential is Very Good  The patient's  medical prognosis is good    PLAN:   Discussion and Recommendations:   1. The patient requires an acute inpatient rehabilitation program with a coordinated program of care at an intensity and frequency not available at a lower level of care. This recommendation is substantiated by the patient's medical physicians who recommend that the patient's intervention and assessment of medical issues needs to be done at an acute level of care for patient's safety and maximum outcome.   2. A coordinated program of care will be supplied by an interdisciplinary team of physical therapy, occupational therapy, rehab physician, rehab nursing, and, if needed, speech therapy and rehab psychology. Rehab team presents a patient-specific rehabilitation and education program concentrating on prevention of future problems related to accessibility, mobility, skin, bowel, bladder, sexuality, and psychosocial and medical/surgical problems.   3. Need for Rehabilitation Physician: The rehab physician will be evaluating the patient on a multi-weekly basis to help coordinate the program of care. The rehab physician communicates between medical physicians, therapists, and nurses to maximize the patient's potential outcome. Specific areas in which the rehab physician will be providing daily assessment include the following:   A. Assessing the patient's heart rate and blood pressure response (vitals monitoring) to activity and making adjustments in medications or conservative measures as needed.   B. The rehab physician will be assessing the frequency at which the program can be increased to allow the patient to reach optimal functional outcome.   C. The rehab physician will also provide assessments in daily skin care, especially in light of patient's impairments in mobility.   D. The rehab physician will provide special expertise in understanding how to work with functional impairment and recommend appropriate interventions, compensatory  techniques, and education that will facilitate the patient's outcome.   4. Rehab R.N.   The rehab RN will be working with patient to carry over in room mobility and activities of daily living when the patient is not in 3 hours of skilled therapy. Rehab nursing will be working in conjunction with rehab physician to address all the medical issues above and continue to assess laboratory work and discuss abnormalities with the treating physicians, assess vitals, and response to activity, and discuss and report abnormalities with the rehab physician. Rehab RN will also continue daily skin care, supervise bladder/bowel program, instruct in medication administration, and ensure patient safety.     5. Therapies to treat at intensity and frequency of (may change after completion of evaluation by all therapeutic disciplines):       PT:  Physical therapy to address mobility, transfer, gait training and evaluation for adaptive equipment needs 1hour/day at least 5 days/week for the duration of the ELOS (see below)       OT:  Occupational therapy to address ADLs, self-care, home management training, functional mobility/transfers and assistive device evaluation, and community re-integration 1hour/day at least 5 days/week for the duration of the ELOS (see below).        ST/Dysphagia:  Speech therapy to address speech, language, and cognitive deficits as well as swallowing difficulties with retraining/dysphagia management and community re-integration with comprehension, expression, cognitive training 1hour/day at least 5 days/week for the duration of the ELOS (see below).     6. Medical management / Rehabilitation Issues/ Adverse Potential as part of rehabilitation plan       C2 AIS D spinal cord injury: Central cord syndrome, C3-C6 cervical spondylosis with myelopathy, status post C3-C6 laminectomy by Dr. Sellers on 2/28.  Pinprick altered bilaterally at C3-C6  -The patient will begin a comprehensive interdisciplinary  rehabilitation program.  - Collar on at all times, spinal precautions  - Calcium carbonate 500 mg twice daily    Neurologic respiratory impairment  -Consult respiratory therapy  - Oxygen as per guidelines  -DuoNeb twice daily  -Check vital capacity    Neurogenic bladder  -Start voiding trial, if can't void in 6 hours or prn check pvr and if >500cc then ICP,if able to void then check PVR, if PVR is >200cc then ICP    Neurogenic bowel  -Upper motor neuron neurogenic bowel program with senna 2 tablets q. noon, Colace 200 mg twice daily and Dulcolax suppository with digital stimulation  -We will educate patient on importance of performing own bowel program, daily bowel movements, and appropriate management can result in dilation of colon and rupture.    Potential for orthostatic hypotension  Because of significant autonomic dysfunction associated with spinal cord injury, the patient is at high risk for orthostatic hypotension.  - Midodrine 5mg q4 hours prn SBP <100    Dysphagia:   -Consults speech therapy to evaluate for swallow study    Skin  Due to the sensory impairments following spinal cord injury, skin protection principles are of the utmost importance.     Plan to institute an every two-hour turning pattern overnight while the patient is in bed. When the patient is out of bed, an every 15 minute repositioning or weight shifting pattern will be utilized in order to help train the patient for long-term skin protection. We will also discuss skin monitoring and its importance with the patient and the caregivers.    Pain:  #Neuropathic pain: Localized in bilateral upper extremities, burning tingling sensation  - Gabapentin 300 mg twice daily, dose limited by GFR  -Vitamin C 500 mg every 12 hours, which is been shown to improve gabapentin absorption and pain management    Postoperative pain:  - Oxycodone 5-10 mg every 3 hours as needed pain  - Robaxin 750 mg every 8 hours as needed pain  -Tylenol 1000 mg 3 times  daily    Anemia: Required IV iron supplementation  -Epogen 2000 units, Monday Wednesday Friday  -Folic acid 5 mg daily  - Ferrous sulfate 325 mg twice daily  -Check CBC in a.m.    Diabetes:  -Lantus 16 units nightly  -Check fingersticks q. before meals, nightly  -Sliding scale insulin  - Pioglitazone 30 mg a daily  - ADA diet  -Consult hospitalist     Acute on chronic kidney injury: Previously stage IV kidney disease with bilateral hydronephrosis suggestive of post renal obstruction, likely worsened by neurogenic bladder.  -Check CMP in a.m.  -Manage neurogenic bladder as above    Hyponatremia:  -Sodium bicarb tablets 650 mg 3 times a day  -Check CMP in a.m.    Hyperlipidemia:  -Simvastatin 20 mg nightly  -Check lipid panel in a.m.      Vitamin deficiency  In the posttraumatic setting, there is a high likelihood of vitamin D deficiency.   - Vitamin D level 4000 units daily  -Check vitamin D level in a.m.    Nutrition  With the assistance of our dietitian team, we will work to optimize nutrition for wound healing and recovery from spinal cord injury. We will also discussed long-term dietary needs following a spinal injury in order to prevent excessive weight gain in the future.    Mood  Given the abrupt and significant change in the patient's level of function, we will consult our rehabilitation psychologist to evaluate the patient and provide psychological support as necessary. We may also consider the use of an antidepressant medication if needed to help improve the patient's mood.    DVT prophylaxis  In the setting of a traumatic spinal cord injury, the patient is at high risk of deep venous thrombosis. Because of this we have elected to pursue prophylactic anticoagulation utilizing heparin 5000 units every 8 hours.  This medication may be necessary for up to 3 months depending on the functional status and clinical situation of the patient as the injury evolves.  -Utilizing heparin secondary to acute on chronic  kidney disease, unable to utilize Lovenox which is optimal    Estimated discharge: 14-21 days    Verified CODE STATUS as full on admission     I completed medication reconciliation on admission and did not identify any clinically significant medication issues    Admission care coordination time today was 75 min, and entire time spent in face-to-face contact was >50% in counseling and coordination of care as detailed in A/P above.        GOALS/EXPECTED LEVEL OF FUNCTION BASED ON CURRENT MEDICAL AND FUNCTIONAL STATUS (may change based on patient's medical status and rate of impairment recovery):  Transfers:   Supervision  Mobility/Gait:   Supervision  ADL's:   Supervision  Cognition: minimal amount of verbal cues  Swallowing: Least restrictive diet    DISPOSITION: Discharge to pre-morbid independent living setting with the supportive care of patient's community resources.      Ramu Garner D.O.  3/12/2020

## 2020-03-12 NOTE — FLOWSHEET NOTE
03/12/20 1613   Patient History   Pulmonary Diagnosis None   Procedures Relevant to Respiratory Status C3-C6 discectomy, decompression and fusion   Home O2 No   Nocturnal CPAP No   Home Treatments/Frequency No   COPD Risk Screening   Do you have a history of COPD? No   COPD Population Screener   During the past 4 weeks, how much did you feel short of breath? 0   Do you ever cough up any mucus or phlegm? 0   In the past 12 months, you do less than you used to because of your breathing problems 0   Have you smoked at least 100 cigarettes in your entire life? 0   How old are you? 2   COPD Screening Score 2   COPD Coordinator Not Recommended Yes   Protocol Pathways   Protocol Pathways None

## 2020-03-13 LAB
25(OH)D3 SERPL-MCNC: 47 NG/ML (ref 30–100)
ALBUMIN SERPL BCP-MCNC: 3 G/DL (ref 3.2–4.9)
ALBUMIN/GLOB SERPL: 1.6 G/DL
ALP SERPL-CCNC: 66 U/L (ref 30–99)
ALT SERPL-CCNC: 13 U/L (ref 2–50)
ANION GAP SERPL CALC-SCNC: 7 MMOL/L (ref 7–16)
AST SERPL-CCNC: 16 U/L (ref 12–45)
BASOPHILS # BLD AUTO: 0 % (ref 0–1.8)
BASOPHILS # BLD: 0 K/UL (ref 0–0.12)
BILIRUB SERPL-MCNC: 1.1 MG/DL (ref 0.1–1.5)
BUN SERPL-MCNC: 49 MG/DL (ref 8–22)
CALCIUM SERPL-MCNC: 8.3 MG/DL (ref 8.5–10.5)
CHLORIDE SERPL-SCNC: 102 MMOL/L (ref 96–112)
CHOLEST SERPL-MCNC: 81 MG/DL (ref 100–199)
CO2 SERPL-SCNC: 27 MMOL/L (ref 20–33)
CREAT SERPL-MCNC: 2.5 MG/DL (ref 0.5–1.4)
EOSINOPHIL # BLD AUTO: 0.09 K/UL (ref 0–0.51)
EOSINOPHIL NFR BLD: 1.5 % (ref 0–6.9)
ERYTHROCYTE [DISTWIDTH] IN BLOOD BY AUTOMATED COUNT: 57.4 FL (ref 35.9–50)
EST. AVERAGE GLUCOSE BLD GHB EST-MCNC: 100 MG/DL
GLOBULIN SER CALC-MCNC: 1.9 G/DL (ref 1.9–3.5)
GLUCOSE BLD-MCNC: 149 MG/DL (ref 65–99)
GLUCOSE BLD-MCNC: 169 MG/DL (ref 65–99)
GLUCOSE BLD-MCNC: 196 MG/DL (ref 65–99)
GLUCOSE BLD-MCNC: 77 MG/DL (ref 65–99)
GLUCOSE SERPL-MCNC: 81 MG/DL (ref 65–99)
HBA1C MFR BLD: 5.1 % (ref 0–5.6)
HCT VFR BLD AUTO: 24.3 % (ref 42–52)
HDLC SERPL-MCNC: 31 MG/DL
HGB BLD-MCNC: 7.7 G/DL (ref 14–18)
IMM GRANULOCYTES # BLD AUTO: 0.05 K/UL (ref 0–0.11)
IMM GRANULOCYTES NFR BLD AUTO: 0.8 % (ref 0–0.9)
LDLC SERPL CALC-MCNC: 37 MG/DL
LYMPHOCYTES # BLD AUTO: 0.53 K/UL (ref 1–4.8)
LYMPHOCYTES NFR BLD: 8.7 % (ref 22–41)
MAGNESIUM SERPL-MCNC: 2.3 MG/DL (ref 1.5–2.5)
MCH RBC QN AUTO: 30.4 PG (ref 27–33)
MCHC RBC AUTO-ENTMCNC: 31.7 G/DL (ref 33.7–35.3)
MCV RBC AUTO: 96 FL (ref 81.4–97.8)
MONOCYTES # BLD AUTO: 0.82 K/UL (ref 0–0.85)
MONOCYTES NFR BLD AUTO: 13.5 % (ref 0–13.4)
NEUTROPHILS # BLD AUTO: 4.59 K/UL (ref 1.82–7.42)
NEUTROPHILS NFR BLD: 75.5 % (ref 44–72)
NRBC # BLD AUTO: 0 K/UL
NRBC BLD-RTO: 0 /100 WBC
PHOSPHATE SERPL-MCNC: 2.7 MG/DL (ref 2.5–4.5)
PLATELET # BLD AUTO: 196 K/UL (ref 164–446)
PMV BLD AUTO: 9.2 FL (ref 9–12.9)
POTASSIUM SERPL-SCNC: 4.1 MMOL/L (ref 3.6–5.5)
PROT SERPL-MCNC: 4.9 G/DL (ref 6–8.2)
RBC # BLD AUTO: 2.53 M/UL (ref 4.7–6.1)
SODIUM SERPL-SCNC: 136 MMOL/L (ref 135–145)
TRIGL SERPL-MCNC: 65 MG/DL (ref 0–149)
TSH SERPL DL<=0.005 MIU/L-ACNC: 2.43 UIU/ML (ref 0.38–5.33)
WBC # BLD AUTO: 6.1 K/UL (ref 4.8–10.8)

## 2020-03-13 PROCEDURE — 82962 GLUCOSE BLOOD TEST: CPT

## 2020-03-13 PROCEDURE — 700112 HCHG RX REV CODE 229: Performed by: PHYSICAL MEDICINE & REHABILITATION

## 2020-03-13 PROCEDURE — 83735 ASSAY OF MAGNESIUM: CPT

## 2020-03-13 PROCEDURE — 92523 SPEECH SOUND LANG COMPREHEN: CPT

## 2020-03-13 PROCEDURE — 36415 COLL VENOUS BLD VENIPUNCTURE: CPT

## 2020-03-13 PROCEDURE — 84443 ASSAY THYROID STIM HORMONE: CPT

## 2020-03-13 PROCEDURE — 97161 PT EVAL LOW COMPLEX 20 MIN: CPT

## 2020-03-13 PROCEDURE — 770010 HCHG ROOM/CARE - REHAB SEMI PRIVAT*

## 2020-03-13 PROCEDURE — 80061 LIPID PANEL: CPT

## 2020-03-13 PROCEDURE — 99232 SBSQ HOSP IP/OBS MODERATE 35: CPT | Performed by: PHYSICAL MEDICINE & REHABILITATION

## 2020-03-13 PROCEDURE — 80053 COMPREHEN METABOLIC PANEL: CPT

## 2020-03-13 PROCEDURE — A9270 NON-COVERED ITEM OR SERVICE: HCPCS | Performed by: PHYSICAL MEDICINE & REHABILITATION

## 2020-03-13 PROCEDURE — 85025 COMPLETE CBC W/AUTO DIFF WBC: CPT

## 2020-03-13 PROCEDURE — 700102 HCHG RX REV CODE 250 W/ 637 OVERRIDE(OP): Performed by: PHYSICAL MEDICINE & REHABILITATION

## 2020-03-13 PROCEDURE — 92610 EVALUATE SWALLOWING FUNCTION: CPT

## 2020-03-13 PROCEDURE — 84100 ASSAY OF PHOSPHORUS: CPT

## 2020-03-13 PROCEDURE — 97166 OT EVAL MOD COMPLEX 45 MIN: CPT

## 2020-03-13 PROCEDURE — 82306 VITAMIN D 25 HYDROXY: CPT

## 2020-03-13 PROCEDURE — 700111 HCHG RX REV CODE 636 W/ 250 OVERRIDE (IP): Performed by: PHYSICAL MEDICINE & REHABILITATION

## 2020-03-13 PROCEDURE — 97116 GAIT TRAINING THERAPY: CPT

## 2020-03-13 PROCEDURE — 83036 HEMOGLOBIN GLYCOSYLATED A1C: CPT

## 2020-03-13 RX ORDER — GABAPENTIN 300 MG/1
600 CAPSULE ORAL EVERY EVENING
Status: DISCONTINUED | OUTPATIENT
Start: 2020-03-13 | End: 2020-03-14

## 2020-03-13 RX ORDER — BACLOFEN 10 MG/1
5 TABLET ORAL 3 TIMES DAILY
Status: DISCONTINUED | OUTPATIENT
Start: 2020-03-13 | End: 2020-03-14

## 2020-03-13 RX ADMIN — FERROUS SULFATE TAB 325 MG (65 MG ELEMENTAL FE) 325 MG: 325 (65 FE) TAB at 08:43

## 2020-03-13 RX ADMIN — Medication 1 TABLET: at 12:15

## 2020-03-13 RX ADMIN — DOCUSATE SODIUM 200 MG: 100 CAPSULE, LIQUID FILLED ORAL at 21:23

## 2020-03-13 RX ADMIN — BACLOFEN 5 MG: 10 TABLET ORAL at 21:24

## 2020-03-13 RX ADMIN — HEPARIN SODIUM 5000 UNITS: 5000 INJECTION, SOLUTION INTRAVENOUS; SUBCUTANEOUS at 05:38

## 2020-03-13 RX ADMIN — METOPROLOL TARTRATE 12.5 MG: 25 TABLET, FILM COATED ORAL at 08:43

## 2020-03-13 RX ADMIN — INSULIN LISPRO 1 UNITS: 100 INJECTION, SOLUTION INTRAVENOUS; SUBCUTANEOUS at 21:12

## 2020-03-13 RX ADMIN — PIOGLITAZONE 30 MG: 15 TABLET ORAL at 08:43

## 2020-03-13 RX ADMIN — CALCIUM CARBONATE (ANTACID) CHEW TAB 500 MG 500 MG: 500 CHEW TAB at 21:43

## 2020-03-13 RX ADMIN — CALCIUM CARBONATE (ANTACID) CHEW TAB 500 MG 500 MG: 500 CHEW TAB at 08:44

## 2020-03-13 RX ADMIN — SODIUM BICARBONATE 650 MG TABLET 650 MG: at 08:42

## 2020-03-13 RX ADMIN — HEPARIN SODIUM 5000 UNITS: 5000 INJECTION, SOLUTION INTRAVENOUS; SUBCUTANEOUS at 21:44

## 2020-03-13 RX ADMIN — BACLOFEN 5 MG: 10 TABLET ORAL at 14:57

## 2020-03-13 RX ADMIN — ACETAMINOPHEN 1000 MG: 500 TABLET, FILM COATED ORAL at 14:57

## 2020-03-13 RX ADMIN — STANDARDIZED SENNA CONCENTRATE 17.2 MG: 8.6 TABLET ORAL at 12:15

## 2020-03-13 RX ADMIN — MELATONIN 4000 UNITS: at 08:43

## 2020-03-13 RX ADMIN — DOCUSATE SODIUM 200 MG: 100 CAPSULE, LIQUID FILLED ORAL at 08:43

## 2020-03-13 RX ADMIN — SODIUM BICARBONATE 650 MG TABLET 650 MG: at 21:42

## 2020-03-13 RX ADMIN — BISACODYL 10 MG: 10 SUPPOSITORY RECTAL at 21:15

## 2020-03-13 RX ADMIN — INSULIN LISPRO 1 UNITS: 100 INJECTION, SOLUTION INTRAVENOUS; SUBCUTANEOUS at 17:03

## 2020-03-13 RX ADMIN — FOLIC ACID 5 MG: 1 TABLET ORAL at 08:43

## 2020-03-13 RX ADMIN — ACETAMINOPHEN 1000 MG: 500 TABLET, FILM COATED ORAL at 08:42

## 2020-03-13 RX ADMIN — SIMVASTATIN 20 MG: 20 TABLET, FILM COATED ORAL at 21:23

## 2020-03-13 RX ADMIN — HEPARIN SODIUM 5000 UNITS: 5000 INJECTION, SOLUTION INTRAVENOUS; SUBCUTANEOUS at 14:57

## 2020-03-13 RX ADMIN — CYANOCOBALAMIN TAB 1000 MCG 1000 MCG: 1000 TAB at 08:44

## 2020-03-13 RX ADMIN — OXYCODONE HYDROCHLORIDE AND ACETAMINOPHEN 500 MG: 500 TABLET ORAL at 17:41

## 2020-03-13 RX ADMIN — EPOETIN ALFA 2000 UNITS: 2000 SOLUTION INTRAVENOUS; SUBCUTANEOUS at 08:42

## 2020-03-13 RX ADMIN — INSULIN GLARGINE 16 UNITS: 100 INJECTION, SOLUTION SUBCUTANEOUS at 21:11

## 2020-03-13 RX ADMIN — OXYCODONE HYDROCHLORIDE 10 MG: 10 TABLET ORAL at 12:28

## 2020-03-13 RX ADMIN — GABAPENTIN 600 MG: 300 CAPSULE ORAL at 21:22

## 2020-03-13 RX ADMIN — FERROUS SULFATE TAB 325 MG (65 MG ELEMENTAL FE) 325 MG: 325 (65 FE) TAB at 17:41

## 2020-03-13 RX ADMIN — OXYCODONE HYDROCHLORIDE AND ACETAMINOPHEN 500 MG: 500 TABLET ORAL at 08:43

## 2020-03-13 RX ADMIN — Medication: at 08:50

## 2020-03-13 RX ADMIN — SODIUM BICARBONATE 650 MG TABLET 650 MG: at 14:57

## 2020-03-13 RX ADMIN — ACETAMINOPHEN 1000 MG: 500 TABLET, FILM COATED ORAL at 21:22

## 2020-03-13 ASSESSMENT — BRIEF INTERVIEW FOR MENTAL STATUS (BIMS)
ASKED TO RECALL SOCK: YES, NO CUE REQUIRED
INITIAL REPETITION OF BED BLUE SOCK - FIRST ATTEMPT: 3
BIMS SUMMARY SCORE: 14
ASKED TO RECALL BLUE: YES, NO CUE REQUIRED
WHAT DAY OF THE WEEK IS IT: CORRECT
ASKED TO RECALL BED: YES, AFTER CUEING (A PIECE OF FURNITURE")"
WHAT MONTH IS IT: ACCURATE WITHIN 5 DAYS
WHAT YEAR IS IT: CORRECT

## 2020-03-13 ASSESSMENT — FIBROSIS 4 INDEX: FIB4 SCORE: 2.4

## 2020-03-13 ASSESSMENT — ACTIVITIES OF DAILY LIVING (ADL): TOILETING: INDEPENDENT

## 2020-03-13 NOTE — THERAPY
Occupational Therapy   Initial Evaluation     Patient Name: Vince Almanzar  Age:  79 y.o., Sex:  male  Medical Record #: [id number]  Today's Date: 3/13/2020     Subjective    Patient agreeable to participate in OT.      Objective     03/13/20 0701   Prior Living Situation   Prior Services Meals on Wheels;Home-Independent   Housing / Facility 2 Story Apartment / Condo  (2nd story apartment, elevator access, no stairs inside)   Steps Into Home 0   Steps In Home 0   Elevator Yes   Bathroom Set up Bathtub / Shower Combination;Shower Curtain;Grab Bars;Tub Transfer Bench   Equipment Owned Grab Bar(s) In Tub / Shower;Grab Bar(s) By Toilet;Tub Transfer Bench;4-Wheel Walker   Lives with - Patient's Self Care Capacity Alone and Able to Care For Self   Prior Level of ADL Function   Self Feeding Independent   Grooming / Hygiene Independent   Bathing Independent   Dressing Independent   Toileting Independent   Prior Level of IADL Function   Medication Management Independent   Laundry Other (Comments)  (sister)   Kitchen Mobility Independent  (Mod I with 4WW )   Finances Independent   Shopping Other (Comments)  (sister)   Prior Level Of Mobility Independent With Device in Community;Independent With Device in Home   Driving / Transportation Relatives / Others Provide Transportation   Occupation (Pre-Hospital Vocational) Retired Due To Age  (Retired )   Leisure Interests Reading;Other (Comments)  (Enjoys watching game shows)   IRF-EMILEE:  Prior Functioning: Everyday Activities   Self Care Independent   Indoor Mobility (Ambulation) Independent   Stairs Not applicable   Functional Cognition Independent   Prior Device Use Walker  (4WW)   Cognition    Level of Consciousness Alert   IRF-EMILEE:  Cognitive Pattern Assessment   Cognitive Pattern Assessment Used BIMS   IRF-EMILEE:  Brief Interview for Mental Status (BIMS)   Repetition of Three Words (First Attempt) 3   Temporal Orientation: Able to Report the Correct Year Correct  "  Temporal Orientation: Able to Report the Correct Month Accurate within 5 days   Temporal Orientation: Able to Report the Correct Day of the Week Correct   Able to Recall \"Sock\" Yes, no cue required   Able to Recall \"Blue\" Yes, no cue required   Able to Recall \"Bed\" Yes, after cueing (\"a piece of furniture\")   BIMS Summary Score 14   Vision Screen   Vision Tested   Tracking Decreased smoothness of horizontal tracking;Decreased smoothness of vertical tracking   Passive ROM Upper Body   Comments Grossly intact    Active ROM Upper Body   Active ROM Upper Body  X   Dominant Hand Right   Comments Decreased bilateral shoulder flexion   Strength Upper Body   Upper Body Strength  X   Comments Bilateral shoulder weakness (R>L)   Sensation Upper Body   Upper Extremity Sensation  X   Comments tingling in BUEs   Upper Body Muscle Tone   Upper Body Muscle Tone  WDL   Coordination Upper Body   Coordination X   Comments Decreased ability to open/close contatiners ( ie shampoo)   IRF-EMILEE:  Eating   Assistance Needed Set-up / clean-up   CARE Score 5   Discharge Goal:  Assistance Needed Independent   Discharge Goal:  Score 6   IRF-EMILEE:  Oral Hygiene   Assistance Needed Set-up / clean-up   CARE Score 5   Discharge Goal:  Assistance Needed Independent   Discharge Goal:  Score 6   IRF-EMILEE:  Shower/Bathe Self   Assistance Needed Physical assistance   Physical Assistance Level 25%-49%   CARE Score 3   Discharge Goal:  Assistance Needed Supervision   Discharge Goal Score 4   IRF-EMILEE:  Upper Body Dressing   Assistance Needed Physical assistance   Physical Assistance Level Less than 25%   CARE Score 3   Discharge Goal:  Assistance Needed Set-up / clean-up   Dischage Goal:  Score 5   IRF-EMILEE:  Lower Body Dressing   Assistance Needed Physical assistance   Physical Assistance Level 25%-49%   CARE Score 3   Discharge Goal:  Assistance Needed Supervision   Discharge Goal:  Score 4   IRF EMILEE:  Putting On/Taking Off Footwear   Assistance Needed " Physical assistance   Physical Assistance Level 75% or more   CARE Score 2   Discharge Goal:  Assistance Needed Set-up / clean-up   Discharge Goal:  Score 5   IRF-EMILEE:  Toileting Hygiene   Assistance Needed Physical assistance   Physical Assistance Level Less than 25%   CARE Score 3   Discharge Goal:  Assistance Needed Supervision   Discharge Goal:  Score 4   IRF-EMILEE:  Toilet Transfer   Assistance Needed Physical assistance   Physical Assistance Level Less than 25%   CARE Score 3   Discharge Goal:  Assistance Needed Supervision   Discahrge Goal:  Score 4   IRF-EMILEE:  Hearing, Speech, and Vision   Expression of Ideas and Wants 4   Understanding Verbal and Non-Verbal Content 4   Problem List   Problem List Decreased Active Daily Living Skills;Decreased Homemaking Skills   Precautions   Precautions Spinal / Back Precautions ;Fall Risk   Comments C-Collar    Current Discharge Plan   Current Discharge Plan Return to Prior Living Situation   Benefit    Therapy Benefit Patient Would Benefit from Inpatient Rehab Occupational Therapy to Maximize [place] with ADLs, IADLs and Functional Mobility.   Interdisciplinary Plan of Care Collaboration   IDT Collaboration with  Nursing   Patient Position at End of Therapy Seated;Call Light within Reach;Self Releasing Lap Belt Applied   Collaboration Comments Mepilex application following shower   Equipment Needs   Assistive Device / DME Other (Comments)  (TBA)   Adaptive Equipment Other (Comments);Sock Aide  (Already owns sock aid, AE need to be further assessed)   OT Total Time Spent   OT Individual Total Time Spent (Mins) 60   OT Charge Group   OT Evaluation OT Evaluation Mod     FIM Eating Score:  5 - Standby Prompting/Supervision or Set-up  Eating Description:  Supervision for safety, Increased time, Set-up of equipment or meal/tube feeding    FIM Grooming Score:  5 - Standby Prompting/Supervision or Set-up  Grooming Description:  Increased time, Set-up of equipment    FIM  Bathing Score:  3 - Moderate Assistance  Bathing Description:  Grab bar, Tub bench, Hand held shower, Increased time, Set-up of equipment, Supervision for safety(Patient required assist with washing/rinsing/drying buttocks and LEs. )    FIM Upper Body Dressing:  3 - Moderate Assistance  Upper Body Dressing Description:  Increased time, Supervision for safety, Verbal cueing, Set-up of equipment    FIM Lower Body Dressing Score:  3 - Moderate Assistance  Lower Body Dressing Description:  Increased time, Supervision for safety, Initial preparation for task, Set-up of equipment, Verbal cueing    FIM Bathing Score:  3 - Moderate Assistance  Bathing Description:  Grab bar, Tub bench, Hand held shower, Increased time, Set-up of equipment, Supervision for safety(Patient required assist with washing/rinsing/drying buttocks and LEs. )    FIM Toiletin - Minimal Assistance  Toileting Description:  Grab bar, Increased time, Supervision for safety, Verbal cueing, Set-up of equipment(Patient requires assist for balance and safety)    FIM Toilet Transfer Score:  4 - Minimal Assistance  Toilet Transfer Description:  Grab bar, Increased time, Supervision for safety, Set-up of equipment    FIM Bed/Chair/Wheelchair Transfers Score:    Bed/Chair/Wheelchair Transfers Description:       Assessment  Patient is 79 y.o. male with a diagnosis of C2ASIA D SCI.  Additional factors influencing patient status / progress (ie: cognitive factors, co-morbidities, social support, etc): Per H&P, patient has a past medical history of diabetes, hypertension, hyperlipidemia, neuropathic pain, stage III chronic kidney disease, previous TBI and central cord syndrome and 2018  who was admitted to [facility] on 2020 with worsening bilateral upper extremity weakness, balance, neck pain.      Patient previously lived alone in 2nd story apartment with tub/shower set-up (with curtains, shower bench and grab bars). Patient presents  highly motivated to participate in therapy and improve overall function and independence. Functional performance barriers at this time include spinal precautions, decreased functional mobility, decreased balance, decreased strength and coordination.     Plan  Recommend Occupational Therapy  minutes per day 5-7 days per week for 2-3 weeks for the following treatments:  OT Orthotics Training, OT Group Therapy, OT Self Care/ADL, OT Community Reintegration, OT Manual Ther Technique, OT Neuro Re-Ed/Balance, OT Sensory Int Techniques, OT Therapeutic Activity, OT Evaluation and OT Therapeutic Exercise.    Goals:  Long term and short term goals have been discussed with patient and they are in agreement.    Occupational Therapy Goals     Problem: Bathing     Dates: Start: 03/13/20       Description:     Goal: STG-Within one week, patient will bathe     Dates: Start: 03/13/20       Description: 1) Individualized Goal:  Minimal assist with AE as needed  2) Interventions:  OT Orthotics Training, OT Group Therapy, OT Self Care/ADL, OT Cognitive Skill Dev, OT Community Reintegration, OT Manual Ther Technique, OT Neuro Re-Ed/Balance, OT Sensory Int Techniques, OT Therapeutic Activity, OT Evaluation and OT Therapeutic Exercise                   Problem: Dressing     Dates: Start: 03/13/20       Description:     Goal: STG-Within one week, patient will dress UB     Dates: Start: 03/13/20       Description: 1) Individualized Goal: Minimal assist   2) Interventions: OT Orthotics Training, OT Group Therapy, OT Self Care/ADL, OT Cognitive Skill Dev, OT Community Reintegration, OT Manual Ther Technique, OT Neuro Re-Ed/Balance, OT Sensory Int Techniques, OT Therapeutic Activity, OT Evaluation and OT Therapeutic Exercise                   Problem: OT Long Term Goals     Dates: Start: 03/13/20       Description:     Goal: LTG-By discharge, patient will complete basic self care tasks     Dates: Start: 03/13/20       Description: 1)  Individualized Goal: Supervision level  2) Interventions: OT Orthotics Training, OT Group Therapy, OT Self Care/ADL, OT Cognitive Skill Dev, OT Community Reintegration, OT Manual Ther Technique, OT Neuro Re-Ed/Balance, OT Sensory Int Techniques, OT Therapeutic Activity, OT Evaluation and OT Therapeutic Exercise             Goal: LTG-By discharge, patient will perform bathroom transfers     Dates: Start: 03/13/20       Description: 1) Individualized Goal: Supervision level     2) Interventions: OT Orthotics Training, OT Group Therapy, OT Self Care/ADL, OT Cognitive Skill Dev, OT Community Reintegration, OT Manual Ther Technique, OT Neuro Re-Ed/Balance, OT Sensory Int Techniques, OT Therapeutic Activity, OT Evaluation and OT Therapeutic Exercise             Goal: LTG-By discharge, patient will complete basic home management     Dates: Start: 03/13/20       Description: 1) Individualized Goal: CGA to supervision level     2) Interventions: OT Orthotics Training, OT Group Therapy, OT Self Care/ADL, OT Cognitive Skill Dev, OT Community Reintegration, OT Manual Ther Technique, OT Neuro Re-Ed/Balance, OT Sensory Int Techniques, OT Therapeutic Activity, OT Evaluation and OT Therapeutic Exercise

## 2020-03-13 NOTE — WOUND TEAM
[facility]  Inpatient Services  Initial Wound and Skin Care Evaluation    Admission Date: 3/12/2020     Consult Date: 03/12/2020   HPI, PMH, SH: Reviewed    Unit where seen by Wound Team: 12/02     WOUND CONSULT RELATED TO:  Left side of neck, bilateral heels, buttocks     Self Report / Pain Level:  Feet hurt       OBJECTIVE:  c collar padded with mepilex. Heel mepilex on bilateral heels. Patient up to chair    WOUND TYPE, LOCATION, CHARACTERISTICS (Pressure Injuries: location, stage, POA or date identified)        Wound 03/12/20 Pressure Injury Neck Anterior Left (Active)   Site Assessment Pink;Yellow 3/12/2020  5:00 PM   Periwound Assessment Intact 3/12/2020  5:00 PM   Margins Undefined edges;Attached edges 3/12/2020  5:00 PM   Closure Adhesive bandage 3/12/2020  5:00 PM   Drainage Amount None 3/12/2020  5:00 PM   Treatments Site care 3/12/2020  5:00 PM   Wound Cleansing Approved Wound Cleanser 3/12/2020  5:00 PM   Periwound Protectant Not Applicable 3/12/2020  5:00 PM   Dressing Cleansing/Solutions Not Applicable 3/12/2020  5:00 PM   Dressing Options Hydrocolloid Thin 3/12/2020  5:00 PM   Dressing Changed Observed 3/12/2020  5:00 PM   Dressing Status Clean;Dry;Intact 3/12/2020  5:00 PM   Dressing Change/Treatment Frequency Every 72 hrs, and As Needed 3/12/2020  5:00 PM   NEXT Dressing Change/Treatment Date 03/15/20 3/12/2020  5:00 PM   NEXT Weekly Photo (Inpatient Only) 03/14/20 3/12/2020  5:00 PM   Pressure Injury Stage 2 3/12/2020  5:00 PM   Non-staged Wound Description Not applicable 3/12/2020  5:00 PM   Wound Length (cm) 1.5 cm 3/12/2020  5:00 PM   Wound Width (cm) 1 cm 3/12/2020  5:00 PM   Wound Depth (cm) 0.1 cm 3/12/2020  5:00 PM   Wound Surface Area (cm^2) 1.5 cm^2 3/12/2020  5:00 PM   Wound Volume (cm^3) 0.15 cm^3 3/12/2020  5:00 PM   Tunneling (cm) 0 cm 3/12/2020  5:00 PM   Undermining (cm) 0 cm 3/12/2020  5:00 PM   Shape round 3/12/2020  5:00 PM   Wound Odor None 3/12/2020  5:00 PM    WOUND NURSE ONLY - Time Spent with Patient (mins) 45 3/12/2020  5:00 PM           Wound Other (comment) Heel Right scar from resolved pressure injury (Active)   Site Assessment Pink;Red;Light Purple 3/12/2020 12:00 PM   Drainage Amount None 3/12/2020 12:00 PM   Dressing Options Mepilex Heel 3/12/2020 12:00 PM   Dressing Changed Changed 3/12/2020 12:00 PM   Dressing Status Clean;Dry;Intact 3/12/2020 12:00 PM   Dressing Change/Treatment Frequency Every 72 hrs, and As Needed 3/12/2020 12:00 PM   NEXT Dressing Change/Treatment Date 03/15/20 3/12/2020 12:00 PM   NEXT Weekly Photo (Inpatient Only) 03/15/20 3/12/2020 12:00 PM       Wound Pressure Injury Heel Left scar from resolved pressure injury (Active)   Site Assessment Purple;Pink;Red 3/12/2020 12:00 PM   Periwound Assessment Clean;Dry;Warm 3/12/2020 12:00 PM   Dressing Options Mepilex Heel 3/12/2020 12:00 PM   Dressing Changed Changed 3/12/2020 12:00 PM   Dressing Status Clean;Dry;Intact 3/12/2020 12:00 PM   Dressing Change/Treatment Frequency Every 72 hrs, and As Needed 3/12/2020 12:00 PM   NEXT Dressing Change/Treatment Date 03/15/20 3/12/2020 12:00 PM   NEXT Weekly Photo (Inpatient Only) 03/15/20 3/12/2020 12:00 PM       Wound Partial Thickness Wound Buttocks;Sacrum Bilateral moisture related callusing with small area of denuded tissue (Active)   Site Assessment Pink;Red 3/12/2020 12:00 PM   Periwound Assessment Pink 3/12/2020 12:00 PM   Dressing Options Mepilex 3/12/2020 12:00 PM   Dressing Changed Changed 3/12/2020 12:00 PM   Dressing Status Clean;Dry;Intact 3/12/2020 12:00 PM   Dressing Change/Treatment Frequency Every 48 hrs, and As Needed 3/12/2020 12:00 PM   NEXT Dressing Change/Treatment Date 03/14/20 3/12/2020 12:00 PM       Vascular:     Dorsal Pedal pulses:  1+  Posterior tib pulses:   1+    DANYELL:      results are normal    Lab Values:    Lab Results   Component Value Date/Time    WBC 6.8 03/12/2020 04:43 AM    RBC 2.68 (L) 03/12/2020 04:43 AM     "HEMOGLOBIN 8.1 (L) 03/12/2020 04:43 AM    HEMATOCRIT 25.6 (L) 03/12/2020 04:43 AM                    Lab Results   Component Value Date/Time    HBA1C 5.1 02/29/2020 03:45 AM           Culture:   Obtained NA, Results show NA    INTERVENTIONS BY WOUND TEAM:  Anterior neck assessed. Wound just dressed with hydrocolloid thin. This is appropriate dressing. Dressing Care order placed for nursing to change dressing Q 72 hours. Bilateral lower extremities are red. Bilateral heels have scars on them. Patient stated that he had \"rubbed them raw in physical therapy and had wound vacs on them.\" heels sensitive to touch but no open wounds just scarring. Buttocks with moisture callusing. Skin Care order placed.    Interdisciplinary consultation: Patient, Bedside RN    EVALUATION: hydrocolloid thin encourages autolytic debridement of yellow tissue from wound    Goals: Steady decrease in wound area and depth weekly.    NURSING PLAN OF CARE ORDERS (X):  Dressing changes: See Dressing Care orders: X  Skin care: See Skin Care orders: X  Rectal tube care: See Rectal Tube Care orders:        Other orders:                           RSKIN:   CURRENTLY IN PLACE (X), APPLIED THIS VISIT (A), ORDERED (O):   Q shift Dale:  X  Q shift pressure point assessments:  X  Pressure redistribution mattress   X                          Low Airloss                        Bariatric DARELL                     Bariatric foam                        Heel float boots                     Heel Silicone dressing  X                       Float Heels off Bed with Pillows    O           Barrier wipes         Barrier Cream         Barrier paste          Sacral silicone dressing    X     Silicone O2 tubing         Anchorfast         Cannula fixation Device (Tender )          Gray Foam Ear protectors           Trach with Optifoam split foam                 Waffle cushion     O   Waffle Overlay         Rectal tube or BMS    Purwick/Condom Cath          Antifungal tx  "     Interdry          Reposition q 2 hours  O      Up to chair   X     Ambulate      PT/OT    X    Dietician        Diabetes Education      PO X    TF     TPN     NPO   # days   Other        WOUND TEAM PLAN OF CARE (X):   Dressing changes by wound team:          Follow up 1-2 times weekly:               Follow up 3 times weekly:                NPWT change 3 times weekly:     Follow up as needed:   X    Other (explain):     Anticipated discharge plans (X):   LTACH:        SNF/Rehab:                  Home Care:           Outpatient Wound Center:            Self Care:            Other:      To be determined

## 2020-03-13 NOTE — THERAPY
Speech Language Pathology   Initial Assessment     Patient Name: Vince Almanzar  AGE:  79 y.o., SEX:  male  Medical Record #: [id number]  Today's Date: 3/13/2020     Subjective    Pt pleasant and cooperative during evaluation.     Objective       03/13/20 1031   Prior Living Situation   Prior Services Meals on Wheels   Housing / Facility 2 Story Apartment / Condo   Lives with - Patient's Self Care Capacity Alone and Able to Care For Self  (sister and neice live lon and assist with cleaning/shop)   Prior Level Of Function   Communication Within Functional Limits   Swallow Unknown   Dentition Intact   Dentures None   Hearing Impaired Both Ears   Hearing Aid None   Vision Distance;Wears Corrective Lenses   Patient's Primary Language English   Education Completed College   Occupation (Pre-Hospital Vocational) Retired Due To Age   Comments    Expressive Language   Expressive Language (WDL) WDL   Reading Comprehension    Reading Comprehension (WDL) WDL   Written Language Expression   Dominant Hand Right   Cognition   Moderate Attention Moderate (3)   Orientation  Supervision (5)   Verbal Short Term Memory 5 Minutes   Visual Short Term Memory Minimal (4)   Functional Math / Financial Management Supervision (5)   Clock Drawing Disorganization;Poor Planning   Social / Pragmatic Communication   Social / Pragmatic Communication WDL   Tracheostomy   Tracheostomy No   Speech Mechanisms / Voice Production   Speech Mechanisms / Voice Production (WDL) WDL   Labial Function   Labial Function (WDL) WDL   Lingual Function   Lingual Function (WDL) WDL   Swallowing   Thin Liquid Minimal   Masticated Foods Minimal   Dysphagia Strategies / Recommendations   Strategies / Interventions Recommended (Yes / No) Yes   Compensatory Strategies Single Sips / Bites;Alternate Solids / Liquids;No Talking During Eating / Drinking   Diet / Liquid Recommendation Regular (7);Thin (0)   Medication Administration  Whole with Liquid Wash    IRF-EMILEE:  Swallowing/Nutritional Status   Swallowing/Nutritional Status Regular food   Outcome Measures   Outcome Measures Utilized SCCAN   SCCAN (Scales of Cognitive and Communicative Ability for Neurorehabilitation)   Oral Expression - Raw Score 17   Oral Expression - Scale Performance Score 89   Orientation - Raw Score 12   Orientation - Scale Performance Score 100   Memory - Raw Score 10   Memory - Scale Performance Score 53   Speech Comprehension - Raw Score 12   Speech Comprehension - Scale Performance Score 92   Reading Comprehension - Raw Score 11   Reading Comprehension - Scale Performance Score 92   Writing - Raw Score 4   Writing - Scale Performance Score 57   Attention - Raw Score 12   Attention - Scale Performance Score 75   Problem Solving - Raw Score 19   Problem Solving - Scale Performance Score 83   SCCAN Total Raw Score 77   SCCAN Degree of Severity Mild Impairment   Problem List   Problem List Dysphagia;Cognitive-Linguistic Deficits   Current Discharge Plan   Current Discharge Plan Return to Prior Living Situation   Benefit   Therapy Benefit Patient would benefit from Inpatient Rehab Speech-Language Pathology to address above identified deficits.   Interdisciplinary Plan of Care Collaboration   IDT Collaboration with  Nursing   Collaboration Comments status of lungs.     Speech Language Pathologist Assigned   Assigned SLP / Pager # CL/MP cog, sw, tdine 60   SLP Total Time Spent   SLP Individual Total Time Spent (Mins) 90   SLP Charge Group   SLP Speech Language Evaluation Speech Sound Language Comprehension   SLP Oral Pharyngeal Evaluation Oral Pharyngeal Evaluation       FIM Eating Score:  5 - Standby Prompting/Supervision or Set-up  Eating Description:  Increased time, Supervision for safety    FIM Comprehension Score:  5 - Stand-by Prompting/Supervision or Set-up  Comprehension Description:  Glasses, Verbal cues    FIM Expression Score:  5 - Stand-by Prompting/Supervision or  Set-up  Expression Description:  Verbal cueing    FIM Social Interaction Score:  5 - Stand-by Prompting/Supervision or Set-up  Social Interaction Description:  Increased time, Verbal cues    FIM Problem Solving Score:  4 - Minimal Assistance  Problem Solving Description:  Bed/chair alarm, Increased time, Seat belt, Verbal cueing, Therapy schedule    FIM Memory Score:  3 - Moderate Assistance  Memory Description:  Verbal cueing, Therapy schedule, Seat belt, Bed/chair alarm, Supervision    Assessment    Patient is 79 y.o. male with a diagnosis of quadriplegia, incomplete C1-C4 spondylosis with myelopathy.  Additional factors influencing patient status/progress (ie: cognitive factors, co-morbidities, social support, etc):     Bedside swallow evaluation completed this day.  Suspect minimal pharyngeal dysphagia (may be due to neck brace).  Oral motor examination was unremarkable.  Pt presents with intermittent coughing/wet vocal quality, with and without presentation of liquids/textures.  Recommend MBS to rule out aspiration.  Continue regular textures/thin (IDDSI 7/IDDSI 0), meds whole with thin liquid.  Recommend tdine to ensure use of strategies (specifically not talking while eating).      SCCAN completed this day.  Pt scored 77, which is characteristic of mild cognitive deficits.  Presented with greatest deficits in attn, recall, and problem solving.    Plan  Recommend Speech Therapy 30-60 minutes per day 5-6 days per week for 4 weeks for the following treatments:  SLP Aphasia Evaluation, SLP Swallowing Dysfunction Treatment, SLP Oral Pharyngeal Evaluation, SLP Video Swallow Evaluation, SLP Cognitive Skill Development and SLP Group Treatment.    Goals:  Long term and short term goals have been discussed with patient and they are in agreement.    Speech Therapy Problems     Problem: Memory STGs     Dates: Start: 03/13/20       Description:     Goal: STG-Within one week, patient will     Dates: Start: 03/13/20        Description: 1) Individualized goal:  Recall daily events with 80% accuracy with use of RWAVS and memory book.    2) Interventions:  SLP Cognitive Skill Development and SLP Group Treatment                   Problem: Problem Solving STGs     Dates: Start: 03/13/20       Description:     Goal: STG-Within one week, patient will     Dates: Start: 03/13/20       Description: 1) Individualized goal:  Complete alternating attn tasks with 80% accuracy given min verbal cues.  2) Interventions:  SLP Cognitive Skill Development and SLP Group Treatment             Goal: STG-Within one week, patient will     Dates: Start: 03/13/20       Description: 1) Individualized goal:  Recall/utilize swallow strategies, safety precautions 80% Sarah.   2) Interventions:  SLP Cognitive Skill Development and SLP Group Treatment                   Problem: Speech/Swallowing LTGs     Dates: Start: 03/13/20       Description:     Goal: LTG-By discharge, patient will safely swallow     Dates: Start: 03/13/20       Description: 1) Individualized goal:  Regular textures and thin liquids without aspiration.  2) Interventions:  SLP Swallowing Dysfunction Treatment, SLP Oral Pharyngeal Evaluation and SLP Video Swallow Evaluation             Goal: LTG-By discharge, patient will solve complex problem     Dates: Start: 03/13/20       Description: 1) Individualized goal:  Gokul for safe discharge home.  2) Interventions:  SLP Aphasia Evaluation, SLP Cognitive Skill Development and SLP Group Treatment                   Problem: Swallowing STGs     Dates: Start: 03/13/20       Description:     Goal: STG-Within one week, patient will     Dates: Start: 03/13/20       Description: 1) Individualized goal:  Participate in modified barium swallow study.  2) Interventions:  SLP Video Swallow Evaluation

## 2020-03-13 NOTE — CARE PLAN
Problem: Communication  Goal: The ability to communicate needs accurately and effectively will improve  Outcome: PROGRESSING AS EXPECTED     Problem: Safety  Goal: Will remain free from injury  Outcome: PROGRESSING AS EXPECTED  Goal: Will remain free from falls  Outcome: PROGRESSING AS EXPECTED     Problem: Infection  Goal: Will remain free from infection  Outcome: PROGRESSING AS EXPECTED     Problem: Venous Thromboembolism (VTW)/Deep Vein Thrombosis (DVT) Prevention:  Goal: Patient will participate in Venous Thrombosis (VTE)/Deep Vein Thrombosis (DVT)Prevention Measures  Outcome: PROGRESSING AS EXPECTED     Problem: Bowel/Gastric:  Goal: Normal bowel function is maintained or improved  Outcome: PROGRESSING AS EXPECTED  Goal: Will not experience complications related to bowel motility  Outcome: PROGRESSING AS EXPECTED     Problem: Knowledge Deficit  Goal: Knowledge of disease process/condition, treatment plan, diagnostic tests, and medications will improve  Outcome: PROGRESSING AS EXPECTED  Goal: Knowledge of the prescribed therapeutic regimen will improve  Outcome: PROGRESSING AS EXPECTED     Problem: Discharge Barriers/Planning  Goal: Patient's continuum of care needs will be met  Outcome: PROGRESSING AS EXPECTED     Problem: Urinary Elimination:  Goal: Ability to reestablish a normal urinary elimination pattern will improve  Outcome: PROGRESSING AS EXPECTED     Problem: Skin Integrity  Goal: Risk for impaired skin integrity will decrease  Outcome: PROGRESSING AS EXPECTED     Problem: Pain Management  Goal: Pain level will decrease to patient's comfort goal  Outcome: PROGRESSING AS EXPECTED

## 2020-03-13 NOTE — CARE PLAN
Problem: Safety  Goal: Will remain free from injury  Outcome: PROGRESSING AS EXPECTED  Note: Patient use call light for assistance. Remains free from injury.      Problem: Pain Management  Goal: Pain level will decrease to patient's comfort goal  Outcome: PROGRESSING AS EXPECTED  Note: Patient c/o neck pain 5/10. Medicated him with oxycodone PRN for pain with positive result. Up and participated in therapy. Tolerated well.

## 2020-03-13 NOTE — DISCHARGE PLANNING
"CASE MANAGEMENT INITIAL ASSESSMENT    Admit Date:  3/12/2020     I met with patient & sister, Ángela, to discuss role of case management / discharge planning / team conference.   Patient is a  79 y.o. male transferred from [facility] 3.12.2020 s/p C3-C6 lami & posterior fusion. Dx: cervical spondylosis w/ myelopathy.  Patient alert & oriented, participating w/ interview. [place].    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    PLOF: lives alone in single level Sr. Living apartment on 2nd floor w/ elevator access. Prev mod independent w/ 4WW. States \"I walk the long hallway twice a day, up & down 3 times each time. Plus I use the arm & leg exerciser 7d/wk. I make sure I stay active. This is as good as I'm going to get. I need to maintain strength & activity, not lose it.\" Prev IRF at [facility] after TBI, [facility] short stay & HH services w/ [facility]. Has DMV handicap placard for sister's vehicle. Sister provides transportation & iADL assistance. Requests to resume HH w/ Danny at discharge. Does not like [facility]. Has MOW service.  Sister unable to provide physical care assistance. States \"his biggest problem is his balance. He needs for that to improve before he can go home alone.\"    PCP: Dr. Jason Granda [facility]  NSGY: Dr. Lg Sellers  [facility] physicians: kidney, cardiac, diabetic & podiatry (unable to provide names)    Admitting MD: Dr. Ramu Garner    Diagnosis: 04.130 other non traumatic spinal cord dysfunction  Central cord syndrome (HCC)  Incomplete quadriplegia at C5-C8 level (HCC)    Co-morbidities:   Patient Active Problem List    Diagnosis Date Noted   • Acute on chronic renal failure (HCC) 02/28/2020     Priority: High   • Type 2 diabetes mellitus (HCC) 12/03/2018     Priority: High   • Central cord syndrome (HCC) 12/03/2018     Priority: High   • Anemia 12/11/2018     Priority: Medium   • Thrombocytopenia (HCC) 12/11/2018     Priority: Medium   • Dysphagia 12/04/2018     Priority: Medium   • " Chronic renal failure 12/03/2018     Priority: Medium   • Scalp laceration 12/03/2018     Priority: Medium   • 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 ([facility]) 12/03/2018     Priority: Medium   • Contraindication to deep vein thrombosis (DVT) prophylaxis 12/03/2018     Priority: Medium   • Essential hypertension 12/03/2018     Priority: Low   • Hyperlipemia 12/03/2018     Priority: Low   • Trauma 12/03/2018     Priority: Low   • Incomplete quadriplegia at C5-C8 level ([facility]) 03/12/2020   • Neuropathic pain 03/12/2020   • Neurogenic bowel 03/12/2020   • Neurogenic bladder 03/12/2020   • Tachycardia 12/16/2018   • Vitamin D deficiency 12/11/2018   • Uncontrolled type 2 diabetes mellitus with hyperglycemia ([facility]) 11/12/2018   • Mass of pancreas 06/03/2016     Prior Living Situation:  Housing / Facility: 1 Story Apartment / Condo (2nd floor apartment w/ elevator access)  Lives with - Patient's Self Care Capacity: Alone and Able to Care For Self    Prior Level of Function:  Medication Management: Independent  Finances: Independent  Shopping: Other (Comments)(sister)  Prior Level Of Mobility: Independent With Device in Community, Independent With Device in Home  Driving / Transportation: Relatives / Others Provide Transportation    Support Systems:  Primary : Ángela Rodriguez sister [phone]  Other support systems: none provided  Advance Directives: Yes  Power of  (Name & Phone): Ángela hernandez [phone]    Previous Services Utilized:   Equipment Owned: 4-Wheel Walker, Tub Transfer Bench, Tub / Shower Seat, Grab Bar(s) In Tub / Shower, Grab Bar(s) By Toilet, Sock Aid, Reacher  Prior Services: Home-Independent, Meals on Wheels    Other Information:  Occupation (Pre-Hospital Vocational): Retired Due To Age     Primary Payor Source: [employer]  Secondary Payor Source: Supplemental Insurance ([employer] Medicare Advantage HMO)  Primary Care Practitioner :  [facility]. [facility]  Other MDs: Dr. Lg Sellers [facility], [facility] physicians: kidney, cardiac, diabetic & podiatry    Patient / Family Goal:  Patient / Family Goal: To get stronger, To improve balance & endurance, To be independent    Additional Case Management Questions:  Have you ever received case management services for yourself or a family member? I believe so, probably when I stayed here.    Do you feel you have and an understanding of what services  provide? Yes, thank you.    Do you have any additional questions regarding case management? Not at this time.        CASE MANAGEMENT PLAN OF CARE   Individualized Goals:   1.  To get stronger  2. To improve balance & endurance  3. To remain independent    Barriers:   1. Functional mobility deficits.  2. Medical co-morbidities.  3. Limited support system.    Plan:  1. Continue to follow patient through hospitalization and provide discharge planning in collaboration with patient, family, physicians and ancillary services.     2. Utilize community resources to ensure a safe discharge.

## 2020-03-13 NOTE — REHAB-PHARMACY IDT TEAM NOTE
Pharmacy   Pharmacy  Antibiotics/Antifungals/Antivirals:  Medication:      Active Orders (From admission, onward)    None        Route:         n/a  Stop Date:  n/a  Reason:   Antihypertensives/Cardiac:  Medication:    Orders (72h ago, onward)     Start     Ordered    03/12/20 2100  metoprolol (LOPRESSOR) tablet 12.5 mg  2 TIMES DAILY      03/12/20 1145    03/12/20 2100  simvastatin (ZOCOR) tablet 20 mg  NIGHTLY      03/12/20 1145    03/12/20 1227  midodrine (PROAMATINE) tablet 5 mg  EVERY 4 HOURS PRN      03/12/20 1228    03/12/20 1146  nitroglycerin (NITRO-BID) 2 % ointment 1 Inch  (Autonomic Dysreflexia Orders)  PRN      03/12/20 1146              Patient Vitals for the past 24 hrs:   BP Pulse   03/13/20 0700 117/68 (!) 58   03/12/20 1824 124/65 76   03/12/20 1618 -- 81     Anticoagulation:  Medication:  Heparin   INR:      Other key medications: gabapentin, lantus, humalog, methocarbamol, pioglitazone  A review of the medication list reveals no issues at this time. Patient is currently on an antihypertensive. Recommend home blood pressure monitoring/recording if antihypertensive regimen continues.  Section completed by:  Sherri Dumont RPH

## 2020-03-13 NOTE — THERAPY
"Physical Therapy   Initial Evaluation     Patient Name: Vince Almanzar  Age:  79 y.o., Sex:  male  Medical Record #: [id number]  Today's Date: 3/13/2020     Subjective    \"I did really well here before and I am hoping to get an A+ performance grade this time.\"  \"I'm going great. I'm always great because I have a positive attitude.\"     Objective       03/13/20 1300   Prior Living Situation   Prior Services Meals on Wheels;Home-Independent   Housing / Facility 2 Story Apartment / Condo  (elevator)   Steps Into Home 0   Steps In Home 0   Elevator Yes   Bathroom Set up Bathtub / Shower Combination;Shower Curtain;Grab Bars;Tub Transfer Bench   Equipment Owned Grab Bar(s) In Tub / Shower;Grab Bar(s) By Toilet;Tub Transfer Bench;4-Wheel Walker   Lives with - Patient's Self Care Capacity Alone and Able to Care For Self   Comments Sister very support; drive to all appointments, does laundry, cleans house, does grocery shopping. Pt lives in [place], [place]. Makes egg, sausage for breakfast, makes sandwich and gets meals and wheels   Prior Level of Functional Mobility   Bed Mobility Other (Comments)  (NA, pt didn't sleep in bed)   Transfer Status Independent   Ambulation Independent   Distance Ambulation (Feet)   (limited community distances)   Assistive Devices Used 4-Wheel Walker   Stairs Other (Comments)  (pt avoids stair performance)   Comments pt hasn't slept in a bed since before the inital fall. Slept in lounge chair. Pt walks 4ww, 3x/ day and uses leg bike 40mintes 5 days / week   IRF-EMILEE:  Prior Functioning: Everyday Activities   Self Care Independent   Indoor Mobility (Ambulation) Independent   Stairs Not applicable   Functional Cognition Independent   Prior Device Use Walker   Cognition    Level of Consciousness Alert   Passive ROM Lower Body   Passive ROM Lower Body X   Comments R knee extension limited by 25degrees, L knee flexion limited by 10 degrees   Active ROM Lower Body    Active ROM Lower Body  WDL "   Strength Lower Body   Lower Body Strength  X   Rt Hip Extension Strength 4 (G)   Rt Hip Flexion Strength 3+ (F+)   Rt Hip Abduction Strength 4- (G-)   Rt Hip Adduction Strength 4- (G-)   Rt Knee Flexion Strength 3+ (F+)   Rt Knee Extension Strength 4 (G)   Rt Ankle Dorsiflexion Strength 4 (G)   Rt Ankle Plantar Flexion Strength 4 (G)   Lt Hip Flexion Strength 4- (G-)   Lt Hip Abduction Strength 4 (G)   Lt Hip Adduction Strength 4 (G)   Lt Knee Flexion Strength 4 (G)   Lt Knee Extension Strength 4 (G)   Lt Ankle Dorsiflexion Strength 4 (G)   Lt Ankle Plantar Flexion Strength 4 (G)   Comments assessed in sitting   Sensation Lower Body   Lower Extremity Sensation   WDL   Comments assessed sharp/dull and light touch L2- S1   Lower Body Muscle Tone   Lower Body Muscle Tone  X   Modified Jorge Scale Right Lower Extremity 3   Modified Jorge Scale Left Lower Extremity 3   Comments assess knee flexor   Balance Assessment   Sitting Balance (Static) Good   Sitting Balance (Dynamic) Fair +   Standing Balance (Static) Poor -   Standing Balance (Dynamic) Trace +   Weight Shift Sitting Fair   Weight Shift Standing Poor   Comments with no AD   Bed Mobility    Supine to Sit Stand by Assist   Sit to Supine Stand by Assist   Sit to Stand Contact Guard Assist   Scooting Contact Guard Assist   Rolling Minimal Assist to Rt.;Minimum Assist to Lt.   Neurological Concerns   Neurological Concerns Yes   Comments spasticity    Coordination Lower Body    Toe Tapping Right Impaired   Toe Tapping Left Impaired   IRF-EMILEE:  Roll Left and Right   Assistance Needed Physical assistance   Physical Assistance Level Less than 25%   CARE Score 3   Discharge Goal:  Assistance Needed Independent   Discharge Goal:  Physical Assistance Level No physical assistance or only touching/steadying assist   Discharge Goal:  Score 6   IRF-EMILEE:  Sit to Lying   Assistance Needed Supervision   Physical Assistance Level No physical assistance or only  touching/steadying assist   CARE Score 4   Discharge Goal:  Assistance Needed Independent   Discharge Goal:  Physical Assistance Level No physical assistance or only touching/steadying assist   Discharge Goal:  Score 6   IRF-EMILEE:  Lying to Sitting on Side of Bed   Assistance Needed Supervision   Physical Assistance Level No physical assistance or only touching/steadying assist   CARE Score 4   Discharge Goal:  Assistance Needed Independent   Discharge Goal:  Physical Assistance Level No physical assistance or only touching/steadying assist   Discharge Goal:  Score 6   IRF-EMILEE:  Sit to Stand   Assistance Needed Physical assistance;Adaptive equipment   Physical Assistance Level Less than 25%   CARE Score 3   Discharge Goal:  Assistance Needed Adaptive equipment;Independent   Discharge Goal:  Physical Assistance Level No physical assistance or only touching/steadying assist   Discahrge Goal:  Score 6   IRF-EMILEE:  Chair/Bed-to-Chair Transfer   Assistance Needed Physical assistance;Adaptive equipment   Physical Assistance Level Less than 25%   CARE Score 3   Discharge Goal:  Assistance Needed Independent;Adaptive equipment   Discharge Goal:  Physical Assistance Level No physical assistance or only touching/steadying assist   Discharge Goal:  Score 6   IRF-EMILEE:  Car Transfer   Assistance Needed Physical assistance;Adaptive equipment   Physical Assistance Level 25%-49%   CARE Score 3   Discharge Goal:  Assistance Needed Independent;Adaptive equipment   Discharge Goal:  Physical Assistance Level No physical assistance or only touching/steadying assist   Discharge Goal:  Score 6   IRF EMILEE:  Walking   Does the Patient Walk? Yes   IRF EMILEE:  Walk 10 Feet   Assistance Needed Incidental touching;Adaptive equipment   Physical Assistance Level Less than 25%   CARE Score 3   Discharge Goal:  Assistance Needed Independent;Adaptive equipment   Discharge Goal:  Physical Assistance Level No physical assistance or only touching/steadying  assist   Discharge Goal:  Score 6   IRF-EMILEE:  Walk 50 Feet with Two Turns   Assistance Needed Physical assistance;Adaptive equipment   Physical Assistance Level Less than 25%   CARE Score 3   Discharge Goal:  Assistance Needed Independent;Adaptive equipment   Discharge Goal:  Physical Assistance Level No physical assistance or only touching/steadying assist   Discharge Goal:  Score 6   IRF-EMILEE:  Walk 150 Feet   Reason if not Attempted Safety concerns   CARE Score 88   Discharge Goal:  Assistance Needed Independent;Adaptive equipment   Discharge Goal:  Physical Assistance Level No physical assistance or only touching/steadying assist   Discharge Goal:  Score 6   IRF EMILEE:  Walking 10 Feet on Uneven Surfaces   Reason if not Attempted Safety concerns   CARE Score 88   Discharge Goal:  Assistance Needed Independent;Adaptive equipment   Discharge Goal:  Physical Assistance Level No physical assistance or only touching/steadying assist   Discharge Goal:  Score 6   IRF EMILEE:  1 Step (Curb)   Reason if not Attempted Safety concerns   CARE Score 88   Discharge Goal:  Assistance Needed Physical assistance;Adaptive equipment   Discharge Goal:  Physical Assistance Level 25%-49%   Discharge Goal:  Score 3   IRF-EMILEE:  4 Steps   Reason if not Attempted Activity not applicable   CARE Score 9   IRF EMILEE:  12 Steps   Reason if not Attempted Activity not applicable   CARE Score 9   IRF EMILEE:  Picking Up Object   Reason if not Attempted Safety concerns   CARE Score 88   Discharge Goal:  Assistance Needed Independent;Adaptive equipment   Discharge Goal:  Physical Assistance Level No physical assistance or only touching/steadying assist   Discharge Goal:  Score 6   IRF-EMILEE:  Wheel 50 Feet with Two Turns   Indicate the Type of Wheelchair or Scooter Used Manual   Assistance Needed Supervision   Physical Assistance Level No physical assistance or only touching/steadying assist   CARE Score 4   Discharge Goal:  Assistance Needed Independent    Discharge Goal:  Physical Assistance Level No physical assistance or only touching/steadying assist   Discharge Goal:  Score 6   IRF-EMILEE:  Wheel 150 Feet   Reason if not Attempted Safety concerns   CARE Score 88   Discharge Goal:  Assistance Needed Independent   Discharge Goal:  Physical Assistance Level No physical assistance or only touching/steadying assist   Discharge Goal:  Score 6   Problem List    Problems Impaired Bed Mobility;Impaired Transfers;Impaired Ambulation;Functional ROM Deficit;Functional Strength Deficit;Impaired Balance;Impaired Coordination;Decreased Activity Tolerance;Limited Knowledge of Post-Op Precautions   Precautions   Precautions Spinal / Back Precautions ;Fall Risk   Comments C collar, risk for pressure injuries   Current Discharge Plan   Current Discharge Plan Return to Prior Living Situation   Interdisciplinary Plan of Care Collaboration   IDT Collaboration with  Family / Caregiver   Patient Position at End of Therapy Seated;Call Light within Reach;Tray Table within Reach;Phone within Reach   Collaboration Comments education on POC, CLOF with sister; nursing informed of wound on L UE, productive cough   Benefit   Therapy Benefit Patient Would Benefit from Inpatient Rehabilitation Physical Therapy to Maximize Functional [place] with ADLs, IADLs and Mobility.   PT Total Time Spent   PT Individual Total Time Spent (Mins) 60   PT Charge Group   PT Gait Training 1   PT Evaluation PT Evaluation Low       FIM Bed/Chair/Wheelchair Transfers Score: 4 - Minimal Assistance  Bed/Chair/Wheelchair Transfers Description:  (bed mob: Sarah; transfer: SPT 4ww CGA)    FIM Walking Score:  2 - Max Assistance  Walking Description:  Assist device/equipment(115ft x 1 4ww CGA)    FIM Wheelchair Score:  2 - Max Assistance  Wheelchair Description:  (85ft x 1, B LE, SPV)    FIM Stairs Score:  0 - Not tested,unsafe activity  Stairs Description:       Assessment  Patient is 79 y.o. male with a diagnosis of  C2ASIA D SCI.  Additional factors influencing patient status / progress (ie: cognitive factors, co-morbidities, social support, etc): Per H&P, patient has a past medical history of diabetes, hypertension, hyperlipidemia, neuropathic pain, stage III chronic kidney disease, previous TBI and central cord syndrome and 2018  who was [facility]/2020 with worsening bilateral upper extremity weakness, balance, neck pain.  Pt lives in [place], [place] independently but has a a very supportive sister who lives near by.     Plan  Recommend Physical Therapy  minutes per day 5-7 days per week for 2-3 weeks for the following treatments:  PT E Stim Attended, PT Gait Training, PT Therapeutic Exercises, PT Neuro Re-Ed/Balance, PT Therapeutic Activity, PT Manual Therapy and PT Evaluation.    Goals:  Long term and short term goals have been discussed with patient and they are in agreement.    Physical Therapy Problems     Problem: Balance     Dates: Start: 03/13/20       Description:     Goal: STG-Within one week, patient will     Dates: Start: 03/13/20       Description: 1) Individualized goal:  Perform 5x STS with 4ww from standard height chair in less than 60seconds  2) Interventions:  PT Group Therapy, PT Gait Training, PT Therapeutic Exercises, PT Neuro Re-Ed/Balance, PT Therapeutic Activity, PT Manual Therapy and PT Evaluation                   Problem: Mobility     Dates: Start: 03/13/20       Description:     Goal: STG-Within one week, patient will ambulate community distances     Dates: Start: 03/13/20       Description: 1) Individualized goal:  150ft x  1 4ww SBA  2) Interventions:  PT Group Therapy, PT Gait Training, PT Therapeutic Exercises, PT Neuro Re-Ed/Balance, PT Therapeutic Activity, PT Manual Therapy and PT Evaluation                 Problem: Mobility Transfers     Dates: Start: 03/13/20       Description:     Goal: STG-Within one week, patient will perform bed mobility      Dates: Start: 03/13/20       Description: 1) Individualized goal: SPV with HOB flat and no VCs for precautions  2) Interventions:  PT Group Therapy, PT Gait Training, PT Therapeutic Exercises, PT Neuro Re-Ed/Balance, PT Therapeutic Activity, PT Manual Therapy and PT Evaluation             Goal: STG-Within one week, patient will transfer bed to chair     Dates: Start: 03/13/20       Description: 1) Individualized goal: 4ww SPV SPT  2) Interventions:  PT Group Therapy, PT Gait Training, PT Therapeutic Exercises, PT Neuro Re-Ed/Balance, PT Therapeutic Activity, PT Manual Therapy and PT Evaluation                 Problem: PT-Long Term Goals     Dates: Start: 03/13/20       Description:     Goal: LTG-By discharge, patient will ambulate     Dates: Start: 03/13/20       Description: 1) Individualized goal:  50ft x 3 4ww mod I, 400ft x 1 4ww SPV  2) Interventions:  PT Group Therapy, PT Gait Training, PT Therapeutic Exercises, PT Neuro Re-Ed/Balance, PT Therapeutic Activity, PT Manual Therapy and PT Evaluation             Goal: LTG-By discharge, patient will transfer one surface to another     Dates: Start: 03/13/20       Description: 1) Individualized goal: mod I 4ww  2) Interventions:  PT Group Therapy, PT Gait Training, PT Therapeutic Exercises, PT Neuro Re-Ed/Balance, PT Therapeutic Activity, PT Manual Therapy and PT Evaluation             Goal: LTG-By discharge, patient will perform home exercise program     Dates: Start: 03/13/20       Description: 1) Individualized goal:  Mod I for B LE strengthening/ standing balance   2) Interventions:  PT Group Therapy, PT Gait Training, PT Therapeutic Exercises, PT Neuro Re-Ed/Balance, PT Therapeutic Activity, PT Manual Therapy and PT Evaluation             Goal: LTG-By discharge, patient will transfer in/out of a car     Dates: Start: 03/13/20       Description: 1) Individualized goal: 4ww SPV   2) Interventions:  PT Group Therapy, PT Gait Training, PT Therapeutic Exercises,  PT Neuro Re-Ed/Balance, PT Therapeutic Activity, PT Manual Therapy and PT Evaluation

## 2020-03-13 NOTE — PROGRESS NOTES
"Rehab Progress Note     Encounter Date: 3/13/2020    CC: UE weakness    Interval Events (Subjective)  He reports mild improvement and tingling and burning in his arms and hands.  Neck pain is also improving.  He has not required any PRN pain medication although understands that it is present and his pain may worsen with increased activity.    He slept well last night  He is complaining of pain and discomfort at the upper trapezius muscles.  Lidocaine patches are helping, pain is constant, worse with upright.    ROS:  Gen: No fatigue, confusion, significant weight loss  Eyes: no blurry vision, double vision or pain in eyes  ENT: no changes in hearing, runny nose, nose bleeds, sinus pain  CV: No CP, palpitations  Lungs: no shortness of breath, changes in secretions, changes in cough, pain with coughing  Abd: No abd pain, nausea, vomiting, pain with eating  : no blood in urine,  suprapubic pain  Ext: No swelling in legs, asymmetric atrophy  Neuro: no changes in strength or sensation  Skin: no new ulcers/skin breakdown appreciated by patient  Mood: No changes in mood today, increase in depression or anxiety  Heme: no bruising, or bleeding  Rest of 14 point review of systems is negative    Objective:  Vitals: /68   Pulse (!) 58   Temp 37.1 °C (98.7 °F) (Temporal)   Resp 20   Ht 1.778 m (5' 10\")   Wt 79.5 kg (175 lb 4.3 oz)   SpO2 95%   Gen: NAD, Body mass index is 25.15 kg/m².  HEENT: NC/AT, PERRLA, moist mucous membranes, tenderness palpation bilaterally over the levator scapula and upper trapezius muscles  Cardio: RRR, no mumurs  Pulm: CTAB, with normal respiratory effort  Abd: Soft NTND, active bowel sounds  Ext: No peripheral edema. No calf tenderness. No clubbing/cyanosis. +dorsal pedalis pulses bilaterally.      Recent Results (from the past 72 hour(s))   ACCU-CHEK GLUCOSE    Collection Time: 03/10/20  1:03 PM   Result Value Ref Range    Glucose - Accu-Ck 174 (H) 65 - 99 mg/dL   ACCU-CHEK GLUCOSE    " Collection Time: 03/10/20  6:00 PM   Result Value Ref Range    Glucose - Accu-Ck 197 (H) 65 - 99 mg/dL   ACCU-CHEK GLUCOSE    Collection Time: 03/10/20  9:13 PM   Result Value Ref Range    Glucose - Accu-Ck 209 (H) 65 - 99 mg/dL   CBC WITHOUT DIFFERENTIAL    Collection Time: 03/11/20  3:12 AM   Result Value Ref Range    WBC 6.8 4.8 - 10.8 K/uL    RBC 2.44 (L) 4.70 - 6.10 M/uL    Hemoglobin 7.6 (L) 14.0 - 18.0 g/dL    Hematocrit 23.7 (L) 42.0 - 52.0 %    MCV 97.1 81.4 - 97.8 fL    MCH 31.1 27.0 - 33.0 pg    MCHC 32.1 (L) 33.7 - 35.3 g/dL    RDW 59.3 (H) 35.9 - 50.0 fL    Platelet Count 165 164 - 446 K/uL    MPV 8.9 (L) 9.0 - 12.9 fL   Basic Metabolic Panel    Collection Time: 03/11/20  3:12 AM   Result Value Ref Range    Sodium 134 (L) 135 - 145 mmol/L    Potassium 4.6 3.6 - 5.5 mmol/L    Chloride 101 96 - 112 mmol/L    Co2 27 20 - 33 mmol/L    Glucose 163 (H) 65 - 99 mg/dL    Bun 51 (H) 8 - 22 mg/dL    Creatinine 2.91 (H) 0.50 - 1.40 mg/dL    Calcium 8.5 8.5 - 10.5 mg/dL    Anion Gap 6.0 0.0 - 11.9   ESTIMATED GFR    Collection Time: 03/11/20  3:12 AM   Result Value Ref Range    GFR If  26 (A) >60 mL/min/1.73 m 2    GFR If Non African American 21 (A) >60 mL/min/1.73 m 2   ACCU-CHEK GLUCOSE    Collection Time: 03/11/20  5:32 AM   Result Value Ref Range    Glucose - Accu-Ck 126 (H) 65 - 99 mg/dL   ACCU-CHEK GLUCOSE    Collection Time: 03/11/20 11:10 AM   Result Value Ref Range    Glucose - Accu-Ck 252 (H) 65 - 99 mg/dL   ACCU-CHEK GLUCOSE    Collection Time: 03/11/20  5:07 PM   Result Value Ref Range    Glucose - Accu-Ck 249 (H) 65 - 99 mg/dL   ACCU-CHEK GLUCOSE    Collection Time: 03/11/20  8:52 PM   Result Value Ref Range    Glucose - Accu-Ck 245 (H) 65 - 99 mg/dL   CBC WITHOUT DIFFERENTIAL    Collection Time: 03/12/20  4:43 AM   Result Value Ref Range    WBC 6.8 4.8 - 10.8 K/uL    RBC 2.68 (L) 4.70 - 6.10 M/uL    Hemoglobin 8.1 (L) 14.0 - 18.0 g/dL    Hematocrit 25.6 (L) 42.0 - 52.0 %    MCV 95.5  81.4 - 97.8 fL    MCH 30.2 27.0 - 33.0 pg    MCHC 31.6 (L) 33.7 - 35.3 g/dL    RDW 57.8 (H) 35.9 - 50.0 fL    Platelet Count 189 164 - 446 K/uL    MPV 9.1 9.0 - 12.9 fL   Basic Metabolic Panel    Collection Time: 03/12/20  4:43 AM   Result Value Ref Range    Sodium 136 135 - 145 mmol/L    Potassium 4.2 3.6 - 5.5 mmol/L    Chloride 97 96 - 112 mmol/L    Co2 24 20 - 33 mmol/L    Glucose 145 (H) 65 - 99 mg/dL    Bun 49 (H) 8 - 22 mg/dL    Creatinine 2.62 (H) 0.50 - 1.40 mg/dL    Calcium 8.2 (L) 8.4 - 10.2 mg/dL    Anion Gap 15.0 7.0 - 16.0   ESTIMATED GFR    Collection Time: 03/12/20  4:43 AM   Result Value Ref Range    GFR If  29 (A) >60 mL/min/1.73 m 2    GFR If Non  24 (A) >60 mL/min/1.73 m 2   ACCU-CHEK GLUCOSE    Collection Time: 03/12/20  5:09 AM   Result Value Ref Range    Glucose - Accu-Ck 138 (H) 65 - 99 mg/dL   ACCU-CHEK GLUCOSE    Collection Time: 03/12/20 12:00 PM   Result Value Ref Range    Glucose - Accu-Ck 210 (H) 65 - 99 mg/dL   ACCU-CHEK GLUCOSE    Collection Time: 03/12/20  5:20 PM   Result Value Ref Range    Glucose - Accu-Ck 177 (H) 65 - 99 mg/dL   ACCU-CHEK GLUCOSE    Collection Time: 03/12/20  8:27 PM   Result Value Ref Range    Glucose - Accu-Ck 176 (H) 65 - 99 mg/dL   CBC with Differential    Collection Time: 03/13/20  5:18 AM   Result Value Ref Range    WBC 6.1 4.8 - 10.8 K/uL    RBC 2.53 (L) 4.70 - 6.10 M/uL    Hemoglobin 7.7 (L) 14.0 - 18.0 g/dL    Hematocrit 24.3 (L) 42.0 - 52.0 %    MCV 96.0 81.4 - 97.8 fL    MCH 30.4 27.0 - 33.0 pg    MCHC 31.7 (L) 33.7 - 35.3 g/dL    RDW 57.4 (H) 35.9 - 50.0 fL    Platelet Count 196 164 - 446 K/uL    MPV 9.2 9.0 - 12.9 fL    Neutrophils-Polys 75.50 (H) 44.00 - 72.00 %    Lymphocytes 8.70 (L) 22.00 - 41.00 %    Monocytes 13.50 (H) 0.00 - 13.40 %    Eosinophils 1.50 0.00 - 6.90 %    Basophils 0.00 0.00 - 1.80 %    Immature Granulocytes 0.80 0.00 - 0.90 %    Nucleated RBC 0.00 /100 WBC    Neutrophils (Absolute) 4.59 1.82 -  7.42 K/uL    Lymphs (Absolute) 0.53 (L) 1.00 - 4.80 K/uL    Monos (Absolute) 0.82 0.00 - 0.85 K/uL    Eos (Absolute) 0.09 0.00 - 0.51 K/uL    Baso (Absolute) 0.00 0.00 - 0.12 K/uL    Immature Granulocytes (abs) 0.05 0.00 - 0.11 K/uL    NRBC (Absolute) 0.00 K/uL   Comp Metabolic Panel (CMP)    Collection Time: 03/13/20  5:18 AM   Result Value Ref Range    Sodium 136 135 - 145 mmol/L    Potassium 4.1 3.6 - 5.5 mmol/L    Chloride 102 96 - 112 mmol/L    Co2 27 20 - 33 mmol/L    Anion Gap 7.0 7.0 - 16.0    Glucose 81 65 - 99 mg/dL    Bun 49 (H) 8 - 22 mg/dL    Creatinine 2.50 (H) 0.50 - 1.40 mg/dL    Calcium 8.3 (L) 8.5 - 10.5 mg/dL    AST(SGOT) 16 12 - 45 U/L    ALT(SGPT) 13 2 - 50 U/L    Alkaline Phosphatase 66 30 - 99 U/L    Total Bilirubin 1.1 0.1 - 1.5 mg/dL    Albumin 3.0 (L) 3.2 - 4.9 g/dL    Total Protein 4.9 (L) 6.0 - 8.2 g/dL    Globulin 1.9 1.9 - 3.5 g/dL    A-G Ratio 1.6 g/dL   Lipid Profile (Lipid Panel)    Collection Time: 03/13/20  5:18 AM   Result Value Ref Range    Cholesterol,Tot 81 (L) 100 - 199 mg/dL    Triglycerides 65 0 - 149 mg/dL    HDL 31 (A) >=40 mg/dL    LDL 37 <100 mg/dL   Magnesium    Collection Time: 03/13/20  5:18 AM   Result Value Ref Range    Magnesium 2.3 1.5 - 2.5 mg/dL   Phosphorus    Collection Time: 03/13/20  5:18 AM   Result Value Ref Range    Phosphorus 2.7 2.5 - 4.5 mg/dL   TSH with Reflex to FT4    Collection Time: 03/13/20  5:18 AM   Result Value Ref Range    TSH 2.430 0.380 - 5.330 uIU/mL   Vitamin D, 25-hydroxy (blood)    Collection Time: 03/13/20  5:18 AM   Result Value Ref Range    25-Hydroxy   Vitamin D 25 47 30 - 100 ng/mL   ESTIMATED GFR    Collection Time: 03/13/20  5:18 AM   Result Value Ref Range    GFR If African American 30 (A) >60 mL/min/1.73 m 2    GFR If Non African American 25 (A) >60 mL/min/1.73 m 2   ACCU-CHEK GLUCOSE    Collection Time: 03/13/20  8:22 AM   Result Value Ref Range    Glucose - Accu-Ck 77 65 - 99 mg/dL       Current Facility-Administered  Medications   Medication Frequency   • lidocaine 2 % jelly PRN    And   • nitroglycerin (NITRO-BID) 2 % ointment 1 Inch PRN   • Pharmacy Consult Request ...Pain Management Review 1 Each PHARMACY TO DOSE   • Respiratory Therapy Consult Continuous RT   • acetaminophen (TYLENOL) tablet 1,000 mg TID   • oxyCODONE immediate-release (ROXICODONE) tablet 5 mg Q3HRS PRN   • oxyCODONE immediate release (ROXICODONE) tablet 10 mg Q3HRS PRN   • acetaminophen (TYLENOL) tablet 650 mg Q4HRS PRN   • ascorbic acid tablet 500 mg BID WITH MEALS   • therapeutic multivitamin-minerals (THERAGRAN-M) tablet 1 Tab DAILY WITH LUNCH   • docusate sodium (COLACE) capsule 200 mg BID    And   • sennosides (SENOKOT) 8.6 MG tablet 17.2 mg DAILY AT NOON    And   • bisacodyl (THE MAGIC BULLET) suppository 10 mg QDAY    And   • magnesium hydroxide (MILK OF MAGNESIA) suspension 30 mL QDAY PRN   • artificial tears ophthalmic solution 1 Drop PRN   • benzocaine-menthol (CEPACOL) lozenge 1 Lozenge Q2HRS PRN   • mag hydrox-al hydrox-simeth (MAALOX PLUS ES or MYLANTA DS) suspension 20 mL Q2HRS PRN   • ondansetron (ZOFRAN ODT) dispertab 4 mg 4X/DAY PRN    Or   • ondansetron (ZOFRAN) syringe/vial injection 4 mg 4X/DAY PRN   • sodium chloride (OCEAN) 0.65 % nasal spray 2 Spray PRN   • traZODone (DESYREL) tablet 50 mg QHS PRN   • ammonium lactate (LAC-HYDRIN) 12 % lotion BID   • insulin lispro (HUMALOG) injection 1-6 Units 4X/DAY ACHS    And   • glucose 4 g chewable tablet 16 g Q15 MIN PRN    And   • dextrose 50% (D50W) injection 50 mL Q15 MIN PRN   • calcium carbonate (TUMS) chewable tab 500 mg BID   • methocarbamol (ROBAXIN) tablet 750 mg Q8HRS PRN   • cyanocobalamin (VITAMIN B12) tablet 1,000 mcg DAILY   • ferrous sulfate tablet 325 mg BID WITH MEALS   • folic acid (FOLVITE) tablet 5 mg DAILY   • insulin glargine (LANTUS) injection 16 Units Q EVENING   • lidocaine (LIDODERM) 5 % 2 Patch Q24HR   • metoprolol (LOPRESSOR) tablet 12.5 mg BID   • pioglitazone  (ACTOS) tablet 30 mg DAILY   • simethicone (MYLICON) chewable tab 80 mg TID PRN   • simvastatin (ZOCOR) tablet 20 mg Nightly   • sodium bicarbonate tablet 650 mg TID   • gabapentin (NEURONTIN) capsule 300 mg Q EVENING   • heparin injection 5,000 Units Q8HRS   • vitamin D (cholecalciferol) tablet 4,000 Units DAILY   • midodrine (PROAMATINE) tablet 5 mg Q4HRS PRN       Orders Placed This Encounter   Procedures   • Diet Order Renal (NO FISH), Diabetic     Standing Status:   Standing     Number of Occurrences:   1     Order Specific Question:   Diet:     Answer:   Renal [8]     Comments:   NO FISH     Order Specific Question:   Diet:     Answer:   Diabetic [3]       Assessment:  Active Hospital Problems    Diagnosis   • Acute on chronic renal failure (HCC)   • Central cord syndrome (HCC)   • Type 2 diabetes mellitus (HCC)   • Anemia   • Thrombocytopenia (HCC)   • 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 Sellers on 2/28.  Pinprick altered bilaterally at C3-C6  - Collar on at all times, spinal precautions  - Calcium carbonate 500 mg twice daily  -Continue comprehensive acute inpatient rehabilitation    Neurologic respiratory impairment  -Consult respiratory therapy  - Oxygen as per guidelines  -DuoNeb twice daily  -Check vital capacity     Neurogenic bladder  -voiding trial, if can't void in 6 hours or prn check pvr and if >500cc then ICP,if able to void then check PVR, if PVR is >200cc then ICP     Neurogenic bowel  -Upper motor neuron neurogenic bowel program  with senna 2 tablets q. noon, Colace 200 mg twice daily and Dulcolax suppository with digital stimulation     Potential for orthostatic hypotension  Because of significant autonomic dysfunction associated with spinal cord injury, the patient is at high risk for orthostatic hypotension.  - Midodrine 5mg q4 hours prn SBP <100     Dysphagia: Previous diagnosis after traumatic brain injury, high risk after cervical spinal cord injury  -Consults speech therapy to evaluate for swallow study     Pain:  #Neuropathic pain: Localized in bilateral upper extremities, burning tingling sensation  - Increase gabapentin 600 mg twice daily, dose limited by GFR  -Vitamin C 500 mg every 12 hours, which is been shown to improve gabapentin absorption and pain management     Postoperative pain:  - Oxycodone 5-10 mg every 3 hours as needed pain  - Robaxin 750 mg every 8 hours as needed pain  -Tylenol 1000 mg 3 times daily     Anemia: Required IV iron supplementation, hemoglobin of 7.7 on admission  -Epogen 2000 units, Monday Wednesday Friday  -Folic acid 5 mg daily  - Ferrous sulfate 325 mg twice daily  -Check CBC on Monday    Diabetes: FSBG   -Lantus 16 units nightly  -Check fingersticks q. before meals, nightly  -Sliding scale insulin  - Pioglitazone 30 mg a daily  - ADA diet  -Consult hospitalist      Acute on chronic kidney injury: Previously stage IV kidney disease with bilateral hydronephrosis suggestive of post renal obstruction, likely worsened by neurogenic bladder.  BUN/creatinine of 49/2.5  -Check BMP in a.m.  -Manage neurogenic bladder as above     Hyponatremia: Sodium of 136 on admission  -Sodium bicarb tablets 650 mg 3 times a day     Hyperlipidemia: Triglyceride 65, HDL 31, LDL 37  -Simvastatin 20 mg nightly     Vitamin deficiency  In the posttraumatic setting, there is a high likelihood of vitamin D deficiency.   Vitamin D level on admission of 47, goal of >30  - DC vitamin D supplementation     DVT prophylaxis:  Patient at increased risk for VTE formation with neurologic compromise/myelopathy  -Heparin 5000 units every 8 hours      Total time:  30 minutes.  I spent greater than 50% of the time for patient care and coordination on this date, including unit/floor time, and face-to-face time with the patient as per assessment and plan above including neuropathic pain, increase the dose of gabapentin to 600 mg twice a day, high risk medication, vitamin D deficiency, DC vitamin D supplementation, diabetes management, discussion with hospitalist.    Ramu Garner D.O.

## 2020-03-13 NOTE — THERAPY
"Recreational Therapy   Initial Evaluation     Patient Name: Vince Almanzar  Age:  79 y.o., Sex:  male  Medical Record #: [id number]  Today's Date: 3/13/2020     Subjective    \"I want to use my walker again.\"    Objective       03/13/20 0901   Leisure History   Leisure Interests Reading;Other (Comments)  (watching game shows)   Pre-Morbid Leisure Lifestyle Individual;Group;Active   Prior Living Arrangements   Lives with - Patient's Self Care Capacity Alone and Able to Care For Self   Steps Into Home 0   Steps In Home 0   Ambulation Independent   Assistive Devices Used Front-Wheel Walker   Driving / Transportation Relatives / Others Provide Transportation   Functional Ability Status - Physical   Endurance Low   Right  Strong   Left  Strong   Right Arm Strong   Left Arm Strong   Right Leg Weak   Left Leg Weak   Upper Extremity Gross Motor Uses Both Arms / Hands   Lower Extremetiy Gross Motor Uses Both Legs  (B LE weakness)   Fine Motor Manipulates Small Objects   Functional Ability Status - Cognitive   Attention Span Remains on Task   Comprehension Follows Three Step Commands   Judgment Able to Make Independent Decisions   Functional Ability Status - Emotional    Affect Appropriate   Mood Appropriate   Behavior Appropriate   Leisure Competence Measure   Leisure Awareness Independent   Leisure Attitude Independent   Leisure Skills Minimal Assist   Cultural / Social Behaviors Independent   Interpersonal Skills Independent   Community Integration Skills Cueing Assist   Social Contact Independent   Community Participation Cueing Assist   Clinical Impression   Clinical Impression Impaired Gross Motor Leisure Functioning;Impaired Endurance;Impaired Leisure Skills;Impaired Relaxation and Coping Skills;Impaired Community Skills   Current Discharge Plan   Current Discharge Plan Return to Prior Living Situation   Benefit    Benefit Patient would Benefit from Inpatient Recreational Therapy to Maximize Independent " Leisure Functioning    Interdisciplinary Plan of Care Collaboration   Patient Position at End of Therapy Seated;Call Light within Reach;Tray Table within Reach   Treatment Time   Total Time Spent (mins) 30   Procedural Tracking   Procedural Tracking Community Re-Integration;Leisure Skills Awareness;Cognitive Skills Training       Assessment  Patient is 79 y.o. male with a diagnosis of C2 AIS D incomplete spinal cord injury, etiologic diagnosis of spondylosis with myelopathy.  Additional factors influencing patient status / progress (ie: cognitive factors, co-morbidities, social support, etc): diabetes, hypertension, hyperlipidemia, neuropathic pain, stage III chronic kidney disease, previous TBI and central cord syndrome and 2018.        Plan  Recommend Recreational Therapy 30-60 minutes per day 3-4 days per week for 2 weeks for the following treatments:  Community Re-Integration, Community Skills Development, Leisure Skills Awareness, Leisure Skills Development, Gross Motor Functional Leisure Skills and Group Treatment    Goals:  Long term and short term goals have been discussed with patient and they are in agreement.    Recreation Therapy Problems     Problem: Recreation Therapy     Dates: Start: 03/13/20       Description:     Goal: STG-Within one week, patient will demonstrate leisure problem solving     Dates: Start: 03/13/20       Description: By sharing on two leisure activities other than stated in the evaluation he would like to learn or participate in during session or in his free time.            Goal: STG-Within one week, patient will demonstrate a standing tolerance     Dates: Start: 03/13/20       Description: By standing for 3 minutes x3 while duel tasking at [facility] and no LOB.            Goal: LTG-By discharge, patient will demonstrate leisure problem solving     Dates: Start: 03/13/20       Description: By sharing on two leisure activities other than stated in the evaluation he would like to learn or  participate in following his discharge and any perceived barriers to his participation.            Goal: LTG-By discharge, patient will demonstrate a standing tolerance     Dates: Start: 03/13/20       Description: By standing for 3 minutes x3 while duel tasking at [facility] and no LOB.

## 2020-03-14 ENCOUNTER — APPOINTMENT (OUTPATIENT)
Dept: RADIOLOGY | Facility: REHABILITATION | Age: 80
DRG: 052 | End: 2020-03-14
Attending: HOSPITALIST
Payer: COMMERCIAL

## 2020-03-14 PROBLEM — J18.9 RIGHT LOWER LOBE PNEUMONIA: Status: ACTIVE | Noted: 2020-03-14

## 2020-03-14 PROBLEM — M48.02 CERVICAL SPINAL STENOSIS: Status: ACTIVE | Noted: 2020-03-14

## 2020-03-14 LAB
APPEARANCE UR: CLEAR
BACTERIA #/AREA URNS HPF: NEGATIVE /HPF
BASOPHILS # BLD AUTO: 0.2 % (ref 0–1.8)
BASOPHILS # BLD: 0.03 K/UL (ref 0–0.12)
BILIRUB UR QL STRIP.AUTO: NEGATIVE
C PNEUM DNA SPEC QL NAA+PROBE: NOT DETECTED
COLOR UR: YELLOW
EOSINOPHIL # BLD AUTO: 0.01 K/UL (ref 0–0.51)
EOSINOPHIL NFR BLD: 0.1 % (ref 0–6.9)
EPI CELLS #/AREA URNS HPF: NEGATIVE /HPF
ERYTHROCYTE [DISTWIDTH] IN BLOOD BY AUTOMATED COUNT: 58.4 FL (ref 35.9–50)
FLUAV H1 2009 PAND RNA SPEC QL NAA+PROBE: NOT DETECTED
FLUAV H1 RNA SPEC QL NAA+PROBE: NOT DETECTED
FLUAV H3 RNA SPEC QL NAA+PROBE: NOT DETECTED
FLUAV RNA SPEC QL NAA+PROBE: NEGATIVE
FLUAV RNA SPEC QL NAA+PROBE: NOT DETECTED
FLUBV RNA SPEC QL NAA+PROBE: NEGATIVE
FLUBV RNA SPEC QL NAA+PROBE: NOT DETECTED
GLUCOSE BLD-MCNC: 107 MG/DL (ref 65–99)
GLUCOSE BLD-MCNC: 116 MG/DL (ref 65–99)
GLUCOSE BLD-MCNC: 144 MG/DL (ref 65–99)
GLUCOSE BLD-MCNC: 146 MG/DL (ref 65–99)
GLUCOSE UR STRIP.AUTO-MCNC: 100 MG/DL
HADV DNA SPEC QL NAA+PROBE: NOT DETECTED
HCOV RNA SPEC QL NAA+PROBE: NOT DETECTED
HCT VFR BLD AUTO: 28.1 % (ref 42–52)
HGB BLD-MCNC: 8.8 G/DL (ref 14–18)
HMPV RNA SPEC QL NAA+PROBE: NOT DETECTED
HPIV1 RNA SPEC QL NAA+PROBE: NOT DETECTED
HPIV2 RNA SPEC QL NAA+PROBE: NOT DETECTED
HPIV3 RNA SPEC QL NAA+PROBE: NOT DETECTED
HPIV4 RNA SPEC QL NAA+PROBE: NOT DETECTED
HYALINE CASTS #/AREA URNS LPF: ABNORMAL /LPF
IMM GRANULOCYTES # BLD AUTO: 0.11 K/UL (ref 0–0.11)
IMM GRANULOCYTES NFR BLD AUTO: 0.8 % (ref 0–0.9)
KETONES UR STRIP.AUTO-MCNC: NEGATIVE MG/DL
LACTATE BLD-SCNC: 1.6 MMOL/L (ref 0.5–2)
LEUKOCYTE ESTERASE UR QL STRIP.AUTO: NEGATIVE
LYMPHOCYTES # BLD AUTO: 0.54 K/UL (ref 1–4.8)
LYMPHOCYTES NFR BLD: 3.9 % (ref 22–41)
M PNEUMO DNA SPEC QL NAA+PROBE: NOT DETECTED
MCH RBC QN AUTO: 29.9 PG (ref 27–33)
MCHC RBC AUTO-ENTMCNC: 31.3 G/DL (ref 33.7–35.3)
MCV RBC AUTO: 95.6 FL (ref 81.4–97.8)
MICRO URNS: ABNORMAL
MONOCYTES # BLD AUTO: 1.17 K/UL (ref 0–0.85)
MONOCYTES NFR BLD AUTO: 8.4 % (ref 0–13.4)
NEUTROPHILS # BLD AUTO: 12.05 K/UL (ref 1.82–7.42)
NEUTROPHILS NFR BLD: 86.6 % (ref 44–72)
NITRITE UR QL STRIP.AUTO: NEGATIVE
NRBC # BLD AUTO: 0 K/UL
NRBC BLD-RTO: 0 /100 WBC
PH UR STRIP.AUTO: 7 [PH] (ref 5–8)
PLATELET # BLD AUTO: 251 K/UL (ref 164–446)
PMV BLD AUTO: 8.9 FL (ref 9–12.9)
PROT UR QL STRIP: 100 MG/DL
RBC # BLD AUTO: 2.94 M/UL (ref 4.7–6.1)
RBC # URNS HPF: ABNORMAL /HPF
RBC UR QL AUTO: ABNORMAL
RSV A RNA SPEC QL NAA+PROBE: NOT DETECTED
RSV B RNA SPEC QL NAA+PROBE: NOT DETECTED
RV+EV RNA SPEC QL NAA+PROBE: DETECTED
S PYO DNA SPEC NAA+PROBE: NOT DETECTED
SP GR UR STRIP.AUTO: 1.01
UROBILINOGEN UR STRIP.AUTO-MCNC: 0.2 MG/DL
WBC # BLD AUTO: 13.9 K/UL (ref 4.8–10.8)
WBC #/AREA URNS HPF: ABNORMAL /HPF

## 2020-03-14 PROCEDURE — 700111 HCHG RX REV CODE 636 W/ 250 OVERRIDE (IP): Performed by: PHYSICAL MEDICINE & REHABILITATION

## 2020-03-14 PROCEDURE — A9270 NON-COVERED ITEM OR SERVICE: HCPCS | Performed by: PHYSICAL MEDICINE & REHABILITATION

## 2020-03-14 PROCEDURE — 81001 URINALYSIS AUTO W/SCOPE: CPT

## 2020-03-14 PROCEDURE — 700102 HCHG RX REV CODE 250 W/ 637 OVERRIDE(OP): Performed by: PHYSICAL MEDICINE & REHABILITATION

## 2020-03-14 PROCEDURE — 83605 ASSAY OF LACTIC ACID: CPT

## 2020-03-14 PROCEDURE — 87633 RESP VIRUS 12-25 TARGETS: CPT

## 2020-03-14 PROCEDURE — 700112 HCHG RX REV CODE 229: Performed by: PHYSICAL MEDICINE & REHABILITATION

## 2020-03-14 PROCEDURE — 87581 M.PNEUMON DNA AMP PROBE: CPT

## 2020-03-14 PROCEDURE — 87651 STREP A DNA AMP PROBE: CPT

## 2020-03-14 PROCEDURE — 87040 BLOOD CULTURE FOR BACTERIA: CPT

## 2020-03-14 PROCEDURE — 82962 GLUCOSE BLOOD TEST: CPT | Mod: 91

## 2020-03-14 PROCEDURE — 94760 N-INVAS EAR/PLS OXIMETRY 1: CPT

## 2020-03-14 PROCEDURE — 700102 HCHG RX REV CODE 250 W/ 637 OVERRIDE(OP): Performed by: HOSPITALIST

## 2020-03-14 PROCEDURE — A9270 NON-COVERED ITEM OR SERVICE: HCPCS | Performed by: HOSPITALIST

## 2020-03-14 PROCEDURE — 87186 SC STD MICRODIL/AGAR DIL: CPT

## 2020-03-14 PROCEDURE — 87070 CULTURE OTHR SPECIMN AEROBIC: CPT

## 2020-03-14 PROCEDURE — 87486 CHLMYD PNEUM DNA AMP PROBE: CPT

## 2020-03-14 PROCEDURE — 700111 HCHG RX REV CODE 636 W/ 250 OVERRIDE (IP): Performed by: HOSPITALIST

## 2020-03-14 PROCEDURE — 99223 1ST HOSP IP/OBS HIGH 75: CPT | Performed by: HOSPITALIST

## 2020-03-14 PROCEDURE — 87086 URINE CULTURE/COLONY COUNT: CPT

## 2020-03-14 PROCEDURE — 99233 SBSQ HOSP IP/OBS HIGH 50: CPT | Performed by: PHYSICAL MEDICINE & REHABILITATION

## 2020-03-14 PROCEDURE — 87502 INFLUENZA DNA AMP PROBE: CPT

## 2020-03-14 PROCEDURE — 85025 COMPLETE CBC W/AUTO DIFF WBC: CPT

## 2020-03-14 PROCEDURE — 700105 HCHG RX REV CODE 258: Performed by: HOSPITALIST

## 2020-03-14 PROCEDURE — 87205 SMEAR GRAM STAIN: CPT

## 2020-03-14 PROCEDURE — 71045 X-RAY EXAM CHEST 1 VIEW: CPT

## 2020-03-14 PROCEDURE — 770010 HCHG ROOM/CARE - REHAB SEMI PRIVAT*

## 2020-03-14 PROCEDURE — 87077 CULTURE AEROBIC IDENTIFY: CPT

## 2020-03-14 RX ORDER — SODIUM CHLORIDE 9 MG/ML
INJECTION, SOLUTION INTRAVENOUS CONTINUOUS
Status: DISCONTINUED | OUTPATIENT
Start: 2020-03-14 | End: 2020-03-17

## 2020-03-14 RX ORDER — LINEZOLID 600 MG/1
600 TABLET, FILM COATED ORAL EVERY 12 HOURS
Status: DISCONTINUED | OUTPATIENT
Start: 2020-03-14 | End: 2020-03-14

## 2020-03-14 RX ORDER — GABAPENTIN 300 MG/1
300 CAPSULE ORAL EVERY EVENING
Status: DISCONTINUED | OUTPATIENT
Start: 2020-03-14 | End: 2020-03-16

## 2020-03-14 RX ORDER — LINEZOLID 600 MG/1
600 TABLET, FILM COATED ORAL EVERY 12 HOURS
Status: DISCONTINUED | OUTPATIENT
Start: 2020-03-14 | End: 2020-03-17

## 2020-03-14 RX ADMIN — FOLIC ACID 5 MG: 1 TABLET ORAL at 09:50

## 2020-03-14 RX ADMIN — LINEZOLID 600 MG: 600 TABLET, FILM COATED ORAL at 17:10

## 2020-03-14 RX ADMIN — INSULIN GLARGINE 16 UNITS: 100 INJECTION, SOLUTION SUBCUTANEOUS at 21:39

## 2020-03-14 RX ADMIN — FERROUS SULFATE TAB 325 MG (65 MG ELEMENTAL FE) 325 MG: 325 (65 FE) TAB at 17:10

## 2020-03-14 RX ADMIN — FERROUS SULFATE TAB 325 MG (65 MG ELEMENTAL FE) 325 MG: 325 (65 FE) TAB at 09:49

## 2020-03-14 RX ADMIN — OXYCODONE HYDROCHLORIDE AND ACETAMINOPHEN 500 MG: 500 TABLET ORAL at 17:10

## 2020-03-14 RX ADMIN — HEPARIN SODIUM 5000 UNITS: 5000 INJECTION, SOLUTION INTRAVENOUS; SUBCUTANEOUS at 14:00

## 2020-03-14 RX ADMIN — GABAPENTIN 300 MG: 300 CAPSULE ORAL at 21:26

## 2020-03-14 RX ADMIN — STANDARDIZED SENNA CONCENTRATE 17.2 MG: 8.6 TABLET ORAL at 13:00

## 2020-03-14 RX ADMIN — SODIUM BICARBONATE 650 MG TABLET 650 MG: at 09:49

## 2020-03-14 RX ADMIN — PIPERACILLIN AND TAZOBACTAM 3.38 G: 3; .375 INJECTION, POWDER, LYOPHILIZED, FOR SOLUTION INTRAVENOUS; PARENTERAL at 21:15

## 2020-03-14 RX ADMIN — CALCIUM CARBONATE (ANTACID) CHEW TAB 500 MG 500 MG: 500 CHEW TAB at 21:32

## 2020-03-14 RX ADMIN — SODIUM BICARBONATE 650 MG TABLET 650 MG: at 14:10

## 2020-03-14 RX ADMIN — CALCIUM CARBONATE (ANTACID) CHEW TAB 500 MG 500 MG: 500 CHEW TAB at 09:49

## 2020-03-14 RX ADMIN — HEPARIN SODIUM 5000 UNITS: 5000 INJECTION, SOLUTION INTRAVENOUS; SUBCUTANEOUS at 21:35

## 2020-03-14 RX ADMIN — CYANOCOBALAMIN TAB 1000 MCG 1000 MCG: 1000 TAB at 09:50

## 2020-03-14 RX ADMIN — PIPERACILLIN AND TAZOBACTAM 3.38 G: 3; .375 INJECTION, POWDER, LYOPHILIZED, FOR SOLUTION INTRAVENOUS; PARENTERAL at 17:09

## 2020-03-14 RX ADMIN — Medication 1 TABLET: at 13:00

## 2020-03-14 RX ADMIN — SODIUM CHLORIDE: 9 INJECTION, SOLUTION INTRAVENOUS at 12:25

## 2020-03-14 RX ADMIN — OXYCODONE HYDROCHLORIDE AND ACETAMINOPHEN 500 MG: 500 TABLET ORAL at 09:49

## 2020-03-14 RX ADMIN — BENZOCAINE AND MENTHOL 1 LOZENGE: 15; 3.6 LOZENGE ORAL at 01:47

## 2020-03-14 RX ADMIN — HEPARIN SODIUM 5000 UNITS: 5000 INJECTION, SOLUTION INTRAVENOUS; SUBCUTANEOUS at 05:36

## 2020-03-14 RX ADMIN — SIMVASTATIN 20 MG: 20 TABLET, FILM COATED ORAL at 21:30

## 2020-03-14 RX ADMIN — PIOGLITAZONE 30 MG: 15 TABLET ORAL at 09:49

## 2020-03-14 RX ADMIN — DOCUSATE SODIUM 200 MG: 100 CAPSULE, LIQUID FILLED ORAL at 21:26

## 2020-03-14 RX ADMIN — Medication 1 APPLICATION: at 21:23

## 2020-03-14 RX ADMIN — Medication: at 09:58

## 2020-03-14 RX ADMIN — SODIUM BICARBONATE 650 MG TABLET 650 MG: at 21:31

## 2020-03-14 RX ADMIN — ACETAMINOPHEN 1000 MG: 500 TABLET, FILM COATED ORAL at 09:49

## 2020-03-14 RX ADMIN — BACLOFEN 5 MG: 10 TABLET ORAL at 09:50

## 2020-03-14 RX ADMIN — METOPROLOL TARTRATE 12.5 MG: 25 TABLET, FILM COATED ORAL at 07:56

## 2020-03-14 ASSESSMENT — LIFESTYLE VARIABLES: EVER_SMOKED: NEVER

## 2020-03-14 NOTE — FLOWSHEET NOTE
03/14/20 1000   Events/Summary/Plan   Events/Summary/Plan called by RN to assess pt. he is not feeling well today with increased cough, fever 102.1, tachycardic ,    Vital Signs   Pulse (!) 122  (heart rate is fluctuating from 90's to 120's )   Respiration 20   Pulse Oximetry 95 %   $ Pulse Oximetry (Spot Check) Yes   Respiratory Assessment   Level of Consciousness Alert  (pt is not feeling well states he is dizzy and weak )   Chest Exam   Work Of Breathing / Effort Mild   Breath Sounds   RUL Breath Sounds Diminished;Coarse Crackles   RML Breath Sounds Diminished   RLL Breath Sounds Diminished   CIRA Breath Sounds Diminished;Coarse Crackles   LLL Breath Sounds Diminished   Secretions   Cough Strong;Non Productive   How Sputum Obtained Spontaneous   Sputum Amount Unable to Evaluate   Sputum Color Unable to Evaluate   Sputum Consistency Unable to Evaluate   Mobility   Activity Performed Sitting up in bed   Oxygen   O2 (LPM) 0   FiO2% 21 %   O2 Delivery Device None - Room Air   Smoking History   Have you ever smoked Never

## 2020-03-14 NOTE — CARE PLAN
Problem: Infection  Goal: Will remain free from infection  Note: WBC elevated, pt had a temperature this morning. Blood works done, CXR and UA done.      Problem: Venous Thromboembolism (VTW)/Deep Vein Thrombosis (DVT) Prevention:  Goal: Patient will participate in Venous Thrombosis (VTE)/Deep Vein Thrombosis (DVT)Prevention Measures  Flowsheets (Taken 3/14/2020 1436)  Pharmacologic Prophylaxis Used: Unfractionated Heparin  Note: Pt is on scheduled heparin injection.

## 2020-03-14 NOTE — PROGRESS NOTES
Received patient during shift change, report rec'd from day shift RN. Resting in bed, VS stable on room air. Per report continent of B&B, bowel program in place. Min assist for transfers. A&O x 4, able to make needs known. Bed in low position, call light within reach.

## 2020-03-14 NOTE — CONSULTS
"DATE OF SERVICE:  3/14/2020    REQUESTING PHYSICIAN:  Robert Lagunas MD    CHIEF COMPLAINT / REASON FOR CONSULTATION:   Hypertension  Diabetes  Cough, fever, tachycardia, leukocytosis    HISTORY OF PRESENT ILLNESS:  This is a 78 y/o male with a PMH significant for hypertension, diabetes, dyslipidemia, neuropathic pain, stage IV chronic kidney disease, and previous TBI and central cord syndrome who came to [facility] on 2/28 with worsening bilateral upper extremity weakness, balance, and neck pain.  The recent MRI in 12/3/19 showed severe stenosis at C3-C6 with interval progression over the last year.  He subsequently underwent an C3-C6 laminectomy and posterior decompression.  He had a history of mild to moderate traumatic brain injury and traumatic spinal cord injury with C3 AISD central cord syndrome in December of 2018, that was managed nonoperatively.       Because of the patient's weakness and debility, Rehab was consulted, evaluated the patient, and was deemed a good Rehab candidate.  The patient was transferred over to the Rehab facility on 3/12/2020.      The patient denies nausea, vomiting, headaches, dizziness, blurry vision, or chest pain.  He does complain of not feeling good and having a cough that started the day before.    REVIEW OF SYSTEMS: All review of systems are negative pre AMA and CMS criteria   except for that stated in the HPI.    PAST MEDICAL HISTORY:  Past Medical History:   Diagnosis Date   • Arrhythmia     [facility]   • Diabetes (HCC)    • Hemorrhagic disorder (HCC)     \"bleeds easily\"   • High cholesterol    • Hypertension    • Jaundice 5/8/2016   • Renal disorder     hx kidney stones       PAST SURGICAL HISTORY:  Past Surgical History:   Procedure Laterality Date   • CERVICAL DECOMPRESSION POSTERIOR  2/28/2020    Procedure: DECOMPRESSION, SPINE, CERVICAL, POSTERIOR APPROACH- C3-6 DISCECTOMY AND FUSION;  Surgeon: Lg Sellers D.O.;  Location: SURGERY [facility];  Service: " Neurosurgery   • GASTROSCOPY-ENDO N/A 6/3/2016    Procedure: GASTROSCOPY-ENDO;  Surgeon: Jasper Donnelly M.D.;  Location: SURGERY [facility];  Service:    • EGD W/ENDOSCOPIC ULTRASOUND N/A 6/3/2016    Procedure: EGD W/ENDOSCOPIC ULTRASOUND UPPER;  Surgeon: Jasper Donnelly M.D.;  Location: SURGERY [facility];  Service:    • EGD WITH ASP/BX N/A 6/3/2016    Procedure: EGD WITH ASP/BX - FNA, DUODENAL BIOPSIES, FNA OF PANCREAS;  Surgeon: Jasper Donnelly M.D.;  Location: SURGERY [facility];  Service:    • ERCP  5/2016   • CHOLECYSTECTOMY  2006    gallstones removed   • HIP ARTHROPLASTY TOTAL Left 2001    left total hip       No Known Allergies    CURRENT MEDICATIONS:    Current Facility-Administered Medications:   •  NS  •  gabapentin  •  linezolid  •  piperacillin-tazobactam (ZOSYN) 3.375 g IV-MBP  •  Initiate Autonomic Dysreflexia orders for SCI T10 and above in patients with one or more of the following symptoms: **AND** Monitor vital signs every 5 minutes during interventions and continue for at least 2 hours and up to 4 hours after resolution of AD **AND** Notify Provider as soon as possible for Autonomic Dysreflexia **AND** Immediately sit patient up if supine and lower legs, loosen constrictive clothing or devices **AND** Check Bladder for distension. Check catheter for kinks, folds, or blockage if indwelling cath present **AND** If Catheter not in place, straight catheterize patient using lidocaine jelly **AND** lidocaine **AND** nitroglycerin **AND** Review/assess for potential instigation cause **AND** Check for fecal impaction using lidocaine jelly if SBP is less than or equal to 150 mmHg. If SBP is greater than 150 mmHg, apply nitroglycerin and wait for blood pressure to decrease before clearing fecal impaction.  •  Pharmacy Consult Request  •  Respiratory Therapy Consult  •  oxyCODONE immediate-release  •  oxyCODONE immediate release  •  acetaminophen  •  ascorbic acid  •  therapeutic  multivitamin-minerals  •  docusate sodium **AND** sennosides **AND** bisacodyl **AND** magnesium hydroxide  •  artificial tears  •  benzocaine-menthol  •  mag hydrox-al hydrox-simeth  •  ondansetron **OR** ondansetron  •  sodium chloride  •  traZODone  •  ammonium lactate  •  insulin lispro **AND** Accu-Chek ACHS **AND** NOTIFY MD and PharmD **AND** glucose **AND** dextrose 50%  •  calcium carbonate  •  methocarbamol  •  cyanocobalamin  •  ferrous sulfate  •  insulin glargine  •  lidocaine  •  metoprolol  •  pioglitazone  •  simethicone  •  simvastatin  •  sodium bicarbonate  •  heparin  •  midodrine    Social History     Socioeconomic History   • Marital status: Single     Spouse name: Not on file   • Number of children: Not on file   • Years of education: Not on file   • Highest education level: Not on file   Occupational History   • Not on file   Social Needs   • Financial resource strain: Not on file   • Food insecurity     Worry: Not on file     Inability: Not on file   • Transportation needs     Medical: Not on file     Non-medical: Not on file   Tobacco Use   • Smoking status: Never Smoker   • Smokeless tobacco: Never Used   Substance and Sexual Activity   • Alcohol use: No   • Drug use: No   • Sexual activity: Not on file   Lifestyle   • Physical activity     Days per week: Not on file     Minutes per session: Not on file   • Stress: Not on file   Relationships   • Social connections     Talks on phone: Not on file     Gets together: Not on file     Attends [employer] service: Not on file     Active member of club or organization: Not on file     Attends meetings of clubs or organizations: Not on file     Relationship status: Not on file   • Intimate partner violence     Fear of current or ex partner: Not on file     Emotionally abused: Not on file     Physically abused: Not on file     Forced sexual activity: Not on file   Other Topics Concern   • Not on file   Social History Narrative   • Not on file        FAMILY HISTORY:  was reviewed and is not pertinent to this consultation.    PHYSICAL EXAMINATION:  VITAL SIGNS:  Temp is 101.8, blood pressure is 121/66, heart rate is , respiratory rate is 18.  GENERAL:  Patient was lying in bed in no distress.  HEENT:  Pupils were equal, round and reactive to light and accomodation.  Oral mucosa was pink and moist.  NECK:  Soft.  Supple.  No JVD.  HEART:  Regular rate and rhythm.  Normal S1 and S2.  No murmurs were appreciated.  LUNGS:  shows B/L mild rhonchi.  ABDOMEN:  Soft, non tender, non distended.  Bowels sound were positive in all four quadrants.  EXTREMITIES:  No clubbing, cyanosis.  There was no lower extremity edema.  NEUROLOGIC:  Cranial nerves two through twelve were grossly intact.    LABS:  Lab Results   Component Value Date/Time    SODIUM 136 03/13/2020 05:18 AM    POTASSIUM 4.1 03/13/2020 05:18 AM    CHLORIDE 102 03/13/2020 05:18 AM    CO2 27 03/13/2020 05:18 AM    GLUCOSE 81 03/13/2020 05:18 AM    BUN 49 (H) 03/13/2020 05:18 AM    CREATININE 2.50 (H) 03/13/2020 05:18 AM      Lab Results   Component Value Date/Time    WBC 13.9 (H) 03/14/2020 11:58 AM    RBC 2.94 (L) 03/14/2020 11:58 AM    HEMOGLOBIN 8.8 (L) 03/14/2020 11:58 AM    HEMATOCRIT 28.1 (L) 03/14/2020 11:58 AM    MCV 95.6 03/14/2020 11:58 AM    MCH 29.9 03/14/2020 11:58 AM    MCHC 31.3 (L) 03/14/2020 11:58 AM    MPV 8.9 (L) 03/14/2020 11:58 AM    NEUTSPOLYS 86.60 (H) 03/14/2020 11:58 AM    LYMPHOCYTES 3.90 (L) 03/14/2020 11:58 AM    MONOCYTES 8.40 03/14/2020 11:58 AM    EOSINOPHILS 0.10 03/14/2020 11:58 AM    BASOPHILS 0.20 03/14/2020 11:58 AM      Lab Results   Component Value Date/Time    PROTHROMBTM 14.0 12/03/2018 01:00 PM    INR 1.07 12/03/2018 01:00 PM        Anemia  H&H stable with Hb 8.8  Fe: 33, sats 12% (3/1)  B12: 264  Folate: > 24 (3/6)  FOB (-) x 1  On Fe & B12 supplements  On Folate supplements --> will d/c    Uncontrolled type 2 diabetes mellitus with hyperglycemia (HCC)  Hba1c:  5.1 (3/13)   BS :   On Lantus 16 units qhs  On Actos: 30 mg daily  Note: home meds include Ozempic 1.0 mg once a week, Actos 30mg qd, and Tresiba 10 units qhs  Cont to monitor    Cervical spinal stenosis  Had worsening bilateral upper extremity weakness, balance, and neck pain  MRI showed severe stenosis at C3-C6 with interval progression over the last year  S/P C3-C6 laminectomy and posterior decompression    Essential hypertension  BP ok  On Lopressor: 12.5 mg bid   Monitor    Chronic kidney disease  Has stage 4 CKD  Bun/Cr: stable  On Bicarb tabs  Monitor    Bilateral pneumonia  Started having a nonproductive cough yesterday 3/13  Developed a fever (while on Tylenol) of 101.8 (3/14)  Leukocytosis: 6.1 (3/13) --> 13.9 (3/14)  Developed tachycardia (3/14)  CXR --> right basilar consolidation consistent with pneumonia; left basal consolidation could be due to atelectasis or pneumonia  U/A negative  Lactate: 1.4  F/U rapid strep  F/U Flu  F/U BC  F/U sputum (if productive)  Will hold scheduled Tylenol temporarily to eval progress  Started IVF's NS at 100 cc/hr  Started Zyvox & Zosyn empirically  Monitor closely      This case has been discussed with the attending Physiatrist.    Thank you for the consultation.  Will follow the patient with you.

## 2020-03-14 NOTE — FLOWSHEET NOTE
"   03/14/20 1013   Incentive Spirometry Treatment   Height 1.778 m (5' 10\")   Predicted Inspiratory Capacity 2950   60% of predicted IS capacity 1770 mL   40% of predicted IS capacity 1180 mL   Incentive Spirometer Volume   (250-700 at best pt not feeling well today )   Incentive Spirometer Next Change Date 04/14/20     "

## 2020-03-14 NOTE — THERAPY
Missed Therapy     Patient Name: Vince Almanzar  Age:  79 y.o., Sex:  male  Medical Record #: [id number]  Today's Date: 3/14/2020    Discussed missed therapy with rehab tech, physician, nursing, respiratory therapist.      Change in status.  Pt with increased cough and fever.  Downgrade diet to dys1/NTL.  Discussed  COVID-19 testing with MD. SANTOS to determine appropriate action.

## 2020-03-14 NOTE — PROGRESS NOTES
"Weekend coverage - rehab Progress Note     Encounter Date: 3/14/2020    CC: UE weakness      Interval Events (Subjective)  -Called by nurse this morning, patient not feeling well.  Tachycardia and fever.  Patient feels very tired, keeps falling asleep when I try to ask questions.  He reports feeling great yesterday, but feels different today. Per chart review, baclofen 5 mg 3 times daily was started yesterday, as well as increasing gabapentin from 300 to 600 mg nightly.  Unlikely that these medication changes caused his current mental status changes. Denies yifan shortness of breath, but per respiratory therapist patient with decreased breath sounds in his right lower lobe; patient having difficulty taking deep breaths, vital capacity was max of 750 mL 1 time, and other times somewhere between 250 to 500 mL.  -Hospitalist Dr. Alfaro consulted. Appreciate recommendations.   -Discussed with speech therapist, no concern for aspiration on evaluation, but will modify diet to decrease the risk.  He plans to perform MBS on Monday.   -Chest x-ray 3/14/20: Consistent with right basilar constant consolidation suggestive of pneumonia, left basilar consolidation suggestive of atelectasis/pneumonia  -Pending labs, blood cultures  -Discontinue baclofen for now and decrease gabapentin to 300 mg nightly, given the current amount of sedation      Objective:  Vitals: /66   Pulse (!) 122   Temp 37.3 °C (99.1 °F) (Oral)   Resp 20   Ht 1.778 m (5' 10\")   Wt 79.5 kg (175 lb 4.3 oz)   SpO2 95%   Gen: Tired, keeps falling asleep during history and exam  Pulm: Difficulty taking deep breaths, intermittent coughs  HEENT: wearing cervical collar, moist mucous membranes  CV: Well perfused extremities  Abd: Soft NTND  Neuro: Able to lift bilateral upper extremities at least to antigravity with very slow pace        Assessment:  Active Hospital Problems    Diagnosis   • Acute on chronic renal failure (HCC)   • Central cord syndrome " (HCC)   • Type 2 diabetes mellitus (HCC)   • Anemia   • Thrombocytopenia (HCC)   • 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 (HCC)   • Essential hypertension   • Hyperlipemia   • Incomplete quadriplegia at C5-C8 level (HCC)   • Neuropathic pain   • Neurogenic bowel   • Neurogenic bladder   • Vitamin D deficiency   • Uncontrolled type 2 diabetes mellitus with hyperglycemia (HCC)         Medical Decision Making and Plan:    #Pulm/ID: On 3/14/20, new fatigue, cough, fever (100.1F), tachycardia, requiring metoprolol to be held on 3/13; decreased vital capacity at 250-700 mL documented (not available for comparison); chest x-ray consistent with right lobe pneumonia and left basilar consolidation  -Pending additional lab/blood cultures  -Appreciate work up/input from Dr. Alfaro (hospitalist)   -Continue respiratory therapy  -Monitor vital capacity      #C2 AIS D spinal cord injury: Central cord syndrome, C3-C6 cervical spondylosis with myelopathy, status post C3-C6 laminectomy by Dr. Sellers on 2/28.  Pinprick altered bilaterally at C3-C6  -Collar on at all times, spinal precautions  -Calcium carbonate 500 mg twice daily  -Continue comprehensive acute inpatient rehabilitation, when clinically improves      #Potential for orthostatic hypotension  Because of significant autonomic dysfunction associated with spinal cord injury, the patient is at high risk for orthostatic hypotension.  - Midodrine 5mg q4 hours prn SBP <100      #Pain:   -Baclofen discontinued 3/14, restart when patient clinically improves  -Gabapentin decreased back to 300 mg nightly, his previous dose, until he clinically improves      #DVT prophylaxis: Patient at increased risk for VTE formation with neurologic compromise/myelopathy  -Heparin 5000 units every 8 hours          Robert Lagunas MD  Physical Medicine and Rehabilitation   3/14/2020

## 2020-03-15 LAB
BASOPHILS # BLD AUTO: 0.2 % (ref 0–1.8)
BASOPHILS # BLD: 0.02 K/UL (ref 0–0.12)
EOSINOPHIL # BLD AUTO: 0.03 K/UL (ref 0–0.51)
EOSINOPHIL NFR BLD: 0.3 % (ref 0–6.9)
ERYTHROCYTE [DISTWIDTH] IN BLOOD BY AUTOMATED COUNT: 58.9 FL (ref 35.9–50)
GLUCOSE BLD-MCNC: 150 MG/DL (ref 65–99)
GLUCOSE BLD-MCNC: 250 MG/DL (ref 65–99)
GLUCOSE BLD-MCNC: 76 MG/DL (ref 65–99)
GLUCOSE BLD-MCNC: 79 MG/DL (ref 65–99)
GRAM STN SPEC: NORMAL
HCT VFR BLD AUTO: 27.9 % (ref 42–52)
HGB BLD-MCNC: 8.6 G/DL (ref 14–18)
IMM GRANULOCYTES # BLD AUTO: 0.09 K/UL (ref 0–0.11)
IMM GRANULOCYTES NFR BLD AUTO: 0.9 % (ref 0–0.9)
LYMPHOCYTES # BLD AUTO: 0.71 K/UL (ref 1–4.8)
LYMPHOCYTES NFR BLD: 7.3 % (ref 22–41)
MCH RBC QN AUTO: 30.1 PG (ref 27–33)
MCHC RBC AUTO-ENTMCNC: 30.8 G/DL (ref 33.7–35.3)
MCV RBC AUTO: 97.6 FL (ref 81.4–97.8)
MONOCYTES # BLD AUTO: 1.06 K/UL (ref 0–0.85)
MONOCYTES NFR BLD AUTO: 10.8 % (ref 0–13.4)
NEUTROPHILS # BLD AUTO: 7.86 K/UL (ref 1.82–7.42)
NEUTROPHILS NFR BLD: 80.5 % (ref 44–72)
NRBC # BLD AUTO: 0 K/UL
NRBC BLD-RTO: 0 /100 WBC
PLATELET # BLD AUTO: 219 K/UL (ref 164–446)
PMV BLD AUTO: 8.9 FL (ref 9–12.9)
RBC # BLD AUTO: 2.86 M/UL (ref 4.7–6.1)
SIGNIFICANT IND 70042: NORMAL
SITE SITE: NORMAL
SOURCE SOURCE: NORMAL
WBC # BLD AUTO: 9.8 K/UL (ref 4.8–10.8)

## 2020-03-15 PROCEDURE — 99232 SBSQ HOSP IP/OBS MODERATE 35: CPT | Performed by: PHYSICAL MEDICINE & REHABILITATION

## 2020-03-15 PROCEDURE — 82962 GLUCOSE BLOOD TEST: CPT

## 2020-03-15 PROCEDURE — 700111 HCHG RX REV CODE 636 W/ 250 OVERRIDE (IP): Performed by: PHYSICAL MEDICINE & REHABILITATION

## 2020-03-15 PROCEDURE — 700102 HCHG RX REV CODE 250 W/ 637 OVERRIDE(OP): Performed by: PHYSICAL MEDICINE & REHABILITATION

## 2020-03-15 PROCEDURE — 770010 HCHG ROOM/CARE - REHAB SEMI PRIVAT*

## 2020-03-15 PROCEDURE — A9270 NON-COVERED ITEM OR SERVICE: HCPCS | Performed by: HOSPITALIST

## 2020-03-15 PROCEDURE — 700111 HCHG RX REV CODE 636 W/ 250 OVERRIDE (IP): Performed by: HOSPITALIST

## 2020-03-15 PROCEDURE — 99232 SBSQ HOSP IP/OBS MODERATE 35: CPT | Performed by: HOSPITALIST

## 2020-03-15 PROCEDURE — 97110 THERAPEUTIC EXERCISES: CPT

## 2020-03-15 PROCEDURE — 97129 THER IVNTJ 1ST 15 MIN: CPT

## 2020-03-15 PROCEDURE — 97535 SELF CARE MNGMENT TRAINING: CPT

## 2020-03-15 PROCEDURE — 36415 COLL VENOUS BLD VENIPUNCTURE: CPT

## 2020-03-15 PROCEDURE — 700105 HCHG RX REV CODE 258: Performed by: HOSPITALIST

## 2020-03-15 PROCEDURE — 94760 N-INVAS EAR/PLS OXIMETRY 1: CPT

## 2020-03-15 PROCEDURE — 700102 HCHG RX REV CODE 250 W/ 637 OVERRIDE(OP): Performed by: HOSPITALIST

## 2020-03-15 PROCEDURE — 92526 ORAL FUNCTION THERAPY: CPT

## 2020-03-15 PROCEDURE — A9270 NON-COVERED ITEM OR SERVICE: HCPCS | Performed by: PHYSICAL MEDICINE & REHABILITATION

## 2020-03-15 PROCEDURE — 97130 THER IVNTJ EA ADDL 15 MIN: CPT

## 2020-03-15 PROCEDURE — 85025 COMPLETE CBC W/AUTO DIFF WBC: CPT

## 2020-03-15 PROCEDURE — 700112 HCHG RX REV CODE 229: Performed by: PHYSICAL MEDICINE & REHABILITATION

## 2020-03-15 PROCEDURE — 700101 HCHG RX REV CODE 250: Performed by: PHYSICAL MEDICINE & REHABILITATION

## 2020-03-15 RX ORDER — INSULIN GLARGINE 100 [IU]/ML
8 INJECTION, SOLUTION SUBCUTANEOUS EVERY EVENING
Status: DISCONTINUED | OUTPATIENT
Start: 2020-03-15 | End: 2020-03-15

## 2020-03-15 RX ADMIN — HEPARIN SODIUM 5000 UNITS: 5000 INJECTION, SOLUTION INTRAVENOUS; SUBCUTANEOUS at 14:27

## 2020-03-15 RX ADMIN — Medication: at 08:05

## 2020-03-15 RX ADMIN — FERROUS SULFATE TAB 325 MG (65 MG ELEMENTAL FE) 325 MG: 325 (65 FE) TAB at 17:30

## 2020-03-15 RX ADMIN — SODIUM BICARBONATE 650 MG TABLET 650 MG: at 14:27

## 2020-03-15 RX ADMIN — SODIUM CHLORIDE 1000 ML: 9 INJECTION, SOLUTION INTRAVENOUS at 03:53

## 2020-03-15 RX ADMIN — HEPARIN SODIUM 5000 UNITS: 5000 INJECTION, SOLUTION INTRAVENOUS; SUBCUTANEOUS at 21:18

## 2020-03-15 RX ADMIN — OXYCODONE HYDROCHLORIDE AND ACETAMINOPHEN 500 MG: 500 TABLET ORAL at 17:30

## 2020-03-15 RX ADMIN — OXYCODONE HYDROCHLORIDE AND ACETAMINOPHEN 500 MG: 500 TABLET ORAL at 08:04

## 2020-03-15 RX ADMIN — LINEZOLID 600 MG: 600 TABLET, FILM COATED ORAL at 21:07

## 2020-03-15 RX ADMIN — BISACODYL 10 MG: 10 SUPPOSITORY RECTAL at 21:22

## 2020-03-15 RX ADMIN — CALCIUM CARBONATE (ANTACID) CHEW TAB 500 MG 500 MG: 500 CHEW TAB at 08:04

## 2020-03-15 RX ADMIN — INSULIN LISPRO 2 UNITS: 100 INJECTION, SOLUTION INTRAVENOUS; SUBCUTANEOUS at 21:15

## 2020-03-15 RX ADMIN — FERROUS SULFATE TAB 325 MG (65 MG ELEMENTAL FE) 325 MG: 325 (65 FE) TAB at 08:04

## 2020-03-15 RX ADMIN — SIMVASTATIN 20 MG: 20 TABLET, FILM COATED ORAL at 21:07

## 2020-03-15 RX ADMIN — HEPARIN SODIUM 5000 UNITS: 5000 INJECTION, SOLUTION INTRAVENOUS; SUBCUTANEOUS at 05:32

## 2020-03-15 RX ADMIN — CYANOCOBALAMIN TAB 1000 MCG 1000 MCG: 1000 TAB at 08:04

## 2020-03-15 RX ADMIN — GABAPENTIN 300 MG: 300 CAPSULE ORAL at 21:08

## 2020-03-15 RX ADMIN — PIOGLITAZONE 30 MG: 15 TABLET ORAL at 08:01

## 2020-03-15 RX ADMIN — Medication: at 21:17

## 2020-03-15 RX ADMIN — STANDARDIZED SENNA CONCENTRATE 17.2 MG: 8.6 TABLET ORAL at 11:53

## 2020-03-15 RX ADMIN — OXYCODONE HYDROCHLORIDE 10 MG: 10 TABLET ORAL at 21:07

## 2020-03-15 RX ADMIN — LINEZOLID 600 MG: 600 TABLET, FILM COATED ORAL at 08:02

## 2020-03-15 RX ADMIN — PIPERACILLIN AND TAZOBACTAM 3.38 G: 3; .375 INJECTION, POWDER, LYOPHILIZED, FOR SOLUTION INTRAVENOUS; PARENTERAL at 21:10

## 2020-03-15 RX ADMIN — OXYCODONE HYDROCHLORIDE 10 MG: 10 TABLET ORAL at 14:30

## 2020-03-15 RX ADMIN — DOCUSATE SODIUM 200 MG: 100 CAPSULE, LIQUID FILLED ORAL at 21:06

## 2020-03-15 RX ADMIN — METOPROLOL TARTRATE 12.5 MG: 25 TABLET, FILM COATED ORAL at 21:07

## 2020-03-15 RX ADMIN — SODIUM BICARBONATE 650 MG TABLET 650 MG: at 21:07

## 2020-03-15 RX ADMIN — LIDOCAINE 2 PATCH: 50 PATCH CUTANEOUS at 11:28

## 2020-03-15 RX ADMIN — PIPERACILLIN AND TAZOBACTAM 3.38 G: 3; .375 INJECTION, POWDER, LYOPHILIZED, FOR SOLUTION INTRAVENOUS; PARENTERAL at 13:00

## 2020-03-15 RX ADMIN — DOCUSATE SODIUM 200 MG: 100 CAPSULE, LIQUID FILLED ORAL at 08:02

## 2020-03-15 RX ADMIN — SODIUM BICARBONATE 650 MG TABLET 650 MG: at 08:02

## 2020-03-15 RX ADMIN — METOPROLOL TARTRATE 12.5 MG: 25 TABLET, FILM COATED ORAL at 08:03

## 2020-03-15 RX ADMIN — Medication 1 TABLET: at 11:54

## 2020-03-15 RX ADMIN — CALCIUM CARBONATE (ANTACID) CHEW TAB 500 MG 500 MG: 500 CHEW TAB at 21:07

## 2020-03-15 RX ADMIN — PIPERACILLIN AND TAZOBACTAM 3.38 G: 3; .375 INJECTION, POWDER, LYOPHILIZED, FOR SOLUTION INTRAVENOUS; PARENTERAL at 05:29

## 2020-03-15 ASSESSMENT — ENCOUNTER SYMPTOMS
DIARRHEA: 0
FEVER: 0
NERVOUS/ANXIOUS: 0
COUGH: 0
BLURRED VISION: 0
DIZZINESS: 0

## 2020-03-15 ASSESSMENT — FIBROSIS 4 INDEX: FIB4 SCORE: 1.36

## 2020-03-15 NOTE — THERAPY
"Occupational Therapy  Daily Treatment     Patient Name: Vince Almanzar  Age:  79 y.o., Sex:  male  Medical Record #: [id number]  Today's Date: 3/15/2020     Precautions  Precautions: Spinal / Back Precautions , Fall Risk  Comments: C collar, risk for pressure injuries         Subjective    \"I've got to use the toilet again\"     Objective       03/15/20 1301   Precautions   Precautions Spinal / Back Precautions ;Fall Risk   Comments C collar, risk for pressure injuries   Vitals   O2 Delivery Device None - Room Air   Non Verbal Descriptors   Non Verbal Scale  Calm   Cognition    Level of Consciousness Alert   Sitting Upper Body Exercises   Upper Extremity Bike Level 2 Resistance  (FluidoBike, Level 3, 10 mins, rest break x 2, 1.051km)   Interdisciplinary Plan of Care Collaboration   IDT Collaboration with  Nursing;Respiratory Therapist   Patient Position at End of Therapy Seated;Self Releasing Lap Belt Applied;Tray Table within Reach;Call Light within Reach;Phone within Reach   Collaboration Comments CLOF, Cleared to take out of room for ther ex (pt to wear mask)   OT Total Time Spent   OT Individual Total Time Spent (Mins) 45   OT Charge Group   OT Self Care / ADL 2   OT Therapeutic Exercise  1       FIM Lower Body Dressing Score:  3 - Moderate Assistance  Lower Body Dressing Description:  Increased time, Supervision for safety, Verbal cueing, Set-up of equipment, Initial preparation for task(Mod assist to thread feet through pants/briefs, Mod assist sit/stand to/from manual wc and grab bar w/ Min assist to manage pants/briefs, Mod assist to manage socks)    FIM Toiletin - Max Assistance  Toileting Description:  Grab bar, Increased time, Supervision for safety, Verbal cueing, Set-up of equipment, Initial preparation for task(Mod assist sit/stand via grab bar, Mod assist to manage pants, Total assist for post BM toilet hygiene)    FIM Toilet Transfer Score:  4 - Minimal Assistance  Toilet Transfer " Description:  Grab bar, Increased time, Supervision for safety, Verbal cueing, Set-up of equipment, Initial preparation for task(Min assist to transfer to/from manual wc and commode via grab bar.)      Assessment    Pt was alert and cooperative w/ tx.  CGA for commode transfer, Mod assist for LB clothing management.  Therapist consulted w/ nursing - cleared to leave room w/ mask on.  Limiters include debility, impaired standing balance and impaired carryover of instruction.    Plan    Cont'd OT to address debility, balance, education for AE/DME to facilitate greater independence w/ ADL's, functional mobility and transfers.

## 2020-03-15 NOTE — CARE PLAN
Problem: Communication  Goal: The ability to communicate needs accurately and effectively will improve  Outcome: PROGRESSING AS EXPECTED     Problem: Safety  Goal: Will remain free from injury  Outcome: PROGRESSING AS EXPECTED     Problem: Infection  Goal: Will remain free from infection  Outcome: PROGRESSING AS EXPECTED  Intervention: Assess signs and symptoms of infection  Note: PO & IV ABT administered as ordered r/t Pneumonia. Lab results called into MD on call by CN. No new orders at this time. VS stable. No c/o SOB, pain at this time.     Problem: Bowel/Gastric:  Goal: Normal bowel function is maintained or improved  Outcome: PROGRESSING AS EXPECTED     Problem: Urinary Elimination:  Goal: Ability to reestablish a normal urinary elimination pattern will improve  Outcome: PROGRESSING AS EXPECTED

## 2020-03-15 NOTE — PROGRESS NOTES
Received call from [place] in [facility] with respiratory panel result positive for Human Rhino Virus  And Entero Virus, result read back, will let MD know.

## 2020-03-15 NOTE — FLOWSHEET NOTE
03/15/20 1300   Events/Summary/Plan   Events/Summary/Plan 02 spot check   Vital Signs   Pulse (!) 108   Respiration 18   Pulse Oximetry 97 %   $ Pulse Oximetry (Spot Check) Yes   Respiratory Assessment   Level of Consciousness Alert   Secretions   Cough Congested;Non Productive   Oxygen   O2 Delivery Device None - Room Air

## 2020-03-15 NOTE — THERAPY
"Occupational Therapy  Daily Treatment     Patient Name: Vince Almanzar  Age:  79 y.o., Sex:  male  Medical Record #: [id number]  Today's Date: 3/15/2020     Precautions  Precautions: Spinal / Back Precautions , Fall Risk  Comments: C collar, risk for pressure injuries         Subjective    \"No, I don't have to use the toilet.\"     Objective       03/15/20 1031   Precautions   Precautions Spinal / Back Precautions ;Fall Risk   Comments C collar, risk for pressure injuries   Vitals   O2 Delivery Device None - Room Air   Pain   Intervention Repositioned;Nurse Notified   Pain 0 - 10 Group   Location Leg   Location Orientation Right   Therapist Pain Assessment 8;Nurse Notified;Post Activity Pain Same as Prior to Activity   Non Verbal Descriptors   Non Verbal Scale  Calm   Cognition    Level of Consciousness Alert   Interdisciplinary Plan of Care Collaboration   IDT Collaboration with  Nursing   Patient Position at End of Therapy In Bed;Call Light within Reach;Tray Table within Reach;Phone within Reach   Collaboration Comments CLOF, BM, pain RLE, transition of care to nursing   OT Total Time Spent   OT Individual Total Time Spent (Mins) 75   OT Charge Group   OT Self Care / ADL 5       FIM Grooming Score:  5 - Standby Prompting/Supervision or Set-up  Grooming Description:  Increased time, Supervision for safety, Verbal cueing, Set-up of equipment, Initial preparation for task(wc level at sink side for groom/hygiene tasks - set up and additional time - reviewed shanging out pads for c-collar)    FIM Bathing Score:  2 - Max Assistance  Bathing Description:       FIM Upper Body Dressing:  3 - Moderate Assistance  Upper Body Dressing Description:  (Pt Total assist to manage cervical collar, Min assist to thread BUE's through sleeves, assist to pull shirt over head and down back)    FIM Lower Body Dressing Score:  2 - Max Assistance  Lower Body Dressing Description:  Increased time, Supervision for safety, Verbal cueing, " Set-up of equipment, Initial preparation for task(Pt at Total assist to don/soff socks, Mod assist to manage pants at waist w/ Min assist in stand at grab bars, Max assist to thread feet through pants and briefs (Total assist RLE))    FIM Tub/Shower Transfers Score:  4 - Minimal Assistance  Tub/Shower Transfers Description:  Grab bar, Increased time, Supervision for safety, Verbal cueing, Set-up of equipment, Initial preparation for task(Min assist to transfer to/from manual wc and shower bench via grab bar)      Assessment    Pt was alert and cooperative w/ tx.  Pt declined having to use the commode  10 mins later significant BM in shower stall.  Min assist forbathroom transfers - refer to FIM scores for details - noted decrease in function.  Review of cervical precautions + switching out pads for cervical collar + collar management.  Noted pain of RLE 8/10 - communicated w/ nursing.   Additional time required    Plan    Cont'd OT to address debility, balance, education for AE/DME to facilitate greater independence w/ ADL's, functional mobility and transfers.

## 2020-03-15 NOTE — THERAPY
Speech Language Pathology  Daily Treatment     Patient Name: Vince Almanzar  Age:  79 y.o., Sex:  male  Medical Record #: [id number]  Today's Date: 3/15/2020     Subjective    Pt pleasant and cooperative during ST session. Pt fatigued however, demonstrated appropriate alert level for dysphagia and cognitive tx.      Objective       03/15/20 1431   Cognition   Moderate Attention Moderate (3)   Dysphagia    Diet / Liquid Recommendation Moderately Thick (3) - (Honey Thick);Mildly Thick (2) - (Nectar Thick);Puree (4)   Interdisciplinary Plan of Care Collaboration   IDT Collaboration with  Nursing;Dietician   Patient Position at End of Therapy Seated;Self Releasing Lap Belt Applied;Call Light within Reach;Tray Table within Reach   Collaboration Comments CLOF, need for three thickener packets to decrease coughing during dysphagia tx.    SLP Total Time Spent   SLP Individual Total Time Spent (Mins) 60   Charge Group   Charges Yes   SLP Swallowing Dysfunction Treatment Swallowing Dysfunction Treatment   SLP Cognitive Skill Development First 15 Minutes 1   SLP Cognitive Skill Development Additional 15 Minutes 1       FIM Eating Score:     Eating Description:  Increased time, Modified diet, Supervision for safety, Verbal cueing      Assessment    Pt completed alternating attn task w/ MOD cues and 78% acc.  Pt presented with consistent coughing with NTL Level 2 mildly thick while targeting effortful swallow exercise. Pt NTL thickened to honey thick liquids using three thickener packets and pt coughed one time out of 10 effortful swallows.  O2 sats maintained at >96% throughout session.    SLP requested scheduling ensure pt has ST on 3/16/2020.     Plan    Complete MBSS, address tolerance of liquid thickness. Continue to address attention.

## 2020-03-15 NOTE — PROGRESS NOTES
[facility] Medicine Daily Progress Note    Date of Service  3/15/2020    Chief Complaint:  Hypertension  Diabetes  Cough, fever, tachycardia, leukocytosis    Interval History:  No significant events or changes since last visit    Review of Systems  Review of Systems   Constitutional: Negative for fever.   Eyes: Negative for blurred vision.   Respiratory: Negative for cough.    Cardiovascular: Negative for chest pain.   Gastrointestinal: Negative for diarrhea.   Musculoskeletal: Negative for joint pain.   Neurological: Negative for dizziness.   Psychiatric/Behavioral: The patient is not nervous/anxious.         Physical Exam  Temp:  [36.7 °C (98.1 °F)-37.4 °C (99.3 °F)] 36.7 °C (98.1 °F)  Pulse:  [] 118  Resp:  [14-20] 18  BP: (118-150)/(51-80) 150/65  SpO2:  [95 %] 95 %    Physical Exam  Vitals signs and nursing note reviewed.   Constitutional:       Appearance: He is not diaphoretic.   HENT:      Mouth/Throat:      Pharynx: No oropharyngeal exudate or posterior oropharyngeal erythema.   Eyes:      Extraocular Movements: Extraocular movements intact.   Neck:      Vascular: No carotid bruit.   Cardiovascular:      Rate and Rhythm: Regular rhythm. Tachycardia present.   Pulmonary:      Effort: Pulmonary effort is normal.      Breath sounds: No wheezing.      Comments: Hs some B/L mild rhonchi  Abdominal:      General: There is no distension.      Palpations: Abdomen is soft.      Tenderness: There is no abdominal tenderness.   Musculoskeletal:      Right lower leg: No edema.      Left lower leg: No edema.   Skin:     General: Skin is warm and dry.   Neurological:      Mental Status: He is alert and oriented to person, place, and time.   Psychiatric:         Mood and Affect: Mood normal.         Behavior: Behavior normal.         Fluids    Intake/Output Summary (Last 24 hours) at 3/15/2020 0950  Last data filed at 3/15/2020 0700  Gross per 24 hour   Intake --   Output 1650 ml   Net -1650 ml       Laboratory  Recent  Labs     03/13/20  0518 03/14/20  1158 03/15/20  0559   WBC 6.1 13.9* 9.8   RBC 2.53* 2.94* 2.86*   HEMOGLOBIN 7.7* 8.8* 8.6*   HEMATOCRIT 24.3* 28.1* 27.9*   MCV 96.0 95.6 97.6   MCH 30.4 29.9 30.1   MCHC 31.7* 31.3* 30.8*   RDW 57.4* 58.4* 58.9*   PLATELETCT 196 251 219   MPV 9.2 8.9* 8.9*     Recent Labs     03/13/20  0518   SODIUM 136   POTASSIUM 4.1   CHLORIDE 102   CO2 27   GLUCOSE 81   BUN 49*   CREATININE 2.50*   CALCIUM 8.3*             Recent Labs     03/13/20  0518   TRIGLYCERIDE 65   HDL 31*   LDL 37       Imaging      Assessment/Plan  Anemia- (present on admission)  Assessment & Plan  H&H stable with Hb 8.8  Fe: 33, sats 12% (3/1)  B12: 264  Folate: > 24 (3/6)  FOB (-) x 1  Off folate supplements  On Fe & B12 supplements    Chronic kidney disease- (present on admission)  Assessment & Plan  Has stage 4 CKD  Bun/Cr: stable  On Bicarb tabs  Monitor    Bilateral pneumonia  Assessment & Plan  (3/13): started having a nonproductive cough  (3/14): developed a fever (while on Tylenol) of 101.8 (3/14); developed tachycardia  (3/15): Tmax 99.3; on & off tachy; has some chills  S/P leukocytosis: 6.1 (3/13) --> 13.9 (3/14) --> 9.8 (3/15)  CXR --> right basilar consolidation consistent with pneumonia; left basal consolidation could be due to atelectasis or pneumonia  U/A negative  Lactate: 1.4  Rapid strep: neg  Flu (-)  Resp panel: neg except for Rhinovirus  BC x 2: NTD -- cont to follow  F/U sputum (if productive)  Will hold scheduled Tylenol temporarily to eval progress  On IVF's NS at 100 cc/hr --> will decrease rate to 75 cc/hr (pt oral intake diminished) (3/15)  On Zyvox & Zosyn empirically  Monitor closely    Cervical spinal stenosis  Assessment & Plan  Had worsening bilateral upper extremity weakness, balance, and neck pain  MRI showed severe stenosis at C3-C6 with interval progression over the last year  S/P C3-C6 laminectomy and posterior decompression    Uncontrolled type 2 diabetes mellitus with  hyperglycemia (HCC)- (present on admission)  Assessment & Plan  Hba1c: 5.1 (3/13)   BS :   On Lantus 16 units qhs --> will d/c until patient eating better (3/15)  On Actos: 30 mg daily  Note: patient not eating much recently -- likely 2nd to infectious process  Note: home meds include Ozempic 1.0 mg once a week, Actos 30mg qd, and Tresiba 10 units qhs  Cont to monitor    Essential hypertension- (present on admission)  Assessment & Plan  BP ok  On Lopressor: 12.5 mg bid   Monitor

## 2020-03-15 NOTE — CARE PLAN
Problem: Safety  Goal: Will remain free from falls  Outcome: PROGRESSING AS EXPECTED  Patient unsteady, assisted with transfers to prevent falls/injury.     Problem: Infection  Goal: Will remain free from infection  Outcome: PROGRESSING AS EXPECTED  Patient with  (+) cocci on blood c/s remains on oral & IV-ABT, will continue to monitor.

## 2020-03-15 NOTE — PROGRESS NOTES
"Weekend coverage - rehab Progress Note     Encounter Date: 3/14/2020    CC: UE weakness      Interval Events (Subjective)  -Feeling better than yesterday, reports that he does not remember much about yesterday  -Still feels tired, but better than yesterday  -WBC improved  -BCx: positive for GPC/possible Strep        Objective:  Vitals: /65   Pulse (!) 108   Temp 36.7 °C (98.1 °F) (Oral)   Resp 18   Ht 1.778 m (5' 10\")   Wt 72.8 kg (160 lb 8 oz)   SpO2 97%   Gen: improved energy and alertness  Pulm: unlabored, room air   HEENT: wearing cervical collar, moist mucous membranes  CV: Well perfused extremities  Abd: Soft NTND  Neuro: bilateral upper extremities with interval functional improvement/strength compared to 3/14        Assessment:  Active Hospital Problems    Diagnosis   • Acute on chronic renal failure (HCC)   • Central cord syndrome (HCC)   • Type 2 diabetes mellitus (HCC)   • Anemia   • Thrombocytopenia (HCC)   • 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 (HCC)   • Essential hypertension   • Hyperlipemia   • Incomplete quadriplegia at C5-C8 level (HCC)   • Neuropathic pain   • Neurogenic bowel   • Neurogenic bladder   • Vitamin D deficiency   • Uncontrolled type 2 diabetes mellitus with hyperglycemia (HCC)         Medical Decision Making and Plan:    #Pulm/ID: On 3/14/20, new fatigue, cough, fever (100.1F), tachycardia, requiring metoprolol to be held on 3/13; decreased vital capacity at 250-700 mL 3/14 (recent data not available for comparison); chest x-ray consistent with right lobe pneumonia and left basilar consolidation; BCx positive for GPC/Strep; +rhinovirus, otherwise neg respiratory panel   -appreciate eval/management from Dr. Alfaro   -abx: linezolid and zosyn  -monitor vital capacity      #C2 AIS D spinal cord injury: Central cord syndrome, C3-C6 cervical spondylosis with myelopathy, status post C3-C6 laminectomy by Dr. Sellers on 2/28.  Pinprick altered " bilaterally at C3-C6  -Collar on at all times, spinal precautions  -Calcium carbonate 500 mg twice daily  -Continue comprehensive acute inpatient rehabilitation, when clinically improves      #Potential for orthostatic hypotension  Because of significant autonomic dysfunction associated with spinal cord injury, the patient is at high risk for orthostatic hypotension.  - Midodrine 5mg q4 hours prn SBP <100      #Pain:   -Continue to hold baclofen at this time, given patient still tired, restart when patient clinically improves  -Continue Gabapentin at 300 mg nightly, his previous dose, until he clinically improves      #DVT prophylaxis: Patient at increased risk for VTE formation with neurologic compromise/myelopathy  -Heparin 5000 units every 8 hours          Robert Lagunas MD  Physical Medicine and Rehabilitation   3/15/2020

## 2020-03-16 ENCOUNTER — APPOINTMENT (OUTPATIENT)
Dept: PAIN MANAGEMENT | Facility: REHABILITATION | Age: 80
DRG: 052 | End: 2020-03-16
Attending: PHYSICAL MEDICINE & REHABILITATION
Payer: COMMERCIAL

## 2020-03-16 ENCOUNTER — APPOINTMENT (OUTPATIENT)
Dept: RADIOLOGY | Facility: REHABILITATION | Age: 80
DRG: 052 | End: 2020-03-16
Attending: PHYSICAL MEDICINE & REHABILITATION
Payer: COMMERCIAL

## 2020-03-16 LAB
ANION GAP SERPL CALC-SCNC: 8 MMOL/L (ref 7–16)
BUN SERPL-MCNC: 39 MG/DL (ref 8–22)
CALCIUM SERPL-MCNC: 8.5 MG/DL (ref 8.5–10.5)
CHLORIDE SERPL-SCNC: 106 MMOL/L (ref 96–112)
CO2 SERPL-SCNC: 25 MMOL/L (ref 20–33)
CREAT SERPL-MCNC: 2.11 MG/DL (ref 0.5–1.4)
GLUCOSE BLD-MCNC: 131 MG/DL (ref 65–99)
GLUCOSE BLD-MCNC: 203 MG/DL (ref 65–99)
GLUCOSE BLD-MCNC: 231 MG/DL (ref 65–99)
GLUCOSE BLD-MCNC: 254 MG/DL (ref 65–99)
GLUCOSE SERPL-MCNC: 167 MG/DL (ref 65–99)
POTASSIUM SERPL-SCNC: 4 MMOL/L (ref 3.6–5.5)
SODIUM SERPL-SCNC: 139 MMOL/L (ref 135–145)

## 2020-03-16 PROCEDURE — 700112 HCHG RX REV CODE 229: Performed by: PHYSICAL MEDICINE & REHABILITATION

## 2020-03-16 PROCEDURE — 700102 HCHG RX REV CODE 250 W/ 637 OVERRIDE(OP): Performed by: PHYSICAL MEDICINE & REHABILITATION

## 2020-03-16 PROCEDURE — 92611 MOTION FLUOROSCOPY/SWALLOW: CPT

## 2020-03-16 PROCEDURE — 700101 HCHG RX REV CODE 250: Performed by: PHYSICAL MEDICINE & REHABILITATION

## 2020-03-16 PROCEDURE — 94760 N-INVAS EAR/PLS OXIMETRY 1: CPT

## 2020-03-16 PROCEDURE — 92526 ORAL FUNCTION THERAPY: CPT

## 2020-03-16 PROCEDURE — 97535 SELF CARE MNGMENT TRAINING: CPT

## 2020-03-16 PROCEDURE — 74230 X-RAY XM SWLNG FUNCJ C+: CPT

## 2020-03-16 PROCEDURE — 700111 HCHG RX REV CODE 636 W/ 250 OVERRIDE (IP): Performed by: HOSPITALIST

## 2020-03-16 PROCEDURE — 700111 HCHG RX REV CODE 636 W/ 250 OVERRIDE (IP): Performed by: PHYSICAL MEDICINE & REHABILITATION

## 2020-03-16 PROCEDURE — 99233 SBSQ HOSP IP/OBS HIGH 50: CPT | Performed by: PHYSICAL MEDICINE & REHABILITATION

## 2020-03-16 PROCEDURE — 700102 HCHG RX REV CODE 250 W/ 637 OVERRIDE(OP): Performed by: HOSPITALIST

## 2020-03-16 PROCEDURE — 700105 HCHG RX REV CODE 258: Performed by: HOSPITALIST

## 2020-03-16 PROCEDURE — 82962 GLUCOSE BLOOD TEST: CPT | Mod: 91

## 2020-03-16 PROCEDURE — 80048 BASIC METABOLIC PNL TOTAL CA: CPT

## 2020-03-16 PROCEDURE — A9270 NON-COVERED ITEM OR SERVICE: HCPCS | Performed by: PHYSICAL MEDICINE & REHABILITATION

## 2020-03-16 PROCEDURE — 36415 COLL VENOUS BLD VENIPUNCTURE: CPT

## 2020-03-16 PROCEDURE — A9270 NON-COVERED ITEM OR SERVICE: HCPCS | Performed by: HOSPITALIST

## 2020-03-16 PROCEDURE — 770010 HCHG ROOM/CARE - REHAB SEMI PRIVAT*

## 2020-03-16 PROCEDURE — 99232 SBSQ HOSP IP/OBS MODERATE 35: CPT | Performed by: HOSPITALIST

## 2020-03-16 PROCEDURE — 97530 THERAPEUTIC ACTIVITIES: CPT

## 2020-03-16 PROCEDURE — 97116 GAIT TRAINING THERAPY: CPT

## 2020-03-16 RX ORDER — DEXTROSE MONOHYDRATE 25 G/50ML
50 INJECTION, SOLUTION INTRAVENOUS
Status: DISCONTINUED | OUTPATIENT
Start: 2020-03-16 | End: 2020-03-16

## 2020-03-16 RX ORDER — GABAPENTIN 300 MG/1
300 CAPSULE ORAL 2 TIMES DAILY
Status: DISCONTINUED | OUTPATIENT
Start: 2020-03-16 | End: 2020-03-18

## 2020-03-16 RX ORDER — DEXTROSE MONOHYDRATE 25 G/50ML
50 INJECTION, SOLUTION INTRAVENOUS
Status: DISCONTINUED | OUTPATIENT
Start: 2020-03-16 | End: 2020-03-28 | Stop reason: HOSPADM

## 2020-03-16 RX ORDER — BACLOFEN 10 MG/1
5 TABLET ORAL 3 TIMES DAILY
Status: DISCONTINUED | OUTPATIENT
Start: 2020-03-16 | End: 2020-03-17

## 2020-03-16 RX ORDER — IPRATROPIUM BROMIDE AND ALBUTEROL SULFATE 2.5; .5 MG/3ML; MG/3ML
3 SOLUTION RESPIRATORY (INHALATION) 3 TIMES DAILY
Status: DISCONTINUED | OUTPATIENT
Start: 2020-03-16 | End: 2020-03-24

## 2020-03-16 RX ADMIN — METOPROLOL TARTRATE 12.5 MG: 25 TABLET, FILM COATED ORAL at 21:43

## 2020-03-16 RX ADMIN — STANDARDIZED SENNA CONCENTRATE 17.2 MG: 8.6 TABLET ORAL at 13:03

## 2020-03-16 RX ADMIN — METOPROLOL TARTRATE 12.5 MG: 25 TABLET, FILM COATED ORAL at 09:02

## 2020-03-16 RX ADMIN — OXYCODONE HYDROCHLORIDE AND ACETAMINOPHEN 500 MG: 500 TABLET ORAL at 18:07

## 2020-03-16 RX ADMIN — CALCIUM CARBONATE (ANTACID) CHEW TAB 500 MG 500 MG: 500 CHEW TAB at 21:41

## 2020-03-16 RX ADMIN — BACLOFEN 5 MG: 10 TABLET ORAL at 21:42

## 2020-03-16 RX ADMIN — PIOGLITAZONE 30 MG: 15 TABLET ORAL at 09:02

## 2020-03-16 RX ADMIN — PIPERACILLIN AND TAZOBACTAM 3.38 G: 3; .375 INJECTION, POWDER, LYOPHILIZED, FOR SOLUTION INTRAVENOUS; PARENTERAL at 05:18

## 2020-03-16 RX ADMIN — OXYCODONE HYDROCHLORIDE AND ACETAMINOPHEN 500 MG: 500 TABLET ORAL at 09:03

## 2020-03-16 RX ADMIN — HEPARIN SODIUM 5000 UNITS: 5000 INJECTION, SOLUTION INTRAVENOUS; SUBCUTANEOUS at 21:46

## 2020-03-16 RX ADMIN — SODIUM BICARBONATE 650 MG TABLET 650 MG: at 21:41

## 2020-03-16 RX ADMIN — Medication 1 TABLET: at 13:03

## 2020-03-16 RX ADMIN — FERROUS SULFATE TAB 325 MG (65 MG ELEMENTAL FE) 325 MG: 325 (65 FE) TAB at 09:03

## 2020-03-16 RX ADMIN — FERROUS SULFATE TAB 325 MG (65 MG ELEMENTAL FE) 325 MG: 325 (65 FE) TAB at 18:07

## 2020-03-16 RX ADMIN — PIPERACILLIN AND TAZOBACTAM 3.38 G: 3; .375 INJECTION, POWDER, LYOPHILIZED, FOR SOLUTION INTRAVENOUS; PARENTERAL at 21:46

## 2020-03-16 RX ADMIN — SODIUM CHLORIDE: 9 INJECTION, SOLUTION INTRAVENOUS at 11:49

## 2020-03-16 RX ADMIN — INSULIN LISPRO 4 UNITS: 100 INJECTION, SOLUTION INTRAVENOUS; SUBCUTANEOUS at 18:11

## 2020-03-16 RX ADMIN — SODIUM BICARBONATE 650 MG TABLET 650 MG: at 09:02

## 2020-03-16 RX ADMIN — CYANOCOBALAMIN TAB 1000 MCG 1000 MCG: 1000 TAB at 09:02

## 2020-03-16 RX ADMIN — DOCUSATE SODIUM 200 MG: 100 CAPSULE, LIQUID FILLED ORAL at 09:02

## 2020-03-16 RX ADMIN — LIDOCAINE 2 PATCH: 50 PATCH CUTANEOUS at 11:55

## 2020-03-16 RX ADMIN — SODIUM BICARBONATE 650 MG TABLET 650 MG: at 15:01

## 2020-03-16 RX ADMIN — Medication: at 09:15

## 2020-03-16 RX ADMIN — LINEZOLID 600 MG: 600 TABLET, FILM COATED ORAL at 21:42

## 2020-03-16 RX ADMIN — SIMVASTATIN 20 MG: 20 TABLET, FILM COATED ORAL at 21:42

## 2020-03-16 RX ADMIN — LINEZOLID 600 MG: 600 TABLET, FILM COATED ORAL at 09:03

## 2020-03-16 RX ADMIN — BACLOFEN 5 MG: 10 TABLET ORAL at 16:00

## 2020-03-16 RX ADMIN — Medication: at 21:52

## 2020-03-16 RX ADMIN — GABAPENTIN 300 MG: 300 CAPSULE ORAL at 21:43

## 2020-03-16 RX ADMIN — GABAPENTIN 300 MG: 300 CAPSULE ORAL at 13:03

## 2020-03-16 RX ADMIN — DOCUSATE SODIUM 200 MG: 100 CAPSULE, LIQUID FILLED ORAL at 21:42

## 2020-03-16 RX ADMIN — CALCIUM CARBONATE (ANTACID) CHEW TAB 500 MG 500 MG: 500 CHEW TAB at 09:02

## 2020-03-16 RX ADMIN — HEPARIN SODIUM 5000 UNITS: 5000 INJECTION, SOLUTION INTRAVENOUS; SUBCUTANEOUS at 13:09

## 2020-03-16 RX ADMIN — PIPERACILLIN AND TAZOBACTAM 3.38 G: 3; .375 INJECTION, POWDER, LYOPHILIZED, FOR SOLUTION INTRAVENOUS; PARENTERAL at 13:08

## 2020-03-16 RX ADMIN — HEPARIN SODIUM 5000 UNITS: 5000 INJECTION, SOLUTION INTRAVENOUS; SUBCUTANEOUS at 05:18

## 2020-03-16 RX ADMIN — INSULIN LISPRO 4 UNITS: 100 INJECTION, SOLUTION INTRAVENOUS; SUBCUTANEOUS at 21:53

## 2020-03-16 RX ADMIN — OXYCODONE HYDROCHLORIDE 10 MG: 10 TABLET ORAL at 10:01

## 2020-03-16 RX ADMIN — OXYCODONE HYDROCHLORIDE 10 MG: 10 TABLET ORAL at 21:46

## 2020-03-16 RX ADMIN — INSULIN LISPRO 6 UNITS: 100 INJECTION, SOLUTION INTRAVENOUS; SUBCUTANEOUS at 11:46

## 2020-03-16 ASSESSMENT — ENCOUNTER SYMPTOMS
ABDOMINAL PAIN: 0
DIARRHEA: 0
VOMITING: 0
NERVOUS/ANXIOUS: 0
CHILLS: 0
NAUSEA: 0
SHORTNESS OF BREATH: 0
FEVER: 0

## 2020-03-16 ASSESSMENT — FIBROSIS 4 INDEX: FIB4 SCORE: 1.56

## 2020-03-16 NOTE — THERAPY
Occupational Therapy  Daily Treatment     Patient Name: Vince Almanzar  Age:  79 y.o., Sex:  male  Medical Record #: [id number]  Today's Date: 3/16/2020     Precautions  Precautions: Spinal / Back Precautions   Comments: Isolation precautions, c-collar, risk for  pressure injuries     Subjective    Patient agreeable to participate in OT.      Objective     03/16/20 1331   Precautions   Precautions Spinal / Back Precautions    Comments Isolation precautions, c-collar, risk for  pressure injuries    Sitting Lower Body Exercises   Sit to Stand 1 set of 10  (mod-max assist in // bars)   Interdisciplinary Plan of Care Collaboration   Patient Position at End of Therapy Seated;Self Releasing Lap Belt Applied   OT Total Time Spent   OT Individual Total Time Spent (Mins) 60   OT Charge Group   OT Self Care / ADL 2     FIM Lower Body Dressing Score:  3 - Moderate Assistance  Lower Body Dressing Description:  Increased time, Supervision for safety, Set-up of equipment, Verbal cueing(Patient required mod assist to don scrub bottoms (min-mod assist for threading LEs through pants and mod assist for standing balance during pants over hips pursuit with 4WW). )     L  - 40#  R  - 25#    Patient completed theraputty exercises using RUE to facilitate R /pinch/function.     Collaborated with RN re: IV.     Assessment    Patient tolerated OT session well with focus on ADLs and UE function/strength. Functional performance primarily limited by impaired standing balance, generalized weakness and decreased endurance.     Plan    Cont'd OT to address debility, balance, education for AE/DME to facilitate greater independence w/ ADL's, functional mobility and transfers.

## 2020-03-16 NOTE — REHAB-ACTIVITY IDT TEAM NOTE
ACT   Recreation/Community     Leisure Competence Measure  Leisure Awareness: Independent  Leisure Attitude: Independent  Leisure Skills: Minimal Assist  Cultural / Social Behaviors: Independent  Interpersonal Skills: Independent  Community Integration Skills: Cueing Assist  Social Contact: Independent  Community Participation: Cueing Assist    Strengths:  Able to follow instructions, Alert and oriented, Motivated for self care and independence, Pleasant and cooperative and Willingly participates in therapeutic activities  Barriers:  Decreased endurance and Generalized weakness  # of short term goals set=2  # of short term goals met=0    Pt has been net only for evaluation. Goals will continue to be monitored.     Recreation Therapy Problems     Problem: Recreation Therapy     Dates: Start: 03/13/20       Description:     Goal: STG-Within one week, patient will demonstrate leisure problem solving     Dates: Start: 03/13/20       Description: By sharing on two leisure activities other than stated in the evaluation he would like to learn or participate in during session or in his free time.            Goal: STG-Within one week, patient will demonstrate a standing tolerance     Dates: Start: 03/13/20       Description: By standing for 3 minutes x3 while duel tasking at [facility] and no LOB.            Goal: LTG-By discharge, patient will demonstrate leisure problem solving     Dates: Start: 03/13/20       Description: By sharing on two leisure activities other than stated in the evaluation he would like to learn or participate in following his discharge and any perceived barriers to his participation.            Goal: LTG-By discharge, patient will demonstrate a standing tolerance     Dates: Start: 03/13/20       Description: By standing for 3 minutes x3 while duel tasking at [facility] and no LOB.                        Section completed by:  Rodney Bradley, CTRS

## 2020-03-16 NOTE — CARE PLAN
Problem: Bathing  Goal: STG-Within one week, patient will bathe  Description: 1) Individualized Goal:  Minimal assist with AE as needed  2) Interventions:  OT Orthotics Training, OT Group Therapy, OT Self Care/ADL, OT Cognitive Skill Dev, OT Community Reintegration, OT Manual Ther Technique, OT Neuro Re-Ed/Balance, OT Sensory Int Techniques, OT Therapeutic Activity, OT Evaluation and OT Therapeutic Exercise     Outcome: NOT MET     Problem: Dressing  Goal: STG-Within one week, patient will dress UB  Description: 1) Individualized Goal: Minimal assist   2) Interventions: OT Orthotics Training, OT Group Therapy, OT Self Care/ADL, OT Cognitive Skill Dev, OT Community Reintegration, OT Manual Ther Technique, OT Neuro Re-Ed/Balance, OT Sensory Int Techniques, OT Therapeutic Activity, OT Evaluation and OT Therapeutic Exercise     Outcome: NOT MET

## 2020-03-16 NOTE — THERAPY
Physical Therapy   Daily Treatment     Patient Name: Vince Almanzar  Age:  79 y.o., Sex:  male  Medical Record #: [id number]  Today's Date: 3/16/2020     Precautions  Precautions: (P) Spinal / Back Precautions , Fall Risk  Comments: (P) isolation precautions, C collar, risk for pressure injuries    Subjective    Pt was sitting in w/c upon arrival; agreeable to tx but many barriers. Initially, missed 30minutes d/t finishing breakfast with SLP/ requiring SPV to finish and unable to dual task during meal. Reviewed precautions; nursing with med pass. Pt with soiled gown/ shirt but unable to change d/t IV line connection; nursing unable to unplug. Verification of precautions with charge nursing/ physician with droplet precautions being recommended at this time. Pt reports change in strength.     Objective       03/16/20 0901   Precautions   Precautions Spinal / Back Precautions ;Fall Risk   Comments isolation precautions, C collar, risk for pressure injuries   Interdisciplinary Plan of Care Collaboration   IDT Collaboration with  Nursing;Physician   Patient Position at End of Therapy Seated;Call Light within Reach;Tray Table within Reach;Phone within Reach   Collaboration Comments re: isolation precautions, SLP: pt not done with breakfast   PT Total Time Spent   PT Individual Total Time Spent (Mins) 30   PT Charge Group   PT Gait Training 1   PT Therapeutic Activities 1       Missed: 30minutes d/t above reasons.    FIM Walking Score:  1 - Total Assistance  Walking Description:  (36ft x 1, 72ft x 1 4ww [facility])    3 x 20 knee extension in sitting PROM  RLE  60sec x 3 PROM R LE    Pt requiring max-totalA for 3x STS to 4ww    Assessment    Pt reports decreased pain post PROM; able to walk twice the distance post intervention. Continues to walk with a new antalgic gait pattern favoring R LE. Pt requiring max-totalA for 3x STS. Pt with significantly increase in assistance needed for STS, new antalgic gait pattern, and  decreased distance in AMB with 4ww in comparison to Friday. Productive cough.    Plan  5x STS, 10MWT, endurance, standing therex, review hand hygiene, standing balance, postural re-ed

## 2020-03-16 NOTE — PROGRESS NOTES
[facility] Medicine Daily Progress Note    Date of Service  3/16/2020    Chief Complaint:  Hypertension  Diabetes  Cough, fever, tachycardia, leukocytosis    Interval History:  No significant events or changes since last visit    Review of Systems  Review of Systems   Constitutional: Negative for chills and fever.   Respiratory: Negative for shortness of breath.    Cardiovascular: Negative for chest pain.   Gastrointestinal: Negative for abdominal pain, diarrhea, nausea and vomiting.   Psychiatric/Behavioral: The patient is not nervous/anxious.         Physical Exam  Temp:  [36.5 °C (97.7 °F)-36.6 °C (97.9 °F)] 36.5 °C (97.7 °F)  Pulse:  [] 99  Resp:  [18] 18  BP: (124-135)/(61-72) 124/61  SpO2:  [95 %-97 %] 96 %    Physical Exam  Vitals signs and nursing note reviewed.   Constitutional:       Appearance: Normal appearance.   HENT:      Head: Atraumatic.   Eyes:      Conjunctiva/sclera: Conjunctivae normal.      Pupils: Pupils are equal, round, and reactive to light.   Neck:      Musculoskeletal: Normal range of motion and neck supple.   Cardiovascular:      Rate and Rhythm: Normal rate and regular rhythm.      Heart sounds: No murmur.   Pulmonary:      Effort: Pulmonary effort is normal.      Breath sounds: No stridor. No wheezing.      Comments: Hs some B/L mild rhonchi  Abdominal:      General: There is no distension.      Palpations: Abdomen is soft.      Tenderness: There is no abdominal tenderness.   Musculoskeletal:      Right lower leg: No edema.      Left lower leg: No edema.   Skin:     General: Skin is warm and dry.      Findings: No rash.   Neurological:      Mental Status: He is alert and oriented to person, place, and time.   Psychiatric:         Mood and Affect: Mood normal.         Behavior: Behavior normal.         Fluids    Intake/Output Summary (Last 24 hours) at 3/16/2020 0976  Last data filed at 3/16/2020 0450  Gross per 24 hour   Intake 480 ml   Output 1550 ml   Net -1070 ml        Laboratory  Recent Labs     03/14/20  1158 03/15/20  0559   WBC 13.9* 9.8   RBC 2.94* 2.86*   HEMOGLOBIN 8.8* 8.6*   HEMATOCRIT 28.1* 27.9*   MCV 95.6 97.6   MCH 29.9 30.1   MCHC 31.3* 30.8*   RDW 58.4* 58.9*   PLATELETCT 251 219   MPV 8.9* 8.9*     Recent Labs     03/16/20  0612   SODIUM 139   POTASSIUM 4.0   CHLORIDE 106   CO2 25   GLUCOSE 167*   BUN 39*   CREATININE 2.11*   CALCIUM 8.5                   Imaging      Assessment/Plan  Anemia- (present on admission)  Assessment & Plan  H&H stable with Hb 8.8  Fe: 33, sats 12% (3/1)  B12: 264  Folate: > 24 (3/6)  FOB (-) x 1  Off folate supplements  On Fe & B12 supplements    Chronic kidney disease- (present on admission)  Assessment & Plan  Has stage 4 CKD  Bun/Cr :improved after giving IVF for infection  On Bicarb tabs  Monitor    Bilateral pneumonia  Assessment & Plan  (3/13): started having a nonproductive cough  (3/14): developed a fever (while on Tylenol) of 101.8 (3/14); developed tachycardia  (3/15): Tmax 99.3; on & off tachy; has some chills  (3/16): afebrile  S/P leukocytosis: 6.1 (3/13) --> 13.9 (3/14) --> 9.8 (3/15)  CXR --> right basilar consolidation consistent with pneumonia; left basal consolidation could be due to atelectasis or pneumonia  U/A negative  Lactate: 1.4  Rapid strep: neg  Flu (-)  Resp panel: neg except for Rhinovirus  U/A --> Proteus --> f/u C&S  BC x 2: 1 bottle shows GPC possibly Strep -- cont to follow  Sputum: NTD (not a good sample)  Will hold scheduled Tylenol temporarily to eval progress  On IVF's NS at 100 cc/hr --> 75 cc/hr (oral intake diminished) (3/15)  On Zyvox & Zosyn empirically  Monitor closely    Cervical spinal stenosis  Assessment & Plan  Had worsening bilateral upper extremity weakness, balance, and neck pain  MRI showed severe stenosis at C3-C6 with interval progression over the last year  S/P C3-C6 laminectomy and posterior decompression    Uncontrolled type 2 diabetes mellitus with hyperglycemia (HCC)-  (present on admission)  Assessment & Plan  Hba1c: 5.1 (3/13)   BS this am 131  Off Lantus 16 units qhs -- until pt eating better (3/15)  On Actos: 30 mg daily  Will increase SS coverage (3/16)  Note: patient not eating much recently  Note: home meds include Ozempic 1.0 mg once a week, Actos 30mg qd, and Tresiba 10 units qhs  Cont to monitor    Essential hypertension- (present on admission)  Assessment & Plan  BP ok  On Lopressor: 12.5 mg bid   Monitor

## 2020-03-16 NOTE — REHAB-COLLABORATIVE ONGOING IDT TEAM NOTE
Weekly Interdisciplinary Team Conference Note    Weekly Interdisciplinary Team Conference # 1  Date:  3/16/2020    Clinicians present and reporting at team conference include the following:   MD: Ramu Garner DO   RN:  Karina Qureshi RN   PT:   Louise Gayle PT, DPT, NCS  OT:  Nga Allen MS, OTR/L   ST:  Jazmine Alcocer MS, CCC-SLP, CBIS  CM:  Chrissy Oliveira RN [facility]  REC:  Rodney Bradley CTRS  RT:  Not Applicable  RPh:  Asael Kilgore [facility]  All reporting clinicians have a working knowledge of this patient's plan of care.    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) 12/03/2018     Priority: Medium   • Contraindication to deep vein thrombosis (DVT) prophylaxis 12/03/2018     Priority: Medium   • Essential hypertension 12/03/2018     Priority: Low   • Hyperlipemia 12/03/2018     Priority: Low   • Trauma 12/03/2018     Priority: Low   • Cervical spinal stenosis 03/14/2020   • Bilateral pneumonia 03/14/2020   • Incomplete quadriplegia at C5-C8 level ([facility]) 03/12/2020   • Neuropathic pain 03/12/2020   • Neurogenic bowel 03/12/2020   • Neurogenic bladder 03/12/2020   • Tachycardia 12/16/2018   • Vitamin D deficiency 12/11/2018   • Uncontrolled type 2 diabetes mellitus with hyperglycemia (HCC) 11/12/2018   • Mass of pancreas 06/03/2016     Results     Procedure Component Value Ref Range Date/Time    ACCU-CHEK GLUCOSE [[id number]]   "(Abnormal) Collected:  03/16/20 0800    Order Status:  Completed Lab Status:  Final result Updated:  03/16/20 0815     Glucose - Accu-Ck 131 65 - 99 mg/dL     ESTIMATED GFR [[id number]]  (Abnormal) Collected:  03/16/20 0612    Order Status:  Completed Lab Status:  Final result Updated:  03/16/20 0734     GFR If  37 >60 mL/min/1.73 m 2      GFR If Non African American 31 >60 mL/min/1.73 m 2     Basic Metabolic Panel [[id number]]  (Abnormal) Collected:  03/16/20 0612    Order Status:  Completed Lab Status:  Final result Updated:  03/16/20 0734    Specimen:  Blood      Sodium 139 135 - 145 mmol/L      Potassium 4.0 3.6 - 5.5 mmol/L      Chloride 106 96 - 112 mmol/L      Co2 25 20 - 33 mmol/L      Glucose 167 65 - 99 mg/dL      Bun 39 8 - 22 mg/dL      Creatinine 2.11 0.50 - 1.40 mg/dL      Calcium 8.5 8.5 - 10.5 mg/dL      Anion Gap 8.0 7.0 - 16.0     ACCU-CHEK GLUCOSE [[id number]]  (Abnormal) Collected:  03/15/20 2106    Order Status:  Completed Lab Status:  Final result Updated:  03/15/20 2223     Glucose - Accu-Ck 250 65 - 99 mg/dL      Comment: Coverage given  Followed orders         ACCU-CHEK GLUCOSE [[id number]]  (Abnormal) Collected:  03/15/20 1728    Order Status:  Completed Lab Status:  Final result Updated:  03/15/20 1745     Glucose - Accu-Ck 150 65 - 99 mg/dL     Blood Culture [[id number]]  (Abnormal) Collected:  03/14/20 1206    Order Status:  Completed Lab Status:  Preliminary result Updated:  03/15/20 1416    Specimen:  Blood from Peripheral      Significant Indicator POS     Source BLD     Site PERIPHERAL     Culture Result Growth detected by Bactec instrument. 03/15/2020  14:14  Gram Stain: Gram positive cocci: Possible Streptococcus sp.      Narrative:       CALL  [place]  [facility] tel. [phone],  CALLED  [facility] tel. [phone] 03/15/2020, 14:16, RB PERF. RESULTS CALLED  TO:[phone], RN  Per Hospital Policy: Only change Specimen Src: to \"Line\" if  specified by physician order.  Right Hand    GRAM STAIN " [[id number]] Collected:  03/14/20 2110    Order Status:  Completed Lab Status:  Final result Updated:  03/15/20 1343    Specimen:  Respirate      Significant Indicator .     Source RESP     Site SPUTUM     Gram Stain Result Many WBCs.  Moderate epithelial cells.  Specimen Quality Score: 1+  Moderate mixed bacteria, no predominant organism seen.      ACCU-CHEK GLUCOSE [[id number]] Collected:  03/15/20 1132    Order Status:  Completed Lab Status:  Final result Updated:  03/15/20 1214     Glucose - Accu-Ck 76 65 - 99 mg/dL     CULTURE RESPIRATORY W/ GRM STN [[id number]] Collected:  03/14/20 2110    Order Status:  Completed Lab Status:  In process Updated:  03/15/20 1103    Specimen:  Respirate from Sputum         Nursing  Diet/Nutrition:  Diabetic, Renal and Nectar Thick Liquids  Medication Administration:  Crush all Medications in Puree  % consumed at meals in last 24 hours:  Consumed 25-50% of meals per documentation.  Meal/Snack Consumption for the past 24 hrs:   Oral Nutrition   03/15/20 1230 Lunch;Between 25-50% Consumed   03/15/20 0900 Breakfast;Between 25-50% Consumed     Snack schedule:  HS  Supplement:  none  Appetite:  Good  Fluid Intake/Output in past 24 hours: In: 360 [P.O.:360]  Out: 1950   Admit Weight:  Weight: 75.8 kg (167 lb)  Weight Last 7 Days   [72.8 kg (160 lb 8 oz)-79.5 kg (175 lb 4.3 oz)] 72.8 kg (160 lb 8 oz) (03/15 0521)    Pain Issues:    Location:  Leg (03/15 2103)  Right (03/15 2103)         Severity:  Mild   Scheduled pain medications:  None     PRN pain medications used in last 24 hours:  oxycodone immediate release (ROXICODONE)    Non Pharmacologic Interventions:  warm blanket, distraction, emotional support, food, heat, relaxation, repositioned and rest  Effectiveness of pain management plan:  good=patient states acceptable comfort after interventions    Bowel:    Bowel Assist Initial Score:  4 - Minimal Assistance  Bowel Assist Current Score:  1 - Total Assistance  Bowl Accidents in last 7  days:     Last bowel movement: 03/15/20  Stool Description: Medium, Loose, Black     Usual bowel pattern:  daily  Scheduled bowel medications:  docusate sodium (COLACE) and senna-docusate (PERICOLACE or SENOKOT S) : Magic bullet  PRN bowel medications used in last 24 hours:  None  Nursing Interventions:  Increased time, Scheduled medication, Supervision, Verbal cueing, Set-up, Positioning on commode/toilet, Brief, Pad, Digital stimulation, Suppository  Effectiveness of bowel program:   good=regular, predictable, controlled emptying of bowel  Bladder:    Bladder Assist Initial Score:  5 - Standby Prompting/Supervision or Set-up  Bladder Assist Current Score:  4 - Minimal Assistance  Bladder Accidents in last 7 days:     PVR range for past 24-48 hours: --  Intermittent Catheterization:  0  Medications affecting bladder:  None    Time void schedule/voiding pattern:  Voiding every 2-4 hours  Interventions:  Increased time, Supervision, Verbal cueing, Emptying of device, Urinal, Pad, Brief, Time void patient initiated  Effectiveness of bladder training:  Fair=occasional unpredictable, incontinent emptying of bladder (< 1 time/day)      Diabetes:  Blood Sugar Frequency:  Before meals and at bedtime    Range of BS for last 48 hours:     Recent Labs     03/14/20  0729 03/14/20  1119 03/14/20  1700 03/14/20  2122 03/15/20  0756 03/15/20  1132 03/15/20  1728 03/15/20  2106   POCGLUCOSE 146* 144* 107* 116* 79 76 150* 250*     Scheduled diabetic medications:  None  Sliding scale usage in past 24 hours:  Yes  Total Short acting insulin in the past 24 hours:  Humalog 2units  Total Long acting insulin in the past 24 hours:  None    Wound:         Patient Lines/Drains/Airways Status    Active Current Wounds     Name: Placement date: Placement time: Site: Days:    Wound 03/12/20 Pressure Injury Neck Anterior Left  03/12/20   1543   Neck  3    Wound 03/12/20 Incision Neck Posterior  03/12/20   1545   Neck  3    Wound Other (comment)  Heel Right scar from resolved pressure injury  no documentation   no documentation   Heel      Wound Pressure Injury Heel Left scar from resolved pressure injury  no documentation   no documentation   Heel      Wound Partial Thickness Wound Buttocks;Sacrum Bilateral moisture related callusing with small area of denuded tissue  no documentation   no documentation   Buttocks;Sacrum                     Interventions:  Moon boots to float heels; mepilex dressing to heels changed daily or as needed  Effectiveness of intervention:  wound is improving         Sleep/wake cycle:   Average hours slept:  Sleeps 4-6 hours without waking  Sleep medication usage:  Declines    Patient/Family Training/Education:  Aspiration Precautions, Bladder Management/Training, Bowel Management/Training, Diabetes Management, Diet/Nutrition, Fall Prevention, General Self Care, Medication Management, Pain Management, Respiratory Hygiene, Safe Transfers, Safety, Skin Care and Wound Care  Discharge Supply Recommendations:  Glucometer and Strips, Blood Pressure Monitor and Wound Care Supplies  Strengths: Alert and oriented, Willingly participates in therapeutic activities, Able to follow instructions, Supportive family, Pleasant and cooperative, Effective communication skills, Good carryover of learning, Motivated for self care and independence, Good endurance and Manages pain appropriately   Barriers:   Aspiration risk, Constipation, Fatigue, Generalized weakness, Limited mobility and Other:  Hard of hearing       Nursing Problems     Problem: Bowel/Gastric:     Description:     Goal: Normal bowel function is maintained or improved     Description:           Goal: Will not experience complications related to bowel motility     Description:                 Problem: Communication     Description:     Goal: The ability to communicate needs accurately and effectively will improve     Description:                 Problem: Discharge Barriers/Planning      Description:     Goal: Patient's continuum of care needs will be met     Description:                 Problem: Infection     Description:     Goal: Will remain free from infection     Description:                 Problem: Knowledge Deficit     Description:     Goal: Knowledge of disease process/condition, treatment plan, diagnostic tests, and medications will improve     Description:           Goal: Knowledge of the prescribed therapeutic regimen will improve     Description:                 Problem: Pain Management     Description:     Goal: Pain level will decrease to patient's comfort goal     Description:                 Problem: Safety     Description:     Goal: Will remain free from injury     Description:           Goal: Will remain free from falls     Description:                 Problem: Skin Integrity     Description:     Goal: Risk for impaired skin integrity will decrease     Description:                 Problem: Urinary Elimination:     Description:     Goal: Ability to reestablish a normal urinary elimination pattern will improve     Description:                 Problem: Venous Thromboembolism (VTW)/Deep Vein Thrombosis (DVT) Prevention:     Description:     Goal: Patient will participate in Venous Thrombosis (VTE)/Deep Vein Thrombosis (DVT)Prevention Measures     Description:     Flowsheet:     Taken at 03/14/20 0329    Pharmacologic Prophylaxis Used Unfractionated Heparin by  Princess Kiara Pelaez R.N.                             Long Term Goals:   At discharge patient will be able to function safely at home and in the community with support.    Section completed by:  Codi Rodriguez R.N.           Mobility  Bed mobility:   4  Bed /Chair/Wheelchair Transfer Initial:  4 - Minimal Assistance  Bed /Chair/Wheelchair Transfer Current:  4 - Minimal Assistance   Bed/Chair/Wheelchair Transfer Description:  Increased time, Supervision for safety, Verbal cueing, Set-up of equipment, Initial preparation  for task(Min assist to transfer to/from EOB sitting and manual wc)  Walk Initial:  2 - Max Assistance  Walk Current:  2 - Max Assistance   Walk Description:  Assist device/equipment(115ft x 1 4ww CGA)  Wheelchair Initial:  2 - Max Assistance  Wheelchair Current:  2 - Max Assistance   Wheelchair Description:  (85ft x 1, B LE, SPV)  Stairs Initial:  0 - Not tested,unsafe activity  Stairs Current: 0 - Not tested,unsafe activity   Stairs Description:    Patient/Family Training/Education:  Hand hygiene, to cover cough, CLOF, use of 4ww, pacing strategies, spinal precautions, prevention of pressure injuries  DME/DC Recommendations:  Pt owns 4ww, anticipate outpatient PT  Strengths:  Manages pain appropriately, Motivated for self care and independence, Pleasant and cooperative, Supportive family and Willingly participates in therapeutic activities  Barriers:   Decreased endurance, Emotional lability, Generalized weakness, Limited mobility, Poor activity tolerance and Poor balance  # of short term goals set= 4  # of short term goals met= 0 (goals set Friday at eval, no PT treatments conducted over weekend)  Physical Therapy Problems     Problem: Balance     Dates: Start: 03/13/20       Description:     Goal: STG-Within one week, patient will     Dates: Start: 03/13/20       Description: 1) Individualized goal:  Perform 5x STS with 4ww from standard height chair in less than 60seconds  2) Interventions:  PT Group Therapy, PT Gait Training, PT Therapeutic Exercises, PT Neuro Re-Ed/Balance, PT Therapeutic Activity, PT Manual Therapy and PT Evaluation                   Problem: Mobility     Dates: Start: 03/13/20       Description:     Goal: STG-Within one week, patient will ambulate community distances     Dates: Start: 03/13/20       Description: 1) Individualized goal:  150ft x  1 4ww SBA  2) Interventions:  PT Group Therapy, PT Gait Training, PT Therapeutic Exercises, PT Neuro Re-Ed/Balance, PT Therapeutic Activity, PT  Manual Therapy and PT Evaluation                 Problem: Mobility Transfers     Dates: Start: 03/13/20       Description:     Goal: STG-Within one week, patient will perform bed mobility     Dates: Start: 03/13/20       Description: 1) Individualized goal: SPV with HOB flat and no VCs for precautions  2) Interventions:  PT Group Therapy, PT Gait Training, PT Therapeutic Exercises, PT Neuro Re-Ed/Balance, PT Therapeutic Activity, PT Manual Therapy and PT Evaluation             Goal: STG-Within one week, patient will transfer bed to chair     Dates: Start: 03/13/20       Description: 1) Individualized goal: 4ww SPV SPT  2) Interventions:  PT Group Therapy, PT Gait Training, PT Therapeutic Exercises, PT Neuro Re-Ed/Balance, PT Therapeutic Activity, PT Manual Therapy and PT Evaluation                 Problem: PT-Long Term Goals     Dates: Start: 03/13/20       Description:     Goal: LTG-By discharge, patient will ambulate     Dates: Start: 03/13/20       Description: 1) Individualized goal:  50ft x 3 4ww mod I, 400ft x 1 4ww SPV  2) Interventions:  PT Group Therapy, PT Gait Training, PT Therapeutic Exercises, PT Neuro Re-Ed/Balance, PT Therapeutic Activity, PT Manual Therapy and PT Evaluation             Goal: LTG-By discharge, patient will transfer one surface to another     Dates: Start: 03/13/20       Description: 1) Individualized goal: mod I 4ww  2) Interventions:  PT Group Therapy, PT Gait Training, PT Therapeutic Exercises, PT Neuro Re-Ed/Balance, PT Therapeutic Activity, PT Manual Therapy and PT Evaluation             Goal: LTG-By discharge, patient will perform home exercise program     Dates: Start: 03/13/20       Description: 1) Individualized goal:  Mod I for B LE strengthening/ standing balance   2) Interventions:  PT Group Therapy, PT Gait Training, PT Therapeutic Exercises, PT Neuro Re-Ed/Balance, PT Therapeutic Activity, PT Manual Therapy and PT Evaluation             Goal: LTG-By discharge, patient  will transfer in/out of a car     Dates: Start: 03/13/20       Description: 1) Individualized goal: 4ww SPV   2) Interventions:  PT Group Therapy, PT Gait Training, PT Therapeutic Exercises, PT Neuro Re-Ed/Balance, PT Therapeutic Activity, PT Manual Therapy and PT Evaluation                           Section completed by:  Louise Gayle, PT    Activities of Daily Living  Eating Initial:  5 - Standby Prompting/Supervision or Set-up  Eating Current:  5 - Standby Prompting/Supervision or Set-up   Eating Description:  Increased time, Modified diet, Supervision for safety, Verbal cueing  Grooming Initial:  5 - Standby Prompting/Supervision or Set-up  Grooming Current:  5 - Standby Prompting/Supervision or Set-up   Grooming Description:  Increased time, Supervision for safety, Verbal cueing, Set-up of equipment, Initial preparation for task(wc level at sink side for groom/hygiene tasks - set up and additional time - reviewed shanging out pads for c-collar)  Bathing Initial:  3 - Moderate Assistance  Bathing Current:  2 - Max Assistance   Bathing Description:  Grab bar, Tub bench, Hand held shower, Increased time, Supervision for safety, Verbal cueing, Set-up of equipment, Initial preparation for task(Min assist in stand via grab bar w/ Total assist for stefanie, Max assist for distal BLE's, Total assist for back, Pt Mod I for anterior trunk and BUE's.  Noted significant BM in shower while standing via grab bar)  Upper Body Dressing Initial:  3 - Moderate Assistance  Upper Body Dressing Current:  3 - Moderate Assistance   Upper Body Dressing Description:  (Pt Total assist to manage cervical collar, Min assist to thread BUE's through sleeves, assist to pull shirt over head and down back)  Lower Body Dressing Initial:  3 - Moderate Assistance  Lower Body Dressing Current:  3 - Moderate Assistance   Lower Body Dressing Description:  3 - Moderate Assistance  Toileting Initial:  3 - Moderate Assistance  Toileting Current:  2 -  Max Assistance   Toileting Description:  Grab bar, Increased time, Supervision for safety, Verbal cueing, Set-up of equipment, Initial preparation for task(Mod assist sit/stand via grab bar, Mod assist to manage pants, Total assist for post BM toilet hygiene)  Toilet Transfer Initial:  4 - Minimal Assistance  Toilet Transfer Current:  4 - Minimal Assistance   Toilet Transfer Description:  4 - Minimal Assistance  Tub / Shower Transfer Initial:  4 - Minimal Assistance  Tub / Shower Transfer Current:  4 - Minimal Assistance   Tub / Shower Transfer Description:  Grab bar, Increased time, Supervision for safety, Verbal cueing, Set-up of equipment, Initial preparation for task(Min assist to transfer to/from manual wc and shower bench via grab bar)  IADL:  Not yet addressed; new patient   Family Training/Education:  Not yet completed; new patient   DME/DC Recommendations:  Anticipate HH OT f/u, patient already owns tub transfer bench and grab bars     Strengths:  Able to follow instructions, Alert and oriented, Effective communication skills, Independent PLOF, Motivated for self care and independence, Pleasant and cooperative and Willingly participates in therapeutic activities  Barriers:  Decreased endurance, Generalized weakness, Limited mobility and Other: Bilateral shoulder weakness, decreased coordination, cervical spine precautions      # of short term goals set= 2  # of short term goals met= 0     Occupational Therapy Goals     Problem: Bathing     Dates: Start: 03/13/20       Description:     Goal: STG-Within one week, patient will bathe     Dates: Start: 03/13/20       Description: 1) Individualized Goal:  Minimal assist with AE as needed  2) Interventions:  OT Orthotics Training, OT Group Therapy, OT Self Care/ADL, OT Cognitive Skill Dev, OT Community Reintegration, OT Manual Ther Technique, OT Neuro Re-Ed/Balance, OT Sensory Int Techniques, OT Therapeutic Activity, OT Evaluation and OT Therapeutic Exercise                    Problem: Dressing     Dates: Start: 03/13/20       Description:     Goal: STG-Within one week, patient will dress UB     Dates: Start: 03/13/20       Description: 1) Individualized Goal: Minimal assist   2) Interventions: OT Orthotics Training, OT Group Therapy, OT Self Care/ADL, OT Cognitive Skill Dev, OT Community Reintegration, OT Manual Ther Technique, OT Neuro Re-Ed/Balance, OT Sensory Int Techniques, OT Therapeutic Activity, OT Evaluation and OT Therapeutic Exercise                   Problem: OT Long Term Goals     Dates: Start: 03/13/20       Description:     Goal: LTG-By discharge, patient will complete basic self care tasks     Dates: Start: 03/13/20       Description: 1) Individualized Goal: Supervision level  2) Interventions: OT Orthotics Training, OT Group Therapy, OT Self Care/ADL, OT Cognitive Skill Dev, OT Community Reintegration, OT Manual Ther Technique, OT Neuro Re-Ed/Balance, OT Sensory Int Techniques, OT Therapeutic Activity, OT Evaluation and OT Therapeutic Exercise             Goal: LTG-By discharge, patient will perform bathroom transfers     Dates: Start: 03/13/20       Description: 1) Individualized Goal: Supervision level     2) Interventions: OT Orthotics Training, OT Group Therapy, OT Self Care/ADL, OT Cognitive Skill Dev, OT Community Reintegration, OT Manual Ther Technique, OT Neuro Re-Ed/Balance, OT Sensory Int Techniques, OT Therapeutic Activity, OT Evaluation and OT Therapeutic Exercise             Goal: LTG-By discharge, patient will complete basic home management     Dates: Start: 03/13/20       Description: 1) Individualized Goal: CGA to supervision level     2) Interventions: OT Orthotics Training, OT Group Therapy, OT Self Care/ADL, OT Cognitive Skill Dev, OT Community Reintegration, OT Manual Ther Technique, OT Neuro Re-Ed/Balance, OT Sensory Int Techniques, OT Therapeutic Activity, OT Evaluation and OT Therapeutic Exercise                         Section completed  by:  Nga Allen MS,OTR/L    Cognitive Linquistic Functions  Comprehension Initial:  5 - Stand-by Prompting/Supervision or Set-up  Comprehension Current:  5 - Stand-by Prompting/Supervision or Set-up   Comprehension Description:  Verbal cues(hearing loss, does not have hearing aids)  Expression Initial:  5 - Stand-by Prompting/Supervision or Set-up  Expression Current:  5 - Stand-by Prompting/Supervision or Set-up   Expression Description:  Verbal cueing  Social Interaction Initial:  5 - Stand-by Prompting/Supervision or Set-up  Social Interaction Current:  6 - Modified Independent   Social Interaction Description:  Increased time, Verbal cues  Problem Solving Initial:  4 - Minimal Assistance  Problem Solving Current:  4 - Minimal Assistance   Problem Solving Description:  Verbal cueing, Therapy schedule, Bed/chair alarm, Increased time, Seat belt  Memory Initial:  3 - Moderate Assistance  Memory Current:  3 - Moderate Assistance   Memory Description:  Verbal cueing, Therapy schedule, Seat belt  Executive Functioning / Organization Initial:     Executive Functioning / Organization Current:      Executive Functioning Desciption: TBA  Swallowing  Swallowing Status:  Patient currently tolerates dysphagia 1/puree/level 4 with frequent coughing.  Also demonstrates coughing with level  2 mildly thick liquids, although with less frequency.   Patient will have received a scheduled MBSS at 10:00 on 3/16/20 with report and recommendations to follow.  Orders Placed This Encounter   Procedures   • Diet Order Renal (NO FISH), Diabetic     Standing Status:   Standing     Number of Occurrences:   1     Order Specific Question:   Diet:     Answer:   Renal [8]     Comments:   NO FISH     Order Specific Question:   Diet:     Answer:   Diabetic [3]     Order Specific Question:   Texture Modifier     Answer:   Level 4 - Pureed (Dysphagia 1)     Order Specific Question:   Liquid level     Answer:   Level 2 - Mildly Thick     Behavior  Modification Plan  Keep instructions simple/concrete, Give clear feedback, Set clear goals, Redirect to task/topic, Decrease the chance of failure by offering activities that are within the patient's abilities and Analyze tasks (break down into smaller steps)  Assistive Technology  Low tech: Calendar, planner, schedule, alarms/timers, pill organizer, post-it notes, lists  Family Training/Education:  To be scheduled.  DC Recommendations:  Anticipate patient will benefit from additional ST services upon discharge from this facility.  Strengths:  Able to follow instructions, Alert and oriented, Motivated for self care and independence, Pleasant and cooperative and Willingly participates in therapeutic activities  Barriers:  Decreased endurance, Generalized weakness and Other: hearing loss, impaired self monitoring and awareness, impaired recall and carry over of new training.  # of short term goals set=  3  # of short term goals met= 1  Speech Therapy Problems     Problem: Memory STGs     Dates: Start: 03/13/20       Description:     Goal: STG-Within one week, patient will     Dates: Start: 03/13/20       Description: 1) Individualized goal:  Recall daily events with 80% accuracy with use of RWAVS and memory book.    2) Interventions:  SLP Cognitive Skill Development and SLP Group Treatment       Note:     Goal Note filed on 03/16/20 1047 by Jazmine Alcocer MS,CCC-SLP    To be addressed.                        Problem: Problem Solving STGs     Dates: Start: 03/13/20       Description:     Goal: STG-Within one week, patient will     Dates: Start: 03/13/20       Description: 1) Individualized goal:  Complete alternating attn tasks with 80% accuracy given min verbal cues.  2) Interventions:  SLP Cognitive Skill Development and SLP Group Treatment       Note:     Goal Note filed on 03/16/20 1047 by Jazmine Alcocer MS,CCC-SLP    78% with mod A                  Goal: STG-Within one week, patient will     Dates: Start:  03/13/20       Description: 1) Individualized goal:  Recall/utilize swallow strategies, safety precautions 80% Sarah.   2) Interventions:  SLP Cognitive Skill Development and SLP Group Treatment       Note:     Goal Note filed on 03/16/20 1047 by Jazmine Alcocer MS,CCC-SLP    Mod A needed for recall of strategies,safety sequencing.                        Problem: Speech/Swallowing LTGs     Dates: Start: 03/13/20       Description:     Goal: LTG-By discharge, patient will safely swallow     Dates: Start: 03/13/20       Description: 1) Individualized goal:  Regular textures and thin liquids without aspiration.  2) Interventions:  SLP Swallowing Dysfunction Treatment, SLP Oral Pharyngeal Evaluation and SLP Video Swallow Evaluation             Goal: LTG-By discharge, patient will solve complex problem     Dates: Start: 03/13/20       Description: 1) Individualized goal:  Gokul for safe discharge home.  2) Interventions:  SLP Aphasia Evaluation, SLP Cognitive Skill Development and SLP Group Treatment                   Problem: Swallowing STGs     Dates: Start: 03/16/20       Description:     Goal: STG-Within one week, patient will     Dates: Start: 03/16/20       Description: 1) Individualized goal:  Swallow level 4 puree dysphagia 1 and level 2 mildly thick liquids with no overt s/sx of aspiration for 2/2 meals.  2) Interventions:  SLP Swallowing Dysfunction Treatment and SLP Group Treatment                          Section completed by:  Jazmine Alcocer MS,CCC-SLP    Recreation/Community     Leisure Competence Measure  Leisure Awareness: Independent  Leisure Attitude: Independent  Leisure Skills: Minimal Assist  Cultural / Social Behaviors: Independent  Interpersonal Skills: Independent  Community Integration Skills: Cueing Assist  Social Contact: Independent  Community Participation: Cueing Assist    Strengths:  Able to follow instructions, Alert and oriented, Motivated for self care and independence, Pleasant and  cooperative and Willingly participates in therapeutic activities  Barriers:  Decreased endurance and Generalized weakness  # of short term goals set=2  # of short term goals met=0    Pt has been net only for evaluation. Goals will continue to be monitored.     Recreation Therapy Problems     Problem: Recreation Therapy     Dates: Start: 03/13/20       Description:     Goal: STG-Within one week, patient will demonstrate leisure problem solving     Dates: Start: 03/13/20       Description: By sharing on two leisure activities other than stated in the evaluation he would like to learn or participate in during session or in his free time.            Goal: STG-Within one week, patient will demonstrate a standing tolerance     Dates: Start: 03/13/20       Description: By standing for 3 minutes x3 while duel tasking at [facility] and no LOB.            Goal: LTG-By discharge, patient will demonstrate leisure problem solving     Dates: Start: 03/13/20       Description: By sharing on two leisure activities other than stated in the evaluation he would like to learn or participate in following his discharge and any perceived barriers to his participation.            Goal: LTG-By discharge, patient will demonstrate a standing tolerance     Dates: Start: 03/13/20       Description: By standing for 3 minutes x3 while duel tasking at [facility] and no LOB.                        Section completed by:  Rodney Bradley, [facility]       [facility]-Pharmacy IDT Team Note by Sherri Dumont RPH at 3/13/2020 11:36 AM  Version 1 of 1    Author:  Sherri Dumont RPH Service:  -- Author Type:  Pharmacist    Filed:  3/13/2020 11:39 AM Date of Service:  3/13/2020 11:36 AM Status:  Signed    :  Sherri Dumont RPH (Pharmacist)         Pharmacy   Pharmacy  Antibiotics/Antifungals/Antivirals:  Medication:      Active Orders (From admission, onward)    None        Route:         n/a  Stop Date:  n/a  Reason:   Antihypertensives/Cardiac:  Medication:     Orders (72h ago, onward)     Start     Ordered    03/12/20 2100  metoprolol (LOPRESSOR) tablet 12.5 mg  2 TIMES DAILY      03/12/20 1145    03/12/20 2100  simvastatin (ZOCOR) tablet 20 mg  NIGHTLY      03/12/20 1145    03/12/20 1227  midodrine (PROAMATINE) tablet 5 mg  EVERY 4 HOURS PRN      03/12/20 1228    03/12/20 1146  nitroglycerin (NITRO-BID) 2 % ointment 1 Inch  (Autonomic Dysreflexia Orders)  PRN      03/12/20 1146              Patient Vitals for the past 24 hrs:   BP Pulse   03/13/20 0700 117/68 (!) 58   03/12/20 1824 124/65 76   03/12/20 1618 -- 81     Anticoagulation:  Medication:  Heparin   INR:      Other key medications: gabapentin, lantus, humalog, methocarbamol, pioglitazone  A review of the medication list reveals no issues at this time. Patient is currently on an antihypertensive. Recommend home blood pressure monitoring/recording if antihypertensive regimen continues.  Section completed by:  Sherri Dumont RPH[TK.1]        Attribution Key     TK.1 - Sherri Dumont RP on 3/13/2020 11:36 AM                  PR Planning  PR destination/dispostion: Home alone to  Living apartment in [place], lives in 2nd floor apartment w/ elevator access. Has 4WW, tub transfer bench, rasied toilet. Previous [facility]. Sister will provide transportation.  Referrals: HH referral.  DC Needs: DME for patient safety & mobility. HH referral. MD f/u appts.  Barriers to discharge: Functional mobility deficits. Lives alone, limited support system.   Strengths: Motivation. Supportive sister. Home accessibility.    Section completed by:  Chrissy Oliveira R.N.      Physician Summary  Ramu Garner DO participated and led team conference discussion.

## 2020-03-16 NOTE — PROGRESS NOTES
"Rehab Progress Note     Encounter Date: 3/16/2020    CC: UE weakness    Interval Events (Subjective)  He complains of localized pain over the back of the neck, constant, associated with activity, no radiation down the arms.  He associates this with increased activity today.    He complains of worsening tingling and pain in the right lower extremity worse with extension of the knee.  He denies any significant weakness increase in the right lower extremity.  Gabapentin was significantly decreased over the weekend secondary to his increase in fatigue, baclofen was DC'd.    Patient was found to have a pneumonia over the weekend, started on antibiotics, Zyvox and Zosyn.  Fever significantly improved, tachycardia improved.  Patient was started on IV fluids.    He complains of productive cough, has been getting worse over the last 24 hours, has been bringing up sputum, unclear color.  He is requesting to start taking then fluids.    Bowel: Last BM 3/15, medium loose with digital stimulation  Bladder: Frequency, urinating every 2 hours, volumes of 150 cc - 200 cc  Previous PVRs less than 50    ROS:  Gen: Improved fatigue, denies confusion, significant weight loss  Eyes: no blurry vision, double vision or pain in eyes  ENT: no changes in hearing, runny nose, nose bleeds, sinus pain  CV: No CP, palpitations, symptoms of AUTONOMIC DYSREFLEXIA  Lungs: no shortness of breath, increase in secretions, no pain with coughing  Abd: No abd pain, nausea, vomiting, pain with eating  : no blood in urine,  suprapubic pain  Ext: Improved swelling in legs, no asymmetric atrophy  Neuro: no changes in strength or sensation  Skin: no new ulcers/skin breakdown appreciated by patient  Mood: No changes in mood today, increase in depression or anxiety  Heme: no bruising, or bleeding  Rest of 14 point review of systems is negative    Objective:  Vitals: /61   Pulse 99   Temp 36.5 °C (97.7 °F) (Temporal)   Resp 18   Ht 1.778 m (5' 10\")  "  Wt 78.5 kg (173 lb 1 oz)   SpO2 96%   Gen: NAD, Body mass index is 24.83 kg/m².  HEENT:  NC/AT, PERRLA, moist mucous membranes, tender to palpation over C4 and C5 facets and posterior cervical musculature.  No pain over the levator scapula and upper trapezius muscles  Cardio: RRR, no mumurs  Pulm: CTAB, with normal respiratory effort  Abd: Soft NTND, active bowel sounds  Ext: Improved peripheral edema bilaterally. No calf tenderness. No clubbing/cyanosis. +dorsal pedalis pulses bilaterally.  Severe pain in entire right leg with knee extension  Knee extension of -10 to 20 degrees    Recent Results (from the past 72 hour(s))   ACCU-CHEK GLUCOSE    Collection Time: 03/13/20 11:45 AM   Result Value Ref Range    Glucose - Accu-Ck 149 (H) 65 - 99 mg/dL   ACCU-CHEK GLUCOSE    Collection Time: 03/13/20  5:02 PM   Result Value Ref Range    Glucose - Accu-Ck 169 (H) 65 - 99 mg/dL   ACCU-CHEK GLUCOSE    Collection Time: 03/13/20  9:08 PM   Result Value Ref Range    Glucose - Accu-Ck 196 (H) 65 - 99 mg/dL   ACCU-CHEK GLUCOSE    Collection Time: 03/14/20  7:29 AM   Result Value Ref Range    Glucose - Accu-Ck 146 (H) 65 - 99 mg/dL   Blood Culture    Collection Time: 03/14/20 10:28 AM   Result Value Ref Range    Significant Indicator NEG     Source BLD     Site PERIPHERAL     Culture Result       No Growth  Note: Blood cultures are incubated for 5 days and  are monitored continuously.Positive blood cultures  are called to the RN and reported as soon as  they are identified.     LACTIC ACID    Collection Time: 03/14/20 10:28 AM   Result Value Ref Range    Lactic Acid 1.6 0.5 - 2.0 mmol/L   Group A Strep by PCR    Collection Time: 03/14/20 10:28 AM   Result Value Ref Range    Group A Strep by PCR Not Detected Not Detected   Influenza A/B By PCR (Adult - Flu Only)    Collection Time: 03/14/20 10:30 AM   Result Value Ref Range    Influenza virus A RNA Negative Negative    Influenza virus B, PCR Negative Negative   Respiratory Panel  By PCR    Collection Time: 03/14/20 10:30 AM   Result Value Ref Range    Adenovirus, PCR Not Detected     Coronavirus, PCR Not Detected     Human Metapneumovirus, PCR Not Detected     Rhinovirus / Enterovirus, PCR DETECTED (A)     Influenza virus A RNA Not Detected     Influenza virus A H1 RNA Not Detected     Influenza A 2009, H1N1, PCR Not Detected     Influenza virus A H3 RNA Not Detected     Influenza virus B, PCR Not Detected     Parainfluenza virus 1, PCR Not Detected     Parainfluenza virus 2, PCR Not Detected     Parainfluenza virus 3, PCR Not Detected     Parainfluenza 4, PCR Not Detected     Resp Syncytial Virus A, PCR Not Detected     Resp Syncytial Virus B, PCR Not Detected     Chlamydia pneumoniae, PCR Not Detected     Mycoplasma pneumoniae, PCR Not Detected    ACCU-CHEK GLUCOSE    Collection Time: 03/14/20 11:19 AM   Result Value Ref Range    Glucose - Accu-Ck 144 (H) 65 - 99 mg/dL   URINALYSIS    Collection Time: 03/14/20 11:37 AM   Result Value Ref Range    Color Yellow     Character Clear     Specific Gravity 1.013 <1.035    Ph 7.0 5.0 - 8.0    Glucose 100 (A) Negative mg/dL    Ketones Negative Negative mg/dL    Protein 100 (A) Negative mg/dL    Bilirubin Negative Negative    Urobilinogen, Urine 0.2 Negative    Nitrite Negative Negative    Leukocyte Esterase Negative Negative    Occult Blood Trace (A) Negative    Micro Urine Req Microscopic    URINE CULTURE(NEW)    Collection Time: 03/14/20 11:37 AM   Result Value Ref Range    Significant Indicator POS (POS)     Source UR     Site URINE, CLEAN CATCH     Culture Result - (A)     Culture Result Proteus species  ,000 cfu/mL   (A)    URINE MICROSCOPIC (W/UA)    Collection Time: 03/14/20 11:37 AM   Result Value Ref Range    WBC 0-2 (A) /hpf    RBC 0-2 (A) /hpf    Bacteria Negative None /hpf    Epithelial Cells Negative /hpf    Hyaline Cast 0-2 /lpf   CBC WITH DIFFERENTIAL    Collection Time: 03/14/20 11:58 AM   Result Value Ref Range    WBC 13.9  (H) 4.8 - 10.8 K/uL    RBC 2.94 (L) 4.70 - 6.10 M/uL    Hemoglobin 8.8 (L) 14.0 - 18.0 g/dL    Hematocrit 28.1 (L) 42.0 - 52.0 %    MCV 95.6 81.4 - 97.8 fL    MCH 29.9 27.0 - 33.0 pg    MCHC 31.3 (L) 33.7 - 35.3 g/dL    RDW 58.4 (H) 35.9 - 50.0 fL    Platelet Count 251 164 - 446 K/uL    MPV 8.9 (L) 9.0 - 12.9 fL    Neutrophils-Polys 86.60 (H) 44.00 - 72.00 %    Lymphocytes 3.90 (L) 22.00 - 41.00 %    Monocytes 8.40 0.00 - 13.40 %    Eosinophils 0.10 0.00 - 6.90 %    Basophils 0.20 0.00 - 1.80 %    Immature Granulocytes 0.80 0.00 - 0.90 %    Nucleated RBC 0.00 /100 WBC    Neutrophils (Absolute) 12.05 (H) 1.82 - 7.42 K/uL    Lymphs (Absolute) 0.54 (L) 1.00 - 4.80 K/uL    Monos (Absolute) 1.17 (H) 0.00 - 0.85 K/uL    Eos (Absolute) 0.01 0.00 - 0.51 K/uL    Baso (Absolute) 0.03 0.00 - 0.12 K/uL    Immature Granulocytes (abs) 0.11 0.00 - 0.11 K/uL    NRBC (Absolute) 0.00 K/uL   Blood Culture    Collection Time: 03/14/20 12:06 PM   Result Value Ref Range    Significant Indicator POS (POS)     Source BLD     Site PERIPHERAL     Culture Result (A)      Growth detected by Bactec instrument. 03/15/2020  14:14  Gram Stain: Gram positive cocci: Possible Streptococcus sp.     ACCU-CHEK GLUCOSE    Collection Time: 03/14/20  5:00 PM   Result Value Ref Range    Glucose - Accu-Ck 107 (H) 65 - 99 mg/dL   GRAM STAIN    Collection Time: 03/14/20  9:10 PM   Result Value Ref Range    Significant Indicator .     Source RESP     Site SPUTUM     Gram Stain Result       Many WBCs.  Moderate epithelial cells.  Specimen Quality Score: 1+  Moderate mixed bacteria, no predominant organism seen.     ACCU-CHEK GLUCOSE    Collection Time: 03/14/20  9:22 PM   Result Value Ref Range    Glucose - Accu-Ck 116 (H) 65 - 99 mg/dL   CBC WITH DIFFERENTIAL    Collection Time: 03/15/20  5:59 AM   Result Value Ref Range    WBC 9.8 4.8 - 10.8 K/uL    RBC 2.86 (L) 4.70 - 6.10 M/uL    Hemoglobin 8.6 (L) 14.0 - 18.0 g/dL    Hematocrit 27.9 (L) 42.0 - 52.0 %    MCV  97.6 81.4 - 97.8 fL    MCH 30.1 27.0 - 33.0 pg    MCHC 30.8 (L) 33.7 - 35.3 g/dL    RDW 58.9 (H) 35.9 - 50.0 fL    Platelet Count 219 164 - 446 K/uL    MPV 8.9 (L) 9.0 - 12.9 fL    Neutrophils-Polys 80.50 (H) 44.00 - 72.00 %    Lymphocytes 7.30 (L) 22.00 - 41.00 %    Monocytes 10.80 0.00 - 13.40 %    Eosinophils 0.30 0.00 - 6.90 %    Basophils 0.20 0.00 - 1.80 %    Immature Granulocytes 0.90 0.00 - 0.90 %    Nucleated RBC 0.00 /100 WBC    Neutrophils (Absolute) 7.86 (H) 1.82 - 7.42 K/uL    Lymphs (Absolute) 0.71 (L) 1.00 - 4.80 K/uL    Monos (Absolute) 1.06 (H) 0.00 - 0.85 K/uL    Eos (Absolute) 0.03 0.00 - 0.51 K/uL    Baso (Absolute) 0.02 0.00 - 0.12 K/uL    Immature Granulocytes (abs) 0.09 0.00 - 0.11 K/uL    NRBC (Absolute) 0.00 K/uL   ACCU-CHEK GLUCOSE    Collection Time: 03/15/20  7:56 AM   Result Value Ref Range    Glucose - Accu-Ck 79 65 - 99 mg/dL   ACCU-CHEK GLUCOSE    Collection Time: 03/15/20 11:32 AM   Result Value Ref Range    Glucose - Accu-Ck 76 65 - 99 mg/dL   ACCU-CHEK GLUCOSE    Collection Time: 03/15/20  5:28 PM   Result Value Ref Range    Glucose - Accu-Ck 150 (H) 65 - 99 mg/dL   ACCU-CHEK GLUCOSE    Collection Time: 03/15/20  9:06 PM   Result Value Ref Range    Glucose - Accu-Ck 250 (H) 65 - 99 mg/dL   Basic Metabolic Panel    Collection Time: 03/16/20  6:12 AM   Result Value Ref Range    Sodium 139 135 - 145 mmol/L    Potassium 4.0 3.6 - 5.5 mmol/L    Chloride 106 96 - 112 mmol/L    Co2 25 20 - 33 mmol/L    Glucose 167 (H) 65 - 99 mg/dL    Bun 39 (H) 8 - 22 mg/dL    Creatinine 2.11 (H) 0.50 - 1.40 mg/dL    Calcium 8.5 8.5 - 10.5 mg/dL    Anion Gap 8.0 7.0 - 16.0   ESTIMATED GFR    Collection Time: 03/16/20  6:12 AM   Result Value Ref Range    GFR If  37 (A) >60 mL/min/1.73 m 2    GFR If Non  31 (A) >60 mL/min/1.73 m 2   ACCU-CHEK GLUCOSE    Collection Time: 03/16/20  8:00 AM   Result Value Ref Range    Glucose - Accu-Ck 131 (H) 65 - 99 mg/dL       Current  Facility-Administered Medications   Medication Frequency   • insulin lispro (HUMALOG) injection 2-12 Units 4X/DAY ACHS    And   • glucose 4 g chewable tablet 16 g Q15 MIN PRN    And   • dextrose 50% (D50W) injection 50 mL Q15 MIN PRN   • NS infusion Continuous   • gabapentin (NEURONTIN) capsule 300 mg Q EVENING   • linezolid (ZYVOX) tablet 600 mg Q12HRS   • piperacillin-tazobactam (ZOSYN) 3.375 g in  mL IVPB Q8HRS   • lidocaine 2 % jelly PRN    And   • nitroglycerin (NITRO-BID) 2 % ointment 1 Inch PRN   • Pharmacy Consult Request ...Pain Management Review 1 Each PHARMACY TO DOSE   • Respiratory Therapy Consult Continuous RT   • oxyCODONE immediate-release (ROXICODONE) tablet 5 mg Q3HRS PRN   • oxyCODONE immediate release (ROXICODONE) tablet 10 mg Q3HRS PRN   • acetaminophen (TYLENOL) tablet 650 mg Q4HRS PRN   • ascorbic acid tablet 500 mg BID WITH MEALS   • therapeutic multivitamin-minerals (THERAGRAN-M) tablet 1 Tab DAILY WITH LUNCH   • docusate sodium (COLACE) capsule 200 mg BID    And   • sennosides (SENOKOT) 8.6 MG tablet 17.2 mg DAILY AT NOON    And   • bisacodyl (THE MAGIC BULLET) suppository 10 mg QDAY    And   • magnesium hydroxide (MILK OF MAGNESIA) suspension 30 mL QDAY PRN   • artificial tears ophthalmic solution 1 Drop PRN   • benzocaine-menthol (CEPACOL) lozenge 1 Lozenge Q2HRS PRN   • mag hydrox-al hydrox-simeth (MAALOX PLUS ES or MYLANTA DS) suspension 20 mL Q2HRS PRN   • ondansetron (ZOFRAN ODT) dispertab 4 mg 4X/DAY PRN    Or   • ondansetron (ZOFRAN) syringe/vial injection 4 mg 4X/DAY PRN   • sodium chloride (OCEAN) 0.65 % nasal spray 2 Spray PRN   • traZODone (DESYREL) tablet 50 mg QHS PRN   • ammonium lactate (LAC-HYDRIN) 12 % lotion BID   • calcium carbonate (TUMS) chewable tab 500 mg BID   • methocarbamol (ROBAXIN) tablet 750 mg Q8HRS PRN   • cyanocobalamin (VITAMIN B12) tablet 1,000 mcg DAILY   • ferrous sulfate tablet 325 mg BID WITH MEALS   • lidocaine (LIDODERM) 5 % 2 Patch Q24HR   •  metoprolol (LOPRESSOR) tablet 12.5 mg BID   • pioglitazone (ACTOS) tablet 30 mg DAILY   • simethicone (MYLICON) chewable tab 80 mg TID PRN   • simvastatin (ZOCOR) tablet 20 mg Nightly   • sodium bicarbonate tablet 650 mg TID   • heparin injection 5,000 Units Q8HRS   • midodrine (PROAMATINE) tablet 5 mg Q4HRS PRN       Orders Placed This Encounter   Procedures   • Diet Order Renal (NO FISH), Diabetic     Standing Status:   Standing     Number of Occurrences:   1     Order Specific Question:   Diet:     Answer:   Renal [8]     Comments:   NO FISH     Order Specific Question:   Diet:     Answer:   Diabetic [3]     Order Specific Question:   Texture Modifier     Answer:   Level 4 - Pureed (Dysphagia 1)     Order Specific Question:   Liquid level     Answer:   Level 2 - Mildly Thick       Assessment:  Active Hospital Problems    Diagnosis   • Acute on chronic renal failure (HCC)   • Central cord syndrome (HCC)   • Type 2 diabetes mellitus (HCC)   • Anemia   • Thrombocytopenia (HCC)   • 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 Sellers on 2/28.  Pinprick altered bilaterally at C3-C6  - Collar on at all times, spinal precautions  - Calcium carbonate 500 mg twice daily  -Continue comprehensive acute inpatient rehabilitation    Neurologic respiratory impairment  -Consult respiratory therapy  - Oxygen as per guidelines  -Increase DuoNeb 3X daily   -Consider hypertonic saline for mucolytic management  -Check vital capacity     Pneumonia: Bilateral lower lobe, likely  bacterial, productive cough  - Zyvox and Zosyn started on 3/14  -Appreciate hospitalist input  -Aggressive secretion management as above  - Given productive cough will place on droplet precaution    Neurogenic bladder  -voiding trial, if can't void in 6 hours or prn check pvr and if >500cc then ICP,if able to void then check PVR, if PVR is >200cc then ICP     Neurogenic bowel  -Upper motor neuron neurogenic bowel program with senna 2 tablets q. noon, Colace 200 mg twice daily and Dulcolax suppository with digital stimulation     Potential for orthostatic hypotension  Because of significant autonomic dysfunction associated with spinal cord injury, the patient is at high risk for orthostatic hypotension.  - Midodrine 5mg q4 hours prn SBP <100     Dysphagia: Previous diagnosis after traumatic brain injury, high risk after cervical spinal cord injury  -Consults speech therapy to evaluate for swallow study     Pain:  #Neuropathic pain:  New bilateral lower extremity pins-and-needles  - Increase gabapentin 300 mg twice daily, including dose, dose limited by GFR  -Vitamin C 500 mg every 12 hours, which is been shown to improve gabapentin absorption and pain management     Postoperative pain:  - Oxycodone 5-10 mg every 3 hours as needed pain  - Robaxin 750 mg every 8 hours as needed pain  -DC scheduled Tylenol secondary to pneumonia, can mask fever     Spasticity:  - Start baclofen 5 mg 3 times daily, monitor for fatigue    Anemia: Required IV iron supplementation, hemoglobin of 7.7 on admission  -Epogen 2000 units, Monday Wednesday Friday  -Folic acid 5 mg daily  - Ferrous sulfate 325 mg twice daily  -Check CBC in a.m.    Diabetes:  -Lantus 16 units nightly  -Check fingersticks q. before meals, nightly  -Sliding scale insulin  - Pioglitazone 30 mg a daily  - ADA diet  -Consult hospitalist      Acute on chronic kidney injury: Previously stage IV kidney disease with bilateral hydronephrosis suggestive of post renal  obstruction, likely worsened by neurogenic bladder.  BUN/creatinine of 49/2.5  -Check BMP in a.m.  -Manage neurogenic bladder as above     Hyponatremia: Sodium of 136 on admission  -Sodium bicarb tablets 650 mg 3 times a day     Hyperlipidemia: Triglyceride 65, HDL 31, LDL 37  -Simvastatin 20 mg nightly     Vitamin deficiency  In the posttraumatic setting, there is a high likelihood of vitamin D deficiency.   Vitamin D level on admission of 47, goal of >30  - DC vitamin D supplementation     DVT prophylaxis: Patient at increased risk for VTE formation with neurologic compromise/myelopathy  -Heparin 5000 units every 8 hours      IDT Team Meeting 3/16/2020    IRamu D.O., was present and led the interdisciplinary team conference on 3/16/2020.  I led the IDT conference and agree with the IDT conference documentation and plan of care as noted below.     RN:  Diet Nectar thick, level 2 liquids   % Meal     Pain Neck pain and leg pain   Sleep    Bowel    Bladder frequency   In's & Out's        PT:  Bed Mobility    Transfers  Max A   Mobility  Max A   Stairs NT   No PT over the weekend because of PNA  Only able to walk 32 feet today    Barriers: pain and spasticity in LE, working with medications, precautions    OT:  Eating    Grooming    Bathing Max A   UB Dressing Mod A   LB Dressing Mod A   Toileting Min A   Shower & Transfer Max A   Decrease in function with PNA    SLP:  - 1/3 goals which was the MBS  - aspiration with thin liquids  Difficulty with mixed textures  [place], worse with droplet precautions     - carry over poor    Rec:  - pending    CM:  Continues to work on disposition and DME needs.      DC/Disposition:  Lives in [facility] living apt, hates the [facility]WW, has been to SNF in the past. Sister can provide transportation but no physical assist.     Grab bars, raised toilet seat    Discharge 3/31      Patient was seen for 36 minutes on unit/floor of which > 50% of time was spent on counseling and  coordination of care regarding the above, including prognosis, risk reduction, benefits of treatment, and options for next stage of care including neuropathic pain, increase gabapentin to 300 mg twice daily, high risk medication, spasticity, start baclofen 5 mg 3 times daily, pneumonia, antibiotic management discussion with hospitalist and patient on droplet precaution, coordination of care during IDT meeting.        Ramu Garner D.O.

## 2020-03-16 NOTE — CARE PLAN
Problem: Balance  Goal: STG-Within one week, patient will  Description: 1) Individualized goal:  Perform 5x STS with 4ww from standard height chair in less than 60seconds  2) Interventions:  PT Group Therapy, PT Gait Training, PT Therapeutic Exercises, PT Neuro Re-Ed/Balance, PT Therapeutic Activity, PT Manual Therapy and PT Evaluation     Outcome: NOT MET     Problem: Mobility  Goal: STG-Within one week, patient will ambulate community distances  Description: 1) Individualized goal:  150ft x  1 4ww SBA  2) Interventions:  PT Group Therapy, PT Gait Training, PT Therapeutic Exercises, PT Neuro Re-Ed/Balance, PT Therapeutic Activity, PT Manual Therapy and PT Evaluation   Outcome: NOT MET     Problem: Mobility Transfers  Goal: STG-Within one week, patient will perform bed mobility  Description: 1) Individualized goal: SPV with HOB flat and no VCs for precautions  2) Interventions:  PT Group Therapy, PT Gait Training, PT Therapeutic Exercises, PT Neuro Re-Ed/Balance, PT Therapeutic Activity, PT Manual Therapy and PT Evaluation     Outcome: NOT MET  Goal: STG-Within one week, patient will transfer bed to chair  Description: 1) Individualized goal: 4ww SPV SPT  2) Interventions:  PT Group Therapy, PT Gait Training, PT Therapeutic Exercises, PT Neuro Re-Ed/Balance, PT Therapeutic Activity, PT Manual Therapy and PT Evaluation   Outcome: NOT MET   *Goals we all set Friday at eval; no PT treatment has been conducted since that time

## 2020-03-16 NOTE — THERAPY
"Recreational Therapy  Daily Treatment     Patient Name: Vince Almanzar  AGE:  79 y.o., SEX:  male  Medical Record #: [id number]  Today's Date: 3/16/2020       Subjective    \"It just keeps coming.\" Pt was speaking on how he felt that a lot of sickness coming back to back are frustrating him.      Objective       03/16/20 1301   Functional Ability Status - Cognitive   Attention Span Remains on Task   Comprehension Follows Three Step Commands   Judgment Able to Make Independent Decisions   Functional Ability Status - Emotional    Affect Appropriate   Mood Appropriate   Behavior Appropriate   Skilled Intervention    Skilled Intervention Cognitive Leisure;Relaxation / Coping Skills   Skilled Intervention Comments [place] a new card game during session time.    Interdisciplinary Plan of Care Collaboration   IDT Collaboration with  Nursing   Patient Position at End of Therapy Seated;Family / Friend in Room   Collaboration Comments Nursing giving medication, niece attended session.    Treatment Time   Total Time Spent (mins) 30   Procedural Tracking   Procedural Tracking Cognitive Skills Training       Assessment    Pt was met in his room with nurse and his niece came in with this writer and attended full session. Education on PPE for family member. Pt remained on task without issue.       Plan    Gross motor duel task.   "

## 2020-03-16 NOTE — CARE PLAN
Problem: Safety  Goal: Will remain free from injury  Outcome: PROGRESSING AS EXPECTED  Note: Pt uses call light  appropriately. Waits for assistance and does not attempt self transfer this shift. Able to verbalize needs.      Problem: Bowel/Gastric:  Goal: Normal bowel function is maintained or improved  Outcome: PROGRESSING AS EXPECTED  Note: Pt on nightly bowel program. Up in the shower chair. Magic bullet inserted with digital stimulation x 2 resulted to 1 loose black medium stool. Will continue to monitor.     Problem: Pain Management  Goal: Pain level will decrease to patient's comfort goal  Outcome: PROGRESSING AS EXPECTED  Note: Roxicodone 10mg given PRN per MAR for c/o back pain with adequate relief. Pt sleeps good. Will continue to monitor.

## 2020-03-16 NOTE — CARE PLAN
Problem: Problem Solving STGs  Goal: STG-Within one week, patient will  Description: 1) Individualized goal:  Complete alternating attn tasks with 80% accuracy given min verbal cues.  2) Interventions:  SLP Cognitive Skill Development and SLP Group Treatment     Outcome: NOT MET  Note: 78% with mod A  Goal: STG-Within one week, patient will  Description: 1) Individualized goal:  Recall/utilize swallow strategies, safety precautions 80% Sarah.   2) Interventions:  SLP Cognitive Skill Development and SLP Group Treatment     Outcome: NOT MET  Note: Mod A needed for recall of strategies,safety sequencing.     Problem: Memory STGs  Goal: STG-Within one week, patient will  Description: 1) Individualized goal:  Recall daily events with 80% accuracy with use of RWAVS and memory book.    2) Interventions:  SLP Cognitive Skill Development and SLP Group Treatment     Outcome: NOT MET  Note: To be addressed.     Problem: Swallowing STGs  Goal: STG-Within one week, patient will  Description: 1) Individualized goal:  Participate in modified barium swallow study.  2) Interventions:  SLP Video Swallow Evaluation     Outcome: MET  Note: MBSS scheduled 3/16/20 at 10:00.  Report and recommendations to follow.

## 2020-03-16 NOTE — THERAPY
Speech Language Pathology  Daily Treatment     Patient Name: Vince Almanzar  Age:  79 y.o., Sex:  male  Medical Record #: [id number]  Today's Date: 3/16/2020     Subjective    Patient in bed.  Patient was assisted out of bed by CNA and therapist.  Patient seen in room with breakfast meal.     Objective       03/16/20 0815   Dysphagia    Diet / Liquid Recommendation Mildly Thick (2) - (Nectar Thick);Puree (4)   Nutritional Liquid Intake Rating Scale Thickened beverages (mildly thick unless otherwise specified)   Nutritional Food Intake Rating Scale Total oral diet of a single consistency   SLP Total Time Spent   SLP Individual Total Time Spent (Mins) 45   Charge Group   SLP Swallowing Dysfunction Treatment Swallowing Dysfunction Treatment       FIM Eating Score:  5 - Standby Prompting/Supervision or Set-up  Eating Description:  Modified diet, Supervision for safety, Verbal cueing, Staff administers tube feed/parenteral nutrition/IVF    FIM Comprehension Score:  5 - Stand-by Prompting/Supervision or Set-up  Comprehension Description:  Verbal cues(hearing loss, does not have hearing aids)    FIM Expression Score:  5 - Stand-by Prompting/Supervision or Set-up  Expression Description:  Verbal cueing    FIM Social Interaction Score:  6 - Modified Independent  Social Interaction Description:  Increased time, Verbal cues    FIM Problem Solving Score:  4 - Minimal Assistance  Problem Solving Description:  Verbal cueing, Therapy schedule, Bed/chair alarm, Increased time, Seat belt    FIM Memory Score:  3 - Moderate Assistance  Memory Description:  Verbal cueing, Therapy schedule, Seat belt      Assessment    Patient displayed frequent productive coughing before, during, and after meal.  He currently receives (level 4/puree/ dysphagia 1) and ( level 2/ mildly thick/nectar thick) textures.  He demonstrated increased coughing post swallow of his pureed [place] of [place] toast which was a bit firmer and more bread like than a typical  tamir.   Patient displays some strain to initiate the swallow.  In his strain, he was noted to part his lips during the swallow.  Tongue base retraction appeared weak.   Patient needed frequent verbal cues to slow rate of intake, small bolus size use of double swallows, and sealing of lips during swallow.  He also benefitted from alternating liquids and solids.    Plan    MBSS with goals as appropriate.

## 2020-03-16 NOTE — REHAB-PT IDT TEAM NOTE
Physical Therapy   Mobility  Bed mobility:   4  Bed /Chair/Wheelchair Transfer Initial:  4 - Minimal Assistance  Bed /Chair/Wheelchair Transfer Current:  4 - Minimal Assistance   Bed/Chair/Wheelchair Transfer Description:  Increased time, Supervision for safety, Verbal cueing, Set-up of equipment, Initial preparation for task(Min assist to transfer to/from EOB sitting and manual wc)  Walk Initial:  2 - Max Assistance  Walk Current:  2 - Max Assistance   Walk Description:  Assist device/equipment(115ft x 1 4ww CGA)  Wheelchair Initial:  2 - Max Assistance  Wheelchair Current:  2 - Max Assistance   Wheelchair Description:  (85ft x 1, B LE, SPV)  Stairs Initial:  0 - Not tested,unsafe activity  Stairs Current: 0 - Not tested,unsafe activity   Stairs Description:    Patient/Family Training/Education:  Hand hygiene, to cover cough, CLOF, use of 4ww, pacing strategies, spinal precautions, prevention of pressure injuries  DME/DC Recommendations:  Pt owns 4ww, anticipate outpatient PT  Strengths:  Manages pain appropriately, Motivated for self care and independence, Pleasant and cooperative, Supportive family and Willingly participates in therapeutic activities  Barriers:   Decreased endurance, Emotional lability, Generalized weakness, Limited mobility, Poor activity tolerance and Poor balance  # of short term goals set= 4  # of short term goals met= 0 (goals set Friday at eval, no PT treatments conducted over weekend)  Physical Therapy Problems     Problem: Balance     Dates: Start: 03/13/20       Description:     Goal: STG-Within one week, patient will     Dates: Start: 03/13/20       Description: 1) Individualized goal:  Perform 5x STS with 4ww from standard height chair in less than 60seconds  2) Interventions:  PT Group Therapy, PT Gait Training, PT Therapeutic Exercises, PT Neuro Re-Ed/Balance, PT Therapeutic Activity, PT Manual Therapy and PT Evaluation                   Problem: Mobility     Dates: Start: 03/13/20        Description:     Goal: STG-Within one week, patient will ambulate community distances     Dates: Start: 03/13/20       Description: 1) Individualized goal:  150ft x  1 4ww SBA  2) Interventions:  PT Group Therapy, PT Gait Training, PT Therapeutic Exercises, PT Neuro Re-Ed/Balance, PT Therapeutic Activity, PT Manual Therapy and PT Evaluation                 Problem: Mobility Transfers     Dates: Start: 03/13/20       Description:     Goal: STG-Within one week, patient will perform bed mobility     Dates: Start: 03/13/20       Description: 1) Individualized goal: SPV with HOB flat and no VCs for precautions  2) Interventions:  PT Group Therapy, PT Gait Training, PT Therapeutic Exercises, PT Neuro Re-Ed/Balance, PT Therapeutic Activity, PT Manual Therapy and PT Evaluation             Goal: STG-Within one week, patient will transfer bed to chair     Dates: Start: 03/13/20       Description: 1) Individualized goal: 4ww SPV SPT  2) Interventions:  PT Group Therapy, PT Gait Training, PT Therapeutic Exercises, PT Neuro Re-Ed/Balance, PT Therapeutic Activity, PT Manual Therapy and PT Evaluation                 Problem: PT-Long Term Goals     Dates: Start: 03/13/20       Description:     Goal: LTG-By discharge, patient will ambulate     Dates: Start: 03/13/20       Description: 1) Individualized goal:  50ft x 3 4ww mod I, 400ft x 1 4ww SPV  2) Interventions:  PT Group Therapy, PT Gait Training, PT Therapeutic Exercises, PT Neuro Re-Ed/Balance, PT Therapeutic Activity, PT Manual Therapy and PT Evaluation             Goal: LTG-By discharge, patient will transfer one surface to another     Dates: Start: 03/13/20       Description: 1) Individualized goal: mod I 4ww  2) Interventions:  PT Group Therapy, PT Gait Training, PT Therapeutic Exercises, PT Neuro Re-Ed/Balance, PT Therapeutic Activity, PT Manual Therapy and PT Evaluation             Goal: LTG-By discharge, patient will perform home exercise program     Dates: Start:  03/13/20       Description: 1) Individualized goal:  Mod I for B LE strengthening/ standing balance   2) Interventions:  PT Group Therapy, PT Gait Training, PT Therapeutic Exercises, PT Neuro Re-Ed/Balance, PT Therapeutic Activity, PT Manual Therapy and PT Evaluation             Goal: LTG-By discharge, patient will transfer in/out of a car     Dates: Start: 03/13/20       Description: 1) Individualized goal: 4ww SPV   2) Interventions:  PT Group Therapy, PT Gait Training, PT Therapeutic Exercises, PT Neuro Re-Ed/Balance, PT Therapeutic Activity, PT Manual Therapy and PT Evaluation                           Section completed by:  Louise Gayle, PT

## 2020-03-16 NOTE — THERAPY
Speech Language Pathology  Video Swallow     Patient Name:  Vince Almanzar  AGE:  79 y.o., SEX:  male  Medical Record #:  [id number]  Today's Date: 3/16/2020     Objective       03/16/20 1001   History / Background Information   Prior Level of Function for Eating / Swallowing WFL   Diagnosis C3-C6 laminectomy and posterior decompression.   Onset Date Of Dysphagia 2/28/20   Dysphagia Symptoms Warranting Video Swallow Increased WBC count, cough, sustected aspiration pneumonia    Dentition Intact   Procedure   Patient Seated in  wheelchair    Seated at (Degrees) 90   Views Completed Lateral;Anterior / Posterior   Fluoroscopy Time 160.9 sec    Consistencies / Presentation Method   Consistencies / Presentation Method Tested   Mildly Think (2) - (Nectar Thick) Teaspoon;Cup   Thin (0) Teaspoon;Cup   Pureed (4) Teaspoon   Soft & Bite-Sized (6) - (Dysphagia III) Teaspoon   Regular (7) Teaspoon   Barium Tablet Teaspoon  (floated in puree)   Oral Phase   Oral Phase X   Mildly Thick (2) - (Nectar Thick) Oral Residue After the Swallow;Premature Spillage Into Valleculae   Thin (0) Premature Spillage Into Pyriform Sinus   Pureed (4) Premature Spillage Into Valleculae   Soft & Bite-Sized (6) - (Dysphagia III) Premature Spillage Into Valleculae   Regular (7) Premature Spillage Into Valleculae   Pharyngeal Phase   Pharyngeal Phase X   Mildly Thick (2) - (Nectar Thick) Residue In Valleculae   Thin (0) Residue In Valleculae;Residue In Pyriform Sinus;Penetration During Swallow;Aspiration After Swallow;Absent Cough Response to Penetration / Aspiration   Pureed (4) Residue In Valleculae   Soft & Bite-Sized (6) - (Dysphagia III) Residue In Valleculae   Esophageal Phase   Esophageal Phase WDL   Compensatory Strategies Attempted   Compensatory Strategies Attempted Yes   Three Second Prep Effective to prevent aspiration of cup sips of thin liquids x2 trials    Impression   Dysphagia Present Yes   Oral - Pharyngeal Mild - Moderate  Impairment   Prognosis   Prognosis for Improvement Good   Barriers to Improvement Silent aspiration   Positive Indicators for Improvement Small amount of silent aspiration, able to follow strategies to prevent aspiration   Recommendations   Diet / Liquid Recommendation Mildly Thick (2) - (Nectar Thick);Minced & Moist (5) - (Dysphagia II)   Medication Administration  Float Whole with Puree   Strategies / Precautions Small Bites;Small Sips;No Straws;No Talking while Eating;Multiple Swallows;Sitting Upright at 90 Degrees while Eating;Stay Upright at Least 30 Minutes After Meals;Oral Care After Meals;Monitor Temperature and Lung Sounds   Interventions Dysphagia Therapy by SLP;Compensatory Safe Swallow Strategy Training;Therapeutic Dining Program for Meals;Pharyngeal - Laryngeal Strengthening Exercises;Patient / Caregiver Education / Training;Follow Up Video Swallow   SLP Contact Information (Name / Extension) Brian Coffman MS, [facility]SLP/ ex [phone]   Video Tape Number [id number]   SLP Total Time Spent   SLP Individual Total Time Spent (Mins) 30   Charge Group   SLP Video Swallow / FEES Videofluoroscopic Evaluation       Assessment    MBSS completed.  Pt trialed 2- Mildly Thick (Nectar Thick) liquids; 0-Thin liquids; 4- Puree (Dysphagia 1) textures; 6- Soft & Bite Sized (Dysphagia 3) textures; 7- Regular Textures and a barium capsule floated in applesauce.  Pt presented with mild-moderate oral-pharyngeal dysphagia characterized by the followin- Mildly Thick (Nectar Thick) Liquids- Pt demonstrated premature spillage to the valleculae with trace oral and trace vallecular residue that pt was able to clear with a second swallow.  0-Thin liquids- Pt demonstrated premature spillage to the pyriforms with trace vallecular and pyriform residue and deep penetration with a small amount reaching the vocal cords with cup sips of thin liquids.  This liquid remained coating the underside of the epiglottis during this study and on 2 occasions  this residue was aspirated during trials of other textures (during trials of pears and a cookie).  Both instances of aspiration were silent.  Pt was able to implement a 3 second prep with cup sips of thin liquids on 2 occasions during this swallow study and tolerated thin liquids without aspiration.  4- Puree (Dysphagia 1) textures- Pt demonstrated premature spillage to the valleculae and trace vallecular residue after the initial swallow.  6- Soft & Bite Sized (Dysphagia 3) and 7- Regular Textures   - Pt demonstrated premature spillage to the valleculae and mild vallecular residue after the initial swallow.  Pt was noted to aspirate on a small amount of residue from a previous thin liquid trial during trials of pears and cookies.      Slightly weak tongue base retraction noted along with incomplete laryngeal vestibule closure and incomplete pharyngeal stripping wave.          Plan    Recommend pt's diet be upgraded to 5- Minced & Moist (Dysphagia 2) textures with 2- Mildly Thick (Nectar Thick) liquids.  Medications can be given whole floated in puree.  Pt would benefit from effortful swallows to target the above mentioned areas of weakness.

## 2020-03-16 NOTE — REHAB-NURSING IDT TEAM NOTE
Nursing   Nursing  Diet/Nutrition:  Diabetic, Renal and Nectar Thick Liquids  Medication Administration:  Crush all Medications in Puree  % consumed at meals in last 24 hours:  Consumed 25-50% of meals per documentation.  Meal/Snack Consumption for the past 24 hrs:   Oral Nutrition   03/15/20 1230 Lunch;Between 25-50% Consumed   03/15/20 0900 Breakfast;Between 25-50% Consumed     Snack schedule:  HS  Supplement:  none  Appetite:  Good  Fluid Intake/Output in past 24 hours: In: 360 [P.O.:360]  Out: 1950   Admit Weight:  Weight: 75.8 kg (167 lb)  Weight Last 7 Days   [72.8 kg (160 lb 8 oz)-79.5 kg (175 lb 4.3 oz)] 72.8 kg (160 lb 8 oz) (03/15 0521)    Pain Issues:    Location:  Leg (03/15 2103)  Right (03/15 2103)         Severity:  Mild   Scheduled pain medications:  None     PRN pain medications used in last 24 hours:  oxycodone immediate release (ROXICODONE)    Non Pharmacologic Interventions:  warm blanket, distraction, emotional support, food, heat, relaxation, repositioned and rest  Effectiveness of pain management plan:  good=patient states acceptable comfort after interventions    Bowel:    Bowel Assist Initial Score:  4 - Minimal Assistance  Bowel Assist Current Score:  1 - Total Assistance  Bowl Accidents in last 7 days:     Last bowel movement: 03/15/20  Stool Description: Medium, Loose, Black     Usual bowel pattern:  daily  Scheduled bowel medications:  docusate sodium (COLACE) and senna-docusate (PERICOLACE or SENOKOT S) : Magic bullet  PRN bowel medications used in last 24 hours:  None  Nursing Interventions:  Increased time, Scheduled medication, Supervision, Verbal cueing, Set-up, Positioning on commode/toilet, Brief, Pad, Digital stimulation, Suppository  Effectiveness of bowel program:   good=regular, predictable, controlled emptying of bowel  Bladder:    Bladder Assist Initial Score:  5 - Standby Prompting/Supervision or Set-up  Bladder Assist Current Score:  4 - Minimal Assistance  Bladder  Accidents in last 7 days:     PVR range for past 24-48 hours: --  Intermittent Catheterization:  0  Medications affecting bladder:  None    Time void schedule/voiding pattern:  Voiding every 2-4 hours  Interventions:  Increased time, Supervision, Verbal cueing, Emptying of device, Urinal, Pad, Brief, Time void patient initiated  Effectiveness of bladder training:  Fair=occasional unpredictable, incontinent emptying of bladder (< 1 time/day)      Diabetes:  Blood Sugar Frequency:  Before meals and at bedtime    Range of BS for last 48 hours:     Recent Labs     03/14/20  0729 03/14/20  1119 03/14/20  1700 03/14/20  2122 03/15/20  0756 03/15/20  1132 03/15/20  1728 03/15/20  2106   POCGLUCOSE 146* 144* 107* 116* 79 76 150* 250*     Scheduled diabetic medications:  None  Sliding scale usage in past 24 hours:  Yes  Total Short acting insulin in the past 24 hours:  Humalog 2units  Total Long acting insulin in the past 24 hours:  None    Wound:         Patient Lines/Drains/Airways Status    Active Current Wounds     Name: Placement date: Placement time: Site: Days:    Wound 03/12/20 Pressure Injury Neck Anterior Left  03/12/20   1543   Neck  3    Wound 03/12/20 Incision Neck Posterior  03/12/20   1545   Neck  3    Wound Other (comment) Heel Right scar from resolved pressure injury  no documentation   no documentation   Heel      Wound Pressure Injury Heel Left scar from resolved pressure injury  no documentation   no documentation   Heel      Wound Partial Thickness Wound Buttocks;Sacrum Bilateral moisture related callusing with small area of denuded tissue  no documentation   no documentation   Buttocks;Sacrum                     Interventions:  Moon boots to float heels; mepilex dressing to heels changed daily or as needed  Effectiveness of intervention:  wound is improving         Sleep/wake cycle:   Average hours slept:  Sleeps 4-6 hours without waking  Sleep medication usage:  Declines    Patient/Family  Training/Education:  Aspiration Precautions, Bladder Management/Training, Bowel Management/Training, Diabetes Management, Diet/Nutrition, Fall Prevention, General Self Care, Medication Management, Pain Management, Respiratory Hygiene, Safe Transfers, Safety, Skin Care and Wound Care  Discharge Supply Recommendations:  Glucometer and Strips, Blood Pressure Monitor and Wound Care Supplies  Strengths: Alert and oriented, Willingly participates in therapeutic activities, Able to follow instructions, Supportive family, Pleasant and cooperative, Effective communication skills, Good carryover of learning, Motivated for self care and independence, Good endurance and Manages pain appropriately   Barriers:   Aspiration risk, Constipation, Fatigue, Generalized weakness, Limited mobility and Other:  Hard of hearing       Nursing Problems     Problem: Bowel/Gastric:     Description:     Goal: Normal bowel function is maintained or improved     Description:           Goal: Will not experience complications related to bowel motility     Description:                 Problem: Communication     Description:     Goal: The ability to communicate needs accurately and effectively will improve     Description:                 Problem: Discharge Barriers/Planning     Description:     Goal: Patient's continuum of care needs will be met     Description:                 Problem: Infection     Description:     Goal: Will remain free from infection     Description:                 Problem: Knowledge Deficit     Description:     Goal: Knowledge of disease process/condition, treatment plan, diagnostic tests, and medications will improve     Description:           Goal: Knowledge of the prescribed therapeutic regimen will improve     Description:                 Problem: Pain Management     Description:     Goal: Pain level will decrease to patient's comfort goal     Description:                 Problem: Safety     Description:     Goal: Will  remain free from injury     Description:           Goal: Will remain free from falls     Description:                 Problem: Skin Integrity     Description:     Goal: Risk for impaired skin integrity will decrease     Description:                 Problem: Urinary Elimination:     Description:     Goal: Ability to reestablish a normal urinary elimination pattern will improve     Description:                 Problem: Venous Thromboembolism (VTW)/Deep Vein Thrombosis (DVT) Prevention:     Description:     Goal: Patient will participate in Venous Thrombosis (VTE)/Deep Vein Thrombosis (DVT)Prevention Measures     Description:     Flowsheet:     Taken at 03/14/20 0598    Pharmacologic Prophylaxis Used Unfractionated Heparin by  Princess Kiara Pelaez R.N.                             Long Term Goals:   At discharge patient will be able to function safely at home and in the community with support.    Section completed by:  Codi Rodriguez R.N.

## 2020-03-16 NOTE — REHAB-OT IDT TEAM NOTE
Occupational Therapy   Activities of Daily Living  Eating Initial:  5 - Standby Prompting/Supervision or Set-up  Eating Current:  5 - Standby Prompting/Supervision or Set-up   Eating Description:  Increased time, Modified diet, Supervision for safety, Verbal cueing  Grooming Initial:  5 - Standby Prompting/Supervision or Set-up  Grooming Current:  5 - Standby Prompting/Supervision or Set-up   Grooming Description:  Increased time, Supervision for safety, Verbal cueing, Set-up of equipment, Initial preparation for task(wc level at sink side for groom/hygiene tasks - set up and additional time - reviewed shanging out pads for c-collar)  Bathing Initial:  3 - Moderate Assistance  Bathing Current:  2 - Max Assistance   Bathing Description:  Grab bar, Tub bench, Hand held shower, Increased time, Supervision for safety, Verbal cueing, Set-up of equipment, Initial preparation for task(Min assist in stand via grab bar w/ Total assist for stefanie, Max assist for distal BLE's, Total assist for back, Pt Mod I for anterior trunk and BUE's.  Noted significant BM in shower while standing via grab bar)  Upper Body Dressing Initial:  3 - Moderate Assistance  Upper Body Dressing Current:  3 - Moderate Assistance   Upper Body Dressing Description:  (Pt Total assist to manage cervical collar, Min assist to thread BUE's through sleeves, assist to pull shirt over head and down back)  Lower Body Dressing Initial:  3 - Moderate Assistance  Lower Body Dressing Current:  3 - Moderate Assistance   Lower Body Dressing Description:  3 - Moderate Assistance  Toileting Initial:  3 - Moderate Assistance  Toileting Current:  2 - Max Assistance   Toileting Description:  Grab bar, Increased time, Supervision for safety, Verbal cueing, Set-up of equipment, Initial preparation for task(Mod assist sit/stand via grab bar, Mod assist to manage pants, Total assist for post BM toilet hygiene)  Toilet Transfer Initial:  4 - Minimal Assistance  Toilet Transfer  Current:  4 - Minimal Assistance   Toilet Transfer Description:  4 - Minimal Assistance  Tub / Shower Transfer Initial:  4 - Minimal Assistance  Tub / Shower Transfer Current:  4 - Minimal Assistance   Tub / Shower Transfer Description:  Grab bar, Increased time, Supervision for safety, Verbal cueing, Set-up of equipment, Initial preparation for task(Min assist to transfer to/from manual wc and shower bench via grab bar)  IADL:  Not yet addressed; new patient   Family Training/Education:  Not yet completed; new patient   DME/DC Recommendations:  Anticipate HH OT f/u, patient already owns tub transfer bench and grab bars     Strengths:  Able to follow instructions, Alert and oriented, Effective communication skills, Independent PLOF, Motivated for self care and independence, Pleasant and cooperative and Willingly participates in therapeutic activities  Barriers:  Decreased endurance, Generalized weakness, Limited mobility and Other: Bilateral shoulder weakness, decreased coordination, cervical spine precautions      # of short term goals set= 2  # of short term goals met= 0     Occupational Therapy Goals     Problem: Bathing     Dates: Start: 03/13/20       Description:     Goal: STG-Within one week, patient will bathe     Dates: Start: 03/13/20       Description: 1) Individualized Goal:  Minimal assist with AE as needed  2) Interventions:  OT Orthotics Training, OT Group Therapy, OT Self Care/ADL, OT Cognitive Skill Dev, OT Community Reintegration, OT Manual Ther Technique, OT Neuro Re-Ed/Balance, OT Sensory Int Techniques, OT Therapeutic Activity, OT Evaluation and OT Therapeutic Exercise                   Problem: Dressing     Dates: Start: 03/13/20       Description:     Goal: STG-Within one week, patient will dress UB     Dates: Start: 03/13/20       Description: 1) Individualized Goal: Minimal assist   2) Interventions: OT Orthotics Training, OT Group Therapy, OT Self Care/ADL, OT Cognitive Skill Dev, OT  Community Reintegration, OT Manual Ther Technique, OT Neuro Re-Ed/Balance, OT Sensory Int Techniques, OT Therapeutic Activity, OT Evaluation and OT Therapeutic Exercise                   Problem: OT Long Term Goals     Dates: Start: 03/13/20       Description:     Goal: LTG-By discharge, patient will complete basic self care tasks     Dates: Start: 03/13/20       Description: 1) Individualized Goal: Supervision level  2) Interventions: OT Orthotics Training, OT Group Therapy, OT Self Care/ADL, OT Cognitive Skill Dev, OT Community Reintegration, OT Manual Ther Technique, OT Neuro Re-Ed/Balance, OT Sensory Int Techniques, OT Therapeutic Activity, OT Evaluation and OT Therapeutic Exercise             Goal: LTG-By discharge, patient will perform bathroom transfers     Dates: Start: 03/13/20       Description: 1) Individualized Goal: Supervision level     2) Interventions: OT Orthotics Training, OT Group Therapy, OT Self Care/ADL, OT Cognitive Skill Dev, OT Community Reintegration, OT Manual Ther Technique, OT Neuro Re-Ed/Balance, OT Sensory Int Techniques, OT Therapeutic Activity, OT Evaluation and OT Therapeutic Exercise             Goal: LTG-By discharge, patient will complete basic home management     Dates: Start: 03/13/20       Description: 1) Individualized Goal: CGA to supervision level     2) Interventions: OT Orthotics Training, OT Group Therapy, OT Self Care/ADL, OT Cognitive Skill Dev, OT Community Reintegration, OT Manual Ther Technique, OT Neuro Re-Ed/Balance, OT Sensory Int Techniques, OT Therapeutic Activity, OT Evaluation and OT Therapeutic Exercise                         Section completed by:  Nga Allen MS,OTR/L

## 2020-03-16 NOTE — REHAB-SLP IDT TEAM NOTE
Speech Therapy   Cognitive Linquistic Functions  Comprehension Initial:  5 - Stand-by Prompting/Supervision or Set-up  Comprehension Current:  5 - Stand-by Prompting/Supervision or Set-up   Comprehension Description:  Verbal cues(hearing loss, does not have hearing aids)  Expression Initial:  5 - Stand-by Prompting/Supervision or Set-up  Expression Current:  5 - Stand-by Prompting/Supervision or Set-up   Expression Description:  Verbal cueing  Social Interaction Initial:  5 - Stand-by Prompting/Supervision or Set-up  Social Interaction Current:  6 - Modified Independent   Social Interaction Description:  Increased time, Verbal cues  Problem Solving Initial:  4 - Minimal Assistance  Problem Solving Current:  4 - Minimal Assistance   Problem Solving Description:  Verbal cueing, Therapy schedule, Bed/chair alarm, Increased time, Seat belt  Memory Initial:  3 - Moderate Assistance  Memory Current:  3 - Moderate Assistance   Memory Description:  Verbal cueing, Therapy schedule, Seat belt  Executive Functioning / Organization Initial:     Executive Functioning / Organization Current:      Executive Functioning Desciption: TBA  Swallowing  Swallowing Status:  Patient currently tolerates dysphagia 1/puree/level 4 with frequent coughing.  Also demonstrates coughing with level  2 mildly thick liquids, although with less frequency.   Patient will have received a scheduled MBSS at 10:00 on 3/16/20 with report and recommendations to follow.  Orders Placed This Encounter   Procedures   • Diet Order Renal (NO FISH), Diabetic     Standing Status:   Standing     Number of Occurrences:   1     Order Specific Question:   Diet:     Answer:   Renal [8]     Comments:   NO FISH     Order Specific Question:   Diet:     Answer:   Diabetic [3]     Order Specific Question:   Texture Modifier     Answer:   Level 4 - Pureed (Dysphagia 1)     Order Specific Question:   Liquid level     Answer:   Level 2 - Mildly Thick     Behavior Modification  Plan  Keep instructions simple/concrete, Give clear feedback, Set clear goals, Redirect to task/topic, Decrease the chance of failure by offering activities that are within the patient's abilities and Analyze tasks (break down into smaller steps)  Assistive Technology  Low tech: Calendar, planner, schedule, alarms/timers, pill organizer, post-it notes, lists  Family Training/Education:  To be scheduled.  DC Recommendations:  Anticipate patient will benefit from additional ST services upon discharge from this facility.  Strengths:  Able to follow instructions, Alert and oriented, Motivated for self care and independence, Pleasant and cooperative and Willingly participates in therapeutic activities  Barriers:  Decreased endurance, Generalized weakness and Other: hearing loss, impaired self monitoring and awareness, impaired recall and carry over of new training.  # of short term goals set=  3  # of short term goals met= 1  Speech Therapy Problems     Problem: Memory STGs     Dates: Start: 03/13/20       Description:     Goal: STG-Within one week, patient will     Dates: Start: 03/13/20       Description: 1) Individualized goal:  Recall daily events with 80% accuracy with use of RWAVS and memory book.    2) Interventions:  SLP Cognitive Skill Development and SLP Group Treatment       Note:     Goal Note filed on 03/16/20 1047 by Jazmine Alcocer MS,CCC-SLP    To be addressed.                        Problem: Problem Solving STGs     Dates: Start: 03/13/20       Description:     Goal: STG-Within one week, patient will     Dates: Start: 03/13/20       Description: 1) Individualized goal:  Complete alternating attn tasks with 80% accuracy given min verbal cues.  2) Interventions:  SLP Cognitive Skill Development and SLP Group Treatment       Note:     Goal Note filed on 03/16/20 1047 by Jazmine Alcocer MS,CCC-SLP    78% with mod A                  Goal: STG-Within one week, patient will     Dates: Start: 03/13/20        Description: 1) Individualized goal:  Recall/utilize swallow strategies, safety precautions 80% Saarh.   2) Interventions:  SLP Cognitive Skill Development and SLP Group Treatment       Note:     Goal Note filed on 03/16/20 1047 by Jazmine Alcocer MS,CCC-SLP    Mod A needed for recall of strategies,safety sequencing.                        Problem: Speech/Swallowing LTGs     Dates: Start: 03/13/20       Description:     Goal: LTG-By discharge, patient will safely swallow     Dates: Start: 03/13/20       Description: 1) Individualized goal:  Regular textures and thin liquids without aspiration.  2) Interventions:  SLP Swallowing Dysfunction Treatment, SLP Oral Pharyngeal Evaluation and SLP Video Swallow Evaluation             Goal: LTG-By discharge, patient will solve complex problem     Dates: Start: 03/13/20       Description: 1) Individualized goal:  Gokul for safe discharge home.  2) Interventions:  SLP Aphasia Evaluation, SLP Cognitive Skill Development and SLP Group Treatment                   Problem: Swallowing STGs     Dates: Start: 03/16/20       Description:     Goal: STG-Within one week, patient will     Dates: Start: 03/16/20       Description: 1) Individualized goal:  Swallow level 4 puree dysphagia 1 and level 2 mildly thick liquids with no overt s/sx of aspiration for 2/2 meals.  2) Interventions:  SLP Swallowing Dysfunction Treatment and SLP Group Treatment                          Section completed by:  Jazmine Alcocer MS,CCC-SLP

## 2020-03-16 NOTE — REHAB-CM IDT TEAM NOTE
Case Management    DC Planning  DC destination/dispostion: Home alone to [facility] apartment in [place], lives in 2nd floor apartment w/ elevator access. Has 4WW, tub transfer bench, rasied toilet. Previous [facility]. Sister will provide transportation.  Referrals: HH referral.  DC Needs: DME for patient safety & mobility. HH referral. MD f/u appts.  Barriers to discharge: Functional mobility deficits. Lives alone, limited support system.   Strengths: Motivation. Supportive sister. Home accessibility.    Section completed by:  Chrissy Oliveira R.N.

## 2020-03-16 NOTE — RESPIRATORY CARE
Conscious Sedation Respiratory Update    FiO2%: 21 % (03/14/20 1000)  O2 (LPM): 0 (03/16/20 1954)       Events/Summary/Plan: Pt refused svn.  Pt states he does not think he needs it.  Pt did do IS and SpO2 was checked.. (03/16/20 7247)

## 2020-03-16 NOTE — THERAPY
Speech Language Pathology  Daily Treatment     Patient Name: Vince Almanzar  Age:  79 y.o., Sex:  male  Medical Record #: [id number]  Today's Date: 3/16/2020     Subjective    Pt was pleasant and cooperative during this ST session.  Therapy completed in pt's room d/t droplet precautions      Objective       03/16/20 1431   SLP Total Time Spent   SLP Individual Total Time Spent (Mins) 45   Charge Group   SLP Swallowing Dysfunction Treatment Swallowing Dysfunction Treatment       Assessment    Pt completed effortful swallows x100 with puree and 2- Mildly Thick (Nectar Thick) liquids with min cues required for completion.  Pt demonstrated frequent coughing throughout this session; however it did not seem related to PO intake as pt was coughing before, during and after intake.  Pt was able to tolerate 4- Puree (Dysphagia 1) textures and 2- Mildly Thick (Nectar Thick) liquids during his MBSS this morning without difficulty.      Plan    Continue targeting effortful swallows and trialing thin liquids with the use of a 3 second prep.

## 2020-03-17 ENCOUNTER — ANCILLARY PROCEDURE (OUTPATIENT)
Dept: CARDIOLOGY | Facility: REHABILITATION | Age: 80
DRG: 052 | End: 2020-03-17
Attending: PHYSICAL MEDICINE & REHABILITATION
Payer: COMMERCIAL

## 2020-03-17 PROBLEM — D72.829 LEUKOCYTOSIS: Status: ACTIVE | Noted: 2020-03-14

## 2020-03-17 LAB
ANION GAP SERPL CALC-SCNC: 9 MMOL/L (ref 7–16)
BACTERIA SPEC RESP CULT: NORMAL
BACTERIA UR CULT: ABNORMAL
BACTERIA UR CULT: ABNORMAL
BASOPHILS # BLD AUTO: 0.2 % (ref 0–1.8)
BASOPHILS # BLD: 0.01 K/UL (ref 0–0.12)
BUN SERPL-MCNC: 37 MG/DL (ref 8–22)
CALCIUM SERPL-MCNC: 8.5 MG/DL (ref 8.5–10.5)
CHLORIDE SERPL-SCNC: 105 MMOL/L (ref 96–112)
CO2 SERPL-SCNC: 25 MMOL/L (ref 20–33)
CREAT SERPL-MCNC: 2.6 MG/DL (ref 0.5–1.4)
EOSINOPHIL # BLD AUTO: 0.06 K/UL (ref 0–0.51)
EOSINOPHIL NFR BLD: 1.2 % (ref 0–6.9)
ERYTHROCYTE [DISTWIDTH] IN BLOOD BY AUTOMATED COUNT: 58 FL (ref 35.9–50)
GLUCOSE BLD-MCNC: 123 MG/DL (ref 65–99)
GLUCOSE BLD-MCNC: 175 MG/DL (ref 65–99)
GLUCOSE BLD-MCNC: 217 MG/DL (ref 65–99)
GLUCOSE BLD-MCNC: 218 MG/DL (ref 65–99)
GLUCOSE SERPL-MCNC: 145 MG/DL (ref 65–99)
GRAM STN SPEC: NORMAL
HCT VFR BLD AUTO: 24.9 % (ref 42–52)
HGB BLD-MCNC: 7.5 G/DL (ref 14–18)
IMM GRANULOCYTES # BLD AUTO: 0.05 K/UL (ref 0–0.11)
IMM GRANULOCYTES NFR BLD AUTO: 1 % (ref 0–0.9)
LYMPHOCYTES # BLD AUTO: 0.53 K/UL (ref 1–4.8)
LYMPHOCYTES NFR BLD: 10.5 % (ref 22–41)
MCH RBC QN AUTO: 29.4 PG (ref 27–33)
MCHC RBC AUTO-ENTMCNC: 30.1 G/DL (ref 33.7–35.3)
MCV RBC AUTO: 97.6 FL (ref 81.4–97.8)
MONOCYTES # BLD AUTO: 0.56 K/UL (ref 0–0.85)
MONOCYTES NFR BLD AUTO: 11.1 % (ref 0–13.4)
NEUTROPHILS # BLD AUTO: 3.85 K/UL (ref 1.82–7.42)
NEUTROPHILS NFR BLD: 76 % (ref 44–72)
NRBC # BLD AUTO: 0 K/UL
NRBC BLD-RTO: 0 /100 WBC
PLATELET # BLD AUTO: 181 K/UL (ref 164–446)
PMV BLD AUTO: 9.2 FL (ref 9–12.9)
POTASSIUM SERPL-SCNC: 4.1 MMOL/L (ref 3.6–5.5)
RBC # BLD AUTO: 2.55 M/UL (ref 4.7–6.1)
SIGNIFICANT IND 70042: ABNORMAL
SIGNIFICANT IND 70042: NORMAL
SITE SITE: ABNORMAL
SITE SITE: NORMAL
SODIUM SERPL-SCNC: 139 MMOL/L (ref 135–145)
SOURCE SOURCE: ABNORMAL
SOURCE SOURCE: NORMAL
WBC # BLD AUTO: 5.1 K/UL (ref 4.8–10.8)

## 2020-03-17 PROCEDURE — 97110 THERAPEUTIC EXERCISES: CPT

## 2020-03-17 PROCEDURE — 700105 HCHG RX REV CODE 258: Performed by: HOSPITALIST

## 2020-03-17 PROCEDURE — 87040 BLOOD CULTURE FOR BACTERIA: CPT

## 2020-03-17 PROCEDURE — 99233 SBSQ HOSP IP/OBS HIGH 50: CPT | Performed by: PHYSICAL MEDICINE & REHABILITATION

## 2020-03-17 PROCEDURE — 700102 HCHG RX REV CODE 250 W/ 637 OVERRIDE(OP): Performed by: HOSPITALIST

## 2020-03-17 PROCEDURE — 82962 GLUCOSE BLOOD TEST: CPT | Mod: 91

## 2020-03-17 PROCEDURE — 700112 HCHG RX REV CODE 229: Performed by: PHYSICAL MEDICINE & REHABILITATION

## 2020-03-17 PROCEDURE — 80048 BASIC METABOLIC PNL TOTAL CA: CPT

## 2020-03-17 PROCEDURE — 85025 COMPLETE CBC W/AUTO DIFF WBC: CPT

## 2020-03-17 PROCEDURE — 97116 GAIT TRAINING THERAPY: CPT

## 2020-03-17 PROCEDURE — 770010 HCHG ROOM/CARE - REHAB SEMI PRIVAT*

## 2020-03-17 PROCEDURE — A9270 NON-COVERED ITEM OR SERVICE: HCPCS | Performed by: PHYSICAL MEDICINE & REHABILITATION

## 2020-03-17 PROCEDURE — 97530 THERAPEUTIC ACTIVITIES: CPT

## 2020-03-17 PROCEDURE — 700111 HCHG RX REV CODE 636 W/ 250 OVERRIDE (IP): Performed by: HOSPITALIST

## 2020-03-17 PROCEDURE — 99233 SBSQ HOSP IP/OBS HIGH 50: CPT | Performed by: HOSPITALIST

## 2020-03-17 PROCEDURE — 93970 EXTREMITY STUDY: CPT | Mod: 26 | Performed by: INTERNAL MEDICINE

## 2020-03-17 PROCEDURE — 94760 N-INVAS EAR/PLS OXIMETRY 1: CPT

## 2020-03-17 PROCEDURE — 700101 HCHG RX REV CODE 250: Performed by: PHYSICAL MEDICINE & REHABILITATION

## 2020-03-17 PROCEDURE — 92526 ORAL FUNCTION THERAPY: CPT

## 2020-03-17 PROCEDURE — 36415 COLL VENOUS BLD VENIPUNCTURE: CPT

## 2020-03-17 PROCEDURE — 700102 HCHG RX REV CODE 250 W/ 637 OVERRIDE(OP): Performed by: PHYSICAL MEDICINE & REHABILITATION

## 2020-03-17 PROCEDURE — 94640 AIRWAY INHALATION TREATMENT: CPT

## 2020-03-17 PROCEDURE — 700111 HCHG RX REV CODE 636 W/ 250 OVERRIDE (IP): Performed by: PHYSICAL MEDICINE & REHABILITATION

## 2020-03-17 PROCEDURE — 93970 EXTREMITY STUDY: CPT

## 2020-03-17 PROCEDURE — A9270 NON-COVERED ITEM OR SERVICE: HCPCS | Performed by: HOSPITALIST

## 2020-03-17 RX ORDER — INSULIN GLARGINE 100 [IU]/ML
10 INJECTION, SOLUTION SUBCUTANEOUS EVERY EVENING
Status: DISCONTINUED | OUTPATIENT
Start: 2020-03-17 | End: 2020-03-19

## 2020-03-17 RX ORDER — BACLOFEN 10 MG/1
10 TABLET ORAL 3 TIMES DAILY
Status: DISCONTINUED | OUTPATIENT
Start: 2020-03-17 | End: 2020-03-18

## 2020-03-17 RX ADMIN — SODIUM BICARBONATE 650 MG TABLET 650 MG: at 21:15

## 2020-03-17 RX ADMIN — INSULIN LISPRO 4 UNITS: 100 INJECTION, SOLUTION INTRAVENOUS; SUBCUTANEOUS at 17:51

## 2020-03-17 RX ADMIN — OXYCODONE HYDROCHLORIDE AND ACETAMINOPHEN 500 MG: 500 TABLET ORAL at 09:05

## 2020-03-17 RX ADMIN — INSULIN LISPRO 2 UNITS: 100 INJECTION, SOLUTION INTRAVENOUS; SUBCUTANEOUS at 11:49

## 2020-03-17 RX ADMIN — INSULIN GLARGINE 10 UNITS: 100 INJECTION, SOLUTION SUBCUTANEOUS at 21:26

## 2020-03-17 RX ADMIN — CALCIUM CARBONATE (ANTACID) CHEW TAB 500 MG 500 MG: 500 CHEW TAB at 10:03

## 2020-03-17 RX ADMIN — SIMVASTATIN 20 MG: 20 TABLET, FILM COATED ORAL at 21:15

## 2020-03-17 RX ADMIN — CEFTRIAXONE SODIUM 1 G: 1 INJECTION, POWDER, FOR SOLUTION INTRAMUSCULAR; INTRAVENOUS at 17:46

## 2020-03-17 RX ADMIN — IPRATROPIUM BROMIDE AND ALBUTEROL SULFATE 3 ML: .5; 3 SOLUTION RESPIRATORY (INHALATION) at 09:00

## 2020-03-17 RX ADMIN — LINEZOLID 600 MG: 600 TABLET, FILM COATED ORAL at 10:05

## 2020-03-17 RX ADMIN — INSULIN LISPRO 4 UNITS: 100 INJECTION, SOLUTION INTRAVENOUS; SUBCUTANEOUS at 21:26

## 2020-03-17 RX ADMIN — BACLOFEN 10 MG: 10 TABLET ORAL at 15:11

## 2020-03-17 RX ADMIN — PIOGLITAZONE 30 MG: 15 TABLET ORAL at 10:05

## 2020-03-17 RX ADMIN — FERROUS SULFATE TAB 325 MG (65 MG ELEMENTAL FE) 325 MG: 325 (65 FE) TAB at 09:05

## 2020-03-17 RX ADMIN — FERROUS SULFATE TAB 325 MG (65 MG ELEMENTAL FE) 325 MG: 325 (65 FE) TAB at 17:46

## 2020-03-17 RX ADMIN — PIPERACILLIN AND TAZOBACTAM 3.38 G: 3; .375 INJECTION, POWDER, LYOPHILIZED, FOR SOLUTION INTRAVENOUS; PARENTERAL at 05:33

## 2020-03-17 RX ADMIN — GABAPENTIN 300 MG: 300 CAPSULE ORAL at 10:05

## 2020-03-17 RX ADMIN — HEPARIN SODIUM 5000 UNITS: 5000 INJECTION, SOLUTION INTRAVENOUS; SUBCUTANEOUS at 15:11

## 2020-03-17 RX ADMIN — CYANOCOBALAMIN TAB 1000 MCG 1000 MCG: 1000 TAB at 10:05

## 2020-03-17 RX ADMIN — HEPARIN SODIUM 5000 UNITS: 5000 INJECTION, SOLUTION INTRAVENOUS; SUBCUTANEOUS at 05:32

## 2020-03-17 RX ADMIN — SODIUM BICARBONATE 650 MG TABLET 650 MG: at 15:11

## 2020-03-17 RX ADMIN — SODIUM BICARBONATE 650 MG TABLET 650 MG: at 10:03

## 2020-03-17 RX ADMIN — BACLOFEN 5 MG: 10 TABLET ORAL at 10:06

## 2020-03-17 RX ADMIN — IPRATROPIUM BROMIDE AND ALBUTEROL SULFATE 3 ML: .5; 3 SOLUTION RESPIRATORY (INHALATION) at 21:15

## 2020-03-17 RX ADMIN — METOPROLOL TARTRATE 12.5 MG: 25 TABLET, FILM COATED ORAL at 21:16

## 2020-03-17 RX ADMIN — CALCIUM CARBONATE (ANTACID) CHEW TAB 500 MG 500 MG: 500 CHEW TAB at 21:15

## 2020-03-17 RX ADMIN — LIDOCAINE 2 PATCH: 50 PATCH CUTANEOUS at 10:14

## 2020-03-17 RX ADMIN — STANDARDIZED SENNA CONCENTRATE 17.2 MG: 8.6 TABLET ORAL at 13:23

## 2020-03-17 RX ADMIN — Medication: at 10:10

## 2020-03-17 RX ADMIN — HEPARIN SODIUM 5000 UNITS: 5000 INJECTION, SOLUTION INTRAVENOUS; SUBCUTANEOUS at 21:15

## 2020-03-17 RX ADMIN — DOCUSATE SODIUM 200 MG: 100 CAPSULE, LIQUID FILLED ORAL at 21:16

## 2020-03-17 RX ADMIN — Medication 1 TABLET: at 13:23

## 2020-03-17 RX ADMIN — OXYCODONE HYDROCHLORIDE AND ACETAMINOPHEN 500 MG: 500 TABLET ORAL at 17:46

## 2020-03-17 RX ADMIN — DOCUSATE SODIUM 200 MG: 100 CAPSULE, LIQUID FILLED ORAL at 10:06

## 2020-03-17 RX ADMIN — Medication: at 21:25

## 2020-03-17 RX ADMIN — GABAPENTIN 300 MG: 300 CAPSULE ORAL at 21:16

## 2020-03-17 RX ADMIN — PIPERACILLIN AND TAZOBACTAM 3.38 G: 3; .375 INJECTION, POWDER, LYOPHILIZED, FOR SOLUTION INTRAVENOUS; PARENTERAL at 13:22

## 2020-03-17 RX ADMIN — BACLOFEN 10 MG: 10 TABLET ORAL at 21:16

## 2020-03-17 ASSESSMENT — ENCOUNTER SYMPTOMS
BRUISES/BLEEDS EASILY: 0
COUGH: 0
FEVER: 0
SHORTNESS OF BREATH: 0
VOMITING: 0
FOCAL WEAKNESS: 1
ABDOMINAL PAIN: 0
EYES NEGATIVE: 1
POLYDIPSIA: 0
CHILLS: 0
NECK PAIN: 1
PALPITATIONS: 0
NAUSEA: 0

## 2020-03-17 NOTE — THERAPY
Physical Therapy   Daily Treatment     Patient Name: Vince Almanzar  Age:  79 y.o., Sex:  male  Medical Record #: [id number]  Today's Date: 3/17/2020     Precautions  Precautions: (P) Spinal / Back Precautions   Comments: (P) Isolation precautions, c-collar, risk for  pressure injuries     Subjective    Pt was sitting in w/c upon arrival and agreeable to tx     Objective       03/17/20 1431   Precautions   Precautions Spinal / Back Precautions    Comments Isolation precautions, c-collar, risk for  pressure injuries    Interdisciplinary Plan of Care Collaboration   Patient Position at End of Therapy Seated;Call Light within Reach;Tray Table within Reach;Phone within Reach;Family / Friend in Room   PT Total Time Spent   PT Individual Total Time Spent (Mins) 30   PT Charge Group   PT Gait Training 1   PT Therapeutic Activities 1       FIM Walking Score:  4 - Minimal Assistance  Walking Description:  (225ft x 1 4ww CGA; 70ft x 1 4ww CGA)    Discussion of safety in sitting with 4ww with demo/ practice    STS x 4 to FWW from w/c with mod-maxA    Assessment    Pt with improved endurance in AMB this session; reduced antalgic gait with increased distance. Limited in walking by R leg pain and impaired cardiovascular endurance.     Plan    5x STS, 10MWT, endurance, standing therex, review hand hygiene, standing balance, postural re-ed

## 2020-03-17 NOTE — FLOWSHEET NOTE
03/17/20 0915   Events/Summary/Plan   Events/Summary/Plan IS done post svn   Vital Signs   Pulse 100   Respiration 18   Breath Sounds   RUL Breath Sounds Clear   RML Breath Sounds Clear   RLL Breath Sounds Diminished   CIRA Breath Sounds Clear   LLL Breath Sounds Diminished

## 2020-03-17 NOTE — PROGRESS NOTES
Hospital Medicine Daily Progress Note      Chief Complaint:  Hypertension  Diabetes  Leukocytosis    Interval History:  Pt afebrile and denies new complaints.    Review of Systems  Review of Systems   Constitutional: Negative for chills and fever.   HENT: Negative.    Eyes: Negative.    Respiratory: Negative for cough and shortness of breath.    Cardiovascular: Negative for chest pain and palpitations.   Gastrointestinal: Negative for abdominal pain, nausea and vomiting.   Musculoskeletal: Positive for neck pain.        Wound pain   Skin: Negative for itching and rash.   Neurological: Positive for focal weakness.   Endo/Heme/Allergies: Negative for polydipsia. Does not bruise/bleed easily.        Physical Exam  Temp:  [36.6 °C (97.8 °F)-36.9 °C (98.4 °F)] 36.9 °C (98.4 °F)  Pulse:  [] 67  Resp:  [18] 18  BP: (108-125)/(51-60) 118/51  SpO2:  [92 %-97 %] 92 %    Physical Exam  Vitals signs reviewed.   Constitutional:       General: He is not in acute distress.     Appearance: Normal appearance. He is not ill-appearing.   HENT:      Head: Normocephalic and atraumatic.      Right Ear: External ear normal.      Left Ear: External ear normal.      Nose: Nose normal.      Mouth/Throat:      Pharynx: Oropharynx is clear.   Eyes:      General:         Right eye: No discharge.         Left eye: No discharge.      Extraocular Movements: Extraocular movements intact.      Conjunctiva/sclera: Conjunctivae normal.   Neck:      Comments: C-collar  Cardiovascular:      Rate and Rhythm: Normal rate and regular rhythm.   Pulmonary:      Effort: No respiratory distress.      Breath sounds: No wheezing.      Comments: Coarse BS  Abdominal:      General: Bowel sounds are normal. There is no distension.      Palpations: Abdomen is soft.      Tenderness: There is no abdominal tenderness.   Musculoskeletal:      Right lower leg: No edema.      Left lower leg: No edema.   Skin:     General: Skin is warm and dry.   Neurological:       Mental Status: He is alert and oriented to person, place, and time.         Fluids    Intake/Output Summary (Last 24 hours) at 3/17/2020 1702  Last data filed at 3/17/2020 1300  Gross per 24 hour   Intake 840 ml   Output 475 ml   Net 365 ml       Laboratory  Recent Labs     03/15/20  0559 03/17/20  0522   WBC 9.8 5.1   RBC 2.86* 2.55*   HEMOGLOBIN 8.6* 7.5*   HEMATOCRIT 27.9* 24.9*   MCV 97.6 97.6   MCH 30.1 29.4   MCHC 30.8* 30.1*   RDW 58.9* 58.0*   PLATELETCT 219 181   MPV 8.9* 9.2     Recent Labs     03/16/20  0612 03/17/20  0522   SODIUM 139 139   POTASSIUM 4.0 4.1   CHLORIDE 106 105   CO2 25 25   GLUCOSE 167* 145*   BUN 39* 37*   CREATININE 2.11* 2.60*   CALCIUM 8.5 8.5                   Assessment/Plan  Anemia- (present on admission)  Assessment & Plan  Fe and Vit B12 low, on supplements for both    Chronic kidney disease- (present on admission)  Assessment & Plan  Has Stage IV CKD  Stop IVF due to concern for volume overload  Continue Sodium Bicarb    Leukocytosis  Assessment & Plan  CXR +bilat PNA  UCx +Proteua  BCx +Strep in one of two bottles, repeat BCx to assess for sterility  Sputum Cx negative  De-escalate Zyvox & Zosyn as no MRSA or Pseudomonas recovered  Start Rocephin    Cervical spinal stenosis  Assessment & Plan  S/P C3-C6 laminectomy and posterior decompression  Wound care and pain control    Uncontrolled type 2 diabetes mellitus with hyperglycemia (HCC)- (present on admission)  Assessment & Plan  HbA1c  5.1  Resume low dose Lantus for hyperglycemic trends  Continue Actos and SSI  Outpt meds include Ozempic 1.0 mg once a week, Actos 30 mg qd, and Tresiba 10 units qhs    Hyperlipemia- (present on admission)  Assessment & Plan  On Zocor    Essential hypertension- (present on admission)  Assessment & Plan  Observe blood pressure trends on Metoprolol    Full Code    Reviewed w/ Charge RN

## 2020-03-17 NOTE — THERAPY
Physical Therapy   Daily Treatment     Patient Name: Vince Almanzar  Age:  79 y.o., Sex:  male  Medical Record #: [id number]  Today's Date: 3/17/2020     Precautions  Precautions: (P) Spinal / Back Precautions   Comments: (P) Isolation precautions, c-collar, risk for  pressure injuries     Subjective    Pt was sitting in w/c upon arrival; nursing present.     Objective       03/17/20 1001   Precautions   Precautions Spinal / Back Precautions    Comments Isolation precautions, c-collar, risk for  pressure injuries    Interdisciplinary Plan of Care Collaboration   Patient Position at End of Therapy Seated;Other (Comments)  (hand off for imaging)   Therapy Missed   Missed Therapy (Minutes) 30   Reason For Missed Therapy Medical - Patient  in Procedure  (pt with stat ultrasound imaging)   PT Total Time Spent   PT Individual Total Time Spent (Mins) 30   PT Charge Group   PT Therapeutic Activities 2     Strength assessment of B LE with no changes from yesterday. Assessment of B LE/edema; pt with significant increased in warm swelling around the R knee, mild edema medial/ superior to L knee and signs of impaired perfusion in B LE digits.  Nursing, charge nurse and physician all notified. Dr. Garner decided to order stat ultra sound. Education was provided on POC, goals of PT, hand hygiene, secretion clearance and barriers to dc home at time while awaiting ultrasound.     Assessment    Pt limited this session by rule out of DVT. Pt with good spirits and no change in muscle strength in B LE from yesterday.     Plan  5x STS, 10MWT, endurance, standing therex, review hand hygiene, standing balance, postural re-ed

## 2020-03-17 NOTE — PROGRESS NOTES
"Rehab Progress Note     Encounter Date: 3/17/2020    CC: UE weakness    Interval Events (Subjective)    He reports lower extremity burning tingling pain is significantly improved after increased dose of gabapentin.  He still complains of 2-4/10 tingling sensation and right lower extremity, worse with knee extension    No significant change in tightness of bilateral hamstrings or jerky motions.  He denies any fatigue with change in medication including initiation of baclofen.  He denies any cognitive status changes    Bowel: Large soft BM 3/16 with digital stimulation    ROS:  Gen: No fatigue, confusion, significant weight loss  Eyes: no blurry vision, double vision or pain in eyes  ENT: no changes in hearing, runny nose, nose bleeds, sinus pain  CV: No CP, palpitations,   Lungs: no shortness of breath, changes in secretions, changes in cough, pain with coughing  Abd: No abd pain, nausea, vomiting, pain with eating  : no blood in urine,  suprapubic pain  Ext: No swelling in legs, asymmetric atrophy  Neuro: no changes in strength or sensation  Skin: no new ulcers/skin breakdown appreciated by patient  Mood: No changes in mood today, increase in depression or anxiety  Heme: no bruising, or bleeding  Rest of 14 point review of systems is negative    Objective:  Vitals: /60   Pulse 100   Temp 36.7 °C (98.1 °F) (Temporal)   Resp 18   Ht 1.778 m (5' 10\")   Wt 78.5 kg (173 lb 1 oz)   SpO2 94%   Gen: NAD, Body mass index is 24.83 kg/m².  HEENT: NC/AT, PERRLA, moist mucous membranes  Cardio: RRR, no mumurs  Pulm: CTAB, with normal respiratory effort  Abd: Soft NTND, active bowel sounds,   Ext: 1-2+ peripheral pitting edema in bilateral LE R>L. No calf tenderness. No clubbing/cyanosis. +dorsal pedalis pulses bilaterally.  -Significant increase in swelling in right lower extremity up to right thigh      Recent Results (from the past 72 hour(s))   Blood Culture    Collection Time: 03/14/20 10:28 AM   Result Value " Ref Range    Significant Indicator NEG     Source BLD     Site PERIPHERAL     Culture Result       No Growth  Note: Blood cultures are incubated for 5 days and  are monitored continuously.Positive blood cultures  are called to the RN and reported as soon as  they are identified.     LACTIC ACID    Collection Time: 03/14/20 10:28 AM   Result Value Ref Range    Lactic Acid 1.6 0.5 - 2.0 mmol/L   Group A Strep by PCR    Collection Time: 03/14/20 10:28 AM   Result Value Ref Range    Group A Strep by PCR Not Detected Not Detected   Influenza A/B By PCR (Adult - Flu Only)    Collection Time: 03/14/20 10:30 AM   Result Value Ref Range    Influenza virus A RNA Negative Negative    Influenza virus B, PCR Negative Negative   Respiratory Panel By PCR    Collection Time: 03/14/20 10:30 AM   Result Value Ref Range    Adenovirus, PCR Not Detected     Coronavirus, PCR Not Detected     Human Metapneumovirus, PCR Not Detected     Rhinovirus / Enterovirus, PCR DETECTED (A)     Influenza virus A RNA Not Detected     Influenza virus A H1 RNA Not Detected     Influenza A 2009, H1N1, PCR Not Detected     Influenza virus A H3 RNA Not Detected     Influenza virus B, PCR Not Detected     Parainfluenza virus 1, PCR Not Detected     Parainfluenza virus 2, PCR Not Detected     Parainfluenza virus 3, PCR Not Detected     Parainfluenza 4, PCR Not Detected     Resp Syncytial Virus A, PCR Not Detected     Resp Syncytial Virus B, PCR Not Detected     Chlamydia pneumoniae, PCR Not Detected     Mycoplasma pneumoniae, PCR Not Detected    ACCU-CHEK GLUCOSE    Collection Time: 03/14/20 11:19 AM   Result Value Ref Range    Glucose - Accu-Ck 144 (H) 65 - 99 mg/dL   URINALYSIS    Collection Time: 03/14/20 11:37 AM   Result Value Ref Range    Color Yellow     Character Clear     Specific Gravity 1.013 <1.035    Ph 7.0 5.0 - 8.0    Glucose 100 (A) Negative mg/dL    Ketones Negative Negative mg/dL    Protein 100 (A) Negative mg/dL    Bilirubin Negative  Negative    Urobilinogen, Urine 0.2 Negative    Nitrite Negative Negative    Leukocyte Esterase Negative Negative    Occult Blood Trace (A) Negative    Micro Urine Req Microscopic    URINE CULTURE(NEW)    Collection Time: 03/14/20 11:37 AM   Result Value Ref Range    Significant Indicator POS (POS)     Source UR     Site URINE, CLEAN CATCH     Culture Result - (A)     Culture Result Proteus species  ,000 cfu/mL   (A)    URINE MICROSCOPIC (W/UA)    Collection Time: 03/14/20 11:37 AM   Result Value Ref Range    WBC 0-2 (A) /hpf    RBC 0-2 (A) /hpf    Bacteria Negative None /hpf    Epithelial Cells Negative /hpf    Hyaline Cast 0-2 /lpf   CBC WITH DIFFERENTIAL    Collection Time: 03/14/20 11:58 AM   Result Value Ref Range    WBC 13.9 (H) 4.8 - 10.8 K/uL    RBC 2.94 (L) 4.70 - 6.10 M/uL    Hemoglobin 8.8 (L) 14.0 - 18.0 g/dL    Hematocrit 28.1 (L) 42.0 - 52.0 %    MCV 95.6 81.4 - 97.8 fL    MCH 29.9 27.0 - 33.0 pg    MCHC 31.3 (L) 33.7 - 35.3 g/dL    RDW 58.4 (H) 35.9 - 50.0 fL    Platelet Count 251 164 - 446 K/uL    MPV 8.9 (L) 9.0 - 12.9 fL    Neutrophils-Polys 86.60 (H) 44.00 - 72.00 %    Lymphocytes 3.90 (L) 22.00 - 41.00 %    Monocytes 8.40 0.00 - 13.40 %    Eosinophils 0.10 0.00 - 6.90 %    Basophils 0.20 0.00 - 1.80 %    Immature Granulocytes 0.80 0.00 - 0.90 %    Nucleated RBC 0.00 /100 WBC    Neutrophils (Absolute) 12.05 (H) 1.82 - 7.42 K/uL    Lymphs (Absolute) 0.54 (L) 1.00 - 4.80 K/uL    Monos (Absolute) 1.17 (H) 0.00 - 0.85 K/uL    Eos (Absolute) 0.01 0.00 - 0.51 K/uL    Baso (Absolute) 0.03 0.00 - 0.12 K/uL    Immature Granulocytes (abs) 0.11 0.00 - 0.11 K/uL    NRBC (Absolute) 0.00 K/uL   Blood Culture    Collection Time: 03/14/20 12:06 PM   Result Value Ref Range    Significant Indicator POS (POS)     Source BLD     Site PERIPHERAL     Culture Result (A)      Growth detected by [employer] instrument. 03/15/2020  14:14    Culture Result (A)      Streptococcus species  Possible Abiotrophia, further  information to follow.     ACCU-CHEK GLUCOSE    Collection Time: 03/14/20  5:00 PM   Result Value Ref Range    Glucose - Accu-Ck 107 (H) 65 - 99 mg/dL   CULTURE RESPIRATORY W/ GRM STN    Collection Time: 03/14/20  9:10 PM   Result Value Ref Range    Significant Indicator NEG     Source RESP     Site SPUTUM     Culture Result Light growth usual upper respiratory wayne     Gram Stain Result       Many WBCs.  Moderate epithelial cells.  Specimen Quality Score: 1+  Moderate mixed bacteria, no predominant organism seen.     GRAM STAIN    Collection Time: 03/14/20  9:10 PM   Result Value Ref Range    Significant Indicator .     Source RESP     Site SPUTUM     Gram Stain Result       Many WBCs.  Moderate epithelial cells.  Specimen Quality Score: 1+  Moderate mixed bacteria, no predominant organism seen.     ACCU-CHEK GLUCOSE    Collection Time: 03/14/20  9:22 PM   Result Value Ref Range    Glucose - Accu-Ck 116 (H) 65 - 99 mg/dL   CBC WITH DIFFERENTIAL    Collection Time: 03/15/20  5:59 AM   Result Value Ref Range    WBC 9.8 4.8 - 10.8 K/uL    RBC 2.86 (L) 4.70 - 6.10 M/uL    Hemoglobin 8.6 (L) 14.0 - 18.0 g/dL    Hematocrit 27.9 (L) 42.0 - 52.0 %    MCV 97.6 81.4 - 97.8 fL    MCH 30.1 27.0 - 33.0 pg    MCHC 30.8 (L) 33.7 - 35.3 g/dL    RDW 58.9 (H) 35.9 - 50.0 fL    Platelet Count 219 164 - 446 K/uL    MPV 8.9 (L) 9.0 - 12.9 fL    Neutrophils-Polys 80.50 (H) 44.00 - 72.00 %    Lymphocytes 7.30 (L) 22.00 - 41.00 %    Monocytes 10.80 0.00 - 13.40 %    Eosinophils 0.30 0.00 - 6.90 %    Basophils 0.20 0.00 - 1.80 %    Immature Granulocytes 0.90 0.00 - 0.90 %    Nucleated RBC 0.00 /100 WBC    Neutrophils (Absolute) 7.86 (H) 1.82 - 7.42 K/uL    Lymphs (Absolute) 0.71 (L) 1.00 - 4.80 K/uL    Monos (Absolute) 1.06 (H) 0.00 - 0.85 K/uL    Eos (Absolute) 0.03 0.00 - 0.51 K/uL    Baso (Absolute) 0.02 0.00 - 0.12 K/uL    Immature Granulocytes (abs) 0.09 0.00 - 0.11 K/uL    NRBC (Absolute) 0.00 K/uL   ACCU-CHEK GLUCOSE    Collection  Time: 03/15/20  7:56 AM   Result Value Ref Range    Glucose - Accu-Ck 79 65 - 99 mg/dL   ACCU-CHEK GLUCOSE    Collection Time: 03/15/20 11:32 AM   Result Value Ref Range    Glucose - Accu-Ck 76 65 - 99 mg/dL   ACCU-CHEK GLUCOSE    Collection Time: 03/15/20  5:28 PM   Result Value Ref Range    Glucose - Accu-Ck 150 (H) 65 - 99 mg/dL   ACCU-CHEK GLUCOSE    Collection Time: 03/15/20  9:06 PM   Result Value Ref Range    Glucose - Accu-Ck 250 (H) 65 - 99 mg/dL   Basic Metabolic Panel    Collection Time: 03/16/20  6:12 AM   Result Value Ref Range    Sodium 139 135 - 145 mmol/L    Potassium 4.0 3.6 - 5.5 mmol/L    Chloride 106 96 - 112 mmol/L    Co2 25 20 - 33 mmol/L    Glucose 167 (H) 65 - 99 mg/dL    Bun 39 (H) 8 - 22 mg/dL    Creatinine 2.11 (H) 0.50 - 1.40 mg/dL    Calcium 8.5 8.5 - 10.5 mg/dL    Anion Gap 8.0 7.0 - 16.0   ESTIMATED GFR    Collection Time: 03/16/20  6:12 AM   Result Value Ref Range    GFR If  37 (A) >60 mL/min/1.73 m 2    GFR If Non  31 (A) >60 mL/min/1.73 m 2   ACCU-CHEK GLUCOSE    Collection Time: 03/16/20  8:00 AM   Result Value Ref Range    Glucose - Accu-Ck 131 (H) 65 - 99 mg/dL   ACCU-CHEK GLUCOSE    Collection Time: 03/16/20 11:29 AM   Result Value Ref Range    Glucose - Accu-Ck 254 (H) 65 - 99 mg/dL   ACCU-CHEK GLUCOSE    Collection Time: 03/16/20  5:20 PM   Result Value Ref Range    Glucose - Accu-Ck 203 (H) 65 - 99 mg/dL   ACCU-CHEK GLUCOSE    Collection Time: 03/16/20  9:41 PM   Result Value Ref Range    Glucose - Accu-Ck 231 (H) 65 - 99 mg/dL   CBC WITH DIFFERENTIAL    Collection Time: 03/17/20  5:22 AM   Result Value Ref Range    WBC 5.1 4.8 - 10.8 K/uL    RBC 2.55 (L) 4.70 - 6.10 M/uL    Hemoglobin 7.5 (L) 14.0 - 18.0 g/dL    Hematocrit 24.9 (L) 42.0 - 52.0 %    MCV 97.6 81.4 - 97.8 fL    MCH 29.4 27.0 - 33.0 pg    MCHC 30.1 (L) 33.7 - 35.3 g/dL    RDW 58.0 (H) 35.9 - 50.0 fL    Platelet Count 181 164 - 446 K/uL    MPV 9.2 9.0 - 12.9 fL    Neutrophils-Polys  76.00 (H) 44.00 - 72.00 %    Lymphocytes 10.50 (L) 22.00 - 41.00 %    Monocytes 11.10 0.00 - 13.40 %    Eosinophils 1.20 0.00 - 6.90 %    Basophils 0.20 0.00 - 1.80 %    Immature Granulocytes 1.00 (H) 0.00 - 0.90 %    Nucleated RBC 0.00 /100 WBC    Neutrophils (Absolute) 3.85 1.82 - 7.42 K/uL    Lymphs (Absolute) 0.53 (L) 1.00 - 4.80 K/uL    Monos (Absolute) 0.56 0.00 - 0.85 K/uL    Eos (Absolute) 0.06 0.00 - 0.51 K/uL    Baso (Absolute) 0.01 0.00 - 0.12 K/uL    Immature Granulocytes (abs) 0.05 0.00 - 0.11 K/uL    NRBC (Absolute) 0.00 K/uL   Basic Metabolic Panel    Collection Time: 03/17/20  5:22 AM   Result Value Ref Range    Sodium 139 135 - 145 mmol/L    Potassium 4.1 3.6 - 5.5 mmol/L    Chloride 105 96 - 112 mmol/L    Co2 25 20 - 33 mmol/L    Glucose 145 (H) 65 - 99 mg/dL    Bun 37 (H) 8 - 22 mg/dL    Creatinine 2.60 (H) 0.50 - 1.40 mg/dL    Calcium 8.5 8.5 - 10.5 mg/dL    Anion Gap 9.0 7.0 - 16.0   ESTIMATED GFR    Collection Time: 03/17/20  5:22 AM   Result Value Ref Range    GFR If  29 (A) >60 mL/min/1.73 m 2    GFR If Non  24 (A) >60 mL/min/1.73 m 2   ACCU-CHEK GLUCOSE    Collection Time: 03/17/20  8:13 AM   Result Value Ref Range    Glucose - Accu-Ck 123 (H) 65 - 99 mg/dL       Current Facility-Administered Medications   Medication Frequency   • insulin lispro (HUMALOG) injection 2-12 Units 4X/DAY ACHS    And   • glucose 4 g chewable tablet 16 g Q15 MIN PRN    And   • dextrose 50% (D50W) injection 50 mL Q15 MIN PRN   • gabapentin (NEURONTIN) capsule 300 mg BID   • baclofen (LIORESAL) tablet 5 mg TID   • ipratropium-albuterol (DUONEB) nebulizer solution TID   • NS infusion Continuous   • linezolid (ZYVOX) tablet 600 mg Q12HRS   • piperacillin-tazobactam (ZOSYN) 3.375 g in  mL IVPB Q8HRS   • lidocaine 2 % jelly PRN    And   • nitroglycerin (NITRO-BID) 2 % ointment 1 Inch PRN   • Pharmacy Consult Request ...Pain Management Review 1 Each PHARMACY TO DOSE   • Respiratory  Therapy Consult Continuous RT   • oxyCODONE immediate-release (ROXICODONE) tablet 5 mg Q3HRS PRN   • oxyCODONE immediate release (ROXICODONE) tablet 10 mg Q3HRS PRN   • acetaminophen (TYLENOL) tablet 650 mg Q4HRS PRN   • ascorbic acid tablet 500 mg BID WITH MEALS   • therapeutic multivitamin-minerals (THERAGRAN-M) tablet 1 Tab DAILY WITH LUNCH   • docusate sodium (COLACE) capsule 200 mg BID    And   • sennosides (SENOKOT) 8.6 MG tablet 17.2 mg DAILY AT NOON    And   • bisacodyl (THE MAGIC BULLET) suppository 10 mg QDAY    And   • magnesium hydroxide (MILK OF MAGNESIA) suspension 30 mL QDAY PRN   • artificial tears ophthalmic solution 1 Drop PRN   • benzocaine-menthol (CEPACOL) lozenge 1 Lozenge Q2HRS PRN   • mag hydrox-al hydrox-simeth (MAALOX PLUS ES or MYLANTA DS) suspension 20 mL Q2HRS PRN   • ondansetron (ZOFRAN ODT) dispertab 4 mg 4X/DAY PRN    Or   • ondansetron (ZOFRAN) syringe/vial injection 4 mg 4X/DAY PRN   • sodium chloride (OCEAN) 0.65 % nasal spray 2 Spray PRN   • traZODone (DESYREL) tablet 50 mg QHS PRN   • ammonium lactate (LAC-HYDRIN) 12 % lotion BID   • calcium carbonate (TUMS) chewable tab 500 mg BID   • methocarbamol (ROBAXIN) tablet 750 mg Q8HRS PRN   • cyanocobalamin (VITAMIN B12) tablet 1,000 mcg DAILY   • ferrous sulfate tablet 325 mg BID WITH MEALS   • lidocaine (LIDODERM) 5 % 2 Patch Q24HR   • metoprolol (LOPRESSOR) tablet 12.5 mg BID   • pioglitazone (ACTOS) tablet 30 mg DAILY   • simethicone (MYLICON) chewable tab 80 mg TID PRN   • simvastatin (ZOCOR) tablet 20 mg Nightly   • sodium bicarbonate tablet 650 mg TID   • heparin injection 5,000 Units Q8HRS   • midodrine (PROAMATINE) tablet 5 mg Q4HRS PRN       Orders Placed This Encounter   Procedures   • Diet Order Renal (NO FISH), Diabetic     Standing Status:   Standing     Number of Occurrences:   1     Order Specific Question:   Diet:     Answer:   Renal [8]     Comments:   NO FISH     Order Specific Question:   Diet:     Answer:    Diabetic [3]     Order Specific Question:   Texture Modifier     Answer:   Level 5 - Minced & Moist (Dysphagia 2)     Order Specific Question:   Liquid level     Answer:   Level 2 - Mildly Thick       Assessment:  Active Hospital Problems    Diagnosis   • Acute on chronic renal failure (HCC)   • Central cord syndrome (HCC)   • Type 2 diabetes mellitus (HCC)   • Anemia   • Thrombocytopenia (HCC)   • 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 Sellers on 2/28.  Pinprick altered bilaterally at C3-C6  - Collar on at all times, spinal precautions  - Calcium carbonate 500 mg twice daily  -Place patient on bedrest secondary to right lower extremity swelling, concern for DVT    Neurologic respiratory impairment  -Consult respiratory therapy  - Oxygen as per guidelines  - DuoNeb 3X daily   -Consider hypertonic saline for mucolytic management  -Check vital capacity     Pneumonia: Bilateral lower lobe, likely bacterial, productive cough  - Zyvox and Zosyn started on 3/14  -Appreciate hospitalist input  -Aggressive secretion management as above  - Given productive cough will place on droplet precaution    Neurogenic bladder  -voiding trial, if can't void in 6 hours or prn check pvr and if >500cc then ICP,if able to void then check PVR, if PVR is >200cc then ICP     Neurogenic bowel  -Upper motor neuron neurogenic bowel program with senna 2 tablets q. noon, Colace 200 mg twice daily and Dulcolax suppository with digital stimulation     Potential for orthostatic  hypotension  Because of significant autonomic dysfunction associated with spinal cord injury, the patient is at high risk for orthostatic hypotension.  - Midodrine 5mg q4 hours prn SBP <100     Dysphagia: Previous diagnosis after traumatic brain injury, high risk after cervical spinal cord injury  -Consulted speech therapy to evaluate for swallow study     Pain:  #Neuropathic pain:  New bilateral lower extremity pins-and-needles  - gabapentin 300 mg twice daily, including dose, dose limited by GFR  -Vitamin C 500 mg every 12 hours, which is been shown to improve gabapentin absorption and pain management     Postoperative pain:  - Oxycodone 5-10 mg every 3 hours as needed pain  - Robaxin 750 mg every 8 hours as needed pain  -DC scheduled Tylenol secondary to pneumonia, can mask fever     Spasticity:  - increase baclofen 10 mg 3 times daily, monitor for fatigue    Anemia: Required IV iron supplementation, hemoglobin of 7.7 on admission  -Epogen 2000 units, Monday Wednesday Friday  -Folic acid 5 mg daily  - Ferrous sulfate 325 mg twice daily  -Check CBC in a.m.    Diabetes:  -Lantus 16 units nightly  -Check fingersticks q. before meals, nightly  -Sliding scale insulin  - Pioglitazone 30 mg a daily  - ADA diet  -Consult hospitalist      Acute on chronic kidney injury: Previously stage IV kidney disease with bilateral hydronephrosis suggestive of post renal obstruction, likely worsened by neurogenic bladder.  BUN/creatinine of 49/2.5  -Check BMP in a.m.  -Manage neurogenic bladder as above     Hyponatremia: Sodium of 136 on admission  -Sodium bicarb tablets 650 mg 3 times a day     Hyperlipidemia: Triglyceride 65, HDL 31, LDL 37  -Simvastatin 20 mg nightly     Vitamin deficiency  In the posttraumatic setting, there is a high likelihood of vitamin D deficiency.   Vitamin D level on admission of 47, goal of >30  - DC vitamin D supplementation     DVT prophylaxis/right lower extremity swelling: Patient at increased risk for  VTE formation with neurologic compromise/myelopathy.  -Increase and right lower extremity swelling new from yesterday, stat ultrasound of right lower extremity  -Hold therapy  -Heparin 5000 units every 8 hours      Patient was seen for 36 minutes on unit/floor of which > 50% of time was spent on counseling and coordination of care regarding the above, including prognosis, risk reduction, benefits of treatment, and options for next stage of care including spasticity, increase and baclofen to 10 mg 3 times daily, acute on chronic kidney injury, check BMP in a.m., pneumonia, check CBC in a.m., discussed with hospitalist, right lower extremity swelling, stat ultrasound of right lower extremity to rule out DVT.      Ramu Garner D.O.

## 2020-03-17 NOTE — THERAPY
"Occupational Therapy  Daily Treatment     Patient Name: Vince Almanzar  Age:  79 y.o., Sex:  male  Medical Record #: [id number]  Today's Date: 3/17/2020     Precautions  Precautions: Spinal / Back Precautions   Comments: Isolation precautions, c-collar, risk for  pressure injuries     Subjective    \"I'm always trying for better. No sense in trying if it's not for better.\"     Objective       03/17/20 1301   Precautions   Precautions Spinal / Back Precautions    Comments Isolation precautions, c-collar, risk for  pressure injuries    Pain 0 - 10 Group   Location Leg   Location Orientation Right   Therapist Pain Assessment During Activity;4   Non Verbal Descriptors   Non Verbal Scale  Calm   Cognition    Cognition / Consciousness WDL   Level of Consciousness Alert   Sitting Upper Body Exercises   Sitting Upper Body Exercises Yes   Upper Extremity Bike Level 2 Resistance  (FluidoBike, 15 minutes, 1.15km)   Sitting Lower Body Exercises   Sitting Lower Body Exercises Yes   Ankle Pumps 3 sets of 15;Bilateral   Sit to Stand 1 set of 10  (Min to Mod A in // bars)   Balance   Standing Balance (Static) Poor +   Standing Balance (Dynamic) Trace +   Weight Shift Standing Poor   Skilled Intervention Compensatory Strategies;Tactile Cuing;Verbal Cuing   Interdisciplinary Plan of Care Collaboration   IDT Collaboration with  Nursing   Patient Position at End of Therapy Seated;Other (Comments);Tray Table within Reach;Call Light within Reach   Collaboration Comments Medication    OT Total Time Spent   OT Individual Total Time Spent (Mins) 60   OT Charge Group   Charges Yes   OT Therapy Activity 1   OT Therapeutic Exercise  3     Out of room activity completed with face mask and hand hygiene completed by patient before and after touching equipment.  Equipment disinfected after patient contact by therapist.     Patient received seated in room upon therapist arrival noting right lower extremity pain with trace edema noted along " posterior aspect of distal leg segment and dorsal aspect of foot. Lidocaine patch present with patient reporting ultrasound earlier in the day.     Assessment    Patient tolerated seated BUE exercises well, maintaining steady conversational pace without SOB, c/o pain, or request for rest during 15 minutes of activity.  Seated breaks during lower body exercises requested following 15-30 seconds of standing intervals.  Pain in RLE presents as primary limiter to sustained standing position.  Verbal cues for hand placement to transition from w/c to standing position with // bar required with each interval.     Plan    Continue overall strengthening, balance building, endurance building, ADL re-ed.

## 2020-03-17 NOTE — CARE PLAN
Problem: Bowel/Gastric:  Goal: Normal bowel function is maintained or improved  Outcome: PROGRESSING AS EXPECTED  Note: Pt refused his scheduled bowel program tonight. He had a large BM at the beginning of this shift. Will continue to monitor.     Problem: Urinary Elimination:  Goal: Ability to reestablish a normal urinary elimination pattern will improve  Outcome: PROGRESSING AS EXPECTED  Note: Pt continent of bladder. Voiding adequately using urinal. He denies dysuria.

## 2020-03-17 NOTE — FLOWSHEET NOTE
03/17/20 1511   Events/Summary/Plan   Events/Summary/Plan refused SVN but agreed to do IS   Vital Signs   Pulse 67   Respiration 18   Pulse Oximetry 92 %   $ Pulse Oximetry (Spot Check) Yes   Chest Exam   Work Of Breathing / Effort Mild   Breath Sounds   RUL Breath Sounds Clear   RML Breath Sounds Clear   RLL Breath Sounds Diminished   CIRA Breath Sounds Clear   LLL Breath Sounds Diminished   Secretions   Cough Strong;Non Productive   Oxygen   O2 (LPM) 0   O2 Delivery Device Room air w/o2 available

## 2020-03-17 NOTE — DISCHARGE PLANNING
Met w/ patient & sister, Ángela, briefly while patient ambulating in hallway w/ therapy. Reviewed targeted DC date 3.31.2020. Will f/u for discussion IDT recommendations & DC planning.

## 2020-03-17 NOTE — FLOWSHEET NOTE
03/17/20 0900   Events/Summary/Plan   Events/Summary/Plan svn given   Vital Signs   Pulse 81  (post 100)   Respiration 18  (post18)   Pulse Oximetry 94 %   $ Pulse Oximetry (Spot Check) Yes   Chest Exam   Work Of Breathing / Effort Mild   Breath Sounds   RUL Breath Sounds Diminished  (increased aeration following svn)   RML Breath Sounds Diminished   RLL Breath Sounds Diminished   CIRA Breath Sounds Diminished   LLL Breath Sounds Diminished   Secretions   Cough Productive   How Sputum Obtained Expectorated;Spontaneous   Sputum Amount Small   Sputum Color Clear   Sputum Consistency Thick   Oxygen   O2 Delivery Device None - Room Air

## 2020-03-17 NOTE — THERAPY
Recreational Therapy  Daily Treatment     Patient Name: Vince Almanzar  AGE:  79 y.o., SEX:  male  Medical Record #: [id number]  Today's Date: 3/17/2020       Subjective    Pt was ready and willing for session.      Objective       03/17/20 1501   Functional Ability Status - Cognitive   Attention Span Remains on Task   Comprehension Follows Three Step Commands   Judgment Able to Make Independent Decisions   Functional Ability Status - Emotional    Affect Appropriate   Mood Appropriate   Behavior Appropriate   Skilled Intervention    Skilled Intervention Cognitive Leisure;Relaxation / Coping Skills   Skilled Intervention Comments Played and taught a card game.    Interdisciplinary Plan of Care Collaboration   IDT Collaboration with  Family / Caregiver;Nursing   Patient Position at End of Therapy Seated;Tray Table within Reach;Call Light within Reach;Family / Friend in Room   Collaboration Comments Friend attended the session. Nursing giving medication    Treatment Time   Total Time Spent (mins) 30   Procedural Tracking   Procedural Tracking Cognitive Skills Training       Assessment    Pt remained in his room and was able to teach some of the rules to his friend that was in the room for session. Pt did need Min verbal cues to remember the rules from the activity from the day prior.     Plan    Positive coping skills.

## 2020-03-17 NOTE — THERAPY
Speech Language Pathology  Daily Treatment     Patient Name: Vince Almanzar  Age:  79 y.o., Sex:  male  Medical Record #: [id number]  Today's Date: 3/17/2020     Subjective    Patient pleasant and cooperative.     Objective       03/17/20 0901   Dysphagia    Dysphagia X   Other Treatments Therapeutic trials of 5ml spoon fuls of thin liquids in conjuction with 3 second prep and second swallows.  Attempted training a breathold, however, patient had difficulty coordinating this strategy.   Diet / Liquid Recommendation Mildly Thick (2) - (Nectar Thick);Minced & Moist (5) - (Dysphagia II)   Nutritional Liquid Intake Rating Scale Thickened beverages (mildly thick unless otherwise specified)   Nutritional Food Intake Rating Scale Total oral diet with multiple consistencies but requiring special preparation or compensations   SLP Total Time Spent   SLP Individual Total Time Spent (Mins) 60   Charge Group   SLP Swallowing Dysfunction Treatment Swallowing Dysfunction Treatment           Assessment    Oral care completed after which patient participated in therapeutic trials of thin liquids, 5ml via teaspoon,  in conjuction with 3 second prep and second swallows.  Attempted training a breathold, however, patient had difficulty coordinating this strategy.  Patient completed 70 swallows with 5 ml thin liquids, He demonstrated 8 total coughs post swallow during trials but it is difficult to determine if these coughs were related to pre existing congestion vs. Potential aspiration/penetration.  Patient asked to complete second swallows after each thin liquid presentation of which he completed 45,  For a total of 115 swallows.  Patient does struggle to initiate second swallows, and needed constant cues to follow through and to maintain lip seal for second swallows.     Plan    Target tongue base retraction strengthening, second swallows, effortful swallows, cough if voice is wet, therapeutic trials of thin liquids via teaspoon  (5ml)

## 2020-03-18 ENCOUNTER — APPOINTMENT (OUTPATIENT)
Dept: RADIOLOGY | Facility: REHABILITATION | Age: 80
DRG: 052 | End: 2020-03-18
Attending: PHYSICAL MEDICINE & REHABILITATION
Payer: COMMERCIAL

## 2020-03-18 LAB
ANION GAP SERPL CALC-SCNC: 11 MMOL/L (ref 7–16)
BACTERIA BLD CULT: ABNORMAL
BACTERIA BLD CULT: ABNORMAL
BASOPHILS # BLD AUTO: 0 % (ref 0–1.8)
BASOPHILS # BLD: 0 K/UL (ref 0–0.12)
BUN SERPL-MCNC: 38 MG/DL (ref 8–22)
C DIFF DNA SPEC QL NAA+PROBE: NEGATIVE
C DIFF TOX GENS STL QL NAA+PROBE: NEGATIVE
CALCIUM SERPL-MCNC: 8.5 MG/DL (ref 8.5–10.5)
CHLORIDE SERPL-SCNC: 107 MMOL/L (ref 96–112)
CO2 SERPL-SCNC: 22 MMOL/L (ref 20–33)
CREAT SERPL-MCNC: 2.45 MG/DL (ref 0.5–1.4)
EOSINOPHIL # BLD AUTO: 0.05 K/UL (ref 0–0.51)
EOSINOPHIL NFR BLD: 1.2 % (ref 0–6.9)
ERYTHROCYTE [DISTWIDTH] IN BLOOD BY AUTOMATED COUNT: 55.2 FL (ref 35.9–50)
GLUCOSE BLD-MCNC: 153 MG/DL (ref 65–99)
GLUCOSE BLD-MCNC: 156 MG/DL (ref 65–99)
GLUCOSE BLD-MCNC: 192 MG/DL (ref 65–99)
GLUCOSE SERPL-MCNC: 182 MG/DL (ref 65–99)
HCT VFR BLD AUTO: 23.8 % (ref 42–52)
HGB BLD-MCNC: 7.5 G/DL (ref 14–18)
IMM GRANULOCYTES # BLD AUTO: 0.03 K/UL (ref 0–0.11)
IMM GRANULOCYTES NFR BLD AUTO: 0.7 % (ref 0–0.9)
LYMPHOCYTES # BLD AUTO: 0.43 K/UL (ref 1–4.8)
LYMPHOCYTES NFR BLD: 10.6 % (ref 22–41)
MCH RBC QN AUTO: 29.6 PG (ref 27–33)
MCHC RBC AUTO-ENTMCNC: 31.5 G/DL (ref 33.7–35.3)
MCV RBC AUTO: 94.1 FL (ref 81.4–97.8)
MONOCYTES # BLD AUTO: 0.43 K/UL (ref 0–0.85)
MONOCYTES NFR BLD AUTO: 10.6 % (ref 0–13.4)
NEUTROPHILS # BLD AUTO: 3.12 K/UL (ref 1.82–7.42)
NEUTROPHILS NFR BLD: 76.9 % (ref 44–72)
NRBC # BLD AUTO: 0 K/UL
NRBC BLD-RTO: 0 /100 WBC
PLATELET # BLD AUTO: 169 K/UL (ref 164–446)
PMV BLD AUTO: 9.2 FL (ref 9–12.9)
POTASSIUM SERPL-SCNC: 3.9 MMOL/L (ref 3.6–5.5)
RBC # BLD AUTO: 2.53 M/UL (ref 4.7–6.1)
SIGNIFICANT IND 70042: ABNORMAL
SITE SITE: ABNORMAL
SODIUM SERPL-SCNC: 140 MMOL/L (ref 135–145)
SOURCE SOURCE: ABNORMAL
WBC # BLD AUTO: 4.1 K/UL (ref 4.8–10.8)

## 2020-03-18 PROCEDURE — 87493 C DIFF AMPLIFIED PROBE: CPT

## 2020-03-18 PROCEDURE — 700111 HCHG RX REV CODE 636 W/ 250 OVERRIDE (IP): Performed by: HOSPITALIST

## 2020-03-18 PROCEDURE — A9270 NON-COVERED ITEM OR SERVICE: HCPCS | Performed by: PHYSICAL MEDICINE & REHABILITATION

## 2020-03-18 PROCEDURE — 700105 HCHG RX REV CODE 258: Performed by: PHYSICAL MEDICINE & REHABILITATION

## 2020-03-18 PROCEDURE — 36415 COLL VENOUS BLD VENIPUNCTURE: CPT

## 2020-03-18 PROCEDURE — 700105 HCHG RX REV CODE 258: Performed by: HOSPITALIST

## 2020-03-18 PROCEDURE — 97535 SELF CARE MNGMENT TRAINING: CPT

## 2020-03-18 PROCEDURE — 700112 HCHG RX REV CODE 229: Performed by: PHYSICAL MEDICINE & REHABILITATION

## 2020-03-18 PROCEDURE — 700102 HCHG RX REV CODE 250 W/ 637 OVERRIDE(OP): Performed by: PHYSICAL MEDICINE & REHABILITATION

## 2020-03-18 PROCEDURE — 85025 COMPLETE CBC W/AUTO DIFF WBC: CPT

## 2020-03-18 PROCEDURE — 94640 AIRWAY INHALATION TREATMENT: CPT

## 2020-03-18 PROCEDURE — 99232 SBSQ HOSP IP/OBS MODERATE 35: CPT | Performed by: HOSPITALIST

## 2020-03-18 PROCEDURE — 97530 THERAPEUTIC ACTIVITIES: CPT

## 2020-03-18 PROCEDURE — 80048 BASIC METABOLIC PNL TOTAL CA: CPT

## 2020-03-18 PROCEDURE — 770010 HCHG ROOM/CARE - REHAB SEMI PRIVAT*

## 2020-03-18 PROCEDURE — 700101 HCHG RX REV CODE 250: Performed by: PHYSICAL MEDICINE & REHABILITATION

## 2020-03-18 PROCEDURE — 99232 SBSQ HOSP IP/OBS MODERATE 35: CPT | Performed by: PHYSICAL MEDICINE & REHABILITATION

## 2020-03-18 PROCEDURE — 92526 ORAL FUNCTION THERAPY: CPT

## 2020-03-18 PROCEDURE — 94760 N-INVAS EAR/PLS OXIMETRY 1: CPT

## 2020-03-18 PROCEDURE — 700102 HCHG RX REV CODE 250 W/ 637 OVERRIDE(OP): Performed by: HOSPITALIST

## 2020-03-18 PROCEDURE — 71045 X-RAY EXAM CHEST 1 VIEW: CPT

## 2020-03-18 PROCEDURE — 700111 HCHG RX REV CODE 636 W/ 250 OVERRIDE (IP): Performed by: PHYSICAL MEDICINE & REHABILITATION

## 2020-03-18 PROCEDURE — 82962 GLUCOSE BLOOD TEST: CPT | Mod: 91

## 2020-03-18 RX ORDER — BISACODYL 10 MG/1
10 SUPPOSITORY RECTAL
Status: DISCONTINUED | OUTPATIENT
Start: 2020-03-18 | End: 2020-03-28 | Stop reason: HOSPADM

## 2020-03-18 RX ORDER — SODIUM CHLORIDE 9 MG/ML
INJECTION, SOLUTION INTRAVENOUS CONTINUOUS
Status: DISCONTINUED | OUTPATIENT
Start: 2020-03-18 | End: 2020-03-19

## 2020-03-18 RX ORDER — BACLOFEN 10 MG/1
5 TABLET ORAL 3 TIMES DAILY
Status: DISCONTINUED | OUTPATIENT
Start: 2020-03-18 | End: 2020-03-19

## 2020-03-18 RX ORDER — GABAPENTIN 400 MG/1
400 CAPSULE ORAL EVERY EVENING
Status: DISCONTINUED | OUTPATIENT
Start: 2020-03-18 | End: 2020-03-19

## 2020-03-18 RX ORDER — DOCUSATE SODIUM 100 MG/1
200 CAPSULE, LIQUID FILLED ORAL 2 TIMES DAILY PRN
Status: DISCONTINUED | OUTPATIENT
Start: 2020-03-18 | End: 2020-03-28 | Stop reason: HOSPADM

## 2020-03-18 RX ORDER — SENNOSIDES A AND B 8.6 MG/1
17.2 TABLET, FILM COATED ORAL
Status: DISCONTINUED | OUTPATIENT
Start: 2020-03-18 | End: 2020-03-28 | Stop reason: HOSPADM

## 2020-03-18 RX ADMIN — IPRATROPIUM BROMIDE AND ALBUTEROL SULFATE 3 ML: .5; 3 SOLUTION RESPIRATORY (INHALATION) at 15:33

## 2020-03-18 RX ADMIN — OXYCODONE HYDROCHLORIDE AND ACETAMINOPHEN 500 MG: 500 TABLET ORAL at 08:34

## 2020-03-18 RX ADMIN — CYANOCOBALAMIN TAB 1000 MCG 1000 MCG: 1000 TAB at 08:34

## 2020-03-18 RX ADMIN — PIOGLITAZONE 30 MG: 15 TABLET ORAL at 08:34

## 2020-03-18 RX ADMIN — INSULIN LISPRO 2 UNITS: 100 INJECTION, SOLUTION INTRAVENOUS; SUBCUTANEOUS at 11:13

## 2020-03-18 RX ADMIN — OXYCODONE HYDROCHLORIDE AND ACETAMINOPHEN 500 MG: 500 TABLET ORAL at 17:34

## 2020-03-18 RX ADMIN — CALCIUM CARBONATE (ANTACID) CHEW TAB 500 MG 500 MG: 500 CHEW TAB at 08:34

## 2020-03-18 RX ADMIN — INSULIN GLARGINE 10 UNITS: 100 INJECTION, SOLUTION SUBCUTANEOUS at 21:58

## 2020-03-18 RX ADMIN — METOPROLOL TARTRATE 12.5 MG: 25 TABLET, FILM COATED ORAL at 08:34

## 2020-03-18 RX ADMIN — HEPARIN SODIUM 5000 UNITS: 5000 INJECTION, SOLUTION INTRAVENOUS; SUBCUTANEOUS at 05:04

## 2020-03-18 RX ADMIN — SODIUM BICARBONATE 650 MG TABLET 650 MG: at 08:34

## 2020-03-18 RX ADMIN — SODIUM BICARBONATE 650 MG TABLET 650 MG: at 14:34

## 2020-03-18 RX ADMIN — Medication: at 21:46

## 2020-03-18 RX ADMIN — IPRATROPIUM BROMIDE AND ALBUTEROL SULFATE 3 ML: .5; 3 SOLUTION RESPIRATORY (INHALATION) at 08:36

## 2020-03-18 RX ADMIN — IPRATROPIUM BROMIDE AND ALBUTEROL SULFATE 3 ML: .5; 3 SOLUTION RESPIRATORY (INHALATION) at 22:29

## 2020-03-18 RX ADMIN — FERROUS SULFATE TAB 325 MG (65 MG ELEMENTAL FE) 325 MG: 325 (65 FE) TAB at 08:34

## 2020-03-18 RX ADMIN — CEFTRIAXONE SODIUM 1 G: 1 INJECTION, POWDER, FOR SOLUTION INTRAMUSCULAR; INTRAVENOUS at 15:58

## 2020-03-18 RX ADMIN — BACLOFEN 5 MG: 10 TABLET ORAL at 14:34

## 2020-03-18 RX ADMIN — DOCUSATE SODIUM 200 MG: 100 CAPSULE, LIQUID FILLED ORAL at 08:34

## 2020-03-18 RX ADMIN — LIDOCAINE 2 PATCH: 50 PATCH CUTANEOUS at 12:13

## 2020-03-18 RX ADMIN — INSULIN LISPRO 2 UNITS: 100 INJECTION, SOLUTION INTRAVENOUS; SUBCUTANEOUS at 17:31

## 2020-03-18 RX ADMIN — INSULIN LISPRO 2 UNITS: 100 INJECTION, SOLUTION INTRAVENOUS; SUBCUTANEOUS at 07:53

## 2020-03-18 RX ADMIN — SODIUM CHLORIDE: 9 INJECTION, SOLUTION INTRAVENOUS at 12:16

## 2020-03-18 RX ADMIN — Medication: at 07:53

## 2020-03-18 RX ADMIN — HEPARIN SODIUM 5000 UNITS: 5000 INJECTION, SOLUTION INTRAVENOUS; SUBCUTANEOUS at 21:46

## 2020-03-18 RX ADMIN — BACLOFEN 10 MG: 10 TABLET ORAL at 08:34

## 2020-03-18 RX ADMIN — GABAPENTIN 300 MG: 300 CAPSULE ORAL at 08:34

## 2020-03-18 RX ADMIN — HEPARIN SODIUM 5000 UNITS: 5000 INJECTION, SOLUTION INTRAVENOUS; SUBCUTANEOUS at 14:34

## 2020-03-18 RX ADMIN — Medication 1 TABLET: at 12:13

## 2020-03-18 RX ADMIN — FERROUS SULFATE TAB 325 MG (65 MG ELEMENTAL FE) 325 MG: 325 (65 FE) TAB at 17:34

## 2020-03-18 ASSESSMENT — ENCOUNTER SYMPTOMS
FEVER: 0
PALPITATIONS: 0
COUGH: 0
BRUISES/BLEEDS EASILY: 0
ABDOMINAL PAIN: 0
SHORTNESS OF BREATH: 0
POLYDIPSIA: 0
NAUSEA: 0
CHILLS: 0
FOCAL WEAKNESS: 1
EYES NEGATIVE: 1
NECK PAIN: 1
VOMITING: 0

## 2020-03-18 NOTE — THERAPY
"Physical Therapy   Daily Treatment     Patient Name: Vince Almanzar  Age:  79 y.o., Sex:  male  Medical Record #: [id number]  Today's Date: 3/18/2020     Precautions  Precautions: (P) Spinal / Back Precautions   Comments: (P) Isolation precautions, c-collar, risk for  pressure injuries     Subjective  Pt was sitting in w/c upon arrival. Unresponsive to loudly calling \"Vince\" from the hallway while putting on PPE. Upon arm rub pt was arousal but unable to sustain eyes open >5seconds, attn >10 seconds and unable to follow one step commands.  Continued to repeat \"What is wrong with me? I'm so dizzy. What do I need?\"     Objective       03/18/20 0931   Precautions   Precautions Spinal / Back Precautions    Comments Isolation precautions, c-collar, risk for  pressure injuries    Interdisciplinary Plan of Care Collaboration   IDT Collaboration with  Nursing;Physician   Patient Position at End of Therapy Seated;Call Light within Reach;Tray Table within Reach;Phone within Reach;Other (Comments)  (nursing aware of CLOF)   Collaboration Comments re: /46 , attn only for ~5-10seconds, inability to follow 1 step commands   PT Total Time Spent   PT Individual Total Time Spent (Mins) 30   PT Charge Group   PT Therapeutic Activities 2       Attempted to identify source of dizziness; pt unable to perform visual scanning tasks d/t impaired attn, ability to maintain eyes open >5sec. /46, HR 70, temp 97.8. Complete BP 5 minutes later 104/44.    Attempt to complete LE exercise; pt unable to follow commands. Attempted to engage in attention to task; attention to room; current situation.     Assessment of R LE; similar swelling, warmth to yesterday     Nursing, charge nurse and physician aware.     Assessment    Pt lethargic, unable to follow one step commands and with impaired attention this session.     Plan    5x STS, 10MWT, endurance, standing therex, review hand hygiene, standing balance, postural re-ed       "

## 2020-03-18 NOTE — THERAPY
Speech Language Pathology  Daily Treatment     Patient Name: Vince Almanzar  Age:  79 y.o., Sex:  male  Medical Record #: [id number]  Today's Date: 3/18/2020     Subjective    Patient in chair asleep at time of ST. RN aware of fatigue.      Objective       03/18/20 0832   Dysphagia    Other Treatments trials of thins   Diet / Liquid Recommendation Mildly Thick (2) - (Nectar Thick);Minced & Moist (5) - (Dysphagia II)   Nutritional Liquid Intake Rating Scale Thickened beverages (mildly thick unless otherwise specified)   Nutritional Food Intake Rating Scale Total oral diet with multiple consistencies but requiring special preparation or compensations   SLP Total Time Spent   SLP Individual Total Time Spent (Mins) 30   Charge Group   SLP Swallowing Dysfunction Treatment Swallowing Dysfunction Treatment         Assessment    Patient required mod cues to maintain alertness in order to participate. C/o fatigue and dizziness. RN and MD aware. Limited trials of thin liquids by tsp d/t fatigue and lethargy. Patient was not able to follow directives to participate in OMEX this session.     Plan    Dysphagia treatment, PO trials as tolerated.

## 2020-03-18 NOTE — CARE PLAN
Problem: Infection  Goal: Will remain free from infection  Outcome: NOT MET     Problem: Safety  Goal: Will remain free from injury  Outcome: PROGRESSING AS EXPECTED

## 2020-03-18 NOTE — FLOWSHEET NOTE
"   03/18/20 0945   Inhalation Therapy Treatment   $ SVN Performed Yes   Given By: Rhonda   Incentive Spirometry Treatment   Height 1.778 m (5' 10\")   Predicted Inspiratory Capacity 2950   60% of predicted IS capacity 1770 mL   40% of predicted IS capacity 1180 mL   Incentive Spirometer Volume 500 mL     "

## 2020-03-18 NOTE — FLOWSHEET NOTE
03/18/20 0945   Events/Summary/Plan   Events/Summary/Plan SVN, 02 spot check   Vital Signs   Pulse 72   Respiration 16   Pulse Oximetry 98 %   $ Pulse Oximetry (Spot Check) Yes   Respiratory Assessment   Level of Consciousness Lethargic   Chest Exam   Work Of Breathing / Effort Shallow   Secretions   Cough Congested   Oxygen   O2 Delivery Device Room air w/o2 available

## 2020-03-18 NOTE — THERAPY
Occupational Therapy  Daily Treatment     Patient Name: Vince Almanzar  Age:  79 y.o., Sex:  male  Medical Record #: [id number]  Today's Date: 3/18/2020     Precautions  Precautions: Spinal / Back Precautions   Comments: Isolation precautions, c-collar, risk for  pressure injuries       Subjective    Patient presented severely fatigued with difficulty maintaining eyes open.      Objective     03/18/20 1001   Precautions   Precautions Spinal / Back Precautions    Comments Isolation precautions, c-collar, risk for pressure injuries   Interdisciplinary Plan of Care Collaboration   IDT Collaboration with  Respiratory Therapist;Certified Nursing Assistant;Physician   Patient Position at End of Therapy In Bed;Call Light within Reach   Collaboration Comments Bed txfr; decreased alertness/fatigue    Therapy Missed   Missed Therapy (Minutes) 30   Reason For Missed Therapy Medical - Patient on Hold from Therapy  (Fatigue, decreased level of alertness, low BP)   OT Total Time Spent   OT Individual Total Time Spent (Mins) 30   OT Charge Group   OT Self Care / ADL 1   OT Therapy Activity 1     Patient required total assist x 2 to perform wc > bed txfr due to fatigue/ decreased level of alertness and decreased strength/endurance.     Assessment    Patient unable to stay awake and actively participate in therapy during this session. MD aware; patient on medical hold.     Plan    Continue overall strengthening, balance building, endurance building, ADL re-ed.

## 2020-03-18 NOTE — THERAPY
Missed Therapy     Patient Name: Vince Almanzar  Age:  79 y.o., Sex:  male  Medical Record #: [id number]  Today's Date: 3/18/2020    Discussed missed therapy with Dr. Garner, RN, OT       03/18/20 0509   Precautions   Precautions Spinal / Back Precautions    Comments Isolation precautions, c-collar, risk for  pressure injuries    Interdisciplinary Plan of Care Collaboration   Patient Position at End of Therapy In Bed;Call Light within Reach;Tray Table within Reach;Phone within Reach   Therapy Missed   Missed Therapy (Minutes) 30   Reason For Missed Therapy Medical - Patient on Hold from Therapy  (pt with extreme lethargy, dizziness, unable to participate)

## 2020-03-18 NOTE — PROGRESS NOTES
Hospital Medicine Daily Progress Note      Chief Complaint:  Hypertension  Diabetes  Leukocytosis    Interval History:  Pt very groggy this morning, falling asleep during our interview--thought to be related to increases in neuropathic pain meds and muscle relaxants per Physiatry.    Review of Systems  Review of Systems   Constitutional: Negative for chills and fever.   HENT: Negative.    Eyes: Negative.    Respiratory: Negative for cough and shortness of breath.    Cardiovascular: Negative for chest pain and palpitations.   Gastrointestinal: Negative for abdominal pain, nausea and vomiting.   Musculoskeletal: Positive for neck pain.        Wound pain   Skin: Negative for itching and rash.   Neurological: Positive for focal weakness.   Endo/Heme/Allergies: Negative for polydipsia. Does not bruise/bleed easily.        Physical Exam  Temp:  [36.6 °C (97.8 °F)-36.9 °C (98.4 °F)] 36.6 °C (97.8 °F)  Pulse:  [67-82] 72  Resp:  [16-20] 16  BP: (118-124)/(51-63) 122/58  SpO2:  [92 %-98 %] 98 %    Physical Exam  Vitals signs reviewed.   Constitutional:       General: He is not in acute distress.     Appearance: Normal appearance. He is not ill-appearing.   HENT:      Head: Normocephalic and atraumatic.      Right Ear: External ear normal.      Left Ear: External ear normal.      Nose: Nose normal.      Mouth/Throat:      Pharynx: Oropharynx is clear.   Eyes:      General:         Right eye: No discharge.         Left eye: No discharge.      Extraocular Movements: Extraocular movements intact.      Conjunctiva/sclera: Conjunctivae normal.   Neck:      Comments: C-collar  Cardiovascular:      Rate and Rhythm: Normal rate and regular rhythm.   Pulmonary:      Effort: No respiratory distress.      Breath sounds: No wheezing.      Comments: Decreased BS  Abdominal:      General: Bowel sounds are normal. There is no distension.      Palpations: Abdomen is soft.      Tenderness: There is no abdominal tenderness.   Musculoskeletal:       Right lower leg: No edema.      Left lower leg: No edema.   Skin:     General: Skin is warm and dry.   Neurological:      Mental Status: He is alert.      Comments: somnolent         Fluids    Intake/Output Summary (Last 24 hours) at 3/18/2020 1211  Last data filed at 3/18/2020 0800  Gross per 24 hour   Intake 1020 ml   Output 850 ml   Net 170 ml       Laboratory  Recent Labs     03/17/20  0522 03/18/20  0551   WBC 5.1 4.1*   RBC 2.55* 2.53*   HEMOGLOBIN 7.5* 7.5*   HEMATOCRIT 24.9* 23.8*   MCV 97.6 94.1   MCH 29.4 29.6   MCHC 30.1* 31.5*   RDW 58.0* 55.2*   PLATELETCT 181 169   MPV 9.2 9.2     Recent Labs     03/16/20  0612 03/17/20  0522 03/18/20  0551   SODIUM 139 139 140   POTASSIUM 4.0 4.1 3.9   CHLORIDE 106 105 107   CO2 25 25 22   GLUCOSE 167* 145* 182*   BUN 39* 37* 38*   CREATININE 2.11* 2.60* 2.45*   CALCIUM 8.5 8.5 8.5                   Assessment/Plan  Anemia- (present on admission)  Assessment & Plan  Fe and Vit B12 low, on supplements for both    Chronic kidney disease- (present on admission)  Assessment & Plan  Has Stage IV CKD  Avoid nephrotoxins  Renal dose all meds  Outpt Nephrology F/U  Continue Sodium Bicarb    Leukocytosis  Assessment & Plan  CXR +bilat PNA  UCx +Proteus  BCx +Strep in one of two bottles, repeat BCx in progress to assess for sterility  Sputum Cx negative  Zyvox & Zosyn de-escalated as no MRSA or Pseudomonas recovered  Continue Rocephin    Cervical spinal stenosis  Assessment & Plan  S/P C3-C6 laminectomy and posterior decompression  Wound care and pain control    Uncontrolled type 2 diabetes mellitus with hyperglycemia (HCC)- (present on admission)  Assessment & Plan  HbA1c  5.1  Resumed low dose Lantus for hyperglycemic trends  Continue Actos and SSI  Outpt meds include Ozempic 1.0 mg once a week, Actos 30 mg qd, and Tresiba 10 units qhs    Hyperlipemia- (present on admission)  Assessment & Plan  On Zocor    Essential hypertension- (present on admission)  Assessment &  Plan  Observe blood pressure trends on Metoprolol    Full Code    Discussed w/ Dr. Garner

## 2020-03-18 NOTE — FLOWSHEET NOTE
03/18/20 1549   Respiratory Assessment   Level of Consciousness Lethargic   Secretions   Cough Congested;Non Productive   Oxygen   O2 Delivery Device None - Room Air

## 2020-03-18 NOTE — THERAPY
Recreational Therapy  Daily Treatment     Patient Name: Vince Almanzar  AGE:  79 y.o., SEX:  male  Medical Record #: [id number]  Today's Date: 3/18/2020       Subjective         Objective       03/18/20 1231   Interdisciplinary Plan of Care Collaboration   IDT Collaboration with  Physical Therapist   Collaboration Comments CLOF. on hold   Treatment Time   Total Time Spent (mins) 0   Total Time Missed 30   Reasons for Time Missed Medical-Patient On Hold       Assessment    Pt currently on hold.     Plan    Cognitive leisure.

## 2020-03-18 NOTE — PROGRESS NOTES
"Rehab Progress Note     Encounter Date: 3/18/2020    CC: UE weakness    Interval Events (Subjective)    He is having difficult time with significant lethargy and fatigue, only able to answer questions intermittently.  He denies any new pain, denies any chest pain  Staff reporting lethargy initiated in the last 12 hours, denies any rigors    Ultrasound of the right lower extremity was negative yesterday    Bowel: Large incontinence this morning  Bladder: No incontinence    ROS:  Able to perform 14 point review of systems secondary to cognitive status, increased fatigue/lethargy    Objective:  Vitals: /58   Pulse 72   Temp 36.6 °C (97.8 °F) (Temporal)   Resp 16   Ht 1.778 m (5' 10\")   Wt 78.5 kg (173 lb 1 oz)   SpO2 98%   Gen: Somnolence Body mass index is 24.83 kg/m².  HEENT: NC/AT, PERRLA, moist mucous membranes  Cardio: RRR, no mumurs  Pulm: Coarse breath sounds bilaterally, with normal respiratory effort  Abd: Soft NTND, active bowel sounds,  Ext: + peripheral pitting edema, right greater than left up to the thigh. No calf tenderness. No clubbing/cyanosis. +dorsal pedalis pulses bilaterally.        Recent Results (from the past 72 hour(s))   ACCU-CHEK GLUCOSE    Collection Time: 03/15/20 11:32 AM   Result Value Ref Range    Glucose - Accu-Ck 76 65 - 99 mg/dL   ACCU-CHEK GLUCOSE    Collection Time: 03/15/20  5:28 PM   Result Value Ref Range    Glucose - Accu-Ck 150 (H) 65 - 99 mg/dL   ACCU-CHEK GLUCOSE    Collection Time: 03/15/20  9:06 PM   Result Value Ref Range    Glucose - Accu-Ck 250 (H) 65 - 99 mg/dL   Basic Metabolic Panel    Collection Time: 03/16/20  6:12 AM   Result Value Ref Range    Sodium 139 135 - 145 mmol/L    Potassium 4.0 3.6 - 5.5 mmol/L    Chloride 106 96 - 112 mmol/L    Co2 25 20 - 33 mmol/L    Glucose 167 (H) 65 - 99 mg/dL    Bun 39 (H) 8 - 22 mg/dL    Creatinine 2.11 (H) 0.50 - 1.40 mg/dL    Calcium 8.5 8.5 - 10.5 mg/dL    Anion Gap 8.0 7.0 - 16.0   ESTIMATED GFR    Collection " Time: 03/16/20  6:12 AM   Result Value Ref Range    GFR If  37 (A) >60 mL/min/1.73 m 2    GFR If Non  31 (A) >60 mL/min/1.73 m 2   ACCU-CHEK GLUCOSE    Collection Time: 03/16/20  8:00 AM   Result Value Ref Range    Glucose - Accu-Ck 131 (H) 65 - 99 mg/dL   ACCU-CHEK GLUCOSE    Collection Time: 03/16/20 11:29 AM   Result Value Ref Range    Glucose - Accu-Ck 254 (H) 65 - 99 mg/dL   ACCU-CHEK GLUCOSE    Collection Time: 03/16/20  5:20 PM   Result Value Ref Range    Glucose - Accu-Ck 203 (H) 65 - 99 mg/dL   ACCU-CHEK GLUCOSE    Collection Time: 03/16/20  9:41 PM   Result Value Ref Range    Glucose - Accu-Ck 231 (H) 65 - 99 mg/dL   CBC WITH DIFFERENTIAL    Collection Time: 03/17/20  5:22 AM   Result Value Ref Range    WBC 5.1 4.8 - 10.8 K/uL    RBC 2.55 (L) 4.70 - 6.10 M/uL    Hemoglobin 7.5 (L) 14.0 - 18.0 g/dL    Hematocrit 24.9 (L) 42.0 - 52.0 %    MCV 97.6 81.4 - 97.8 fL    MCH 29.4 27.0 - 33.0 pg    MCHC 30.1 (L) 33.7 - 35.3 g/dL    RDW 58.0 (H) 35.9 - 50.0 fL    Platelet Count 181 164 - 446 K/uL    MPV 9.2 9.0 - 12.9 fL    Neutrophils-Polys 76.00 (H) 44.00 - 72.00 %    Lymphocytes 10.50 (L) 22.00 - 41.00 %    Monocytes 11.10 0.00 - 13.40 %    Eosinophils 1.20 0.00 - 6.90 %    Basophils 0.20 0.00 - 1.80 %    Immature Granulocytes 1.00 (H) 0.00 - 0.90 %    Nucleated RBC 0.00 /100 WBC    Neutrophils (Absolute) 3.85 1.82 - 7.42 K/uL    Lymphs (Absolute) 0.53 (L) 1.00 - 4.80 K/uL    Monos (Absolute) 0.56 0.00 - 0.85 K/uL    Eos (Absolute) 0.06 0.00 - 0.51 K/uL    Baso (Absolute) 0.01 0.00 - 0.12 K/uL    Immature Granulocytes (abs) 0.05 0.00 - 0.11 K/uL    NRBC (Absolute) 0.00 K/uL   Basic Metabolic Panel    Collection Time: 03/17/20  5:22 AM   Result Value Ref Range    Sodium 139 135 - 145 mmol/L    Potassium 4.1 3.6 - 5.5 mmol/L    Chloride 105 96 - 112 mmol/L    Co2 25 20 - 33 mmol/L    Glucose 145 (H) 65 - 99 mg/dL    Bun 37 (H) 8 - 22 mg/dL    Creatinine 2.60 (H) 0.50 - 1.40 mg/dL     Calcium 8.5 8.5 - 10.5 mg/dL    Anion Gap 9.0 7.0 - 16.0   ESTIMATED GFR    Collection Time: 03/17/20  5:22 AM   Result Value Ref Range    GFR If  29 (A) >60 mL/min/1.73 m 2    GFR If Non  24 (A) >60 mL/min/1.73 m 2   ACCU-CHEK GLUCOSE    Collection Time: 03/17/20  8:13 AM   Result Value Ref Range    Glucose - Accu-Ck 123 (H) 65 - 99 mg/dL   ACCU-CHEK GLUCOSE    Collection Time: 03/17/20 11:44 AM   Result Value Ref Range    Glucose - Accu-Ck 175 (H) 65 - 99 mg/dL   BLOOD CULTURE    Collection Time: 03/17/20  5:15 PM   Result Value Ref Range    Significant Indicator NEG     Source BLD     Site PERIPHERAL     Culture Result       No Growth  Note: Blood cultures are incubated for 5 days and  are monitored continuously.Positive blood cultures  are called to the RN and reported as soon as  they are identified.     BLOOD CULTURE    Collection Time: 03/17/20  5:15 PM   Result Value Ref Range    Significant Indicator NEG     Source BLD     Site PERIPHERAL     Culture Result       No Growth  Note: Blood cultures are incubated for 5 days and  are monitored continuously.Positive blood cultures  are called to the RN and reported as soon as  they are identified.     ACCU-CHEK GLUCOSE    Collection Time: 03/17/20  5:44 PM   Result Value Ref Range    Glucose - Accu-Ck 217 (H) 65 - 99 mg/dL   ACCU-CHEK GLUCOSE    Collection Time: 03/17/20  9:21 PM   Result Value Ref Range    Glucose - Accu-Ck 218 (H) 65 - 99 mg/dL   Basic Metabolic Panel    Collection Time: 03/18/20  5:51 AM   Result Value Ref Range    Sodium 140 135 - 145 mmol/L    Potassium 3.9 3.6 - 5.5 mmol/L    Chloride 107 96 - 112 mmol/L    Co2 22 20 - 33 mmol/L    Glucose 182 (H) 65 - 99 mg/dL    Bun 38 (H) 8 - 22 mg/dL    Creatinine 2.45 (H) 0.50 - 1.40 mg/dL    Calcium 8.5 8.5 - 10.5 mg/dL    Anion Gap 11.0 7.0 - 16.0   CBC WITH DIFFERENTIAL    Collection Time: 03/18/20  5:51 AM   Result Value Ref Range    WBC 4.1 (L) 4.8 - 10.8 K/uL    RBC  2.53 (L) 4.70 - 6.10 M/uL    Hemoglobin 7.5 (L) 14.0 - 18.0 g/dL    Hematocrit 23.8 (L) 42.0 - 52.0 %    MCV 94.1 81.4 - 97.8 fL    MCH 29.6 27.0 - 33.0 pg    MCHC 31.5 (L) 33.7 - 35.3 g/dL    RDW 55.2 (H) 35.9 - 50.0 fL    Platelet Count 169 164 - 446 K/uL    MPV 9.2 9.0 - 12.9 fL    Neutrophils-Polys 76.90 (H) 44.00 - 72.00 %    Lymphocytes 10.60 (L) 22.00 - 41.00 %    Monocytes 10.60 0.00 - 13.40 %    Eosinophils 1.20 0.00 - 6.90 %    Basophils 0.00 0.00 - 1.80 %    Immature Granulocytes 0.70 0.00 - 0.90 %    Nucleated RBC 0.00 /100 WBC    Neutrophils (Absolute) 3.12 1.82 - 7.42 K/uL    Lymphs (Absolute) 0.43 (L) 1.00 - 4.80 K/uL    Monos (Absolute) 0.43 0.00 - 0.85 K/uL    Eos (Absolute) 0.05 0.00 - 0.51 K/uL    Baso (Absolute) 0.00 0.00 - 0.12 K/uL    Immature Granulocytes (abs) 0.03 0.00 - 0.11 K/uL    NRBC (Absolute) 0.00 K/uL   ESTIMATED GFR    Collection Time: 03/18/20  5:51 AM   Result Value Ref Range    GFR If  31 (A) >60 mL/min/1.73 m 2    GFR If Non African American 26 (A) >60 mL/min/1.73 m 2   ACCU-CHEK GLUCOSE    Collection Time: 03/18/20  7:52 AM   Result Value Ref Range    Glucose - Accu-Ck 156 (H) 65 - 99 mg/dL       Current Facility-Administered Medications   Medication Frequency   • gabapentin (NEURONTIN) capsule 400 mg Q EVENING   • baclofen (LIORESAL) tablet 5 mg TID   • NS infusion Continuous   • cefTRIAXone (ROCEPHIN) 1 g in  mL IVPB Q24HRS   • insulin glargine (LANTUS) injection 10 Units Q EVENING   • insulin lispro (HUMALOG) injection 2-12 Units 4X/DAY ACHS    And   • glucose 4 g chewable tablet 16 g Q15 MIN PRN    And   • dextrose 50% (D50W) injection 50 mL Q15 MIN PRN   • ipratropium-albuterol (DUONEB) nebulizer solution TID   • lidocaine 2 % jelly PRN    And   • nitroglycerin (NITRO-BID) 2 % ointment 1 Inch PRN   • Pharmacy Consult Request ...Pain Management Review 1 Each PHARMACY TO DOSE   • Respiratory Therapy Consult Continuous RT   • oxyCODONE immediate-release  (ROXICODONE) tablet 5 mg Q3HRS PRN   • oxyCODONE immediate release (ROXICODONE) tablet 10 mg Q3HRS PRN   • acetaminophen (TYLENOL) tablet 650 mg Q4HRS PRN   • ascorbic acid tablet 500 mg BID WITH MEALS   • therapeutic multivitamin-minerals (THERAGRAN-M) tablet 1 Tab DAILY WITH LUNCH   • docusate sodium (COLACE) capsule 200 mg BID    And   • sennosides (SENOKOT) 8.6 MG tablet 17.2 mg DAILY AT NOON    And   • bisacodyl (THE MAGIC BULLET) suppository 10 mg QDAY    And   • magnesium hydroxide (MILK OF MAGNESIA) suspension 30 mL QDAY PRN   • artificial tears ophthalmic solution 1 Drop PRN   • benzocaine-menthol (CEPACOL) lozenge 1 Lozenge Q2HRS PRN   • mag hydrox-al hydrox-simeth (MAALOX PLUS ES or MYLANTA DS) suspension 20 mL Q2HRS PRN   • ondansetron (ZOFRAN ODT) dispertab 4 mg 4X/DAY PRN    Or   • ondansetron (ZOFRAN) syringe/vial injection 4 mg 4X/DAY PRN   • sodium chloride (OCEAN) 0.65 % nasal spray 2 Spray PRN   • traZODone (DESYREL) tablet 50 mg QHS PRN   • ammonium lactate (LAC-HYDRIN) 12 % lotion BID   • calcium carbonate (TUMS) chewable tab 500 mg BID   • methocarbamol (ROBAXIN) tablet 750 mg Q8HRS PRN   • cyanocobalamin (VITAMIN B12) tablet 1,000 mcg DAILY   • ferrous sulfate tablet 325 mg BID WITH MEALS   • lidocaine (LIDODERM) 5 % 2 Patch Q24HR   • metoprolol (LOPRESSOR) tablet 12.5 mg BID   • pioglitazone (ACTOS) tablet 30 mg DAILY   • simethicone (MYLICON) chewable tab 80 mg TID PRN   • simvastatin (ZOCOR) tablet 20 mg Nightly   • sodium bicarbonate tablet 650 mg TID   • heparin injection 5,000 Units Q8HRS   • midodrine (PROAMATINE) tablet 5 mg Q4HRS PRN       Orders Placed This Encounter   Procedures   • Diet Order Renal (NO FISH), Diabetic     Standing Status:   Standing     Number of Occurrences:   1     Order Specific Question:   Diet:     Answer:   Renal [8]     Comments:   NO FISH     Order Specific Question:   Diet:     Answer:   Diabetic [3]     Order Specific Question:   Texture Modifier      Answer:   Level 5 - Minced & Moist (Dysphagia 2)     Order Specific Question:   Liquid level     Answer:   Level 2 - Mildly Thick       Assessment:  Active Hospital Problems    Diagnosis   • Acute on chronic renal failure (HCC)   • Central cord syndrome (HCC)   • Type 2 diabetes mellitus (HCC)   • Anemia   • Thrombocytopenia (HCC)   • 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 Sellers on 2/28.  Pinprick altered bilaterally at C3-C6  - Collar on at all times, spinal precautions  - Calcium carbonate 500 mg twice daily  -Place patient on bedrest secondary to right lower extremity swelling, concern for DVT  - hold PT/OT secondary to lethargy and somnolence    Neurologic respiratory impairment  -Consult respiratory therapy  - Oxygen as per guidelines  - DuoNeb 3X daily   -Consider hypertonic saline for mucolytic management  -Check vital capacity     Pneumonia: Bilateral lower lobe, likely bacterial, productive cough  - Zyvox and Zosyn started on 3/14, transitioned to ceftriaxone on 3/18  -Appreciate hospitalist input  -Aggressive secretion management as above  - Given productive cough will place on droplet precaution until completion of antibiotics    Neurogenic bladder  -voiding trial, if can't void in 6 hours or prn check pvr and if >500cc then ICP,if able to void then check PVR, if PVR is >200cc then ICP     Neurogenic bowel  -Upper motor neuron neurogenic bowel program with senna 2 tablets q. noon, Colace 200 mg twice daily and Dulcolax suppository with digital  stimulation     Potential for orthostatic hypotension  Because of significant autonomic dysfunction associated with spinal cord injury, the patient is at high risk for orthostatic hypotension.  - Midodrine 5mg q4 hours prn SBP <100     Dysphagia: Previous diagnosis after traumatic brain injury, high risk after cervical spinal cord injury  -Consulted speech therapy to evaluate for swallow study     Pain:  #Neuropathic pain:  New bilateral lower extremity pins-and-needles  - Change gabapentin 400 mg daily,  dose limited by GFR, concern that increased dose of gabapentin was resulting in lethargy  -Vitamin C 500 mg every 12 hours, which is been shown to improve gabapentin absorption and pain management     Postoperative pain:  - Oxycodone 5-10 mg every 3 hours as needed pain  - Robaxin 750 mg every 8 hours as needed pain  -DC scheduled Tylenol secondary to pneumonia, can mask fever     Spasticity: Given increased lethargy will decrease baclofen  - Decrease baclofen 5 mg 3 times daily, monitor for fatigue    Anemia: Required IV iron supplementation, hemoglobin of 7.7 on admission  -Epogen 2000 units, Monday Wednesday Friday  -Folic acid 5 mg daily  - Ferrous sulfate 325 mg twice daily    Diabetes:  -Lantus 10 units nightly  -Check fingersticks q. before meals, nightly  -Sliding scale insulin  - Pioglitazone 30 mg a daily  - ADA diet  -Consult hospitalist      Acute on chronic kidney injury: Previously stage IV kidney disease with bilateral hydronephrosis suggestive of post renal obstruction, likely worsened by neurogenic bladder.  BUN/creatinine of 49/2.5  -Check BMP in a.m.  -Manage neurogenic bladder as above     Hyponatremia: Sodium of 136 on admission  -Sodium bicarb tablets 650 mg 3 times a day     Hyperlipidemia: Triglyceride 65, HDL 31, LDL 37  -Simvastatin 20 mg nightly     Vitamin deficiency  In the posttraumatic setting, there is a high likelihood of vitamin D deficiency.   Vitamin D level on admission of 47,  goal of >30  - DC vitamin D supplementation     DVT prophylaxis/right lower extremity swelling: Patient at increased risk for VTE formation with neurologic compromise/myelopathy.  -Ultrasound right lower extremity was negative for DVT  -Heparin 5000 units every 8 hours, unable to transition to Lovenox given current renal function      Patient was seen for 29 minutes on unit/floor of which > 50% of time was spent on counseling and coordination of care regarding the above, including prognosis, risk reduction, benefits of treatment, and options for next stage of care including neuropathic pain, change in gabapentin to 400 mg nightly, spasticity, decrease and baclofen secondary to lethargy to 5 mg 3 times daily, acute on chronic kidney injury, check BMP in a.m.      Ramu Garner D.O.

## 2020-03-18 NOTE — THERAPY
Missed Therapy     Patient Name: Vince Almanzar  Age:  79 y.o., Sex:  male  Medical Record #: [id number]  Today's Date: 3/18/2020    Discussed missed therapy with scheduling, therapy supervisor

## 2020-03-18 NOTE — CARE PLAN
Problem: Infection  Goal: Will remain free from infection  Outcome: PROGRESSING SLOWER THAN EXPECTED  Note: Patient is currently on IV antibiotic for PNA. Was very lethargy this am and would not keep eyes opened. Dr Garner is aware. On NS infusion.        Problem: Bowel/Gastric:  Goal: Normal bowel function is maintained or improved  Outcome: PROGRESSING SLOWER THAN EXPECTED  Note: Patient had some loose stool, was incontinent of bowel and needed staff to clean up. On c diff precaution due to currently on antibiotic.

## 2020-03-19 ENCOUNTER — APPOINTMENT (OUTPATIENT)
Dept: RADIOLOGY | Facility: REHABILITATION | Age: 80
DRG: 052 | End: 2020-03-19
Attending: HOSPITALIST
Payer: COMMERCIAL

## 2020-03-19 PROBLEM — R40.0 SOMNOLENCE: Status: ACTIVE | Noted: 2020-03-19

## 2020-03-19 LAB
ANION GAP SERPL CALC-SCNC: 12 MMOL/L (ref 7–16)
BACTERIA BLD CULT: NORMAL
BASOPHILS # BLD AUTO: 0.2 % (ref 0–1.8)
BASOPHILS # BLD: 0.01 K/UL (ref 0–0.12)
BUN SERPL-MCNC: 34 MG/DL (ref 8–22)
CALCIUM SERPL-MCNC: 8.6 MG/DL (ref 8.5–10.5)
CHLORIDE SERPL-SCNC: 107 MMOL/L (ref 96–112)
CO2 SERPL-SCNC: 21 MMOL/L (ref 20–33)
CREAT SERPL-MCNC: 2.2 MG/DL (ref 0.5–1.4)
EOSINOPHIL # BLD AUTO: 0.01 K/UL (ref 0–0.51)
EOSINOPHIL NFR BLD: 0.2 % (ref 0–6.9)
ERYTHROCYTE [DISTWIDTH] IN BLOOD BY AUTOMATED COUNT: 55.2 FL (ref 35.9–50)
GLUCOSE BLD-MCNC: 117 MG/DL (ref 65–99)
GLUCOSE BLD-MCNC: 120 MG/DL (ref 65–99)
GLUCOSE BLD-MCNC: 130 MG/DL (ref 65–99)
GLUCOSE BLD-MCNC: 132 MG/DL (ref 65–99)
GLUCOSE BLD-MCNC: 158 MG/DL (ref 65–99)
GLUCOSE SERPL-MCNC: 135 MG/DL (ref 65–99)
HCT VFR BLD AUTO: 25.6 % (ref 42–52)
HGB BLD-MCNC: 7.9 G/DL (ref 14–18)
IMM GRANULOCYTES # BLD AUTO: 0.06 K/UL (ref 0–0.11)
IMM GRANULOCYTES NFR BLD AUTO: 1.1 % (ref 0–0.9)
LACTATE BLD-SCNC: 1.3 MMOL/L (ref 0.5–2)
LACTATE BLD-SCNC: 1.4 MMOL/L (ref 0.5–2)
LYMPHOCYTES # BLD AUTO: 0.39 K/UL (ref 1–4.8)
LYMPHOCYTES NFR BLD: 7.2 % (ref 22–41)
MCH RBC QN AUTO: 28.9 PG (ref 27–33)
MCHC RBC AUTO-ENTMCNC: 30.9 G/DL (ref 33.7–35.3)
MCV RBC AUTO: 93.8 FL (ref 81.4–97.8)
MONOCYTES # BLD AUTO: 0.45 K/UL (ref 0–0.85)
MONOCYTES NFR BLD AUTO: 8.4 % (ref 0–13.4)
NEUTROPHILS # BLD AUTO: 4.46 K/UL (ref 1.82–7.42)
NEUTROPHILS NFR BLD: 82.9 % (ref 44–72)
NRBC # BLD AUTO: 0 K/UL
NRBC BLD-RTO: 0 /100 WBC
NT-PROBNP SERPL IA-MCNC: 8993 PG/ML (ref 0–125)
PLATELET # BLD AUTO: 173 K/UL (ref 164–446)
PMV BLD AUTO: 8.8 FL (ref 9–12.9)
POTASSIUM SERPL-SCNC: 3.8 MMOL/L (ref 3.6–5.5)
RBC # BLD AUTO: 2.73 M/UL (ref 4.7–6.1)
SIGNIFICANT IND 70042: NORMAL
SITE SITE: NORMAL
SODIUM SERPL-SCNC: 140 MMOL/L (ref 135–145)
SOURCE SOURCE: NORMAL
WBC # BLD AUTO: 5.4 K/UL (ref 4.8–10.8)

## 2020-03-19 PROCEDURE — 97530 THERAPEUTIC ACTIVITIES: CPT

## 2020-03-19 PROCEDURE — 94669 MECHANICAL CHEST WALL OSCILL: CPT

## 2020-03-19 PROCEDURE — 71045 X-RAY EXAM CHEST 1 VIEW: CPT

## 2020-03-19 PROCEDURE — 94760 N-INVAS EAR/PLS OXIMETRY 1: CPT

## 2020-03-19 PROCEDURE — 36415 COLL VENOUS BLD VENIPUNCTURE: CPT

## 2020-03-19 PROCEDURE — 700111 HCHG RX REV CODE 636 W/ 250 OVERRIDE (IP): Performed by: PHYSICAL MEDICINE & REHABILITATION

## 2020-03-19 PROCEDURE — 700102 HCHG RX REV CODE 250 W/ 637 OVERRIDE(OP): Performed by: PHYSICAL MEDICINE & REHABILITATION

## 2020-03-19 PROCEDURE — 83880 ASSAY OF NATRIURETIC PEPTIDE: CPT

## 2020-03-19 PROCEDURE — 700105 HCHG RX REV CODE 258: Performed by: PHYSICAL MEDICINE & REHABILITATION

## 2020-03-19 PROCEDURE — 94640 AIRWAY INHALATION TREATMENT: CPT

## 2020-03-19 PROCEDURE — A9270 NON-COVERED ITEM OR SERVICE: HCPCS | Performed by: PHYSICAL MEDICINE & REHABILITATION

## 2020-03-19 PROCEDURE — 770010 HCHG ROOM/CARE - REHAB SEMI PRIVAT*

## 2020-03-19 PROCEDURE — 92526 ORAL FUNCTION THERAPY: CPT

## 2020-03-19 PROCEDURE — 97535 SELF CARE MNGMENT TRAINING: CPT

## 2020-03-19 PROCEDURE — 700101 HCHG RX REV CODE 250: Performed by: PHYSICAL MEDICINE & REHABILITATION

## 2020-03-19 PROCEDURE — 86140 C-REACTIVE PROTEIN: CPT

## 2020-03-19 PROCEDURE — 80048 BASIC METABOLIC PNL TOTAL CA: CPT

## 2020-03-19 PROCEDURE — 82962 GLUCOSE BLOOD TEST: CPT | Mod: 91

## 2020-03-19 PROCEDURE — 99233 SBSQ HOSP IP/OBS HIGH 50: CPT | Performed by: PHYSICAL MEDICINE & REHABILITATION

## 2020-03-19 PROCEDURE — 87040 BLOOD CULTURE FOR BACTERIA: CPT

## 2020-03-19 PROCEDURE — 700111 HCHG RX REV CODE 636 W/ 250 OVERRIDE (IP): Performed by: HOSPITALIST

## 2020-03-19 PROCEDURE — 85025 COMPLETE CBC W/AUTO DIFF WBC: CPT

## 2020-03-19 PROCEDURE — 97110 THERAPEUTIC EXERCISES: CPT

## 2020-03-19 PROCEDURE — 83605 ASSAY OF LACTIC ACID: CPT | Mod: 91

## 2020-03-19 PROCEDURE — 700105 HCHG RX REV CODE 258: Performed by: HOSPITALIST

## 2020-03-19 PROCEDURE — 99233 SBSQ HOSP IP/OBS HIGH 50: CPT | Performed by: HOSPITALIST

## 2020-03-19 RX ORDER — PREGABALIN 25 MG/1
25 CAPSULE ORAL 2 TIMES DAILY
Status: DISCONTINUED | OUTPATIENT
Start: 2020-03-19 | End: 2020-03-23

## 2020-03-19 RX ORDER — LINEZOLID 2 MG/ML
600 INJECTION, SOLUTION INTRAVENOUS EVERY 12 HOURS
Status: DISCONTINUED | OUTPATIENT
Start: 2020-03-19 | End: 2020-03-21

## 2020-03-19 RX ORDER — LINEZOLID 600 MG/1
600 TABLET, FILM COATED ORAL EVERY 12 HOURS
Status: DISCONTINUED | OUTPATIENT
Start: 2020-03-19 | End: 2020-03-19

## 2020-03-19 RX ORDER — ACETYLCYSTEINE 200 MG/ML
3 SOLUTION ORAL; RESPIRATORY (INHALATION)
Status: DISCONTINUED | OUTPATIENT
Start: 2020-03-19 | End: 2020-03-23

## 2020-03-19 RX ORDER — FUROSEMIDE 10 MG/ML
20 INJECTION INTRAMUSCULAR; INTRAVENOUS
Status: DISCONTINUED | OUTPATIENT
Start: 2020-03-19 | End: 2020-03-22

## 2020-03-19 RX ORDER — POTASSIUM CHLORIDE 20 MEQ/1
10 TABLET, EXTENDED RELEASE ORAL DAILY
Status: DISCONTINUED | OUTPATIENT
Start: 2020-03-19 | End: 2020-03-22

## 2020-03-19 RX ORDER — GABAPENTIN 300 MG/1
300 CAPSULE ORAL EVERY EVENING
Status: DISCONTINUED | OUTPATIENT
Start: 2020-03-19 | End: 2020-03-24

## 2020-03-19 RX ADMIN — FUROSEMIDE 20 MG: 10 INJECTION, SOLUTION INTRAVENOUS at 15:07

## 2020-03-19 RX ADMIN — LINEZOLID 600 MG: 600 INJECTION, SOLUTION INTRAVENOUS at 21:42

## 2020-03-19 RX ADMIN — Medication: at 09:52

## 2020-03-19 RX ADMIN — Medication 1 APPLICATION: at 21:00

## 2020-03-19 RX ADMIN — PIPERACILLIN AND TAZOBACTAM 2.25 G: 2; .25 INJECTION, POWDER, LYOPHILIZED, FOR SOLUTION INTRAVENOUS; PARENTERAL at 18:27

## 2020-03-19 RX ADMIN — PIPERACILLIN AND TAZOBACTAM 2.25 G: 2; .25 INJECTION, POWDER, LYOPHILIZED, FOR SOLUTION INTRAVENOUS; PARENTERAL at 16:22

## 2020-03-19 RX ADMIN — PIOGLITAZONE 30 MG: 15 TABLET ORAL at 09:49

## 2020-03-19 RX ADMIN — HEPARIN SODIUM 5000 UNITS: 5000 INJECTION, SOLUTION INTRAVENOUS; SUBCUTANEOUS at 15:10

## 2020-03-19 RX ADMIN — ACETYLCYSTEINE 3 ML: 200 INHALANT RESPIRATORY (INHALATION) at 14:52

## 2020-03-19 RX ADMIN — SODIUM BICARBONATE 650 MG TABLET 650 MG: at 09:49

## 2020-03-19 RX ADMIN — OXYCODONE HYDROCHLORIDE AND ACETAMINOPHEN 500 MG: 500 TABLET ORAL at 09:49

## 2020-03-19 RX ADMIN — HEPARIN SODIUM 5000 UNITS: 5000 INJECTION, SOLUTION INTRAVENOUS; SUBCUTANEOUS at 21:54

## 2020-03-19 RX ADMIN — ACETYLCYSTEINE 3 ML: 200 INHALANT RESPIRATORY (INHALATION) at 21:18

## 2020-03-19 RX ADMIN — IPRATROPIUM BROMIDE AND ALBUTEROL SULFATE 3 ML: .5; 3 SOLUTION RESPIRATORY (INHALATION) at 08:54

## 2020-03-19 RX ADMIN — IPRATROPIUM BROMIDE AND ALBUTEROL SULFATE 3 ML: .5; 3 SOLUTION RESPIRATORY (INHALATION) at 21:57

## 2020-03-19 RX ADMIN — IPRATROPIUM BROMIDE AND ALBUTEROL SULFATE 3 ML: .5; 3 SOLUTION RESPIRATORY (INHALATION) at 14:52

## 2020-03-19 RX ADMIN — LINEZOLID 600 MG: 600 INJECTION, SOLUTION INTRAVENOUS at 13:00

## 2020-03-19 RX ADMIN — FERROUS SULFATE TAB 325 MG (65 MG ELEMENTAL FE) 325 MG: 325 (65 FE) TAB at 09:53

## 2020-03-19 RX ADMIN — SODIUM CHLORIDE: 9 INJECTION, SOLUTION INTRAVENOUS at 06:49

## 2020-03-19 RX ADMIN — HEPARIN SODIUM 5000 UNITS: 5000 INJECTION, SOLUTION INTRAVENOUS; SUBCUTANEOUS at 05:02

## 2020-03-19 RX ADMIN — LIDOCAINE 2 PATCH: 50 PATCH CUTANEOUS at 09:48

## 2020-03-19 RX ADMIN — CALCIUM CARBONATE (ANTACID) CHEW TAB 500 MG 500 MG: 500 CHEW TAB at 09:49

## 2020-03-19 RX ADMIN — CYANOCOBALAMIN TAB 1000 MCG 1000 MCG: 1000 TAB at 09:49

## 2020-03-19 ASSESSMENT — ENCOUNTER SYMPTOMS
CHILLS: 0
COUGH: 0
ABDOMINAL PAIN: 0
PALPITATIONS: 0
FEVER: 0
FOCAL WEAKNESS: 1
SHORTNESS OF BREATH: 0
POLYDIPSIA: 0
NAUSEA: 0
NECK PAIN: 1
EYES NEGATIVE: 1
BRUISES/BLEEDS EASILY: 0
VOMITING: 0

## 2020-03-19 NOTE — THERAPY
Speech Language Pathology  Daily Treatment     Patient Name: Vince Almanzar  Age:  79 y.o., Sex:  male  Medical Record #: [id number]  Today's Date: 3/19/2020     Subjective    Patient was asleep in bed at time of ST.      Objective       03/19/20 1102   Dysphagia    Other Treatments Oral care and stim   Diet / Liquid Recommendation NPO   Nutritional Liquid Intake Rating Scale Nothing by mouth   Nutritional Food Intake Rating Scale Nothing by mouth   SLP Total Time Spent   SLP Individual Total Time Spent (Mins) 30   Charge Group   SLP Swallowing Dysfunction Treatment Swallowing Dysfunction Treatment         Assessment    Patient continues to require max verbal and tactile cues to wake. Patient with significant amount of crushed medication pocketed in all buccal cavities. Completed thorough oral care with limited responses to oral stim. Was not able to follow any oral motor directives. PO trials were not attempted at this time d/t risk of aspiration. Recommend NPO with cortak as supplemental nutrition source. Discussed with MD and RD.     Plan    Recommend NPO, oral stim, PO trials as appropriate.

## 2020-03-19 NOTE — PROGRESS NOTES
Reported to incoming nurse to inform MD that patient is very lethargic and not swallowing pill and follow commands. Patient still wakes up

## 2020-03-19 NOTE — PROGRESS NOTES
Received patient lethargic. Unable to follow commands. Open his eyes. Patient on O2 per NC. Patient have a severe cough. On droplet isolation. Patient on low airloss mattress.patient safety and fall precaution reinforced.

## 2020-03-19 NOTE — THERAPY
Missed Therapy     Patient Name: Vince Almanzar  Age:  79 y.o., Sex:  male  Medical Record #: [id number]  Today's Date: 3/19/2020    Discussed missed therapy with  and MD.     03/19/20 1231   Precautions   Precautions Spinal / Back Precautions ;Fall Risk   Comments Isolation precautions, c-collar, risk for  pressure injuries    Therapy Missed   Missed Therapy (Minutes) 60   Reason For Missed Therapy Medical - Patient on Hold from Therapy  (minimally responsive, increased spasticity)

## 2020-03-19 NOTE — PROGRESS NOTES
Reported by outgoing RN that the patient is lethargic. With severe cough. Per outgoing RN MD is aware of patient changes.

## 2020-03-19 NOTE — PROGRESS NOTES
"Rehab Progress Note     Encounter Date: 3/19/2020    CC: UE weakness    Interval Events (Subjective)    Still very lethargic, but is opening eyes and responding to questions.  Improved from yesterday.  Was started on IV fluids yesterday because of the lethargy.    He denies any pain this morning    Staff reporting spasticity is significantly worse in bilateral lower extremities, right greater than left    ROS:  Gen: + fatigue, improving confusion, no significant weight loss  Eyes: no blurry vision, double vision or pain in eyes  ENT: no changes in hearing, runny nose, nose bleeds, sinus pain  CV: No CP, palpitations  Lungs: no shortness of breath, changes in secretions, changes in cough, pain with coughing  Abd: No abd pain, nausea, vomiting, pain with eating  : no blood in urine,  suprapubic pain  Ext: No swelling in legs, asymmetric atrophy  Neuro: no changes in strength or sensation  Skin: no new ulcers/skin breakdown appreciated by patient  Mood: No changes in mood today, increase in depression or anxiety  Heme: no bruising, or bleeding  Rest of 14 point review of systems is negative    Objective:  Vitals: /49   Pulse 68   Temp 36.1 °C (96.9 °F) (Temporal)   Resp 16   Ht 1.778 m (5' 10\")   Wt 78.5 kg (173 lb 1 oz)   SpO2 97%   Gen: Lethargic Body mass index is 24.83 kg/m².  Responding to commands and questions appropriately  HEENT: NC/AT, PERRLA, moist mucous membranes  Cardio: RRR, no mumurs  Pulm: CTAB, with normal respiratory effort  Abd: Soft NTND, active bowel sounds  Ext: 2+ pitting peripheral edema R>L.  No clubbing/cyanosis. +dorsal pedalis pulses bilaterally.  Allodynia with light touch to right lower extremity  Tone: 3+/4 MAS bilateral knee extension        Recent Results (from the past 72 hour(s))   ACCU-CHEK GLUCOSE    Collection Time: 03/16/20 11:29 AM   Result Value Ref Range    Glucose - Accu-Ck 254 (H) 65 - 99 mg/dL   ACCU-CHEK GLUCOSE    Collection Time: 03/16/20  5:20 PM   Result " Value Ref Range    Glucose - Accu-Ck 203 (H) 65 - 99 mg/dL   ACCU-CHEK GLUCOSE    Collection Time: 03/16/20  9:41 PM   Result Value Ref Range    Glucose - Accu-Ck 231 (H) 65 - 99 mg/dL   CBC WITH DIFFERENTIAL    Collection Time: 03/17/20  5:22 AM   Result Value Ref Range    WBC 5.1 4.8 - 10.8 K/uL    RBC 2.55 (L) 4.70 - 6.10 M/uL    Hemoglobin 7.5 (L) 14.0 - 18.0 g/dL    Hematocrit 24.9 (L) 42.0 - 52.0 %    MCV 97.6 81.4 - 97.8 fL    MCH 29.4 27.0 - 33.0 pg    MCHC 30.1 (L) 33.7 - 35.3 g/dL    RDW 58.0 (H) 35.9 - 50.0 fL    Platelet Count 181 164 - 446 K/uL    MPV 9.2 9.0 - 12.9 fL    Neutrophils-Polys 76.00 (H) 44.00 - 72.00 %    Lymphocytes 10.50 (L) 22.00 - 41.00 %    Monocytes 11.10 0.00 - 13.40 %    Eosinophils 1.20 0.00 - 6.90 %    Basophils 0.20 0.00 - 1.80 %    Immature Granulocytes 1.00 (H) 0.00 - 0.90 %    Nucleated RBC 0.00 /100 WBC    Neutrophils (Absolute) 3.85 1.82 - 7.42 K/uL    Lymphs (Absolute) 0.53 (L) 1.00 - 4.80 K/uL    Monos (Absolute) 0.56 0.00 - 0.85 K/uL    Eos (Absolute) 0.06 0.00 - 0.51 K/uL    Baso (Absolute) 0.01 0.00 - 0.12 K/uL    Immature Granulocytes (abs) 0.05 0.00 - 0.11 K/uL    NRBC (Absolute) 0.00 K/uL   Basic Metabolic Panel    Collection Time: 03/17/20  5:22 AM   Result Value Ref Range    Sodium 139 135 - 145 mmol/L    Potassium 4.1 3.6 - 5.5 mmol/L    Chloride 105 96 - 112 mmol/L    Co2 25 20 - 33 mmol/L    Glucose 145 (H) 65 - 99 mg/dL    Bun 37 (H) 8 - 22 mg/dL    Creatinine 2.60 (H) 0.50 - 1.40 mg/dL    Calcium 8.5 8.5 - 10.5 mg/dL    Anion Gap 9.0 7.0 - 16.0   ESTIMATED GFR    Collection Time: 03/17/20  5:22 AM   Result Value Ref Range    GFR If  29 (A) >60 mL/min/1.73 m 2    GFR If Non  24 (A) >60 mL/min/1.73 m 2   ACCU-CHEK GLUCOSE    Collection Time: 03/17/20  8:13 AM   Result Value Ref Range    Glucose - Accu-Ck 123 (H) 65 - 99 mg/dL   ACCU-CHEK GLUCOSE    Collection Time: 03/17/20 11:44 AM   Result Value Ref Range    Glucose - Accu-Ck  175 (H) 65 - 99 mg/dL   BLOOD CULTURE    Collection Time: 03/17/20  5:15 PM   Result Value Ref Range    Significant Indicator NEG     Source BLD     Site PERIPHERAL     Culture Result       No Growth  Note: Blood cultures are incubated for 5 days and  are monitored continuously.Positive blood cultures  are called to the RN and reported as soon as  they are identified.     BLOOD CULTURE    Collection Time: 03/17/20  5:15 PM   Result Value Ref Range    Significant Indicator NEG     Source BLD     Site PERIPHERAL     Culture Result       No Growth  Note: Blood cultures are incubated for 5 days and  are monitored continuously.Positive blood cultures  are called to the RN and reported as soon as  they are identified.     ACCU-CHEK GLUCOSE    Collection Time: 03/17/20  5:44 PM   Result Value Ref Range    Glucose - Accu-Ck 217 (H) 65 - 99 mg/dL   ACCU-CHEK GLUCOSE    Collection Time: 03/17/20  9:21 PM   Result Value Ref Range    Glucose - Accu-Ck 218 (H) 65 - 99 mg/dL   Basic Metabolic Panel    Collection Time: 03/18/20  5:51 AM   Result Value Ref Range    Sodium 140 135 - 145 mmol/L    Potassium 3.9 3.6 - 5.5 mmol/L    Chloride 107 96 - 112 mmol/L    Co2 22 20 - 33 mmol/L    Glucose 182 (H) 65 - 99 mg/dL    Bun 38 (H) 8 - 22 mg/dL    Creatinine 2.45 (H) 0.50 - 1.40 mg/dL    Calcium 8.5 8.5 - 10.5 mg/dL    Anion Gap 11.0 7.0 - 16.0   CBC WITH DIFFERENTIAL    Collection Time: 03/18/20  5:51 AM   Result Value Ref Range    WBC 4.1 (L) 4.8 - 10.8 K/uL    RBC 2.53 (L) 4.70 - 6.10 M/uL    Hemoglobin 7.5 (L) 14.0 - 18.0 g/dL    Hematocrit 23.8 (L) 42.0 - 52.0 %    MCV 94.1 81.4 - 97.8 fL    MCH 29.6 27.0 - 33.0 pg    MCHC 31.5 (L) 33.7 - 35.3 g/dL    RDW 55.2 (H) 35.9 - 50.0 fL    Platelet Count 169 164 - 446 K/uL    MPV 9.2 9.0 - 12.9 fL    Neutrophils-Polys 76.90 (H) 44.00 - 72.00 %    Lymphocytes 10.60 (L) 22.00 - 41.00 %    Monocytes 10.60 0.00 - 13.40 %    Eosinophils 1.20 0.00 - 6.90 %    Basophils 0.00 0.00 - 1.80 %     Immature Granulocytes 0.70 0.00 - 0.90 %    Nucleated RBC 0.00 /100 WBC    Neutrophils (Absolute) 3.12 1.82 - 7.42 K/uL    Lymphs (Absolute) 0.43 (L) 1.00 - 4.80 K/uL    Monos (Absolute) 0.43 0.00 - 0.85 K/uL    Eos (Absolute) 0.05 0.00 - 0.51 K/uL    Baso (Absolute) 0.00 0.00 - 0.12 K/uL    Immature Granulocytes (abs) 0.03 0.00 - 0.11 K/uL    NRBC (Absolute) 0.00 K/uL   ESTIMATED GFR    Collection Time: 03/18/20  5:51 AM   Result Value Ref Range    GFR If  31 (A) >60 mL/min/1.73 m 2    GFR If Non African American 26 (A) >60 mL/min/1.73 m 2   ACCU-CHEK GLUCOSE    Collection Time: 03/18/20  7:52 AM   Result Value Ref Range    Glucose - Accu-Ck 156 (H) 65 - 99 mg/dL   ACCU-CHEK GLUCOSE    Collection Time: 03/18/20 11:10 AM   Result Value Ref Range    Glucose - Accu-Ck 192 (H) 65 - 99 mg/dL   C Diff by PCR rflx Toxin    Collection Time: 03/18/20  5:00 PM   Result Value Ref Range    C Diff by PCR Negative Negative    [id number] Presumptive Negative Negative   ACCU-CHEK GLUCOSE    Collection Time: 03/18/20  5:31 PM   Result Value Ref Range    Glucose - Accu-Ck 153 (H) 65 - 99 mg/dL   ACCU-CHEK GLUCOSE    Collection Time: 03/18/20  9:48 PM   Result Value Ref Range    Glucose - Accu-Ck 130 (H) 65 - 99 mg/dL   Basic Metabolic Panel    Collection Time: 03/19/20  5:41 AM   Result Value Ref Range    Sodium 140 135 - 145 mmol/L    Potassium 3.8 3.6 - 5.5 mmol/L    Chloride 107 96 - 112 mmol/L    Co2 21 20 - 33 mmol/L    Glucose 135 (H) 65 - 99 mg/dL    Bun 34 (H) 8 - 22 mg/dL    Creatinine 2.20 (H) 0.50 - 1.40 mg/dL    Calcium 8.6 8.5 - 10.5 mg/dL    Anion Gap 12.0 7.0 - 16.0   ESTIMATED GFR    Collection Time: 03/19/20  5:41 AM   Result Value Ref Range    GFR If African American 35 (A) >60 mL/min/1.73 m 2    GFR If Non  29 (A) >60 mL/min/1.73 m 2   ACCU-CHEK GLUCOSE    Collection Time: 03/19/20  8:10 AM   Result Value Ref Range    Glucose - Accu-Ck 117 (H) 65 - 99 mg/dL       Current  Facility-Administered Medications   Medication Frequency   • gabapentin (NEURONTIN) capsule 400 mg Q EVENING   • baclofen (LIORESAL) tablet 5 mg TID   • NS infusion Continuous   • Pharmacy Consult Request PHARMACY TO DOSE   • bisacodyl (THE MAGIC BULLET) suppository 10 mg QDAY PRN    And   • docusate sodium (COLACE) capsule 200 mg BID PRN    And   • sennosides (SENOKOT) 8.6 MG tablet 17.2 mg QDAY PRN    And   • magnesium hydroxide (MILK OF MAGNESIA) suspension 30 mL QDAY PRN   • cefTRIAXone (ROCEPHIN) 1 g in  mL IVPB Q24HRS   • insulin glargine (LANTUS) injection 10 Units Q EVENING   • insulin lispro (HUMALOG) injection 2-12 Units 4X/DAY ACHS    And   • glucose 4 g chewable tablet 16 g Q15 MIN PRN    And   • dextrose 50% (D50W) injection 50 mL Q15 MIN PRN   • ipratropium-albuterol (DUONEB) nebulizer solution TID   • lidocaine 2 % jelly PRN    And   • nitroglycerin (NITRO-BID) 2 % ointment 1 Inch PRN   • Pharmacy Consult Request ...Pain Management Review 1 Each PHARMACY TO DOSE   • Respiratory Therapy Consult Continuous RT   • oxyCODONE immediate-release (ROXICODONE) tablet 5 mg Q3HRS PRN   • oxyCODONE immediate release (ROXICODONE) tablet 10 mg Q3HRS PRN   • acetaminophen (TYLENOL) tablet 650 mg Q4HRS PRN   • ascorbic acid tablet 500 mg BID WITH MEALS   • therapeutic multivitamin-minerals (THERAGRAN-M) tablet 1 Tab DAILY WITH LUNCH   • artificial tears ophthalmic solution 1 Drop PRN   • benzocaine-menthol (CEPACOL) lozenge 1 Lozenge Q2HRS PRN   • mag hydrox-al hydrox-simeth (MAALOX PLUS ES or MYLANTA DS) suspension 20 mL Q2HRS PRN   • ondansetron (ZOFRAN ODT) dispertab 4 mg 4X/DAY PRN    Or   • ondansetron (ZOFRAN) syringe/vial injection 4 mg 4X/DAY PRN   • sodium chloride (OCEAN) 0.65 % nasal spray 2 Spray PRN   • traZODone (DESYREL) tablet 50 mg QHS PRN   • ammonium lactate (LAC-HYDRIN) 12 % lotion BID   • calcium carbonate (TUMS) chewable tab 500 mg BID   • methocarbamol (ROBAXIN) tablet 750 mg Q8HRS PRN    • cyanocobalamin (VITAMIN B12) tablet 1,000 mcg DAILY   • ferrous sulfate tablet 325 mg BID WITH MEALS   • lidocaine (LIDODERM) 5 % 2 Patch Q24HR   • metoprolol (LOPRESSOR) tablet 12.5 mg BID   • pioglitazone (ACTOS) tablet 30 mg DAILY   • simethicone (MYLICON) chewable tab 80 mg TID PRN   • simvastatin (ZOCOR) tablet 20 mg Nightly   • sodium bicarbonate tablet 650 mg TID   • heparin injection 5,000 Units Q8HRS   • midodrine (PROAMATINE) tablet 5 mg Q4HRS PRN       Orders Placed This Encounter   Procedures   • Diet Order Renal (NO FISH), Diabetic     Standing Status:   Standing     Number of Occurrences:   1     Order Specific Question:   Diet:     Answer:   Renal [8]     Comments:   NO FISH     Order Specific Question:   Diet:     Answer:   Diabetic [3]     Order Specific Question:   Texture Modifier     Answer:   Level 5 - Minced & Moist (Dysphagia 2)     Order Specific Question:   Liquid level     Answer:   Level 2 - Mildly Thick       Assessment:  Active Hospital Problems    Diagnosis   • Acute on chronic renal failure (HCC)   • Central cord syndrome (HCC)   • Type 2 diabetes mellitus (HCC)   • Anemia   • Thrombocytopenia (HCC)   • 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 Sellers on 2/28.  Pinprick altered bilaterally at C3-C6  - Collar on at all times, spinal precautions  - Calcium carbonate 500 mg twice daily  -Place patient on bedrest secondary to right lower extremity swelling, concern for DVT  - hold PT/OT secondary to lethargy and  somnolence    Neurologic respiratory impairment  -Consult respiratory therapy  - Oxygen as per guidelines  - DuoNeb 3X daily   -Start Mucomyst 3 times a day for mucolytic     Pneumonia: Bilateral lower lobe, likely bacterial, productive cough  - Zyvox and Zosyn started on 3/14, transitioned to ceftriaxone on 3/18  -Appreciate hospitalist input  -Aggressive secretion management as above  - Given productive cough will place on droplet precaution until completion of antibiotics  -Check CBC in a.m.    Neurogenic bladder  -voiding trial, if can't void in 6 hours or prn check pvr and if >500cc then ICP,if able to void then check PVR, if PVR is >200cc then ICP     Neurogenic bowel  -Upper motor neuron neurogenic bowel program with senna 2 tablets q. noon, Colace 200 mg twice daily and Dulcolax suppository with digital stimulation  -Patient had episode of diarrhea yesterday was checked for C. difficile which was negative    Potential for orthostatic hypotension  Because of significant autonomic dysfunction associated with spinal cord injury, the patient is at high risk for orthostatic hypotension.  - Midodrine 5mg q4 hours prn SBP <100  -Midodrine 5 mg tid    Dysphagia: Previous diagnosis after traumatic brain injury, high risk after cervical spinal cord injury  -Consulted speech therapy, increased difficulty secondary to lethargy, difficulty following compensatory techniques     Pain:  #Neuropathic pain:  bilateral lower extremity pins-and-needles  - Decrease gabapentin 300 mg daily,  dose limited by GFR, concern that increased dose of gabapentin was resulting in lethargy  -Start Lyrica 25 mg twice daily to address allodynia  -Vitamin C 500 mg every 12 hours, which is been shown to improve gabapentin absorption and pain management     Postoperative pain:  - Oxycodone 5-10 mg every 3 hours as needed pain  - Robaxin 750 mg every 8 hours as needed pain  -DC scheduled Tylenol secondary to pneumonia, can mask  fever     Spasticity: Given increased lethargy will decrease baclofen  -Given continued lethargy will DC baclofen    Anemia: Required IV iron supplementation, hemoglobin of 7.7 on admission  -Epogen 2000 units, Monday Wednesday Friday  -Folic acid 5 mg daily  - Ferrous sulfate 325 mg twice daily    Diabetes: Appreciate hospitalist input  -Lantus 10 units nightly  -Check fingersticks q. before meals, nightly  -Sliding scale insulin  - Pioglitazone 30 mg a daily  - ADA diet  -Consulted hospitalist      Acute on chronic kidney injury: Previously stage IV kidney disease with bilateral hydronephrosis suggestive of post renal obstruction, likely worsened by neurogenic bladder.  BUN/creatinine of 49/2.5  -Check BMP in a.m.  -Manage neurogenic bladder as above     Hyponatremia: Sodium of 136 on admission  -Sodium bicarb tablets 650 mg 3 times a day     Hyperlipidemia: Triglyceride 65, HDL 31, LDL 37  -Simvastatin 20 mg nightly     Vitamin deficiency  In the posttraumatic setting, there is a high likelihood of vitamin D deficiency.   Vitamin D level on admission of 47, goal of >30  - DC vitamin D supplementation     DVT prophylaxis/right lower extremity swelling: Patient at increased risk for VTE formation with neurologic compromise/myelopathy.  -Ultrasound right lower extremity was negative for DVT  -Heparin 5000 units every 8 hours, unable to transition to Lovenox given current renal function    Patient was seen for 37 minutes on unit/floor of which > 50% of time was spent on counseling and coordination of care regarding the above, including prognosis, risk reduction, benefits of treatment, and options for next stage of care including spasticity, DC baclofen given continued lethargy, pneumonia, check CBC in a.m., check CRP in am, check CBC today, check CXR, acute on chronic kidney disease, check BMP in a.m., continue IV fluid with a goal flushing medication and improving renal function, neuropathic pain, decrease  gabapentin to 300 mg nightly and start Lyrica 25 mg twice daily.       Ramu Garner D.O.

## 2020-03-19 NOTE — THERAPY
Physical Therapy   Daily Treatment     Patient Name: Vince Almanzar  Age:  79 y.o., Sex:  male  Medical Record #: [id number]  Today's Date: 3/19/2020     Precautions  Precautions: (P) Spinal / Back Precautions   Comments: (P) Isolation precautions, c-collar, risk for  pressure injuries     Subjective    Pt was lying in bed upon arrival; minimally responsive except for painful stimuli; unable to follow one step commands. Pt was sitting in bladder and bowel incontinent brief      Objective       03/19/20 1001   Precautions   Precautions Spinal / Back Precautions    Comments Isolation precautions, c-collar, risk for  pressure injuries    Interdisciplinary Plan of Care Collaboration   IDT Collaboration with  Nursing;Physician   Patient Position at End of Therapy In Bed;Call Light within Reach;Tray Table within Reach;Phone within Reach;Other (Comments)  (with physician )   Collaboration Comments re: CLOF, assist with hygiene   Therapy Missed   Missed Therapy (Minutes) 30   Reason For Missed Therapy Medical - Patient on Hold from Therapy  (mildly responsiveness, increased spasticity )   PT Total Time Spent   PT Individual Total Time Spent (Mins) 30   PT Charge Group   PT Therapeutic Activities 2       TotalA to roll R/L x 4 each to perform hygiene/ change brief/ diann, totalA for repositioning in bed to prevent pressure injuries; pt left sitting up to improve arousal level.   3 modified chris score for both R/L quadriceps     Assessment    Pt with increased rigidity in B LE and with decreased ability to follow one step commands. Improved arousal in upright sitting.     Plan    Monitor for safety to participate/ LE rigidity/ dizziness, 5x STS, 10MWT, endurance, standing therex, review hand hygiene, standing balance, postural re-ed

## 2020-03-19 NOTE — PROGRESS NOTES
2030 person called asking information about the patient unable to verify identity called next of kin number in chart but no answer.

## 2020-03-19 NOTE — FLOWSHEET NOTE
Conscious Sedation Respiratory Update    FiO2%: 21 % (03/14/20 1000)  O2 (LPM): 0 (03/19/20 1500)       Events/Summary/Plan: IPV (Metane[employer]) via mask done per Dr. Garner's order.  Not tolerated well. (03/19/20 1500)

## 2020-03-19 NOTE — THERAPY
Speech Language Pathology  Daily Treatment     Patient Name: Vince Almanzar  Age:  79 y.o., Sex:  male  Medical Record #: [id number]  Today's Date: 3/19/2020     Subjective    Patient was in bed at time of ST. Continues to require multiple tactile and verbal cues to maintain alertness. RN aware.     Objective       03/19/20 0802   Dysphagia    Diet / Liquid Recommendation Mildly Thick (2) - (Nectar Thick);Minced & Moist (5) - (Dysphagia II)   Nutritional Liquid Intake Rating Scale Non thickened beverages   Nutritional Food Intake Rating Scale Total oral diet with multiple consistencies but requiring special preparation or compensations   SLP Total Time Spent   SLP Individual Total Time Spent (Mins) 30   Charge Group   SLP Swallowing Dysfunction Treatment Swallowing Dysfunction Treatment         Assessment    Patient required mod-max cues to follow oral motor directives. One attempts at trial of puree, however, patient required max cues to close lips and swallow. Completed oral care and suction. Patient was not safe for PO during this session. Will attempt in PM.     Plan    PO trials as appropriate.

## 2020-03-19 NOTE — THERAPY
"Occupational Therapy  Daily Treatment     Patient Name: Vince Almanzar  Age:  79 y.o., Sex:  male  Medical Record #: [id number]  Today's Date: 3/19/2020     Precautions  Precautions: Spinal / Back Precautions , Fall Risk  Comments: Isolation precautions, c-collar, risk for  pressure injuries          Subjective    \"What's wrong with me?\"      Objective       03/19/20 0901   Precautions   Precautions Spinal / Back Precautions    Comments Isolation precautions, c-collar, risk for  pressure injuries    Cognition    Level of Consciousness Lethargic   Supine Upper Body Exercises   Front Arm Raise 1 set of 10;Left;Light Resistance Theraband  (pink theraband attached to bed rail)   Comments shoulder flexion to 90 degrees without resistance 1 set x 10 reps with cues for intiation and contination of task. elbow flex/ext- bringing arm up to chest 1 set x 10 reps    Fine Motor / Dexterity    Fine Motor / Dexterity Yes   Fine Motor / Dexterity Interventions pink sponge given to pt to increse  strength; 10 squeezes with R hand with max cues for continuation of task   Interdisciplinary Plan of Care Collaboration   Patient Position at End of Therapy In Bed;Call Light within Reach;Tray Table within Reach;Phone within Reach   OT Total Time Spent   OT Individual Total Time Spent (Mins) 30   OT Charge Group   OT Self Care / ADL 1   OT Therapeutic Exercise  1       FIM Grooming Score:  2 - Max Assistance  Grooming Description:  Increased time, Verbal cueing, Initial preparation for task(Pt required max Mcgrath assist to wash face using RUE; support at elbow to bring arm up to face. )      Assessment    Pt having increased difficulty tolerating session this morning. Pt very lethargic and needing cues throughout to increase alertness. Pt able to follow simple 1-step commands. Mcgrath assist needed to wash face while sitting up in bed. Pt able to complete strengthening exercises in bed for BUE with max cues for continuation of task, noted " fatigue during exercises by end of each set. Pt continually coughing throughout session with half of coughs being productive.     Plan    Continue overall strengthening, balance building, endurance building, ADL re-ed.

## 2020-03-19 NOTE — THERAPY
Recreational Therapy  Daily Treatment     Patient Name: Vince Almanzar  AGE:  79 y.o., SEX:  male  Medical Record #: [id number]  Today's Date: 3/19/2020       Subjective    Pt on a medical hold     Objective       03/19/20 1501   Treatment Time   Total Time Spent (mins) 0   Total Time Missed 30   Reasons for Time Missed Medical-Patient On Hold       Assessment    Remained in his room on a medical hold.     Plan    Check [facility] 3/20 for therapy.

## 2020-03-19 NOTE — PROGRESS NOTES
Hospital Medicine Daily Progress Note      Chief Complaint:  Hypertension  Diabetes  Leukocytosis    Interval History:  Pt still not very alert this morning.    Review of Systems  Review of Systems   Constitutional: Negative for chills and fever.   HENT: Negative.    Eyes: Negative.    Respiratory: Negative for cough and shortness of breath.    Cardiovascular: Negative for chest pain and palpitations.   Gastrointestinal: Negative for abdominal pain, nausea and vomiting.   Musculoskeletal: Positive for neck pain.        Wound pain   Skin: Negative for itching and rash.   Neurological: Positive for focal weakness.   Endo/Heme/Allergies: Negative for polydipsia. Does not bruise/bleed easily.        Physical Exam  Temp:  [35.7 °C (96.2 °F)-36.1 °C (97 °F)] 35.7 °C (96.2 °F)  Pulse:  [64-84] 68  Resp:  [14-18] 16  BP: (103-135)/(49-64) 135/64  SpO2:  [97 %-98 %] 97 %    Physical Exam  Vitals signs reviewed.   Constitutional:       General: He is not in acute distress.     Appearance: Normal appearance. He is not ill-appearing.   HENT:      Head: Normocephalic and atraumatic.      Right Ear: External ear normal.      Left Ear: External ear normal.      Nose: Nose normal.      Mouth/Throat:      Pharynx: Oropharynx is clear.   Eyes:      General:         Right eye: No discharge.         Left eye: No discharge.      Extraocular Movements: Extraocular movements intact.      Conjunctiva/sclera: Conjunctivae normal.   Neck:      Comments: C-collar  Cardiovascular:      Rate and Rhythm: Normal rate and regular rhythm.   Pulmonary:      Effort: No respiratory distress.      Breath sounds: No wheezing.      Comments: Decreased BS  Abdominal:      General: Bowel sounds are normal. There is no distension.      Palpations: Abdomen is soft.      Tenderness: There is no abdominal tenderness.   Musculoskeletal:      Right lower leg: Edema present.      Left lower leg: Edema present.      Comments: RLE 2+ edema, LLE 1+ edema   Skin:      General: Skin is warm and dry.   Neurological:      Mental Status: He is alert.      Comments: somnolent         Fluids    Intake/Output Summary (Last 24 hours) at 3/19/2020 1148  Last data filed at 3/19/2020 0800  Gross per 24 hour   Intake 10 ml   Output 250 ml   Net -240 ml       Laboratory  Recent Labs     03/17/20  0522 03/18/20  0551   WBC 5.1 4.1*   RBC 2.55* 2.53*   HEMOGLOBIN 7.5* 7.5*   HEMATOCRIT 24.9* 23.8*   MCV 97.6 94.1   MCH 29.4 29.6   MCHC 30.1* 31.5*   RDW 58.0* 55.2*   PLATELETCT 181 169   MPV 9.2 9.2     Recent Labs     03/17/20  0522 03/18/20  0551 03/19/20  0541   SODIUM 139 140 140   POTASSIUM 4.1 3.9 3.8   CHLORIDE 105 107 107   CO2 25 22 21   GLUCOSE 145* 182* 135*   BUN 37* 38* 34*   CREATININE 2.60* 2.45* 2.20*   CALCIUM 8.5 8.5 8.6                   Assessment/Plan  Anemia- (present on admission)  Assessment & Plan  Fe and Vit B12 low, on supplements for both    Chronic kidney disease- (present on admission)  Assessment & Plan  Has Stage IV CKD  Avoid nephrotoxins  Renal dose all meds  Outpt Nephrology F/U  Continue Sodium Bicarb    Somnolence  Assessment & Plan  Consider decreasing neuropathic pain meds    Leukocytosis  Assessment & Plan  CXR +bilat PNA  UCx +Proteus  BCx +Strep in one of two bottles, repeat BCx NGTD  Sputum Cx negative  Zyvox & Zosyn de-escalated as no MRSA or Pseudomonas recovered  Continue Rocephin    Cervical spinal stenosis  Assessment & Plan  S/P C3-C6 laminectomy and posterior decompression  Cervical collar  Wound care and pain control    Uncontrolled type 2 diabetes mellitus with hyperglycemia (HCC)- (present on admission)  Assessment & Plan  HbA1c  5.1  Hold Lantus if NPO  Continue Actos and SSI  Outpt meds include Ozempic 1.0 mg once a week, Actos 30 mg qd, and Tresiba 10 units qhs    Hyperlipemia- (present on admission)  Assessment & Plan  On Zocor    Essential hypertension- (present on admission)  Assessment & Plan  Observe blood pressure trends on  Metoprolol    Full Code    Discussed w/ Dr. Garner    ADDENDUM  CXR c/w volume overload, will start IV Lasix.  Will change Rocephin back to Zyvox and Zosyn due to concern for relapsing infection.

## 2020-03-20 ENCOUNTER — APPOINTMENT (OUTPATIENT)
Dept: RADIOLOGY | Facility: MEDICAL CENTER | Age: 80
DRG: 052 | End: 2020-03-20
Attending: PHYSICAL MEDICINE & REHABILITATION
Payer: COMMERCIAL

## 2020-03-20 ENCOUNTER — APPOINTMENT (OUTPATIENT)
Dept: RADIOLOGY | Facility: REHABILITATION | Age: 80
DRG: 052 | End: 2020-03-20
Attending: HOSPITALIST
Payer: COMMERCIAL

## 2020-03-20 ENCOUNTER — APPOINTMENT (OUTPATIENT)
Dept: RADIOLOGY | Facility: MEDICAL CENTER | Age: 80
End: 2020-03-20
Attending: PHYSICAL MEDICINE & REHABILITATION
Payer: COMMERCIAL

## 2020-03-20 ENCOUNTER — APPOINTMENT (OUTPATIENT)
Dept: RADIOLOGY | Facility: REHABILITATION | Age: 80
DRG: 052 | End: 2020-03-20
Attending: PHYSICAL MEDICINE & REHABILITATION
Payer: COMMERCIAL

## 2020-03-20 PROBLEM — E87.70 VOLUME OVERLOAD: Status: ACTIVE | Noted: 2020-03-20

## 2020-03-20 LAB
ANION GAP SERPL CALC-SCNC: 11 MMOL/L (ref 7–16)
BUN SERPL-MCNC: 31 MG/DL (ref 8–22)
CALCIUM SERPL-MCNC: 8.3 MG/DL (ref 8.5–10.5)
CHLORIDE SERPL-SCNC: 105 MMOL/L (ref 96–112)
CO2 SERPL-SCNC: 22 MMOL/L (ref 20–33)
CREAT SERPL-MCNC: 2.08 MG/DL (ref 0.5–1.4)
CRP SERPL HS-MCNC: 5.05 MG/DL (ref 0–0.75)
GLUCOSE BLD-MCNC: 123 MG/DL (ref 65–99)
GLUCOSE BLD-MCNC: 190 MG/DL (ref 65–99)
GLUCOSE BLD-MCNC: 219 MG/DL (ref 65–99)
GLUCOSE BLD-MCNC: 231 MG/DL (ref 65–99)
GLUCOSE SERPL-MCNC: 136 MG/DL (ref 65–99)
LACTATE BLD-SCNC: 1.1 MMOL/L (ref 0.5–2)
POTASSIUM SERPL-SCNC: 3.9 MMOL/L (ref 3.6–5.5)
SODIUM SERPL-SCNC: 138 MMOL/L (ref 135–145)

## 2020-03-20 PROCEDURE — 99233 SBSQ HOSP IP/OBS HIGH 50: CPT | Performed by: PHYSICAL MEDICINE & REHABILITATION

## 2020-03-20 PROCEDURE — A9270 NON-COVERED ITEM OR SERVICE: HCPCS | Performed by: PHYSICAL MEDICINE & REHABILITATION

## 2020-03-20 PROCEDURE — 94669 MECHANICAL CHEST WALL OSCILL: CPT

## 2020-03-20 PROCEDURE — 83605 ASSAY OF LACTIC ACID: CPT

## 2020-03-20 PROCEDURE — 94640 AIRWAY INHALATION TREATMENT: CPT

## 2020-03-20 PROCEDURE — 82962 GLUCOSE BLOOD TEST: CPT | Mod: 91

## 2020-03-20 PROCEDURE — 700102 HCHG RX REV CODE 250 W/ 637 OVERRIDE(OP): Performed by: PHYSICAL MEDICINE & REHABILITATION

## 2020-03-20 PROCEDURE — 97535 SELF CARE MNGMENT TRAINING: CPT

## 2020-03-20 PROCEDURE — 99233 SBSQ HOSP IP/OBS HIGH 50: CPT | Performed by: HOSPITALIST

## 2020-03-20 PROCEDURE — 700111 HCHG RX REV CODE 636 W/ 250 OVERRIDE (IP): Performed by: PHYSICAL MEDICINE & REHABILITATION

## 2020-03-20 PROCEDURE — 700105 HCHG RX REV CODE 258: Performed by: HOSPITALIST

## 2020-03-20 PROCEDURE — 71045 X-RAY EXAM CHEST 1 VIEW: CPT

## 2020-03-20 PROCEDURE — 700101 HCHG RX REV CODE 250: Performed by: PHYSICAL MEDICINE & REHABILITATION

## 2020-03-20 PROCEDURE — 97110 THERAPEUTIC EXERCISES: CPT

## 2020-03-20 PROCEDURE — 770010 HCHG ROOM/CARE - REHAB SEMI PRIVAT*

## 2020-03-20 PROCEDURE — C1751 CATH, INF, PER/CENT/MIDLINE: HCPCS

## 2020-03-20 PROCEDURE — 700111 HCHG RX REV CODE 636 W/ 250 OVERRIDE (IP): Performed by: HOSPITALIST

## 2020-03-20 PROCEDURE — 92526 ORAL FUNCTION THERAPY: CPT

## 2020-03-20 PROCEDURE — 94760 N-INVAS EAR/PLS OXIMETRY 1: CPT

## 2020-03-20 RX ADMIN — HEPARIN SODIUM 5000 UNITS: 5000 INJECTION, SOLUTION INTRAVENOUS; SUBCUTANEOUS at 18:26

## 2020-03-20 RX ADMIN — IPRATROPIUM BROMIDE AND ALBUTEROL SULFATE 3 ML: .5; 3 SOLUTION RESPIRATORY (INHALATION) at 10:30

## 2020-03-20 RX ADMIN — HEPARIN SODIUM 5000 UNITS: 5000 INJECTION, SOLUTION INTRAVENOUS; SUBCUTANEOUS at 05:22

## 2020-03-20 RX ADMIN — PIPERACILLIN AND TAZOBACTAM 2.25 G: 2; .25 INJECTION, POWDER, LYOPHILIZED, FOR SOLUTION INTRAVENOUS; PARENTERAL at 18:27

## 2020-03-20 RX ADMIN — PREGABALIN 25 MG: 25 CAPSULE ORAL at 21:14

## 2020-03-20 RX ADMIN — LINEZOLID 600 MG: 600 INJECTION, SOLUTION INTRAVENOUS at 21:23

## 2020-03-20 RX ADMIN — SIMVASTATIN 20 MG: 20 TABLET, FILM COATED ORAL at 21:17

## 2020-03-20 RX ADMIN — Medication 1 APPLICATION: at 21:20

## 2020-03-20 RX ADMIN — SODIUM BICARBONATE 650 MG TABLET 650 MG: at 18:27

## 2020-03-20 RX ADMIN — INSULIN LISPRO 4 UNITS: 100 INJECTION, SOLUTION INTRAVENOUS; SUBCUTANEOUS at 18:57

## 2020-03-20 RX ADMIN — PIPERACILLIN AND TAZOBACTAM 2.25 G: 2; .25 INJECTION, POWDER, LYOPHILIZED, FOR SOLUTION INTRAVENOUS; PARENTERAL at 02:26

## 2020-03-20 RX ADMIN — IPRATROPIUM BROMIDE AND ALBUTEROL SULFATE 3 ML: .5; 3 SOLUTION RESPIRATORY (INHALATION) at 14:44

## 2020-03-20 RX ADMIN — Medication: at 09:00

## 2020-03-20 RX ADMIN — ACETYLCYSTEINE 3 ML: 200 INHALANT RESPIRATORY (INHALATION) at 10:30

## 2020-03-20 RX ADMIN — FERROUS SULFATE TAB 325 MG (65 MG ELEMENTAL FE) 325 MG: 325 (65 FE) TAB at 18:27

## 2020-03-20 RX ADMIN — FUROSEMIDE 20 MG: 10 INJECTION, SOLUTION INTRAVENOUS at 05:17

## 2020-03-20 RX ADMIN — PIPERACILLIN AND TAZOBACTAM 2.25 G: 2; .25 INJECTION, POWDER, LYOPHILIZED, FOR SOLUTION INTRAVENOUS; PARENTERAL at 12:06

## 2020-03-20 RX ADMIN — SODIUM BICARBONATE 650 MG TABLET 650 MG: at 23:21

## 2020-03-20 RX ADMIN — LIDOCAINE 2 PATCH: 50 PATCH CUTANEOUS at 10:03

## 2020-03-20 RX ADMIN — HEPARIN SODIUM 5000 UNITS: 5000 INJECTION, SOLUTION INTRAVENOUS; SUBCUTANEOUS at 23:16

## 2020-03-20 RX ADMIN — INSULIN LISPRO 2 UNITS: 100 INJECTION, SOLUTION INTRAVENOUS; SUBCUTANEOUS at 12:19

## 2020-03-20 RX ADMIN — IPRATROPIUM BROMIDE AND ALBUTEROL SULFATE 3 ML: .5; 3 SOLUTION RESPIRATORY (INHALATION) at 19:04

## 2020-03-20 RX ADMIN — LINEZOLID 600 MG: 600 INJECTION, SOLUTION INTRAVENOUS at 09:58

## 2020-03-20 RX ADMIN — ACETYLCYSTEINE 4 ML: 200 INHALANT RESPIRATORY (INHALATION) at 14:44

## 2020-03-20 RX ADMIN — METOPROLOL TARTRATE 12.5 MG: 25 TABLET, FILM COATED ORAL at 21:16

## 2020-03-20 RX ADMIN — ACETYLCYSTEINE 3 ML: 200 INHALANT RESPIRATORY (INHALATION) at 19:05

## 2020-03-20 RX ADMIN — INSULIN LISPRO 4 UNITS: 100 INJECTION, SOLUTION INTRAVENOUS; SUBCUTANEOUS at 23:11

## 2020-03-20 RX ADMIN — GABAPENTIN 300 MG: 300 CAPSULE ORAL at 21:14

## 2020-03-20 ASSESSMENT — ENCOUNTER SYMPTOMS
CHILLS: 0
SHORTNESS OF BREATH: 0
NAUSEA: 0
ABDOMINAL PAIN: 0
NECK PAIN: 1
FEVER: 0
BRUISES/BLEEDS EASILY: 0
PALPITATIONS: 0
COUGH: 0
FOCAL WEAKNESS: 1
VOMITING: 0
POLYDIPSIA: 0
EYES NEGATIVE: 1

## 2020-03-20 ASSESSMENT — FIBROSIS 4 INDEX: FIB4 SCORE: 1.98

## 2020-03-20 NOTE — THERAPY
Physical Therapy   Daily Treatment     Patient Name: Vince Almanzar  Age:  79 y.o., Sex:  male  Medical Record #: [id number]  Today's Date: 3/20/2020     Precautions  Precautions: (P) Spinal / Back Precautions , Fall Risk  Comments: (P) Isolation precautions, c-collar, risk for  pressure injuries     Subjective    Pt was sitting in w/c upon arrival; finished with RT treatment. Agreeable to tx.     Objective       03/20/20 1000   Precautions   Precautions Spinal / Back Precautions ;Fall Risk   Comments Isolation precautions, c-collar, risk for  pressure injuries    Vitals   O2 (LPM) 2   O2 Delivery Device Nasal Cannula   Sitting Lower Body Exercises   Ankle Pumps 3 sets of 15;Bilateral   Hip Abduction 3 sets of 15;Bilateral;Medium Resistance Theraband   Hip Adduction 3 sets of 15;Bilateral   Marching 3 sets of 15;Reciprocal   Hamstring Curl 3 sets of 15;Bilateral;Medium Resistance Theraband   Other Exercises 60sec x 5 flutter kicks; STS x 3 maxA x 2 to 4ww   Interdisciplinary Plan of Care Collaboration   IDT Collaboration with  Physician;Respiratory Therapist;Nursing   Patient Position at End of Therapy Seated;Tray Table within Reach;Call Light within Reach;Phone within Reach   Collaboration Comments re: med pass, CLOF, respiratory tx   Therapy Missed   Missed Therapy (Minutes) 15   Reason For Missed Therapy Medical - Patient  in Procedure  (pt in respiratory treatment with RT)   PT Total Time Spent   PT Individual Total Time Spent (Mins) 45   PT Charge Group   PT Therapeutic Exercise 3     Obtained increased O2 line to allow movement/ walking in room as able.  Education on sit to stand with anterior/posterior scooting    Stance 3 minutes with modA and weight shift at 4ww    Assessment    Pt with significantly improved attention and ability to follow one step commands this session. Very limited in neuromuscular control/ strength with STS requiring maxA x 2 to stand to 4ww; recommending 2x SB transfer vs darryl for  transfer from w/c to bed at this time    Plan    Monitor for safety to participate/ LE rigidity/ dizziness, endurance, standing therex/weight shift, standing balance, postural re-ed, w/c mobility/ safety, transfer training, bed mobility

## 2020-03-20 NOTE — FLOWSHEET NOTE
03/20/20 1030   Inhalation Therapy Treatment   $ SVN Performed Yes   Given By: Mouthpiece   IPV/Metaneb   $ IPV/Metaneb Performed Yes  (Tolerated fair for 10 min)   Given By: Mouthseal

## 2020-03-20 NOTE — PROGRESS NOTES
"Rehab Progress Note     Encounter Date: 3/20/2020    CC: UE weakness    Interval Events (Subjective)      Episode of continued confusion, lethargy yesterday, full infectious work-up was performed including stat CBC, BMP, CRP, blood cultures, chest x-ray, BNP.  Received Lasix to address fluid/volume overload seen on chest x-ray.  He was restarted on Zyvox and Zosyn.     Significant improvement in cognitive status, verbal responses, orientation.    He is complaining of increased pain and bilateral lower extremities    He is working well with breathing treatment, good production sputum.    Following commands as per nursing and staff, nursing staff is still concerned about ability to swallow.      ROS:  Gen: + fatigue, mild confusion, significant weight loss  Eyes: no blurry vision, double vision or pain in eyes  ENT: no changes in hearing, runny nose, nose bleeds, sinus pain  CV: No CP, palpitations, symptoms of AUTONOMIC DYSREFLEXIA  Lungs: no shortness of breath, changes in secretions, changes in cough, pain with coughing  Abd: No abd pain, nausea, vomiting, pain with eating  : no blood in urine,  suprapubic pain  Ext: No swelling in legs, asymmetric atrophy  Neuro: no changes in strength or sensation  Skin: no new ulcers/skin breakdown appreciated by patient  Mood: No changes in mood today, increase in depression or anxiety  Heme: no bruising, or bleeding  Rest of 14 point review of systems is negative    Objective:  Vitals: /72   Pulse 98   Temp 36.4 °C (97.6 °F) (Temporal)   Resp 20   Ht 1.778 m (5' 10\")   Wt 75.5 kg (166 lb 7.2 oz)   SpO2 100%   Gen: NAD, Body mass index is 23.88 kg/m².  Up in chair, participating with respiratory therapy  HEENT: NC/AT, PERRLA, moist mucous membranes  Cardio: RRR, no mumurs  Pulm: CTAB, with normal respiratory effort  Abd: Soft NTND, active bowel sounds,   Ext: No peripheral edema. No calf tenderness. No clubbing/cyanosis. +dorsal pedalis pulses " bilaterally.    Tone: Significant improvement from yesterday, line-1+ in bilateral lower extremities, pain with extension of right knee motion passively on the right knee      Recent Results (from the past 72 hour(s))   ACCU-CHEK GLUCOSE    Collection Time: 03/17/20 11:44 AM   Result Value Ref Range    Glucose - Accu-Ck 175 (H) 65 - 99 mg/dL   BLOOD CULTURE    Collection Time: 03/17/20  5:15 PM   Result Value Ref Range    Significant Indicator NEG     Source BLD     Site PERIPHERAL     Culture Result       No Growth  Note: Blood cultures are incubated for 5 days and  are monitored continuously.Positive blood cultures  are called to the RN and reported as soon as  they are identified.     BLOOD CULTURE    Collection Time: 03/17/20  5:15 PM   Result Value Ref Range    Significant Indicator NEG     Source BLD     Site PERIPHERAL     Culture Result       No Growth  Note: Blood cultures are incubated for 5 days and  are monitored continuously.Positive blood cultures  are called to the RN and reported as soon as  they are identified.     ACCU-CHEK GLUCOSE    Collection Time: 03/17/20  5:44 PM   Result Value Ref Range    Glucose - Accu-Ck 217 (H) 65 - 99 mg/dL   ACCU-CHEK GLUCOSE    Collection Time: 03/17/20  9:21 PM   Result Value Ref Range    Glucose - Accu-Ck 218 (H) 65 - 99 mg/dL   Basic Metabolic Panel    Collection Time: 03/18/20  5:51 AM   Result Value Ref Range    Sodium 140 135 - 145 mmol/L    Potassium 3.9 3.6 - 5.5 mmol/L    Chloride 107 96 - 112 mmol/L    Co2 22 20 - 33 mmol/L    Glucose 182 (H) 65 - 99 mg/dL    Bun 38 (H) 8 - 22 mg/dL    Creatinine 2.45 (H) 0.50 - 1.40 mg/dL    Calcium 8.5 8.5 - 10.5 mg/dL    Anion Gap 11.0 7.0 - 16.0   CBC WITH DIFFERENTIAL    Collection Time: 03/18/20  5:51 AM   Result Value Ref Range    WBC 4.1 (L) 4.8 - 10.8 K/uL    RBC 2.53 (L) 4.70 - 6.10 M/uL    Hemoglobin 7.5 (L) 14.0 - 18.0 g/dL    Hematocrit 23.8 (L) 42.0 - 52.0 %    MCV 94.1 81.4 - 97.8 fL    MCH 29.6 27.0 - 33.0 pg     MCHC 31.5 (L) 33.7 - 35.3 g/dL    RDW 55.2 (H) 35.9 - 50.0 fL    Platelet Count 169 164 - 446 K/uL    MPV 9.2 9.0 - 12.9 fL    Neutrophils-Polys 76.90 (H) 44.00 - 72.00 %    Lymphocytes 10.60 (L) 22.00 - 41.00 %    Monocytes 10.60 0.00 - 13.40 %    Eosinophils 1.20 0.00 - 6.90 %    Basophils 0.00 0.00 - 1.80 %    Immature Granulocytes 0.70 0.00 - 0.90 %    Nucleated RBC 0.00 /100 WBC    Neutrophils (Absolute) 3.12 1.82 - 7.42 K/uL    Lymphs (Absolute) 0.43 (L) 1.00 - 4.80 K/uL    Monos (Absolute) 0.43 0.00 - 0.85 K/uL    Eos (Absolute) 0.05 0.00 - 0.51 K/uL    Baso (Absolute) 0.00 0.00 - 0.12 K/uL    Immature Granulocytes (abs) 0.03 0.00 - 0.11 K/uL    NRBC (Absolute) 0.00 K/uL   ESTIMATED GFR    Collection Time: 03/18/20  5:51 AM   Result Value Ref Range    GFR If  31 (A) >60 mL/min/1.73 m 2    GFR If Non African American 26 (A) >60 mL/min/1.73 m 2   ACCU-CHEK GLUCOSE    Collection Time: 03/18/20  7:52 AM   Result Value Ref Range    Glucose - Accu-Ck 156 (H) 65 - 99 mg/dL   ACCU-CHEK GLUCOSE    Collection Time: 03/18/20 11:10 AM   Result Value Ref Range    Glucose - Accu-Ck 192 (H) 65 - 99 mg/dL   C Diff by PCR rflx Toxin    Collection Time: 03/18/20  5:00 PM   Result Value Ref Range    C Diff by PCR Negative Negative    [id number] Presumptive Negative Negative   ACCU-CHEK GLUCOSE    Collection Time: 03/18/20  5:31 PM   Result Value Ref Range    Glucose - Accu-Ck 153 (H) 65 - 99 mg/dL   ACCU-CHEK GLUCOSE    Collection Time: 03/18/20  9:48 PM   Result Value Ref Range    Glucose - Accu-Ck 130 (H) 65 - 99 mg/dL   Basic Metabolic Panel    Collection Time: 03/19/20  5:41 AM   Result Value Ref Range    Sodium 140 135 - 145 mmol/L    Potassium 3.8 3.6 - 5.5 mmol/L    Chloride 107 96 - 112 mmol/L    Co2 21 20 - 33 mmol/L    Glucose 135 (H) 65 - 99 mg/dL    Bun 34 (H) 8 - 22 mg/dL    Creatinine 2.20 (H) 0.50 - 1.40 mg/dL    Calcium 8.6 8.5 - 10.5 mg/dL    Anion Gap 12.0 7.0 - 16.0   ESTIMATED GFR     Collection Time: 03/19/20  5:41 AM   Result Value Ref Range    GFR If African American 35 (A) >60 mL/min/1.73 m 2    GFR If Non  29 (A) >60 mL/min/1.73 m 2   ACCU-CHEK GLUCOSE    Collection Time: 03/19/20  8:10 AM   Result Value Ref Range    Glucose - Accu-Ck 117 (H) 65 - 99 mg/dL   ACCU-CHEK GLUCOSE    Collection Time: 03/19/20 11:56 AM   Result Value Ref Range    Glucose - Accu-Ck 120 (H) 65 - 99 mg/dL   BLOOD CULTURE    Collection Time: 03/19/20  2:20 PM   Result Value Ref Range    Significant Indicator NEG     Source BLD     Site PERIPHERAL     Culture Result       No Growth  Note: Blood cultures are incubated for 5 days and  are monitored continuously.Positive blood cultures  are called to the RN and reported as soon as  they are identified.     BLOOD CULTURE    Collection Time: 03/19/20  2:30 PM   Result Value Ref Range    Significant Indicator NEG     Source BLD     Site PERIPHERAL     Culture Result       No Growth  Note: Blood cultures are incubated for 5 days and  are monitored continuously.Positive blood cultures  are called to the RN and reported as soon as  they are identified.     LACTIC ACID    Collection Time: 03/19/20  2:50 PM   Result Value Ref Range    Lactic Acid 1.4 0.5 - 2.0 mmol/L   proBrain Natriuretic Peptide, NT    Collection Time: 03/19/20  2:50 PM   Result Value Ref Range    NT-proBNP 8993 (H) 0 - 125 pg/mL   ACCU-CHEK GLUCOSE    Collection Time: 03/19/20  5:16 PM   Result Value Ref Range    Glucose - Accu-Ck 158 (H) 65 - 99 mg/dL   LACTIC ACID    Collection Time: 03/19/20  7:45 PM   Result Value Ref Range    Lactic Acid 1.3 0.5 - 2.0 mmol/L   CBC WITH DIFFERENTIAL    Collection Time: 03/19/20  7:45 PM   Result Value Ref Range    WBC 5.4 4.8 - 10.8 K/uL    RBC 2.73 (L) 4.70 - 6.10 M/uL    Hemoglobin 7.9 (L) 14.0 - 18.0 g/dL    Hematocrit 25.6 (L) 42.0 - 52.0 %    MCV 93.8 81.4 - 97.8 fL    MCH 28.9 27.0 - 33.0 pg    MCHC 30.9 (L) 33.7 - 35.3 g/dL    RDW 55.2 (H) 35.9 -  50.0 fL    Platelet Count 173 164 - 446 K/uL    MPV 8.8 (L) 9.0 - 12.9 fL    Neutrophils-Polys 82.90 (H) 44.00 - 72.00 %    Lymphocytes 7.20 (L) 22.00 - 41.00 %    Monocytes 8.40 0.00 - 13.40 %    Eosinophils 0.20 0.00 - 6.90 %    Basophils 0.20 0.00 - 1.80 %    Immature Granulocytes 1.10 (H) 0.00 - 0.90 %    Nucleated RBC 0.00 /100 WBC    Neutrophils (Absolute) 4.46 1.82 - 7.42 K/uL    Lymphs (Absolute) 0.39 (L) 1.00 - 4.80 K/uL    Monos (Absolute) 0.45 0.00 - 0.85 K/uL    Eos (Absolute) 0.01 0.00 - 0.51 K/uL    Baso (Absolute) 0.01 0.00 - 0.12 K/uL    Immature Granulocytes (abs) 0.06 0.00 - 0.11 K/uL    NRBC (Absolute) 0.00 K/uL   Basic Metabolic Panel    Collection Time: 03/19/20  7:45 PM   Result Value Ref Range    Sodium 138 135 - 145 mmol/L    Potassium 3.9 3.6 - 5.5 mmol/L    Chloride 105 96 - 112 mmol/L    Co2 22 20 - 33 mmol/L    Glucose 136 (H) 65 - 99 mg/dL    Bun 31 (H) 8 - 22 mg/dL    Creatinine 2.08 (H) 0.50 - 1.40 mg/dL    Calcium 8.3 (L) 8.5 - 10.5 mg/dL    Anion Gap 11.0 7.0 - 16.0   CRP QUANTITIVE (NON-CARDIAC)    Collection Time: 03/19/20  7:45 PM   Result Value Ref Range    Stat C-Reactive Protein 5.05 (H) 0.00 - 0.75 mg/dL   ESTIMATED GFR    Collection Time: 03/19/20  7:45 PM   Result Value Ref Range    GFR If  38 (A) >60 mL/min/1.73 m 2    GFR If Non  31 (A) >60 mL/min/1.73 m 2   ACCU-CHEK GLUCOSE    Collection Time: 03/19/20  9:51 PM   Result Value Ref Range    Glucose - Accu-Ck 132 (H) 65 - 99 mg/dL   LACTIC ACID    Collection Time: 03/20/20 12:05 AM   Result Value Ref Range    Lactic Acid 1.1 0.5 - 2.0 mmol/L   ACCU-CHEK GLUCOSE    Collection Time: 03/20/20  8:29 AM   Result Value Ref Range    Glucose - Accu-Ck 123 (H) 65 - 99 mg/dL       Current Facility-Administered Medications   Medication Frequency   • acetylcysteine (MUCOMYST) 20 % solution 3 mL TID (RT)   • pregabalin (LYRICA) capsule 25 mg BID   • gabapentin (NEURONTIN) capsule 300 mg Q EVENING   •  furosemide (LASIX) injection 20 mg Q DAY   • potassium chloride SA (Kdur) tablet 10 mEq DAILY   • Linezolid (ZYVOX) premix 600 mg Q12HRS   • piperacillin-tazobactam (ZOSYN) 2.25 g in  mL IVPB Q8HR   • Pharmacy Consult Request PHARMACY TO DOSE   • bisacodyl (THE MAGIC BULLET) suppository 10 mg QDAY PRN    And   • docusate sodium (COLACE) capsule 200 mg BID PRN    And   • sennosides (SENOKOT) 8.6 MG tablet 17.2 mg QDAY PRN    And   • magnesium hydroxide (MILK OF MAGNESIA) suspension 30 mL QDAY PRN   • insulin lispro (HUMALOG) injection 2-12 Units 4X/DAY ACHS    And   • glucose 4 g chewable tablet 16 g Q15 MIN PRN    And   • dextrose 50% (D50W) injection 50 mL Q15 MIN PRN   • ipratropium-albuterol (DUONEB) nebulizer solution TID   • lidocaine 2 % jelly PRN    And   • nitroglycerin (NITRO-BID) 2 % ointment 1 Inch PRN   • Pharmacy Consult Request ...Pain Management Review 1 Each PHARMACY TO DOSE   • Respiratory Therapy Consult Continuous RT   • oxyCODONE immediate-release (ROXICODONE) tablet 5 mg Q3HRS PRN   • oxyCODONE immediate release (ROXICODONE) tablet 10 mg Q3HRS PRN   • acetaminophen (TYLENOL) tablet 650 mg Q4HRS PRN   • therapeutic multivitamin-minerals (THERAGRAN-M) tablet 1 Tab DAILY WITH LUNCH   • artificial tears ophthalmic solution 1 Drop PRN   • benzocaine-menthol (CEPACOL) lozenge 1 Lozenge Q2HRS PRN   • mag hydrox-al hydrox-simeth (MAALOX PLUS ES or MYLANTA DS) suspension 20 mL Q2HRS PRN   • ondansetron (ZOFRAN ODT) dispertab 4 mg 4X/DAY PRN    Or   • ondansetron (ZOFRAN) syringe/vial injection 4 mg 4X/DAY PRN   • sodium chloride (OCEAN) 0.65 % nasal spray 2 Spray PRN   • traZODone (DESYREL) tablet 50 mg QHS PRN   • ammonium lactate (LAC-HYDRIN) 12 % lotion BID   • methocarbamol (ROBAXIN) tablet 750 mg Q8HRS PRN   • cyanocobalamin (VITAMIN B12) tablet 1,000 mcg DAILY   • ferrous sulfate tablet 325 mg BID WITH MEALS   • lidocaine (LIDODERM) 5 % 2 Patch Q24HR   • metoprolol (LOPRESSOR) tablet 12.5 mg  BID   • pioglitazone (ACTOS) tablet 30 mg DAILY   • simethicone (MYLICON) chewable tab 80 mg TID PRN   • simvastatin (ZOCOR) tablet 20 mg Nightly   • sodium bicarbonate tablet 650 mg TID   • heparin injection 5,000 Units Q8HRS   • midodrine (PROAMATINE) tablet 5 mg Q4HRS PRN       Orders Placed This Encounter   Procedures   • Diet NPO     Standing Status:   Standing     Number of Occurrences:   1     Order Specific Question:   Restrict to:     Answer:   Strict [1]       Assessment:  Active Hospital Problems    Diagnosis   • Acute on chronic renal failure (HCC)   • Central cord syndrome (HCC)   • Type 2 diabetes mellitus (HCC)   • Anemia   • Thrombocytopenia (HCC)   • 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 Sellers on 2/28.  Pinprick altered bilaterally at C3-C6  - Collar on at all times, spinal precautions  - Calcium carbonate 500 mg twice daily  -Place patient on bedrest secondary to right lower extremity swelling, concern for DVT  -Continue comprehensive acute inpatient rehabilitation physical, occupational and speech therapy    Neurologic respiratory impairment  -Consult respiratory therapy  - Oxygen as per guidelines  - DuoNeb 3X daily   -Mucomyst 3 times a day for mucolytic X 3 days then transition to hypertonic saline.     Pneumonia: Bilateral lower lobe, likely bacterial, productive cough  - Zyvox and Zosyn started on 3/14, transitioned to ceftriaxone on 3/18, restarted on Zyvox and Zosyn on 3/19  -Appreciate hospitalist input  -Aggressive secretion  management as above  - Given productive cough will place on droplet precaution until completion of antibiotics  -Check CBC in a.m.    Fluid overload: Pleural effusions bilaterally  -Check chest x-ray in a.m.  -Continue with Lasix    Neurogenic bladder: Replaced Wallis secondary to administration of Lasix and fluid overload.  This will also help with strict I's and O's  -Wallis in place     Neurogenic bowel  -Upper motor neuron neurogenic bowel program with senna 2 tablets q. noon, Colace 200 mg twice daily and Dulcolax suppository with digital stimulation  -Patient had episode of diarrhea yesterday was checked for C. difficile which was negative    Potential for orthostatic hypotension  Because of significant autonomic dysfunction associated with spinal cord injury, the patient is at high risk for orthostatic hypotension.  - Midodrine 5mg q4 hours prn SBP <100  -Midodrine 5 mg tid    Dysphagia: Previous diagnosis after traumatic brain injury, high risk after cervical spinal cord injury  -Consulted speech therapy, increased difficulty secondary to lethargy, difficulty following compensatory techniques  -Patient was placed on n.p.o. yesterday evening, continue n.p.o. until patient is able to safely tolerate swallowing, if unable to advance to swallowing today will place NG tube    Pain:  #Neuropathic pain:  bilateral lower extremity pins-and-needles  - Decrease gabapentin 300 mg daily,  dose limited by GFR, concern that increased dose of gabapentin was resulting in lethargy  -Lyrica 25 mg twice daily to address allodynia, currently being held  -Vitamin C 500 mg every 12 hours, which is been shown to improve gabapentin absorption and pain management     Postoperative pain:  - Oxycodone 5-10 mg every 3 hours as needed pain  - Robaxin 750 mg every 8 hours as needed pain  -DC scheduled Tylenol secondary to pneumonia, can mask fever     Spasticity: Given increased lethargy will decrease baclofen  - DC baclofen, secondary to  fatigue    Anemia: Required IV iron supplementation, hemoglobin of 7.7 on admission  -Epogen 2000 units, Monday Wednesday Friday  -Folic acid 5 mg daily  - Ferrous sulfate 325 mg twice daily    Diabetes: Appreciate hospitalist input  -Lantus 10 units nightly  -Check fingersticks q. before meals, nightly  -Sliding scale insulin  - Pioglitazone 30 mg a daily  - ADA diet  -Consulted hospitalist      Acute on chronic kidney injury: Previously stage IV kidney disease with bilateral hydronephrosis suggestive of post renal obstruction, likely worsened by neurogenic bladder.  BUN/creatinine of 49/2.5  -Check BMP in a.m.  -Manage neurogenic bladder as above     Hyponatremia: Sodium of 136 on admission  -Sodium bicarb tablets 650 mg 3 times a day     Hyperlipidemia: Triglyceride 65, HDL 31, LDL 37  -Simvastatin 20 mg nightly     Vitamin deficiency  In the posttraumatic setting, there is a high likelihood of vitamin D deficiency.   Vitamin D level on admission of 47, goal of >30  - DC vitamin D supplementation     DVT prophylaxis/right lower extremity swelling: Patient at increased risk for VTE formation with neurologic compromise/myelopathy.  -Ultrasound right lower extremity was negative for DVT  -Heparin 5000 units every 8 hours, unable to transition to Lovenox given current renal function    Patient was seen for 37 minutes on unit/floor of which > 50% of time was spent on counseling and coordination of care regarding the above, including prognosis, risk reduction, benefits of treatment, and options for next stage of care including pleural effusions, check chest x-ray, Lasix, pneumonia, check CBC, Place midline, dysphasia, placed on n.p.o., if continues to have difficulty arrival place NG tube.      Ramu Garner D.O.

## 2020-03-20 NOTE — CARE PLAN
Problem: Communication  Goal: The ability to communicate needs accurately and effectively will improve  3/20/2020 0209 by Van Oconnor R.N.  Outcome: PROGRESSING AS EXPECTED  3/20/2020 0203 by Van Oconnor R.N.  Outcome: PROGRESSING AS EXPECTED  Intervention: Reorient patient to environment as needed  Note: Pt A&O to self, place. Able to make needs known.     Problem: Safety  Goal: Will remain free from injury  Outcome: PROGRESSING AS EXPECTED     Problem: Infection  Goal: Will remain free from infection  Outcome: PROGRESSING AS EXPECTED  Intervention: Assess signs and symptoms of infection  Note: IV ABT continues. VS stable, afebrile. Droplet precautions in place r/t pneumonia.     Problem: Bowel/Gastric:  Goal: Normal bowel function is maintained or improved  Outcome: PROGRESSING AS EXPECTED  Intervention: Educate patient and significant other/support system about diet, fluid intake, medications and activity to promote bowel function  Note: Incontinent bm x 2 so far this shift. Pat-care provided, brief changed.     Problem: Urinary Elimination:  Goal: Ability to reestablish a normal urinary elimination pattern will improve  Outcome: PROGRESSING AS EXPECTED     Problem: Skin Integrity  Goal: Risk for impaired skin integrity will decrease  Outcome: PROGRESSING AS EXPECTED     Problem: Respiratory:  Goal: Respiratory status will improve  Outcome: PROGRESSING AS EXPECTED  Intervention: Administer and titrate oxygen therapy  Note: O2 via nc in place. Breathing txt administered, tolerated well. Per report pt was not tolerating PO meds/diet during day shift, made NPO by MD prior shift.

## 2020-03-20 NOTE — THERAPY
Speech Language Pathology  Daily Treatment     Patient Name: Vince Almanzar  Age:  79 y.o., Sex:  male  Medical Record #: [id number]  Today's Date: 3/20/2020     Subjective    Patient awake and cooperative.  In bed.  Requested CNA assist patient to chair and toilet 10 min prior to session.  Nurse and charge nurse present.    However, when returned at scheduled therapy time 8:00 patient was still in bed, RT entering room. Requested again, for patient assistance to his chair, toileting which delayed treatment 15 min.     Objective       03/20/20 0801   Dysphagia    Other Treatments Therapeutic trials of 5ml amounts of mildly thick and puree.   Diet / Liquid Recommendation NPO   Nutritional Liquid Intake Rating Scale Nothing by mouth   Nutritional Food Intake Rating Scale Nothing by mouth   Therapy Missed   Missed Therapy (Minutes) 15   Reason For Missed Therapy Non-Medical - Other (Please Comment)  (not ready for therapy.)   SLP Total Time Spent   SLP Individual Total Time Spent (Mins) 15   Charge Group   SLP Swallowing Dysfunction Treatment Swallowing Dysfunction Treatment         Assessment    Patient assisted with oral care.  He demonstrated coarse coughing prior to oral trials given. Patient provided with oral stim with lemon glycerin swabs prior to oral trials.  To oral stim patient displayed incomplete range or hyo-laryngeal excursion and difficulty with oral pharyngeal coordination of transport of saliva bolus. Patient trialed with 2 (5ml) teaspoons of apple sauce, and 3 ( 5 ml) teaspoons of mildly thick.  Again demonstrating reduced laryngeal excursion and discoordination of oral to pharyngeal transport.  Patient displayed coughing post swallow, and was prompted to cough as a protective measure.    Plan    Continue NPO, Continue therapeutic trials with SLP only.

## 2020-03-20 NOTE — PROGRESS NOTES
Patient has chronic kidney disease with creatinines ranging from 2.08-2.91, no significant worsening has occurred over the past 7 days.  We do not for see patient requiring dialysis.  At this time I would recommend continuing with midline placement.     Ramu Garner D.O.

## 2020-03-20 NOTE — PROCEDURES
Vascular Access Team    Date of Insertion: 3/20/20  Arm Circumference: n/a  Line Length: 10  Line Size: 18  Vein Occupancy %: Unable to assess, US doesn't have that capability  Reason for Midline: Antibiotic therapy  Labs: WBC 5.4, , BUN 31, Cr 2.08, GFR 31, INR N/A    Orders confirmed, vessel patency confirmed with ultrasound. Risks and benefits of procedure explained to patient and education regarding line associated bloodstream infections provided. Questions answered.     PowerGlide Midline placed in LUE per licensed provider order with ultrasound guidance. 18g, 10 cm line placed in Bacilic vein after 1 attempt(s).  Catheter inserted with brisk blood return. Secured with 0cm external from insertion site.  Line flushed without resistance with 10 mL 0.9% normal saline.  Midline secured with Biopatch and Tegaderm.     Midline placement is confirmed by nurse using ultrasound and ability to flush and draw blood. Midline is appropriate for use at this time.  No X-ray is needed for placement confirmation. Pt tolerated procedure well.  Patient condition relayed to unit RN or ordering physician via this post procedure note in the EMR.    Ultrasound images uploaded to PACS and viewable in the EMR - no  Ultrasound imaged printed and placed in paper chart - no      BARD PowerGlide Midline ref # [id number], Lot # [id number], Expiration Date 12/31/20

## 2020-03-20 NOTE — FLOWSHEET NOTE
03/20/20 1030   Events/Summary/Plan   Events/Summary/Plan SVN, IPV   Vital Signs   Pulse 96   Respiration 18   Pulse Oximetry 100 %   $ Pulse Oximetry (Spot Check) Yes   Respiratory Assessment   Level of Consciousness Alert   Secretions   Cough Moist;Productive   How Sputum Obtained Spontaneous   Sputum Amount Moderate   Sputum Color Clear   Sputum Consistency Thick   Oxygen   O2 (LPM) 2   O2 Delivery Device Nasal Cannula

## 2020-03-20 NOTE — THERAPY
"Recreational Therapy  Daily Treatment     Patient Name: Vince Almanzar  AGE:  79 y.o., SEX:  male  Medical Record #: [id number]  Today's Date: 3/20/2020       Subjective    \"I'm feeling better today.\"     Objective       03/20/20 0931   Functional Ability Status - Cognitive   Attention Span Remains on Task   Comprehension Follows Three Step Commands   Judgment Able to Make Independent Decisions   Functional Ability Status - Emotional    Affect Appropriate   Mood Appropriate   Behavior Appropriate   Skilled Intervention    Skilled Intervention Cognitive Leisure;Relaxation / Coping Skills   Skilled Intervention Comments Cognitive leisure card game   Interdisciplinary Plan of Care Collaboration   Patient Position at End of Therapy Seated;Call Light within Reach;Tray Table within Reach   Treatment Time   Total Time Spent (mins) 30   Procedural Tracking   Procedural Tracking Cognitive Skills Training       Assessment    Pt was able to remember the rules of the game from a prior days session. Pt remained on task without issue.     Plan    Continue positive leisure activities.   "

## 2020-03-20 NOTE — FLOWSHEET NOTE
03/20/20 1444   Therapy Not Performed   Type of Therapy Not Performed IPV   Reason Therapy Not Performed   (Pt needing cortrack placement ASAP for pain meds)

## 2020-03-20 NOTE — REHAB-PHARMACY IDT TEAM NOTE
Pharmacy   Pharmacy  Antibiotics/Antifungals/Antivirals:  Medication:      Active Orders (From admission, onward)    Ordered     Ordering Provider       Thu Mar 19, 2020  5:30 PM    03/19/20 1730  piperacillin-tazobactam (ZOSYN) 2.25 g in  mL IVPB  EVERY 8 HOURS      Diana Dykes M.D.       Thu Mar 19, 2020 12:33 PM    03/19/20 1233  Linezolid (ZYVOX) premix 600 mg  EVERY 12 HOURS     Question:  Indication and durations for linezolid  Answer:  Empiric, MRSA or VRE (duration 3 days)    Diana Dykes M.D.        Route:         IV, PO  Stop Date:  Both 3/24  Reason: Leukocytosis  Antihypertensives/Cardiac:  Medication:    Orders (72h ago, onward)     Start     Ordered    03/19/20 1245  furosemide (LASIX) injection 20 mg  Q DAY      03/19/20 1218    03/12/20 2100  metoprolol (LOPRESSOR) tablet 12.5 mg  2 TIMES DAILY      03/12/20 1145    03/12/20 2100  simvastatin (ZOCOR) tablet 20 mg  NIGHTLY      03/12/20 1145    03/12/20 1227  midodrine (PROAMATINE) tablet 5 mg  EVERY 4 HOURS PRN      03/12/20 1228    03/12/20 1146  nitroglycerin (NITRO-BID) 2 % ointment 1 Inch  (Autonomic Dysreflexia Orders)  PRN      03/12/20 1146              Patient Vitals for the past 24 hrs:   BP Pulse   03/20/20 0713 144/72 98   03/20/20 0516 129/59 67   03/19/20 2200 -- 83   03/19/20 1900 137/68 88   03/19/20 1500 134/61 86   03/19/20 1440 -- 70     Anticoagulation:  Medication: Heparin                                  Other key medications: A review of the medication list reveals no issues at this time. Patient is currently on antihypertensive(s). Recommend home blood pressure monitoring/recording if antihypertensive(s) regimen(s) continue. Patient is currently on diabetic medication(s) and/or Insulin(s). Recommend home blood glucose monitoring/recording if these regimen(s) continue.    Section completed by: Asael Kilgore [facility]

## 2020-03-20 NOTE — THERAPY
Speech Language Pathology  Daily Treatment     Patient Name: Vince Almanzar  Age:  79 y.o., Sex:  male  Medical Record #: [id number]  Today's Date: 3/20/2020     Subjective    Patient on droplet precautions.  Patient seen in wheel chair, awake confused, hearing loss a barrier to adequate comprehension given care givers are masked.     Objective       03/20/20 1331   Dysphagia    Other Treatments Therapeutic trials of puree, mildly thick via teaspoon and controled sip sizes, and small single ice chips.   Diet / Liquid Recommendation NPO   Nutritional Liquid Intake Rating Scale Thickened beverages (mildly thick unless otherwise specified)  (trials)   Nutritional Food Intake Rating Scale Total oral diet of a single consistency  (trials.)   SLP Total Time Spent   SLP Individual Total Time Spent (Mins) 45   Charge Group   SLP Swallowing Dysfunction Treatment Swallowing Dysfunction Treatment       FIM Eating Score:  5 - Standby Prompting/Supervision or Set-up  Eating Description:       FIM Comprehension Score:  4 - Minimal Assistance  Comprehension Description:  Verbal cues, Glasses(hearing loss, no hearing aids.)    FIM Expression Score:  5 - Stand-by Prompting/Supervision or Set-up  Expression Description:  Verbal cueing    FIM Social Interaction Score:  5 - Stand-by Prompting/Supervision or Set-up  Social Interaction Description:  Increased time, Verbal cues, Schedule    FIM Problem Solving Score:  3 - Moderate Assistance  Problem Solving Description:  Verbal cueing, Increased time, Seat belt, Bed/chair alarm, Therapy schedule    FIM Memory Score:  2 - Max Assistance  Memory Description:  Verbal cueing, Therapy schedule, Seat belt      Assessment    Patient seen with therapeutic trials of oral stim with oral swab and lollipop, single small ice chip trials x 3, and therapeutic trials of  4-puree ( 5 ml) and 2-mildly thick( 5ml and small cup sips).  Patient initially displayed incomplete hyo-laryngeal excursion for  oral stim with swab and lollipop, suggesting incomplete swallows and c-p opening.  Patient trialed with puree and nectar thick liquids via 5ml spoon.  Patient swallows were characterized by impaired ability to coordinate A-P transport, delayed initiation of swallow, oral residue post swallow which he was unable to clear as he was unable to generate a second swallow to direction or stim.   Patient was given 10 trials of 5 ml puree, 3 of which full hyolaryngeal excursion was not palpated.  Patient given 10 trials of 5 ml mildly thick, 2 of which full hyolaryngeal was not palpated.  Patient given therapist controled small cup sips of nextar thick x 4 for which he coughed an choked on one.  Patient appears to demonstrate improved range of hyo laryngeal excursion with larger boluses ( which may suggest that he has lower sensory awarenss of small boluses).  However, he appears to have poor ability to control and contain larger boluses and suspect signficant premature spillage before the swallow.  His reactive cough is weak.  During trials patient coughed infrequently, but suspected some delayed coughing on residue after the primary swallow.  He required total ( skilled)assist and supervision to attend to the bolus he had taken into mouth, to take the small amount of bolus material trialed, with some degree of airway protection, and this did not appear consistent.  Patient's NGT is not currently in place.  Recommend that he continues to need alternative means of nutrition to maintain adequate hydration and nutrition, and facilitate improved airway protection.    Plan    Recommend NPO except therapeutic trials of puree, mildly thick with SLP only.  Target safe swallow function for least restrictive diet.

## 2020-03-20 NOTE — FLOWSHEET NOTE
03/20/20 1444   Events/Summary/Plan   Events/Summary/Plan SVN, SpO2 check   Vital Signs   Pulse 98   Respiration 18   Pulse Oximetry 100 %   $ Pulse Oximetry (Spot Check) Yes   Respiratory Assessment   Level of Consciousness Alert   Secretions   Cough Moist;Non Productive   Oxygen   O2 (LPM) 2  (Place pt on room air)   O2 Delivery Device Nasal Cannula

## 2020-03-20 NOTE — PROGRESS NOTES
Received patient during shift change, report rec'd from day shift RN. Resting in bed, VS stable w/02 via nc. Per report incontinent of B&B, not tolerating transfers. A&O x 2-3, able to make needs known. Bed in low position, call light within reach.

## 2020-03-20 NOTE — CARE PLAN
Problem: Infection  Goal: Will remain free from infection  Outcome: PROGRESSING AS EXPECTED  Patient with pneumonia, remains on IV-ABT, will continue to monitor.     Problem: Skin Integrity  Goal: Risk for impaired skin integrity will decrease  Outcome: NOT MET   Patient incontinent of B&B, kept clean & dry to prevent further skin breakdown, will continue to monitor.

## 2020-03-20 NOTE — PROGRESS NOTES
Hospital Medicine Daily Progress Note      Chief Complaint:  Hypertension  Diabetes  Leukocytosis    Interval History:  Remains afebrile w/o change in normalized WBC.  Doing better today w/ diuresis.    Review of Systems  Review of Systems   Constitutional: Negative for chills and fever.   HENT: Negative.    Eyes: Negative.    Respiratory: Negative for cough and shortness of breath.    Cardiovascular: Negative for chest pain and palpitations.   Gastrointestinal: Negative for abdominal pain, nausea and vomiting.   Musculoskeletal: Positive for neck pain.        Wound pain   Skin: Negative for itching and rash.   Neurological: Positive for focal weakness.   Endo/Heme/Allergies: Negative for polydipsia. Does not bruise/bleed easily.        Physical Exam  Temp:  [36.4 °C (97.6 °F)-36.7 °C (98.1 °F)] 36.4 °C (97.6 °F)  Pulse:  [67-98] 98  Resp:  [16-20] 20  BP: (129-144)/(59-72) 144/72  SpO2:  [97 %-100 %] 100 %    Physical Exam  Vitals signs reviewed.   Constitutional:       General: He is not in acute distress.     Appearance: Normal appearance. He is not ill-appearing.   HENT:      Head: Normocephalic and atraumatic.      Right Ear: External ear normal.      Left Ear: External ear normal.      Nose: Nose normal.      Mouth/Throat:      Pharynx: Oropharynx is clear.   Eyes:      General:         Right eye: No discharge.         Left eye: No discharge.      Extraocular Movements: Extraocular movements intact.      Conjunctiva/sclera: Conjunctivae normal.   Neck:      Comments: C-collar  Cardiovascular:      Rate and Rhythm: Normal rate and regular rhythm.   Pulmonary:      Effort: No respiratory distress.      Breath sounds: No wheezing.      Comments: Decreased BS  Abdominal:      General: Bowel sounds are normal. There is no distension.      Palpations: Abdomen is soft.      Tenderness: There is no abdominal tenderness.   Musculoskeletal:      Right lower leg: Edema present.      Left lower leg: Edema present.       Comments: BLE trace/1+ edema   Skin:     General: Skin is warm and dry.   Neurological:      Mental Status: He is alert.      Comments: awake         Fluids    Intake/Output Summary (Last 24 hours) at 3/20/2020 1106  Last data filed at 3/20/2020 0846  Gross per 24 hour   Intake --   Output 400 ml   Net -400 ml       Laboratory  Recent Labs     03/18/20  0551 03/19/20 1945   WBC 4.1* 5.4   RBC 2.53* 2.73*   HEMOGLOBIN 7.5* 7.9*   HEMATOCRIT 23.8* 25.6*   MCV 94.1 93.8   MCH 29.6 28.9   MCHC 31.5* 30.9*   RDW 55.2* 55.2*   PLATELETCT 169 173   MPV 9.2 8.8*     Recent Labs     03/18/20  0551 03/19/20  0541 03/19/20 1945   SODIUM 140 140 138   POTASSIUM 3.9 3.8 3.9   CHLORIDE 107 107 105   CO2 22 21 22   GLUCOSE 182* 135* 136*   BUN 38* 34* 31*   CREATININE 2.45* 2.20* 2.08*   CALCIUM 8.5 8.6 8.3*                   Assessment/Plan  Anemia- (present on admission)  Assessment & Plan  Fe and Vit B12 low, on supplements for both    Chronic kidney disease- (present on admission)  Assessment & Plan  Has Stage IV CKD  Avoid nephrotoxins  Renal dose all meds  Outpt Nephrology F/U  Continue Sodium Bicarb    Volume overload  Assessment & Plan  Clinically improved on exam w/ decreased edema  Check F/U CXR  Continue Lasix as tolerated by blood pressure and renal function    Somnolence  Assessment & Plan  Mentation much improved off Baclofen    Leukocytosis  Assessment & Plan  CXR +bilat PNA  UCx +Proteus  BCx +Strep in one of two bottles, repeat BCx NGTD  Sputum Cx negative  Zyvox & Zosyn resumed due to concern for relapse while on Rocephin    Cervical spinal stenosis  Assessment & Plan  S/P C3-C6 laminectomy and posterior decompression  Cervical collar  Wound care and pain control    Uncontrolled type 2 diabetes mellitus with hyperglycemia (HCC)- (present on admission)  Assessment & Plan  HbA1c  5.1  Hold Lantus until taking PO well  Continue Actos and SSI  Outpt meds include Ozempic 1.0 mg weekly, Actos 30 mg qd, and Tresiba 10  units qhs    Hyperlipemia- (present on admission)  Assessment & Plan  On Zocor    Essential hypertension- (present on admission)  Assessment & Plan  Observe blood pressure trends on Metoprolol    Full Code

## 2020-03-20 NOTE — THERAPY
Physical Therapy   Daily Treatment     Patient Name: Vince Almanzar  Age:  79 y.o., Sex:  male  Medical Record #: [id number]  Today's Date: 3/20/2020     Precautions  Precautions: Spinal / Back Precautions , Fall Risk  Comments: Midline in LUE, Hard of hearing, Isolation precautions, c-collar, risk for  pressure injuries     Subjective    Pt reports he is agreeable to exercise for makeup treatment     Objective       03/20/20 1201   Supine Lower Body Exercise   Supine Lower Body Exercises Yes   Bridges 2 sets of 10;Two Legged   Hip Abduction 2 sets of 10;Bilateral   Hip Adduction  2 sets of 10;Bilateral   Straight Leg Raises 2 sets of 10;Bilateral   Short Arc Quad 2 sets of 10;Bilateral   Heel Slide 2 sets of 10;Bilateral   Ankle Pumps 2 sets of 10;Bilateral   Other Exercises VCs and demo for form adn tehcnique   Interdisciplinary Plan of Care Collaboration   Patient Position at End of Therapy In Bed;Call Light within Reach;Tray Table within Reach  (with nursing)   PT Total Time Spent   PT Individual Total Time Spent (Mins) 15   PT Charge Group   PT Therapeutic Exercise 1       Assessment    Pt performs sup exercise program well with cues for form and technique     Plan    Monitor for safety to participate/ LE rigidity/ dizziness, endurance, standing therex/weight shift, standing balance, postural re-ed, w/c mobility/ safety, transfer training, bed mobility

## 2020-03-21 LAB
BASOPHILS # BLD AUTO: 0 % (ref 0–1.8)
BASOPHILS # BLD: 0 K/UL (ref 0–0.12)
C DIFF DNA SPEC QL NAA+PROBE: POSITIVE
C DIFF TOX A+B STL QL IA: NEGATIVE
C DIFF TOX GENS STL QL NAA+PROBE: NORMAL
EOSINOPHIL # BLD AUTO: 0.03 K/UL (ref 0–0.51)
EOSINOPHIL NFR BLD: 0.7 % (ref 0–6.9)
ERYTHROCYTE [DISTWIDTH] IN BLOOD BY AUTOMATED COUNT: 56.2 FL (ref 35.9–50)
GLUCOSE BLD-MCNC: 155 MG/DL (ref 65–99)
GLUCOSE BLD-MCNC: 223 MG/DL (ref 65–99)
GLUCOSE BLD-MCNC: 226 MG/DL (ref 65–99)
GLUCOSE BLD-MCNC: 238 MG/DL (ref 65–99)
HCT VFR BLD AUTO: 22.7 % (ref 42–52)
HGB BLD-MCNC: 7 G/DL (ref 14–18)
IMM GRANULOCYTES # BLD AUTO: 0.05 K/UL (ref 0–0.11)
IMM GRANULOCYTES NFR BLD AUTO: 1.2 % (ref 0–0.9)
LYMPHOCYTES # BLD AUTO: 0.37 K/UL (ref 1–4.8)
LYMPHOCYTES NFR BLD: 8.8 % (ref 22–41)
MCH RBC QN AUTO: 29.5 PG (ref 27–33)
MCHC RBC AUTO-ENTMCNC: 30.8 G/DL (ref 33.7–35.3)
MCV RBC AUTO: 95.8 FL (ref 81.4–97.8)
MONOCYTES # BLD AUTO: 0.43 K/UL (ref 0–0.85)
MONOCYTES NFR BLD AUTO: 10.2 % (ref 0–13.4)
NEUTROPHILS # BLD AUTO: 3.33 K/UL (ref 1.82–7.42)
NEUTROPHILS NFR BLD: 79.1 % (ref 44–72)
NRBC # BLD AUTO: 0 K/UL
NRBC BLD-RTO: 0 /100 WBC
PLATELET # BLD AUTO: 146 K/UL (ref 164–446)
PMV BLD AUTO: 9.1 FL (ref 9–12.9)
RBC # BLD AUTO: 2.37 M/UL (ref 4.7–6.1)
WBC # BLD AUTO: 4.2 K/UL (ref 4.8–10.8)

## 2020-03-21 PROCEDURE — 700102 HCHG RX REV CODE 250 W/ 637 OVERRIDE(OP): Performed by: PHYSICAL MEDICINE & REHABILITATION

## 2020-03-21 PROCEDURE — 99232 SBSQ HOSP IP/OBS MODERATE 35: CPT | Performed by: HOSPITALIST

## 2020-03-21 PROCEDURE — 94640 AIRWAY INHALATION TREATMENT: CPT

## 2020-03-21 PROCEDURE — 97530 THERAPEUTIC ACTIVITIES: CPT

## 2020-03-21 PROCEDURE — 94760 N-INVAS EAR/PLS OXIMETRY 1: CPT

## 2020-03-21 PROCEDURE — 700105 HCHG RX REV CODE 258: Performed by: HOSPITALIST

## 2020-03-21 PROCEDURE — A9270 NON-COVERED ITEM OR SERVICE: HCPCS | Performed by: HOSPITALIST

## 2020-03-21 PROCEDURE — 85025 COMPLETE CBC W/AUTO DIFF WBC: CPT

## 2020-03-21 PROCEDURE — 700111 HCHG RX REV CODE 636 W/ 250 OVERRIDE (IP): Performed by: PHYSICAL MEDICINE & REHABILITATION

## 2020-03-21 PROCEDURE — 700101 HCHG RX REV CODE 250: Performed by: PHYSICAL MEDICINE & REHABILITATION

## 2020-03-21 PROCEDURE — 92526 ORAL FUNCTION THERAPY: CPT

## 2020-03-21 PROCEDURE — 87324 CLOSTRIDIUM AG IA: CPT

## 2020-03-21 PROCEDURE — 97535 SELF CARE MNGMENT TRAINING: CPT

## 2020-03-21 PROCEDURE — 94669 MECHANICAL CHEST WALL OSCILL: CPT

## 2020-03-21 PROCEDURE — A9270 NON-COVERED ITEM OR SERVICE: HCPCS | Performed by: PHYSICAL MEDICINE & REHABILITATION

## 2020-03-21 PROCEDURE — 700111 HCHG RX REV CODE 636 W/ 250 OVERRIDE (IP): Performed by: HOSPITALIST

## 2020-03-21 PROCEDURE — 700102 HCHG RX REV CODE 250 W/ 637 OVERRIDE(OP): Performed by: HOSPITALIST

## 2020-03-21 PROCEDURE — 87493 C DIFF AMPLIFIED PROBE: CPT

## 2020-03-21 PROCEDURE — 82270 OCCULT BLOOD FECES: CPT

## 2020-03-21 PROCEDURE — 82962 GLUCOSE BLOOD TEST: CPT | Mod: 91

## 2020-03-21 PROCEDURE — 770010 HCHG ROOM/CARE - REHAB SEMI PRIVAT*

## 2020-03-21 RX ORDER — LINEZOLID 600 MG/1
600 TABLET, FILM COATED ORAL EVERY 12 HOURS
Status: COMPLETED | OUTPATIENT
Start: 2020-03-21 | End: 2020-03-23

## 2020-03-21 RX ADMIN — Medication: at 09:53

## 2020-03-21 RX ADMIN — LINEZOLID 600 MG: 600 INJECTION, SOLUTION INTRAVENOUS at 09:45

## 2020-03-21 RX ADMIN — ACETYLCYSTEINE 3 ML: 200 INHALANT RESPIRATORY (INHALATION) at 14:00

## 2020-03-21 RX ADMIN — HEPARIN SODIUM 5000 UNITS: 5000 INJECTION, SOLUTION INTRAVENOUS; SUBCUTANEOUS at 15:49

## 2020-03-21 RX ADMIN — Medication 1 TABLET: at 11:47

## 2020-03-21 RX ADMIN — INSULIN LISPRO 4 UNITS: 100 INJECTION, SOLUTION INTRAVENOUS; SUBCUTANEOUS at 18:03

## 2020-03-21 RX ADMIN — ACETYLCYSTEINE 3 ML: 200 INHALANT RESPIRATORY (INHALATION) at 07:17

## 2020-03-21 RX ADMIN — PIOGLITAZONE 30 MG: 15 TABLET ORAL at 09:46

## 2020-03-21 RX ADMIN — IPRATROPIUM BROMIDE AND ALBUTEROL SULFATE 3 ML: .5; 3 SOLUTION RESPIRATORY (INHALATION) at 12:27

## 2020-03-21 RX ADMIN — FUROSEMIDE 20 MG: 10 INJECTION, SOLUTION INTRAVENOUS at 05:36

## 2020-03-21 RX ADMIN — LIDOCAINE 2 PATCH: 50 PATCH CUTANEOUS at 11:47

## 2020-03-21 RX ADMIN — SODIUM BICARBONATE 650 MG TABLET 650 MG: at 15:49

## 2020-03-21 RX ADMIN — SIMVASTATIN 20 MG: 20 TABLET, FILM COATED ORAL at 21:22

## 2020-03-21 RX ADMIN — SODIUM BICARBONATE 650 MG TABLET 650 MG: at 09:46

## 2020-03-21 RX ADMIN — HEPARIN SODIUM 5000 UNITS: 5000 INJECTION, SOLUTION INTRAVENOUS; SUBCUTANEOUS at 05:41

## 2020-03-21 RX ADMIN — METOPROLOL TARTRATE 12.5 MG: 25 TABLET, FILM COATED ORAL at 09:45

## 2020-03-21 RX ADMIN — PIPERACILLIN AND TAZOBACTAM 2.25 G: 2; .25 INJECTION, POWDER, LYOPHILIZED, FOR SOLUTION INTRAVENOUS; PARENTERAL at 02:51

## 2020-03-21 RX ADMIN — PREGABALIN 25 MG: 25 CAPSULE ORAL at 09:46

## 2020-03-21 RX ADMIN — PIPERACILLIN AND TAZOBACTAM 2.25 G: 2; .25 INJECTION, POWDER, LYOPHILIZED, FOR SOLUTION INTRAVENOUS; PARENTERAL at 11:47

## 2020-03-21 RX ADMIN — HEPARIN SODIUM 5000 UNITS: 5000 INJECTION, SOLUTION INTRAVENOUS; SUBCUTANEOUS at 21:23

## 2020-03-21 RX ADMIN — INSULIN LISPRO 2 UNITS: 100 INJECTION, SOLUTION INTRAVENOUS; SUBCUTANEOUS at 09:48

## 2020-03-21 RX ADMIN — CYANOCOBALAMIN TAB 1000 MCG 1000 MCG: 1000 TAB at 09:45

## 2020-03-21 RX ADMIN — PREGABALIN 25 MG: 25 CAPSULE ORAL at 21:21

## 2020-03-21 RX ADMIN — METOPROLOL TARTRATE 12.5 MG: 25 TABLET, FILM COATED ORAL at 21:21

## 2020-03-21 RX ADMIN — PIPERACILLIN AND TAZOBACTAM 2.25 G: 2; .25 INJECTION, POWDER, LYOPHILIZED, FOR SOLUTION INTRAVENOUS; PARENTERAL at 18:02

## 2020-03-21 RX ADMIN — INSULIN LISPRO 4 UNITS: 100 INJECTION, SOLUTION INTRAVENOUS; SUBCUTANEOUS at 21:36

## 2020-03-21 RX ADMIN — FERROUS SULFATE TAB 325 MG (65 MG ELEMENTAL FE) 325 MG: 325 (65 FE) TAB at 18:01

## 2020-03-21 RX ADMIN — INSULIN LISPRO 4 UNITS: 100 INJECTION, SOLUTION INTRAVENOUS; SUBCUTANEOUS at 12:15

## 2020-03-21 RX ADMIN — IPRATROPIUM BROMIDE AND ALBUTEROL SULFATE 3 ML: .5; 3 SOLUTION RESPIRATORY (INHALATION) at 07:16

## 2020-03-21 RX ADMIN — SODIUM BICARBONATE 650 MG TABLET 650 MG: at 21:22

## 2020-03-21 RX ADMIN — FERROUS SULFATE TAB 325 MG (65 MG ELEMENTAL FE) 325 MG: 325 (65 FE) TAB at 09:46

## 2020-03-21 RX ADMIN — LINEZOLID 600 MG: 600 TABLET, FILM COATED ORAL at 21:22

## 2020-03-21 RX ADMIN — GABAPENTIN 300 MG: 300 CAPSULE ORAL at 21:21

## 2020-03-21 RX ADMIN — IPRATROPIUM BROMIDE AND ALBUTEROL SULFATE 3 ML: .5; 3 SOLUTION RESPIRATORY (INHALATION) at 18:12

## 2020-03-21 RX ADMIN — POTASSIUM CHLORIDE 10 MEQ: 1500 TABLET, EXTENDED RELEASE ORAL at 09:46

## 2020-03-21 RX ADMIN — ACETYLCYSTEINE 3 ML: 200 INHALANT RESPIRATORY (INHALATION) at 18:13

## 2020-03-21 ASSESSMENT — ENCOUNTER SYMPTOMS
POLYDIPSIA: 0
PALPITATIONS: 0
ABDOMINAL PAIN: 0
COUGH: 0
FOCAL WEAKNESS: 1
EYES NEGATIVE: 1
NECK PAIN: 1
FEVER: 0
SHORTNESS OF BREATH: 0
NAUSEA: 0
BRUISES/BLEEDS EASILY: 0
CHILLS: 0
VOMITING: 0

## 2020-03-21 ASSESSMENT — FIBROSIS 4 INDEX: FIB4 SCORE: 1.98

## 2020-03-21 NOTE — FLOWSHEET NOTE
03/21/20 0717   Events/Summary/Plan   Events/Summary/Plan SVN, IPV, SpO2 check   Vital Signs   Pulse 80   Respiration 18   Pulse Oximetry 96 %   $ Pulse Oximetry (Spot Check) Yes   Respiratory Assessment   Level of Consciousness Alert   Breath Sounds   RUL Breath Sounds Diminished   RML Breath Sounds Diminished   RLL Breath Sounds Diminished   CIRA Breath Sounds Diminished   LLL Breath Sounds Diminished   Secretions   Cough Moist;Congested   How Sputum Obtained Spontaneous   Sputum Amount Small   Sputum Color Clear   Sputum Consistency Thick   Oxygen   O2 Delivery Device None - Room Air

## 2020-03-21 NOTE — PROGRESS NOTES
Hospital Medicine Daily Progress Note      Chief Complaint:  Hypertension  Diabetes  Leukocytosis    Interval History:  Pt sitting up in wheelchair, working w/ SLP.  No chest pain, shortness of breath, or palpitations.  Labs and imaging reviewed.    Review of Systems  Review of Systems   Constitutional: Negative for chills and fever.   HENT: Negative.    Eyes: Negative.    Respiratory: Negative for cough and shortness of breath.    Cardiovascular: Negative for chest pain and palpitations.   Gastrointestinal: Negative for abdominal pain, nausea and vomiting.   Musculoskeletal: Positive for neck pain.        Wound pain   Skin: Negative for itching and rash.   Neurological: Positive for focal weakness.   Endo/Heme/Allergies: Negative for polydipsia. Does not bruise/bleed easily.        Physical Exam  Temp:  [36.6 °C (97.9 °F)-36.7 °C (98.1 °F)] 36.6 °C (97.9 °F)  Pulse:  [80-98] 80  Resp:  [16-18] 18  BP: (123-132)/(52-78) 123/52  SpO2:  [96 %-100 %] 96 %    Physical Exam  Vitals signs reviewed.   Constitutional:       General: He is not in acute distress.     Appearance: Normal appearance. He is not ill-appearing.   HENT:      Head: Normocephalic and atraumatic.      Right Ear: External ear normal.      Left Ear: External ear normal.      Nose: Nose normal.      Mouth/Throat:      Pharynx: Oropharynx is clear.   Eyes:      General:         Right eye: No discharge.         Left eye: No discharge.      Extraocular Movements: Extraocular movements intact.      Conjunctiva/sclera: Conjunctivae normal.   Neck:      Comments: C-collar  Cardiovascular:      Rate and Rhythm: Normal rate and regular rhythm.   Pulmonary:      Effort: No respiratory distress.      Breath sounds: No wheezing.      Comments: Decreased BS  Abdominal:      General: Bowel sounds are normal. There is no distension.      Palpations: Abdomen is soft.      Tenderness: There is no abdominal tenderness.   Musculoskeletal:      Right lower leg: Edema  present.      Left lower leg: Edema present.      Comments: BLE trace/1+ edema   Skin:     General: Skin is warm and dry.   Neurological:      Mental Status: He is alert.      Comments: Awake and alert         Fluids    Intake/Output Summary (Last 24 hours) at 3/21/2020 1028  Last data filed at 3/21/2020 0845  Gross per 24 hour   Intake 700 ml   Output 900 ml   Net -200 ml       Laboratory  Recent Labs     03/19/20 1945 03/21/20  0533   WBC 5.4 4.2*   RBC 2.73* 2.37*   HEMOGLOBIN 7.9* 7.0*   HEMATOCRIT 25.6* 22.7*   MCV 93.8 95.8   MCH 28.9 29.5   MCHC 30.9* 30.8*   RDW 55.2* 56.2*   PLATELETCT 173 146*   MPV 8.8* 9.1     Recent Labs     03/19/20  0541 03/19/20 1945   SODIUM 140 138   POTASSIUM 3.8 3.9   CHLORIDE 107 105   CO2 21 22   GLUCOSE 135* 136*   BUN 34* 31*   CREATININE 2.20* 2.08*   CALCIUM 8.6 8.3*                   Assessment/Plan  Anemia- (present on admission)  Assessment & Plan  Mild decline in H/H  Check FOB x 3  Continue to follow  Conservative strategy for transfusion given blood shortage  Fe and Vit B12 low, on supplements for both    Chronic kidney disease- (present on admission)  Assessment & Plan  Has Stage IV CKD  Avoid nephrotoxins  Renal dose all meds  Outpt Nephrology F/U  Continue Sodium Bicarb    Volume overload  Assessment & Plan  Clinically improving on exam w/ decreased BLE edema  F/U CXR still w/ pulm edema  Continue Lasix as tolerated by blood pressure and renal function    Somnolence  Assessment & Plan  Mentation much improved off Baclofen    Leukocytosis  Assessment & Plan  CXR +bilat PNA  UCx +Proteus  BCx +Strep in one of two bottles, repeat BCx NGTD  Sputum Cx negative  Zyvox & Zosyn resumed due to concern for relapse while on Rocephin    Cervical spinal stenosis  Assessment & Plan  S/P C3-C6 laminectomy and posterior decompression  Cervical collar  Wound care and pain control    Uncontrolled type 2 diabetes mellitus with hyperglycemia (HCC)- (present on admission)  Assessment  & Plan  HbA1c  5.1  Hold Lantus until taking PO well  Continue Actos and SSI  Outpt meds include Ozempic 1.0 mg weekly, Actos 30 mg qd, and Tresiba 10 units qhs    Hyperlipemia- (present on admission)  Assessment & Plan  On Zocor    Essential hypertension- (present on admission)  Assessment & Plan  Observing blood pressure trends on Metoprolol    Full Code

## 2020-03-21 NOTE — THERAPY
Occupational Therapy  Daily Treatment     Patient Name: Vince Almanzar  Age:  79 y.o., Sex:  male  Medical Record #: [id number]  Today's Date: 3/21/2020     Precautions  Precautions: Spinal / Back Precautions , Fall Risk  Comments: Midline in LUE, Hard of hearing, Isolation precautions, c-collar, risk for  pressure injuries     Safety   Comments: See FIMs for ADL performance details with UB/LB dressing and grooming    Subjective    Patient reported he needs to be changed at start of session due to incontinence of bowel.      Objective     03/21/20 1231   Precautions   Precautions Spinal / Back Precautions ;Fall Risk   Comments Midline in LUE, Hard of hearing, Isolation precautions, c-collar, risk for  pressure injuries    Cognition    Level of Consciousness Alert   Interdisciplinary Plan of Care Collaboration   Patient Position at End of Therapy Seated;Self Releasing Lap Belt Applied;Family / Friend in Room   OT Total Time Spent   OT Individual Total Time Spent (Mins) 60   OT Charge Group   OT Self Care / ADL 4       FIM Grooming Score:  5 - Standby Prompting/Supervision or Set-up  Grooming Description:  Set-up of equipment, Increased time, Supervision for safety(Set-up for brushing teeth while seated in w/c)    FIM Lower Body Dressing Score:  2 - Max Assistance  Lower Body Dressing Description:  Increased time, Set-up of equipment, Reacher, Sock aid(Max assist to don elastic waist pants with use of reacher and  R non skid sock with use of sock aid. All LB dressing tasks completed in bed . )    Patient reported incontinence of bowel at start of session and when undersigned therapist/CNA initiated changing patient, additional episode of bowel incontinence noted. Loose stool/diarrhea; Charge nurse aware.     Patient required max assist for supine > sitting EOB and performed STS/txfr (EOB > w/c) with mod assist (stand step technique).     Assessment    Significant improvement noted during this session with activity  tolerance, strength and functional mobility. Continues to require max assist for LB dressing at this time (with AE) however anticipate patient will make functional gains given improved medical status, level of alertness, and activity tolerance.     Plan    incorporate spinal precautions and energy conservation techniques into ADLs with AE as needed, general strengthening, endurance building, balance (sit and stand),

## 2020-03-21 NOTE — THERAPY
Missed Therapy     Patient Name: Vince Almanzar  Age:  79 y.o., Sex:  male  Medical Record #: [id number]  Today's Date: 3/21/2020    Received hand off of care from RT, however, patient not yet up in wheel chair for therapy as previously planned with nursing and RT.   Intervention delayed 15 min while patient readied for tx. Discussed missed therapy with , nursing, RT, Requested scheduling to consider an 8:30 or later start time to accommodate basic care.       03/21/20 0801   Therapy Missed   Missed Therapy (Minutes) 15   Reason For Missed Therapy Non-Medical - Other (Please Comment)

## 2020-03-21 NOTE — THERAPY
Speech Language Pathology  Daily Treatment     Patient Name: Vince Almanzar  Age:  79 y.o., Sex:  male  Medical Record #: [id number]  Today's Date: 3/21/2020     Subjective    Patient awake and alert. Hard of hearing, following simple directions.     Objective       03/21/20 0815   Dysphagia    Other Treatments Therapeutic trials of slightly thick (2) NTL, and puree ( 4) dysphagia 1 in 5 ml teaspoon amounts.    Diet / Liquid Recommendation NPO   Nutritional Liquid Intake Rating Scale Nothing by mouth   Nutritional Food Intake Rating Scale Tube dependent with minimal attempts of oral intake   SLP Total Time Spent   SLP Individual Total Time Spent (Mins) 15   Charge Group   SLP Swallowing Dysfunction Treatment Swallowing Dysfunction Treatment           Assessment    Oral care provided prior to oral trials.Patient assessed with 5ml teaspoon amounts of slightly thick (2) and puree (4)  For 6 trials each.  Patient demonstrating delayed swallows with tongue pumping and strain to initiate A-P transport,  with palpable range of hyo-laryngeal excursion for all but one trial of slightly thick liquids.  Patient did display a cough post swallow with this trial.  He still struggles to generate second swallows to clear oral or pharyngeal residue.     Plan    Target safe swallow, with complete range of hyolaryngeal excursion with therapeutic trials of puree level 1 (dysphagia 1 ) and mildly thick level 2 via teaspoon and cup sips if tolerated.

## 2020-03-21 NOTE — FLOWSHEET NOTE
03/21/20 0717   Inhalation Therapy Treatment   $ SVN Performed Yes   Given By: Mouthpiece   Incentive Spirometry Treatment   Incentive Spirometer Volume 500 mL   IPV/Metaneb   $ IPV/Metaneb Performed Yes  (Tolerated 15 min)   Given By: Mouthpiece   Pressure Setting (cmh20) 15

## 2020-03-21 NOTE — FLOWSHEET NOTE
03/21/20 1500   Therapy Not Performed   Type of Therapy Not Performed IPV   Reason Therapy Not Performed Refused

## 2020-03-21 NOTE — THERAPY
Speech Language Pathology  Daily Treatment     Patient Name: Vince Almanzar  Age:  79 y.o., Sex:  male  Medical Record #: [id number]  Today's Date: 3/21/2020     Subjective    Patient up in wheel chair, awake and alert.  Pleasant and cooperative.     Objective       03/21/20 0931   Dysphagia    Other Treatments Therapeutic trials of puree level -4 and mildly thick level-2 via teaspoon controled by speech therapist.   Diet / Liquid Recommendation NPO;Pre-Feeding Trials with SLP Only   Nutritional Liquid Intake Rating Scale Nothing by mouth   Nutritional Food Intake Rating Scale Nothing by mouth   SLP Total Time Spent   SLP Individual Total Time Spent (Mins) 30   Charge Group   SLP Swallowing Dysfunction Treatment Swallowing Dysfunction Treatment       FIM Eating Score:  5 - Standby Prompting/Supervision or Set-up  Eating Description:       FIM Comprehension Score:  5 - Stand-by Prompting/Supervision or Set-up  Comprehension Description:  Glasses([place], does not own hearing aids)    FIM Expression Score:  6 - Modified Independent  Expression Description:  Verbal cueing    FIM Social Interaction Score:  6 - Modified Independent  Social Interaction Description:  Increased time, Verbal cues    FIM Problem Solving Score:  3 - Moderate Assistance  Problem Solving Description:  Verbal cueing, Therapy schedule, Bed/chair alarm, Increased time, Seat belt    FIM Memory Score:  2 - Max Assistance  Memory Description:  Therapy schedule, Bed/chair alarm, Seat belt, Verbal cueing      Assessment    Patient assessed with therapeutic trials of 5 ml of puree (level -4) and mildly thick ( level -2/NTL) via teaspoon.  Patient demonstrated improved frequency of complete range of hyolaryngeal excursion for swallows attempted with complete range of excursion for  7/10 each, for 5 ml of puree and mildly thick.  Patient displays delayed oral transport and delayed initiation of pharyngeal swallow with tongue pumping, impaired coordination  and sequencing of the swallow musculature, poor lip seal during oral and pharyngeal transport, which can negatively impact pressure differentials that help drive the swallow, however he does show slow improvement with all.  Demonstrated one cough post swallow of the incomplete swallows palpated which is concerning for increased risk of penetration and aspiration.  Patient cued to cough to help protect airway or eject possible penetration or aspiration, for those incomplete swallows that did not generate a spontaneous cough.  Patient was able to cough to cuing and follow with a saliva swallow, without distress.  Patient requires external assistance to monitor for effective and complete swallows and airway protection, as well implementing lip seal during A-P transport and attempting to elicit second swallows.  Patient requires an extra ordinary amount of time to facilitate safe swallow.  But is showing slow steady improvement.    Plan    Continue NPO with therapeutic trials of puree and mildly thick with SLP only in 5 ml, 10 ml and cup sip amounts.

## 2020-03-21 NOTE — THERAPY
Speech Language Pathology  Daily Treatment     Patient Name: Vince Almanzar  Age:  79 y.o., Sex:  male  Medical Record #: [id number]  Today's Date: 3/21/2020     Subjective    Patient up in wheel chair, awake and cooperative, does not recall that he needs to keep his [place] NGT in place until he is demonstrating safe swallow and physican determines he is safe to remove it. Seen for 15 minutes to make up, for delay in tx this a.m.     Objective       03/21/20 1531   Dysphagia    Other Treatments Therapeutic trials of puree level -4 and mildly thick level-2 via teaspoon and controled cup sips, by speech therapist.   Diet / Liquid Recommendation NPO;Pre-Feeding Trials with SLP Only   Nutritional Liquid Intake Rating Scale Nothing by mouth   Nutritional Food Intake Rating Scale Nothing by mouth   SLP Total Time Spent   SLP Individual Total Time Spent (Mins) 15   Charge Group   SLP Swallowing Dysfunction Treatment Swallowing Dysfunction Treatment           Assessment    Patient demonstrating complete range of hyo-laryngeal excursion on 10/10 (5ml) amounts of puree and slightly thick.  He generated second swallows to max cues. He participated in therapeutic trials of small controled cup sips with complete range of excursion palpated but displayed poor sequencing of swallow with likely premature spillage and delayed trigger of pharyngeal swallow on 2/4 with delayed cough post swallow with out distress.  Patient continues to make slow steady improvement, but requires skilled assist to facilitate safe swallow, consistently.    Plan    Recommend continue NPO with therapeutic trials of puree Level - 4 and mildly thick level -2 for breakfast and lunch on 3/22/20.  Recommend initiate liquid trials with 5 ml teaspoon amounts, and advance to cup sips when appropriate. Recommend lip seal, double swallow, and preventative cough when voice is wet followed by saliva swallow.

## 2020-03-21 NOTE — THERAPY
Physical Therapy   Daily Treatment     Patient Name: Vince Almanzar  Age:  79 y.o., Sex:  male  Medical Record #: [id number]  Today's Date: 3/21/2020     Precautions  Precautions: Spinal / Back Precautions , Fall Risk  Comments: Midline in LUE, Hard of hearing, Isolation precautions, c-collar, risk for  pressure injuries     Subjective    Pt reports during STS training that he had loose BM     Objective       03/21/20 1031   Bed Mobility    Sit to Stand Maximal Assist  (1 person and BUE assist from // bars, repeated practice)   Interdisciplinary Plan of Care Collaboration   IDT Collaboration with  Nursing   Patient Position at End of Therapy Seated;Chair Alarm On;Self Releasing Lap Belt Applied;Call Light within Reach;Tray Table within Reach  (with nursing)   Collaboration Comments Discussed pt precautions and loose incontinent BM   PT Total Time Spent   PT Individual Total Time Spent (Mins) 60   PT Charge Group   PT Therapeutic Activities 4       FIM Toileting:     Toileting Description:       FIM Toilet Transfer Score:  1 - Total Assistance  Toilet Transfer Description:  Requires lift, Grab bar, Increased time, Adaptive equipment, Verbal cueing, Requires 2 people to assist    FIM Wheelchair Score:  5 - Standby Prompting/Supervision or Set-up  Wheelchair Description:  Safety concerns, Supervision for safety, Verbal cueing, Extra time, Adaptive equipment(150 ft, BLEs and BUEs)      Assessment    Pt demos improved STS strength and ability this session compared to the last few days able to stand with one person assist in // bars, also demos improved wc mob distance. Pt does demo BM incontinence requiring assist to clean and assist of 2 persons for toileting and toilet transfer    Plan    Monitor for safety to participate/ LE rigidity/ dizziness, endurance, standing therex/weight shift, standing balance, postural re-ed, w/c mobility/ safety, transfer training, bed mobility

## 2020-03-21 NOTE — PROGRESS NOTES
Received patient during shift change, report rec'd from day shift RN. Resting in bed, VS stable on room air. Incontinent of B&B, max assist for transfers. A&O x 2-3, able to make needs known. Bed in low position, call light within reach.

## 2020-03-21 NOTE — CARE PLAN
Problem: Communication  Goal: The ability to communicate needs accurately and effectively will improve  Outcome: PROGRESSING AS EXPECTED     Problem: Safety  Goal: Will remain free from injury  Outcome: PROGRESSING AS EXPECTED     Problem: Infection  Goal: Will remain free from infection  Outcome: PROGRESSING AS EXPECTED     Problem: Bowel/Gastric:  Goal: Normal bowel function is maintained or improved  Outcome: PROGRESSING SLOWER THAN EXPECTED  Intervention: Educate patient and significant other/support system about diet, fluid intake, medications and activity to promote bowel function  Note: Coretrack TF started today. Multiple loose stools. IV ABT continues.     Problem: Skin Integrity  Goal: Risk for impaired skin integrity will decrease  Outcome: PROGRESSING AS EXPECTED     Problem: Pain Management  Goal: Pain level will decrease to patient's comfort goal  Outcome: PROGRESSING AS EXPECTED     Problem: Respiratory:  Goal: Respiratory status will improve  Outcome: PROGRESSING AS EXPECTED  Intervention: Administer and titrate oxygen therapy  Note: VS stable on room air during this shift. Continue to assess.     Problem: Respiratory:  Goal: Respiratory status will improve  Intervention: Administer and titrate oxygen therapy  Note: VS stable on room air during this shift. Continue to assess.

## 2020-03-21 NOTE — CARE PLAN
Problem: Bowel/Gastric:  Goal: Normal bowel function is maintained or improved  Outcome: PROGRESSING SLOWER THAN EXPECTED  Note: Pt has loose stool this shift, and incontinent.     Problem: Respiratory:  Goal: Respiratory status will improve  Outcome: PROGRESSING AS EXPECTED    Pt is on RA this shift and has been able to cough up secretions and uses yanker to clear throat.

## 2020-03-21 NOTE — FLOWSHEET NOTE
03/20/20 1905   Events/Summary/Plan   Events/Summary/Plan SVN, IPV, SpO2 check   Vital Signs   Pulse 88   Respiration 18   Pulse Oximetry 100 %   $ Pulse Oximetry (Spot Check) Yes   Respiratory Assessment   Level of Consciousness Alert   Chest Exam   Work Of Breathing / Effort Shallow   Breath Sounds   RUL Breath Sounds Diminished   RML Breath Sounds Diminished   RLL Breath Sounds Diminished   CIRA Breath Sounds Diminished   LLL Breath Sounds Diminished   Secretions   Cough Moist   How Sputum Obtained Spontaneous   Oxygen   O2 (LPM) 2   O2 Delivery Device Nasal Cannula

## 2020-03-21 NOTE — FLOWSHEET NOTE
03/20/20 1905   Inhalation Therapy Treatment   $ SVN Performed Yes   Given By: Mouthpiece   IPV/Metaneb   $ IPV/Metaneb Performed Yes   Given By: Mouthpiece   Pressure Setting (cmh20) 10  (pt cannot immanuel more press)

## 2020-03-21 NOTE — FLOWSHEET NOTE
03/21/20 1228   Events/Summary/Plan   Events/Summary/Plan SVN, SpO2 check   Vital Signs   Pulse 82   Respiration 18   Pulse Oximetry 95 %   $ Pulse Oximetry (Spot Check) Yes   Respiratory Assessment   Level of Consciousness Alert   Chest Exam   Work Of Breathing / Effort Mild;Shallow   Breath Sounds   RUL Breath Sounds Diminished   RML Breath Sounds Diminished   RLL Breath Sounds Diminished   CIRA Breath Sounds Diminished   LLL Breath Sounds Diminished   Secretions   Cough Moist;Congested;Non Productive   Oxygen   O2 Delivery Device None - Room Air

## 2020-03-22 PROBLEM — R19.7 DIARRHEA: Status: ACTIVE | Noted: 2020-03-22

## 2020-03-22 LAB
ANION GAP SERPL CALC-SCNC: 11 MMOL/L (ref 7–16)
BACTERIA BLD CULT: NORMAL
BACTERIA BLD CULT: NORMAL
BUN SERPL-MCNC: 31 MG/DL (ref 8–22)
CALCIUM SERPL-MCNC: 8.2 MG/DL (ref 8.5–10.5)
CHLORIDE SERPL-SCNC: 103 MMOL/L (ref 96–112)
CO2 SERPL-SCNC: 21 MMOL/L (ref 20–33)
CREAT SERPL-MCNC: 2.19 MG/DL (ref 0.5–1.4)
ERYTHROCYTE [DISTWIDTH] IN BLOOD BY AUTOMATED COUNT: 56 FL (ref 35.9–50)
GLUCOSE BLD-MCNC: 181 MG/DL (ref 65–99)
GLUCOSE BLD-MCNC: 201 MG/DL (ref 65–99)
GLUCOSE BLD-MCNC: 227 MG/DL (ref 65–99)
GLUCOSE SERPL-MCNC: 229 MG/DL (ref 65–99)
HCT VFR BLD AUTO: 24.9 % (ref 42–52)
HGB BLD-MCNC: 7.8 G/DL (ref 14–18)
MCH RBC QN AUTO: 29.4 PG (ref 27–33)
MCHC RBC AUTO-ENTMCNC: 31.3 G/DL (ref 33.7–35.3)
MCV RBC AUTO: 94 FL (ref 81.4–97.8)
NT-PROBNP SERPL IA-MCNC: 9051 PG/ML (ref 0–125)
PLATELET # BLD AUTO: 157 K/UL (ref 164–446)
PMV BLD AUTO: 9.2 FL (ref 9–12.9)
POTASSIUM SERPL-SCNC: 4.3 MMOL/L (ref 3.6–5.5)
RBC # BLD AUTO: 2.65 M/UL (ref 4.7–6.1)
SIGNIFICANT IND 70042: NORMAL
SIGNIFICANT IND 70042: NORMAL
SITE SITE: NORMAL
SITE SITE: NORMAL
SODIUM SERPL-SCNC: 135 MMOL/L (ref 135–145)
SOURCE SOURCE: NORMAL
SOURCE SOURCE: NORMAL
WBC # BLD AUTO: 6.3 K/UL (ref 4.8–10.8)

## 2020-03-22 PROCEDURE — 700102 HCHG RX REV CODE 250 W/ 637 OVERRIDE(OP): Performed by: PHYSICAL MEDICINE & REHABILITATION

## 2020-03-22 PROCEDURE — 99233 SBSQ HOSP IP/OBS HIGH 50: CPT | Performed by: HOSPITALIST

## 2020-03-22 PROCEDURE — 92526 ORAL FUNCTION THERAPY: CPT

## 2020-03-22 PROCEDURE — 94640 AIRWAY INHALATION TREATMENT: CPT

## 2020-03-22 PROCEDURE — 83880 ASSAY OF NATRIURETIC PEPTIDE: CPT

## 2020-03-22 PROCEDURE — 700101 HCHG RX REV CODE 250: Performed by: PHYSICAL MEDICINE & REHABILITATION

## 2020-03-22 PROCEDURE — A9270 NON-COVERED ITEM OR SERVICE: HCPCS | Performed by: PHYSICAL MEDICINE & REHABILITATION

## 2020-03-22 PROCEDURE — 770010 HCHG ROOM/CARE - REHAB SEMI PRIVAT*

## 2020-03-22 PROCEDURE — 700111 HCHG RX REV CODE 636 W/ 250 OVERRIDE (IP): Performed by: HOSPITALIST

## 2020-03-22 PROCEDURE — 94669 MECHANICAL CHEST WALL OSCILL: CPT

## 2020-03-22 PROCEDURE — A9270 NON-COVERED ITEM OR SERVICE: HCPCS | Performed by: HOSPITALIST

## 2020-03-22 PROCEDURE — 97530 THERAPEUTIC ACTIVITIES: CPT

## 2020-03-22 PROCEDURE — 700105 HCHG RX REV CODE 258: Performed by: HOSPITALIST

## 2020-03-22 PROCEDURE — 36415 COLL VENOUS BLD VENIPUNCTURE: CPT

## 2020-03-22 PROCEDURE — 80048 BASIC METABOLIC PNL TOTAL CA: CPT

## 2020-03-22 PROCEDURE — 700111 HCHG RX REV CODE 636 W/ 250 OVERRIDE (IP): Performed by: PHYSICAL MEDICINE & REHABILITATION

## 2020-03-22 PROCEDURE — 82962 GLUCOSE BLOOD TEST: CPT | Mod: 91

## 2020-03-22 PROCEDURE — 85027 COMPLETE CBC AUTOMATED: CPT

## 2020-03-22 PROCEDURE — 97535 SELF CARE MNGMENT TRAINING: CPT

## 2020-03-22 PROCEDURE — 700102 HCHG RX REV CODE 250 W/ 637 OVERRIDE(OP): Performed by: HOSPITALIST

## 2020-03-22 RX ORDER — FUROSEMIDE 20 MG/1
20 TABLET ORAL
Status: DISCONTINUED | OUTPATIENT
Start: 2020-03-23 | End: 2020-03-28 | Stop reason: HOSPADM

## 2020-03-22 RX ORDER — CHOLESTYRAMINE LIGHT 4 G/5.7G
1 POWDER, FOR SUSPENSION ORAL 2 TIMES DAILY
Status: DISCONTINUED | OUTPATIENT
Start: 2020-03-22 | End: 2020-03-28 | Stop reason: HOSPADM

## 2020-03-22 RX ORDER — LOPERAMIDE HYDROCHLORIDE 2 MG/1
2 CAPSULE ORAL 4 TIMES DAILY PRN
Status: DISCONTINUED | OUTPATIENT
Start: 2020-03-22 | End: 2020-03-27

## 2020-03-22 RX ORDER — INSULIN GLARGINE 100 [IU]/ML
10 INJECTION, SOLUTION SUBCUTANEOUS
Status: DISCONTINUED | OUTPATIENT
Start: 2020-03-23 | End: 2020-03-28 | Stop reason: HOSPADM

## 2020-03-22 RX ORDER — LACTOBACILLUS RHAMNOSUS GG 10B CELL
1 CAPSULE ORAL
Status: DISCONTINUED | OUTPATIENT
Start: 2020-03-22 | End: 2020-03-24

## 2020-03-22 RX ADMIN — PIPERACILLIN AND TAZOBACTAM 2.25 G: 2; .25 INJECTION, POWDER, LYOPHILIZED, FOR SOLUTION INTRAVENOUS; PARENTERAL at 02:16

## 2020-03-22 RX ADMIN — IPRATROPIUM BROMIDE AND ALBUTEROL SULFATE 3 ML: .5; 3 SOLUTION RESPIRATORY (INHALATION) at 09:22

## 2020-03-22 RX ADMIN — ACETYLCYSTEINE 3 ML: 200 INHALANT RESPIRATORY (INHALATION) at 13:39

## 2020-03-22 RX ADMIN — SODIUM BICARBONATE 650 MG TABLET 650 MG: at 09:18

## 2020-03-22 RX ADMIN — INSULIN LISPRO 4 UNITS: 100 INJECTION, SOLUTION INTRAVENOUS; SUBCUTANEOUS at 18:20

## 2020-03-22 RX ADMIN — Medication 1 TABLET: at 10:47

## 2020-03-22 RX ADMIN — Medication: at 09:00

## 2020-03-22 RX ADMIN — LINEZOLID 600 MG: 600 TABLET, FILM COATED ORAL at 09:20

## 2020-03-22 RX ADMIN — SODIUM BICARBONATE 650 MG TABLET 650 MG: at 21:53

## 2020-03-22 RX ADMIN — IPRATROPIUM BROMIDE AND ALBUTEROL SULFATE 3 ML: .5; 3 SOLUTION RESPIRATORY (INHALATION) at 21:52

## 2020-03-22 RX ADMIN — SODIUM BICARBONATE 650 MG TABLET 650 MG: at 15:47

## 2020-03-22 RX ADMIN — LIDOCAINE 2 PATCH: 50 PATCH CUTANEOUS at 10:47

## 2020-03-22 RX ADMIN — FERROUS SULFATE TAB 325 MG (65 MG ELEMENTAL FE) 325 MG: 325 (65 FE) TAB at 18:20

## 2020-03-22 RX ADMIN — HEPARIN SODIUM 5000 UNITS: 5000 INJECTION, SOLUTION INTRAVENOUS; SUBCUTANEOUS at 05:17

## 2020-03-22 RX ADMIN — PREGABALIN 25 MG: 25 CAPSULE ORAL at 21:54

## 2020-03-22 RX ADMIN — PREGABALIN 25 MG: 25 CAPSULE ORAL at 09:20

## 2020-03-22 RX ADMIN — FUROSEMIDE 20 MG: 10 INJECTION, SOLUTION INTRAVENOUS at 05:14

## 2020-03-22 RX ADMIN — HEPARIN SODIUM 5000 UNITS: 5000 INJECTION, SOLUTION INTRAVENOUS; SUBCUTANEOUS at 21:52

## 2020-03-22 RX ADMIN — PIPERACILLIN AND TAZOBACTAM 2.25 G: 2; .25 INJECTION, POWDER, LYOPHILIZED, FOR SOLUTION INTRAVENOUS; PARENTERAL at 18:19

## 2020-03-22 RX ADMIN — CHOLESTYRAMINE 4 G: 4 POWDER, FOR SUSPENSION ORAL at 09:18

## 2020-03-22 RX ADMIN — Medication 1 CAPSULE: at 11:34

## 2020-03-22 RX ADMIN — CHOLESTYRAMINE 4 G: 4 POWDER, FOR SUSPENSION ORAL at 23:33

## 2020-03-22 RX ADMIN — GABAPENTIN 300 MG: 300 CAPSULE ORAL at 21:54

## 2020-03-22 RX ADMIN — VANCOMYCIN HYDROCHLORIDE 125 MG: KIT ORAL at 12:00

## 2020-03-22 RX ADMIN — METOPROLOL TARTRATE 12.5 MG: 25 TABLET, FILM COATED ORAL at 09:19

## 2020-03-22 RX ADMIN — CYANOCOBALAMIN TAB 1000 MCG 1000 MCG: 1000 TAB at 09:20

## 2020-03-22 RX ADMIN — LINEZOLID 600 MG: 600 TABLET, FILM COATED ORAL at 21:58

## 2020-03-22 RX ADMIN — VANCOMYCIN HYDROCHLORIDE 125 MG: KIT ORAL at 18:20

## 2020-03-22 RX ADMIN — ACETYLCYSTEINE 3 ML: 200 INHALANT RESPIRATORY (INHALATION) at 21:52

## 2020-03-22 RX ADMIN — Medication: at 21:55

## 2020-03-22 RX ADMIN — FERROUS SULFATE TAB 325 MG (65 MG ELEMENTAL FE) 325 MG: 325 (65 FE) TAB at 09:20

## 2020-03-22 RX ADMIN — PIPERACILLIN AND TAZOBACTAM 2.25 G: 2; .25 INJECTION, POWDER, LYOPHILIZED, FOR SOLUTION INTRAVENOUS; PARENTERAL at 10:46

## 2020-03-22 RX ADMIN — ACETYLCYSTEINE 3 ML: 200 INHALANT RESPIRATORY (INHALATION) at 09:49

## 2020-03-22 RX ADMIN — INSULIN LISPRO 4 UNITS: 100 INJECTION, SOLUTION INTRAVENOUS; SUBCUTANEOUS at 22:23

## 2020-03-22 RX ADMIN — POTASSIUM CHLORIDE 10 MEQ: 1500 TABLET, EXTENDED RELEASE ORAL at 09:18

## 2020-03-22 RX ADMIN — PIOGLITAZONE 30 MG: 15 TABLET ORAL at 09:18

## 2020-03-22 RX ADMIN — SIMVASTATIN 20 MG: 20 TABLET, FILM COATED ORAL at 21:58

## 2020-03-22 RX ADMIN — HEPARIN SODIUM 5000 UNITS: 5000 INJECTION, SOLUTION INTRAVENOUS; SUBCUTANEOUS at 15:47

## 2020-03-22 RX ADMIN — VANCOMYCIN HYDROCHLORIDE 125 MG: KIT ORAL at 23:33

## 2020-03-22 RX ADMIN — INSULIN LISPRO 2 UNITS: 100 INJECTION, SOLUTION INTRAVENOUS; SUBCUTANEOUS at 09:28

## 2020-03-22 RX ADMIN — INSULIN LISPRO 4 UNITS: 100 INJECTION, SOLUTION INTRAVENOUS; SUBCUTANEOUS at 11:19

## 2020-03-22 RX ADMIN — IPRATROPIUM BROMIDE AND ALBUTEROL SULFATE 3 ML: .5; 3 SOLUTION RESPIRATORY (INHALATION) at 13:31

## 2020-03-22 ASSESSMENT — ENCOUNTER SYMPTOMS
FOCAL WEAKNESS: 1
ABDOMINAL PAIN: 0
COUGH: 0
BRUISES/BLEEDS EASILY: 0
DIARRHEA: 1
CHILLS: 0
SHORTNESS OF BREATH: 0
VOMITING: 0
NECK PAIN: 1
EYES NEGATIVE: 1
POLYDIPSIA: 0
NAUSEA: 0
FEVER: 0
PALPITATIONS: 0

## 2020-03-22 ASSESSMENT — FIBROSIS 4 INDEX: FIB4 SCORE: 2.18

## 2020-03-22 NOTE — FLOWSHEET NOTE
"   03/22/20 1401   Inhalation Therapy Treatment   $ SVN Performed Yes   Given By: Mouthpiece   Date SVN Next Change Due   (mucomyst)   Incentive Spirometry Treatment   Height 1.778 m (5' 10\")   Predicted Inspiratory Capacity 2950   60% of predicted IS capacity 1770 mL   40% of predicted IS capacity 1180 mL   Incentive Spirometer Volume 600 mL   IPV/Metaneb   $ IPV/Metaneb Performed Yes   Given By: Mouthpiece     "

## 2020-03-22 NOTE — THERAPY
Occupational Therapy  Daily Treatment     Patient Name: Vince Almanzar  Age:  79 y.o., Sex:  male  Medical Record #: [id number]  Today's Date: 3/22/2020     Precautions  Precautions: (P) Cervical Collar  , Fall Risk, Nasogastric Tube  Comments: Midline in LUE, Hard of hearing, Isolation precautions, c-collar, risk for  pressure injuries     Safety   ADL Safety : (P) Requires Supervision for Safety, Requires Physical Assist for Safety  Bathroom Safety: (P) Requires Supervision for Safety, Requires Physical Assist for Safety  Comments: See FIMs for ADL performance details with UB/LB dressing and grooming    Subjective    Reports fatigue, up for multiple BMs last night. Reports 14. Agreeable to OT session, though frequent rest periods were taken     Objective       03/22/20 1350   Precautions   Precautions Cervical Collar  ;Fall Risk;Nasogastric Tube   Safety    ADL Safety  Requires Supervision for Safety;Requires Physical Assist for Safety   Bathroom Safety Requires Supervision for Safety;Requires Physical Assist for Safety   Vitals   O2 Delivery Device None - Room Air   Pain 0 - 10 Group   Therapist Pain Assessment Post Activity Pain Same as Prior to Activity;Nurse Notified;0  (reports fatigue after not sleeping much last night)   Cognition    Level of Consciousness Alert   Active ROM Upper Body   Active ROM Upper Body  X   Dominant Hand Right   Comments limited proximally, right > left   Strength Upper Body   Upper Body Strength  X   Sitting Upper Body Exercises   Bicep Curls 1 set of 10;Bilateral   Wrist Flexion / Extension 1 set of 10;Bilateral   Other Exercise foam cube and putty grasp, pinch, twist and pull   Hand Strengthening   Hand Strengthening Theraputty (Comment on Resistance)  (red.grasp, pinch, twist, pull)   Sitting Lower Body Exercises   Ankle Pumps 1 set of 10   Marching 1 set of 10   Sit to Stand   (4 repetitions tolerated)   Balance   Sitting Balance (Static) Good   Sitting Balance (Dynamic)  Fair +   Standing Balance (Static) Fair -   Standing Balance (Dynamic) Poor +   Weight Shift Sitting Fair   Weight Shift Standing Fair   Skilled Intervention Tactile Cuing;Verbal Cuing   Interdisciplinary Plan of Care Collaboration   IDT Collaboration with  Nursing;Family / Caregiver   Patient Position at End of Therapy Seated;Call Light within Reach;Tray Table within Reach;Phone within Reach;Family / Friend in Room   Collaboration Comments RN aware of functional level/ participation. family present for last half of session   OT Total Time Spent   OT Individual Total Time Spent (Mins) 60   OT Charge Group   OT Self Care / ADL 1   OT Therapy Activity 3       FIM Grooming Score:  5 - Standby Prompting/Supervision or Set-up  Grooming Description:  Increased time, Verbal cueing, Supervision for safety      Assessment    Tolerated all without pain, motivated to try, despite fatigue level.  Improving level of function noted    Plan    Follow current OT POC. Focus on ADLs, transfers, functional endurance

## 2020-03-22 NOTE — THERAPY
Speech Language Pathology  Daily Treatment     Patient Name: Vince Almanzar  Age:  79 y.o., Sex:  male  Medical Record #: [id number]  Today's Date: 3/22/2020     Subjective    Pt pleasant and cooperative during dysphagia management session w/ trial tray.      Objective       03/22/20 1131   Dysphagia    Other Treatments Therapeutic trials of level 4 puree and level 2 mildly thick liquids via pt controled cup sips.   Diet / Liquid Recommendation NPO;Pre-Feeding Trials with SLP Only   Nutritional Liquid Intake Rating Scale Nothing by mouth   Nutritional Food Intake Rating Scale Tube dependent with consistent oral intake   Interdisciplinary Plan of Care Collaboration   IDT Collaboration with  Nursing   Patient Position at End of Therapy Seated;Self Releasing Lap Belt Applied;Tray Table within Reach;Call Light within Reach   Collaboration Comments CLOF, consumed ~50% of trial tray, pt reporting fullness in belly.   SLP Total Time Spent   SLP Individual Total Time Spent (Mins) 30   Charge Group   SLP Swallowing Dysfunction Treatment Swallowing Dysfunction Treatment       FIM Eating Score:  5 - Standby Prompting/Supervision or Set-up  Eating Description:  Increased time, Modified diet, Tube feed bolus, Supervision for safety, Verbal cueing, Set-up of equipment or meal/tube feeding, Staff administers tube feed/parenteral nutrition/IVF      Assessment    Pt assessed w/ trial tray of level 4 pureed textures and level 2 mildly thick liquids. Pt implementing multiple swallows and modulating bite size IND. Pt coughed x1 w/ pureed textures, otherwise no overt clinical s/sx of aspiration. Pt benefiting from cues to slow rate.     Plan    Continue trials of level 4 pureed textures and level 2 mildly thick liquids.

## 2020-03-22 NOTE — PROGRESS NOTES
Hospital Medicine Daily Progress Note      Chief Complaint:  Hypertension  Diabetes  Leukocytosis    Interval History:  Pt had watery diarrhea x 10 episodes overnight.  Repeat C Dif testing sent.    Review of Systems  Review of Systems   Constitutional: Negative for chills and fever.   HENT: Negative.    Eyes: Negative.    Respiratory: Negative for cough and shortness of breath.    Cardiovascular: Negative for chest pain and palpitations.   Gastrointestinal: Positive for diarrhea. Negative for abdominal pain, nausea and vomiting.   Musculoskeletal: Positive for neck pain.        Wound pain   Skin: Negative for itching and rash.   Neurological: Positive for focal weakness.   Endo/Heme/Allergies: Negative for polydipsia. Does not bruise/bleed easily.        Physical Exam  Temp:  [36.5 °C (97.7 °F)-36.6 °C (97.9 °F)] 36.5 °C (97.7 °F)  Pulse:  [80-86] 80  Resp:  [16-18] 18  BP: (120-126)/(56-59) 121/57  SpO2:  [94 %-100 %] 94 %    Physical Exam  Vitals signs reviewed.   Constitutional:       General: He is not in acute distress.     Appearance: Normal appearance. He is not ill-appearing.   HENT:      Head: Normocephalic and atraumatic.      Right Ear: External ear normal.      Left Ear: External ear normal.      Nose: Nose normal.      Mouth/Throat:      Pharynx: Oropharynx is clear.   Eyes:      General:         Right eye: No discharge.         Left eye: No discharge.      Extraocular Movements: Extraocular movements intact.      Conjunctiva/sclera: Conjunctivae normal.   Neck:      Comments: C-collar  Cardiovascular:      Rate and Rhythm: Normal rate and regular rhythm.   Pulmonary:      Effort: No respiratory distress.      Breath sounds: No wheezing.      Comments: Decreased BS  Abdominal:      General: Bowel sounds are normal. There is no distension.      Palpations: Abdomen is soft.      Tenderness: There is no abdominal tenderness.   Musculoskeletal:      Right lower leg: Edema present.      Left lower leg:  Edema present.      Comments: BLE trace edema   Skin:     General: Skin is warm and dry.   Neurological:      Mental Status: He is alert and oriented to person, place, and time.      Comments: Awake and alert         Fluids    Intake/Output Summary (Last 24 hours) at 3/22/2020 1116  Last data filed at 3/22/2020 0800  Gross per 24 hour   Intake 1660 ml   Output 600 ml   Net 1060 ml       Laboratory  Recent Labs     03/19/20 1945 03/21/20  0533 03/22/20  0552   WBC 5.4 4.2* 6.3   RBC 2.73* 2.37* 2.65*   HEMOGLOBIN 7.9* 7.0* 7.8*   HEMATOCRIT 25.6* 22.7* 24.9*   MCV 93.8 95.8 94.0   MCH 28.9 29.5 29.4   MCHC 30.9* 30.8* 31.3*   RDW 55.2* 56.2* 56.0*   PLATELETCT 173 146* 157*   MPV 8.8* 9.1 9.2     Recent Labs     03/19/20 1945 03/22/20  0552   SODIUM 138 135   POTASSIUM 3.9 4.3   CHLORIDE 105 103   CO2 22 21   GLUCOSE 136* 229*   BUN 31* 31*   CREATININE 2.08* 2.19*   CALCIUM 8.3* 8.2*                   Assessment/Plan  Anemia- (present on admission)  Assessment & Plan  Mild decline in H/H  Check FOB x 3  Conservative strategy for transfusion given blood shortage  Fe and Vit B12 low, on supplements for both    Chronic kidney disease- (present on admission)  Assessment & Plan  Has Stage IV CKD  Avoid nephrotoxins  Renal dose all meds  Outpt Nephrology F/U  Continue Sodium Bicarb    Diarrhea  Assessment & Plan  Previously C Dif PCR negative and Toxin negative  Now C Dif PCR positive and Toxin negative  At high risk for developing C Dif infection given prolonged exposure to abx  Therefore, will start PO Vancomycin  Trial symptomatic control w/ Questran and Culturelle    Volume overload  Assessment & Plan  Clinically improving on exam w/ decreased BLE edema  Will changed Lasix from IV to enteral route    Somnolence  Assessment & Plan  Mentation much improved off Baclofen    Leukocytosis  Assessment & Plan  CXR +bilat PNA  UCx +Proteus  BCx +Strep in one of two bottles, repeat BCx NGTD  Sputum Cx negative  Zyvox & Zosyn  resumed due to concern for relapse while on Rocephin    Cervical spinal stenosis  Assessment & Plan  S/P C3-C6 laminectomy and posterior decompression  Cervical collar  Wound care and pain control    Uncontrolled type 2 diabetes mellitus with hyperglycemia (HCC)- (present on admission)  Assessment & Plan  HbA1c  5.1  Serum glucose trending back up  Will resume low dose Lantus  Continue Actos and SSI  Outpt meds include Ozempic 1.0 mg weekly, Actos 30 mg qd, and Tresiba 10 units qhs    Hyperlipemia- (present on admission)  Assessment & Plan  On Zocor    Essential hypertension- (present on admission)  Assessment & Plan  Observing blood pressure trends on Metoprolol    Full Code    Reviewed w/ RN

## 2020-03-22 NOTE — FLOWSHEET NOTE
03/21/20 1814   Inhalation Therapy Treatment   $ SVN Performed Yes   Given By: Mouthpiece   Incentive Spirometry Treatment   Incentive Spirometer Volume 500 mL   IPV/Metaneb   $ IPV/Metaneb Performed Yes   Given By: Mouthpiece   Pressure Setting (cmh20) 20  (20 when mouth seal maintained. tolerated 10 min)

## 2020-03-22 NOTE — CARE PLAN
Problem: Bowel/Gastric:  Goal: Normal bowel function is maintained or improved  Outcome: NOT MET     Problem: Safety  Goal: Will remain free from falls  Outcome: PROGRESSING AS EXPECTED

## 2020-03-22 NOTE — FLOWSHEET NOTE
03/21/20 1814   Events/Summary/Plan   Events/Summary/Plan SVN, IPV, SpO2 check   Vital Signs   Pulse 81   Respiration 16   Pulse Oximetry 94 %   $ Pulse Oximetry (Spot Check) Yes   Respiratory Assessment   Level of Consciousness Alert   Chest Exam   Work Of Breathing / Effort Mild;Shallow   Breath Sounds   RUL Breath Sounds Diminished   RML Breath Sounds Diminished   RLL Breath Sounds Diminished   CIRA Breath Sounds Diminished   LLL Breath Sounds Diminished   Secretions   Cough Congested   How Sputum Obtained Spontaneous   Sputum Amount Moderate   Sputum Color Clear;White   Sputum Consistency Thick   Oxygen   O2 Delivery Device None - Room Air

## 2020-03-22 NOTE — THERAPY
Speech Language Pathology  Daily Treatment     Patient Name: Vince Almanzar  Age:  79 y.o., Sex:  male  Medical Record #: [id number]  Today's Date: 3/22/2020     Subjective    Pt pleasant and cooperative during dysphagia management session.      Objective       03/22/20 0801   Dysphagia    Other Treatments Therapeutic trials of puree level 4 and mildly thick level 2 via cup sips, pt controled.   Diet / Liquid Recommendation NPO;Pre-Feeding Trials with SLP Only   Nutritional Liquid Intake Rating Scale Nothing by mouth   Nutritional Food Intake Rating Scale Nothing by mouth   Interdisciplinary Plan of Care Collaboration   IDT Collaboration with  Nursing   Patient Position at End of Therapy Seated;Self Releasing Lap Belt Applied;Call Light within Reach;Tray Table within Reach   Collaboration Comments CLOF, consumed ~60% of trial tray   SLP Total Time Spent   SLP Individual Total Time Spent (Mins) 60   Charge Group   SLP Swallowing Dysfunction Treatment Swallowing Dysfunction Treatment       FIM Eating Score:  5 - Standby Prompting/Supervision or Set-up  Eating Description:  Increased time, Modified diet, Tube feed bolus, Supervision for safety, Verbal cueing, Set-up of equipment or meal/tube feeding, Staff administers tube feed/parenteral nutrition/IVF      Assessment    Pt assessed w/ trial tray of level 4 puree textures and level 2 mildly thick liquids. Pt presented with wet vocal quality in 5/30 bites/sips and was able to clear with throat clear. Pt IND modulating bite size and implementing multiple swallows. Cues to alternate food/liquid when struggling to swallow puree texture.     Plan    Continue trials of level 4 puree and level 2 mildly thick liquids.

## 2020-03-22 NOTE — FLOWSHEET NOTE
03/22/20 0959   Vital Signs   Pulse 80   Respiration 18   Pulse Oximetry 94 %   Respiratory Assessment   Level of Consciousness Alert   Secretions   Cough Congested;Productive   How Sputum Obtained Expectorated   Sputum Amount Moderate   Sputum Color Tan   Sputum Consistency Thick   Oxygen   O2 Delivery Device None - Room Air

## 2020-03-23 ENCOUNTER — APPOINTMENT (OUTPATIENT)
Dept: RADIOLOGY | Facility: REHABILITATION | Age: 80
DRG: 052 | End: 2020-03-23
Attending: PHYSICAL MEDICINE & REHABILITATION
Payer: COMMERCIAL

## 2020-03-23 LAB
GLUCOSE BLD-MCNC: 163 MG/DL (ref 65–99)
GLUCOSE BLD-MCNC: 175 MG/DL (ref 65–99)
GLUCOSE BLD-MCNC: 185 MG/DL (ref 65–99)
GLUCOSE BLD-MCNC: 219 MG/DL (ref 65–99)
GLUCOSE BLD-MCNC: 224 MG/DL (ref 65–99)
HEMOCCULT SP1 STL QL: NEGATIVE
HEMOCCULT SP2 STL QL: NEGATIVE

## 2020-03-23 PROCEDURE — 700105 HCHG RX REV CODE 258: Performed by: HOSPITALIST

## 2020-03-23 PROCEDURE — 71046 X-RAY EXAM CHEST 2 VIEWS: CPT

## 2020-03-23 PROCEDURE — 700111 HCHG RX REV CODE 636 W/ 250 OVERRIDE (IP): Performed by: HOSPITALIST

## 2020-03-23 PROCEDURE — 82962 GLUCOSE BLOOD TEST: CPT

## 2020-03-23 PROCEDURE — 97116 GAIT TRAINING THERAPY: CPT

## 2020-03-23 PROCEDURE — A9270 NON-COVERED ITEM OR SERVICE: HCPCS | Performed by: PHYSICAL MEDICINE & REHABILITATION

## 2020-03-23 PROCEDURE — 770010 HCHG ROOM/CARE - REHAB SEMI PRIVAT*

## 2020-03-23 PROCEDURE — 97110 THERAPEUTIC EXERCISES: CPT

## 2020-03-23 PROCEDURE — 92526 ORAL FUNCTION THERAPY: CPT

## 2020-03-23 PROCEDURE — A9270 NON-COVERED ITEM OR SERVICE: HCPCS | Performed by: HOSPITALIST

## 2020-03-23 PROCEDURE — 99233 SBSQ HOSP IP/OBS HIGH 50: CPT | Performed by: PHYSICAL MEDICINE & REHABILITATION

## 2020-03-23 PROCEDURE — 700102 HCHG RX REV CODE 250 W/ 637 OVERRIDE(OP): Performed by: HOSPITALIST

## 2020-03-23 PROCEDURE — 94669 MECHANICAL CHEST WALL OSCILL: CPT

## 2020-03-23 PROCEDURE — 94640 AIRWAY INHALATION TREATMENT: CPT

## 2020-03-23 PROCEDURE — 700102 HCHG RX REV CODE 250 W/ 637 OVERRIDE(OP): Performed by: PHYSICAL MEDICINE & REHABILITATION

## 2020-03-23 PROCEDURE — 99232 SBSQ HOSP IP/OBS MODERATE 35: CPT | Performed by: HOSPITALIST

## 2020-03-23 PROCEDURE — 700101 HCHG RX REV CODE 250: Performed by: PHYSICAL MEDICINE & REHABILITATION

## 2020-03-23 PROCEDURE — 97535 SELF CARE MNGMENT TRAINING: CPT

## 2020-03-23 PROCEDURE — 700111 HCHG RX REV CODE 636 W/ 250 OVERRIDE (IP): Performed by: PHYSICAL MEDICINE & REHABILITATION

## 2020-03-23 RX ORDER — PREGABALIN 25 MG/1
50 CAPSULE ORAL 2 TIMES DAILY
Status: DISCONTINUED | OUTPATIENT
Start: 2020-03-23 | End: 2020-03-25

## 2020-03-23 RX ORDER — SODIUM CHLORIDE FOR INHALATION 3 %
3 VIAL, NEBULIZER (ML) INHALATION 3 TIMES DAILY
Status: DISCONTINUED | OUTPATIENT
Start: 2020-03-23 | End: 2020-03-24

## 2020-03-23 RX ADMIN — HEPARIN SODIUM 5000 UNITS: 5000 INJECTION, SOLUTION INTRAVENOUS; SUBCUTANEOUS at 05:44

## 2020-03-23 RX ADMIN — FUROSEMIDE 20 MG: 20 TABLET ORAL at 05:44

## 2020-03-23 RX ADMIN — PIPERACILLIN AND TAZOBACTAM 2.25 G: 2; .25 INJECTION, POWDER, LYOPHILIZED, FOR SOLUTION INTRAVENOUS; PARENTERAL at 17:32

## 2020-03-23 RX ADMIN — IPRATROPIUM BROMIDE AND ALBUTEROL SULFATE 3 ML: .5; 3 SOLUTION RESPIRATORY (INHALATION) at 16:17

## 2020-03-23 RX ADMIN — INSULIN LISPRO 4 UNITS: 100 INJECTION, SOLUTION INTRAVENOUS; SUBCUTANEOUS at 11:20

## 2020-03-23 RX ADMIN — INSULIN LISPRO 2 UNITS: 100 INJECTION, SOLUTION INTRAVENOUS; SUBCUTANEOUS at 07:39

## 2020-03-23 RX ADMIN — VANCOMYCIN HYDROCHLORIDE 125 MG: KIT ORAL at 12:00

## 2020-03-23 RX ADMIN — LOPERAMIDE HYDROCHLORIDE 2 MG: 2 CAPSULE ORAL at 22:20

## 2020-03-23 RX ADMIN — METOPROLOL TARTRATE 12.5 MG: 25 TABLET, FILM COATED ORAL at 08:34

## 2020-03-23 RX ADMIN — Medication: at 07:42

## 2020-03-23 RX ADMIN — SODIUM CHLORIDE SOLN NEBU 3% 3 ML: 3 NEBU SOLN at 16:24

## 2020-03-23 RX ADMIN — PIPERACILLIN AND TAZOBACTAM 2.25 G: 2; .25 INJECTION, POWDER, LYOPHILIZED, FOR SOLUTION INTRAVENOUS; PARENTERAL at 02:51

## 2020-03-23 RX ADMIN — HEPARIN SODIUM 5000 UNITS: 5000 INJECTION, SOLUTION INTRAVENOUS; SUBCUTANEOUS at 21:11

## 2020-03-23 RX ADMIN — Medication 1 APPLICATION: at 21:15

## 2020-03-23 RX ADMIN — CHOLESTYRAMINE 4 G: 4 POWDER, FOR SUSPENSION ORAL at 21:12

## 2020-03-23 RX ADMIN — PREGABALIN 25 MG: 25 CAPSULE ORAL at 08:34

## 2020-03-23 RX ADMIN — Medication 1 TABLET: at 11:24

## 2020-03-23 RX ADMIN — SIMVASTATIN 20 MG: 20 TABLET, FILM COATED ORAL at 21:14

## 2020-03-23 RX ADMIN — LOPERAMIDE HYDROCHLORIDE 2 MG: 2 CAPSULE ORAL at 05:45

## 2020-03-23 RX ADMIN — CHOLESTYRAMINE 4 G: 4 POWDER, FOR SUSPENSION ORAL at 11:22

## 2020-03-23 RX ADMIN — PREGABALIN 50 MG: 25 CAPSULE ORAL at 21:12

## 2020-03-23 RX ADMIN — IPRATROPIUM BROMIDE AND ALBUTEROL SULFATE 3 ML: .5; 3 SOLUTION RESPIRATORY (INHALATION) at 07:25

## 2020-03-23 RX ADMIN — ACETYLCYSTEINE 3 ML: 200 INHALANT RESPIRATORY (INHALATION) at 07:33

## 2020-03-23 RX ADMIN — Medication 1 CAPSULE: at 08:34

## 2020-03-23 RX ADMIN — SODIUM BICARBONATE 650 MG TABLET 650 MG: at 14:59

## 2020-03-23 RX ADMIN — LIDOCAINE 2 PATCH: 50 PATCH CUTANEOUS at 11:23

## 2020-03-23 RX ADMIN — HEPARIN SODIUM 5000 UNITS: 5000 INJECTION, SOLUTION INTRAVENOUS; SUBCUTANEOUS at 14:59

## 2020-03-23 RX ADMIN — FERROUS SULFATE TAB 325 MG (65 MG ELEMENTAL FE) 325 MG: 325 (65 FE) TAB at 08:34

## 2020-03-23 RX ADMIN — VANCOMYCIN HYDROCHLORIDE 125 MG: KIT ORAL at 07:12

## 2020-03-23 RX ADMIN — LINEZOLID 600 MG: 600 TABLET, FILM COATED ORAL at 08:34

## 2020-03-23 RX ADMIN — INSULIN LISPRO 2 UNITS: 100 INJECTION, SOLUTION INTRAVENOUS; SUBCUTANEOUS at 21:16

## 2020-03-23 RX ADMIN — VANCOMYCIN HYDROCHLORIDE 125 MG: KIT ORAL at 18:00

## 2020-03-23 RX ADMIN — SODIUM BICARBONATE 650 MG TABLET 650 MG: at 11:23

## 2020-03-23 RX ADMIN — VANCOMYCIN HYDROCHLORIDE 125 MG: KIT ORAL at 22:21

## 2020-03-23 RX ADMIN — INSULIN LISPRO 2 UNITS: 100 INJECTION, SOLUTION INTRAVENOUS; SUBCUTANEOUS at 17:29

## 2020-03-23 RX ADMIN — IPRATROPIUM BROMIDE AND ALBUTEROL SULFATE 3 ML: .5; 3 SOLUTION RESPIRATORY (INHALATION) at 11:42

## 2020-03-23 RX ADMIN — PIOGLITAZONE 30 MG: 15 TABLET ORAL at 08:34

## 2020-03-23 RX ADMIN — METOPROLOL TARTRATE 12.5 MG: 25 TABLET, FILM COATED ORAL at 21:12

## 2020-03-23 RX ADMIN — CYANOCOBALAMIN TAB 1000 MCG 1000 MCG: 1000 TAB at 08:34

## 2020-03-23 RX ADMIN — GABAPENTIN 300 MG: 300 CAPSULE ORAL at 21:12

## 2020-03-23 RX ADMIN — INSULIN GLARGINE 10 UNITS: 100 INJECTION, SOLUTION SUBCUTANEOUS at 11:21

## 2020-03-23 RX ADMIN — LINEZOLID 600 MG: 600 TABLET, FILM COATED ORAL at 21:12

## 2020-03-23 RX ADMIN — SODIUM BICARBONATE 650 MG TABLET 650 MG: at 21:12

## 2020-03-23 RX ADMIN — OXYCODONE HYDROCHLORIDE 10 MG: 10 TABLET ORAL at 22:20

## 2020-03-23 RX ADMIN — PIPERACILLIN AND TAZOBACTAM 2.25 G: 2; .25 INJECTION, POWDER, LYOPHILIZED, FOR SOLUTION INTRAVENOUS; PARENTERAL at 11:24

## 2020-03-23 RX ADMIN — FERROUS SULFATE TAB 325 MG (65 MG ELEMENTAL FE) 325 MG: 325 (65 FE) TAB at 17:32

## 2020-03-23 ASSESSMENT — ENCOUNTER SYMPTOMS
FEVER: 0
NAUSEA: 0
VOMITING: 0
FOCAL WEAKNESS: 1
POLYDIPSIA: 0
BRUISES/BLEEDS EASILY: 0
NECK PAIN: 1
DIARRHEA: 1
ABDOMINAL PAIN: 0
EYES NEGATIVE: 1
SHORTNESS OF BREATH: 0
CHILLS: 0
PALPITATIONS: 0
COUGH: 0

## 2020-03-23 ASSESSMENT — FIBROSIS 4 INDEX: FIB4 SCORE: 2.71

## 2020-03-23 NOTE — REHAB-ACTIVITY IDT TEAM NOTE
ACT   Recreation/Community     Leisure Competence Measure  Leisure Awareness: Independent  Leisure Attitude: Independent  Leisure Skills: Minimal Assist  Cultural / Social Behaviors: Independent  Interpersonal Skills: Independent  Community Integration Skills: Cueing Assist  Social Contact: Independent  Community Participation: Cueing Assist    Strengths:  Able to follow instructions, Motivated for self care and independence, Pleasant and cooperative and Other: Willing participant when strength allows  Barriers:  Decreased endurance, Generalized weakness, Limited mobility, Poor activity tolerance and Other: Isolation precaution  # of short term goals set=2  # of short term goals met=1    Pts participation is limited by isolation precautions and limited endurance. Pt is highly motivated when his energy level is up. Pt reports many leisure interests and mostly reports that he enjoys playing cards as a positive leisure activity. Will keep providing positive leisure and coping skills while remaining in his room.    Recreation Therapy Problems     Problem: Recreation Therapy     Dates: Start: 03/13/20       Description:     Goal: STG-Within one week, patient will demonstrate a standing tolerance     Dates: Start: 03/13/20       Description: By standing for 3 minutes x3 while duel tasking at [facility] and no LOB.            Goal: LTG-By discharge, patient will demonstrate a standing tolerance     Dates: Start: 03/13/20       Description: By standing for 3 minutes x3 while duel tasking at [facility] and no LOB.                        Section completed by:  ALESHA MondragonS

## 2020-03-23 NOTE — THERAPY
Speech Language Pathology  Daily Treatment     Patient Name: Vince Almanzar  Age:  79 y.o., Sex:  male  Medical Record #: [id number]  Today's Date: 3/23/2020     Subjective    Patient was in room at time of ST. Was willing to participate.      Objective       03/23/20 1402   Dysphagia    Other Treatments trials of level 4/ MTL   Diet / Liquid Recommendation NPO;Pre-Feeding Trials with SLP Only   Nutritional Liquid Intake Rating Scale Nothing by mouth   Nutritional Food Intake Rating Scale Tube dependent with consistent oral intake   SLP Total Time Spent   SLP Individual Total Time Spent (Mins) 30   Charge Group   SLP Swallowing Dysfunction Treatment Swallowing Dysfunction Treatment         Assessment    Patient was assessed with level 4 /MTL trial tray. Continues to demonstrate intermittent wet vocal quality and required cues to self monitor and clear. Ongoing education provided.     Plan    Level 4/ MTL trial trays breakfast and lunch with possible upgrade as appropriate.

## 2020-03-23 NOTE — PROGRESS NOTES
Hospital Medicine Daily Progress Note      Chief Complaint:  Hypertension  Diabetes  Leukocytosis    Interval History:  Diarrhea improved from 10 down to 4 episodes last night, less watery on bulking agent.    Review of Systems  Review of Systems   Constitutional: Negative for chills and fever.   HENT: Negative.    Eyes: Negative.    Respiratory: Negative for cough and shortness of breath.    Cardiovascular: Negative for chest pain and palpitations.   Gastrointestinal: Positive for diarrhea. Negative for abdominal pain, nausea and vomiting.   Musculoskeletal: Positive for neck pain.        Wound pain   Skin: Negative for itching and rash.   Neurological: Positive for focal weakness.   Endo/Heme/Allergies: Negative for polydipsia. Does not bruise/bleed easily.        Physical Exam  Temp:  [36.4 °C (97.6 °F)-36.8 °C (98.2 °F)] 36.8 °C (98.2 °F)  Pulse:  [80-84] 80  Resp:  [17-20] 20  BP: (105-130)/(54-60) 114/60  SpO2:  [95 %-99 %] 99 %    Physical Exam  Vitals signs reviewed.   Constitutional:       General: He is not in acute distress.     Appearance: Normal appearance. He is not ill-appearing.   HENT:      Head: Normocephalic and atraumatic.      Right Ear: External ear normal.      Left Ear: External ear normal.      Nose: Nose normal.      Mouth/Throat:      Pharynx: Oropharynx is clear.   Eyes:      General:         Right eye: No discharge.         Left eye: No discharge.      Extraocular Movements: Extraocular movements intact.      Conjunctiva/sclera: Conjunctivae normal.   Neck:      Comments: C-collar  Cardiovascular:      Rate and Rhythm: Normal rate and regular rhythm.   Pulmonary:      Effort: No respiratory distress.      Breath sounds: No wheezing.      Comments: Decreased BS  Abdominal:      General: Bowel sounds are normal. There is no distension.      Palpations: Abdomen is soft.      Tenderness: There is no abdominal tenderness.   Musculoskeletal:      Right lower leg: Edema present.      Left lower  leg: Edema present.      Comments: BLE trace edema   Skin:     General: Skin is warm and dry.   Neurological:      Mental Status: He is alert and oriented to person, place, and time.      Comments: Awake and alert         Fluids    Intake/Output Summary (Last 24 hours) at 3/23/2020 1020  Last data filed at 3/23/2020 0712  Gross per 24 hour   Intake 1050 ml   Output 650 ml   Net 400 ml       Laboratory  Recent Labs     03/21/20  0533 03/22/20  0552   WBC 4.2* 6.3   RBC 2.37* 2.65*   HEMOGLOBIN 7.0* 7.8*   HEMATOCRIT 22.7* 24.9*   MCV 95.8 94.0   MCH 29.5 29.4   MCHC 30.8* 31.3*   RDW 56.2* 56.0*   PLATELETCT 146* 157*   MPV 9.1 9.2     Recent Labs     03/22/20  0552   SODIUM 135   POTASSIUM 4.3   CHLORIDE 103   CO2 21   GLUCOSE 229*   BUN 31*   CREATININE 2.19*   CALCIUM 8.2*                   Assessment/Plan  Anemia- (present on admission)  Assessment & Plan  Mild decline in H/H  FOB x 2 negative thus far  Conservative transfusion strategy given blood shortage  Fe and Vit B12 low, on supplements for both  Check F/U labs in am    Chronic kidney disease- (present on admission)  Assessment & Plan  Has Stage IV CKD  Avoid nephrotoxins  Renal dose all meds  Outpt Nephrology F/U  Continue Sodium Bicarb  Check F/U labs in am    Diarrhea  Assessment & Plan  Previously C Dif PCR negative and Toxin negative  Now C Dif PCR positive and Toxin negative  At high risk for developing C Dif infection given exposure to abx and prolonged hospitalization  Therefore, started PO Vancomycin  Continue symptomatic control w/ Questran and Culturelle    Volume overload  Assessment & Plan  Clinically improving on exam w/ decreased BLE edema  Lasix changed from IV to enteral route due to mild increase in Cr  Check F/U CXR in am    Somnolence  Assessment & Plan  Mentation much improved w/ decreased pain meds    Leukocytosis  Assessment & Plan  CXR +bilat PNA  UCx +Proteus  BCx +Strep in one of two bottles, repeat BCx NGTD  Sputum Cx  negative  Zyvox & Zosyn resumed due to concern for relapse while on Rocephin    Cervical spinal stenosis  Assessment & Plan  S/P C3-C6 laminectomy and posterior decompression  Cervical collar  Wound care and pain control    Uncontrolled type 2 diabetes mellitus with hyperglycemia (HCC)- (present on admission)  Assessment & Plan  HbA1c  5.1  Serum glucose trending back up  Low dose Lantus resumed  Continue Actos and SSI  Outpt meds include Ozempic 1.0 mg weekly, Actos 30 mg qd, and Tresiba 10 units qhs    Hyperlipemia- (present on admission)  Assessment & Plan  On Zocor    Essential hypertension- (present on admission)  Assessment & Plan  Blood pressure controlled on Metoprolol    Full Code    Discussed w/ Dr. Garner

## 2020-03-23 NOTE — REHAB-OT IDT TEAM NOTE
Occupational Therapy   Activities of Daily Living  Eating Initial:  5 - Standby Prompting/Supervision or Set-up  Eating Current:  5 - Standby Prompting/Supervision or Set-up   Eating Description:  Increased time, Modified diet, Tube feed bolus, Supervision for safety, Verbal cueing, Set-up of equipment or meal/tube feeding, Staff administers tube feed/parenteral nutrition/IVF  Grooming Initial:  5 - Standby Prompting/Supervision or Set-up  Grooming Current:  5 - Standby Prompting/Supervision or Set-up   Grooming Description:  Increased time, Verbal cueing, Supervision for safety  Bathing Initial:  3 - Moderate Assistance  Bathing Current:  2 - Max Assistance   Bathing Description:  Grab bar, Tub bench, Hand held shower, Increased time, Supervision for safety, Verbal cueing, Set-up of equipment, Initial preparation for task(Min assist in stand via grab bar w/ Total assist for stefanie, Max assist for distal BLE's, Total assist for back, Pt Mod I for anterior trunk and BUE's.  Noted significant BM in shower while standing via grab bar)  Upper Body Dressing Initial:  3 - Moderate Assistance  Upper Body Dressing Current:  2 - Max Assistance   Upper Body Dressing Description:  Increased time, Supervision for safety, Verbal cueing, Set-up of equipment(Patient required max assist for threading BUEs through long sleeve shirt and shirt over head pursuit while seated in w/c )  Lower Body Dressing Initial:  3 - Moderate Assistance  Lower Body Dressing Current:  2 - Max Assistance   Lower Body Dressing Description:  2 - Max Assistance  Toileting Initial:  3 - Moderate Assistance  Toileting Current:  2 - Max Assistance   Toileting Description:  Grab bar, Increased time, Supervision for safety, Verbal cueing, Set-up of equipment, Initial preparation for task(Mod assist sit/stand via grab bar, Mod assist to manage pants, Total assist for post BM toilet hygiene)  Toilet Transfer Initial:  4 - Minimal Assistance  Toilet Transfer  Current:  1 - Total Assistance   Toilet Transfer Description:  1 - Total Assistance  Tub / Shower Transfer Initial:  4 - Minimal Assistance  Tub / Shower Transfer Current:  4 - Minimal Assistance   Tub / Shower Transfer Description:  Grab bar, Increased time, Supervision for safety, Verbal cueing, Set-up of equipment, Initial preparation for task(Min assist to transfer to/from manual wc and shower bench via grab bar)  IADL:  NA at this time  Family Training/Education:  Brief education completed with sister on Sat 3/21  DME/DC Recommendations: TBD; Anticipate HHOT / additional caregiver support     Strengths:  Motivated for self care and independence, Pleasant and cooperative, Supportive family and Willingly participates in therapeutic activities  Barriers:  Decreased endurance, Generalized weakness, Limited mobility, Poor activity tolerance and Other: variable level of alertness and medical status during last week     # of short term goals set= 2    # of short term goals met= 0      Occupational Therapy Goals     Problem: Bathing     Dates: Start: 03/13/20       Description:     Goal: STG-Within one week, patient will bathe     Dates: Start: 03/13/20       Description: 1) Individualized Goal:  Minimal assist with AE as needed  2) Interventions:  OT Orthotics Training, OT Group Therapy, OT Self Care/ADL, OT Cognitive Skill Dev, OT Community Reintegration, OT Manual Ther Technique, OT Neuro Re-Ed/Balance, OT Sensory Int Techniques, OT Therapeutic Activity, OT Evaluation and OT Therapeutic Exercise                   Problem: Dressing     Dates: Start: 03/13/20       Description:     Goal: STG-Within one week, patient will dress UB     Dates: Start: 03/13/20       Description: 1) Individualized Goal: Minimal assist   2) Interventions: OT Orthotics Training, OT Group Therapy, OT Self Care/ADL, OT Cognitive Skill Dev, OT Community Reintegration, OT Manual Ther Technique, OT Neuro Re-Ed/Balance, OT Sensory Int Techniques,  OT Therapeutic Activity, OT Evaluation and OT Therapeutic Exercise                   Problem: OT Long Term Goals     Dates: Start: 03/13/20       Description:     Goal: LTG-By discharge, patient will complete basic self care tasks     Dates: Start: 03/13/20       Description: 1) Individualized Goal: Supervision level  2) Interventions: OT Orthotics Training, OT Group Therapy, OT Self Care/ADL, OT Cognitive Skill Dev, OT Community Reintegration, OT Manual Ther Technique, OT Neuro Re-Ed/Balance, OT Sensory Int Techniques, OT Therapeutic Activity, OT Evaluation and OT Therapeutic Exercise             Goal: LTG-By discharge, patient will perform bathroom transfers     Dates: Start: 03/13/20       Description: 1) Individualized Goal: Supervision level     2) Interventions: OT Orthotics Training, OT Group Therapy, OT Self Care/ADL, OT Cognitive Skill Dev, OT Community Reintegration, OT Manual Ther Technique, OT Neuro Re-Ed/Balance, OT Sensory Int Techniques, OT Therapeutic Activity, OT Evaluation and OT Therapeutic Exercise             Goal: LTG-By discharge, patient will complete basic home management     Dates: Start: 03/13/20       Description: 1) Individualized Goal: [facility] to supervision level     2) Interventions: OT Orthotics Training, OT Group Therapy, OT Self Care/ADL, OT Cognitive Skill Dev, OT Community Reintegration, OT Manual Ther Technique, OT Neuro Re-Ed/Balance, OT Sensory Int Techniques, OT Therapeutic Activity, OT Evaluation and OT Therapeutic Exercise                         Section completed by:  Nga Allen MS,OTR/L

## 2020-03-23 NOTE — CARE PLAN
Problem: Other Problem (see comments)  Goal: Other Goal  Description: Monitor/Evaluation: Monitor PO vs TF intake, diet upgrades, weight, labs, medication adjustments, skin integrity, GI function, vitals, I/Os, and overall hydration status.  Adjust nutritional POC pending clinical outcomes.    RD following bi-weekly until EN at goal and tolerated.   Goal: 1. Tolerance to EN adjustment  2. Maintain adequate oral nutrient/fluid intake to promote nutrition optimization/healing. 3.Transition to PO pending clinical outcomes         Outcome: PROGRESSING AS EXPECTED

## 2020-03-23 NOTE — REHAB-SLP IDT TEAM NOTE
Speech Therapy   Cognitive Linquistic Functions  Comprehension Initial:  5 - Stand-by Prompting/Supervision or Set-up  Comprehension Current:  5 - Stand-by Prompting/Supervision or Set-up   Comprehension Description:  Glasses([place], does not own hearing aids)  Expression Initial:  5 - Stand-by Prompting/Supervision or Set-up  Expression Current:  6 - Modified Independent   Expression Description:  Verbal cueing  Social Interaction Initial:  5 - Stand-by Prompting/Supervision or Set-up  Social Interaction Current:  6 - Modified Independent   Social Interaction Description:  Increased time, Verbal cues  Problem Solving Initial:  4 - Minimal Assistance  Problem Solving Current:  3 - Moderate Assistance   Problem Solving Description:  Verbal cueing, Therapy schedule, Bed/chair alarm, Increased time, Seat belt  Memory Initial:  3 - Moderate Assistance  Memory Current:  3 - Moderate Assistance   Memory Description:  Therapy schedule, Bed/chair alarm, Seat belt, Verbal cueing  Executive Functioning / Organization Initial:     Executive Functioning / Organization Current:      Executive Functioning Desciption:  To be assessed  Swallowing  Swallowing Status:  NPO, trials of dysphagia I, NTL with likely upgrade this week.   Orders Placed This Encounter   Procedures   • Diet NPO     Standing Status:   Standing     Number of Occurrences:   1     Order Specific Question:   Restrict to:     Answer:   Strict [1]     Behavior Modification Plan  Allow for rest time, Keep instructions simple/concrete, Analyze tasks (break down into smaller steps) and Reinforce participation in desired tasks  Assistive Technology  Low/Impaired vision equipment and Low tech: Calendar, planner, schedule, alarms/timers, pill organizer, post-it notes, lists  Family Training/Education:  Not yet completed  DC Recommendations:   HH vs. OP speech therapy  Strengths:  Able to follow instructions, Making steady progress towards goals, Pleasant and cooperative and  Willingly participates in therapeutic activities  Barriers:  Unable to follow instructions  # of short term goals set=4  # of short term goals met=0  Speech Therapy Problems     Problem: Memory STGs     Dates: Start: 03/13/20       Description:     Goal: STG-Within one week, patient will     Dates: Start: 03/13/20       Description: 1) Individualized goal:  Recall daily events with 80% accuracy with use of RWAVS and memory book.    2) Interventions:  SLP Cognitive Skill Development and SLP Group Treatment       Note:     Goal Note filed on 03/16/20 1047 by Jazmine Alcocer MS,CCC-SLP    To be addressed.                        Problem: Problem Solving STGs     Dates: Start: 03/13/20       Description:     Goal: STG-Within one week, patient will     Dates: Start: 03/13/20       Description: 1) Individualized goal:  Complete alternating attn tasks with 80% accuracy given min verbal cues.  2) Interventions:  SLP Cognitive Skill Development and SLP Group Treatment       Note:     Goal Note filed on 03/16/20 1047 by Jazmine Alcocer MS,CCC-SLP    78% with mod A                  Goal: STG-Within one week, patient will     Dates: Start: 03/13/20       Description: 1) Individualized goal:  Recall/utilize swallow strategies, safety precautions 80% Sarah.   2) Interventions:  SLP Cognitive Skill Development and SLP Group Treatment       Note:     Goal Note filed on 03/16/20 1047 by Jazmine Alcocer MS,CCC-SLP    Mod A needed for recall of strategies,safety sequencing.                        Problem: Speech/Swallowing LTGs     Dates: Start: 03/13/20       Description:     Goal: LTG-By discharge, patient will safely swallow     Dates: Start: 03/13/20       Description: 1) Individualized goal:  Regular textures and thin liquids without aspiration.  2) Interventions:  SLP Swallowing Dysfunction Treatment, SLP Oral Pharyngeal Evaluation and SLP Video Swallow Evaluation             Goal: LTG-By discharge, patient will solve complex  problem     Dates: Start: 03/13/20       Description: 1) Individualized goal:  [place] for safe discharge home.  2) Interventions:  SLP Aphasia Evaluation, SLP Cognitive Skill Development and SLP Group Treatment                   Problem: Swallowing STGs     Dates: Start: 03/16/20       Description:     Goal: STG-Within one week, patient will     Dates: Start: 03/16/20       Description: 1) Individualized goal:  Swallow level 4 puree dysphagia 1 and level 2 mildly thick liquids with no overt s/sx of aspiration for 2/2 meals.  2) Interventions:  SLP Swallowing Dysfunction Treatment and SLP Group Treatment       Note:     Goal Note filed on 03/20/20 1716 by Mouna Lee R.N.    Pt is on RA this shift and has been able to cough up secretions and uses yanker to clear throat.                                Section completed by:  Jesica Ralph MS,CCC-SLP

## 2020-03-23 NOTE — REHAB-DIETARY IDT TEAM NOTE
"Dietary   Nutrition  Dietary Problems     Problem: Other Problem (see comments)     Description: Diagnosis: Difficulty swallowing r/t lethargy and unsafe for PO status, secondary dysphagia s/p spinal surgery as evidenced by SLP notes/ evaluation, NGT for alternate route of nutrition/ hydration/ medications.          Goal: Other Goal     Description: Monitor/Evaluation: Monitor PO vs TF intake, diet upgrades, weight, labs, medication adjustments, skin integrity, GI function, vitals, I/Os, and overall hydration status.  Adjust nutritional POC pending clinical outcomes.    RD following bi-weekly until EN at goal and tolerated.   Goal: 1. Tolerance to EN adjustment  2. Maintain adequate oral nutrient/fluid intake to promote nutrition optimization/healing. 3.Transition to PO pending clinical outcomes                         Patient now on isolation for C.diff as well as strep.  He has been having severe diarrhea which has improved with treatment for C.diff (note increased abx need) in addition to Questran.  Appetite is low.  He is anxious to have NGT out.  He is getting diet trials only with SLP.  No significant nausea, vomiting.  Obviously no constipation.  UOP is good (note lasix).  Noted to be [place].    Pt does exhibit some lower extremity edema.    No hunger pains. Does feel thirsty which is to be expected.  Does not feel overly full with TF.  Weight has been stable since admission despite set back with swallowing and infection.    Usual intake PTA was 3 meals per day (\"good eater\" per report).   Thin at baseline.   Low energy.  Sleep fair when not up having diarrhea.      Diet: diet trials only with SLP- eating <50% of trials  TF: Diabetisource 400mL QID + 200mL free water QID between feedings providing 1920 kcals, 96 g/protein, 2105mL free water per day     Weight: via bed scale 74.5 kg- stable with admission weight  Skin: no changes from previous note     Pertinent Labs: accuchecks >200mg/dL; fasting / " 123  Hgb 7.8/Hct 24.9, Serum Glucose 229, BUN 31, Creat 2.19, Ca 8.2, BTNP 9051  Pertinent Medications: Questran, B-132, Iron, lasix 20 mg daily, Gabapentin, Heparin, 10 units Lantus AM, SSI, Duoneb, Culturelle, Lidocaine patch, Zyvox, Metoprolol, Actos, Zosyn IV, Lyrica, Zocor, MVI+ minerals, Vancomycin oral      GI: + diarrhea/ + BM today   : yellow UOP   Vitals: RA today; WNL  I/Os: +800mL x 24 hours      Does not meet criteria for malnutrition at this time.      Plan: Continue current nutritional POC.  Hold feeding if PO >50% with SLP. Diet upgrades per SLP.  Follow hydration status for now.        Section completed by:  Kelly Peacock R.D.

## 2020-03-23 NOTE — THERAPY
Occupational Therapy  Daily Treatment     Patient Name: Vince Almanzar  Age:  79 y.o., Sex:  male  Medical Record #: [id number]  Today's Date: 3/23/2020     Precautions  Precautions: (P) Fall Risk, Cervical Collar  , Nasogastric Tube  Comments: (P) PICC line LUE, Hard of hearing, Droplet/contact Isolation precautions, c-collar, risk for  pressure injuries     Safety   ADL Safety : Requires Supervision for Safety, Requires Physical Assist for Safety  Bathroom Safety: Requires Supervision for Safety, Requires Physical Assist for Safety  Comments: See FIMs for ADL performance details with UB/LB dressing and grooming    Subjective    Patient sitting in w/c with nurse giving meds.  Agreeable to work on grooming and dressing.     Objective       03/23/20 0831   Precautions   Precautions Fall Risk;Cervical Collar  ;Nasogastric Tube   Comments PICC line LUE, Hard of hearing, Droplet/contact Isolation precautions, c-collar, risk for  pressure injuries    Interdisciplinary Plan of Care Collaboration   IDT Collaboration with  Nursing   Patient Position at End of Therapy Seated;Self Releasing Lap Belt Applied;Chair Alarm On;Call Light within Reach;Tray Table within Reach   Collaboration Comments re: gave meds    OT Total Time Spent   OT Individual Total Time Spent (Mins) 30   OT Charge Group   OT Self Care / ADL 2       FIM Grooming Score:  5 - Standby Prompting/Supervision or Set-up  Grooming Description:  Supervision for safety(supervised to brush teeth and shave seated at sink)    FIM Upper Body Dressing:  3 - Moderate Assistance  Upper Body Dressing Description:  Supervision for safety, Verbal cueing, Set-up of equipment, Initial preparation for task(able to thread arms in shirt, assist to pull over head and pull down over trunk)    FIM Lower Body Dressing Score:  1 - Total Assistance  Lower Body Dressing Description:  Increased time, Supervision for safety, Verbal cueing, Set-up of equipment, Initial preparation for  task(assist w/ 3/3 tasks to don sweatpants, though pt did parts of all three steps)      Assessment    Patient demonstrated improvement in UB dressing, but required increased assist for LB dressing this session.  Did participate in all three tasks to don pants, but required assist with all three tasks.    Plan    incorporate spinal precautions and energy conservation techniques into ADLs with AE as needed, general strengthening, endurance building, balance (sit and stand),

## 2020-03-23 NOTE — FLOWSHEET NOTE
03/23/20 0751   Inhalation Therapy Treatment   $ SVN Performed Yes   Given By: Mouthpiece   Date SVN Next Change Due 03/26/20  (mucomyst)   Incentive Spirometry Treatment   Incentive Spirometer Volume 600 mL   IPV/Metaneb   $ IPV/Metaneb Performed Yes   Given By: Mouthpiece

## 2020-03-23 NOTE — THERAPY
Physical Therapy   Daily Treatment     Patient Name: Vince Almanzar  Age:  79 y.o., Sex:  male  Medical Record #: [id number]  Today's Date: 3/23/2020     Precautions  Precautions: (P) Fall Risk, Cervical Collar  , Nasogastric Tube  Comments: (P) PICC line LUE, Hard of hearing, Droplet/contact Isolation precautions, c-collar, risk for  pressure injuries     Subjective    Pt was sitting in w/c upon arrival and agreeable to tx  Feel better but still feeling weak; unsure if he will be ready by d/c date of 3/31     Objective       03/23/20 1001   Precautions   Precautions Fall Risk;Cervical Collar  ;Nasogastric Tube   Comments PICC line LUE, Hard of hearing, Droplet/contact Isolation precautions, c-collar, risk for  pressure injuries    Interdisciplinary Plan of Care Collaboration   Patient Position at End of Therapy Seated;Call Light within Reach;Tray Table within Reach;Phone within Reach   PT Total Time Spent   PT Individual Total Time Spent (Mins) 60   PT Charge Group   PT Gait Training 3   PT Therapeutic Exercise 1       FIM Walking Score:  2 - Max Assistance  Walking Description:  (all x1 in feet (55, 65, 80, 95, 112, 65, 55, 70, 70) with SBA and 4ww)    STS from w/c to 4ww  2 x 5 with min-modA    Arm bike 4 x (2min fwd, 2min retro) level 1 in between half of walking bouts    Assessment    Pt with reduction in R hamstring spasticity with increased walking. Good safety awareness and improved endurance today    Plan    Endurance in 4ww/ self pacing/ safety in sitting in 4ww, standing balance, endurance, issue standing therex HEP, monitor spasticity, STS, bed mobility

## 2020-03-23 NOTE — THERAPY
Occupational Therapy  Daily Treatment     Patient Name: Vince Almanzar  Age:  79 y.o., Sex:  male  Medical Record #: [id number]  Today's Date: 3/23/2020     Precautions  Precautions: Fall Risk, Cervical Collar    Comments: No BP on LUE, [employer] [place], Droplet/isolation precautions, c-collar, risk for pressure injuries     Safety   ADL Safety : Requires Supervision for Safety, Requires Physical Assist for Safety  Bathroom Safety: Requires Supervision for Safety, Requires Physical Assist for Safety  Comments: See FIMs for ADL performance details with UB/LB dressing and grooming    Subjective    Patient agreeable to participate in OT.      Objective     03/23/20 1331   Precautions   Precautions Fall Risk;Cervical Collar     Comments No BP on LUE, [employer] [place], Droplet/isolation precautions, c-collar, risk for pressure injuries    Interdisciplinary Plan of Care Collaboration   Patient Position at End of Therapy Seated;Self Releasing Lap Belt Applied   OT Total Time Spent   OT Individual Total Time Spent (Mins) 30   OT Charge Group   OT Self Care / ADL 2     FIM Grooming Score:  5 - Standby Prompting/Supervision or Set-up  Grooming Description:  Set-up of equipment(Set-up for washing hands while seated in w/c )    FIM Toileting: Incontinent x 2 (loose stool), Max assist to perform toileting tasks     FIM Toilet Transfer Score:  4 - Minimal Assistance  Toilet Transfer Description:  Increased time, Grab bar, Supervision for safety, Set-up of equipment, Verbal cueing(Patient performed wc<> toilet txfr with minimal assistance (for lift) with use of grab bar for balance and safety. )    Assessment    Therapy session limited due to incontinence of bowel x 2 (loose stool) however patient demonstrates significant functional improvement with functional transfers and activity tolerance.     Plan    incorporate spinal precautions and energy conservation techniques into ADLs with AE as needed, general strengthening, endurance building, balance  (sit and stand)

## 2020-03-23 NOTE — REHAB-NURSING IDT TEAM NOTE
Nursing   Nursing  Diet/Nutrition:  Tube Feed  Medication Administration:  Via Gastric Tube  % consumed at meals in last 24 hours:  Tolerating goal of Diabetisource AC.  Meal/Snack Consumption for the past 24 hrs:   Oral Nutrition   03/22/20 1200 NPO at this Time   03/22/20 0900 NPO at this Time     Snack schedule:  Other:N/A  Supplement:  Other: N/A  Appetite:  Good  Fluid Intake/Output in past 24 hours: In: 660 [Other:460]  Out: 950   Admit Weight:  Weight: 75.8 kg (167 lb)  Weight Last 7 Days   [74.2 kg (163 lb 9.3 oz)-78.5 kg (173 lb 1 oz)] 74.5 kg (164 lb 3.9 oz) (03/22 1600)    Pain Issues:    Location:  --  --         Severity:  Denies   Scheduled pain medications:  gabapentin (NEURONTIN) Lyrica, Lidoderm Patch.   PRN pain medications used in last 24 hours:  None   Non Pharmacologic Interventions:  rest  Effectiveness of pain management plan:  good=patient states acceptable comfort after interventions    Bowel:    Bowel Assist Initial Score:  4 - Minimal Assistance  Bowel Assist Current Score:  1 - Total Assistance  Bowl Accidents in last 7 days:  2  Last bowel movement: 03/22/20  Stool Description: Incontinence     Usual bowel pattern:  every other day  Scheduled bowel medications:  None  PRN bowel medications used in last 24 hours:  None  Nursing Interventions:  Increased time, Brief, Supervision, Verbal cueing, Set-up, Requires 2 people to assist, Emptying of device  Effectiveness of bowel program:   fair=sometimes needs prn bowel meds for constipation  Bladder:    Bladder Assist Initial Score:  5 - Standby Prompting/Supervision or Set-up  Bladder Assist Current Score:  1 - Total Assistance  Bladder Accidents in last 7 days:     PVR range for past 24-48 hours: --  Intermittent Catheterization:  N/A  Medications affecting bladder:  None    Time void schedule/voiding pattern:  Voiding every 2-4 hours  Interventions:  Increased time, Brief, Supervision, Verbal cueing, Time void patient initiated, Emptying of  device  Effectiveness of bladder training:  Poor= > 1 incontinent emptying of bladder    Diabetes:  Blood Sugar Frequency:  Before meals and at bedtime    Range of BS for last 48 hours:     Recent Labs     03/21/20  0810 03/21/20  1214 03/21/20  1653 03/21/20  2133 03/22/20  0803 03/22/20  1118 03/22/20  1723 03/22/20  2212   POCGLUCOSE 155* 238* 226* 223* 181* 201* 227* 219*     Scheduled diabetic medications:  Other Actos  Sliding scale usage in past 24 hours:  Yes  Total Short acting insulin in the past 24 hours:  Humalog 14 units.  Total Long acting insulin in the past 24 hours:  None  Pt to start 3/23/20.    Wound:         Patient Lines/Drains/Airways Status    Active Current Wounds     Name: Placement date: Placement time: Site: Days:    Wound 03/12/20 Incision Neck Posterior  03/12/20   1545   Neck  10    Wound Other (comment) Heel Right scar from resolved pressure injury  --   --   Heel      Wound Pressure Injury Heel Left scar from resolved pressure injury  --   --   Heel      Wound Partial Thickness Wound Buttocks;Sacrum Bilateral moisture related callusing with small area of denuded tissue  --   --   Buttocks;Sacrum                     Interventions:  Incision to Neck EVANGELISTA, Bilateral Heels with Mepilex Heel drsgs in place.  Buttocks/Sacrum with barrier paste.  Effectiveness of intervention:  wound is improving     Sleep/wake cycle:   Average hours slept:  Sleeps 3-4 hours without waking  Sleep medication usage:  None    Patient/Family Training/Education:  Aspiration Precautions, Bladder Management/Training, Bowel Management/Training, Diabetes Management, Diet/Nutrition, Fall Prevention, General Self Care, Medication Management, Pain Management, Safe Transfers, Safety, Skin Care and Wound Care  Discharge Supply Recommendations:  Glucometer and Strips, Wound Care Supplies, Feeding Tube Supplies and Other: Adaptive equipment.  Strengths: Alert and oriented, Able to follow instructions, Pleasant and  cooperative and Effective communication skills   Barriers:   Bladder incontinence, Bowel incontinence, Generalized weakness and Limited mobility       Nursing Problems     Problem: Bowel/Gastric:     Description:     Goal: Normal bowel function is maintained or improved     Description:           Goal: Will not experience complications related to bowel motility     Description:                 Problem: Communication     Description:     Goal: The ability to communicate needs accurately and effectively will improve     Description:                 Problem: Discharge Barriers/Planning     Description:     Goal: Patient's continuum of care needs will be met     Description:                 Problem: Infection     Description:     Goal: Will remain free from infection     Description:                 Problem: Knowledge Deficit     Description:     Goal: Knowledge of disease process/condition, treatment plan, diagnostic tests, and medications will improve     Description:           Goal: Knowledge of the prescribed therapeutic regimen will improve     Description:                 Problem: Pain Management     Description:     Goal: Pain level will decrease to patient's comfort goal     Description:                 Problem: Respiratory:     Description:     Goal: Respiratory status will improve     Description:                 Problem: Safety     Description:     Goal: Will remain free from injury     Description:           Goal: Will remain free from falls     Description:                 Problem: Skin Integrity     Description:     Goal: Risk for impaired skin integrity will decrease     Description:                 Problem: Urinary Elimination:     Description:     Goal: Ability to reestablish a normal urinary elimination pattern will improve     Description:                 Problem: Venous Thromboembolism (VTW)/Deep Vein Thrombosis (DVT) Prevention:     Description:     Goal: Patient will participate in Venous  Thrombosis (VTE)/Deep Vein Thrombosis (DVT)Prevention Measures     Description:     Flowsheet:     Taken at 03/14/20 8831    Pharmacologic Prophylaxis Used Unfractionated Heparin by  Princess Kiara Pelaez R.N.                             Long Term Goals:   At discharge patient will be able to function safely at home and in the community with support.    Section completed by:  Niya Kruse L.P.N.2

## 2020-03-23 NOTE — THERAPY
"Recreational Therapy  Daily Treatment     Patient Name: Vince Almanzar  AGE:  79 y.o., SEX:  male  Medical Record #: [id number]  Today's Date: 3/23/2020       Subjective    \"Can we play the card game.\"     Objective       03/23/20 1501   Functional Ability Status - Cognitive   Attention Span Remains on Task   Comprehension Follows Three Step Commands   Judgment Able to Make Independent Decisions   Functional Ability Status - Emotional    Affect Appropriate   Mood Appropriate   Behavior Appropriate   Skilled Intervention    Skilled Intervention Cognitive Leisure;Relaxation / Coping Skills   Skilled Intervention Comments Card game   Interdisciplinary Plan of Care Collaboration   IDT Collaboration with  Nursing;Certified Nursing Assistant   Patient Position at End of Therapy Seated;Call Light within Reach;Tray Table within Reach   Collaboration Comments Received care from nursing. Care transferred to [facility].    Treatment Time   Total Time Missed 30   Procedural Tracking   Procedural Tracking Cognitive Skills Training;Leisure Skills Development       Assessment    Pt was given the card game. Pt stated that he said he did not feel he could deal the cards. He was able to be successful and delt with his left hand.     Plan    Continue cognitive leisure.   "

## 2020-03-23 NOTE — REHAB-RESPIRATORY IDT TEAM NOTE
Respiratory Therapy   Respiratory Therapy    Pt is continuing to receive therapy with metaneb and Albuterol, followed by SVN with Mucomyst.  He is making very minimal progress with his IS.  His cough is congested and productive.  He his no longer requiring oxygen.    Section completed by:  Romelia Simpson, RRT

## 2020-03-23 NOTE — DISCHARGE PLANNING
CM met with patient.  He stated he has much support from [facility] and his sister.  He would like HH upon DC and would like [facility].  He stated he has no preference for DME Co.  Patient stated he has his own glucometer and a 4WW that PT said he could use.      CM spoke with patients sister Ángela and she stated patient gets Meals on Wheels.  CM will continue to assist with DC needs.

## 2020-03-23 NOTE — REHAB-DIETARY IDT TEAM NOTE
"Dietary   Nutrition  Dietary Problems     Problem: Other Problem (see comments)     Description: Diagnosis: Difficulty swallowing r/t lethargy and unsafe for PO status, secondary dysphagia s/p spinal surgery as evidenced by SLP notes/ evaluation, NGT for alternate route of nutrition/ hydration/ medications.          Goal: Other Goal     Description: Monitor/Evaluation: Monitor PO vs TF intake, diet upgrades, weight, labs, medication adjustments, skin integrity, GI function, vitals, I/Os, and overall hydration status.  Adjust nutritional POC pending clinical outcomes.    RD following bi-weekly until EN at goal and tolerated.   Goal: 1. Tolerance to EN adjustment  2. Maintain adequate oral nutrient/fluid intake to promote nutrition optimization/healing. 3.Transition to PO pending clinical outcomes                           Patient now on isolation for C.diff as well as strep.  He has been having severe diarrhea which has improved with treatment for C.diff (note increased abx need) in addition to Questran.  Appetite is low.  He is anxious to have NGT out.  He is getting diet trials only with SLP.  No significant nausea, vomiting.  Obviously no constipation.  UOP is good (note lasix).  Noted to be [place].    Pt does exhibit some lower extremity edema.    No hunger pains. Does feel thirsty which is to be expected.  Does not feel overly full with TF.  Weight has been stable since admission despite set back with swallowing and infection.    Usual intake PTA was 3 meals per day (\"good eater\" per report).   Thin at baseline.   Low energy.  Sleep fair when not up having diarrhea.     Diet: diet trials only with SLP- eating <50% of trials  TF: Diabetisource 400mL QID + 200mL free water QID between feedings providing 1920 kcals, 96 g/protein, 2105mL free water per day    Weight: via bed scale 74.5 kg- stable with admission weight  Skin: no changes from previous note    Pertinent Labs: accuchecks >200mg/dL; fasting / 123  Hgb " 7.8/Hct 24.9, Serum Glucose 229, BUN 31, Creat 2.19, Ca 8.2, BTNP 9051  Pertinent Medications: Questran, B-132, Iron, lasix 20 mg daily, Gabapentin, Heparin, 10 units Lantus AM, SSI, Duoneb, Culturelle, Lidocaine patch, Zyvox, Metoprolol, Actos, Zosyn IV, Lyrica, Zocor, MVI+ minerals, Vancomycin oral     GI: + diarrhea/ + BM today   : yellow UOP   Vitals: RA today; WNL  I/Os: +800mL x 24 hours     Does not meet criteria for malnutrition at this time.     Plan: Continue current nutritional POC.  Hold feeding if PO >50% with SLP. Diet upgrades per SLP.  Follow hydration status for now.       Section completed by:  Kelly Peacock R.D.

## 2020-03-23 NOTE — PROGRESS NOTES
Received bedside shift report from Mouna HILARIO RN regarding patient and assumed care. Patient awake, calm and stable, currently positioned in bed for comfort and safety; call light within reach. Denies pain or discomfort at this time. Will continue to monitor.

## 2020-03-23 NOTE — REHAB-PT IDT TEAM NOTE
Physical Therapy   Mobility  Bed mobility:   4  Bed /Chair/Wheelchair Transfer Initial:  4 - Minimal Assistance  Bed /Chair/Wheelchair Transfer Current:  2 (transfer: SPT modA; bed mob: maxA x 1 HOB flat)  Walk Initial:  2 - Max Assistance  Walk Current:  4 - Minimal Assistance   Walk Description:  (225ft x 1 4ww CGA; 70ft x 1 4ww CGA) (3/17 is the last day the pt was able to AMB)  Wheelchair Initial:  2 - Max Assistance  Wheelchair Current:  5 - Standby Prompting/Supervision or Set-up   Wheelchair Description:  Safety concerns, Supervision for safety, Verbal cueing, Extra time, Adaptive equipment(150 ft, BLEs and BUEs)   Stairs Initial:  0 - Not tested,unsafe activity  Stairs Current: 0 - Not tested,unsafe activity   Stairs Description:    Patient/Family Training/Education:  Activity tolerance, self pacing, hygiene  DME/DC Recommendations:  TBD(FWW vs w/c?, pt owns 4ww), potential need additional caregiving services  Strengths:  Willingly participates in therapeutic activities, supportive family, improving alertness  Barriers:   Decreased endurance, Poor activity tolerance and Poor balance, spasticity, impaired STS mechanics  # of short term goals set= 4  # of short term goals met=0  Physical Therapy Problems     Problem: Balance     Dates: Start: 03/13/20       Description:     Goal: STG-Within one week, patient will     Dates: Start: 03/13/20       Description: 1) Individualized goal:  Perform 5x STS with 4ww from standard height chair in less than 60seconds  2) Interventions:  PT Group Therapy, PT Gait Training, PT Therapeutic Exercises, PT Neuro Re-Ed/Balance, PT Therapeutic Activity, PT Manual Therapy and PT Evaluation                   Problem: Mobility     Dates: Start: 03/13/20       Description:     Goal: STG-Within one week, patient will ambulate community distances     Dates: Start: 03/13/20       Description: 1) Individualized goal:  150ft x  1 4ww SBA  2) Interventions:  PT Group Therapy, PT Gait  Training, PT Therapeutic Exercises, PT Neuro Re-Ed/Balance, PT Therapeutic Activity, PT Manual Therapy and PT Evaluation                 Problem: Mobility Transfers     Dates: Start: 03/13/20       Description:     Goal: STG-Within one week, patient will perform bed mobility     Dates: Start: 03/13/20       Description: 1) Individualized goal: SPV with HOB flat and no VCs for precautions  2) Interventions:  PT Group Therapy, PT Gait Training, PT Therapeutic Exercises, PT Neuro Re-Ed/Balance, PT Therapeutic Activity, PT Manual Therapy and PT Evaluation             Goal: STG-Within one week, patient will transfer bed to chair     Dates: Start: 03/13/20       Description: 1) Individualized goal: 4ww SPV SPT  2) Interventions:  PT Group Therapy, PT Gait Training, PT Therapeutic Exercises, PT Neuro Re-Ed/Balance, PT Therapeutic Activity, PT Manual Therapy and PT Evaluation                 Problem: PT-Long Term Goals     Dates: Start: 03/13/20       Description:     Goal: LTG-By discharge, patient will ambulate     Dates: Start: 03/13/20       Description: 1) Individualized goal:  50ft x 3 4ww mod I, 400ft x 1 4ww SPV  2) Interventions:  PT Group Therapy, PT Gait Training, PT Therapeutic Exercises, PT Neuro Re-Ed/Balance, PT Therapeutic Activity, PT Manual Therapy and PT Evaluation             Goal: LTG-By discharge, patient will transfer one surface to another     Dates: Start: 03/13/20       Description: 1) Individualized goal: mod I 4ww  2) Interventions:  PT Group Therapy, PT Gait Training, PT Therapeutic Exercises, PT Neuro Re-Ed/Balance, PT Therapeutic Activity, PT Manual Therapy and PT Evaluation             Goal: LTG-By discharge, patient will perform home exercise program     Dates: Start: 03/13/20       Description: 1) Individualized goal:  Mod I for B LE strengthening/ standing balance   2) Interventions:  PT Group Therapy, PT Gait Training, PT Therapeutic Exercises, PT Neuro Re-Ed/Balance, PT Therapeutic  Activity, PT Manual Therapy and PT Evaluation             Goal: LTG-By discharge, patient will transfer in/out of a car     Dates: Start: 03/13/20       Description: 1) Individualized goal: 4ww SPV   2) Interventions:  PT Group Therapy, PT Gait Training, PT Therapeutic Exercises, PT Neuro Re-Ed/Balance, PT Therapeutic Activity, PT Manual Therapy and PT Evaluation                           Section completed by:  Louise Gayle, PT, DPT

## 2020-03-23 NOTE — CARE PLAN
Problem: Infection  Goal: Will remain free from infection  Outcome: PROGRESSING AS EXPECTED  Note: Pt is able to verbalize s/s of infection: redness of area, fever, pain.       Problem: Urinary Elimination:  Goal: Ability to reestablish a normal urinary elimination pattern will improve  Outcome: PROGRESSING AS EXPECTED  Note: Patient voiding adequate amounts of clear, yellow urine. Denies dysuria and flank pain: afebrile.  Pt continent this shift with urinal and toilet.

## 2020-03-23 NOTE — CARE PLAN
Problem: Balance  Goal: STG-Within one week, patient will  Description: 1) Individualized goal:  Perform 5x STS with 4ww from standard height chair in less than 60seconds  2) Interventions:  PT Group Therapy, PT Gait Training, PT Therapeutic Exercises, PT Neuro Re-Ed/Balance, PT Therapeutic Activity, PT Manual Therapy and PT Evaluation     Outcome: NOT MET  Note: Pt with reduction in strength requiring maxA x 1 for STS     Problem: Mobility  Goal: STG-Within one week, patient will ambulate community distances  Description: 1) Individualized goal:  150ft x  1 4ww SBA  2) Interventions:  PT Group Therapy, PT Gait Training, PT Therapeutic Exercises, PT Neuro Re-Ed/Balance, PT Therapeutic Activity, PT Manual Therapy and PT Evaluation   Outcome: NOT MET  Note: Pt limited by extreme fatigue/ spasticity this week     Problem: Mobility Transfers  Goal: STG-Within one week, patient will perform bed mobility  Description: 1) Individualized goal: SPV with HOB flat and no VCs for precautions  2) Interventions:  PT Group Therapy, PT Gait Training, PT Therapeutic Exercises, PT Neuro Re-Ed/Balance, PT Therapeutic Activity, PT Manual Therapy and PT Evaluation     Outcome: NOT MET  Note: Pt requring additional assistance d/t impaired strength/ rigidity   Goal: STG-Within one week, patient will transfer bed to chair  Description: 1) Individualized goal: 4ww SPV SPT  2) Interventions:  PT Group Therapy, PT Gait Training, PT Therapeutic Exercises, PT Neuro Re-Ed/Balance, PT Therapeutic Activity, PT Manual Therapy and PT Evaluation   Outcome: NOT MET  Note: Pt limited by strength this weekend

## 2020-03-23 NOTE — FLOWSHEET NOTE
03/23/20 0755   Events/Summary/Plan   Events/Summary/Plan Metaneb, IS, SVN   Vital Signs   Respiration 20   Respiratory Assessment   Level of Consciousness Alert   Breath Sounds   RUL Breath Sounds Coarse Crackles   RML Breath Sounds Diminished   CIRA Breath Sounds Coarse Crackles   Secretions   Cough Congested;Productive   How Sputum Obtained Expectorated   Sputum Amount Moderate   Sputum Color Tan   Sputum Consistency Thick   Oxygen   O2 Delivery Device None - Room Air

## 2020-03-23 NOTE — REHAB-CM IDT TEAM NOTE
Case Management    DC Planning  DC destination/dispostion: Home alone to [facility] apartment in [place], lives in 2nd floor apartment w/ elevator access. Has 4WW, tub transfer bench, rasied toilet.     Previous [facility]. Sister will provide transportation.    Referrals: HH referral.    DC Needs: DME for patient safety & mobility. HH referral. MD f/u appts.    Barriers to discharge: Functional mobility deficits. Lives alone, limited support system.     Strengths: Motivation. Supportive sister. Home accessibility.     Section completed by:  Natty Jackson

## 2020-03-23 NOTE — DIETARY
Nutrition Care/ Consult For Tube Feeding/ Free Water     Assessment:    Admitting Diagnosis: Incomplete Quadriplegia; s/p   C3-C6 laminectomy and posterior decompression  Pertinent PMH: DM, HTN., Hyperlipidemia, Neuropathic Pain, Stage 3 CDK, Previous TBI and centra cord syndrome     Additional Information: No physical assessment done as this RD was notified at home of placement of NGT and confirmation. Will assess physically on follow up.   Pt notably high functioning for his age prior to admission to hospital. Lives in [place] at baseline.   3/14: pt with increased cough, fever- diet downgraded; found to have bilateral PNA; stared on Abx and IVFs  3/15: IVF decreased to 75mL/hour  3/16: diet upgraded  3/18: increased lethargy and fatigue   3/19: pt not safe for PO and made NPO per  Speech due to finding pills and food in his mouth. IVF re-started. Re-started on abx  3/20: volume overload with lasix initiated ; Cortrak placed - tube in fundus/ antrum  Okay to initiate bolus feeds.     Diet: NPO    Labs: Hgb 7.9/Hct 25.6, Serum Glucose 136, BUN 31, Creat 2.08, GFR 31. Ca 8.3, BTNP 8993, accuchecks elevated   Medications: B-12, Iron, Lasix, Gabapentin, Heparin, SSI, Duoneb, Lidocaine, Metoprolol, Actos, Zosyn IV, Zyvox, Simvastation, MVI+ Minerals  PRN Medications: noted  IVF: discontinued s/t volume overload     Height: 177.8cm   Weight: 75.5kg   BMI: 23.88- WNL     Skin: +midline; incision to neck, right heel scar from resolved pressure injury, left heel scar from resolved pressure injury, partial thickness wound to buttocks  GI: watery BM today; note abx   : yellow UOP  Vitals: 2L NC, BP good  I/Os: n/a - documentation likely inaccurate     Nutrient Needs:  Kcal: 1261-1041 kcals/day  BEE= 1488   Protein: 91- 98 g/day   Fluid: 1800mL/day + output   CHOs (if DM/TF) : 180-200 g/day     Malnutrition risk: unable to determine at this time     Diagnosis: Difficulty swallowing r/t lethargy and unsafe for PO status,  secondary dysphagia s/p spinal surgery as evidenced by SLP notes/ evaluation, NGT for alternate route of nutrition/ hydration/ medications.     Intervention/ Recommendations/POC:  1. Bolus feedings Diabetisource AC goal 400mL QID (meal time + Hs)  2. 200mL free water flush QID  3. Diet upgrades per SLP.  4. Oral Care every shift.    TF+Free water= 1920 kcals, 96 grams/protein, 1306mL free water + flush= 2105mL free water ( will adjust free water pending hydration status once TF at goal and tolerated)          Monitor/Evaluation: Monitor PO vs TF intake, diet upgrades, weight, labs, medication adjustments, skin integrity, GI function, vitals, I/Os, and overall hydration status.  Adjust nutritional POC pending clinical outcomes.    RD following bi-weekly until EN at goal and tolerated.   Goal: 1. Tolerance to EN adjustment  2. Maintain adequate oral nutrient/fluid intake to promote nutrition optimization/healing. 3.Transition to PO pending clinical outcomes

## 2020-03-23 NOTE — CARE PLAN
Problem: Safety  Goal: Will remain free from injury  Outcome: PROGRESSING AS EXPECTED  Note: Reviewed fall and safety precautions with patient and reminded patient to call for assistance before getting out of bed. Patient verbalized understanding and has not attempted self transfer this shift.      Problem: Infection  Goal: Will remain free from infection  Outcome: PROGRESSING SLOWER THAN EXPECTED  Note: Patient continues on IV and PO antibiotic for PNA. No adverse reaction noted. He is more alert and awake. No SOB noted.

## 2020-03-23 NOTE — FLOWSHEET NOTE
03/23/20 1627   Inhalation Therapy Treatment   $ SVN Performed Yes   Given By: Mouthpiece   IPV/Metaneb   $ IPV/Metaneb Performed Yes   Given By: Mouthpiece

## 2020-03-23 NOTE — CARE PLAN
Problem: Communication  Goal: The ability to communicate needs accurately and effectively will improve  Note: Encouraged to make needs known to staff and to use call light for staff assist.      Problem: Safety  Goal: Will remain free from falls  Note: Call light kept within reach and encouraged to use for assistance and with toileting needs. Encouraged to call staff and to wait for staff before transfers. Bed alarm is in place.  Hourly rounding is in effect.

## 2020-03-23 NOTE — PROGRESS NOTES
"Rehab Progress Note     Encounter Date: 3/23/2020    CC: UE weakness    Interval Events (Subjective)    He reports significant improvement in energy level and cognition over the past couple of days.  He did have multiple episodes of loose, watery bowel incontinence on Saturday, significant improvement after initiation of oral vancomycin.    He was started on tube feeding on Friday, transition to bolus feeding over the weekend.    He reports his cough is significantly improved in the last 24 hours.  He reports his energy level is improving    Bowel: Large loose BM this morning  Bladder: Wallis removed, volitional voiding      Intake/Output Summary (Last 24 hours) at 3/23/2020 1005  Last data filed at 3/23/2020 0712  Gross per 24 hour   Intake 1050 ml   Output 650 ml   Net 400 ml       ROS:  Gen: No fatigue, confusion, significant weight loss  Eyes: no blurry vision, double vision or pain in eyes  ENT: no changes in hearing, runny nose, nose bleeds, sinus pain  CV: No CP, palpitations, symptoms of AUTONOMIC DYSREFLEXIA  Lungs: no shortness of breath, changes in secretions, changes in cough, pain with coughing  Abd: No abd pain, nausea, vomiting, pain with eating  : no blood in urine, suprapubic pain  Ext: No swelling in legs, asymmetric atrophy  Neuro: no changes in strength or sensation  Skin: no new ulcers/skin breakdown appreciated by patient  Mood: No changes in mood today, increase in depression or anxiety  Heme: no bruising, or bleeding  Rest of 14 point review of systems is negative    Objective:  Vitals: /60   Pulse 80   Temp 36.8 °C (98.2 °F) (Oral)   Resp 20   Ht 1.778 m (5' 10\")   Wt 74.5 kg (164 lb 3.9 oz)   SpO2 99%   Gen: NAD, Body mass index is 23.57 kg/m².  HEENT: NC/AT, PERRLA, moist mucous membranes, NG tube in place and left nares, tube feeding to 400 cc/h  Cardio: RRR, no mumurs  Pulm: CTAB, with normal respiratory effort, improved breath sounds on the right  Abd: Soft NTND, active " bowel sounds  Ext: Mild bilateral peripheral edema. No calf tenderness. No clubbing/cyanosis. +dorsal pedalis pulses bilaterally.        Recent Results (from the past 72 hour(s))   ACCU-CHEK GLUCOSE    Collection Time: 03/20/20 12:05 PM   Result Value Ref Range    Glucose - Accu-Ck 190 (H) 65 - 99 mg/dL   ACCU-CHEK GLUCOSE    Collection Time: 03/20/20  5:40 PM   Result Value Ref Range    Glucose - Accu-Ck 231 (H) 65 - 99 mg/dL   ACCU-CHEK GLUCOSE    Collection Time: 03/20/20 10:34 PM   Result Value Ref Range    Glucose - Accu-Ck 219 (H) 65 - 99 mg/dL   CBC WITH DIFFERENTIAL    Collection Time: 03/21/20  5:33 AM   Result Value Ref Range    WBC 4.2 (L) 4.8 - 10.8 K/uL    RBC 2.37 (L) 4.70 - 6.10 M/uL    Hemoglobin 7.0 (L) 14.0 - 18.0 g/dL    Hematocrit 22.7 (L) 42.0 - 52.0 %    MCV 95.8 81.4 - 97.8 fL    MCH 29.5 27.0 - 33.0 pg    MCHC 30.8 (L) 33.7 - 35.3 g/dL    RDW 56.2 (H) 35.9 - 50.0 fL    Platelet Count 146 (L) 164 - 446 K/uL    MPV 9.1 9.0 - 12.9 fL    Neutrophils-Polys 79.10 (H) 44.00 - 72.00 %    Lymphocytes 8.80 (L) 22.00 - 41.00 %    Monocytes 10.20 0.00 - 13.40 %    Eosinophils 0.70 0.00 - 6.90 %    Basophils 0.00 0.00 - 1.80 %    Immature Granulocytes 1.20 (H) 0.00 - 0.90 %    Nucleated RBC 0.00 /100 WBC    Neutrophils (Absolute) 3.33 1.82 - 7.42 K/uL    Lymphs (Absolute) 0.37 (L) 1.00 - 4.80 K/uL    Monos (Absolute) 0.43 0.00 - 0.85 K/uL    Eos (Absolute) 0.03 0.00 - 0.51 K/uL    Baso (Absolute) 0.00 0.00 - 0.12 K/uL    Immature Granulocytes (abs) 0.05 0.00 - 0.11 K/uL    NRBC (Absolute) 0.00 K/uL   ACCU-CHEK GLUCOSE    Collection Time: 03/21/20  8:10 AM   Result Value Ref Range    Glucose - Accu-Ck 155 (H) 65 - 99 mg/dL   OCCULT BLOOD X3 (STOOL)    Collection Time: 03/21/20 11:35 AM   Result Value Ref Range    Occult Blood 1 Negative Negative    Occult Blood 2 Negative Negative   Cdiff By PCR Rflx Toxin    Collection Time: 03/21/20 11:35 AM   Result Value Ref Range    C Diff by PCR See Toxin Negative     [id number] Presumptive Positive Negative   C Diff Toxin    Collection Time: 03/21/20 11:35 AM   Result Value Ref Range    C.Diff Toxin A&B Negative    ACCU-CHEK GLUCOSE    Collection Time: 03/21/20 12:14 PM   Result Value Ref Range    Glucose - Accu-Ck 238 (H) 65 - 99 mg/dL   ACCU-CHEK GLUCOSE    Collection Time: 03/21/20  4:53 PM   Result Value Ref Range    Glucose - Accu-Ck 226 (H) 65 - 99 mg/dL   ACCU-CHEK GLUCOSE    Collection Time: 03/21/20  9:33 PM   Result Value Ref Range    Glucose - Accu-Ck 223 (H) 65 - 99 mg/dL   CBC WITHOUT DIFFERENTIAL    Collection Time: 03/22/20  5:52 AM   Result Value Ref Range    WBC 6.3 4.8 - 10.8 K/uL    RBC 2.65 (L) 4.70 - 6.10 M/uL    Hemoglobin 7.8 (L) 14.0 - 18.0 g/dL    Hematocrit 24.9 (L) 42.0 - 52.0 %    MCV 94.0 81.4 - 97.8 fL    MCH 29.4 27.0 - 33.0 pg    MCHC 31.3 (L) 33.7 - 35.3 g/dL    RDW 56.0 (H) 35.9 - 50.0 fL    Platelet Count 157 (L) 164 - 446 K/uL    MPV 9.2 9.0 - 12.9 fL   Basic Metabolic Panel    Collection Time: 03/22/20  5:52 AM   Result Value Ref Range    Sodium 135 135 - 145 mmol/L    Potassium 4.3 3.6 - 5.5 mmol/L    Chloride 103 96 - 112 mmol/L    Co2 21 20 - 33 mmol/L    Glucose 229 (H) 65 - 99 mg/dL    Bun 31 (H) 8 - 22 mg/dL    Creatinine 2.19 (H) 0.50 - 1.40 mg/dL    Calcium 8.2 (L) 8.5 - 10.5 mg/dL    Anion Gap 11.0 7.0 - 16.0   proBrain Natriuretic Peptide, NT    Collection Time: 03/22/20  5:52 AM   Result Value Ref Range    NT-proBNP 9051 (H) 0 - 125 pg/mL   ESTIMATED GFR    Collection Time: 03/22/20  5:52 AM   Result Value Ref Range    GFR If African American 35 (A) >60 mL/min/1.73 m 2    GFR If Non  29 (A) >60 mL/min/1.73 m 2   ACCU-CHEK GLUCOSE    Collection Time: 03/22/20  8:03 AM   Result Value Ref Range    Glucose - Accu-Ck 181 (H) 65 - 99 mg/dL   ACCU-CHEK GLUCOSE    Collection Time: 03/22/20 11:18 AM   Result Value Ref Range    Glucose - Accu-Ck 201 (H) 65 - 99 mg/dL   ACCU-CHEK GLUCOSE    Collection Time: 03/22/20  5:23 PM    Result Value Ref Range    Glucose - Accu-Ck 227 (H) 65 - 99 mg/dL   ACCU-CHEK GLUCOSE    Collection Time: 03/22/20 10:12 PM   Result Value Ref Range    Glucose - Accu-Ck 219 (H) 65 - 99 mg/dL   ACCU-CHEK GLUCOSE    Collection Time: 03/23/20  7:37 AM   Result Value Ref Range    Glucose - Accu-Ck 163 (H) 65 - 99 mg/dL       Current Facility-Administered Medications   Medication Frequency   • cholestyramine (QUESTRAN,PREVALITE) 4 GM powder 4 g BID   • loperamide (IMODIUM) capsule 2 mg 4X/DAY PRN   • insulin glargine (LANTUS) injection 10 Units QAM INSULIN   • vancomycin 50 mg/mL oral soln 125 mg Q6HRS   • furosemide (LASIX) tablet 20 mg Q DAY   • lactobacillus rhamnosus (CULTURELLE) capsule 1 Cap QDAY with Breakfast   • linezolid (ZYVOX) tablet 600 mg Q12HRS   • Pharmacy Consult Request PHARMACY TO DOSE   • Pharmacy Consult: Enteral tube insertion - review meds/change route/product selection PHARMACY TO DOSE   • acetylcysteine (MUCOMYST) 20 % solution 3 mL TID (RT)   • pregabalin (LYRICA) capsule 25 mg BID   • gabapentin (NEURONTIN) capsule 300 mg Q EVENING   • piperacillin-tazobactam (ZOSYN) 2.25 g in  mL IVPB Q8HR   • bisacodyl (THE MAGIC BULLET) suppository 10 mg QDAY PRN    And   • docusate sodium (COLACE) capsule 200 mg BID PRN    And   • sennosides (SENOKOT) 8.6 MG tablet 17.2 mg QDAY PRN    And   • magnesium hydroxide (MILK OF MAGNESIA) suspension 30 mL QDAY PRN   • insulin lispro (HUMALOG) injection 2-12 Units 4X/DAY ACHS    And   • glucose 4 g chewable tablet 16 g Q15 MIN PRN    And   • dextrose 50% (D50W) injection 50 mL Q15 MIN PRN   • ipratropium-albuterol (DUONEB) nebulizer solution TID   • lidocaine 2 % jelly PRN    And   • nitroglycerin (NITRO-BID) 2 % ointment 1 Inch PRN   • Respiratory Therapy Consult Continuous RT   • oxyCODONE immediate-release (ROXICODONE) tablet 5 mg Q3HRS PRN   • oxyCODONE immediate release (ROXICODONE) tablet 10 mg Q3HRS PRN   • acetaminophen (TYLENOL) tablet 650 mg Q4HRS  PRN   • therapeutic multivitamin-minerals (THERAGRAN-M) tablet 1 Tab DAILY WITH LUNCH   • artificial tears ophthalmic solution 1 Drop PRN   • benzocaine-menthol (CEPACOL) lozenge 1 Lozenge Q2HRS PRN   • mag hydrox-al hydrox-simeth (MAALOX PLUS ES or MYLANTA DS) suspension 20 mL Q2HRS PRN   • ondansetron (ZOFRAN ODT) dispertab 4 mg 4X/DAY PRN    Or   • ondansetron (ZOFRAN) syringe/vial injection 4 mg 4X/DAY PRN   • sodium chloride (OCEAN) 0.65 % nasal spray 2 Spray PRN   • traZODone (DESYREL) tablet 50 mg QHS PRN   • ammonium lactate (LAC-HYDRIN) 12 % lotion BID   • methocarbamol (ROBAXIN) tablet 750 mg Q8HRS PRN   • cyanocobalamin (VITAMIN B12) tablet 1,000 mcg DAILY   • ferrous sulfate tablet 325 mg BID WITH MEALS   • lidocaine (LIDODERM) 5 % 2 Patch Q24HR   • metoprolol (LOPRESSOR) tablet 12.5 mg BID   • pioglitazone (ACTOS) tablet 30 mg DAILY   • simethicone (MYLICON) chewable tab 80 mg TID PRN   • simvastatin (ZOCOR) tablet 20 mg Nightly   • sodium bicarbonate tablet 650 mg TID   • heparin injection 5,000 Units Q8HRS   • midodrine (PROAMATINE) tablet 5 mg Q4HRS PRN       Orders Placed This Encounter   Procedures   • Diet NPO     Standing Status:   Standing     Number of Occurrences:   1     Order Specific Question:   Restrict to:     Answer:   Strict [1]       Assessment:  Active Hospital Problems    Diagnosis   • Acute on chronic renal failure (HCC)   • Central cord syndrome (HCC)   • Type 2 diabetes mellitus (HCC)   • Anemia   • Thrombocytopenia (HCC)   • 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 Sellers on 2/28.  Pinprick altered bilaterally at C3-C6  - Collar on at all times, spinal precautions  - Calcium carbonate 500 mg twice daily  -Continue comprehensive acute inpatient rehabilitation with physical, occupational and speech therapy    Neurologic respiratory impairment  -Consult respiratory therapy  - Oxygen as per guidelines  - DuoNeb 3X daily   -Mucomyst 3 times a day for mucolytic X 3 days then transition to hypertonic saline.     Pneumonia: Bilateral lower lobe, likely bacterial, productive cough  - Zyvox and Zosyn started on 3/14, transitioned to ceftriaxone on 3/18, restarted on Zyvox and Zosyn on 3/19  -Appreciate hospitalist input  -Aggressive secretion management as above  - Given productive cough will place on droplet precaution until completion of antibiotics  -Check CBC in a.m.    Fluid overload: Pleural effusions bilaterally  -Check chest x-ray   -Lasix decreased to 20 mg daily    C. difficile: PCR positive, toxin negative, colonization versus infection given and loose watery stool  - Vancomycin oral 125 mg every 6 hours  -Contact precautions, C. difficile, on droplet precautions for productive cough in the setting of pneumonia  -Cholestyramine 4 g twice daily    Neurogenic bladder: Wallis removed after treatment with Lasix  - voiding trial, if can't void in 6 hours or prn check pvr and if >500cc then ICP,if able to void then check PVR, if PVR is >200cc then ICP       Neurogenic bowel  -Upper motor neuron neurogenic bowel program with senna 2 tablets q. noon, Colace 200 mg twice daily and Dulcolax suppository with digital stimulation  -Patient had episode of diarrhea yesterday was checked for C. difficile which was negative    Potential for orthostatic hypotension  Because of significant autonomic dysfunction associated with spinal cord injury, the patient is at high risk for orthostatic hypotension.  - Midodrine 5mg q4 hours prn SBP  <100  -Midodrine 5 mg tid    Dysphasia: Previous diagnosis after traumatic brain injury, high risk after cervical spinal cord injury  -Consulted speech therapy, increased difficulty secondary to lethargy, difficulty following compensatory techniques  -Patient was placed on n.p.o. NG tube placed on 3/20  - Hold lunchtime tube feeding to allow for tray trial with speech therapy    Pain:  #Neuropathic pain:  bilateral lower extremity pins-and-needles  - Decrease gabapentin 300 mg daily,  dose limited by GFR, concern that increased dose of gabapentin was resulting in lethargy  -Lyrica 25 mg twice daily to address allodynia, currently being held  -vitamin C 500 mg every 12 hours, which is been shown to improve gabapentin absorption and pain management     Postoperative pain:  - Oxycodone 5-10 mg every 3 hours as needed pain  - Robaxin 750 mg every 8 hours as needed pain  -DC scheduled Tylenol secondary to pneumonia, can mask fever     Spasticity: Given increased lethargy will decrease baclofen  - DC baclofen, secondary to fatigue    Anemia: Required IV iron supplementation, hemoglobin of 7.7 on admission  -Epogen 2000 units, Monday Wednesday Friday  -Folic acid 5 mg daily  - Ferrous sulfate 325 mg twice daily    Diabetes: Appreciate hospitalist input  -Lantus 10 units nightly  -Check fingersticks q. before meals, nightly  -Sliding scale insulin  - Pioglitazone 30 mg a daily  - ADA diet  -Consulted hospitalist      Acute on chronic kidney injury: Previously stage IV kidney disease with bilateral hydronephrosis suggestive of post renal obstruction, likely worsened by neurogenic bladder.  BUN/creatinine of 49/2.5  -Check BMP in a.m.  -Manage neurogenic bladder as above     Hyponatremia: Sodium of 136 on admission  -Sodium bicarb tablets 650 mg 3 times a day     Hyperlipidemia: Triglyceride 65, HDL 31, LDL 37  -Simvastatin 20 mg nightly     Vitamin deficiency  In the posttraumatic setting, there is a high likelihood of  vitamin D deficiency.   Vitamin D level on admission of 47, goal of >30  - DC vitamin D supplementation     DVT prophylaxis/right lower extremity swelling: Patient at increased risk for VTE formation with neurologic compromise/myelopathy.  -Ultrasound right lower extremity was negative for DVT  -Heparin 5000 units every 8 hours, unable to transition to Lovenox given current renal function    IDT Team Meeting 3/23/2020    IRamu D.O., was present and led the interdisciplinary team conference on 3/23/2020.  I led the IDT conference and agree with the IDT conference documentation and plan of care as noted below.     RN:  Diet Tube feeding    % Meal  Holding tube feeding   Pain Improved pain   Sleep    Bowel    Bladder Volitional voiding   In's & Out's      PT:  Bed Mobility    Transfers Min - mod A   Mobility Min A   Stairs    Significantly better today, able to walk  ft with supervision, improved safety awareness     Barriers: spasticity, improved with walking and ROM.   4WW for household distances    OT:  Eating    Grooming    Bathing Max A   UB Dressing    LB Dressing Max A   Toileting    Shower & Transfer min A   Improving over the past couple of days with [id number] management     Barriers: incont with bowel    SLP:  trialed Dys 1 and nectar thick, tray trial       Resp:  Completed mucinex     Rec:  Meal prep and delivery and     CM:  Continues to work on disposition and DME needs.    sister will cont to assist     DC/Disposition:  3/31  Will need family training  HH    Patient was seen for 39 minutes on unit/floor of which > 50% of time was spent on counseling and coordination of care regarding the above, including prognosis, risk reduction, benefits of treatment, and options for next stage of care including dysphasia, holds lunchtime tube feedings, trial tray with speech therapy, respiratory, DC Mucomyst, initiate hypertonic saline 3 times daily for mucolytic, C. difficile, vancomycin,  cholestyramine, discussion with hospitalist, neuropathic pain, increase Lyrica to 50 mg twice daily.          Ramu Garner D.O.

## 2020-03-23 NOTE — THERAPY
Speech Language Pathology  Daily Treatment     Patient Name: Vince Almanzar  Age:  79 y.o., Sex:  male  Medical Record #: [id number]  Today's Date: 3/23/2020     Subjective    Patient was in room at time of ST. Was willing to participate.      Objective       03/23/20 0902   Dysphagia    Other Treatments trials of level 4, MTL    Diet / Liquid Recommendation NPO;Pre-Feeding Trials with SLP Only   Nutritional Liquid Intake Rating Scale Nothing by mouth   Nutritional Food Intake Rating Scale Tube dependent with consistent oral intake   SLP Total Time Spent   SLP Individual Total Time Spent (Mins) 60   Charge Group   SLP Swallowing Dysfunction Treatment Swallowing Dysfunction Treatment       Assessment    Patient was assessed with PO trials of MTL and pureed textures. Intermittently wet vocal was noted with patient requiring mod cues to monitor and clear. Patient did not recall POC regarding dysphagia that was discussed during previous session. Frequently asks about NGT removal despite ongoing education.     Plan    Level 4/MTL trial tray.

## 2020-03-23 NOTE — REHAB-COLLABORATIVE ONGOING IDT TEAM NOTE
Weekly Interdisciplinary Team Conference Note    Weekly Interdisciplinary Team Conference # 2  Date:  3/23/2020    Clinicians present and reporting at team conference include the following:   MD: Ramu Garner DO   RN:  Imelda Gallego RN   PT:   Louise Gayle PT, DPT, NCS  OT:  Nga Allen MS, OTR/L   ST:  Jesica Ralph MS, CCC-SLP  CM:  Natty Lira MS, LSW  REC:  Rodney Bradley CTRS  RT:  Romelia Simpson RRT  RPh:  Asael Kilgore RPh  Other:   None  All reporting clinicians have a working knowledge of this patient's plan of care.    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 ([facility]) 12/03/2018     Priority: Medium   • Contraindication to deep vein thrombosis (DVT) prophylaxis 12/03/2018     Priority: Medium   • Essential hypertension 12/03/2018     Priority: Low   • Hyperlipemia 12/03/2018     Priority: Low   • Trauma 12/03/2018     Priority: Low   • Diarrhea 03/22/2020   • Volume overload 03/20/2020   • Somnolence 03/19/2020   • Cervical spinal stenosis 03/14/2020   • Leukocytosis 03/14/2020   • Incomplete quadriplegia at C5-C8 level ([facility]) 03/12/2020   • Neuropathic pain 03/12/2020   • Neurogenic bowel 03/12/2020   • Neurogenic bladder 03/12/2020   • Tachycardia 12/16/2018   • Vitamin D deficiency 12/11/2018   • Uncontrolled type 2 diabetes mellitus with hyperglycemia ([facility]) 11/12/2018   • Mass of pancreas 06/03/2016     Results   "   Procedure Component Value Ref Range Date/Time    ACCU-CHEK GLUCOSE [[id number]]  (Abnormal) Collected:  03/23/20 0737    Order Status:  Completed Lab Status:  Final result Updated:  03/23/20 0756     Glucose - Accu-Ck 163 65 - 99 mg/dL     OCCULT BLOOD X3 (STOOL) [[id number]] Collected:  03/21/20 1135    Order Status:  Completed Lab Status:  Preliminary result Updated:  03/23/20 0400    Specimen:  Stool      Occult Blood 1 Negative Negative      Occult Blood 2 Negative Negative     Narrative:       Droplet    ACCU-CHEK GLUCOSE [[id number]]  (Abnormal) Collected:  03/22/20 2212    Order Status:  Completed Lab Status:  Final result Updated:  03/23/20 0029     Glucose - Accu-Ck 219 65 - 99 mg/dL      Comment: Coverage given  Followed orders  Notified provider         BLOOD CULTURE [[id number]] Collected:  03/17/20 1715    Order Status:  Completed Lab Status:  Final result Updated:  03/22/20 2100    Specimen:  Blood from Peripheral      Significant Indicator NEG     Source BLD     Site PERIPHERAL     Culture Result No growth after 5 days of incubation.    Narrative:       Droplet,Contact  Per Hospital Policy: Only change Specimen Src: to \"Line\" if  specified by physician order.  Left Forearm/Arm    BLOOD CULTURE [[id number]] Collected:  03/17/20 1715    Order Status:  Completed Lab Status:  Final result Updated:  03/22/20 2100    Specimen:  Blood from Peripheral      Significant Indicator NEG     Source BLD     Site PERIPHERAL     Culture Result No growth after 5 days of incubation.    Narrative:       Droplet,Contact  Per Hospital Policy: Only change Specimen Src: to \"Line\" if  specified by physician order.  Right Forearm/Arm    ACCU-CHEK GLUCOSE [[id number]]  (Abnormal) Collected:  03/22/20 1723    Order Status:  Completed Lab Status:  Final result Updated:  03/22/20 1737     Glucose - Accu-Ck 227 65 - 99 mg/dL      Comment: Coverage given  Followed orders         ACCU-CHEK GLUCOSE [[id number]]  (Abnormal) Collected:  " 03/22/20 1118    Order Status:  Completed Lab Status:  Final result Updated:  03/22/20 1137     Glucose - Accu-Ck 201 65 - 99 mg/dL         Nursing  Diet/Nutrition:  Tube Feed  Medication Administration:  Via Gastric Tube  % consumed at meals in last 24 hours:  Tolerating goal of Diabetisource AC.  Meal/Snack Consumption for the past 24 hrs:   Oral Nutrition   03/22/20 1200 NPO at this Time   03/22/20 0900 NPO at this Time     Snack schedule:  Other:N/A  Supplement:  Other: N/A  Appetite:  Good  Fluid Intake/Output in past 24 hours: In: 660 [Other:460]  Out: 950   Admit Weight:  Weight: 75.8 kg (167 lb)  Weight Last 7 Days   [74.2 kg (163 lb 9.3 oz)-78.5 kg (173 lb 1 oz)] 74.5 kg (164 lb 3.9 oz) (03/22 1600)    Pain Issues:    Location:  --  --         Severity:  Denies   Scheduled pain medications:  gabapentin (NEURONTIN) Lyrica, Lidoderm Patch.   PRN pain medications used in last 24 hours:  None   Non Pharmacologic Interventions:  rest  Effectiveness of pain management plan:  good=patient states acceptable comfort after interventions    Bowel:    Bowel Assist Initial Score:  4 - Minimal Assistance  Bowel Assist Current Score:  1 - Total Assistance  Bowl Accidents in last 7 days:  2  Last bowel movement: 03/22/20  Stool Description: Incontinence     Usual bowel pattern:  every other day  Scheduled bowel medications:  None  PRN bowel medications used in last 24 hours:  None  Nursing Interventions:  Increased time, Brief, Supervision, Verbal cueing, Set-up, Requires 2 people to assist, Emptying of device  Effectiveness of bowel program:   fair=sometimes needs prn bowel meds for constipation  Bladder:    Bladder Assist Initial Score:  5 - Standby Prompting/Supervision or Set-up  Bladder Assist Current Score:  1 - Total Assistance  Bladder Accidents in last 7 days:     PVR range for past 24-48 hours: --  Intermittent Catheterization:  N/A  Medications affecting bladder:  None    Time void schedule/voiding pattern:   Voiding every 2-4 hours  Interventions:  Increased time, Brief, Supervision, Verbal cueing, Time void patient initiated, Emptying of device  Effectiveness of bladder training:  Poor= > 1 incontinent emptying of bladder    Diabetes:  Blood Sugar Frequency:  Before meals and at bedtime    Range of BS for last 48 hours:     Recent Labs     03/21/20  0810 03/21/20  1214 03/21/20  1653 03/21/20  2133 03/22/20  0803 03/22/20  1118 03/22/20  1723 03/22/20  2212   POCGLUCOSE 155* 238* 226* 223* 181* 201* 227* 219*     Scheduled diabetic medications:  Other Actos  Sliding scale usage in past 24 hours:  Yes  Total Short acting insulin in the past 24 hours:  Humalog 14 units.  Total Long acting insulin in the past 24 hours:  None  Pt to start 3/23/20.    Wound:         Patient Lines/Drains/Airways Status    Active Current Wounds     Name: Placement date: Placement time: Site: Days:    Wound 03/12/20 Incision Neck Posterior  03/12/20   1545   Neck  10    Wound Other (comment) Heel Right scar from resolved pressure injury  --   --   Heel      Wound Pressure Injury Heel Left scar from resolved pressure injury  --   --   Heel      Wound Partial Thickness Wound Buttocks;Sacrum Bilateral moisture related callusing with small area of denuded tissue  --   --   Buttocks;Sacrum                     Interventions:  Incision to Neck EVANGELISTA, Bilateral Heels with Mepilex Heel drsgs in place.  Buttocks/Sacrum with barrier paste.  Effectiveness of intervention:  wound is improving     Sleep/wake cycle:   Average hours slept:  Sleeps 3-4 hours without waking  Sleep medication usage:  None    Patient/Family Training/Education:  Aspiration Precautions, Bladder Management/Training, Bowel Management/Training, Diabetes Management, Diet/Nutrition, Fall Prevention, General Self Care, Medication Management, Pain Management, Safe Transfers, Safety, Skin Care and Wound Care  Discharge Supply Recommendations:  Glucometer and Strips, Wound Care Supplies,  Feeding Tube Supplies and Other: Adaptive equipment.  Strengths: Alert and oriented, Able to follow instructions, Pleasant and cooperative and Effective communication skills   Barriers:   Bladder incontinence, Bowel incontinence, Generalized weakness and Limited mobility       Nursing Problems     Problem: Bowel/Gastric:     Description:     Goal: Normal bowel function is maintained or improved     Description:           Goal: Will not experience complications related to bowel motility     Description:                 Problem: Communication     Description:     Goal: The ability to communicate needs accurately and effectively will improve     Description:                 Problem: Discharge Barriers/Planning     Description:     Goal: Patient's continuum of care needs will be met     Description:                 Problem: Infection     Description:     Goal: Will remain free from infection     Description:                 Problem: Knowledge Deficit     Description:     Goal: Knowledge of disease process/condition, treatment plan, diagnostic tests, and medications will improve     Description:           Goal: Knowledge of the prescribed therapeutic regimen will improve     Description:                 Problem: Pain Management     Description:     Goal: Pain level will decrease to patient's comfort goal     Description:                 Problem: Respiratory:     Description:     Goal: Respiratory status will improve     Description:                 Problem: Safety     Description:     Goal: Will remain free from injury     Description:           Goal: Will remain free from falls     Description:                 Problem: Skin Integrity     Description:     Goal: Risk for impaired skin integrity will decrease     Description:                 Problem: Urinary Elimination:     Description:     Goal: Ability to reestablish a normal urinary elimination pattern will improve     Description:                 Problem: Venous  Thromboembolism (VTW)/Deep Vein Thrombosis (DVT) Prevention:     Description:     Goal: Patient will participate in Venous Thrombosis (VTE)/Deep Vein Thrombosis (DVT)Prevention Measures     Description:     Flowsheet:     Taken at 03/14/20 8764    Pharmacologic Prophylaxis Used Unfractionated Heparin by  Princess Kiara Pelaez R.N.                             Long Term Goals:   At discharge patient will be able to function safely at home and in the community with support.    Section completed by:  Niya Kruse L.P.N.2        Respiratory Therapy    Pt is continuing to receive therapy with metaneb and Albuterol, followed by SVN with Mucomyst.  He is making very minimal progress with his IS.  His cough is congested and productive.  He his no longer requiring oxygen.    Section completed by:  Romelia Simpson, RRT    Mobility  Bed mobility:   4  Bed /Chair/Wheelchair Transfer Initial:  4 - Minimal Assistance  Bed /Chair/Wheelchair Transfer Current:  2 (transfer: SPT modA; bed mob: maxA x 1 HOB flat)  Walk Initial:  2 - Max Assistance  Walk Current:  4 - Minimal Assistance   Walk Description:  (225ft x 1 4ww CGA; 70ft x 1 4ww CGA) (3/17 is the last day the pt was able to AMB)  Wheelchair Initial:  2 - Max Assistance  Wheelchair Current:  5 - Standby Prompting/Supervision or Set-up   Wheelchair Description:  Safety concerns, Supervision for safety, Verbal cueing, Extra time, Adaptive equipment(150 ft, BLEs and BUEs)   Stairs Initial:  0 - Not tested,unsafe activity  Stairs Current: 0 - Not tested,unsafe activity   Stairs Description:    Patient/Family Training/Education:  Activity tolerance, self pacing, hygiene  DME/DC Recommendations:  TBD(FWW vs w/c?, pt owns 4ww), potential need additional caregiving services  Strengths:  Willingly participates in therapeutic activities, supportive family, improving alertness  Barriers:   Decreased endurance, Poor activity tolerance and Poor balance, spasticity, impaired  STS mechanics  # of short term goals set= 4  # of short term goals met=0  Physical Therapy Problems     Problem: Balance     Dates: Start: 03/13/20       Description:     Goal: STG-Within one week, patient will     Dates: Start: 03/13/20       Description: 1) Individualized goal:  Perform 5x STS with 4ww from standard height chair in less than 60seconds  2) Interventions:  PT Group Therapy, PT Gait Training, PT Therapeutic Exercises, PT Neuro Re-Ed/Balance, PT Therapeutic Activity, PT Manual Therapy and PT Evaluation                   Problem: Mobility     Dates: Start: 03/13/20       Description:     Goal: STG-Within one week, patient will ambulate community distances     Dates: Start: 03/13/20       Description: 1) Individualized goal:  150ft x  1 4ww SBA  2) Interventions:  PT Group Therapy, PT Gait Training, PT Therapeutic Exercises, PT Neuro Re-Ed/Balance, PT Therapeutic Activity, PT Manual Therapy and PT Evaluation                 Problem: Mobility Transfers     Dates: Start: 03/13/20       Description:     Goal: STG-Within one week, patient will perform bed mobility     Dates: Start: 03/13/20       Description: 1) Individualized goal: SPV with HOB flat and no VCs for precautions  2) Interventions:  PT Group Therapy, PT Gait Training, PT Therapeutic Exercises, PT Neuro Re-Ed/Balance, PT Therapeutic Activity, PT Manual Therapy and PT Evaluation             Goal: STG-Within one week, patient will transfer bed to chair     Dates: Start: 03/13/20       Description: 1) Individualized goal: 4ww SPV SPT  2) Interventions:  PT Group Therapy, PT Gait Training, PT Therapeutic Exercises, PT Neuro Re-Ed/Balance, PT Therapeutic Activity, PT Manual Therapy and PT Evaluation                 Problem: PT-Long Term Goals     Dates: Start: 03/13/20       Description:     Goal: LTG-By discharge, patient will ambulate     Dates: Start: 03/13/20       Description: 1) Individualized goal:  50ft x 3 4ww mod I, 400ft x 1 4ww SPV  2)  Interventions:  PT Group Therapy, PT Gait Training, PT Therapeutic Exercises, PT Neuro Re-Ed/Balance, PT Therapeutic Activity, PT Manual Therapy and PT Evaluation             Goal: LTG-By discharge, patient will transfer one surface to another     Dates: Start: 03/13/20       Description: 1) Individualized goal: mod I 4ww  2) Interventions:  PT Group Therapy, PT Gait Training, PT Therapeutic Exercises, PT Neuro Re-Ed/Balance, PT Therapeutic Activity, PT Manual Therapy and PT Evaluation             Goal: LTG-By discharge, patient will perform home exercise program     Dates: Start: 03/13/20       Description: 1) Individualized goal:  Mod I for B LE strengthening/ standing balance   2) Interventions:  PT Group Therapy, PT Gait Training, PT Therapeutic Exercises, PT Neuro Re-Ed/Balance, PT Therapeutic Activity, PT Manual Therapy and PT Evaluation             Goal: LTG-By discharge, patient will transfer in/out of a car     Dates: Start: 03/13/20       Description: 1) Individualized goal: 4ww SPV   2) Interventions:  PT Group Therapy, PT Gait Training, PT Therapeutic Exercises, PT Neuro Re-Ed/Balance, PT Therapeutic Activity, PT Manual Therapy and PT Evaluation                           Section completed by:  Louise Gayle, PT, DPT    Activities of Daily Living  Eating Initial:  5 - Standby Prompting/Supervision or Set-up  Eating Current:  5 - Standby Prompting/Supervision or Set-up   Eating Description:  Increased time, Modified diet, Tube feed bolus, Supervision for safety, Verbal cueing, Set-up of equipment or meal/tube feeding, Staff administers tube feed/parenteral nutrition/IVF  Grooming Initial:  5 - Standby Prompting/Supervision or Set-up  Grooming Current:  5 - Standby Prompting/Supervision or Set-up   Grooming Description:  Increased time, Verbal cueing, Supervision for safety  Bathing Initial:  3 - Moderate Assistance  Bathing Current:  2 - Max Assistance   Bathing Description:  Grab bar, Tub bench, Hand  held shower, Increased time, Supervision for safety, Verbal cueing, Set-up of equipment, Initial preparation for task(Min assist in stand via grab bar w/ Total assist for stefanie, Max assist for distal BLE's, Total assist for back, Pt Mod I for anterior trunk and BUE's.  Noted significant BM in shower while standing via grab bar)  Upper Body Dressing Initial:  3 - Moderate Assistance  Upper Body Dressing Current:  2 - Max Assistance   Upper Body Dressing Description:  Increased time, Supervision for safety, Verbal cueing, Set-up of equipment(Patient required max assist for threading BUEs through long sleeve shirt and shirt over head pursuit while seated in w/c )  Lower Body Dressing Initial:  3 - Moderate Assistance  Lower Body Dressing Current:  2 - Max Assistance   Lower Body Dressing Description:  2 - Max Assistance  Toileting Initial:  3 - Moderate Assistance  Toileting Current:  2 - Max Assistance   Toileting Description:  Grab bar, Increased time, Supervision for safety, Verbal cueing, Set-up of equipment, Initial preparation for task(Mod assist sit/stand via grab bar, Mod assist to manage pants, Total assist for post BM toilet hygiene)  Toilet Transfer Initial:  4 - Minimal Assistance  Toilet Transfer Current:  1 - Total Assistance   Toilet Transfer Description:  1 - Total Assistance  Tub / Shower Transfer Initial:  4 - Minimal Assistance  Tub / Shower Transfer Current:  4 - Minimal Assistance   Tub / Shower Transfer Description:  Grab bar, Increased time, Supervision for safety, Verbal cueing, Set-up of equipment, Initial preparation for task(Min assist to transfer to/from manual wc and shower bench via grab bar)  IADL:  NA at this time  Family Training/Education:  Brief education completed with sister on Sat 3/21  DME/DC Recommendations: TBD; Anticipate HHOT / additional caregiver support     Strengths:  Motivated for self care and independence, Pleasant and cooperative, Supportive family and Willingly  participates in therapeutic activities  Barriers:  Decreased endurance, Generalized weakness, Limited mobility, Poor activity tolerance and Other: variable level of alertness and medical status during last week     # of short term goals set= 2    # of short term goals met= 0      Occupational Therapy Goals     Problem: Bathing     Dates: Start: 03/13/20       Description:     Goal: STG-Within one week, patient will bathe     Dates: Start: 03/13/20       Description: 1) Individualized Goal:  Minimal assist with AE as needed  2) Interventions:  OT Orthotics Training, OT Group Therapy, OT Self Care/ADL, OT Cognitive Skill Dev, OT Community Reintegration, OT Manual Ther Technique, OT Neuro Re-Ed/Balance, OT Sensory Int Techniques, OT Therapeutic Activity, OT Evaluation and OT Therapeutic Exercise                   Problem: Dressing     Dates: Start: 03/13/20       Description:     Goal: STG-Within one week, patient will dress UB     Dates: Start: 03/13/20       Description: 1) Individualized Goal: Minimal assist   2) Interventions: OT Orthotics Training, OT Group Therapy, OT Self Care/ADL, OT Cognitive Skill Dev, OT Community Reintegration, OT Manual Ther Technique, OT Neuro Re-Ed/Balance, OT Sensory Int Techniques, OT Therapeutic Activity, OT Evaluation and OT Therapeutic Exercise                   Problem: OT Long Term Goals     Dates: Start: 03/13/20       Description:     Goal: LTG-By discharge, patient will complete basic self care tasks     Dates: Start: 03/13/20       Description: 1) Individualized Goal: Supervision level  2) Interventions: OT Orthotics Training, OT Group Therapy, OT Self Care/ADL, OT Cognitive Skill Dev, OT Community Reintegration, OT Manual Ther Technique, OT Neuro Re-Ed/Balance, OT Sensory Int Techniques, OT Therapeutic Activity, OT Evaluation and OT Therapeutic Exercise             Goal: LTG-By discharge, patient will perform bathroom transfers     Dates: Start: 03/13/20       Description: 1)  Individualized Goal: Supervision level     2) Interventions: OT Orthotics Training, OT Group Therapy, OT Self Care/ADL, OT Cognitive Skill Dev, OT Community Reintegration, OT Manual Ther Technique, OT Neuro Re-Ed/Balance, OT Sensory Int Techniques, OT Therapeutic Activity, OT Evaluation and OT Therapeutic Exercise             Goal: LTG-By discharge, patient will complete basic home management     Dates: Start: 03/13/20       Description: 1) Individualized Goal: CGA to supervision level     2) Interventions: OT Orthotics Training, OT Group Therapy, OT Self Care/ADL, OT Cognitive Skill Dev, OT Community Reintegration, OT Manual Ther Technique, OT Neuro Re-Ed/Balance, OT Sensory Int Techniques, OT Therapeutic Activity, OT Evaluation and OT Therapeutic Exercise                         Section completed by:  Nga Allen MS,OTR/L    Cognitive Linquistic Functions  Comprehension Initial:  5 - Stand-by Prompting/Supervision or Set-up  Comprehension Current:  5 - Stand-by Prompting/Supervision or Set-up   Comprehension Description:  Glasses([place], does not own hearing aids)  Expression Initial:  5 - Stand-by Prompting/Supervision or Set-up  Expression Current:  6 - Modified Independent   Expression Description:  Verbal cueing  Social Interaction Initial:  5 - Stand-by Prompting/Supervision or Set-up  Social Interaction Current:  6 - Modified Independent   Social Interaction Description:  Increased time, Verbal cues  Problem Solving Initial:  4 - Minimal Assistance  Problem Solving Current:  3 - Moderate Assistance   Problem Solving Description:  Verbal cueing, Therapy schedule, Bed/chair alarm, Increased time, Seat belt  Memory Initial:  3 - Moderate Assistance  Memory Current:  3 - Moderate Assistance   Memory Description:  Therapy schedule, Bed/chair alarm, Seat belt, Verbal cueing  Executive Functioning / Organization Initial:     Executive Functioning / Organization Current:      Executive Functioning Desciption:  To be  assessed  Swallowing  Swallowing Status:  NPO, trials of dysphagia I, NTL with likely upgrade this week.   Orders Placed This Encounter   Procedures   • Diet NPO     Standing Status:   Standing     Number of Occurrences:   1     Order Specific Question:   Restrict to:     Answer:   Strict [1]     Behavior Modification Plan  Allow for rest time, Keep instructions simple/concrete, Analyze tasks (break down into smaller steps) and Reinforce participation in desired tasks  Assistive Technology  Low/Impaired vision equipment and Low tech: Calendar, planner, schedule, alarms/timers, pill organizer, post-it notes, lists  Family Training/Education:  Not yet completed  DC Recommendations:   HH vs. OP speech therapy  Strengths:  Able to follow instructions, Making steady progress towards goals, Pleasant and cooperative and Willingly participates in therapeutic activities  Barriers:  Unable to follow instructions  # of short term goals set=4  # of short term goals met=0  Speech Therapy Problems     Problem: Memory STGs     Dates: Start: 03/13/20       Description:     Goal: STG-Within one week, patient will     Dates: Start: 03/13/20       Description: 1) Individualized goal:  Recall daily events with 80% accuracy with use of RWAVS and memory book.    2) Interventions:  SLP Cognitive Skill Development and SLP Group Treatment       Note:     Goal Note filed on 03/16/20 1047 by Jazmine Alcocer MS,CCC-SLP    To be addressed.                        Problem: Problem Solving STGs     Dates: Start: 03/13/20       Description:     Goal: STG-Within one week, patient will     Dates: Start: 03/13/20       Description: 1) Individualized goal:  Complete alternating attn tasks with 80% accuracy given min verbal cues.  2) Interventions:  SLP Cognitive Skill Development and SLP Group Treatment       Note:     Goal Note filed on 03/16/20 1047 by Jazmine Alcocer MS,CCC-SLP    78% with mod A                  Goal: STG-Within one week, patient  will     Dates: Start: 03/13/20       Description: 1) Individualized goal:  Recall/utilize swallow strategies, safety precautions 80% Sarah.   2) Interventions:  SLP Cognitive Skill Development and SLP Group Treatment       Note:     Goal Note filed on 03/16/20 1047 by Jazmine Alcocer MS,CCC-SLP    Mod A needed for recall of strategies,safety sequencing.                        Problem: Speech/Swallowing LTGs     Dates: Start: 03/13/20       Description:     Goal: LTG-By discharge, patient will safely swallow     Dates: Start: 03/13/20       Description: 1) Individualized goal:  Regular textures and thin liquids without aspiration.  2) Interventions:  SLP Swallowing Dysfunction Treatment, SLP Oral Pharyngeal Evaluation and SLP Video Swallow Evaluation             Goal: LTG-By discharge, patient will solve complex problem     Dates: Start: 03/13/20       Description: 1) Individualized goal:  [place] for safe discharge home.  2) Interventions:  SLP Aphasia Evaluation, SLP Cognitive Skill Development and SLP Group Treatment                   Problem: Swallowing STGs     Dates: Start: 03/16/20       Description:     Goal: STG-Within one week, patient will     Dates: Start: 03/16/20       Description: 1) Individualized goal:  Swallow level 4 puree dysphagia 1 and level 2 mildly thick liquids with no overt s/sx of aspiration for 2/2 meals.  2) Interventions:  SLP Swallowing Dysfunction Treatment and SLP Group Treatment       Note:     Goal Note filed on 03/20/20 1716 by Mouna Lee, R.N.    Pt is on RA this shift and has been able to cough up secretions and uses yanker to clear throat.                                Section completed by:  Jesica Ralph MS,CCC-SLP    Recreation/Community     Leisure Competence Measure  Leisure Awareness: Independent  Leisure Attitude: Independent  Leisure Skills: Minimal Assist  Cultural / Social Behaviors: Independent  Interpersonal Skills: Independent  Community Integration  Skills: Cueing Assist  Social Contact: Independent  Community Participation: Cueing Assist    Strengths:  Able to follow instructions, Alert and oriented, Motivated for self care and independence, Pleasant and cooperative and Willingly participates in therapeutic activities  Barriers:  Decreased endurance and Generalized weakness  # of short term goals set=2  # of short term goals met=0    Pt has been net only for evaluation. Goals will continue to be monitored.     Recreation Therapy Problems     Problem: Recreation Therapy     Dates: Start: 03/13/20       Description:     Goal: STG-Within one week, patient will demonstrate leisure problem solving     Dates: Start: 03/13/20       Description: By sharing on two leisure activities other than stated in the evaluation he would like to learn or participate in during session or in his free time.            Goal: STG-Within one week, patient will demonstrate a standing tolerance     Dates: Start: 03/13/20       Description: By standing for 3 minutes x3 while duel tasking at [facility] and no LOB.            Goal: LTG-By discharge, patient will demonstrate leisure problem solving     Dates: Start: 03/13/20       Description: By sharing on two leisure activities other than stated in the evaluation he would like to learn or participate in following his discharge and any perceived barriers to his participation.            Goal: LTG-By discharge, patient will demonstrate a standing tolerance     Dates: Start: 03/13/20       Description: By standing for 3 minutes x3 while duel tasking at [facility] and no LOB.                        Section completed by:  Rodney Bradley, [facility]       REHAB-Pharmacy IDT Team Note by Asael Kilgore RPH at 3/20/2020 11:43 AM  Version 1 of 1    Author:  Asael Kilgore RPH Service:  -- Author Type:  Pharmacist    Filed:  3/20/2020 11:45 AM Date of Service:  3/20/2020 11:43 AM Status:  Signed    :  Asael Kilgore RPH (Pharmacist)         Pharmacy    Pharmacy  Antibiotics/Antifungals/Antivirals:  Medication:      Active Orders (From admission, onward)    Ordered     Ordering Provider       Thu Mar 19, 2020  5:30 PM    03/19/20 1730  piperacillin-tazobactam (ZOSYN) 2.25 g in  mL IVPB  EVERY 8 HOURS      Diana Dykes M.D.       Thu Mar 19, 2020 12:33 PM    03/19/20 1233  Linezolid (ZYVOX) premix 600 mg  EVERY 12 HOURS     Question:  Indication and durations for linezolid  Answer:  Empiric, MRSA or VRE (duration 3 days)    Diana Dykes M.D.        Route:         IV, PO  Stop Date:  Both 3/24  Reason: Leukocytosis  Antihypertensives/Cardiac:  Medication:    Orders (72h ago, onward)     Start     Ordered    03/19/20 1245  furosemide (LASIX) injection 20 mg  Q DAY      03/19/20 1218    03/12/20 2100  metoprolol (LOPRESSOR) tablet 12.5 mg  2 TIMES DAILY      03/12/20 1145    03/12/20 2100  simvastatin (ZOCOR) tablet 20 mg  NIGHTLY      03/12/20 1145    03/12/20 1227  midodrine (PROAMATINE) tablet 5 mg  EVERY 4 HOURS PRN      03/12/20 1228    03/12/20 1146  nitroglycerin (NITRO-BID) 2 % ointment 1 Inch  (Autonomic Dysreflexia Orders)  PRN      03/12/20 1146              Patient Vitals for the past 24 hrs:   BP Pulse   03/20/20 0713 144/72 98   03/20/20 0516 129/59 67   03/19/20 2200 -- 83   03/19/20 1900 137/68 88   03/19/20 1500 134/61 86   03/19/20 1440 -- 70     Anticoagulation:  Medication: Heparin                                  Other key medications: A review of the medication list reveals no issues at this time. Patient is currently on antihypertensive(s). Recommend home blood pressure monitoring/recording if antihypertensive(s) regimen(s) continue. Patient is currently on diabetic medication(s) and/or Insulin(s). Recommend home blood glucose monitoring/recording if these regimen(s) continue.    Section completed by: Asael Kilgore RPh[AW.1]     Attribution Key     AW.1 - Asael Kilgore RPH on 3/20/2020 11:43 AM                  DC Planning  DC  destination/dispostion: Home alone to [place] apartment in [place], lives in 2nd floor apartment w/ elevator access. Has 4WW, tub transfer bench, rasied toilet.     Previous [facility]. Sister will provide transportation.    Referrals: HH referral.    DC Needs: DME for patient safety & mobility. HH referral. MD f/u appts.    Barriers to discharge: Functional mobility deficits. Lives alone, limited support system.     Strengths: Motivation. Supportive sister. Home accessibility.     Section completed by:  Natty Jackson      Physician Summary  Ramu Garner DO participated and led team conference discussion.

## 2020-03-24 ENCOUNTER — APPOINTMENT (OUTPATIENT)
Dept: RADIOLOGY | Facility: REHABILITATION | Age: 80
DRG: 052 | End: 2020-03-24
Attending: HOSPITALIST
Payer: COMMERCIAL

## 2020-03-24 LAB
ANION GAP SERPL CALC-SCNC: 10 MMOL/L (ref 7–16)
BACTERIA BLD CULT: NORMAL
BACTERIA BLD CULT: NORMAL
BUN SERPL-MCNC: 42 MG/DL (ref 8–22)
CALCIUM SERPL-MCNC: 8.1 MG/DL (ref 8.5–10.5)
CHLORIDE SERPL-SCNC: 104 MMOL/L (ref 96–112)
CO2 SERPL-SCNC: 23 MMOL/L (ref 20–33)
CREAT SERPL-MCNC: 2.41 MG/DL (ref 0.5–1.4)
ERYTHROCYTE [DISTWIDTH] IN BLOOD BY AUTOMATED COUNT: 61.1 FL (ref 35.9–50)
GLUCOSE BLD-MCNC: 113 MG/DL (ref 65–99)
GLUCOSE BLD-MCNC: 130 MG/DL (ref 65–99)
GLUCOSE BLD-MCNC: 162 MG/DL (ref 65–99)
GLUCOSE BLD-MCNC: 176 MG/DL (ref 65–99)
GLUCOSE SERPL-MCNC: 110 MG/DL (ref 65–99)
HCT VFR BLD AUTO: 24.5 % (ref 42–52)
HGB BLD-MCNC: 7.7 G/DL (ref 14–18)
MCH RBC QN AUTO: 30 PG (ref 27–33)
MCHC RBC AUTO-ENTMCNC: 31.4 G/DL (ref 33.7–35.3)
MCV RBC AUTO: 95.3 FL (ref 81.4–97.8)
NT-PROBNP SERPL IA-MCNC: 8352 PG/ML (ref 0–125)
PLATELET # BLD AUTO: 126 K/UL (ref 164–446)
PMV BLD AUTO: 9.1 FL (ref 9–12.9)
POTASSIUM SERPL-SCNC: 4.2 MMOL/L (ref 3.6–5.5)
RBC # BLD AUTO: 2.57 M/UL (ref 4.7–6.1)
SIGNIFICANT IND 70042: NORMAL
SIGNIFICANT IND 70042: NORMAL
SITE SITE: NORMAL
SITE SITE: NORMAL
SODIUM SERPL-SCNC: 137 MMOL/L (ref 135–145)
SOURCE SOURCE: NORMAL
SOURCE SOURCE: NORMAL
WBC # BLD AUTO: 4.9 K/UL (ref 4.8–10.8)

## 2020-03-24 PROCEDURE — 80048 BASIC METABOLIC PNL TOTAL CA: CPT

## 2020-03-24 PROCEDURE — 700101 HCHG RX REV CODE 250: Performed by: PHYSICAL MEDICINE & REHABILITATION

## 2020-03-24 PROCEDURE — 94760 N-INVAS EAR/PLS OXIMETRY 1: CPT

## 2020-03-24 PROCEDURE — 85027 COMPLETE CBC AUTOMATED: CPT

## 2020-03-24 PROCEDURE — 92526 ORAL FUNCTION THERAPY: CPT

## 2020-03-24 PROCEDURE — 97530 THERAPEUTIC ACTIVITIES: CPT

## 2020-03-24 PROCEDURE — 83880 ASSAY OF NATRIURETIC PEPTIDE: CPT

## 2020-03-24 PROCEDURE — 97110 THERAPEUTIC EXERCISES: CPT

## 2020-03-24 PROCEDURE — A9270 NON-COVERED ITEM OR SERVICE: HCPCS | Performed by: PHYSICAL MEDICINE & REHABILITATION

## 2020-03-24 PROCEDURE — 82962 GLUCOSE BLOOD TEST: CPT | Mod: 91

## 2020-03-24 PROCEDURE — 770010 HCHG ROOM/CARE - REHAB SEMI PRIVAT*

## 2020-03-24 PROCEDURE — 36415 COLL VENOUS BLD VENIPUNCTURE: CPT

## 2020-03-24 PROCEDURE — 99232 SBSQ HOSP IP/OBS MODERATE 35: CPT | Performed by: PHYSICAL MEDICINE & REHABILITATION

## 2020-03-24 PROCEDURE — 94640 AIRWAY INHALATION TREATMENT: CPT

## 2020-03-24 PROCEDURE — 700102 HCHG RX REV CODE 250 W/ 637 OVERRIDE(OP): Performed by: HOSPITALIST

## 2020-03-24 PROCEDURE — 71045 X-RAY EXAM CHEST 1 VIEW: CPT

## 2020-03-24 PROCEDURE — 99232 SBSQ HOSP IP/OBS MODERATE 35: CPT | Performed by: HOSPITALIST

## 2020-03-24 PROCEDURE — 94669 MECHANICAL CHEST WALL OSCILL: CPT

## 2020-03-24 PROCEDURE — 700105 HCHG RX REV CODE 258: Performed by: HOSPITALIST

## 2020-03-24 PROCEDURE — 97535 SELF CARE MNGMENT TRAINING: CPT

## 2020-03-24 PROCEDURE — 700111 HCHG RX REV CODE 636 W/ 250 OVERRIDE (IP): Performed by: HOSPITALIST

## 2020-03-24 PROCEDURE — 700102 HCHG RX REV CODE 250 W/ 637 OVERRIDE(OP): Performed by: PHYSICAL MEDICINE & REHABILITATION

## 2020-03-24 PROCEDURE — 700111 HCHG RX REV CODE 636 W/ 250 OVERRIDE (IP): Performed by: PHYSICAL MEDICINE & REHABILITATION

## 2020-03-24 PROCEDURE — 97116 GAIT TRAINING THERAPY: CPT

## 2020-03-24 PROCEDURE — A9270 NON-COVERED ITEM OR SERVICE: HCPCS | Performed by: HOSPITALIST

## 2020-03-24 RX ORDER — IPRATROPIUM BROMIDE AND ALBUTEROL SULFATE 2.5; .5 MG/3ML; MG/3ML
3 SOLUTION RESPIRATORY (INHALATION) 3 TIMES DAILY
Status: DISCONTINUED | OUTPATIENT
Start: 2020-03-24 | End: 2020-03-28 | Stop reason: HOSPADM

## 2020-03-24 RX ORDER — SODIUM CHLORIDE FOR INHALATION 3 %
3 VIAL, NEBULIZER (ML) INHALATION 3 TIMES DAILY
Status: DISCONTINUED | OUTPATIENT
Start: 2020-03-24 | End: 2020-03-28 | Stop reason: HOSPADM

## 2020-03-24 RX ADMIN — FERROUS SULFATE TAB 325 MG (65 MG ELEMENTAL FE) 325 MG: 325 (65 FE) TAB at 17:39

## 2020-03-24 RX ADMIN — FERROUS SULFATE TAB 325 MG (65 MG ELEMENTAL FE) 325 MG: 325 (65 FE) TAB at 08:23

## 2020-03-24 RX ADMIN — SIMVASTATIN 20 MG: 20 TABLET, FILM COATED ORAL at 21:07

## 2020-03-24 RX ADMIN — PIPERACILLIN AND TAZOBACTAM 2.25 G: 2; .25 INJECTION, POWDER, LYOPHILIZED, FOR SOLUTION INTRAVENOUS; PARENTERAL at 02:52

## 2020-03-24 RX ADMIN — PREGABALIN 50 MG: 25 CAPSULE ORAL at 08:22

## 2020-03-24 RX ADMIN — HEPARIN SODIUM 5000 UNITS: 5000 INJECTION, SOLUTION INTRAVENOUS; SUBCUTANEOUS at 21:08

## 2020-03-24 RX ADMIN — IPRATROPIUM BROMIDE AND ALBUTEROL SULFATE 3 ML: .5; 3 SOLUTION RESPIRATORY (INHALATION) at 07:32

## 2020-03-24 RX ADMIN — OXYCODONE HYDROCHLORIDE 10 MG: 10 TABLET ORAL at 16:18

## 2020-03-24 RX ADMIN — CHOLESTYRAMINE 4 G: 4 POWDER, FOR SUSPENSION ORAL at 08:22

## 2020-03-24 RX ADMIN — INSULIN LISPRO 2 UNITS: 100 INJECTION, SOLUTION INTRAVENOUS; SUBCUTANEOUS at 11:35

## 2020-03-24 RX ADMIN — VANCOMYCIN HYDROCHLORIDE 125 MG: KIT ORAL at 18:00

## 2020-03-24 RX ADMIN — Medication: at 21:11

## 2020-03-24 RX ADMIN — SODIUM CHLORIDE SOLN NEBU 3% 3 ML: 3 NEBU SOLN at 16:26

## 2020-03-24 RX ADMIN — VANCOMYCIN HYDROCHLORIDE 125 MG: KIT ORAL at 05:22

## 2020-03-24 RX ADMIN — LIDOCAINE 2 PATCH: 50 PATCH CUTANEOUS at 10:45

## 2020-03-24 RX ADMIN — FUROSEMIDE 20 MG: 20 TABLET ORAL at 05:22

## 2020-03-24 RX ADMIN — SODIUM CHLORIDE SOLN NEBU 3% 3 ML: 3 NEBU SOLN at 11:10

## 2020-03-24 RX ADMIN — CYANOCOBALAMIN TAB 1000 MCG 1000 MCG: 1000 TAB at 08:22

## 2020-03-24 RX ADMIN — IPRATROPIUM BROMIDE AND ALBUTEROL SULFATE 3 ML: .5; 3 SOLUTION RESPIRATORY (INHALATION) at 16:19

## 2020-03-24 RX ADMIN — Medication 1 TABLET: at 11:29

## 2020-03-24 RX ADMIN — PIOGLITAZONE 30 MG: 15 TABLET ORAL at 08:22

## 2020-03-24 RX ADMIN — PREGABALIN 50 MG: 25 CAPSULE ORAL at 21:06

## 2020-03-24 RX ADMIN — Medication: at 09:00

## 2020-03-24 RX ADMIN — VANCOMYCIN HYDROCHLORIDE 125 MG: KIT ORAL at 12:00

## 2020-03-24 RX ADMIN — METHOCARBAMOL TABLETS 750 MG: 750 TABLET, COATED ORAL at 05:21

## 2020-03-24 RX ADMIN — INSULIN GLARGINE 10 UNITS: 100 INJECTION, SOLUTION SUBCUTANEOUS at 08:23

## 2020-03-24 RX ADMIN — SODIUM CHLORIDE SOLN NEBU 3% 3 ML: 3 NEBU SOLN at 07:43

## 2020-03-24 RX ADMIN — Medication 1 CAPSULE: at 08:22

## 2020-03-24 RX ADMIN — SODIUM BICARBONATE 650 MG TABLET 650 MG: at 08:22

## 2020-03-24 RX ADMIN — METOPROLOL TARTRATE 12.5 MG: 25 TABLET, FILM COATED ORAL at 08:22

## 2020-03-24 RX ADMIN — CHOLESTYRAMINE 4 G: 4 POWDER, FOR SUSPENSION ORAL at 21:06

## 2020-03-24 RX ADMIN — HEPARIN SODIUM 5000 UNITS: 5000 INJECTION, SOLUTION INTRAVENOUS; SUBCUTANEOUS at 14:38

## 2020-03-24 RX ADMIN — OXYCODONE HYDROCHLORIDE 10 MG: 10 TABLET ORAL at 21:07

## 2020-03-24 RX ADMIN — IPRATROPIUM BROMIDE AND ALBUTEROL SULFATE 3 ML: .5; 3 SOLUTION RESPIRATORY (INHALATION) at 11:02

## 2020-03-24 RX ADMIN — HEPARIN SODIUM 5000 UNITS: 5000 INJECTION, SOLUTION INTRAVENOUS; SUBCUTANEOUS at 05:22

## 2020-03-24 RX ADMIN — INSULIN LISPRO 2 UNITS: 100 INJECTION, SOLUTION INTRAVENOUS; SUBCUTANEOUS at 21:02

## 2020-03-24 ASSESSMENT — ENCOUNTER SYMPTOMS
VOMITING: 0
SHORTNESS OF BREATH: 0
NERVOUS/ANXIOUS: 0
NAUSEA: 0
DIARRHEA: 1
CHILLS: 0
FEVER: 0
ABDOMINAL PAIN: 0

## 2020-03-24 NOTE — FLOWSHEET NOTE
03/24/20 1113   Inhalation Therapy Treatment   $ SVN Performed Yes   Given By: Mouthpiece   IPV/Metaneb   $ IPV/Metaneb Performed Yes   Given By: Mouthpiece  (metaneb)

## 2020-03-24 NOTE — PROGRESS NOTES
Received bedside shift report from Kandy DHALIWAL RN regarding patient and assumed care. Patient awake, calm and stable, currently positioned in bed for comfort and safety; call light within reach. Denies pain or discomfort at this time. Will continue to monitor.

## 2020-03-24 NOTE — THERAPY
Speech Language Pathology  Daily Treatment     Patient Name: Vince Almanzar  Age:  79 y.o., Sex:  male  Medical Record #: [id number]  Today's Date: 3/24/2020     Subjective    Patient was in room, up in chair.      Objective       03/24/20 0802   Dysphagia    Other Treatments trials of level 4/MTL   Diet / Liquid Recommendation NPO;Pre-Feeding Trials with SLP Only   Nutritional Liquid Intake Rating Scale Nothing by mouth   Nutritional Food Intake Rating Scale Tube dependent with consistent oral intake   SLP Total Time Spent   SLP Individual Total Time Spent (Mins) 30   Charge Group   SLP Swallowing Dysfunction Treatment Swallowing Dysfunction Treatment       Assessment    Patient was assessed with level 4/MTL trial tray. Continues to demonstrate intermittent wet vocal quality, but is improving. Ongoing education regarding safe swallow strategies.   Appears to tolerate medications floated whole in puree.     Plan    Continue trials with possible upgrade as appropriate.

## 2020-03-24 NOTE — PROGRESS NOTES
[facility] Medicine Daily Progress Note    Date of Service  3/24/2020    Chief Complaint:  Hypertension  Diabetes  Cough, fever, tachycardia, leukocytosis    Interval History:  No significant events or changes since last visit    Review of Systems  Review of Systems   Constitutional: Negative for chills and fever.   Respiratory: Negative for shortness of breath.    Cardiovascular: Negative for chest pain.   Gastrointestinal: Positive for diarrhea. Negative for abdominal pain, nausea and vomiting.   Psychiatric/Behavioral: The patient is not nervous/anxious.         Physical Exam  Temp:  [36.3 °C (97.3 °F)-36.9 °C (98.4 °F)] 36.9 °C (98.4 °F)  Pulse:  [72-82] 80  Resp:  [18-20] 18  BP: (109-130)/(52-60) 109/56  SpO2:  [92 %-98 %] 98 %    Physical Exam  Vitals signs and nursing note reviewed.   Constitutional:       Appearance: Normal appearance.   HENT:      Head: Atraumatic.   Eyes:      Conjunctiva/sclera: Conjunctivae normal.      Pupils: Pupils are equal, round, and reactive to light.   Neck:      Vascular: No carotid bruit.      Comments: Has C-collar  Cardiovascular:      Rate and Rhythm: Normal rate and regular rhythm.      Heart sounds: No murmur.   Pulmonary:      Effort: Pulmonary effort is normal.      Breath sounds: No stridor. No wheezing.   Abdominal:      General: There is no distension.      Palpations: Abdomen is soft.      Tenderness: There is no abdominal tenderness.   Musculoskeletal:      Right lower leg: Edema present.      Left lower leg: Edema present.   Skin:     General: Skin is warm and dry.      Findings: No rash.   Neurological:      Mental Status: He is alert and oriented to person, place, and time.   Psychiatric:         Mood and Affect: Mood normal.         Behavior: Behavior normal.         Fluids    Intake/Output Summary (Last 24 hours) at 3/24/2020 0950  Last data filed at 3/24/2020 0900  Gross per 24 hour   Intake 1520 ml   Output 475 ml   Net 1045 ml       Laboratory  Recent Labs      03/22/20  0552 03/24/20  0526   WBC 6.3 4.9   RBC 2.65* 2.57*   HEMOGLOBIN 7.8* 7.7*   HEMATOCRIT 24.9* 24.5*   MCV 94.0 95.3   MCH 29.4 30.0   MCHC 31.3* 31.4*   RDW 56.0* 61.1*   PLATELETCT 157* 126*   MPV 9.2 9.1     Recent Labs     03/22/20  0552 03/24/20  0526   SODIUM 135 137   POTASSIUM 4.3 4.2   CHLORIDE 103 104   CO2 21 23   GLUCOSE 229* 110*   BUN 31* 42*   CREATININE 2.19* 2.41*   CALCIUM 8.2* 8.1*                   Imaging      Assessment/Plan  Anemia- (present on admission)  Assessment & Plan  Hb stable at 7.7  Fe: 33, sats 12% (3/1)  B12: 264  Folate: > 24 (3/6)  FOB (-) x 1  Off folate supplements  On Fe & B12 supplements    Chronic kidney disease- (present on admission)  Assessment & Plan  Has stage 4 CKD  Bun/Cr :improved after giving IVF for infection  S/P metabolic acidosis  On Bicarb tabs  --> will d/c and observe (3/24)  Monitor    Diarrhea  Assessment & Plan  Diarrhea less recently  (3/18): C Diff PCR negative and Toxin negative  (3/21): C Diff PCR positive and Toxin negative  At high risk for developing C Dif infection given exposure to abx and prolonged hospitalization  On oral Vancomycin (thru 3/29)    Volume overload  Assessment & Plan  Has LE edema -  improved  BNP: 9051 (3/22) --> 8352 (3/24)  On Lasix: 20 mg daily  Note: on tube feeds -- transitioning to an oral diet  Monitor    Somnolence  Assessment & Plan  Mentation much improved w/ decreased pain meds    Pneumonia  Assessment & Plan  Resolved  S/P fever  S/P leukocytosis  CXR --> right basilar consolidation consistent with pneumonia; left basal consolidation could be due to atelectasis or pneumonia  Rapid strep: neg  Flu (-)  Resp panel: neg except for Rhinovirus  U/A (+) for Proteus   BC x 2: neg  S/P abx    Cervical spinal stenosis  Assessment & Plan  Had worsening bilateral upper extremity weakness, balance, and neck pain  MRI showed severe stenosis at C3-C6 with interval progression over the last year  S/P C3-C6 laminectomy and  posterior decompression    Uncontrolled type 2 diabetes mellitus with hyperglycemia (HCC)- (present on admission)  Assessment & Plan  Hba1c: 5.1 (3/13)   BS: 113-224  On Lantus: 10 units qam  On Actos: 30 mg daily  Note: home meds include Ozempic 1.0 mg once a week, Actos 30mg qd, and Tresiba 10 units qhs  Cont to monitor    Hyperlipemia- (present on admission)  Assessment & Plan  On Zocor    Essential hypertension- (present on admission)  Assessment & Plan  BP ok  On Lopressor: 12.5 mg bid   Monitor

## 2020-03-24 NOTE — THERAPY
Recreational Therapy  Daily Treatment     Patient Name: Vince Almanzar  AGE:  79 y.o., SEX:  male  Medical Record #: [id number]  Today's Date: 3/24/2020       Subjective    Pt was ready and willing for session.      Objective       03/24/20 1501   Functional Ability Status - Cognitive   Attention Span Remains on Task   Comprehension Follows Two Step Commands   Judgment Able to Make Independent Decisions   Cognitive Comments Min verbal prompts to follow technique of the game he had played twice earlier   Functional Ability Status - Emotional    Affect Appropriate   Mood Appropriate   Behavior Appropriate   Skilled Intervention    Skilled Intervention Cognitive Leisure;Relaxation / Coping Skills   Interdisciplinary Plan of Care Collaboration   Patient Position at End of Therapy Seated;Phone within Reach;Tray Table within Reach   Treatment Time   Total Time Spent (mins) 30   Procedural Tracking   Procedural Tracking Cognitive Skills Training       Assessment    Pt required min verbal prompts to complete the cognitive leisure.     Plan    Cognitive leisure

## 2020-03-24 NOTE — THERAPY
Occupational Therapy  Daily Treatment     Patient Name: Vince Almanzar  Age:  79 y.o., Sex:  male  Medical Record #: [id number]  Today's Date: 3/24/2020     Precautions  Precautions: (P) Fall Risk, Cervical Collar    Comments: (P) No BP on LUE, [place], Droplet/isolation precautions, c-collar, risk for pressure injuries     Safety   ADL Safety : Requires Supervision for Safety, Requires Physical Assist for Safety  Bathroom Safety: Requires Supervision for Safety, Requires Physical Assist for Safety  Comments: See FIMs for ADL performance details with UB/LB dressing and grooming    Subjective    Patient agreeable to participate in OT .      Objective     03/24/20 1231   Precautions   Precautions Fall Risk;Cervical Collar     Comments No BP on LUE, [place], Droplet/isolation precautions, c-collar, risk for pressure injuries    Sitting Upper Body Exercises   Upper Extremity Bike Level 3 Resistance  (10:06; 1.1 miles)   Interdisciplinary Plan of Care Collaboration   Patient Position at End of Therapy Seated;Self Releasing Lap Belt Applied   OT Total Time Spent   OT Individual Total Time Spent (Mins) 60   OT Charge Group   OT Self Care / ADL 2   OT Therapy Activity 1   OT Therapeutic Exercise  1       FIM Lower Body Dressing Score:  2 - Max Assistance  Lower Body Dressing Description:  Reacher, Increased time, Supervision for safety, Verbal cueing, Set-up of equipment(Max assist for donning elastic waist pants (threading LEs through pants and pants over hips pursuit with use of grab bar for balanace/safety))    FIM Toileting:  Incontinent of bowel (loose stool).    STS (1 set of 8) in // bars with mod assist (for lift).     Assessment    Patient tolerated OT session well with focus on ADLs, endurance and balance. Pt unaware that he was incontinent of bowel and pants were wet. Functional performance limited by decreased endurance, strength and standing balance.     Plan    incorporate spinal precautions and energy  conservation techniques into ADLs with AE as needed, general strengthening, endurance building, balance (sit and stand)

## 2020-03-24 NOTE — THERAPY
Physical Therapy   Daily Treatment     Patient Name: Vince Almanzar  Age:  79 y.o., Sex:  male  Medical Record #: [id number]  Today's Date: 3/24/2020     Precautions  Precautions: (P) Fall Risk, Cervical Collar    Comments: (P) No BP on LUE, [place], Droplet/isolation precautions, c-collar, risk for pressure injuries     Subjective    Pt was sitting in w/c upon arrival and agreeable to tx     Objective       03/24/20 1001   Precautions   Precautions Fall Risk;Cervical Collar     Comments No BP on LUE, [place], Droplet/isolation precautions, c-collar, risk for pressure injuries    Sitting Lower Body Exercises   Other Exercises 3 x 10 horizontal abduction pass R/L UE; arm bike level 1 5min x 2 fwd   Interdisciplinary Plan of Care Collaboration   Patient Position at End of Therapy Seated;Call Light within Reach;Tray Table within Reach;Phone within Reach   PT Total Time Spent   PT Individual Total Time Spent (Mins) 60   PT Charge Group   PT Gait Training 2   PT Therapeutic Activities 2       FIM Walking Score:  5 - Standby Prompting/Supervision or Set-up  Walking Description:  (130ft x 1, 190ft x 1, 125ft x1, 170ft x 1, 90ft x 1, 60ft  x1, FWW SPV)    3 x 15 AAROM R/L LE with gait belt, education for in room performance on own    30sec x 3 R/L PROM hip ER      Assessment    Pt with improved walking endurance with 4ww and good safety in sitting in 4ww/ self pacing this session    Plan    Endurance in 4ww/ self pacing/ safety in sitting in 4ww, standing balance, endurance, issue standing therex HEP, monitor spasticity, STS, bed mobility

## 2020-03-24 NOTE — THERAPY
Speech Language Pathology  Daily Treatment     Patient Name: Vince Almanzar  Age:  79 y.o., Sex:  male  Medical Record #: [id number]  Today's Date: 3/24/2020     Subjective    Patient was in room, willing to participate.      Objective       03/24/20 1132   Dysphagia    Other Treatments level 4/MTL   Diet / Liquid Recommendation Mildly Thick (2) - (Nectar Thick);Puree (4)   Nutritional Liquid Intake Rating Scale Thickened beverages (mildly thick unless otherwise specified)   Nutritional Food Intake Rating Scale Total oral diet of a single consistency   SLP Total Time Spent   SLP Individual Total Time Spent (Mins) 30   Charge Group   SLP Swallowing Dysfunction Treatment Swallowing Dysfunction Treatment         Assessment    Patient was assessed with level 4/MTL trial tray. Intermittent changes in vocal quality with cues to clear, otherwise appears to tolerate.     Plan    Initiate level 4/MTL diet, float meds in puree, continue to assess tolerate and trial as appropriate. t-dine when out of isolation

## 2020-03-24 NOTE — PROGRESS NOTES
"Rehab Progress Note     Encounter Date: 3/24/2020    CC: UE weakness    Interval Events (Subjective)  He reports improving strength and bilateral upper and lower extremities.    Did well with eating today, no coughing or choking    He reports productive cough is significantly improved with the breathing treatments.    He denies any fevers chills increased shortness of breath with DC of antibiotics.    He reports having multiple bowel incontinence over the last 24 hours.   He is participating well with therapy    Bowel: Last BM: 03/23/20  Bladder: Multiple bladder incontinence in diaper    ROS:  Gen: No fatigue, confusion, significant weight loss  Eyes: no blurry vision, double vision or pain in eyes  ENT: no changes in hearing, runny nose, nose bleeds, sinus pain  CV: No CP, palpitations  Lungs: no shortness of breath, changes in secretions, changes in cough, pain with coughing  Abd: No abd pain, nausea, vomiting, pain with eating  : no blood in urine, suprapubic pain  Ext: No swelling in legs, asymmetric atrophy  Neuro: no changes in strength or sensation  Skin: no new ulcers/skin breakdown appreciated by patient  Mood: No changes in mood today, increase in depression or anxiety  Heme: no bruising, or bleeding  Rest of 14 point review of systems is negative    Objective:  Vitals: /56   Pulse 80   Temp 36.9 °C (98.4 °F) (Oral)   Resp 18   Ht 1.778 m (5' 10\")   Wt 74.5 kg (164 lb 3.9 oz)   SpO2 98%   Gen: NAD, Body mass index is 23.57 kg/m².  HEENT: NC/AT, PERRLA, moist mucous membranes, NG tube in place and left nares  Cardio: RRR, no mumurs  Pulm: CTAB, with normal respiratory effort  Abd: Soft NTND, active bowel sounds  Ext: R>L peripheral edema up to the thigh. No calf tenderness. No clubbing/cyanosis. +dorsal pedalis pulses bilaterally.    Tone: 1+-2/4 MAS right knee extension/hamstrings, unable to reach full extension, approximately 10-20 degrees from extension, not resulting in significant " pain        Recent Results (from the past 72 hour(s))   OCCULT BLOOD X3 (STOOL)    Collection Time: 03/21/20 11:35 AM   Result Value Ref Range    Occult Blood 1 Negative Negative    Occult Blood 2 Negative Negative   Cdiff By PCR Rflx Toxin    Collection Time: 03/21/20 11:35 AM   Result Value Ref Range    C Diff by PCR See Toxin Negative    [id number] Presumptive Positive Negative   C Diff Toxin    Collection Time: 03/21/20 11:35 AM   Result Value Ref Range    C.Diff Toxin A&B Negative    ACCU-CHEK GLUCOSE    Collection Time: 03/21/20 12:14 PM   Result Value Ref Range    Glucose - Accu-Ck 238 (H) 65 - 99 mg/dL   ACCU-CHEK GLUCOSE    Collection Time: 03/21/20  4:53 PM   Result Value Ref Range    Glucose - Accu-Ck 226 (H) 65 - 99 mg/dL   ACCU-CHEK GLUCOSE    Collection Time: 03/21/20  9:33 PM   Result Value Ref Range    Glucose - Accu-Ck 223 (H) 65 - 99 mg/dL   CBC WITHOUT DIFFERENTIAL    Collection Time: 03/22/20  5:52 AM   Result Value Ref Range    WBC 6.3 4.8 - 10.8 K/uL    RBC 2.65 (L) 4.70 - 6.10 M/uL    Hemoglobin 7.8 (L) 14.0 - 18.0 g/dL    Hematocrit 24.9 (L) 42.0 - 52.0 %    MCV 94.0 81.4 - 97.8 fL    MCH 29.4 27.0 - 33.0 pg    MCHC 31.3 (L) 33.7 - 35.3 g/dL    RDW 56.0 (H) 35.9 - 50.0 fL    Platelet Count 157 (L) 164 - 446 K/uL    MPV 9.2 9.0 - 12.9 fL   Basic Metabolic Panel    Collection Time: 03/22/20  5:52 AM   Result Value Ref Range    Sodium 135 135 - 145 mmol/L    Potassium 4.3 3.6 - 5.5 mmol/L    Chloride 103 96 - 112 mmol/L    Co2 21 20 - 33 mmol/L    Glucose 229 (H) 65 - 99 mg/dL    Bun 31 (H) 8 - 22 mg/dL    Creatinine 2.19 (H) 0.50 - 1.40 mg/dL    Calcium 8.2 (L) 8.5 - 10.5 mg/dL    Anion Gap 11.0 7.0 - 16.0   proBrain Natriuretic Peptide, NT    Collection Time: 03/22/20  5:52 AM   Result Value Ref Range    NT-proBNP 9051 (H) 0 - 125 pg/mL   ESTIMATED GFR    Collection Time: 03/22/20  5:52 AM   Result Value Ref Range    GFR If African American 35 (A) >60 mL/min/1.73 m 2    GFR If Non African  American 29 (A) >60 mL/min/1.73 m 2   ACCU-CHEK GLUCOSE    Collection Time: 03/22/20  8:03 AM   Result Value Ref Range    Glucose - Accu-Ck 181 (H) 65 - 99 mg/dL   ACCU-CHEK GLUCOSE    Collection Time: 03/22/20 11:18 AM   Result Value Ref Range    Glucose - Accu-Ck 201 (H) 65 - 99 mg/dL   ACCU-CHEK GLUCOSE    Collection Time: 03/22/20  5:23 PM   Result Value Ref Range    Glucose - Accu-Ck 227 (H) 65 - 99 mg/dL   ACCU-CHEK GLUCOSE    Collection Time: 03/22/20 10:12 PM   Result Value Ref Range    Glucose - Accu-Ck 219 (H) 65 - 99 mg/dL   ACCU-CHEK GLUCOSE    Collection Time: 03/23/20  7:37 AM   Result Value Ref Range    Glucose - Accu-Ck 163 (H) 65 - 99 mg/dL   ACCU-CHEK GLUCOSE    Collection Time: 03/23/20 11:19 AM   Result Value Ref Range    Glucose - Accu-Ck 224 (H) 65 - 99 mg/dL   ACCU-CHEK GLUCOSE    Collection Time: 03/23/20  5:28 PM   Result Value Ref Range    Glucose - Accu-Ck 175 (H) 65 - 99 mg/dL   ACCU-CHEK GLUCOSE    Collection Time: 03/23/20  9:05 PM   Result Value Ref Range    Glucose - Accu-Ck 185 (H) 65 - 99 mg/dL   CBC WITHOUT DIFFERENTIAL    Collection Time: 03/24/20  5:26 AM   Result Value Ref Range    WBC 4.9 4.8 - 10.8 K/uL    RBC 2.57 (L) 4.70 - 6.10 M/uL    Hemoglobin 7.7 (L) 14.0 - 18.0 g/dL    Hematocrit 24.5 (L) 42.0 - 52.0 %    MCV 95.3 81.4 - 97.8 fL    MCH 30.0 27.0 - 33.0 pg    MCHC 31.4 (L) 33.7 - 35.3 g/dL    RDW 61.1 (H) 35.9 - 50.0 fL    Platelet Count 126 (L) 164 - 446 K/uL    MPV 9.1 9.0 - 12.9 fL   Basic Metabolic Panel    Collection Time: 03/24/20  5:26 AM   Result Value Ref Range    Sodium 137 135 - 145 mmol/L    Potassium 4.2 3.6 - 5.5 mmol/L    Chloride 104 96 - 112 mmol/L    Co2 23 20 - 33 mmol/L    Glucose 110 (H) 65 - 99 mg/dL    Bun 42 (H) 8 - 22 mg/dL    Creatinine 2.41 (H) 0.50 - 1.40 mg/dL    Calcium 8.1 (L) 8.5 - 10.5 mg/dL    Anion Gap 10.0 7.0 - 16.0   proBrain Natriuretic Peptide, NT    Collection Time: 03/24/20  5:26 AM   Result Value Ref Range    NT-proBNP 8352 (H)  0 - 125 pg/mL   ESTIMATED GFR    Collection Time: 03/24/20  5:26 AM   Result Value Ref Range    GFR If  32 (A) >60 mL/min/1.73 m 2    GFR If Non African American 26 (A) >60 mL/min/1.73 m 2   ACCU-CHEK GLUCOSE    Collection Time: 03/24/20  7:28 AM   Result Value Ref Range    Glucose - Accu-Ck 113 (H) 65 - 99 mg/dL       Current Facility-Administered Medications   Medication Frequency   • pregabalin (LYRICA) capsule 50 mg BID   • sodium chloride 3% nebulizer solution 3 mL TID   • cholestyramine (QUESTRAN,PREVALITE) 4 GM powder 4 g BID   • loperamide (IMODIUM) capsule 2 mg 4X/DAY PRN   • insulin glargine (LANTUS) injection 10 Units QAM INSULIN   • vancomycin 50 mg/mL oral soln 125 mg Q6HRS   • furosemide (LASIX) tablet 20 mg Q DAY   • Pharmacy Consult: Enteral tube insertion - review meds/change route/product selection PHARMACY TO DOSE   • gabapentin (NEURONTIN) capsule 300 mg Q EVENING   • bisacodyl (THE MAGIC BULLET) suppository 10 mg QDAY PRN    And   • docusate sodium (COLACE) capsule 200 mg BID PRN    And   • sennosides (SENOKOT) 8.6 MG tablet 17.2 mg QDAY PRN    And   • magnesium hydroxide (MILK OF MAGNESIA) suspension 30 mL QDAY PRN   • insulin lispro (HUMALOG) injection 2-12 Units 4X/DAY ACHS    And   • glucose 4 g chewable tablet 16 g Q15 MIN PRN    And   • dextrose 50% (D50W) injection 50 mL Q15 MIN PRN   • ipratropium-albuterol (DUONEB) nebulizer solution TID   • lidocaine 2 % jelly PRN    And   • nitroglycerin (NITRO-BID) 2 % ointment 1 Inch PRN   • Respiratory Therapy Consult Continuous RT   • oxyCODONE immediate-release (ROXICODONE) tablet 5 mg Q3HRS PRN   • oxyCODONE immediate release (ROXICODONE) tablet 10 mg Q3HRS PRN   • acetaminophen (TYLENOL) tablet 650 mg Q4HRS PRN   • therapeutic multivitamin-minerals (THERAGRAN-M) tablet 1 Tab DAILY WITH LUNCH   • artificial tears ophthalmic solution 1 Drop PRN   • benzocaine-menthol (CEPACOL) lozenge 1 Lozenge Q2HRS PRN   • mag hydrox-al  hydrox-simeth (MAALOX PLUS ES or MYLANTA DS) suspension 20 mL Q2HRS PRN   • ondansetron (ZOFRAN ODT) dispertab 4 mg 4X/DAY PRN    Or   • ondansetron (ZOFRAN) syringe/vial injection 4 mg 4X/DAY PRN   • sodium chloride (OCEAN) 0.65 % nasal spray 2 Spray PRN   • traZODone (DESYREL) tablet 50 mg QHS PRN   • ammonium lactate (LAC-HYDRIN) 12 % lotion BID   • methocarbamol (ROBAXIN) tablet 750 mg Q8HRS PRN   • cyanocobalamin (VITAMIN B12) tablet 1,000 mcg DAILY   • ferrous sulfate tablet 325 mg BID WITH MEALS   • lidocaine (LIDODERM) 5 % 2 Patch Q24HR   • metoprolol (LOPRESSOR) tablet 12.5 mg BID   • pioglitazone (ACTOS) tablet 30 mg DAILY   • simethicone (MYLICON) chewable tab 80 mg TID PRN   • simvastatin (ZOCOR) tablet 20 mg Nightly   • heparin injection 5,000 Units Q8HRS   • midodrine (PROAMATINE) tablet 5 mg Q4HRS PRN       Orders Placed This Encounter   Procedures   • Diet NPO     Standing Status:   Standing     Number of Occurrences:   1     Order Specific Question:   Restrict to:     Answer:   Strict [1]       Assessment:  Active Hospital Problems    Diagnosis   • Acute on chronic renal failure (HCC)   • Central cord syndrome (HCC)   • Type 2 diabetes mellitus (HCC)   • Anemia   • Thrombocytopenia (HCC)   • 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 Sellers on 2/28.  Pinprick altered bilaterally at C3-C6  - Collar on at all times, spinal precautions  - Calcium carbonate 500 mg twice daily  -Continue comprehensive acute inpatient rehabilitation with physical,  occupational and speech therapy    Neurologic respiratory impairment: Aggressive secretion management  -Consult respiratory therapy  - Oxygen as per guidelines  - DuoNeb 3X daily   -DC Mucomyst 3 times a day for mucolytic   -Start hypertonic saline, 3% for mucolytic with DuoNeb    Pneumonia: Bilateral lower lobe, likely bacterial, productive cough  - Zyvox and Zosyn started on 3/14, transitioned to ceftriaxone on 3/18, restarted on Zyvox and Zosyn on 3/19, completed on 3/23  -Appreciate hospitalist input  -Aggressive secretion management as above  - Given productive cough will place on droplet precaution until completion of antibiotics, of chest x-ray is improving will DC droplet precaution  -Check chest x-ray in a.m.  -Check CBC in a.m.    Fluid overload: Pleural effusions bilaterally, chest x-ray from 3/24 shows stable pleural effusion and bilateral pneumonia/consolidation  -Check chest x-ray   -Lasix 20 mg daily, discussed with hospitalist, continue current dosage given nectar thick liquid diet, decreased p.o. fluid intake  -Daily weights, strict I's and O's  Intake/Output Summary (Last 24 hours) at 3/24/2020 1105  Last data filed at 3/24/2020 0900  Gross per 24 hour   Intake 1520 ml   Output 475 ml   Net 1045 ml         C. Difficile diarrhea: PCR positive, toxin negative, colonization versus infection versus tube feed tolerance given and loose watery stool  - Vancomycin oral 125 mg every 6 hours  -Contact precautions, C. difficile, on droplet precautions for productive cough in the setting of pneumonia  -Cholestyramine 4 g twice daily    Neurogenic bladder: Wallis removed after treatment with Lasix  - voiding trial, if can't void in 6 hours or prn check pvr and if >500cc then ICP,if able to void then check PVR, if PVR is >200cc then ICP       Neurogenic bowel  -Upper motor neuron neurogenic bowel program with senna 2 tablets q. noon, Colace 200 mg twice daily and Dulcolax suppository with digital  stimulation  -Patient had episode of diarrhea yesterday was checked for C. difficile which was negative    Potential for orthostatic hypotension  Because of significant autonomic dysfunction associated with spinal cord injury, the patient is at high risk for orthostatic hypotension.  - Midodrine 5mg q4 hours prn SBP <100  -DC scheduled midodrine 5 mg tid    Dysphasia: Previous diagnosis after traumatic brain injury, high risk after cervical spinal cord injury  -Consulted speech therapy, increased difficulty secondary to lethargy, difficulty following compensatory techniques  -Patient was placed on n.p.o. NG tube placed on 3/20  -Undergoing trial trays with speech therapy    Pain:  #Neuropathic pain:  bilateral lower extremity pins-and-needles  - DC gabapentin 300 mg daily,  dose limited by GFR, concern that increased dose of gabapentin was resulting in lethargy  -Lyrica 50 mg twice daily to address allodynia, currently being held  -vitamin C 500 mg every 12 hours, which is been shown to improve gabapentin absorption and pain management     Postoperative pain:  - Oxycodone 5-10 mg every 3 hours as needed pain  - Robaxin 750 mg every 8 hours as needed pain  -DC scheduled Tylenol secondary to pneumonia, can mask fever     Spasticity: Given increased lethargy will decrease baclofen  - DC'd baclofen, secondary to fatigue    Anemia: Required IV iron supplementation, hemoglobin of 7.7 on admission  -Epogen 2000 units, Monday Wednesday Friday  -Folic acid 5 mg daily  - Ferrous sulfate 325 mg twice daily    Diabetes: Appreciate hospitalist input  -Lantus 10 units nightly  -Check fingersticks q. before meals, nightly  -Sliding scale insulin  - Pioglitazone 30 mg a daily  - ADA diet  -Consulted hospitalist      Acute on chronic kidney injury: Previously stage IV kidney disease with bilateral hydronephrosis suggestive of post renal obstruction, likely worsened by neurogenic bladder.  BUN/creatinine of 49/2.5  -Check BMP in  a.m.  -Manage neurogenic bladder as above     Hyponatremia: Sodium of 136 on admission  -Sodium bicarb tablets 650 mg 3 times a day     Hyperlipidemia: Triglyceride 65, HDL 31, LDL 37  -Simvastatin 20 mg nightly     Vitamin deficiency  In the posttraumatic setting, there is a high likelihood of vitamin D deficiency.   Vitamin D level on admission of 47, goal of >30  - DC vitamin D supplementation     DVT prophylaxis/right lower extremity swelling: Patient at increased risk for VTE formation with neurologic compromise/myelopathy.  -Ultrasound right lower extremity was negative for DVT  -Heparin 5000 units every 8 hours, unable to transition to Lovenox given current renal function    Patient was seen for 28 minutes on unit/floor of which > 50% of time was spent on counseling and coordination of care regarding the above, including prognosis, risk reduction, benefits of treatment, and options for next stage of care including acute on chronic kidney injury, check BMP in a.m., pneumonia, DC antibiotics, check CBC in a.m., check chest x-ray in a.m., neuropathic pain, DC gabapentin, fluid overload, discussion on Lasix, start patient on daily weights, strict I's and O's, dysphasia, working with SLP on advancing diet.        Ramu Garner D.O.

## 2020-03-24 NOTE — FLOWSHEET NOTE
03/24/20 0749   Events/Summary/Plan   Events/Summary/Plan Metaneb with Alb, SVN with sod chlor, 02 spot check, IS   Vital Signs   Pulse 80   Respiration 18   Pulse Oximetry 98 %   $ Pulse Oximetry (Spot Check) Yes   Respiratory Assessment   Level of Consciousness Alert   Breath Sounds   RML Breath Sounds Diminished   RLL Breath Sounds Diminished   LLL Breath Sounds Diminished   Secretions   Cough Congested;Productive   How Sputum Obtained Expectorated   Sputum Amount Moderate   Sputum Color Tan;White   Sputum Consistency Thick   Oxygen   O2 Delivery Device None - Room Air

## 2020-03-24 NOTE — CARE PLAN
Problem: Safety  Goal: Will remain free from injury  Outcome: PROGRESSING AS EXPECTED     Problem: Infection  Goal: Will remain free from infection  Outcome: PROGRESSING AS EXPECTED  Note: Pt is receiving oral vancomycin.      Problem: Bowel/Gastric:  Goal: Normal bowel function is maintained or improved  Note: Pt had an episode of bowel incontinence this shift with watery stool per OT.      Problem: Urinary Elimination:  Goal: Ability to reestablish a normal urinary elimination pattern will improve  Outcome: PROGRESSING AS EXPECTED  Note: Pt is now on strict I&O.      Problem: Pain Management  Goal: Pain level will decrease to patient's comfort goal  Outcome: PROGRESSING AS EXPECTED

## 2020-03-24 NOTE — FLOWSHEET NOTE
03/24/20 0748   Inhalation Therapy Treatment   $ SVN Performed Yes   Given By: Mouthpiece  (3% sod chlor)   Date SVN Next Change Due 03/31/20   IPV/Metaneb   $ IPV/Metaneb Performed Yes   Given By: Mouthpiece

## 2020-03-24 NOTE — FLOWSHEET NOTE
03/24/20 1628   Inhalation Therapy Treatment   $ SVN Performed Yes   Given By: Mouthpiece   IPV/Metaneb   $ IPV/Metaneb Performed Yes   Given By: Mouthpiece

## 2020-03-25 LAB
ANION GAP SERPL CALC-SCNC: 11 MMOL/L (ref 7–16)
BASOPHILS # BLD AUTO: 0.2 % (ref 0–1.8)
BASOPHILS # BLD: 0.01 K/UL (ref 0–0.12)
BUN SERPL-MCNC: 44 MG/DL (ref 8–22)
CALCIUM SERPL-MCNC: 8.5 MG/DL (ref 8.5–10.5)
CHLORIDE SERPL-SCNC: 107 MMOL/L (ref 96–112)
CO2 SERPL-SCNC: 22 MMOL/L (ref 20–33)
CREAT SERPL-MCNC: 2.53 MG/DL (ref 0.5–1.4)
EOSINOPHIL # BLD AUTO: 0.08 K/UL (ref 0–0.51)
EOSINOPHIL NFR BLD: 1.9 % (ref 0–6.9)
ERYTHROCYTE [DISTWIDTH] IN BLOOD BY AUTOMATED COUNT: 63.7 FL (ref 35.9–50)
GLUCOSE BLD-MCNC: 106 MG/DL (ref 65–99)
GLUCOSE BLD-MCNC: 156 MG/DL (ref 65–99)
GLUCOSE BLD-MCNC: 183 MG/DL (ref 65–99)
GLUCOSE BLD-MCNC: 83 MG/DL (ref 65–99)
GLUCOSE SERPL-MCNC: 103 MG/DL (ref 65–99)
HCT VFR BLD AUTO: 25.3 % (ref 42–52)
HGB BLD-MCNC: 7.9 G/DL (ref 14–18)
IMM GRANULOCYTES # BLD AUTO: 0.06 K/UL (ref 0–0.11)
IMM GRANULOCYTES NFR BLD AUTO: 1.4 % (ref 0–0.9)
LYMPHOCYTES # BLD AUTO: 0.66 K/UL (ref 1–4.8)
LYMPHOCYTES NFR BLD: 15.5 % (ref 22–41)
MCH RBC QN AUTO: 30 PG (ref 27–33)
MCHC RBC AUTO-ENTMCNC: 31.2 G/DL (ref 33.7–35.3)
MCV RBC AUTO: 96.2 FL (ref 81.4–97.8)
MONOCYTES # BLD AUTO: 0.62 K/UL (ref 0–0.85)
MONOCYTES NFR BLD AUTO: 14.6 % (ref 0–13.4)
NEUTROPHILS # BLD AUTO: 2.83 K/UL (ref 1.82–7.42)
NEUTROPHILS NFR BLD: 66.4 % (ref 44–72)
NRBC # BLD AUTO: 0.02 K/UL
NRBC BLD-RTO: 0.5 /100 WBC
PLATELET # BLD AUTO: 130 K/UL (ref 164–446)
PMV BLD AUTO: 9.3 FL (ref 9–12.9)
POTASSIUM SERPL-SCNC: 4.4 MMOL/L (ref 3.6–5.5)
RBC # BLD AUTO: 2.63 M/UL (ref 4.7–6.1)
SODIUM SERPL-SCNC: 140 MMOL/L (ref 135–145)
WBC # BLD AUTO: 4.3 K/UL (ref 4.8–10.8)

## 2020-03-25 PROCEDURE — 97535 SELF CARE MNGMENT TRAINING: CPT

## 2020-03-25 PROCEDURE — 700102 HCHG RX REV CODE 250 W/ 637 OVERRIDE(OP): Performed by: HOSPITALIST

## 2020-03-25 PROCEDURE — 770010 HCHG ROOM/CARE - REHAB SEMI PRIVAT*

## 2020-03-25 PROCEDURE — 97530 THERAPEUTIC ACTIVITIES: CPT

## 2020-03-25 PROCEDURE — 80048 BASIC METABOLIC PNL TOTAL CA: CPT

## 2020-03-25 PROCEDURE — 97110 THERAPEUTIC EXERCISES: CPT

## 2020-03-25 PROCEDURE — 36415 COLL VENOUS BLD VENIPUNCTURE: CPT

## 2020-03-25 PROCEDURE — 97116 GAIT TRAINING THERAPY: CPT

## 2020-03-25 PROCEDURE — 99232 SBSQ HOSP IP/OBS MODERATE 35: CPT | Performed by: HOSPITALIST

## 2020-03-25 PROCEDURE — 700111 HCHG RX REV CODE 636 W/ 250 OVERRIDE (IP): Performed by: PHYSICAL MEDICINE & REHABILITATION

## 2020-03-25 PROCEDURE — 82962 GLUCOSE BLOOD TEST: CPT | Mod: 91

## 2020-03-25 PROCEDURE — 94640 AIRWAY INHALATION TREATMENT: CPT

## 2020-03-25 PROCEDURE — 700101 HCHG RX REV CODE 250: Performed by: PHYSICAL MEDICINE & REHABILITATION

## 2020-03-25 PROCEDURE — A9270 NON-COVERED ITEM OR SERVICE: HCPCS | Performed by: HOSPITALIST

## 2020-03-25 PROCEDURE — 94760 N-INVAS EAR/PLS OXIMETRY 1: CPT

## 2020-03-25 PROCEDURE — 700102 HCHG RX REV CODE 250 W/ 637 OVERRIDE(OP): Performed by: PHYSICAL MEDICINE & REHABILITATION

## 2020-03-25 PROCEDURE — 92526 ORAL FUNCTION THERAPY: CPT

## 2020-03-25 PROCEDURE — A9270 NON-COVERED ITEM OR SERVICE: HCPCS | Performed by: PHYSICAL MEDICINE & REHABILITATION

## 2020-03-25 PROCEDURE — 99232 SBSQ HOSP IP/OBS MODERATE 35: CPT | Performed by: PHYSICAL MEDICINE & REHABILITATION

## 2020-03-25 PROCEDURE — 85025 COMPLETE CBC W/AUTO DIFF WBC: CPT

## 2020-03-25 RX ORDER — BACLOFEN 10 MG/1
5 TABLET ORAL 3 TIMES DAILY
Status: DISCONTINUED | OUTPATIENT
Start: 2020-03-25 | End: 2020-03-28 | Stop reason: HOSPADM

## 2020-03-25 RX ORDER — PREGABALIN 75 MG/1
75 CAPSULE ORAL 2 TIMES DAILY
Status: DISCONTINUED | OUTPATIENT
Start: 2020-03-25 | End: 2020-03-28 | Stop reason: HOSPADM

## 2020-03-25 RX ORDER — METHOCARBAMOL 500 MG/1
500 TABLET, FILM COATED ORAL EVERY 8 HOURS PRN
Status: DISCONTINUED | OUTPATIENT
Start: 2020-03-25 | End: 2020-03-27

## 2020-03-25 RX ADMIN — IPRATROPIUM BROMIDE AND ALBUTEROL SULFATE 3 ML: .5; 3 SOLUTION RESPIRATORY (INHALATION) at 12:50

## 2020-03-25 RX ADMIN — PREGABALIN 75 MG: 75 CAPSULE ORAL at 22:02

## 2020-03-25 RX ADMIN — OXYCODONE HYDROCHLORIDE 10 MG: 10 TABLET ORAL at 22:09

## 2020-03-25 RX ADMIN — INSULIN GLARGINE 10 UNITS: 100 INJECTION, SOLUTION SUBCUTANEOUS at 07:37

## 2020-03-25 RX ADMIN — OXYCODONE HYDROCHLORIDE 10 MG: 10 TABLET ORAL at 04:05

## 2020-03-25 RX ADMIN — VANCOMYCIN HYDROCHLORIDE 125 MG: KIT ORAL at 18:00

## 2020-03-25 RX ADMIN — HEPARIN SODIUM 5000 UNITS: 5000 INJECTION, SOLUTION INTRAVENOUS; SUBCUTANEOUS at 04:05

## 2020-03-25 RX ADMIN — VANCOMYCIN HYDROCHLORIDE 125 MG: KIT ORAL at 00:31

## 2020-03-25 RX ADMIN — HEPARIN SODIUM 5000 UNITS: 5000 INJECTION, SOLUTION INTRAVENOUS; SUBCUTANEOUS at 14:14

## 2020-03-25 RX ADMIN — BACLOFEN 5 MG: 10 TABLET ORAL at 21:57

## 2020-03-25 RX ADMIN — IPRATROPIUM BROMIDE AND ALBUTEROL SULFATE 3 ML: .5; 3 SOLUTION RESPIRATORY (INHALATION) at 17:02

## 2020-03-25 RX ADMIN — CHOLESTYRAMINE 4 G: 4 POWDER, FOR SUSPENSION ORAL at 22:02

## 2020-03-25 RX ADMIN — BACLOFEN 5 MG: 10 TABLET ORAL at 14:14

## 2020-03-25 RX ADMIN — FERROUS SULFATE TAB 325 MG (65 MG ELEMENTAL FE) 325 MG: 325 (65 FE) TAB at 17:37

## 2020-03-25 RX ADMIN — CHOLESTYRAMINE 4 G: 4 POWDER, FOR SUSPENSION ORAL at 08:39

## 2020-03-25 RX ADMIN — CYANOCOBALAMIN TAB 1000 MCG 1000 MCG: 1000 TAB at 08:39

## 2020-03-25 RX ADMIN — SODIUM CHLORIDE SOLN NEBU 3% 3 ML: 3 NEBU SOLN at 12:57

## 2020-03-25 RX ADMIN — Medication: at 07:37

## 2020-03-25 RX ADMIN — Medication: at 22:05

## 2020-03-25 RX ADMIN — LIDOCAINE 2 PATCH: 50 PATCH CUTANEOUS at 11:15

## 2020-03-25 RX ADMIN — IPRATROPIUM BROMIDE AND ALBUTEROL SULFATE 3 ML: .5; 3 SOLUTION RESPIRATORY (INHALATION) at 07:40

## 2020-03-25 RX ADMIN — SODIUM CHLORIDE SOLN NEBU 3% 3 ML: 3 NEBU SOLN at 07:46

## 2020-03-25 RX ADMIN — Medication 1 TABLET: at 11:15

## 2020-03-25 RX ADMIN — PREGABALIN 50 MG: 25 CAPSULE ORAL at 08:39

## 2020-03-25 RX ADMIN — FERROUS SULFATE TAB 325 MG (65 MG ELEMENTAL FE) 325 MG: 325 (65 FE) TAB at 08:39

## 2020-03-25 RX ADMIN — FUROSEMIDE 20 MG: 20 TABLET ORAL at 04:05

## 2020-03-25 RX ADMIN — PIOGLITAZONE 30 MG: 15 TABLET ORAL at 08:39

## 2020-03-25 RX ADMIN — HEPARIN SODIUM 5000 UNITS: 5000 INJECTION, SOLUTION INTRAVENOUS; SUBCUTANEOUS at 22:04

## 2020-03-25 RX ADMIN — INSULIN LISPRO 2 UNITS: 100 INJECTION, SOLUTION INTRAVENOUS; SUBCUTANEOUS at 22:12

## 2020-03-25 RX ADMIN — VANCOMYCIN HYDROCHLORIDE 125 MG: KIT ORAL at 12:00

## 2020-03-25 RX ADMIN — SODIUM CHLORIDE SOLN NEBU 3% 3 ML: 3 NEBU SOLN at 17:04

## 2020-03-25 RX ADMIN — INSULIN LISPRO 2 UNITS: 100 INJECTION, SOLUTION INTRAVENOUS; SUBCUTANEOUS at 11:15

## 2020-03-25 RX ADMIN — SIMVASTATIN 20 MG: 20 TABLET, FILM COATED ORAL at 21:57

## 2020-03-25 RX ADMIN — VANCOMYCIN HYDROCHLORIDE 125 MG: KIT ORAL at 04:05

## 2020-03-25 ASSESSMENT — ENCOUNTER SYMPTOMS
FEVER: 0
BLURRED VISION: 0
NAUSEA: 0
HEADACHES: 0
DIZZINESS: 0
HALLUCINATIONS: 0
DIARRHEA: 1
PALPITATIONS: 0
SHORTNESS OF BREATH: 0
VOMITING: 0

## 2020-03-25 ASSESSMENT — FIBROSIS 4 INDEX: FIB4 SCORE: 2.71

## 2020-03-25 ASSESSMENT — PATIENT HEALTH QUESTIONNAIRE - PHQ9
1. LITTLE INTEREST OR PLEASURE IN DOING THINGS: NOT AT ALL
2. FEELING DOWN, DEPRESSED, IRRITABLE, OR HOPELESS: NOT AT ALL
SUM OF ALL RESPONSES TO PHQ9 QUESTIONS 1 AND 2: 0

## 2020-03-25 NOTE — FLOWSHEET NOTE
"   03/25/20 1248   Inhalation Therapy Treatment   $ SVN Performed Yes   Given By: Mouthpiece  (3%)   Incentive Spirometry Treatment   Height 1.778 m (5' 10\")   Predicted Inspiratory Capacity 2950   60% of predicted IS capacity 1770 mL   40% of predicted IS capacity 1180 mL   Incentive Spirometer Volume 750 mL     "

## 2020-03-25 NOTE — THERAPY
Speech Language Pathology  Daily Treatment     Patient Name: Vince Almanzar  Age:  79 y.o., Sex:  male  Medical Record #: [id number]  Today's Date: 3/25/2020     Subjective    Pt pleasant and cooperative, appeared in good spirits.      Objective       03/25/20 0804   Dysphagia    Positioning / Behavior Modification Self Monitoring;Multiple Swallows;Modulate Rate or Bite Size   Diet / Liquid Recommendation Mildly Thick (2) - (Nectar Thick);Puree (4)   Nutritional Liquid Intake Rating Scale Thickened beverages (mildly thick unless otherwise specified)   Nutritional Food Intake Rating Scale Total oral diet of a single consistency   Interdisciplinary Plan of Care Collaboration   Patient Position at End of Therapy Seated;Call Light within Reach;Tray Table within Reach   SLP Total Time Spent   SLP Individual Total Time Spent (Mins) 30   Charge Group   SLP Swallowing Dysfunction Treatment Swallowing Dysfunction Treatment           Assessment    Pt tplerated level 4 puree and mildly thick liquids with no overt s/sx of pen/asp. Pt vocal quality remained clear throughout meal. Pt continues to demonstrate rapid rate of intake, however, was able to tolerate this date. Ongoing education provided to patient to slow rate, monitor vocal quality and clear if needed. Pt will benefit from ongoing training to assist with independent carryover at this time.     Plan    Trial minced moist with advancement as appropriate.

## 2020-03-25 NOTE — THERAPY
Physical Therapy   Daily Treatment     Patient Name: Vince Almanzar  Age:  79 y.o., Sex:  male  Medical Record #: [id number]  Today's Date: 3/25/2020     Precautions  Precautions: Fall Risk, Cervical Collar    Comments: No BP on LUE, [place], Droplet/isolation precautions, c-collar, risk for pressure injuries     Subjective    Pt was sitting in w/c upon arrival and agreeable to tx     Objective     03/25/20 1001   Precautions   Precautions Fall Risk;Cervical Collar     Comments No BP on LUE, [place], Droplet/isolation precautions, c-collar, risk for pressure injuries    Standing Lower Body Exercises   Standing Lower Body Exercises   (in // bars, B UE support, rest after every 2 exercises)   Hamstring Curl 1 set of 10;Bilateral    Hip Flexion 1 set of 10;Bilateral   Hip Extension 1 set of 10;Bilateral    Hip Abduction 1 set of 10;Bilateral   Marching 1 set of 10   Heel Rise 1 set of 15;Bilateral   Toe Rise 1 set of 15;Bilateral   Mini Squat 1 set of 10;Partial   Interdisciplinary Plan of Care Collaboration   Patient Position at End of Therapy Seated;Call Light within Reach;Tray Table within Reach;Phone within Reach   PT Total Time Spent   PT Individual Total Time Spent (Mins) 60   PT Charge Group   PT Gait Training 2   PT Therapeutic Exercise 1   PT Therapeutic Activities 1     FIM Walking Score:  5 - Standby Prompting/Supervision or Set-up  Walking Description:  (190ft x 1, 210ft x1, 155ft x1, 78ft x 1 4ww SPV)    Stance at closet with practice taking clothes on/off , transporting them on 4ww and sitting down on bed to simulate what he would do at home 3minx  2    STS from w/c to 4ww x5 with SBA     Issued standing therex program for performance at home; education on duration/ intensity/ frequency    Assessment    Pt with significant improvement in endurance, reports compliance of PROM HEP for in room performance, good pacing and safety awareness with FWW. Continues to be limited by endurance and glute/ quad  strength especially during STS activities. Posture and standing balance additionally remain barriers.     Plan  6mwt 4ww, 5x STS test from w/c height, STS from lower surfaces, standing balance with functional reaching, bed mobility from standard bed, postural re-ed

## 2020-03-25 NOTE — PROGRESS NOTES
Received bedside shift report from Princess DELFINO BRASWELL RN regarding patient and assumed care. Patient awake, calm and stable, currently positioned in bed for comfort and safety; call light within reach. Denies pain or discomfort at this time. Will continue to monitor.

## 2020-03-25 NOTE — FLOWSHEET NOTE
03/25/20 1240   Inhalation Therapy Treatment   $ SVN Performed Yes   Given By: Mouthpiece  (Duoneb)   Date SVN Next Change Due 04/01/20

## 2020-03-25 NOTE — THERAPY
"Occupational Therapy  Daily Treatment     Patient Name: Vince Almanzar  Age:  79 y.o., Sex:  male  Medical Record #: [id number]  Today's Date: 3/25/2020     Precautions  Precautions: (P) Fall Risk, Cervical Collar    Comments: (P) No BP on LUE, [place], Droplet/isolation precautions, c-collar, risk for pressure injuries     Safety   ADL Safety : Requires Supervision for Safety, Requires Physical Assist for Safety  Bathroom Safety: Requires Supervision for Safety, Requires Physical Assist for Safety  Comments: (P) See FIMs for ADL performance details     Subjective    \"Now comes the hard part\" patient stated after shower, referring to dressing.      Objective     03/25/20 1301   Precautions   Precautions Fall Risk;Cervical Collar     Comments No BP on LUE, [place], Droplet/isolation precautions, c-collar, risk for pressure injuries    Safety    Comments See FIMs for ADL performance details    Cognition    Orientation Level Oriented x 4   Level of Consciousness Alert   Interdisciplinary Plan of Care Collaboration   IDT Collaboration with  Respiratory Therapist   Patient Position at End of Therapy In Bed;Call Light within Reach   Collaboration Comments Received patient from RT   OT Total Time Spent   OT Individual Total Time Spent (Mins) 60   OT Charge Group   OT Self Care / ADL 4       FIM Bathing Score:  3 - Moderate Assistance  Bathing Description:  Grab bar, Tub bench, Long handled bath tool, Hand held shower, Increased time, Supervision for safety, Set-up of equipment(Patient demo'd ability to wash chest, abdomen, arms, BUEs and BLEs with min to CGA while seated on fold-down shower bench . Required max-total assist for washing/drying buttocks and for thoroughness with drying BLEs. )     FIM Upper Body Dressing:  3 - Moderate Assistance  Upper Body Dressing Description:  (Mod assist for donning \[facility]\"" gown for gown over shoulders and tying gown . Min assist for doffing t-shirt. )    FIM Lower Body Dressing Score:  2 " - Max Assistance  Lower Body Dressing Description:  Dressing stick, Reacher, Sock aid, Increased time, Supervision for safety, Set-up of equipment, Verbal cueing(Set-up for donning socks with sock aid and max assist for donning  scrub bottoms. )    FIM Tub/Shower Transfers Score:  4 - Minimal Assistance  Tub/Shower Transfers Description:  Grab bar, Increased time, Supervision for safety, Set-up of equipment, Verbal cueing    Patient performed wc> bed txfr with mod assist (for lift and min-mod assist for managing LEs during sit >supine).     Incontinence of bowel (loose) x 2 noted during this session.     Assessment    Patient tolerated OT session well with focus on progression with ADLs. Patient making functional progress toward d/c goals however ADL independence continues to be limited by decreased endurance, strength, balance and functional mobility. Patient highly motivated to participate in therapy and improve overall function and independence.      Plan    Incorporate spinal precautions and energy conservation techniques into ADLs with AE as needed, general strengthening, endurance building, balance (sit and stand)

## 2020-03-25 NOTE — PROGRESS NOTES
[facility] Medicine Daily Progress Note    Date of Service  3/25/2020    Chief Complaint:  Hypertension  Diabetes  Cough, fever, tachycardia, leukocytosis    Interval History:  No significant events or changes since last visit    Review of Systems  Review of Systems   Constitutional: Negative for fever.   Eyes: Negative for blurred vision.   Respiratory: Negative for shortness of breath.    Cardiovascular: Negative for palpitations.   Gastrointestinal: Positive for diarrhea. Negative for nausea and vomiting.   Neurological: Negative for dizziness and headaches.   Psychiatric/Behavioral: Negative for hallucinations.        Physical Exam  Temp:  [36.6 °C (97.9 °F)-36.8 °C (98.3 °F)] 36.6 °C (97.9 °F)  Pulse:  [80-82] 82  Resp:  [18] 18  BP: (100-124)/(52-60) 109/56  SpO2:  [95 %-96 %] 95 %    Physical Exam  Vitals signs and nursing note reviewed.   Constitutional:       General: He is not in acute distress.  HENT:      Mouth/Throat:      Mouth: Mucous membranes are moist.      Pharynx: Oropharynx is clear.   Eyes:      General: No scleral icterus.  Neck:      Vascular: No carotid bruit.      Comments: Has C-collar  Cardiovascular:      Rate and Rhythm: Normal rate and regular rhythm.      Heart sounds: No murmur.   Pulmonary:      Effort: Pulmonary effort is normal.      Breath sounds: Normal breath sounds. No stridor.   Abdominal:      General: There is no distension.      Palpations: Abdomen is soft.      Tenderness: There is no abdominal tenderness.   Musculoskeletal:      Right lower leg: Edema present.      Left lower leg: Edema present.   Skin:     General: Skin is warm and dry.      Findings: No rash.   Neurological:      Mental Status: He is alert and oriented to person, place, and time.   Psychiatric:         Mood and Affect: Mood normal.         Behavior: Behavior normal.         Fluids    Intake/Output Summary (Last 24 hours) at 3/25/2020 0950  Last data filed at 3/25/2020 0859  Gross per 24 hour   Intake 560  ml   Output 1500 ml   Net -940 ml       Laboratory  Recent Labs     03/24/20  0526 03/25/20  0545   WBC 4.9 4.3*   RBC 2.57* 2.63*   HEMOGLOBIN 7.7* 7.9*   HEMATOCRIT 24.5* 25.3*   MCV 95.3 96.2   MCH 30.0 30.0   MCHC 31.4* 31.2*   RDW 61.1* 63.7*   PLATELETCT 126* 130*   MPV 9.1 9.3     Recent Labs     03/24/20  0526 03/25/20  0545   SODIUM 137 140   POTASSIUM 4.2 4.4   CHLORIDE 104 107   CO2 23 22   GLUCOSE 110* 103*   BUN 42* 44*   CREATININE 2.41* 2.53*   CALCIUM 8.1* 8.5                   Imaging      Assessment/Plan  Anemia- (present on admission)  Assessment & Plan  Hb stable at 7.9 (3/25)  Fe: 33, sats 12% (3/1)  B12: 264  Folate: > 24 (3/6)  FOB (-) x 1  Off folate supplements  On Fe & B12 supplements    Chronic kidney disease- (present on admission)  Assessment & Plan  Has stage 4 CKD  Bun/Cr :improved after giving IVF for infection  S/P metabolic acidosis  Off Bicarb tabs (3/24)  Will recheck Co2 in a few days  Monitor    Diarrhea  Assessment & Plan  Diarrhea less recently  (3/18): C Diff PCR negative and Toxin negative  (3/21): C Diff PCR positive and Toxin negative  At high risk for developing C Dif infection given exposure to abx and prolonged hospitalization  On oral Vancomycin (thru 3/29)    Volume overload  Assessment & Plan  Has LE edema - improved  BNP: 9051 (3/22) --> 8352 (3/24)  On Lasix: 20 mg daily  Note: on tube feeds -- transitioning to an oral diet but currently on a thickened liquid diet  Monitor    Somnolence  Assessment & Plan  Mentation much improved w/ decreased pain meds    Pneumonia  Assessment & Plan  Resolved  S/P fever and leukocytosis  CXR --> right basilar consolidation consistent with pneumonia; left basal consolidation could be due to atelectasis or pneumonia  Rapid strep: neg  Flu (-)  Resp panel: neg except for Rhinovirus  BC x 2: neg  S/P abx    Cervical spinal stenosis  Assessment & Plan  Had worsening bilateral upper extremity weakness, balance, and neck pain  MRI showed  severe stenosis at C3-C6 with interval progression over the last year  S/P C3-C6 laminectomy and posterior decompression    Uncontrolled type 2 diabetes mellitus with hyperglycemia (HCC)- (present on admission)  Assessment & Plan  Hba1c: 5.1 (3/13)   BS: 106-176  On Lantus: 10 units qam  On Actos: 30 mg daily  Note: home meds include Ozempic 1.0 mg once a week, Actos 30mg qd, and Tresiba 10 units qhs  Cont to monitor    Hyperlipemia- (present on admission)  Assessment & Plan  On Zocor    Essential hypertension- (present on admission)  Assessment & Plan  BP ok  On Lopressor: 12.5 mg bid   Monitor

## 2020-03-25 NOTE — CARE PLAN
Problem: Urinary Elimination:  Goal: Ability to reestablish a normal urinary elimination pattern will improve  Outcome: PROGRESSING AS EXPECTED  Note: Patient remains continent of bladder, using urinal for elimination.      Problem: Pain Management  Goal: Pain level will decrease to patient's comfort goal  Outcome: PROGRESSING AS EXPECTED  Note: Patient denied any pain during shift. c/o some spasms on leg and is now on baclofen.

## 2020-03-25 NOTE — THERAPY
Occupational Therapy  Daily Treatment     Patient Name: Vince Almanzar  Age:  79 y.o., Sex:  male  Medical Record #: [id number]  Today's Date: 3/25/2020     Precautions  Precautions: Fall Risk, Cervical Collar    Comments: No BP on LUE, [place], Droplet/isolation precautions, c-collar, risk for pressure injuries     Safety   ADL Safety : Requires Supervision for Safety, Requires Physical Assist for Safety  Bathroom Safety: Requires Supervision for Safety, Requires Physical Assist for Safety  Comments: See FIMs for ADL performance details with UB/LB dressing and grooming    Subjective    Patient agreeable to participate in OT.      Objective     03/25/20 0901   Precautions   Precautions Fall Risk;Cervical Collar  ;Other (See Comments)   Comments No BP on LUE, droplet/isolation precautions, c-collar, risk for pressure injuries   Cognition    Level of Consciousness Alert   Sitting Lower Body Exercises   Nustep Resistance Level 5;Resistance Level 6   Interdisciplinary Plan of Care Collaboration   Patient Position at End of Therapy Seated;Self Releasing Lap Belt Applied   OT Total Time Spent   OT Individual Total Time Spent (Mins) 30   OT Charge Group   OT Therapy Activity 1   OT Therapeutic Exercise  1     Patient performed wc<>Nustep txfr with CGA assist (stand pivot txfr).     Mod I for w/c mobility in room and short distances (~ 25').    Assessment    Excellent activity tolerance during OT session today. Patient making steady gains toward d/c goals. Improvement noted with functional transfers and endurance.     Plan    ADL re-assessment this afternoon. Incorporate spinal precautions and energy conservation techniques into ADLs with AE as needed, general strengthening, endurance building, balance (sit and stand)

## 2020-03-25 NOTE — THERAPY
Speech Language Pathology  Daily Treatment     Patient Name: Vince Almanzar  Age:  79 y.o., Sex:  male  Medical Record #: [id number]  Today's Date: 3/25/2020     Subjective    Pt was pleasant and cooperative during this ST session      Objective       03/25/20 1131   SLP Total Time Spent   SLP Individual Total Time Spent (Mins) 30   Charge Group   SLP Swallowing Dysfunction Treatment Swallowing Dysfunction Treatment         Assessment    Pt consumed 4- Puree (Dysphagia 1) textures and 2- Mildly Thick (Nectar Thick) liquids during lunch with a wet vocal quality noted x2.  No overt coughing noted during this meal.  Min cues were required for pt to slow his rate of eating.      Plan    Recommend initiating trials of 5- Minced & Moist (Dysphagia 2) textures

## 2020-03-25 NOTE — PROGRESS NOTES
"Rehab Progress Note     Encounter Date: 3/25/2020    CC: UE weakness    Interval Events (Subjective)    He reports improving neck pain, described as 2-3, achy sensation localized to the neck, worse with being upright.  Pain medication helps    He reports getting stronger every day.  His cough is significantly improved.  Staff agrees with this as they have not heard a single productive cough in the last 24 hours.  He has not had any productive cough associated with Med-Neb    NG tube was removed last night, tolerating eating well with dysphagia 1 nectar thick liquids.      Bowel: Medium watery BM, incontinence this morning x1  Bladder: Using urinal, no incontinence in last 24 hours    ROS:  Gen: No fatigue, confusion, significant weight loss  Eyes: no blurry vision, double vision or pain in eyes  ENT: no changes in hearing, runny nose, nose bleeds, sinus pain  CV: No CP, palpitations, symptoms of AUTONOMIC DYSREFLEXIA  Lungs: no shortness of breath, changes in secretions, changes in cough, pain with coughing  Abd: No abd pain, nausea, vomiting, pain with eating  : no blood in urine, pain during storage phase, bladder spasms, suprapubic pain  Ext: No swelling in legs, asymmetric atrophy  Neuro: no changes in strength or sensation  Skin: no new ulcers/skin breakdown appreciated by patient  Mood: No changes in mood today, increase in depression or anxiety  Heme: no bruising, or bleeding  Rest of 14 point review of systems is negative      Objective:  Vitals: /56   Pulse 82   Temp 36.6 °C (97.9 °F) (Oral)   Resp 18   Ht 1.778 m (5' 10\")   Wt 68.5 kg (151 lb 0.2 oz)   SpO2 95%   Gen: NAD, Body mass index is 21.67 kg/m².  HEENT: NC/AT, PERRLA, moist mucous membranes  Cardio: RRR, no mumurs  Pulm: CTAB, improved breath sounds in the upper lobes and lower lobes of bilateral lungs, still diminished bilaterally at the bases.    Abd: Soft NTND, active bowel sounds,   Ext: r>l 2-3+ pitting peripheral edema up to " the thigh on the right and the knee on the left. No calf tenderness. No clubbing/cyanosis. +dorsal pedalis pulses bilaterally.      Tone: 1+-2/4 MAS right knee extension/hamstrings, unable to reach full extension, approximately 10-20 degrees from extension, not resulting in significant pain        Recent Results (from the past 72 hour(s))   ACCU-CHEK GLUCOSE    Collection Time: 03/22/20 11:18 AM   Result Value Ref Range    Glucose - Accu-Ck 201 (H) 65 - 99 mg/dL   ACCU-CHEK GLUCOSE    Collection Time: 03/22/20  5:23 PM   Result Value Ref Range    Glucose - Accu-Ck 227 (H) 65 - 99 mg/dL   ACCU-CHEK GLUCOSE    Collection Time: 03/22/20 10:12 PM   Result Value Ref Range    Glucose - Accu-Ck 219 (H) 65 - 99 mg/dL   ACCU-CHEK GLUCOSE    Collection Time: 03/23/20  7:37 AM   Result Value Ref Range    Glucose - Accu-Ck 163 (H) 65 - 99 mg/dL   ACCU-CHEK GLUCOSE    Collection Time: 03/23/20 11:19 AM   Result Value Ref Range    Glucose - Accu-Ck 224 (H) 65 - 99 mg/dL   ACCU-CHEK GLUCOSE    Collection Time: 03/23/20  5:28 PM   Result Value Ref Range    Glucose - Accu-Ck 175 (H) 65 - 99 mg/dL   ACCU-CHEK GLUCOSE    Collection Time: 03/23/20  9:05 PM   Result Value Ref Range    Glucose - Accu-Ck 185 (H) 65 - 99 mg/dL   CBC WITHOUT DIFFERENTIAL    Collection Time: 03/24/20  5:26 AM   Result Value Ref Range    WBC 4.9 4.8 - 10.8 K/uL    RBC 2.57 (L) 4.70 - 6.10 M/uL    Hemoglobin 7.7 (L) 14.0 - 18.0 g/dL    Hematocrit 24.5 (L) 42.0 - 52.0 %    MCV 95.3 81.4 - 97.8 fL    MCH 30.0 27.0 - 33.0 pg    MCHC 31.4 (L) 33.7 - 35.3 g/dL    RDW 61.1 (H) 35.9 - 50.0 fL    Platelet Count 126 (L) 164 - 446 K/uL    MPV 9.1 9.0 - 12.9 fL   Basic Metabolic Panel    Collection Time: 03/24/20  5:26 AM   Result Value Ref Range    Sodium 137 135 - 145 mmol/L    Potassium 4.2 3.6 - 5.5 mmol/L    Chloride 104 96 - 112 mmol/L    Co2 23 20 - 33 mmol/L    Glucose 110 (H) 65 - 99 mg/dL    Bun 42 (H) 8 - 22 mg/dL    Creatinine 2.41 (H) 0.50 - 1.40 mg/dL     Calcium 8.1 (L) 8.5 - 10.5 mg/dL    Anion Gap 10.0 7.0 - 16.0   proBrain Natriuretic Peptide, NT    Collection Time: 03/24/20  5:26 AM   Result Value Ref Range    NT-proBNP 8352 (H) 0 - 125 pg/mL   ESTIMATED GFR    Collection Time: 03/24/20  5:26 AM   Result Value Ref Range    GFR If  32 (A) >60 mL/min/1.73 m 2    GFR If Non African American 26 (A) >60 mL/min/1.73 m 2   ACCU-CHEK GLUCOSE    Collection Time: 03/24/20  7:28 AM   Result Value Ref Range    Glucose - Accu-Ck 113 (H) 65 - 99 mg/dL   ACCU-CHEK GLUCOSE    Collection Time: 03/24/20 11:28 AM   Result Value Ref Range    Glucose - Accu-Ck 162 (H) 65 - 99 mg/dL   ACCU-CHEK GLUCOSE    Collection Time: 03/24/20  5:38 PM   Result Value Ref Range    Glucose - Accu-Ck 130 (H) 65 - 99 mg/dL   ACCU-CHEK GLUCOSE    Collection Time: 03/24/20  9:02 PM   Result Value Ref Range    Glucose - Accu-Ck 176 (H) 65 - 99 mg/dL   CBC WITH DIFFERENTIAL    Collection Time: 03/25/20  5:45 AM   Result Value Ref Range    WBC 4.3 (L) 4.8 - 10.8 K/uL    RBC 2.63 (L) 4.70 - 6.10 M/uL    Hemoglobin 7.9 (L) 14.0 - 18.0 g/dL    Hematocrit 25.3 (L) 42.0 - 52.0 %    MCV 96.2 81.4 - 97.8 fL    MCH 30.0 27.0 - 33.0 pg    MCHC 31.2 (L) 33.7 - 35.3 g/dL    RDW 63.7 (H) 35.9 - 50.0 fL    Platelet Count 130 (L) 164 - 446 K/uL    MPV 9.3 9.0 - 12.9 fL    Neutrophils-Polys 66.40 44.00 - 72.00 %    Lymphocytes 15.50 (L) 22.00 - 41.00 %    Monocytes 14.60 (H) 0.00 - 13.40 %    Eosinophils 1.90 0.00 - 6.90 %    Basophils 0.20 0.00 - 1.80 %    Immature Granulocytes 1.40 (H) 0.00 - 0.90 %    Nucleated RBC 0.50 /100 WBC    Neutrophils (Absolute) 2.83 1.82 - 7.42 K/uL    Lymphs (Absolute) 0.66 (L) 1.00 - 4.80 K/uL    Monos (Absolute) 0.62 0.00 - 0.85 K/uL    Eos (Absolute) 0.08 0.00 - 0.51 K/uL    Baso (Absolute) 0.01 0.00 - 0.12 K/uL    Immature Granulocytes (abs) 0.06 0.00 - 0.11 K/uL    NRBC (Absolute) 0.02 K/uL   Basic Metabolic Panel    Collection Time: 03/25/20  5:45 AM   Result Value  Ref Range    Sodium 140 135 - 145 mmol/L    Potassium 4.4 3.6 - 5.5 mmol/L    Chloride 107 96 - 112 mmol/L    Co2 22 20 - 33 mmol/L    Glucose 103 (H) 65 - 99 mg/dL    Bun 44 (H) 8 - 22 mg/dL    Creatinine 2.53 (H) 0.50 - 1.40 mg/dL    Calcium 8.5 8.5 - 10.5 mg/dL    Anion Gap 11.0 7.0 - 16.0   ESTIMATED GFR    Collection Time: 03/25/20  5:45 AM   Result Value Ref Range    GFR If African American 30 (A) >60 mL/min/1.73 m 2    GFR If Non African American 25 (A) >60 mL/min/1.73 m 2   ACCU-CHEK GLUCOSE    Collection Time: 03/25/20  7:36 AM   Result Value Ref Range    Glucose - Accu-Ck 106 (H) 65 - 99 mg/dL       Current Facility-Administered Medications   Medication Frequency   • ipratropium-albuterol (DUONEB) nebulizer solution TID   • sodium chloride 3% nebulizer solution 3 mL TID   • pregabalin (LYRICA) capsule 50 mg BID   • cholestyramine (QUESTRAN,PREVALITE) 4 GM powder 4 g BID   • loperamide (IMODIUM) capsule 2 mg 4X/DAY PRN   • insulin glargine (LANTUS) injection 10 Units QAM INSULIN   • vancomycin 50 mg/mL oral soln 125 mg Q6HRS   • furosemide (LASIX) tablet 20 mg Q DAY   • Pharmacy Consult: Enteral tube insertion - review meds/change route/product selection PHARMACY TO DOSE   • bisacodyl (THE MAGIC BULLET) suppository 10 mg QDAY PRN    And   • docusate sodium (COLACE) capsule 200 mg BID PRN    And   • sennosides (SENOKOT) 8.6 MG tablet 17.2 mg QDAY PRN    And   • magnesium hydroxide (MILK OF MAGNESIA) suspension 30 mL QDAY PRN   • insulin lispro (HUMALOG) injection 2-12 Units 4X/DAY ACHS    And   • glucose 4 g chewable tablet 16 g Q15 MIN PRN    And   • dextrose 50% (D50W) injection 50 mL Q15 MIN PRN   • lidocaine 2 % jelly PRN    And   • nitroglycerin (NITRO-BID) 2 % ointment 1 Inch PRN   • Respiratory Therapy Consult Continuous RT   • oxyCODONE immediate-release (ROXICODONE) tablet 5 mg Q3HRS PRN   • oxyCODONE immediate release (ROXICODONE) tablet 10 mg Q3HRS PRN   • acetaminophen (TYLENOL) tablet 650 mg  Q4HRS PRN   • therapeutic multivitamin-minerals (THERAGRAN-M) tablet 1 Tab DAILY WITH LUNCH   • artificial tears ophthalmic solution 1 Drop PRN   • benzocaine-menthol (CEPACOL) lozenge 1 Lozenge Q2HRS PRN   • mag hydrox-al hydrox-simeth (MAALOX PLUS ES or MYLANTA DS) suspension 20 mL Q2HRS PRN   • ondansetron (ZOFRAN ODT) dispertab 4 mg 4X/DAY PRN    Or   • ondansetron (ZOFRAN) syringe/vial injection 4 mg 4X/DAY PRN   • sodium chloride (OCEAN) 0.65 % nasal spray 2 Spray PRN   • traZODone (DESYREL) tablet 50 mg QHS PRN   • ammonium lactate (LAC-HYDRIN) 12 % lotion BID   • methocarbamol (ROBAXIN) tablet 750 mg Q8HRS PRN   • cyanocobalamin (VITAMIN B12) tablet 1,000 mcg DAILY   • ferrous sulfate tablet 325 mg BID WITH MEALS   • lidocaine (LIDODERM) 5 % 2 Patch Q24HR   • metoprolol (LOPRESSOR) tablet 12.5 mg BID   • pioglitazone (ACTOS) tablet 30 mg DAILY   • simethicone (MYLICON) chewable tab 80 mg TID PRN   • simvastatin (ZOCOR) tablet 20 mg Nightly   • heparin injection 5,000 Units Q8HRS   • midodrine (PROAMATINE) tablet 5 mg Q4HRS PRN       Orders Placed This Encounter   Procedures   • Diet Order Diabetic     Standing Status:   Standing     Number of Occurrences:   1     Order Specific Question:   Diet:     Answer:   Diabetic [3]     Order Specific Question:   Texture Modifier     Answer:   Level 4 - Pureed (Dysphagia 1)     Order Specific Question:   Liquid level     Answer:   Level 2 - Mildly Thick       Assessment:  Active Hospital Problems    Diagnosis   • Acute on chronic renal failure (HCC)   • Central cord syndrome (HCC)   • Type 2 diabetes mellitus (HCC)   • Anemia   • Thrombocytopenia (HCC)   • 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 Sellers on 2/28.  Pinprick altered bilaterally at C3-C6  - Collar on at all times, spinal precautions  - Calcium carbonate 500 mg twice daily  -Continue comprehensive acute inpatient rehabilitation    Neurologic respiratory impairment: Aggressive secretion management  -Consult respiratory therapy  - Oxygen as per guidelines  - DuoNeb 3X daily   -DC Mucomyst 3 times a day for mucolytic   - hypertonic saline, 3% for mucolytic with DuoNeb  -DC Medi-neb  -Discussed with respiratory therapy this morning.      Pneumonia: Bilateral lower lobe, likely bacterial, productive cough  - Zyvox and Zosyn started on 3/14, transitioned to ceftriaxone on 3/18, restarted on Zyvox and Zosyn on 3/19, completed on 3/23  -Appreciate hospitalist input  -Aggressive secretion management as above  - Given productive cough will place on droplet precaution until completion of antibiotics, of chest x-ray is improving will DC droplet precaution  -Remove droplet precautions, no more productive cough for the last 24 hours    Fluid overload: Pleural effusions bilaterally, chest x-ray from 3/24 shows stable pleural effusion and bilateral pneumonia/consolidation  -Check chest x-ray   -Lasix 20 mg daily  -Daily weights, strict I's and O's    Intake/Output Summary (Last 24 hours) at 3/25/2020 1251  Last data filed at 3/25/2020 0859  Gross per 24 hour   Intake 360 ml   Output 1500 ml   Net -1140 ml         C. Difficile diarrhea: PCR positive, toxin negative, colonization versus infection versus tube feed tolerance given and loose watery stool  - Vancomycin oral 125 mg every 6 hours  -Contact precautions, C. difficile, on droplet precautions for productive cough in the setting of pneumonia  -Cholestyramine 4 g twice daily    Neurogenic bladder: [place] removed  after treatment with Lasix  - voiding trial, if can't void in 6 hours or prn check pvr and if >500cc then ICP,if able to void then check PVR, if PVR is >200cc then ICP       Neurogenic bowel  -Discontinued bowel meds given and watery diarrhea  -Inconsistent C. difficile tests    Potential for orthostatic hypotension  Because of significant autonomic dysfunction associated with spinal cord injury, the patient is at high risk for orthostatic hypotension.  - Midodrine 5mg q4 hours prn SBP <100  -DC scheduled midodrine 5 mg tid    Dysphasia: Previous diagnosis after traumatic brain injury, high risk after cervical spinal cord injury  -Consulted speech therapy, increased difficulty secondary to lethargy, difficulty following compensatory techniques  -Patient was placed on n.p.o. NG tube placed on 3/20  -Undergoing trial trays with speech therapy    Pain:  #Neuropathic pain:  bilateral lower extremity pins-and-needles  - DC gabapentin 300 mg daily,  dose limited by GFR, concern that increased dose of gabapentin was resulting in lethargy  -Increase Lyrica 75 mg twice daily to address allodynia, currently being held  -vitamin C 500 mg every 12 hours, which is been shown to improve gabapentin absorption and pain management     Postoperative pain:  - Oxycodone 5-10 mg every 3 hours as needed pain  - Decrease Robaxin 500 mg every 8 hours as needed pain  -DC scheduled Tylenol secondary to pneumonia, can mask fever     Spasticity: Given increased lethargy will decrease baclofen  - DC'd baclofen, secondary to fatigue    Anemia: Required IV iron supplementation, hemoglobin of 7.7 on admission  -Epogen 2000 units, Monday Wednesday Friday  -Folic acid 5 mg daily  - Ferrous sulfate 325 mg twice daily    Diabetes: Appreciate hospitalist input  -Lantus 10 units nightly  -Check fingersticks q. before meals, nightly  -Sliding scale insulin  - Pioglitazone 30 mg a daily  - ADA diet  -Consulted hospitalist      Acute on chronic kidney  injury: Previously stage IV kidney disease with bilateral hydronephrosis suggestive of post renal obstruction, likely worsened by neurogenic bladder.  BUN/creatinine of 49/2.5  -Check BMP in a.m.  -Manage neurogenic bladder as above     Hyponatremia: Sodium of 136 on admission  -Sodium bicarb tablets 650 mg 3 times a day     Hyperlipidemia: Triglyceride 65, HDL 31, LDL 37  -Simvastatin 20 mg nightly     Vitamin deficiency  In the posttraumatic setting, there is a high likelihood of vitamin D deficiency.   Vitamin D level on admission of 47, goal of >30  - DC vitamin D supplementation     DVT prophylaxis/right lower extremity swelling: Patient at increased risk for VTE formation with neurologic compromise/myelopathy.  -Ultrasound right lower extremity was negative for DVT  -Heparin 5000 units every 8 hours, unable to transition to Lovenox given current renal function    Patient was seen for 29 minutes on unit/floor of which > 50% of time was spent on counseling and coordination of care regarding the above, including prognosis, risk reduction, benefits of treatment, and options for next stage of care including neuropathic pain, increase Lyrica to 75 mg twice daily, spasticity, start baclofen 5 mg 3 times daily.      Ramu Garner D.O.

## 2020-03-25 NOTE — FLOWSHEET NOTE
03/25/20 1248   Events/Summary/Plan   Events/Summary/Plan 2 SVNs given, IS, 02 spot check   Vital Signs   Pulse 80   Respiration 18   Pulse Oximetry 97 %   $ Pulse Oximetry (Spot Check) Yes   Respiratory Assessment   Level of Consciousness Alert   Oxygen   O2 Delivery Device None - Room Air

## 2020-03-25 NOTE — CARE PLAN
Problem: Other Problem (see comments)  Goal: Other Goal  Description: Monitor/Evaluation: Monitor PO vs TF intake, diet upgrades, weight, labs, medication adjustments, skin integrity, GI function, vitals, I/Os, and overall hydration status.  Adjust nutritional POC pending clinical outcomes.    RD following bi-weekly until EN at goal and tolerated.   Goal: 1. Tolerance to EN adjustment  2. Maintain adequate oral nutrient/fluid intake to promote nutrition optimization/healing. 3.Transition to PO pending clinical outcomes         Outcome: MET

## 2020-03-25 NOTE — REHAB-DIETARY IDT TEAM NOTE
Dietary   Nutrition  Dietary Problems     Problem: Other Problem (see comments)     Description: Diagnosis: Difficulty swallowing r/t lethargy and unsafe for PO status, secondary dysphagia s/p spinal surgery as evidenced by SLP notes/ evaluation, NGT for alternate route of nutrition/ hydration/ medications.          Goal: Other Goal (Resolved)     Description: Monitor/Evaluation: Monitor PO vs TF intake, diet upgrades, weight, labs, medication adjustments, skin integrity, GI function, vitals, I/Os, and overall hydration status.  Adjust nutritional POC pending clinical outcomes.    RD following bi-weekly until EN at goal and tolerated.   Goal: 1. Tolerance to EN adjustment  2. Maintain adequate oral nutrient/fluid intake to promote nutrition optimization/healing. 3.Transition to PO pending clinical outcomes                           Nutrition Services: Update   Day 13 of admit.  Vince Almanzar is a 79 y.o. male with admitting DX of 04.130 other non traumatic spinal cord dysfunction, Central cord syndrome, Incomplete quadriplegia at C5-C8 level    Pt is currently on diabetic, level 4-pureed diet with level 2 mildly thick liquids. Per SLP, pt may try level 5-minced and moist diet tomorrow. PO is % x 1 and 50-75% x 1.  Current PO intake is adequate.     Pt's Cortrak was removed. RD D/C'd tube feed orders.     Wt 3/25/20: 68.5 kg via bed scale - Weight loss noted of 6 kg (8%) x 3 days. Weight loss may be related to resolution of edema. Improvement noted per MD progress note. Pt continues to receive 20 mg Lasix daily.    Labs: glucose accuchecks: 3/24-3/25: 106-176 mg/dL. Pt continues to receive Lantus, SSI and Actos for glucose control.     Malnutrition Risk: Criteria not met    Recommendations/Plan:  1. Diet advancement per SLP   2. Encourage intake of meals  3. Document intake of all meals as % taken in ADL's to provide interdisciplinary communication across all shifts.   4. Monitor weight.  5. Nutrition  rep will continue to see patient for ongoing meal and snack preferences.  6. Obtain supplement order per RD as needed.    RD following weekly or PRN.    Section completed by:  Arianne Matson R.D.

## 2020-03-26 LAB
ANION GAP SERPL CALC-SCNC: 10 MMOL/L (ref 7–16)
BUN SERPL-MCNC: 43 MG/DL (ref 8–22)
CALCIUM SERPL-MCNC: 8.3 MG/DL (ref 8.5–10.5)
CHLORIDE SERPL-SCNC: 106 MMOL/L (ref 96–112)
CO2 SERPL-SCNC: 22 MMOL/L (ref 20–33)
CREAT SERPL-MCNC: 2.54 MG/DL (ref 0.5–1.4)
GLUCOSE BLD-MCNC: 101 MG/DL (ref 65–99)
GLUCOSE BLD-MCNC: 150 MG/DL (ref 65–99)
GLUCOSE BLD-MCNC: 169 MG/DL (ref 65–99)
GLUCOSE BLD-MCNC: 205 MG/DL (ref 65–99)
GLUCOSE SERPL-MCNC: 106 MG/DL (ref 65–99)
POTASSIUM SERPL-SCNC: 4.5 MMOL/L (ref 3.6–5.5)
SODIUM SERPL-SCNC: 138 MMOL/L (ref 135–145)

## 2020-03-26 PROCEDURE — 97130 THER IVNTJ EA ADDL 15 MIN: CPT

## 2020-03-26 PROCEDURE — 700102 HCHG RX REV CODE 250 W/ 637 OVERRIDE(OP): Performed by: HOSPITALIST

## 2020-03-26 PROCEDURE — 700101 HCHG RX REV CODE 250: Performed by: PHYSICAL MEDICINE & REHABILITATION

## 2020-03-26 PROCEDURE — 94760 N-INVAS EAR/PLS OXIMETRY 1: CPT

## 2020-03-26 PROCEDURE — 97535 SELF CARE MNGMENT TRAINING: CPT

## 2020-03-26 PROCEDURE — 97530 THERAPEUTIC ACTIVITIES: CPT

## 2020-03-26 PROCEDURE — 99232 SBSQ HOSP IP/OBS MODERATE 35: CPT | Performed by: PHYSICAL MEDICINE & REHABILITATION

## 2020-03-26 PROCEDURE — A9270 NON-COVERED ITEM OR SERVICE: HCPCS | Performed by: PHYSICAL MEDICINE & REHABILITATION

## 2020-03-26 PROCEDURE — 92526 ORAL FUNCTION THERAPY: CPT

## 2020-03-26 PROCEDURE — 94640 AIRWAY INHALATION TREATMENT: CPT

## 2020-03-26 PROCEDURE — A9270 NON-COVERED ITEM OR SERVICE: HCPCS | Performed by: HOSPITALIST

## 2020-03-26 PROCEDURE — 97116 GAIT TRAINING THERAPY: CPT

## 2020-03-26 PROCEDURE — 99232 SBSQ HOSP IP/OBS MODERATE 35: CPT | Performed by: HOSPITALIST

## 2020-03-26 PROCEDURE — 36415 COLL VENOUS BLD VENIPUNCTURE: CPT

## 2020-03-26 PROCEDURE — 82962 GLUCOSE BLOOD TEST: CPT

## 2020-03-26 PROCEDURE — 97110 THERAPEUTIC EXERCISES: CPT

## 2020-03-26 PROCEDURE — 770010 HCHG ROOM/CARE - REHAB SEMI PRIVAT*

## 2020-03-26 PROCEDURE — 97129 THER IVNTJ 1ST 15 MIN: CPT

## 2020-03-26 PROCEDURE — 700111 HCHG RX REV CODE 636 W/ 250 OVERRIDE (IP): Performed by: PHYSICAL MEDICINE & REHABILITATION

## 2020-03-26 PROCEDURE — 80048 BASIC METABOLIC PNL TOTAL CA: CPT

## 2020-03-26 PROCEDURE — 700102 HCHG RX REV CODE 250 W/ 637 OVERRIDE(OP): Performed by: PHYSICAL MEDICINE & REHABILITATION

## 2020-03-26 RX ADMIN — IPRATROPIUM BROMIDE AND ALBUTEROL SULFATE 3 ML: .5; 3 SOLUTION RESPIRATORY (INHALATION) at 07:49

## 2020-03-26 RX ADMIN — INSULIN LISPRO 4 UNITS: 100 INJECTION, SOLUTION INTRAVENOUS; SUBCUTANEOUS at 21:35

## 2020-03-26 RX ADMIN — INSULIN LISPRO 2 UNITS: 100 INJECTION, SOLUTION INTRAVENOUS; SUBCUTANEOUS at 18:05

## 2020-03-26 RX ADMIN — METOPROLOL TARTRATE 12.5 MG: 25 TABLET, FILM COATED ORAL at 08:51

## 2020-03-26 RX ADMIN — SODIUM CHLORIDE SOLN NEBU 3% 3 ML: 3 NEBU SOLN at 07:50

## 2020-03-26 RX ADMIN — FERROUS SULFATE TAB 325 MG (65 MG ELEMENTAL FE) 325 MG: 325 (65 FE) TAB at 17:54

## 2020-03-26 RX ADMIN — SODIUM CHLORIDE SOLN NEBU 3% 3 ML: 3 NEBU SOLN at 19:29

## 2020-03-26 RX ADMIN — CHOLESTYRAMINE 4 G: 4 POWDER, FOR SUSPENSION ORAL at 08:53

## 2020-03-26 RX ADMIN — CYANOCOBALAMIN TAB 1000 MCG 1000 MCG: 1000 TAB at 08:51

## 2020-03-26 RX ADMIN — LIDOCAINE 2 PATCH: 50 PATCH CUTANEOUS at 10:45

## 2020-03-26 RX ADMIN — PREGABALIN 75 MG: 75 CAPSULE ORAL at 21:32

## 2020-03-26 RX ADMIN — SIMVASTATIN 20 MG: 20 TABLET, FILM COATED ORAL at 21:30

## 2020-03-26 RX ADMIN — VANCOMYCIN HYDROCHLORIDE 125 MG: KIT ORAL at 18:00

## 2020-03-26 RX ADMIN — Medication 1 TABLET: at 12:34

## 2020-03-26 RX ADMIN — Medication 1 APPLICATION: at 21:29

## 2020-03-26 RX ADMIN — HEPARIN SODIUM 5000 UNITS: 5000 INJECTION, SOLUTION INTRAVENOUS; SUBCUTANEOUS at 06:06

## 2020-03-26 RX ADMIN — BACLOFEN 5 MG: 10 TABLET ORAL at 14:26

## 2020-03-26 RX ADMIN — CHOLESTYRAMINE 4 G: 4 POWDER, FOR SUSPENSION ORAL at 22:50

## 2020-03-26 RX ADMIN — PIOGLITAZONE 30 MG: 15 TABLET ORAL at 08:51

## 2020-03-26 RX ADMIN — FUROSEMIDE 20 MG: 20 TABLET ORAL at 06:06

## 2020-03-26 RX ADMIN — HEPARIN SODIUM 5000 UNITS: 5000 INJECTION, SOLUTION INTRAVENOUS; SUBCUTANEOUS at 21:33

## 2020-03-26 RX ADMIN — HEPARIN SODIUM 5000 UNITS: 5000 INJECTION, SOLUTION INTRAVENOUS; SUBCUTANEOUS at 14:27

## 2020-03-26 RX ADMIN — INSULIN GLARGINE 10 UNITS: 100 INJECTION, SOLUTION SUBCUTANEOUS at 08:59

## 2020-03-26 RX ADMIN — VANCOMYCIN HYDROCHLORIDE 125 MG: KIT ORAL at 12:00

## 2020-03-26 RX ADMIN — BACLOFEN 5 MG: 10 TABLET ORAL at 08:51

## 2020-03-26 RX ADMIN — Medication: at 09:00

## 2020-03-26 RX ADMIN — FERROUS SULFATE TAB 325 MG (65 MG ELEMENTAL FE) 325 MG: 325 (65 FE) TAB at 08:51

## 2020-03-26 RX ADMIN — VANCOMYCIN HYDROCHLORIDE 125 MG: KIT ORAL at 00:00

## 2020-03-26 RX ADMIN — IPRATROPIUM BROMIDE AND ALBUTEROL SULFATE 3 ML: .5; 3 SOLUTION RESPIRATORY (INHALATION) at 19:29

## 2020-03-26 RX ADMIN — VANCOMYCIN HYDROCHLORIDE 125 MG: KIT ORAL at 06:00

## 2020-03-26 RX ADMIN — IPRATROPIUM BROMIDE AND ALBUTEROL SULFATE 3 ML: .5; 3 SOLUTION RESPIRATORY (INHALATION) at 14:54

## 2020-03-26 RX ADMIN — SODIUM CHLORIDE SOLN NEBU 3% 3 ML: 3 NEBU SOLN at 14:55

## 2020-03-26 RX ADMIN — BACLOFEN 5 MG: 10 TABLET ORAL at 21:31

## 2020-03-26 RX ADMIN — PREGABALIN 75 MG: 75 CAPSULE ORAL at 08:51

## 2020-03-26 ASSESSMENT — ENCOUNTER SYMPTOMS
DIARRHEA: 1
DIZZINESS: 0
COUGH: 0
NERVOUS/ANXIOUS: 0
BLURRED VISION: 0
FEVER: 0

## 2020-03-26 ASSESSMENT — FIBROSIS 4 INDEX: FIB4 SCORE: 2.63

## 2020-03-26 NOTE — FLOWSHEET NOTE
03/26/20 1456   Events/Summary/Plan   Events/Summary/Plan SVN   Vital Signs   Pulse 74   Respiration 18   Pulse Oximetry 94 %   $ Pulse Oximetry (Spot Check) Yes   Respiratory Assessment   Level of Consciousness Alert   Secretions   Cough Congested;Non Productive   Oxygen   O2 Delivery Device None - Room Air

## 2020-03-26 NOTE — THERAPY
Occupational Therapy  Daily Treatment     Patient Name: Vince Almanzar  Age:  79 y.o., Sex:  male  Medical Record #: [id number]  Today's Date: 3/26/2020     Precautions  Precautions: Fall Risk, Cervical Collar    Comments: No BP on LUE, [place], Droplet/isolation precautions, c-collar, risk for pressure injuries     Safety   ADL Safety : Requires Supervision for Safety, Requires Physical Assist for Safety  Bathroom Safety: Requires Supervision for Safety, Requires Physical Assist for Safety  Comments: See FIMs for ADL performance details     Subjective    Patient agreeable to participate in OT.      Objective     03/26/20 1031   Precautions   Precautions Fall Risk;Cervical Collar     Comments No BP on LUE, [place], Droplet/isolation precautions, c-collar, risk for pressure injuries    Cognition    Cognition / Consciousness WDL   Orientation Level Oriented x 4   Level of Consciousness Alert   Sitting Upper Body Exercises   Upper Extremity Bike Level 2 Resistance  (Fluidobike x 6:30 in sitting )   Interdisciplinary Plan of Care Collaboration   Patient Position at End of Therapy Seated;Self Releasing Lap Belt Applied   OT Total Time Spent   OT Individual Total Time Spent (Mins) 60   OT Charge Group   OT Therapy Activity 1   OT Therapeutic Exercise  3     Rickshaw 3 sets of 10; 15# (forward) and 2 sets of 10; 5# (backward)  Equalizer 3 sets of 10 (bilateral rows); 15#    Supine exercises using SROM technique (no weight) - 2 sets of 10 shoulder flexion/extention, shoulder circles, elbow flexion, chest press    Chest press with 1# dumbbells while seated EOM (1 set of 10 R/L).     Assessment    Patient tolerated OT session well with focus on endurance and UB strengthening. Active shoulder flexion limited to ~90 degrees due to UE weakness. Benefits from intermittent cues for posture/positioning.     Plan    ***

## 2020-03-26 NOTE — FLOWSHEET NOTE
03/26/20 1456   Inhalation Therapy Treatment   $ SVN Performed Yes   Given By: Mouthpiece   Incentive Spirometry Treatment   Incentive Spirometer Volume 750 mL

## 2020-03-26 NOTE — FLOWSHEET NOTE
03/26/20 0750   Events/Summary/Plan   Events/Summary/Plan 2 SVNs given   Vital Signs   Pulse 78   Respiration 16   Pulse Oximetry 94 %   $ Pulse Oximetry (Spot Check) Yes   Respiratory Assessment   Level of Consciousness Alert   Chest Exam   Work Of Breathing / Effort Mild;Shallow   Oxygen   O2 Delivery Device None - Room Air

## 2020-03-26 NOTE — THERAPY
Recreational Therapy  Daily Treatment     Patient Name: Vince Almanzar  AGE:  79 y.o., SEX:  male  Medical Record #: [id number]  Today's Date: 3/26/2020       Subjective    When given the option to remain in his room or go to the main gym for session he stated that he would like to remain in his room.      Objective       03/26/20 1501   Functional Ability Status - Cognitive   Attention Span Remains on Task   Comprehension Follows Two Step Commands   Judgment Able to Make Independent Decisions   Cognitive Comments Someo reminders of the rules   Functional Ability Status - Emotional    Affect Appropriate   Mood Appropriate   Behavior Appropriate   Skilled Intervention    Skilled Intervention Relaxation / Coping Skills;Cognitive Leisure;Fine Motor Leisure   Skilled Intervention Comments Card game as well as dealing the cards   Interdisciplinary Plan of Care Collaboration   IDT Collaboration with  Respiratory Therapist   Patient Position at End of Therapy Seated;Tray Table within Reach;Call Light within Reach   Collaboration Comments Recieved care and passed on care to RT following session.    Treatment Time   Total Time Spent (mins) 30   Procedural Tracking   Procedural Tracking Cognitive Skills Training;Leisure Skills Development       Assessment    Pt was given a cognitive leisure activity. Pt required Min cues to follow the strategy of the activity including reminders x3 for the same technique. Pt remained on task the entire session time.     Plan    Cognitive leisure while duel tasking and standing outside of his room.

## 2020-03-26 NOTE — FLOWSHEET NOTE
03/25/20 1704   Inhalation Therapy Treatment   $ SVN Performed Yes   Given By: Mouthpiece  (Duo)

## 2020-03-26 NOTE — CARE PLAN
Problem: Safety  Goal: Will remain free from falls  Intervention: Implement fall precautions  Flowsheets (Taken 3/26/2020 0239)  Bed Alarm: Yes - Alarm On  Environmental Precautions:   Treaded Slipper Socks on Patient   Personal Belongings, Wastebasket, Call Bell etc. in Easy Reach  Chair/Bed Strip Alarm: Yes - Alarm On  Note: Safety precautions observed, bed at lowest level, call light in reach, needs anticipated and attended. Pt free from fall and injury.     Problem: Respiratory:  Goal: Respiratory status will improve  Intervention: Assess and monitor pulmonary status  Note: Pt on room air, denies sob with occasional productive cath , self suctioning, encouraged to cover mouth with tissue when coughing.Lungs sounds diminished at the bases.     Problem: Fluid Volume:  Goal: Will maintain balanced intake and output  Intervention: Monitor, educate, and encourage compliance with therapeutic intake of liquids  Note: Pt on strict intake and output , cont monitored.

## 2020-03-26 NOTE — FLOWSHEET NOTE
03/25/20 1706   Events/Summary/Plan   Events/Summary/Plan 2 SVNs given   Vital Signs   Respiration 18   Respiratory Assessment   Level of Consciousness Alert   Oxygen   O2 Delivery Device None - Room Air

## 2020-03-26 NOTE — PROGRESS NOTES
[facility] Medicine Daily Progress Note    Date of Service  3/26/2020    Chief Complaint:  Hypertension  Diabetes  Cough, fever, tachycardia, leukocytosis    Interval History:  No significant events or changes since last visit    Review of Systems  Review of Systems   Constitutional: Negative for fever.   Eyes: Negative for blurred vision.   Respiratory: Negative for cough.    Cardiovascular: Negative for chest pain.   Gastrointestinal: Positive for diarrhea.   Musculoskeletal: Negative for joint pain.   Neurological: Negative for dizziness.   Psychiatric/Behavioral: The patient is not nervous/anxious.         Physical Exam  Temp:  [36.3 °C (97.3 °F)-36.8 °C (98.3 °F)] 36.3 °C (97.3 °F)  Pulse:  [78-82] 78  Resp:  [16-20] 16  BP: (115-126)/(46-65) 118/65  SpO2:  [94 %-98 %] 94 %    Physical Exam  Vitals signs and nursing note reviewed.   Constitutional:       Appearance: He is not diaphoretic.   HENT:      Mouth/Throat:      Pharynx: No oropharyngeal exudate or posterior oropharyngeal erythema.   Eyes:      Extraocular Movements: Extraocular movements intact.   Neck:      Vascular: No carotid bruit.      Comments: Has C-collar  Cardiovascular:      Rate and Rhythm: Normal rate and regular rhythm.      Heart sounds: No murmur.   Pulmonary:      Effort: Pulmonary effort is normal.      Breath sounds: Normal breath sounds. No stridor.   Abdominal:      General: Bowel sounds are normal.      Palpations: Abdomen is soft.   Musculoskeletal:      Right lower leg: Edema present.      Left lower leg: Edema present.   Skin:     General: Skin is warm and dry.      Findings: No rash.   Neurological:      Mental Status: He is alert and oriented to person, place, and time.   Psychiatric:         Mood and Affect: Mood normal.         Behavior: Behavior normal.         Fluids    Intake/Output Summary (Last 24 hours) at 3/26/2020 0901  Last data filed at 3/26/2020 0800  Gross per 24 hour   Intake 360 ml   Output 1000 ml   Net -640 ml        Laboratory  Recent Labs     03/24/20  0526 03/25/20  0545   WBC 4.9 4.3*   RBC 2.57* 2.63*   HEMOGLOBIN 7.7* 7.9*   HEMATOCRIT 24.5* 25.3*   MCV 95.3 96.2   MCH 30.0 30.0   MCHC 31.4* 31.2*   RDW 61.1* 63.7*   PLATELETCT 126* 130*   MPV 9.1 9.3     Recent Labs     03/24/20  0526 03/25/20  0545 03/26/20  0544   SODIUM 137 140 138   POTASSIUM 4.2 4.4 4.5   CHLORIDE 104 107 106   CO2 23 22 22   GLUCOSE 110* 103* 106*   BUN 42* 44* 43*   CREATININE 2.41* 2.53* 2.54*   CALCIUM 8.1* 8.5 8.3*                   Imaging      Assessment/Plan  Anemia- (present on admission)  Assessment & Plan  Hb stable at 7.9 (3/25)  Fe: 33, sats 12% (3/1)  B12: 264  Folate: > 24 (3/6)  FOB (-) x 1  Off folate supplements  On Fe & B12 supplements    Chronic kidney disease- (present on admission)  Assessment & Plan  Has stage 4 CKD  Bun/Cr :improved after giving IVF for infection  S/P metabolic acidosis: CO2 22 (3/26)  Off Bicarb tabs (3/24)  Monitor    Diarrhea  Assessment & Plan  Diarrhea less recently  (3/18): C Diff PCR negative and Toxin negative  (3/21): C Diff PCR positive and Toxin negative  At high risk for developing C Dif infection given exposure to abx and prolonged hospitalization  On oral Vancomycin (thru 3/29)    Volume overload  Assessment & Plan  Has LE edema - improved  BNP: 9051 (3/22) --> 8352 (3/24)  On Lasix: 20 mg daily  Note: on tube feeds -- transitioning to an oral diet but currently on a thickened liquid diet  Monitor K+ levels: 4.5 (3/26)    Somnolence  Assessment & Plan  Mentation much improved w/ decreased pain meds    Pneumonia  Assessment & Plan  Resolved  S/P fever and leukocytosis  CXR --> right basilar consolidation consistent with pneumonia; left basal consolidation could be due to atelectasis or pneumonia  Rapid strep: neg  Flu (-)  Resp panel: neg except for Rhinovirus  BC x 2: neg  S/P abx    Cervical spinal stenosis  Assessment & Plan  Had worsening bilateral upper extremity weakness, balance, and  neck pain  MRI showed severe stenosis at C3-C6 with interval progression over the last year  S/P C3-C6 laminectomy and posterior decompression    Uncontrolled type 2 diabetes mellitus with hyperglycemia (HCC)- (present on admission)  Assessment & Plan  Hba1c: 5.1 (3/13)   BS:   On Lantus: 10 units qam  On Actos: 30 mg daily  Note: home meds include Ozempic 1.0 mg once a week, Actos 30mg qd, and Tresiba 10 units qhs  Cont to monitor    Hyperlipemia- (present on admission)  Assessment & Plan  On Zocor    Essential hypertension- (present on admission)  Assessment & Plan  BP ok  On Lopressor: 12.5 mg bid   Monitor

## 2020-03-26 NOTE — THERAPY
Speech Language Pathology  Daily Treatment     Patient Name: Vince Almanzar  Age:  79 y.o., Sex:  male  Medical Record #: [id number]  Today's Date: 3/26/2020     Subjective    Patient was willing to participate.      Objective       03/26/20 1132   Dysphagia    Positioning / Behavior Modification Self Monitoring;Modulate Rate or Bite Size;Multiple Swallows   Other Treatments trials level 5   Diet / Liquid Recommendation Mildly Thick (2) - (Nectar Thick);Minced & Moist (5) - (Dysphagia II)   Nutritional Liquid Intake Rating Scale Thickened beverages (mildly thick unless otherwise specified)   Nutritional Food Intake Rating Scale Total oral diet with multiple consistencies but requiring special preparation or compensations   SLP Total Time Spent   SLP Individual Total Time Spent (Mins) 30   Charge Group   SLP Cognitive Skill Development First 15 Minutes 1   SLP Cognitive Skill Development Additional 15 Minutes 1         Assessment    Trials af level 5 textures were assessed. Patient appears to tolerate without overt s/sx of aspiration. Min cues for swallow strategies.     Plan    Upgrade to level 5, trials of level 6 and thin liquids as tolerated.

## 2020-03-26 NOTE — PROGRESS NOTES
"Rehab Progress Note     Encounter Date: 3/26/2020    CC: UE weakness    Interval Events (Subjective)    He reports getting stronger every day.  He feels like his arms are getting stronger.    He denies any pain in his neck today.    He denies any increase in fatigue with addition of low-dose baclofen    He reports spasticity and his right hamstring, tightness in right hamstring has improved with the addition of baclofen.  This is no longer impairing his ability to ambulate, not waking him up at night not causing him pain.    He is tolerating his modified diet well.  He denies any coughing or choking.  He continues to work with speech therapy.    He denies any trouble with current medication duration      ROS:  Gen: No fatigue, confusion, significant weight loss  Eyes: no blurry vision, double vision or pain in eyes  ENT: no changes in hearing, runny nose, nose bleeds, sinus pain  CV: No CP, palpitations, symptoms of AUTONOMIC DYSREFLEXIA  Lungs: no shortness of breath, changes in secretions, changes in cough, pain with coughing  Abd: No abd pain, nausea, vomiting, pain with eating  : no blood in urine, pain during storage phase, bladder spasms, suprapubic pain  Ext: No swelling in legs, asymmetric atrophy  Neuro: Improving strength and bilateral upper extremities  Skin: no new ulcers/skin breakdown appreciated by patient  Mood: No changes in mood today, increase in depression or anxiety  Heme: no bruising, or bleeding  Rest of 14 point review of systems is negative      Objective:  Vitals: /65   Pulse 78   Temp 36.3 °C (97.3 °F) (Oral)   Resp 16   Ht 1.778 m (5' 10\")   Wt 67.5 kg (148 lb 13 oz)   SpO2 94%   Gen: NAD, Body mass index is 21.35 kg/m².  HEENT:  NC/AT, PERRLA, moist mucous membranes  Cardio: RRR, no mumurs  Pulm: CTAB, with normal respiratory effort  Abd: Soft NTND, active bowel sounds  Ext: No peripheral edema. No calf tenderness. No clubbing/cyanosis. +dorsal pedalis pulses " bilaterally.      Tone: 1/4 MAS right knee extension/hamstrings, unable to reach full extension, approximately 10 degrees from extension, not resulting in significant pain        Recent Results (from the past 72 hour(s))   ACCU-CHEK GLUCOSE    Collection Time: 03/23/20 11:19 AM   Result Value Ref Range    Glucose - Accu-Ck 224 (H) 65 - 99 mg/dL   ACCU-CHEK GLUCOSE    Collection Time: 03/23/20  5:28 PM   Result Value Ref Range    Glucose - Accu-Ck 175 (H) 65 - 99 mg/dL   ACCU-CHEK GLUCOSE    Collection Time: 03/23/20  9:05 PM   Result Value Ref Range    Glucose - Accu-Ck 185 (H) 65 - 99 mg/dL   CBC WITHOUT DIFFERENTIAL    Collection Time: 03/24/20  5:26 AM   Result Value Ref Range    WBC 4.9 4.8 - 10.8 K/uL    RBC 2.57 (L) 4.70 - 6.10 M/uL    Hemoglobin 7.7 (L) 14.0 - 18.0 g/dL    Hematocrit 24.5 (L) 42.0 - 52.0 %    MCV 95.3 81.4 - 97.8 fL    MCH 30.0 27.0 - 33.0 pg    MCHC 31.4 (L) 33.7 - 35.3 g/dL    RDW 61.1 (H) 35.9 - 50.0 fL    Platelet Count 126 (L) 164 - 446 K/uL    MPV 9.1 9.0 - 12.9 fL   Basic Metabolic Panel    Collection Time: 03/24/20  5:26 AM   Result Value Ref Range    Sodium 137 135 - 145 mmol/L    Potassium 4.2 3.6 - 5.5 mmol/L    Chloride 104 96 - 112 mmol/L    Co2 23 20 - 33 mmol/L    Glucose 110 (H) 65 - 99 mg/dL    Bun 42 (H) 8 - 22 mg/dL    Creatinine 2.41 (H) 0.50 - 1.40 mg/dL    Calcium 8.1 (L) 8.5 - 10.5 mg/dL    Anion Gap 10.0 7.0 - 16.0   proBrain Natriuretic Peptide, NT    Collection Time: 03/24/20  5:26 AM   Result Value Ref Range    NT-proBNP 8352 (H) 0 - 125 pg/mL   ESTIMATED GFR    Collection Time: 03/24/20  5:26 AM   Result Value Ref Range    GFR If  32 (A) >60 mL/min/1.73 m 2    GFR If Non African American 26 (A) >60 mL/min/1.73 m 2   ACCU-CHEK GLUCOSE    Collection Time: 03/24/20  7:28 AM   Result Value Ref Range    Glucose - Accu-Ck 113 (H) 65 - 99 mg/dL   ACCU-CHEK GLUCOSE    Collection Time: 03/24/20 11:28 AM   Result Value Ref Range    Glucose - Accu-Ck 162 (H)  65 - 99 mg/dL   ACCU-CHEK GLUCOSE    Collection Time: 03/24/20  5:38 PM   Result Value Ref Range    Glucose - Accu-Ck 130 (H) 65 - 99 mg/dL   ACCU-CHEK GLUCOSE    Collection Time: 03/24/20  9:02 PM   Result Value Ref Range    Glucose - Accu-Ck 176 (H) 65 - 99 mg/dL   CBC WITH DIFFERENTIAL    Collection Time: 03/25/20  5:45 AM   Result Value Ref Range    WBC 4.3 (L) 4.8 - 10.8 K/uL    RBC 2.63 (L) 4.70 - 6.10 M/uL    Hemoglobin 7.9 (L) 14.0 - 18.0 g/dL    Hematocrit 25.3 (L) 42.0 - 52.0 %    MCV 96.2 81.4 - 97.8 fL    MCH 30.0 27.0 - 33.0 pg    MCHC 31.2 (L) 33.7 - 35.3 g/dL    RDW 63.7 (H) 35.9 - 50.0 fL    Platelet Count 130 (L) 164 - 446 K/uL    MPV 9.3 9.0 - 12.9 fL    Neutrophils-Polys 66.40 44.00 - 72.00 %    Lymphocytes 15.50 (L) 22.00 - 41.00 %    Monocytes 14.60 (H) 0.00 - 13.40 %    Eosinophils 1.90 0.00 - 6.90 %    Basophils 0.20 0.00 - 1.80 %    Immature Granulocytes 1.40 (H) 0.00 - 0.90 %    Nucleated RBC 0.50 /100 WBC    Neutrophils (Absolute) 2.83 1.82 - 7.42 K/uL    Lymphs (Absolute) 0.66 (L) 1.00 - 4.80 K/uL    Monos (Absolute) 0.62 0.00 - 0.85 K/uL    Eos (Absolute) 0.08 0.00 - 0.51 K/uL    Baso (Absolute) 0.01 0.00 - 0.12 K/uL    Immature Granulocytes (abs) 0.06 0.00 - 0.11 K/uL    NRBC (Absolute) 0.02 K/uL   Basic Metabolic Panel    Collection Time: 03/25/20  5:45 AM   Result Value Ref Range    Sodium 140 135 - 145 mmol/L    Potassium 4.4 3.6 - 5.5 mmol/L    Chloride 107 96 - 112 mmol/L    Co2 22 20 - 33 mmol/L    Glucose 103 (H) 65 - 99 mg/dL    Bun 44 (H) 8 - 22 mg/dL    Creatinine 2.53 (H) 0.50 - 1.40 mg/dL    Calcium 8.5 8.5 - 10.5 mg/dL    Anion Gap 11.0 7.0 - 16.0   ESTIMATED GFR    Collection Time: 03/25/20  5:45 AM   Result Value Ref Range    GFR If African American 30 (A) >60 mL/min/1.73 m 2    GFR If Non African American 25 (A) >60 mL/min/1.73 m 2   ACCU-CHEK GLUCOSE    Collection Time: 03/25/20  7:36 AM   Result Value Ref Range    Glucose - Accu-Ck 106 (H) 65 - 99 mg/dL   ACCU-CHEK  GLUCOSE    Collection Time: 03/25/20 11:14 AM   Result Value Ref Range    Glucose - Accu-Ck 156 (H) 65 - 99 mg/dL   ACCU-CHEK GLUCOSE    Collection Time: 03/25/20  4:59 PM   Result Value Ref Range    Glucose - Accu-Ck 83 65 - 99 mg/dL   ACCU-CHEK GLUCOSE    Collection Time: 03/25/20  8:25 PM   Result Value Ref Range    Glucose - Accu-Ck 183 (H) 65 - 99 mg/dL   Basic Metabolic Panel    Collection Time: 03/26/20  5:44 AM   Result Value Ref Range    Sodium 138 135 - 145 mmol/L    Potassium 4.5 3.6 - 5.5 mmol/L    Chloride 106 96 - 112 mmol/L    Co2 22 20 - 33 mmol/L    Glucose 106 (H) 65 - 99 mg/dL    Bun 43 (H) 8 - 22 mg/dL    Creatinine 2.54 (H) 0.50 - 1.40 mg/dL    Calcium 8.3 (L) 8.5 - 10.5 mg/dL    Anion Gap 10.0 7.0 - 16.0   ESTIMATED GFR    Collection Time: 03/26/20  5:44 AM   Result Value Ref Range    GFR If African American 30 (A) >60 mL/min/1.73 m 2    GFR If Non African American 25 (A) >60 mL/min/1.73 m 2   ACCU-CHEK GLUCOSE    Collection Time: 03/26/20  7:18 AM   Result Value Ref Range    Glucose - Accu-Ck 101 (H) 65 - 99 mg/dL       Current Facility-Administered Medications   Medication Frequency   • baclofen (LIORESAL) tablet 5 mg TID   • pregabalin (LYRICA) capsule 75 mg BID   • methocarbamol (ROBAXIN) tablet 500 mg Q8HRS PRN   • ipratropium-albuterol (DUONEB) nebulizer solution TID   • sodium chloride 3% nebulizer solution 3 mL TID   • cholestyramine (QUESTRAN,PREVALITE) 4 GM powder 4 g BID   • loperamide (IMODIUM) capsule 2 mg 4X/DAY PRN   • insulin glargine (LANTUS) injection 10 Units QAM INSULIN   • vancomycin 50 mg/mL oral soln 125 mg Q6HRS   • furosemide (LASIX) tablet 20 mg Q DAY   • Pharmacy Consult: Enteral tube insertion - review meds/change route/product selection PHARMACY TO DOSE   • bisacodyl (THE MAGIC BULLET) suppository 10 mg QDAY PRN    And   • docusate sodium (COLACE) capsule 200 mg BID PRN    And   • sennosides (SENOKOT) 8.6 MG tablet 17.2 mg QDAY PRN    And   • magnesium hydroxide  (MILK OF MAGNESIA) suspension 30 mL QDAY PRN   • insulin lispro (HUMALOG) injection 2-12 Units 4X/DAY ACHS    And   • glucose 4 g chewable tablet 16 g Q15 MIN PRN    And   • dextrose 50% (D50W) injection 50 mL Q15 MIN PRN   • lidocaine 2 % jelly PRN    And   • nitroglycerin (NITRO-BID) 2 % ointment 1 Inch PRN   • Respiratory Therapy Consult Continuous RT   • oxyCODONE immediate-release (ROXICODONE) tablet 5 mg Q3HRS PRN   • oxyCODONE immediate release (ROXICODONE) tablet 10 mg Q3HRS PRN   • acetaminophen (TYLENOL) tablet 650 mg Q4HRS PRN   • therapeutic multivitamin-minerals (THERAGRAN-M) tablet 1 Tab DAILY WITH LUNCH   • artificial tears ophthalmic solution 1 Drop PRN   • benzocaine-menthol (CEPACOL) lozenge 1 Lozenge Q2HRS PRN   • mag hydrox-al hydrox-simeth (MAALOX PLUS ES or MYLANTA DS) suspension 20 mL Q2HRS PRN   • ondansetron (ZOFRAN ODT) dispertab 4 mg 4X/DAY PRN    Or   • ondansetron (ZOFRAN) syringe/vial injection 4 mg 4X/DAY PRN   • sodium chloride (OCEAN) 0.65 % nasal spray 2 Spray PRN   • traZODone (DESYREL) tablet 50 mg QHS PRN   • ammonium lactate (LAC-HYDRIN) 12 % lotion BID   • cyanocobalamin (VITAMIN B12) tablet 1,000 mcg DAILY   • ferrous sulfate tablet 325 mg BID WITH MEALS   • lidocaine (LIDODERM) 5 % 2 Patch Q24HR   • metoprolol (LOPRESSOR) tablet 12.5 mg BID   • pioglitazone (ACTOS) tablet 30 mg DAILY   • simethicone (MYLICON) chewable tab 80 mg TID PRN   • simvastatin (ZOCOR) tablet 20 mg Nightly   • heparin injection 5,000 Units Q8HRS   • midodrine (PROAMATINE) tablet 5 mg Q4HRS PRN       Orders Placed This Encounter   Procedures   • Diet Order Diabetic     Standing Status:   Standing     Number of Occurrences:   1     Order Specific Question:   Diet:     Answer:   Diabetic [3]     Order Specific Question:   Texture Modifier     Answer:   Level 4 - Pureed (Dysphagia 1)     Order Specific Question:   Liquid level     Answer:   Level 2 - Mildly Thick       Assessment:  Active Hospital Problems     Diagnosis   • Acute on chronic renal failure (HCC)   • Central cord syndrome (HCC)   • Type 2 diabetes mellitus (HCC)   • Anemia   • Thrombocytopenia (HCC)   • 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 Sellers on 2/28.  Pinprick altered bilaterally at C3-C6  - Collar on at all times, spinal precautions  - Calcium carbonate 500 mg twice daily  -Continue comprehensive acute inpatient rehabilitation with physical therapy, Occupational Therapy, speech therapy    Neurologic respiratory impairment: Aggressive secretion management  -Consult respiratory therapy  - Oxygen as per guidelines  - DuoNeb 3X daily   -DC'd Mucomyst 3 times a day for mucolytic   - hypertonic saline, 3% for mucolytic with DuoNeb  -DC Medi-neb    Pneumonia: Bilateral lower lobe, likely bacterial, productive cough  - Zyvox and Zosyn started on 3/14, transitioned to ceftriaxone on 3/18, restarted on Zyvox and Zosyn on 3/19, completed on 3/23  -Appreciate hospitalist input  -Aggressive secretion management as above  - Given productive cough will place on droplet precaution until completion of antibiotics, of chest x-ray is improving will DC droplet precaution  -Remove droplet precautions, no more productive cough for the last 24 hours    Fluid overload: Pleural effusions bilaterally, chest x-ray from 3/24 shows stable pleural effusion and bilateral pneumonia/consolidation, 68.5->67.5 kg  -Lasix 20 mg daily  -Daily weights, strict I's and O's    Intake/Output Summary (Last 24 hours) at 3/26/2020  1335  Last data filed at 3/26/2020 1200  Gross per 24 hour   Intake 840 ml   Output 1150 ml   Net -310 ml         C. Difficile diarrhea: PCR positive, toxin negative, colonization versus infection versus tube feed tolerance given and loose watery stool  - Vancomycin oral 125 mg every 6 hours  -Contact precautions, C. difficile, on droplet precautions for productive cough in the setting of pneumonia  -Cholestyramine 4 g twice daily    Neurogenic bladder: Wallis removed after treatment with Lasix  - voiding trial, if can't void in 6 hours or prn check pvr and if >500cc then ICP,if able to void then check PVR, if PVR is >200cc then ICP       Neurogenic bowel: Improving bowel incontinence  -Discontinued bowel meds given and watery diarrhea  -Inconsistent C. difficile tests    Potential for orthostatic hypotension  Because of significant autonomic dysfunction associated with spinal cord injury, the patient is at high risk for orthostatic hypotension.  - Midodrine 5mg q4 hours prn SBP <100  -DC scheduled midodrine 5 mg tid    Dysphagia: Previous diagnosis after traumatic brain injury, high risk after cervical spinal cord injury  -Consulted speech therapy, increased difficulty secondary to lethargy, difficulty following compensatory techniques  -Patient was placed on n.p.o. NG tube placed on 3/20  -Undergoing trial trays with speech therapy    Pain:  #Neuropathic pain:  bilateral lower extremity pins-and-needles  - DC gabapentin 300 mg daily,  dose limited by GFR, concern that increased dose of gabapentin was resulting in lethargy  - Lyrica 75 mg twice daily to address allodynia, currently being held  -vitamin C 500 mg every 12 hours, which is been shown to improve gabapentin absorption and pain management     Postoperative pain:  - Oxycodone 5-10 mg every 3 hours as needed pain  - Decrease Robaxin 500 mg every 8 hours as needed pain  -DC scheduled Tylenol secondary to pneumonia, can mask fever     Spasticity: Given  increased lethargy will decrease baclofen  - Baclofen 5 mg 3 times daily    Anemia: Required IV iron supplementation, hemoglobin of 7.7 on admission  -Epogen 2000 units, Monday Wednesday Friday  -Folic acid 5 mg daily  - Ferrous sulfate 325 mg twice daily    Diabetes: Appreciate hospitalist input  -Lantus 10 units nightly  -Check fingersticks q. before meals, nightly  -Sliding scale insulin  - Pioglitazone 30 mg a daily  - ADA diet  -Consulted hospitalist      Acute on chronic kidney injury: Previously stage IV kidney disease with bilateral hydronephrosis suggestive of post renal obstruction, likely worsened by neurogenic bladder.    -Manage neurogenic bladder as above     Hyponatremia: Sodium of 136 on admission  -Sodium bicarb tablets 650 mg 3 times a day     Hyperlipidemia: Triglyceride 65, HDL 31, LDL 37  -Simvastatin 20 mg nightly     Vitamin deficiency  In the posttraumatic setting, there is a high likelihood of vitamin D deficiency.   Vitamin D level on admission of 47, goal of >30  - DC vitamin D supplementation     DVT prophylaxis/right lower extremity swelling: Patient at increased risk for VTE formation with neurologic compromise/myelopathy.  -Ultrasound right lower extremity was negative for DVT  -Heparin 5000 units every 8 hours, unable to transition to Lovenox given current renal function    Patient was seen for 27 minutes on unit/floor of which > 50% of time was spent on counseling and coordination of care regarding the above, including prognosis, risk reduction, benefits of treatment, and options for next stage of care including neurogenic bowel, discussion restarting senna, if incontinence continues to improve we will check KUB tomorrow, spasticity, baclofen 5 mg 3 times daily, fluid overload, -1 kg in last 24 hours.        Ramu Garner D.O.

## 2020-03-26 NOTE — THERAPY
Speech Language Pathology  Daily Treatment     Patient Name: Vince Almanzar  Age:  79 y.o., Sex:  male  Medical Record #: [id number]  Today's Date: 3/26/2020     Subjective    Patient arrived on time to ST with assistance.      Objective       03/26/20 0802   Dysphagia    Positioning / Behavior Modification Self Monitoring;Effortful Swallow;Modulate Rate or Bite Size;Multiple Swallows   Other Treatments trials of minced most   Diet / Liquid Recommendation Mildly Thick (2) - (Nectar Thick);Puree (4)   Nutritional Liquid Intake Rating Scale Thickened beverages (mildly thick unless otherwise specified)   Nutritional Food Intake Rating Scale Total oral diet of a single consistency   SLP Total Time Spent   SLP Individual Total Time Spent (Mins) 60   Charge Group   SLP Swallowing Dysfunction Treatment Swallowing Dysfunction Treatment       Assessment    Patient was assessed with current diet textures as well as trials of minced and moist. No overt s/sx of aspiration were noted. Min cues for use of swallow strategies.     Plan    Continue trials of minced and moist.

## 2020-03-26 NOTE — THERAPY
Occupational Therapy  Daily Treatment     Patient Name: Vince Almanzar  Age:  79 y.o., Sex:  male  Medical Record #: [id number]  Today's Date: 3/26/2020     Precautions  Precautions: (P) Fall Risk, Cervical Collar    Comments: (P) No BP on LUE, [place], Droplet/isolation precautions, c-collar, risk for pressure injuries     Safety   ADL Safety : Requires Supervision for Safety, Requires Physical Assist for Safety  Bathroom Safety: Requires Supervision for Safety, Requires Physical Assist for Safety  Comments: See FIMs for ADL performance details     Subjective    Patient agreeable to participate in OT.     Objective     20 1301   Precautions   Precautions Fall Risk;Cervical Collar     Comments No BP on LUE, [place], Droplet/isolation precautions, c-collar, risk for pressure injuries    Interdisciplinary Plan of Care Collaboration   Patient Position at End of Therapy Seated;Self Releasing Lap Belt Applied   OT Total Time Spent   OT Individual Total Time Spent (Mins) 30   OT Charge Group   OT Self Care / ADL 2     FIM Grooming Score:  5 - Standby Prompting/Supervision or Set-up  Grooming Description:  (Set-up for washing hands, brushing teeth and shaving while seated in w/c)    FIM Lower Body Dressing Score:  3 - Moderate Assistance  Lower Body Dressing Description:  Sock aid, Increased time, Supervision for safety, Set-up of equipment(Patient required mod assist for donning elastic waist pants (min assist for threading  LLE through pants and for pants over hips pursuits). Set-up for donning socks with use of sock aid. )    FIM Toiletin - Max Assistance  Toileting Description:  Grab bar, Increased time, Supervision for safety, Set-up of equipment(Max assist for toileting (for clothing management, standing balance and safety) with use of grab bar .)    FIM Toilet Transfer Score:  4 - Minimal Assistance  Toilet Transfer Description:  Grab bar, Increased time, Supervision for safety, Set-up of equipment(Min to CGA  for wc<>toilet txfr with use of grab bar. )    Patient partially incontinent of bowel during this session however able to also have BM while seated on toilet.     Assessment    Patient tolerated OT session well with focus on progression with ADLs. Patient demo's improved functional independence with ADL independence including STS (CGA during this session compared to min assist during previous session). Continues to present to bowel incontinence.     Plan    Incorporate spinal precautions and energy conservation techniques into ADLs with AE as needed, UB strengthening/general strengthening, endurance building, balance (sit and stand)

## 2020-03-26 NOTE — THERAPY
Physical Therapy   Daily Treatment     Patient Name: Vince Almanzar  Age:  79 y.o., Sex:  male  Medical Record #: [id number]  Today's Date: 3/26/2020     Precautions  Precautions: (P) Fall Risk, Cervical Collar    Comments: (P) No BP on LUE, [place], Droplet/isolation precautions, c-collar, risk for pressure injuries     Subjective    Pt was sitting in w/c upon arrival and agreeable to tx     Objective       03/26/20 1001   Precautions   Precautions Fall Risk;Cervical Collar     Comments No BP on LUE, [place], Droplet/isolation precautions, c-collar, risk for pressure injuries    Lower Extremity Outcome Measures   Lower Extremity Outcome Measures Utilized 5x STS   5x STS (Five Times Sit to Stand Test)   Height of Sitting Surface (inches) 22   5x Sit to STand (seconds) 48   Sit to Stand Comments min from w/c height to FWW   Interdisciplinary Plan of Care Collaboration   Patient Position at End of Therapy Seated;Call Light within Reach;Tray Table within Reach;Phone within Reach   PT Total Time Spent   PT Individual Total Time Spent (Mins) 30   PT Charge Group   PT Gait Training 2     FIM Walking Score:  5 - Standby Prompting/Supervision or Set-up  Walking Description:  (300ft x 2 FWW SPV)        Assessment    Pt with improved endurance and self pacing strategies. Continues to be limited by endurance and neuromuscular planning in STS.     Plan    Vector training, postural re-ed, STS training to 4ww, endurance in AMB with 4ww

## 2020-03-27 ENCOUNTER — APPOINTMENT (OUTPATIENT)
Dept: RADIOLOGY | Facility: REHABILITATION | Age: 80
DRG: 052 | End: 2020-03-27
Attending: PHYSICAL MEDICINE & REHABILITATION
Payer: COMMERCIAL

## 2020-03-27 LAB
GLUCOSE BLD-MCNC: 117 MG/DL (ref 65–99)
GLUCOSE BLD-MCNC: 140 MG/DL (ref 65–99)
GLUCOSE BLD-MCNC: 173 MG/DL (ref 65–99)
GLUCOSE BLD-MCNC: 207 MG/DL (ref 65–99)

## 2020-03-27 PROCEDURE — 97110 THERAPEUTIC EXERCISES: CPT

## 2020-03-27 PROCEDURE — 770010 HCHG ROOM/CARE - REHAB SEMI PRIVAT*

## 2020-03-27 PROCEDURE — 700102 HCHG RX REV CODE 250 W/ 637 OVERRIDE(OP): Performed by: HOSPITALIST

## 2020-03-27 PROCEDURE — 94760 N-INVAS EAR/PLS OXIMETRY 1: CPT

## 2020-03-27 PROCEDURE — 94640 AIRWAY INHALATION TREATMENT: CPT

## 2020-03-27 PROCEDURE — 97116 GAIT TRAINING THERAPY: CPT

## 2020-03-27 PROCEDURE — 700101 HCHG RX REV CODE 250: Performed by: PHYSICAL MEDICINE & REHABILITATION

## 2020-03-27 PROCEDURE — 700111 HCHG RX REV CODE 636 W/ 250 OVERRIDE (IP): Performed by: PHYSICAL MEDICINE & REHABILITATION

## 2020-03-27 PROCEDURE — A9270 NON-COVERED ITEM OR SERVICE: HCPCS | Performed by: PHYSICAL MEDICINE & REHABILITATION

## 2020-03-27 PROCEDURE — 99232 SBSQ HOSP IP/OBS MODERATE 35: CPT | Performed by: HOSPITALIST

## 2020-03-27 PROCEDURE — 700102 HCHG RX REV CODE 250 W/ 637 OVERRIDE(OP): Performed by: PHYSICAL MEDICINE & REHABILITATION

## 2020-03-27 PROCEDURE — 99232 SBSQ HOSP IP/OBS MODERATE 35: CPT | Performed by: PHYSICAL MEDICINE & REHABILITATION

## 2020-03-27 PROCEDURE — 92526 ORAL FUNCTION THERAPY: CPT

## 2020-03-27 PROCEDURE — 74018 RADEX ABDOMEN 1 VIEW: CPT

## 2020-03-27 PROCEDURE — 82962 GLUCOSE BLOOD TEST: CPT | Mod: 91

## 2020-03-27 PROCEDURE — A9270 NON-COVERED ITEM OR SERVICE: HCPCS | Performed by: HOSPITALIST

## 2020-03-27 RX ADMIN — VANCOMYCIN HYDROCHLORIDE 125 MG: KIT ORAL at 00:00

## 2020-03-27 RX ADMIN — BACLOFEN 5 MG: 10 TABLET ORAL at 21:37

## 2020-03-27 RX ADMIN — SODIUM CHLORIDE SOLN NEBU 3% 3 ML: 3 NEBU SOLN at 13:26

## 2020-03-27 RX ADMIN — PIOGLITAZONE 30 MG: 15 TABLET ORAL at 09:26

## 2020-03-27 RX ADMIN — IPRATROPIUM BROMIDE AND ALBUTEROL SULFATE 3 ML: .5; 3 SOLUTION RESPIRATORY (INHALATION) at 19:29

## 2020-03-27 RX ADMIN — CHOLESTYRAMINE 4 G: 4 POWDER, FOR SUSPENSION ORAL at 09:26

## 2020-03-27 RX ADMIN — SODIUM CHLORIDE SOLN NEBU 3% 3 ML: 3 NEBU SOLN at 19:30

## 2020-03-27 RX ADMIN — IPRATROPIUM BROMIDE AND ALBUTEROL SULFATE 3 ML: .5; 3 SOLUTION RESPIRATORY (INHALATION) at 13:25

## 2020-03-27 RX ADMIN — BACLOFEN 5 MG: 10 TABLET ORAL at 14:58

## 2020-03-27 RX ADMIN — PREGABALIN 75 MG: 75 CAPSULE ORAL at 21:25

## 2020-03-27 RX ADMIN — HEPARIN SODIUM 5000 UNITS: 5000 INJECTION, SOLUTION INTRAVENOUS; SUBCUTANEOUS at 14:57

## 2020-03-27 RX ADMIN — CYANOCOBALAMIN TAB 1000 MCG 1000 MCG: 1000 TAB at 09:27

## 2020-03-27 RX ADMIN — BACLOFEN 5 MG: 10 TABLET ORAL at 09:26

## 2020-03-27 RX ADMIN — IPRATROPIUM BROMIDE AND ALBUTEROL SULFATE 3 ML: .5; 3 SOLUTION RESPIRATORY (INHALATION) at 07:22

## 2020-03-27 RX ADMIN — Medication 1 TABLET: at 11:39

## 2020-03-27 RX ADMIN — Medication: at 09:00

## 2020-03-27 RX ADMIN — HEPARIN SODIUM 5000 UNITS: 5000 INJECTION, SOLUTION INTRAVENOUS; SUBCUTANEOUS at 05:20

## 2020-03-27 RX ADMIN — INSULIN LISPRO 4 UNITS: 100 INJECTION, SOLUTION INTRAVENOUS; SUBCUTANEOUS at 21:40

## 2020-03-27 RX ADMIN — SIMVASTATIN 20 MG: 20 TABLET, FILM COATED ORAL at 21:25

## 2020-03-27 RX ADMIN — VANCOMYCIN HYDROCHLORIDE 125 MG: KIT ORAL at 06:00

## 2020-03-27 RX ADMIN — HEPARIN SODIUM 5000 UNITS: 5000 INJECTION, SOLUTION INTRAVENOUS; SUBCUTANEOUS at 21:38

## 2020-03-27 RX ADMIN — LIDOCAINE 2 PATCH: 50 PATCH CUTANEOUS at 11:40

## 2020-03-27 RX ADMIN — FERROUS SULFATE TAB 325 MG (65 MG ELEMENTAL FE) 325 MG: 325 (65 FE) TAB at 09:25

## 2020-03-27 RX ADMIN — VANCOMYCIN HYDROCHLORIDE 125 MG: KIT ORAL at 18:00

## 2020-03-27 RX ADMIN — FERROUS SULFATE TAB 325 MG (65 MG ELEMENTAL FE) 325 MG: 325 (65 FE) TAB at 17:40

## 2020-03-27 RX ADMIN — Medication 1 APPLICATION: at 21:20

## 2020-03-27 RX ADMIN — INSULIN LISPRO 2 UNITS: 100 INJECTION, SOLUTION INTRAVENOUS; SUBCUTANEOUS at 11:35

## 2020-03-27 RX ADMIN — CHOLESTYRAMINE 4 G: 4 POWDER, FOR SUSPENSION ORAL at 23:03

## 2020-03-27 RX ADMIN — PREGABALIN 75 MG: 75 CAPSULE ORAL at 09:27

## 2020-03-27 RX ADMIN — VANCOMYCIN HYDROCHLORIDE 125 MG: KIT ORAL at 12:00

## 2020-03-27 RX ADMIN — INSULIN GLARGINE 10 UNITS: 100 INJECTION, SOLUTION SUBCUTANEOUS at 09:28

## 2020-03-27 RX ADMIN — SODIUM CHLORIDE SOLN NEBU 3% 3 ML: 3 NEBU SOLN at 07:23

## 2020-03-27 RX ADMIN — FUROSEMIDE 20 MG: 20 TABLET ORAL at 05:17

## 2020-03-27 ASSESSMENT — 6 MINUTE WALK TEST (6MWT)
GAIT SPEED - METERS PER SECOND: .23
LEVEL OF ASSISTANCE: SUPERVISION
TOTAL DISTANCE WALKED (FT): 267
NUMBER OF RESTS: 1

## 2020-03-27 ASSESSMENT — ENCOUNTER SYMPTOMS
NAUSEA: 0
CHILLS: 0
FEVER: 0
NERVOUS/ANXIOUS: 0
ABDOMINAL PAIN: 0
SHORTNESS OF BREATH: 0
VOMITING: 0
DIARRHEA: 1

## 2020-03-27 ASSESSMENT — FIBROSIS 4 INDEX: FIB4 SCORE: 2.63

## 2020-03-27 NOTE — PROGRESS NOTES
"Rehab Progress Note     Encounter Date: 3/27/2020    CC: UE weakness    Interval Events (Subjective)      Spasticity is significantly improved and right hamstrings.  Range of motion improved to -5-10 degrees full extension.  He denies any pain     Doing much better with swallowing continues to work with speech therapy, no coughing or choking  He denies any current productive cough    Bladder: Episode of incontinence last night  Bowel: Medium loose stool yesterday afternoon, no watery incontinence in the last 24 hours    ROS:  Gen: No fatigue, confusion, significant weight loss  Eyes: no blurry vision, double vision or pain in eyes  ENT: no changes in hearing, runny nose, nose bleeds, sinus pain  CV: No CP, palpitations  Lungs: no shortness of breath, changes in secretions, changes in cough, pain with coughing  Abd: No abd pain, nausea, vomiting, pain with eating  : no blood in urine,  suprapubic pain  Ext: + swelling in legs R>L, no asymmetric atrophy  Neuro: no changes in strength or sensation  Skin: no new ulcers/skin breakdown appreciated by patient  Mood: No changes in mood today, increase in depression or anxiety  Heme: no bruising, or bleeding  Rest of 14 point review of systems is negative      Objective:  Vitals: /49   Pulse 78   Temp 36.7 °C (98 °F) (Oral)   Resp 16   Ht 1.778 m (5' 10\")   Wt 68.5 kg (151 lb 0.2 oz)   SpO2 94%   Gen: NAD, Body mass index is 21.67 kg/m².  HEENT: NC/AT, PERRLA, moist mucous membranes, c-collar in place  Cardio: RRR, no mumurs  Pulm: CTAB, improved breath sounds in bilateral bases  Abd: Soft NTND, active bowel sounds  Ext: 2+ peripheral edema R>L, mild improvement from yesterday. No calf tenderness. No clubbing/cyanosis. +dorsal pedalis pulses bilaterally.      Tone: 1/4 MAS right knee extension/hamstrings, significant improvement, approximately 5-10 degrees from extension, not resulting in significant pain        Recent Results (from the past 72 hour(s)) "   ACCU-CHEK GLUCOSE    Collection Time: 03/24/20  5:38 PM   Result Value Ref Range    Glucose - Accu-Ck 130 (H) 65 - 99 mg/dL   ACCU-CHEK GLUCOSE    Collection Time: 03/24/20  9:02 PM   Result Value Ref Range    Glucose - Accu-Ck 176 (H) 65 - 99 mg/dL   CBC WITH DIFFERENTIAL    Collection Time: 03/25/20  5:45 AM   Result Value Ref Range    WBC 4.3 (L) 4.8 - 10.8 K/uL    RBC 2.63 (L) 4.70 - 6.10 M/uL    Hemoglobin 7.9 (L) 14.0 - 18.0 g/dL    Hematocrit 25.3 (L) 42.0 - 52.0 %    MCV 96.2 81.4 - 97.8 fL    MCH 30.0 27.0 - 33.0 pg    MCHC 31.2 (L) 33.7 - 35.3 g/dL    RDW 63.7 (H) 35.9 - 50.0 fL    Platelet Count 130 (L) 164 - 446 K/uL    MPV 9.3 9.0 - 12.9 fL    Neutrophils-Polys 66.40 44.00 - 72.00 %    Lymphocytes 15.50 (L) 22.00 - 41.00 %    Monocytes 14.60 (H) 0.00 - 13.40 %    Eosinophils 1.90 0.00 - 6.90 %    Basophils 0.20 0.00 - 1.80 %    Immature Granulocytes 1.40 (H) 0.00 - 0.90 %    Nucleated RBC 0.50 /100 WBC    Neutrophils (Absolute) 2.83 1.82 - 7.42 K/uL    Lymphs (Absolute) 0.66 (L) 1.00 - 4.80 K/uL    Monos (Absolute) 0.62 0.00 - 0.85 K/uL    Eos (Absolute) 0.08 0.00 - 0.51 K/uL    Baso (Absolute) 0.01 0.00 - 0.12 K/uL    Immature Granulocytes (abs) 0.06 0.00 - 0.11 K/uL    NRBC (Absolute) 0.02 K/uL   Basic Metabolic Panel    Collection Time: 03/25/20  5:45 AM   Result Value Ref Range    Sodium 140 135 - 145 mmol/L    Potassium 4.4 3.6 - 5.5 mmol/L    Chloride 107 96 - 112 mmol/L    Co2 22 20 - 33 mmol/L    Glucose 103 (H) 65 - 99 mg/dL    Bun 44 (H) 8 - 22 mg/dL    Creatinine 2.53 (H) 0.50 - 1.40 mg/dL    Calcium 8.5 8.5 - 10.5 mg/dL    Anion Gap 11.0 7.0 - 16.0   ESTIMATED GFR    Collection Time: 03/25/20  5:45 AM   Result Value Ref Range    GFR If African American 30 (A) >60 mL/min/1.73 m 2    GFR If Non African American 25 (A) >60 mL/min/1.73 m 2   ACCU-CHEK GLUCOSE    Collection Time: 03/25/20  7:36 AM   Result Value Ref Range    Glucose - Accu-Ck 106 (H) 65 - 99 mg/dL   ACCU-CHEK GLUCOSE     Collection Time: 03/25/20 11:14 AM   Result Value Ref Range    Glucose - Accu-Ck 156 (H) 65 - 99 mg/dL   ACCU-CHEK GLUCOSE    Collection Time: 03/25/20  4:59 PM   Result Value Ref Range    Glucose - Accu-Ck 83 65 - 99 mg/dL   ACCU-CHEK GLUCOSE    Collection Time: 03/25/20  8:25 PM   Result Value Ref Range    Glucose - Accu-Ck 183 (H) 65 - 99 mg/dL   Basic Metabolic Panel    Collection Time: 03/26/20  5:44 AM   Result Value Ref Range    Sodium 138 135 - 145 mmol/L    Potassium 4.5 3.6 - 5.5 mmol/L    Chloride 106 96 - 112 mmol/L    Co2 22 20 - 33 mmol/L    Glucose 106 (H) 65 - 99 mg/dL    Bun 43 (H) 8 - 22 mg/dL    Creatinine 2.54 (H) 0.50 - 1.40 mg/dL    Calcium 8.3 (L) 8.5 - 10.5 mg/dL    Anion Gap 10.0 7.0 - 16.0   ESTIMATED GFR    Collection Time: 03/26/20  5:44 AM   Result Value Ref Range    GFR If African American 30 (A) >60 mL/min/1.73 m 2    GFR If Non African American 25 (A) >60 mL/min/1.73 m 2   ACCU-CHEK GLUCOSE    Collection Time: 03/26/20  7:18 AM   Result Value Ref Range    Glucose - Accu-Ck 101 (H) 65 - 99 mg/dL   ACCU-CHEK GLUCOSE    Collection Time: 03/26/20 11:06 AM   Result Value Ref Range    Glucose - Accu-Ck 150 (H) 65 - 99 mg/dL   ACCU-CHEK GLUCOSE    Collection Time: 03/26/20  5:36 PM   Result Value Ref Range    Glucose - Accu-Ck 169 (H) 65 - 99 mg/dL   ACCU-CHEK GLUCOSE    Collection Time: 03/26/20  8:50 PM   Result Value Ref Range    Glucose - Accu-Ck 205 (H) 65 - 99 mg/dL   ACCU-CHEK GLUCOSE    Collection Time: 03/27/20  7:59 AM   Result Value Ref Range    Glucose - Accu-Ck 117 (H) 65 - 99 mg/dL   ACCU-CHEK GLUCOSE    Collection Time: 03/27/20 11:13 AM   Result Value Ref Range    Glucose - Accu-Ck 173 (H) 65 - 99 mg/dL       Current Facility-Administered Medications   Medication Frequency   • baclofen (LIORESAL) tablet 5 mg TID   • pregabalin (LYRICA) capsule 75 mg BID   • methocarbamol (ROBAXIN) tablet 500 mg Q8HRS PRN   • ipratropium-albuterol (DUONEB) nebulizer solution TID   • sodium  chloride 3% nebulizer solution 3 mL TID   • cholestyramine (QUESTRAN,PREVALITE) 4 GM powder 4 g BID   • loperamide (IMODIUM) capsule 2 mg 4X/DAY PRN   • insulin glargine (LANTUS) injection 10 Units QAM INSULIN   • vancomycin 50 mg/mL oral soln 125 mg Q6HRS   • furosemide (LASIX) tablet 20 mg Q DAY   • Pharmacy Consult: Enteral tube insertion - review meds/change route/product selection PHARMACY TO DOSE   • bisacodyl (THE MAGIC BULLET) suppository 10 mg QDAY PRN    And   • docusate sodium (COLACE) capsule 200 mg BID PRN    And   • sennosides (SENOKOT) 8.6 MG tablet 17.2 mg QDAY PRN    And   • magnesium hydroxide (MILK OF MAGNESIA) suspension 30 mL QDAY PRN   • insulin lispro (HUMALOG) injection 2-12 Units 4X/DAY ACHS    And   • glucose 4 g chewable tablet 16 g Q15 MIN PRN    And   • dextrose 50% (D50W) injection 50 mL Q15 MIN PRN   • lidocaine 2 % jelly PRN    And   • nitroglycerin (NITRO-BID) 2 % ointment 1 Inch PRN   • Respiratory Therapy Consult Continuous RT   • oxyCODONE immediate-release (ROXICODONE) tablet 5 mg Q3HRS PRN   • oxyCODONE immediate release (ROXICODONE) tablet 10 mg Q3HRS PRN   • acetaminophen (TYLENOL) tablet 650 mg Q4HRS PRN   • therapeutic multivitamin-minerals (THERAGRAN-M) tablet 1 Tab DAILY WITH LUNCH   • artificial tears ophthalmic solution 1 Drop PRN   • benzocaine-menthol (CEPACOL) lozenge 1 Lozenge Q2HRS PRN   • mag hydrox-al hydrox-simeth (MAALOX PLUS ES or MYLANTA DS) suspension 20 mL Q2HRS PRN   • ondansetron (ZOFRAN ODT) dispertab 4 mg 4X/DAY PRN    Or   • ondansetron (ZOFRAN) syringe/vial injection 4 mg 4X/DAY PRN   • sodium chloride (OCEAN) 0.65 % nasal spray 2 Spray PRN   • traZODone (DESYREL) tablet 50 mg QHS PRN   • ammonium lactate (LAC-HYDRIN) 12 % lotion BID   • cyanocobalamin (VITAMIN B12) tablet 1,000 mcg DAILY   • ferrous sulfate tablet 325 mg BID WITH MEALS   • lidocaine (LIDODERM) 5 % 2 Patch Q24HR   • metoprolol (LOPRESSOR) tablet 12.5 mg BID   • pioglitazone (ACTOS)  tablet 30 mg DAILY   • simethicone (MYLICON) chewable tab 80 mg TID PRN   • simvastatin (ZOCOR) tablet 20 mg Nightly   • heparin injection 5,000 Units Q8HRS   • midodrine (PROAMATINE) tablet 5 mg Q4HRS PRN       Orders Placed This Encounter   Procedures   • Diet Order Diabetic     Standing Status:   Standing     Number of Occurrences:   1     Order Specific Question:   Diet:     Answer:   Diabetic [3]     Order Specific Question:   Texture Modifier     Answer:   Level 6 - Soft & Bite Sized (Dysphagia 3)     Order Specific Question:   Liquid level     Answer:   Level 2 - Mildly Thick       Assessment:  Active Hospital Problems    Diagnosis   • Acute on chronic renal failure (HCC)   • Central cord syndrome (HCC)   • Type 2 diabetes mellitus (HCC)   • Anemia   • Thrombocytopenia (HCC)   • 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 Sellers on 2/28.  Pinprick altered bilaterally at C3-C6  - Collar on at all times, spinal precautions  - Calcium carbonate 500 mg twice daily  -Continue comprehensive acute inpatient rehabilitation    Neurologic respiratory impairment: Aggressive secretion management  -Consult respiratory therapy  - Oxygen as per guidelines  - DuoNeb 3X daily   -DC'd Mucomyst 3 times a day for mucolytic   - hypertonic saline, 3% for mucolytic with DuoNeb  -DC Medi-neb    Pneumonia: Bilateral lower lobe, likely bacterial, productive cough  - Zyvox and Zosyn started on 3/14, transitioned to ceftriaxone on 3/18, restarted on Zyvox and Zosyn on 3/19,  completed on 3/23  -Appreciate hospitalist input  -Aggressive secretion management as above  - Given productive cough will place on droplet precaution until completion of antibiotics, of chest x-ray is improving will DC droplet precaution  -Remove droplet precautions, no more productive cough for the last 24 hours  -Check CBC in a.m.    Fluid overload: Pleural effusions bilaterally, chest x-ray from 3/24 shows stable pleural effusion and bilateral pneumonia/consolidation, 68.5->67.5 kg  -Lasix 20 mg daily  -Daily weights, strict I's and O's  -Check BMP in a.m.  Intake/Output Summary (Last 24 hours) at 3/27/2020 1237  Last data filed at 3/27/2020 0800  Gross per 24 hour   Intake 260 ml   Output 300 ml   Net -40 ml       C. Difficile diarrhea: PCR positive, toxin negative, colonization versus infection versus tube feed tolerance given and loose watery stool  - Vancomycin oral 125 mg every 6 hours  -Contact precautions, C. difficile, on droplet precautions for productive cough in the setting of pneumonia  -Cholestyramine 4 g twice daily  - Check CBC    Neurogenic bladder: [place] removed after treatment with Lasix  - voiding trial, if can't void in 6 hours or prn check pvr and if >500cc then ICP,if able to void then check PVR, if PVR is >200cc then ICP       Neurogenic bowel: Improving bowel incontinence  -Discontinued bowel meds given and watery diarrhea  -Inconsistent C. difficile tests  -Check KUB    Potential for orthostatic hypotension  Because of significant autonomic dysfunction associated with spinal cord injury, the patient is at high risk for orthostatic hypotension.  - Midodrine 5mg q4 hours prn SBP <100  -DC scheduled midodrine 5 mg tid    Dysphagia: Previous diagnosis after traumatic brain injury, high risk after cervical spinal cord injury  -Consulted speech therapy, increased difficulty secondary to lethargy, difficulty following compensatory techniques  -Patient was placed on n.p.o. NG tube placed on  3/20, NG tube removed on 3/25  -Undergoing trial trays with speech therapy    Pain:  #Neuropathic pain:  bilateral lower extremity pins-and-needles  - DC gabapentin 300 mg daily,  dose limited by GFR, concern that increased dose of gabapentin was resulting in lethargy  - Lyrica 75 mg twice daily to address allodynia, currently being held  -vitamin C 500 mg every 12 hours, which is been shown to improve gabapentin absorption and pain management     Postoperative pain:  - Oxycodone 5-10 mg every 3 hours as needed pain  - DC Robaxin 500 mg every 8 hours as needed pain  -DC scheduled Tylenol secondary to pneumonia, can mask fever     Spasticity: Good results with low-dose baclofen, not waking him up at night, not impairing function, not causing him pain  - Baclofen 5 mg 3 times daily    Anemia: Required IV iron supplementation, hemoglobin of 7.7 on admission  -Epogen 2000 units, Monday Wednesday Friday  -Folic acid 5 mg daily  - Ferrous sulfate 325 mg twice daily    Diabetes: Appreciate hospitalist input  -Lantus 10 units nightly  -Check fingersticks q. before meals, nightly  -Sliding scale insulin  - Pioglitazone 30 mg a daily  - ADA diet  -Consulted hospitalist      Acute on chronic kidney injury: Previously stage IV kidney disease with bilateral hydronephrosis suggestive of post renal obstruction, likely worsened by neurogenic bladder.    -Manage neurogenic bladder as above     Hyponatremia: Sodium of 136 on admission  -Sodium bicarb tablets 650 mg 3 times a day     Hyperlipidemia: Triglyceride 65, HDL 31, LDL 37  -Simvastatin 20 mg nightly     Vitamin deficiency  In the posttraumatic setting, there is a high likelihood of vitamin D deficiency.   Vitamin D level on admission of 47, goal of >30  - DC vitamin D supplementation     DVT prophylaxis/right lower extremity swelling: Patient at increased risk for VTE formation with neurologic compromise/myelopathy.  -Ultrasound right lower extremity was negative for  DVT  -Heparin 5000 units every 8 hours, unable to transition to Lovenox given current renal function    Patient was seen for 28 minutes on unit/floor of which > 50% of time was spent on counseling and coordination of care regarding the above, including prognosis, risk reduction, benefits of treatment, and options for next stage of care including fluid overload, check BMP, discussed with hospitalist about increasing dose of Lasix, pneumonia, check CBC in a.m., spasticity, significant improvement with low-dose baclofen, neurogenic bowel, check KUB.    Ramu Garner D.O.

## 2020-03-27 NOTE — DISCHARGE PLANNING
Dr. Garner and Therapies would like patient to stay another week since he is progressing so well.  [facility] auth'd [facility] until 4/12/2020.  Team notified.

## 2020-03-27 NOTE — THERAPY
Recreational Therapy  Daily Treatment     Patient Name: Vince Almanzar  AGE:  79 y.o., SEX:  male  Medical Record #: [id number]  Today's Date: 3/27/2020       Subjective    Pleasant and willing for session.      Objective       03/27/20 1401   Functional Ability Status - Cognitive   Attention Span Remains on Task   Comprehension Follows Three Step Commands   Judgment Able to Make Independent Decisions   Functional Ability Status - Emotional    Affect Appropriate   Mood Appropriate   Behavior Appropriate   Skilled Intervention    Skilled Intervention Relaxation / Coping Skills;Cognitive Leisure   Skilled Intervention Comments Card game   Interdisciplinary Plan of Care Collaboration   Patient Position at End of Therapy Seated;Tray Table within Reach;Call Light within Reach   Treatment Time   Total Time Spent (mins) 30   Procedural Tracking   Procedural Tracking Cognitive Skills Training;Leisure Skills Development       Assessment    Pt was in his room following a RT session. Pt was given a cognitive leisure activity he had performed before. Pt required Min cues to remember the correct rules. Some poor carryover.     Plan    Cognitive leisure and planning on leisure activities following discharge.

## 2020-03-27 NOTE — FLOWSHEET NOTE
03/26/20 1930   Events/Summary/Plan   Events/Summary/Plan SVN   Vital Signs   Pulse 80   Respiration 16   Pulse Oximetry 94 %   $ Pulse Oximetry (Spot Check) Yes   Respiratory Assessment   Level of Consciousness Alert   Secretions   Cough Congested;Non Productive   Oxygen   O2 Delivery Device None - Room Air

## 2020-03-27 NOTE — PROGRESS NOTES
[facility] Medicine Daily Progress Note    Date of Service  3/27/2020    Chief Complaint:  Hypertension  Diabetes  Cough, fever, tachycardia, leukocytosis    Interval History:  No significant events or changes since last visit    Review of Systems  Review of Systems   Constitutional: Negative for chills and fever.   Respiratory: Negative for shortness of breath.    Cardiovascular: Negative for chest pain.   Gastrointestinal: Positive for diarrhea. Negative for abdominal pain, nausea and vomiting.   Psychiatric/Behavioral: The patient is not nervous/anxious.         Physical Exam  Temp:  [36.6 °C (97.8 °F)-36.8 °C (98.2 °F)] 36.7 °C (98 °F)  Pulse:  [72-85] 78  Resp:  [16-18] 16  BP: (106-125)/(48-62) 113/49  SpO2:  [94 %-97 %] 94 %    Physical Exam  Vitals signs and nursing note reviewed.   Constitutional:       Appearance: Normal appearance.   HENT:      Head: Atraumatic.   Eyes:      Conjunctiva/sclera: Conjunctivae normal.      Pupils: Pupils are equal, round, and reactive to light.   Neck:      Musculoskeletal: Normal range of motion and neck supple.      Comments: Has C-collar  Cardiovascular:      Rate and Rhythm: Normal rate and regular rhythm.   Pulmonary:      Effort: Pulmonary effort is normal.      Breath sounds: Normal breath sounds.   Abdominal:      General: Bowel sounds are normal.      Palpations: Abdomen is soft.   Musculoskeletal:      Right lower leg: Edema present.      Left lower leg: Edema present.   Skin:     General: Skin is warm and dry.   Neurological:      Mental Status: He is alert and oriented to person, place, and time.   Psychiatric:         Mood and Affect: Mood normal.         Behavior: Behavior normal.         Fluids    Intake/Output Summary (Last 24 hours) at 3/27/2020 1020  Last data filed at 3/27/2020 0800  Gross per 24 hour   Intake 440 ml   Output 450 ml   Net -10 ml       Laboratory  Recent Labs     03/25/20  0545   WBC 4.3*   RBC 2.63*   HEMOGLOBIN 7.9*   HEMATOCRIT 25.3*   MCV  96.2   MCH 30.0   MCHC 31.2*   RDW 63.7*   PLATELETCT 130*   MPV 9.3     Recent Labs     03/25/20  0545 03/26/20  0544   SODIUM 140 138   POTASSIUM 4.4 4.5   CHLORIDE 107 106   CO2 22 22   GLUCOSE 103* 106*   BUN 44* 43*   CREATININE 2.53* 2.54*   CALCIUM 8.5 8.3*                   Imaging      Assessment/Plan  Anemia- (present on admission)  Assessment & Plan  Hb stable at 7.9 (3/25)  Fe: 33, sats 12% (3/1)  B12: 264  Folate: > 24 (3/6)  FOB (-) x 1  On Fe & B12 supplements    Chronic kidney disease- (present on admission)  Assessment & Plan  Has stage 4 CKD  Bun/Cr :improved after giving IVF for infection  S/P metabolic acidosis: CO2 22 (3/26)  Off Bicarb tabs (3/24)  Monitor    Diarrhea  Assessment & Plan  Diarrhea less recently  (3/18): C Diff PCR negative and Toxin negative  (3/21): C Diff PCR positive and Toxin negative  At high risk for developing C Dif infection given exposure to abx and prolonged hospitalization  On oral Vancomycin (thru 3/29)    Volume overload  Assessment & Plan  Has LE edema - improved  BNP: 9051 (3/22) --> 8352 (3/24)  On Lasix: 20 mg daily  Note: on tube feeds -- transitioning to an oral diet but currently on a thickened liquid diet  Monitor K+ levels: 4.5 (3/26)    Somnolence  Assessment & Plan  Mentation much improved w/ decreased pain meds    Pneumonia  Assessment & Plan  Resolved  S/P fever and leukocytosis  CXR --> right basilar consolidation consistent with pneumonia; left basal consolidation could be due to atelectasis or pneumonia  Rapid strep: neg  Flu (-)  Resp panel: neg except for Rhinovirus  BC x 2: neg  S/P abx    Cervical spinal stenosis  Assessment & Plan  Had worsening bilateral upper extremity weakness, balance, and neck pain  MRI showed severe stenosis at C3-C6 with interval progression over the last year  S/P C3-C6 laminectomy and posterior decompression    Uncontrolled type 2 diabetes mellitus with hyperglycemia (HCC)- (present on admission)  Assessment &  Plan  Hba1c: 5.1 (3/13)   BS:   On Lantus: 10 units qam  On Actos: 30 mg daily  Note: home meds include Ozempic 1.0 mg once a week, Actos 30mg qd, and Tresiba 10 units qhs  Cont to monitor    Hyperlipemia- (present on admission)  Assessment & Plan  On Zocor    Essential hypertension- (present on admission)  Assessment & Plan  BP ok  On Lopressor: 12.5 mg bid   Monitor

## 2020-03-27 NOTE — FLOWSHEET NOTE
03/27/20 0723   Events/Summary/Plan   Events/Summary/Plan SVN   Vital Signs   Pulse 78   Respiration 16   Pulse Oximetry 94 %   $ Pulse Oximetry (Spot Check) Yes   Respiratory Assessment   Level of Consciousness Alert   Breath Sounds   RUL Breath Sounds [place];Diminished   RML Breath Sounds Rhonchi;Diminished   RLL Breath Sounds Rhonchi;Diminished   CIRA Breath Sounds Rhonchi;Diminished   LLL Breath Sounds Rhonchi;Diminished   Secretions   Cough Productive;Strong   How Sputum Obtained Expectorated   Sputum Amount Moderate   Sputum Color White   Sputum Consistency Thick   Oxygen   O2 Delivery Device None - Room Air

## 2020-03-27 NOTE — THERAPY
Physical Therapy   Daily Treatment     Patient Name: Vince Almanzar  Age:  79 y.o., Sex:  male  Medical Record #: [id number]  Today's Date: 3/27/2020     Precautions  Precautions: (P) Fall Risk, Cervical Collar    Comments: (P) No BP on LUE, [place], c-collar    Subjective    Pt was sitting in w/c upon arrival and agreeable to tx     Objective       03/27/20 0931   Precautions   Precautions Fall Risk;Cervical Collar     Comments No BP on LUE, [place], c-collar   Sitting Lower Body Exercises   Other Exercises UE arm bike gear 3 5min x 2   6 Minute Walk Test   Distance (feet) 267   Gait Speed (meters per second) 0.23   Number of Rests 1   Level of Assistance 5   Interdisciplinary Plan of Care Collaboration   Patient Position at End of Therapy Seated;Call Light within Reach;Tray Table within Reach;Phone within Reach   PT Total Time Spent   PT Individual Total Time Spent (Mins) 60   PT Charge Group   PT Gait Training 1   PT Therapeutic Exercise 3     Supine therex for posture/ core control:  Supine over blanket roll on spine 60sec x 3 no UE balance, 60sec x 3 overpressure for pec minor mm stretch  3 x 20 each Is (alternating UE), Ts, marching, glute squeeze  Supine <>sit from mat x 1 with SBA    10 x 5 5sec ascend/descend STS from 25inch high mat to FWW with VCs for eccentric control and upright posture    Assessment    Pt limited by thoracic kyphosis, cardiovascular endurance and standing balance but with great motivation and participation throughout session.    Plan    Vector training (posterior cue), postural re-ed, extensor muscle group activation, STS training to 4ww, endurance in AMB with 4ww

## 2020-03-27 NOTE — THERAPY
Occupational Therapy  Daily Treatment     Patient Name: Vince Almanzar  Age:  79 y.o., Sex:  male  Medical Record #: [id number]  Today's Date: 3/27/2020     Precautions  Precautions: Fall Risk, Cervical Collar    Comments: No BP on LUE, [place], c-collar    Safety   ADL Safety : Requires Supervision for Safety, Requires Physical Assist for Safety  Bathroom Safety: Requires Supervision for Safety, Requires Physical Assist for Safety  Comments: See FIMs for ADL performance details     Subjective       Objective       03/27/20 0831   Precautions   Precautions Fall Risk;Cervical Collar     Comments No BP on LUE, [place], c-collar   Vitals   O2 Delivery Device None - Room Air   Pain   Intervention Declines   Pain 0 - 10 Group   Therapist Pain Assessment 0   Non Verbal Descriptors   Non Verbal Scale  Calm   Cognition    Level of Consciousness Alert   Standing Upper Body Exercises   Other Exercises Standing at FluidoBike, Level 2, CGA via gait belt w/ no LOB, 5x3 mins w/ seated rest break between sets.  1.775km, additional time required.   Interdisciplinary Plan of Care Collaboration   IDT Collaboration with  Nursing;Therapy Tech   Patient Position at End of Therapy Seated;Self Releasing Lap Belt Applied;Call Light within Reach;Tray Table within Reach;Phone within Reach   OT Total Time Spent   OT Individual Total Time Spent (Mins) 30   OT Charge Group   OT Therapeutic Exercise  2         Assessment    Pt was alert and cooperative w/ tx.  Review of spinal/cervical precautions.  Tx emphasis on Standing balance/endurance/strenthenigng - refer to notes above.      Plan    Incorporate spinal precautions and energy conservation techniques into ADLs with AE as needed, UB strengthening/general strengthening, endurance building, balance (sit and stand)

## 2020-03-27 NOTE — CARE PLAN
Problem: Communication  Goal: The ability to communicate needs accurately and effectively will improve  Outcome: PROGRESSING AS EXPECTED     Problem: Safety  Goal: Will remain free from injury  Outcome: PROGRESSING AS EXPECTED     Problem: Infection  Goal: Will remain free from infection  Outcome: PROGRESSING AS EXPECTED     Problem: Bowel/Gastric:  Goal: Normal bowel function is maintained or improved  Outcome: PROGRESSING AS EXPECTED     Problem: Pain Management  Goal: Pain level will decrease to patient's comfort goal  Outcome: PROGRESSING AS EXPECTED     Problem: Respiratory:  Goal: Respiratory status will improve  Outcome: PROGRESSING AS EXPECTED

## 2020-03-27 NOTE — REHAB-PHARMACY IDT TEAM NOTE
Pharmacy   Pharmacy  Antibiotics/Antifungals/Antivirals:  Medication:      Active Orders (From admission, onward)    Ordered     Ordering Provider       Sun Mar 22, 2020 10:59 AM    03/22/20 1059  vancomycin 50 mg/mL oral soln 125 mg  EVERY 6 HOURS      Diana Dykes M.D.        Route:         Po   Stop Date:  3/29/20  Reason: C-diff  Antihypertensives/Cardiac:  Medication:    Orders (72h ago, onward)     Start     Ordered    03/23/20 0600  furosemide (LASIX) tablet 20 mg  Q DAY      03/22/20 1103    03/22/20 0915  cholestyramine (QUESTRAN,PREVALITE) 4 GM powder 4 g  2 TIMES DAILY      03/22/20 0850    03/12/20 2100  metoprolol (LOPRESSOR) tablet 12.5 mg  2 TIMES DAILY      03/12/20 1145    03/12/20 2100  simvastatin (ZOCOR) tablet 20 mg  NIGHTLY      03/12/20 1145    03/12/20 1227  midodrine (PROAMATINE) tablet 5 mg  EVERY 4 HOURS PRN      03/12/20 1228    03/12/20 1146  nitroglycerin (NITRO-BID) 2 % ointment 1 Inch  (Autonomic Dysreflexia Orders)  PRN      03/12/20 1146              Patient Vitals for the past 24 hrs:   BP Pulse   03/27/20 1512 113/53 79   03/27/20 1327 -- 76   03/27/20 0723 -- 78   03/27/20 0709 113/49 78   03/27/20 0514 125/48 79   03/26/20 2122 -- 79   03/26/20 1930 -- 80   03/26/20 1853 125/61 85     Anticoagulation:  Medication: Heparin                                  Other key medications: A review of the medication list reveals no issues at this time. Patient is currently on antihypertensive(s). Recommend home blood pressure monitoring/recording if antihypertensive(s) regimen(s) continue. Patient is currently on diabetic medication(s) and/or Insulin(s). Recommend home blood glucose monitoring/recording if these regimen(s) continue.    Section completed by: Asael Kilgore [facility]

## 2020-03-27 NOTE — FLOWSHEET NOTE
03/27/20 1327   Inhalation Therapy Treatment   $ SVN Performed Yes   Given By: Mouthpiece   Incentive Spirometry Treatment   Incentive Spirometer Volume 750 mL

## 2020-03-27 NOTE — THERAPY
Speech Language Pathology  Daily Treatment     Patient Name: Vince Almanzar  Age:  79 y.o., Sex:  male  Medical Record #: [id number]  Today's Date: 3/27/2020     Subjective    Patient pleasant and cooperative.     Objective       03/27/20 0801   Dysphagia    Positioning / Behavior Modification Self Monitoring;Multiple Swallows   Other Treatments Therapeutic trial of (6) soft bite sized    Diet / Liquid Recommendation Mildly Thick (2) - (Nectar Thick);Minced & Moist (5) - (Dysphagia II)  (continue trial of (6) soft bite sized at lunch )   Nutritional Liquid Intake Rating Scale Thickened beverages (mildly thick unless otherwise specified)   Nutritional Food Intake Rating Scale Total oral diet with multiple consistencies but requiring special preparation or compensations   SLP Total Time Spent   SLP Individual Total Time Spent (Mins) 30   Charge Group   SLP Swallowing Dysfunction Treatment Swallowing Dysfunction Treatment           Assessment    Patient seen at breakfast and tolerated Therapeutic trial of (6) soft bite sized  With ( 2) mildly thick with no overt s/sx of aspiration.  Patient benefitted from min cueing to slow rate of eating.    Plan    Continue trial of Therapeutic trial of (6) soft bite sized  At lunch and advance if tolerating with out s/sx of aspiration.  Continue therapeutic trials of thin liquids in isolation via 10ml and cup sip if appropriate.

## 2020-03-27 NOTE — REHAB-CM IDT TEAM NOTE
Case Management    DC Planning  DC destination/dispostion: Home alone to [facility] apartment in [place], lives in 2nd floor apartment w/ elevator access. Has 4WW, tub transfer bench, rasied toilet. Previous [facility]. Sister will provide transportation.    Referrals: HH referral.    DC Needs: DME for patient safety & mobility. HH referral. MD f/u appts.    Barriers to discharge: Functional mobility deficits. Lives alone, limited support system.     Strengths: Motivation. Supportive sister. Home accessibility.       Section completed by:  Natty Jackson

## 2020-03-27 NOTE — THERAPY
Occupational Therapy  Daily Treatment     Patient Name: Vince Almanzar  Age:  79 y.o., Sex:  male  Medical Record #: [id number]  Today's Date: 3/27/2020     Precautions  Precautions: Fall Risk, Cervical Collar    Comments: No BP on LUE, [place], c-collar    Safety   ADL Safety : Requires Supervision for Safety, Requires Physical Assist for Safety  Bathroom Safety: Requires Supervision for Safety, Requires Physical Assist for Safety  Comments: See FIMs for ADL performance details     Subjective     Objective       03/27/20 1231   Precautions   Precautions Fall Risk;Cervical Collar     Comments No BP on LUE, [place], c-collar   Vitals   O2 Delivery Device None - Room Air   Pain   Intervention Declines   Standing Upper Body Exercises   Comments 1)Standing 4' from rebounder, CGA via gait belt, LOB x 4 w/ therapist assisted correction, rebounder activity w/ large green ball, 4x50 w/ seated rest break between sets.    2)  Bilateral Row - standing at equalizer, CGA via gait belt, no LOB, 3x10 @ 15#'s.   Interdisciplinary Plan of Care Collaboration   IDT Collaboration with  Nursing   Patient Position at End of Therapy Seated;Self Releasing Lap Belt Applied;Call Light within Reach;Tray Table within Reach;Phone within Reach   OT Total Time Spent   OT Individual Total Time Spent (Mins) 30   OT Charge Group   OT Therapeutic Exercise  2       Assessment    Pt was alert and cooperative w/ tx.  Tx emphasis on standing balance/endurance/strength + BUE strength - see notes above.  Limiters include impaired standing balance, generalized weakness, poor endurance.    Plan    Incorporate spinal precautions and energy conservation techniques into ADLs with AE as needed, UB strengthening/general strengthening, endurance building, balance (sit and stand)

## 2020-03-27 NOTE — FLOWSHEET NOTE
03/27/20 1327   Events/Summary/Plan   Events/Summary/Plan SVN   Vital Signs   Pulse 76   Respiration 16   Pulse Oximetry 98 %   $ Pulse Oximetry (Spot Check) Yes   Respiratory Assessment   Level of Consciousness Alert   Secretions   Cough Strong;Non Productive   Oxygen   O2 Delivery Device None - Room Air

## 2020-03-27 NOTE — THERAPY
Speech Language Pathology  Daily Treatment     Patient Name: Vince Almanzar  Age:  79 y.o., Sex:  male  Medical Record #: [id number]  Today's Date: 3/27/2020     Subjective    Pt was pleasant and cooperative during this ST session      Objective       03/27/20 1131   Dysphagia    Diet / Liquid Recommendation Mildly Thick (2) - (Nectar Thick);Soft & Bite-Sized (6) - (Dysphagia III)   SLP Total Time Spent   SLP Individual Total Time Spent (Mins) 30   Charge Group   SLP Swallowing Dysfunction Treatment Swallowing Dysfunction Treatment         Assessment    Pt trialed 6- Soft & Bite Sized (Dysphagia 3) textures with 2- Mildly Thick (Nectar Thick) liquids during lunch without any overt s/sx of aspiration/penetration.  Pt was also able to tolerate whole medications with a 2- Mildly Thick (Nectar Thick) liquid wash.      Plan    Upgrade to 6- Soft & Bite Sized (Dysphagia 3) textures

## 2020-03-27 NOTE — PROGRESS NOTES
Received patient during shift change, report rec'd from day shift RN. Resting in bed, VS stable on room air. Per report continent of B&B w/episodes of incontinence. Mod assist for transfers. A&O x 4, able to make needs known. Bed in low position, call light within reach.

## 2020-03-28 ENCOUNTER — APPOINTMENT (OUTPATIENT)
Dept: RADIOLOGY | Facility: MEDICAL CENTER | Age: 80
DRG: 871 | End: 2020-03-28
Attending: EMERGENCY MEDICINE
Payer: COMMERCIAL

## 2020-03-28 ENCOUNTER — HOSPITAL ENCOUNTER (INPATIENT)
Facility: REHABILITATION | Age: 80
LOS: 1 days | DRG: 052 | End: 2020-03-28
Attending: PHYSICAL MEDICINE & REHABILITATION | Admitting: PHYSICAL MEDICINE & REHABILITATION
Payer: COMMERCIAL

## 2020-03-28 ENCOUNTER — HOSPITAL ENCOUNTER (EMERGENCY)
Facility: MEDICAL CENTER | Age: 80
End: 2020-03-28
Attending: EMERGENCY MEDICINE | Admitting: PHYSICAL MEDICINE & REHABILITATION
Payer: MEDICARE

## 2020-03-28 ENCOUNTER — APPOINTMENT (OUTPATIENT)
Dept: RADIOLOGY | Facility: MEDICAL CENTER | Age: 80
End: 2020-03-28
Attending: EMERGENCY MEDICINE
Payer: MEDICARE

## 2020-03-28 ENCOUNTER — APPOINTMENT (OUTPATIENT)
Dept: RADIOLOGY | Facility: MEDICAL CENTER | Age: 80
DRG: 871 | End: 2020-03-28
Attending: PSYCHIATRY & NEUROLOGY
Payer: COMMERCIAL

## 2020-03-28 ENCOUNTER — HOSPITAL ENCOUNTER (INPATIENT)
Facility: MEDICAL CENTER | Age: 80
LOS: 13 days | DRG: 871 | End: 2020-04-10
Attending: EMERGENCY MEDICINE | Admitting: PSYCHIATRY & NEUROLOGY
Payer: COMMERCIAL

## 2020-03-28 VITALS
DIASTOLIC BLOOD PRESSURE: 67 MMHG | HEIGHT: 70 IN | RESPIRATION RATE: 11 BRPM | TEMPERATURE: 97.9 F | OXYGEN SATURATION: 98 % | WEIGHT: 152 LBS | BODY MASS INDEX: 21.76 KG/M2 | HEART RATE: 79 BPM | SYSTOLIC BLOOD PRESSURE: 150 MMHG

## 2020-03-28 VITALS
TEMPERATURE: 97.8 F | SYSTOLIC BLOOD PRESSURE: 127 MMHG | HEIGHT: 70 IN | WEIGHT: 152.5 LBS | BODY MASS INDEX: 21.83 KG/M2 | OXYGEN SATURATION: 95 % | DIASTOLIC BLOOD PRESSURE: 72 MMHG | RESPIRATION RATE: 12 BRPM | HEART RATE: 80 BPM

## 2020-03-28 VITALS — RESPIRATION RATE: 12 BRPM | OXYGEN SATURATION: 95 % | HEART RATE: 80 BPM

## 2020-03-28 DIAGNOSIS — R60.9 DEPENDENT EDEMA: ICD-10-CM

## 2020-03-28 DIAGNOSIS — J18.9 PNEUMONIA OF BOTH LOWER LOBES DUE TO INFECTIOUS ORGANISM: ICD-10-CM

## 2020-03-28 DIAGNOSIS — N28.9 RENAL INSUFFICIENCY: ICD-10-CM

## 2020-03-28 DIAGNOSIS — R41.82 ALTERED MENTAL STATUS, UNSPECIFIED ALTERED MENTAL STATUS TYPE: ICD-10-CM

## 2020-03-28 DIAGNOSIS — Z91.89 AT RISK FOR INEFFECTIVE CLEARANCE OF AIRWAY: ICD-10-CM

## 2020-03-28 DIAGNOSIS — S14.129D CENTRAL CORD SYNDROME, SUBSEQUENT ENCOUNTER (HCC): ICD-10-CM

## 2020-03-28 DIAGNOSIS — R40.4 TRANSIENT ALTERATION OF AWARENESS: ICD-10-CM

## 2020-03-28 DIAGNOSIS — M48.02 CERVICAL STENOSIS OF SPINE: Primary | ICD-10-CM

## 2020-03-28 PROBLEM — G93.40 ACUTE ENCEPHALOPATHY: Status: ACTIVE | Noted: 2020-03-28

## 2020-03-28 PROBLEM — J96.00 ACUTE RESPIRATORY FAILURE (HCC): Status: ACTIVE | Noted: 2020-03-28

## 2020-03-28 LAB
ALBUMIN SERPL BCP-MCNC: 3.2 G/DL (ref 3.2–4.9)
ALBUMIN SERPL BCP-MCNC: 3.3 G/DL (ref 3.2–4.9)
ALBUMIN/GLOB SERPL: 1.4 G/DL
ALBUMIN/GLOB SERPL: 1.4 G/DL
ALP SERPL-CCNC: 105 U/L (ref 30–99)
ALP SERPL-CCNC: 97 U/L (ref 30–99)
ALT SERPL-CCNC: 18 U/L (ref 2–50)
ALT SERPL-CCNC: 19 U/L (ref 2–50)
ANION GAP SERPL CALC-SCNC: 11 MMOL/L (ref 7–16)
ANISOCYTOSIS BLD QL SMEAR: ABNORMAL
APPEARANCE UR: ABNORMAL
APPEARANCE UR: CLEAR
AST SERPL-CCNC: 26 U/L (ref 12–45)
AST SERPL-CCNC: 32 U/L (ref 12–45)
BACTERIA #/AREA URNS HPF: NEGATIVE /HPF
BACTERIA #/AREA URNS HPF: NEGATIVE /HPF
BASOPHILS # BLD AUTO: 0.1 % (ref 0–1.8)
BASOPHILS # BLD AUTO: 0.2 % (ref 0–1.8)
BASOPHILS # BLD AUTO: 0.2 % (ref 0–1.8)
BASOPHILS # BLD: 0.01 K/UL (ref 0–0.12)
BILIRUB SERPL-MCNC: 0.5 MG/DL (ref 0.1–1.5)
BILIRUB SERPL-MCNC: 0.7 MG/DL (ref 0.1–1.5)
BILIRUB UR QL STRIP.AUTO: NEGATIVE
BILIRUB UR QL STRIP.AUTO: NEGATIVE
BLOOD CULTURE HOLD CXBCH: NORMAL
BLOOD CULTURE HOLD CXBCH: NORMAL
BUN SERPL-MCNC: 38 MG/DL (ref 8–22)
BUN SERPL-MCNC: 40 MG/DL (ref 8–22)
BUN SERPL-MCNC: 43 MG/DL (ref 8–22)
BURR CELLS/RBC NFR CSF MANUAL: 0 %
CALCIUM SERPL-MCNC: 8.4 MG/DL (ref 8.5–10.5)
CALCIUM SERPL-MCNC: 8.5 MG/DL (ref 8.5–10.5)
CALCIUM SERPL-MCNC: 8.5 MG/DL (ref 8.5–10.5)
CAOX CRY #/AREA URNS HPF: ABNORMAL /HPF
CHLORIDE SERPL-SCNC: 106 MMOL/L (ref 96–112)
CHLORIDE SERPL-SCNC: 106 MMOL/L (ref 96–112)
CHLORIDE SERPL-SCNC: 108 MMOL/L (ref 96–112)
CLARITY CSF: CLEAR
CO2 SERPL-SCNC: 20 MMOL/L (ref 20–33)
CO2 SERPL-SCNC: 20 MMOL/L (ref 20–33)
CO2 SERPL-SCNC: 21 MMOL/L (ref 20–33)
COLOR CSF: COLORLESS
COLOR SPUN CSF: COLORLESS
COLOR UR: YELLOW
COLOR UR: YELLOW
COMMENT 1642: NORMAL
CREAT SERPL-MCNC: 2.07 MG/DL (ref 0.5–1.4)
CREAT SERPL-MCNC: 2.14 MG/DL (ref 0.5–1.4)
CREAT SERPL-MCNC: 2.18 MG/DL (ref 0.5–1.4)
EKG IMPRESSION: NORMAL
EOSINOPHIL # BLD AUTO: 0.01 K/UL (ref 0–0.51)
EOSINOPHIL # BLD AUTO: 0.03 K/UL (ref 0–0.51)
EOSINOPHIL # BLD AUTO: 0.06 K/UL (ref 0–0.51)
EOSINOPHIL NFR BLD: 0.1 % (ref 0–6.9)
EOSINOPHIL NFR BLD: 0.5 % (ref 0–6.9)
EOSINOPHIL NFR BLD: 1.2 % (ref 0–6.9)
EPI CELLS #/AREA URNS HPF: NEGATIVE /HPF
EPI CELLS #/AREA URNS HPF: NEGATIVE /HPF
ERYTHROCYTE [DISTWIDTH] IN BLOOD BY AUTOMATED COUNT: 70.7 FL (ref 35.9–50)
ERYTHROCYTE [DISTWIDTH] IN BLOOD BY AUTOMATED COUNT: 72.5 FL (ref 35.9–50)
ERYTHROCYTE [DISTWIDTH] IN BLOOD BY AUTOMATED COUNT: 73.5 FL (ref 35.9–50)
FLUAV RNA SPEC QL NAA+PROBE: NEGATIVE
FLUBV RNA SPEC QL NAA+PROBE: NEGATIVE
GIANT PLATELETS BLD QL SMEAR: NORMAL
GLOBULIN SER CALC-MCNC: 2.3 G/DL (ref 1.9–3.5)
GLOBULIN SER CALC-MCNC: 2.4 G/DL (ref 1.9–3.5)
GLUCOSE BLD-MCNC: 147 MG/DL (ref 65–99)
GLUCOSE BLD-MCNC: 171 MG/DL (ref 65–99)
GLUCOSE CSF-MCNC: 106 MG/DL (ref 40–80)
GLUCOSE SERPL-MCNC: 162 MG/DL (ref 65–99)
GLUCOSE SERPL-MCNC: 173 MG/DL (ref 65–99)
GLUCOSE SERPL-MCNC: 203 MG/DL (ref 65–99)
GLUCOSE UR STRIP.AUTO-MCNC: 100 MG/DL
GLUCOSE UR STRIP.AUTO-MCNC: 500 MG/DL
GRAM STN SPEC: NORMAL
HCT VFR BLD AUTO: 25.3 % (ref 42–52)
HCT VFR BLD AUTO: 25.5 % (ref 42–52)
HCT VFR BLD AUTO: 26.9 % (ref 42–52)
HGB BLD-MCNC: 8.1 G/DL (ref 14–18)
HGB BLD-MCNC: 8.2 G/DL (ref 14–18)
HGB BLD-MCNC: 8.3 G/DL (ref 14–18)
HYALINE CASTS #/AREA URNS LPF: ABNORMAL /LPF
HYALINE CASTS #/AREA URNS LPF: ABNORMAL /LPF
IMM GRANULOCYTES # BLD AUTO: 0.04 K/UL (ref 0–0.11)
IMM GRANULOCYTES # BLD AUTO: 0.05 K/UL (ref 0–0.11)
IMM GRANULOCYTES # BLD AUTO: 0.08 K/UL (ref 0–0.11)
IMM GRANULOCYTES NFR BLD AUTO: 0.6 % (ref 0–0.9)
IMM GRANULOCYTES NFR BLD AUTO: 0.8 % (ref 0–0.9)
IMM GRANULOCYTES NFR BLD AUTO: 1 % (ref 0–0.9)
KETONES UR STRIP.AUTO-MCNC: NEGATIVE MG/DL
KETONES UR STRIP.AUTO-MCNC: NEGATIVE MG/DL
LACTATE BLD-SCNC: 1.2 MMOL/L (ref 0.5–2)
LEUKOCYTE ESTERASE UR QL STRIP.AUTO: ABNORMAL
LEUKOCYTE ESTERASE UR QL STRIP.AUTO: NEGATIVE
LG PLATELETS BLD QL SMEAR: NORMAL
LYMPHOCYTES # BLD AUTO: 0.33 K/UL (ref 1–4.8)
LYMPHOCYTES # BLD AUTO: 0.38 K/UL (ref 1–4.8)
LYMPHOCYTES # BLD AUTO: 0.52 K/UL (ref 1–4.8)
LYMPHOCYTES NFR BLD: 10 % (ref 22–41)
LYMPHOCYTES NFR BLD: 3.1 % (ref 22–41)
LYMPHOCYTES NFR BLD: 5.9 % (ref 22–41)
LYMPHOCYTES NFR CSF: 42 %
MACROCYTES BLD QL SMEAR: ABNORMAL
MCH RBC QN AUTO: 30.1 PG (ref 27–33)
MCH RBC QN AUTO: 31.3 PG (ref 27–33)
MCH RBC QN AUTO: 31.8 PG (ref 27–33)
MCHC RBC AUTO-ENTMCNC: 30.5 G/DL (ref 33.7–35.3)
MCHC RBC AUTO-ENTMCNC: 32 G/DL (ref 33.7–35.3)
MCHC RBC AUTO-ENTMCNC: 32.5 G/DL (ref 33.7–35.3)
MCV RBC AUTO: 97.7 FL (ref 81.4–97.8)
MCV RBC AUTO: 97.7 FL (ref 81.4–97.8)
MCV RBC AUTO: 98.9 FL (ref 81.4–97.8)
MICRO URNS: ABNORMAL
MICRO URNS: ABNORMAL
MICROCYTES BLD QL SMEAR: ABNORMAL
MONOCYTES # BLD AUTO: 0.49 K/UL (ref 0–0.85)
MONOCYTES # BLD AUTO: 0.59 K/UL (ref 0–0.85)
MONOCYTES # BLD AUTO: 0.81 K/UL (ref 0–0.85)
MONOCYTES NFR BLD AUTO: 11.4 % (ref 0–13.4)
MONOCYTES NFR BLD AUTO: 7.6 % (ref 0–13.4)
MONOCYTES NFR BLD AUTO: 7.7 % (ref 0–13.4)
MONONUC CELLS NFR CSF: 58 %
MORPHOLOGY BLD-IMP: NORMAL
NEUTROPHILS # BLD AUTO: 3.96 K/UL (ref 1.82–7.42)
NEUTROPHILS # BLD AUTO: 5.46 K/UL (ref 1.82–7.42)
NEUTROPHILS # BLD AUTO: 9.31 K/UL (ref 1.82–7.42)
NEUTROPHILS NFR BLD: 76.2 % (ref 44–72)
NEUTROPHILS NFR BLD: 85.2 % (ref 44–72)
NEUTROPHILS NFR BLD: 88.2 % (ref 44–72)
NITRITE UR QL STRIP.AUTO: NEGATIVE
NITRITE UR QL STRIP.AUTO: NEGATIVE
NRBC # BLD AUTO: 0 K/UL
NRBC # BLD AUTO: 0 K/UL
NRBC # BLD AUTO: 0.02 K/UL
NRBC BLD-RTO: 0 /100 WBC
NRBC BLD-RTO: 0 /100 WBC
NRBC BLD-RTO: 0.3 /100 WBC
NT-PROBNP SERPL IA-MCNC: 5823 PG/ML (ref 0–125)
OVALOCYTES BLD QL SMEAR: NORMAL
PH UR STRIP.AUTO: 5 [PH] (ref 5–8)
PH UR STRIP.AUTO: 5.5 [PH] (ref 5–8)
PLATELET # BLD AUTO: 119 K/UL (ref 164–446)
PLATELET # BLD AUTO: 121 K/UL (ref 164–446)
PLATELET # BLD AUTO: 126 K/UL (ref 164–446)
PLATELET BLD QL SMEAR: NORMAL
PMV BLD AUTO: 9.3 FL (ref 9–12.9)
PMV BLD AUTO: 9.5 FL (ref 9–12.9)
PMV BLD AUTO: 9.6 FL (ref 9–12.9)
POIKILOCYTOSIS BLD QL SMEAR: NORMAL
POLYCHROMASIA BLD QL SMEAR: NORMAL
POTASSIUM SERPL-SCNC: 4.3 MMOL/L (ref 3.6–5.5)
POTASSIUM SERPL-SCNC: 4.3 MMOL/L (ref 3.6–5.5)
POTASSIUM SERPL-SCNC: 4.5 MMOL/L (ref 3.6–5.5)
PROCALCITONIN SERPL-MCNC: 0.23 NG/ML
PROT CSF-MCNC: 120 MG/DL (ref 15–45)
PROT SERPL-MCNC: 5.5 G/DL (ref 6–8.2)
PROT SERPL-MCNC: 5.7 G/DL (ref 6–8.2)
PROT UR QL STRIP: 30 MG/DL
PROT UR QL STRIP: NEGATIVE MG/DL
RBC # BLD AUTO: 2.59 M/UL (ref 4.7–6.1)
RBC # BLD AUTO: 2.61 M/UL (ref 4.7–6.1)
RBC # BLD AUTO: 2.72 M/UL (ref 4.7–6.1)
RBC # CSF: <1 CELLS/UL
RBC # URNS HPF: ABNORMAL /HPF
RBC # URNS HPF: ABNORMAL /HPF
RBC BLD AUTO: PRESENT
RBC UR QL AUTO: ABNORMAL
RBC UR QL AUTO: ABNORMAL
SIGNIFICANT IND 70042: NORMAL
SITE SITE: NORMAL
SODIUM SERPL-SCNC: 137 MMOL/L (ref 135–145)
SODIUM SERPL-SCNC: 138 MMOL/L (ref 135–145)
SODIUM SERPL-SCNC: 139 MMOL/L (ref 135–145)
SOURCE SOURCE: NORMAL
SP GR UR STRIP.AUTO: 1.01
SP GR UR STRIP.AUTO: 1.01
SPECIMEN VOL CSF: 7.3 ML
TRIGL SERPL-MCNC: 115 MG/DL (ref 0–149)
TUBE # CSF: 3
TUBE # CSF: 4
UROBILINOGEN UR STRIP.AUTO-MCNC: 0.2 MG/DL
UROBILINOGEN UR STRIP.AUTO-MCNC: 0.2 MG/DL
WBC # BLD AUTO: 10.6 K/UL (ref 4.8–10.8)
WBC # BLD AUTO: 5.2 K/UL (ref 4.8–10.8)
WBC # BLD AUTO: 6.4 K/UL (ref 4.8–10.8)
WBC # CSF: <1 CELLS/UL (ref 0–10)
WBC #/AREA URNS HPF: ABNORMAL /HPF
WBC #/AREA URNS HPF: ABNORMAL /HPF

## 2020-03-28 PROCEDURE — 85025 COMPLETE CBC W/AUTO DIFF WBC: CPT

## 2020-03-28 PROCEDURE — 62270 DX LMBR SPI PNXR: CPT

## 2020-03-28 PROCEDURE — 700111 HCHG RX REV CODE 636 W/ 250 OVERRIDE (IP): Performed by: EMERGENCY MEDICINE

## 2020-03-28 PROCEDURE — 82962 GLUCOSE BLOOD TEST: CPT

## 2020-03-28 PROCEDURE — 700102 HCHG RX REV CODE 250 W/ 637 OVERRIDE(OP): Performed by: HOSPITALIST

## 2020-03-28 PROCEDURE — 96365 THER/PROPH/DIAG IV INF INIT: CPT

## 2020-03-28 PROCEDURE — 700111 HCHG RX REV CODE 636 W/ 250 OVERRIDE (IP): Performed by: PHYSICAL MEDICINE & REHABILITATION

## 2020-03-28 PROCEDURE — 84145 PROCALCITONIN (PCT): CPT

## 2020-03-28 PROCEDURE — 85025 COMPLETE CBC W/AUTO DIFF WBC: CPT | Mod: 91

## 2020-03-28 PROCEDURE — 700105 HCHG RX REV CODE 258: Performed by: EMERGENCY MEDICINE

## 2020-03-28 PROCEDURE — 84478 ASSAY OF TRIGLYCERIDES: CPT

## 2020-03-28 PROCEDURE — 71045 X-RAY EXAM CHEST 1 VIEW: CPT

## 2020-03-28 PROCEDURE — 700102 HCHG RX REV CODE 250 W/ 637 OVERRIDE(OP): Performed by: PHYSICAL MEDICINE & REHABILITATION

## 2020-03-28 PROCEDURE — 71250 CT THORAX DX C-: CPT

## 2020-03-28 PROCEDURE — 87086 URINE CULTURE/COLONY COUNT: CPT

## 2020-03-28 PROCEDURE — 80048 BASIC METABOLIC PNL TOTAL CA: CPT

## 2020-03-28 PROCEDURE — A6213 FOAM DRG >16<=48 SQ IN W/BDR: HCPCS | Performed by: HOSPITALIST

## 2020-03-28 PROCEDURE — 94760 N-INVAS EAR/PLS OXIMETRY 1: CPT

## 2020-03-28 PROCEDURE — 36415 COLL VENOUS BLD VENIPUNCTURE: CPT

## 2020-03-28 PROCEDURE — 84157 ASSAY OF PROTEIN OTHER: CPT

## 2020-03-28 PROCEDURE — 93005 ELECTROCARDIOGRAM TRACING: CPT | Performed by: EMERGENCY MEDICINE

## 2020-03-28 PROCEDURE — 31500 INSERT EMERGENCY AIRWAY: CPT

## 2020-03-28 PROCEDURE — 99233 SBSQ HOSP IP/OBS HIGH 50: CPT | Performed by: HOSPITALIST

## 2020-03-28 PROCEDURE — 96367 TX/PROPH/DG ADDL SEQ IV INF: CPT

## 2020-03-28 PROCEDURE — 87502 INFLUENZA DNA AMP PROBE: CPT

## 2020-03-28 PROCEDURE — 87040 BLOOD CULTURE FOR BACTERIA: CPT

## 2020-03-28 PROCEDURE — 770010 HCHG ROOM/CARE - REHAB SEMI PRIVAT*

## 2020-03-28 PROCEDURE — 82803 BLOOD GASES ANY COMBINATION: CPT

## 2020-03-28 PROCEDURE — 99292 CRITICAL CARE ADDL 30 MIN: CPT | Performed by: PSYCHIATRY & NEUROLOGY

## 2020-03-28 PROCEDURE — 70450 CT HEAD/BRAIN W/O DYE: CPT

## 2020-03-28 PROCEDURE — 0BH17EZ INSERTION OF ENDOTRACHEAL AIRWAY INTO TRACHEA, VIA NATURAL OR ARTIFICIAL OPENING: ICD-10-PCS | Performed by: EMERGENCY MEDICINE

## 2020-03-28 PROCEDURE — 80053 COMPREHEN METABOLIC PANEL: CPT

## 2020-03-28 PROCEDURE — 87205 SMEAR GRAM STAIN: CPT

## 2020-03-28 PROCEDURE — A9270 NON-COVERED ITEM OR SERVICE: HCPCS | Performed by: PHYSICAL MEDICINE & REHABILITATION

## 2020-03-28 PROCEDURE — A9270 NON-COVERED ITEM OR SERVICE: HCPCS | Performed by: HOSPITALIST

## 2020-03-28 PROCEDURE — 96375 TX/PRO/DX INJ NEW DRUG ADDON: CPT

## 2020-03-28 PROCEDURE — 700101 HCHG RX REV CODE 250: Performed by: EMERGENCY MEDICINE

## 2020-03-28 PROCEDURE — 700111 HCHG RX REV CODE 636 W/ 250 OVERRIDE (IP)

## 2020-03-28 PROCEDURE — 99291 CRITICAL CARE FIRST HOUR: CPT

## 2020-03-28 PROCEDURE — 99291 CRITICAL CARE FIRST HOUR: CPT | Performed by: PSYCHIATRY & NEUROLOGY

## 2020-03-28 PROCEDURE — 80053 COMPREHEN METABOLIC PANEL: CPT | Mod: 91

## 2020-03-28 PROCEDURE — 81001 URINALYSIS AUTO W/SCOPE: CPT | Mod: 91

## 2020-03-28 PROCEDURE — 82945 GLUCOSE OTHER FLUID: CPT

## 2020-03-28 PROCEDURE — 96366 THER/PROPH/DIAG IV INF ADDON: CPT

## 2020-03-28 PROCEDURE — 770022 HCHG ROOM/CARE - ICU (200)

## 2020-03-28 PROCEDURE — 5A1945Z RESPIRATORY VENTILATION, 24-96 CONSECUTIVE HOURS: ICD-10-PCS | Performed by: PSYCHIATRY & NEUROLOGY

## 2020-03-28 PROCEDURE — 87070 CULTURE OTHR SPECIMN AEROBIC: CPT

## 2020-03-28 PROCEDURE — 94002 VENT MGMT INPAT INIT DAY: CPT

## 2020-03-28 PROCEDURE — 83880 ASSAY OF NATRIURETIC PEPTIDE: CPT

## 2020-03-28 PROCEDURE — 302214 INTUBATION BOX: Performed by: EMERGENCY MEDICINE

## 2020-03-28 PROCEDURE — 99285 EMERGENCY DEPT VISIT HI MDM: CPT

## 2020-03-28 PROCEDURE — 81001 URINALYSIS AUTO W/SCOPE: CPT

## 2020-03-28 PROCEDURE — 009U3ZX DRAINAGE OF SPINAL CANAL, PERCUTANEOUS APPROACH, DIAGNOSTIC: ICD-10-PCS | Performed by: EMERGENCY MEDICINE

## 2020-03-28 PROCEDURE — 83605 ASSAY OF LACTIC ACID: CPT

## 2020-03-28 PROCEDURE — 36600 WITHDRAWAL OF ARTERIAL BLOOD: CPT

## 2020-03-28 PROCEDURE — 89051 BODY FLUID CELL COUNT: CPT

## 2020-03-28 RX ORDER — BISACODYL 10 MG/1
10 SUPPOSITORY RECTAL
Status: DISCONTINUED | OUTPATIENT
Start: 2020-03-28 | End: 2020-03-28 | Stop reason: HOSPADM

## 2020-03-28 RX ORDER — SODIUM CHLORIDE FOR INHALATION 3 %
3 VIAL, NEBULIZER (ML) INHALATION 3 TIMES DAILY
Status: CANCELLED | OUTPATIENT
Start: 2020-03-28

## 2020-03-28 RX ORDER — PIOGLITAZONEHYDROCHLORIDE 15 MG/1
30 TABLET ORAL DAILY
Status: CANCELLED | OUTPATIENT
Start: 2020-03-29

## 2020-03-28 RX ORDER — SIMETHICONE 80 MG
80 TABLET,CHEWABLE ORAL 3 TIMES DAILY PRN
Status: DISCONTINUED | OUTPATIENT
Start: 2020-03-28 | End: 2020-03-28 | Stop reason: HOSPADM

## 2020-03-28 RX ORDER — MIDODRINE HYDROCHLORIDE 2.5 MG/1
5 TABLET ORAL EVERY 4 HOURS PRN
Status: DISCONTINUED | OUTPATIENT
Start: 2020-03-28 | End: 2020-03-28 | Stop reason: HOSPADM

## 2020-03-28 RX ORDER — AMMONIUM LACTATE 12 G/100G
LOTION TOPICAL 2 TIMES DAILY
Status: CANCELLED | OUTPATIENT
Start: 2020-03-28

## 2020-03-28 RX ORDER — FUROSEMIDE 20 MG/1
20 TABLET ORAL
Status: DISCONTINUED | OUTPATIENT
Start: 2020-03-29 | End: 2020-03-28 | Stop reason: HOSPADM

## 2020-03-28 RX ORDER — BACLOFEN 10 MG/1
5 TABLET ORAL 3 TIMES DAILY
Status: DISCONTINUED | OUTPATIENT
Start: 2020-03-28 | End: 2020-03-28 | Stop reason: HOSPADM

## 2020-03-28 RX ORDER — IPRATROPIUM BROMIDE AND ALBUTEROL SULFATE 2.5; .5 MG/3ML; MG/3ML
3 SOLUTION RESPIRATORY (INHALATION) 3 TIMES DAILY
Status: CANCELLED | OUTPATIENT
Start: 2020-03-28

## 2020-03-28 RX ORDER — ONDANSETRON 4 MG/1
4 TABLET, ORALLY DISINTEGRATING ORAL 4 TIMES DAILY PRN
Status: CANCELLED | OUTPATIENT
Start: 2020-03-28

## 2020-03-28 RX ORDER — LIDOCAINE HYDROCHLORIDE 20 MG/ML
JELLY TOPICAL PRN
Status: DISCONTINUED | OUTPATIENT
Start: 2020-03-28 | End: 2020-03-28 | Stop reason: HOSPADM

## 2020-03-28 RX ORDER — INSULIN GLARGINE 100 [IU]/ML
10 INJECTION, SOLUTION SUBCUTANEOUS
Status: CANCELLED | OUTPATIENT
Start: 2020-03-29

## 2020-03-28 RX ORDER — ALUMINA, MAGNESIA, AND SIMETHICONE 2400; 2400; 240 MG/30ML; MG/30ML; MG/30ML
20 SUSPENSION ORAL
Status: DISCONTINUED | OUTPATIENT
Start: 2020-03-28 | End: 2020-03-28 | Stop reason: HOSPADM

## 2020-03-28 RX ORDER — ECHINACEA PURPUREA EXTRACT 125 MG
2 TABLET ORAL PRN
Status: CANCELLED | OUTPATIENT
Start: 2020-03-28

## 2020-03-28 RX ORDER — BISACODYL 10 MG/1
10 SUPPOSITORY RECTAL
Status: CANCELLED | OUTPATIENT
Start: 2020-03-28

## 2020-03-28 RX ORDER — MIDODRINE HYDROCHLORIDE 2.5 MG/1
5 TABLET ORAL EVERY 4 HOURS PRN
Status: CANCELLED | OUTPATIENT
Start: 2020-03-28

## 2020-03-28 RX ORDER — OXYCODONE HYDROCHLORIDE 5 MG/1
5 TABLET ORAL
Status: CANCELLED | OUTPATIENT
Start: 2020-03-28

## 2020-03-28 RX ORDER — FAMOTIDINE 20 MG/1
20 TABLET, FILM COATED ORAL DAILY
Status: DISCONTINUED | OUTPATIENT
Start: 2020-03-29 | End: 2020-03-31

## 2020-03-28 RX ORDER — CHOLESTYRAMINE LIGHT 4 G/5.7G
1 POWDER, FOR SUSPENSION ORAL 2 TIMES DAILY
Status: CANCELLED | OUTPATIENT
Start: 2020-03-28

## 2020-03-28 RX ORDER — LIDOCAINE 50 MG/G
2 PATCH TOPICAL EVERY 24 HOURS
Status: DISCONTINUED | OUTPATIENT
Start: 2020-03-28 | End: 2020-03-28 | Stop reason: HOSPADM

## 2020-03-28 RX ORDER — SENNOSIDES A AND B 8.6 MG/1
17.2 TABLET, FILM COATED ORAL
Status: CANCELLED | OUTPATIENT
Start: 2020-03-28

## 2020-03-28 RX ORDER — ROCURONIUM BROMIDE 10 MG/ML
100 INJECTION, SOLUTION INTRAVENOUS ONCE
Status: COMPLETED | OUTPATIENT
Start: 2020-03-28 | End: 2020-03-28

## 2020-03-28 RX ORDER — HEPARIN SODIUM 5000 [USP'U]/ML
5000 INJECTION, SOLUTION INTRAVENOUS; SUBCUTANEOUS EVERY 8 HOURS
Status: CANCELLED | OUTPATIENT
Start: 2020-03-28

## 2020-03-28 RX ORDER — SODIUM CHLORIDE FOR INHALATION 3 %
3 VIAL, NEBULIZER (ML) INHALATION 3 TIMES DAILY
Status: DISCONTINUED | OUTPATIENT
Start: 2020-03-28 | End: 2020-03-28 | Stop reason: HOSPADM

## 2020-03-28 RX ORDER — DEXTROSE MONOHYDRATE 25 G/50ML
50 INJECTION, SOLUTION INTRAVENOUS
Status: CANCELLED | OUTPATIENT
Start: 2020-03-28

## 2020-03-28 RX ORDER — DEXTROSE MONOHYDRATE 25 G/50ML
50 INJECTION, SOLUTION INTRAVENOUS
Status: DISCONTINUED | OUTPATIENT
Start: 2020-03-28 | End: 2020-03-28 | Stop reason: HOSPADM

## 2020-03-28 RX ORDER — LIDOCAINE 50 MG/G
2 PATCH TOPICAL EVERY 24 HOURS
Status: CANCELLED | OUTPATIENT
Start: 2020-03-28

## 2020-03-28 RX ORDER — SIMVASTATIN 20 MG
20 TABLET ORAL NIGHTLY
Status: CANCELLED | OUTPATIENT
Start: 2020-03-28

## 2020-03-28 RX ORDER — AMOXICILLIN 250 MG
2 CAPSULE ORAL 2 TIMES DAILY
Status: DISCONTINUED | OUTPATIENT
Start: 2020-03-28 | End: 2020-03-28

## 2020-03-28 RX ORDER — AZITHROMYCIN 250 MG/1
500 TABLET, FILM COATED ORAL DAILY
Status: DISCONTINUED | OUTPATIENT
Start: 2020-03-28 | End: 2020-03-29

## 2020-03-28 RX ORDER — ONDANSETRON 2 MG/ML
4 INJECTION INTRAMUSCULAR; INTRAVENOUS 4 TIMES DAILY PRN
Status: CANCELLED | OUTPATIENT
Start: 2020-03-28

## 2020-03-28 RX ORDER — BACLOFEN 10 MG/1
5 TABLET ORAL 3 TIMES DAILY
Status: CANCELLED | OUTPATIENT
Start: 2020-03-28

## 2020-03-28 RX ORDER — SIMVASTATIN 20 MG
20 TABLET ORAL NIGHTLY
Status: DISCONTINUED | OUTPATIENT
Start: 2020-03-28 | End: 2020-03-28 | Stop reason: HOSPADM

## 2020-03-28 RX ORDER — FERROUS SULFATE 325(65) MG
325 TABLET ORAL 2 TIMES DAILY WITH MEALS
Status: DISCONTINUED | OUTPATIENT
Start: 2020-03-28 | End: 2020-03-28 | Stop reason: HOSPADM

## 2020-03-28 RX ORDER — PHENYLEPHRINE HCL IN 0.9% NACL 0.5 MG/5ML
100 SYRINGE (ML) INTRAVENOUS ONCE
Status: COMPLETED | OUTPATIENT
Start: 2020-03-28 | End: 2020-03-28

## 2020-03-28 RX ORDER — FERROUS SULFATE 325(65) MG
325 TABLET ORAL 2 TIMES DAILY WITH MEALS
Status: CANCELLED | OUTPATIENT
Start: 2020-03-28

## 2020-03-28 RX ORDER — TRAZODONE HYDROCHLORIDE 50 MG/1
50 TABLET ORAL
Status: DISCONTINUED | OUTPATIENT
Start: 2020-03-28 | End: 2020-03-28 | Stop reason: HOSPADM

## 2020-03-28 RX ORDER — DOCUSATE SODIUM 100 MG/1
200 CAPSULE, LIQUID FILLED ORAL 2 TIMES DAILY PRN
Status: CANCELLED | OUTPATIENT
Start: 2020-03-28

## 2020-03-28 RX ORDER — M-VIT,TX,IRON,MINS/CALC/FOLIC 27MG-0.4MG
1 TABLET ORAL
Status: CANCELLED | OUTPATIENT
Start: 2020-03-28

## 2020-03-28 RX ORDER — OXYCODONE HYDROCHLORIDE 10 MG/1
10 TABLET ORAL
Status: DISCONTINUED | OUTPATIENT
Start: 2020-03-28 | End: 2020-03-28 | Stop reason: HOSPADM

## 2020-03-28 RX ORDER — ECHINACEA PURPUREA EXTRACT 125 MG
2 TABLET ORAL PRN
Status: DISCONTINUED | OUTPATIENT
Start: 2020-03-28 | End: 2020-03-28 | Stop reason: HOSPADM

## 2020-03-28 RX ORDER — TRAZODONE HYDROCHLORIDE 50 MG/1
50 TABLET ORAL
Status: CANCELLED | OUTPATIENT
Start: 2020-03-28

## 2020-03-28 RX ORDER — ACETAMINOPHEN 325 MG/1
650 TABLET ORAL EVERY 4 HOURS PRN
Status: CANCELLED | OUTPATIENT
Start: 2020-03-28

## 2020-03-28 RX ORDER — OXYCODONE HYDROCHLORIDE 5 MG/1
5 TABLET ORAL
Status: DISCONTINUED | OUTPATIENT
Start: 2020-03-28 | End: 2020-03-28 | Stop reason: HOSPADM

## 2020-03-28 RX ORDER — POLYETHYLENE GLYCOL 3350 17 G/17G
1 POWDER, FOR SOLUTION ORAL
Status: DISCONTINUED | OUTPATIENT
Start: 2020-03-28 | End: 2020-03-28

## 2020-03-28 RX ORDER — FUROSEMIDE 20 MG/1
20 TABLET ORAL
Status: CANCELLED | OUTPATIENT
Start: 2020-03-29

## 2020-03-28 RX ORDER — CHOLESTYRAMINE LIGHT 4 G/5.7G
1 POWDER, FOR SUSPENSION ORAL 2 TIMES DAILY
Status: DISCONTINUED | OUTPATIENT
Start: 2020-03-28 | End: 2020-03-28 | Stop reason: HOSPADM

## 2020-03-28 RX ORDER — ALUMINA, MAGNESIA, AND SIMETHICONE 2400; 2400; 240 MG/30ML; MG/30ML; MG/30ML
20 SUSPENSION ORAL
Status: CANCELLED | OUTPATIENT
Start: 2020-03-28

## 2020-03-28 RX ORDER — POLYVINYL ALCOHOL 14 MG/ML
1 SOLUTION/ DROPS OPHTHALMIC PRN
Status: CANCELLED | OUTPATIENT
Start: 2020-03-28

## 2020-03-28 RX ORDER — IPRATROPIUM BROMIDE AND ALBUTEROL SULFATE 2.5; .5 MG/3ML; MG/3ML
3 SOLUTION RESPIRATORY (INHALATION)
Status: DISCONTINUED | OUTPATIENT
Start: 2020-03-28 | End: 2020-04-10 | Stop reason: HOSPADM

## 2020-03-28 RX ORDER — M-VIT,TX,IRON,MINS/CALC/FOLIC 27MG-0.4MG
1 TABLET ORAL
Status: CANCELLED | OUTPATIENT
Start: 2020-03-29

## 2020-03-28 RX ORDER — SENNOSIDES A AND B 8.6 MG/1
17.2 TABLET, FILM COATED ORAL
Status: DISCONTINUED | OUTPATIENT
Start: 2020-03-28 | End: 2020-03-28 | Stop reason: HOSPADM

## 2020-03-28 RX ORDER — AMMONIUM LACTATE 12 G/100G
LOTION TOPICAL 2 TIMES DAILY
Status: DISCONTINUED | OUTPATIENT
Start: 2020-03-28 | End: 2020-03-28 | Stop reason: HOSPADM

## 2020-03-28 RX ORDER — LIDOCAINE HYDROCHLORIDE 20 MG/ML
JELLY TOPICAL PRN
Status: CANCELLED | OUTPATIENT
Start: 2020-03-28

## 2020-03-28 RX ORDER — SODIUM CHLORIDE 9 MG/ML
1000 INJECTION, SOLUTION INTRAVENOUS ONCE
Status: COMPLETED | OUTPATIENT
Start: 2020-03-28 | End: 2020-03-28

## 2020-03-28 RX ORDER — SIMETHICONE 80 MG
80 TABLET,CHEWABLE ORAL 3 TIMES DAILY PRN
Status: CANCELLED | OUTPATIENT
Start: 2020-03-28

## 2020-03-28 RX ORDER — OXYCODONE HYDROCHLORIDE 10 MG/1
10 TABLET ORAL
Status: CANCELLED | OUTPATIENT
Start: 2020-03-28

## 2020-03-28 RX ORDER — ACETAMINOPHEN 325 MG/1
650 TABLET ORAL EVERY 4 HOURS PRN
Status: DISCONTINUED | OUTPATIENT
Start: 2020-03-28 | End: 2020-03-28 | Stop reason: HOSPADM

## 2020-03-28 RX ORDER — INSULIN GLARGINE 100 [IU]/ML
10 INJECTION, SOLUTION SUBCUTANEOUS
Status: DISCONTINUED | OUTPATIENT
Start: 2020-03-29 | End: 2020-03-28 | Stop reason: HOSPADM

## 2020-03-28 RX ORDER — PREGABALIN 75 MG/1
75 CAPSULE ORAL 2 TIMES DAILY
Status: CANCELLED | OUTPATIENT
Start: 2020-03-28

## 2020-03-28 RX ORDER — PREGABALIN 75 MG/1
75 CAPSULE ORAL 2 TIMES DAILY
Status: DISCONTINUED | OUTPATIENT
Start: 2020-03-28 | End: 2020-03-28 | Stop reason: HOSPADM

## 2020-03-28 RX ORDER — DOCUSATE SODIUM 100 MG/1
200 CAPSULE, LIQUID FILLED ORAL 2 TIMES DAILY PRN
Status: DISCONTINUED | OUTPATIENT
Start: 2020-03-28 | End: 2020-03-28 | Stop reason: HOSPADM

## 2020-03-28 RX ORDER — HEPARIN SODIUM 5000 [USP'U]/ML
5000 INJECTION, SOLUTION INTRAVENOUS; SUBCUTANEOUS EVERY 8 HOURS
Status: DISCONTINUED | OUTPATIENT
Start: 2020-03-28 | End: 2020-03-28 | Stop reason: HOSPADM

## 2020-03-28 RX ORDER — BISACODYL 10 MG
10 SUPPOSITORY, RECTAL RECTAL
Status: DISCONTINUED | OUTPATIENT
Start: 2020-03-28 | End: 2020-04-02

## 2020-03-28 RX ORDER — ONDANSETRON 2 MG/ML
4 INJECTION INTRAMUSCULAR; INTRAVENOUS 4 TIMES DAILY PRN
Status: DISCONTINUED | OUTPATIENT
Start: 2020-03-28 | End: 2020-03-28 | Stop reason: HOSPADM

## 2020-03-28 RX ORDER — POLYVINYL ALCOHOL 14 MG/ML
1 SOLUTION/ DROPS OPHTHALMIC PRN
Status: DISCONTINUED | OUTPATIENT
Start: 2020-03-28 | End: 2020-03-28 | Stop reason: HOSPADM

## 2020-03-28 RX ORDER — IPRATROPIUM BROMIDE AND ALBUTEROL SULFATE 2.5; .5 MG/3ML; MG/3ML
3 SOLUTION RESPIRATORY (INHALATION) 3 TIMES DAILY
Status: DISCONTINUED | OUTPATIENT
Start: 2020-03-28 | End: 2020-03-28 | Stop reason: HOSPADM

## 2020-03-28 RX ORDER — M-VIT,TX,IRON,MINS/CALC/FOLIC 27MG-0.4MG
1 TABLET ORAL
Status: DISCONTINUED | OUTPATIENT
Start: 2020-03-29 | End: 2020-03-28 | Stop reason: HOSPADM

## 2020-03-28 RX ORDER — POLYETHYLENE GLYCOL 3350 17 G/17G
1 POWDER, FOR SOLUTION ORAL
Status: DISCONTINUED | OUTPATIENT
Start: 2020-03-28 | End: 2020-04-02

## 2020-03-28 RX ORDER — AMOXICILLIN 250 MG
2 CAPSULE ORAL 2 TIMES DAILY
Status: DISCONTINUED | OUTPATIENT
Start: 2020-03-28 | End: 2020-04-02

## 2020-03-28 RX ORDER — ONDANSETRON 4 MG/1
4 TABLET, ORALLY DISINTEGRATING ORAL 4 TIMES DAILY PRN
Status: DISCONTINUED | OUTPATIENT
Start: 2020-03-28 | End: 2020-03-28 | Stop reason: HOSPADM

## 2020-03-28 RX ORDER — PIOGLITAZONEHYDROCHLORIDE 15 MG/1
30 TABLET ORAL DAILY
Status: DISCONTINUED | OUTPATIENT
Start: 2020-03-29 | End: 2020-03-28 | Stop reason: HOSPADM

## 2020-03-28 RX ORDER — BISACODYL 10 MG
10 SUPPOSITORY, RECTAL RECTAL
Status: DISCONTINUED | OUTPATIENT
Start: 2020-03-28 | End: 2020-03-28

## 2020-03-28 RX ADMIN — ROCURONIUM BROMIDE 100 MG: 10 INJECTION, SOLUTION INTRAVENOUS at 19:02

## 2020-03-28 RX ADMIN — INSULIN LISPRO 2 UNITS: 100 INJECTION, SOLUTION INTRAVENOUS; SUBCUTANEOUS at 08:23

## 2020-03-28 RX ADMIN — VANCOMYCIN HYDROCHLORIDE 125 MG: KIT ORAL at 00:00

## 2020-03-28 RX ADMIN — PIOGLITAZONE 30 MG: 15 TABLET ORAL at 08:21

## 2020-03-28 RX ADMIN — HEPARIN SODIUM 5000 UNITS: 5000 INJECTION, SOLUTION INTRAVENOUS; SUBCUTANEOUS at 05:34

## 2020-03-28 RX ADMIN — PROPOFOL 5 MCG/KG/MIN: 10 INJECTION, EMULSION INTRAVENOUS at 19:20

## 2020-03-28 RX ADMIN — BACLOFEN 5 MG: 10 TABLET ORAL at 08:21

## 2020-03-28 RX ADMIN — FUROSEMIDE 20 MG: 20 TABLET ORAL at 05:32

## 2020-03-28 RX ADMIN — PROPOFOL 100 MG: 10 INJECTION, EMULSION INTRAVENOUS at 19:02

## 2020-03-28 RX ADMIN — FENTANYL CITRATE 50 MCG: 50 INJECTION INTRAMUSCULAR; INTRAVENOUS at 21:09

## 2020-03-28 RX ADMIN — PREGABALIN 75 MG: 75 CAPSULE ORAL at 08:21

## 2020-03-28 RX ADMIN — VANCOMYCIN HYDROCHLORIDE 125 MG: KIT ORAL at 06:00

## 2020-03-28 RX ADMIN — FERROUS SULFATE TAB 325 MG (65 MG ELEMENTAL FE) 325 MG: 325 (65 FE) TAB at 08:21

## 2020-03-28 RX ADMIN — Medication 100 MCG: at 19:09

## 2020-03-28 RX ADMIN — CYANOCOBALAMIN TAB 1000 MCG 1000 MCG: 1000 TAB at 08:21

## 2020-03-28 RX ADMIN — INSULIN GLARGINE 10 UNITS: 100 INJECTION, SOLUTION SUBCUTANEOUS at 08:23

## 2020-03-28 RX ADMIN — DOXYCYCLINE 100 MG: 100 INJECTION, POWDER, LYOPHILIZED, FOR SOLUTION INTRAVENOUS at 12:51

## 2020-03-28 RX ADMIN — SODIUM CHLORIDE 1000 ML: 9 INJECTION, SOLUTION INTRAVENOUS at 19:17

## 2020-03-28 RX ADMIN — CEFTRIAXONE SODIUM 2 G: 2 INJECTION, POWDER, FOR SOLUTION INTRAMUSCULAR; INTRAVENOUS at 12:14

## 2020-03-28 RX ADMIN — METOPROLOL TARTRATE 12.5 MG: 25 TABLET, FILM COATED ORAL at 08:21

## 2020-03-28 RX ADMIN — CHOLESTYRAMINE 4 G: 4 POWDER, FOR SUSPENSION ORAL at 08:21

## 2020-03-28 ASSESSMENT — PAIN SCALES - WONG BAKER: WONGBAKER_NUMERICALRESPONSE: DOESN'T HURT AT ALL

## 2020-03-28 ASSESSMENT — FIBROSIS 4 INDEX
FIB4 SCORE: 2.87
FIB4 SCORE: 3.75
FIB4 SCORE: 2.87

## 2020-03-28 NOTE — PROGRESS NOTES
Received patient during shift change, report rec'd from day shift RN. Sitting up in w/c, VS stable on room air. Continent of B&B, mod assist for transfers. A&O x 4, able to make needs known. Call light within reach.

## 2020-03-28 NOTE — ED TRIAGE NOTES
"BIBA for aloc that started today. Pt at facility for cdiff and sepsis post spinal injury. Pt wearing c collar upon arrival. Constricted pupils bilaterally, unsure of last pain med dose. Pt responsive to pain and voice and just responds \"I dont know anything' when asked questions  "

## 2020-03-28 NOTE — DISCHARGE PLANNING
MSW spoke to [facility] CM Sal[facility]. Salina stated they have accept pt back to Rehab. Shooting for transport for 1530 but waiting on their van to return from [facility]. MSW updated bedside RN. MSW updated pt's sister Ángela on status.

## 2020-03-28 NOTE — DISCHARGE PLANNING
MSW spoke with TRINO Downing at [facility] about pt returning. [facility] to talk to their doctor and call MSW back.

## 2020-03-28 NOTE — PROGRESS NOTES
At 0830, primary RN notified charge RN of patient altered mental status and emesis while in [place] for breakfast. Notified hospitalist Angelina of change in patient baseline. Patient requiring significant prompting to answer yes or no questions and unable to answer orientation questions at this time. Order to send patient to ED per Dr. Alfaro for change in mental status. Patient family (sister Ángela) notified of change by primary RN. Patient left facility with [facility] transport at 0918.

## 2020-03-28 NOTE — ED NOTES
Transport here to take pt to rehab with wheelchair. Pt does not appear to be able to safely transport in w/c. Called social work and notified them of pts condition and hx of sci

## 2020-03-28 NOTE — PROGRESS NOTES
[facility] Medicine Daily Progress Note    Date of Service  3/28/2020    Chief Complaint:  Hypertension  Diabetes  Cough, fever, tachycardia, leukocytosis    Interval History:  No significant events or changes since last visit    Review of Systems  Review of Systems   Unable to perform ROS: Medical condition        Physical Exam  Temp:  [36.4 °C (97.5 °F)-36.6 °C (97.8 °F)] 36.6 °C (97.8 °F)  Pulse:  [76-83] 83  Resp:  [16-17] 17  BP: (113-149)/(53-72) 127/72  SpO2:  [94 %-98 %] 94 %    Physical Exam  Vitals signs and nursing note reviewed.   Constitutional:       General: He is not in acute distress.  HENT:      Mouth/Throat:      Mouth: Mucous membranes are moist.      Pharynx: Oropharynx is clear.   Eyes:      Conjunctiva/sclera: Conjunctivae normal.      Pupils: Pupils are equal, round, and reactive to light.   Neck:      Vascular: No carotid bruit.      Comments: Has C-collar  Cardiovascular:      Rate and Rhythm: Normal rate and regular rhythm.   Pulmonary:      Effort: Pulmonary effort is normal.      Breath sounds: No wheezing or rales.   Abdominal:      General: There is no distension.      Palpations: Abdomen is soft.      Tenderness: There is no abdominal tenderness.   Musculoskeletal:      Right lower leg: Edema present.      Left lower leg: Edema present.   Skin:     General: Skin is warm and dry.   Neurological:      Mental Status: He is alert and oriented to person, place, and time.   Psychiatric:         Mood and Affect: Mood normal.         Behavior: Behavior normal.         Fluids    Intake/Output Summary (Last 24 hours) at 3/28/2020 0912  Last data filed at 3/27/2020 2300  Gross per 24 hour   Intake 390 ml   Output 1025 ml   Net -635 ml       Laboratory  Recent Labs     03/28/20  0450   WBC 5.2   RBC 2.59*   HEMOGLOBIN 8.1*   HEMATOCRIT 25.3*   MCV 97.7   MCH 31.3   MCHC 32.0*   RDW 72.5*   PLATELETCT 119*   MPV 9.5     Recent Labs     03/26/20  0544 03/28/20  0450   SODIUM 138 137   POTASSIUM 4.5  4.3   CHLORIDE 106 106   CO2 22 20   GLUCOSE 106* 173*   BUN 43* 43*   CREATININE 2.54* 2.18*   CALCIUM 8.3* 8.5                   Imaging      Assessment/Plan  Anemia- (present on admission)  Assessment & Plan  Hb stable at 7.9 (3/25)  Fe: 33, sats 12% (3/1)  B12: 264  Folate: > 24 (3/6)  FOB (-) x 1  On Fe & B12 supplements    Chronic kidney disease- (present on admission)  Assessment & Plan  Has stage 4 CKD  Cr: cont to slowly improve  S/P metabolic acidosis: CO2 22 (3/26) --> 20 (3/28)  Off Bicarb tabs (3/24)  Monitor    Altered mental status  Assessment & Plan  Started this am 3/28  Had acute change in mentation  Has lethargy/somnolence, disorientation, minimal communication  Had N/V at breakfast  Has been afebrile  No leukocytosis (3/28)  Blood sugars: ok  H&H stable  Bun/Cr: has been slowly improving  Diarrhea resolved recently  Will sent to ER for further eval and management (3/28)      Diarrhea  Assessment & Plan  Diarrhea resolved recently (3/28)  (3/18): C Diff PCR negative and Toxin negative  (3/21): C Diff PCR positive and Toxin negative  At high risk for developing C Dif infection given exposure to abx and prolonged hospitalization  On oral Vancomycin (thru 3/29)    Volume overload  Assessment & Plan  Has LE edema - improved  BNP: 9051 (3/22) --> 8352 (3/24)  On Lasix: 20 mg daily  Note: on tube feeds -- transitioning to an oral diet but currently on a thickened liquid diet  Monitor K+ levels: 4.3 (3/28)    Pneumonia  Assessment & Plan  Resolved  S/P fever and leukocytosis  CXR --> right basilar consolidation consistent with pneumonia; left basal consolidation could be due to atelectasis or pneumonia  Rapid strep: neg  Flu (-)  Resp panel: neg except for Rhinovirus  BC x 2: neg  S/P abx    Cervical spinal stenosis  Assessment & Plan  Had worsening bilateral upper extremity weakness, balance, and neck pain  MRI showed severe stenosis at C3-C6 with interval progression over the last year  S/P C3-C6  laminectomy and posterior decompression    Uncontrolled type 2 diabetes mellitus with hyperglycemia (HCC)- (present on admission)  Assessment & Plan  Hba1c: 5.1 (3/13)   BS: 117-207  On Lantus: 10 units qam  On Actos: 30 mg daily  Note: home meds include Ozempic 1.0 mg once a week, Actos 30mg qd, and Tresiba 10 units qhs  Cont to monitor    Hyperlipemia- (present on admission)  Assessment & Plan  On Zocor    Essential hypertension- (present on admission)  Assessment & Plan  BP ok  On Lopressor: 12.5 mg bid   Monitor

## 2020-03-28 NOTE — FLOWSHEET NOTE
03/28/20 0900   Therapy Not Performed   Type of Therapy Not Performed SVN   Reason Therapy Not Performed Patient Nauseated

## 2020-03-28 NOTE — CARE PLAN
Problem: Communication  Goal: The ability to communicate needs accurately and effectively will improve  Outcome: PROGRESSING AS EXPECTED     Problem: Safety  Goal: Will remain free from injury  Outcome: PROGRESSING AS EXPECTED     Problem: Infection  Goal: Will remain free from infection  Outcome: PROGRESSING AS EXPECTED     Problem: Bowel/Gastric:  Goal: Normal bowel function is maintained or improved  Outcome: PROGRESSING AS EXPECTED     Problem: Urinary Elimination:  Goal: Ability to reestablish a normal urinary elimination pattern will improve  Outcome: PROGRESSING AS EXPECTED     Problem: Skin Integrity  Goal: Risk for impaired skin integrity will decrease  Outcome: PROGRESSING AS EXPECTED     Problem: Pain Management  Goal: Pain level will decrease to patient's comfort goal  Outcome: PROGRESSING AS EXPECTED     Problem: Respiratory:  Goal: Respiratory status will improve  Outcome: PROGRESSING AS EXPECTED

## 2020-03-28 NOTE — DISCHARGE PLANNING
MSW received call from bedside RN. Pt cannot sit in a wheelchair. MSW faxed PCS to [facility]. Olimpia at [facility] set transport for [phone].

## 2020-03-28 NOTE — CARE PLAN
Problem: Safety  Goal: Will remain free from injury  Outcome: PROGRESSING AS EXPECTED  Goal: Will remain free from falls  Outcome: PROGRESSING AS EXPECTED   Pt education performed on fall risks and pt safety, pt shows some understanding, pt does call approprietly and does not attempt to get out of bed or chair without assistance, will continue to reinforce education as needed and continue to monitor.

## 2020-03-28 NOTE — ED PROVIDER NOTES
ED Provider Note    3ED Provider Note    Primary care provider: Jason Granda M.D.  Means of arrival: EMS  History obtained from: chart, EMS  History limited by: patient poor historian    CHIEF COMPLAINT  Chief Complaint   Patient presents with   • ALOC       HPI  Vince Almanzar is a 79 y.o. male who presents to the Emergency Department transferred here from rehab facility, brought in by ambulance for altered mental status.  Patient does not know why he was transferred.  He does recollect, recently having spinal surgery.  He cannot give me the name of the surgeon.  He has no complaints of pain except at the operative site.  No further history is available from the patient.    REVIEW OF SYSTEMS  Review of Systems   Unable to perform ROS: Medical condition       PAST MEDICAL HISTORY   has a past medical history of Arrhythmia, Diabetes (HCC), Hemorrhagic disorder (HCC), High cholesterol, Hypertension, Jaundice (5/8/2016), and Renal disorder.    SURGICAL HISTORY   has a past surgical history that includes hip arthroplasty total (Left, 2001); cholecystectomy (2006); ercp (5/2016); gastroscopy-endo (N/A, 6/3/2016); egd w/endoscopic ultrasound (N/A, 6/3/2016); egd with asp/bx (N/A, 6/3/2016); and cervical decompression posterior (2/28/2020).    SOCIAL HISTORY  Social History     Tobacco Use   • Smoking status: Never Smoker   • Smokeless tobacco: Never Used   Substance Use Topics   • Alcohol use: No   • Drug use: No      Social History     Substance and Sexual Activity   Drug Use No       FAMILY HISTORY  No family history on file.    CURRENT MEDICATIONS  Home Medications     Reviewed by Debbie Craven R.N. (Registered Nurse) on 03/28/20 at 0950  Med List Status: <None>   Medication Last Dose Status   calcitRIOL (ROCALTROL) 0.25 MCG Cap  Active   ferrous sulfate 325 (65 Fe) MG tablet  Active   Insulin Aspart 100 UNIT/ML Subcutaneous Solution Pen-injector (NOVOLOG)  Active   insulin glargine (LANTUS) 100 UNIT/ML  "Solution  Active   metoprolol (LOPRESSOR) 25 MG Tab  Active   pioglitazone (ACTOS) 30 MG Tab  Active   simvastatin (ZOCOR) 20 MG Tab  Active   sodium bicarbonate (SODIUM BICARBONATE) 650 MG Tab  Active                ALLERGIES  No Known Allergies    PHYSICAL EXAM  VITAL SIGNS: /63   Pulse 79   Temp 36.6 °C (97.9 °F) (Temporal)   Resp 14   Ht 1.778 m (5' 10\")   Wt 68.9 kg (152 lb)   SpO2 98%   BMI 21.81 kg/m²   Vitals reviewed.  Constitutional: Patient is oriented to person only.  Appears well-developed and well-nourished. No distress.    Head: Normocephalic and atraumatic.   Ears: Normal external ears bilaterally.   Mouth/Throat: Oropharynx is clear and moist  Eyes: Conjunctivae are normal. Pupils are equal, round, and reactive to light. C-collar in place.  Neck: Normal range of motion. Neck supple.  Cardiovascular: Normal rate, regular rhythm and normal heart sounds. Normal peripheral pulses.  Pulmonary/Chest: Effort normal and breath sounds normal. No respiratory distress, no wheezes, rhonchi, or rales.  Abdominal: Soft. Bowel sounds are normal. There is no tenderness. No rebound or guarding, or peritoneal signs.   Musculoskeletal: Bilateral lower extremity, symmetric edema and no tenderness.   Lymphadenopathy: No cervical adenopathy.   Neurological: No focal deficits.   Skin: Skin is warm and dry. No erythema. No pallor.   Psychiatric: Patient has a normal mood and affect.     LABS  Results for orders placed or performed during the hospital encounter of 03/28/20   CBC WITH DIFFERENTIAL   Result Value Ref Range    WBC 10.6 4.8 - 10.8 K/uL    RBC 2.61 (L) 4.70 - 6.10 M/uL    Hemoglobin 8.3 (L) 14.0 - 18.0 g/dL    Hematocrit 25.5 (L) 42.0 - 52.0 %    MCV 97.7 81.4 - 97.8 fL    MCH 31.8 27.0 - 33.0 pg    MCHC 32.5 (L) 33.7 - 35.3 g/dL    RDW 70.7 (H) 35.9 - 50.0 fL    Platelet Count 126 (L) 164 - 446 K/uL    MPV 9.6 9.0 - 12.9 fL    Neutrophils-Polys 88.20 (H) 44.00 - 72.00 %    Lymphocytes 3.10 (L) " 22.00 - 41.00 %    Monocytes 7.70 0.00 - 13.40 %    Eosinophils 0.10 0.00 - 6.90 %    Basophils 0.10 0.00 - 1.80 %    Immature Granulocytes 0.80 0.00 - 0.90 %    Nucleated RBC 0.00 /100 WBC    Neutrophils (Absolute) 9.31 (H) 1.82 - 7.42 K/uL    Lymphs (Absolute) 0.33 (L) 1.00 - 4.80 K/uL    Monos (Absolute) 0.81 0.00 - 0.85 K/uL    Eos (Absolute) 0.01 0.00 - 0.51 K/uL    Baso (Absolute) 0.01 0.00 - 0.12 K/uL    Immature Granulocytes (abs) 0.08 0.00 - 0.11 K/uL    NRBC (Absolute) 0.00 K/uL   COMP METABOLIC PANEL   Result Value Ref Range    Sodium 138 135 - 145 mmol/L    Potassium 4.5 3.6 - 5.5 mmol/L    Chloride 106 96 - 112 mmol/L    Co2 21 20 - 33 mmol/L    Anion Gap 11.0 7.0 - 16.0    Glucose 203 (H) 65 - 99 mg/dL    Bun 40 (H) 8 - 22 mg/dL    Creatinine 2.14 (H) 0.50 - 1.40 mg/dL    Calcium 8.5 8.5 - 10.5 mg/dL    AST(SGOT) 26 12 - 45 U/L    ALT(SGPT) 18 2 - 50 U/L    Alkaline Phosphatase 105 (H) 30 - 99 U/L    Total Bilirubin 0.7 0.1 - 1.5 mg/dL    Albumin 3.3 3.2 - 4.9 g/dL    Total Protein 5.7 (L) 6.0 - 8.2 g/dL    Globulin 2.4 1.9 - 3.5 g/dL    A-G Ratio 1.4 g/dL   URINALYSIS CULTURE, IF INDICATED   Result Value Ref Range    Color Yellow     Character Clear     Specific Gravity 1.015 <1.035    Ph 5.0 5.0 - 8.0    Glucose 500 (A) Negative mg/dL    Ketones Negative Negative mg/dL    Protein 30 (A) Negative mg/dL    Bilirubin Negative Negative    Urobilinogen, Urine 0.2 Negative    Nitrite Negative Negative    Leukocyte Esterase Negative Negative    Occult Blood Small (A) Negative    Micro Urine Req Microscopic    URINE MICROSCOPIC (W/UA)   Result Value Ref Range    WBC 0-2 (A) /hpf    RBC 10-20 (A) /hpf    Bacteria Negative None /hpf    Epithelial Cells Negative /hpf    Hyaline Cast 0-2 /lpf   ESTIMATED GFR   Result Value Ref Range    GFR If  36 (A) >60 mL/min/1.73 m 2    GFR If Non African American 30 (A) >60 mL/min/1.73 m 2   Blood Culture,Hold   Result Value Ref Range    Blood Culture Hold  "Collected    Blood Culture,Hold   Result Value Ref Range    Blood Culture Hold Collected        All labs reviewed by me.    RADIOLOGY  DX-CHEST-PORTABLE (1 VIEW)   Final Result      1.  Ill-defined bibasilar pulmonary opacities, edema or multifocal pneumonia.   2.  Small bilateral pleural effusions.      CT-HEAD W/O   Final Result      1. No CT evidence of acute infarct, hemorrhage or mass.   2. Mild to moderate global parenchymal atrophy. Chronic small vessel ischemic changes.   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        The radiologist's interpretation of all radiological studies have been reviewed by me.    COURSE & MEDICAL DECISION MAKING  Pertinent Labs & Imaging studies reviewed. (See chart for details)    Obtained and reviewed past medical records.  See below    10:07 AM - Patient seen and examined at bedside.  This is a 79-year-old male.  He is pleasant and cooperative.  He does not know why he is here in the emergency department.  Of explained, nursing staff was concerned about him being confused.  He states \"I do not know what I did to make them feel that way\".  I have reviewed his previous notes.  There is a nursing note from 912 this morning where there is notation that the patient had altered mental status and an episode of vomiting during breakfast.  In hospitalist's note from today, patient's white blood cell count is noted to be 5.2.  H&H 8 and 25, which is essentially the patient's baseline, previously noted to be 7.9 on March 25..  Platelet count 119.  Birgit shows an elevated glucose of 173.  Creatinine 2.1, down from a previous value of 2.5 on March 26.  Patient has a history of chronic kidney disease, anemia.  Altered mental status was noted this morning.  No report of fever per their hospitalist note.  Blood sugars have been okay.  H&H stable.  BUN and creatinine slowly improving.  Resolved per their note.  Patient also noted to have lower extremity edema that has been improving.  Patient underwent 1 " view x-ray of the abdomen yesterday that showed no specific findings to suggest small bowel obstruction.    11:29 AM reviewed.  Patient's white blood cell count is elevated at 10.6.  H&H 8 and 25 unchanged from this morning.  There is a neutrophilic shift.  Patient's chemistry shows an elevated glucose of 203.  Creatinine 2.14, trending downward from previous value this morning of 2.18 and prior to that, 2.54.  Urine analysis shows elevated glucose, 100 with trace occult blood.  Elevated protein.  Normal WBC count, normal RBC count.  Negative for bacteria and negative nitrites and leukocyte esterase.  CT shows an old temporal infarct and global atrophy but no acute findings.  Awaiting chest x-ray    11:50 AM, chest x-ray reviewed.  It has marked changes noted from prior x-ray of the chest on March 23.  Findings today, consistent with pneumonia.    11:57 AM patient's reevaluated the bedside.  He demonstrates no increased work of breathing.  He is not on oxygen.  He satting in the high 90s.  He is not tachycardic.  His white blood cell count is increasing in his findings on chest x-ray are different from essentially 5 days ago.  Have spoken with the hospitalist, who does agree to admit the patient but does not necessarily feel that this is necessary.  I will contact the physician who saw the patient in rehab this morning, to discuss disposition.    12:20PM D/W Dr. Alfaro, physician at the rehab center who sent the patient here for evaluation.  I discussed with him, the patient's work-up including a CT of the head and labs from today in comparison with labs done earlier today.  He does have evidence of pneumonia which is new from March 23.  However, he is not tachypneic, tachycardic or hypoxic.  At this point, Dr. polk, feels he can safely be discharged back to the rehab center and they can continue antibiotics.  He is made aware, that patient is treated with Rocephin and doxycycline here in the emergency department.   Per our discussion, the patient as he knows him better, is back to his baseline.  Nurse is made aware, that the patient can be discharged once antibiotics are infused.  Patient is in stable condition.    FINAL IMPRESSION  1. Transient alteration of awareness    2. Pneumonia of both lower lobes due to infectious organism (HCC)    3. Renal insufficiency

## 2020-03-28 NOTE — ASSESSMENT & PLAN NOTE
BP ok  On Lopressor: 12.5 mg bid   Monitor  
Diarrhea resolved recently (3/28)  (3/18): C Diff PCR negative and Toxin negative  (3/21): C Diff PCR positive and Toxin negative  At high risk for developing C Dif infection given exposure to abx and prolonged hospitalization  On oral Vancomycin (thru 3/29)  
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  
Has LE edema - improved  BNP: 9051 (3/22) --> 8352 (3/24)  On Lasix: 20 mg daily  Note: on tube feeds -- transitioning to an oral diet but currently on a thickened liquid diet  Monitor K+ levels: 4.3 (3/28)  
Has stage 4 CKD  Cr: cont to slowly improve  S/P metabolic acidosis: CO2 22 (3/26) --> 20 (3/28)  Off Bicarb tabs (3/24)  Monitor  
Hb stable at 7.9 (3/25)  Fe: 33, sats 12% (3/1)  B12: 264  Folate: > 24 (3/6)  FOB (-) x 1  On Fe & B12 supplements  
Hba1c: 5.1 (3/13)   BS: 117-207  On Lantus: 10 units qam  On Actos: 30 mg daily  Note: home meds include Ozempic 1.0 mg once a week, Actos 30mg qd, and Tresiba 10 units qhs  Cont to monitor  
Mentation much improved w/ decreased pain meds  
On Zocor  
Resolved  S/P fever and leukocytosis  CXR --> right basilar consolidation consistent with pneumonia; left basal consolidation could be due to atelectasis or pneumonia  Rapid strep: neg  Flu (-)  Resp panel: neg except for Rhinovirus  BC x 2: neg  S/P abx  
Started this am 3/28  Had acute change in mentation  Has lethargy/somnolence, disorientation, minimal communication  Had N/V at breakfast  Has been afebrile  No leukocytosis (3/28)  Blood sugars: ok  H&H stable  Bun/Cr: has been slowly improving  Diarrhea resolved recently  Will send to ER for further eval and management (3/28)  
IV discontinued, cath removed intact

## 2020-03-29 ENCOUNTER — APPOINTMENT (OUTPATIENT)
Dept: RADIOLOGY | Facility: MEDICAL CENTER | Age: 80
DRG: 871 | End: 2020-03-29
Attending: PSYCHIATRY & NEUROLOGY
Payer: COMMERCIAL

## 2020-03-29 ENCOUNTER — APPOINTMENT (OUTPATIENT)
Dept: CARDIOLOGY | Facility: MEDICAL CENTER | Age: 80
DRG: 871 | End: 2020-03-29
Attending: PSYCHIATRY & NEUROLOGY
Payer: COMMERCIAL

## 2020-03-29 ENCOUNTER — APPOINTMENT (OUTPATIENT)
Dept: RADIOLOGY | Facility: MEDICAL CENTER | Age: 80
DRG: 871 | End: 2020-03-29
Attending: INTERNAL MEDICINE
Payer: COMMERCIAL

## 2020-03-29 LAB
ACTION RANGE TRIGGERED IACRT: NO
ANION GAP SERPL CALC-SCNC: 12 MMOL/L (ref 7–16)
ANION GAP SERPL CALC-SCNC: 12 MMOL/L (ref 7–16)
ANISOCYTOSIS BLD QL SMEAR: ABNORMAL
BASE EXCESS BLDA CALC-SCNC: -2 MMOL/L (ref -4–3)
BASOPHILS # BLD AUTO: 0.2 % (ref 0–1.8)
BASOPHILS # BLD: 0.01 K/UL (ref 0–0.12)
BODY TEMPERATURE: ABNORMAL DEGREES
BUN SERPL-MCNC: 31 MG/DL (ref 8–22)
BUN SERPL-MCNC: 34 MG/DL (ref 8–22)
CALCIUM SERPL-MCNC: 8.1 MG/DL (ref 8.5–10.5)
CALCIUM SERPL-MCNC: 8.3 MG/DL (ref 8.5–10.5)
CHLORIDE SERPL-SCNC: 109 MMOL/L (ref 96–112)
CHLORIDE SERPL-SCNC: 110 MMOL/L (ref 96–112)
CO2 BLDA-SCNC: 23 MMOL/L (ref 20–33)
CO2 SERPL-SCNC: 21 MMOL/L (ref 20–33)
CO2 SERPL-SCNC: 21 MMOL/L (ref 20–33)
COMMENT 1642: NORMAL
CORTIS SERPL-MCNC: 14.2 UG/DL (ref 0–23)
CREAT SERPL-MCNC: 1.97 MG/DL (ref 0.5–1.4)
CREAT SERPL-MCNC: 1.99 MG/DL (ref 0.5–1.4)
EOSINOPHIL # BLD AUTO: 0.05 K/UL (ref 0–0.51)
EOSINOPHIL NFR BLD: 1.2 % (ref 0–6.9)
ERYTHROCYTE [DISTWIDTH] IN BLOOD BY AUTOMATED COUNT: 73.6 FL (ref 35.9–50)
GLUCOSE SERPL-MCNC: 109 MG/DL (ref 65–99)
GLUCOSE SERPL-MCNC: 111 MG/DL (ref 65–99)
HCO3 BLDA-SCNC: 22 MMOL/L (ref 17–25)
HCT VFR BLD AUTO: 24.5 % (ref 42–52)
HGB BLD-MCNC: 7.7 G/DL (ref 14–18)
HOROWITZ INDEX BLDA+IHG-RTO: 357 MM[HG]
IMM GRANULOCYTES # BLD AUTO: 0.04 K/UL (ref 0–0.11)
IMM GRANULOCYTES NFR BLD AUTO: 1 % (ref 0–0.9)
INST. QUALIFIED PATIENT IIQPT: YES
LV EJECT FRACT  99904: 70
LV EJECT FRACT MOD 2C 99903: 65.96
LV EJECT FRACT MOD 4C 99902: 71.81
LV EJECT FRACT MOD BP 99901: 70.8
LYMPHOCYTES # BLD AUTO: 0.54 K/UL (ref 1–4.8)
LYMPHOCYTES NFR BLD: 12.9 % (ref 22–41)
MACROCYTES BLD QL SMEAR: ABNORMAL
MAGNESIUM SERPL-MCNC: 1.9 MG/DL (ref 1.5–2.5)
MAGNESIUM SERPL-MCNC: 2 MG/DL (ref 1.5–2.5)
MCH RBC QN AUTO: 31.4 PG (ref 27–33)
MCHC RBC AUTO-ENTMCNC: 31.4 G/DL (ref 33.7–35.3)
MCV RBC AUTO: 100 FL (ref 81.4–97.8)
MONOCYTES # BLD AUTO: 0.39 K/UL (ref 0–0.85)
MONOCYTES NFR BLD AUTO: 9.3 % (ref 0–13.4)
MORPHOLOGY BLD-IMP: NORMAL
NEUTROPHILS # BLD AUTO: 3.16 K/UL (ref 1.82–7.42)
NEUTROPHILS NFR BLD: 75.4 % (ref 44–72)
NRBC # BLD AUTO: 0.02 K/UL
NRBC BLD-RTO: 0.5 /100 WBC
O2/TOTAL GAS SETTING VFR VENT: 30 %
PCO2 BLDA: 31.9 MMHG (ref 26–37)
PCO2 TEMP ADJ BLDA: 30.4 MMHG (ref 26–37)
PH BLDA: 7.45 [PH] (ref 7.4–7.5)
PH TEMP ADJ BLDA: 7.46 [PH] (ref 7.4–7.5)
PHOSPHATE SERPL-MCNC: 2.5 MG/DL (ref 2.5–4.5)
PLATELET # BLD AUTO: 109 K/UL (ref 164–446)
PLATELET BLD QL SMEAR: NORMAL
PMV BLD AUTO: 9.3 FL (ref 9–12.9)
PO2 BLDA: 107 MMHG (ref 64–87)
PO2 TEMP ADJ BLDA: 100 MMHG (ref 64–87)
POIKILOCYTOSIS BLD QL SMEAR: NORMAL
POTASSIUM SERPL-SCNC: 3.5 MMOL/L (ref 3.6–5.5)
POTASSIUM SERPL-SCNC: 3.8 MMOL/L (ref 3.6–5.5)
RBC # BLD AUTO: 2.45 M/UL (ref 4.7–6.1)
RBC BLD AUTO: PRESENT
SAO2 % BLDA: 98 % (ref 93–99)
SODIUM SERPL-SCNC: 142 MMOL/L (ref 135–145)
SODIUM SERPL-SCNC: 143 MMOL/L (ref 135–145)
SPECIMEN DRAWN FROM PATIENT: ABNORMAL
WBC # BLD AUTO: 4.2 K/UL (ref 4.8–10.8)

## 2020-03-29 PROCEDURE — 82803 BLOOD GASES ANY COMBINATION: CPT

## 2020-03-29 PROCEDURE — 83735 ASSAY OF MAGNESIUM: CPT | Mod: 91

## 2020-03-29 PROCEDURE — 06HY33Z INSERTION OF INFUSION DEVICE INTO LOWER VEIN, PERCUTANEOUS APPROACH: ICD-10-PCS | Performed by: INTERNAL MEDICINE

## 2020-03-29 PROCEDURE — 700105 HCHG RX REV CODE 258: Performed by: PSYCHIATRY & NEUROLOGY

## 2020-03-29 PROCEDURE — 80048 BASIC METABOLIC PNL TOTAL CA: CPT | Mod: 91

## 2020-03-29 PROCEDURE — 94003 VENT MGMT INPAT SUBQ DAY: CPT

## 2020-03-29 PROCEDURE — 99292 CRITICAL CARE ADDL 30 MIN: CPT | Mod: 25 | Performed by: INTERNAL MEDICINE

## 2020-03-29 PROCEDURE — 36556 INSERT NON-TUNNEL CV CATH: CPT | Mod: RT | Performed by: INTERNAL MEDICINE

## 2020-03-29 PROCEDURE — 770022 HCHG ROOM/CARE - ICU (200)

## 2020-03-29 PROCEDURE — 36600 WITHDRAWAL OF ARTERIAL BLOOD: CPT

## 2020-03-29 PROCEDURE — 71045 X-RAY EXAM CHEST 1 VIEW: CPT

## 2020-03-29 PROCEDURE — 93306 TTE W/DOPPLER COMPLETE: CPT

## 2020-03-29 PROCEDURE — 700105 HCHG RX REV CODE 258: Performed by: INTERNAL MEDICINE

## 2020-03-29 PROCEDURE — 700101 HCHG RX REV CODE 250: Performed by: INTERNAL MEDICINE

## 2020-03-29 PROCEDURE — 85025 COMPLETE CBC W/AUTO DIFF WBC: CPT

## 2020-03-29 PROCEDURE — 700111 HCHG RX REV CODE 636 W/ 250 OVERRIDE (IP): Performed by: INTERNAL MEDICINE

## 2020-03-29 PROCEDURE — 93306 TTE W/DOPPLER COMPLETE: CPT | Mod: 26 | Performed by: INTERNAL MEDICINE

## 2020-03-29 PROCEDURE — 36556 INSERT NON-TUNNEL CV CATH: CPT

## 2020-03-29 PROCEDURE — 82533 TOTAL CORTISOL: CPT

## 2020-03-29 PROCEDURE — 700111 HCHG RX REV CODE 636 W/ 250 OVERRIDE (IP): Performed by: PSYCHIATRY & NEUROLOGY

## 2020-03-29 PROCEDURE — A9270 NON-COVERED ITEM OR SERVICE: HCPCS | Performed by: PSYCHIATRY & NEUROLOGY

## 2020-03-29 PROCEDURE — A9270 NON-COVERED ITEM OR SERVICE: HCPCS | Performed by: INTERNAL MEDICINE

## 2020-03-29 PROCEDURE — 700102 HCHG RX REV CODE 250 W/ 637 OVERRIDE(OP): Performed by: PSYCHIATRY & NEUROLOGY

## 2020-03-29 PROCEDURE — 99291 CRITICAL CARE FIRST HOUR: CPT | Mod: 25 | Performed by: INTERNAL MEDICINE

## 2020-03-29 PROCEDURE — 700101 HCHG RX REV CODE 250: Performed by: PSYCHIATRY & NEUROLOGY

## 2020-03-29 PROCEDURE — 84100 ASSAY OF PHOSPHORUS: CPT

## 2020-03-29 PROCEDURE — 700102 HCHG RX REV CODE 250 W/ 637 OVERRIDE(OP): Performed by: INTERNAL MEDICINE

## 2020-03-29 RX ORDER — DEXMEDETOMIDINE HYDROCHLORIDE 4 UG/ML
.1-1.5 INJECTION, SOLUTION INTRAVENOUS CONTINUOUS
Status: DISCONTINUED | OUTPATIENT
Start: 2020-03-29 | End: 2020-03-30

## 2020-03-29 RX ORDER — FUROSEMIDE 10 MG/ML
40 INJECTION INTRAMUSCULAR; INTRAVENOUS EVERY 8 HOURS
Status: DISCONTINUED | OUTPATIENT
Start: 2020-03-29 | End: 2020-03-30

## 2020-03-29 RX ORDER — MIDODRINE HYDROCHLORIDE 5 MG/1
5 TABLET ORAL
Status: DISCONTINUED | OUTPATIENT
Start: 2020-03-29 | End: 2020-04-01

## 2020-03-29 RX ORDER — PHENYLEPHRINE HCL IN 0.9% NACL 0.5 MG/5ML
SYRINGE (ML) INTRAVENOUS
Status: ACTIVE
Start: 2020-03-29 | End: 2020-03-30

## 2020-03-29 RX ADMIN — SENNOSIDES AND DOCUSATE SODIUM 2 TABLET: 8.6; 5 TABLET ORAL at 16:58

## 2020-03-29 RX ADMIN — FAMOTIDINE 20 MG: 10 INJECTION INTRAVENOUS at 05:45

## 2020-03-29 RX ADMIN — FUROSEMIDE 40 MG: 10 INJECTION, SOLUTION INTRAMUSCULAR; INTRAVENOUS at 22:40

## 2020-03-29 RX ADMIN — Medication 100 MCG/HR: at 11:31

## 2020-03-29 RX ADMIN — MIDODRINE HYDROCHLORIDE 5 MG: 5 TABLET ORAL at 16:14

## 2020-03-29 RX ADMIN — DOXYCYCLINE 100 MG: 100 INJECTION, POWDER, LYOPHILIZED, FOR SOLUTION INTRAVENOUS at 02:33

## 2020-03-29 RX ADMIN — FENTANYL CITRATE 50 MCG: 0.05 INJECTION, SOLUTION INTRAMUSCULAR; INTRAVENOUS at 14:10

## 2020-03-29 RX ADMIN — FUROSEMIDE 40 MG: 10 INJECTION, SOLUTION INTRAMUSCULAR; INTRAVENOUS at 11:37

## 2020-03-29 RX ADMIN — ENOXAPARIN SODIUM 30 MG: 30 INJECTION SUBCUTANEOUS at 05:45

## 2020-03-29 RX ADMIN — CEFTRIAXONE SODIUM 1 G: 1 INJECTION, POWDER, FOR SOLUTION INTRAMUSCULAR; INTRAVENOUS at 05:44

## 2020-03-29 RX ADMIN — PROPOFOL 20 MCG/KG/MIN: 10 INJECTION, EMULSION INTRAVENOUS at 07:45

## 2020-03-29 RX ADMIN — DEXMEDETOMIDINE HYDROCHLORIDE 0.5 MCG/KG/HR: 100 INJECTION, SOLUTION INTRAVENOUS at 11:25

## 2020-03-29 RX ADMIN — DOXYCYCLINE 100 MG: 100 INJECTION, POWDER, LYOPHILIZED, FOR SOLUTION INTRAVENOUS at 16:58

## 2020-03-29 RX ADMIN — PHENYLEPHRINE HYDROCHLORIDE 50 MCG/MIN: 10 INJECTION INTRAVENOUS at 14:44

## 2020-03-29 RX ADMIN — PROPOFOL 15 MCG/KG/MIN: 10 INJECTION, EMULSION INTRAVENOUS at 00:00

## 2020-03-29 ASSESSMENT — FIBROSIS 4 INDEX: FIB4 SCORE: 4.67

## 2020-03-29 NOTE — CONSULTS
Critical Care Consultation    Date of consult: 3/28/2020    Referring Physician  Oli Le M.D.    Reason for Consultation  Acute respiratory failure and acute encephalopathy    History of Presenting Illness  79 y.o. male with hx of DM , Hypertension, CKD 4 and recent laminectomy  who presented 3/28/2020 with acute encephalopathy. Apparently he was at his rehab facility and developed altered mentation however I am unsure if if this was disorientation or another process as he is intubated no family is around and hx is sparse. He was in the [facility] ED this am discharged back but returned again this evening. He was intubated for airway protection and was noted to have thick secretions prior. CXR reveal multifocal opacities. COVID rule out was initiated. An LP was performed and initial CSF was unremarkable. CBC revealed a normal WBC but lymphopenia. CR elevated but actually improved compared to prior labs. BNP was 5000s which appears chronically elevated.        Code Status  Prior    Review of Systems  Review of Systems   Unable to perform ROS: Intubated       Past Medical History   has a past medical history of Arrhythmia, Diabetes (HCC), Hemorrhagic disorder (HCC), High cholesterol, Hypertension, Jaundice (5/8/2016), and Renal disorder.    Surgical History   has a past surgical history that includes hip arthroplasty total (Left, 2001); cholecystectomy (2006); ercp (5/2016); gastroscopy-endo (N/A, 6/3/2016); egd w/endoscopic ultrasound (N/A, 6/3/2016); egd with asp/bx (N/A, 6/3/2016); and cervical decompression posterior (2/28/2020).    Family History  family history is not on file.    Social History   reports that he has never smoked. He has never used smokeless tobacco. He reports that he does not drink alcohol or use drugs.    Medications  Home Medications    **Home medications have not yet been reviewed for this encounter**       Current Facility-Administered Medications   Medication Dose Route Frequency Provider  Last Rate Last Dose   • fentaNYL (SUBLIMAZE) injection 50 mcg  50 mcg Intravenous Q2HRS PRN Oli Le M.D.         Current Outpatient Medications   Medication Sig Dispense Refill   • ferrous sulfate 325 (65 Fe) MG tablet Take 1 Tab by mouth 2 times a day, with meals. 30 Tab    • metoprolol (LOPRESSOR) 25 MG Tab Take 12.5 mg by mouth 2 times a day.     • simvastatin (ZOCOR) 20 MG Tab Take 20 mg by mouth every evening.     • insulin glargine (LANTUS) 100 UNIT/ML Solution Inject 6 Units as instructed every evening.     • Insulin Aspart 100 UNIT/ML Subcutaneous Solution Pen-injector (NOVOLOG) Inject 5 Units as instructed every bedtime.     • pioglitazone (ACTOS) 30 MG Tab Take 30 mg by mouth every day.     • calcitRIOL (ROCALTROL) 0.25 MCG Cap Take 0.25 mcg by mouth every day.     • sodium bicarbonate (SODIUM BICARBONATE) 650 MG Tab Take 650 mg by mouth 3 times a day.         Allergies  No Known Allergies    Vital Signs last 24 hours  Temp:  [36.6 °C (97.9 °F)-36.9 °C (98.4 °F)] 36.9 °C (98.4 °F)  Pulse:  [] 112  Resp:  [8-23] 21  BP: ()/(36-67) 145/66  SpO2:  [95 %-100 %] 100 %    Physical Exam  Physical Exam  Constitutional:       Appearance: He is ill-appearing.   HENT:      Head: Normocephalic and atraumatic.      Right Ear: External ear normal.      Left Ear: External ear normal.      Nose: Nose normal.      Mouth/Throat:      Pharynx: No oropharyngeal exudate.   Eyes:      Pupils: Pupils are equal, round, and reactive to light.   Neck:      Musculoskeletal: No neck rigidity.      Comments: c collar in place  Cardiovascular:      Rate and Rhythm: Normal rate and regular rhythm.      Pulses: Normal pulses.   Pulmonary:      Breath sounds: Rales present. No wheezing.   Abdominal:      General: Abdomen is flat.   Musculoskeletal:         General: No swelling.   Skin:     General: Skin is dry.      Capillary Refill: Capillary refill takes 2 to 3 seconds.      Coloration: Skin is pale. Skin is not  jaundiced.   Neurological:      General: No focal deficit present.      Cranial Nerves: No cranial nerve deficit.         Fluids  No intake or output data in the 24 hours ending 03/28/20 2112    Laboratory  Recent Results (from the past 48 hour(s))   ACCU-CHEK GLUCOSE    Collection Time: 03/27/20  7:59 AM   Result Value Ref Range    Glucose - Accu-Ck 117 (H) 65 - 99 mg/dL   ACCU-CHEK GLUCOSE    Collection Time: 03/27/20 11:13 AM   Result Value Ref Range    Glucose - Accu-Ck 173 (H) 65 - 99 mg/dL   ACCU-CHEK GLUCOSE    Collection Time: 03/27/20  5:27 PM   Result Value Ref Range    Glucose - Accu-Ck 140 (H) 65 - 99 mg/dL   ACCU-CHEK GLUCOSE    Collection Time: 03/27/20  8:57 PM   Result Value Ref Range    Glucose - Accu-Ck 207 (H) 65 - 99 mg/dL   CBC WITH DIFFERENTIAL    Collection Time: 03/28/20  4:50 AM   Result Value Ref Range    WBC 5.2 4.8 - 10.8 K/uL    RBC 2.59 (L) 4.70 - 6.10 M/uL    Hemoglobin 8.1 (L) 14.0 - 18.0 g/dL    Hematocrit 25.3 (L) 42.0 - 52.0 %    MCV 97.7 81.4 - 97.8 fL    MCH 31.3 27.0 - 33.0 pg    MCHC 32.0 (L) 33.7 - 35.3 g/dL    RDW 72.5 (H) 35.9 - 50.0 fL    Platelet Count 119 (L) 164 - 446 K/uL    MPV 9.5 9.0 - 12.9 fL    Neutrophils-Polys 76.20 (H) 44.00 - 72.00 %    Lymphocytes 10.00 (L) 22.00 - 41.00 %    Monocytes 11.40 0.00 - 13.40 %    Eosinophils 1.20 0.00 - 6.90 %    Basophils 0.20 0.00 - 1.80 %    Immature Granulocytes 1.00 (H) 0.00 - 0.90 %    Nucleated RBC 0.00 /100 WBC    Neutrophils (Absolute) 3.96 1.82 - 7.42 K/uL    Lymphs (Absolute) 0.52 (L) 1.00 - 4.80 K/uL    Monos (Absolute) 0.59 0.00 - 0.85 K/uL    Eos (Absolute) 0.06 0.00 - 0.51 K/uL    Baso (Absolute) 0.01 0.00 - 0.12 K/uL    Immature Granulocytes (abs) 0.05 0.00 - 0.11 K/uL    NRBC (Absolute) 0.00 K/uL    Anisocytosis 2+     Macrocytosis 1+     Microcytosis 1+    Basic Metabolic Panel    Collection Time: 03/28/20  4:50 AM   Result Value Ref Range    Sodium 137 135 - 145 mmol/L    Potassium 4.3 3.6 - 5.5 mmol/L     Chloride 106 96 - 112 mmol/L    Co2 20 20 - 33 mmol/L    Glucose 173 (H) 65 - 99 mg/dL    Bun 43 (H) 8 - 22 mg/dL    Creatinine 2.18 (H) 0.50 - 1.40 mg/dL    Calcium 8.5 8.5 - 10.5 mg/dL    Anion Gap 11.0 7.0 - 16.0   ESTIMATED GFR    Collection Time: 03/28/20  4:50 AM   Result Value Ref Range    GFR If  36 (A) >60 mL/min/1.73 m 2    GFR If Non  29 (A) >60 mL/min/1.73 m 2   PERIPHERAL SMEAR REVIEW    Collection Time: 03/28/20  4:50 AM   Result Value Ref Range    Peripheral Smear Review see below    PLATELET ESTIMATE    Collection Time: 03/28/20  4:50 AM   Result Value Ref Range    Plt Estimation #CAC    MORPHOLOGY    Collection Time: 03/28/20  4:50 AM   Result Value Ref Range    RBC Morphology Present     Large Platelets 1+     Giant Platelets 1+     Polychromia 1+     Poikilocytosis 1+     Ovalocytes 1+    DIFFERENTIAL COMMENT    Collection Time: 03/28/20  4:50 AM   Result Value Ref Range    Comments-Diff see below    ACCU-CHEK GLUCOSE    Collection Time: 03/28/20  7:39 AM   Result Value Ref Range    Glucose - Accu-Ck 171 (H) 65 - 99 mg/dL   CBC WITH DIFFERENTIAL    Collection Time: 03/28/20  9:45 AM   Result Value Ref Range    WBC 10.6 4.8 - 10.8 K/uL    RBC 2.61 (L) 4.70 - 6.10 M/uL    Hemoglobin 8.3 (L) 14.0 - 18.0 g/dL    Hematocrit 25.5 (L) 42.0 - 52.0 %    MCV 97.7 81.4 - 97.8 fL    MCH 31.8 27.0 - 33.0 pg    MCHC 32.5 (L) 33.7 - 35.3 g/dL    RDW 70.7 (H) 35.9 - 50.0 fL    Platelet Count 126 (L) 164 - 446 K/uL    MPV 9.6 9.0 - 12.9 fL    Neutrophils-Polys 88.20 (H) 44.00 - 72.00 %    Lymphocytes 3.10 (L) 22.00 - 41.00 %    Monocytes 7.70 0.00 - 13.40 %    Eosinophils 0.10 0.00 - 6.90 %    Basophils 0.10 0.00 - 1.80 %    Immature Granulocytes 0.80 0.00 - 0.90 %    Nucleated RBC 0.00 /100 WBC    Neutrophils (Absolute) 9.31 (H) 1.82 - 7.42 K/uL    Lymphs (Absolute) 0.33 (L) 1.00 - 4.80 K/uL    Monos (Absolute) 0.81 0.00 - 0.85 K/uL    Eos (Absolute) 0.01 0.00 - 0.51 K/uL    Baso  (Absolute) 0.01 0.00 - 0.12 K/uL    Immature Granulocytes (abs) 0.08 0.00 - 0.11 K/uL    NRBC (Absolute) 0.00 K/uL   COMP METABOLIC PANEL    Collection Time: 03/28/20  9:45 AM   Result Value Ref Range    Sodium 138 135 - 145 mmol/L    Potassium 4.5 3.6 - 5.5 mmol/L    Chloride 106 96 - 112 mmol/L    Co2 21 20 - 33 mmol/L    Anion Gap 11.0 7.0 - 16.0    Glucose 203 (H) 65 - 99 mg/dL    Bun 40 (H) 8 - 22 mg/dL    Creatinine 2.14 (H) 0.50 - 1.40 mg/dL    Calcium 8.5 8.5 - 10.5 mg/dL    AST(SGOT) 26 12 - 45 U/L    ALT(SGPT) 18 2 - 50 U/L    Alkaline Phosphatase 105 (H) 30 - 99 U/L    Total Bilirubin 0.7 0.1 - 1.5 mg/dL    Albumin 3.3 3.2 - 4.9 g/dL    Total Protein 5.7 (L) 6.0 - 8.2 g/dL    Globulin 2.4 1.9 - 3.5 g/dL    A-G Ratio 1.4 g/dL   ESTIMATED GFR    Collection Time: 03/28/20  9:45 AM   Result Value Ref Range    GFR If  36 (A) >60 mL/min/1.73 m 2    GFR If Non African American 30 (A) >60 mL/min/1.73 m 2   Blood Culture,Hold    Collection Time: 03/28/20  9:45 AM   Result Value Ref Range    Blood Culture Hold Collected    Blood Culture,Hold    Collection Time: 03/28/20  9:45 AM   Result Value Ref Range    Blood Culture Hold Collected    URINALYSIS CULTURE, IF INDICATED    Collection Time: 03/28/20 10:16 AM   Result Value Ref Range    Color Yellow     Character Clear     Specific Gravity 1.015 <1.035    Ph 5.0 5.0 - 8.0    Glucose 500 (A) Negative mg/dL    Ketones Negative Negative mg/dL    Protein 30 (A) Negative mg/dL    Bilirubin Negative Negative    Urobilinogen, Urine 0.2 Negative    Nitrite Negative Negative    Leukocyte Esterase Negative Negative    Occult Blood Small (A) Negative    Micro Urine Req Microscopic    URINE MICROSCOPIC (W/UA)    Collection Time: 03/28/20 10:16 AM   Result Value Ref Range    WBC 0-2 (A) /hpf    RBC 10-20 (A) /hpf    Bacteria Negative None /hpf    Epithelial Cells Negative /hpf    Hyaline Cast 0-2 /lpf   ACCU-CHEK GLUCOSE    Collection Time: 03/28/20  5:22 PM    Result Value Ref Range    Glucose - Accu-Ck 147 (H) 65 - 99 mg/dL   LACTIC ACID    Collection Time: 03/28/20  6:00 PM   Result Value Ref Range    Lactic Acid 1.2 0.5 - 2.0 mmol/L   CBC WITH DIFFERENTIAL    Collection Time: 03/28/20  6:00 PM   Result Value Ref Range    WBC 6.4 4.8 - 10.8 K/uL    RBC 2.72 (L) 4.70 - 6.10 M/uL    Hemoglobin 8.2 (L) 14.0 - 18.0 g/dL    Hematocrit 26.9 (L) 42.0 - 52.0 %    MCV 98.9 (H) 81.4 - 97.8 fL    MCH 30.1 27.0 - 33.0 pg    MCHC 30.5 (L) 33.7 - 35.3 g/dL    RDW 73.5 (H) 35.9 - 50.0 fL    Platelet Count 121 (L) 164 - 446 K/uL    MPV 9.3 9.0 - 12.9 fL    Neutrophils-Polys 85.20 (H) 44.00 - 72.00 %    Lymphocytes 5.90 (L) 22.00 - 41.00 %    Monocytes 7.60 0.00 - 13.40 %    Eosinophils 0.50 0.00 - 6.90 %    Basophils 0.20 0.00 - 1.80 %    Immature Granulocytes 0.60 0.00 - 0.90 %    Nucleated RBC 0.30 /100 WBC    Neutrophils (Absolute) 5.46 1.82 - 7.42 K/uL    Lymphs (Absolute) 0.38 (L) 1.00 - 4.80 K/uL    Monos (Absolute) 0.49 0.00 - 0.85 K/uL    Eos (Absolute) 0.03 0.00 - 0.51 K/uL    Baso (Absolute) 0.01 0.00 - 0.12 K/uL    Immature Granulocytes (abs) 0.04 0.00 - 0.11 K/uL    NRBC (Absolute) 0.02 K/uL   Comp Metabolic Panel    Collection Time: 03/28/20  6:00 PM   Result Value Ref Range    Sodium 139 135 - 145 mmol/L    Potassium 4.3 3.6 - 5.5 mmol/L    Chloride 108 96 - 112 mmol/L    Co2 20 20 - 33 mmol/L    Anion Gap 11.0 7.0 - 16.0    Glucose 162 (H) 65 - 99 mg/dL    Bun 38 (H) 8 - 22 mg/dL    Creatinine 2.07 (H) 0.50 - 1.40 mg/dL    Calcium 8.4 (L) 8.5 - 10.5 mg/dL    AST(SGOT) 32 12 - 45 U/L    ALT(SGPT) 19 2 - 50 U/L    Alkaline Phosphatase 97 30 - 99 U/L    Total Bilirubin 0.5 0.1 - 1.5 mg/dL    Albumin 3.2 3.2 - 4.9 g/dL    Total Protein 5.5 (L) 6.0 - 8.2 g/dL    Globulin 2.3 1.9 - 3.5 g/dL    A-G Ratio 1.4 g/dL   ESTIMATED GFR    Collection Time: 03/28/20  6:00 PM   Result Value Ref Range    GFR If  38 (A) >60 mL/min/1.73 m 2    GFR If Non African American  31 (A) >60 mL/min/1.73 m 2   PROCALCITONIN    Collection Time: 03/28/20  6:00 PM   Result Value Ref Range    Procalcitonin 0.23 <0.25 ng/mL   proBrain Natriuretic Peptide, NT    Collection Time: 03/28/20  6:00 PM   Result Value Ref Range    NT-proBNP 5823 (H) 0 - 125 pg/mL   Triglyceride    Collection Time: 03/28/20  6:00 PM   Result Value Ref Range    Triglycerides 115 0 - 149 mg/dL       Imaging  CT-HEAD W/O   Final Result      1.  No evidence of acute intracranial process.      2.  Cerebral atrophy as well as periventricular chronic small vessel ischemic change.      DX-CHEST-PORTABLE (1 VIEW)   Final Result         Endotracheal tube with tip projecting over the mid to lower thoracic trachea.      Gastric drainage tube courses below diaphragm, tip is not seen.      DX-CHEST-PORTABLE (1 VIEW)   Final Result         No significant interval change. Grossly unchanged bibasilar ill-defined opacities      DX-CHEST-PORTABLE (1 VIEW)    (Results Pending)   EC-ECHOCARDIOGRAM COMPLETE W/O CONT    (Results Pending)       Assessment/Plan  Acute encephalopathy  Assessment & Plan  Likely from underlying medical issues; ?PNA CAP vs viral/atypical  CT head without acute process  LP and CSF largely unremarkable  If no improvement could conisder MRI but unlikely to be utility at the moment  Urine w/ LE and WBC but nitrite neg      Acute respiratory failure (HCC)  Assessment & Plan  Perhaps CAP or aspiration PNA but CXR not overwhelmingly convincing, COVID in the DDX  Some evidence of volume overload  Flu negative    RT/O2 protocols  Daily and PRN ABGs  Titration of ventilator therapy based on ABGs and patient's status  Sedation as tolerated/indicated  Daily CXR  HOB >30 degrees and peridex for VAP prevention  Pepcid for GI prophylaxis  SAT/SBT when able (ABCDEF Bundle)  Early mobilization    Continue iv abx    Acute on chronic renal failure (HCC)- (present on admission)  Assessment & Plan  Improved compared to prior labs  Renal  dose meds, avoid nephrotoxins  Strict I/Os  Follow renal function      DM (diabetes mellitus) (HCC)  Assessment & Plan  SSI  Resume home regimen when able    Thrombocytopenia (HCC)- (present on admission)  Assessment & Plan  No signs of hemorrhage at presetn  Chronic  monitor    Cervical stenosis of spine  Assessment & Plan   Recent cervical laminectomy and decompression    Surgical without evidence of erythema or edema      Discussed patient condition and risk of morbidity and/or mortality with RN, RT, Pharmacy, Charge nurse / hot rounds and Patient.    The patient remains critically ill.  Critical care time = 98 minutes in directly providing and coordinating critical care and extensive data review.  No time overlap and excludes procedures.

## 2020-03-29 NOTE — PROGRESS NOTES
2 RN Skin Check    2 RN skin check complete.   Devices in place: C-spine collar, ETT with collar, pulse oximiter, B soft wrist restraints, Wallis catheter, SCDs.  Skin assessed under devices: yes.  Confirmed pressure ulcers found on: none.  New potential pressure ulcers noted on L heel. Wound consult placed Yes.  The following interventions in place Pillows, Mepilex, Barrier cream and Waffle bed overlay.  No redness under c-spine collar. Generalized bruising on B arms, legs, and torso. Mass noted over L lower rib cage posteriorly. Sacrum red and blanching. No edema or erythema around LP site. Edematous in lower extremities. B heels red and blanching. Possible pressure injury on L heel.

## 2020-03-29 NOTE — RESPIRATORY CARE
Intubation    Intubation respiratory failure  Positive Color Change on EZCap? yes  Difficult Airway no  Number of attempts 1  Evidence of aspiration no  Airway ETT 8.0-Secured At  (cm): 25 (03/28/20 1916)  Vent Mode: APVCMV (03/28/20 1916)     Rate (breaths/min): 18 (03/28/20 1916)        PEEP/CPAP: 8 (03/28/20 1916)       Events/Summary/Plan: Pt intubated by MD (03/28/20 1916)

## 2020-03-29 NOTE — CARE PLAN
Ventilator Daily Summary    Vent Day #2    Ventilator settings changed this shift:    Weaning trials:    Respiratory Procedures:    Plan: Continue current ventilator settings and wean mechanical ventilation as tolerated per physician orders.

## 2020-03-29 NOTE — DIETARY
"Nutrition Support Assessment:  Day 1 of admit.  Vince Almanzar is a 79 y.o. male with admitting DX of acute respiratory failure.     Current problem list:  1. Acute respiratory failure  2. Acute encephalopathy  3. Acute on chronic renal failure  4. DM  5. Thrombocytopenia  6. Cervical stenosis of spine     Assessment:  Estimated Nutritional Needs based on:   Height: 177.8 cm (5' 10\")  Weight: 75.6 kg (166 lb 10.7 oz)  Weight to Use in Calculations: 75.6 kg (166 lb 10.7 oz) - bed scale  Body mass index is 23.91 kg/m²., BMI classification: normal    Calculation/Equation: MSJ x 1.2 = 1786 kcals  RMR via PSU using vent L/min 9 (flowsheet) and Tmax 36.9 = 1656 kcals  Total Calories / day: 1600 - 1800  (Calories / k - 24)  Total Grams Protein / day: 76 - 91  (Grams Protein / k - 1.2)     Evaluation:   1. TF consult received.  2. Pt has cortrak placed for enteral access pending confirmation.  3. Pt is currently intubated on ventilator day 2 after presenting with AMS from rehab facility.  4. Pt is Covid R/O. ERP notes pt with thick mucous secretions prior to intubation.  5. Labs: glucose 109-203, BUN 34, creatinine 1.97,   6. Meds: pepcid, lasix, colace, bowel protocol, precedex, Abram at 50 mcg  7. Last BM: 3/29 small, brown, loose  8. Specialized formula indicated for pt on pressor support.     Malnutrition Risk: Unable to assess at this time.     Recommendations/Plan:  Start Impact Peptide 1.5 @ 25 ml/hr and advance per protocol to 45 ml/hr (goal rate) to provide 1620 kcal (21 kcal/kg), 102 grams protein (1.3 gm/kg), and 832 ml free water per day.   Fluids per MD.   Monitor pressors.    RD to follow.              "

## 2020-03-29 NOTE — ASSESSMENT & PLAN NOTE
Likely from underlying medical issues; toxic metabolic sepsis  CT head without acute process  LP and CSF negative  Follows commands  Stop sedation and allow to clear ? Hypoactive delirium  Check TSH and ammonia  Phos normal    Resolved is appropriate continue delirium like precautions

## 2020-03-29 NOTE — ED TRIAGE NOTES
Chief Complaint   Patient presents with   • ALOC     A&O4 at baseline per EMS     Brought in by EMS, patient was recently d/cd from [facility] ER to [facility] rehab. On arrival to [facility]hab patient was obtunded. According to rehab patient is A&O4 and able to carry on conversations.   Patient VSS, continues to be obtunded. Chart up for ERP.

## 2020-03-29 NOTE — ED NOTES
Patient was given Doxycycline and rocephin earlier today, Called Dr. Tripathi will keep patient on Doxycycline.

## 2020-03-29 NOTE — PROGRESS NOTES
Received bedside report from Yani RN, transported patient to [facility] with RN, CNA and RT. CHG bath completed, 2 RN skin check completed.

## 2020-03-29 NOTE — ED NOTES
Late entry  RN in to place PIV. Patient requiring frequent suctioning throughout interaction. Noted thick mucous secretions. Patient with weak cough and inability to clear airway. MD made aware. RT called to bedside. Patient medicated per MAR. The decision was made to intubate.

## 2020-03-29 NOTE — ED NOTES
Patient moved to CT table, ET tube maintained in place 25 at securing device, PPE maintained while patient in CT.

## 2020-03-29 NOTE — FLOWSHEET NOTE
03/28/20 1715   Events/Summary/Plan   Events/Summary/Plan re-assess, 02 check. [facility] for transfer to [facility] in room   Vital Signs   Pulse 80   Respiration 12   Pulse Oximetry 95 %   $ Pulse Oximetry (Spot Check) Yes   Respiratory Assessment   Level of Consciousness Lethargic   Oxygen   O2 (LPM) 3   O2 Delivery Device Nasal Cannula

## 2020-03-29 NOTE — ASSESSMENT & PLAN NOTE
Recent cervical laminectomy and decompression   Surgical without evidence of erythema or edema  c-collar in place with activities  Follow up with NSG recommendations

## 2020-03-29 NOTE — ED NOTES
Patient moved back to [facility], ET tube maintained in place. VS stable, sedation adequate at this time.

## 2020-03-29 NOTE — PROGRESS NOTES
Patient returned to [facility] via [facility] transport. Primary RN notified charge RN that patient continues to be altered. Notified hospitalist Angelina. Patient is unresponsive to speech. Opens eyes and groans with painful stimuli. Per Dr. Alfaro, send patient back to ED for altered mental status. Patient left rehab at 1725 with [facility] transport. Patient family notified of condition by primary RN.

## 2020-03-29 NOTE — ED NOTES
Received report from Darren CINTRON, Patient intubated and propofol being initiated along with NS bolus. Patient was reported as Altered and unable to arouse. Under contact precautions for history of C-dif. Patient not reported to have respiratory symptoms.

## 2020-03-29 NOTE — PROGRESS NOTES
Critical Care Progress Note    Date of admission  3/28/2020    Chief Complaint  79 y.o. male admitted 3/28/2020 with acute respiratory failure and encephalopathy    Hospital Course    79 y.o. male with hx of DM , Hypertension, CKD 4 and recent laminectomy  who presented 3/28/2020 with acute encephalopathy. Apparently he was at his rehab facility and developed altered mentation however I am unsure if if this was disorientation or another process as he is intubated no family is around and hx is sparse. He was in the [facility] ED this am discharged back but returned again this evening. He was intubated for airway protection and was noted to have thick secretions prior. CXR reveal multifocal opacities. COVID rule out was initiated. An LP was performed and initial CSF was unremarkable. CBC revealed a normal WBC but lymphopenia. CR elevated but actually improved compared to prior labs. BNP was 5000s which appears chronically elevated.      Interval Problem Update  Reviewed last 24 hour events:  T-max 98.4  +400 cc over last 24 hours  CXR with increasing right basilar opacity  CT head without acute findings  CT chest with large bilateral pleural effusions and RUL nodules 5 and 9 mm  Labs reviewed  ABG 7.4 4/31/107/98 on 30    CSF 3/28 in process  Flu negative  COVID pending  C. difficile 3/21 negative toxin    Ceftriaxone 3/29-P  Doxycycline 3/29-P    Propofol@20  Fentanyl@pause    Last BM 3/28    Addendum  Patient had labile blood pressure, HR in the 70s, SBP in the 80s.  On 0.5 Precedex and 100 mics of fentanyl.  Check cortisol level, start midodrine, start vasopressor support to maintain map greater than 65.?  RODOLFO versus neurogenic.  Echo showed EF 70%, unable to fully assess diastolic dysfunction, IVC without inspiratory variation.    Review of Systems  Review of Systems   Unable to perform ROS: Acuity of condition        Vital Signs for last 24 hours   Temp:  [35.9 °C (96.6 °F)-36.9 °C (98.4 °F)] 35.9 °C (96.6 °F)  Pulse:   [] 95  Resp:  [8-35] 25  BP: ()/(36-75) 99/58  SpO2:  [95 %-100 %] 95 %    Hemodynamic parameters for last 24 hours       Respiratory Information for the last 24 hours  Vent Mode: APVCMV  Rate (breaths/min): 20  Vt Target (mL): 450  PEEP/CPAP: 8  MAP: 12  Control VTE (exp VT): 465    Physical Exam   Physical Exam  Vitals signs and nursing note reviewed.   Constitutional:       General: He is in acute distress.      Appearance: He is ill-appearing. He is not diaphoretic.   HENT:      Head: Normocephalic and atraumatic.   Eyes:      Conjunctiva/sclera: Conjunctivae normal.   Cardiovascular:      Rate and Rhythm: Normal rate.      Pulses: Normal pulses.   Pulmonary:      Breath sounds: No wheezing or rhonchi.   Abdominal:      General: There is no distension.      Palpations: Abdomen is soft.      Tenderness: There is no abdominal tenderness.   Musculoskeletal:         General: Swelling present.   Skin:     Comments: Diffuse wounds throughout   Neurological:      Cranial Nerves: No cranial nerve deficit.   Psychiatric:      Comments: Sedate         Medications  Current Facility-Administered Medications   Medication Dose Route Frequency Provider Last Rate Last Dose   • doxycycline (VIBRAMYCIN) 100 mg in  mL IVPB  100 mg Intravenous Q12HRS Mina Tripathi M.D.   Stopped at 03/29/20 0333   • Respiratory Therapy Consult   Nebulization Continuous RT Mina Tripathi M.D.       • ipratropium-albuterol (DUONEB) nebulizer solution  3 mL Nebulization Q2HRS PRN (RT) Mina Tripathi M.D.       • famotidine (PEPCID) tablet 20 mg  20 mg Enteral Tube DAILY Mina Tripathi M.D.        Or   • famotidine (PEPCID) injection 20 mg  20 mg Intravenous DAILY Mina Tripathi M.D.   20 mg at 03/29/20 0545   • senna-docusate (PERICOLACE or SENOKOT S) 8.6-50 MG per tablet 2 Tab  2 Tab Enteral Tube BID Mina Tripathi M.D.   Stopped at 03/29/20 0000    And   • polyethylene glycol/lytes (MIRALAX) PACKET 1 Packet  1 Packet Enteral  Tube QDAY PRN Mina Tripathi M.D.        And   • magnesium hydroxide (MILK OF MAGNESIA) suspension 30 mL  30 mL Enteral Tube QDAY PRN Mina Tripathi M.D.        And   • bisacodyl (DULCOLAX) suppository 10 mg  10 mg Rectal QDAY PRN Mina Tripathi M.D.       • MD Alert...ICU Electrolyte Replacement per Pharmacy   Other PHARMACY TO DOSE Mina Tripathi M.D.       • lidocaine (XYLOCAINE) 1 % injection 1-2 mL  1-2 mL Tracheal Tube Q30 MIN PRN Mina Tripathi M.D.       • fentaNYL (SUBLIMAZE) injection 100 mcg  100 mcg Intravenous Q15 MIN PRN Mina Tripathi M.D.        And   • fentaNYL (SUBLIMAZE) injection 200 mcg  200 mcg Intravenous Q15 MIN PRN Mina Tripathi M.D.        And   • fentaNYL (SUBLIMAZE) 50 mcg/mL in 50mL (Continuous Infusion)   Intravenous Continuous Mina Tripathi M.D.   Stopped at 03/29/20 0000    And   • propofol (DIPRIVAN) injection  0-80 mcg/kg/min Intravenous Continuous Mina Tripathi M.D. 8.3 mL/hr at 03/29/20 0403 20 mcg/kg/min at 03/29/20 0403   • enoxaparin (LOVENOX) inj 30 mg  30 mg Subcutaneous DAILY Mina Tripathi M.D.   30 mg at 03/29/20 0545   • cefTRIAXone (ROCEPHIN) 1 g in  mL IVPB  1 g Intravenous Q24HRS Mina Tripathi M.D.   Stopped at 03/29/20 0614       Fluids    Intake/Output Summary (Last 24 hours) at 3/29/2020 0741  Last data filed at 3/29/2020 0200  Gross per 24 hour   Intake 1000 ml   Output 600 ml   Net 400 ml       Laboratory  Recent Labs     03/29/20  0429   ISTATAPH 7.447   ISTATAPCO2 31.9   ISTATAPO2 107*   ISTATATCO2 23   MCUAAJP5RXW 98   ISTATARTHCO3 22.0   ISTATARTBE -2   ISTATTEMP 96.6 F   ISTATFIO2 30   ISTATSPEC Arterial   ISTATAPHTC 7.463   [id number] 100*         Recent Labs     03/28/20  0945 03/28/20  1800 03/29/20  0445   SODIUM 138 139 142   POTASSIUM 4.5 4.3 3.8   CHLORIDE 106 108 109   CO2 21 20 21   BUN 40* 38* 34*   CREATININE 2.14* 2.07* 1.97*   MAGNESIUM  --   --  2.0   PHOSPHORUS  --   --  2.5   CALCIUM 8.5 8.4* 8.3*     Recent Labs      03/28/20  0945 03/28/20  1800 03/29/20  0445   ALTSGPT 18 19  --    ASTSGOT 26 32  --    ALKPHOSPHAT 105* 97  --    TBILIRUBIN 0.7 0.5  --    GLUCOSE 203* 162* 109*     Recent Labs     03/28/20  0945 03/28/20  1800 03/29/20  0445   WBC 10.6 6.4 4.2*   NEUTSPOLYS 88.20* 85.20* 75.40*   LYMPHOCYTES 3.10* 5.90* 12.90*   MONOCYTES 7.70 7.60 9.30   EOSINOPHILS 0.10 0.50 1.20   BASOPHILS 0.10 0.20 0.20   ASTSGOT 26 32  --    ALTSGPT 18 19  --    ALKPHOSPHAT 105* 97  --    TBILIRUBIN 0.7 0.5  --      Recent Labs     03/28/20  0945 03/28/20  1800 03/29/20  0445   RBC 2.61* 2.72* 2.45*   HEMOGLOBIN 8.3* 8.2* 7.7*   HEMATOCRIT 25.5* 26.9* 24.5*   PLATELETCT 126* 121* 109*       Imaging  X-Ray:  I have personally reviewed the images and compared with prior images.    Assessment/Plan  Acute encephalopathy  Assessment & Plan  Likely from underlying medical issues; ?PNA CAP vs viral/atypical  CT head without acute process  LP and CSF largely unremarkable  If no improvement could conisder MRI but unlikely to be utility at the moment  Urine w/ LE and WBC but nitrite neg      Acute respiratory failure (HCC)  Assessment & Plan  Intubated 3/28  Continue full mechanical ventilatory support  Adjust ventilator based on ABG and pulmonary mechanics  CT with large bilateral pleural effusions and exam consistent with CHF exacerbation  Check echo  Diuresis tolerated with Lasix 40 every 8  Continue empiric antibiotics at this time  Flu negative, follow-up with COVID    Acute on chronic renal failure (HCC)- (present on admission)  Assessment & Plan  Improved compared to prior labs  Renal dose meds, avoid nephrotoxins  Strict I/Os  Follow renal function      DM (diabetes mellitus) (HCC)  Assessment & Plan  SSI  Resume home regimen when able    Thrombocytopenia (HCC)- (present on admission)  Assessment & Plan  No signs of hemorrhage at presetn  Chronic  monitor    Cervical stenosis of spine  Assessment & Plan   Recent cervical laminectomy and  decompression    Surgical without evidence of erythema or edema       VTE:  Lovenox  Ulcer: H2 Antagonist  Lines: Central Line  Picc nonfunctioning will remove    I have performed a physical exam and reviewed and updated ROS and Plan today (3/29/2020). In review of yesterday's note (3/28/2020), there are no changes except as documented above.     Discussed patient condition and risk of morbidity and/or mortality with RN, RT and Pharmacy  The patient remains critically ill.  Critical care time = 78 minutes in directly providing and coordinating critical care and extensive data review.  No time overlap and excludes procedures.

## 2020-03-29 NOTE — PROCEDURES
"Date: 3/29/2020    Procedure:  Central Venous Catheter Insertion    Indication: hypotension    Physician:  Sarthak Diaz M.D.    Consent:  emergent; time out performed    Procedure:  The patient was placed in the [place] position.  Appropriate \"time out\" was performed.  The right groin was prepped and draped in the usual sterile fashion.  Under full body sterile barrier precautions, a triple lumen central venous catheter was placed without difficulty in the right femoral position.  US was used for localization and placement.  All lumens were flushed with sterile saline and sterile caps were applied.  The line was sutured in place and a sterile dressing was applied.  There were no immediate complications.  A post-procedure CXR will be reviewed.  Estimated blood loss < 5cc.      Initial attempt made at left subclavian after prepping in usual sterile fashion.  Given c-collar in place and inability to adequately position, made 2 attempts without access and therefore aborted given body habitus and concern for increased risk of pneumothorax.  CXR pending to ensure no complications.  "

## 2020-03-29 NOTE — FLOWSHEET NOTE
03/28/20 1700   Events/Summary/Plan   Events/Summary/Plan Called to room, pt with emesisi, c-collar in place , pt unable to protect airway. Oral suction . Placed on 02/NC.  RN at bedside.   Vital Signs   Pulse 74   Pulse Oximetry (!) 81 %   $ Pulse Oximetry (Spot Check) Yes   Respiratory Assessment   Level of Consciousness Lethargic

## 2020-03-29 NOTE — PROGRESS NOTES
Cortrak Placement    Tube Team verified patient name and medical record number prior to tube placement.  Cortrak tube (43 inches, 10 K[place]z) placed at 55 cm in right nare.  Per Cortrak picture, tube appears to be in the stomach.  Nursing Instructions: Awaiting KUB to confirm placement before use for medications or feeding. Once placement confirmed, flush tube with 30 ml of water, and then remove and save stylet, in patient medication drawer.

## 2020-03-29 NOTE — ASSESSMENT & PLAN NOTE
Intubated 3/28-> extubated 3/31   CT with large bilateral pleural effusions and exam consistent with CHF exacerbation  Check echo -> reviewed  Force Diuresis as tolerated  Continue empiric antibiotics at this time for 5 days  Flu negative, COVID neg    Monitor need for intubation or HFNC with post extubation trial  Aspiration precautions

## 2020-03-29 NOTE — PROGRESS NOTES
At 1635, pt transported back to Rehab from regional ER by [facility], pt back to bed. patient altered mental status and emesis while vitals being taken. Pt turned on side and suctioned, O2 put into place, Notified hospitalist Angelina, RT Nena, and Charge RN of change in patient baseline, pt responds only to painful stimulus. Order to send patient to ED per Dr. Alfaro for change in mental status. Patient family (sister Ángela) notified of change by primary RN. Patient left facility with [facility] transport at 1725.

## 2020-03-29 NOTE — ED PROVIDER NOTES
"ED Provider Note    Scribed for Oli Le M.D. by Amarilys Cook. 3/28/2020,  6:08 PM.    Means of Arrival: EMS  History obtained from: Nurse  History limited by: Altered level of consciousness    CHIEF COMPLAINT  Chief Complaint   Patient presents with   • ALOC     A&O4 at baseline per EMS       HPI  Vince Almanzar is a 79 y.o. male who presents to the Emergency Department for altered level of consciousness onset this morning. The patient was hospitalized for the last 16 days and was discharged this morning. The hospitalist note showed that he had an altered mental status this morning. Patient discussed with Dr. Alfaro at 12:20 PM and was sent back to rehab.     HPI limited secondary to patient's altered level of consciousness.    REVIEW OF SYSTEMS  NEUROLOGIC:  Positive for altered level of consciousness.     See HPI for further details.   ROS limited secondary to patient's altered level of consciousness.    PAST MEDICAL HISTORY  Past Medical History:   Diagnosis Date   • Arrhythmia     [facility]   • Diabetes (HCC)    • Hemorrhagic disorder (HCC)     \"bleeds easily\"   • High cholesterol    • Hypertension    • Jaundice 5/8/2016   • Renal disorder     hx kidney stones       FAMILY HISTORY  No family history on file.    SOCIAL HISTORY   reports that he has never smoked. He has never used smokeless tobacco. He reports that he does not drink alcohol or use drugs.    SURGICAL HISTORY  Past Surgical History:   Procedure Laterality Date   • CERVICAL DECOMPRESSION POSTERIOR  2/28/2020    Procedure: DECOMPRESSION, SPINE, CERVICAL, POSTERIOR APPROACH- C3-6 DISCECTOMY AND FUSION;  Surgeon: Lg Sellers D.O.;  Location: SURGERY [facility];  Service: Neurosurgery   • GASTROSCOPY-ENDO N/A 6/3/2016    Procedure: GASTROSCOPY-ENDO;  Surgeon: Jasper Donnelly M.D.;  Location: SURGERY [facility];  Service:    • EGD W/ENDOSCOPIC ULTRASOUND N/A 6/3/2016    Procedure: EGD W/ENDOSCOPIC ULTRASOUND " "UPPER;  Surgeon: Jasper Donnelly M.D.;  Location: SURGERY [facility];  Service:    • EGD WITH ASP/BX N/A 6/3/2016    Procedure: EGD WITH ASP/BX - FNA, DUODENAL BIOPSIES, FNA OF PANCREAS;  Surgeon: Jasper Donnelly M.D.;  Location: SURGERY [facility];  Service:    • ERCP  2016   • CHOLECYSTECTOMY      gallstones removed   • HIP ARTHROPLASTY TOTAL Left     left total hip       CURRENT MEDICATIONS  Home Medications    **Home medications have not yet been reviewed for this encounter**         ALLERGIES  No Known Allergies    PHYSICAL EXAM  VITAL SIGNS: /60   Pulse 79   Temp 36.9 °C (98.4 °F) (Temporal)   Resp 16   Ht 1.778 m (5' 10\")   Wt 68.9 kg (152 lb)   SpO2 99%   BMI 21.81 kg/m²    Gen: Alert, no acute distress  HEENT: ATNC  Eyes: PERRL, EOMI, normal conjunctiva.   Neck: C collar in place, trachea midline  Resp: Lung sounds normal, no respiratory distress  CV: Regular rate and rhythm, no murmur. No JVD  Abd: Soft, non-distended  Ext: Ecchymosis over forearms. No deformities  Psych: normal mood  Neuro: Alerts to voice and mild tactile stimulation. Can squeeze hand on the left. No clonus. Spontaneously moves all extremities. Withdraws from pain in all extremities.    DIAGNOSTIC STUDIES / PROCEDURES     EKG  Results for orders placed or performed during the hospital encounter of 20   EKG (NOW)   Result Value Ref Range    Report       [facility] Emergency Dept.    Test Date:  2020  Pt Name:    PABLO STEVENS               Department: ER  MRN:        [id number]                      Room:        21  Gender:     Male                         Technician: [id number]  :        [id number]                   Requested By:DENZEL LE  Order #:    [id number]                    Reading MD: Denzel Le    Measurements  Intervals                                Axis  Rate:       106                          P:          31  ME:         157                          QRS:       "  64  QRSD:       120                          T:          28  QT:         372  QTc:        494    Interpretive Statements  INCOMPLETE ANALYSIS DUE TO MISSING DATA IN PRECORDIAL LEAD(S)  SINUS TACHYCARDIA WITH IRREGULAR RATE    IVCD, CONSIDER ATYPICAL RBBB  BASELINE WANDER IN LEAD(S) II,aVF  MISSING LEAD(S): V6  Compared to ECG 12/16/2018 15:41:16  Incomplete right bundle-branch block no longer present  Electronically Signed On 3-28-20 20 21:51:18 PDT by [employer]  Labs Reviewed   URINALYSIS,CULTURE IF INDICATED - Abnormal; Notable for the following components:       Result Value    Character Cloudy (*)     Glucose 100 (*)     Leukocyte Esterase Trace (*)     Occult Blood Large (*)     All other components within normal limits    Narrative:     Rule out COVID-19 Panel.   CBC WITH DIFFERENTIAL - Abnormal; Notable for the following components:    RBC 2.72 (*)     Hemoglobin 8.2 (*)     Hematocrit 26.9 (*)     MCV 98.9 (*)     MCHC 30.5 (*)     RDW 73.5 (*)     Platelet Count 121 (*)     Neutrophils-Polys 85.20 (*)     Lymphocytes 5.90 (*)     Lymphs (Absolute) 0.38 (*)     All other components within normal limits   COMP METABOLIC PANEL - Abnormal; Notable for the following components:    Glucose 162 (*)     Bun 38 (*)     Creatinine 2.07 (*)     Calcium 8.4 (*)     Total Protein 5.5 (*)     All other components within normal limits   ESTIMATED GFR - Abnormal; Notable for the following components:    GFR If  38 (*)     GFR If Non  31 (*)     All other components within normal limits   PROBRAIN NATRIURETIC PEPTIDE, NT - Abnormal; Notable for the following components:    NT-proBNP 5823 (*)     All other components within normal limits   CSF GLUCOSE - Abnormal; Notable for the following components:    Glucose  (*)     All other components within normal limits   CSF PROTEIN - Abnormal; Notable for the following components:    Total Protein,  (*)     All  "other components within normal limits   URINE MICROSCOPIC (W/UA) - Abnormal; Notable for the following components:    WBC 10-20 (*)     -150 (*)     Hyaline Cast 3-5 (*)     All other components within normal limits    Narrative:     Rule out COVID-19 Panel.   BLOOD CULTURE    Narrative:     Per Hospital Policy: Only change Specimen Src: to \"Line\" if  specified by physician order.   LACTIC ACID   PROCALCITONIN   TRIGLYCERIDE   INFLUENZA A/B BY PCR    Narrative:     Rule out COVID-19 Panel.   CSF CULTURE   CSF CELL COUNT   URINE CULTURE(NEW)    Narrative:     Rule out COVID-19 Panel.   GRAM STAIN   BLOOD CULTURE   COVID/SARS COV-2     All labs reviewed by me.    RADIOLOGY  CT-HEAD W/O   Final Result      1.  No evidence of acute intracranial process.      2.  Cerebral atrophy as well as periventricular chronic small vessel ischemic change.      DX-CHEST-PORTABLE (1 VIEW)   Final Result         Endotracheal tube with tip projecting over the mid to lower thoracic trachea.      Gastric drainage tube courses below diaphragm, tip is not seen.      DX-CHEST-PORTABLE (1 VIEW)   Final Result         No significant interval change. Grossly unchanged bibasilar ill-defined opacities      CT-RENAL COLIC EVALUATION(A/P W/O)    (Results Pending)   DX-CHEST-PORTABLE (1 VIEW)    (Results Pending)   EC-ECHOCARDIOGRAM COMPLETE W/O CONT    (Results Pending)     The radiologist’s interpretation of all radiology studies have been reviewed by me.    Intubation Procedure Note    Indication: airway compromise    Consent: Unable to be obtained due to patient's condition.    Medications Used: rocuronium and propofol intravenously     Procedure: The patient was placed in the appropriate position.  Cricoid pressure was not required.  Intubation was performed using an 8.0 cuffed endotracheal tube 1 view with glide. Secretions present at glottis suctioned. Easy pass first go at a distance of 26 cm to the dental ridge.  Initial confirmation " of placement included bilateral breath sounds.  A chest x-ray to verify correct placement of the tube showed appropriate tube position.    The patient tolerated the procedure well.     Complications: None    Procedure: Lumbar puncture  Indication: Altered mental status  Informed consent: Unable to be obtained due to altered mental status.  The patient sister was attempted to be called, however did not .  Given the urgent nature of identifying the patient's cause for his altered mental status, I believe that medical necessity outweighs other concerns.  Patient was placed in left lateral decubitus position.  The L4-5 interspace was identified by palpation.  Skin was cleansed 3 times with Betadine, it was anesthetized 1% lidocaine without epinephrine.  A spinal needle was introduced, and initially slightly pink followed by clear fluid was obtained.  This was sent to the lab.  There were no immediate complications.      COURSE & MEDICAL DECISION MAKING  Pertinent Labs & Imaging studies reviewed. (See chart for details)    Reviewed old medical records that showed The patient was hospitalized for the last 16 days and was discharged this morning. The hospitalist note showed that he had an altered mental status this morning. Patient discussed with Dr. Alfaro at 12:20 PM and was sent back to rehab. He  gievn dose of rocephanin doxy before discharge this mornigng     6:08 PM Patient seen and examined at bedside. Ordered for labs and imaging to evaluate.    6:54 PM - Patient is gurgling on secretions and not making attempts to swallow or clear throat. Not providing words, only intermittent screaming with gurgling component. Will intubate for airway. Intubation procedure performed at this time.     9:12 PM  Case discussed with Dr. Tripathi, who accepts hospitalization.    CRITICAL CARE  The very real possibility of a deterioration of this patient's condition required the highest level of my preparedness for sudden, emergent  intervention.  I provided critical care services, which included medication orders, frequent reevaluations of the patient's condition and response to treatment, ordering and reviewing test results, and discussing the case with various consultants.  The critical care time associated with the care of the patient was 40 minutes. Review chart for interventions. This time is exclusive of any other billable procedures.     Medical Decision Making:  Patient presents with altered mental status, he is only able to follow basic commands, unable to provide any history.  Review of the patient's history in the chart demonstrates that he was recently sent to rehab and was seen this morning for report of altered mental status, however it appeared to be reassuring.  Upon my arrival, he is clearly deteriorated from this morning.  He is initially maintaining his airway.  Will repeat work-up, although patient had a negative work-up this morning.    Patient's work-up demonstrates no intracranial bleed, no evidence of hepatic encephalopathy, no hyponatremia, no hyperglycemia, no DKA.  Chest x-ray from this morning demonstrated bilateral infiltrates concerning for bilateral pneumonia versus edema.  His proBNP is markedly elevated, procalcitonin is negative.  This does not rule out coronavirus,.  Patient was unable to maintain his airway, and at the time of intubation did have secretions on his glottis which were suction.  He had transient hypotension, which resolved with a dose of Abram-Synephrine.  Given his altered mental status, lumbar puncture performed evaluate for meningitis/encephalitis.  Abdomen benign, however he does have ecchymosis on the right flank.  Noncon CT was considered, however his coronavirus risk and likely low yield of this may be deferred.    Patient will be hospitalized by Dr. Tripathi in critical condition    FINAL IMPRESSION  1. Altered mental status, unspecified altered mental status type    2. Dependent edema    3.  At risk for ineffective clearance of airway            Amarilys GILES (Benoit), am scribing for, and in the presence of, Oli Le M.D..    Electronically signed by: Amarilys Cook (Benoit), 3/28/2020    IOli M.D. personally performed the services described in this documentation, as scribed by Amarilys Cook in my presence, and it is both accurate and complete. C    The note accurately reflects work and decisions made by me.  Oli Le M.D.  3/28/2020  9:33 PM

## 2020-03-30 PROBLEM — J90 PLEURAL EFFUSION, BILATERAL: Status: ACTIVE | Noted: 2020-03-30

## 2020-03-30 LAB
ACTION RANGE TRIGGERED IACRT: NO
ACTION RANGE TRIGGERED IACRT: NO
ALBUMIN SERPL BCP-MCNC: 2.9 G/DL (ref 3.2–4.9)
ALBUMIN/GLOB SERPL: 1.3 G/DL
ALP SERPL-CCNC: 105 U/L (ref 30–99)
ALT SERPL-CCNC: 17 U/L (ref 2–50)
AMMONIA PLAS-SCNC: <10 UMOL/L (ref 11–45)
ANION GAP SERPL CALC-SCNC: 14 MMOL/L (ref 7–16)
AST SERPL-CCNC: 23 U/L (ref 12–45)
BACTERIA UR CULT: NORMAL
BASE EXCESS BLDA CALC-SCNC: -2 MMOL/L (ref -4–3)
BASE EXCESS BLDA CALC-SCNC: -3 MMOL/L (ref -4–3)
BASOPHILS # BLD AUTO: 0.4 % (ref 0–1.8)
BASOPHILS # BLD: 0.02 K/UL (ref 0–0.12)
BILIRUB SERPL-MCNC: 0.7 MG/DL (ref 0.1–1.5)
BODY TEMPERATURE: ABNORMAL DEGREES
BODY TEMPERATURE: ABNORMAL DEGREES
BUN SERPL-MCNC: 35 MG/DL (ref 8–22)
CALCIUM SERPL-MCNC: 8.5 MG/DL (ref 8.5–10.5)
CHLORIDE SERPL-SCNC: 107 MMOL/L (ref 96–112)
CO2 BLDA-SCNC: 22 MMOL/L (ref 20–33)
CO2 BLDA-SCNC: 23 MMOL/L (ref 20–33)
CO2 SERPL-SCNC: 20 MMOL/L (ref 20–33)
CREAT SERPL-MCNC: 2.15 MG/DL (ref 0.5–1.4)
CRP SERPL HS-MCNC: 3.31 MG/DL (ref 0–0.75)
CRP SERPL HS-MCNC: 3.63 MG/DL (ref 0–0.75)
EKG IMPRESSION: NORMAL
EOSINOPHIL # BLD AUTO: 0.05 K/UL (ref 0–0.51)
EOSINOPHIL NFR BLD: 0.9 % (ref 0–6.9)
ERYTHROCYTE [DISTWIDTH] IN BLOOD BY AUTOMATED COUNT: 74.9 FL (ref 35.9–50)
GLOBULIN SER CALC-MCNC: 2.2 G/DL (ref 1.9–3.5)
GLUCOSE BLD-MCNC: 239 MG/DL (ref 65–99)
GLUCOSE SERPL-MCNC: 183 MG/DL (ref 65–99)
HCO3 BLDA-SCNC: 20.8 MMOL/L (ref 17–25)
HCO3 BLDA-SCNC: 22 MMOL/L (ref 17–25)
HCT VFR BLD AUTO: 25.4 % (ref 42–52)
HGB BLD-MCNC: 8.2 G/DL (ref 14–18)
HOROWITZ INDEX BLDA+IHG-RTO: 246 MM[HG]
HOROWITZ INDEX BLDA+IHG-RTO: 327 MM[HG]
IMM GRANULOCYTES # BLD AUTO: 0.07 K/UL (ref 0–0.11)
IMM GRANULOCYTES NFR BLD AUTO: 1.3 % (ref 0–0.9)
INST. QUALIFIED PATIENT IIQPT: YES
INST. QUALIFIED PATIENT IIQPT: YES
LYMPHOCYTES # BLD AUTO: 0.39 K/UL (ref 1–4.8)
LYMPHOCYTES NFR BLD: 7.3 % (ref 22–41)
MAGNESIUM SERPL-MCNC: 1.9 MG/DL (ref 1.5–2.5)
MCH RBC QN AUTO: 31.7 PG (ref 27–33)
MCHC RBC AUTO-ENTMCNC: 32.3 G/DL (ref 33.7–35.3)
MCV RBC AUTO: 98.1 FL (ref 81.4–97.8)
MONOCYTES # BLD AUTO: 0.47 K/UL (ref 0–0.85)
MONOCYTES NFR BLD AUTO: 8.8 % (ref 0–13.4)
NEUTROPHILS # BLD AUTO: 4.37 K/UL (ref 1.82–7.42)
NEUTROPHILS NFR BLD: 81.3 % (ref 44–72)
NRBC # BLD AUTO: 0.03 K/UL
NRBC BLD-RTO: 0.6 /100 WBC
O2/TOTAL GAS SETTING VFR VENT: 30 %
O2/TOTAL GAS SETTING VFR VENT: 50 %
PCO2 BLDA: 30.1 MMHG (ref 26–37)
PCO2 BLDA: 33.4 MMHG (ref 26–37)
PCO2 TEMP ADJ BLDA: 29.7 MMHG (ref 26–37)
PH BLDA: 7.43 [PH] (ref 7.4–7.5)
PH BLDA: 7.45 [PH] (ref 7.4–7.5)
PH TEMP ADJ BLDA: 7.45 [PH] (ref 7.4–7.5)
PHOSPHATE SERPL-MCNC: 2.8 MG/DL (ref 2.5–4.5)
PLATELET # BLD AUTO: 116 K/UL (ref 164–446)
PMV BLD AUTO: 9.5 FL (ref 9–12.9)
PO2 BLDA: 123 MMHG (ref 64–87)
PO2 BLDA: 98 MMHG (ref 64–87)
PO2 TEMP ADJ BLDA: 96 MMHG (ref 64–87)
POTASSIUM SERPL-SCNC: 3.8 MMOL/L (ref 3.6–5.5)
PREALB SERPL-MCNC: 14.6 MG/DL (ref 18–38)
PREALB SERPL-MCNC: 15.6 MG/DL (ref 18–38)
PROT SERPL-MCNC: 5.1 G/DL (ref 6–8.2)
RBC # BLD AUTO: 2.59 M/UL (ref 4.7–6.1)
SAO2 % BLDA: 98 % (ref 93–99)
SAO2 % BLDA: 99 % (ref 93–99)
SARS-COV-2 RNA RESP QL NAA+PROBE: NOT DETECTED
SIGNIFICANT IND 70042: NORMAL
SITE SITE: NORMAL
SODIUM SERPL-SCNC: 141 MMOL/L (ref 135–145)
SOURCE SOURCE: NORMAL
SPECIMEN DRAWN FROM PATIENT: ABNORMAL
SPECIMEN DRAWN FROM PATIENT: ABNORMAL
SPECIMEN SOURCE: NORMAL
TSH SERPL DL<=0.005 MIU/L-ACNC: 3.65 UIU/ML (ref 0.38–5.33)
WBC # BLD AUTO: 5.4 K/UL (ref 4.8–10.8)

## 2020-03-30 PROCEDURE — 700102 HCHG RX REV CODE 250 W/ 637 OVERRIDE(OP): Performed by: PSYCHIATRY & NEUROLOGY

## 2020-03-30 PROCEDURE — 700101 HCHG RX REV CODE 250: Performed by: INTERNAL MEDICINE

## 2020-03-30 PROCEDURE — 700111 HCHG RX REV CODE 636 W/ 250 OVERRIDE (IP): Performed by: PSYCHIATRY & NEUROLOGY

## 2020-03-30 PROCEDURE — A9270 NON-COVERED ITEM OR SERVICE: HCPCS | Performed by: PSYCHIATRY & NEUROLOGY

## 2020-03-30 PROCEDURE — 84443 ASSAY THYROID STIM HORMONE: CPT

## 2020-03-30 PROCEDURE — 86140 C-REACTIVE PROTEIN: CPT

## 2020-03-30 PROCEDURE — 700111 HCHG RX REV CODE 636 W/ 250 OVERRIDE (IP): Performed by: INTERNAL MEDICINE

## 2020-03-30 PROCEDURE — 82962 GLUCOSE BLOOD TEST: CPT | Mod: 91

## 2020-03-30 PROCEDURE — 84134 ASSAY OF PREALBUMIN: CPT

## 2020-03-30 PROCEDURE — A9270 NON-COVERED ITEM OR SERVICE: HCPCS | Performed by: INTERNAL MEDICINE

## 2020-03-30 PROCEDURE — 700102 HCHG RX REV CODE 250 W/ 637 OVERRIDE(OP): Performed by: INTERNAL MEDICINE

## 2020-03-30 PROCEDURE — 99292 CRITICAL CARE ADDL 30 MIN: CPT | Performed by: INTERNAL MEDICINE

## 2020-03-30 PROCEDURE — 93010 ELECTROCARDIOGRAM REPORT: CPT | Performed by: INTERNAL MEDICINE

## 2020-03-30 PROCEDURE — 700105 HCHG RX REV CODE 258: Performed by: PSYCHIATRY & NEUROLOGY

## 2020-03-30 PROCEDURE — 94003 VENT MGMT INPAT SUBQ DAY: CPT

## 2020-03-30 PROCEDURE — 700105 HCHG RX REV CODE 258

## 2020-03-30 PROCEDURE — 84100 ASSAY OF PHOSPHORUS: CPT

## 2020-03-30 PROCEDURE — 71045 X-RAY EXAM CHEST 1 VIEW: CPT

## 2020-03-30 PROCEDURE — 83735 ASSAY OF MAGNESIUM: CPT

## 2020-03-30 PROCEDURE — 85025 COMPLETE CBC W/AUTO DIFF WBC: CPT

## 2020-03-30 PROCEDURE — 770022 HCHG ROOM/CARE - ICU (200)

## 2020-03-30 PROCEDURE — 82803 BLOOD GASES ANY COMBINATION: CPT

## 2020-03-30 PROCEDURE — 93005 ELECTROCARDIOGRAM TRACING: CPT | Performed by: INTERNAL MEDICINE

## 2020-03-30 PROCEDURE — 36600 WITHDRAWAL OF ARTERIAL BLOOD: CPT

## 2020-03-30 PROCEDURE — 700101 HCHG RX REV CODE 250: Performed by: PSYCHIATRY & NEUROLOGY

## 2020-03-30 PROCEDURE — 94150 VITAL CAPACITY TEST: CPT

## 2020-03-30 PROCEDURE — 99291 CRITICAL CARE FIRST HOUR: CPT | Performed by: INTERNAL MEDICINE

## 2020-03-30 PROCEDURE — 80053 COMPREHEN METABOLIC PANEL: CPT

## 2020-03-30 PROCEDURE — 82140 ASSAY OF AMMONIA: CPT

## 2020-03-30 RX ORDER — MAGNESIUM SULFATE HEPTAHYDRATE 40 MG/ML
2 INJECTION, SOLUTION INTRAVENOUS ONCE
Status: COMPLETED | OUTPATIENT
Start: 2020-03-30 | End: 2020-03-30

## 2020-03-30 RX ORDER — SODIUM CHLORIDE 9 MG/ML
INJECTION, SOLUTION INTRAVENOUS
Status: COMPLETED
Start: 2020-03-30 | End: 2020-03-30

## 2020-03-30 RX ORDER — HEPARIN SODIUM 5000 [USP'U]/ML
5000 INJECTION, SOLUTION INTRAVENOUS; SUBCUTANEOUS EVERY 8 HOURS
Status: DISCONTINUED | OUTPATIENT
Start: 2020-03-31 | End: 2020-04-04

## 2020-03-30 RX ORDER — FUROSEMIDE 10 MG/ML
40 INJECTION INTRAMUSCULAR; INTRAVENOUS
Status: DISCONTINUED | OUTPATIENT
Start: 2020-03-30 | End: 2020-03-31

## 2020-03-30 RX ORDER — DOXYCYCLINE 100 MG/1
100 TABLET ORAL EVERY 12 HOURS
Status: DISCONTINUED | OUTPATIENT
Start: 2020-03-30 | End: 2020-04-02

## 2020-03-30 RX ORDER — DEXTROSE MONOHYDRATE 25 G/50ML
50 INJECTION, SOLUTION INTRAVENOUS
Status: DISCONTINUED | OUTPATIENT
Start: 2020-03-30 | End: 2020-04-07

## 2020-03-30 RX ADMIN — FUROSEMIDE 40 MG: 10 INJECTION, SOLUTION INTRAMUSCULAR; INTRAVENOUS at 15:34

## 2020-03-30 RX ADMIN — INSULIN HUMAN 3 UNITS: 100 INJECTION, SOLUTION PARENTERAL at 22:06

## 2020-03-30 RX ADMIN — FUROSEMIDE 40 MG: 10 INJECTION, SOLUTION INTRAMUSCULAR; INTRAVENOUS at 05:26

## 2020-03-30 RX ADMIN — DOXYCYCLINE 100 MG: 100 TABLET, FILM COATED ORAL at 17:32

## 2020-03-30 RX ADMIN — DOXYCYCLINE 100 MG: 100 INJECTION, POWDER, LYOPHILIZED, FOR SOLUTION INTRAVENOUS at 05:26

## 2020-03-30 RX ADMIN — POTASSIUM BICARBONATE 50 MEQ: 978 TABLET, EFFERVESCENT ORAL at 08:01

## 2020-03-30 RX ADMIN — SODIUM CHLORIDE 500 ML: 9 INJECTION, SOLUTION INTRAVENOUS at 07:15

## 2020-03-30 RX ADMIN — INSULIN HUMAN 3 UNITS: 100 INJECTION, SOLUTION PARENTERAL at 11:16

## 2020-03-30 RX ADMIN — INSULIN HUMAN 3 UNITS: 100 INJECTION, SOLUTION PARENTERAL at 17:35

## 2020-03-30 RX ADMIN — FAMOTIDINE 20 MG: 20 TABLET ORAL at 05:26

## 2020-03-30 RX ADMIN — SENNOSIDES AND DOCUSATE SODIUM 2 TABLET: 8.6; 5 TABLET ORAL at 17:32

## 2020-03-30 RX ADMIN — MIDODRINE HYDROCHLORIDE 5 MG: 5 TABLET ORAL at 11:08

## 2020-03-30 RX ADMIN — MIDODRINE HYDROCHLORIDE 5 MG: 5 TABLET ORAL at 05:27

## 2020-03-30 RX ADMIN — FENTANYL CITRATE 100 MCG: 0.05 INJECTION, SOLUTION INTRAMUSCULAR; INTRAVENOUS at 00:44

## 2020-03-30 RX ADMIN — DEXMEDETOMIDINE HYDROCHLORIDE 0.5 MCG/KG/HR: 100 INJECTION, SOLUTION INTRAVENOUS at 08:09

## 2020-03-30 RX ADMIN — CEFTRIAXONE SODIUM 1 G: 1 INJECTION, POWDER, FOR SOLUTION INTRAMUSCULAR; INTRAVENOUS at 05:26

## 2020-03-30 RX ADMIN — ENOXAPARIN SODIUM 40 MG: 100 INJECTION SUBCUTANEOUS at 05:31

## 2020-03-30 RX ADMIN — MIDODRINE HYDROCHLORIDE 5 MG: 5 TABLET ORAL at 17:32

## 2020-03-30 RX ADMIN — MAGNESIUM SULFATE IN WATER 2 G: 40 INJECTION, SOLUTION INTRAVENOUS at 08:06

## 2020-03-30 ASSESSMENT — COGNITIVE AND FUNCTIONAL STATUS - GENERAL
HELP NEEDED FOR BATHING: TOTAL
PERSONAL GROOMING: TOTAL
EATING MEALS: TOTAL
CLIMB 3 TO 5 STEPS WITH RAILING: TOTAL
STANDING UP FROM CHAIR USING ARMS: TOTAL
MOBILITY SCORE: 6
SUGGESTED CMS G CODE MODIFIER MOBILITY: CN
DRESSING REGULAR UPPER BODY CLOTHING: TOTAL
TURNING FROM BACK TO SIDE WHILE IN FLAT BAD: UNABLE
SUGGESTED CMS G CODE MODIFIER DAILY ACTIVITY: CN
DAILY ACTIVITIY SCORE: 6
MOVING TO AND FROM BED TO CHAIR: UNABLE
TOILETING: TOTAL
DRESSING REGULAR LOWER BODY CLOTHING: TOTAL
WALKING IN HOSPITAL ROOM: TOTAL
MOVING FROM LYING ON BACK TO SITTING ON SIDE OF FLAT BED: UNABLE

## 2020-03-30 ASSESSMENT — PULMONARY FUNCTION TESTS: FVC: .6

## 2020-03-30 ASSESSMENT — FIBROSIS 4 INDEX: FIB4 SCORE: 4.87

## 2020-03-30 NOTE — CARE PLAN
Problem: Safety - Medical Restraint  Goal: Free from restraint(s) (Restraint for Interference with Medical Device)  Description: INTERVENTIONS:  1. ONCE/SHIFT or MINIMUM Q12H: Assess and document the continuing need for restraints  2. Q24H: Continued use of restraint requires LIP to perform face to face examination and written order  3. Identify and implement measures to help patient regain control  Outcome: PROGRESSING SLOWER THAN EXPECTED  Flowsheets (Taken 3/29/2020 0108)  Addressed this shift: Free from restraint(s) (restraint for interference with medical device):   ONCE/SHIFT or MINIMUM Every 12 hours: Assess and document the continuing need for restraints   Every 24 hours: Continued use of restraint requires Licensed Independent Practitioner to perform face to face examination and written order   Identify and implement measures to help patient regain control  Note: Patient remains intubated and sedated, requiring restraints to ensure safety. Will reassess appropriateness of restraints throughout shift.      Problem: Safety  Goal: Will remain free from falls  Outcome: PROGRESSING AS EXPECTED  Note: Patient remains free from falls, Janay Woody fall screen complete and appropriate interventions in place. Bed in lowest and locked position. Patient unable to use call light at this time.

## 2020-03-30 NOTE — PROGRESS NOTES
Aspen C collar replacement pads were delivered to RN. Any questions please all traction at [phone]. Thank you

## 2020-03-30 NOTE — PROGRESS NOTES
12 hour chart check    Monitor summary  SB-SA 49-95 w/ 7 beats v-tach and 11 beats rapid wide-complex a-fib  0.22/0.12/0.42

## 2020-03-30 NOTE — CARE PLAN
Problem: Nutritional:  Goal: Nutrition support tolerated and meeting greater than 85% of estimated needs  Outcome: MET   Impact Peptide 1.5 @ 45 mL/hr (final goal rate).

## 2020-03-30 NOTE — PROGRESS NOTES
Critical Care Progress Note    Date of admission  3/28/2020    Chief Complaint  79 y.o. male admitted 3/28/2020 with acute respiratory failure and encephalopathy    Hospital Course    79 y.o. male with hx of DM , Hypertension, CKD 4 and recent laminectomy  who presented 3/28/2020 with acute encephalopathy. Apparently he was at his rehab facility and developed altered mentation however I am unsure if if this was disorientation or another process as he is intubated no family is around and hx is sparse. He was in the [facility] ED this am discharged back but returned again this evening. He was intubated for airway protection and was noted to have thick secretions prior. CXR reveal multifocal opacities. COVID rule out was initiated. An LP was performed and initial CSF was unremarkable. CBC revealed a normal WBC but lymphopenia. CR elevated but actually improved compared to prior labs. BNP was 5000s which appears chronically elevated.      Interval Problem Update  Reviewed last 24 hour events:  Neuro: follows lethargic   HR: 80-90's PAC sinus pauses  SBP: 's edema   Tmax: afebrile   GI: og to cortrax last BM this am  UOP: anders 1200ml  Lines: og, cortrax, peripheral central   Resp: vent 3 8 30% RSBI 40 FVC .6 Nif -32 lots thick   Vte: lovenox -> heparin  PPI/H2:pepcid   Antibx: doxy 2/5 Cef 2/7   Covid negative  Sliding scale moderate insulin    Bedside US with moderate right effusion  LV/RV Function good IVC dilated  RV decompressed  will not do thoracentesis  SBT with PEEP of 5  Lasix 40mg Q8->Q12  Tolerated prolong spont but still sleepy on exam with increase secretion will rest  D/c fentanyl and dex    CSF 3/28 in process  Flu negative  COVID negative  C. difficile 3/21 negative toxin    Ceftriaxone 3/29-P  Doxycycline 3/29-P  Last BM 3/28, 3/30    Review of Systems  Review of Systems   Unable to perform ROS: Intubated        Vital Signs for last 24 hours   Pulse:  [] 91  Resp:  [13-25] 20  BP: ()/(41-70)  128/54  SpO2:  [85 %-100 %] 100 %    Hemodynamic parameters for last 24 hours       Respiratory Information for the last 24 hours  Vent Mode: APVCMV  Rate (breaths/min): 20  Vt Target (mL): 450  PEEP/CPAP: 5  P Support: 5  MAP: 8.4  Length of Weaning Trial (Hours): 1  Control VTE (exp VT): 450    Physical Exam   Physical Exam  Vitals signs and nursing note reviewed.   Constitutional:       General: He is in acute distress.      Appearance: He is not ill-appearing or diaphoretic.      Comments: ET in place   HENT:      Head: Normocephalic and atraumatic.      Nose:      Comments: cortrax     Mouth/Throat:      Comments: ET in place  Eyes:      Conjunctiva/sclera: Conjunctivae normal.      Pupils: Pupils are equal, round, and reactive to light.   Neck:      Comments: c collar in place  Cardiovascular:      Rate and Rhythm: Normal rate.      Pulses: Normal pulses.   Pulmonary:      Breath sounds: No wheezing or rhonchi.      Comments: Decrease breath sound to right > left no wheeze  Abdominal:      General: There is no distension.      Palpations: Abdomen is soft.      Tenderness: There is no abdominal tenderness.   Musculoskeletal:         General: Swelling present.      Right lower leg: Edema present.      Left lower leg: Edema present.      Comments: Mottling to bilateral knees cold   Skin:     Coloration: Skin is not jaundiced.      Comments: Diffuse wounds throughout   Neurological:      Cranial Nerves: No cranial nerve deficit.      Comments: Awakes follow commands while being intubated 4+/5 strength in lower with good equal strength bilateral    Psychiatric:      Comments: Unable to determine         Medications  Current Facility-Administered Medications   Medication Dose Route Frequency Provider Last Rate Last Dose   • doxycycline monohydrate (ADOXA) tablet 100 mg  100 mg Enteral Tube Q12HRS Sarthak Mcintyre M.D.   100 mg at 03/30/20 4412   • insulin regular (HUMULIN R) injection 2-9 Units  2-9 Units  Subcutaneous Q6HRS Sarthak Mcintyre M.D.   3 Units at 03/30/20 1735    And   • dextrose 50% (D50W) injection 50 mL  50 mL Intravenous Q15 MIN PRN Sarthak Mcintyre M.D.       • [START ON 3/31/2020] heparin injection 5,000 Units  5,000 Units Subcutaneous Q8HRS Sarthak Mcintyre M.D.       • furosemide (LASIX) injection 40 mg  40 mg Intravenous BID DIURETIC Sarthak Mcintyre M.D.   40 mg at 03/30/20 1534   • phenylephrine (DEBORA-SYNEPHRINE) 40 mg in  mL Infusion  0-300 mcg/min Intravenous Continuous Sarthak Diaz M.D.   Stopped at 03/29/20 1515   • Pharmacy Consult: Enteral tube insertion - review meds/change route/product selection  1 Each Other PHARMACY TO DOSE Sarthak Diaz M.D.       • midodrine (PROAMATINE) tablet 5 mg  5 mg Enteral Tube TID WITH MEALS Sarthak Diaz M.D.   5 mg at 03/30/20 1732   • Respiratory Therapy Consult   Nebulization Continuous RT Mina Tripathi M.D.       • ipratropium-albuterol (DUONEB) nebulizer solution  3 mL Nebulization Q2HRS PRN (RT) Mina Tripathi M.D.       • famotidine (PEPCID) tablet 20 mg  20 mg Enteral Tube DAILY Mina Tripathi M.D.   20 mg at 03/30/20 0526    Or   • famotidine (PEPCID) injection 20 mg  20 mg Intravenous DAILY Mina Tripathi M.D.   20 mg at 03/29/20 0545   • senna-docusate (PERICOLACE or SENOKOT S) 8.6-50 MG per tablet 2 Tab  2 Tab Enteral Tube BID Mina Tripathi M.D.   2 Tab at 03/30/20 1732    And   • polyethylene glycol/lytes (MIRALAX) PACKET 1 Packet  1 Packet Enteral Tube QDAY PRN Mina Tripathi M.D.        And   • magnesium hydroxide (MILK OF MAGNESIA) suspension 30 mL  30 mL Enteral Tube QDAY PRN Mina Tripathi M.D.        And   • bisacodyl (DULCOLAX) suppository 10 mg  10 mg Rectal QDAY PRN Mina Tripathi M.D.       • MD Alert...ICU Electrolyte Replacement per Pharmacy   Other PHARMACY TO DOSE Mina Tripathi M.D.       • lidocaine (XYLOCAINE) 1 % injection 1-2 mL  1-2 mL Tracheal Tube Q30 MIN PRN Mina Tripathi M.D.       •  cefTRIAXone (ROCEPHIN) 1 g in  mL IVPB  1 g Intravenous Q24HRS Mina Tripathi M.D.   Stopped at 03/30/20 0556       Fluids    Intake/Output Summary (Last 24 hours) at 3/30/2020 1918  Last data filed at 3/30/2020 1800  Gross per 24 hour   Intake 1420.33 ml   Output 3075 ml   Net -1654.67 ml       Laboratory  Recent Labs     03/28/20  2226 03/29/20  0429 03/30/20  0441   ISTATAPH 7.428 7.447 7.447   ISTATAPCO2 33.4 31.9 30.1   ISTATAPO2 123* 107* 98*   ISTATATCO2 23 23 22   [id number] 99 98 98   ISTATARTHCO3 22.0 22.0 20.8   ISTATARTBE -2 -2 -3   ISTATTEMP see below 96.6 F 36.7 C   ISTATFIO2 50 30 30   ISTATSPEC Arterial Arterial Arterial   ISTATAPHTC  --  7.463 7.452   [id number]  --  100* 96*         Recent Labs     03/29/20 0445 03/29/20  1659 03/30/20  0529   SODIUM 142 143 141   POTASSIUM 3.8 3.5* 3.8   CHLORIDE 109 110 107   CO2 21 21 20   BUN 34* 31* 35*   CREATININE 1.97* 1.99* 2.15*   MAGNESIUM 2.0 1.9 1.9   PHOSPHORUS 2.5  --  2.8   CALCIUM 8.3* 8.1* 8.5     Recent Labs     03/28/20  0945 03/28/20  1800 03/29/20 0445 03/29/20  1659 03/30/20  0529 03/30/20  1600   ALTSGPT 18 19  --   --  17  --    ASTSGOT 26 32  --   --  23  --    ALKPHOSPHAT 105* 97  --   --  105*  --    TBILIRUBIN 0.7 0.5  --   --  0.7  --    PREALBUMIN  --   --   --   --   --  14.6*   GLUCOSE 203* 162* 109* 111* 183*  --      Recent Labs     03/28/20  0945 03/28/20  1800 03/29/20  0445 03/30/20  0529   WBC 10.6 6.4 4.2* 5.4   NEUTSPOLYS 88.20* 85.20* 75.40* 81.30*   LYMPHOCYTES 3.10* 5.90* 12.90* 7.30*   MONOCYTES 7.70 7.60 9.30 8.80   EOSINOPHILS 0.10 0.50 1.20 0.90   BASOPHILS 0.10 0.20 0.20 0.40   ASTSGOT 26 32  --  23   ALTSGPT 18 19  --  17   ALKPHOSPHAT 105* 97  --  105*   TBILIRUBIN 0.7 0.5  --  0.7     Recent Labs     03/28/20  1800 03/29/20  0445 03/30/20  0529   RBC 2.72* 2.45* 2.59*   HEMOGLOBIN 8.2* 7.7* 8.2*   HEMATOCRIT 26.9* 24.5* 25.4*   PLATELETCT 121* 109* 116*       Imaging  X-Ray:  I have personally reviewed  the images and compared with prior images. and My impression is: bilateral effusion and atlectasis ET in place  CT:    Reviewed  Echo:   Reviewed    Assessment/Plan  Acute encephalopathy  Assessment & Plan  Likely from underlying medical issues; toxic metabolic sepsis  CT head without acute process  LP and CSF negative  Follows commands  Stop sedation and allow to clear ? Hypoactive delirium  Check TSH and ammonia  Phos normal    Acute respiratory failure (HCC)  Assessment & Plan  Intubated 3/28  Continue full mechanical ventilatory support  Adjust ventilator based on ABG and pulmonary mechanics  CT with large bilateral pleural effusions and exam consistent with CHF exacerbation  Check echo -> reviewed  Force Diuresis as tolerated  Continue empiric antibiotics at this time  Flu negative, COVID neg    Passing SBT with prolonged trial on Peep of 5 but still somnolent with increasing secretions  Stop sedation and monitor for extubation trial 4/1    Acute on chronic renal failure (HCC)- (present on admission)  Assessment & Plan  Similar to baseline cr  Renal dose meds, avoid nephrotoxins  Strict I/Os  Follow renal function      DM (diabetes mellitus) ([facility])  Assessment & Plan  SSI with hypoglycemic protocols  Resume home regimen when able    Thrombocytopenia (HCC)- (present on admission)  Assessment & Plan  No signs of bleeding chronic continue to trend  Mild thrombocytopenia    Cervical stenosis of spine  Assessment & Plan  Recent cervical laminectomy and decompression   Surgical without evidence of erythema or edema  c-collar in place with activities  Follow up with NSG recommendations       VTE:  Lovenox  Ulcer: H2 Antagonist  Lines: Central Line  Ongoing indication addressed    I have performed a physical exam and reviewed and updated ROS and Plan today (3/30/2020). In review of yesterday's note (3/29/2020), there are no changes except as documented above.     Discussed patient condition and risk of morbidity and/or  mortality with RN, RT, Pharmacy, Charge nurse / hot rounds and Patient     The patient remains critically ill with encephalopathy and ventilator support with active titration and multiple attempts for liberation and adjustments.  Critical care time = 75 minutes in directly providing and coordinating critical care and extensive data review.  No time overlap and excludes procedures.

## 2020-03-30 NOTE — CARE PLAN
Problem: Infection  Goal: Will remain free from infection  Outcome: PROGRESSING AS EXPECTED  Note: Will continue Q2 oral care to prevent VAP. Scrubbing the hub of both the peripheral IV, as well as the arterial line. Will continue to clean all wounds daily with soap and water.      Problem: Skin Integrity  Goal: Risk for impaired skin integrity will decrease  Outcome: PROGRESSING AS EXPECTED  Note: Patient has fragile skin. Currently using mepilex and barrier past to protect the skin. Will continue Q2 turns for red and blanching sacrum. Using pillows for positioning, and to float heels.

## 2020-03-31 ENCOUNTER — APPOINTMENT (OUTPATIENT)
Dept: RADIOLOGY | Facility: MEDICAL CENTER | Age: 80
DRG: 871 | End: 2020-03-31
Attending: PSYCHIATRY & NEUROLOGY
Payer: COMMERCIAL

## 2020-03-31 LAB
ACTION RANGE TRIGGERED IACRT: NO
ANION GAP SERPL CALC-SCNC: 12 MMOL/L (ref 7–16)
BACTERIA CSF CULT: NORMAL
BASE EXCESS BLDA CALC-SCNC: 3 MMOL/L (ref -4–3)
BASOPHILS # BLD AUTO: 0.2 % (ref 0–1.8)
BASOPHILS # BLD: 0.01 K/UL (ref 0–0.12)
BODY TEMPERATURE: ABNORMAL DEGREES
BUN SERPL-MCNC: 48 MG/DL (ref 8–22)
CALCIUM SERPL-MCNC: 8.6 MG/DL (ref 8.5–10.5)
CHLORIDE SERPL-SCNC: 104 MMOL/L (ref 96–112)
CO2 BLDA-SCNC: 27 MMOL/L (ref 20–33)
CO2 SERPL-SCNC: 24 MMOL/L (ref 20–33)
CREAT SERPL-MCNC: 2.07 MG/DL (ref 0.5–1.4)
EOSINOPHIL # BLD AUTO: 0.05 K/UL (ref 0–0.51)
EOSINOPHIL NFR BLD: 1.1 % (ref 0–6.9)
ERYTHROCYTE [DISTWIDTH] IN BLOOD BY AUTOMATED COUNT: 81.9 FL (ref 35.9–50)
GLUCOSE BLD-MCNC: 206 MG/DL (ref 65–99)
GLUCOSE BLD-MCNC: 219 MG/DL (ref 65–99)
GLUCOSE BLD-MCNC: 264 MG/DL (ref 65–99)
GLUCOSE BLD-MCNC: 272 MG/DL (ref 65–99)
GLUCOSE SERPL-MCNC: 270 MG/DL (ref 65–99)
GRAM STN SPEC: NORMAL
HCO3 BLDA-SCNC: 25.7 MMOL/L (ref 17–25)
HCT VFR BLD AUTO: 26.5 % (ref 42–52)
HGB BLD-MCNC: 8.5 G/DL (ref 14–18)
HOROWITZ INDEX BLDA+IHG-RTO: 307 MM[HG]
IMM GRANULOCYTES # BLD AUTO: 0.04 K/UL (ref 0–0.11)
IMM GRANULOCYTES NFR BLD AUTO: 0.9 % (ref 0–0.9)
INST. QUALIFIED PATIENT IIQPT: YES
LYMPHOCYTES # BLD AUTO: 0.43 K/UL (ref 1–4.8)
LYMPHOCYTES NFR BLD: 9.6 % (ref 22–41)
MAGNESIUM SERPL-MCNC: 2.3 MG/DL (ref 1.5–2.5)
MCH RBC QN AUTO: 31.7 PG (ref 27–33)
MCHC RBC AUTO-ENTMCNC: 32.1 G/DL (ref 33.7–35.3)
MCV RBC AUTO: 98.9 FL (ref 81.4–97.8)
MONOCYTES # BLD AUTO: 0.55 K/UL (ref 0–0.85)
MONOCYTES NFR BLD AUTO: 12.3 % (ref 0–13.4)
NEUTROPHILS # BLD AUTO: 3.39 K/UL (ref 1.82–7.42)
NEUTROPHILS NFR BLD: 75.9 % (ref 44–72)
NRBC # BLD AUTO: 0.03 K/UL
NRBC BLD-RTO: 0.7 /100 WBC
O2/TOTAL GAS SETTING VFR VENT: 30 %
PCO2 BLDA: 31.9 MMHG (ref 26–37)
PCO2 TEMP ADJ BLDA: 31.5 MMHG (ref 26–37)
PH BLDA: 7.51 [PH] (ref 7.4–7.5)
PH TEMP ADJ BLDA: 7.52 [PH] (ref 7.4–7.5)
PHOSPHATE SERPL-MCNC: 2 MG/DL (ref 2.5–4.5)
PLATELET # BLD AUTO: 121 K/UL (ref 164–446)
PMV BLD AUTO: 9.6 FL (ref 9–12.9)
PO2 BLDA: 92 MMHG (ref 64–87)
PO2 TEMP ADJ BLDA: 91 MMHG (ref 64–87)
POTASSIUM SERPL-SCNC: 4 MMOL/L (ref 3.6–5.5)
RBC # BLD AUTO: 2.68 M/UL (ref 4.7–6.1)
SAO2 % BLDA: 98 % (ref 93–99)
SIGNIFICANT IND 70042: NORMAL
SITE SITE: NORMAL
SODIUM SERPL-SCNC: 140 MMOL/L (ref 135–145)
SOURCE SOURCE: NORMAL
SPECIMEN DRAWN FROM PATIENT: ABNORMAL
WBC # BLD AUTO: 4.5 K/UL (ref 4.8–10.8)

## 2020-03-31 PROCEDURE — 97166 OT EVAL MOD COMPLEX 45 MIN: CPT

## 2020-03-31 PROCEDURE — 700105 HCHG RX REV CODE 258: Performed by: INTERNAL MEDICINE

## 2020-03-31 PROCEDURE — A9270 NON-COVERED ITEM OR SERVICE: HCPCS | Performed by: INTERNAL MEDICINE

## 2020-03-31 PROCEDURE — 700102 HCHG RX REV CODE 250 W/ 637 OVERRIDE(OP): Performed by: INTERNAL MEDICINE

## 2020-03-31 PROCEDURE — 97163 PT EVAL HIGH COMPLEX 45 MIN: CPT

## 2020-03-31 PROCEDURE — 97162 PT EVAL MOD COMPLEX 30 MIN: CPT

## 2020-03-31 PROCEDURE — 80048 BASIC METABOLIC PNL TOTAL CA: CPT

## 2020-03-31 PROCEDURE — 85025 COMPLETE CBC W/AUTO DIFF WBC: CPT

## 2020-03-31 PROCEDURE — 71045 X-RAY EXAM CHEST 1 VIEW: CPT

## 2020-03-31 PROCEDURE — 700102 HCHG RX REV CODE 250 W/ 637 OVERRIDE(OP): Performed by: PSYCHIATRY & NEUROLOGY

## 2020-03-31 PROCEDURE — 99291 CRITICAL CARE FIRST HOUR: CPT | Performed by: INTERNAL MEDICINE

## 2020-03-31 PROCEDURE — 99292 CRITICAL CARE ADDL 30 MIN: CPT | Performed by: INTERNAL MEDICINE

## 2020-03-31 PROCEDURE — 94003 VENT MGMT INPAT SUBQ DAY: CPT

## 2020-03-31 PROCEDURE — 770022 HCHG ROOM/CARE - ICU (200)

## 2020-03-31 PROCEDURE — 700101 HCHG RX REV CODE 250: Performed by: INTERNAL MEDICINE

## 2020-03-31 PROCEDURE — A9270 NON-COVERED ITEM OR SERVICE: HCPCS | Performed by: PSYCHIATRY & NEUROLOGY

## 2020-03-31 PROCEDURE — 97535 SELF CARE MNGMENT TRAINING: CPT

## 2020-03-31 PROCEDURE — 82803 BLOOD GASES ANY COMBINATION: CPT

## 2020-03-31 PROCEDURE — 700105 HCHG RX REV CODE 258: Performed by: PSYCHIATRY & NEUROLOGY

## 2020-03-31 PROCEDURE — 84100 ASSAY OF PHOSPHORUS: CPT

## 2020-03-31 PROCEDURE — 94150 VITAL CAPACITY TEST: CPT

## 2020-03-31 PROCEDURE — 83735 ASSAY OF MAGNESIUM: CPT

## 2020-03-31 PROCEDURE — 700111 HCHG RX REV CODE 636 W/ 250 OVERRIDE (IP): Performed by: PSYCHIATRY & NEUROLOGY

## 2020-03-31 PROCEDURE — 36600 WITHDRAWAL OF ARTERIAL BLOOD: CPT

## 2020-03-31 PROCEDURE — 82962 GLUCOSE BLOOD TEST: CPT

## 2020-03-31 PROCEDURE — 700111 HCHG RX REV CODE 636 W/ 250 OVERRIDE (IP): Performed by: INTERNAL MEDICINE

## 2020-03-31 RX ORDER — INSULIN GLARGINE 100 [IU]/ML
6 INJECTION, SOLUTION SUBCUTANEOUS EVERY EVENING
Status: DISCONTINUED | OUTPATIENT
Start: 2020-03-31 | End: 2020-04-01

## 2020-03-31 RX ORDER — FUROSEMIDE 10 MG/ML
20 INJECTION INTRAMUSCULAR; INTRAVENOUS
Status: DISCONTINUED | OUTPATIENT
Start: 2020-04-01 | End: 2020-04-02

## 2020-03-31 RX ADMIN — FUROSEMIDE 40 MG: 10 INJECTION, SOLUTION INTRAMUSCULAR; INTRAVENOUS at 05:04

## 2020-03-31 RX ADMIN — POTASSIUM PHOSPHATE, MONOBASIC AND POTASSIUM PHOSPHATE, DIBASIC 15 MMOL: 224; 236 INJECTION, SOLUTION, CONCENTRATE INTRAVENOUS at 11:07

## 2020-03-31 RX ADMIN — FUROSEMIDE 40 MG: 10 INJECTION, SOLUTION INTRAMUSCULAR; INTRAVENOUS at 16:46

## 2020-03-31 RX ADMIN — FAMOTIDINE 20 MG: 10 INJECTION INTRAVENOUS at 05:04

## 2020-03-31 RX ADMIN — HEPARIN SODIUM 5000 UNITS: 5000 INJECTION, SOLUTION INTRAVENOUS; SUBCUTANEOUS at 16:45

## 2020-03-31 RX ADMIN — HEPARIN SODIUM 5000 UNITS: 5000 INJECTION, SOLUTION INTRAVENOUS; SUBCUTANEOUS at 21:54

## 2020-03-31 RX ADMIN — INSULIN HUMAN 5 UNITS: 100 INJECTION, SOLUTION PARENTERAL at 16:54

## 2020-03-31 RX ADMIN — INSULIN HUMAN 5 UNITS: 100 INJECTION, SOLUTION PARENTERAL at 12:19

## 2020-03-31 RX ADMIN — MIDODRINE HYDROCHLORIDE 5 MG: 5 TABLET ORAL at 05:31

## 2020-03-31 RX ADMIN — INSULIN HUMAN 5 UNITS: 100 INJECTION, SOLUTION PARENTERAL at 05:01

## 2020-03-31 RX ADMIN — MIDODRINE HYDROCHLORIDE 5 MG: 5 TABLET ORAL at 16:45

## 2020-03-31 RX ADMIN — INSULIN GLARGINE 6 UNITS: 100 INJECTION, SOLUTION SUBCUTANEOUS at 16:55

## 2020-03-31 RX ADMIN — SENNOSIDES AND DOCUSATE SODIUM 2 TABLET: 8.6; 5 TABLET ORAL at 05:04

## 2020-03-31 RX ADMIN — DOXYCYCLINE 100 MG: 100 TABLET, FILM COATED ORAL at 05:04

## 2020-03-31 RX ADMIN — CEFTRIAXONE SODIUM 1 G: 1 INJECTION, POWDER, FOR SOLUTION INTRAMUSCULAR; INTRAVENOUS at 05:03

## 2020-03-31 RX ADMIN — MIDODRINE HYDROCHLORIDE 5 MG: 5 TABLET ORAL at 11:07

## 2020-03-31 RX ADMIN — DOXYCYCLINE 100 MG: 100 TABLET, FILM COATED ORAL at 16:45

## 2020-03-31 ASSESSMENT — COGNITIVE AND FUNCTIONAL STATUS - GENERAL
DAILY ACTIVITIY SCORE: 9
SUGGESTED CMS G CODE MODIFIER MOBILITY: CN
CLIMB 3 TO 5 STEPS WITH RAILING: TOTAL
MOVING TO AND FROM BED TO CHAIR: UNABLE
HELP NEEDED FOR BATHING: TOTAL
MOBILITY SCORE: 6
DRESSING REGULAR UPPER BODY CLOTHING: A LOT
MOVING FROM LYING ON BACK TO SITTING ON SIDE OF FLAT BED: UNABLE
DRESSING REGULAR LOWER BODY CLOTHING: TOTAL
EATING MEALS: A LOT
WALKING IN HOSPITAL ROOM: TOTAL
TURNING FROM BACK TO SIDE WHILE IN FLAT BAD: UNABLE
PERSONAL GROOMING: A LOT
STANDING UP FROM CHAIR USING ARMS: TOTAL
TOILETING: TOTAL
SUGGESTED CMS G CODE MODIFIER DAILY ACTIVITY: CL

## 2020-03-31 ASSESSMENT — GAIT ASSESSMENTS
GAIT LEVEL OF ASSIST: MAXIMAL ASSIST
ASSISTIVE DEVICE: HAND HELD ASSIST
DEVIATION: BRADYKINETIC;DECREASED BASE OF SUPPORT
DISTANCE (FEET): 2

## 2020-03-31 ASSESSMENT — ACTIVITIES OF DAILY LIVING (ADL): TOILETING: INDEPENDENT

## 2020-03-31 ASSESSMENT — FIBROSIS 4 INDEX: FIB4 SCORE: 3.7

## 2020-03-31 ASSESSMENT — PULMONARY FUNCTION TESTS: FVC: .8

## 2020-03-31 NOTE — CARE PLAN
Problem: Safety - Medical Restraint  Goal: Remains free of injury from restraints (Restraint for Interference with Medical Device)  Description: INTERVENTIONS:  1. Determine that other, less restrictive measures have been tried or would not be effective before applying the restraint  2. Evaluate the patient's condition at the time of restraint application  3. Inform patient/family regarding the reason for restraint  4. Q2H: Monitor safety, psychosocial status, comfort, nutrition and hydration  Outcome: PROGRESSING AS EXPECTED  Goal: Free from restraint(s) (Restraint for Interference with Medical Device)  Description: INTERVENTIONS:  1. ONCE/SHIFT or MINIMUM Q12H: Assess and document the continuing need for restraints  2. Q24H: Continued use of restraint requires LIP to perform face to face examination and written order  3. Identify and implement measures to help patient regain control  Outcome: PROGRESSING AS EXPECTED     Problem: Communication  Goal: The ability to communicate needs accurately and effectively will improve  Outcome: PROGRESSING AS EXPECTED     Problem: Safety  Goal: Will remain free from injury  Outcome: PROGRESSING AS EXPECTED  Goal: Will remain free from falls  Outcome: PROGRESSING AS EXPECTED     Problem: Infection  Goal: Will remain free from infection  Outcome: PROGRESSING AS EXPECTED     Problem: Venous Thromboembolism (VTW)/Deep Vein Thrombosis (DVT) Prevention:  Goal: Patient will participate in Venous Thrombosis (VTE)/Deep Vein Thrombosis (DVT)Prevention Measures  Outcome: PROGRESSING AS EXPECTED     Problem: Bowel/Gastric:  Goal: Normal bowel function is maintained or improved  Outcome: PROGRESSING AS EXPECTED

## 2020-03-31 NOTE — THERAPY
"Pt is a 80 y/o male admitted from [facility] for encephalopathy and acute respiratory failure. Pt reports he was making progress at [facility] and expressed desire to return. Pt demonstrated deficits in strength, balance, gait, sequencing and tone. Pt with strong extension tone of L LE when rolling onto R side for pericare. Pt required Mod - Max A for all mobility and is a very high fall risk. Pt will benefit from acute PT interventions to address current impairments. Recommend post acute placement as pt is not functionally capable of DC home at this time.     Physical Therapy Evaluation completed.   Bed Mobility:  Supine to Sit: Maximal Assist  Transfers: Sit to Stand: Maximal Assist  Gait: Level Of Assist: Max Assist with PT assist   x2 ft EOB -> chair    Plan of Care: Will benefit from Physical Therapy 5 times per week  Discharge Recommendations: Equipment: Will Continue to Assess for Equipment Needs.   Post-acute therapy: Recommend post-acute placement for continued physical therapy services prior to discharge home.         See \"Rehab Therapy-Acute\" Patient Summary Report for complete documentation.     "

## 2020-03-31 NOTE — CARE PLAN
Problem: Ventilation Defect:  Goal: Ability to achieve and maintain unassisted ventilation or tolerate decreased levels of ventilator support  Outcome: PROGRESSING AS EXPECTED        Ventilator Daily Summary    Vent Day #3    Ventilator settings changed this shift: none  CMV 20 450 +5 30%    Weaning trials: Spont x 1.5 hrs    Plan: Continue current ventilator settings and wean mechanical ventilation as tolerated per physician orders.

## 2020-03-31 NOTE — FLOWSHEET NOTE
03/31/20 0511   Weaning Trial   Weaning Trial Initiated Yes   Weaning Trial Stopped due to: Pt weaned for 1 hour and returned to rest settings per protocol   Length of Weaning Trial (Hours) 1.5   Weaning Parameters   RR (bpm) 14   $ FVC / Vital Capacity (liters)  0.8   NIF (cm H2O)  -25   Rapid Shallow Breathing Index (RR/VT) 30   Spontaneous VE 6.8   Spontaneous

## 2020-03-31 NOTE — PROGRESS NOTES
Critical Care Progress Note    Date of admission  3/28/2020    Chief Complaint  79 y.o. male admitted 3/28/2020 with acute respiratory failure and encephalopathy    Hospital Course    79 y.o. male with hx of DM , Hypertension, CKD 4 and recent laminectomy  who presented 3/28/2020 with acute encephalopathy. Apparently he was at his rehab facility and developed altered mentation however I am unsure if if this was disorientation or another process as he is intubated no family is around and hx is sparse. He was in the [facility] ED this am discharged back but returned again this evening. He was intubated for airway protection and was noted to have thick secretions prior. CXR reveal multifocal opacities. COVID rule out was initiated. An LP was performed and initial CSF was unremarkable. CBC revealed a normal WBC but lymphopenia. CR elevated but actually improved compared to prior labs. BNP was 5000s which appears chronically elevated.       Interval Problem Update  Reviewed last 24 hour events:  Neuro: aox4 lethargic follows no pain   HR: snr to tach 60-80's  SBP: 120-140's  Tmax: afebrile  GI: cortrax at goal BM last night   UOP: anders 1900ml   Lines: 2 peripheral right central line  Resp: RSBI 30 NIF -25 secretions watery cough    Vte: heparin   PPI/H2:stop pepcid  Antibx: C3 3/5 for CAP  Extubate  kphos   Insulin sliding scale14 ->start 6 units lantus  D/c central line  Keep anders  Lasix 20mg BID  Doing well with extubation trial awake mental status clear no respiratory distress  Robust urine output will decrease lasix dose    CSF 3/28 in process  Flu negative  COVID negative  C. difficile 3/21 negative toxin    Ceftriaxone 3/29-P  Doxycycline 3/29-P  Last BM 3/28, 3/30    Review of Systems  Review of Systems   Unable to perform ROS: Intubated        Vital Signs for last 24 hours   Pulse:  [] 97  Resp:  [13-27] 25  BP: ()/(37-80) 123/49  SpO2:  [100 %] 100 %    Hemodynamic parameters for last 24 hours        Respiratory Information for the last 24 hours  Vent Mode: APVCMV  Rate (breaths/min): 20  Vt Target (mL): 450  PEEP/CPAP: 5  P Support: 5  MAP: 8.6  Length of Weaning Trial (Hours): 1.5  Control VTE (exp VT): 449    Physical Exam   Physical Exam  Vitals signs and nursing note reviewed.   Constitutional:       General: He is not in acute distress.     Appearance: He is not ill-appearing or diaphoretic.      Comments: ET in place sitting up awake on vent   HENT:      Head: Normocephalic and atraumatic.      Nose:      Comments: cortrax     Mouth/Throat:      Comments: ET in place  Eyes:      Conjunctiva/sclera: Conjunctivae normal.      Pupils: Pupils are equal, round, and reactive to light.   Neck:      Comments: c collar in place  Cardiovascular:      Rate and Rhythm: Normal rate.      Pulses: Normal pulses.   Pulmonary:      Breath sounds: No wheezing or rhonchi.      Comments: Clear bilateral, clear watery secretions good cough  Abdominal:      General: There is no distension.      Palpations: Abdomen is soft.      Tenderness: There is no abdominal tenderness.   Musculoskeletal:         General: Swelling present.      Right lower leg: Edema present.      Left lower leg: Edema present.      Comments: Mottling to bilateral knees cold   Skin:     Coloration: Skin is not jaundiced.      Comments: Diffuse wounds throughout   Neurological:      Cranial Nerves: No cranial nerve deficit.      Comments: Awakes follow commands while being intubated 4+/5 strength in lower with good equal strength bilateral, able to lift head up off bed   Psychiatric:      Comments: Unable to determine         Medications  Current Facility-Administered Medications   Medication Dose Route Frequency Provider Last Rate Last Dose   • doxycycline monohydrate (ADOXA) tablet 100 mg  100 mg Enteral Tube Q12HRS Sarthak Mcintyre M.D.   100 mg at 03/31/20 0504   • insulin regular (HUMULIN R) injection 2-9 Units  2-9 Units Subcutaneous Q6HRS Sarthak  SALVADOR Mcintyre M.D.   5 Units at 03/31/20 0501    And   • dextrose 50% (D50W) injection 50 mL  50 mL Intravenous Q15 MIN PRN Sarthak Mcintyre M.D.       • heparin injection 5,000 Units  5,000 Units Subcutaneous Q8HRS Sarthak Mcintyre M.D.   Stopped at 03/31/20 1000   • furosemide (LASIX) injection 40 mg  40 mg Intravenous BID DIURETIC Sarthak Mcintyre M.D.   40 mg at 03/31/20 0504   • phenylephrine (DEBORA-SYNEPHRINE) 40 mg in  mL Infusion  0-300 mcg/min Intravenous Continuous Sarthak Diaz M.D.   Stopped at 03/29/20 1515   • Pharmacy Consult: Enteral tube insertion - review meds/change route/product selection  1 Each Other PHARMACY TO DOSE Sarthak Diaz M.D.       • midodrine (PROAMATINE) tablet 5 mg  5 mg Enteral Tube TID WITH MEALS Sarthak Diaz M.D.   5 mg at 03/31/20 0531   • Respiratory Therapy Consult   Nebulization Continuous RT Mina Tripathi M.D.       • ipratropium-albuterol (DUONEB) nebulizer solution  3 mL Nebulization Q2HRS PRN (RT) Mina Tripathi M.D.       • famotidine (PEPCID) tablet 20 mg  20 mg Enteral Tube DAILY Mina Tripathi M.D.   20 mg at 03/30/20 0526    Or   • famotidine (PEPCID) injection 20 mg  20 mg Intravenous DAILY Mina Tripathi M.D.   20 mg at 03/31/20 0504   • senna-docusate (PERICOLACE or SENOKOT S) 8.6-50 MG per tablet 2 Tab  2 Tab Enteral Tube BID Mina Tripathi M.D.   2 Tab at 03/31/20 0504    And   • polyethylene glycol/lytes (MIRALAX) PACKET 1 Packet  1 Packet Enteral Tube QDAY PRN Mina Tripathi M.D.        And   • magnesium hydroxide (MILK OF MAGNESIA) suspension 30 mL  30 mL Enteral Tube QDAY PRN Mina Tripathi M.D.        And   • bisacodyl (DULCOLAX) suppository 10 mg  10 mg Rectal QDAY PRN Mina Tripathi M.D.       • lidocaine (XYLOCAINE) 1 % injection 1-2 mL  1-2 mL Tracheal Tube Q30 MIN PRN Mina Tripathi M.D.       • cefTRIAXone (ROCEPHIN) 1 g in  mL IVPB  1 g Intravenous Q24HRS Mina Tripathi M.D.   Stopped at 03/31/20 0546        Fluids    Intake/Output Summary (Last 24 hours) at 3/31/2020 0855  Last data filed at 3/31/2020 0600  Gross per 24 hour   Intake 1550.33 ml   Output 2825 ml   Net -1274.67 ml       Laboratory  Recent Labs     03/29/20  0429 03/30/20  0441 03/31/20  0343   ISTATAPH 7.447 7.447 7.513*   ISTATAPCO2 31.9 30.1 31.9   ISTATAPO2 107* 98* 92*   ISTATATCO2 23 22 27   KXCVPAC6AFB 98 98 98   ISTATARTHCO3 22.0 20.8 25.7*   ISTATARTBE -2 -3 3   ISTATTEMP 96.6 F 36.7 C 36.7 C   ISTATFIO2 30 30 30   ISTATSPEC Arterial Arterial Arterial   ISTATAPHTC 7.463 7.452 7.518*   [id number] 100* 96* 91*         Recent Labs     03/29/20  0445 03/29/20  1659 03/30/20  0529 03/31/20  0351   SODIUM 142 143 141 140   POTASSIUM 3.8 3.5* 3.8 4.0   CHLORIDE 109 110 107 104   CO2 21 21 20 24   BUN 34* 31* 35* 48*   CREATININE 1.97* 1.99* 2.15* 2.07*   MAGNESIUM 2.0 1.9 1.9 2.3   PHOSPHORUS 2.5  --  2.8 2.0*   CALCIUM 8.3* 8.1* 8.5 8.6     Recent Labs     03/28/20  0945 03/28/20  1800  03/29/20  1659 03/30/20  0529 03/30/20  1600 03/30/20  2051 03/31/20  0351   ALTSGPT 18 19  --   --  17  --   --   --    ASTSGOT 26 32  --   --  23  --   --   --    ALKPHOSPHAT 105* 97  --   --  105*  --   --   --    TBILIRUBIN 0.7 0.5  --   --  0.7  --   --   --    PREALBUMIN  --   --   --   --   --  14.6* 15.6*  --    GLUCOSE 203* 162*   < > 111* 183*  --   --  270*    < > = values in this interval not displayed.     Recent Labs     03/28/20  0945 03/28/20  1800 03/29/20 0445 03/30/20 0529 03/31/20  0351   WBC 10.6 6.4 4.2* 5.4 4.5*   NEUTSPOLYS 88.20* 85.20* 75.40* 81.30* 75.90*   LYMPHOCYTES 3.10* 5.90* 12.90* 7.30* 9.60*   MONOCYTES 7.70 7.60 9.30 8.80 12.30   EOSINOPHILS 0.10 0.50 1.20 0.90 1.10   BASOPHILS 0.10 0.20 0.20 0.40 0.20   ASTSGOT 26 32  --  23  --    ALTSGPT 18 19  --  17  --    ALKPHOSPHAT 105* 97  --  105*  --    TBILIRUBIN 0.7 0.5  --  0.7  --      Recent Labs     03/29/20  0445 03/30/20 0529 03/31/20  0351   RBC 2.45* 2.59* 2.68*    HEMOGLOBIN 7.7* 8.2* 8.5*   HEMATOCRIT 24.5* 25.4* 26.5*   PLATELETCT 109* 116* 121*       Imaging  X-Ray:  I have personally reviewed the images and compared with prior images. and My impression is: ET in place stable bilateral effusions  CT:    Reviewed  Echo:   Reviewed    Assessment/Plan  Acute encephalopathy  Assessment & Plan  Likely from underlying medical issues; toxic metabolic sepsis  CT head without acute process  LP and CSF negative  Follows commands  Stop sedation and allow to clear ? Hypoactive delirium  Check TSH and ammonia  Phos normal    Acute respiratory failure (HCC)  Assessment & Plan  Intubated 3/28  Continue full mechanical ventilatory support  Adjust ventilator based on ABG and pulmonary mechanics  CT with large bilateral pleural effusions and exam consistent with CHF exacerbation  Check echo -> reviewed  Force Diuresis as tolerated  Continue empiric antibiotics at this time  Flu negative, COVID neg    Passing SBT with prolonged trial on Peep of 5 but still somnolent with increasing secretions  Stop sedation and monitor for extubation trial 4/1    Acute on chronic renal failure (HCC)- (present on admission)  Assessment & Plan  Similar to baseline cr  Renal dose meds, avoid nephrotoxins  Strict I/Os  Follow renal function      DM (diabetes mellitus) (HCC)  Assessment & Plan  SSI with hypoglycemic protocols  Resume home regimen when able    Thrombocytopenia (HCC)- (present on admission)  Assessment & Plan  No signs of bleeding chronic continue to trend  Mild thrombocytopenia    Pleural effusion, bilateral  Assessment & Plan  Seen on CT chest right greater then left  Force diuresis when able and monitor need for drainage    Cervical stenosis of spine  Assessment & Plan  Recent cervical laminectomy and decompression   Surgical without evidence of erythema or edema  c-collar in place with activities  Follow up with NSG recommendations       VTE:  Lovenox  Ulcer: H2 Antagonist  Lines: [place]  Catheter  Ongoing indication addressed    I have performed a physical exam and reviewed and updated ROS and Plan today (3/31/2020). In review of yesterday's note (3/30/2020), there are no changes except as documented above.     Discussed patient condition and risk of morbidity and/or mortality with RN, RT, Pharmacy, Charge nurse / hot rounds and Patient     The patient remains critically ill with extubation trial today and close monitor for need for re-intubation.  Critical care time = 78 minutes in directly providing and coordinating critical care and extensive data review.  No time overlap and excludes procedures.

## 2020-03-31 NOTE — WOUND TEAM
[facility]  Inpatient Services  Initial Wound and Skin Care Evaluation    Admission Date: 3/28/2020     Last order of IP CONSULT TO WOUND CARE was found on 3/29/2020 from Hospital Encounter on 3/28/2020     HPI, PMH, SH: Reviewed    Unit where seen by Wound Team: T630/00     WOUND CONSULT/FOLLOW UP RELATED TO:  Bilateral heels, bilateral shins, abdomen, and Left forearm     Self Report / Pain Level:  Declines pain. No s/sx.       OBJECTIVE:  Patient on ICU DARELL, heel mepilex in place.    WOUND TYPE, LOCATION, CHARACTERISTICS (Pressure Injuries: location, stage, POA or date identified)  Wound 03/29/20 Pressure Injury Heel Posterior Left Stage 2 POA (Active)   Wound Image    3/30/2020  3:30 PM   Site Assessment Clean;Dry;Pink;Fragile 3/30/2020  4:00 PM   Periwound Assessment Clean;Dry;Intact;Pink 3/30/2020  4:00 PM   Margins Defined edges 3/30/2020  4:00 PM   Closure Other (Comment) 3/30/2020  4:00 PM   Drainage Amount None 3/30/2020  4:00 PM   Treatments Site care 3/30/2020  3:30 PM   Wound Cleansing Soap and Water 3/30/2020  3:30 PM   Periwound Protectant Not Applicable 3/30/2020  3:30 PM   Dressing Cleansing/Solutions Not Applicable 3/30/2020  3:30 PM   Dressing Options Mepilex Heel 3/30/2020  4:00 PM   Dressing Changed Observed 3/30/2020  3:30 PM   Dressing Status Clean;Dry;Intact 3/30/2020  4:00 PM   Dressing Change/Treatment Frequency Every 72 hrs, and As Needed 3/30/2020  5:00 PM   NEXT Dressing Change/Treatment Date 03/31/20 3/30/2020  5:00 PM   NEXT Weekly Photo (Inpatient Only) 04/06/20 3/30/2020  3:30 PM   Pressure Injury Stage 2 3/30/2020  3:30 PM   Wound Length (cm) 3 cm 3/30/2020  3:30 PM   Wound Width (cm) 2 cm 3/30/2020  3:30 PM   Wound Surface Area (cm^2) 6 cm^2 3/30/2020  3:30 PM   Wound Odor None 3/30/2020  3:30 PM   Number of days: 1          Vascular:    DANYELL:   DANYELL Results, Last 30 Days Us-danyell Single Level Bilat    Result Date: 3/11/2020  Narrative  Vascular Laboratory  Conclusions   No evidence of arterial insufficiency.  PABLO STEVENS  Age:    79    Gender:     M  MRN:    [id number]  :    1940      BSA:  Exam Date:     2020 10:16  Room #:     Inpatient  Priority:     Routine  Ht (in):             Wt (lb):  Ordering Physician:        GEE MARROQUIN  Referring Physician:       [id number] Arizmendi  Sonographer:               Augusta Yee RDMS  Study Type:                Complete Bilateral  Technical Quality:         Adequate  Indications:     Encounter for screening for cardiovascular disorders,                   Diabetes, Swelling of Limb, Pain in Limb  CPT Codes:       08142  ICD Codes:       Z13.6  250  729.81  729.5  History:         Debendent changes. Diabetes. Pain in bilateral limbs.  Limitations:                 RIGHT  Waveform            Systolic BPs (mmHg)                             137           Brachial  Triphasic                                Common Femoral  Triphasic                  200           Posterior Tibial  Triphasic                  199           Dorsalis Pedis                                           Peroneal                             1.46          DANYELL                                           TBI                       LEFT  Waveform        Systolic BPs (mmHg)                             136           Brachial  Triphasic                                Common Femoral  Triphasic                  165           Posterior Tibial  Triphasic                  147           Dorsalis Pedis                                           Peroneal                             1.20          DANYELL                                           TBI  Findings  Bilateral.  Doppler waveforms of the common femoral artery is of high amplitude and  triphasic.  Doppler waveforms at the ankle are brisk and triphasic.  Ankle-brachial index is normal.  Additional testing was not performed in accordance with lower extremity  arterial evaluation protocol.  Jovanny Briggs MD  (Electronically  Signed)  Final Date:      11 March 2020                   11:19        Lab Values:    Lab Results   Component Value Date/Time    WBC 5.4 03/30/2020 05:29 AM    RBC 2.59 (L) 03/30/2020 05:29 AM    HEMOGLOBIN 8.2 (L) 03/30/2020 05:29 AM    HEMATOCRIT 25.4 (L) 03/30/2020 05:29 AM    CREACTPROT 3.63 (H) 03/30/2020 05:29 AM    HBA1C 5.1 03/13/2020 05:18 AM          Culture:   Obtained N/A,   Culture Results show:  No results found for this or any previous visit (from the past 720 hour(s)).      INTERVENTIONS BY WOUND TEAM:  Patient and chart reviewed. Consult placed regarding bilateral shins, bilateral heels, Left forearm, and abdomen. Left forearm had bruising, no need for wound care, left open to air.Lower  Abdomen with small bruises throughout, consistent with lovenox/heparin injection bruises. Left open to air, no need for wound care, bedside RN/MD to decide if continuing anticoagulation therapy. Bilateral shins with scabs, open to air, intact, no drainage, no wound care needed, pictures obtained and in chart. Right heel with what appears to be a callus and some scarring, heel mepilex in place, to be changed Q72hrs or PRN. Left heel with Stage 2 POA pressure injury, no drainage present, pictures and measurements obtained, 3cm x 2cm, heel mepilex placed back on heel. Nursing to continue basic skin care orders, apply and change Heel mepilex Q72hrs or PRN for saturation/dislodgment. Proper orders placed, no need for wound team follow up unless wounds worsen.    Interdisciplinary consultation: Patient, Bedside RN, Wound RN Michael    EVALUATION: Patient elderly male readmitted for altered level of consciousness. Patient has been seen previously by wound services outpatient and inpatient. Bruising on LFA and abdomen to stay open to air. Bilateral shin scabs to be open to air. POA Stage 2 on Left heel is to have heel mepilex on. Heel mepilex to be applied to bilateral heels to prevent further friction and sheer and offload  pressure from pressure injuries. To be changed by bedside RN Q72hrs or PRN.  No further need for wound care follow up unless bedside RN's have concerns or any areas worsen or open. Proper skin orders placed.    Goals: Steady decrease in wound area and depth weekly.    NURSING PLAN OF CARE ORDERS (X):    Dressing changes: See Dressing Care orders: X  Skin care: See Skin Care orders: X  Rectal tube care: See Rectal Tube Care orders:   Other orders:    RSKIN:   CURRENTLY IN PLACE (X), APPLIED THIS VISIT (A), ORDERED (O):   Q shift Dale:  X  Q shift pressure point assessments:  X  Pressure redistribution mattress            Low Airloss  X - ICU bed        Bariatric DARELL         Bariatric foam           Heel float boots     Heel Silicone dressing   X     Float Heels off Bed with Pillows  O             Barrier wipes         Barrier Cream         Barrier paste          Sacral silicone dressing   O      Silicone O2 tubing       O  Anchorfast         Cannula fixation Device (Tender )          Gray Foam Ear protectors           Trach with Optifoam split foam                 Waffle cushion        Waffle Overlay         Rectal tube or BMS    Purwick/Condom Cath          Antifungal tx      Interdry          Reposition q 2 hours    X, O    Up to chair        Ambulate      PT/OT        Dietician        Diabetes Education      PO     TF     TPN     NPO   # days   Other        WOUND TEAM PLAN OF CARE:   Dressing changes by wound team:          Follow up 1-2 times weekly:               Follow up 3 times weekly:                NPWT change 3 times weekly:     Follow up as needed:   X, please reconsult if Left heel worsens or other issues arise    Other (explain):     Anticipated discharge plans: N/A  LTACH:        SNF/Rehab:                  Home Care:           Outpatient Wound Center:            Self Care:

## 2020-03-31 NOTE — THERAPY
"Occupational Therapy Evaluation completed.   Functional Status:  Initiated session however pt reported having BM, max A w/rolling increased tone in BLE w/rolling, total assist for stefanie care, max A w/supine>sit EOB, rigid posture w/limited ROM of BUE, max A sit>Stand completed x2 txf to chair, able to use LUE for call light and suction RUE w/c/o pain by tricep and appears weaker than L, total assist w/collar management, remained up in chair w/alarm on and call light w/in reach Rn aware   Plan of Care: Will benefit from Occupational Therapy 4 times per week  Discharge Recommendations:  Equipment: Will Continue to Assess for Equipment Needs. Post-acute therapy Recommend post-acute placement for additional occupational therapy services prior to discharge home.      See \"Rehab Therapy-Acute\" Patient Summary Report for complete documentation.      79 yr old male admitted from rehab d/t AMS and cough, worsening respiratory status and required intubation, this admission dx w/acute respiratory failure and acute encephalopathy, w/recent cervical sx. Pt is demonstrating fluctuating tone, weakness, impaired posture and decreased activity tolerance impacting ADL's and mobility pt will benefit from acute OT and currently recommend post acute placement prior to d/c home   "

## 2020-04-01 ENCOUNTER — APPOINTMENT (OUTPATIENT)
Dept: RADIOLOGY | Facility: MEDICAL CENTER | Age: 80
DRG: 871 | End: 2020-04-01
Attending: PSYCHIATRY & NEUROLOGY
Payer: COMMERCIAL

## 2020-04-01 PROBLEM — I47.20 V TACH (HCC): Status: ACTIVE | Noted: 2020-04-01

## 2020-04-01 LAB
ANION GAP SERPL CALC-SCNC: 10 MMOL/L (ref 7–16)
BASOPHILS # BLD AUTO: 0.2 % (ref 0–1.8)
BASOPHILS # BLD: 0.01 K/UL (ref 0–0.12)
BUN SERPL-MCNC: 56 MG/DL (ref 8–22)
CALCIUM SERPL-MCNC: 8.7 MG/DL (ref 8.5–10.5)
CHLORIDE SERPL-SCNC: 105 MMOL/L (ref 96–112)
CO2 SERPL-SCNC: 26 MMOL/L (ref 20–33)
CREAT SERPL-MCNC: 2.09 MG/DL (ref 0.5–1.4)
EKG IMPRESSION: NORMAL
EOSINOPHIL # BLD AUTO: 0.08 K/UL (ref 0–0.51)
EOSINOPHIL NFR BLD: 1.8 % (ref 0–6.9)
ERYTHROCYTE [DISTWIDTH] IN BLOOD BY AUTOMATED COUNT: 84.7 FL (ref 35.9–50)
GLUCOSE BLD-MCNC: 192 MG/DL (ref 65–99)
GLUCOSE BLD-MCNC: 223 MG/DL (ref 65–99)
GLUCOSE BLD-MCNC: 230 MG/DL (ref 65–99)
GLUCOSE BLD-MCNC: 249 MG/DL (ref 65–99)
GLUCOSE SERPL-MCNC: 227 MG/DL (ref 65–99)
HCT VFR BLD AUTO: 26.9 % (ref 42–52)
HGB BLD-MCNC: 8.6 G/DL (ref 14–18)
IMM GRANULOCYTES # BLD AUTO: 0.04 K/UL (ref 0–0.11)
IMM GRANULOCYTES NFR BLD AUTO: 0.9 % (ref 0–0.9)
LYMPHOCYTES # BLD AUTO: 0.61 K/UL (ref 1–4.8)
LYMPHOCYTES NFR BLD: 13.7 % (ref 22–41)
MAGNESIUM SERPL-MCNC: 2.2 MG/DL (ref 1.5–2.5)
MCH RBC QN AUTO: 31.9 PG (ref 27–33)
MCHC RBC AUTO-ENTMCNC: 32 G/DL (ref 33.7–35.3)
MCV RBC AUTO: 99.6 FL (ref 81.4–97.8)
MONOCYTES # BLD AUTO: 0.65 K/UL (ref 0–0.85)
MONOCYTES NFR BLD AUTO: 14.6 % (ref 0–13.4)
NEUTROPHILS # BLD AUTO: 3.06 K/UL (ref 1.82–7.42)
NEUTROPHILS NFR BLD: 68.8 % (ref 44–72)
NRBC # BLD AUTO: 0 K/UL
NRBC BLD-RTO: 0 /100 WBC
PLATELET # BLD AUTO: 120 K/UL (ref 164–446)
PMV BLD AUTO: 9.9 FL (ref 9–12.9)
POTASSIUM SERPL-SCNC: 4.5 MMOL/L (ref 3.6–5.5)
RBC # BLD AUTO: 2.7 M/UL (ref 4.7–6.1)
SODIUM SERPL-SCNC: 141 MMOL/L (ref 135–145)
WBC # BLD AUTO: 4.5 K/UL (ref 4.8–10.8)

## 2020-04-01 PROCEDURE — 85025 COMPLETE CBC W/AUTO DIFF WBC: CPT

## 2020-04-01 PROCEDURE — 99233 SBSQ HOSP IP/OBS HIGH 50: CPT | Performed by: INTERNAL MEDICINE

## 2020-04-01 PROCEDURE — A6213 FOAM DRG >16<=48 SQ IN W/BDR: HCPCS | Performed by: HOSPITALIST

## 2020-04-01 PROCEDURE — 97116 GAIT TRAINING THERAPY: CPT

## 2020-04-01 PROCEDURE — 700111 HCHG RX REV CODE 636 W/ 250 OVERRIDE (IP): Performed by: INTERNAL MEDICINE

## 2020-04-01 PROCEDURE — 92610 EVALUATE SWALLOWING FUNCTION: CPT

## 2020-04-01 PROCEDURE — 82962 GLUCOSE BLOOD TEST: CPT | Mod: 91

## 2020-04-01 PROCEDURE — 700105 HCHG RX REV CODE 258: Performed by: INTERNAL MEDICINE

## 2020-04-01 PROCEDURE — 700102 HCHG RX REV CODE 250 W/ 637 OVERRIDE(OP): Performed by: INTERNAL MEDICINE

## 2020-04-01 PROCEDURE — 93005 ELECTROCARDIOGRAM TRACING: CPT | Performed by: HOSPITALIST

## 2020-04-01 PROCEDURE — 97530 THERAPEUTIC ACTIVITIES: CPT

## 2020-04-01 PROCEDURE — A9270 NON-COVERED ITEM OR SERVICE: HCPCS | Performed by: INTERNAL MEDICINE

## 2020-04-01 PROCEDURE — 71045 X-RAY EXAM CHEST 1 VIEW: CPT

## 2020-04-01 PROCEDURE — 99223 1ST HOSP IP/OBS HIGH 75: CPT | Performed by: HOSPITALIST

## 2020-04-01 PROCEDURE — 770020 HCHG ROOM/CARE - TELE (206)

## 2020-04-01 PROCEDURE — 83735 ASSAY OF MAGNESIUM: CPT

## 2020-04-01 PROCEDURE — 93010 ELECTROCARDIOGRAM REPORT: CPT | Performed by: INTERNAL MEDICINE

## 2020-04-01 PROCEDURE — 80048 BASIC METABOLIC PNL TOTAL CA: CPT

## 2020-04-01 RX ORDER — SIMETHICONE 80 MG
80 TABLET,CHEWABLE ORAL 3 TIMES DAILY PRN
Status: DISCONTINUED | OUTPATIENT
Start: 2020-04-01 | End: 2020-04-01

## 2020-04-01 RX ORDER — CHOLESTYRAMINE LIGHT 4 G/5.7G
1 POWDER, FOR SUSPENSION ORAL 2 TIMES DAILY
Status: DISCONTINUED | OUTPATIENT
Start: 2020-04-01 | End: 2020-04-01

## 2020-04-01 RX ORDER — HEPARIN SODIUM 5000 [USP'U]/ML
5000 INJECTION, SOLUTION INTRAVENOUS; SUBCUTANEOUS EVERY 8 HOURS
Status: DISCONTINUED | OUTPATIENT
Start: 2020-04-01 | End: 2020-04-01

## 2020-04-01 RX ORDER — INSULIN GLARGINE 100 [IU]/ML
10 INJECTION, SOLUTION SUBCUTANEOUS
Status: DISCONTINUED | OUTPATIENT
Start: 2020-04-02 | End: 2020-04-01

## 2020-04-01 RX ORDER — LIDOCAINE 50 MG/G
2 PATCH TOPICAL EVERY 24 HOURS
Status: DISCONTINUED | OUTPATIENT
Start: 2020-04-01 | End: 2020-04-01

## 2020-04-01 RX ORDER — FERROUS SULFATE 325(65) MG
325 TABLET ORAL 2 TIMES DAILY WITH MEALS
Status: DISCONTINUED | OUTPATIENT
Start: 2020-04-01 | End: 2020-04-01

## 2020-04-01 RX ORDER — FUROSEMIDE 20 MG/1
20 TABLET ORAL
Status: DISCONTINUED | OUTPATIENT
Start: 2020-04-01 | End: 2020-04-01

## 2020-04-01 RX ORDER — PIOGLITAZONEHYDROCHLORIDE 30 MG/1
30 TABLET ORAL DAILY
Status: DISCONTINUED | OUTPATIENT
Start: 2020-04-01 | End: 2020-04-01

## 2020-04-01 RX ORDER — BACLOFEN 10 MG/1
5 TABLET ORAL 3 TIMES DAILY
Status: DISCONTINUED | OUTPATIENT
Start: 2020-04-01 | End: 2020-04-01

## 2020-04-01 RX ORDER — SENNOSIDES A AND B 8.6 MG/1
17.2 TABLET, FILM COATED ORAL
Status: DISCONTINUED | OUTPATIENT
Start: 2020-04-01 | End: 2020-04-01

## 2020-04-01 RX ORDER — ACETAMINOPHEN 325 MG/1
650 TABLET ORAL EVERY 4 HOURS PRN
Status: DISCONTINUED | OUTPATIENT
Start: 2020-04-01 | End: 2020-04-01

## 2020-04-01 RX ORDER — DOCUSATE SODIUM 100 MG/1
200 CAPSULE, LIQUID FILLED ORAL 2 TIMES DAILY PRN
Status: DISCONTINUED | OUTPATIENT
Start: 2020-04-01 | End: 2020-04-01

## 2020-04-01 RX ORDER — ONDANSETRON 2 MG/ML
4 INJECTION INTRAMUSCULAR; INTRAVENOUS 4 TIMES DAILY PRN
Status: DISCONTINUED | OUTPATIENT
Start: 2020-04-01 | End: 2020-04-01

## 2020-04-01 RX ORDER — DEXTROSE MONOHYDRATE 25 G/50ML
50 INJECTION, SOLUTION INTRAVENOUS
Status: DISCONTINUED | OUTPATIENT
Start: 2020-04-01 | End: 2020-04-01

## 2020-04-01 RX ORDER — CHOLECALCIFEROL (VITAMIN D3) 125 MCG
1000 CAPSULE ORAL DAILY
Status: DISCONTINUED | OUTPATIENT
Start: 2020-04-01 | End: 2020-04-01

## 2020-04-01 RX ORDER — LIDOCAINE HYDROCHLORIDE 20 MG/ML
JELLY TOPICAL PRN
Status: DISCONTINUED | OUTPATIENT
Start: 2020-04-01 | End: 2020-04-01

## 2020-04-01 RX ORDER — ECHINACEA PURPUREA EXTRACT 125 MG
2 TABLET ORAL PRN
Status: DISCONTINUED | OUTPATIENT
Start: 2020-04-01 | End: 2020-04-01

## 2020-04-01 RX ORDER — INSULIN GLARGINE 100 [IU]/ML
10 INJECTION, SOLUTION SUBCUTANEOUS EVERY EVENING
Status: DISCONTINUED | OUTPATIENT
Start: 2020-04-01 | End: 2020-04-09

## 2020-04-01 RX ORDER — MIDODRINE HYDROCHLORIDE 5 MG/1
5 TABLET ORAL EVERY 4 HOURS PRN
Status: DISCONTINUED | OUTPATIENT
Start: 2020-04-01 | End: 2020-04-01

## 2020-04-01 RX ORDER — BISACODYL 10 MG/1
10 SUPPOSITORY RECTAL
Status: DISCONTINUED | OUTPATIENT
Start: 2020-04-01 | End: 2020-04-01

## 2020-04-01 RX ORDER — PREGABALIN 75 MG/1
75 CAPSULE ORAL 2 TIMES DAILY
Status: DISCONTINUED | OUTPATIENT
Start: 2020-04-01 | End: 2020-04-01

## 2020-04-01 RX ORDER — ONDANSETRON 4 MG/1
4 TABLET, ORALLY DISINTEGRATING ORAL 4 TIMES DAILY PRN
Status: DISCONTINUED | OUTPATIENT
Start: 2020-04-01 | End: 2020-04-01

## 2020-04-01 RX ORDER — OXYCODONE HYDROCHLORIDE 5 MG/1
5 TABLET ORAL
Status: DISCONTINUED | OUTPATIENT
Start: 2020-04-01 | End: 2020-04-01

## 2020-04-01 RX ORDER — POLYVINYL ALCOHOL 14 MG/ML
1 SOLUTION/ DROPS OPHTHALMIC PRN
Status: DISCONTINUED | OUTPATIENT
Start: 2020-04-01 | End: 2020-04-01

## 2020-04-01 RX ORDER — M-VIT,TX,IRON,MINS/CALC/FOLIC 27MG-0.4MG
1 TABLET ORAL
Status: DISCONTINUED | OUTPATIENT
Start: 2020-04-02 | End: 2020-04-01

## 2020-04-01 RX ORDER — AMMONIUM LACTATE 12 G/100G
LOTION TOPICAL 2 TIMES DAILY
Status: DISCONTINUED | OUTPATIENT
Start: 2020-04-01 | End: 2020-04-01

## 2020-04-01 RX ORDER — OXYCODONE HYDROCHLORIDE 10 MG/1
10 TABLET ORAL
Status: DISCONTINUED | OUTPATIENT
Start: 2020-04-01 | End: 2020-04-01

## 2020-04-01 RX ORDER — SODIUM CHLORIDE FOR INHALATION 3 %
3 VIAL, NEBULIZER (ML) INHALATION 3 TIMES DAILY
Status: DISCONTINUED | OUTPATIENT
Start: 2020-04-01 | End: 2020-04-01

## 2020-04-01 RX ORDER — IPRATROPIUM BROMIDE AND ALBUTEROL SULFATE 2.5; .5 MG/3ML; MG/3ML
3 SOLUTION RESPIRATORY (INHALATION) 3 TIMES DAILY
Status: DISCONTINUED | OUTPATIENT
Start: 2020-04-01 | End: 2020-04-01

## 2020-04-01 RX ORDER — SIMVASTATIN 20 MG
20 TABLET ORAL NIGHTLY
Status: DISCONTINUED | OUTPATIENT
Start: 2020-04-01 | End: 2020-04-01

## 2020-04-01 RX ORDER — ALUMINA, MAGNESIA, AND SIMETHICONE 2400; 2400; 240 MG/30ML; MG/30ML; MG/30ML
20 SUSPENSION ORAL
Status: DISCONTINUED | OUTPATIENT
Start: 2020-04-01 | End: 2020-04-01

## 2020-04-01 RX ORDER — TRAZODONE HYDROCHLORIDE 50 MG/1
50 TABLET ORAL
Status: DISCONTINUED | OUTPATIENT
Start: 2020-04-01 | End: 2020-04-01

## 2020-04-01 RX ORDER — MIDODRINE HYDROCHLORIDE 5 MG/1
5 TABLET ORAL EVERY 8 HOURS PRN
Status: DISCONTINUED | OUTPATIENT
Start: 2020-04-01 | End: 2020-04-02

## 2020-04-01 RX ADMIN — INSULIN HUMAN 3 UNITS: 100 INJECTION, SOLUTION PARENTERAL at 18:17

## 2020-04-01 RX ADMIN — INSULIN HUMAN 3 UNITS: 100 INJECTION, SOLUTION PARENTERAL at 23:28

## 2020-04-01 RX ADMIN — METOPROLOL TARTRATE 12.5 MG: 25 TABLET, FILM COATED ORAL at 18:19

## 2020-04-01 RX ADMIN — INSULIN HUMAN 3 UNITS: 100 INJECTION, SOLUTION PARENTERAL at 12:00

## 2020-04-01 RX ADMIN — METOPROLOL TARTRATE 12.5 MG: 25 TABLET, FILM COATED ORAL at 11:20

## 2020-04-01 RX ADMIN — HEPARIN SODIUM 5000 UNITS: 5000 INJECTION, SOLUTION INTRAVENOUS; SUBCUTANEOUS at 14:50

## 2020-04-01 RX ADMIN — HEPARIN SODIUM 5000 UNITS: 5000 INJECTION, SOLUTION INTRAVENOUS; SUBCUTANEOUS at 23:28

## 2020-04-01 RX ADMIN — DOXYCYCLINE 100 MG: 100 TABLET, FILM COATED ORAL at 05:36

## 2020-04-01 RX ADMIN — MIDODRINE HYDROCHLORIDE 5 MG: 5 TABLET ORAL at 08:07

## 2020-04-01 RX ADMIN — INSULIN HUMAN 3 UNITS: 100 INJECTION, SOLUTION PARENTERAL at 05:46

## 2020-04-01 RX ADMIN — HEPARIN SODIUM 5000 UNITS: 5000 INJECTION, SOLUTION INTRAVENOUS; SUBCUTANEOUS at 05:35

## 2020-04-01 RX ADMIN — INSULIN GLARGINE 10 UNITS: 100 INJECTION, SOLUTION SUBCUTANEOUS at 18:18

## 2020-04-01 RX ADMIN — CEFTRIAXONE SODIUM 1 G: 1 INJECTION, POWDER, FOR SOLUTION INTRAMUSCULAR; INTRAVENOUS at 05:35

## 2020-04-01 RX ADMIN — INSULIN HUMAN 2 UNITS: 100 INJECTION, SOLUTION PARENTERAL at 00:03

## 2020-04-01 RX ADMIN — DOXYCYCLINE 100 MG: 100 TABLET, FILM COATED ORAL at 18:19

## 2020-04-01 RX ADMIN — FUROSEMIDE 20 MG: 10 INJECTION, SOLUTION INTRAMUSCULAR; INTRAVENOUS at 14:50

## 2020-04-01 RX ADMIN — FUROSEMIDE 20 MG: 10 INJECTION, SOLUTION INTRAMUSCULAR; INTRAVENOUS at 05:35

## 2020-04-01 ASSESSMENT — ENCOUNTER SYMPTOMS
FEVER: 0
WEAKNESS: 0
DIZZINESS: 0
PALPITATIONS: 0
SPEECH CHANGE: 0
MYALGIAS: 0
SHORTNESS OF BREATH: 0
HEADACHES: 0
COUGH: 1
SINUS PAIN: 0
CLAUDICATION: 0
TREMORS: 0
VOMITING: 0
ABDOMINAL PAIN: 0
CONSTIPATION: 0
COUGH: 0
BLURRED VISION: 0
PHOTOPHOBIA: 0
DEPRESSION: 0
NERVOUS/ANXIOUS: 0
TINGLING: 0
SPUTUM PRODUCTION: 1
EYE REDNESS: 0
DIARRHEA: 0
NECK PAIN: 1
NAUSEA: 0
STRIDOR: 0
FOCAL WEAKNESS: 0
CHILLS: 0

## 2020-04-01 ASSESSMENT — GAIT ASSESSMENTS
DISTANCE (FEET): 5
GAIT LEVEL OF ASSIST: MINIMAL ASSIST
DEVIATION: DECREASED BASE OF SUPPORT;DECREASED HEEL STRIKE;DECREASED TOE OFF;OTHER (COMMENT)
ASSISTIVE DEVICE: FRONT WHEEL WALKER

## 2020-04-01 ASSESSMENT — COGNITIVE AND FUNCTIONAL STATUS - GENERAL
TURNING FROM BACK TO SIDE WHILE IN FLAT BAD: UNABLE
MOVING FROM LYING ON BACK TO SITTING ON SIDE OF FLAT BED: UNABLE
MOBILITY SCORE: 8
WALKING IN HOSPITAL ROOM: A LITTLE
MOVING TO AND FROM BED TO CHAIR: UNABLE
SUGGESTED CMS G CODE MODIFIER MOBILITY: CM
CLIMB 3 TO 5 STEPS WITH RAILING: TOTAL
STANDING UP FROM CHAIR USING ARMS: TOTAL

## 2020-04-01 ASSESSMENT — LIFESTYLE VARIABLES
CONSUMPTION TOTAL: NEGATIVE
ON A TYPICAL DAY WHEN YOU DRINK ALCOHOL HOW MANY DRINKS DO YOU HAVE: 0
HOW MANY TIMES IN THE PAST YEAR HAVE YOU HAD 5 OR MORE DRINKS IN A DAY: 0
TOTAL SCORE: 0
AVERAGE NUMBER OF DAYS PER WEEK YOU HAVE A DRINK CONTAINING ALCOHOL: 0
DOES PATIENT WANT TO STOP DRINKING: NO
ALCOHOL_USE: NO
SUBSTANCE_ABUSE: 0
HAVE PEOPLE ANNOYED YOU BY CRITICIZING YOUR DRINKING: NO
EVER HAD A DRINK FIRST THING IN THE MORNING TO STEADY YOUR NERVES TO GET RID OF A HANGOVER: NO
TOTAL SCORE: 0
TOTAL SCORE: 0
HAVE YOU EVER FELT YOU SHOULD CUT DOWN ON YOUR DRINKING: NO
EVER FELT BAD OR GUILTY ABOUT YOUR DRINKING: NO

## 2020-04-01 ASSESSMENT — FIBROSIS 4 INDEX: FIB4 SCORE: 3.55

## 2020-04-01 NOTE — CONSULTS
Hospital Medicine Consultation    Date of Service  4/1/2020    Referring Physician  Matty Mcintyre MD    Consulting Physician  Heather Monroe M.D.    Reason for Consultation  Taking over care from intensivist service    History of Presenting Illness  79 y.o. male with hx of diabetes, hypertension, CKD type 4 and recent cervical laminectomy who presented 3/28/2020 with acute encephalopathy. He had been transferred to [facility] 16 days prior and was sent to the ER on 3/28 for altered mentation. At that visit, he had an extensive workup, and though he couldn't remember why he had been sent to the ER, he had an unremarkable CT head.  He was found to have a new pneumonia and was treated with antibiotics, though was stable to return [facility] to rehab.  He was discharged back to rehab with the plan to continue antibiotics.  He then was sent back later that evening for altered mentation again.  He began gurling on secretions and no protecting his airway, so he was intubated and admitted to the ICU.  He was then a COVID rule out, as CXR revealed multifocal opacities and he was on the vent.  He was extubated on 3/31 and did fine, though he did remain somewhat confused as to why he was here.  He went in and out of afib, with 20 beats of vtach the night of 3/31, so he was started on metoprolol low dose. COVD came back negative, as well as influenza.  He did also recieve an LP in the ER and CSF was negative for infection, though showed high glucose and protein.  All cultures were negative.      Review of Systems  Review of Systems   Constitutional: Negative for chills and fever.   Eyes: Negative for blurred vision.   Respiratory: Negative for cough and shortness of breath.    Cardiovascular: Negative for chest pain and palpitations.   Gastrointestinal: Negative for abdominal pain, nausea and vomiting.   Genitourinary: Negative for dysuria.   Musculoskeletal: Positive for neck pain (Says his neck brace is too loose). Negative for  myalgias.   Neurological: Negative for dizziness and headaches.   All other systems reviewed and are negative.      Past Medical History   has a past medical history of Arrhythmia, Diabetes (HCC), Hemorrhagic disorder (HCC), High cholesterol, Hypertension, Jaundice (5/8/2016), and Renal disorder.    Surgical History   has a past surgical history that includes hip arthroplasty total (Left, 2001); cholecystectomy (2006); ercp (5/2016); gastroscopy-endo (N/A, 6/3/2016); egd w/endoscopic ultrasound (N/A, 6/3/2016); egd with asp/bx (N/A, 6/3/2016); and cervical decompression posterior (2/28/2020).    Family History  Reviewed, not pertinent to medical condition    Social History   reports that he has never smoked. He has never used smokeless tobacco. He reports that he does not drink alcohol or use drugs.    Medications  Current Facility-Administered Medications   Medication Dose Route Frequency Provider Last Rate Last Dose   • insulin glargine (LANTUS) injection 10 Units  10 Units Subcutaneous Q EVENING Sarthak Mcintyre M.D.   10 Units at 04/01/20 1818   • metoprolol (LOPRESSOR) tablet 12.5 mg  12.5 mg Enteral Tube BID Sarthak Mcintyre M.D.   12.5 mg at 04/01/20 1819   • midodrine (PROAMATINE) tablet 5 mg  5 mg Enteral Tube Q8HRS PRN Sarthak Mcintyre M.D.       • furosemide (LASIX) injection 20 mg  20 mg Intravenous BID DIURETIC Sarthak Mcintyre M.D.   20 mg at 04/01/20 1450   • doxycycline monohydrate (ADOXA) tablet 100 mg  100 mg Enteral Tube Q12HRS Sarthak Mcintyre M.D.   100 mg at 04/01/20 1819   • insulin regular (HUMULIN R) injection 2-9 Units  2-9 Units Subcutaneous Q6HRS Sarthak Mcintyre M.D.   3 Units at 04/01/20 1817    And   • dextrose 50% (D50W) injection 50 mL  50 mL Intravenous Q15 MIN PRN Sarthak Mcintyre M.D.       • heparin injection 5,000 Units  5,000 Units Subcutaneous Q8HRS Sarthak Mcintyre M.D.   5,000 Units at 04/01/20 1450   • Pharmacy Consult: Enteral tube insertion - review meds/change  route/product selection  1 Each Other PHARMACY TO DOSE Sarthak Diaz M.D.       • Respiratory Therapy Consult   Nebulization Continuous RT Mina Tripathi M.D.       • ipratropium-albuterol (DUONEB) nebulizer solution  3 mL Nebulization Q2HRS PRN (RT) Mina Tripathi M.D.       • senna-docusate (PERICOLACE or SENOKOT S) 8.6-50 MG per tablet 2 Tab  2 Tab Enteral Tube BID Mina Tripathi M.D.   Stopped at 03/31/20 1800    And   • polyethylene glycol/lytes (MIRALAX) PACKET 1 Packet  1 Packet Enteral Tube QDAY PRN Mina Tripathi M.D.        And   • magnesium hydroxide (MILK OF MAGNESIA) suspension 30 mL  30 mL Enteral Tube QDAY PRN Mina Tripathi M.D.        And   • bisacodyl (DULCOLAX) suppository 10 mg  10 mg Rectal QDAY PRN Mina Tripathi M.D.       • cefTRIAXone (ROCEPHIN) 1 g in  mL IVPB  1 g Intravenous Q24HRS Sarthak Mcintyre M.D.   Stopped at 04/01/20 0605       Allergies  No Known Allergies    Physical Exam  Temp:  [36.7 °C (98.1 °F)-37.6 °C (99.7 °F)] 36.7 °C (98.1 °F)  Pulse:  [] 104  Resp:  [13-24] 17  BP: (111-145)/(48-78) 134/65  SpO2:  [93 %-100 %] 99 %    Physical Exam  Vitals signs and nursing note reviewed.   Constitutional:       Appearance: He is well-developed.   HENT:      Head: Normocephalic and atraumatic.   Neck:      Comments: In neck brace  Cardiovascular:      Rate and Rhythm: Normal rate and regular rhythm.      Heart sounds: Normal heart sounds. No murmur.   Pulmonary:      Effort: Pulmonary effort is normal. No respiratory distress.      Breath sounds: Normal breath sounds. No wheezing or rales.   Abdominal:      General: Bowel sounds are normal. There is no distension.      Palpations: Abdomen is soft.      Tenderness: There is no abdominal tenderness.   Musculoskeletal: Normal range of motion.      Right lower leg: No edema.      Left lower leg: No edema.      Comments: MONZON   Skin:     General: Skin is warm and dry.      Findings: No erythema.   Neurological:       Comments: Oriented to person and recent surgery, but unsure why he is here now; does not remember events leading to this hospitalization   Psychiatric:      Comments: A little repetitive about his neck collar         Fluids  Date 04/01/20 0700 - 04/02/20 0659   Shift 9110-1469 5169-4277 3109-9515 24 Hour Total   INTAKE   NG/GT 90   90   Enteral 60   60   Shift Total 150   150   OUTPUT   Urine 625 210  835   Shift Total 625 210  835   Weight (kg) 72.5 72.5 72.5 72.5       Laboratory  Recent Labs     03/30/20  0529 03/31/20  0351 04/01/20  0547   WBC 5.4 4.5* 4.5*   RBC 2.59* 2.68* 2.70*   HEMOGLOBIN 8.2* 8.5* 8.6*   HEMATOCRIT 25.4* 26.5* 26.9*   MCV 98.1* 98.9* 99.6*   MCH 31.7 31.7 31.9   MCHC 32.3* 32.1* 32.0*   RDW 74.9* 81.9* 84.7*   PLATELETCT 116* 121* 120*   MPV 9.5 9.6 9.9     Recent Labs     03/30/20  0529 03/31/20  0351 04/01/20  0547   SODIUM 141 140 141   POTASSIUM 3.8 4.0 4.5   CHLORIDE 107 104 105   CO2 20 24 26   GLUCOSE 183* 270* 227*   BUN 35* 48* 56*   CREATININE 2.15* 2.07* 2.09*   CALCIUM 8.5 8.6 8.7                     Imaging  DX-CHEST-PORTABLE (1 VIEW)   Final Result      No significant change from prior exam.      DX-CHEST-PORTABLE (1 VIEW)   Final Result      Stable chest. Unchanging left lower lobe atelectasis or pneumonia and bibasilar interstitial edema or pneumonia.      DX-CHEST-PORTABLE (1 VIEW)   Final Result      Persistent bilateral pulmonary consolidation with pleural effusions.      DX-CHEST-PORTABLE (1 VIEW)   Final Result         Central line is not seen. No appreciable pneumothorax.      DX-ABDOMEN FOR TUBE PLACEMENT   Final Result      Enteric tube tip projects over the stomach.      NG tube tip projects over the distal stomach.      EC-ECHOCARDIOGRAM COMPLETE W/O CONT   Final Result      DX-CHEST-PORTABLE (1 VIEW)   Final Result      Stable examination.      CT-CHEST (THORAX) W/O   Final Result      1.  Large bilateral pleural effusions with bibasilar atelectasis.      2.   Indeterminate right upper lobe pulmonary nodules measuring 5 and 9 mm in size.      3.  Endotracheal and nasogastric tubes present.      4.  Calcified granulomas.      Low Risk: CT at 3-6 months, then consider CT at 18-24 months      High Risk: CT at 3-6 months, then at 18-24 months      Comments: Use most suspicious nodule as guide to management. Follow-up intervals may vary according to size and risk.      Low Risk - Minimal or absent history of smoking and of other known risk factors.      High Risk - History of smoking or of other known risk factors.      Note: These recommendations do not apply to lung cancer screening, patients with immunosuppression, or patients with known primary cancer.      Fleischner Society 2017 Guidelines for Management of Incidentally Detected Pulmonary Nodules in Adults               CT-HEAD W/O   Final Result      1.  No evidence of acute intracranial process.      2.  Cerebral atrophy as well as periventricular chronic small vessel ischemic change.      DX-CHEST-PORTABLE (1 VIEW)   Final Result         Endotracheal tube with tip projecting over the mid to lower thoracic trachea.      Gastric drainage tube courses below diaphragm, tip is not seen.      DX-CHEST-PORTABLE (1 VIEW)   Final Result         No significant interval change. Grossly unchanged bibasilar ill-defined opacities          Assessment/Plan  Acute encephalopathy- (present on admission)  Assessment & Plan  Likely from underlying medical issues; toxic metabolic from sepsis  CT head was negative  LP CSF negative  Follows commands, ?delirium; holding sedation as possible  Normal TSH and ammonia      Acute respiratory failure (HCC)- (present on admission)  Assessment & Plan  Intubated 3/28->3/31   CT had bilateral pleural effusions but no heart failure on echo  Force Diuresis as tolerated; on 20mg IV bid  Continue empiric antibiotics at this time for 5 days to cover for pna  Flu negative, COVID neg       Acute on chronic  renal failure (HCC)- (present on admission)  Assessment & Plan  At baseline now Cr 2    DM (diabetes mellitus) (HCC)- (present on admission)  Assessment & Plan  On lantus 10 units and ssi; may need increased; takes actos as outpt  Recent Labs     03/30/20  2156 03/31/20  1217 03/31/20  1651 04/01/20  0002 04/01/20  0546 04/01/20  1212 04/01/20  1811   POCGLUCOSE 206* 264* 272* 192* 230* 223* 249*         Dysphagia- (present on admission)  Assessment & Plan  Speech following, on nectar thicks    V tach (HCC)- (present on admission)  Assessment & Plan  Unsustained, restarted on metoprolol   Electrolytes ok    Pleural effusion, bilateral- (present on admission)  Assessment & Plan  S/p diuresis  On room air    Cervical stenosis of spine- (present on admission)  Assessment & Plan  S/p cervical laminectomy in February, has been at rehab  Can move all extremities; will need ongoing rehab

## 2020-04-01 NOTE — CARE PLAN
Problem: Venous Thromboembolism (VTW)/Deep Vein Thrombosis (DVT) Prevention:  Goal: Patient will participate in Venous Thrombosis (VTE)/Deep Vein Thrombosis (DVT)Prevention Measures  Flowsheets  Taken 4/1/2020 0000  Mechanical Prophylaxis: SCDs, Sequential Compression Device  SCDs, Sequential Compression Device: On  Taken 3/31/2020 2000  Pharmacologic Prophylaxis Used: Unfractionated Heparin     Problem: Bowel/Gastric:  Goal: Normal bowel function is maintained or improved  Flowsheets (Taken 4/1/2020 0000)  Last BM: 03/31/20  Note: Pt having multiple BMs per shift. Will hold stool softener.

## 2020-04-01 NOTE — THERAPY
"Speech Language Therapy Clinical Swallow Evaluation completed.  Functional Status: Clinical swallow evaluation completed on this date.  Patient up to a chair, pleasant and agreeable; AA&O x4.  Pt inconsistently followed directives to the oral [facility] exam which appeared to be related to being hard of hearing.  No gross deficits appreciated. Initially, vocal quality wet but cleared with cues to a volitional throat clear with effortful swallow.  Presentation of PO included ice chips, mildly thick and thin liquids, applesauce, pudding, and soft solids.  The patient presented with overt s/sx of aspiration with thin liquids as seen by coughing and sputtering with 100% of trials.  With soft solids, the patient demonstrated adequate mastication but c/o globus feeing pointing to his throat and stated, \"It's sticking!\"  Globus was relieved with a cup sip of mildly thick liquids.  The patient had no overt s/sx of aspiration with any other consistency consumed and demonstrated timely initiation of swallow trigger and laryngeal elevation was palpated as strong and complete. Pt required MIN feeding assistance due to guiding the spoon around the cervical collar.  At this time, recommend initiation of a Puree (4) diet with Mildly Thick (2) liquids and DIRECT supervision and feeding assistance as needed.   Recommendations - Diet: Diet / Liquid Recommendation: Mildly Thick (2) - (Nectar Thick), Puree (4)                          Strategies: Direct supervision during meals, No Straws and Head of Bed at 90 Degrees                          Medication Administration: Medication Administration : Crush all Medications in Puree  Plan of Care: Will benefit from Speech Therapy 3 times per week  Post-Acute Therapy: Recommend inpatient transitional care services for continued speech therapy services.      See \"Rehab Therapy-Acute\" Patient Summary Report for complete documentation.   "

## 2020-04-01 NOTE — CARE PLAN
Problem: Communication  Goal: The ability to communicate needs accurately and effectively will improve  Outcome: PROGRESSING AS EXPECTED  Note: Pt communicates needs.     Problem: Bowel/Gastric:  Goal: Normal bowel function is maintained or improved  Outcome: PROGRESSING AS EXPECTED  Note: Having Bms, occasionally incontinent. Reinforced using call bell to call for assistance to go to bathroom.

## 2020-04-01 NOTE — PROGRESS NOTES
Report given to Dusty CINTRON on T7. Pt transferred to T708 bed 1 via wheelchair. Pt stable. Chart and meds sent with pt.

## 2020-04-01 NOTE — PROGRESS NOTES
Critical Care Progress Note    Date of admission  3/28/2020    Chief Complaint  79 y.o. male admitted 3/28/2020 with acute respiratory failure and encephalopathy    Hospital Course    79 y.o. male with hx of DM , Hypertension, CKD 4 and recent laminectomy  who presented 3/28/2020 with acute encephalopathy. Apparently he was at his rehab facility and developed altered mentation however I am unsure if if this was disorientation or another process as he is intubated no family is around and hx is sparse. He was in the [facility] ED this am discharged back but returned again this evening. He was intubated for airway protection and was noted to have thick secretions prior. CXR reveal multifocal opacities. COVID rule out was initiated. An LP was performed and initial CSF was unremarkable. CBC revealed a normal WBC but lymphopenia. CR elevated but actually improved compared to prior labs. BNP was 5000s which appears chronically elevated.       Interval Problem Update  Reviewed last 24 hour events:  snr to afib this am 70-80's  Neuro: intact no pain, occasional haziness  HR: afib 70-80's  SBP: 110-130's  Tmax: afebrile  GI: TF at goal, + BM  UOP: 1700ml  Lines: peripheral IV's anders  Resp: room air   Vte: heparin sq vte  PPI/H2:n/a  Antibx: C3 4/5  CXR improved  VT beats overnight 20 beats  Check labs and correct aggressively  Dec diuretics more ? Changed 3/31  lantus inc to 10   Metoprolol 12.5mg BID  Check mag -> 2.2    CSF 3/28 in process  Flu negative  COVID negative  C. difficile 3/21 negative toxin    Ceftriaxone 3/29-P  Doxycycline 3/29-P  Last BM 3/28, 3/30    Review of Systems  Review of Systems   Constitutional: Negative for fever and malaise/fatigue.   HENT: Negative for sinus pain.    Eyes: Negative for photophobia and redness.   Respiratory: Positive for cough and sputum production. Negative for shortness of breath and stridor.    Cardiovascular: Negative for chest pain, claudication and leg swelling.    Gastrointestinal: Negative for abdominal pain, constipation, diarrhea, nausea and vomiting.   Genitourinary: Negative for dysuria, hematuria and urgency.   Musculoskeletal: Negative for joint pain and myalgias.   Neurological: Negative for dizziness, tingling, tremors, speech change, focal weakness, weakness and headaches.   Psychiatric/Behavioral: Negative for depression and substance abuse. The patient is not nervous/anxious.         Vital Signs for last 24 hours   Temp:  [36.7 °C (98.1 °F)-37.6 °C (99.7 °F)] 36.7 °C (98.1 °F)  Pulse:  [] 104  Resp:  [13-24] 17  BP: (111-145)/(48-78) 134/65  SpO2:  [93 %-100 %] 99 %    Hemodynamic parameters for last 24 hours       Respiratory Information for the last 24 hours       Physical Exam   Physical Exam  Vitals signs and nursing note reviewed.   Constitutional:       General: He is not in acute distress.     Appearance: He is not ill-appearing or diaphoretic.      Comments: Sitting in chair with collar on   HENT:      Head: Normocephalic and atraumatic.      Nose:      Comments: cortrax  Eyes:      Conjunctiva/sclera: Conjunctivae normal.      Pupils: Pupils are equal, round, and reactive to light.   Neck:      Comments: c collar in place  Cardiovascular:      Rate and Rhythm: Normal rate.      Pulses: Normal pulses.   Pulmonary:      Breath sounds: No wheezing or rhonchi.      Comments: Clear bilateral, clear watery secretions good cough  Abdominal:      General: There is no distension.      Palpations: Abdomen is soft.      Tenderness: There is no abdominal tenderness.   Musculoskeletal:         General: Swelling present.      Right lower leg: Edema present.      Left lower leg: Edema present.   Skin:     Coloration: Skin is not jaundiced.      Comments: Diffuse wounds throughout   Neurological:      Cranial Nerves: No cranial nerve deficit.      Comments: Awakes follow commands 4+/5 strength in lower with good equal  strength bilateral, able to lift head up  off bed   Psychiatric:         Mood and Affect: Mood normal.         Medications  Current Facility-Administered Medications   Medication Dose Route Frequency Provider Last Rate Last Dose   • insulin glargine (LANTUS) injection 10 Units  10 Units Subcutaneous Q EVENING Sarthak Mcintyre M.D.       • metoprolol (LOPRESSOR) tablet 12.5 mg  12.5 mg Enteral Tube BID Sarthak Mcintyre M.D.   12.5 mg at 04/01/20 1120   • midodrine (PROAMATINE) tablet 5 mg  5 mg Enteral Tube Q8HRS PRN Sarthak Mcintyre M.D.       • acetaminophen (TYLENOL) tablet 650 mg  650 mg Oral Q4HRS PRN Zachery Alfaro M.D.       • artificial tears ophthalmic solution 1 Drop  1 Drop Both Eyes PRN Zachery Alfaro M.D.       • benzocaine-menthol (CEPACOL) lozenge 1 Lozenge  1 Lozenge Mouth/Throat Q2HRS PRN Zachery Alfaro M.D.       • mag hydrox-al hydrox-simeth (MAALOX PLUS ES or MYLANTA DS) suspension 20 mL  20 mL Oral Q2HRS PRN Zachery Alfaro M.D.       • ondansetron (ZOFRAN ODT) dispertab 4 mg  4 mg Oral 4X/DAY PRN Zachery Alfaro M.D.        Or   • ondansetron (ZOFRAN) syringe/vial injection 4 mg  4 mg Intramuscular 4X/DAY PRN Zachery Alfaro M.D.       • sodium chloride (OCEAN) 0.65 % nasal spray 2 Spray  2 Spray Nasal PRN Zachery Alfaro M.D.       • [START ON 4/2/2020] therapeutic multivitamin-minerals (THERAGRAN-M) tablet 1 Tab  1 Tab Oral DAILY WITH LUNCH Zachery Alfaro M.D.       • traZODone (DESYREL) tablet 50 mg  50 mg Oral QHS PRN Zachery Alfaro M.D.       • oxyCODONE immediate release (ROXICODONE) tablet 10 mg  10 mg Oral Q3HRS PRN Zachery Alfaro M.D.       • oxyCODONE immediate-release (ROXICODONE) tablet 5 mg  5 mg Oral Q3HRS PRN Zachery Alfaro M.D.       • Respiratory Therapy Consult   Nebulization Continuous RT Zachery Alfaro M.D.       • docusate sodium (COLACE) capsule 200 mg  200 mg Oral BID PRN Zachery Alfaro M.D.        And   • sennosides (SENOKOT) 8.6 MG tablet 17.2 mg  17.2 mg Oral QDAY PRN Zachery Alfaro M.D.        And    • bisacodyl (THE MAGIC BULLET) suppository 10 mg  10 mg Rectal QDAY PRN Zachery Alfaro M.D.        And   • magnesium hydroxide (MILK OF MAGNESIA) suspension 30 mL  30 mL Oral QDAY PRN Zachery Alfaro M.D.       • lidocaine 2 % jelly   Topical PRN Zachery Alfaro M.D.        And   • nitroglycerin (NITRO-BID) 2 % ointment 1 Inch  1 Inch Topical PRN Zachery Alfaro M.D.       • Pharmacy Consult: Enteral tube insertion - review meds/change route/product selection  1 Each Other PHARMACY TO DOSE Zachery Alfaro M.D.       • baclofen (LIORESAL) tablet 5 mg  5 mg Oral TID Zachery Alfaro M.D.       • cholestyramine (QUESTRAN,PREVALITE) 4 GM powder 4 g  1 Packet Oral BID Zachery Alfaro M.D.       • furosemide (LASIX) tablet 20 mg  20 mg Oral Q DAY Zachery Alfaro M.D.       • heparin injection 5,000 Units  5,000 Units Subcutaneous Q8HRS Zachery Alfaro M.D.       • [START ON 4/2/2020] insulin glargine (LANTUS) injection 10 Units  10 Units Subcutaneous QAM INSULIN Zachery Alfaro M.D.       • ipratropium-albuterol (DUONEB) nebulizer solution  3 mL Nebulization TID Zachery Alfaro M.D.       • midodrine (PROAMATINE) tablet 5 mg  5 mg Oral Q4HRS PRN Zachery Alfaro M.D.       • pregabalin (LYRICA) capsule 75 mg  75 mg Oral BID Zachery Alfaro M.D.       • sodium chloride 3% nebulizer solution 3 mL  3 mL Nebulization TID Zachery Alfaro M.D.       • vancomycin 50 mg/mL oral soln 125 mg  125 mg Oral Q6HRS Zachery Alfaro M.D.       • ammonium lactate (LAC-HYDRIN) 12 % lotion   Topical BID Zachery Alfaro M.D.       • insulin lispro (HUMALOG) injection 2-12 Units  2-12 Units Subcutaneous 4X/DAY ACHS Zachery Alfaro M.D.        And   • glucose 4 g chewable tablet 16 g  16 g Oral Q15 MIN PRN Zachery Alfaro M.D.        And   • dextrose 50% (D50W) injection 50 mL  50 mL Intravenous Q15 MIN PRN Zachery Alfaro M.D.       • cyanocobalamin (VITAMIN B-12) tablet 1,000 mcg  1,000 mcg Oral DAILY Zachery Alfaro M.D.       • ferrous  sulfate tablet 325 mg  325 mg Oral BID WITH MEALS Zachery Alfaro M.D.       • lidocaine (LIDODERM) 5 % 2 Patch  2 Patch Transdermal Q24HR Zachery Alfaro M.D.       • metoprolol (LOPRESSOR) tablet 12.5 mg  12.5 mg Oral BID Zachery Alfaro M.D.       • pioglitazone (ACTOS) tablet 30 mg  30 mg Oral DAILY Zachery Alfaro M.D.       • simethicone (MYLICON) chewable tab 80 mg  80 mg Oral TID PRN Zachery Alfaro M.D.       • simvastatin (ZOCOR) tablet 20 mg  20 mg Oral Nightly Zachery Alfaro M.D.       • furosemide (LASIX) injection 20 mg  20 mg Intravenous BID DIURETIC Sarthak Mcintyre M.D.   20 mg at 04/01/20 1450   • doxycycline monohydrate (ADOXA) tablet 100 mg  100 mg Enteral Tube Q12HRS Sarthak Mcintyre M.D.   100 mg at 04/01/20 0536   • insulin regular (HUMULIN R) injection 2-9 Units  2-9 Units Subcutaneous Q6HRS Sarthak Mcintyre M.D.   3 Units at 04/01/20 1200    And   • dextrose 50% (D50W) injection 50 mL  50 mL Intravenous Q15 MIN PRN Sarthak Mcintyre M.D.       • heparin injection 5,000 Units  5,000 Units Subcutaneous Q8HRS Sarthak Mcintyre M.D.   5,000 Units at 04/01/20 1450   • Pharmacy Consult: Enteral tube insertion - review meds/change route/product selection  1 Each Other PHARMACY TO DOSE Sarthak Diaz M.D.       • Respiratory Therapy Consult   Nebulization Continuous RT Mina Tripathi M.D.       • ipratropium-albuterol (DUONEB) nebulizer solution  3 mL Nebulization Q2HRS PRN (RT) Mina Tripathi M.D.       • senna-docusate (PERICOLACE or SENOKOT S) 8.6-50 MG per tablet 2 Tab  2 Tab Enteral Tube BID Mina Tripathi M.D.   Stopped at 03/31/20 1800    And   • polyethylene glycol/lytes (MIRALAX) PACKET 1 Packet  1 Packet Enteral Tube QDAY PRN Mina Tripathi M.D.        And   • magnesium hydroxide (MILK OF MAGNESIA) suspension 30 mL  30 mL Enteral Tube QDAY PRN Mina Tripathi M.D.        And   • bisacodyl (DULCOLAX) suppository 10 mg  10 mg Rectal QDAY PRN Mina Tripathi M.D.       • cefTRIAXone  (ROCEPHIN) 1 g in  mL IVPB  1 g Intravenous Q24HRS Sarthak Mcintyre M.D.   Stopped at 04/01/20 0605       Fluids    Intake/Output Summary (Last 24 hours) at 4/1/2020 1647  Last data filed at 4/1/2020 1500  Gross per 24 hour   Intake 780 ml   Output 2775 ml   Net -1995 ml       Laboratory  Recent Labs     03/30/20  0441 03/31/20  0343   ISTATAPH 7.447 7.513*   ISTATAPCO2 30.1 31.9   ISTATAPO2 98* 92*   ISTATATCO2 22 27   [id number] 98 98   ISTATARTHCO3 20.8 25.7*   ISTATARTBE -3 3   ISTATTEMP 36.7 C 36.7 C   ISTATFIO2 30 30   ISTATSPEC Arterial Arterial   ISTATAPHTC 7.452 7.518*   [id number] 96* 91*         Recent Labs     03/30/20  0529 03/31/20 0351 04/01/20  0547 04/01/20  1125   SODIUM 141 140 141  --    POTASSIUM 3.8 4.0 4.5  --    CHLORIDE 107 104 105  --    CO2 20 24 26  --    BUN 35* 48* 56*  --    CREATININE 2.15* 2.07* 2.09*  --    MAGNESIUM 1.9 2.3  --  2.2   PHOSPHORUS 2.8 2.0*  --   --    CALCIUM 8.5 8.6 8.7  --      Recent Labs     03/30/20  0529 03/30/20  1600 03/30/20 2051 03/31/20  0351 04/01/20  0547   ALTSGPT 17  --   --   --   --    ASTSGOT 23  --   --   --   --    ALKPHOSPHAT 105*  --   --   --   --    TBILIRUBIN 0.7  --   --   --   --    PREALBUMIN  --  14.6* 15.6*  --   --    GLUCOSE 183*  --   --  270* 227*     Recent Labs     03/30/20  0529 03/31/20  0351 04/01/20  0547   WBC 5.4 4.5* 4.5*   NEUTSPOLYS 81.30* 75.90* 68.80   LYMPHOCYTES 7.30* 9.60* 13.70*   MONOCYTES 8.80 12.30 14.60*   EOSINOPHILS 0.90 1.10 1.80   BASOPHILS 0.40 0.20 0.20   ASTSGOT 23  --   --    ALTSGPT 17  --   --    ALKPHOSPHAT 105*  --   --    TBILIRUBIN 0.7  --   --      Recent Labs     03/30/20  0529 03/31/20  0351 04/01/20  0547   RBC 2.59* 2.68* 2.70*   HEMOGLOBIN 8.2* 8.5* 8.6*   HEMATOCRIT 25.4* 26.5* 26.9*   PLATELETCT 116* 121* 120*       Imaging  X-Ray:  I have personally reviewed the images and compared with prior images. and My impression is: bilateral effusion and pulmonary edema  CT:     Reviewed  Echo:   Reviewed    Assessment/Plan  Acute encephalopathy  Assessment & Plan  Likely from underlying medical issues; toxic metabolic sepsis  CT head without acute process  LP and CSF negative  Follows commands  Stop sedation and allow to clear ? Hypoactive delirium  Check TSH and ammonia  Phos normal    Resolved is appropriate continue delirium like precautions    Acute respiratory failure ([facility])  Assessment & Plan  Intubated 3/28-> extubated 3/31   CT with large bilateral pleural effusions and exam consistent with CHF exacerbation  Check echo -> reviewed  Force Diuresis as tolerated  Continue empiric antibiotics at this time for 5 days  Flu negative, COVID neg    Monitor need for intubation or HFNC with post extubation trial  Aspiration precautions    Acute on chronic renal failure ([facility])- (present on admission)  Assessment & Plan  Similar to baseline cr  Renal dose meds, avoid nephrotoxins  Strict I/Os  Follow renal function      DM (diabetes mellitus) ([facility])  Assessment & Plan  SSI with hypoglycemic protocols  Lantus 10 units    Thrombocytopenia ([facility])- (present on admission)  Assessment & Plan  No signs of bleeding chronic continue to trend  Mild thrombocytopenia    Dysphagia- (present on admission)  Assessment & Plan  Continue cortrax and speech therapy  Aspiration precautions    V tach ([facility])  Assessment & Plan  None sustained overnight 20 beats 4/1  Correct electrolytes  Start metoprolol  Monitor on telemetry  If recurrence consult cardiology      Pleural effusion, bilateral  Assessment & Plan  Seen on CT chest right greater then left  Force diuresis when able and monitor need for drainage    Cervical stenosis of spine  Assessment & Plan  Recent cervical laminectomy and decompression   Surgical without evidence of erythema or edema  c-collar in place with activities  Follow up with NSG recommendations       VTE:  Heparin  Ulcer: Not Indicated  Lines: Wallis Catheter  Ongoing indication addressed    I have  performed a physical exam and reviewed and updated ROS and Plan today (4/1/2020). In review of yesterday's note (3/31/2020), there are no changes except as documented above.     Discussed patient condition and risk of morbidity and/or mortality with RN, RT, Pharmacy, Charge nurse / hot rounds and Patient

## 2020-04-01 NOTE — ASSESSMENT & PLAN NOTE
None sustained overnight 20 beats 4/1  Correct electrolytes  Start metoprolol  Monitor on telemetry  If recurrence consult cardiology

## 2020-04-01 NOTE — THERAPY
"Physical Therapy Treatment completed.   Bed Mobility:  Supine to Sit: (NT, in chair pre and post)  Transfers: Sit to Stand: Maximal Assist  Gait: Level Of Assist: Minimal Assist with Front-Wheel Walker       Plan of Care: Will benefit from Physical Therapy 5 times per week  Discharge Recommendations: Equipment: Will Continue to Assess for Equipment Needs. Post-acute therapy: Recommend post-acute placement for continued physical therapy services prior to discharge home.       See \"Rehab Therapy-Acute\" Patient Summary Report for complete documentation.     Patient progressing with functional mobility. He required max A to stand from chair but once standing took a few steps with FWW and min A. He demonstrated decreased step length and inconsistent CASSI but improved during session. Continue to recommend post acute placement, will continue to follow.  "

## 2020-04-02 PROBLEM — R91.8 PULMONARY NODULES: Status: ACTIVE | Noted: 2020-04-02

## 2020-04-02 PROBLEM — Z71.89 ACP (ADVANCE CARE PLANNING): Status: ACTIVE | Noted: 2020-04-02

## 2020-04-02 LAB
ANION GAP SERPL CALC-SCNC: 13 MMOL/L (ref 7–16)
BACTERIA BLD CULT: NORMAL
BUN SERPL-MCNC: 59 MG/DL (ref 8–22)
CALCIUM SERPL-MCNC: 9.1 MG/DL (ref 8.5–10.5)
CHLORIDE SERPL-SCNC: 104 MMOL/L (ref 96–112)
CO2 SERPL-SCNC: 26 MMOL/L (ref 20–33)
CREAT SERPL-MCNC: 2.27 MG/DL (ref 0.5–1.4)
ERYTHROCYTE [DISTWIDTH] IN BLOOD BY AUTOMATED COUNT: 88.5 FL (ref 35.9–50)
GLUCOSE BLD-MCNC: 120 MG/DL (ref 65–99)
GLUCOSE BLD-MCNC: 199 MG/DL (ref 65–99)
GLUCOSE BLD-MCNC: 222 MG/DL (ref 65–99)
GLUCOSE BLD-MCNC: 226 MG/DL (ref 65–99)
GLUCOSE SERPL-MCNC: 116 MG/DL (ref 65–99)
HCT VFR BLD AUTO: 28.9 % (ref 42–52)
HGB BLD-MCNC: 8.9 G/DL (ref 14–18)
MCH RBC QN AUTO: 31.4 PG (ref 27–33)
MCHC RBC AUTO-ENTMCNC: 30.8 G/DL (ref 33.7–35.3)
MCV RBC AUTO: 102.1 FL (ref 81.4–97.8)
PLATELET # BLD AUTO: 113 K/UL (ref 164–446)
PMV BLD AUTO: 9.6 FL (ref 9–12.9)
POTASSIUM SERPL-SCNC: 4.2 MMOL/L (ref 3.6–5.5)
RBC # BLD AUTO: 2.83 M/UL (ref 4.7–6.1)
SIGNIFICANT IND 70042: NORMAL
SITE SITE: NORMAL
SODIUM SERPL-SCNC: 143 MMOL/L (ref 135–145)
SOURCE SOURCE: NORMAL
WBC # BLD AUTO: 4.8 K/UL (ref 4.8–10.8)

## 2020-04-02 PROCEDURE — 87046 STOOL CULTR AEROBIC BACT EA: CPT

## 2020-04-02 PROCEDURE — 97116 GAIT TRAINING THERAPY: CPT

## 2020-04-02 PROCEDURE — 87045 FECES CULTURE AEROBIC BACT: CPT

## 2020-04-02 PROCEDURE — 770020 HCHG ROOM/CARE - TELE (206)

## 2020-04-02 PROCEDURE — 700102 HCHG RX REV CODE 250 W/ 637 OVERRIDE(OP): Performed by: INTERNAL MEDICINE

## 2020-04-02 PROCEDURE — 85027 COMPLETE CBC AUTOMATED: CPT

## 2020-04-02 PROCEDURE — 82962 GLUCOSE BLOOD TEST: CPT | Mod: 91

## 2020-04-02 PROCEDURE — 80048 BASIC METABOLIC PNL TOTAL CA: CPT

## 2020-04-02 PROCEDURE — 97530 THERAPEUTIC ACTIVITIES: CPT

## 2020-04-02 PROCEDURE — 92526 ORAL FUNCTION THERAPY: CPT

## 2020-04-02 PROCEDURE — 700111 HCHG RX REV CODE 636 W/ 250 OVERRIDE (IP): Performed by: INTERNAL MEDICINE

## 2020-04-02 PROCEDURE — 36415 COLL VENOUS BLD VENIPUNCTURE: CPT

## 2020-04-02 PROCEDURE — 87177 OVA AND PARASITES SMEARS: CPT

## 2020-04-02 PROCEDURE — A9270 NON-COVERED ITEM OR SERVICE: HCPCS | Performed by: INTERNAL MEDICINE

## 2020-04-02 PROCEDURE — 87209 SMEAR COMPLEX STAIN: CPT

## 2020-04-02 PROCEDURE — 700105 HCHG RX REV CODE 258: Performed by: INTERNAL MEDICINE

## 2020-04-02 PROCEDURE — 99233 SBSQ HOSP IP/OBS HIGH 50: CPT | Performed by: INTERNAL MEDICINE

## 2020-04-02 RX ORDER — BISACODYL 10 MG
10 SUPPOSITORY, RECTAL RECTAL
Status: DISCONTINUED | OUTPATIENT
Start: 2020-04-02 | End: 2020-04-04

## 2020-04-02 RX ORDER — FUROSEMIDE 40 MG/1
40 TABLET ORAL
Status: DISCONTINUED | OUTPATIENT
Start: 2020-04-02 | End: 2020-04-06

## 2020-04-02 RX ORDER — AMOXICILLIN 250 MG
2 CAPSULE ORAL 2 TIMES DAILY
Status: DISCONTINUED | OUTPATIENT
Start: 2020-04-02 | End: 2020-04-04

## 2020-04-02 RX ORDER — DOXYCYCLINE 100 MG/1
100 TABLET ORAL EVERY 12 HOURS
Status: COMPLETED | OUTPATIENT
Start: 2020-04-02 | End: 2020-04-02

## 2020-04-02 RX ORDER — POLYETHYLENE GLYCOL 3350 17 G/17G
1 POWDER, FOR SOLUTION ORAL
Status: DISCONTINUED | OUTPATIENT
Start: 2020-04-02 | End: 2020-04-04

## 2020-04-02 RX ORDER — THIAMINE MONONITRATE (VIT B1) 100 MG
100 TABLET ORAL DAILY
Status: DISCONTINUED | OUTPATIENT
Start: 2020-04-03 | End: 2020-04-10 | Stop reason: HOSPADM

## 2020-04-02 RX ORDER — THIAMINE MONONITRATE (VIT B1) 100 MG
100 TABLET ORAL DAILY
Status: DISCONTINUED | OUTPATIENT
Start: 2020-04-02 | End: 2020-04-02

## 2020-04-02 RX ORDER — MIDODRINE HYDROCHLORIDE 5 MG/1
5 TABLET ORAL EVERY 8 HOURS PRN
Status: DISCONTINUED | OUTPATIENT
Start: 2020-04-02 | End: 2020-04-10 | Stop reason: HOSPADM

## 2020-04-02 RX ADMIN — DOXYCYCLINE 100 MG: 100 TABLET, FILM COATED ORAL at 06:07

## 2020-04-02 RX ADMIN — METOPROLOL TARTRATE 12.5 MG: 25 TABLET, FILM COATED ORAL at 06:07

## 2020-04-02 RX ADMIN — INSULIN HUMAN 3 UNITS: 100 INJECTION, SOLUTION PARENTERAL at 17:17

## 2020-04-02 RX ADMIN — INSULIN HUMAN 2 UNITS: 100 INJECTION, SOLUTION PARENTERAL at 12:40

## 2020-04-02 RX ADMIN — INSULIN GLARGINE 10 UNITS: 100 INJECTION, SOLUTION SUBCUTANEOUS at 17:17

## 2020-04-02 RX ADMIN — FUROSEMIDE 40 MG: 40 TABLET ORAL at 17:12

## 2020-04-02 RX ADMIN — FUROSEMIDE 20 MG: 10 INJECTION, SOLUTION INTRAMUSCULAR; INTRAVENOUS at 06:06

## 2020-04-02 RX ADMIN — CEFTRIAXONE SODIUM 1 G: 1 INJECTION, POWDER, FOR SOLUTION INTRAMUSCULAR; INTRAVENOUS at 06:06

## 2020-04-02 RX ADMIN — DOXYCYCLINE 100 MG: 100 TABLET, FILM COATED ORAL at 17:12

## 2020-04-02 RX ADMIN — HEPARIN SODIUM 5000 UNITS: 5000 INJECTION, SOLUTION INTRAVENOUS; SUBCUTANEOUS at 15:45

## 2020-04-02 RX ADMIN — METOPROLOL TARTRATE 12.5 MG: 25 TABLET, FILM COATED ORAL at 17:12

## 2020-04-02 RX ADMIN — HEPARIN SODIUM 5000 UNITS: 5000 INJECTION, SOLUTION INTRAVENOUS; SUBCUTANEOUS at 06:08

## 2020-04-02 ASSESSMENT — COGNITIVE AND FUNCTIONAL STATUS - GENERAL
WALKING IN HOSPITAL ROOM: A LITTLE
SUGGESTED CMS G CODE MODIFIER MOBILITY: CL
MOVING FROM LYING ON BACK TO SITTING ON SIDE OF FLAT BED: A LOT
STANDING UP FROM CHAIR USING ARMS: A LOT
MOBILITY SCORE: 10
CLIMB 3 TO 5 STEPS WITH RAILING: TOTAL
TURNING FROM BACK TO SIDE WHILE IN FLAT BAD: UNABLE
MOVING TO AND FROM BED TO CHAIR: UNABLE

## 2020-04-02 ASSESSMENT — ENCOUNTER SYMPTOMS
DIARRHEA: 1
NEUROLOGICAL NEGATIVE: 1
FEVER: 0
CONSTIPATION: 0
CHILLS: 0
VOMITING: 0
DIAPHORESIS: 0
BLOOD IN STOOL: 0
NAUSEA: 0
CARDIOVASCULAR NEGATIVE: 1
ABDOMINAL PAIN: 0
EYES NEGATIVE: 1
RESPIRATORY NEGATIVE: 1
MUSCULOSKELETAL NEGATIVE: 1
PSYCHIATRIC NEGATIVE: 1

## 2020-04-02 ASSESSMENT — GAIT ASSESSMENTS
ASSISTIVE DEVICE: FRONT WHEEL WALKER
GAIT LEVEL OF ASSIST: MINIMAL ASSIST
DISTANCE (FEET): 20

## 2020-04-02 NOTE — ASSESSMENT & PLAN NOTE
S/p cervical laminectomy in February, has been at rehab  Can move all extremities; will need ongoing rehab

## 2020-04-02 NOTE — CARE PLAN
Problem: Nutritional:  Goal: Achieve adequate nutritional intake  Description: Patient will consume >/=50% of meals   Outcome: PROGRESSING SLOWER THAN EXPECTED

## 2020-04-02 NOTE — PROGRESS NOTES
Paged SLP this morning as new diet order that came over with transfer orders was incorrect. Patient kept NPO based on nursing judgment until SLP came to reassess this morning.

## 2020-04-02 NOTE — ASSESSMENT & PLAN NOTE
Intubated 3/28->3/31, to protect airway due to altered mental status   chest x-ray showed bilateral infiltration  CT had bilateral pleural effusions      Flu negative, COVID neg  Likely combination between pulmonary edema due to chronic kidney disease and possible pneumonia.      Echo did not show heart failure  I's and O's and weight daily and resume diuretics if needed  Finished antibiotics for 5 days  Improving and right now he is on room air

## 2020-04-02 NOTE — PROGRESS NOTES
Monitors called, patient is in 2nd Degree Type 1, blocked PACs. MD at bedside. Possible Cards consult.

## 2020-04-02 NOTE — ASSESSMENT & PLAN NOTE
S/p C3-6 laminectomy and posterolateral fusion  for cervical stenosis/myelopathy  The patient received rehab therapy.  Continue monitoring and follow-up as outpatient with the neurosurgeon  Collar removal 4/8/20  Xrays 4/8 wnl  Seen by Dr. Sellers 4/9

## 2020-04-02 NOTE — PROGRESS NOTES
Assumed care of PT A&O 4. Pt resting in wheelchair with no signs of labored breathing. On RA. Tele monitor in place, cardiac rhythm being monitored. Call light within reach, bed in lowest position, upper bed rails up. Pt was updated on plan of care for the day. Will continue to monitor.     SLP re-eval pending for breakfast. Currently NPO

## 2020-04-02 NOTE — THERAPY
Speech Language Therapy dysphagia treatment completed.   Functional Status:  Called by RN to see patient this am as she has been holding tray due to intolerance of current diet.  Upon chart review and discussion with RN, patient was cleared for a pureed diet (PU4) with mildly thick liquids (MT2) yesterday by SLP.  Diet order for this consistency was entered in computer, but per RN, somehow when patient transferred from ICU and they were cleaning up old orders from Rehab facility, this diet order got cancelled. RN then reverted back to Soft and bite sized diet with mildly thick liquids, which was what she thought was the most current diet order.  When patient did not tolerate this diet, she held tray and paged this SLP.    Patient awake, alert and oriented in all spheres.  He was sitting up in chair.  Vocal quality was wet and gurgly, but with cues to cough and orally suction, vocal quality was clear. Presentation of PO consisted of mildly thick liquids (6 ounces), thin liquids, purees (8 ounces), minced and moist (8 bites), and soft and bite sized (4 bites) textures.  Patient with wet voice X2 following swallows of NTL, but with cues to trigger a second swallow, vocal quality cleared.  He had throat clearing X1 on purees, but no other overt S/Sx of aspiration noted.  On thin liquids, he had significant wet voice and coughing in 4/4 trials which is concerning for possible penetration/aspiration.  On minced and moist and soft and bite sized consistencies, he had prolonged mastication and after swallow, voice was wet and gurgly about 25% of the time and patient with c/o globus sensation at the level of the UES and then the mid esophageal region which did subside with a mildly thick liquid wash.  At this time recommend continuation of diet recommended yesterday per SLP, Puree (PU4) diet with Mildly Thick (MT2) liquids and DIRECT supervision and feeding assistance as needed.  Would keep cortrak in for 1 more day to  "ensure tolerance and ability to meet nutritional needs.  Discussed with Dr. Trujillo, and dietary consult for supplements also ordered.     Recommendations - Diet: Mildly Thick (MT2) - (Nectar Thick), Puree (PU4)--supplements per RD                            Strategies: Direct supervision during meals, No Straws and Head of Bed at 90 Degrees--preferably up in chair for all PO                            Medication Administration: Crush all Medications in Puree    Plan of Care: Will benefit from Speech Therapy 3 times per week    Post-Acute Therapy: Recommend inpatient transitional care services for continued speech therapy services.      See \"Rehab Therapy-Acute\" Patient Summary Report for complete documentation.     "

## 2020-04-02 NOTE — PROGRESS NOTES
[facility] Medicine Daily Progress Note    Date of Service  4/2/2020    Chief Complaint  79 y.o. male admitted 3/28/2020 with altered mental status    Hospital Course    *79 y.o. male with hx of diabetes, hypertension, CKD type 4 and recent cervical laminectomy who presented 3/28/2020 with acute encephalopathy. He had been transferred to [facility] 16 days prior and was sent to the ER on 3/28 for altered mentation. At that visit, he had an extensive workup, and though he couldn't remember why he had been sent to the ER, he had an unremarkable CT head.  He was found to have a new pneumonia and was treated with antibiotics, though was stable to return [facility] to rehab.  He was discharged back to rehab with the plan to continue antibiotics.  He then was sent back later for altered mentation again.  He began gurling on secretions and no protecting his airway, so he was intubated and admitted to the ICU.  He was then a COVID rule out, as CXR revealed multifocal opacities and he was on the vent.  He was extubated on 3/31 and did fine, though he did remain somewhat confused as to why he was here.  He went in and out of afib, with 20 beats of vtach the night of 3/31, so he was started on metoprolol low dose. COVD came back negative, as well as influenza.  He did also recieve an LP in the ER and CSF was negative for infection, though showed high glucose and protein.  All cultures were negative.*        Interval Problem Update  -Evaluated and examined the patient at bedside, denied any complaints and no events last night.  -The patient is alert and oriented x3, discussed with the patient the CODE STATUS and he is clear that he does not want intubation and no resuscitation.  -PT/OT and SLP working with the patient  -Labs reviewed, around his baseline.  -Switch IV to oral Lasix, on room air and saturation more than 90%  -X-ray reviewed personally stable no worsening on pleural effusion.   -We will check B12 and folic acid due to  elevated MCV      Consultants/Specialty  Intensivist    Code Status  Full code switch to DNR and DNI according to the patient wishes    Disposition  We will reevaluate the patient with PT and OT and likely he needs to continue rehabilitation    Review of Systems  Review of Systems   Constitutional: Positive for malaise/fatigue. Negative for chills, diaphoresis and fever.   Eyes: Negative.    Respiratory: Negative.    Cardiovascular: Negative.    Gastrointestinal: Positive for diarrhea. Negative for abdominal pain, blood in stool, constipation, nausea and vomiting.   Genitourinary: Negative.    Musculoskeletal: Negative.    Neurological: Negative.    Psychiatric/Behavioral: Negative.         Physical Exam  Temp:  [36.4 °C (97.6 °F)-36.8 °C (98.2 °F)] 36.7 °C (98 °F)  Pulse:  [] 92  Resp:  [16-22] 22  BP: (108-134)/(60-72) 108/63  SpO2:  [96 %-99 %] 97 %    Physical Exam  Constitutional:       Appearance: He is not ill-appearing.      Comments: cortack in place    Neck:      Comments: Collar in place  Cardiovascular:      Rate and Rhythm: Normal rate.      Heart sounds: No murmur.   Pulmonary:      Effort: Pulmonary effort is normal. No respiratory distress.      Breath sounds: No wheezing or rales.   Abdominal:      General: Abdomen is flat. Bowel sounds are normal. There is no distension.      Tenderness: There is no abdominal tenderness. There is no guarding.   Neurological:      Mental Status: He is alert and oriented to person, place, and time. Mental status is at baseline.      Cranial Nerves: No cranial nerve deficit.      Motor: No weakness.      Comments: Generalized weakness         Fluids    Intake/Output Summary (Last 24 hours) at 4/2/2020 1555  Last data filed at 4/2/2020 1400  Gross per 24 hour   Intake 540 ml   Output 750 ml   Net -210 ml       Laboratory  Recent Labs     03/31/20  0351 04/01/20  0547 04/02/20  0410   WBC 4.5* 4.5* 4.8   RBC 2.68* 2.70* 2.83*   HEMOGLOBIN 8.5* 8.6* 8.9*    HEMATOCRIT 26.5* 26.9* 28.9*   MCV 98.9* 99.6* 102.1*   MCH 31.7 31.9 31.4   MCHC 32.1* 32.0* 30.8*   RDW 81.9* 84.7* 88.5*   PLATELETCT 121* 120* 113*   MPV 9.6 9.9 9.6     Recent Labs     03/31/20  0351 04/01/20  0547 04/02/20  0410   SODIUM 140 141 143   POTASSIUM 4.0 4.5 4.2   CHLORIDE 104 105 104   CO2 24 26 26   GLUCOSE 270* 227* 116*   BUN 48* 56* 59*   CREATININE 2.07* 2.09* 2.27*   CALCIUM 8.6 8.7 9.1                   Imaging  DX-CHEST-PORTABLE (1 VIEW)   Final Result      No significant change from prior exam.      DX-CHEST-PORTABLE (1 VIEW)   Final Result      Stable chest. Unchanging left lower lobe atelectasis or pneumonia and bibasilar interstitial edema or pneumonia.      DX-CHEST-PORTABLE (1 VIEW)   Final Result      Persistent bilateral pulmonary consolidation with pleural effusions.      DX-CHEST-PORTABLE (1 VIEW)   Final Result         Central line is not seen. No appreciable pneumothorax.      DX-ABDOMEN FOR TUBE PLACEMENT   Final Result      Enteric tube tip projects over the stomach.      NG tube tip projects over the distal stomach.      EC-ECHOCARDIOGRAM COMPLETE W/O CONT   Final Result      DX-CHEST-PORTABLE (1 VIEW)   Final Result      Stable examination.      CT-CHEST (THORAX) W/O   Final Result      1.  Large bilateral pleural effusions with bibasilar atelectasis.      2.  Indeterminate right upper lobe pulmonary nodules measuring 5 and 9 mm in size.      3.  Endotracheal and nasogastric tubes present.      4.  Calcified granulomas.      Low Risk: CT at 3-6 months, then consider CT at 18-24 months      High Risk: CT at 3-6 months, then at 18-24 months      Comments: Use most suspicious nodule as guide to management. Follow-up intervals may vary according to size and risk.      Low Risk - Minimal or absent history of smoking and of other known risk factors.      High Risk - History of smoking or of other known risk factors.      Note: These recommendations do not apply to lung cancer  screening, patients with immunosuppression, or patients with known primary cancer.      Fleischner Society 2017 Guidelines for Management of Incidentally Detected Pulmonary Nodules in Adults               CT-HEAD W/O   Final Result      1.  No evidence of acute intracranial process.      2.  Cerebral atrophy as well as periventricular chronic small vessel ischemic change.      DX-CHEST-PORTABLE (1 VIEW)   Final Result         Endotracheal tube with tip projecting over the mid to lower thoracic trachea.      Gastric drainage tube courses below diaphragm, tip is not seen.      DX-CHEST-PORTABLE (1 VIEW)   Final Result         No significant interval change. Grossly unchanged bibasilar ill-defined opacities           Assessment/Plan  Acute encephalopathy- (present on admission)  Assessment & Plan  Likely delirium from underlying medical issues; toxic metabolic from sepsis  CT head was negative  LP CSF negative  Continue nonpharmacological treatment for delirium  Avoid opioid and benzo  PT and OT with speech  Likely need rehab  Significant improving, the patient is alert and oriented x3 and he is able to make a good conversation      Acute respiratory failure (HCC)- (present on admission)  Assessment & Plan  Intubated 3/28->3/31   Chest x-ray showed bilateral infiltration  CT had bilateral pleural effusions      Flu negative, COVID neg      Echo did not show heart failure  Force Diuresis as tolerated; on 20mg IV bid  Finished antibiotics for 5 days  Improving and right now he is on room air         Central cord syndrome (HCC)- (present on admission)  Assessment & Plan  S/p C3-6 laminectomy and posterolateral fusion  for cervical stenosis/myelopathy  The patient received rehab therapy.  Continue monitoring and follow-up as outpatient with the neurosurgeon    V tach (HCC)- (present on admission)  Assessment & Plan  Unsustained, restarted on metoprolol   Electrolytes ok    Thrombocytopenia (HCC)- (present on  admission)  Assessment & Plan  Chronic around 110s  No signs of a bleeding  Continue monitoring    Anemia- (present on admission)  Assessment & Plan  With elevated MCV and low platelets  Stable  We will check B12 and folic acid   Iron panel around stable      Dysphagia- (present on admission)  Assessment & Plan  Due to cervical spine injury   SLP on board   We will keep his cartrack at this time until improving on his a speech    DM (diabetes mellitus) (HCC)- (present on admission)  Assessment & Plan  Last A1c is 5.8 and his target for his age   Continue on lantus 10 units and ssi;      Pleural effusion, bilateral- (present on admission)  Assessment & Plan  S/p diuresis  On room air    Cervical stenosis of spine- (present on admission)  Assessment & Plan  S/p cervical laminectomy in February, has been at rehab  Can move all extremities; will need ongoing rehab    ACP (advance care planning)  Assessment & Plan  The patient is alert and oriented x4 and he makes a good conversation he is able to make decision, discussed with him the CODE STATUS and the patient is a clear that he wants to be DNR/DNI.  I reviewed his advanced directive which showed the patient preferred not to be resuscitated    Pulmonary nodules- (present on admission)  Assessment & Plan  F/u with pcp    Acute on chronic renal failure (HCC)- (present on admission)  Assessment & Plan  Improving   Around his baseline creatinine 2        VTE prophylaxis: Heparin

## 2020-04-02 NOTE — PROGRESS NOTES
MS:  SR w/ 1st degree and BBB: 81-95  1-1.2 sec pauses (F)  .22/.12/.36   EKG confirmed frequent pauses

## 2020-04-02 NOTE — ASSESSMENT & PLAN NOTE
Likely delirium from underlying medical issues; toxic metabolic from sepsis  CT head was negative  LP CSF negative  Continue nonpharmacological treatment for delirium  Avoid opioid and benzo  PT and OT with speech  Likely need rehab  Significant improving, the patient is alert and oriented x3 and he is able to make a good conversation

## 2020-04-02 NOTE — DISCHARGE PLANNING
Acute Rehab Hospital/ Transitional Care Coordination  Tiger Text from Marybeth re: need for therapy notes.   Will need updated / ongoing notes from PT/OT/SLP to confirm pt is able to tolerate Rehab level of care.     Will continue to follow.

## 2020-04-02 NOTE — ASSESSMENT & PLAN NOTE
With elevated MCV and low platelets  Stable  We will check B12 and folic acid   Iron panel around stable

## 2020-04-02 NOTE — PROGRESS NOTES
Patient transported to floor from [facility], report received from Breezy RN, patient placed in 708. Tele monitor in place, called monitors verified visual rhythm. Isolation precautions in place. Patient AAxOx4 on room air. Patient educated on use of call light, verbalized understanding, call light within reach.

## 2020-04-02 NOTE — ASSESSMENT & PLAN NOTE
The patient is alert and oriented x4 and he makes a good conversation he is able to make decision, discussed with him the CODE STATUS and the patient is a clear that he wants to be DNR/DNI.  I reviewed his advanced directive which showed the patient preferred not to be resuscitated

## 2020-04-02 NOTE — PROGRESS NOTES
12 hour chart check.    Monitor Summary:    New Admit 16:20  SR with F pauses, runs of P.Afib  .20/.10/.34

## 2020-04-02 NOTE — DIETARY
Nutrition Services: Update   Day 5 of admit.  Vince Almanzar is a 79 y.o. male with admitting DX of Acute respiratory failure, Quadriplegia, C1-C4 incomplete     Pt was previously receiving TF. Cortrak remains in place and TF is clamped. Pt is currently on diabetic, soft & bite sized diet with mildly thick liquids. Per chart pt PO 25-50% 1 meal documented. Wt 4/1: 72.5 kg via bed scale - wt variable since admit suspect related to fluids.     Update consult received for supplements, will add Boost Glucose Control TID with meals.     Malnutrition Risk: No criteria noted at this time.     Recommendations/Plan:  1. Keep cortrak in place until pt can demonstrate adequate PO intake (PO >/=50% for at least 3 meals)  2. RD will re-assess need for supplemental TF.   3. Encourage intake of meals  4. Document intake of all meals as % taken in ADL's to provide interdisciplinary communication across all shifts.   5. Monitor weight.  6. Nutrition rep will continue to see patient for ongoing meal and snack preferences.    RD following

## 2020-04-02 NOTE — THERAPY
"Physical Therapy Treatment completed.   Bed Mobility:  Supine to Sit: Minimal Assist(assistance performed with increased height of chair)  Transfers: Sit to Stand: Supervised  Gait: Level Of Assist: Minimal Assist with Front-Wheel Walker       Plan of Care: Will benefit from Physical Therapy 3 times per week  Discharge Recommendations: Equipment: Front-Wheel Walker. Post-acute therapy Discharge to a transitional care facility for continued skilled therapy services.    Patient pleasant but poorly motivated during therapy, asking when we can stop.  Patient initially refused second ambulation, but with significant encouragement performed second set of ambulation.  Patient not wanting to leave room during session, stating it is too far.  Patient did not show signs of fatigue during session, indicating self limiting behaviors likely.  Encouraged consistent, short ambulation with nursing staff, patient agreeable.   Overall, performed well in therapy, no LOB, and cueing for upright torso and increase CASSI.      Ty Teri PT    See \"Rehab Therapy-Acute\" Patient Summary Report for complete documentation.       "

## 2020-04-02 NOTE — PROGRESS NOTES
Patient not tolerating new diet. Day RN to follow up with speech in the morning regarding diet.

## 2020-04-03 LAB
ANION GAP SERPL CALC-SCNC: 12 MMOL/L (ref 7–16)
BASOPHILS # BLD AUTO: 0.4 % (ref 0–1.8)
BASOPHILS # BLD: 0.02 K/UL (ref 0–0.12)
BUN SERPL-MCNC: 52 MG/DL (ref 8–22)
CALCIUM SERPL-MCNC: 9 MG/DL (ref 8.5–10.5)
CHLORIDE SERPL-SCNC: 104 MMOL/L (ref 96–112)
CO2 SERPL-SCNC: 26 MMOL/L (ref 20–33)
CREAT SERPL-MCNC: 2.2 MG/DL (ref 0.5–1.4)
EOSINOPHIL # BLD AUTO: 0.13 K/UL (ref 0–0.51)
EOSINOPHIL NFR BLD: 2.8 % (ref 0–6.9)
ERYTHROCYTE [DISTWIDTH] IN BLOOD BY AUTOMATED COUNT: 83.2 FL (ref 35.9–50)
FOLATE SERPL-MCNC: 15.5 NG/ML
GLUCOSE BLD-MCNC: 152 MG/DL (ref 65–99)
GLUCOSE BLD-MCNC: 169 MG/DL (ref 65–99)
GLUCOSE BLD-MCNC: 190 MG/DL (ref 65–99)
GLUCOSE BLD-MCNC: 197 MG/DL (ref 65–99)
GLUCOSE BLD-MCNC: 89 MG/DL (ref 65–99)
GLUCOSE SERPL-MCNC: 87 MG/DL (ref 65–99)
HCT VFR BLD AUTO: 27.5 % (ref 42–52)
HGB BLD-MCNC: 8.7 G/DL (ref 14–18)
IMM GRANULOCYTES # BLD AUTO: 0.03 K/UL (ref 0–0.11)
IMM GRANULOCYTES NFR BLD AUTO: 0.7 % (ref 0–0.9)
LYMPHOCYTES # BLD AUTO: 0.72 K/UL (ref 1–4.8)
LYMPHOCYTES NFR BLD: 15.7 % (ref 22–41)
MAGNESIUM SERPL-MCNC: 2 MG/DL (ref 1.5–2.5)
MCH RBC QN AUTO: 31.6 PG (ref 27–33)
MCHC RBC AUTO-ENTMCNC: 31.6 G/DL (ref 33.7–35.3)
MCV RBC AUTO: 100 FL (ref 81.4–97.8)
MONOCYTES # BLD AUTO: 0.6 K/UL (ref 0–0.85)
MONOCYTES NFR BLD AUTO: 13.1 % (ref 0–13.4)
NEUTROPHILS # BLD AUTO: 3.08 K/UL (ref 1.82–7.42)
NEUTROPHILS NFR BLD: 67.3 % (ref 44–72)
NRBC # BLD AUTO: 0 K/UL
NRBC BLD-RTO: 0 /100 WBC
PLATELET # BLD AUTO: 111 K/UL (ref 164–446)
PMV BLD AUTO: 9.7 FL (ref 9–12.9)
POTASSIUM SERPL-SCNC: 4.8 MMOL/L (ref 3.6–5.5)
PROCALCITONIN SERPL-MCNC: 0.16 NG/ML
RBC # BLD AUTO: 2.75 M/UL (ref 4.7–6.1)
SODIUM SERPL-SCNC: 142 MMOL/L (ref 135–145)
VIT B12 SERPL-MCNC: 944 PG/ML (ref 211–911)
WBC # BLD AUTO: 4.6 K/UL (ref 4.8–10.8)
WBC STL QL MICRO: NORMAL

## 2020-04-03 PROCEDURE — A9270 NON-COVERED ITEM OR SERVICE: HCPCS | Performed by: PHYSICAL MEDICINE & REHABILITATION

## 2020-04-03 PROCEDURE — 80048 BASIC METABOLIC PNL TOTAL CA: CPT

## 2020-04-03 PROCEDURE — 97116 GAIT TRAINING THERAPY: CPT | Mod: CQ

## 2020-04-03 PROCEDURE — 36415 COLL VENOUS BLD VENIPUNCTURE: CPT

## 2020-04-03 PROCEDURE — 97530 THERAPEUTIC ACTIVITIES: CPT

## 2020-04-03 PROCEDURE — 83735 ASSAY OF MAGNESIUM: CPT

## 2020-04-03 PROCEDURE — 700102 HCHG RX REV CODE 250 W/ 637 OVERRIDE(OP): Performed by: INTERNAL MEDICINE

## 2020-04-03 PROCEDURE — 770020 HCHG ROOM/CARE - TELE (206)

## 2020-04-03 PROCEDURE — 82607 VITAMIN B-12: CPT

## 2020-04-03 PROCEDURE — 82962 GLUCOSE BLOOD TEST: CPT | Mod: 91

## 2020-04-03 PROCEDURE — 99232 SBSQ HOSP IP/OBS MODERATE 35: CPT | Performed by: INTERNAL MEDICINE

## 2020-04-03 PROCEDURE — 97530 THERAPEUTIC ACTIVITIES: CPT | Mod: CQ

## 2020-04-03 PROCEDURE — A9270 NON-COVERED ITEM OR SERVICE: HCPCS | Performed by: INTERNAL MEDICINE

## 2020-04-03 PROCEDURE — 85025 COMPLETE CBC W/AUTO DIFF WBC: CPT

## 2020-04-03 PROCEDURE — 700102 HCHG RX REV CODE 250 W/ 637 OVERRIDE(OP): Performed by: PHYSICAL MEDICINE & REHABILITATION

## 2020-04-03 PROCEDURE — 82746 ASSAY OF FOLIC ACID SERUM: CPT

## 2020-04-03 PROCEDURE — 99222 1ST HOSP IP/OBS MODERATE 55: CPT | Performed by: PHYSICAL MEDICINE & REHABILITATION

## 2020-04-03 PROCEDURE — 84145 PROCALCITONIN (PCT): CPT

## 2020-04-03 PROCEDURE — 700111 HCHG RX REV CODE 636 W/ 250 OVERRIDE (IP): Performed by: INTERNAL MEDICINE

## 2020-04-03 PROCEDURE — 89055 LEUKOCYTE ASSESSMENT FECAL: CPT

## 2020-04-03 RX ORDER — GABAPENTIN 100 MG/1
100 CAPSULE ORAL 3 TIMES DAILY
Status: DISCONTINUED | OUTPATIENT
Start: 2020-04-03 | End: 2020-04-10 | Stop reason: HOSPADM

## 2020-04-03 RX ADMIN — HEPARIN SODIUM 5000 UNITS: 5000 INJECTION, SOLUTION INTRAVENOUS; SUBCUTANEOUS at 15:08

## 2020-04-03 RX ADMIN — GABAPENTIN 100 MG: 100 CAPSULE ORAL at 17:13

## 2020-04-03 RX ADMIN — HEPARIN SODIUM 5000 UNITS: 5000 INJECTION, SOLUTION INTRAVENOUS; SUBCUTANEOUS at 05:49

## 2020-04-03 RX ADMIN — INSULIN HUMAN 2 UNITS: 100 INJECTION, SOLUTION PARENTERAL at 17:12

## 2020-04-03 RX ADMIN — INSULIN GLARGINE 10 UNITS: 100 INJECTION, SOLUTION SUBCUTANEOUS at 17:13

## 2020-04-03 RX ADMIN — METOPROLOL TARTRATE 12.5 MG: 25 TABLET, FILM COATED ORAL at 05:46

## 2020-04-03 RX ADMIN — INSULIN HUMAN 2 UNITS: 100 INJECTION, SOLUTION PARENTERAL at 23:21

## 2020-04-03 RX ADMIN — INSULIN HUMAN 2 UNITS: 100 INJECTION, SOLUTION PARENTERAL at 00:20

## 2020-04-03 RX ADMIN — METOPROLOL TARTRATE 12.5 MG: 25 TABLET, FILM COATED ORAL at 17:14

## 2020-04-03 RX ADMIN — INSULIN HUMAN 2 UNITS: 100 INJECTION, SOLUTION PARENTERAL at 11:59

## 2020-04-03 RX ADMIN — FUROSEMIDE 40 MG: 40 TABLET ORAL at 17:14

## 2020-04-03 RX ADMIN — HEPARIN SODIUM 5000 UNITS: 5000 INJECTION, SOLUTION INTRAVENOUS; SUBCUTANEOUS at 22:10

## 2020-04-03 RX ADMIN — FUROSEMIDE 40 MG: 40 TABLET ORAL at 05:48

## 2020-04-03 RX ADMIN — HEPARIN SODIUM 5000 UNITS: 5000 INJECTION, SOLUTION INTRAVENOUS; SUBCUTANEOUS at 00:18

## 2020-04-03 RX ADMIN — THIAMINE HCL TAB 100 MG 100 MG: 100 TAB at 05:48

## 2020-04-03 ASSESSMENT — ENCOUNTER SYMPTOMS
EYES NEGATIVE: 1
DIARRHEA: 1
CONSTIPATION: 0
NAUSEA: 0
MUSCULOSKELETAL NEGATIVE: 1
DIAPHORESIS: 0
RESPIRATORY NEGATIVE: 1
CHILLS: 0
ABDOMINAL PAIN: 0
CARDIOVASCULAR NEGATIVE: 1
NEUROLOGICAL NEGATIVE: 1
PSYCHIATRIC NEGATIVE: 1
VOMITING: 0
BLOOD IN STOOL: 0
FEVER: 0

## 2020-04-03 ASSESSMENT — COGNITIVE AND FUNCTIONAL STATUS - GENERAL
CLIMB 3 TO 5 STEPS WITH RAILING: TOTAL
TOILETING: TOTAL
MOVING TO AND FROM BED TO CHAIR: UNABLE
EATING MEALS: A LOT
HELP NEEDED FOR BATHING: TOTAL
MOVING FROM LYING ON BACK TO SITTING ON SIDE OF FLAT BED: UNABLE
SUGGESTED CMS G CODE MODIFIER MOBILITY: CL
WALKING IN HOSPITAL ROOM: A LITTLE
TURNING FROM BACK TO SIDE WHILE IN FLAT BAD: UNABLE
PERSONAL GROOMING: A LOT
DRESSING REGULAR LOWER BODY CLOTHING: TOTAL
STANDING UP FROM CHAIR USING ARMS: A LITTLE
DAILY ACTIVITIY SCORE: 9
MOBILITY SCORE: 10
DRESSING REGULAR UPPER BODY CLOTHING: A LOT
SUGGESTED CMS G CODE MODIFIER DAILY ACTIVITY: CL

## 2020-04-03 ASSESSMENT — GAIT ASSESSMENTS
DEVIATION: BRADYKINETIC;SHUFFLED GAIT;OTHER (COMMENT)
GAIT LEVEL OF ASSIST: MINIMAL ASSIST
DISTANCE (FEET): 35
ASSISTIVE DEVICE: FRONT WHEEL WALKER

## 2020-04-03 ASSESSMENT — FIBROSIS 4 INDEX: FIB4 SCORE: 3.87

## 2020-04-03 NOTE — THERAPY
"Physical Therapy Treatment completed.   Bed Mobility:  Supine to Sit: (NT up in chair)  Transfers: Sit to Stand: Minimal Assist  Gait: Level Of Assist: Minimal Assist with Front-Wheel Walker       Plan of Care: Will benefit from Physical Therapy 5 times per week  Discharge Recommendations: Equipment: Will Continue to Assess for Equipment Needs. Post-acute therapy Recommend post-acute placement for continued physical therapy services prior to discharge home.       See \"Rehab Therapy-Acute\" Patient Summary Report for complete documentation.     Pt progressing well w/ therapy. Pt needs most assist w/ transitional changes. He requires a lot of cues for propper STS. He had poor eccentric control and would essentially plop w/out therapist assist. Pt able to increase his ambulation tolerance today. As he fatigues, he demonstrates delayed initiation of advancing the R LE w/ minor buckling. Pt w/ very poor posture during all mobility but states \"I've been this way forever.\" Pt continues to be at a level in which he will benefit from post acute therapy.  "

## 2020-04-03 NOTE — DISCHARGE PLANNING
Received Choice form at 0905  Agency/Facility Name: [facility]  Referral sent per Choice form @ 0905

## 2020-04-03 NOTE — DISCHARGE PLANNING
Patient's sister, Ángela provided information for assessment.     Patient is a 79 year old single man who came from [facility], but normally lives in [place]. Patient's sister, Ángela also lives in [place]. She reports he is unable to care for himself, as he lives alone. Patient is a retired  and has SS financial benefits. Patient is also a , was in the [employer] and was stationed in [place]. Insurance is [facility] and Medicare.     PCP is Dr. Granda. Sister reports patient has many doctors, [facility]. Patient has DME, walker and has needed PT/OT following surgery. Patient does not have home O2. [facility]. No issues with drugs, alcohol, or mental health.     Discussed plan for D/C with sister. Updated that [facility] might not accept patient back, as he needs to be assessed at a certain level, may need SNF. She reports patient has chosen [facility] in the past. SW to keep sister update.     PLAN: D/C to SNF or Rehab. Per Dr. Trujillo in rounds today 4/3/20, he is medically clear.   SW to call [facility] for follow up. Pending SNF acceptance.     Care Transition Team Assessment    Information Source  Orientation : Unable to Assess  Information Given By: Relative  Informant's Name: SisterÁngela  Who is responsible for making decisions for patient? : Patient    Readmission Evaluation  Is this a readmission?: Yes - unplanned readmission  Why do you think you were readmitted?: Respritory Failure    Elopement Risk  Legal Hold: No  Ambulatory or Self Mobile in Wheelchair: No-Not an Elopement Risk  Elopement Risk: Not at Risk for Elopement    Interdisciplinary Discharge Planning  Lives with - Patient's Self Care Capacity: Alone and Able to Care For Self  Patient or legal guardian wants to designate a caregiver (see row info): No  Housing / Facility: 1 Story Apartment / Condo  Prior Services: Other (Comments)(transfer from Inpatient Rehab)    Discharge  Preparedness  What is your plan after discharge?: Uncertain - pending medical team collaboration, Skilled nursing facility  What are your discharge supports?: Sibling  Prior Functional Level: Needs Assist with Activities of Daily Living, Needs Assist with Medication Management, Uses Walker  Difficulity with ADLs: Bathing, Dressing, Toileting, Walking  Difficulty with ADLs Comment: Needs rehab  Difficulity with IADLs: Cooking, Driving, Laundry, Shopping  Difficulity with IADL Comments: Has help from family    Functional Assesment  Prior Functional Level: Needs Assist with Activities of Daily Living, Needs Assist with Medication Management, Uses Walker    Finances  Financial Barriers to Discharge: No  Prescription Coverage: Yes    Vision / Hearing Impairment  Right Eye Vision: Impaired, Wears Glasses  Left Eye Vision: Impaired, Wears Glasses         Advance Directive  Advance Directive?: POLST    Domestic Abuse  Have you ever been the victim of abuse or violence?: No  Physical Abuse or Sexual Abuse: No  Verbal Abuse or Emotional Abuse: No  Possible Abuse Reported to:: Not Applicable    Psychological Assessment  History of Substance Abuse: None  History of Psychiatric Problems: No  Non-compliant with Treatment: No  Newly Diagnosed Illness: No    Discharge Risks or Barriers  Discharge risks or barriers?: Complex medical needs  Patient risk factors: Complex medical needs, Readmission    Anticipated Discharge Information  Anticipated discharge disposition: Acute rehab, SNF  Discharge Address: Pending  Discharge Contact Phone Number: [phone]

## 2020-04-03 NOTE — PROGRESS NOTES
Handoff report received. Assumed patient care. PT is reclined in bed, AAOx4, complaint of mild generalized discomfort. Tele box is on, POC discussed with PT and all questions addressed. Safety precautions in place. Call light and personal belongings within reach. Educated to call for assistance if needed.

## 2020-04-03 NOTE — THERAPY
"Occupational Therapy Treatment completed with focus on ADLs and ADL transfers.  Functional Status:   Currently mod/max A with ADLs and ADL transfers. Pt currently limited by decreased functional mobility, activity tolerance, cognition, sensation, strength, AROM, coordination, balance, and pain which are affecting pt's ability to complete ADLs/IADLs at baseline. Pt would benefit from OT services in the acute care setting to maximize functional recovery.   Plan of Care: Will benefit from Occupational Therapy 4 times per week  Discharge Recommendations: Recommend post-acute placement for additional occupational therapy services prior to discharge home.  See \"Rehab Therapy-Acute\" Patient Summary Report for complete documentation.   "

## 2020-04-03 NOTE — PROGRESS NOTES
Assumed care this am from night shift RN. Patient resting in bed. Call bell and personal items within reach, bed locked in low position. Hourly rounding in place.

## 2020-04-03 NOTE — DISCHARGE PLANNING
Acute Rehab Hospital/ Transitional Care Coordination  Tc back from [facility]  who is working from home.   She confirms pt will need a new authorization for transferring to Rehab  [facility]s [facility] to the [facility] today ---the earliest we will hear back from them is on Monday 6, 2020.

## 2020-04-03 NOTE — PREADMISSION SCREENING NOTE
"  Pre-Admission Screening Form    Patient Information:   Name: Vince Almanzar     MRN: [id number]       : 1940      Age: 79 y.o.   Gender: male      Race: White [7]       Marital Status: Single [1]  Family Contact: MichaelÁngela        Relationship: Sister [14]  Home Phone: [phone]           Cell Phone:   Advanced Directives: Yes  Code Status:  DNAR/DNI  Current Attending Provider: Jacinta Trujillo M.D.  Referring Physician: N/A       Physiatrist Consult: N/A       Referral Date:   [facility]  Secondary Payor Source:  [facility] [employer] [facility] MEDICARE    Medical Information:   Date of Admission to Acute Care Setting:3/28/2020  Room Number: T708/02  Rehabilitation Diagnosis: 121 Quadriplegia, Incomplete C5-8  Immunization History   Administered Date(s) Administered   • Influenza Vaccine Adult HD 2018, 09/15/2019     No Known Allergies  Past Medical History:   Diagnosis Date   • Arr[facility]ia     [facility]   • Diabetes (HCC)    • Hemorrhagic disorder (HCC)     \"bleeds easily\"   • High cholesterol    • Hypertension    • Jaundice 2016   • Renal disorder     hx kidney stones     Past Surgical History:   Procedure Laterality Date   • CERVICAL DECOMPRESSION POSTERIOR  2020    Procedure: DECOMPRESSION, SPINE, CERVICAL, POSTERIOR APPROACH- C3-6 DISCECTOMY AND FUSION;  Surgeon: Lg Sellers D.O.;  Location: [facility];  Service: Neurosurgery   • GASTROSCOPY-ENDO N/A 6/3/2016    Procedure: GASTROSCOPY-ENDO;  Surgeon: Jasper Donnelly M.D.;  Location: [facility];  Service:    • EGD W/ENDOSCOPIC ULTRASOUND N/A 6/3/2016    Procedure: EGD W/ENDOSCOPIC ULTRASOUND UPPER;  Surgeon: Jasper Donnelly M.D.;  Location: [facility];  Service:    • EGD WITH ASP/BX N/A 6/3/2016    Procedure: EGD WITH ASP/BX - FNA, DUODENAL BIOPSIES, FNA OF PANCREAS;  Surgeon: Jasper Donnelly M.D.;  Location: [facility];  Service:    • " ERCP  5/2016   • CHOLECYSTECTOMY  2006    gallstones removed   • HIP ARTHROPLASTY TOTAL Left 2001    left total hip       History Leading to Admission, Conditions that Caused the Need for Rehab (CMS):     Mina Tripathi M.D.   Physician   Pulmonary   Consults   Signed   Date of Service:  3/28/2020  9:12 PM               Expand All Collapse All      []Hide copied text    []Debby for details  Critical Care Consultation     Date of consult: 3/28/2020     Referring Physician  Oli Le M.D.     Reason for Consultation  Acute respiratory failure and acute encephalopathy     History of Presenting Illness  79 y.o. male with hx of DM , Hypertension, CKD 4 and recent laminectomy  who presented 3/28/2020 with acute encephalopathy. Apparently he was at his rehab facility and developed altered mentation however I am unsure if if this was disorientation or another process as he is intubated no family is around and hx is sparse. He was in the [facility] ED this am discharged back but returned again this evening. He was intubated for airway protection and was noted to have thick secretions prior. CXR reveal multifocal opacities. COVID rule out was initiated. An LP was performed and initial CSF was unremarkable. CBC revealed a normal WBC but lymphopenia. CR elevated but actually improved compared to prior labs. BNP was 5000s which appears chronically elevated.         Assessment/Plan  Acute encephalopathy  Assessment & Plan  Likely from underlying medical issues; ?PNA CAP vs viral/atypical  CT head without acute process  LP and CSF largely unremarkable  If no improvement could conisder MRI but unlikely to be utility at the moment  Urine w/ LE and WBC but nitrite neg        Acute respiratory failure (HCC)  Assessment & Plan  Perhaps CAP or aspiration PNA but CXR not overwhelmingly convincing, COVID in the DDX  Some evidence of volume overload  Flu negative     RT/O2 protocols  Daily and PRN ABGs  Titration of ventilator therapy  "based on ABGs and patient's status  Sedation as tolerated/indicated  Daily CXR  HOB >30 degrees and peridex for VAP prevention  Pepcid for GI prophylaxis  SAT/SBT when able (ABCDEF Bundle)  Early mobilization     Continue iv abx     Acute on chronic renal failure (HCC)- (present on admission)  Assessment & Plan  Improved compared to prior labs  Renal dose meds, avoid nephrotoxins  Strict I/Os  Follow renal function        DM (diabetes mellitus) (HCC)  Assessment & Plan  SSI  Resume home regimen when able     Thrombocytopenia (HCC)- (present on admission)  Assessment & Plan  No signs of hemorrhage at presetn  Chronic  monitor     Cervical stenosis of spine  Assessment & Plan   Recent cervical laminectomy and decompression    Surgical without evidence of erythema or edema        Discussed patient condition and risk of morbidity and/or mortality with RN, RT, Pharmacy, Charge nurse / hot rounds and Patient.    The patient remains critically ill.  Critical care time = 98 minutes in directly providing and coordinating critical care and extensive data review.  No time overlap and excludes procedures.     Sarthak Diaz M.D.   Physician   Pulmonary   Procedures   Addendum   Date of Service:  3/29/2020  2:31 PM               Addendum             []Hide copied text    []Avelinover for details  Date: 3/29/2020     Procedure:  Central Venous Catheter Insertion     Indication: hypotension     Physician:  Sarthak Diaz M.D.     Consent:  emergent; time out performed     Procedure:  The patient was placed in the [place] position.  Appropriate \"time out\" was performed.  The right groin was prepped and draped in the usual sterile fashion.  Under full body sterile barrier precautions, a triple lumen central venous catheter was placed without difficulty in the right femoral position.  US was used for localization and placement.  All lumens were flushed with sterile saline and sterile caps were applied.  The line was sutured in " place and a sterile dressing was applied.  There were no immediate complications.  A post-procedure CXR will be reviewed.  Estimated blood loss < 5cc.       Initial attempt made at left subclavian after prepping in usual sterile fashion.  Given c-collar in place and inability to adequately position, made 2 attempts without access and therefore aborted given body habitus and concern for increased risk of pneumothorax.  CXR pending to ensure no complications.              Za Mcdonald R.N.   Registered Nurse      Wound Team   Signed   Date of Service:  3/30/2020  5:01 PM               Expand All Collapse All      []Hide copied text    []Hover for details  [facility]  Inpatient Services  Initial Wound and Skin Care Evaluation     Admission Date: 3/28/2020     Last order of IP CONSULT TO WOUND CARE was found on 3/29/2020 from Hospital Encounter on 3/28/2020      HPI, PMH, SH: Reviewed    Unit where seen by Wound Team: T630/00      WOUND CONSULT/FOLLOW UP RELATED TO:  Bilateral heels, bilateral shins, abdomen, and Left forearm      Self Report / Pain Level:  Declines pain. No s/sx.        OBJECTIVE:  Patient on ICU DARELL, heel mepilex in place.     WOUND TYPE, LOCATION, CHARACTERISTICS (Pressure Injuries: location, stage, POA or date identified)       Wound 03/29/20 Pressure Injury Heel Posterior Left Stage 2 POA (Active)   Wound Image    3/30/2020  3:30 PM   Site Assessment Clean;Dry;Pink;Fragile 3/30/2020  4:00 PM   Periwound Assessment Clean;Dry;Intact;Pink 3/30/2020  4:00 PM   Margins Defined edges 3/30/2020  4:00 PM   Closure Other (Comment) 3/30/2020  4:00 PM   Drainage Amount None 3/30/2020  4:00 PM   Treatments Site care 3/30/2020  3:30 PM   Wound Cleansing Soap and Water 3/30/2020  3:30 PM   Periwound Protectant Not Applicable 3/30/2020  3:30 PM   Dressing Cleansing/Solutions Not Applicable 3/30/2020  3:30 PM   Dressing Options Mepilex Heel 3/30/2020  4:00 PM   Dressing Changed Observed  3/30/2020  3:30 PM   Dressing Status Clean;Dry;Intact 3/30/2020  4:00 PM   Dressing Change/Treatment Frequency Every 72 hrs, and As Needed 3/30/2020  5:00 PM   NEXT Dressing Change/Treatment Date 03/31/20 3/30/2020  5:00 PM   NEXT Weekly Photo (Inpatient Only) 04/06/20 3/30/2020  3:30 PM   Pressure Injury Stage 2 3/30/2020  3:30 PM   Wound Length (cm) 3 cm 3/30/2020  3:30 PM   Wound Width (cm) 2 cm 3/30/2020  3:30 PM   Wound Surface Area (cm^2) 6 cm^2 3/30/2020  3:30 PM   Wound Odor None 3/30/2020  3:30 PM   Number of days: 1            Vascular:     SANTIAGO:   SANTIAGO Results, Last 30 Days Us-santiago Single Level Bilat     Result Date: 3/11/2020  Narrative  Vascular Laboratory  Conclusions  No evidence of arterial insufficiency.  PABLO STEVENS  Age:    79    Gender:     M  MRN:    [id number]  :    1940      BSA:  Exam Date:     2020 10:16  Room #:     Inpatient  Priority:     Routine  Ht (in):             Wt (lb):  Ordering Physician:        GEE MARROQUIN  Referring Physician:       [id number] Louis  Sonographer:               Augusta Yee RDMS  Study Type:                Complete Bilateral  Technical Quality:         Adequate  Indications:     Encounter for screening for cardiovascular disorders,                   Diabetes, Swelling of Limb, Pain in Limb  CPT Codes:       04268  ICD Codes:       Z13.6  250  729.81  729.5  History:         Debendent changes. Diabetes. Pain in bilateral limbs.  Limitations:                 RIGHT  Waveform            Systolic BPs (mmHg)                             137           Brachial  Triphasic                                Common Femoral  Triphasic                  200           Posterior Tibial  Triphasic                  199           Dorsalis Pedis                                           Peroneal                             1.46          SANTIAGO                                           TBI                       LEFT  Waveform        Systolic BPs (mmHg)                              136           Brachial  Triphasic                                Common Femoral  Triphasic                  165           Posterior Tibial  Triphasic                  147           Dorsalis Pedis                                           Peroneal                             1.20          DANYELL                                           TBI  Findings  Bilateral.  Doppler waveforms of the common femoral artery is of high amplitude and  triphasic.  Doppler waveforms at the ankle are brisk and triphasic.  Ankle-brachial index is normal.  Additional testing was not performed in accordance with lower extremity  arterial evaluation protocol.  Jovanny Briggs MD  (Electronically Signed)  Final Date:      11 March 2020                   11:19           Lab Values:           Lab Results   Component Value Date/Time     WBC 5.4 03/30/2020 05:29 AM     RBC 2.59 (L) 03/30/2020 05:29 AM     HEMOGLOBIN 8.2 (L) 03/30/2020 05:29 AM     HEMATOCRIT 25.4 (L) 03/30/2020 05:29 AM     CREACTPROT 3.63 (H) 03/30/2020 05:29 AM     HBA1C 5.1 03/13/2020 05:18 AM            Culture:                                  Obtained N/A,   Culture Results show:  Recent Results   No results found for this or any previous visit (from the past 720 hour(s)).           INTERVENTIONS BY WOUND TEAM:  Patient and chart reviewed. Consult placed regarding bilateral shins, bilateral heels, Left forearm, and abdomen. Left forearm had bruising, no need for wound care, left open to air.Lower  Abdomen with small bruises throughout, consistent with lovenox/heparin injection bruises. Left open to air, no need for wound care, bedside RN/MD to decide if continuing anticoagulation therapy. Bilateral shins with scabs, open to air, intact, no drainage, no wound care needed, pictures obtained and in chart. Right heel with what appears to be a callus and some scarring, heel mepilex in place, to be changed Q72hrs or PRN. Left heel with Stage 2 POA pressure injury, no  drainage present, pictures and measurements obtained, 3cm x 2cm, heel mepilex placed back on heel. Nursing to continue basic skin care orders, apply and change Heel mepilex Q72hrs or PRN for saturation/dislodgment. Proper orders placed, no need for wound team follow up unless wounds worsen.     Interdisciplinary consultation: Patient, Bedside RN, Wound RN Michael     EVALUATION: Patient elderly male readmitted for altered level of consciousness. Patient has been seen previously by wound services outpatient and inpatient. Bruising on LFA and abdomen to stay open to air. Bilateral shin scabs to be open to air. POA Stage 2 on Left heel is to have heel mepilex on. Heel mepilex to be applied to bilateral heels to prevent further friction and sheer and offload pressure from pressure injuries. To be changed by bedside RN Q72hrs or PRN.  No further need for wound care follow up unless bedside RN's have concerns or any areas worsen or open. Proper skin orders placed.     Goals: Steady decrease in wound area and depth weekly.     NURSING PLAN OF CARE ORDERS (X):     Dressing changes: See Dressing Care orders: X  Skin care: See Skin Care orders: X  Rectal tube care: See Rectal Tube Care orders:   Other orders:     RSKIN:   CURRENTLY IN PLACE (X), APPLIED THIS VISIT (A), ORDERED (O):   Q shift Dale:  X  Q shift pressure point assessments:  X  Pressure redistribution mattress            Low Airloss  X - ICU bed        Bariatric DARELL         Bariatric foam           Heel float boots     Heel Silicone dressing   X     Float Heels off Bed with Pillows  O             Barrier wipes         Barrier Cream         Barrier paste          Sacral silicone dressing   O      Silicone O2 tubing       O  Anchorfast         Cannula fixation Device (Tender )          Gray Foam Ear protectors           Trach with Optifoam split foam                 Waffle cushion        Waffle Overlay         Rectal tube or BMS    Purwick/Condom Cath             Antifungal tx      Interdry          Reposition q 2 hours    X, O    Up to chair        Ambulate      PT/OT        Dietician        Diabetes Education      PO     TF     TPN     NPO   # days   Other         WOUND TEAM PLAN OF CARE:   Dressing changes by wound team:          Follow up 1-2 times weekly:               Follow up 3 times weekly:                NPWT change 3 times weekly:     Follow up as needed:   X, please reconsult if Left heel worsens or other issues arise    Other (explain):      Anticipated discharge plans: N/A  LTACH:        SNF/Rehab:                  Home Care:           Outpatient Wound Center:            Self Care:                     Heather Monroe M.D.   Physician   [facility] Medicine   Consults   Signed   Date of Service:  4/1/2020  4:38 PM               Expand All Collapse All      []Hide copied text    []Hover for details  Hospital Medicine Consultation     Date of Service  4/1/2020     Referring Physician  Matty Mcintyre MD     Consulting Physician  Heather Monroe M.D.     Reason for Consultation  Taking over care from intensivist service     History of Presenting Illness  79 y.o. male with hx of diabetes, hypertension, CKD type 4 and recent cervical laminectomy who presented 3/28/2020 with acute encephalopathy. He had been transferred to [facility] 16 days prior and was sent to the ER on 3/28 for altered mentation. At that visit, he had an extensive workup, and though he couldn't remember why he had been sent to the ER, he had an unremarkable CT head.  He was found to have a new pneumonia and was treated with antibiotics, though was stable to return [facility] to rehab.  He was discharged back to rehab with the plan to continue antibiotics.  He then was sent back later that evening for altered mentation again.  He began gurling on secretions and no protecting his airway, so he was intubated and admitted to the ICU.  He was then a COVID rule out, as CXR revealed multifocal opacities and he  was on the vent.  He was extubated on 3/31 and did fine, though he did remain somewhat confused as to why he was here.  He went in and out of afib, with 20 beats of vtach the night of 3/31, so he was started on metoprolol low dose. COVD came back negative, as well as influenza.  He did also recieve an LP in the ER and CSF was negative for infection, though showed high glucose and protein.  All cultures were negative.              Assessment/Plan  Acute encephalopathy- (present on admission)  Assessment & Plan  Likely from underlying medical issues; toxic metabolic from sepsis  CT head was negative  LP CSF negative  Follows commands, ?delirium; holding sedation as possible  Normal TSH and ammonia        Acute respiratory failure (HCC)- (present on admission)  Assessment & Plan  Intubated 3/28->3/31   CT had bilateral pleural effusions but no heart failure on echo  Force Diuresis as tolerated; on 20mg IV bid  Continue empiric antibiotics at this time for 5 days to cover for pna  Flu negative, COVID neg        Acute on chronic renal failure (HCC)- (present on admission)  Assessment & Plan  At baseline now Cr 2     DM (diabetes mellitus) (HCC)- (present on admission)  Assessment & Plan  On lantus 10 units and ssi; may need increased; takes actos as outpt            Recent Labs     03/30/20  2156 03/31/20  1217 03/31/20  1651 04/01/20  0002 04/01/20  0546 04/01/20  1212 04/01/20  1811   POCGLUCOSE 206* 264* 272* 192* 230* 223* 249*            Dysphagia- (present on admission)  Assessment & Plan  Speech following, on nectar thicks     V tach (HCC)- (present on admission)  Assessment & Plan  Unsustained, restarted on metoprolol   Electrolytes ok     Pleural effusion, bilateral- (present on admission)  Assessment & Plan  S/p diuresis  On room air     Cervical stenosis of spine- (present on admission)  Assessment & Plan  S/p cervical laminectomy in February, has been at rehab  Can move all extremities; will need ongoing  rehab              Co-morbidities: See above  Potential Risk - Complications: Contractures, Deep Vein Thrombosis, Dysphagia, Incontinence, Malnutrition, Pain, Paralysis, Perceptual Impairment, Pneumonia, Pressure Ulcer, Seizures, Urinary Tract Infection and Infection  Level of Risk: High    Ongoing Medical Management Needed (Medical/Nursing Needs):   Patient Active Problem List    Diagnosis Date Noted   • Acute respiratory failure ([facility]) 03/28/2020     Priority: High   • Acute encephalopathy 03/28/2020     Priority: High   • Central cord syndrome ([facility]) 12/03/2018     Priority: High   • V tach ([facility]) 04/01/2020     Priority: Medium   • Anemia 12/11/2018     Priority: Medium   • Thrombocytopenia ([facility]) 12/11/2018     Priority: Medium   • Dysphagia 12/04/2018     Priority: Medium   • DM (diabetes mellitus) ([facility]) 12/03/2018     Priority: Medium   • Chronic kidney disease 12/03/2018     Priority: Medium   • Scalp laceration 12/03/2018     Priority: Medium   • 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 ([facility]) 12/03/2018     Priority: Medium   • Contraindication to deep vein thrombosis (DVT) prophylaxis 12/03/2018     Priority: Medium   • Pulmonary nodules 04/02/2020     Priority: Low   • ACP (advance care planning) 04/02/2020     Priority: Low   • Acute on chronic renal failure ([facility]) 02/28/2020     Priority: Low   • Essential hypertension 12/03/2018     Priority: Low   • Hyperlipemia 12/03/2018     Priority: Low   • Trauma 12/03/2018     Priority: Low   • Pleural effusion, bilateral 03/30/2020   • Altered mental status 03/28/2020   • Diarrhea 03/22/2020   • Volume overload 03/20/2020   • Somnolence 03/19/2020   • Cervical stenosis of spine 03/14/2020   • Pneumonia 03/14/2020   • Incomplete quadriplegia at C5-C8 level ([facility]) 03/12/2020   • Neuropathic pain 03/12/2020   • Neurogenic bowel 03/12/2020   • Neurogenic bladder 03/12/2020   • Tachycardia 12/16/2018   • Vitamin D deficiency 12/11/2018    "  • Uncontrolled type 2 diabetes mellitus with hyperglycemia (HCC) 11/12/2018   • Mass of pancreas 06/03/2016     Sandy Mcconnell R.N.   Registered Nurse      Progress Notes   Signed   Date of Service:  4/3/2020  4:26 AM                    []Hide copied text    []Debby for details  Monitor Summary:   SR ST   RRamsey PVC, BBB, 1st Degree HB   .24/ .10/ .38  Multiple 1.2 second pauses        Current Vital Signs:   Temperature: 36.7 °C (98 °F) Pulse: 100 Respiration: 16 Blood Pressure : 112/59  Weight: (pt refused to get up. bed sacale not working.RN notified ) Height: 177.8 cm (5' 10\")  Pulse Oximetry: 98 % O2 (LPM): 0      Completed Laboratory Reports:  Recent Labs     03/31/20  1651 04/01/20  0002 04/01/20  0546 04/01/20  0547 04/01/20  1212 04/01/20  1811 04/01/20  2327 04/02/20  0410 04/02/20  0604 04/02/20  1238 04/02/20  1716 04/03/20  0020 04/03/20  0352 04/03/20  0551 04/03/20  1156   WBC  --   --   --  4.5*  --   --   --  4.8  --   --   --   --  4.6*  --   --    HEMOGLOBIN  --   --   --  8.6*  --   --   --  8.9*  --   --   --   --  8.7*  --   --    HEMATOCRIT  --   --   --  26.9*  --   --   --  28.9*  --   --   --   --  27.5*  --   --    PLATELETCT  --   --   --  120*  --   --   --  113*  --   --   --   --  111*  --   --    SODIUM  --   --   --  141  --   --   --  143  --   --   --   --  142  --   --    POTASSIUM  --   --   --  4.5  --   --   --  4.2  --   --   --   --  4.8  --   --    BUN  --   --   --  56*  --   --   --  59*  --   --   --   --  52*  --   --    CREATININE  --   --   --  2.09*  --   --   --  2.27*  --   --   --   --  2.20*  --   --    GLUCOSE  --   --   --  227*  --   --   --  116*  --   --   --   --  87  --   --    POCGLUCOSE 272* 192* 230*  --  223* 249* 226*  --  120* 199* 222* 152*  --  89 190*     Additional Labs: Not Applicable    Prior Living Situation:   Housing / Facility: 1 Story Apartment / Condo  Lives with - Patient's Self Care Capacity: Alone and Able to Care For Self  Equipment " Owned: Front-Wheel Walker    Prior Level of Function / Living Situation:   Physical Therapy: Prior Services: Other (Comments)(transfer from Inpatient Rehab)  Housing / Facility: 1 Story Apartment / Condo  Elevator: Yes  Equipment Owned: Front-Wheel Walker  Lives with - Patient's Self Care Capacity: Alone and Able to Care For Self  Bed Mobility: Independent  Transfer Status: Independent  Ambulation: Independent  Distance Ambulation (Feet): (unable to determine)  Assistive Devices Used: Front-Wheel Walker  Current Level of Function:   Level Of Assist: Minimal Assist  Assistive Device: Front Wheel Walker  Distance (Feet): 20  Deviation: Decreased Base Of Support, Decreased Heel Strike, Decreased Toe Off, Other (Comment)(inconsistent CASSI, decreased step length)  # of Stairs Climbed: 0  Weight Bearing Status: no restrictions  Skilled Intervention: Compensatory Strategies, Tactile Cuing, Verbal Cuing  Comments: posture improved with cueing and ambulation   Supine to Sit: Minimal Assist(assistance performed with increased height of chair)  Sit to Supine: Minimal Assist  Scooting: Maximal Assist(seated)  Skilled Intervention: Compensatory Strategies, Facilitation, Tactile Cuing, Verbal Cuing  Sit to Stand: Supervised  Bed, Chair, Wheelchair Transfer: Maximal Assist  Transfer Method: Other (Comments)(stand step)  Skilled Intervention: Compensatory Strategies, Facilitation, Postural Facilitation, Sequencing, Tactile Cuing, Verbal Cuing  Sitting in Chair: in chair pre and post  Sitting Edge of Bed: NT  Standin min total  Occupational Therapy:   Self Feeding: Independent  Grooming / Hygiene: Independent  Bathing: Independent  Dressing: Independent  Toileting: Independent  Prior Services: Other (Comments)(transfer from Inpatient Rehab)  Housing / Facility: 1 Story Apartment / Condo  Current Level of Function:   Upper Body Dressing: Maximal Assist  Lower Body Dressing: Maximal Assist  Toileting: Total Assist  Speech Language  "Pathology:   Assessment : Clinical swallow evaluation completed on this date.  Patient up to a chair, pleasant and agreeable; AA&O x4.  Pt inconsistently followed directives to the oral [facility] exam which appeared to be related to being hard of hearing.  No gross deficits appreciated. Initially, vocal quality wet but cleared with cues to a volitional throat clear with effortful swallow.  Presentation of PO included ice chips, mildly thick and thin liquids, applesauce, pudding, and soft solids.  The patient presented with overt s/sx of aspiration with thin liquids as seen by coughing and sputtering with 100% of trials.  With soft solids, the patient demonstrated adequate mastication but c/o globus feeing pointing to his throat and stated, \"It's sticking!\"  Globus was relieved with a cup sip of mildly thick liquids.  The patient had no overt s/sx of aspiration with any other consistency consumed and demonstrated timely initiation of swallow trigger and laryngeal elevation was palpated as strong and complete. Pt required MIN feeding assistance due to guiding the spoon around the cervical collar.  At this time, recommend initiation of a Puree (4) diet with Mildly Thick (2) liquids and DIRECT supervision and feeding assistance as needed.   Problem List: Dysphagia  Diet / Liquid Recommendation: Mildly Thick (2) - (Nectar Thick), Puree (4)  Comments: Patient awake, alert and oriented in all spheres.  He was sitting up in chair.  Vocal quality was wet and gurgly, but with cues to cough and orally suction, vocal quality was clear. Presentation of PO consisted of mildly thick liquids (6 ounces), thin liquids, purees (8 ounces), minced and moist (8 bites), and soft and bite sized (4 bites) textures.  Patient with wet voice X2 following swallows of NTL, but with cues to trigger a second swallow, vocal quality cleared.  He had throat clearing X1 on purees, but no other overt S/Sx of aspiration noted.  On thin liquids, he had " significant wet voice and coughing in 4/4 trials which is concerning for possible penetration/aspiration.  On minced and moist and soft and bite sized consistencies, he had prolonged mastication and after swallow, voice was wet and gurgly about 25% of the time and patient with c/o globus sensation at the level of the UES and then the mid esophageal region which did subside with a mildly thick liquid wash.  At this time recommend continuation of diet recommended yesterday per SLP, Puree (PU4) diet with Mildly Thick (MT2) liquids and DIRECT supervision and feeding assistance as needed.   Rehabilitation Prognosis/Potential: Good  Estimated Length of Stay: 14-21 days    Nursing:   Orientation : Unable to Assess  Continent    Scope/Intensity of Services Recommended:  Physical Therapy: 1 hr / day  5 days / week. Therapeutic Interventions Required: Maximize Endurance, Mobility, Strength and Safety  Occupational Therapy: 1 hr / day 5 days / week. Therapeutic Interventions Required: Maximize Self Care, ADLs, IADLs and Energy Conservation  Speech & Language Pathology: 1 hr / day 5 days / week. Therapeutic Interventions Required: Maximize Cognition, Swallowing and Safety  Rehabilitation Nursin/7. Therapeutic Interventions Required: Monitor Pain, Skin, Vital Signs, Intake and Output, Labs, Safety, Aspiration Risk, Family Training and Cortrak nutritional feeding regimen/Cortrak care and maintenance; DVT Prophylaxis; Bowel & Bladder regimen; Infection control; ADL's.   Rehabilitation Physician: 3 - 5 days / week. Therapeutic Interventions Required: Medical Management  Respiratory Care: Consult. Therapeutic Interventions Required: Pulmonary Toileting, Aspiration Risk and Respiratory care per protocol.  Dietician: Consult. Therapeutic Interventions Required: Nutritional evaluation with recommendations to promote optimal health/healing.     Rehabilitation Goals and Plan (Expected frequency & duration of treatment in the IRF):      Return to the Community, Modified Independent Level of Care and Outpatient Support  Anticipated Date of Rehabilitation Admission: 04/03/2020  Patient/Family oriented IRF level of care/facility/plan: Yes  Patient/Family willing to participate in IRF care/facility/plan: Yes  Patient able to tolerate IRF level of care proposed: Yes  Patient has potential to benefit IRF level of care proposed: Yes  Comments: Not Applicable    Special Needs or Precautions - Medical Necessity:  Tube Feedings   Isolation Precautions: Droplet, Contact and Eye Protection  Safety Concerns/Precautions:  Fall Risk / High Risk for Falls, Balance, Cognition and Bed / Chair Alarm  Complex Wound Care: Monitor C-Spine surgical incision for appropriate healing.   Pain Management  Splints/Braces/Orthotics: Aspen Collar on at all times  Diet:   DIET ORDERS (From admission to next 24h)     Start     Ordered    04/02/20 0955  Supplements  ONCE     Question:  Which Supplement  Answer:  Per RD    04/02/20 0954    04/02/20 0921  Diet Order Diabetic  ALL MEALS     Question Answer Comment   Diet: Diabetic    Texture Modifier Level 4 - Pureed (Dysphagia 1)    Liquid level Level 2 - Mildly Thick    Miscellaneous modifications: SLP - Deliver to Nursing Station    Miscellaneous modifications: SLP - 1:1 Supervision by Nursing        04/02/20 0921    03/29/20 1503  Diet Tube Feeding  CONTINUOUS DIET     Question Answer Comment   Which Rate/Volume? 45 ml/hr    Formula: IMPACT PEPTIDE 1.5    Specify Type: CONTINUOUS        03/29/20 1502                Anticipated Discharge Destination / Patient/Family Goal:  Destination: Home with Assistance Support System: Sister  Anticipated home health services: OT, PT, SLP and Nursing  Previously used  service/ provider: [facility]  Anticipated DME Needs: To be determined  Outpatient Services: To be determined  Alternative resources to address additional identified needs:   N/A  Pre-Screen Completed: 4/3/2020 1:22 PM  Anel Cordon R.N.

## 2020-04-03 NOTE — CARE PLAN
Problem: Safety  Goal: Will remain free from injury  Outcome: PROGRESSING AS EXPECTED  Note: Patient's risk for injury and falls assessed. Appropriate safety precautions in place. Patient educated to utilize call light for needs. Patient verbalizes understanding.      Problem: Skin Integrity  Goal: Risk for impaired skin integrity will decrease  Outcome: PROGRESSING AS EXPECTED  Note: Patient skin assessed. Risk factors that lead to skin breakdown/impairment identified. Interventions to prevent skin breakdown are in place: q2 turns, waffle cushion, pillows for positioning. Will continue to monitor skin integrity.

## 2020-04-03 NOTE — PROGRESS NOTES
[facility] Medicine Daily Progress Note    Date of Service  4/3/2020    Chief Complaint  79 y.o. male admitted 3/28/2020 with altered mental status    Hospital Course    *79 y.o. male with hx of diabetes, hypertension, CKD type 4 and recent cervical laminectomy who presented 3/28/2020 with acute encephalopathy. He had been transferred to [facility] 16 days prior and was sent to the ER on 3/28 for altered mentation. At that visit, he had an extensive workup, and though he couldn't remember why he had been sent to the ER, he had an unremarkable CT head.  He was found to have a new pneumonia and was treated with antibiotics, though was stable to return [facility] to rehab.  He was discharged back to rehab with the plan to continue antibiotics.  He then was sent back later for altered mentation again.  He began gurling on secretions and no protecting his airway, so he was intubated and admitted to the ICU.  He was then a COVID rule out, as CXR revealed multifocal opacities and he was on the vent.  He was extubated on 3/31 and did fine, though he did remain somewhat confused as to why he was here.  He went in and out of afib, with 20 beats of vtach the night of 3/31, so he was started on metoprolol low dose. COVD came back negative, as well as influenza.  He did also recieve an LP in the ER and CSF was negative for infection, though showed high glucose and protein.  All cultures were negative.*        Interval Problem Update  -Evaluated and examined the patient at bedside, denied any complaints and no events last night.  -The patient around his baseline and medically stable for placement.  -The patient is alert and oriented x3.  -He has some diarrhea with no white blood cell and in stool, waiting for C. difficile test before starting Imodium.       Consultants/Specialty  Intensivist    Code Status  Full code switch to DNR and DNI according to the patient wishes    Disposition  We will reevaluate the patient with PT and OT and  likely he needs to continue rehabilitation    Review of Systems  Review of Systems   Constitutional: Positive for malaise/fatigue. Negative for chills, diaphoresis and fever.   Eyes: Negative.    Respiratory: Negative.    Cardiovascular: Negative.    Gastrointestinal: Positive for diarrhea. Negative for abdominal pain, blood in stool, constipation, nausea and vomiting.   Genitourinary: Negative.    Musculoskeletal: Negative.    Neurological: Negative.    Psychiatric/Behavioral: Negative.         Physical Exam  Temp:  [36.7 °C (98 °F)-37.2 °C (98.9 °F)] 36.7 °C (98 °F)  Pulse:  [] 100  Resp:  [16-20] 16  BP: (108-136)/(57-70) 112/59  SpO2:  [95 %-98 %] 98 %    Physical Exam  Constitutional:       Appearance: He is not ill-appearing.      Comments: cortack in place    Neck:      Comments: Collar in place  Cardiovascular:      Rate and Rhythm: Normal rate.      Heart sounds: No murmur.   Pulmonary:      Effort: Pulmonary effort is normal. No respiratory distress.      Breath sounds: No wheezing or rales.   Abdominal:      General: Abdomen is flat. Bowel sounds are normal. There is no distension.      Tenderness: There is no abdominal tenderness. There is no guarding.   Neurological:      General: No focal deficit present.      Mental Status: He is alert and oriented to person, place, and time. Mental status is at baseline.      Cranial Nerves: No cranial nerve deficit.      Motor: No weakness.      Comments: Generalized weakness         Fluids    Intake/Output Summary (Last 24 hours) at 4/3/2020 1524  Last data filed at 4/3/2020 1500  Gross per 24 hour   Intake 780 ml   Output 1050 ml   Net -270 ml       Laboratory  Recent Labs     04/01/20  0547 04/02/20  0410 04/03/20  0352   WBC 4.5* 4.8 4.6*   RBC 2.70* 2.83* 2.75*   HEMOGLOBIN 8.6* 8.9* 8.7*   HEMATOCRIT 26.9* 28.9* 27.5*   MCV 99.6* 102.1* 100.0*   MCH 31.9 31.4 31.6   MCHC 32.0* 30.8* 31.6*   RDW 84.7* 88.5* 83.2*   PLATELETCT 120* 113* 111*   MPV 9.9 9.6  9.7     Recent Labs     04/01/20  0547 04/02/20  0410 04/03/20  0352   SODIUM 141 143 142   POTASSIUM 4.5 4.2 4.8   CHLORIDE 105 104 104   CO2 26 26 26   GLUCOSE 227* 116* 87   BUN 56* 59* 52*   CREATININE 2.09* 2.27* 2.20*   CALCIUM 8.7 9.1 9.0                   Imaging  DX-CHEST-PORTABLE (1 VIEW)   Final Result      No significant change from prior exam.      DX-CHEST-PORTABLE (1 VIEW)   Final Result      Stable chest. Unchanging left lower lobe atelectasis or pneumonia and bibasilar interstitial edema or pneumonia.      DX-CHEST-PORTABLE (1 VIEW)   Final Result      Persistent bilateral pulmonary consolidation with pleural effusions.      DX-CHEST-PORTABLE (1 VIEW)   Final Result         Central line is not seen. No appreciable pneumothorax.      DX-ABDOMEN FOR TUBE PLACEMENT   Final Result      Enteric tube tip projects over the stomach.      NG tube tip projects over the distal stomach.      EC-ECHOCARDIOGRAM COMPLETE W/O CONT   Final Result      DX-CHEST-PORTABLE (1 VIEW)   Final Result      Stable examination.      CT-CHEST (THORAX) W/O   Final Result      1.  Large bilateral pleural effusions with bibasilar atelectasis.      2.  Indeterminate right upper lobe pulmonary nodules measuring 5 and 9 mm in size.      3.  Endotracheal and nasogastric tubes present.      4.  Calcified granulomas.      Low Risk: CT at 3-6 months, then consider CT at 18-24 months      High Risk: CT at 3-6 months, then at 18-24 months      Comments: Use most suspicious nodule as guide to management. Follow-up intervals may vary according to size and risk.      Low Risk - Minimal or absent history of smoking and of other known risk factors.      High Risk - History of smoking or of other known risk factors.      Note: These recommendations do not apply to lung cancer screening, patients with immunosuppression, or patients with known primary cancer.      Fleischner Society 2017 Guidelines for Management of Incidentally Detected Pulmonary  Nodules in Adults               CT-HEAD W/O   Final Result      1.  No evidence of acute intracranial process.      2.  Cerebral atrophy as well as periventricular chronic small vessel ischemic change.      DX-CHEST-PORTABLE (1 VIEW)   Final Result         Endotracheal tube with tip projecting over the mid to lower thoracic trachea.      Gastric drainage tube courses below diaphragm, tip is not seen.      DX-CHEST-PORTABLE (1 VIEW)   Final Result         No significant interval change. Grossly unchanged bibasilar ill-defined opacities           Assessment/Plan  Acute encephalopathy- (present on admission)  Assessment & Plan  Likely delirium from underlying medical issues; toxic metabolic from sepsis  CT head was negative  LP CSF negative  Continue nonpharmacological treatment for delirium  Avoid opioid and benzo  PT and OT with speech  Likely need rehab  Significant improving, the patient is alert and oriented x3 and he is able to make a good conversation      Acute respiratory failure (HCC)- (present on admission)  Assessment & Plan  Intubated 3/28->3/31, to protect airway due to altered mental status   chest x-ray showed bilateral infiltration  CT had bilateral pleural effusions      Flu negative, COVID neg  Likely combination between pulmonary edema due to chronic kidney disease and possible pneumonia.      Echo did not show heart failure  Improving continue diuretics  Finished antibiotics for 5 days  Improving and right now he is on room air         Central cord syndrome (HCC)- (present on admission)  Assessment & Plan  S/p C3-6 laminectomy and posterolateral fusion  for cervical stenosis/myelopathy  The patient received rehab therapy.  Continue monitoring and follow-up as outpatient with the neurosurgeon    V tach ([facility])- (present on admission)  Assessment & Plan  Unsustained, restarted on metoprolol   Electrolytes ok    Thrombocytopenia ([facility])- (present on admission)  Assessment & Plan  Chronic around 110s  No  signs of a bleeding  Continue monitoring    Anemia- (present on admission)  Assessment & Plan  With elevated MCV and low platelets  Stable  We will check B12 and folic acid   Iron panel around stable      Dysphagia- (present on admission)  Assessment & Plan  Due to cervical spine injury   SLP on board   We will keep his cartrack at this time until improving on his a speech    DM (diabetes mellitus) (HCC)- (present on admission)  Assessment & Plan  Last A1c is 5.8 and his target for his age   Continue on lantus 10 units and ssi;      Pleural effusion, bilateral- (present on admission)  Assessment & Plan  S/p diuresis  On room air    Cervical stenosis of spine- (present on admission)  Assessment & Plan  S/p cervical laminectomy in February, has been at rehab  Can move all extremities; will need ongoing rehab    ACP (advance care planning)  Assessment & Plan  The patient is alert and oriented x4 and he makes a good conversation he is able to make decision, discussed with him the CODE STATUS and the patient is a clear that he wants to be DNR/DNI.  I reviewed his advanced directive which showed the patient preferred not to be resuscitated    Pulmonary nodules- (present on admission)  Assessment & Plan  F/u with pcp    Acute on chronic renal failure (HCC)- (present on admission)  Assessment & Plan  Improving   Around his baseline creatinine 2        VTE prophylaxis: Heparin    I have performed a physical exam and reviewed and updated ROS and Plan today (4/3/2020). In review of yesterday's note (4/2/2020), there are no changes except as documented above.

## 2020-04-03 NOTE — CARE PLAN
Problem: Safety  Goal: Will remain free from injury  4/3/2020 1009 by Alanis Vincent R.N.  Outcome: PROGRESSING AS EXPECTED  4/3/2020 1007 by Alanis Vincent R.N.  Outcome: PROGRESSING AS EXPECTED  Intervention: Provide assistance with mobility  4/3/2020 1009 by Alanis Vincent R.N.  Flowsheets  Taken 4/3/2020 1009  Assistance: Assistance of Two or More  Taken 4/3/2020 1007  Ambulation Tolerance:   Tolerates Well   General Weakness  Note: Patient pivot up to chair with x2 assist. Tolerated well, has generalized weakness.   4/3/2020 1007 by Alanis Vincent R.N.  Flowsheets (Taken 4/3/2020 1007)  Assistance: Assistance of Two or More  Ambulation Tolerance:   Tolerates Well   General Weakness  Note: Patient pivot up to chair with x2 assist. Tolerates well, with generalized weakness.      Problem: Knowledge Deficit  Goal: Knowledge of disease process/condition, treatment plan, diagnostic tests, and medications will improve  Outcome: PROGRESSING AS EXPECTED  Intervention: Explain information regarding disease process/condition, treatment plan, diagnostic tests, and medications and document in education  Note: Patient educated on plan of care. Doctor at bedside. Patient eager to return to rehab facility. Patient has no further questions regarding plan of care at this time.

## 2020-04-03 NOTE — DISCHARGE PLANNING
JUDI tt [facility] coordinator, Salina Estrada ([phone]) to see if they are taking patient back.

## 2020-04-03 NOTE — CONSULTS
Physical Medicine and Rehabilitation Consultation         Initial Consult      Date of Consultation: 4/3/2020  Reason for consultation: assess for acute inpatient rehab appropriateness  LOS: 6 Day(s)      Chief complaint: incomplete quad, C2 AIS D    HPI: The patient is a 79 y.o. right hand dominant male with a past medical history of diabetes, hypertension, hyperlipidemia, neuropathic pain, stage III chronic kidney disease, previous TBI and central cord syndrome and 2018  who was originally admitted to [facility] on 2/28/2020 with worsening bilateral upper extremity weakness, balance, neck pain. Most recent MRI showed severe stenosis at C3-C6 with interval progression over the last year.  He underwent an C3-C6 laminectomy and posterior decompression. Patient was being followed by neurosurgery for continued right arm discoordination and pain. 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  At this time he was managed nonoperatively    Patient went to acute rehab and developed altered mental status requiring transfer back to the acute hospital and readmission. Patient underwent extensive work-up, found to have unremarkable CT head, found to have new pneumonia treated with antibiotics, and was discharged back to acute rehab with a plan to continue antibiotics, however again developed altered mental status and was returned to the ED.  Patient began gurgling on secretions and not protecting his airway so he was intubated and admitted to the ICU  on  3/28/2020 at  5:44 PM.  Patient was extubated on 3/31 continue to have confusion, went in and out of A. fib with 20 beats of V. tach the night of 3/31.  Patient was started on metoprolol.  Patient came back coded negative, influenza negative, LP performed in the ED ER and CSF was negative, all cultures were negative.  Echo did not show heart  "failure.    Etiology for confusion and altered mental status thought to be acute encephalopathy from underlying medical issues/toxic metabolic from sepsis.  Patient is now alert and oriented x4 and ready to resume his rehab.    The patient currently reports that he is feeling much better, he is awake alert and aware.  His pain is well controlled, and he is looking forward to returning to rehab.      ROS:  Pertinent positives are listed in HPI, all other systems reviewed and are negative      Social Hx:  Pre-morbidly, this patient lived in a single level home with 0 steps to enter, alone and able to care for self.   Employment: Not working  Tobacco: Denies  Alcohol: Denies  Drugs: Denies    Patient lives in senior living, on the second floor with elevator access.  He lives alone and does not have additional support available.     Current level of function:  The patient was evaluated by acute care Physical Therapy, Occupational Therapy and Speech Language Pathology; currently requiring minimal assistance for mobility and maximal assistance for ADLs, also with ongoing swallowing deficits.    PMH:  Past Medical History:   Diagnosis Date   • Arrhythmia     [facility]   • Diabetes (HCC)    • Hemorrhagic disorder (HCC)     \"bleeds easily\"   • High cholesterol    • Hypertension    • Jaundice 5/8/2016   • Renal disorder     hx kidney stones       PSH:  Past Surgical History:   Procedure Laterality Date   • CERVICAL DECOMPRESSION POSTERIOR  2/28/2020    Procedure: DECOMPRESSION, SPINE, CERVICAL, POSTERIOR APPROACH- C3-6 DISCECTOMY AND FUSION;  Surgeon: Lg Sellers D.O.;  Location: SURGERY [facility];  Service: Neurosurgery   • GASTROSCOPY-ENDO N/A 6/3/2016    Procedure: GASTROSCOPY-ENDO;  Surgeon: Jasper Donnelly M.D.;  Location: SURGERY [facility];  Service:    • EGD W/ENDOSCOPIC ULTRASOUND N/A 6/3/2016    Procedure: EGD W/ENDOSCOPIC ULTRASOUND UPPER;  Surgeon: Jasper Donnelly M.D.;  Location: " "SURGERY [facility];  Service:    • EGD WITH ASP/BX N/A 6/3/2016    Procedure: EGD WITH ASP/BX - FNA, DUODENAL BIOPSIES, FNA OF PANCREAS;  Surgeon: Jasper Donnelly M.D.;  Location: [facility];  Service:    • ERCP  5/2016   • CHOLECYSTECTOMY  2006    gallstones removed   • HIP ARTHROPLASTY TOTAL Left 2001    left total hip       FHX:  Non-pertinent to today's issues    Medications:  Current Facility-Administered Medications   Medication Dose   • gabapentin (NEURONTIN) capsule 100 mg  100 mg   • metoprolol (LOPRESSOR) tablet 12.5 mg  12.5 mg   • senna-docusate (PERICOLACE or SENOKOT S) 8.6-50 MG per tablet 2 Tab  2 Tab    And   • polyethylene glycol/lytes (MIRALAX) PACKET 1 Packet  1 Packet    And   • magnesium hydroxide (MILK OF MAGNESIA) suspension 30 mL  30 mL    And   • bisacodyl (DULCOLAX) suppository 10 mg  10 mg   • thiamine tablet 100 mg  100 mg   • midodrine (PROAMATINE) tablet 5 mg  5 mg   • furosemide (LASIX) tablet 40 mg  40 mg   • insulin glargine (LANTUS) injection 10 Units  10 Units   • insulin regular (HUMULIN R) injection 2-9 Units  2-9 Units    And   • dextrose 50% (D50W) injection 50 mL  50 mL   • heparin injection 5,000 Units  5,000 Units   • Respiratory Therapy Consult     • ipratropium-albuterol (DUONEB) nebulizer solution  3 mL       Allergies:  No Known Allergies    Physical Exam:  Vitals: /59   Pulse 100   Temp 36.7 °C (98 °F) (Temporal)   Resp 16   Ht 1.778 m (5' 10\")   Wt 72.5 kg (159 lb 13.3 oz)   SpO2 98%   Gen: NAD  Head:  NC/AT  Eyes/ Nose/ Mouth: PERRLA, moist mucous membranes  Cardio: RRR, no mumurs  Pulm: CTAB, with normal respiratory effort  Abd: Soft NTND, active bowel sounds,   Ext: No peripheral edema. No calf tenderness. No clubbing/cyanosis.     Mental status:  A&Ox4 (person, place, date, situation) answers questions appropriately follows commands  Speech: fluent, no aphasia or dysarthria    CRANIAL NERVES:  2,3: visual acuity grossly intact, " PERRL  3,4,6: EOMI bilaterally, no nystagmus or diplopia  5: sensation intact to light touch bilaterally and symmetric  7: no facial asymmetry  8: hearing grossly intact  9,10: symmetric palate elevation  11: SCM/Trapezius strength 4/5 bilaterally  12: tongue protrudes midline    Motor:  Right side slightly weaker than left side however still in 4/5 range      Upper Extremity  Myotome R L   Shoulder flexion C5 4/5 4/5   Elbow flexion C5 4/5 4/5   Wrist extension C6 4/5 4/5   Elbow extension C7 4/5 4/5   Finger flexion C8 4/5 4/5   Finger abduction T1 3/5 4/5     Lower Extremity Myotome R L   Hip flexion L2 4/5 4/5   Knee extension L3 4/5 4/5   Ankle dorsiflexion L4 4/5 4/5   Toe extension L5 4/5 4/5   Ankle plantarflexion S1 3/5 3/5     Sensory:   Altered sensation to light touch below C2 dermatome    DTRs: 3+ in bilateral biceps, triceps, brachioradialis, 3+ in bilateral patellar and achilles tendons  No clonus at bilateral ankles  Positive Babinski b/l  Positive Villavicencio b/l     Labs: Reviewed and significant for   Recent Labs     04/01/20  0547 04/02/20  0410 04/03/20  0352   RBC 2.70* 2.83* 2.75*   HEMOGLOBIN 8.6* 8.9* 8.7*   HEMATOCRIT 26.9* 28.9* 27.5*   PLATELETCT 120* 113* 111*     Recent Labs     04/01/20  0547 04/02/20  0410 04/03/20  0352   SODIUM 141 143 142   POTASSIUM 4.5 4.2 4.8   CHLORIDE 105 104 104   CO2 26 26 26   GLUCOSE 227* 116* 87   BUN 56* 59* 52*   CREATININE 2.09* 2.27* 2.20*   CALCIUM 8.7 9.1 9.0     Recent Results (from the past 24 hour(s))   ACCU-CHEK GLUCOSE    Collection Time: 04/02/20  5:16 PM   Result Value Ref Range    Glucose - Accu-Ck 222 (H) 65 - 99 mg/dL   CULTURE STOOL    Collection Time: 04/02/20  6:46 PM   Result Value Ref Range    Significant Indicator NEG     Source STL     Site STOOL     Culture Result       Shiga Toxin testing not performed due to lack of growth in  [place] broth.    NOTE:    Stool cultures are screened for Shiga Toxins 1 and 2,    Salmonella, Shigella,  Campylobacter, Aeromonas,    Plesiomonas.     ACCU-CHEK GLUCOSE    Collection Time: 04/03/20 12:20 AM   Result Value Ref Range    Glucose - Accu-Ck 152 (H) 65 - 99 mg/dL   CBC WITH DIFFERENTIAL    Collection Time: 04/03/20  3:52 AM   Result Value Ref Range    WBC 4.6 (L) 4.8 - 10.8 K/uL    RBC 2.75 (L) 4.70 - 6.10 M/uL    Hemoglobin 8.7 (L) 14.0 - 18.0 g/dL    Hematocrit 27.5 (L) 42.0 - 52.0 %    .0 (H) 81.4 - 97.8 fL    MCH 31.6 27.0 - 33.0 pg    MCHC 31.6 (L) 33.7 - 35.3 g/dL    RDW 83.2 (H) 35.9 - 50.0 fL    Platelet Count 111 (L) 164 - 446 K/uL    MPV 9.7 9.0 - 12.9 fL    Neutrophils-Polys 67.30 44.00 - 72.00 %    Lymphocytes 15.70 (L) 22.00 - 41.00 %    Monocytes 13.10 0.00 - 13.40 %    Eosinophils 2.80 0.00 - 6.90 %    Basophils 0.40 0.00 - 1.80 %    Immature Granulocytes 0.70 0.00 - 0.90 %    Nucleated RBC 0.00 /100 WBC    Neutrophils (Absolute) 3.08 1.82 - 7.42 K/uL    Lymphs (Absolute) 0.72 (L) 1.00 - 4.80 K/uL    Monos (Absolute) 0.60 0.00 - 0.85 K/uL    Eos (Absolute) 0.13 0.00 - 0.51 K/uL    Baso (Absolute) 0.02 0.00 - 0.12 K/uL    Immature Granulocytes (abs) 0.03 0.00 - 0.11 K/uL    NRBC (Absolute) 0.00 K/uL   Basic Metabolic Panel    Collection Time: 04/03/20  3:52 AM   Result Value Ref Range    Sodium 142 135 - 145 mmol/L    Potassium 4.8 3.6 - 5.5 mmol/L    Chloride 104 96 - 112 mmol/L    Co2 26 20 - 33 mmol/L    Glucose 87 65 - 99 mg/dL    Bun 52 (H) 8 - 22 mg/dL    Creatinine 2.20 (H) 0.50 - 1.40 mg/dL    Calcium 9.0 8.5 - 10.5 mg/dL    Anion Gap 12.0 7.0 - 16.0   MAGNESIUM    Collection Time: 04/03/20  3:52 AM   Result Value Ref Range    Magnesium 2.0 1.5 - 2.5 mg/dL   VITAMIN B12    Collection Time: 04/03/20  3:52 AM   Result Value Ref Range    Vitamin B12 -True Cobalamin 944 (H) 211 - 911 pg/mL   FOLATE    Collection Time: 04/03/20  3:52 AM   Result Value Ref Range    Folate -Folic Acid 15.5 >4.0 ng/mL   PROCALCITONIN    Collection Time: 04/03/20  3:52 AM   Result Value Ref Range     Procalcitonin 0.16 <0.25 ng/mL   ESTIMATED GFR    Collection Time: 04/03/20  3:52 AM   Result Value Ref Range    GFR If African American 35 (A) >60 mL/min/1.73 m 2    GFR If Non  29 (A) >60 mL/min/1.73 m 2   ACCU-CHEK GLUCOSE    Collection Time: 04/03/20  5:51 AM   Result Value Ref Range    Glucose - Accu-Ck 89 65 - 99 mg/dL   STOOL WBC'S    Collection Time: 04/03/20  9:06 AM   Result Value Ref Range    Stool WBC's None seen None seen   ACCU-CHEK GLUCOSE    Collection Time: 04/03/20 11:56 AM   Result Value Ref Range    Glucose - Accu-Ck 190 (H) 65 - 99 mg/dL       Imaging:   Ct-chest (thorax) W/o    Result Date: 3/28/2020  3/28/2020 11:07 PM HISTORY/REASON FOR EXAM: Pneumonia follow-up. TECHNIQUE/EXAM DESCRIPTION: CT scan of the chest without contrast. Thin-section helical images were obtained from the lung apices through the adrenal glands. Low dose optimization technique was utilized for this CT exam including automated exposure control and adjustment of the mA and/or kV according to patient size. COMPARISON: None FINDINGS: The study is limited due to non-use of intravenous contrast. The mediastinum and hilum is not well evaluated. There is an endotracheal and nasogastric tube present. There is bibasilar atelectasis and there are large bilateral pleural effusions. There is a right upper lobe pulmonary nodule present measuring 5 mm in size. There is another pulmonary nodule within the right upper lobe anterolaterally measuring 9 mm in size. There are multiple calcified granulomas within the lungs bilaterally. There is no evidence of mediastinal or hilar adenopathy. There is no evidence of pericardial effusion. There is atherosclerotic vascular calcification. There has been prior cholecystectomy. Left kidney is markedly atrophic. Right renal collecting system is dilated.     1.  Large bilateral pleural effusions with bibasilar atelectasis. 2.  Indeterminate right upper lobe pulmonary nodules  measuring 5 and 9 mm in size. 3.  Endotracheal and nasogastric tubes present. 4.  Calcified granulomas. Low Risk: CT at 3-6 months, then consider CT at 18-24 months High Risk: CT at 3-6 months, then at 18-24 months Comments: Use most suspicious nodule as guide to management. Follow-up intervals may vary according to size and risk. Low Risk - Minimal or absent history of smoking and of other known risk factors. High Risk - History of smoking or of other known risk factors. Note: These recommendations do not apply to lung cancer screening, patients with immunosuppression, or patients with known primary cancer. Fleischner Society 2017 Guidelines for Management of Incidentally Detected Pulmonary Nodules in Adults     Ct-head W/o    Result Date: 3/28/2020  3/28/2020 7:52 PM HISTORY/REASON FOR EXAM: Altered mental status. TECHNIQUE/EXAM DESCRIPTION AND NUMBER OF VIEWS: CT of the head without contrast. Up to date radiation dose reduction adjustments have been utilized to meet ALARA standards for radiation dose reduction. COMPARISON: 3/28/2020 FINDINGS: There is no evidence of mass or mass effect. CSF spaces are prominent. There is moderate periventricular chronic small vessel ischemic change present. There is no intracranial hemorrhage seen. The calvarium is intact. There is no scalp injury. There is no evidence of sinus disease. There is left temporal tip encephalomalacia again seen.     1.  No evidence of acute intracranial process. 2.  Cerebral atrophy as well as periventricular chronic small vessel ischemic change.    Ct-head W/o    Result Date: 3/28/2020   CT HEAD WITHOUT CONTRAST 3/28/2020 11:02 AM HISTORY/REASON FOR EXAM:  Altered mental status TECHNIQUE/EXAM DESCRIPTION AND NUMBER OF VIEWS: CT of the head without contrast. The study was performed on a helical multidetector CT scanner. Contiguous 2.5 mm axial sections were obtained from the skull base through the vertex. Up to date radiation dose reduction  adjustments have been utilized to meet ALARA standards for radiation dose reduction. COMPARISON:  12/3/2018 FINDINGS: Lateral ventricles are normal in size and symmetric. Mild to moderate global parenchymal atrophy. Chronic small vessel ischemic changes. 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 No significant mass effect or midline shift. Basal cisterns are patent. No evidence for intracranial hemorrhage. Calvaria are intact. Visualized orbits are unremarkable. Visualized mastoid air cells are clear. No significant sinus disease in the visualized paranasal sinuses.     1. No CT evidence of acute infarct, hemorrhage or mass. 2. Mild to moderate global parenchymal atrophy. Chronic small vessel ischemic changes. 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    [id number] View    Result Date: 3/27/2020  3/27/2020 3:00 PM HISTORY/REASON FOR EXAM:  Diarrhea in the setting of neurogenic bowel TECHNIQUE/EXAM DESCRIPTION AND NUMBER OF VIEWS:  1 view(s) of the abdomen. COMPARISON: 3/20/2020 FINDINGS: Small amount of stool in the colon. Nonspecific nonobstructive bowel gas pattern. No dilated gas-filled loop of small bowel. No portal venous gas or pneumatosis. No definite free intraperitoneal air but evaluation is limited on supine radiograph.     No specific finding to suggest small bowel obstruction.    Qw-[id number]-5 View    Result Date: 3/12/2020  3/12/2020 1:09 PM HISTORY/REASON FOR EXAM:  Constipation in the setting of neurogenic bowel, portable. TECHNIQUE/EXAM DESCRIPTION AND NUMBER OF VIEWS:  1 views of the abdomen. COMPARISON: None FINDINGS: No abnormally distended loops of air-filled bowel bowel are evident to suggest bowel obstruction. Free air not excluded absent upright radiograph. There are surgical clips in the RIGHT upper quadrant. There is LEFT hip arthroplasty hardware. There is dextroconvex scoliosis of the lumbar spine with underlying degenerative changes.     No evidence of bowel obstruction.     [id number]-7 View    Result Date:  3/6/2020  3/6/2020 5:09 PM HISTORY/REASON FOR EXAM:  Left lower quadrant abdominal pain. TECHNIQUE/EXAM DESCRIPTION AND NUMBER OF VIEWS:  1 views of the abdomen. COMPARISON: None FINDINGS: There is no evidence of bowel obstruction. There is no evidence of free intraperitoneal air. No abnormal calcifications are seen. Surgical clips are noted in the right upper quadrant. Volume of stool in the large bowel is mildly prominent.     No evidence of bowel obstruction. Possible constipation.     Dx-cervical Spine-2 Or 3 Views    Result Date: 3/4/2020  3/4/2020 7:08 PM HISTORY/REASON FOR EXAM:  Follow-up cervical spine fusion. TECHNIQUE/EXAM DESCRIPTION AND NUMBER OF VIEWS: Cervical spine series, 2 views. COMPARISON:  Preoperative imaging 1/6/2020 FINDINGS: There has been interval placement of posterior bilateral screw fixation extending from the C3-C6 levels. Surgical hardware appears grossly intact. There are posterior cutaneous skin staples. The diffuse cervical spondylosis with prominent endplate spurs anteriorly are without interval change. Mild disc disease is again seen. There is bilateral arthropathy. Visualized lung apices are clear. Prevertebral soft tissues are unremarkable.     1.  There has been interval posterior decompression with andrew and screw fixation extending from C3 through C6 levels.    Dx-chest-2 Views    Result Date: 3/23/2020  3/23/2020 11:50 AM HISTORY/REASON FOR EXAM:  Cough TECHNIQUE/EXAM DESCRIPTION AND NUMBER OF VIEWS: Two views of the chest. COMPARISON:  3/20/2020 FINDINGS: Cardiomediastinal contour is within normal limits. Lungs show hypoinflation. Hazy increased opacity at both lung bases. Mild blunting of the costophrenic angles bilaterally. No pneumothorax. Prior cervical fusion. Feeding tube in place however tip is off the image.     1.  Hypoinflation with mild bibasilar atelectasis or infiltrate, slightly improved from prior exam. 2.  Small bilateral pleural  effusions.    Dx-chest-portable (1 View)    Result Date: 4/1/2020 4/1/2020 3:29 AM HISTORY/REASON FOR EXAM:  For indication of respiratory failure; For indication of respiratory failure. TECHNIQUE/EXAM DESCRIPTION AND NUMBER OF VIEWS: Single portable view of the chest. COMPARISON: 3/31/2020 FINDINGS: Cardiomediastinal contour is unchanged. Previously described pulmonary parenchymal opacities persist. No pneumothorax identified. Supportive tubing is unchanged.     No significant change from prior exam.    Dx-chest-portable (1 View)    Result Date: 3/31/2020  3/31/2020 2:27 AM HISTORY/REASON FOR EXAM:  Respiratory failure. TECHNIQUE/EXAM DESCRIPTION AND NUMBER OF VIEWS: Single portable view of the chest. COMPARISON:  3/30/2020 FINDINGS: Endotracheal tube remains in place. The cardiac silhouette is within normal limits. Left lower lobe atelectasis or pneumonia and bibasilar interstitial edema or pneumonia is unchanged. Small bilateral pleural effusions are unchanged. Fusion hardware is present in the cervical spine posteriorly.     Stable chest. Unchanging left lower lobe atelectasis or pneumonia and bibasilar interstitial edema or pneumonia.    Dx-chest-portable (1 View)    Result Date: 3/30/2020  3/30/2020 2:25 AM HISTORY/REASON FOR EXAM:  For indication of respiratory failure; For indication of respiratory failure. TECHNIQUE/EXAM DESCRIPTION AND NUMBER OF VIEWS: Single portable view of the chest. COMPARISON: Yesterday FINDINGS: Endotracheal tube NG tube and feeding tube remain in place. There is persistent bilateral pulmonary consolidation, right greater left. No pneumothorax. There are small effusions.     Persistent bilateral pulmonary consolidation with pleural effusions.    Dx-chest-portable (1 View)    Result Date: 3/29/2020  3/29/2020 2:29 PM HISTORY/REASON FOR EXAM:  Unsuccessful tube placement TECHNIQUE/EXAM DESCRIPTION AND NUMBER OF VIEWS: Single portable view of the chest. COMPARISON: 3/29/2020 FINDINGS:  Endotracheal tube with tip projecting over the mid to lower thoracic trachea. Gastric drainage tube and feeding tube are redemonstrated. Central line is not seen. Grossly unchanged bibasilar ill-defined opacities Layering pleural effusions. No appreciable pneumothorax. Stable cardiopericardial silhouette.     Central line is not seen. No appreciable pneumothorax.    Dx-chest-portable (1 View)    Result Date: 3/29/2020  3/29/2020 3:08 AM HISTORY/REASON FOR EXAM: Respiratory distress TECHNIQUE/EXAM DESCRIPTION AND NUMBER OF VIEWS: Single portable view of the chest. COMPARISON: Previous date FINDINGS: There are multiple supportive devices again seen, stable. There are stable bilateral infiltrates. There is stable right pleural effusion. The heart is enlarged.     Stable examination.    Dx-chest-portable (1 View)    Result Date: 3/28/2020  3/28/2020 7:13 PM HISTORY/REASON FOR EXAM:  tube placement TECHNIQUE/EXAM DESCRIPTION AND NUMBER OF VIEWS: Single portable view of the chest. COMPARISON: 3/28/2020 FINDINGS: Endotracheal tube with tip projecting over the mid to lower thoracic trachea. Gastric drainage tube courses below diaphragm, tip is not seen. Grossly unchanged bibasilar ill-defined opacities Bilateral pleural effusions. No pneumothorax. Stable cardiopericardial silhouette.     Endotracheal tube with tip projecting over the mid to lower thoracic trachea. Gastric drainage tube courses below diaphragm, tip is not seen.    Dx-chest-portable (1 View)    Result Date: 3/28/2020  3/28/2020 6:23 PM HISTORY/REASON FOR EXAM:  Shortness of Breath TECHNIQUE/EXAM DESCRIPTION AND NUMBER OF VIEWS: Single portable view of the chest. COMPARISON: 3/28/2020 FINDINGS: Grossly unchanged bibasilar ill-defined opacities Trace bilateral pleural effusions. No pneumothorax. Stable cardiopericardial silhouette.     No significant interval change. Grossly unchanged bibasilar ill-defined opacities    Dx-chest-portable (1 View)    Result Date:  3/28/2020  3/28/2020 11:05 AM HISTORY/REASON FOR EXAM:  Altered mental status, concern for infection. TECHNIQUE/EXAM DESCRIPTION AND NUMBER OF VIEWS: Single portable view of the chest. COMPARISON: 3/24/2020 FINDINGS: LUNGS: Hypoinflation. Ill-defined bibasilar pulmonary opacities and small bilateral pleural effusions. PNEUMOTHORAX: None. LINES AND TUBES: None. MEDIASTINUM: Partially obscured cardiac silhouette. It does not appear enlarged. MUSCULOSKELETAL STRUCTURES: No acute displaced fracture.     1.  Ill-defined bibasilar pulmonary opacities, edema or multifocal pneumonia. 2.  Small bilateral pleural effusions.    Dx-chest-portable (1 View)    Result Date: 3/24/2020  3/24/2020 8:00 AM HISTORY/REASON FOR EXAM:  Pleural Effusion. TECHNIQUE/EXAM DESCRIPTION AND NUMBER OF VIEWS: Single portable view of the chest. COMPARISON: 3/23/2020 FINDINGS: Examination is limited by the patient's overlying chin. The cardiomediastinal silhouette is stable. Perihilar and bibasilar opacities are mildly increased. Trace pleural effusions not excluded. No pneumothorax is seen.     Perihilar and bibasilar opacities may represent a combination of edema, atelectasis, pneumonia and are mildly increased compared to prior. Trace pleural effusions not excluded.     Dx-chest-portable (1 View)    Result Date: 3/20/2020  3/20/2020 12:06 PM HISTORY/REASON FOR EXAM:  Pleural Effusion. TECHNIQUE/EXAM DESCRIPTION AND NUMBER OF VIEWS: Single portable view of the chest. COMPARISON: 3/19/2020 FINDINGS: Evaluation is limited by patient's braces. The cardiomediastinal silhouette is stable. There may be layering right pleural effusion with overlying atelectasis/consolidation. Perihilar and bibasilar opacities are again noted. No pneumothorax is seen. Surgical clips are noted in the right upper quadrant.     Perihilar and bibasilar opacities may represent edema, atelectasis, pneumonitis. There may be layering right pleural effusion.     Dx-chest-portable  (1 View)    Result Date: 3/19/2020  3/19/2020 11:54 AM HISTORY/REASON FOR EXAM:  Shortness of breath. History of pneumonia. TECHNIQUE/EXAM DESCRIPTION AND NUMBER OF VIEWS: Single portable view of the chest. COMPARISON:  3/18/2020 FINDINGS: Shallow inspiration has been radiographed. The heart is at the upper limits of normal in size. Bibasilar areas of interstitial edema or pneumonia are present. Minimal left lower lobe atelectasis is present. Small bilateral pleural effusions are present.     And change in bibasilar interstitial edema or pneumonia with small bilateral pleural effusions. Possible volume overload. Findings accentuated by shallow inspiration.    Dx-chest-portable (1 View)    Result Date: 3/18/2020  3/18/2020 11:00 AM HISTORY/REASON FOR EXAM:  Cough. TECHNIQUE/EXAM DESCRIPTION AND NUMBER OF VIEWS: Single portable view of the chest. COMPARISON: 3/14/2020 FINDINGS: Cardiomediastinal silhouette is stable. Worsening lung volumes. Persistent perihilar and basilar interstitial and airspace opacities which could be related to edema and/or infection. Given differences in lung volumes, is likely no significant change. No significant pleural effusion or pneumothorax. No acute osseous abnormality.     Hypoinflation with grossly stable bilateral airspace opacities. No significant pleural effusion.    Dx-chest-portable (1 View)    Result Date: 3/14/2020  3/14/2020 11:00 AM HISTORY/REASON FOR EXAM:  Cough. TECHNIQUE/EXAM DESCRIPTION AND NUMBER OF VIEWS: Single portable view of the chest. COMPARISON: December 10, 2018 FINDINGS: Heart size is within normal limits. There is right basilar consolidation. There is also minimal increased density in the left lung base. No pleural abnormalities are noted.     Right basilar consolidation consistent with pneumonia. Left basal consolidation could be due to atelectasis or pneumonia.    Dx-esophagus - [id number]    Result Date: 3/16/2020  3/16/2020 10:05 AM HISTORY/REASON FOR  EXAM:  Dysphagia TECHNIQUE/EXAM DESCRIPTION AND NUMBER OF VIEWS: Esophagus-CINE-Video-CD. The study was performed by the speech language pathologist who administered a variety of barium coated substances under direct fluoroscopic visualization. COMPARISON:  None. FINDINGS: The study was performed by the speech language pathologist who will issue a full report and used a total of 160.9 seconds of fluoroscopy time. INTERPRETING LOCATION: [address] [place] [address]    Us-santiago Single Level Bilat    Result Date: 3/11/2020   Vascular Laboratory  Conclusions  No evidence of arterial insufficiency.  PABLO STEVENS  Age:    79    Gender:     M  MRN:    [id number]  :    1940      BSA:  Exam Date:     2020 10:16  Room #:     Inpatient  Priority:     Routine  Ht (in):             Wt (lb):  Ordering Physician:        GEE MARROQUIN  Referring Physician:       [id number] Louis  Sonographer:               Augusta Yee RDMS  Study Type:                Complete Bilateral  Technical Quality:         Adequate  Indications:     Encounter for screening for cardiovascular disorders,                   Diabetes, Swelling of Limb, Pain in Limb  CPT Codes:       63353  ICD Codes:       Z13.6  250  729.81  729.5  History:         Debendent changes. Diabetes. Pain in bilateral limbs.  Limitations:                 RIGHT  Waveform            Systolic BPs (mmHg)                             137           Brachial  Triphasic                                Common Femoral  Triphasic                  200           Posterior Tibial  Triphasic                  199           Dorsalis Pedis                                           Peroneal                             1.46          SANTIAGO                                           TBI                       LEFT  Waveform        Systolic BPs (mmHg)                             136           Brachial  Triphasic                                Common Femoral  Triphasic                  165            Posterior Tibial  Triphasic                  147           Dorsalis Pedis                                           Peroneal                             1.20          DANYELL                                           TBI  Findings  Bilateral.  Doppler waveforms of the common femoral artery is of high amplitude and  triphasic.  Doppler waveforms at the ankle are brisk and triphasic.  Ankle-brachial index is normal.  Additional testing was not performed in accordance with lower extremity  arterial evaluation protocol.  Jovanny Briggs MD  (Electronically Signed)  Final Date:      2020                   11:19    Us-extremity Venous Lower Bilat    Result Date: 3/17/2020   Vascular Laboratory  CONCLUSIONS  The right distal superficial femoral vein is difficult to see due to edema.  No superficial or deep venous thrombosis seen in right or left lower  extremity.  PABLO STEVENS  Exam Date:     2020 10:07  Room #:     [facility]  Priority:     Stat  Ht (in):             Wt (lb):  Ordering Physician:        RAMU GARNER  Referring Physician:       Ramu Garner  Sonographer:               Nydia Hansen RVT  Study Type:                Complete Bilateral  Technical Quality:         Fair  Age:    79    Gender:     M  MRN:    [id number]  :    1940      BSA:  Indications:     Edema, unspecified, Pain in right leg  CPT Codes:       65059  ICD Codes:       R60.9  M79.604  History:         Bilateral edema, right leg pain.  Limitations:  PROCEDURES:  The following venous structures were evaluated: common femoral, profunda  femoral, greater saphenous, femoral, popliteal , peroneal and posterior  tibial veins.  Serial compression, augmentation maneuvers,  color and spectral Doppler  flow evaluations were performed.  FINDINGS:  Bilateral lower extremities -.  The right distal superficial femoral vein is difficult to see due to edema.  No superficial or deep venous  thrombosis in the visualized vessels.  Complete color filling and compressibility with normal venous flow dynamics  including spontaneous flow, response to augmentation maneuvers, and  respiratory phasicity in the visualized vessels.  Liana Pascual M.D.  (Electronically Signed)  Final Date:      2020                   11:56    Ec-echocardiogram Complete W/o Cont    Result Date: 3/29/2020  Transthoracic Echo Report Echocardiography Laboratory CONCLUSIONS No prior study is available for comparison. Normal left ventricular systolic function. Left ventricular ejection fraction is visually estimated to be 70%. Diastolic function is difficult to assess with arrhythmia. Normal inferior vena cava size without inspiratory collapse. PABLO STEVENS Exam Date:         2020                    09:15 Exam Location:     Inpatient Priority:          Routine Ordering Physician:        PAUL GRACIA Referring Physician:       SAMIA Arora Sonographer:               Rosana Moreno RDCS Age:    79     Gender:    M MRN:    [id number] :    1940 BSA:    1.93   Ht (in):    70     Wt (lb):    167 Exam Type:     Complete Indications:     Shortness of breath ICD Codes:       R06.02 CPT Codes:       68674 BP:   145    /   66     HR:   85 Technical Quality:       Technically difficult study -                          adequate information is obtained MEASUREMENTS  (Male / Female) Normal Values 2D ECHO LV Diastolic Diameter PLAX        4.3 cm                4.2 - 5.9 / 3.9 - 5.3 cm LV Systolic Diameter PLAX         2.6 cm                2.1 - 4.0 cm IVS Diastolic Thickness           0.77 cm               LVPW Diastolic Thickness          0.81 cm               RV Diameter 4C                    2.5 cm                2.5 - 2.9 cm LVOT Diameter                     2 cm                  RA Diameter                       4.5 cm                Estimated LV Ejection Fraction    70 %                   LV Ejection Fraction MOD BP       70.8 %                >= 55  % LV Ejection Fraction MOD 4C       71.8 %                LV Ejection Fraction MOD 2C       66 %                  LA Volume Index                   37.4 cm3/m2           16 - 28 cm3/m2 IVC Diameter                      1.9 cm                DOPPLER AV Peak Velocity                  1.4 m/s               AV Peak Gradient                  7.3 mmHg              AV Mean Gradient                  4.1 mmHg              LVOT Peak Velocity                0.85 m/s              AV Area Cont Eq vti               1.8 cm2               MV Velocity Time Integral         17 cm                 Mitral E Point Velocity           0.77 m/s              Mitral E to A Ratio               1.2                   MV Pressure Half Time             65.2 ms               MV Area PHT                       3.4 cm2               MV Deceleration Time              225 ms                PV Peak Velocity                  0.91 m/s              PV Peak Gradient                  3.3 mmHg              RVOT Peak Velocity                0.76 m/s              * Indicates values subject to auto-interpretation LV EF:  70    % FINDINGS Left Ventricle Normal left ventricular chamber size. Normal left ventricular wall thickness. Normal left ventricular systolic function. Left ventricular ejection fraction is visually estimated to be 70%. Normal regional wall motion. Diastolic function is difficult to assess with arrhythmia. Right Ventricle Normal right ventricular size and systolic function. Right Atrium Enlarged right atrium. Normal inferior vena cava size without inspiratory collapse. Left Atrium Mildly dilated left atrium. Left atrial volume index is 37 mL/sq m. Mitral Valve Structurally normal mitral valve. No mitral stenosis. Trace mitral regurgitation. Aortic Valve Tricuspid aortic valve. Aortic sclerosis without stenosis. No aortic insufficiency. Tricuspid Valve Structurally normal tricuspid  valve. No tricuspid stenosis. Trace tricuspid regurgitation. Unable to estimate pulmonary artery pressure due to an inadequate tricuspid regurgitant jet. Pulmonic Valve The pulmonic valve is not well visualized. Trace pulmonic insufficiency. Pericardium No pericardial effusion seen. Aorta Normal aortic root for body surface area. Ascending aorta diameter is 3.0 cm. Lili Ryan MD (Electronically Signed) Final Date:     29 March 2020                 11:55    Dx-abdomen For Tube Placement    Result Date: 3/29/2020  3/29/2020 12:19 PM HISTORY/REASON FOR EXAM: Line evaluation. TECHNIQUE/EXAM DESCRIPTION AND NUMBER OF VIEWS:  1 view(s) of the abdomen. COMPARISON:  3/27/2020. FINDINGS: Enteric tube has been placed. The tip projects over the stomach. NG tube tip projects over the distal stomach. The bowel gas pattern is nonspecific.     Enteric tube tip projects over the stomach. NG tube tip projects over the distal stomach.    Dx-abdomen For Tube Placement    Result Date: 3/20/2020  3/20/2020 4:48 PM HISTORY/REASON FOR EXAM:  Line evaluation. TECHNIQUE/EXAM DESCRIPTION AND NUMBER OF VIEWS:  1 view(s) of the abdomen. COMPARISON:  3/20/2020 FINDINGS: Enteric tube has been placed. The tip projects over the midline. The bowel gas pattern is within normal limits.  Right upper quadrant cholecystectomy clips.     Feeding tube tip in the gastric fundus/antrum.    Dx-abdomen For Tube Placement    Result Date: 3/20/2020  3/20/2020 3:58 PM HISTORY/REASON FOR EXAM:  Line evaluation. TECHNIQUE/EXAM DESCRIPTION AND NUMBER OF VIEWS:  1 view(s) of the abdomen. COMPARISON:  3/12/2020. FINDINGS: Enteric tube projects over the second portion of the duodenum. There is a nonspecific bowel gas pattern. Surgical clips are seen in the right upper quadrant. There is a small amount of residual enteric contrast. Degenerative changes are seen in the spine. There is rightward curvature of the spine.     Enteric tube projects over the second portion  of the duodenum.    Ir-midline Catheter Insertion Wo Guidance > Age 3    Result Date: 3/20/2020  HISTORY/REASON FOR EXAM:  Midline Placement   TECHNIQUE/EXAM DESCRIPTION AND NUMBER OF VIEWS: Midline insertion with ultrasound guidance.  FINDINGS: Midline insertion with Ultrasound Guidance was performed by qualified nursing staff without the assistance of a Radiologist. Midline positioning as measured by RN or as appropriate length of catheter selected.              Ultrasound-guided midline placement performed by qualified nursing staff as above.         ASSESSMENT:  Patient is a 79 y.o. male admitted with C2 AIS D spinal cord injury, and recent transfer to [facility] for altered mental status, determined to be likely secondary to toxic/metabolic encephalopathy now awake, alert, and aware.    Rehabilitation: Impaired ADLs and mobility  Patient is a good candidate for inpatient rehab based on needs for PT, OT, and speech therapy.  Patient will also benefit from family training.  Patient has a good discharge situation which will be home with family support.   Barriers to transfer include: Insurance authorization, TCCs to verify disposition, medical clearance and bed availability     [facility] Code / Diagnosis to Support: 04.1211 Quadriplegia, Incomplete C1-4    Acute toxic/metabolic encephalopathy  -Resolved  -Continue SLP for cognition    C2 AIS D spinal cord injury  -Consistent with central cord syndrome  -Continue PT OT and speech therapy while in-house  -We will [facility] authorization from [facility]  -Anticipate patient will benefit from [facility] services    Diabetes mellitus type 2  -Insulin 2 to 9 units  -Lantus 10 units q. evening    Hypertension  -Lasix 40 mg twice daily  -Metoprolol 12.5 mg twice daily    Neurogenic hypotension  -Midodrine 5 mg every 8 hours PRN     Neurologic respiratory impairment  - Consult respiratory therapy  - Oxygen as per guidelines  - DuoNeb twice daily    Neuropathic pain  -Starting gabapentin 100 mg 3  times daily  -Dose limited by chronic kidney disease with GFR 35    Bowel program  - Senokot 1 tab nightly  - MiraLAX 1 packet twice daily PRN  - Milk of magnesia 30mL daily if no bowel movement in greater than 24 hours  - Dulcolax suppository 10 mg if patient goes more than 48 hours without a bowel movement  - Fleet enema if patient goes more than 72 hours without a bowel movement    DVT Prophylaxis:   -Heparin 5K 3 times daily      Discussed with pt, summarized hospitalization and care, options for next step of care  Labs reviewed   Imaging personally reviewed    Discussed with team about recommendations     Thank you for allowing us to participate in the care of this patient.       Discussed with patient about recommendations for and plan for rehabilitation as follows. Patient with multiple co-morbidities(including but not limited to incomplete quadriplegia, spinal cord injury, encephalopathy); with swallowing deficits and functional deficits in mobility/self-care, and Moderate de-conditioning.     Pre-morbidly, this patient lived in a single level home with One steps to enter, alone and able to care for self. The patient was evaluated by acute care Physical Therapy, Occupational Therapy and Speech Language Pathology; currently requiring minimal assistance for mobility and maximal assistance for ADLs, also with ongoing swallowing deficits.     The patient is a(n) Very Good candidate for an acute inpatient rehabilitation program with a coordinated program of care at an intensity and frequency not available at a lower level of care.     Note: if patient continues to progress while waiting for medical clearance, and no longer requires 2 of of 3 therapy services (PT/OT/SLP) at a CGA/Minimal assistance level, patient will no longer need acute inpatient rehabilitation.    This recommendation is substantiated by the patient's current medical condition with intervention and assessment of medical issues requiring an acute  level of care for patient's safety and maximum outcome. A coordinated program of care will be provided by an interdisciplinary team including physical therapy, occupational therapy, speech language pathology, hospitalist, physiatry and rehab nursing. Rehab goals include improved swallowing, mobility, self-care management, strength and conditioning/endurance, pain management, bowel and bladder management, mood and affect, and safety with independent home management including caregiver training.     Estimated length of stay is approximately 14-21 days. Rehab potential: Very Good. Disposition: to pre-morbid independent living setting with supportive care of patient's family. We will continue to follow with you in anticipation of discharge to acute inpatient rehabilitation when medically stable to do so at the discretion of his  attending physician. Thank you for allowing us to participate in his care. Please call with any questions regarding this recommendation.    I spent 35 minutes today from 2:15 to 2:40 reviewing the past medical history of this patient, regarding the diagnosis of incomplete quadriplegia.        Negrito Zambrano, DO   Physical Medicine and Rehabilitation

## 2020-04-03 NOTE — DISCHARGE PLANNING
JUDI completed chart review. Per PT/OT/ST notes thus far, recommending SNF. Patient has made choice for [facility]. SW faced to [facility] [phone] for [facility].     Still pending [facility] acceptance possibly with ongoing rehab notes.     PLAN: Patient to D/C to SNF or Rehab once medically clear.

## 2020-04-03 NOTE — CARE PLAN
Problem: Nutritional:  Goal: Achieve adequate nutritional intake  Description: Patient will consume >/=50% of meals   Outcome: PROGRESSING AS EXPECTED     Per flow sheet: % x 2 meals + % x 2 supplements on 4/2, 25-50% x 1 meal + <25% x 1 supplement for breakfast on 4/3. RN reports pt has a good appetite despite lower PO intake today. Pt had a bowel movement in middle of breakfast and when returned to tray did not want to resume meal.

## 2020-04-03 NOTE — DISCHARGE PLANNING
Acute Rehab Hospital/ Transitional Care Coordination  Tc to Carla @ [facility]  and to Jose Purvis [phone] at the [facility] re;authorization.   Only able to leave messages on both numbers.   I also called [facility]  @ ext [phone], but this is not a good working number.    I do not anticipate I will receive authorization today.     [place] Text to Damian LAU informing her of above.

## 2020-04-04 PROBLEM — I48.91 A-FIB (HCC): Chronic | Status: ACTIVE | Noted: 2020-04-04

## 2020-04-04 LAB
C DIFF DNA SPEC QL NAA+PROBE: NEGATIVE
C DIFF TOX GENS STL QL NAA+PROBE: NEGATIVE
GLUCOSE BLD-MCNC: 160 MG/DL (ref 65–99)
GLUCOSE BLD-MCNC: 168 MG/DL (ref 65–99)
GLUCOSE BLD-MCNC: 99 MG/DL (ref 65–99)

## 2020-04-04 PROCEDURE — 87493 C DIFF AMPLIFIED PROBE: CPT

## 2020-04-04 PROCEDURE — 94760 N-INVAS EAR/PLS OXIMETRY 1: CPT

## 2020-04-04 PROCEDURE — 82962 GLUCOSE BLOOD TEST: CPT

## 2020-04-04 PROCEDURE — 700102 HCHG RX REV CODE 250 W/ 637 OVERRIDE(OP): Performed by: PHYSICAL MEDICINE & REHABILITATION

## 2020-04-04 PROCEDURE — 770020 HCHG ROOM/CARE - TELE (206)

## 2020-04-04 PROCEDURE — 99233 SBSQ HOSP IP/OBS HIGH 50: CPT | Performed by: INTERNAL MEDICINE

## 2020-04-04 PROCEDURE — 93005 ELECTROCARDIOGRAM TRACING: CPT | Performed by: INTERNAL MEDICINE

## 2020-04-04 PROCEDURE — A9270 NON-COVERED ITEM OR SERVICE: HCPCS | Performed by: INTERNAL MEDICINE

## 2020-04-04 PROCEDURE — 700111 HCHG RX REV CODE 636 W/ 250 OVERRIDE (IP): Performed by: INTERNAL MEDICINE

## 2020-04-04 PROCEDURE — A9270 NON-COVERED ITEM OR SERVICE: HCPCS | Performed by: PHYSICAL MEDICINE & REHABILITATION

## 2020-04-04 PROCEDURE — 700102 HCHG RX REV CODE 250 W/ 637 OVERRIDE(OP): Performed by: INTERNAL MEDICINE

## 2020-04-04 RX ORDER — METOPROLOL TARTRATE 1 MG/ML
5 INJECTION, SOLUTION INTRAVENOUS
Status: DISCONTINUED | OUTPATIENT
Start: 2020-04-04 | End: 2020-04-10 | Stop reason: HOSPADM

## 2020-04-04 RX ADMIN — GABAPENTIN 100 MG: 100 CAPSULE ORAL at 12:22

## 2020-04-04 RX ADMIN — GABAPENTIN 100 MG: 100 CAPSULE ORAL at 05:56

## 2020-04-04 RX ADMIN — APIXABAN 2.5 MG: 2.5 TABLET, FILM COATED ORAL at 12:22

## 2020-04-04 RX ADMIN — GABAPENTIN 100 MG: 100 CAPSULE ORAL at 17:03

## 2020-04-04 RX ADMIN — HEPARIN SODIUM 5000 UNITS: 5000 INJECTION, SOLUTION INTRAVENOUS; SUBCUTANEOUS at 05:55

## 2020-04-04 RX ADMIN — INSULIN HUMAN 2 UNITS: 100 INJECTION, SOLUTION PARENTERAL at 17:06

## 2020-04-04 RX ADMIN — FUROSEMIDE 40 MG: 40 TABLET ORAL at 17:05

## 2020-04-04 RX ADMIN — APIXABAN 2.5 MG: 2.5 TABLET, FILM COATED ORAL at 17:04

## 2020-04-04 RX ADMIN — FUROSEMIDE 40 MG: 40 TABLET ORAL at 05:56

## 2020-04-04 RX ADMIN — THIAMINE HCL TAB 100 MG 100 MG: 100 TAB at 05:55

## 2020-04-04 RX ADMIN — METOPROLOL TARTRATE 12.5 MG: 25 TABLET, FILM COATED ORAL at 05:55

## 2020-04-04 RX ADMIN — INSULIN GLARGINE 10 UNITS: 100 INJECTION, SOLUTION SUBCUTANEOUS at 17:06

## 2020-04-04 RX ADMIN — METOPROLOL TARTRATE 12.5 MG: 25 TABLET, FILM COATED ORAL at 17:04

## 2020-04-04 RX ADMIN — INSULIN HUMAN 2 UNITS: 100 INJECTION, SOLUTION PARENTERAL at 12:20

## 2020-04-04 ASSESSMENT — ENCOUNTER SYMPTOMS
PSYCHIATRIC NEGATIVE: 1
BLOOD IN STOOL: 0
FEVER: 0
DIARRHEA: 1
CHILLS: 0
RESPIRATORY NEGATIVE: 1
NAUSEA: 0
NEUROLOGICAL NEGATIVE: 1
CARDIOVASCULAR NEGATIVE: 1
EYES NEGATIVE: 1
ABDOMINAL PAIN: 0
VOMITING: 0
CONSTIPATION: 0
MUSCULOSKELETAL NEGATIVE: 1
DIAPHORESIS: 0

## 2020-04-04 ASSESSMENT — CHA2DS2 SCORE
CHA2DS2 VASC SCORE: 3
DIABETES: YES
AGE 65 TO 74: NO
SEX: MALE
HYPERTENSION: NO
CHF OR LEFT VENTRICULAR DYSFUNCTION: NO
PRIOR STROKE OR TIA OR THROMBOEMBOLISM: NO
AGE 75 OR GREATER: YES
VASCULAR DISEASE: NO

## 2020-04-04 NOTE — PROGRESS NOTES
Hospital Medicine Daily Progress Note    Date of Service  4/4/2020    Chief Complaint  79 y.o. male admitted 3/28/2020 with altered mental status    Hospital Course    *79 y.o. male with hx of diabetes, hypertension, CKD type 4 and recent cervical laminectomy who presented 3/28/2020 with acute encephalopathy. He had been transferred to [facility] 16 days prior and was sent to the ER on 3/28 for altered mentation. At that visit, he had an extensive workup, and though he couldn't remember why he had been sent to the ER, he had an unremarkable CT head.  He was found to have a new pneumonia and was treated with antibiotics, though was stable to return [facility] to rehab.  He was discharged back to rehab with the plan to continue antibiotics.  He then was sent back later for altered mentation again.  He began gurling on secretions and no protecting his airway, so he was intubated and admitted to the ICU.  He was then a COVID rule out, as CXR revealed multifocal opacities and he was on the vent.  He was extubated on 3/31 and did fine, though he did remain somewhat confused as to why he was here.  He went in and out of afib, with 20 beats of vtach the night of 3/31, so he was started on metoprolol low dose. COVD came back negative, as well as influenza.  He did also recieve an LP in the ER and CSF was negative for infection, though showed high glucose and protein.  All cultures were negative.*    During this hospitalization the patient developed episode of atrial fibrillation with no RVR, after long discussion with the patient and his sister they accept the risk of bleeding and they wanted to start with blood thinner restart with apixaban 2.5 mg twice daily due to chronic kidney disease.     Interval Problem Update  -No events or fever last night, the patient is alert and oriented and no confusion.   -Improving on his eating and possible removing Cortrack  -Waiting for C. difficile test for diarrhea.   -Developed atrial  fibrillation with no RVR the patient was a stable with normal blood pressure, after discussing with him and his sister they agreed to start apixaban.     Consultants/Specialty  Intensivist    Code Status  Full code switch to DNR and DNI according to the patient wishes    Disposition  Medically stable for placement    Review of Systems  Review of Systems   Constitutional: Positive for malaise/fatigue. Negative for chills, diaphoresis and fever.   Eyes: Negative.    Respiratory: Negative.    Cardiovascular: Negative.    Gastrointestinal: Positive for diarrhea. Negative for abdominal pain, blood in stool, constipation, nausea and vomiting.   Genitourinary: Negative.    Musculoskeletal: Negative.    Neurological: Negative.    Psychiatric/Behavioral: Negative.         Physical Exam  Temp:  [36.7 °C (98 °F)-37.2 °C (99 °F)] 36.7 °C (98.1 °F)  Pulse:  [] 89  Resp:  [16-20] 20  BP: (104-126)/(57-70) 126/66  SpO2:  [95 %-98 %] 96 %    Physical Exam  Constitutional:       Appearance: He is not ill-appearing.      Comments: cortack in place    Neck:      Comments: Collar in place  Cardiovascular:      Rate and Rhythm: Normal rate.      Heart sounds: No murmur.   Pulmonary:      Effort: Pulmonary effort is normal. No respiratory distress.      Breath sounds: No wheezing or rales.   Abdominal:      General: Abdomen is flat. Bowel sounds are normal. There is no distension.      Tenderness: There is no abdominal tenderness. There is no guarding.   Neurological:      General: No focal deficit present.      Mental Status: He is alert and oriented to person, place, and time. Mental status is at baseline.      Cranial Nerves: No cranial nerve deficit.      Motor: No weakness.      Comments: Generalized weakness         Fluids    Intake/Output Summary (Last 24 hours) at 4/4/2020 1111  Last data filed at 4/4/2020 0835  Gross per 24 hour   Intake 240 ml   Output 840 ml   Net -600 ml       Laboratory  Recent Labs     04/02/20  5691  04/03/20  0352   WBC 4.8 4.6*   RBC 2.83* 2.75*   HEMOGLOBIN 8.9* 8.7*   HEMATOCRIT 28.9* 27.5*   .1* 100.0*   MCH 31.4 31.6   MCHC 30.8* 31.6*   RDW 88.5* 83.2*   PLATELETCT 113* 111*   MPV 9.6 9.7     Recent Labs     04/02/20  0410 04/03/20  0352   SODIUM 143 142   POTASSIUM 4.2 4.8   CHLORIDE 104 104   CO2 26 26   GLUCOSE 116* 87   BUN 59* 52*   CREATININE 2.27* 2.20*   CALCIUM 9.1 9.0                   Imaging  DX-CHEST-PORTABLE (1 VIEW)   Final Result      No significant change from prior exam.      DX-CHEST-PORTABLE (1 VIEW)   Final Result      Stable chest. Unchanging left lower lobe atelectasis or pneumonia and bibasilar interstitial edema or pneumonia.      DX-CHEST-PORTABLE (1 VIEW)   Final Result      Persistent bilateral pulmonary consolidation with pleural effusions.      DX-CHEST-PORTABLE (1 VIEW)   Final Result         Central line is not seen. No appreciable pneumothorax.      DX-ABDOMEN FOR TUBE PLACEMENT   Final Result      Enteric tube tip projects over the stomach.      NG tube tip projects over the distal stomach.      EC-ECHOCARDIOGRAM COMPLETE W/O CONT   Final Result      DX-CHEST-PORTABLE (1 VIEW)   Final Result      Stable examination.      CT-CHEST (THORAX) W/O   Final Result      1.  Large bilateral pleural effusions with bibasilar atelectasis.      2.  Indeterminate right upper lobe pulmonary nodules measuring 5 and 9 mm in size.      3.  Endotracheal and nasogastric tubes present.      4.  Calcified granulomas.      Low Risk: CT at 3-6 months, then consider CT at 18-24 months      High Risk: CT at 3-6 months, then at 18-24 months      Comments: Use most suspicious nodule as guide to management. Follow-up intervals may vary according to size and risk.      Low Risk - Minimal or absent history of smoking and of other known risk factors.      High Risk - History of smoking or of other known risk factors.      Note: These recommendations do not apply to lung cancer screening, patients  with immunosuppression, or patients with known primary cancer.      Fleischner Society 2017 Guidelines for Management of Incidentally Detected Pulmonary Nodules in Adults               CT-HEAD W/O   Final Result      1.  No evidence of acute intracranial process.      2.  Cerebral atrophy as well as periventricular chronic small vessel ischemic change.      DX-CHEST-PORTABLE (1 VIEW)   Final Result         Endotracheal tube with tip projecting over the mid to lower thoracic trachea.      Gastric drainage tube courses below diaphragm, tip is not seen.      DX-CHEST-PORTABLE (1 VIEW)   Final Result         No significant interval change. Grossly unchanged bibasilar ill-defined opacities           Assessment/Plan  A-fib (HCC)- (present on admission)  Assessment & Plan  Paroxysmal and no RVR  [id number] risk of stroke 3.2 %/year  HASBLED is 2 and the risk of bleeding is 4.1%  After long discussion with the patient and his sister, they agreed to start with apixaban.  We will start with apixaban 2.5 mg twice daily due to chronic kidney disease.  Keep monitoring closely    Acute encephalopathy- (present on admission)  Assessment & Plan  Likely delirium from underlying medical issues; toxic metabolic from sepsis  CT head was negative  LP CSF negative  Continue nonpharmacological treatment for delirium  Avoid opioid and benzo  PT and OT with speech  Likely need rehab  Significant improving, the patient is alert and oriented x3 and he is able to make a good conversation      Acute respiratory failure (HCC)- (present on admission)  Assessment & Plan  Intubated 3/28->3/31, to protect airway due to altered mental status   chest x-ray showed bilateral infiltration  CT had bilateral pleural effusions      Flu negative, COVID neg  Likely combination between pulmonary edema due to chronic kidney disease and possible pneumonia.      Echo did not show heart failure  Improving continue diuretics  Finished antibiotics for 5  days  Improving and right now he is on room air         Central cord syndrome (HCC)- (present on admission)  Assessment & Plan  S/p C3-6 laminectomy and posterolateral fusion  for cervical stenosis/myelopathy  The patient received rehab therapy.  Continue monitoring and follow-up as outpatient with the neurosurgeon    ELLIOTT ramsey ([facility])- (present on admission)  Assessment & Plan  Unsustained, restarted on metoprolol   Electrolytes ok    Thrombocytopenia ([facility])- (present on admission)  Assessment & Plan  Chronic around 110s  No signs of a bleeding  Continue monitoring    Anemia- (present on admission)  Assessment & Plan  With elevated MCV and low platelets  Stable  We will check B12 and folic acid   Iron panel around stable      Dysphagia- (present on admission)  Assessment & Plan  Due to cervical spine injury   SLP on board   We will keep his cartrack at this time until improving on his a speech    DM (diabetes mellitus) ([facility])- (present on admission)  Assessment & Plan  Last A1c is 5.8 and his target for his age   Continue on lantus 10 units and ssi;      Pleural effusion, bilateral- (present on admission)  Assessment & Plan  S/p diuresis  On room air    Cervical stenosis of spine- (present on admission)  Assessment & Plan  S/p cervical laminectomy in February, has been at rehab  Can move all extremities; will need ongoing rehab    ACP (advance care planning)  Assessment & Plan  The patient is alert and oriented x4 and he makes a good conversation he is able to make decision, discussed with him the CODE STATUS and the patient is a clear that he wants to be DNR/DNI.  I reviewed his advanced directive which showed the patient preferred not to be resuscitated    Pulmonary nodules- (present on admission)  Assessment & Plan  F/u with pcp    Acute on chronic renal failure ([facility])- (present on admission)  Assessment & Plan  Improving   Around his baseline creatinine 2        VTE prophylaxis: Heparin    I have performed a physical  exam and reviewed and updated ROS and Plan today (4/4/2020). In review of yesterday's note (4/3/2020), there are no changes except as documented above.

## 2020-04-04 NOTE — ASSESSMENT & PLAN NOTE
Paroxysmal and no RVR  [id number]>3 risk of stroke 3.2 %/year  HASBLED is 2 and the risk of bleeding is 4.1%  After long discussion with the patient and his sister, they agreed to start with apixaban.  Continue apixaban 2.5 mg twice daily due to chronic kidney disease.  Keep monitoring closely

## 2020-04-04 NOTE — PROGRESS NOTES
Handoff report received from day shift nurse. Pt care assumed. Pt is currently resting in bed. POC discussed with Pt and Pt verbalizes no questions at this time. Pt is AAOx3-4, on Ra, on Tele monitoring, and VSS. Call light and belongings within reach, bed in lowest and locked position, and Pt educated on use of call light. Will continue to monitor.

## 2020-04-04 NOTE — DISCHARGE PLANNING
[facility] Transitional [facility] Coordination     [place] authorization for inpatient rehab is pending.  Do not anticipate response from VA until Monday at the earliest.

## 2020-04-04 NOTE — PROGRESS NOTES
12 hr cc          Monitor Summary:  SR-S   With frequent pauses ranging from 1.08sec to 1.35 sec  With rare PVC  .16/.08/.46

## 2020-04-05 LAB
ANION GAP SERPL CALC-SCNC: 13 MMOL/L (ref 7–16)
ANISOCYTOSIS BLD QL SMEAR: ABNORMAL
BASOPHILS # BLD AUTO: 0.4 % (ref 0–1.8)
BASOPHILS # BLD: 0.02 K/UL (ref 0–0.12)
BUN SERPL-MCNC: 57 MG/DL (ref 8–22)
CALCIUM SERPL-MCNC: 8.5 MG/DL (ref 8.5–10.5)
CHLORIDE SERPL-SCNC: 102 MMOL/L (ref 96–112)
CO2 SERPL-SCNC: 24 MMOL/L (ref 20–33)
COMMENT 1642: NORMAL
CREAT SERPL-MCNC: 2.38 MG/DL (ref 0.5–1.4)
EKG IMPRESSION: NORMAL
EOSINOPHIL # BLD AUTO: 0.06 K/UL (ref 0–0.51)
EOSINOPHIL NFR BLD: 1.3 % (ref 0–6.9)
ERYTHROCYTE [DISTWIDTH] IN BLOOD BY AUTOMATED COUNT: 79 FL (ref 35.9–50)
GLUCOSE BLD-MCNC: 102 MG/DL (ref 65–99)
GLUCOSE BLD-MCNC: 106 MG/DL (ref 65–99)
GLUCOSE BLD-MCNC: 120 MG/DL (ref 65–99)
GLUCOSE BLD-MCNC: 150 MG/DL (ref 65–99)
GLUCOSE BLD-MCNC: 208 MG/DL (ref 65–99)
GLUCOSE SERPL-MCNC: 87 MG/DL (ref 65–99)
HCT VFR BLD AUTO: 29.9 % (ref 42–52)
HGB BLD-MCNC: 9.5 G/DL (ref 14–18)
IMM GRANULOCYTES # BLD AUTO: 0.03 K/UL (ref 0–0.11)
IMM GRANULOCYTES NFR BLD AUTO: 0.7 % (ref 0–0.9)
LYMPHOCYTES # BLD AUTO: 0.7 K/UL (ref 1–4.8)
LYMPHOCYTES NFR BLD: 15.6 % (ref 22–41)
MACROCYTES BLD QL SMEAR: ABNORMAL
MAGNESIUM SERPL-MCNC: 2.1 MG/DL (ref 1.5–2.5)
MCH RBC QN AUTO: 30.9 PG (ref 27–33)
MCHC RBC AUTO-ENTMCNC: 31.8 G/DL (ref 33.7–35.3)
MCV RBC AUTO: 97.4 FL (ref 81.4–97.8)
MONOCYTES # BLD AUTO: 0.61 K/UL (ref 0–0.85)
MONOCYTES NFR BLD AUTO: 13.6 % (ref 0–13.4)
MORPHOLOGY BLD-IMP: NORMAL
NEUTROPHILS # BLD AUTO: 3.07 K/UL (ref 1.82–7.42)
NEUTROPHILS NFR BLD: 68.4 % (ref 44–72)
NRBC # BLD AUTO: 0 K/UL
NRBC BLD-RTO: 0 /100 WBC
PLATELET # BLD AUTO: 121 K/UL (ref 164–446)
PLATELET BLD QL SMEAR: NORMAL
PMV BLD AUTO: 10.1 FL (ref 9–12.9)
POLYCHROMASIA BLD QL SMEAR: NORMAL
POTASSIUM SERPL-SCNC: 4 MMOL/L (ref 3.6–5.5)
RBC # BLD AUTO: 3.07 M/UL (ref 4.7–6.1)
RBC BLD AUTO: PRESENT
SODIUM SERPL-SCNC: 139 MMOL/L (ref 135–145)
WBC # BLD AUTO: 4.5 K/UL (ref 4.8–10.8)

## 2020-04-05 PROCEDURE — 80048 BASIC METABOLIC PNL TOTAL CA: CPT

## 2020-04-05 PROCEDURE — 36415 COLL VENOUS BLD VENIPUNCTURE: CPT

## 2020-04-05 PROCEDURE — 99232 SBSQ HOSP IP/OBS MODERATE 35: CPT | Performed by: INTERNAL MEDICINE

## 2020-04-05 PROCEDURE — A9270 NON-COVERED ITEM OR SERVICE: HCPCS | Performed by: PHYSICAL MEDICINE & REHABILITATION

## 2020-04-05 PROCEDURE — 85025 COMPLETE CBC W/AUTO DIFF WBC: CPT

## 2020-04-05 PROCEDURE — A9270 NON-COVERED ITEM OR SERVICE: HCPCS | Performed by: INTERNAL MEDICINE

## 2020-04-05 PROCEDURE — 83735 ASSAY OF MAGNESIUM: CPT

## 2020-04-05 PROCEDURE — 82962 GLUCOSE BLOOD TEST: CPT

## 2020-04-05 PROCEDURE — 700102 HCHG RX REV CODE 250 W/ 637 OVERRIDE(OP): Performed by: PHYSICAL MEDICINE & REHABILITATION

## 2020-04-05 PROCEDURE — 93010 ELECTROCARDIOGRAM REPORT: CPT | Performed by: INTERNAL MEDICINE

## 2020-04-05 PROCEDURE — 700102 HCHG RX REV CODE 250 W/ 637 OVERRIDE(OP): Performed by: INTERNAL MEDICINE

## 2020-04-05 PROCEDURE — 770020 HCHG ROOM/CARE - TELE (206)

## 2020-04-05 RX ORDER — LOPERAMIDE HYDROCHLORIDE 2 MG/1
2 CAPSULE ORAL 4 TIMES DAILY PRN
Status: DISCONTINUED | OUTPATIENT
Start: 2020-04-05 | End: 2020-04-10 | Stop reason: HOSPADM

## 2020-04-05 RX ADMIN — GABAPENTIN 100 MG: 100 CAPSULE ORAL at 16:42

## 2020-04-05 RX ADMIN — LOPERAMIDE HYDROCHLORIDE 2 MG: 2 CAPSULE ORAL at 12:10

## 2020-04-05 RX ADMIN — LOPERAMIDE HYDROCHLORIDE 2 MG: 2 CAPSULE ORAL at 16:42

## 2020-04-05 RX ADMIN — THIAMINE HCL TAB 100 MG 100 MG: 100 TAB at 05:30

## 2020-04-05 RX ADMIN — METOPROLOL TARTRATE 12.5 MG: 25 TABLET, FILM COATED ORAL at 05:29

## 2020-04-05 RX ADMIN — GABAPENTIN 100 MG: 100 CAPSULE ORAL at 05:29

## 2020-04-05 RX ADMIN — INSULIN HUMAN 3 UNITS: 100 INJECTION, SOLUTION PARENTERAL at 16:48

## 2020-04-05 RX ADMIN — FUROSEMIDE 40 MG: 40 TABLET ORAL at 05:30

## 2020-04-05 RX ADMIN — GABAPENTIN 100 MG: 100 CAPSULE ORAL at 12:09

## 2020-04-05 RX ADMIN — METOPROLOL TARTRATE 12.5 MG: 25 TABLET, FILM COATED ORAL at 16:42

## 2020-04-05 RX ADMIN — APIXABAN 2.5 MG: 2.5 TABLET, FILM COATED ORAL at 16:42

## 2020-04-05 RX ADMIN — FUROSEMIDE 40 MG: 40 TABLET ORAL at 16:42

## 2020-04-05 RX ADMIN — INSULIN GLARGINE 10 UNITS: 100 INJECTION, SOLUTION SUBCUTANEOUS at 16:44

## 2020-04-05 RX ADMIN — APIXABAN 2.5 MG: 2.5 TABLET, FILM COATED ORAL at 05:30

## 2020-04-05 ASSESSMENT — ENCOUNTER SYMPTOMS
NEUROLOGICAL NEGATIVE: 1
CONSTIPATION: 0
MUSCULOSKELETAL NEGATIVE: 1
CARDIOVASCULAR NEGATIVE: 1
PSYCHIATRIC NEGATIVE: 1
CHILLS: 0
RESPIRATORY NEGATIVE: 1
NAUSEA: 0
VOMITING: 0
DIAPHORESIS: 0
BLOOD IN STOOL: 0
EYES NEGATIVE: 1
DIARRHEA: 1
ABDOMINAL PAIN: 0
FEVER: 0

## 2020-04-05 NOTE — PROGRESS NOTES
Hospital Medicine Daily Progress Note    Date of Service  4/5/2020    Chief Complaint  79 y.o. male admitted 3/28/2020 with altered mental status    Hospital Course    *79 y.o. male with hx of diabetes, hypertension, CKD type 4 and recent cervical laminectomy who presented 3/28/2020 with acute encephalopathy. He had been transferred to [facility] 16 days prior and was sent to the ER on 3/28 for altered mentation. At that visit, he had an extensive workup, and though he couldn't remember why he had been sent to the ER, he had an unremarkable CT head.  He was found to have a new pneumonia and was treated with antibiotics, though was stable to return [facility] to rehab.  He was discharged back to rehab with the plan to continue antibiotics.  He then was sent back later for altered mentation again.  He began gurling on secretions and no protecting his airway, so he was intubated and admitted to the ICU.  He was then a COVID rule out, as CXR revealed multifocal opacities and he was on the vent.  He was extubated on 3/31 and did fine, though he did remain somewhat confused as to why he was here.  He went in and out of afib, with 20 beats of vtach the night of 3/31, so he was started on metoprolol low dose. COVD came back negative, as well as influenza.  He did also recieve an LP in the ER and CSF was negative for infection, though showed high glucose and protein.  All cultures were negative.*    During this hospitalization the patient developed episode of atrial fibrillation with no RVR, after long discussion with the patient and his sister they accept the risk of bleeding and they wanted to start with blood thinner restart with apixaban 2.5 mg twice daily due to chronic kidney disease.     Interval Problem Update  -No events or fever last night, the patient is alert and oriented and no confusion.   -Cortrack was removed  -Heart rate is controlled  -C. difficile is negative, start Imodium for diarrhea  -Patient is a stable  for placement      Consultants/Specialty  Intensivist    Code Status  Medically stable for rehab and placement    Disposition  Medically stable for placement    Review of Systems  Review of Systems   Constitutional: Positive for malaise/fatigue. Negative for chills, diaphoresis and fever.   Eyes: Negative.    Respiratory: Negative.    Cardiovascular: Negative.    Gastrointestinal: Positive for diarrhea. Negative for abdominal pain, blood in stool, constipation, nausea and vomiting.   Genitourinary: Negative.    Musculoskeletal: Negative.    Neurological: Negative.    Psychiatric/Behavioral: Negative.         Physical Exam  Temp:  [36.5 °C (97.7 °F)-37.4 °C (99.4 °F)] 37.4 °C (99.4 °F)  Pulse:  [70-84] 80  Resp:  [16-18] 18  BP: ()/(45-66) 96/52  SpO2:  [96 %-100 %] 98 %    Physical Exam  Constitutional:       Appearance: He is not ill-appearing.      Comments: cortack in place    Neck:      Comments: Collar in place  Cardiovascular:      Rate and Rhythm: Normal rate.      Heart sounds: No murmur.   Pulmonary:      Effort: Pulmonary effort is normal. No respiratory distress.      Breath sounds: No wheezing or rales.   Abdominal:      General: Abdomen is flat. Bowel sounds are normal. There is no distension.      Tenderness: There is no abdominal tenderness. There is no guarding.   Neurological:      General: No focal deficit present.      Mental Status: He is alert and oriented to person, place, and time. Mental status is at baseline.      Cranial Nerves: No cranial nerve deficit.      Motor: No weakness.      Comments: Generalized weakness         Fluids    Intake/Output Summary (Last 24 hours) at 4/5/2020 0950  Last data filed at 4/5/2020 0730  Gross per 24 hour   Intake 360 ml   Output 600 ml   Net -240 ml       Laboratory  Recent Labs     04/03/20  0352 04/05/20  0336   WBC 4.6* 4.5*   RBC 2.75* 3.07*   HEMOGLOBIN 8.7* 9.5*   HEMATOCRIT 27.5* 29.9*   .0* 97.4   MCH 31.6 30.9   MCHC 31.6* 31.8*   RDW  83.2* 79.0*   PLATELETCT 111* 121*   MPV 9.7 10.1     Recent Labs     04/03/20  0352 04/05/20  0336   SODIUM 142 139   POTASSIUM 4.8 4.0   CHLORIDE 104 102   CO2 26 24   GLUCOSE 87 87   BUN 52* 57*   CREATININE 2.20* 2.38*   CALCIUM 9.0 8.5                   Imaging  DX-CHEST-PORTABLE (1 VIEW)   Final Result      No significant change from prior exam.      DX-CHEST-PORTABLE (1 VIEW)   Final Result      Stable chest. Unchanging left lower lobe atelectasis or pneumonia and bibasilar interstitial edema or pneumonia.      DX-CHEST-PORTABLE (1 VIEW)   Final Result      Persistent bilateral pulmonary consolidation with pleural effusions.      DX-CHEST-PORTABLE (1 VIEW)   Final Result         Central line is not seen. No appreciable pneumothorax.      DX-ABDOMEN FOR TUBE PLACEMENT   Final Result      Enteric tube tip projects over the stomach.      NG tube tip projects over the distal stomach.      EC-ECHOCARDIOGRAM COMPLETE W/O CONT   Final Result      DX-CHEST-PORTABLE (1 VIEW)   Final Result      Stable examination.      CT-CHEST (THORAX) W/O   Final Result      1.  Large bilateral pleural effusions with bibasilar atelectasis.      2.  Indeterminate right upper lobe pulmonary nodules measuring 5 and 9 mm in size.      3.  Endotracheal and nasogastric tubes present.      4.  Calcified granulomas.      Low Risk: CT at 3-6 months, then consider CT at 18-24 months      High Risk: CT at 3-6 months, then at 18-24 months      Comments: Use most suspicious nodule as guide to management. Follow-up intervals may vary according to size and risk.      Low Risk - Minimal or absent history of smoking and of other known risk factors.      High Risk - History of smoking or of other known risk factors.      Note: These recommendations do not apply to lung cancer screening, patients with immunosuppression, or patients with known primary cancer.      Fleischner Society 2017 Guidelines for Management of Incidentally Detected Pulmonary  Nodules in Adults               CT-HEAD W/O   Final Result      1.  No evidence of acute intracranial process.      2.  Cerebral atrophy as well as periventricular chronic small vessel ischemic change.      DX-CHEST-PORTABLE (1 VIEW)   Final Result         Endotracheal tube with tip projecting over the mid to lower thoracic trachea.      Gastric drainage tube courses below diaphragm, tip is not seen.      DX-CHEST-PORTABLE (1 VIEW)   Final Result         No significant interval change. Grossly unchanged bibasilar ill-defined opacities           Assessment/Plan  A-fib (HCC)- (present on admission)  Assessment & Plan  Paroxysmal and no RVR  [id number]>3 risk of stroke 3.2 %/year  HASBLED is 2 and the risk of bleeding is 4.1%  After long discussion with the patient and his sister, they agreed to start with apixaban.  Continue apixaban 2.5 mg twice daily due to chronic kidney disease.  Keep monitoring closely    Acute encephalopathy- (present on admission)  Assessment & Plan  Likely delirium from underlying medical issues; toxic metabolic from sepsis  CT head was negative  LP CSF negative  Continue nonpharmacological treatment for delirium  Avoid opioid and benzo  PT and OT with speech  Likely need rehab  Significant improving, the patient is alert and oriented x3 and he is able to make a good conversation      Acute respiratory failure (HCC)- (present on admission)  Assessment & Plan  Intubated 3/28->3/31, to protect airway due to altered mental status   chest x-ray showed bilateral infiltration  CT had bilateral pleural effusions      Flu negative, COVID neg  Likely combination between pulmonary edema due to chronic kidney disease and possible pneumonia.      Echo did not show heart failure  Improving continue diuretics  Finished antibiotics for 5 days  Improving and right now he is on room air         Central cord syndrome (HCC)- (present on admission)  Assessment & Plan  S/p C3-6 laminectomy and posterolateral  fusion  for cervical stenosis/myelopathy  The patient received rehab therapy.  Continue monitoring and follow-up as outpatient with the neurosurgeon    V tach ([facility])- (present on admission)  Assessment & Plan  Unsustained, restarted on metoprolol   Electrolytes ok    Thrombocytopenia ([facility])- (present on admission)  Assessment & Plan  Chronic around 110s  No signs of a bleeding  Continue monitoring    Anemia- (present on admission)  Assessment & Plan  With elevated MCV and low platelets  Stable  We will check B12 and folic acid   Iron panel around stable      Dysphagia- (present on admission)  Assessment & Plan  Due to cervical spine injury   SLP on board   Cortrack was removed since he is eating    DM (diabetes mellitus) ([facility])- (present on admission)  Assessment & Plan  Last A1c is 5.8 and his target for his age   Continue on lantus 10 units and ssi;      Pleural effusion, bilateral- (present on admission)  Assessment & Plan  S/p diuresis  On room air    Cervical stenosis of spine- (present on admission)  Assessment & Plan  S/p cervical laminectomy in February, has been at rehab  Can move all extremities; will need ongoing rehab    ACP (advance care planning)  Assessment & Plan  The patient is alert and oriented x4 and he makes a good conversation he is able to make decision, discussed with him the CODE STATUS and the patient is a clear that he wants to be DNR/DNI.  I reviewed his advanced directive which showed the patient preferred not to be resuscitated    Pulmonary nodules- (present on admission)  Assessment & Plan  F/u with pcp    Diarrhea- (present on admission)  Assessment & Plan  No white blood cell and stool and C. difficile is negative  We will start with Imodium as needed    Acute on chronic renal failure ([facility])- (present on admission)  Assessment & Plan  Improving   Around his baseline creatinine 2        VTE prophylaxis: Heparin    I have performed a physical exam and reviewed and updated ROS and Plan  today (4/5/2020). In review of yesterday's note (4/4/2020), there are no changes except as documented above.

## 2020-04-05 NOTE — PROGRESS NOTES
MS:  A.Flutter/A.Fib   -/.08/-  Mulitple 6-8 beat runs of V tach - MD aware  2 beats BBB - MD aware  Frequent pauses 1-2 secs - MD aware

## 2020-04-05 NOTE — PROGRESS NOTES
Assumed care this pm from day shift RN. Patient in bed . Call bell and personal items within reach, bed locked in low position. Bed exit alarm off. Hourly rounding in place.

## 2020-04-05 NOTE — CARE PLAN
Problem: Safety  Goal: Will remain free from injury  Outcome: PROGRESSING AS EXPECTED   Safety precautions and fall prevention interventions in place. Fall prevention education provided, pt verbalized understanding. Bed in low/locked position, treaded socks on pt, call bell is within reach. Appropriate devices provided with ambulation assistance. Pt calls appropriately for help.   Problem: Knowledge Deficit  Goal: Knowledge of disease process/condition, treatment plan, diagnostic tests, and medications will improve  Pt and family updated on POC, including medications, treatment and discharge planning. Questions answered as needed, pt and family verbalized understanding. Encouraged to voice further questions/concerns.

## 2020-04-05 NOTE — CARE PLAN
Problem: Safety  Goal: Will remain free from falls  Outcome: PROGRESSING AS EXPECTED  Intervention: Implement fall precautions  Flowsheets (Taken 4/4/2020 2009)  Environmental Precautions:   Treaded Slipper Socks on Patient   Personal Belongings, Wastebasket, Call Bell etc. in Easy Reach   Transferred to Stronger Side   Report Given to Other Health Care Providers Regarding Fall Risk   Bed in Low Position   Mobility Assessed & Appropriate Sign Placed   Communication Sign for Patients & Families     Problem: Skin Integrity  Goal: Risk for impaired skin integrity will decrease  Outcome: PROGRESSING SLOWER THAN EXPECTED  Intervention: Assess and monitor skin integrity, appearance and/or temperature  Note: Pt. Has redness on sacrum.  Area cleansed and assessed.  Will cointinue to change position of pt. Every 2 hours and to encourage ambulation with two people assist.

## 2020-04-06 ENCOUNTER — HOSPITAL ENCOUNTER (INPATIENT)
Facility: REHABILITATION | Age: 80
End: 2020-04-06
Attending: PHYSICAL MEDICINE & REHABILITATION | Admitting: PHYSICAL MEDICINE & REHABILITATION
Payer: COMMERCIAL

## 2020-04-06 LAB
ALBUMIN SERPL BCP-MCNC: 3.4 G/DL (ref 3.2–4.9)
ALBUMIN/GLOB SERPL: 1.5 G/DL
ALP SERPL-CCNC: 122 U/L (ref 30–99)
ALT SERPL-CCNC: 39 U/L (ref 2–50)
ANION GAP SERPL CALC-SCNC: 14 MMOL/L (ref 7–16)
AST SERPL-CCNC: 37 U/L (ref 12–45)
BILIRUB SERPL-MCNC: 0.7 MG/DL (ref 0.1–1.5)
BUN SERPL-MCNC: 68 MG/DL (ref 8–22)
CALCIUM SERPL-MCNC: 8.5 MG/DL (ref 8.5–10.5)
CHLORIDE SERPL-SCNC: 100 MMOL/L (ref 96–112)
CO2 SERPL-SCNC: 24 MMOL/L (ref 20–33)
CREAT SERPL-MCNC: 2.61 MG/DL (ref 0.5–1.4)
GLOBULIN SER CALC-MCNC: 2.3 G/DL (ref 1.9–3.5)
GLUCOSE BLD-MCNC: 152 MG/DL (ref 65–99)
GLUCOSE BLD-MCNC: 154 MG/DL (ref 65–99)
GLUCOSE BLD-MCNC: 86 MG/DL (ref 65–99)
GLUCOSE SERPL-MCNC: 88 MG/DL (ref 65–99)
OVA AND PARASITE, FECAL INTERPRETATION Q0595: NEGATIVE
POTASSIUM SERPL-SCNC: 4.3 MMOL/L (ref 3.6–5.5)
PROT SERPL-MCNC: 5.7 G/DL (ref 6–8.2)
SODIUM SERPL-SCNC: 138 MMOL/L (ref 135–145)

## 2020-04-06 PROCEDURE — 99232 SBSQ HOSP IP/OBS MODERATE 35: CPT | Performed by: INTERNAL MEDICINE

## 2020-04-06 PROCEDURE — 700105 HCHG RX REV CODE 258: Performed by: INTERNAL MEDICINE

## 2020-04-06 PROCEDURE — 700102 HCHG RX REV CODE 250 W/ 637 OVERRIDE(OP): Performed by: PHYSICAL MEDICINE & REHABILITATION

## 2020-04-06 PROCEDURE — 80053 COMPREHEN METABOLIC PANEL: CPT

## 2020-04-06 PROCEDURE — 36415 COLL VENOUS BLD VENIPUNCTURE: CPT

## 2020-04-06 PROCEDURE — 700102 HCHG RX REV CODE 250 W/ 637 OVERRIDE(OP): Performed by: INTERNAL MEDICINE

## 2020-04-06 PROCEDURE — 92526 ORAL FUNCTION THERAPY: CPT

## 2020-04-06 PROCEDURE — 82962 GLUCOSE BLOOD TEST: CPT

## 2020-04-06 PROCEDURE — A9270 NON-COVERED ITEM OR SERVICE: HCPCS | Performed by: INTERNAL MEDICINE

## 2020-04-06 PROCEDURE — A9270 NON-COVERED ITEM OR SERVICE: HCPCS | Performed by: PHYSICAL MEDICINE & REHABILITATION

## 2020-04-06 PROCEDURE — 770020 HCHG ROOM/CARE - TELE (206)

## 2020-04-06 RX ORDER — SODIUM CHLORIDE 9 MG/ML
1000 INJECTION, SOLUTION INTRAVENOUS CONTINUOUS
Status: DISCONTINUED | OUTPATIENT
Start: 2020-04-06 | End: 2020-04-06

## 2020-04-06 RX ORDER — FUROSEMIDE 20 MG/1
20 TABLET ORAL
Status: DISCONTINUED | OUTPATIENT
Start: 2020-04-06 | End: 2020-04-06

## 2020-04-06 RX ADMIN — GABAPENTIN 100 MG: 100 CAPSULE ORAL at 17:21

## 2020-04-06 RX ADMIN — INSULIN GLARGINE 10 UNITS: 100 INJECTION, SOLUTION SUBCUTANEOUS at 17:27

## 2020-04-06 RX ADMIN — GABAPENTIN 100 MG: 100 CAPSULE ORAL at 06:35

## 2020-04-06 RX ADMIN — SODIUM CHLORIDE 1000 ML: 9 INJECTION, SOLUTION INTRAVENOUS at 07:58

## 2020-04-06 RX ADMIN — THIAMINE HCL TAB 100 MG 100 MG: 100 TAB at 06:35

## 2020-04-06 RX ADMIN — INSULIN HUMAN 2 UNITS: 100 INJECTION, SOLUTION PARENTERAL at 17:26

## 2020-04-06 RX ADMIN — INSULIN HUMAN 2 UNITS: 100 INJECTION, SOLUTION PARENTERAL at 11:34

## 2020-04-06 RX ADMIN — FUROSEMIDE 20 MG: 20 TABLET ORAL at 17:21

## 2020-04-06 RX ADMIN — APIXABAN 2.5 MG: 2.5 TABLET, FILM COATED ORAL at 06:35

## 2020-04-06 RX ADMIN — GABAPENTIN 100 MG: 100 CAPSULE ORAL at 11:35

## 2020-04-06 RX ADMIN — FUROSEMIDE 40 MG: 40 TABLET ORAL at 06:35

## 2020-04-06 RX ADMIN — METOPROLOL TARTRATE 12.5 MG: 25 TABLET, FILM COATED ORAL at 06:35

## 2020-04-06 RX ADMIN — METOPROLOL TARTRATE 12.5 MG: 25 TABLET, FILM COATED ORAL at 17:21

## 2020-04-06 RX ADMIN — APIXABAN 2.5 MG: 2.5 TABLET, FILM COATED ORAL at 17:21

## 2020-04-06 ASSESSMENT — ENCOUNTER SYMPTOMS
CARDIOVASCULAR NEGATIVE: 1
DIARRHEA: 0
EYES NEGATIVE: 1
FEVER: 0
BLOOD IN STOOL: 0
VOMITING: 0
ABDOMINAL PAIN: 0
MUSCULOSKELETAL NEGATIVE: 1
NEUROLOGICAL NEGATIVE: 1
RESPIRATORY NEGATIVE: 1
DIAPHORESIS: 0
PSYCHIATRIC NEGATIVE: 1
CHILLS: 0
CONSTIPATION: 0
NAUSEA: 0

## 2020-04-06 ASSESSMENT — FIBROSIS 4 INDEX: FIB4 SCORE: 3.77

## 2020-04-06 NOTE — CARE PLAN
Problem: Communication  Goal: The ability to communicate needs accurately and effectively will improve  Outcome: PROGRESSING AS EXPECTED     Problem: Safety  Goal: Will remain free from injury  Outcome: PROGRESSING AS EXPECTED  Goal: Will remain free from falls  Outcome: PROGRESSING AS EXPECTED     Problem: Infection  Goal: Will remain free from infection  Outcome: PROGRESSING AS EXPECTED     Problem: Venous Thromboembolism (VTW)/Deep Vein Thrombosis (DVT) Prevention:  Goal: Patient will participate in Venous Thrombosis (VTE)/Deep Vein Thrombosis (DVT)Prevention Measures  Outcome: PROGRESSING AS EXPECTED     Problem: Bowel/Gastric:  Goal: Normal bowel function is maintained or improved  Outcome: PROGRESSING AS EXPECTED  Goal: Will not experience complications related to bowel motility  Outcome: PROGRESSING AS EXPECTED     Problem: Knowledge Deficit  Goal: Knowledge of disease process/condition, treatment plan, diagnostic tests, and medications will improve  Outcome: PROGRESSING AS EXPECTED  Goal: Knowledge of the prescribed therapeutic regimen will improve  Outcome: PROGRESSING AS EXPECTED     Problem: Discharge Barriers/Planning  Goal: Patient's continuum of care needs will be met  Outcome: PROGRESSING AS EXPECTED     Problem: Pain Management  Goal: Pain level will decrease to patient's comfort goal  Outcome: PROGRESSING AS EXPECTED     Problem: Respiratory:  Goal: Respiratory status will improve  Outcome: PROGRESSING AS EXPECTED     Problem: Fluid Volume:  Goal: Will maintain balanced intake and output  Outcome: PROGRESSING AS EXPECTED     Problem: Urinary Elimination:  Goal: Ability to reestablish a normal urinary elimination pattern will improve  Outcome: PROGRESSING AS EXPECTED     Problem: Skin Integrity  Goal: Risk for impaired skin integrity will decrease  Outcome: PROGRESSING AS EXPECTED     Problem: Mobility  Goal: Risk for activity intolerance will decrease  Outcome: PROGRESSING AS EXPECTED

## 2020-04-06 NOTE — PROGRESS NOTES
Received report from night shift RNToya. Assumed care. Discussed plan of care, updated white board. Pt is resting in bed, alert and oriented x4. Pt denies any pain or distress at this time. Fall precautions in place. All needs met. Bed in lowest position. Call light within reach. Will continue to monitor.

## 2020-04-06 NOTE — PROGRESS NOTES
Pt noted to have occasional beats of second degree type 2. Reviewed rhythm with Dr. Trujillo. Pt bp 98/48, MD aware. Orders to monitor pt.

## 2020-04-06 NOTE — PROGRESS NOTES
Bedside shift report given. Patient is stable and Ax4. Patient is on isolation precautions. Bed in lowest position. Bedside table within reach. Call bell at bedside.

## 2020-04-06 NOTE — CARE PLAN
Problem: Communication  Goal: The ability to communicate needs accurately and effectively will improve  Outcome: PROGRESSING AS EXPECTED   Discussed plan of care, reviewed meds with pt, encouraged pt to voice any questions and/or concerns regarding care.     Problem: Safety  Goal: Will remain free from injury  Outcome: PROGRESSING AS EXPECTED   Assessed fall risk, fall precautions initiated.Educated patient on use of call light, no slip socks on, bed lowest position. All needs attended to. Patient verbalized understanding. Pt calls appropriately.    Problem: Skin Integrity  Goal: Risk for impaired skin integrity will decrease  Outcome: PROGRESSING AS EXPECTED   Patient skin assessed. Risk factors that lead to skin breakdown/impairment identified. Interventions to prevent skin breakdown are in place.Will continue to monitor skin integrity.

## 2020-04-06 NOTE — THERAPY
"Speech Language Therapy dysphagia treatment completed.   Functional Status:  Pt was seen for dysphagia tx focused on safe swallow strategy training, swallow exercise training and reassessment with tx trials. Pt was A&Ox4, cooperative and able to sit himself up in bed. Cervical collar was in place. Pt was presented with prescribed Pureed Solids (PU4) and Mildly Thick Liquids (MT2), demo'ing no s/sx of aspiration or difficulty swallowing. Given therapeutic trials of Minced and Moist Solids (MM5), pt reported increased difficulty clearing from his throat, requesting thin liquid rinses. However, with tx trials of thins via tsp/cup, increased wet/gurgly vocal quality noted, concerning for penetration/aspiration. Effortful swallow, lingual presses and laryngeal elevation exercises were taught today with pt able to return teach each exercise. Pt complete 10 reps of lingual presses and 5 reps of effortful swallows, falsettos with min-mod verbal/gestural cues. Recommend continuing current diet of Pureed Solids (PU4)/Mildly Thick Liquids (MT2) with monitoring during meal. Crush pills in puree.     Recommendations: Pureed Solids (PU4)/Mildly Thick Liquids (MT2) with monitoring during meal. Crush pills in puree.   Plan of Care: Will benefit from Speech Therapy 3 times per week  Post-Acute Therapy: Recommend inpatient transitional care services for continued speech therapy services.      See \"Rehab Therapy-Acute\" Patient Summary Report for complete documentation.     "

## 2020-04-06 NOTE — DISCHARGE PLANNING
Agency/Facility Name: [facility]  Spoke To: Admissions  Outcome: [facility].

## 2020-04-06 NOTE — CARE PLAN
Problem: Nutritional:  Goal: Achieve adequate nutritional intake  Description: Patient will consume >/=50% of meals   Outcome: MET     PO % x 8 meals (% x 6 meals) + <% x 4 nutritional supplements (% x 2 supplements) per flow sheets since 4/3.

## 2020-04-07 PROBLEM — D61.818 PANCYTOPENIA (HCC): Status: ACTIVE | Noted: 2020-04-07

## 2020-04-07 LAB
ANION GAP SERPL CALC-SCNC: 13 MMOL/L (ref 7–16)
BACTERIA STL CULT: NORMAL
BASOPHILS # BLD AUTO: 0.2 % (ref 0–1.8)
BASOPHILS # BLD: 0.01 K/UL (ref 0–0.12)
BUN SERPL-MCNC: 60 MG/DL (ref 8–22)
CALCIUM SERPL-MCNC: 8.2 MG/DL (ref 8.5–10.5)
CHLORIDE SERPL-SCNC: 100 MMOL/L (ref 96–112)
CO2 SERPL-SCNC: 24 MMOL/L (ref 20–33)
CREAT SERPL-MCNC: 2.23 MG/DL (ref 0.5–1.4)
EOSINOPHIL # BLD AUTO: 0.09 K/UL (ref 0–0.51)
EOSINOPHIL NFR BLD: 2.1 % (ref 0–6.9)
ERYTHROCYTE [DISTWIDTH] IN BLOOD BY AUTOMATED COUNT: 75.2 FL (ref 35.9–50)
GLUCOSE BLD-MCNC: 105 MG/DL (ref 65–99)
GLUCOSE BLD-MCNC: 113 MG/DL (ref 65–99)
GLUCOSE BLD-MCNC: 148 MG/DL (ref 65–99)
GLUCOSE BLD-MCNC: 157 MG/DL (ref 65–99)
GLUCOSE BLD-MCNC: 71 MG/DL (ref 65–99)
GLUCOSE SERPL-MCNC: 65 MG/DL (ref 65–99)
HCT VFR BLD AUTO: 25.8 % (ref 42–52)
HGB BLD-MCNC: 8.6 G/DL (ref 14–18)
IMM GRANULOCYTES # BLD AUTO: 0.02 K/UL (ref 0–0.11)
IMM GRANULOCYTES NFR BLD AUTO: 0.5 % (ref 0–0.9)
LYMPHOCYTES # BLD AUTO: 0.49 K/UL (ref 1–4.8)
LYMPHOCYTES NFR BLD: 11.2 % (ref 22–41)
MAGNESIUM SERPL-MCNC: 2.1 MG/DL (ref 1.5–2.5)
MCH RBC QN AUTO: 31.9 PG (ref 27–33)
MCHC RBC AUTO-ENTMCNC: 33.3 G/DL (ref 33.7–35.3)
MCV RBC AUTO: 95.6 FL (ref 81.4–97.8)
MONOCYTES # BLD AUTO: 0.51 K/UL (ref 0–0.85)
MONOCYTES NFR BLD AUTO: 11.6 % (ref 0–13.4)
NEUTROPHILS # BLD AUTO: 3.26 K/UL (ref 1.82–7.42)
NEUTROPHILS NFR BLD: 74.4 % (ref 44–72)
NRBC # BLD AUTO: 0 K/UL
NRBC BLD-RTO: 0 /100 WBC
PLATELET # BLD AUTO: 120 K/UL (ref 164–446)
PMV BLD AUTO: 10.1 FL (ref 9–12.9)
POTASSIUM SERPL-SCNC: 3.8 MMOL/L (ref 3.6–5.5)
RBC # BLD AUTO: 2.7 M/UL (ref 4.7–6.1)
SIGNIFICANT IND 70042: NORMAL
SITE SITE: NORMAL
SODIUM SERPL-SCNC: 137 MMOL/L (ref 135–145)
SOURCE SOURCE: NORMAL
WBC # BLD AUTO: 4.4 K/UL (ref 4.8–10.8)

## 2020-04-07 PROCEDURE — 700102 HCHG RX REV CODE 250 W/ 637 OVERRIDE(OP): Performed by: INTERNAL MEDICINE

## 2020-04-07 PROCEDURE — 97535 SELF CARE MNGMENT TRAINING: CPT

## 2020-04-07 PROCEDURE — 97530 THERAPEUTIC ACTIVITIES: CPT

## 2020-04-07 PROCEDURE — 770020 HCHG ROOM/CARE - TELE (206)

## 2020-04-07 PROCEDURE — A9270 NON-COVERED ITEM OR SERVICE: HCPCS | Performed by: PHYSICAL MEDICINE & REHABILITATION

## 2020-04-07 PROCEDURE — 83735 ASSAY OF MAGNESIUM: CPT

## 2020-04-07 PROCEDURE — 99232 SBSQ HOSP IP/OBS MODERATE 35: CPT | Performed by: INTERNAL MEDICINE

## 2020-04-07 PROCEDURE — 97110 THERAPEUTIC EXERCISES: CPT

## 2020-04-07 PROCEDURE — 80048 BASIC METABOLIC PNL TOTAL CA: CPT

## 2020-04-07 PROCEDURE — A9270 NON-COVERED ITEM OR SERVICE: HCPCS | Performed by: INTERNAL MEDICINE

## 2020-04-07 PROCEDURE — 36415 COLL VENOUS BLD VENIPUNCTURE: CPT

## 2020-04-07 PROCEDURE — 82962 GLUCOSE BLOOD TEST: CPT

## 2020-04-07 PROCEDURE — 85025 COMPLETE CBC W/AUTO DIFF WBC: CPT

## 2020-04-07 PROCEDURE — 700102 HCHG RX REV CODE 250 W/ 637 OVERRIDE(OP): Performed by: PHYSICAL MEDICINE & REHABILITATION

## 2020-04-07 RX ORDER — DEXTROSE MONOHYDRATE 25 G/50ML
50 INJECTION, SOLUTION INTRAVENOUS
Status: DISCONTINUED | OUTPATIENT
Start: 2020-04-07 | End: 2020-04-10 | Stop reason: HOSPADM

## 2020-04-07 RX ADMIN — INSULIN HUMAN 2 UNITS: 100 INJECTION, SOLUTION PARENTERAL at 17:17

## 2020-04-07 RX ADMIN — GABAPENTIN 100 MG: 100 CAPSULE ORAL at 17:12

## 2020-04-07 RX ADMIN — GABAPENTIN 100 MG: 100 CAPSULE ORAL at 05:53

## 2020-04-07 RX ADMIN — INSULIN GLARGINE 10 UNITS: 100 INJECTION, SOLUTION SUBCUTANEOUS at 17:17

## 2020-04-07 RX ADMIN — APIXABAN 2.5 MG: 2.5 TABLET, FILM COATED ORAL at 17:12

## 2020-04-07 RX ADMIN — METOPROLOL TARTRATE 12.5 MG: 25 TABLET, FILM COATED ORAL at 05:53

## 2020-04-07 RX ADMIN — GABAPENTIN 100 MG: 100 CAPSULE ORAL at 11:23

## 2020-04-07 RX ADMIN — APIXABAN 2.5 MG: 2.5 TABLET, FILM COATED ORAL at 05:53

## 2020-04-07 RX ADMIN — THIAMINE HCL TAB 100 MG 100 MG: 100 TAB at 05:53

## 2020-04-07 RX ADMIN — METOPROLOL TARTRATE 12.5 MG: 25 TABLET, FILM COATED ORAL at 17:12

## 2020-04-07 ASSESSMENT — ENCOUNTER SYMPTOMS
BLOOD IN STOOL: 0
DIARRHEA: 0
DIAPHORESIS: 0
EYES NEGATIVE: 1
CONSTIPATION: 0
MUSCULOSKELETAL NEGATIVE: 1
NEUROLOGICAL NEGATIVE: 1
FEVER: 0
PSYCHIATRIC NEGATIVE: 1
CARDIOVASCULAR NEGATIVE: 1
CHILLS: 0
ABDOMINAL PAIN: 0
NAUSEA: 0
RESPIRATORY NEGATIVE: 1
VOMITING: 0

## 2020-04-07 ASSESSMENT — GAIT ASSESSMENTS
DISTANCE (FEET): 75
ASSISTIVE DEVICE: FRONT WHEEL WALKER
GAIT LEVEL OF ASSIST: MINIMAL ASSIST

## 2020-04-07 ASSESSMENT — COGNITIVE AND FUNCTIONAL STATUS - GENERAL
MOBILITY SCORE: 12
SUGGESTED CMS G CODE MODIFIER DAILY ACTIVITY: CL
MOVING FROM LYING ON BACK TO SITTING ON SIDE OF FLAT BED: A LITTLE
PERSONAL GROOMING: A LOT
TOILETING: A LOT
DRESSING REGULAR LOWER BODY CLOTHING: TOTAL
DRESSING REGULAR UPPER BODY CLOTHING: A LOT
HELP NEEDED FOR BATHING: A LOT
CLIMB 3 TO 5 STEPS WITH RAILING: TOTAL
TURNING FROM BACK TO SIDE WHILE IN FLAT BAD: UNABLE
STANDING UP FROM CHAIR USING ARMS: A LITTLE
SUGGESTED CMS G CODE MODIFIER MOBILITY: CL
DAILY ACTIVITIY SCORE: 11
EATING MEALS: A LOT
MOVING TO AND FROM BED TO CHAIR: UNABLE
WALKING IN HOSPITAL ROOM: A LITTLE

## 2020-04-07 NOTE — PROGRESS NOTES
Handoff report received from day shift nurse. Pt care assumed. Pt is currently resting in bed. POC discussed with Pt and Pt verbalizes no questions at this time. Pt is AAOx4, on Ra, on Tele monitoring, and VSS. Call light and belongings within reach, bed in lowest and locked position, and Pt educated on use of call light. Will continue to monitor.

## 2020-04-07 NOTE — THERAPY
"Physical Therapy Treatment completed.   Bed Mobility:  Supine to Sit: Minimal Assist (reached for therapist's hand, pulled to sit)  Transfers: Sit to Stand: Minimal Assist (progressed during session to close supervision)  Gait: Level Of Assist: Minimal Assist (for safety only) with Front-Wheel Walker   Plan of Care: Will benefit from Physical Therapy 4 times per week  Discharge Recommendations: Equipment: Will Continue to Assess for Equipment Needs. Post-acute therapy: Recommend post-acute placement for continued physical therapy services prior to discharge home.       See \"Rehab Therapy-Acute\" Patient Summary Report for complete documentation.     Patient continuing to demonstrate progress with functional mobility. He required min A for bed mobility, he pulled on therapist's hand to sit. He progressed during session from min A to close supervision with STS and ambulation with FWW, he ambulated approximately 75ft before reporting he needed a break. Continues to demonstrate kyphotic posture, anticipate this is baseline for him. Recommend post acute placement and will continue to follow while in house.      "

## 2020-04-07 NOTE — PROGRESS NOTES
Hospital Medicine Daily Progress Note    Date of Service  4/7/2020    Chief Complaint  79 y.o. male admitted 3/28/2020 with altered mental status    Hospital Course    *79 y.o. male with hx of diabetes, hypertension, CKD type 4 and recent cervical laminectomy who presented 3/28/2020 with acute encephalopathy. He had been transferred to [facility] 16 days prior and was sent to the ER on 3/28 for altered mentation. At that visit, he had an extensive workup, and though he couldn't remember why he had been sent to the ER, he had an unremarkable CT head.  He was found to have a new pneumonia and was treated with antibiotics, though was stable to return [facility] to rehab.  He was discharged back to rehab with the plan to continue antibiotics.  He then was sent back later for altered mentation again.  He began gurling on secretions and not protecting his airway, so he was intubated and admitted to the ICU.  He was then a COVID rule out, as CXR revealed multifocal opacities and he was on the vent.  He was extubated on 3/31 and did fine, though he did remain somewhat confused as to why he was here.  He went in and out of afib, with 20 beats of vtach the night of 3/31, so he was started on metoprolol low dose. COVD came back negative, as well as influenza.  He did also recieve an LP in the ER and CSF was negative for infection, though showed high glucose and protein.  All cultures were negative.*    During this hospitalization the patient developed episode of atrial fibrillation with no RVR, after long discussion with the patient and his sister they accept the risk of bleeding and they wanted to start with blood thinner restart with apixaban 2.5 mg twice daily due to chronic kidney disease.     Interval Problem Update  - No acute events overnight  - Afebrile  - Cr improved 2.6-->2.23 (baseline 1.9-2.3)  - Patient without concerns or complaints, anxious to go to rehab, still waiting for [facility] auth  - on  RA      Consultants/Specialty  Intensivist    Code Status  Medically stable for rehab and placement    Disposition  Medically stable for placement    Review of Systems  Review of Systems   Constitutional: Positive for malaise/fatigue. Negative for chills, diaphoresis and fever.   HENT: Negative for nosebleeds.    Eyes: Negative.    Respiratory: Negative.    Cardiovascular: Negative.    Gastrointestinal: Negative for abdominal pain, blood in stool, constipation, diarrhea, nausea and vomiting.   Genitourinary: Negative.    Musculoskeletal: Negative.    Neurological: Negative.    Psychiatric/Behavioral: Negative.         Physical Exam  Temp:  [35.6 °C (96 °F)-37.2 °C (98.9 °F)] 36.8 °C (98.2 °F)  Pulse:  [83-92] 83  Resp:  [16-20] 18  BP: (102-118)/(46-63) 102/46  SpO2:  [96 %-99 %] 96 %    Physical Exam  Vitals signs and nursing note reviewed.   Constitutional:       General: He is not in acute distress.     Appearance: He is normal weight. He is not ill-appearing or toxic-appearing.      Comments:     Neck:      Comments: Collar in place  Cardiovascular:      Rate and Rhythm: Normal rate and regular rhythm.   Pulmonary:      Effort: Pulmonary effort is normal.      Comments: On RA  Abdominal:      General: Abdomen is flat. There is no distension.      Tenderness: There is no abdominal tenderness.   Neurological:      General: No focal deficit present.      Mental Status: He is alert and oriented to person, place, and time. Mental status is at baseline.      Cranial Nerves: No cranial nerve deficit.      Motor: No weakness.      Comments: Generalized weakness         Fluids    Intake/Output Summary (Last 24 hours) at 4/7/2020 1306  Last data filed at 4/7/2020 0900  Gross per 24 hour   Intake 440 ml   Output 570 ml   Net -130 ml       Laboratory  Recent Labs     04/05/20  0336 04/07/20  0455   WBC 4.5* 4.4*   RBC 3.07* 2.70*   HEMOGLOBIN 9.5* 8.6*   HEMATOCRIT 29.9* 25.8*   MCV 97.4 95.6   MCH 30.9 31.9   MCHC 31.8*  33.3*   RDW 79.0* 75.2*   PLATELETCT 121* 120*   MPV 10.1 10.1     Recent Labs     04/05/20  0336 04/06/20  0245 04/07/20  0455   SODIUM 139 138 137   POTASSIUM 4.0 4.3 3.8   CHLORIDE 102 100 100   CO2 24 24 24   GLUCOSE 87 88 65   BUN 57* 68* 60*   CREATININE 2.38* 2.61* 2.23*   CALCIUM 8.5 8.5 8.2*                   Imaging  DX-CHEST-PORTABLE (1 VIEW)   Final Result      No significant change from prior exam.      DX-CHEST-PORTABLE (1 VIEW)   Final Result      Stable chest. Unchanging left lower lobe atelectasis or pneumonia and bibasilar interstitial edema or pneumonia.      DX-CHEST-PORTABLE (1 VIEW)   Final Result      Persistent bilateral pulmonary consolidation with pleural effusions.      DX-CHEST-PORTABLE (1 VIEW)   Final Result         Central line is not seen. No appreciable pneumothorax.      DX-ABDOMEN FOR TUBE PLACEMENT   Final Result      Enteric tube tip projects over the stomach.      NG tube tip projects over the distal stomach.      EC-ECHOCARDIOGRAM COMPLETE W/O CONT   Final Result      DX-CHEST-PORTABLE (1 VIEW)   Final Result      Stable examination.      CT-CHEST (THORAX) W/O   Final Result      1.  Large bilateral pleural effusions with bibasilar atelectasis.      2.  Indeterminate right upper lobe pulmonary nodules measuring 5 and 9 mm in size.      3.  Endotracheal and nasogastric tubes present.      4.  Calcified granulomas.      Low Risk: CT at 3-6 months, then consider CT at 18-24 months      High Risk: CT at 3-6 months, then at 18-24 months      Comments: Use most suspicious nodule as guide to management. Follow-up intervals may vary according to size and risk.      Low Risk - Minimal or absent history of smoking and of other known risk factors.      High Risk - History of smoking or of other known risk factors.      Note: These recommendations do not apply to lung cancer screening, patients with immunosuppression, or patients with known primary cancer.      Fleischner Society 2017  Guidelines for Management of Incidentally Detected Pulmonary Nodules in Adults               CT-HEAD W/O   Final Result      1.  No evidence of acute intracranial process.      2.  Cerebral atrophy as well as periventricular chronic small vessel ischemic change.      DX-CHEST-PORTABLE (1 VIEW)   Final Result         Endotracheal tube with tip projecting over the mid to lower thoracic trachea.      Gastric drainage tube courses below diaphragm, tip is not seen.      DX-CHEST-PORTABLE (1 VIEW)   Final Result         No significant interval change. Grossly unchanged bibasilar ill-defined opacities           Assessment/Plan  A-fib (HCC)- (present on admission)  Assessment & Plan  Paroxysmal and no RVR  [id number] risk of stroke 3.2 %/year  HASBLED is 2 and the risk of bleeding is 4.1%  After long discussion with the patient and his sister, they agreed to start with apixaban.  Continue apixaban 2.5 mg twice daily due to chronic kidney disease.  Keep monitoring closely    Acute encephalopathy- (present on admission)  Assessment & Plan  Likely delirium from underlying medical issues; toxic metabolic from sepsis  CT head was negative  LP CSF negative  Continue nonpharmacological treatment for delirium  Avoid opioid and benzo  PT and OT with speech  Likely need rehab  Significant improving, the patient is alert and oriented x3 and he is able to make a good conversation      Acute respiratory failure (HCC)- (present on admission)  Assessment & Plan  Intubated 3/28->3/31, to protect airway due to altered mental status   chest x-ray showed bilateral infiltration  CT had bilateral pleural effusions      Flu negative, COVID neg  Likely combination between pulmonary edema due to chronic kidney disease and possible pneumonia.      Echo did not show heart failure  I's and O's and weight daily and resume diuretics if needed  Finished antibiotics for 5 days  Improving and right now he is on room air         Central cord syndrome (HCC)-  (present on admission)  Assessment & Plan  S/p C3-6 laminectomy and posterolateral fusion  for cervical stenosis/myelopathy  The patient received rehab therapy.  Continue monitoring and follow-up as outpatient with the neurosurgeon    V tach ([facility])- (present on admission)  Assessment & Plan  Unsustained, restarted on metoprolol   Electrolytes ok    Thrombocytopenia ([facility])- (present on admission)  Assessment & Plan  Chronic around 110s  No signs of a bleeding  Continue monitoring    Anemia- (present on admission)  Assessment & Plan  With elevated MCV and low platelets  Stable  We will check B12 and folic acid   Iron panel around stable      Dysphagia- (present on admission)  Assessment & Plan  Due to cervical spine injury   SLP on board   Cortrack was removed since he is eating    DM (diabetes mellitus) ([facility])- (present on admission)  Assessment & Plan  Last A1c is 5.8 and his target for his age   Continue on lantus 10 units and ssi;      Pancytopenia ([facility])  Assessment & Plan  Low WBC (no neutropenia)  Anemic and low plt  CTM      Pleural effusion, bilateral- (present on admission)  Assessment & Plan  S/p diuresis  On room air    Cervical stenosis of spine- (present on admission)  Assessment & Plan  S/p cervical laminectomy in February, has been at rehab  Can move all extremities; will need ongoing rehab    ACP (advance care planning)  Assessment & Plan  The patient is alert and oriented x4 and he makes a good conversation he is able to make decision, discussed with him the CODE STATUS and the patient is a clear that he wants to be DNR/DNI.  I reviewed his advanced directive which showed the patient preferred not to be resuscitated    Pulmonary nodules- (present on admission)  Assessment & Plan  F/u with pcp    Diarrhea- (present on admission)  Assessment & Plan  No white blood cell and stool and C. difficile is negative  We will start with Imodium as needed    Acute on chronic renal failure ([facility])- (present on  admission)  Assessment & Plan  Improving   Around his baseline creatinine 1.9-2.3       VTE prophylaxis: Heparin

## 2020-04-07 NOTE — DISCHARGE PLANNING
[facility] Transitional Care Coordination     [facility] for [facility] is pending.      Continue to follow.

## 2020-04-07 NOTE — ASSESSMENT & PLAN NOTE
Improving  Looks like he's had a history of intermittent pancytopenia for awhile  Low WBC (no neutropenia)  Anemic and low plt  CTM, may recommend outpatient heme/onc referral if persists

## 2020-04-07 NOTE — PROGRESS NOTES
Hospital Medicine Daily Progress Note    Date of Service  4/6/2020    Chief Complaint  79 y.o. male admitted 3/28/2020 with altered mental status    Hospital Course    *79 y.o. male with hx of diabetes, hypertension, CKD type 4 and recent cervical laminectomy who presented 3/28/2020 with acute encephalopathy. He had been transferred to [facility] 16 days prior and was sent to the ER on 3/28 for altered mentation. At that visit, he had an extensive workup, and though he couldn't remember why he had been sent to the ER, he had an unremarkable CT head.  He was found to have a new pneumonia and was treated with antibiotics, though was stable to return [facility] to rehab.  He was discharged back to rehab with the plan to continue antibiotics.  He then was sent back later for altered mentation again.  He began gurling on secretions and no protecting his airway, so he was intubated and admitted to the ICU.  He was then a COVID rule out, as CXR revealed multifocal opacities and he was on the vent.  He was extubated on 3/31 and did fine, though he did remain somewhat confused as to why he was here.  He went in and out of afib, with 20 beats of vtach the night of 3/31, so he was started on metoprolol low dose. COVD came back negative, as well as influenza.  He did also recieve an LP in the ER and CSF was negative for infection, though showed high glucose and protein.  All cultures were negative.*    During this hospitalization the patient developed episode of atrial fibrillation with no RVR, after long discussion with the patient and his sister they accept the risk of bleeding and they wanted to start with blood thinner restart with apixaban 2.5 mg twice daily due to chronic kidney disease.     Interval Problem Update  -No events or fever last night, the patient is alert and oriented and no confusion.   -Some worsening on his creatinine, hold Lasix and IV fluid was given.  -The patient has been having some nonsustained of PVC,  also he has heart block first-degree type I without symptom.   -Patient is a stable for placement      Consultants/Specialty  Intensivist    Code Status  Medically stable for rehab and placement    Disposition  Medically stable for placement    Review of Systems  Review of Systems   Constitutional: Positive for malaise/fatigue. Negative for chills, diaphoresis and fever.   Eyes: Negative.    Respiratory: Negative.    Cardiovascular: Negative.    Gastrointestinal: Negative for abdominal pain, blood in stool, constipation, diarrhea, nausea and vomiting.   Genitourinary: Negative.    Musculoskeletal: Negative.    Neurological: Negative.    Psychiatric/Behavioral: Negative.         Physical Exam  Temp:  [36.4 °C (97.6 °F)-37.2 °C (99 °F)] 36.9 °C (98.5 °F)  Pulse:  [64-95] 92  Resp:  [16-20] 17  BP: ()/(46-63) 117/63  SpO2:  [96 %-99 %] 99 %    Physical Exam  Constitutional:       Appearance: He is not ill-appearing.      Comments: cortack in place    Neck:      Comments: Collar in place  Cardiovascular:      Rate and Rhythm: Normal rate.      Heart sounds: No murmur.   Pulmonary:      Effort: Pulmonary effort is normal. No respiratory distress.      Breath sounds: No wheezing or rales.   Abdominal:      General: Abdomen is flat. Bowel sounds are normal. There is no distension.      Tenderness: There is no abdominal tenderness. There is no guarding.   Neurological:      General: No focal deficit present.      Mental Status: He is alert and oriented to person, place, and time. Mental status is at baseline.      Cranial Nerves: No cranial nerve deficit.      Motor: No weakness.      Comments: Generalized weakness         Fluids    Intake/Output Summary (Last 24 hours) at 4/6/2020 1840  Last data filed at 4/6/2020 1200  Gross per 24 hour   Intake 480 ml   Output 700 ml   Net -220 ml       Laboratory  Recent Labs     04/05/20  0336   WBC 4.5*   RBC 3.07*   HEMOGLOBIN 9.5*   HEMATOCRIT 29.9*   MCV 97.4   MCH 30.9    MCHC 31.8*   RDW 79.0*   PLATELETCT 121*   MPV 10.1     Recent Labs     04/05/20  0336 04/06/20  0245   SODIUM 139 138   POTASSIUM 4.0 4.3   CHLORIDE 102 100   CO2 24 24   GLUCOSE 87 88   BUN 57* 68*   CREATININE 2.38* 2.61*   CALCIUM 8.5 8.5                   Imaging  DX-CHEST-PORTABLE (1 VIEW)   Final Result      No significant change from prior exam.      DX-CHEST-PORTABLE (1 VIEW)   Final Result      Stable chest. Unchanging left lower lobe atelectasis or pneumonia and bibasilar interstitial edema or pneumonia.      DX-CHEST-PORTABLE (1 VIEW)   Final Result      Persistent bilateral pulmonary consolidation with pleural effusions.      DX-CHEST-PORTABLE (1 VIEW)   Final Result         Central line is not seen. No appreciable pneumothorax.      DX-ABDOMEN FOR TUBE PLACEMENT   Final Result      Enteric tube tip projects over the stomach.      NG tube tip projects over the distal stomach.      EC-ECHOCARDIOGRAM COMPLETE W/O CONT   Final Result      DX-CHEST-PORTABLE (1 VIEW)   Final Result      Stable examination.      CT-CHEST (THORAX) W/O   Final Result      1.  Large bilateral pleural effusions with bibasilar atelectasis.      2.  Indeterminate right upper lobe pulmonary nodules measuring 5 and 9 mm in size.      3.  Endotracheal and nasogastric tubes present.      4.  Calcified granulomas.      Low Risk: CT at 3-6 months, then consider CT at 18-24 months      High Risk: CT at 3-6 months, then at 18-24 months      Comments: Use most suspicious nodule as guide to management. Follow-up intervals may vary according to size and risk.      Low Risk - Minimal or absent history of smoking and of other known risk factors.      High Risk - History of smoking or of other known risk factors.      Note: These recommendations do not apply to lung cancer screening, patients with immunosuppression, or patients with known primary cancer.      Fleischner Society 2017 Guidelines for Management of Incidentally Detected Pulmonary  Nodules in Adults               CT-HEAD W/O   Final Result      1.  No evidence of acute intracranial process.      2.  Cerebral atrophy as well as periventricular chronic small vessel ischemic change.      DX-CHEST-PORTABLE (1 VIEW)   Final Result         Endotracheal tube with tip projecting over the mid to lower thoracic trachea.      Gastric drainage tube courses below diaphragm, tip is not seen.      DX-CHEST-PORTABLE (1 VIEW)   Final Result         No significant interval change. Grossly unchanged bibasilar ill-defined opacities           Assessment/Plan  A-fib (HCC)- (present on admission)  Assessment & Plan  Paroxysmal and no RVR  [id number]>3 risk of stroke 3.2 %/year  HASBLED is 2 and the risk of bleeding is 4.1%  After long discussion with the patient and his sister, they agreed to start with apixaban.  Continue apixaban 2.5 mg twice daily due to chronic kidney disease.  Keep monitoring closely    Acute encephalopathy- (present on admission)  Assessment & Plan  Likely delirium from underlying medical issues; toxic metabolic from sepsis  CT head was negative  LP CSF negative  Continue nonpharmacological treatment for delirium  Avoid opioid and benzo  PT and OT with speech  Likely need rehab  Significant improving, the patient is alert and oriented x3 and he is able to make a good conversation      Acute respiratory failure (HCC)- (present on admission)  Assessment & Plan  Intubated 3/28->3/31, to protect airway due to altered mental status   chest x-ray showed bilateral infiltration  CT had bilateral pleural effusions      Flu negative, COVID neg  Likely combination between pulmonary edema due to chronic kidney disease and possible pneumonia.      Echo did not show heart failure  I's and O's and weight daily and resume diuretics if needed  Finished antibiotics for 5 days  Improving and right now he is on room air         Central cord syndrome (HCC)- (present on admission)  Assessment & Plan  S/p C3-6  laminectomy and posterolateral fusion  for cervical stenosis/myelopathy  The patient received rehab therapy.  Continue monitoring and follow-up as outpatient with the neurosurgeon    V tach ([facility])- (present on admission)  Assessment & Plan  Unsustained, restarted on metoprolol   Electrolytes ok    Thrombocytopenia ([facility])- (present on admission)  Assessment & Plan  Chronic around 110s  No signs of a bleeding  Continue monitoring    Anemia- (present on admission)  Assessment & Plan  With elevated MCV and low platelets  Stable  We will check B12 and folic acid   Iron panel around stable      Dysphagia- (present on admission)  Assessment & Plan  Due to cervical spine injury   SLP on board   Cortrack was removed since he is eating    DM (diabetes mellitus) ([facility])- (present on admission)  Assessment & Plan  Last A1c is 5.8 and his target for his age   Continue on lantus 10 units and ssi;      Pleural effusion, bilateral- (present on admission)  Assessment & Plan  S/p diuresis  On room air    Cervical stenosis of spine- (present on admission)  Assessment & Plan  S/p cervical laminectomy in February, has been at rehab  Can move all extremities; will need ongoing rehab    ACP (advance care planning)  Assessment & Plan  The patient is alert and oriented x4 and he makes a good conversation he is able to make decision, discussed with him the CODE STATUS and the patient is a clear that he wants to be DNR/DNI.  I reviewed his advanced directive which showed the patient preferred not to be resuscitated    Pulmonary nodules- (present on admission)  Assessment & Plan  F/u with pcp    Diarrhea- (present on admission)  Assessment & Plan  No white blood cell and stool and C. difficile is negative  We will start with Imodium as needed    Acute on chronic renal failure ([facility])- (present on admission)  Assessment & Plan  Improving   Around his baseline creatinine 2        VTE prophylaxis: Heparin    I have performed a physical exam and  reviewed and updated ROS and Plan today (4/6/2020). In review of yesterday's note (4/5/2020), there are no changes except as documented above.

## 2020-04-07 NOTE — CARE PLAN
Problem: Communication  Goal: The ability to communicate needs accurately and effectively will improve  Outcome: PROGRESSING AS EXPECTED     Problem: Safety  Goal: Will remain free from injury  Outcome: PROGRESSING AS EXPECTED  Goal: Will remain free from falls  Outcome: PROGRESSING AS EXPECTED     Problem: Infection  Goal: Will remain free from infection  Outcome: PROGRESSING AS EXPECTED     Problem: Bowel/Gastric:  Goal: Normal bowel function is maintained or improved  Outcome: PROGRESSING AS EXPECTED

## 2020-04-07 NOTE — THERAPY
"Occupational Therapy Treatment completed with focus on ADLs and ADL transfers.  Functional Status:    Pt seen for OT treatment this afternoon. Received sitting EOB after working with PT, demos good static sitting balance. He completed STS with CGA, ambulated to the bathroom and completed toilet xfer Min A, Max A for pericare following toileting. He sat at sink for G/H, Mod A required for shaving 2/2 low vision, however supv/set-up for brushing teeth. Pt ambulated back to EOB after session and changed gown with Min A. Will continue working with him, he continues to be pleasant and motivated.     Plan of Care: Will benefit from Occupational Therapy 4 times per week  Discharge Recommendations:  Equipment Will Continue to Assess for Equipment Needs. Post-acute therapy Recommend post-acute placement for additional occupational therapy services prior to discharge home.      See \"Rehab Therapy-Acute\" Patient Summary Report for complete documentation.   " Subjective:    No acute events overnight; tolerating tube feeds        Objective:      Vital Signs Last 24 Hrs  T(C): 37.8, Max: 38 ( @ 20:00)  T(F): 100, Max: 100.4 ( @ 20:00)  HR: 122 (113 - 157)  BP: 92/70 (70/46 - 115/74)  BP(mean): 75 (48 - 83)  RR: 25 (19 - 39)  SpO2: 100% (94% - 100%)    EXAM:  Gen: Comfortable appearing, awake, tracheostomy in place   ABD: G-tube in place  Face:  No active bleeding from mouth      I&O's Detail  I & Os for 24h ending 2017 07:00  =============================================  IN:    Miscellaneous Tube Feedin ml    dextrose 5% + sodium chloride 0.45%. - Pediatric: 200 ml    Enteral Tube Flush: 60 ml    IV PiggyBack: 18 ml    Total IN: 544 ml  ---------------------------------------------  OUT:    Incontinent per Diaper: 299 ml    Total OUT: 299 ml  ---------------------------------------------  Total NET: 245 ml    I & Os for current day (as of 2017 08:32)  =============================================  IN:    Miscellaneous Tube Feedin ml    Total IN: 133 ml  ---------------------------------------------  OUT:    Incontinent per Diaper: 20 ml    Total OUT: 20 ml  ---------------------------------------------  Total NET: 113 ml      Daily Height/Length in cm: 71 (2017 17:40)    Daily     LABS:                RADIOLOGY & ADDITIONAL STUDIES: Subjective:    No acute events overnight; tolerating tube feeds        Objective:      Vital Signs Last 24 Hrs  T(C): 37.8, Max: 38 ( @ 20:00)  T(F): 100, Max: 100.4 ( @ 20:00)  HR: 122 (113 - 157)  BP: 92/70 (70/46 - 115/74)  BP(mean): 75 (48 - 83)  RR: 25 (19 - 39)  SpO2: 100% (94% - 100%)    EXAM:  Gen: Comfortable appearing, awake, tracheostomy in place   ABD: G-tube in place  Face:  No active bleeding from mouth      I&O's Detail  I & Os for 24h ending 2017 07:00  =============================================  IN:    Miscellaneous Tube Feedin ml    dextrose 5% + sodium chloride 0.45%. - Pediatric: 200 ml    Enteral Tube Flush: 60 ml    IV PiggyBack: 18 ml    Total IN: 544 ml  ---------------------------------------------  OUT:    Incontinent per Diaper: 299 ml    Total OUT: 299 ml  ---------------------------------------------  Total NET: 245 ml    I & Os for current day (as of 2017 08:32)  =============================================  IN:    Miscellaneous Tube Feedin ml    Total IN: 133 ml  ---------------------------------------------  OUT:    Incontinent per Diaper: 20 ml    Total OUT: 20 ml  ---------------------------------------------  Total NET: 113 ml      Daily Height/Length in cm: 71 (2017 17:40)    Daily

## 2020-04-07 NOTE — PROGRESS NOTES
12 hr cc            Monitor Summary:  SR 85-97  With frequent pauses from 1.2-1.5 seconds  Frequent PVC, rare Bigeminal PVC  12/.08/.40

## 2020-04-07 NOTE — PROGRESS NOTES
Bedside report received from ABDULAZIZ Johnson. Assumed care of pt. Pt awake, laying in bed. A/Ox4, VSS. No concerns, complaints or distress. Pt educated to call before getting out of bed. POC reviewed and white board updated. Tele box on. SR 75 on the monitor. Call light in reach. Bed locked in lowest position with 2 upper bed rails up. Bed alarm on.

## 2020-04-07 NOTE — DISCHARGE PLANNING
Spoke with sister Ángela explained to her that authorization for [facility] remains pending.

## 2020-04-08 ENCOUNTER — APPOINTMENT (OUTPATIENT)
Dept: RADIOLOGY | Facility: MEDICAL CENTER | Age: 80
DRG: 871 | End: 2020-04-08
Attending: INTERNAL MEDICINE
Payer: COMMERCIAL

## 2020-04-08 LAB
ANION GAP SERPL CALC-SCNC: 11 MMOL/L (ref 7–16)
BASOPHILS # BLD AUTO: 0.3 % (ref 0–1.8)
BASOPHILS # BLD: 0.01 K/UL (ref 0–0.12)
BUN SERPL-MCNC: 53 MG/DL (ref 8–22)
CALCIUM SERPL-MCNC: 8.4 MG/DL (ref 8.5–10.5)
CHLORIDE SERPL-SCNC: 102 MMOL/L (ref 96–112)
CO2 SERPL-SCNC: 24 MMOL/L (ref 20–33)
CREAT SERPL-MCNC: 2.09 MG/DL (ref 0.5–1.4)
EOSINOPHIL # BLD AUTO: 0.07 K/UL (ref 0–0.51)
EOSINOPHIL NFR BLD: 1.8 % (ref 0–6.9)
ERYTHROCYTE [DISTWIDTH] IN BLOOD BY AUTOMATED COUNT: 73.5 FL (ref 35.9–50)
GLUCOSE BLD-MCNC: 145 MG/DL (ref 65–99)
GLUCOSE BLD-MCNC: 153 MG/DL (ref 65–99)
GLUCOSE BLD-MCNC: 168 MG/DL (ref 65–99)
GLUCOSE BLD-MCNC: 90 MG/DL (ref 65–99)
GLUCOSE SERPL-MCNC: 90 MG/DL (ref 65–99)
HCT VFR BLD AUTO: 27.9 % (ref 42–52)
HGB BLD-MCNC: 9.1 G/DL (ref 14–18)
IMM GRANULOCYTES # BLD AUTO: 0.01 K/UL (ref 0–0.11)
IMM GRANULOCYTES NFR BLD AUTO: 0.3 % (ref 0–0.9)
LYMPHOCYTES # BLD AUTO: 0.57 K/UL (ref 1–4.8)
LYMPHOCYTES NFR BLD: 14.8 % (ref 22–41)
MCH RBC QN AUTO: 31.1 PG (ref 27–33)
MCHC RBC AUTO-ENTMCNC: 32.6 G/DL (ref 33.7–35.3)
MCV RBC AUTO: 95.2 FL (ref 81.4–97.8)
MONOCYTES # BLD AUTO: 0.44 K/UL (ref 0–0.85)
MONOCYTES NFR BLD AUTO: 11.5 % (ref 0–13.4)
NEUTROPHILS # BLD AUTO: 2.74 K/UL (ref 1.82–7.42)
NEUTROPHILS NFR BLD: 71.3 % (ref 44–72)
NRBC # BLD AUTO: 0 K/UL
NRBC BLD-RTO: 0 /100 WBC
PLATELET # BLD AUTO: 137 K/UL (ref 164–446)
PMV BLD AUTO: 10.1 FL (ref 9–12.9)
POTASSIUM SERPL-SCNC: 4 MMOL/L (ref 3.6–5.5)
RBC # BLD AUTO: 2.93 M/UL (ref 4.7–6.1)
SODIUM SERPL-SCNC: 137 MMOL/L (ref 135–145)
WBC # BLD AUTO: 3.8 K/UL (ref 4.8–10.8)

## 2020-04-08 PROCEDURE — 85025 COMPLETE CBC W/AUTO DIFF WBC: CPT

## 2020-04-08 PROCEDURE — 97110 THERAPEUTIC EXERCISES: CPT

## 2020-04-08 PROCEDURE — 72040 X-RAY EXAM NECK SPINE 2-3 VW: CPT

## 2020-04-08 PROCEDURE — 80048 BASIC METABOLIC PNL TOTAL CA: CPT

## 2020-04-08 PROCEDURE — A9270 NON-COVERED ITEM OR SERVICE: HCPCS | Performed by: PHYSICAL MEDICINE & REHABILITATION

## 2020-04-08 PROCEDURE — 82962 GLUCOSE BLOOD TEST: CPT

## 2020-04-08 PROCEDURE — 770020 HCHG ROOM/CARE - TELE (206)

## 2020-04-08 PROCEDURE — 92526 ORAL FUNCTION THERAPY: CPT

## 2020-04-08 PROCEDURE — 36415 COLL VENOUS BLD VENIPUNCTURE: CPT

## 2020-04-08 PROCEDURE — 700102 HCHG RX REV CODE 250 W/ 637 OVERRIDE(OP): Performed by: PHYSICAL MEDICINE & REHABILITATION

## 2020-04-08 PROCEDURE — 99232 SBSQ HOSP IP/OBS MODERATE 35: CPT | Performed by: INTERNAL MEDICINE

## 2020-04-08 PROCEDURE — 700102 HCHG RX REV CODE 250 W/ 637 OVERRIDE(OP): Performed by: INTERNAL MEDICINE

## 2020-04-08 PROCEDURE — 97530 THERAPEUTIC ACTIVITIES: CPT

## 2020-04-08 PROCEDURE — A9270 NON-COVERED ITEM OR SERVICE: HCPCS | Performed by: INTERNAL MEDICINE

## 2020-04-08 PROCEDURE — 97116 GAIT TRAINING THERAPY: CPT

## 2020-04-08 RX ADMIN — METOPROLOL TARTRATE 12.5 MG: 25 TABLET, FILM COATED ORAL at 16:42

## 2020-04-08 RX ADMIN — GABAPENTIN 100 MG: 100 CAPSULE ORAL at 05:34

## 2020-04-08 RX ADMIN — INSULIN HUMAN 2 UNITS: 100 INJECTION, SOLUTION PARENTERAL at 21:37

## 2020-04-08 RX ADMIN — APIXABAN 2.5 MG: 2.5 TABLET, FILM COATED ORAL at 16:41

## 2020-04-08 RX ADMIN — THIAMINE HCL TAB 100 MG 100 MG: 100 TAB at 05:33

## 2020-04-08 RX ADMIN — GABAPENTIN 100 MG: 100 CAPSULE ORAL at 11:03

## 2020-04-08 RX ADMIN — METOPROLOL TARTRATE 12.5 MG: 25 TABLET, FILM COATED ORAL at 05:34

## 2020-04-08 RX ADMIN — INSULIN GLARGINE 10 UNITS: 100 INJECTION, SOLUTION SUBCUTANEOUS at 16:45

## 2020-04-08 RX ADMIN — GABAPENTIN 100 MG: 100 CAPSULE ORAL at 16:43

## 2020-04-08 RX ADMIN — APIXABAN 2.5 MG: 2.5 TABLET, FILM COATED ORAL at 05:33

## 2020-04-08 RX ADMIN — INSULIN HUMAN 2 UNITS: 100 INJECTION, SOLUTION PARENTERAL at 16:44

## 2020-04-08 ASSESSMENT — GAIT ASSESSMENTS
ASSISTIVE DEVICE: FRONT WHEEL WALKER
DISTANCE (FEET): 50
GAIT LEVEL OF ASSIST: MINIMAL ASSIST

## 2020-04-08 ASSESSMENT — ENCOUNTER SYMPTOMS
DIARRHEA: 0
FEVER: 0
VOMITING: 0
NAUSEA: 0
EYES NEGATIVE: 1
PSYCHIATRIC NEGATIVE: 1
NEUROLOGICAL NEGATIVE: 1
CONSTIPATION: 0
BLOOD IN STOOL: 0
ABDOMINAL PAIN: 0
MUSCULOSKELETAL NEGATIVE: 1
RESPIRATORY NEGATIVE: 1
CARDIOVASCULAR NEGATIVE: 1
CHILLS: 0
DIAPHORESIS: 0

## 2020-04-08 ASSESSMENT — COGNITIVE AND FUNCTIONAL STATUS - GENERAL
WALKING IN HOSPITAL ROOM: A LITTLE
SUGGESTED CMS G CODE MODIFIER MOBILITY: CL
TURNING FROM BACK TO SIDE WHILE IN FLAT BAD: UNABLE
MOBILITY SCORE: 12
CLIMB 3 TO 5 STEPS WITH RAILING: TOTAL
MOVING TO AND FROM BED TO CHAIR: UNABLE
MOVING FROM LYING ON BACK TO SITTING ON SIDE OF FLAT BED: A LITTLE
STANDING UP FROM CHAIR USING ARMS: A LITTLE

## 2020-04-08 ASSESSMENT — FIBROSIS 4 INDEX: FIB4 SCORE: 3.33

## 2020-04-08 NOTE — THERAPY
"Physical Therapy Treatment completed.     Bed Mobility:  Supine to Sit: Minimal Assist  Transfers: Sit to Stand: Supervision (very UE dominant)  Gait: Level Of Assist: Minimal Assist(for safety only) with Front-Wheel Walker       Plan of Care: Will benefit from Physical Therapy 3 times per week  Discharge Recommendations: Equipment: Front-Wheel Walker. Post-acute therapy Discharge to a transitional care facility for continued skilled therapy services.    Patient demonstrates significantly improved aerobic tolerance, and requires less assist with functional mobility.   Patient continues to ambulate with kypohtic posture and utilize UE significantly during sit<>stand task as well as ambulation.  Focused today's session on LE strength as well as aerobic endurance, which patient did well with.  Patient moderately fatigued post session.  Recommend discharge to inpatient rehabilitation as patient can tolerate significant activity and is motivated to improve function.      Ty Teri PT     See \"Rehab Therapy-Acute\" Patient Summary Report for complete documentation.       "

## 2020-04-08 NOTE — PROGRESS NOTES
Bedside report received from ABDULAZIZ Bingham. Assumed care of pt. Pt awake, laying in bed. A/Ox4, VSS. No concerns, complaints or distress. Pt educated to call before getting out of bed. POC reviewed and white board updated. Tele box on. SR 78 on the monitor. Call light in reach. Bed locked in lowest position with 2 upper bed rails up. Bed alarm on.

## 2020-04-08 NOTE — THERAPY
"Speech Language Therapy dysphagia treatment completed.   Functional Status:  Pt was seen for dysphagia tx with continued focused on safe swallow strategy training, swallow exercise training. Pt was also reassessed for advanced diet textures. Pt was A&Ox4, cooperative and able to sit himself up in bed with minimal assistance likely 2.2 confusion vs weakness. Patient reported they \"took my collar off\" earlier today. Patient denied any neck pain during session. Patient was given PO trials of MM5 and SB6 solid trials. The patient presented with significant improvement in overall swallow function from previous SLP session and consumed 2oz of MM5 textures and 4oz of SB6 textures without difficulty, complaints of globus or s/s of aspiration. Patient was given additional PO trials of thin liquids which continued to result in wet vocal quality and delayed throat clear. A three second prep strategy was attempted as recommended on MBS 3/16 however, patient demonstrated difficulty following strategy as well as continued s/s concerning for penetration/aspiration. Patient independently recalled and completed effortful swallow, lingual presses and laryngeal elevation exercises this session.      Recommendations: 1) Advance diet to Soft and bite size (SB6) with continuation of Mildly Thick Liquids (MT2).  Crush or float small pills in puree.     Plan of Care: Will benefit from Speech Therapy 4 times per week.    Post-Acute Therapy: Recommend inpatient transitional care services for continued speech therapy services.        See \"Rehab Therapy-Acute\" Patient Summary Report for complete documentation.     "

## 2020-04-08 NOTE — PROGRESS NOTES
Hospital Medicine Daily Progress Note    Date of Service  4/8/2020    Chief Complaint  79 y.o. male admitted 3/28/2020 with altered mental status    Hospital Course    *79 y.o. male with hx of diabetes, hypertension, CKD type 4 and recent cervical laminectomy who presented 3/28/2020 with acute encephalopathy. He had been transferred to [facility] 16 days prior and was sent to the ER on 3/28 for altered mentation. At that visit, he had an extensive workup, and though he couldn't remember why he had been sent to the ER, he had an unremarkable CT head.  He was found to have a new pneumonia and was treated with antibiotics, though was stable to return [facility] to rehab.  He was discharged back to rehab with the plan to continue antibiotics.  He then was sent back later for altered mentation again.  He began gurling on secretions and not protecting his airway, so he was intubated and admitted to the ICU.  He was then a COVID rule out, as CXR revealed multifocal opacities and he was on the vent.  He was extubated on 3/31 and did fine, though he did remain somewhat confused as to why he was here.  He went in and out of afib, with 20 beats of vtach the night of 3/31, so he was started on metoprolol low dose. COVD came back negative, as well as influenza.  He did also recieve an LP in the ER and CSF was negative for infection, though showed high glucose and protein.  All cultures were negative.*    During this hospitalization the patient developed episode of atrial fibrillation with no RVR, after long discussion with the patient and his sister they accept the risk of bleeding and they wanted to start with blood thinner restart with apixaban 2.5 mg twice daily due to chronic kidney disease.     Interval Problem Update  - No acute events overnight  - Afebrile  - Cr improved 2.6-->2.23-->2.09 (baseline 1.9-2.3)  - Patient also wants to know when he can get his collar off, spoke with Dr. Sellers who was okay with removal of  collar today, recommended xrays, will see patient tomorrow  - Patient reports feeling fatigued today    Consultants/Specialty  Intensivist    Code Status  Medically stable for rehab and placement    Disposition  Medically stable for placement    Review of Systems  Review of Systems   Constitutional: Positive for malaise/fatigue. Negative for chills, diaphoresis and fever.   HENT: Negative for nosebleeds.    Eyes: Negative.    Respiratory: Negative.    Cardiovascular: Negative.    Gastrointestinal: Negative for abdominal pain, blood in stool, constipation, diarrhea, nausea and vomiting.   Genitourinary: Negative.    Musculoskeletal: Negative.    Neurological: Negative.    Psychiatric/Behavioral: Negative.         Physical Exam  Temp:  [36.4 °C (97.6 °F)-36.9 °C (98.5 °F)] 36.4 °C (97.6 °F)  Pulse:  [79-93] 79  Resp:  [18] 18  BP: (105-123)/(51-65) 105/51  SpO2:  [96 %-99 %] 99 %    Physical Exam  Vitals signs and nursing note reviewed.   Constitutional:       General: He is not in acute distress.     Appearance: He is normal weight. He is not ill-appearing or toxic-appearing.      Comments:     Neck:      Comments: Collar in place  Cardiovascular:      Rate and Rhythm: Normal rate and regular rhythm.   Pulmonary:      Effort: Pulmonary effort is normal.      Breath sounds: Normal breath sounds.      Comments: On RA  Abdominal:      General: Abdomen is flat. There is no distension.      Tenderness: There is no abdominal tenderness.   Neurological:      General: No focal deficit present.      Mental Status: He is alert and oriented to person, place, and time. Mental status is at baseline.      Cranial Nerves: No cranial nerve deficit.      Motor: No weakness.      Comments: Generalized weakness         Fluids    Intake/Output Summary (Last 24 hours) at 4/8/2020 1147  Last data filed at 4/8/2020 1000  Gross per 24 hour   Intake 480 ml   Output 1400 ml   Net -920 ml       Laboratory  Recent Labs     04/07/20  1898  04/08/20  0358   WBC 4.4* 3.8*   RBC 2.70* 2.93*   HEMOGLOBIN 8.6* 9.1*   HEMATOCRIT 25.8* 27.9*   MCV 95.6 95.2   MCH 31.9 31.1   MCHC 33.3* 32.6*   RDW 75.2* 73.5*   PLATELETCT 120* 137*   MPV 10.1 10.1     Recent Labs     04/06/20  0245 04/07/20  0455 04/08/20  0358   SODIUM 138 137 137   POTASSIUM 4.3 3.8 4.0   CHLORIDE 100 100 102   CO2 24 24 24   GLUCOSE 88 65 90   BUN 68* 60* 53*   CREATININE 2.61* 2.23* 2.09*   CALCIUM 8.5 8.2* 8.4*                   Imaging  DX-CHEST-PORTABLE (1 VIEW)   Final Result      No significant change from prior exam.      DX-CHEST-PORTABLE (1 VIEW)   Final Result      Stable chest. Unchanging left lower lobe atelectasis or pneumonia and bibasilar interstitial edema or pneumonia.      DX-CHEST-PORTABLE (1 VIEW)   Final Result      Persistent bilateral pulmonary consolidation with pleural effusions.      DX-CHEST-PORTABLE (1 VIEW)   Final Result         Central line is not seen. No appreciable pneumothorax.      DX-ABDOMEN FOR TUBE PLACEMENT   Final Result      Enteric tube tip projects over the stomach.      NG tube tip projects over the distal stomach.      EC-ECHOCARDIOGRAM COMPLETE W/O CONT   Final Result      DX-CHEST-PORTABLE (1 VIEW)   Final Result      Stable examination.      CT-CHEST (THORAX) W/O   Final Result      1.  Large bilateral pleural effusions with bibasilar atelectasis.      2.  Indeterminate right upper lobe pulmonary nodules measuring 5 and 9 mm in size.      3.  Endotracheal and nasogastric tubes present.      4.  Calcified granulomas.      Low Risk: CT at 3-6 months, then consider CT at 18-24 months      High Risk: CT at 3-6 months, then at 18-24 months      Comments: Use most suspicious nodule as guide to management. Follow-up intervals may vary according to size and risk.      Low Risk - Minimal or absent history of smoking and of other known risk factors.      High Risk - History of smoking or of other known risk factors.      Note: These recommendations  do not apply to lung cancer screening, patients with immunosuppression, or patients with known primary cancer.      Fleischner Society 2017 Guidelines for Management of Incidentally Detected Pulmonary Nodules in Adults               CT-HEAD W/O   Final Result      1.  No evidence of acute intracranial process.      2.  Cerebral atrophy as well as periventricular chronic small vessel ischemic change.      DX-CHEST-PORTABLE (1 VIEW)   Final Result         Endotracheal tube with tip projecting over the mid to lower thoracic trachea.      Gastric drainage tube courses below diaphragm, tip is not seen.      DX-CHEST-PORTABLE (1 VIEW)   Final Result         No significant interval change. Grossly unchanged bibasilar ill-defined opacities      DX-CERVICAL SPINE-FLX-EXT ONLY    (Results Pending)   DX-CERVICAL SPINE-2 OR 3 VIEWS    (Results Pending)        Assessment/Plan  A-fib (HCC)- (present on admission)  Assessment & Plan  Paroxysmal and no RVR  [id number]>3 risk of stroke 3.2 %/year  HASBLED is 2 and the risk of bleeding is 4.1%  After long discussion with the patient and his sister, they agreed to start with apixaban.  Continue apixaban 2.5 mg twice daily due to chronic kidney disease.  Keep monitoring closely    Acute encephalopathy- (present on admission)  Assessment & Plan  Likely delirium from underlying medical issues; toxic metabolic from sepsis  CT head was negative  LP CSF negative  Continue nonpharmacological treatment for delirium  Avoid opioid and benzo  PT and OT with speech  Likely need rehab  Significant improving, the patient is alert and oriented x3 and he is able to make a good conversation      Acute respiratory failure (HCC)- (present on admission)  Assessment & Plan  Intubated 3/28->3/31, to protect airway due to altered mental status   chest x-ray showed bilateral infiltration  CT had bilateral pleural effusions      Flu negative, COVID neg  Likely combination between pulmonary edema due to chronic  kidney disease and possible pneumonia.      Echo did not show heart failure  I's and O's and weight daily and resume diuretics if needed  Finished antibiotics for 5 days  Improving and right now he is on room air         Central cord syndrome (HCC)- (present on admission)  Assessment & Plan  S/p C3-6 laminectomy and posterolateral fusion  for cervical stenosis/myelopathy  The patient received rehab therapy.  Continue monitoring and follow-up as outpatient with the neurosurgeon  Collar removal 4/8/20  Xrays today     V tach ([facility])- (present on admission)  Assessment & Plan  Unsustained, restarted on metoprolol   Electrolytes ok    Thrombocytopenia ([facility])- (present on admission)  Assessment & Plan  Chronic around 110s  No signs of a bleeding  Continue monitoring    Anemia- (present on admission)  Assessment & Plan  With elevated MCV and low platelets  Stable  We will check B12 and folic acid   Iron panel around stable      Dysphagia- (present on admission)  Assessment & Plan  Due to cervical spine injury   SLP on board   Cortrack was removed since he is eating    DM (diabetes mellitus) ([facility])- (present on admission)  Assessment & Plan  Last A1c is 5.8 and his target for his age   Continue on lantus 10 units and ssi;      Pancytopenia ([facility])  Assessment & Plan  Low WBC (no neutropenia)  Anemic and low plt  CTM      Pleural effusion, bilateral- (present on admission)  Assessment & Plan  S/p diuresis  On room air    Cervical stenosis of spine- (present on admission)  Assessment & Plan  S/p cervical laminectomy in February, has been at rehab  Can move all extremities; will need ongoing rehab    ACP (advance care planning)  Assessment & Plan  The patient is alert and oriented x4 and he makes a good conversation he is able to make decision, discussed with him the CODE STATUS and the patient is a clear that he wants to be DNR/DNI.  I reviewed his advanced directive which showed the patient preferred not to be  resuscitated    Pulmonary nodules- (present on admission)  Assessment & Plan  F/u with pcp    Diarrhea- (present on admission)  Assessment & Plan  No white blood cell and stool and C. difficile is negative  We will start with Imodium as needed    Acute on chronic renal failure (HCC)- (present on admission)  Assessment & Plan  Improving   Around his baseline creatinine 1.9-2.3       VTE prophylaxis: Heparin

## 2020-04-08 NOTE — PROGRESS NOTES
Pt back from x-ray. Monitor room notified. Sitting at bedside eating lunch at this time. Even visible chest rise. No signs of distress. Call light in place. Bed in low and locked position.

## 2020-04-08 NOTE — DISCHARGE PLANNING
[facility]     [facility] insurance auth for inpatient rehab remains pending this morning.  TCC monitoring for updates.

## 2020-04-08 NOTE — PROGRESS NOTES
Assumed care this pm from day shift RN. Patient in bed . Call bell and personal items within reach, bed locked in low position. Bed exit alarm on . Hourly rounding in place.

## 2020-04-08 NOTE — CARE PLAN
Problem: Safety  Goal: Will remain free from falls  Note: Pt mobility assessed at beginning of shift. Pt is x1 assist with a walker. Fall precautions in place. Non-slip socks on. Bed in lowest locked position. Bed alarm on. Call light within reach. Pt educated to call for assistance and verbalizes understanding.      Problem: Knowledge Deficit  Goal: Knowledge of disease process/condition, treatment plan, diagnostic tests, and medications will improve  Note: Pt educated about disease process. Reason why medications are taken. And informed about treatment plan.

## 2020-04-08 NOTE — CARE PLAN
Problem: Communication  Goal: The ability to communicate needs accurately and effectively will improve  Outcome: PROGRESSING AS EXPECTED     Problem: Safety  Goal: Will remain free from falls  Outcome: PROGRESSING AS EXPECTED  Intervention: Implement fall precautions  Flowsheets (Taken 4/7/2020 2121)  Environmental Precautions:   Treaded Slipper Socks on Patient   Personal Belongings, Wastebasket, Call Bell etc. in Easy Reach   Transferred to Stronger Side   Report Given to Other Health Care Providers Regarding Fall Risk   Bed in Low Position   Communication Sign for Patients & Families   Mobility Assessed & Appropriate Sign Placed   Pt. Given education on use of call light and how to use phone to contact RN directly.  Return demonstrations given.

## 2020-04-09 LAB
BASOPHILS # BLD AUTO: 0.2 % (ref 0–1.8)
BASOPHILS # BLD: 0.01 K/UL (ref 0–0.12)
EOSINOPHIL # BLD AUTO: 0.07 K/UL (ref 0–0.51)
EOSINOPHIL NFR BLD: 1.6 % (ref 0–6.9)
ERYTHROCYTE [DISTWIDTH] IN BLOOD BY AUTOMATED COUNT: 71.7 FL (ref 35.9–50)
GLUCOSE BLD-MCNC: 145 MG/DL (ref 65–99)
GLUCOSE BLD-MCNC: 173 MG/DL (ref 65–99)
GLUCOSE BLD-MCNC: 67 MG/DL (ref 65–99)
GLUCOSE BLD-MCNC: 96 MG/DL (ref 65–99)
HCT VFR BLD AUTO: 28.1 % (ref 42–52)
HGB BLD-MCNC: 9.3 G/DL (ref 14–18)
IMM GRANULOCYTES # BLD AUTO: 0.02 K/UL (ref 0–0.11)
IMM GRANULOCYTES NFR BLD AUTO: 0.5 % (ref 0–0.9)
LYMPHOCYTES # BLD AUTO: 0.65 K/UL (ref 1–4.8)
LYMPHOCYTES NFR BLD: 14.7 % (ref 22–41)
MCH RBC QN AUTO: 31.4 PG (ref 27–33)
MCHC RBC AUTO-ENTMCNC: 33.1 G/DL (ref 33.7–35.3)
MCV RBC AUTO: 94.9 FL (ref 81.4–97.8)
MONOCYTES # BLD AUTO: 0.56 K/UL (ref 0–0.85)
MONOCYTES NFR BLD AUTO: 12.7 % (ref 0–13.4)
MORPHOLOGY BLD-IMP: NORMAL
NEUTROPHILS # BLD AUTO: 3.11 K/UL (ref 1.82–7.42)
NEUTROPHILS NFR BLD: 70.3 % (ref 44–72)
NRBC # BLD AUTO: 0 K/UL
NRBC BLD-RTO: 0 /100 WBC
PLATELET # BLD AUTO: 136 K/UL (ref 164–446)
PMV BLD AUTO: 9.5 FL (ref 9–12.9)
RBC # BLD AUTO: 2.96 M/UL (ref 4.7–6.1)
WBC # BLD AUTO: 4.4 K/UL (ref 4.8–10.8)

## 2020-04-09 PROCEDURE — A9270 NON-COVERED ITEM OR SERVICE: HCPCS | Performed by: PHYSICAL MEDICINE & REHABILITATION

## 2020-04-09 PROCEDURE — 97530 THERAPEUTIC ACTIVITIES: CPT

## 2020-04-09 PROCEDURE — 700102 HCHG RX REV CODE 250 W/ 637 OVERRIDE(OP): Performed by: PHYSICAL MEDICINE & REHABILITATION

## 2020-04-09 PROCEDURE — 700102 HCHG RX REV CODE 250 W/ 637 OVERRIDE(OP): Performed by: INTERNAL MEDICINE

## 2020-04-09 PROCEDURE — 97112 NEUROMUSCULAR REEDUCATION: CPT

## 2020-04-09 PROCEDURE — 99232 SBSQ HOSP IP/OBS MODERATE 35: CPT | Performed by: INTERNAL MEDICINE

## 2020-04-09 PROCEDURE — 82962 GLUCOSE BLOOD TEST: CPT | Mod: 91

## 2020-04-09 PROCEDURE — 36415 COLL VENOUS BLD VENIPUNCTURE: CPT

## 2020-04-09 PROCEDURE — 85025 COMPLETE CBC W/AUTO DIFF WBC: CPT

## 2020-04-09 PROCEDURE — 770020 HCHG ROOM/CARE - TELE (206)

## 2020-04-09 PROCEDURE — A9270 NON-COVERED ITEM OR SERVICE: HCPCS | Performed by: INTERNAL MEDICINE

## 2020-04-09 PROCEDURE — 97535 SELF CARE MNGMENT TRAINING: CPT

## 2020-04-09 RX ORDER — INSULIN GLARGINE 100 [IU]/ML
8 INJECTION, SOLUTION SUBCUTANEOUS EVERY EVENING
Status: DISCONTINUED | OUTPATIENT
Start: 2020-04-09 | End: 2020-04-10 | Stop reason: HOSPADM

## 2020-04-09 RX ADMIN — METOPROLOL TARTRATE 12.5 MG: 25 TABLET, FILM COATED ORAL at 17:17

## 2020-04-09 RX ADMIN — INSULIN HUMAN 2 UNITS: 100 INJECTION, SOLUTION PARENTERAL at 21:05

## 2020-04-09 RX ADMIN — GABAPENTIN 100 MG: 100 CAPSULE ORAL at 11:53

## 2020-04-09 RX ADMIN — APIXABAN 2.5 MG: 2.5 TABLET, FILM COATED ORAL at 05:35

## 2020-04-09 RX ADMIN — INSULIN HUMAN 2 UNITS: 100 INJECTION, SOLUTION PARENTERAL at 11:50

## 2020-04-09 RX ADMIN — INSULIN GLARGINE 8 UNITS: 100 INJECTION, SOLUTION SUBCUTANEOUS at 17:24

## 2020-04-09 RX ADMIN — METOPROLOL TARTRATE 12.5 MG: 25 TABLET, FILM COATED ORAL at 05:35

## 2020-04-09 RX ADMIN — APIXABAN 2.5 MG: 2.5 TABLET, FILM COATED ORAL at 17:16

## 2020-04-09 RX ADMIN — GABAPENTIN 100 MG: 100 CAPSULE ORAL at 05:35

## 2020-04-09 RX ADMIN — GABAPENTIN 100 MG: 100 CAPSULE ORAL at 17:23

## 2020-04-09 RX ADMIN — THIAMINE HCL TAB 100 MG 100 MG: 100 TAB at 05:35

## 2020-04-09 ASSESSMENT — ENCOUNTER SYMPTOMS
PSYCHIATRIC NEGATIVE: 1
NAUSEA: 0
VOMITING: 0
MUSCULOSKELETAL NEGATIVE: 1
EYES NEGATIVE: 1
FEVER: 0
ABDOMINAL PAIN: 0
CONSTIPATION: 0
CHILLS: 0
RESPIRATORY NEGATIVE: 1
DIAPHORESIS: 0
CARDIOVASCULAR NEGATIVE: 1
DIARRHEA: 0
NEUROLOGICAL NEGATIVE: 1
BLOOD IN STOOL: 0

## 2020-04-09 ASSESSMENT — COGNITIVE AND FUNCTIONAL STATUS - GENERAL
DRESSING REGULAR LOWER BODY CLOTHING: A LOT
TURNING FROM BACK TO SIDE WHILE IN FLAT BAD: UNABLE
MOVING FROM LYING ON BACK TO SITTING ON SIDE OF FLAT BED: UNABLE
DRESSING REGULAR UPPER BODY CLOTHING: A LOT
STANDING UP FROM CHAIR USING ARMS: A LITTLE
DAILY ACTIVITIY SCORE: 14
SUGGESTED CMS G CODE MODIFIER DAILY ACTIVITY: CK
PERSONAL GROOMING: A LOT
WALKING IN HOSPITAL ROOM: A LITTLE
HELP NEEDED FOR BATHING: A LOT
SUGGESTED CMS G CODE MODIFIER MOBILITY: CL
EATING MEALS: A LITTLE
MOVING TO AND FROM BED TO CHAIR: UNABLE
TOILETING: A LITTLE
CLIMB 3 TO 5 STEPS WITH RAILING: TOTAL
MOBILITY SCORE: 10

## 2020-04-09 ASSESSMENT — GAIT ASSESSMENTS
GAIT LEVEL OF ASSIST: MINIMAL ASSIST
DISTANCE (FEET): 50
ASSISTIVE DEVICE: FRONT WHEEL WALKER

## 2020-04-09 ASSESSMENT — PATIENT HEALTH QUESTIONNAIRE - PHQ9
SUM OF ALL RESPONSES TO PHQ9 QUESTIONS 1 AND 2: 0
2. FEELING DOWN, DEPRESSED, IRRITABLE, OR HOPELESS: NOT AT ALL
1. LITTLE INTEREST OR PLEASURE IN DOING THINGS: NOT AT ALL

## 2020-04-09 NOTE — PROGRESS NOTES
Hospital Medicine Daily Progress Note    Date of Service  4/9/2020    Chief Complaint  79 y.o. male admitted 3/28/2020 with altered mental status    Hospital Course    *79 y.o. male with hx of diabetes, hypertension, CKD type 4 and recent cervical laminectomy who presented 3/28/2020 with acute encephalopathy. He had been transferred to [facility] 16 days prior and was sent to the ER on 3/28 for altered mentation. At that visit, he had an extensive workup, and though he couldn't remember why he had been sent to the ER, he had an unremarkable CT head.  He was found to have a new pneumonia and was treated with antibiotics, though was stable to return [facility] to rehab.  He was discharged back to rehab with the plan to continue antibiotics.  He then was sent back later for altered mentation again.  He began gurling on secretions and not protecting his airway, so he was intubated and admitted to the ICU.  He was then a COVID rule out, as CXR revealed multifocal opacities and he was on the vent.  He was extubated on 3/31 and did fine, though he did remain somewhat confused as to why he was here.  He went in and out of afib, with 20 beats of vtach the night of 3/31, so he was started on metoprolol low dose. COVD came back negative, as well as influenza.  He did also recieve an LP in the ER and CSF was negative for infection, though showed high glucose and protein.  All cultures were negative.    During this hospitalization the patient developed episode of atrial fibrillation with no RVR, after long discussion with the patient and his sister they accept the risk of bleeding and they wanted to start with blood thinner restart with apixaban 2.5 mg twice daily due to chronic kidney disease.     Patient had c-collar removed during this hospitalization and was seen by neurosurgery, xrays obtained and wnl.          Interval Problem Update  - No acute events overnight  - Afebrile  - Some fatigue but otherwise doing okay, ready to go  to rehab but waiting for [facility] auth  - c-collar removed yesterday, seen by Dr. Sellers today  - had episode of hypoglycemia this am (69)    Consultants/Specialty  Intensivist    Code Status  DNAR/DNI    Disposition  Medically stable for placement    Review of Systems  Review of Systems   Constitutional: Positive for malaise/fatigue. Negative for chills, diaphoresis and fever.   HENT: Negative for nosebleeds.    Eyes: Negative.    Respiratory: Negative.    Cardiovascular: Negative.    Gastrointestinal: Negative for abdominal pain, blood in stool, constipation, diarrhea, nausea and vomiting.   Genitourinary: Negative.    Musculoskeletal: Negative.    Neurological: Negative.    Psychiatric/Behavioral: Negative.         Physical Exam  Temp:  [36.4 °C (97.5 °F)-37 °C (98.6 °F)] 36.6 °C (97.9 °F)  Pulse:  [81-98] 98  Resp:  [14-18] 18  BP: (106-121)/(51-72) 106/51  SpO2:  [95 %-99 %] 99 %    Physical Exam  Vitals signs and nursing note reviewed.   Constitutional:       General: He is not in acute distress.     Appearance: He is normal weight. He is not ill-appearing or toxic-appearing.      Comments:     HENT:      Ears:      Comments: Hard of hearing  Cardiovascular:      Rate and Rhythm: Normal rate and regular rhythm.   Pulmonary:      Effort: Pulmonary effort is normal.      Breath sounds: Normal breath sounds.      Comments: On RA  Abdominal:      General: Abdomen is flat. There is no distension.      Tenderness: There is no abdominal tenderness.   Neurological:      General: No focal deficit present.      Mental Status: He is alert and oriented to person, place, and time. Mental status is at baseline.      Cranial Nerves: No cranial nerve deficit.      Motor: No weakness.      Comments: Generalized weakness          Fluids    Intake/Output Summary (Last 24 hours) at 4/9/2020 1234  Last data filed at 4/9/2020 0000  Gross per 24 hour   Intake 240 ml   Output 500 ml   Net -260 ml       Laboratory  Recent Labs      04/07/20  0455 04/08/20  0358 04/09/20  0629   WBC 4.4* 3.8* 4.4*   RBC 2.70* 2.93* 2.96*   HEMOGLOBIN 8.6* 9.1* 9.3*   HEMATOCRIT 25.8* 27.9* 28.1*   MCV 95.6 95.2 94.9   MCH 31.9 31.1 31.4   MCHC 33.3* 32.6* 33.1*   RDW 75.2* 73.5* 71.7*   PLATELETCT 120* 137* 136*   MPV 10.1 10.1 9.5     Recent Labs     04/07/20  0455 04/08/20  0358   SODIUM 137 137   POTASSIUM 3.8 4.0   CHLORIDE 100 102   CO2 24 24   GLUCOSE 65 90   BUN 60* 53*   CREATININE 2.23* 2.09*   CALCIUM 8.2* 8.4*                   Imaging  DX-CERVICAL SPINE-FLX-EXT ONLY   Final Result      Surgical change extending from C3 through C6.      DX-CERVICAL SPINE-2 OR 3 VIEWS   Final Result      Posterior hardware extending from C3 to C6 appears within normal limits      DX-CHEST-PORTABLE (1 VIEW)   Final Result      No significant change from prior exam.      DX-CHEST-PORTABLE (1 VIEW)   Final Result      Stable chest. Unchanging left lower lobe atelectasis or pneumonia and bibasilar interstitial edema or pneumonia.      DX-CHEST-PORTABLE (1 VIEW)   Final Result      Persistent bilateral pulmonary consolidation with pleural effusions.      DX-CHEST-PORTABLE (1 VIEW)   Final Result         Central line is not seen. No appreciable pneumothorax.      DX-ABDOMEN FOR TUBE PLACEMENT   Final Result      Enteric tube tip projects over the stomach.      NG tube tip projects over the distal stomach.      EC-ECHOCARDIOGRAM COMPLETE W/O CONT   Final Result      DX-CHEST-PORTABLE (1 VIEW)   Final Result      Stable examination.      CT-CHEST (THORAX) W/O   Final Result      1.  Large bilateral pleural effusions with bibasilar atelectasis.      2.  Indeterminate right upper lobe pulmonary nodules measuring 5 and 9 mm in size.      3.  Endotracheal and nasogastric tubes present.      4.  Calcified granulomas.      Low Risk: CT at 3-6 months, then consider CT at 18-24 months      High Risk: CT at 3-6 months, then at 18-24 months      Comments: Use most suspicious nodule as  guide to management. Follow-up intervals may vary according to size and risk.      Low Risk - Minimal or absent history of smoking and of other known risk factors.      High Risk - History of smoking or of other known risk factors.      Note: These recommendations do not apply to lung cancer screening, patients with immunosuppression, or patients with known primary cancer.      Fleischner Society 2017 Guidelines for Management of Incidentally Detected Pulmonary Nodules in Adults               CT-HEAD W/O   Final Result      1.  No evidence of acute intracranial process.      2.  Cerebral atrophy as well as periventricular chronic small vessel ischemic change.      DX-CHEST-PORTABLE (1 VIEW)   Final Result         Endotracheal tube with tip projecting over the mid to lower thoracic trachea.      Gastric drainage tube courses below diaphragm, tip is not seen.      DX-CHEST-PORTABLE (1 VIEW)   Final Result         No significant interval change. Grossly unchanged bibasilar ill-defined opacities           Assessment/Plan  A-fib (HCC)- (present on admission)  Assessment & Plan  Paroxysmal and no RVR  [id number] risk of stroke 3.2 %/year  HASBLED is 2 and the risk of bleeding is 4.1%  After long discussion with the patient and his sister, they agreed to start with apixaban.  Continue apixaban 2.5 mg twice daily due to chronic kidney disease.  Keep monitoring closely    Acute encephalopathy- (present on admission)  Assessment & Plan  Likely delirium from underlying medical issues; toxic metabolic from sepsis  CT head was negative  LP CSF negative  Continue nonpharmacological treatment for delirium  Avoid opioid and benzo  PT and OT with speech  Likely need rehab  Significant improving, the patient is alert and oriented x3 and he is able to make a good conversation      Acute respiratory failure (HCC)- (present on admission)  Assessment & Plan  Intubated 3/28->3/31, to protect airway due to altered mental status   chest  x-ray showed bilateral infiltration  CT had bilateral pleural effusions      Flu negative, COVID neg  Likely combination between pulmonary edema due to chronic kidney disease and possible pneumonia.      Echo did not show heart failure  I's and O's and weight daily and resume diuretics if needed  Finished antibiotics for 5 days  Improving and right now he is on room air         Central cord syndrome (HCC)- (present on admission)  Assessment & Plan  S/p C3-6 laminectomy and posterolateral fusion  for cervical stenosis/myelopathy  The patient received rehab therapy.  Continue monitoring and follow-up as outpatient with the neurosurgeon  Collar removal 4/8/20  Xrays 4/8 wnl  Seen by Dr. Sellers 4/9    V tach ([facility])- (present on admission)  Assessment & Plan  Unsustained, restarted on metoprolol   Electrolytes ok    Thrombocytopenia ([facility])- (present on admission)  Assessment & Plan  Chronic around 110s  No signs of a bleeding  Continue monitoring    Anemia- (present on admission)  Assessment & Plan  With elevated MCV and low platelets  Stable  We will check B12 and folic acid   Iron panel around stable      Dysphagia- (present on admission)  Assessment & Plan  Due to cervical spine injury   SLP on board   Cortrack was removed since he is eating    DM (diabetes mellitus) ([facility])- (present on admission)  Assessment & Plan  Last A1c is 5.8 and his target for his age   Continue on lantus 10-->8 (4/9) units and ssi;      Pancytopenia ([facility])  Assessment & Plan  Low WBC (no neutropenia)  Anemic and low plt  CTM      Pleural effusion, bilateral- (present on admission)  Assessment & Plan  S/p diuresis  On room air    Cervical stenosis of spine- (present on admission)  Assessment & Plan  S/p cervical laminectomy in February, has been at rehab  Can move all extremities; will need ongoing rehab    ACP (advance care planning)  Assessment & Plan  The patient is alert and oriented x4 and he makes a good conversation he is able to make  decision, discussed with him the CODE STATUS and the patient is a clear that he wants to be DNR/DNI.  I reviewed his advanced directive which showed the patient preferred not to be resuscitated    Pulmonary nodules- (present on admission)  Assessment & Plan  F/u with pcp    Diarrhea- (present on admission)  Assessment & Plan  No white blood cell and stool and C. difficile is negative  We will start with Imodium as needed    Acute on chronic renal failure (HCC)- (present on admission)  Assessment & Plan  Improving   Around his baseline creatinine 1.9-2.3       VTE prophylaxis: Heparin

## 2020-04-09 NOTE — CARE PLAN
Problem: Safety  Goal: Will remain free from falls  Note: Pt mobility assessed at beginning of shift. Pt is x1 assist with a walker. Fall precautions in place. Non-slip socks on. Bed in lowest locked position. Bed alarm on. Call light within reach. Pt educated to call for assistance and verbalizes understanding.      Problem: Knowledge Deficit  Goal: Knowledge of the prescribed therapeutic regimen will improve  Note: Pt educated about disease process. Reason why medications are taken. And informed about treatment plan.

## 2020-04-09 NOTE — THERAPY
"Physical Therapy Treatment completed.   Bed Mobility:  Supine to Sit: Minimal Assist  Transfers: Sit to Stand: Minimal Assist  Gait: Level Of Assist: Minimal Assist with Front-Wheel Walker       Plan of Care: Will benefit from Physical Therapy 5 times per week  Discharge Recommendations: Equipment: Will Continue to Assess for Equipment Needs    Pt wonderfully motivated and pleasant; good tolerance of manual therapy distal thoracic and shoulders as tight from cervical collar precautions; highly recommend return to rehab when medically appropriate to do so; will follow to progress while in house        See \"Rehab Therapy-Acute\" Patient Summary Report for complete documentation.       "

## 2020-04-09 NOTE — PROGRESS NOTES
Pt seen and examined. Admitted for respiratory issues.  States he is stronger post Cervical decompression and fusion. Was doing well in rehab. Currently good strength in all 4 extremities. Improved since preop. Awaiting rehab bed.   Incision well healed. Okay to take C-collar off. Follow up out patient post discharge. Please call with any questions.

## 2020-04-09 NOTE — PROGRESS NOTES
Pt sitting in chair at beside. Even visible chest rise. No signs of distress. Pt toileted. Call light in place. Bed in low and locked position.

## 2020-04-10 VITALS
RESPIRATION RATE: 18 BRPM | HEIGHT: 70 IN | DIASTOLIC BLOOD PRESSURE: 56 MMHG | SYSTOLIC BLOOD PRESSURE: 112 MMHG | BODY MASS INDEX: 21.68 KG/M2 | HEART RATE: 91 BPM | WEIGHT: 151.46 LBS | TEMPERATURE: 98.9 F | OXYGEN SATURATION: 98 %

## 2020-04-10 PROBLEM — R19.7 DIARRHEA: Status: RESOLVED | Noted: 2020-03-22 | Resolved: 2020-04-10

## 2020-04-10 PROBLEM — J96.00 ACUTE RESPIRATORY FAILURE (HCC): Status: RESOLVED | Noted: 2020-03-28 | Resolved: 2020-04-10

## 2020-04-10 PROBLEM — N17.9 ACUTE ON CHRONIC RENAL FAILURE (HCC): Status: RESOLVED | Noted: 2020-02-28 | Resolved: 2020-04-10

## 2020-04-10 PROBLEM — N18.9 ACUTE ON CHRONIC RENAL FAILURE (HCC): Status: RESOLVED | Noted: 2020-02-28 | Resolved: 2020-04-10

## 2020-04-10 PROBLEM — J90 PLEURAL EFFUSION, BILATERAL: Status: RESOLVED | Noted: 2020-03-30 | Resolved: 2020-04-10

## 2020-04-10 PROBLEM — G93.40 ACUTE ENCEPHALOPATHY: Status: RESOLVED | Noted: 2020-03-28 | Resolved: 2020-04-10

## 2020-04-10 PROBLEM — R13.10 DYSPHAGIA: Status: RESOLVED | Noted: 2018-12-04 | Resolved: 2020-04-10

## 2020-04-10 PROBLEM — I47.20 V TACH (HCC): Status: RESOLVED | Noted: 2020-04-01 | Resolved: 2020-04-10

## 2020-04-10 LAB
ALBUMIN SERPL BCP-MCNC: 3.3 G/DL (ref 3.2–4.9)
ALBUMIN/GLOB SERPL: 1.6 G/DL
ALP SERPL-CCNC: 130 U/L (ref 30–99)
ALT SERPL-CCNC: 34 U/L (ref 2–50)
ANION GAP SERPL CALC-SCNC: 11 MMOL/L (ref 7–16)
AST SERPL-CCNC: 32 U/L (ref 12–45)
BILIRUB SERPL-MCNC: 0.6 MG/DL (ref 0.1–1.5)
BUN SERPL-MCNC: 48 MG/DL (ref 8–22)
CALCIUM SERPL-MCNC: 8.5 MG/DL (ref 8.5–10.5)
CHLORIDE SERPL-SCNC: 101 MMOL/L (ref 96–112)
CO2 SERPL-SCNC: 23 MMOL/L (ref 20–33)
CREAT SERPL-MCNC: 2.14 MG/DL (ref 0.5–1.4)
EKG IMPRESSION: NORMAL
GLOBULIN SER CALC-MCNC: 2.1 G/DL (ref 1.9–3.5)
GLUCOSE BLD-MCNC: 147 MG/DL (ref 65–99)
GLUCOSE BLD-MCNC: 175 MG/DL (ref 65–99)
GLUCOSE BLD-MCNC: 81 MG/DL (ref 65–99)
GLUCOSE SERPL-MCNC: 80 MG/DL (ref 65–99)
MAGNESIUM SERPL-MCNC: 2.2 MG/DL (ref 1.5–2.5)
PHOSPHATE SERPL-MCNC: 3.8 MG/DL (ref 2.5–4.5)
POTASSIUM SERPL-SCNC: 4.4 MMOL/L (ref 3.6–5.5)
PROT SERPL-MCNC: 5.4 G/DL (ref 6–8.2)
SODIUM SERPL-SCNC: 135 MMOL/L (ref 135–145)

## 2020-04-10 PROCEDURE — A9270 NON-COVERED ITEM OR SERVICE: HCPCS | Performed by: INTERNAL MEDICINE

## 2020-04-10 PROCEDURE — 700102 HCHG RX REV CODE 250 W/ 637 OVERRIDE(OP): Performed by: INTERNAL MEDICINE

## 2020-04-10 PROCEDURE — 82962 GLUCOSE BLOOD TEST: CPT

## 2020-04-10 PROCEDURE — 83735 ASSAY OF MAGNESIUM: CPT

## 2020-04-10 PROCEDURE — 36415 COLL VENOUS BLD VENIPUNCTURE: CPT

## 2020-04-10 PROCEDURE — 84100 ASSAY OF PHOSPHORUS: CPT

## 2020-04-10 PROCEDURE — 80053 COMPREHEN METABOLIC PANEL: CPT

## 2020-04-10 PROCEDURE — 700102 HCHG RX REV CODE 250 W/ 637 OVERRIDE(OP): Performed by: PHYSICAL MEDICINE & REHABILITATION

## 2020-04-10 PROCEDURE — 99239 HOSP IP/OBS DSCHRG MGMT >30: CPT | Performed by: INTERNAL MEDICINE

## 2020-04-10 PROCEDURE — 93010 ELECTROCARDIOGRAM REPORT: CPT | Performed by: INTERNAL MEDICINE

## 2020-04-10 PROCEDURE — A9270 NON-COVERED ITEM OR SERVICE: HCPCS | Performed by: PHYSICAL MEDICINE & REHABILITATION

## 2020-04-10 PROCEDURE — 93005 ELECTROCARDIOGRAM TRACING: CPT | Performed by: INTERNAL MEDICINE

## 2020-04-10 RX ORDER — GABAPENTIN 100 MG/1
100 CAPSULE ORAL 3 TIMES DAILY
Qty: 90 CAP
Start: 2020-04-10

## 2020-04-10 RX ORDER — ACETAMINOPHEN 325 MG/1
650 TABLET ORAL EVERY 6 HOURS PRN
Qty: 30 TAB | Refills: 0
Start: 2020-04-10

## 2020-04-10 RX ORDER — OXYCODONE HYDROCHLORIDE 5 MG/1
5 TABLET ORAL EVERY 4 HOURS PRN
Status: DISCONTINUED | OUTPATIENT
Start: 2020-04-10 | End: 2020-04-10 | Stop reason: HOSPADM

## 2020-04-10 RX ORDER — IPRATROPIUM BROMIDE AND ALBUTEROL SULFATE 2.5; .5 MG/3ML; MG/3ML
3 SOLUTION RESPIRATORY (INHALATION) EVERY 4 HOURS PRN
Qty: 30 BULLET
Start: 2020-04-10

## 2020-04-10 RX ORDER — LANOLIN ALCOHOL/MO/W.PET/CERES
100 CREAM (GRAM) TOPICAL DAILY
Qty: 30 TAB
Start: 2020-04-11

## 2020-04-10 RX ORDER — ACETAMINOPHEN 325 MG/1
650 TABLET ORAL EVERY 6 HOURS PRN
Status: DISCONTINUED | OUTPATIENT
Start: 2020-04-10 | End: 2020-04-10 | Stop reason: HOSPADM

## 2020-04-10 RX ORDER — LOPERAMIDE HYDROCHLORIDE 2 MG/1
2 CAPSULE ORAL 4 TIMES DAILY PRN
Qty: 30 CAP
Start: 2020-04-10

## 2020-04-10 RX ADMIN — APIXABAN 2.5 MG: 2.5 TABLET, FILM COATED ORAL at 06:01

## 2020-04-10 RX ADMIN — THIAMINE HCL TAB 100 MG 100 MG: 100 TAB at 06:01

## 2020-04-10 RX ADMIN — GABAPENTIN 100 MG: 100 CAPSULE ORAL at 11:30

## 2020-04-10 RX ADMIN — ACETAMINOPHEN 650 MG: 325 TABLET, FILM COATED ORAL at 11:30

## 2020-04-10 RX ADMIN — METOPROLOL TARTRATE 12.5 MG: 25 TABLET, FILM COATED ORAL at 06:01

## 2020-04-10 RX ADMIN — GABAPENTIN 100 MG: 100 CAPSULE ORAL at 06:01

## 2020-04-10 ASSESSMENT — ENCOUNTER SYMPTOMS
NECK PAIN: 1
ABDOMINAL PAIN: 0
EYES NEGATIVE: 1
PSYCHIATRIC NEGATIVE: 1
VOMITING: 0
DIAPHORESIS: 0
BLOOD IN STOOL: 0
DIARRHEA: 0
FEVER: 0
CONSTIPATION: 0
NEUROLOGICAL NEGATIVE: 1
RESPIRATORY NEGATIVE: 1
NAUSEA: 0
PALPITATIONS: 0
CHILLS: 0

## 2020-04-10 ASSESSMENT — CHA2DS2 SCORE
SEX: MALE
PRIOR STROKE OR TIA OR THROMBOEMBOLISM: NO
AGE 65 TO 74: NO
CHF OR LEFT VENTRICULAR DYSFUNCTION: NO
DIABETES: YES
CHA2DS2 VASC SCORE: 4
VASCULAR DISEASE: NO
AGE 75 OR GREATER: YES
HYPERTENSION: YES

## 2020-04-10 ASSESSMENT — FIBROSIS 4 INDEX: FIB4 SCORE: 3.11

## 2020-04-10 NOTE — DISCHARGE PLANNING
@1600  Agency/Facility Name: [facility]  Spoke To: Carmina  Outcome: Working on insurance auth. Transport set for 1800.  SW informed.    Agency/Facility Name: [facility]  Spoke To: Shweta  Outcome: Accepted pending auth.    @1520  Agency/Facility Name: [facility]  Spoke To: Carmina  Outcome: Accepted and has a bed today, checking transport.    @1500  Agency/Facility Name: [facility]  Spoke To: Shweta  Outcome: Checking service connection.    Received Choice form at 1435  Agency/Facility Name: [facility]  Referral sent per Choice form @ 1438

## 2020-04-10 NOTE — THERAPY
Speech Therapy Contact Note    Per chart, pt is leaving for SNF today. Per CNA, pt has been well on his current diet, eating well, no issues. SLP will hold tx today.

## 2020-04-10 NOTE — PROGRESS NOTES
Got hand off report from ABDULAZIZ Marquez at bedside. Pt. Lying in bed, calm, unlabored breathing, other needs are assessed, bed in lowest position, call light within reach. Fall precautions in place, pt. educated call for assistance.

## 2020-04-10 NOTE — DISCHARGE SUMMARY
Discharge Summary    CHIEF COMPLAINT ON ADMISSION  Chief Complaint   Patient presents with   • ALOC     A&O4 at baseline per EMS       Reason for Admission  EMS 21     CODE STATUS  DNAR/DNI    HPI & HOSPITAL COURSE    *79 y.o. male with hx of diabetes, hypertension, CKD type 4 and recent cervical laminectomy who presented 3/28/2020 with acute encephalopathy. He had been transferred to [facility] 16 days prior and was sent to the ER on 3/28 for altered mentation. At that visit, he had an extensive workup, and though he couldn't remember why he had been sent to the ER, he had an unremarkable CT head.  He was found to have a new pneumonia and was treated with antibiotics, though was stable to return [facility] to rehab.  He was discharged back to rehab with the plan to continue antibiotics.  He then was sent back later for altered mentation again.  He began gurling on secretions and not protecting his airway, so he was intubated and admitted to the ICU.  He was then a COVID rule out, as CXR revealed multifocal opacities and he was on the vent.  He was extubated on 3/31 and did fine, his mental status continued to improve.      He went in and out of afib, with 20 beats of vtach the night of 3/31, so he was re-started on metoprolol low dose. COVD came back negative, as well as influenza.  He did also recieve an LP in the ER and CSF was negative for infection, though showed high glucose and protein.  All cultures were negative.    During this hospitalization the patient developed episode of atrial fibrillation with no RVR, after long discussion with the patient and his sister they accept the risk of bleeding and they wanted to start with blood thinner restart with apixaban 2.5 mg twice daily due to chronic kidney disease. Patient also had some sinus pauses, called cardiology who said that since they were < 5 sec and asymptomatic that we didn't need to do further workup.      Patient had c-collar removed during this  hospitalization and was seen by neurosurgery, xrays obtained and wnl.   He did have some neck pain that was treated with tylenol.      Patient does have pancytopenia, whic he's had a history of intermittent pancytopenia.  He was not neutropenic this admission.  His H/H and plt were stable.  Will need to continue to monitor outpatient and consider heme/onc referral. He was also found to have pulmonary nodules which will also need to be monitored outpatient.     Therefore, he is discharged in good and stable condition to skilled nursing facility.    The patient met 2-midnight criteria for an inpatient stay at the time of discharge.      FOLLOW UP ITEMS POST DISCHARGE  Monitor pancytopenia  Monitor pulmonary nodule  Likely needs [facility] up as well as neurosurgery follow up    DISCHARGE DIAGNOSES  Active Problems:    Central cord syndrome (HCC) POA: Yes    A-fib (HCC) (Chronic) POA: Yes    DM (diabetes mellitus) (HCC) POA: Yes    Anemia POA: Yes    Thrombocytopenia (HCC) POA: Yes    Cervical stenosis of spine POA: Yes    Pancytopenia (HCC) POA: Unknown    Pulmonary nodules POA: Yes    ACP (advance care planning) POA: Unknown  Resolved Problems:    Acute respiratory failure (HCC) POA: Yes    Acute encephalopathy POA: Yes    Dysphagia POA: Yes    V tach (HCC) POA: Yes    Pleural effusion, bilateral POA: Yes    Acute on chronic renal failure (HCC) POA: Yes    Diarrhea POA: Yes      FOLLOW UP  No future appointments.  [facility]  [address] [facility] [address]  [phone]          MEDICATIONS ON DISCHARGE     Medication List      START taking these medications      Instructions   acetaminophen 325 MG Tabs  Commonly known as:  TYLENOL   Take 2 Tabs by mouth every 6 hours as needed.  Dose:  650 mg     apixaban 2.5mg Tabs  Commonly known as:  ELIQUIS   Take 1 Tab by mouth 2 Times a Day. Indications: Thromboembolism secondary to Atrial Fibrillation  Dose:  2.5 mg     gabapentin 100 MG  Caps  Commonly known as:  NEURONTIN   Take 1 Cap by mouth 3 times a day.  Dose:  100 mg     ipratropium-albuterol 0.5-2.5 (3) MG/3ML nebulizer solution  Commonly known as:  DUONEB   3 mL by Nebulization route every four hours as needed for Shortness of Breath.  Dose:  3 mL     loperamide 2 MG Caps  Commonly known as:  IMODIUM   Take 1 Cap by mouth 4 times a day as needed for Diarrhea.  Dose:  2 mg     thiamine 100 MG tablet  Start taking on:  April 11, 2020  Commonly known as:  THIAMINE   Take 1 Tab by mouth every day.  Dose:  100 mg        CONTINUE taking these medications      Instructions   calcitRIOL 0.25 MCG Caps  Commonly known as:  ROCALTROL   Take 0.25 mcg by mouth every day.  Dose:  0.25 mcg     ferrous sulfate 325 (65 Fe) MG tablet   Take 1 Tab by mouth 2 times a day, with meals.  Dose:  325 mg     insulin glargine 100 UNIT/ML Soln  Commonly known as:  LANTUS   Inject 6 Units as instructed every evening.  Dose:  6 Units     metoprolol 25 MG Tabs  Commonly known as:  LOPRESSOR   Take 12.5 mg by mouth 2 times a day.  Dose:  12.5 mg     NOVOLOG (insulin aspart) 100 UNIT/ML injection PEN  Commonly known as:  NOVOLOG   Inject 5 Units as instructed every bedtime.  Dose:  5 Units     pioglitazone 30 MG Tabs  Commonly known as:  ACTOS   Take 30 mg by mouth every day.  Dose:  30 mg     simvastatin 20 MG Tabs  Commonly known as:  ZOCOR   Take 20 mg by mouth every evening.  Dose:  20 mg     sodium bicarbonate 650 MG Tabs  Commonly known as:  SODIUM BICARBONATE   Take 650 mg by mouth 3 times a day.  Dose:  650 mg            Allergies  No Known Allergies    DIET  Orders Placed This Encounter   Procedures   • Diet Order Diabetic     Standing Status:   Standing     Number of Occurrences:   1     Order Specific Question:   Diet:     Answer:   Diabetic [3]     Order Specific Question:   Texture Modifier     Answer:   Level 6 - Soft & Bite Sized (Dysphagia 3)     Order Specific Question:   Liquid level     Answer:   Level 2  - Mildly Thick     Order Specific Question:   Miscellaneous modifications:     Answer:   SLP - Deliver to Nursing Station [22]       ACTIVITY  As tolerated.  Weight bearing as tolerated    LINES, DRAINS, AND WOUNDS  This is an automated list. Peripheral IVs will be removed prior to discharge.  Peripheral IV 03/29/20 20 G Left Forearm (Active)   Site Assessment Clean;Dry;Intact 4/10/2020  1:00 PM   Dressing Type Occlusive;Transparent 4/10/2020  1:00 PM   Line Status Saline locked 4/10/2020  1:00 PM   Dressing Status Clean;Dry;Intact 4/10/2020  1:00 PM   Dressing Intervention N/A 4/7/2020  9:00 PM   Infiltration Grading ([facility]) 0 4/10/2020  1:00 PM   Phlebitis Scale ([facility] Only) 0 4/10/2020  1:00 PM       Wound 03/12/20 Incision Neck Posterior (Active)       Wound Other (comment) Heel Right scar from resolved pressure injury (Active)   Site Assessment GRADY 4/10/2020  8:30 AM   Periwound Assessment Clean;Dry;Intact 4/9/2020  8:05 PM   Margins GRADY 4/9/2020  8:05 PM   Closure GRADY 4/9/2020  8:05 PM   Drainage Amount GRADY 4/9/2020  8:05 PM   Drainage Description GRADY 4/9/2020  8:05 PM   Dressing Options Mepilex Heel 4/10/2020  8:30 AM   Dressing Changed Observed 4/8/2020  8:05 PM   Dressing Status Clean;Dry;Intact 4/9/2020  8:05 PM       Wound Pressure Injury Heel Left scar from resolved pressure injury (Active)   Site Assessment GRADY 4/5/2020  8:00 AM   Periwound Assessment GRADY 4/5/2020  8:10 PM   Margins GRADY 4/5/2020  8:10 PM   Closure GRADY 4/5/2020  8:10 PM   Drainage Amount GRADY 4/5/2020  8:10 PM   Drainage Description GRADY 4/5/2020  8:10 PM       Wound Partial Thickness Wound Buttocks;Sacrum Bilateral moisture related callusing with small area of denuded tissue (Active)   Site Assessment Red;Fragile 4/10/2020  8:30 AM   Periwound Assessment Clean;Dry;Intact 4/10/2020  8:30 AM   Closure Adhesive bandage 4/8/2020 10:00 PM   Drainage Amount None 4/8/2020 10:00 PM   Treatments Cleansed 4/8/2020  8:00 AM   Wound Cleansing  Not Applicable 4/8/2020  8:00 AM   Dressing Options Open to Air 4/10/2020  8:30 AM   Dressing Changed Observed 4/7/2020  8:00 AM   Dressing Status Clean;Dry;Intact 4/8/2020  8:00 AM   Dressing Change/Treatment Frequency As Needed 4/7/2020  8:00 AM       Wound 03/29/20 Pressure Injury Heel Posterior Left Stage 2 POA (Active)   Wound Image    3/30/2020  3:30 PM   Site Assessment GRADY 4/10/2020  8:30 AM   Periwound Assessment Clean;Dry;Intact;Pink 4/9/2020  8:05 PM   Margins GRADY 4/9/2020  8:05 PM   Closure GRADY 4/9/2020  8:05 PM   Drainage Amount None 4/9/2020  8:05 PM   Drainage Description GRADY 4/9/2020  8:05 PM   Treatments Site care 4/8/2020  8:00 AM   Wound Cleansing Approved Wound Cleanser 4/8/2020  8:00 AM   Periwound Protectant Not Applicable 4/9/2020  8:05 PM   Dressing Cleansing/Solutions Not Applicable 4/9/2020  8:05 PM   Dressing Options Mepilex Heel 4/10/2020  8:30 AM   Dressing Changed Observed 4/8/2020  8:05 PM   Dressing Status Clean;Dry;Intact 4/9/2020  8:05 PM   Dressing Change/Treatment Frequency As Needed 4/9/2020  8:05 PM   NEXT Dressing Change/Treatment Date 04/08/20 4/8/2020  8:00 AM   NEXT Weekly Photo (Inpatient Only) 04/08/20 4/8/2020  8:00 AM   Pressure Injury Stage 2 3/30/2020  3:30 PM   Wound Length (cm) 3 cm 3/30/2020  3:30 PM   Wound Width (cm) 2 cm 3/30/2020  3:30 PM   Wound Surface Area (cm^2) 6 cm^2 3/30/2020  3:30 PM   Wound Odor None 3/30/2020  3:30 PM       Peripheral IV 03/29/20 20 G Left Forearm (Active)   Site Assessment Clean;Dry;Intact 4/10/2020  1:00 PM   Dressing Type Occlusive;Transparent 4/10/2020  1:00 PM   Line Status Saline locked 4/10/2020  1:00 PM   Dressing Status Clean;Dry;Intact 4/10/2020  1:00 PM   Dressing Intervention N/A 4/7/2020  9:00 PM   Infiltration Grading ([facility]) 0 4/10/2020  1:00 PM   Phlebitis Scale ([facility] Only) 0 4/10/2020  1:00 PM               MENTAL STATUS ON TRANSFER  Level of Consciousness: Alert  Orientation : Oriented x 4  Speech: Speech  Clear    CONSULTATIONS  Intensivits    PROCEDURES  CVC 3/28/20    Discharge exam:  Physical Exam   Constitutional: He is oriented to person, place, and time and well-developed, well-nourished, and in no distress.   HENT:   Head: Normocephalic and atraumatic.   Mouth/Throat: Oropharynx is clear and moist.   Very hard of hearing   Eyes: Conjunctivae are normal. No scleral icterus.   Neck: Neck supple.   Cardiovascular: Normal rate, regular rhythm and normal heart sounds.   Pulmonary/Chest: Effort normal.   Decreased breath sounds at bases   Abdominal: Soft. He exhibits no distension.   Musculoskeletal:         General: No edema.   Neurological: He is alert and oriented to person, place, and time.   Skin: Skin is warm and dry.   Psychiatric: Mood and affect normal.         LABORATORY  Lab Results   Component Value Date    SODIUM 135 04/10/2020    POTASSIUM 4.4 04/10/2020    CHLORIDE 101 04/10/2020    CO2 23 04/10/2020    GLUCOSE 80 04/10/2020    BUN 48 (H) 04/10/2020    CREATININE 2.14 (H) 04/10/2020        Lab Results   Component Value Date    WBC 4.4 (L) 04/09/2020    HEMOGLOBIN 9.3 (L) 04/09/2020    HEMATOCRIT 28.1 (L) 04/09/2020    PLATELETCT 136 (L) 04/09/2020        Results     Procedure Component Value Units Date/Time    CULTURE STOOL [[id number]] Collected:  04/02/20 1846    Order Status:  Completed Specimen:  Stool Updated:  04/07/20 1430     Significant Indicator NEG     Source STL     Site STOOL     Culture Result Shiga Toxin testing not performed due to lack of growth in  [place] broth.  No enteric pathogens isolated.  NOTE:  Stool cultures are screened for Shiga Toxins 1 and 2,  Salmonella, Shigella, Campylobacter, Aeromonas,  Plesiomonas, and Vibrio.      Narrative:       Droplet, Contact, and Eye [id number] FRANTZ JIM  Has the patient been out of the country recently?->No  Is the patient immuno-compromised?->No  Is there a concern for a parasitic infection other than  Giardia or  Cryptospordium?->Yes  Droplet, Contact, and Eye [id number] FRANTZ ROSSASH JIM    Ova & Parasite Exam, Fecal [[id number]] Collected:  04/02/20 1846    Order Status:  Completed Updated:  04/06/20 1037     Ova and Parasite, Fecal Interpretation Negative     Comment: The Wet Mount (iodine stain) was negative for ova and parasites,  however, there was insufficient fecal material to complete the  Trichrome Stain. Therefore results of this test were based solely  on iodine stain interpretation which cannot be used to identify  all protozoa. Recommend collecting and testing a new specimen.  INTERPRETIVE INFORMATION: Ova and Parasite, Fecal  Method for identification of Ova and Parasites includes wet mount  and trichromes stains.  Due to the various shedding cycles of many parasites, three  separate stool specimens collected over a 5-7-day period are  recommended for ova and parasite examination. A single negative  result does not rule out the possibility of a parasitic infection.  The ova and parasite exam does not specifically detect  Cryptosporidium, Cyclospora, Cystoisospora, and microsporidia. For  additional test information refer to [facility] [employer] consult,  [email or web address]  Performed by [facility],  [address] [place] [id number] [phone]  [email or web address], Hernan Turner MD, Lab. Director         Narrative:       Droplet, Contact, and Eye [id number] FRANTZ BROUSSARD HERNÁN  Has the patient been out of the country recently?->No  Is the patient immuno-compromised?->No  Is there a concern for a parasitic infection other than  Giardia or Cryptospordium?->Yes    C Diff by PCR rflx Toxin [[id number]] Collected:  04/04/20 1801    Order Status:  Completed Specimen:  Stool Updated:  04/04/20 2010     C Diff by PCR Negative     Comment: C. difficile NOT detected by PCR.  Treatment not indicated per guidelines.  Repeat testing not indicated within 7 days.          027-NAP1-BI Presumptive Negative     Comment:  Presumptive 027/NAP1/BI target DNA sequences are NOT DETECTED.       Narrative:       Special Contact [id number] ANGELA CUENCA  Does this patient have risk factors for C-diff?->Yes  Has patient taken stool softeners or laxatives in the last 5  days?->No        DX-CERVICAL SPINE-2 OR 3 VIEWS  Narrative: 4/8/2020 11:49 AM    HISTORY/REASON FOR EXAM:  AP LATERAL, S/P LAMINECTOMY.  Follow-up to spinal surgery    TECHNIQUE/EXAM DESCRIPTION AND NUMBER OF VIEWS:  Cervical spine series, 2 views.    COMPARISON:  None.    FINDINGS:  The cervical vertebral bodies are normal in height and alignment.    There is posterior hardware extending from C3 to C6.    There is endplate spurring and facet arthropathy. The lung apices are clear.  Impression: Posterior hardware extending from C3 to C6 appears within normal limits  DX-CERVICAL SPINE-FLX-EXT ONLY  Narrative: 4/8/2020 11:48 AM    HISTORY/REASON FOR EXAM: Neck pain.    TECHNIQUE/EXAM DESCRIPTION AND NUMBER OF VIEWS:  Cervical spine series, 2 views.    COMPARISON: None.    FINDINGS:  There are surgical changes consistent with posterior pedicular screw and andrew fixation extending from C3 through C6. There is laminectomy change. There is multilevel degenerative disc disease.  There is soft tissue calcification dorsally.  Impression: Surgical change extending from C3 through C6.        Total time of the discharge process exceeds 35 minutes.

## 2020-04-10 NOTE — DISCHARGE PLANNING
SW sent blanket choice out for SNF to see if patient is able to D/C sooner to SNF. Sent choice to [facility] [phone]

## 2020-04-10 NOTE — PROGRESS NOTES
Hospital Medicine Daily Progress Note    Date of Service  4/10/2020    Chief Complaint  79 y.o. male admitted 3/28/2020 with altered mental status    Hospital Course    *79 y.o. male with hx of diabetes, hypertension, CKD type 4 and recent cervical laminectomy who presented 3/28/2020 with acute encephalopathy. He had been transferred to [facility] 16 days prior and was sent to the ER on 3/28 for altered mentation. At that visit, he had an extensive workup, and though he couldn't remember why he had been sent to the ER, he had an unremarkable CT head.  He was found to have a new pneumonia and was treated with antibiotics, though was stable to return [facility] to rehab.  He was discharged back to rehab with the plan to continue antibiotics.  He then was sent back later for altered mentation again.  He began gurling on secretions and not protecting his airway, so he was intubated and admitted to the ICU.  He was then a COVID rule out, as CXR revealed multifocal opacities and he was on the vent.  He was extubated on 3/31 and did fine, though he did remain somewhat confused as to why he was here.  He went in and out of afib, with 20 beats of vtach the night of 3/31, so he was started on metoprolol low dose. COVD came back negative, as well as influenza.  He did also recieve an LP in the ER and CSF was negative for infection, though showed high glucose and protein.  All cultures were negative.    During this hospitalization the patient developed episode of atrial fibrillation with no RVR, after long discussion with the patient and his sister they accept the risk of bleeding and they wanted to start with blood thinner restart with apixaban 2.5 mg twice daily due to chronic kidney disease.     Patient had c-collar removed during this hospitalization and was seen by neurosurgery, xrays obtained and wnl.          Interval Problem Update  - having intermittent pauses throughout hospitalization however did have longer pause  yesterday afternoon (2.9 sec), asymptomatic  - no chest pain or palpitations today  - [facility], hasn't seen them in awhile  - having some neck pain today, asking for tylenol  - still waiting for [facility] insurance auth for rehab    Consultants/Specialty  Intensivist    Code Status  DNAR/DNI    Disposition  Medically stable for placement, awaiting [facility] insurance auth    Review of Systems  Review of Systems   Constitutional: Positive for malaise/fatigue. Negative for chills, diaphoresis and fever.   HENT: Negative for nosebleeds.    Eyes: Negative.    Respiratory: Negative.    Cardiovascular: Negative for chest pain and palpitations.   Gastrointestinal: Negative for abdominal pain, blood in stool, constipation, diarrhea, nausea and vomiting.   Genitourinary: Negative.    Musculoskeletal: Positive for neck pain.   Neurological: Negative.    Psychiatric/Behavioral: Negative.         Physical Exam  Temp:  [36.5 °C (97.7 °F)-37 °C (98.6 °F)] 36.8 °C (98.3 °F)  Pulse:  [61-98] 87  Resp:  [16-18] 18  BP: (100-120)/(51-60) 100/54  SpO2:  [90 %-99 %] 96 %    Physical Exam  Vitals signs and nursing note reviewed.   Constitutional:       General: He is not in acute distress.     Appearance: He is normal weight. He is not ill-appearing or toxic-appearing.      Comments:     HENT:      Ears:      Comments: Hard of hearing  Cardiovascular:      Rate and Rhythm: Normal rate and regular rhythm.      Heart sounds: No murmur.   Pulmonary:      Effort: Pulmonary effort is normal.      Breath sounds: No wheezing or rales.      Comments: On RA  Decreased breath sounds at the bases  Abdominal:      General: Abdomen is flat. There is no distension.      Tenderness: There is no abdominal tenderness.   Skin:     Comments: Posterior neck incision, healing well, c/d/i   Neurological:      General: No focal deficit present.      Mental Status: He is alert and oriented to person, place, and time. Mental status is at baseline.      Cranial  Nerves: No cranial nerve deficit.      Motor: No weakness.      Comments: Generalized weakness          Fluids    Intake/Output Summary (Last 24 hours) at 4/10/2020 1046  Last data filed at 4/10/2020 0814  Gross per 24 hour   Intake 240 ml   Output 1250 ml   Net -1010 ml       Laboratory  Recent Labs     04/08/20  0358 04/09/20  0629   WBC 3.8* 4.4*   RBC 2.93* 2.96*   HEMOGLOBIN 9.1* 9.3*   HEMATOCRIT 27.9* 28.1*   MCV 95.2 94.9   MCH 31.1 31.4   MCHC 32.6* 33.1*   RDW 73.5* 71.7*   PLATELETCT 137* 136*   MPV 10.1 9.5     Recent Labs     04/08/20  0358 04/10/20  0350   SODIUM 137 135   POTASSIUM 4.0 4.4   CHLORIDE 102 101   CO2 24 23   GLUCOSE 90 80   BUN 53* 48*   CREATININE 2.09* 2.14*   CALCIUM 8.4* 8.5                   Imaging  DX-CERVICAL SPINE-FLX-EXT ONLY   Final Result      Surgical change extending from C3 through C6.      DX-CERVICAL SPINE-2 OR 3 VIEWS   Final Result      Posterior hardware extending from C3 to C6 appears within normal limits      DX-CHEST-PORTABLE (1 VIEW)   Final Result      No significant change from prior exam.      DX-CHEST-PORTABLE (1 VIEW)   Final Result      Stable chest. Unchanging left lower lobe atelectasis or pneumonia and bibasilar interstitial edema or pneumonia.      DX-CHEST-PORTABLE (1 VIEW)   Final Result      Persistent bilateral pulmonary consolidation with pleural effusions.      DX-CHEST-PORTABLE (1 VIEW)   Final Result         Central line is not seen. No appreciable pneumothorax.      DX-ABDOMEN FOR TUBE PLACEMENT   Final Result      Enteric tube tip projects over the stomach.      NG tube tip projects over the distal stomach.      EC-ECHOCARDIOGRAM COMPLETE W/O CONT   Final Result      DX-CHEST-PORTABLE (1 VIEW)   Final Result      Stable examination.      CT-CHEST (THORAX) W/O   Final Result      1.  Large bilateral pleural effusions with bibasilar atelectasis.      2.  Indeterminate right upper lobe pulmonary nodules measuring 5 and 9 mm in size.      3.   Endotracheal and nasogastric tubes present.      4.  Calcified granulomas.      Low Risk: CT at 3-6 months, then consider CT at 18-24 months      High Risk: CT at 3-6 months, then at 18-24 months      Comments: Use most suspicious nodule as guide to management. Follow-up intervals may vary according to size and risk.      Low Risk - Minimal or absent history of smoking and of other known risk factors.      High Risk - History of smoking or of other known risk factors.      Note: These recommendations do not apply to lung cancer screening, patients with immunosuppression, or patients with known primary cancer.      Fleischner Society 2017 Guidelines for Management of Incidentally Detected Pulmonary Nodules in Adults               CT-HEAD W/O   Final Result      1.  No evidence of acute intracranial process.      2.  Cerebral atrophy as well as periventricular chronic small vessel ischemic change.      DX-CHEST-PORTABLE (1 VIEW)   Final Result         Endotracheal tube with tip projecting over the mid to lower thoracic trachea.      Gastric drainage tube courses below diaphragm, tip is not seen.      DX-CHEST-PORTABLE (1 VIEW)   Final Result         No significant interval change. Grossly unchanged bibasilar ill-defined opacities           Assessment/Plan  A-fib (HCC)- (present on admission)  Assessment & Plan  Paroxysmal and no RVR  [id number] risk of stroke 3.2 %/year  HASBLED is 2 and the risk of bleeding is 4.1%  After long discussion with the patient and his sister, they agreed to start with apixaban.  Continue apixaban 2.5 mg twice daily due to chronic kidney disease.  Keep monitoring closely    Acute encephalopathy- (present on admission)  Assessment & Plan  Likely delirium from underlying medical issues; toxic metabolic from sepsis  CT head was negative  LP CSF negative  Continue nonpharmacological treatment for delirium  Avoid opioid and benzo  PT and OT with speech  Likely need rehab  Significant improving,  the patient is alert and oriented x3 and he is able to make a good conversation      Acute respiratory failure (HCC)- (present on admission)  Assessment & Plan  Intubated 3/28->3/31, to protect airway due to altered mental status   chest x-ray showed bilateral infiltration  CT had bilateral pleural effusions      Flu negative, COVID neg  Likely combination between pulmonary edema due to chronic kidney disease and possible pneumonia.      Echo did not show heart failure  I's and O's and weight daily and resume diuretics if needed  Finished antibiotics for 5 days  Improving and right now he is on room air         Central cord syndrome (HCC)- (present on admission)  Assessment & Plan  S/p C3-6 laminectomy and posterolateral fusion  for cervical stenosis/myelopathy  The patient received rehab therapy.  Continue monitoring and follow-up as outpatient with the neurosurgeon  Collar removal 4/8/20  Xrays 4/8 wnl  Seen by Dr. Sellers 4/9    V tach ([facility])- (present on admission)  Assessment & Plan  Unsustained, restarted on metoprolol 4/1  Now having longer sinus pauses (longest so far 2.9 sec), asymptomatic  Had Echo 3/28, normal EF  Will consult cardiology today    Thrombocytopenia ([facility])- (present on admission)  Assessment & Plan  Chronic around 110s  No signs of a bleeding  Continue monitoring    Anemia- (present on admission)  Assessment & Plan  With elevated MCV and low platelets  Stable  We will check B12 and folic acid   Iron panel around stable      Dysphagia- (present on admission)  Assessment & Plan  Due to cervical spine injury   SLP on board   Cortrack was removed since he is eating    DM (diabetes mellitus) ([facility])- (present on admission)  Assessment & Plan  Last A1c is 5.8 and his target for his age   Continue on lantus 10-->8 (4/9) units and ssi;      Pancytopenia ([facility])  Assessment & Plan  Improving  Looks like he's had a history of intermittent pancytopenia for awhile  Low WBC (no neutropenia)  Anemic and low  plt  CTM, may recommend outpatient heme/onc referral if persists      Pleural effusion, bilateral- (present on admission)  Assessment & Plan  S/p diuresis  On room air    Cervical stenosis of spine- (present on admission)  Assessment & Plan  S/p cervical laminectomy in February, has been at rehab  Can move all extremities; will need ongoing rehab    ACP (advance care planning)  Assessment & Plan  The patient is alert and oriented x4 and he makes a good conversation he is able to make decision, discussed with him the CODE STATUS and the patient is a clear that he wants to be DNR/DNI.  I reviewed his advanced directive which showed the patient preferred not to be resuscitated    Pulmonary nodules- (present on admission)  Assessment & Plan  F/u with pcp    Diarrhea- (present on admission)  Assessment & Plan  No white blood cell and stool and C. difficile is negative  We will start with Imodium as needed    Acute on chronic renal failure (HCC)- (present on admission)  Assessment & Plan  Improving   Around his baseline creatinine 1.9-2.3       VTE prophylaxis: Heparin

## 2020-04-10 NOTE — DISCHARGE INSTRUCTIONS
Discharge Instructions    Discharged to other by medical transportation with escort. Discharged via wheelchair, hospital escort: Yes.  Special equipment needed: C-Collar, walker    Be sure to schedule a follow-up appointment with your primary care doctor or any specialists as instructed.     Discharge Plan:   Diet Plan: Discussed  Activity Level: Discussed  Confirmed Follow up Appointment: Patient to Call and Schedule Appointment  Confirmed Symptoms Management: Discussed  Medication Reconciliation Updated: Yes  Influenza Vaccine Indication: Not indicated: Previously immunized this influenza season and > 8 years of age    I understand that a diet low in cholesterol, fat, and sodium is recommended for good health. Unless I have been given specific instructions below for another diet, I accept this instruction as my diet prescription.   Other diet: Mechanical soft necator thick dysphagia 3 consistent carb diabetic     Special Instructions: None    · Is patient discharged on Warfarin / Coumadin?   No     Depression / Suicide Risk    As [facility] are [facility], it is important to learn how to keep safe from harming yourself.    Recognize the warning signs:  · Abrupt changes in personality, positive or negative- including increase in energy   · Giving away possessions  · Change in eating patterns- significant weight changes-  positive or negative  · Change in sleeping patterns- unable to sleep or sleeping all the time   · Unwillingness or inability to communicate  · Depression  · Unusual sadness, discouragement and loneliness  · Talk of wanting to die  · Neglect of personal appearance   · Rebelliousness- reckless behavior  · Withdrawal from people/activities they love  · Confusion- inability to concentrate     If you or a loved one observes any of these behaviors or has concerns about self-harm, here's what you can do:  · Talk about it- your feelings and reasons for harming yourself  · Remove  any means that you might use to hurt yourself (examples: pills, rope, extension cords, firearm)  · Get professional help from the community (Mental Health, Substance Abuse, psychological counseling)  · Do not be alone:Call your Safe Contact- someone whom you trust who will be there for you.  · Call your local CRISIS HOTLINE 203-0461 or 110-670-6011  · Call your local Children's Mobile Crisis Response Team [place] (984) 697-6145 or wwwAdmittor  · Call the toll free National Suicide Prevention Hotlines   · National Suicide Prevention Lifeline 588-959-VEZR (9089)  · National Hope Line Network 800-SUICIDE (568-6415)      Atrial Fibrillation  Introduction  Atrial fibrillation is a type of heartbeat that is irregular or fast (rapid). If you have this condition, your heart keeps quivering in a weird (chaotic) way. This condition can make it so your heart cannot pump blood normally. Having this condition gives a person more risk for stroke, heart failure, and other heart problems. There are different types of atrial fibrillation. Talk with your doctor to learn about the type that you have.  Follow these instructions at home:  · Take over-the-counter and prescription medicines only as told by your doctor.  · If your doctor prescribed a blood-thinning medicine, take it exactly as told. Taking too much of it can cause bleeding. If you do not take enough of it, you will not have the protection that you need against stroke and other problems.  · Do not use any tobacco products. These include cigarettes, chewing tobacco, and e-cigarettes. If you need help quitting, ask your doctor.  · If you have apnea (obstructive sleep apnea), manage it as told by your doctor.  · Do not drink alcohol.  · Do not drink beverages that have caffeine. These include coffee, soda, and tea.  · Maintain a healthy weight. Do not use diet pills unless your doctor says they are safe for you. Diet pills may make heart problems  worse.  · Follow diet instructions as told by your doctor.  · Exercise regularly as told by your doctor.  · Keep all follow-up visits as told by your doctor. This is important.  Contact a doctor if:  · You notice a change in the speed, rhythm, or strength of your heartbeat.  · You are taking a blood-thinning medicine and you notice more bruising.  · You get tired more easily when you move or exercise.  Get help right away if:  · You have pain in your chest or your belly (abdomen).  · You have sweating or weakness.  · You feel sick to your stomach (nauseous).  · You notice blood in your throw up (vomit), poop (stool), or pee (urine).  · You are short of breath.  · You suddenly have swollen feet and ankles.  · You feel dizzy.  · Your suddenly get weak or numb in your face, arms, or legs, especially if it happens on one side of your body.  · You have trouble talking, trouble understanding, or both.  · Your face or your eyelid droops on one side.  These symptoms may be an emergency. Do not wait to see if the symptoms will go away. Get medical help right away. Call your local emergency services (911 in the [place] Do not drive yourself to the hospital.   This information is not intended to replace advice given to you by your health care provider. Make sure you discuss any questions you have with your health care provider.  Document Released: 09/26/2009 Document Revised: 05/25/2017 Document Reviewed: 04/13/2016  © 2017 [employer]

## 2020-04-10 NOTE — DISCHARGE PLANNING
[facility] excepted patient, has bed, pending transport. SW called sister, Ángela to give an update, she is agreeable and thinks it is a good idea to move to SNF while waiting on Rehab.   SW attempted to call patient in room, no answer. JUDI called RNJimmy to ask that he inform patient.   SW waiting on transport time.

## 2020-04-11 NOTE — PROGRESS NOTES
Pt. transferred to [facility] with a transporter. IV been pulled out, tele monitor off and monitor room notified.All pt. Belongings accounted for. Unable to call for report, attempted multiple times with voice mail left.

## 2020-04-13 NOTE — DISCHARGE SUMMARY
Rehab Discharge Summary    Admission Date: 3/12/2020    Discharge Date: 3/28/2020    Attending Provider: Dr Ramu Garner    Admission Diagnosis:   Active Hospital Problems    Diagnosis   • Anemia   • Chronic kidney disease   • Essential hypertension   • Hyperlipemia   • Altered mental status   • Volume overload   • Somnolence   • Cervical stenosis of spine   • Pneumonia   • Incomplete quadriplegia at C5-C8 level (HCC)   • Neuropathic pain   • Neurogenic bowel   • Neurogenic bladder   • Uncontrolled type 2 diabetes mellitus with hyperglycemia ([facility], [facility])       Discharge Diagnosis:  Active Hospital Problems    Diagnosis   • Anemia   • Chronic kidney disease   • Essential hypertension   • Hyperlipemia   • Altered mental status   • Volume overload   • Somnolence   • Cervical stenosis of spine   • Pneumonia   • Incomplete quadriplegia at C5-C8 level (HCC)   • Neuropathic pain   • Neurogenic bowel   • Neurogenic bladder   • Uncontrolled type 2 diabetes mellitus with hyperglycemia ([facility])       HPI per H&P:  The patient is a 79 y.o. right hand dominant male with a past medical history of diabetes, hypertension, hyperlipidemia, neuropathic pain, stage III chronic kidney disease, previous TBI and central cord syndrome and 2018  who was admitted to [facility] on 2/28/2020 with worsening bilateral upper extremity weakness, balance, neck pain. Most recent MRI showed severe stenosis at C3-C6 with interval progression over the last year.  He underwent an C3-C6 laminectomy and posterior decompression.     Patient was being followed by neurosurgery for continued right arm discoordination and pain.        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  At this time he was managed nonoperatively.  Right upper limb was still mildly weak and discoordinated at time of discharge from acute rehabilitation.  At time of  discharge from acute rehabilitation he was able to void normally, no incontinence, no significant post void residuals.  His bowel had significantly improved requiring only Colace and PRN MOM.      Chart review of patient's last PCP visit on 5/31/2019 reflects the following for diabetes management: 1.  Ozempic 1.0 once a week on Monday; 2.  Stay Actos/Pioglitazone 30mg once a day; 3.  Tresiba 10 units every night     Patient was evaluated by physiatry and Physical Therapy and Occupational Therapy and determined to be appropriate for acute inpatient rehab and was admitted to [facility] on 3/12     Patient has not had a bowel movement since admission.  Has history of difficulty with constipation.     He denies any difficulty with urinary retention or incontinence over the past year after discharge from acute rehabilitation.  During acute hospitalization post surgery patient was having difficulty with urinary retention.  Ultrasound of kidneys showed bilateral hydronephrosis.     He reports after surgery strength has been improving in upper extremities, balance is still significantly altered.  He prefers to use four-wheel walker versus front wheel walker.     Neuropathic pain in upper extremities is improving but still present with burning and tingling in bilateral upper extremities going from the neck all the way down to the hands.  Constant, positive radiation, no other associated symptoms.        Pre-mobidly, the patient lived in a two level assisted living home with self and attendant.  With this acute therapeutic intervention, this patient hopes to improve his functional status, and return to independent living with the supportive care of caregiver(s).    Patient was admitted to [facility] on 3/12/2020.     Hospital Course by Problem List:  C2 AIS D spinal cord injury: Central cord syndrome, C3-C6 cervical spondylosis with myelopathy, status post C3-C6 laminectomy by   Verito on 2/28.  Pinprick altered bilaterally at C3-C6  - Collar on at all times, spinal precautions  - Calcium carbonate 500 mg twice daily     Altered mental status: Likely from infection, differential diagnosis includes urinary tract infection/aspiration pneumonia.   -Given the abrupt change in mental status, worsening infection will send to emergency room for further evaluation and management.  -Appreciate hospitalist input    Neurologic respiratory impairment: Aggressive secretion management  -Consult respiratory therapy  - Oxygen as per guidelines  - DuoNeb 3X daily   -DC'd Mucomyst 3 times a day for mucolytic   - hypertonic saline, 3% for mucolytic with DuoNeb  -DC'd Medi-neb     Pneumonia: Bilateral lower lobe, likely bacterial, productive cough  - Zyvox and Zosyn started on 3/14, transitioned to ceftriaxone on 3/18, restarted on Zyvox and Zosyn on 3/19, completed on 3/23  -Appreciate hospitalist input  -Aggressive secretion management as above  - Given productive cough will place on droplet precaution until completion of antibiotics, of chest x-ray is improving will DC droplet precaution  -Remove droplet precautions, no more productive cough for the last 24 hours     Fluid overload: Pleural effusions bilaterally, chest x-ray from 3/24 shows stable pleural effusion and bilateral pneumonia/consolidation, 68.5->67.5 kg  -Lasix 20 mg daily  -Daily weights, strict I's and O's    C. Difficile diarrhea: PCR positive, toxin negative, colonization versus infection versus tube feed tolerance given and loose watery stool  - Vancomycin oral 125 mg every 6 hours  -Contact precautions, C. difficile, on droplet precautions for productive cough in the setting of pneumonia  -Cholestyramine 4 g twice daily     Neurogenic bladder: Wallis removed after treatment with Lasix  - voiding trial, if can't void in 6 hours or prn check pvr and if >500cc then ICP,if able to void then check PVR, if PVR is >200cc then ICP     Neurogenic  bowel: Improving bowel incontinence  -Discontinued bowel meds given and watery diarrhea  -Inconsistent C. difficile tests     Potential for orthostatic hypotension  Because of significant autonomic dysfunction associated with spinal cord injury, the patient is at high risk for orthostatic hypotension.  - Midodrine 5mg q4 hours prn SBP <100  -DC scheduled midodrine 5 mg tid     Dysphagia: Previous diagnosis after traumatic brain injury, high risk after cervical spinal cord injury  -Consulted speech therapy, increased difficulty secondary to lethargy, difficulty following compensatory techniques  -Patient was placed on n.p.o. NG tube placed on 3/20, NG tube removed on 3/25  -Undergoing trial trays with speech therapy     Pain:  #Neuropathic pain:  bilateral lower extremity pins-and-needles  - DC gabapentin 300 mg daily,  dose limited by GFR, concern that increased dose of gabapentin was resulting in lethargy  - Lyrica 75 mg twice daily to address allodynia, currently being held  -vitamin C 500 mg every 12 hours, which is been shown to improve gabapentin absorption and pain management     Postoperative pain:  - Oxycodone 5-10 mg every 3 hours as needed pain  - DC Robaxin 500 mg every 8 hours as needed pain  -DC scheduled Tylenol secondary to pneumonia, can mask fever     Spasticity: Good results with low-dose baclofen, not waking him up at night, not impairing function, not causing him pain  - Baclofen 5 mg 3 times daily     Anemia: Required IV iron supplementation, hemoglobin of 7.7 on admission  -Epogen 2000 units, Monday Wednesday Friday  -Folic acid 5 mg daily  - Ferrous sulfate 325 mg twice daily     Diabetes: Appreciate hospitalist input  -Lantus 10 units nightly  -Check fingersticks q. before meals, nightly  -Sliding scale insulin  - Pioglitazone 30 mg a daily  - ADA diet  -Consulted hospitalist      Acute on chronic kidney injury: Previously stage IV kidney disease with bilateral hydronephrosis suggestive of  post renal obstruction, likely worsened by neurogenic bladder.    -Manage neurogenic bladder as above     Hyponatremia: Sodium of 136 on admission  -Sodium bicarb tablets 650 mg 3 times a day     Hyperlipidemia: Triglyceride 65, HDL 31, LDL 37  -Simvastatin 20 mg nightly     Vitamin deficiency  In the posttraumatic setting, there is a high likelihood of vitamin D deficiency.   Vitamin D level on admission of 47, goal of >30  - DC vitamin D supplementation     DVT prophylaxis/right lower extremity swelling: Patient at increased risk for VTE formation with neurologic compromise/myelopathy.  -Ultrasound right lower extremity was negative for DVT  -Heparin 5000 units every 8 hours, unable to transition to Lovenox given current renal function      Functional Status at Discharge  Eatin - Total Assistance  Eating Description:  Increased time, Modified diet, Tube feed bolus, Supervision for safety, Verbal cueing, Set-up of equipment or meal/tube feeding, Staff administers tube feed/parenteral nutrition/IVF  Groomin - Standby Prompting/Supervision or Set-up  Grooming Description:  (Set-up for washing hands, brushing teeth and shaving while seated in w/c)  Bathing:  3 - Moderate Assistance  Bathing Description:  Grab bar, Tub bench, Long handled bath tool, Hand held shower, Increased time, Supervision for safety, Set-up of equipment(Patient demo'd ability to wash chest, abdomen, arms, BUEs and BLEs with min to CGA while seated on fold-down shower bench . Required max-total assist for washing/drying buttocks and for thoroughness with drying BLEs. )  Upper Body Dressing:  3 - Moderate Assistance  Upper Body Dressing Description:  (Mod assist for donning [facility] gown for gown over shoulders and tying gown . Min assist for doffing t-shirt. )  Lower Body Dressing:  3 - Moderate Assistance  Lower Body Dressing Description:  3 - Moderate Assistance     Walk:  5 - Standby Prompting/Supervision or Set-up  Distance Walked:   Walks a minimum of 150 feet  Walk Description:  (300ft x 2 FWW SPV)  Wheelchair:  5 - Standby Prompting/Supervision or Set-up  Distance Propelled:  Propels a minimum of 150 feet   Wheelchair Description:  Safety concerns, Supervision for safety, Verbal cueing, Extra time, Adaptive equipment(150 ft, BLEs and BUEs)  Stairs 0 - Not tested,unsafe activity  Stairs Description      Comprehension Mode:  Auditory  Comprehension:  5 - Stand-by Prompting/Supervision or Set-up  Comprehension Description:  Glasses([place], does not own hearing aids)  Expression Mode:  Vocal  Expression:  6 - Modified Independent  Expression Description:  Verbal cueing  Social Interaction:  6 - Modified Independent  Social Interaction Description:  Increased time, Verbal cues  Problem Solving:  3 - Moderate Assistance  Problem Solving Description:  Verbal cueing, Therapy schedule, Bed/chair alarm, Increased time, Seat belt  Memory:  3 - Moderate Assistance  Memory Description:  Therapy schedule, Bed/chair alarm, Seat belt, Verbal cueing  Progress since Admit: Patient's last diet was level 6, NTL  Discharge Location : [facility]  Criteria for Termination of Services: Patient Transferred to Acute [facility] for Medical Purposes    Ramu GILES D.O., personally performed a complete drug regimen review and no potential clinically significant medication issues were identified.   Discharge Medication:     Medication List      ASK your doctor about these medications      Instructions   calcitRIOL 0.25 MCG Caps  Commonly known as:  ROCALTROL   Take 0.25 mcg by mouth every day.  Dose:  0.25 mcg     ferrous sulfate 325 (65 Fe) MG tablet   Take 1 Tab by mouth 2 times a day, with meals.  Dose:  325 mg     HYDROcodone-acetaminophen 5-325 MG Tabs per tablet  Commonly known as:  NORCO  Ask about: Should I take this medication?   Take 1-2 Tabs by mouth every 6 hours as needed for up to 10 days.  Dose:  1-2 Tab     insulin glargine 100 UNIT/ML  Soln  Commonly known as:  LANTUS   Inject 6 Units as instructed every evening.  Dose:  6 Units     metoprolol 25 MG Tabs  Commonly known as:  LOPRESSOR   Take 12.5 mg by mouth 2 times a day.  Dose:  12.5 mg     NOVOLOG (insulin aspart) 100 UNIT/ML injection PEN  Commonly known as:  NOVOLOG   Inject 5 Units as instructed every bedtime.  Dose:  5 Units     pioglitazone 30 MG Tabs  Commonly known as:  ACTOS   Take 30 mg by mouth every day.  Dose:  30 mg     simvastatin 20 MG Tabs  Commonly known as:  ZOCOR   Take 20 mg by mouth every evening.  Dose:  20 mg     sodium bicarbonate 650 MG Tabs  Commonly known as:  SODIUM BICARBONATE   Take 650 mg by mouth 3 times a day.  Dose:  650 mg            Discharge Diet:  NPO because of altered mental status    Discharge Activity:  As per primary team    Disposition:  Discharge to emergency room for further work-up and evaluation with severe altered mental status    Equipment:  None    Follow-up & Discharge Instructions:  Follow up with your primary care provider (PCP) within 7-10 days of discharge to review your medications and take over your care.     If you develop chest pain, fever, chills, change in neurologic function (weakness, sensation changes, vision changes), or other concerning sxs, seek immediate medical attention or call 911.      Condition on Discharge:  Good    More than 35 minutes was spent on discharging this patient, including face-to-face time, prescription management, and the dictation of this note.    Ramu Garner D.O.

## 2020-04-16 NOTE — DOCUMENTATION QUERY
[facility]                                                                       Query Response Note      PATIENT:               PABLO STEVENS  ACCT #:                  [id number]  MRN:                     [id number]  :                      1940  ADMIT DATE:       3/28/2020 5:44 PM  DISCH DATE:        4/10/2020 6:45 PM  RESPONDING  PROVIDER #:        [id number]           QUERY TEXT:    There is conflicting documentation in the medical record.      _Toxic metabolic encephalopathy due to Sepsis_ is documented starting on PN Francisco Javier 3/30 and consult note 20_.   However, Sepsis is not mentioned on the DC Summary or H&P.      Based on treatment, clinical findings and risk factors, can this documentation be further clarified?    The patient's Clinical Indicators include:  Acute toxic encephalopathy  Acute respiratory failure requiring intubation and mechanical ventilation  Pneumonia  Pulmonary edema due to  chronic kidney disease  Options provided:   -- Sepsis ruled in, present on admission   -- Sepsis ruled in, developed after admission   -- Sepsis ruled out   -- Unable to determine      Query created by: Sawallisch, Beth on 2020 2:39 PM    RESPONSE TEXT:    Unable to determine          Electronically signed by:  MIKI BADILLO MD 2020 10:44 AM

## 2021-03-30 NOTE — REHAB-CM IDT TEAM NOTE
Case Management    DC Planning  DC destination/dispostion:  Patient lives alone in a Senior apartment complex.  He has safety bars in his bathroom and shower.     Referrals: Will update Meals on Wheels.     DC Needs: He may need home health initially.  He has a fww, [facility] safety bars and shower seat.  He has been doing his own insulin and fingersticks.  We may need to schedule family training closer to d/c.  He will need follow up with [facility] and his neurosurgeon.     Barriers to discharge:  Lives alone     Strengths: sister and her children are very supportive.     Section completed by:  Mckayla Mason R.N.   Rhofade Pregnancy And Lactation Text: This medication has not been assigned a Pregnancy Risk Category. It is unknown if the medication is excreted in breast milk.

## 2021-05-14 NOTE — RESPIRATORY CARE
Extubation    Cuff leak noted YES  Stridor present NO     FiO2%: 28 % (03/31/20 1020)  O2 (LPM): 2 (03/31/20 1020)     Patient toleration - tolerated well with no complications    Events/Summary/Plan: Pt was extubated and placed on 02 at 2lpm.  Oral care done (03/31/20 1020)         Consent 3/Introductory Paragraph: I gave the patient a chance to ask questions they had about the procedure.  Following this I explained the Mohs procedure and consent was obtained. The risks, benefits and alternatives to therapy were discussed in detail. Specifically, the risks of infection, scarring, bleeding, prolonged wound healing, incomplete removal, allergy to anesthesia, nerve injury and recurrence were addressed. Prior to the procedure, the treatment site was clearly identified and confirmed by the patient. All components of Universal Protocol/PAUSE Rule completed.

## 2021-08-02 NOTE — PROGRESS NOTES
Bedside report received from ABDULAZIZ Bingham. Assumed care of pt. Pt awake, laying in bed. A/Ox4, VSS. No concerns, complaints or distress. Pt educated to call before getting out of bed. POC reviewed and white board updated. Tele box on. Afib 80 on the monitor. Call light in reach. Bed locked in lowest position with 2 upper bed rails up. Bed alarm on.     Postop Diagnosis: same

## 2022-12-02 NOTE — FLOWSHEET NOTE
03/17/20 0915   Incentive Spirometry Treatment   Incentive Spirometer Volume 1000 mL      Per physician orders, patient was given home sleep testing device and instructed on how to apply the device before going to bed tonight.  I sized the device and showed the patient using a mirror how the device fits and what it should look like so they can use a mirror when putting it on themselves at home.  We reviewed the instruction booklet and the number to call if they have any questions at night.  Patient understood and was instructed to return the device the next day back to the sleep lab.

## 2023-08-18 NOTE — CARE PLAN
Medication was sent.    Problem: Skin Integrity  Goal: Risk for impaired skin integrity will decrease    Intervention: Assess risk factors for impaired skin integrity and/or pressure ulcers  Assessed. Appropriate preventative measures in place.

## 2024-04-24 NOTE — ASSESSMENT & PLAN NOTE
Group Topic: BH Coping Skills Education    Date: 4/24/2024  Start Time: 10:00 AM  End Time: 10:50 AM  Facilitators: Olimpia Cano LCSW    Focus:  Self-Compassion (Adaptive Behaviors Skills)  Number in attendance: 9    The [employer] video \"The power of Vulnerability\" by Curtis Nash was introduced and discussion was led on the impact of being vulnerable.    Olimpia Cano LCSW    Method: Group  Attendance: Present  Participation: Active  Patient Response: Appropriate feedback and Attentive    Patient followed along with 5 [employer] video and participated in group discussion.        Weakness BUE and burning in RUE  Possible increased density within the cervical and proximal thoracic cervical canal which may be artifactual or related to hemorrhage within the CSF space.  MRI: Suspicion for focal myelopathic cord signal abnormality at C4 in the right paramedian cord possible small cord contusion  Spinal cord perfusion with goal MAP > 85 for 5 days per the Neurosurgeon. Completed 12/8   Robert Simon MD. Neurosurgery.

## 2024-08-22 NOTE — ED NOTES
Patient presents to ED for tailbone pain after reportedly falling down 1-2 stairs this AM  Patient denies hitting head, no LOC  Patient a/o x 4 with NAD    Pt to CT

## 2024-09-05 NOTE — FLOWSHEET NOTE
03/28/20 0900   Events/Summary/Plan   Events/Summary/Plan 02 check  (Assess pt post emesis. Reported lethargy. )   Vital Signs   Pulse 80   Respiration 12   Pulse Oximetry 95 %   $ Pulse Oximetry (Spot Check) Yes   Respiratory Assessment   Level of Consciousness Lethargic   Breath Sounds   RUL Breath Sounds Clear   RML Breath Sounds Diminished   RLL Breath Sounds Diminished   CIRA Breath Sounds Clear   LLL Breath Sounds Diminished   Secretions   Cough   (no cough on request)   Mobility   Activity Performed Back to bed   Ambulation Tolerance General Weakness   Staff Present for Mobilization CNA;RN   Mobilization Comments max 2   Oxygen   O2 (LPM) 0      05-Sep-2024 13:20

## 2025-06-13 NOTE — ED NOTES
Orders:    DULoxetine (CYMBALTA) 30 mg delayed release capsule; Take 1 capsule (30 mg total) by mouth daily     Traction at bedside for collar placement, Tech at bedside for wound care and clean up

## 2025-07-09 NOTE — ANESTHESIA POSTPROCEDURE EVALUATION
Patient: Vince Almanzar    Procedure Summary     Date:  02/28/20 Room / Location:  [facility] OR 04 / SURGERY [facility]    Anesthesia Start:  0735 Anesthesia Stop:  1029    Procedure:  DECOMPRESSION, SPINE, CERVICAL, POSTERIOR APPROACH- C3-6 DISCECTOMY AND FUSION (Neck) Diagnosis:  (SPINAL STENOSIS, CERVICAL REGION)    Surgeon:  Lg Sellers D.O. Responsible Provider:  Georgi Fitch M.D.    Anesthesia Type:  general ASA Status:  3          Final Anesthesia Type: general  Last vitals  BP   Blood Pressure : 139/56    Temp   36.3 °C (97.4 °F)    Pulse   Pulse: (!) 102   Resp   17    SpO2   97 %      Anesthesia Post Evaluation    Patient location during evaluation: PACU  Patient participation: complete - patient participated  Level of consciousness: awake  Pain score: 3    Airway patency: patent  Anesthetic complications: no  Cardiovascular status: adequate  Respiratory status: acceptable  Hydration status: acceptable    PONV: none           Nurse Pain Score: 2 (NPRS)         Visit Information    Have you changed or started any medications since your last visit including any over-the-counter medicines, vitamins, or herbal medicines? no   Have you stopped taking any of your medications? Is so, why? no  Are you having any side effects from any of your medications? - no    Have you seen any other physician or provider since your last visit?  no   Have you had any other diagnostic tests since your last visit?  no   Have you been seen in the emergency room and/or had an admission in a hospital since we last saw you?  no   Have you had your routine dental cleaning in the past 6 months?  no     Do you have an active MyChart account? If no, what is the barrier?  Yes    Patient Care Team:  Aniket Chiang MD as PCP - General (Family Medicine)    Medical History Review  Past Medical, Family, and Social History reviewed and does contribute to the patient presenting condition    Health Maintenance   Topic Date Due    Depression Screen  Never done    Varicella vaccine (1 of 2 - 13+ 2-dose series) Never done    HIV screen  Never done    Hepatitis C screen  Never done    Pneumococcal 0-49 years Vaccine (1 of 2 - PCV) Never done    Hepatitis B vaccine (2 of 3 - 19+ 3-dose series) 11/27/2019    Lipids  Never done    COVID-19 Vaccine (1 - 2024-25 season) Never done    Colorectal Cancer Screen  Never done    Flu vaccine (1) 08/01/2025    DTaP/Tdap/Td vaccine (2 - Td or Tdap) 11/02/2027    Hepatitis A vaccine  Aged Out    Hib vaccine  Aged Out    HPV vaccine  Aged Out    Polio vaccine  Aged Out    Meningococcal (ACWY) vaccine  Aged Out    Meningococcal B vaccine  Aged Out                     Spouse name: Not on file    Number of children: Not on file    Years of education: Not on file    Highest education level: Not on file   Occupational History    Not on file   Tobacco Use    Smoking status: Former     Current packs/day: 0.00     Average packs/day: 1 pack/day for 21.0 years (21.0 ttl pk-yrs)     Types: Cigarettes     Start date:      Quit date: 2019     Years since quittin.5    Smokeless tobacco: Never   Vaping Use    Vaping status: Never Used   Substance and Sexual Activity    Alcohol use: Yes     Comment: occ    Drug use: Never    Sexual activity: Not on file   Other Topics Concern    Not on file   Social History Narrative    Not on file     Social Drivers of Health     Financial Resource Strain: Not on file   Food Insecurity: Patient Declined (2025)    Hunger Vital Sign     Worried About Running Out of Food in the Last Year: Patient declined     Ran Out of Food in the Last Year: Patient declined   Transportation Needs: Patient Declined (2025)    PRAPARE - Transportation     Lack of Transportation (Medical): Patient declined     Lack of Transportation (Non-Medical): Patient declined   Physical Activity: Unknown (2025)    Exercise Vital Sign     Days of Exercise per Week: 5 days     Minutes of Exercise per Session: Patient declined   Stress: Not on file   Social Connections: Not on file   Intimate Partner Violence: Not on file   Housing Stability: Patient Declined (2025)    Housing Stability Vital Sign     Unable to Pay for Housing in the Last Year: Patient declined     Number of Times Moved in the Last Year: 0     Homeless in the Last Year: Patient declined     Counseling given: Not Answered        History reviewed. No pertinent family history.          -rest of complaints with corresponding details per ROS    The patient's past medical, surgical, social, and family history as well as his current medications and allergies were reviewed as documented intoday's

## (undated) DEVICE — SUTURE 2-0 VICRYL PLUS CT-1 - 8 X 18 INCH(12/BX)

## (undated) DEVICE — NEEDLE SPINAL NON-SAFETY 20 GA X 3 IN (25/BX)

## (undated) DEVICE — GLOVE BIOGEL SZ 8 SURGICAL PF LTX - (50PR/BX 4BX/CA)

## (undated) DEVICE — SUTURE GENERAL

## (undated) DEVICE — ELECTRODE DUAL RETURN W/ CORD - (50/PK)

## (undated) DEVICE — SUCTION INSTRUMENT YANKAUER BULBOUS TIP W/O VENT (50EA/CA)

## (undated) DEVICE — GLOVE SZ 7.5 BIOGEL PI MICRO - PF LF (50PR/BX)

## (undated) DEVICE — CANISTER SUCTION 3000ML MECHANICAL FILTER AUTO SHUTOFF MEDI-VAC NONSTERILE LF DISP  (40EA/CA)

## (undated) DEVICE — GLOVE BIOGEL PI INDICATOR SZ 8.5 SURGICAL PF LF - (50PR/BX 4BX/CA)

## (undated) DEVICE — SUTURE 0 VICRYL PLUS CT-1 - 8 X 18 INCH (12/BX)

## (undated) DEVICE — SHEET PEDIATRIC LAPAROTOMY - (10/CA)

## (undated) DEVICE — SET EXTENSION WITH 2 PORTS (48EA/CA) ***PART #2C8610 IS A SUBSTITUTE*****

## (undated) DEVICE — MIDAS LUBRICATOR DIFFUSER PACK (4EA/CA)

## (undated) DEVICE — NEEDLE SPINAL NON-SAFETY 18 GA X 3 IN (25EA/BX)

## (undated) DEVICE — GOWN WARMING STANDARD FLEX - (30/CA)

## (undated) DEVICE — GLOVE BIOGEL PI INDICATOR SZ 6.5 SURGICAL PF LF - (50/BX 4BX/CA)

## (undated) DEVICE — PROTECTOR ULNA NERVE - (36PR/CA)

## (undated) DEVICE — KIT SURGIFLO W/OUT THROMBIN - (6EA/CA)

## (undated) DEVICE — DRAPE LARGE 3 QUARTER - (20/CA)

## (undated) DEVICE — TUBING CLEARLINK DUO-VENT - C-FLO (48EA/CA)

## (undated) DEVICE — TOOL DISSECT MATCH HEAD

## (undated) DEVICE — SYSTEM PEEL & PLACE 13CM INCISIONS

## (undated) DEVICE — SURGIFOAM (SIZE 100) - (6EA/CA)

## (undated) DEVICE — KIT ANESTHESIA W/CIRCUIT & 3/LT BAG W/FILTER (20EA/CA)

## (undated) DEVICE — KIT ROOM DECONTAMINATION

## (undated) DEVICE — TRAY CATHETER FOLEY URINE METER W/STATLOCK 350ML (10EA/CA)

## (undated) DEVICE — SLEEVE, VASO, THIGH, MED

## (undated) DEVICE — MASK ANESTHESIA ADULT  - (100/CA)

## (undated) DEVICE — TUBING C&T SET FLYING LEADS DRAIN TUBING (10EA/BX)

## (undated) DEVICE — SUTURE 4-0 MONOCRYL PLUS PS-2 - 27 INCH (36/BX)

## (undated) DEVICE — BLADE CLIPPER FITS 2501 CLIPPER (BLUE)  (20EA/CA)

## (undated) DEVICE — TUBE E-T HI-LO CUFF 7.0MM (10EA/PK)

## (undated) DEVICE — SENSOR SPO2 NEO LNCS ADHESIVE (20/BX) SEE USER NOTES

## (undated) DEVICE — ELECTRODE 850 FOAM ADHESIVE - HYDROGEL RADIOTRNSPRNT (50/PK)

## (undated) DEVICE — CHLORAPREP 26 ML APPLICATOR - ORANGE TINT(25/CA)

## (undated) DEVICE — SET LEADWIRE 5 LEAD BEDSIDE DISPOSABLE ECG (1SET OF 5/EA)

## (undated) DEVICE — CLOSURE SKIN STRIP 1/2 X 4 IN - (STERI STRIP) (50/BX 4BX/CA)

## (undated) DEVICE — WATER IRRIG. STER. 1500 ML - (9/CA)

## (undated) DEVICE — NEPTUNE 4 PORT MANIFOLD - (20/PK)

## (undated) DEVICE — BOVIE BLADE COATED &INSULATED - 25/PK

## (undated) DEVICE — GLOVE BIOGEL PI INDICATOR SZ 8.0 SURGICAL PF LF -(50/BX 4BX/CA)

## (undated) DEVICE — SPONGE GAUZESTER 4 X 4 4PLY - (128PK/CA)

## (undated) DEVICE — LACTATED RINGERS INJ. 500 ML - (24EA/CA)

## (undated) DEVICE — LACTATED RINGERS INJ 1000 ML - (14EA/CA 60CA/PF)

## (undated) DEVICE — HEMOSTAT SURG ABSORBABLE - 2 X 3 IN SURGICEL (24EA/CA)

## (undated) DEVICE — STERI STRIP COMPOUND BENZOIN - TINCTURE 0.6ML WITH APPLICATOR (40EA/BX)

## (undated) DEVICE — INTRAOP NEURO IN OR 1:1 PER 15 MIN

## (undated) DEVICE — SODIUM CHL IRRIGATION 0.9% 1000ML (12EA/CA)

## (undated) DEVICE — PATTIES SURG X-RAYCOTTONOID - 1/2 X 3 IN (200/CA)

## (undated) DEVICE — KIT EVACUATER 3 SPRING PVC LF 1/8 DRAIN SIZE (10EA/CA)"

## (undated) DEVICE — CATHETER IV 20 GA X 1-1/4 ---SURG.& SDS ONLY--- (50EA/BX)

## (undated) DEVICE — TOWELS CLOTH SURGICAL - (4/PK 20PK/CA)

## (undated) DEVICE — PACK NEURO - (2EA/CA)

## (undated) DEVICE — BIT DRILL MATCH HEAD MIDAS REX 15-A 2.2MM X 15CM

## (undated) DEVICE — GOWN SURGEONS X-LARGE - DISP. (30/CA)